# Patient Record
Sex: MALE | Race: BLACK OR AFRICAN AMERICAN | Employment: OTHER | URBAN - NONMETROPOLITAN AREA
[De-identification: names, ages, dates, MRNs, and addresses within clinical notes are randomized per-mention and may not be internally consistent; named-entity substitution may affect disease eponyms.]

---

## 2021-03-23 ENCOUNTER — HOSPITAL ENCOUNTER (OUTPATIENT)
Dept: LAB | Age: 71
Discharge: HOME | End: 2021-03-23

## 2021-03-23 PROCEDURE — 99001 SPECIMEN HANDLING PT-LAB: CPT

## 2021-04-04 ENCOUNTER — HOSPITAL ENCOUNTER (INPATIENT)
Age: 71
LOS: 39 days | Discharge: NURSING, REHAB OR LONG-TERM CARE | DRG: 004 | End: 2021-05-13
Attending: INTERNAL MEDICINE | Admitting: INTERNAL MEDICINE
Payer: MEDICARE

## 2021-04-04 ENCOUNTER — ANESTHESIA (OUTPATIENT)
Dept: SURGERY | Age: 71
DRG: 004 | End: 2021-04-04
Payer: MEDICARE

## 2021-04-04 ENCOUNTER — ANESTHESIA (OUTPATIENT)
Dept: ICU | Age: 71
DRG: 004 | End: 2021-04-04
Payer: MEDICARE

## 2021-04-04 ENCOUNTER — ANESTHESIA EVENT (OUTPATIENT)
Dept: ICU | Age: 71
DRG: 004 | End: 2021-04-04
Payer: MEDICARE

## 2021-04-04 ENCOUNTER — APPOINTMENT (OUTPATIENT)
Dept: GENERAL RADIOLOGY | Age: 71
End: 2021-04-04
Attending: FAMILY MEDICINE
Payer: MEDICARE

## 2021-04-04 ENCOUNTER — APPOINTMENT (OUTPATIENT)
Dept: GENERAL RADIOLOGY | Age: 71
DRG: 004 | End: 2021-04-04
Attending: PHYSICIAN ASSISTANT
Payer: MEDICARE

## 2021-04-04 ENCOUNTER — ANESTHESIA EVENT (OUTPATIENT)
Dept: SURGERY | Age: 71
DRG: 004 | End: 2021-04-04
Payer: MEDICARE

## 2021-04-04 ENCOUNTER — HOSPITAL ENCOUNTER (EMERGENCY)
Age: 71
Discharge: OTHER INSTITUTION | End: 2021-04-04
Attending: FAMILY MEDICINE
Payer: MEDICARE

## 2021-04-04 VITALS
HEIGHT: 72 IN | OXYGEN SATURATION: 100 % | SYSTOLIC BLOOD PRESSURE: 126 MMHG | WEIGHT: 280 LBS | HEART RATE: 78 BPM | DIASTOLIC BLOOD PRESSURE: 84 MMHG | RESPIRATION RATE: 16 BRPM | TEMPERATURE: 98.2 F | BODY MASS INDEX: 37.93 KG/M2

## 2021-04-04 DIAGNOSIS — J93.11 PRIMARY SPONTANEOUS PNEUMOTHORAX: ICD-10-CM

## 2021-04-04 DIAGNOSIS — J96.00 ACUTE RESPIRATORY FAILURE, UNSPECIFIED WHETHER WITH HYPOXIA OR HYPERCAPNIA (HCC): ICD-10-CM

## 2021-04-04 DIAGNOSIS — I46.9 CARDIAC ARREST (HCC): ICD-10-CM

## 2021-04-04 DIAGNOSIS — I26.94 MULTIPLE SUBSEGMENTAL PULMONARY EMBOLI WITHOUT ACUTE COR PULMONALE (HCC): ICD-10-CM

## 2021-04-04 DIAGNOSIS — T46.4X5A ANGIOEDEMA DUE TO ANGIOTENSIN CONVERTING ENZYME INHIBITOR (ACE-I): ICD-10-CM

## 2021-04-04 DIAGNOSIS — T78.3XXA ANGIOEDEMA DUE TO ANGIOTENSIN CONVERTING ENZYME INHIBITOR (ACE-I): ICD-10-CM

## 2021-04-04 DIAGNOSIS — J98.8 ACUTE AIRWAY OBSTRUCTION: ICD-10-CM

## 2021-04-04 DIAGNOSIS — E11.319 CONTROLLED TYPE 2 DIABETES MELLITUS WITH RETINOPATHY, MACULAR EDEMA PRESENCE UNSPECIFIED, UNSPECIFIED LATERALITY, UNSPECIFIED RETINOPATHY SEVERITY, UNSPECIFIED WHETHER LONG TERM INSULIN USE (HCC): ICD-10-CM

## 2021-04-04 DIAGNOSIS — E11.42 DIABETIC POLYNEUROPATHY ASSOCIATED WITH TYPE 2 DIABETES MELLITUS (HCC): ICD-10-CM

## 2021-04-04 DIAGNOSIS — Z51.5 ENCOUNTER FOR PALLIATIVE CARE: ICD-10-CM

## 2021-04-04 DIAGNOSIS — E11.40 CONTROLLED TYPE 2 DIABETES MELLITUS WITH DIABETIC NEUROPATHY, WITHOUT LONG-TERM CURRENT USE OF INSULIN (HCC): ICD-10-CM

## 2021-04-04 DIAGNOSIS — J96.01 ACUTE RESPIRATORY FAILURE WITH HYPOXIA (HCC): ICD-10-CM

## 2021-04-04 DIAGNOSIS — Z93.0 TRACHEOSTOMY IN PLACE (HCC): ICD-10-CM

## 2021-04-04 DIAGNOSIS — Z71.89 GOALS OF CARE, COUNSELING/DISCUSSION: ICD-10-CM

## 2021-04-04 DIAGNOSIS — I82.409 DEEP VEIN THROMBOSIS (DVT) OF LOWER EXTREMITY, UNSPECIFIED CHRONICITY, UNSPECIFIED LATERALITY, UNSPECIFIED VEIN (HCC): ICD-10-CM

## 2021-04-04 DIAGNOSIS — I26.09 ACUTE PULMONARY EMBOLISM WITH ACUTE COR PULMONALE, UNSPECIFIED PULMONARY EMBOLISM TYPE (HCC): ICD-10-CM

## 2021-04-04 DIAGNOSIS — T78.3XXA ANGIOEDEMA, INITIAL ENCOUNTER: Primary | ICD-10-CM

## 2021-04-04 DIAGNOSIS — R91.8 PULMONARY INFILTRATES: ICD-10-CM

## 2021-04-04 DIAGNOSIS — E66.9 OBESITY (BMI 30-39.9): ICD-10-CM

## 2021-04-04 DIAGNOSIS — N17.9 AKI (ACUTE KIDNEY INJURY) (HCC): ICD-10-CM

## 2021-04-04 LAB
ALBUMIN SERPL-MCNC: 3.4 G/DL (ref 3.4–5)
ALBUMIN/GLOB SERPL: 0.7 {RATIO} (ref 0.8–1.7)
ALP SERPL-CCNC: 130 U/L (ref 45–117)
ALT SERPL-CCNC: 141 U/L (ref 16–61)
ANION GAP SERPL CALC-SCNC: 11 MMOL/L
ANION GAP SERPL CALC-SCNC: 7 MMOL/L (ref 3–18)
ARTERIAL PATENCY WRIST A: ABNORMAL
ARTERIAL PATENCY WRIST A: ABNORMAL
ARTERIAL PATENCY WRIST A: YES
ARTERIAL PATENCY WRIST A: YES
AST SERPL-CCNC: 160 U/L (ref 10–38)
B PERT DNA SPEC QL NAA+PROBE: NOT DETECTED
BASE DEFICIT BLD-SCNC: 7 MMOL/L
BASE DEFICIT BLDA-SCNC: 4.8 MMOL/L (ref 0–2.5)
BASE DEFICIT BLDA-SCNC: 4.9 MMOL/L (ref 0–2.5)
BASE EXCESS BLD CALC-SCNC: 1 MMOL/L
BASOPHILS # BLD: 0.1 K/UL (ref 0–0.1)
BASOPHILS NFR BLD: 1 % (ref 0–2)
BDY SITE: ABNORMAL
BILIRUB SERPL-MCNC: 0.5 MG/DL (ref 0.2–1)
BODY TEMPERATURE: 36.9
BORDETELLA PARAPERTUSSIS PCR, BORPAR: NOT DETECTED
BUN SERPL-MCNC: 24 MG/DL (ref 7–18)
BUN SERPL-MCNC: 27 MG/DL (ref 9–21)
BUN/CREAT SERPL: 13 (ref 12–20)
BUN/CREAT SERPL: 14
C PNEUM DNA SPEC QL NAA+PROBE: NOT DETECTED
CA-I BLD-MCNC: 8.4 MG/DL (ref 8.5–10.5)
CA-I SERPL-SCNC: 1.12 MMOL/L (ref 1.12–1.32)
CALCIUM SERPL-MCNC: 8 MG/DL (ref 8.5–10.1)
CHLORIDE SERPL-SCNC: 103 MMOL/L (ref 94–111)
CHLORIDE SERPL-SCNC: 108 MMOL/L (ref 100–111)
CO2 SERPL-SCNC: 22 MMOL/L (ref 21–33)
CO2 SERPL-SCNC: 26 MMOL/L (ref 21–32)
COHGB MFR BLD: 1.6 % (ref 0–3)
COHGB MFR BLD: 3.1 % (ref 0–3)
COVID-19 RAPID TEST, COVR: NOT DETECTED
CREAT SERPL-MCNC: 1.8 MG/DL (ref 0.6–1.3)
CREAT SERPL-MCNC: 2 MG/DL (ref 0.8–1.5)
CRP SERPL-MCNC: 0.8 MG/DL (ref 0–0.3)
EOSINOPHIL # BLD: 0.3 K/UL (ref 0–0.4)
EOSINOPHIL NFR BLD: 3 % (ref 0–5)
EPAP/CPAP/PEEP, PAPEEP: 5
EPAP/CPAP/PEEP, PAPEEP: 5
ERYTHROCYTE [DISTWIDTH] IN BLOOD BY AUTOMATED COUNT: 15.9 % (ref 11.6–14.5)
EST. AVERAGE GLUCOSE BLD GHB EST-MCNC: 143 MG/DL
FIO2 ON VENT: 100 %
FIO2 ON VENT: 100 %
FLUAV H1 2009 PAND RNA SPEC QL NAA+PROBE: NOT DETECTED
FLUAV H1 RNA SPEC QL NAA+PROBE: NOT DETECTED
FLUAV H3 RNA SPEC QL NAA+PROBE: NOT DETECTED
FLUAV SUBTYP SPEC NAA+PROBE: NOT DETECTED
FLUBV RNA SPEC QL NAA+PROBE: NOT DETECTED
GAS FLOW.O2 O2 DELIVERY SYS: ABNORMAL L/MIN
GAS FLOW.O2 O2 DELIVERY SYS: ABNORMAL L/MIN
GAS FLOW.O2 SETTING OXYMISER: 10 L/MIN
GAS FLOW.O2 SETTING OXYMISER: 15 L/M
GAS FLOW.O2 SETTING OXYMISER: 15 L/MIN
GAS FLOW.O2 SETTING OXYMISER: 20 BPM
GLOBULIN SER CALC-MCNC: 4.8 G/DL (ref 2–4)
GLUCOSE SERPL-MCNC: 102 MG/DL (ref 74–99)
GLUCOSE SERPL-MCNC: 144 MG/DL (ref 70–110)
HADV DNA SPEC QL NAA+PROBE: NOT DETECTED
HBA1C MFR BLD: 6.6 % (ref 4.2–5.6)
HCO3 BLD-SCNC: 19 MMOL/L (ref 22–26)
HCO3 BLD-SCNC: 28.3 MMOL/L (ref 22–26)
HCO3 BLDA-SCNC: 22 MMOL/L (ref 23–27)
HCO3 BLDA-SCNC: 25 MMOL/L (ref 23–27)
HCOV 229E RNA SPEC QL NAA+PROBE: NOT DETECTED
HCOV HKU1 RNA SPEC QL NAA+PROBE: NOT DETECTED
HCOV NL63 RNA SPEC QL NAA+PROBE: NOT DETECTED
HCOV OC43 RNA SPEC QL NAA+PROBE: NOT DETECTED
HCT VFR BLD AUTO: 45.1 % (ref 36–48)
HGB BLD OXIMETRY-MCNC: 12.9 G/DL (ref 12–16)
HGB BLD OXIMETRY-MCNC: 14.5 G/DL (ref 12–16)
HGB BLD-MCNC: 13.8 G/DL (ref 13–16)
HMPV RNA SPEC QL NAA+PROBE: NOT DETECTED
HPIV1 RNA SPEC QL NAA+PROBE: NOT DETECTED
HPIV2 RNA SPEC QL NAA+PROBE: NOT DETECTED
HPIV3 RNA SPEC QL NAA+PROBE: NOT DETECTED
HPIV4 RNA SPEC QL NAA+PROBE: NOT DETECTED
IMM GRANULOCYTES # BLD AUTO: 0 K/UL
IMM GRANULOCYTES NFR BLD AUTO: 0 %
INR PPP: 1 (ref 0.8–1.2)
INSPIRATION.DURATION SETTING TIME VENT: 1 SEC
IPAP/PIP, IPAPIP: 30
IPAP/PIP, IPAPIP: 30
L PNEUMO AG UR QL IA: NEGATIVE
LYMPHOCYTES # BLD: 4 K/UL (ref 0.9–3.6)
LYMPHOCYTES NFR BLD: 43 % (ref 21–52)
M PNEUMO DNA SPEC QL NAA+PROBE: NOT DETECTED
MAGNESIUM SERPL-MCNC: 2.1 MG/DL (ref 1.6–2.6)
MCH RBC QN AUTO: 25.5 PG (ref 24–34)
MCHC RBC AUTO-ENTMCNC: 30.6 G/DL (ref 31–37)
MCV RBC AUTO: 83.2 FL (ref 74–97)
METHGB MFR BLD: 0.1 % (ref 0–3)
METHGB MFR BLD: 0.3 % (ref 0–3)
MONOCYTES # BLD: 0.9 K/UL (ref 0.05–1.2)
MONOCYTES NFR BLD: 10 % (ref 3–10)
NEUTS SEG # BLD: 4 K/UL (ref 1.8–8)
NEUTS SEG NFR BLD: 43 % (ref 40–73)
O2/TOTAL GAS SETTING VFR VENT: 100 %
O2/TOTAL GAS SETTING VFR VENT: 100 %
OXYHGB MFR BLD: 91.7 % (ref 90–100)
OXYHGB MFR BLD: 95.4 % (ref 90–100)
PCO2 BLD: 35.1 MMHG (ref 35–45)
PCO2 BLD: 64.9 MMHG (ref 35–45)
PCO2 BLDA: 49 MMHG (ref 35–45)
PCO2 BLDA: 71 MMHG (ref 35–45)
PEEP RESPIRATORY: 6 CMH2O
PH BLD: 7.25 [PH] (ref 7.35–7.45)
PH BLD: 7.34 [PH] (ref 7.35–7.45)
PH BLDA: 7.17 [PH] (ref 7.37–7.43)
PH BLDA: 7.28 [PH] (ref 7.37–7.43)
PHOSPHATE SERPL-MCNC: 3.7 MG/DL (ref 2.5–4.9)
PLATELET # BLD AUTO: 252 K/UL (ref 135–420)
PMV BLD AUTO: 11.7 FL (ref 9.2–11.8)
PO2 BLD: 101 MMHG (ref 80–100)
PO2 BLD: 94 MMHG (ref 80–100)
PO2 BLDA: 103 MMHG (ref 84–98)
PO2 BLDA: 88 MMHG (ref 84–98)
POTASSIUM SERPL-SCNC: 3.6 MMOL/L (ref 3.2–5.1)
POTASSIUM SERPL-SCNC: 4.6 MMOL/L (ref 3.5–5.5)
PROT SERPL-MCNC: 8.2 G/DL (ref 6.4–8.2)
PROTHROMBIN TIME: 12.7 SEC (ref 11.5–15.2)
RBC # BLD AUTO: 5.42 M/UL (ref 4.7–5.5)
RSV RNA SPEC QL NAA+PROBE: NOT DETECTED
RV+EV RNA SPEC QL NAA+PROBE: NOT DETECTED
S PNEUM AG UR QL: NEGATIVE
SAO2 % BLD: 95 % (ref 94–100)
SAO2 % BLD: 96 % (ref 92–97)
SAO2 % BLD: 97 % (ref 92–97)
SAO2 % BLD: 97 % (ref 94–100)
SAO2% DEVICE SAO2% SENSOR NAME: ABNORMAL
SAO2% DEVICE SAO2% SENSOR NAME: ABNORMAL
SARS-COV-2 PCR, COVPCR: NOT DETECTED
SERVICE CMNT-IMP: ABNORMAL
SERVICE CMNT-IMP: ABNORMAL
SODIUM SERPL-SCNC: 136 MMOL/L (ref 135–145)
SODIUM SERPL-SCNC: 141 MMOL/L (ref 136–145)
SPECIMEN SITE: ABNORMAL
SPECIMEN SITE: ABNORMAL
SPECIMEN SOURCE: NORMAL
SPECIMEN TYPE: ABNORMAL
SPECIMEN TYPE: ABNORMAL
TOTAL RATE, TRATE: 15
TOTAL RESP. RATE, ITRR: 15
TOTAL RESP. RATE, ITRR: 23
TROPONIN I SERPL-MCNC: 0.06 NG/ML (ref 0.02–0.05)
TSH SERPL DL<=0.05 MIU/L-ACNC: 1.35 UIU/ML (ref 0.36–3.74)
VENTILATION MODE VENT: ABNORMAL
VOLUME CONTROL PLUS IVLCP: YES
VT SETTING VENT: 550 ML
WBC # BLD AUTO: 9.1 K/UL (ref 4.6–13.2)

## 2021-04-04 PROCEDURE — 87635 SARS-COV-2 COVID-19 AMP PRB: CPT

## 2021-04-04 PROCEDURE — 74011000258 HC RX REV CODE- 258: Performed by: PHYSICIAN ASSISTANT

## 2021-04-04 PROCEDURE — 36600 WITHDRAWAL OF ARTERIAL BLOOD: CPT

## 2021-04-04 PROCEDURE — 65610000006 HC RM INTENSIVE CARE

## 2021-04-04 PROCEDURE — 85025 COMPLETE CBC W/AUTO DIFF WBC: CPT

## 2021-04-04 PROCEDURE — 80048 BASIC METABOLIC PNL TOTAL CA: CPT

## 2021-04-04 PROCEDURE — 96375 TX/PRO/DX INJ NEW DRUG ADDON: CPT

## 2021-04-04 PROCEDURE — 85652 RBC SED RATE AUTOMATED: CPT

## 2021-04-04 PROCEDURE — 77030002996 HC SUT SLK J&J -A: Performed by: OTOLARYNGOLOGY

## 2021-04-04 PROCEDURE — P9047 ALBUMIN (HUMAN), 25%, 50ML: HCPCS | Performed by: PHYSICIAN ASSISTANT

## 2021-04-04 PROCEDURE — 99292 CRITICAL CARE ADDL 30 MIN: CPT | Performed by: INTERNAL MEDICINE

## 2021-04-04 PROCEDURE — 86161 COMPLEMENT/FUNCTION ACTIVITY: CPT

## 2021-04-04 PROCEDURE — 83605 ASSAY OF LACTIC ACID: CPT

## 2021-04-04 PROCEDURE — 2709999900 HC NON-CHARGEABLE SUPPLY: Performed by: OTOLARYNGOLOGY

## 2021-04-04 PROCEDURE — 0BH17EZ INSERTION OF ENDOTRACHEAL AIRWAY INTO TRACHEA, VIA NATURAL OR ARTIFICIAL OPENING: ICD-10-PCS | Performed by: OTOLARYNGOLOGY

## 2021-04-04 PROCEDURE — 87449 NOS EACH ORGANISM AG IA: CPT

## 2021-04-04 PROCEDURE — 74011250636 HC RX REV CODE- 250/636: Performed by: FAMILY MEDICINE

## 2021-04-04 PROCEDURE — 82803 BLOOD GASES ANY COMBINATION: CPT

## 2021-04-04 PROCEDURE — 86140 C-REACTIVE PROTEIN: CPT

## 2021-04-04 PROCEDURE — 83735 ASSAY OF MAGNESIUM: CPT

## 2021-04-04 PROCEDURE — 99140 ANES COMP EMERGENCY COND: CPT | Performed by: NURSE ANESTHETIST, CERTIFIED REGISTERED

## 2021-04-04 PROCEDURE — 77030011267 HC ELECTRD BLD COVD -A: Performed by: OTOLARYNGOLOGY

## 2021-04-04 PROCEDURE — 31720 CLEARANCE OF AIRWAYS: CPT

## 2021-04-04 PROCEDURE — 96366 THER/PROPH/DIAG IV INF ADDON: CPT

## 2021-04-04 PROCEDURE — 77030040356 HC CORD BPLR FRCP COVD -A: Performed by: OTOLARYNGOLOGY

## 2021-04-04 PROCEDURE — 86160 COMPLEMENT ANTIGEN: CPT

## 2021-04-04 PROCEDURE — 92950 HEART/LUNG RESUSCITATION CPR: CPT

## 2021-04-04 PROCEDURE — 93005 ELECTROCARDIOGRAM TRACING: CPT

## 2021-04-04 PROCEDURE — 74011000250 HC RX REV CODE- 250: Performed by: OTOLARYNGOLOGY

## 2021-04-04 PROCEDURE — 5A1955Z RESPIRATORY VENTILATION, GREATER THAN 96 CONSECUTIVE HOURS: ICD-10-PCS | Performed by: OTOLARYNGOLOGY

## 2021-04-04 PROCEDURE — 00320 ANES ALL PX NECK NOS 1YR/>: CPT | Performed by: ANESTHESIOLOGY

## 2021-04-04 PROCEDURE — 99285 EMERGENCY DEPT VISIT HI MDM: CPT

## 2021-04-04 PROCEDURE — 76060000033 HC ANESTHESIA 1 TO 1.5 HR: Performed by: OTOLARYNGOLOGY

## 2021-04-04 PROCEDURE — 74011000258 HC RX REV CODE- 258: Performed by: FAMILY MEDICINE

## 2021-04-04 PROCEDURE — 74011250637 HC RX REV CODE- 250/637: Performed by: PHYSICIAN ASSISTANT

## 2021-04-04 PROCEDURE — 0202U NFCT DS 22 TRGT SARS-COV-2: CPT

## 2021-04-04 PROCEDURE — 74011000250 HC RX REV CODE- 250: Performed by: NURSE ANESTHETIST, CERTIFIED REGISTERED

## 2021-04-04 PROCEDURE — 82330 ASSAY OF CALCIUM: CPT

## 2021-04-04 PROCEDURE — 0B110F4 BYPASS TRACHEA TO CUTANEOUS WITH TRACHEOSTOMY DEVICE, OPEN APPROACH: ICD-10-PCS | Performed by: OTOLARYNGOLOGY

## 2021-04-04 PROCEDURE — 96365 THER/PROPH/DIAG IV INF INIT: CPT

## 2021-04-04 PROCEDURE — 74011250636 HC RX REV CODE- 250/636: Performed by: PHYSICIAN ASSISTANT

## 2021-04-04 PROCEDURE — 76010000149 HC OR TIME 1 TO 1.5 HR: Performed by: OTOLARYNGOLOGY

## 2021-04-04 PROCEDURE — 71045 X-RAY EXAM CHEST 1 VIEW: CPT

## 2021-04-04 PROCEDURE — 84145 PROCALCITONIN (PCT): CPT

## 2021-04-04 PROCEDURE — 85610 PROTHROMBIN TIME: CPT

## 2021-04-04 PROCEDURE — 75810000216 HC TRACHEOSTOMY

## 2021-04-04 PROCEDURE — 83520 IMMUNOASSAY QUANT NOS NONAB: CPT

## 2021-04-04 PROCEDURE — 80053 COMPREHEN METABOLIC PANEL: CPT

## 2021-04-04 PROCEDURE — 74011250636 HC RX REV CODE- 250/636: Performed by: NURSE ANESTHETIST, CERTIFIED REGISTERED

## 2021-04-04 PROCEDURE — 94002 VENT MGMT INPAT INIT DAY: CPT

## 2021-04-04 PROCEDURE — 75810000304 HC PLACE NEED INTRAOSSEOUS INFUS

## 2021-04-04 PROCEDURE — 00320 ANES ALL PX NECK NOS 1YR/>: CPT | Performed by: NURSE ANESTHETIST, CERTIFIED REGISTERED

## 2021-04-04 PROCEDURE — 96376 TX/PRO/DX INJ SAME DRUG ADON: CPT

## 2021-04-04 PROCEDURE — 99291 CRITICAL CARE FIRST HOUR: CPT | Performed by: INTERNAL MEDICINE

## 2021-04-04 PROCEDURE — 74011000250 HC RX REV CODE- 250: Performed by: PHYSICIAN ASSISTANT

## 2021-04-04 PROCEDURE — 36415 COLL VENOUS BLD VENIPUNCTURE: CPT

## 2021-04-04 PROCEDURE — 83036 HEMOGLOBIN GLYCOSYLATED A1C: CPT

## 2021-04-04 PROCEDURE — 74011000250 HC RX REV CODE- 250: Performed by: FAMILY MEDICINE

## 2021-04-04 PROCEDURE — 84100 ASSAY OF PHOSPHORUS: CPT

## 2021-04-04 PROCEDURE — 84484 ASSAY OF TROPONIN QUANT: CPT

## 2021-04-04 PROCEDURE — 0CJS8ZZ INSPECTION OF LARYNX, VIA NATURAL OR ARTIFICIAL OPENING ENDOSCOPIC: ICD-10-PCS | Performed by: OTOLARYNGOLOGY

## 2021-04-04 PROCEDURE — 77030031753 HC SHR ENDO COAG HARM J&J -E: Performed by: OTOLARYNGOLOGY

## 2021-04-04 PROCEDURE — 84443 ASSAY THYROID STIM HORMONE: CPT

## 2021-04-04 PROCEDURE — 99140 ANES COMP EMERGENCY COND: CPT | Performed by: ANESTHESIOLOGY

## 2021-04-04 RX ORDER — VECURONIUM BROMIDE FOR INJECTION 1 MG/ML
INJECTION, POWDER, LYOPHILIZED, FOR SOLUTION INTRAVENOUS AS NEEDED
Status: DISCONTINUED | OUTPATIENT
Start: 2021-04-04 | End: 2021-04-04 | Stop reason: HOSPADM

## 2021-04-04 RX ORDER — LIDOCAINE HCL/PF 100 MG/5ML
100 SYRINGE (ML) INTRAVENOUS ONCE
Status: COMPLETED | OUTPATIENT
Start: 2021-04-04 | End: 2021-04-04

## 2021-04-04 RX ORDER — PROPOFOL 10 MG/ML
0-50 INJECTION, EMULSION INTRAVENOUS
Status: COMPLETED | OUTPATIENT
Start: 2021-04-04 | End: 2021-04-04

## 2021-04-04 RX ORDER — ALBUMIN HUMAN 250 G/1000ML
25 SOLUTION INTRAVENOUS ONCE
Status: COMPLETED | OUTPATIENT
Start: 2021-04-04 | End: 2021-04-05

## 2021-04-04 RX ORDER — LIDOCAINE HYDROCHLORIDE 20 MG/ML
40 INJECTION, SOLUTION EPIDURAL; INFILTRATION; INTRACAUDAL; PERINEURAL ONCE
Status: DISCONTINUED | OUTPATIENT
Start: 2021-04-04 | End: 2021-04-04 | Stop reason: HOSPADM

## 2021-04-04 RX ORDER — PROPOFOL 10 MG/ML
5 VIAL (ML) INTRAVENOUS
Status: DISCONTINUED | OUTPATIENT
Start: 2021-04-04 | End: 2021-04-04 | Stop reason: HOSPADM

## 2021-04-04 RX ORDER — CIPROFLOXACIN HYDROCHLORIDE 3.5 MG/ML
2 SOLUTION/ DROPS TOPICAL
Status: DISPENSED | OUTPATIENT
Start: 2021-04-04 | End: 2021-04-06

## 2021-04-04 RX ORDER — DIPHENHYDRAMINE HYDROCHLORIDE 50 MG/ML
50 INJECTION, SOLUTION INTRAMUSCULAR; INTRAVENOUS EVERY 6 HOURS
Status: COMPLETED | OUTPATIENT
Start: 2021-04-04 | End: 2021-04-05

## 2021-04-04 RX ORDER — PROPOFOL 10 MG/ML
0-50 VIAL (ML) INTRAVENOUS
Status: DISCONTINUED | OUTPATIENT
Start: 2021-04-04 | End: 2021-04-06

## 2021-04-04 RX ORDER — NOREPINEPHRINE BITARTRATE 1 MG/ML
INJECTION, SOLUTION INTRAVENOUS
Status: DISCONTINUED
Start: 2021-04-04 | End: 2021-04-04 | Stop reason: HOSPADM

## 2021-04-04 RX ORDER — CHLORHEXIDINE GLUCONATE 1.2 MG/ML
10 RINSE ORAL EVERY 12 HOURS
Status: DISCONTINUED | OUTPATIENT
Start: 2021-04-04 | End: 2021-05-01

## 2021-04-04 RX ORDER — LORAZEPAM 2 MG/ML
2 INJECTION INTRAMUSCULAR
Status: COMPLETED | OUTPATIENT
Start: 2021-04-04 | End: 2021-04-04

## 2021-04-04 RX ORDER — SODIUM BICARBONATE 1 MEQ/ML
50 SYRINGE (ML) INTRAVENOUS
Status: COMPLETED | OUTPATIENT
Start: 2021-04-04 | End: 2021-04-04

## 2021-04-04 RX ORDER — LIDOCAINE HYDROCHLORIDE AND EPINEPHRINE 20; 5 MG/ML; UG/ML
INJECTION, SOLUTION EPIDURAL; INFILTRATION; INTRACAUDAL; PERINEURAL AS NEEDED
Status: DISCONTINUED | OUTPATIENT
Start: 2021-04-04 | End: 2021-04-19 | Stop reason: ALTCHOICE

## 2021-04-04 RX ORDER — LIDOCAINE HYDROCHLORIDE 10 MG/ML
10 INJECTION INFILTRATION; PERINEURAL ONCE
Status: DISCONTINUED | OUTPATIENT
Start: 2021-04-04 | End: 2021-04-04 | Stop reason: CLARIF

## 2021-04-04 RX ORDER — VASOPRESSIN 20 U/ML
INJECTION PARENTERAL AS NEEDED
Status: DISCONTINUED | OUTPATIENT
Start: 2021-04-04 | End: 2021-04-04 | Stop reason: HOSPADM

## 2021-04-04 RX ORDER — PHENYLEPHRINE HCL IN 0.9% NACL 1 MG/10 ML
SYRINGE (ML) INTRAVENOUS AS NEEDED
Status: DISCONTINUED | OUTPATIENT
Start: 2021-04-04 | End: 2021-04-04 | Stop reason: HOSPADM

## 2021-04-04 RX ORDER — SODIUM CHLORIDE 9 MG/ML
INJECTION, SOLUTION INTRAVENOUS
Status: DISCONTINUED
Start: 2021-04-04 | End: 2021-04-04 | Stop reason: HOSPADM

## 2021-04-04 RX ORDER — MAGNESIUM SULFATE 100 %
4 CRYSTALS MISCELLANEOUS AS NEEDED
Status: DISCONTINUED | OUTPATIENT
Start: 2021-04-04 | End: 2021-05-13 | Stop reason: HOSPADM

## 2021-04-04 RX ORDER — ROCURONIUM BROMIDE 10 MG/ML
0.6 INJECTION, SOLUTION INTRAVENOUS
Status: COMPLETED | OUTPATIENT
Start: 2021-04-04 | End: 2021-04-04

## 2021-04-04 RX ORDER — MIDAZOLAM IN 0.9 % SOD.CHLORID 1 MG/ML
0-10 PLASTIC BAG, INJECTION (ML) INTRAVENOUS
Status: DISCONTINUED | OUTPATIENT
Start: 2021-04-04 | End: 2021-04-05

## 2021-04-04 RX ORDER — DEXAMETHASONE SODIUM PHOSPHATE 4 MG/ML
INJECTION, SOLUTION INTRA-ARTICULAR; INTRALESIONAL; INTRAMUSCULAR; INTRAVENOUS; SOFT TISSUE AS NEEDED
Status: DISCONTINUED | OUTPATIENT
Start: 2021-04-04 | End: 2021-04-04 | Stop reason: HOSPADM

## 2021-04-04 RX ORDER — DEXAMETHASONE SODIUM PHOSPHATE 4 MG/ML
4 INJECTION, SOLUTION INTRA-ARTICULAR; INTRALESIONAL; INTRAMUSCULAR; INTRAVENOUS; SOFT TISSUE EVERY 6 HOURS
Status: COMPLETED | OUTPATIENT
Start: 2021-04-04 | End: 2021-04-06

## 2021-04-04 RX ORDER — DEXTROSE 50 % IN WATER (D50W) INTRAVENOUS SYRINGE
25-50 AS NEEDED
Status: DISCONTINUED | OUTPATIENT
Start: 2021-04-04 | End: 2021-05-13 | Stop reason: HOSPADM

## 2021-04-04 RX ORDER — FENTANYL CITRATE 50 UG/ML
100 INJECTION, SOLUTION INTRAMUSCULAR; INTRAVENOUS ONCE
Status: COMPLETED | OUTPATIENT
Start: 2021-04-04 | End: 2021-04-04

## 2021-04-04 RX ORDER — MIDAZOLAM HYDROCHLORIDE 5 MG/ML
5 INJECTION INTRAMUSCULAR; INTRAVENOUS ONCE
Status: COMPLETED | OUTPATIENT
Start: 2021-04-04 | End: 2021-04-04

## 2021-04-04 RX ORDER — LIDOCAINE HYDROCHLORIDE 10 MG/ML
10 INJECTION, SOLUTION EPIDURAL; INFILTRATION; INTRACAUDAL; PERINEURAL ONCE
Status: DISCONTINUED | OUTPATIENT
Start: 2021-04-04 | End: 2021-04-04 | Stop reason: CLARIF

## 2021-04-04 RX ORDER — MIDAZOLAM HYDROCHLORIDE 1 MG/ML
1-2 INJECTION, SOLUTION INTRAMUSCULAR; INTRAVENOUS
Status: DISCONTINUED | OUTPATIENT
Start: 2021-04-04 | End: 2021-04-25

## 2021-04-04 RX ORDER — IPRATROPIUM BROMIDE AND ALBUTEROL SULFATE 2.5; .5 MG/3ML; MG/3ML
3 SOLUTION RESPIRATORY (INHALATION)
Status: DISCONTINUED | OUTPATIENT
Start: 2021-04-04 | End: 2021-05-01

## 2021-04-04 RX ORDER — CIPROFLOXACIN HYDROCHLORIDE 3.5 MG/ML
2 SOLUTION/ DROPS TOPICAL EVERY 4 HOURS
Status: DISPENSED | OUTPATIENT
Start: 2021-04-06 | End: 2021-04-11

## 2021-04-04 RX ORDER — NOREPINEPHRINE BITARTRATE/D5W 8 MG/250ML
.5-5 PLASTIC BAG, INJECTION (ML) INTRAVENOUS
Status: DISCONTINUED | OUTPATIENT
Start: 2021-04-04 | End: 2021-04-05

## 2021-04-04 RX ORDER — ETOMIDATE 2 MG/ML
0.15 INJECTION INTRAVENOUS ONCE
Status: COMPLETED | OUTPATIENT
Start: 2021-04-04 | End: 2021-04-04

## 2021-04-04 RX ORDER — INSULIN LISPRO 100 [IU]/ML
INJECTION, SOLUTION INTRAVENOUS; SUBCUTANEOUS EVERY 6 HOURS
Status: DISCONTINUED | OUTPATIENT
Start: 2021-04-04 | End: 2021-05-13 | Stop reason: HOSPADM

## 2021-04-04 RX ORDER — FENTANYL CITRATE 50 UG/ML
50-100 INJECTION, SOLUTION INTRAMUSCULAR; INTRAVENOUS
Status: DISCONTINUED | OUTPATIENT
Start: 2021-04-04 | End: 2021-04-09

## 2021-04-04 RX ORDER — CARBOXYMETHYLCELLULOSE SODIUM 10 MG/ML
1 GEL OPHTHALMIC AS NEEDED
Status: DISCONTINUED | OUTPATIENT
Start: 2021-04-04 | End: 2021-05-13 | Stop reason: HOSPADM

## 2021-04-04 RX ADMIN — ETOMIDATE 19.06 MG: 2 INJECTION INTRAVENOUS at 09:45

## 2021-04-04 RX ADMIN — FAMOTIDINE 20 MG: 10 INJECTION INTRAVENOUS at 17:39

## 2021-04-04 RX ADMIN — SODIUM BICARBONATE 50 MEQ: 84 INJECTION, SOLUTION INTRAVENOUS at 12:04

## 2021-04-04 RX ADMIN — DEXAMETHASONE SODIUM PHOSPHATE 4 MG: 4 INJECTION, SOLUTION INTRAMUSCULAR; INTRAVENOUS at 17:39

## 2021-04-04 RX ADMIN — VANCOMYCIN HYDROCHLORIDE 2000 MG: 1 INJECTION, POWDER, LYOPHILIZED, FOR SOLUTION INTRAVENOUS at 11:33

## 2021-04-04 RX ADMIN — FENTANYL CITRATE 200 MCG/HR: 50 INJECTION, SOLUTION INTRAMUSCULAR; INTRAVENOUS at 22:15

## 2021-04-04 RX ADMIN — CARBOXYMETHYLCELLULOSE SODIUM 1 DROP: 10 GEL OPHTHALMIC at 18:48

## 2021-04-04 RX ADMIN — MIDAZOLAM 50 MG: 5 INJECTION INTRAMUSCULAR; INTRAVENOUS at 14:28

## 2021-04-04 RX ADMIN — DIPHENHYDRAMINE HYDROCHLORIDE 50 MG: 50 INJECTION, SOLUTION INTRAMUSCULAR; INTRAVENOUS at 17:39

## 2021-04-04 RX ADMIN — CIPROFLOXACIN 2 DROP: 3 SOLUTION OPHTHALMIC at 18:48

## 2021-04-04 RX ADMIN — SODIUM CHLORIDE 1000 ML: 9 INJECTION, SOLUTION INTRAVENOUS at 13:16

## 2021-04-04 RX ADMIN — LIDOCAINE HYDROCHLORIDE 20 MG: 20 INJECTION INTRAVENOUS at 14:44

## 2021-04-04 RX ADMIN — MIDAZOLAM 2 MG/HR: 5 INJECTION INTRAMUSCULAR; INTRAVENOUS at 22:06

## 2021-04-04 RX ADMIN — AMPICILLIN SODIUM AND SULBACTAM SODIUM 3 G: 2; 1 INJECTION, POWDER, FOR SOLUTION INTRAMUSCULAR; INTRAVENOUS at 12:08

## 2021-04-04 RX ADMIN — ALBUMIN (HUMAN) 25 G: 0.25 INJECTION, SOLUTION INTRAVENOUS at 21:31

## 2021-04-04 RX ADMIN — SODIUM CHLORIDE, SODIUM ACETATE ANHYDROUS, SODIUM GLUCONATE, POTASSIUM CHLORIDE, AND MAGNESIUM CHLORIDE 1000 ML: 526; 222; 502; 37; 30 INJECTION, SOLUTION INTRAVENOUS at 19:00

## 2021-04-04 RX ADMIN — PROPOFOL 40 MCG/KG/MIN: 10 INJECTION, EMULSION INTRAVENOUS at 17:35

## 2021-04-04 RX ADMIN — PROPOFOL 18 MCG/KG/MIN: 10 INJECTION, EMULSION INTRAVENOUS at 10:11

## 2021-04-04 RX ADMIN — PROPOFOL 22 MCG/KG/MIN: 10 INJECTION, EMULSION INTRAVENOUS at 12:55

## 2021-04-04 RX ADMIN — FENTANYL CITRATE 175 MCG/HR: 50 INJECTION, SOLUTION INTRAMUSCULAR; INTRAVENOUS at 17:36

## 2021-04-04 RX ADMIN — MIDAZOLAM 2 MG: 1 INJECTION INTRAMUSCULAR; INTRAVENOUS at 21:25

## 2021-04-04 RX ADMIN — ROCURONIUM BROMIDE 100 MG: 10 INJECTION INTRAVENOUS at 14:23

## 2021-04-04 RX ADMIN — LORAZEPAM 2 MG: 2 INJECTION INTRAMUSCULAR; INTRAVENOUS at 11:54

## 2021-04-04 RX ADMIN — ROCURONIUM BROMIDE 76.2 MG: 10 SOLUTION INTRAVENOUS at 09:46

## 2021-04-04 RX ADMIN — LORAZEPAM 2 MG: 2 INJECTION INTRAMUSCULAR; INTRAVENOUS at 12:17

## 2021-04-04 RX ADMIN — MIDAZOLAM 2 MG: 1 INJECTION INTRAMUSCULAR; INTRAVENOUS at 21:01

## 2021-04-04 RX ADMIN — SODIUM CHLORIDE, SODIUM ACETATE ANHYDROUS, SODIUM GLUCONATE, POTASSIUM CHLORIDE, AND MAGNESIUM CHLORIDE: 526; 222; 502; 37; 30 INJECTION, SOLUTION INTRAVENOUS at 17:39

## 2021-04-04 RX ADMIN — VASOPRESSIN 2 UNITS: 20 INJECTION INTRAVENOUS at 16:23

## 2021-04-04 RX ADMIN — VECURONIUM BROMIDE 10 MG: 1 INJECTION, POWDER, LYOPHILIZED, FOR SOLUTION INTRAVENOUS at 15:30

## 2021-04-04 RX ADMIN — PROPOFOL 18 MCG/KG/MIN: 10 INJECTION, EMULSION INTRAVENOUS at 13:24

## 2021-04-04 RX ADMIN — SODIUM CHLORIDE 1000 ML: 9 INJECTION, SOLUTION INTRAVENOUS at 12:59

## 2021-04-04 RX ADMIN — FENTANYL CITRATE 100 MCG: 50 INJECTION, SOLUTION INTRAMUSCULAR; INTRAVENOUS at 14:28

## 2021-04-04 RX ADMIN — PROPOFOL 20 MCG/KG/MIN: 10 INJECTION, EMULSION INTRAVENOUS at 12:58

## 2021-04-04 RX ADMIN — PIPERACILLIN SODIUM AND TAZOBACTAM SODIUM 4.5 G: 4; .5 INJECTION, POWDER, LYOPHILIZED, FOR SOLUTION INTRAVENOUS at 18:48

## 2021-04-04 RX ADMIN — DEXAMETHASONE SODIUM PHOSPHATE 10 MG: 4 INJECTION, SOLUTION INTRAMUSCULAR; INTRAVENOUS at 15:30

## 2021-04-04 RX ADMIN — Medication 100 MCG: at 16:22

## 2021-04-04 NOTE — H&P
[facility]  Pulmonary, Critical Care, and Sleep Medicine    Name: Mariela White MRN: [id number]   : 1950 Hospital: [facility]   Date: 2021        Critical Care: Initial Patient Consult    Admission Date:   2021  LOS: 0  MAR reviewed and pertinent medications noted or modified as needed    IMPRESSION:   · Angioedema- with airway and respiratory failure and collapse; thought due to ACE inhibiotor  · S/P Emergent Cricthyrotomy [facility]-ED 21- converted to 6.5 ETT intraoperative by anesthesia team 21  · S/P cardiac arrest @ [facility]iatric 21- brief thought due to hypoxia due to airway collapse  · Respiratory Failure: due to above; currently intubated and on vent for airway protection  · Pulmonary Infiltrates: suspect aspiration secretions and/or blood due to above- can't exclude other infectious process at this time. Rapid Covid Negative at Psychiatric  · JACQUELYN  · Left scleral erythema- unknown baseline; possible infection   · DM  · DM retinopathy per chart review  · Diabetic peripheral neuropathy  · Obesity: Body mass index is 37.96 kg/m².   ·       RECOMMENDATIONS:   NEURO:   Serial neuro evaluations   Sedation Holiday per protocol- On hold for next 24 hrs and reassess   RAAS: -3 to -4   Wetting tears and cipro eye drops     CARDIO:   MAP goal >64   Serial cardiac markers   Telemetry   EKG  PULM:   Maintain aspiration precautions: HOB >30 degrees   SpO2 goal >92%   Bronchial /oral hygiene   VAP bundle- Wean vent as clinical status allows   Decadron scheduled 4mg Q 6 x 8 doses and reassess   Benedryl Scheduled 50mg Q 6     GI/ NUTRITION:   OGT- will attempt to place if able due to face and neck swelling   Diet: NPO for now   SUP:Pepcid   Follow up with nutritional recommendations     RENAL/ METAB/ :   Monitor I&Os   Trend electrolytes- replaced as needed, K:4, Mg>2 PO4>2   Garcia: indwelling   Normosol at 100mls/hr    HEM/ONC   Trend Hb/HCT and PLTs, and indicis   DVT prophylaxis: SCD and    Blood Products: not indicated at this time  ID   Broad spectrum anticioitcs   BioFire   Pan culture to include urine antigents   Follow up on pending cultures   Cipro Eye drops   Streamline antibiotics per culture data and clinical course    ENDO   BGs Q 6,SSI   Check C1 inhibitor     MSK/ ORTHO   No active Issues     SKIN/ WOUNDS   Per protocol     OTHER/DISPO:    CODE STATUS: No Order- Full    Case reviewed and discussed with ENT staff, OR and ICU care team     Subjective/History: This patient has been seen and evaluated at the request of Dr. Brenda Guillen- ED from [facility] for Angioedema and airway compromise. Patient is a 79 y.o. male presented earlier this morning to [facility] ED in rapidly progressive respiratory distress due to angioedema and airway swelling presumed due to ACE inhibitor therapy. ED provider unable to intubate due to severity of airway swelling. Brief cardio-pulmonary collapse with brief loss of pulse. IO placed into right shoulder. Emergent cricothyrotomy 5.0 place. Good placement. ED team able to oxygenate and ventilate patient. Prior to transport ABG notable for Respiratory acidosis. Call back to -ED patient on vent and paralyzed with rate of 10. I requested that RR be increased to 16-18 bpm depending what patient could tolerate. Our ENT provider on call, Dr. Treva Archer was contacted. OR team on call came in. Patient flow via Nightingale/Life flight to our facility. The patient was taken from flight line to OR hold. On-call team rapidly assess patient. I evaluated the patient immediately upon arrival. Patient with Cric in place, bilateral breath  Sounds, SpO2 in the mid 90's. Swollen neck and face. ABG -POC with persistent but improved respiratory acidosis. ER team took patient emergently to the OR    Anesthesia able to place ETT 6.5 intraop. I was called by ENT provider in the OR. Case discussed.  Opted to keep patient intubated with 6.5 ETT. Will monitor for 48-72 hours and reassess patient. CXR notable for bilateral opacities. Suspect the later due to aspiration of blood and /or airway secretions. Patient was reexamined again at bedside upon arrival to ICU- Bed2    Patient paralyzed and sedated. 6.5 ETT in place. Bilateral breath sounds. SpO2 100%    CXR obtained in ICU- ETT slightly high- will try to advance 1-2 cm if able. Inpat Anti-Infectives (From admission, onward)     Start     Ordered Stop    04/06/21 1800  ciprofloxacin HCl (CILOXAN) 0.3 % ophthalmic solution 2 Drop  2 Drop,   Both Eyes,   EVERY 4 HOURS      04/04/21 1724 04/11/21 1759    04/04/21 1800  ciprofloxacin HCl (CILOXAN) 0.3 % ophthalmic solution 2 Drop  2 Drop,   Both Eyes,   EVERY 2 HOURS WHILE AWAKE      04/04/21 1724 04/06/21 1759    04/04/21 1800  piperacillin-tazobactam (ZOSYN) 4.5 g in 0.9% sodium chloride (MBP/ADV) 100 mL MBP  4.5 g,   IntraVENous,   EVERY 6 HOURS      04/04/21 1728 --    04/04/21 1756  VANCOMYCIN INFORMATION NOTE  Other,   RX DOSING/MONITORING      04/04/21 1757 --                The patient is critically ill and can not provide additional history due to Unconsciousness, Ventilated and Unable to speak. Chart and notes reviewed. Data reviewed. []I have reviewed the flowsheet and previous days notes. []The patient is unable to give any meaningful history or review of systems because the patient is:  [x]Intubated [x]Sedated   []Unresponsive      []The patient is critically ill on      [x]Mechanical ventilation []Pressors   []BiPAP []                       No past medical history on file. No past surgical history on file. Prior to Admission medications    Medication Sig Start Date End Date Taking? Authorizing Provider   LISINOPRIL, BULK, by Does Not Apply route.     Other, MD Cheryl     Current Facility-Administered Medications   Medication Dose Route Frequency    chlorhexidine (PERIDEX) 0.12 % mouthwash 10 mL 10 mL Oral Q12H    propofol (DIPRIVAN) 10 mg/mL infusion  0-50 mcg/kg/min IntraVENous TITRATE    fentaNYL (PF) 900 mcg/30 ml infusion soln  0-200 mcg/hr IntraVENous TITRATE    electrolyte-r (NORMOSOL R) infusion   IntraVENous CONTINUOUS    famotidine (PF) (PEPCID) 20 mg in 0.9% sodium chloride 10 mL injection  20 mg IntraVENous Q12H    dexamethasone (DECADRON) 4 mg/mL injection 4 mg  4 mg IntraVENous Q6H    diphenhydrAMINE (BENADRYL) injection 50 mg  50 mg IntraVENous Q6H    insulin lispro (HUMALOG) injection   SubCUTAneous Q6H    ciprofloxacin HCl (CILOXAN) 0.3 % ophthalmic solution 2 Drop  2 Drop Both Eyes Q2HWA    Followed by   Negrita Redmond ON 2021] ciprofloxacin HCl (CILOXAN) 0.3 % ophthalmic solution 2 Drop  2 Drop Both Eyes Q4H    piperacillin-tazobactam (ZOSYN) 4.5 g in 0.9% sodium chloride (MBP/ADV) 100 mL MBP  4.5 g IntraVENous Q6H    VANCOMYCIN INFORMATION NOTE   Other Rx Dosing/Monitoring    electrolyte-r (NORMOSOL R) bolus infusion 1,000 mL  1,000 mL IntraVENous ONCE    albumin human 25% (BUMINATE) solution 25 g  25 g IntraVENous ONCE    NOREPINephrine (LEVOPHED) 8 mg in 5% dextrose 250mL (32 mcg/mL) infusion  0.5-50 mcg/min IntraVENous TITRATE     No Known Allergies   Social History     Tobacco Use    Smoking status: Not on file   Substance Use Topics    Alcohol use: Not on file      No family history on file. Review of Systems:  Review of systems not obtained due to patient factors. Objective:   Vital Signs:    Visit Vitals  BP (!) 80/56   Pulse 70   Temp 97.5 °F (36.4 °C)   Resp 20   Ht 6' 0.01\" (1.829 m)   Wt 127 kg (279 lb 15.8 oz)   SpO2 94%   BMI 37.96 kg/m²       O2 Device: Ventilator       Temp (24hrs), Av.9 °F (36.6 °C), Min:97.5 °F (36.4 °C), Max:98.2 °F (36.8 °C)       Intake/Output:   Last shift:      No intake/output data recorded.   Last 3 shifts:  07 - 04/04 1900  In: 500 [I.V.:500]  Out: -     Intake/Output Summary (Last 24 hours) at 2021 1955  Last data filed at 4/4/2021 1659  Gross per 24 hour   Intake 500 ml   Output    Net 500 ml         Ventilator Settings:  Mode Rate Tidal Volume Pressure FiO2 PEEP   VC+   550 ml    100 % 6 cm H20(Per Spo2)     Peak airway pressure: 27 cm H2O    Minute ventilation: 11.5 l/min       ARDS network Guidelines:   Lung protective strategy and Plateau  Pressure goal < 30 cm H2O goals  Oxygenation Goals PaO2 55-80 mm Hg or SaO2 88-95%  PH goal 7.30-7.45    VAP bundle;  Reviewed. [place] tube to suction at 20-30 cm Hg. Maintain Annalisa tube with 5-10ml air every 4 hours  Routine oral care every 4 hours  Elevation of head > 45 degree  Daily sedation holiday and SBT evaluation starting at 6.00am.  Physical Exam:    General:  Intubated and sedated  , appears stated age.+ Obese body habitus   Head:  Normocephalic, without obvious abnormality, atraumatic. + facial edema   Eyes:  Conjunctivae/corneas clear. PERRL- left eye hyperremia, EOMs intact. Nose: Nares normal. Septum midline. Mucosa normal. No drainage or sinus tenderness. Throat: Lips, mucosa, and tongue swollen. Teeth and gums normal.   Neck: bandage and packing inplace where trach was palced, no carotid bruit and no JVD. - minimal crepitus at neck   Back:   Symmetric   Lungs:   Bilateral breath sounds, with bilateral rhonchi- no wheezing   Chest wall:  No tenderness or deformity.- No crepitus   Heart:  Regular rate and rhythm, S1, S2 normal, no murmur, click, rub or gallop. Abdomen:   Soft, Obes[place]on-tender. Bowel sounds normal. No masses,  No organomegaly. Extremities: Extremities normal, atraumatic, no cyanosis or edema. Pulses: 2+ and symmetric all extremities.    Skin: Skin color, texture, turgor normal. No rashes or lesions   Lymph nodes:      Cervical, supraclavicular nodes: unremarkable   Neurologic: Grossly nonfocal       Data:     Recent Labs     04/04/21  0940   WBC 9.1   HGB 13.8   HCT 45.1        Recent Labs     04/04/21  1711 04/04/21  0940    136 K 4.6 3.6    103   CO2 26 22   * 144*   BUN 24* 27*   CREA 1.80* 2.00*   CA 8.0* 8.4*   MG 2.1  --    PHOS 3.7  --    ALB 3.4  --    *  --    INR 1.0  --      No results found for: BNP, BNPP, BNPPPOC, XBNPT, BNPNT  Lab Results   Component Value Date/Time    INR 1.0 04/04/2021 05:11 PM    Prothrombin time 12.7 04/04/2021 05:11 PM       Recent Labs     04/04/21  1355 04/04/21  1140   PH 7.28* 7.17*   PCO2 49* 71*   PO2 103* 88   HCO3 22* 25   FIO2 100.0 100.0     Recent Labs     04/04/21  1540   FIO2I 100   HCO3I 28.3*   PCO2I 64.9*   PHI 7.25*   PO2I 101*       ENDO:  Lab Results   Component Value Date/Time    TSH 1.35 04/04/2021 05:11 PM       Lab Results   Component Value Date/Time    Glucose 102 (H) 04/04/2021 05:11 PM    Glucose 144 (H) 04/04/2021 09:40 AM         CARDIO:  Telemetry:normal sinus rhythm  EKG Results     Procedure 720 Value Units Date/Time    EKG, 12 LEAD, SUBSEQUENT [[id number]]     Order Status: Sent                MEDS: Please also see MAR  Current Facility-Administered Medications   Medication Dose Route Frequency    chlorhexidine (PERIDEX) 0.12 % mouthwash 10 mL  10 mL Oral Q12H    propofol (DIPRIVAN) 10 mg/mL infusion  0-50 mcg/kg/min IntraVENous TITRATE    fentaNYL (PF) 900 mcg/30 ml infusion soln  0-200 mcg/hr IntraVENous TITRATE    electrolyte-r (NORMOSOL R) infusion   IntraVENous CONTINUOUS    famotidine (PF) (PEPCID) 20 mg in 0.9% sodium chloride 10 mL injection  20 mg IntraVENous Q12H    dexamethasone (DECADRON) 4 mg/mL injection 4 mg  4 mg IntraVENous Q6H    diphenhydrAMINE (BENADRYL) injection 50 mg  50 mg IntraVENous Q6H    insulin lispro (HUMALOG) injection   SubCUTAneous Q6H    ciprofloxacin HCl (CILOXAN) 0.3 % ophthalmic solution 2 Drop  2 Drop Both Eyes Q2HWA    Followed by   Zarina Mealing ON 4/6/2021] ciprofloxacin HCl (CILOXAN) 0.3 % ophthalmic solution 2 Drop  2 Drop Both Eyes Q4H    piperacillin-tazobactam (ZOSYN) 4.5 g in 0.9% sodium chloride (MBP/ADV) 100 mL MBP  4.5 g IntraVENous Q6H    VANCOMYCIN INFORMATION NOTE   Other Rx Dosing/Monitoring    electrolyte-r (NORMOSOL R) bolus infusion 1,000 mL  1,000 mL IntraVENous ONCE    albumin human 25% (BUMINATE) solution 25 g  25 g IntraVENous ONCE    NOREPINephrine (LEVOPHED) 8 mg in 5% dextrose 250mL (32 mcg/mL) infusion  0.5-50 mcg/min IntraVENous TITRATE     MICRO:       Results     Procedure Component Value Units Date/Time    Anthony Diez, URINE [[id number]] Collected: 04/04/21 1801    Order Status: Completed Specimen: Urine, random Updated: 04/04/21 1944     Strep pneumo Ag, urine Negative       LEGIONELLA PNEUMOPHILA AG, URINE [[id number]] Collected: 04/04/21 1801    Order Status: Completed Specimen: Urine, random Updated: 04/04/21 1944     Legionella Ag, urine Negative       RESPIRATORY VIRUS PANEL W/COVID-19, PCR [[id number]] Collected: 04/04/21 1700    Order Status: Completed Specimen: NASOPHARYNGEAL SWAB Updated: 04/04/21 1804    CULTURE, MRSA [[id number]] Collected: 04/04/21 1700    Order Status: Completed Specimen: Nasal from Nares Updated: 04/04/21 1836    COVID-19 RAPID TEST [[id number]] Collected: 04/04/21 1112    Order Status: Completed Specimen: Nasopharyngeal Updated: 04/04/21 1151     Specimen source Nasopharyngeal        COVID-19 rapid test Not Detected        Comment:   Rapid NAAT:  The specimen is NEGATIVE for SARS-CoV-2, the novel coronavirus associated with COVID-19. Negative results should be treated as presumptive and, if inconsistent with clinical signs and symptoms or necessary for patient management, should be tested with an alternative molecular assay. Negative results do not preclude SARS-CoV-2 infection and should not be used as the sole basis for patient management decisions. This test has been authorized by the FDA under an Emergency Use   Authorization (EUA) for use by authorized laboratories.  Fact sheet for Healthcare Providers: [employer]nz Fact sheet for Patients: [employer]Update[employer]co[employer]nz   Methodology: Isothermal Nucleic Acid Amplification                   Imaging:  I have personally reviewed the patients radiographs and have reviewed the reports:    CXR Results  (Last 48 hours)               04/04/21 1034  XR CHEST PORT Final result    Impression:      Interstitial and alveolar process, with bilateral pulmonary consolidation. Appearance concerning for atypical infection. Narrative:  CLINICAL HISTORY:  Shortness of breath. Airway insertion. COMPARISONS:  None. TECHNIQUE:  single frontal view of the chest       ------------------------------------------       FINDINGS:       Lungs:  Patchy multifocal lung parenchymal consolidation bilaterally. Mild   associated increase in pulmonary interstitial markings. No evidence of pleural   fluid. Mediastinum: Mild to moderate cardiomegaly. Tracheostomy tube, appears in good   position. NG/OG tube passes beyond edge of film in expected location of stomach       Bones: No evidence of fracture or suspicious bone lesion.           ------------------------------------------               CT Results  (Last 48 hours)    None           Best practice :  Core measures:    Glycemic control  [facility]. Ventilated patients- aim to keep peak plateau pressure 07-78AM H2O. Sress ulcer prophylaxis  DVT prophylaxis               Critical Care Time:  The services I provided to this patient were to treat and/or prevent clinically significant deterioration that could result in the failure of one or more body systems and/or organ systems due to respiratory distress, hypoxia, cardiac dysrhythmia.      Services included the following:  -reviewing nursing notes and old charts  -vital sign assessments   -direct patient care  -medication orders and management  -interpreting and reviewing diagnostic studies/labs  -re-evaluations  -documentation time Aggregate critical care time was 90  minutes, which includes only time during which I was engaged in work directly related to the patient's care as described above. During this entire length of time I was immediately available to the patient. The reason for providing this level of medical care for this critically ill patient was due a critical illness that impaired one or more vital organ systems such that there was a high probability of imminent or life threatening deterioration in the patients condition. This care involved high complexity decision making to assess, manipulate, and support vital system functions, to treat this degreee vital organ system failure and to prevent further life threatening deterioration of the patients condition      Complex decision making was made in the evaluation and management plans during this consultation. More than 50% of time was spent in counseling and coordination of care including review of data and discussion with other team members.       Vj Koehler DO, [place] [id number] [facility]  Pulmonary, Critical Care, and Sleep Medicine

## 2021-04-04 NOTE — ED NOTES
Unable to obtain health history or medication list from grandson. Grandson questioned about allergies and states \"I don't think he any any. \"

## 2021-04-04 NOTE — ED PROVIDER NOTES
EMERGENCY DEPARTMENT HISTORY AND PHYSICAL EXAM      Date: 4/4/2021  Patient Name: Pal Posadas      History of Presenting Illness     Chief Complaint   Patient presents with    Respiratory Distress       History Provided By: Patient    HPI: Pal Posadas, 79 y.o. male with a past medical history significant hypertension presents to the ED with cc of trouble breathing. Patient stated he woke up in his tongue and lips were swollen. He stated he was having difficulty breathing because of his throat closing off. He is having lot of secretions and is spitting them up. He denies any chest pain or shortness of breath. He denies any fevers or chills. He is on lisinopril. He has had no previous episode like this. He is not allergic to anything that he knows of. There are no other complaints, changes, or physical findings at this time. PCP: None    Current Facility-Administered Medications   Medication Dose Route Frequency Provider Last Rate Last Admin    vancomycin (VANCOCIN) 2,000 mg in 0.9% sodium chloride 500 mL IVPB  2,000 mg IntraVENous Andrea Cabezas  mL/hr at 04/04/21 1133 2,000 mg at 04/04/21 1133    propofol (DIPRIVAN) 10 mg/mL infusion  5 mcg/kg/min IntraVENous TITRATE Michelle Chavez MD 15.2 mL/hr at 04/04/21 1258 20 mcg/kg/min at 04/04/21 1258     Current Outpatient Medications   Medication Sig Dispense Refill    LISINOPRIL, BULK, by Does Not Apply route. Past History     Past Medical History:  No past medical history on file. Past Surgical History:  No past surgical history on file. Family History:  No family history on file. Social History:  Social History     Tobacco Use    Smoking status: Not on file   Substance Use Topics    Alcohol use: Not on file    Drug use: Not on file       Allergies:  No Known Allergies      Review of Systems     Review of Systems   Constitutional: Negative for fatigue and fever.    HENT: Positive for drooling, facial swelling, trouble swallowing and voice change. Negative for rhinorrhea and sore throat. Respiratory: Positive for stridor. Negative for cough and shortness of breath. Cardiovascular: Negative for chest pain and palpitations. Gastrointestinal: Negative for abdominal pain, diarrhea, nausea and vomiting. Genitourinary: Negative for difficulty urinating and dysuria. Musculoskeletal: Positive for neck pain. Negative for arthralgias and myalgias. Skin: Negative for color change and rash. Neurological: Negative for light-headedness and headaches. Physical Exam     Physical Exam  Vitals signs and nursing note reviewed. Constitutional:       General: He is awake. He is in acute distress. Appearance: Normal appearance. He is well-developed. He is obese. He is not ill-appearing, toxic-appearing or diaphoretic. Interventions: Face mask in place. HENT:      Head: Normocephalic and atraumatic. Mouth/Throat:      Comments: Tongue and neck swollen. Obvious angioedema. Able to see the palate but not the UVULA. Lots of secretions. Eyes:      Conjunctiva/sclera: Conjunctivae normal.      Pupils: Pupils are equal, round, and reactive to light. Neck:      Musculoskeletal: Normal range of motion and neck supple. Cardiovascular:      Rate and Rhythm: Regular rhythm. Tachycardia present. Pulses: Normal pulses. Heart sounds: Normal heart sounds. Pulmonary:      Effort: Respiratory distress present. Breath sounds: Normal breath sounds. Stridor present. Abdominal:      General: Abdomen is flat. Palpations: Abdomen is soft. Tenderness: There is no abdominal tenderness. Musculoskeletal: Normal range of motion. Skin:     General: Skin is warm and dry. Capillary Refill: Capillary refill takes less than 2 seconds. Neurological:      General: No focal deficit present. Mental Status: He is alert and oriented to person, place, and time.       GCS: GCS eye subscore is 4. GCS verbal subscore is 5. GCS motor subscore is 6. Psychiatric:         Mood and Affect: Mood and affect normal.         Behavior: Behavior normal. Behavior is cooperative. Thought Content: Thought content normal.         Lab and Diagnostic Study Results     Labs -     Recent Results (from the past 12 hour(s))   CBC WITH AUTOMATED DIFF    Collection Time: 04/04/21  9:40 AM   Result Value Ref Range    WBC 9.1 4.6 - 13.2 K/uL    RBC 5.42 4.70 - 5.50 M/uL    HGB 13.8 13.0 - 16.0 g/dL    HCT 45.1 36.0 - 48.0 %    MCV 83.2 74.0 - 97.0 FL    MCH 25.5 24.0 - 34.0 PG    MCHC 30.6 (L) 31.0 - 37.0 g/dL    RDW 15.9 (H) 11.6 - 14.5 %    PLATELET 345 872 - 185 K/uL    MPV 11.7 9.2 - 11.8 FL    NEUTROPHILS 43 40 - 73 %    LYMPHOCYTES 43 21 - 52 %    MONOCYTES 10 3 - 10 %    EOSINOPHILS 3 0 - 5 %    BASOPHILS 1 0 - 2 %    IMMATURE GRANULOCYTES 0 %    ABS. NEUTROPHILS 4.0 1.8 - 8.0 K/UL    ABS. LYMPHOCYTES 4.0 (H) 0.9 - 3.6 K/UL    ABS. MONOCYTES 0.9 0.05 - 1.2 K/UL    ABS. EOSINOPHILS 0.3 0.0 - 0.4 K/UL    ABS. BASOPHILS 0.1 0.0 - 0.1 K/UL    ABS. IMM.  GRANS. 0.0 K/UL   METABOLIC PANEL, BASIC    Collection Time: 04/04/21  9:40 AM   Result Value Ref Range    Sodium 136 135 - 145 mmol/L    Potassium 3.6 3.2 - 5.1 mmol/L    Chloride 103 94 - 111 mmol/L    CO2 22 21 - 33 mmol/L    Anion gap 11 mmol/L    Glucose 144 (H) 70 - 110 mg/dL    BUN 27 (H) 9 - 21 mg/dL    Creatinine 2.00 (H) 0.8 - 1.50 mg/dL    BUN/Creatinine ratio 14      GFR est AA 40 ml/min/1.73m2    GFR est non-AA 33 ml/min/1.73m2    Calcium 8.4 (L) 8.5 - 10.5 mg/dL   COVID-19 RAPID TEST    Collection Time: 04/04/21 11:12 AM   Result Value Ref Range    Specimen source Nasopharyngeal      COVID-19 rapid test Not Detected Not Detected     BLOOD GAS, ARTERIAL    Collection Time: 04/04/21 11:40 AM   Result Value Ref Range    pH 7.17 (LL) 7.37 - 7.43      PCO2 71 (HH) 35.0 - 45.0 mmHg    PO2 88 84 - 98 mmHg    O2 SAT 95 94 - 100 %    BICARBONATE 25 23.0 - 27.0 mmol/L    BASE DEFICIT 4.9 (H) 0.0 - 2.5 mmol/L    O2 METHOD VENT      FIO2 100.0 %    MODE PCV      SET RATE 10      IPAP/PIP 30      EPAP/CPAP/PEEP 5      Sample source Arterial      SITE Right Brachial      CHRISTIANE'S TEST PASS      Carboxy-Hgb 3.1 (H) 0 - 3 %    Methemoglobin 0.1 0 - 3 %    tHb 14.5 12.0 - 16.0 g/dL    Oxyhemoglobin 91.7 90 - 100 %       Radiologic Studies -   [unfilled]  CT Results  (Last 48 hours)    None        CXR Results  (Last 48 hours)               04/04/21 1034  XR CHEST PORT Final result    Impression:      Interstitial and alveolar process, with bilateral pulmonary consolidation. Appearance concerning for atypical infection. Narrative:  CLINICAL HISTORY:  Shortness of breath. Airway insertion. COMPARISONS:  None. TECHNIQUE:  single frontal view of the chest       ------------------------------------------       FINDINGS:       Lungs:  Patchy multifocal lung parenchymal consolidation bilaterally. Mild   associated increase in pulmonary interstitial markings. No evidence of pleural   fluid. Mediastinum: Mild to moderate cardiomegaly. Tracheostomy tube, appears in good   position. NG/OG tube passes beyond edge of film in expected location of stomach       Bones: No evidence of fracture or suspicious bone lesion.           ------------------------------------------             Medical Decision Making and ED Course   - I am the first and primary provider for this patient AND AM THE PRIMARY PROVIDER OF RECORD. - I reviewed the vital signs, available nursing notes, past medical history, past surgical history, family history and social history. - Initial assessment performed. The patients presenting problems have been discussed, and the staff are in agreement with the care plan formulated and outlined with them. I have encouraged them to ask questions as they arise throughout their visit.     Vital Signs-Reviewed the patient's vital signs. Patient Vitals for the past 12 hrs:   Temp Pulse Resp BP SpO2   04/04/21 1301    (!) 80/52    04/04/21 1255  86 14 (!) 80/60 95 %   04/04/21 1240    98/60    04/04/21 1216  96 12 106/74 97 %   04/04/21 1130  (!) 102 10 136/86 94 %   04/04/21 1108  (!) 110 10  94 %   04/04/21 1103  (!) 109 12 (!) 155/97 94 %   04/04/21 1037  (!) 110 10 (!) 192/123 94 %   04/04/21 1014 98.2 °F (36.8 °C) (!) 118 20  94 %   04/04/21 0957  100 20 (!) 163/128 100 %   04/04/21 0940  98 18 (!) 172/163 93 %       EKG interpretation: (Preliminary): Performed at 1013, and read at 1020  Rhythm: sinus tachycardia; and regular . Rate (approx.): 114; Axis: left axis deviation; SC interval: normal; QRS interval: normal ; ST/T wave: normal; Other findings: PVCs. Records Reviewed: Nursing Notes    The patient presents with trouble breathing with a differential diagnosis of angioedema, airway obstruction. ED Course:              Provider Notes (Medical Decision Making):     MDM     1010 -the patient presented with acute respiratory distress due to compromised airway secondary to severe angioedema. Using etomidate and rocuronium for RSI intubation was attempted using the video assisted laryngoscopy. This was unsuccessful. During the attempts the patient bradycardia down and most likely lost a pulse. He was given chest compressions for less than 2 minutes and there was a return of spontaneous circulation. After regaining his pulse surgical airway was performed. This was successful. A postprocedure chest x-ray shows good placement of the tracheostomy tube. There was some bilateral infiltrates noted. This is most likely aspiration. He is going to be given some IV antibiotics. Currently his sats are 94-95%. 1130 -patient was reassessed. His vitals remain stable on the ventilator. His O2 sats are in the 94-95 range. Reevaluated his surgical airway site.   There is no crepitus noted there is no swelling around the site. 1303 -the patient is a 70-year-old male with a history of hypertension who takes an ACE inhibitor. Patient states that he woke up this morning with his tongue swelling. He came in because he felt like he was having trouble breathing due to throat swelling. On arrival patient was in extremis. He was able to say a few words but was having trouble swallowing his secretions. His tongue was definitely swollen with angioedema. There was a little bit of neck swelling as well in the submandibular regions. I advised the patient that we need to emergently intubate him and he consented. 2 attempts at video assisted laryngoscopy was unsuccessful due to extreme amount of edema and airway obstruction. The usual landmarks were not seen. During the attempts the patient's O2 sats dropped and the patient's pulse of was lost for less than 2 minutes. Chest compressions were done. We had some spontaneous return of circulation without epinephrine. An LMA was then placed in the patient's O2 sats came up to the 90s. I then proceeded to place a surgical airway. This was successful using a 5.0 tracheostomy tube through the cricothyroid cartilage. This is been secured. The tracheostomy has a cuff on it. The chest x-ray postprocedure shows good placement of the tracheostomy tube. Also still shows some fluffy infiltrates bilaterally. This most likely an aspiration. His rapid Covid test was negative. His white count was normal.  He has been given Unasyn and vancomycin. The intensivist Dr. Poonam Pierre at [facility] was consulted. She is the accepting physician. I also talked to Dr. Darius Truong the ENT there. The patient's blood gas was done. The pH was 7. 16. PCO2 was 70. PO2 was 88. The rate has been increased to 15. The sats have remained stable at 94-95%. 1356 -patient blood pressure has dropped to the 80s.   We have been weaning back his propofol dose as well as giving him IV fluid bolus. His blood pressure has not come above 90 for the past hour. The flight crew should be arriving anytime. I went ahead and put in an IO in his right humerus. We will start him on Levophed. 1423 -patient's blood pressure has improved with IV fluid bolus. The IO was in place. Of note the patient's pH has also improved. It is now up to 7.23. We did increase his rate from 10-15. His PCO2 is going down. He is stable for transfer for. Consultations:       CONSULT NOTE:   11:09 AM  I spoke with Dr. Forrest Forrest. She is an intensivist at [facility]   Specialty: Intensivist.  Discussed pt's hx, disposition, and available diagnostic and imaging results. Reviewed care plans. Consultant wants to consult with her ENT to make sure that he is available this weekend. She recommends that in the meantime we start Unasyn, vancomycin, and check a rapid Covid. She is willing to accept the patient if ENT is available. 1210 -   Consult Note    I spoke with Dr. Forrest Forrest. Speciality: Intensivist.  The patient history and physical discussed. All pertinent labs and imaging discussed. The consultant recommends transfer to [facility]. She will be the accepting physician. Chicho Tamez MD      [id number]  Consult Note    I spoke with Dr. Zunilda Sue. Speciality: ENT  The patient history and physical discussed. All pertinent labs and imaging discussed.   The consultant recommends transfer and that he is taking the patient straight to the OR on arrival.   Chicho Tamez MD        Procedures and Critical Care       Performed by: Chicho Tamez MD  PROCEDURES:  Intubation    Date/Time: 4/4/2021 9:50 AM  Performed by: Leroy Black MD  Authorized by: Leroy Black MD     Consent:     Consent obtained:  Verbal    Consent given by:  Patient    Risks discussed:  Aspiration and bleeding  Pre-procedure details:     Patient status:  Awake    Mallampati score:  III    Pretreatment meds: Etomidate. Paralytics:  Rocuronium  Procedure details:     Preoxygenation:  Bag valve mask    CPR in progress: no      Intubation method:  Oral    Oral intubation technique:  Video-assisted    Laryngoscope blade: Mac 3    Tube size (mm):  7.5    Tube type:  Cuffed    Number of attempts:  2    Ventilation between attempts: yes      Cricoid pressure: no    Comments:      2 attempts were made to intubate the patient and both were unsuccessful. There was too much edema and airway swelling. All the landmarks were obscured. Was not able to see the arytenoids cartilage. Was unable to see the cords. A bougie was attempted with one last time and this was unsuccessful as well. Because of unsuccessful attempt we placed an LMA to improve the saturations and move towards a surgical airway. IO Line Insertion    Date/Time: 4/4/2021 1:55 PM  Performed by: Jony Williamson MD  Authorized by: Jony Williamson MD     Consent:     Consent obtained:  Emergent situation  Pre-procedure details:     Site preparation:  Chlorhexidine  Procedure details:     Insertion site:  R proximal humerus    Insertion device:  15 gauge IO needle and drill device    Insertion: Needle was inserted through the bony cortex      Number of attempts:  1    Insertion confirmation:  Aspiration of blood/marrow, easy infusion of fluids and stability of the needle  Post-procedure details:     Secured with:  Protective shield    Patient tolerance of procedure: Tolerated well, no immediate complications     Surgical Airway    1005 -because of failure of the video assisted laryngoscopy surgical airway was done. Using a #10 blade a vertical incision was made over the trachea. Incision was made down to the level of the trachea. The cricoid cartilage was noted and a small incision using the blade was made. After puncturing the cricoid cartilage and getting airflow through that a  #5 tracheostomy tube placed in the airway.   This was secured in place.  Ventilations were given and the O2 sats remained stable. The skin incision was then closed using 4-0 Ethilon. 4 simple sutures were placed. A post procedure chest x-ray showed good placement of the tracheostomy tube. CRITICAL CARE NOTE :  10:47 AM  Amount of Critical Care Time: 45(minutes)    IMPENDING DETERIORATION -Airway and Respiratory  ASSOCIATED RISK FACTORS - Hypoxia, Dysrhythmia and CNS Decompensation  MANAGEMENT- Bedside Assessment and Surgical Airway. INTERPRETATION -  Xrays, Blood Gases, ECG, Blood Pressure and Labs. INTERVENTIONS - Surgical Airway. CASE REVIEW - Hospitalist/Intensivist and ENT. TREATMENT RESPONSE -Stable  PERFORMED BY - Self    NOTES   :  I have spent critical care time involved in lab review, consultations with specialist, family decision- making, bedside attention and documentation. This time excludes time spent in any separate billed procedures. During this entire length of time I was immediately available to the patient . Dalia Caraballo MD        Disposition     Disposition: Transfer. Diagnosis     Clinical Impression:   1. Angioedema, initial encounter    2. Acute airway obstruction        Attestations:    Dalia Caraballo MD    Please note that this dictation was completed with [employer], the computer voice recognition software. Quite often unanticipated grammatical, syntax, homophones, and other interpretive errors are inadvertently transcribed by the computer software. Please disregard these errors. Please excuse any errors that have escaped final proofreading. Thank you.

## 2021-04-04 NOTE — ED TRIAGE NOTES
Patient arrives via private car with family for c/o respiratory distress. Patient with tongue and throat swelling. States symptoms started this morning.

## 2021-04-04 NOTE — BRIEF OP NOTE
Brief Postoperative Note    Patient: Rey Raygoza  YOB: 1950  MRN: [id number]    Date of Procedure: 4/4/2021     Pre-Op Diagnosis: Upper airway obstruction     Post-Op Diagnosis: Same as preoperative diagnosis. Procedure(s):  Direct laryngoscopy and placement of endotracheal tube  Surgeon(s):  Benji Jenkins MD    Surgical Assistant: None    Anesthesia: General     Estimated Blood Loss (mL): Minimal    Complications: None    Specimens: * No specimens in log *     Implants: * No implants in log *    Drains: * No LDAs found *    Findings: Under direct visualization via laryngoscope, airway able to be noted however significant swelling still persisting on right side. With significant amount of difficulty endotracheal tube passed and cricothyroid trach tube removed. Good exchange of air noted. Excellent tidal volumes seen.   I discussed situation via phone conversation with patient's son and sister    Electronically Signed by Nidhi Weston MD on 4/4/2021 at 4:51 PM

## 2021-04-04 NOTE — PROGRESS NOTES
5.0 cuffed trach secured with twill ties. Bilateral breath sounds clear and equal. Cuff inflated. Vent initiated.

## 2021-04-04 NOTE — ROUTINE PROCESS
1910: Bedside and Verbal shift change report given to KUSHAL Ramirez (oncoming nurse) by RN Etha Leyden (offgoing nurse). Report included the following information SBAR, Intake/Output, Recent Results and Quality Measures.

## 2021-04-04 NOTE — ANESTHESIA PREPROCEDURE EVALUATION
Relevant Problems   No relevant active problems       Anesthetic History               Review of Systems / Medical History      Pulmonary                   Neuro/Psych              Cardiovascular    Hypertension                   GI/Hepatic/Renal                Endo/Other             Other Findings   Comments: Patient brought to OR from [facility] with a trach ,sedated and paralysed.   History and consent obtained from sister over telephone           Physical Exam    Airway          Tracheostomy present   Cardiovascular  Regular rate and rhythm,  S1 and S2 normal,  no murmur, click, rub, or gallop             Dental         Pulmonary  Breath sounds clear to auscultation               Abdominal  GI exam deferred       Other Findings            Anesthetic Plan    ASA: 4, emergent  Anesthesia type: general          Induction: Intravenous  Anesthetic plan and risks discussed with: Sibling

## 2021-04-04 NOTE — PROGRESS NOTES
At the bedside optimizing vent settings as he has a 5.0 trach. PIPs are over 45 in volume ventilation. Changed to Pressure ventilation, PIP of 30. Volumes are approximately 6 per Kg. Will continue to monitor and adjust as necessary.

## 2021-04-04 NOTE — ANESTHESIA POSTPROCEDURE EVALUATION
Procedure(s):  TRACHEOSTOMY. general    Anesthesia Post Evaluation      Multimodal analgesia: multimodal analgesia used between 6 hours prior to anesthesia start to PACU discharge  Patient location during evaluation: ICU  Patient participation: complete - patient participated  Level of consciousness: awake  Pain score: 0  Pain management: adequate  Airway patency: patent  Anesthetic complications: no  Cardiovascular status: stable  Respiratory status: acceptable and ventilator  Hydration status: acceptable  Post anesthesia nausea and vomiting:  controlled  Final Post Anesthesia Temperature Assessment:  Normothermia (36.0-37.5 degrees C)      INITIAL Post-op Vital signs:   Vitals Value Taken Time   /89 04/04/21 1658   Temp 36.8 °C (98.2 °F) 04/04/21 1658   Pulse 90 04/04/21 1700   Resp 19 04/04/21 1700   SpO2 96 % 04/04/21 1700   Vitals shown include unvalidated device data.

## 2021-04-04 NOTE — PROGRESS NOTES
Otolaryngology head neck surgery  Consult dictated  Impression upper airway obstruction secondary to angioedema    Plan to or for possible tracheotomy possible intubation  Unable to reach family hence we will proceed due to the emergent situation  I believe family has been informed by nursing care but I have not talked to them    Pari Light

## 2021-04-05 ENCOUNTER — APPOINTMENT (OUTPATIENT)
Dept: GENERAL RADIOLOGY | Age: 71
DRG: 004 | End: 2021-04-05
Attending: PHYSICIAN ASSISTANT
Payer: MEDICARE

## 2021-04-05 PROBLEM — I46.9 CARDIAC ARREST (HCC): Status: ACTIVE | Noted: 2021-04-04

## 2021-04-05 PROBLEM — R91.8 PULMONARY INFILTRATES: Status: ACTIVE | Noted: 2021-04-05

## 2021-04-05 PROBLEM — E11.49 CONTROLLED TYPE 2 DIABETES MELLITUS WITH NEUROLOGIC COMPLICATION, WITHOUT LONG-TERM CURRENT USE OF INSULIN (HCC): Status: ACTIVE | Noted: 2021-04-05

## 2021-04-05 PROBLEM — E11.319 DIABETIC RETINOPATHY ASSOCIATED WITH TYPE 2 DIABETES MELLITUS (HCC): Status: ACTIVE | Noted: 2021-04-05

## 2021-04-05 PROBLEM — E11.40 DIABETIC NEUROPATHY ASSOCIATED WITH TYPE 2 DIABETES MELLITUS (HCC): Status: ACTIVE | Noted: 2021-04-05

## 2021-04-05 PROBLEM — N17.9 AKI (ACUTE KIDNEY INJURY) (HCC): Status: ACTIVE | Noted: 2021-04-04

## 2021-04-05 PROBLEM — E66.9 OBESITY (BMI 30-39.9): Status: ACTIVE | Noted: 2021-04-05

## 2021-04-05 PROBLEM — J96.90 RESPIRATORY FAILURE (HCC): Status: ACTIVE | Noted: 2021-04-05

## 2021-04-05 LAB
ANION GAP SERPL CALC-SCNC: 10 MMOL/L (ref 3–18)
ARTERIAL PATENCY WRIST A: YES
ATRIAL RATE: 115 BPM
BASE DEFICIT BLD-SCNC: 5 MMOL/L
BASOPHILS # BLD: 0 K/UL (ref 0–0.1)
BASOPHILS # BLD: 0 K/UL (ref 0–0.1)
BASOPHILS NFR BLD: 0 % (ref 0–2)
BASOPHILS NFR BLD: 0 % (ref 0–2)
BDY SITE: ABNORMAL
BODY TEMPERATURE: 37.2
BUN SERPL-MCNC: 23 MG/DL (ref 7–18)
BUN/CREAT SERPL: 15 (ref 12–20)
CALCIUM SERPL-MCNC: 8.1 MG/DL (ref 8.5–10.1)
CALCULATED P AXIS, ECG09: -64 DEGREES
CALCULATED R AXIS, ECG10: -28 DEGREES
CALCULATED T AXIS, ECG11: 30 DEGREES
CHLORIDE SERPL-SCNC: 109 MMOL/L (ref 100–111)
CK MB CFR SERPL CALC: 1.6 % (ref 0–4)
CK MB CFR SERPL CALC: 1.7 % (ref 0–4)
CK MB CFR SERPL CALC: 1.7 % (ref 0–4)
CK MB SERPL-MCNC: 1.4 NG/ML (ref 5–25)
CK MB SERPL-MCNC: 1.5 NG/ML (ref 5–25)
CK MB SERPL-MCNC: 2.8 NG/ML (ref 5–25)
CK SERPL-CCNC: 164 U/L (ref 39–308)
CK SERPL-CCNC: 89 U/L (ref 39–308)
CK SERPL-CCNC: 90 U/L (ref 39–308)
CO2 SERPL-SCNC: 20 MMOL/L (ref 21–32)
CREAT SERPL-MCNC: 1.55 MG/DL (ref 0.6–1.3)
DIAGNOSIS, 93000: NORMAL
DIFFERENTIAL METHOD BLD: ABNORMAL
DIFFERENTIAL METHOD BLD: ABNORMAL
EOSINOPHIL # BLD: 0 K/UL (ref 0–0.4)
EOSINOPHIL # BLD: 0 K/UL (ref 0–0.4)
EOSINOPHIL NFR BLD: 0 % (ref 0–5)
EOSINOPHIL NFR BLD: 0 % (ref 0–5)
ERYTHROCYTE [DISTWIDTH] IN BLOOD BY AUTOMATED COUNT: 15.5 % (ref 11.6–14.5)
ERYTHROCYTE [DISTWIDTH] IN BLOOD BY AUTOMATED COUNT: 15.6 % (ref 11.6–14.5)
ERYTHROCYTE [SEDIMENTATION RATE] IN BLOOD: 1 MM/HR (ref 0–20)
GAS FLOW.O2 O2 DELIVERY SYS: ABNORMAL L/MIN
GAS FLOW.O2 SETTING OXYMISER: 20 BPM
GLUCOSE BLD STRIP.AUTO-MCNC: 115 MG/DL (ref 70–110)
GLUCOSE BLD STRIP.AUTO-MCNC: 127 MG/DL (ref 70–110)
GLUCOSE BLD STRIP.AUTO-MCNC: 130 MG/DL (ref 70–110)
GLUCOSE BLD STRIP.AUTO-MCNC: 132 MG/DL (ref 70–110)
GLUCOSE SERPL-MCNC: 93 MG/DL (ref 74–99)
HCO3 BLD-SCNC: 20 MMOL/L (ref 22–26)
HCT VFR BLD AUTO: 34.4 % (ref 36–48)
HCT VFR BLD AUTO: 37.9 % (ref 36–48)
HGB BLD-MCNC: 11 G/DL (ref 13–16)
HGB BLD-MCNC: 11.9 G/DL (ref 13–16)
INSPIRATION.DURATION SETTING TIME VENT: 1 SEC
LACTATE SERPL-SCNC: 1.4 MMOL/L (ref 0.4–2)
LACTATE SERPL-SCNC: 2.3 MMOL/L (ref 0.4–2)
LYMPHOCYTES # BLD: 0.6 K/UL (ref 0.9–3.6)
LYMPHOCYTES # BLD: 0.7 K/UL (ref 0.9–3.6)
LYMPHOCYTES NFR BLD: 7 % (ref 21–52)
LYMPHOCYTES NFR BLD: 9 % (ref 21–52)
MAGNESIUM SERPL-MCNC: 2 MG/DL (ref 1.6–2.6)
MCH RBC QN AUTO: 25.7 PG (ref 24–34)
MCH RBC QN AUTO: 26.6 PG (ref 24–34)
MCHC RBC AUTO-ENTMCNC: 31.4 G/DL (ref 31–37)
MCHC RBC AUTO-ENTMCNC: 32 G/DL (ref 31–37)
MCV RBC AUTO: 81.9 FL (ref 74–97)
MCV RBC AUTO: 83.1 FL (ref 74–97)
MONOCYTES # BLD: 0.2 K/UL (ref 0.05–1.2)
MONOCYTES # BLD: 0.2 K/UL (ref 0.05–1.2)
MONOCYTES NFR BLD: 2 % (ref 3–10)
MONOCYTES NFR BLD: 3 % (ref 3–10)
NEUTS SEG # BLD: 6.9 K/UL (ref 1.8–8)
NEUTS SEG # BLD: 7.1 K/UL (ref 1.8–8)
NEUTS SEG NFR BLD: 89 % (ref 40–73)
NEUTS SEG NFR BLD: 90 % (ref 40–73)
O2/TOTAL GAS SETTING VFR VENT: 0.7 %
P-R INTERVAL, ECG05: 176 MS
PCO2 BLD: 31.8 MMHG (ref 35–45)
PEEP RESPIRATORY: 6 CMH2O
PH BLD: 7.41 [PH] (ref 7.35–7.45)
PHOSPHATE SERPL-MCNC: 1.7 MG/DL (ref 2.5–4.9)
PHOSPHATE SERPL-MCNC: 4.1 MG/DL (ref 2.5–4.9)
PLATELET # BLD AUTO: 198 K/UL (ref 135–420)
PLATELET # BLD AUTO: 224 K/UL (ref 135–420)
PMV BLD AUTO: 11.1 FL (ref 9.2–11.8)
PMV BLD AUTO: 12 FL (ref 9.2–11.8)
PO2 BLD: 80 MMHG (ref 80–100)
POTASSIUM SERPL-SCNC: 4.1 MMOL/L (ref 3.5–5.5)
PROCALCITONIN SERPL-MCNC: 0.35 NG/ML
Q-T INTERVAL, ECG07: 325 MS
QRS DURATION, ECG06: 100 MS
QTC CALCULATION (BEZET), ECG08: 448 MS
RBC # BLD AUTO: 4.14 M/UL (ref 4.7–5.5)
RBC # BLD AUTO: 4.63 M/UL (ref 4.7–5.5)
SAO2 % BLD: 96 % (ref 92–97)
SERVICE CMNT-IMP: ABNORMAL
SODIUM SERPL-SCNC: 139 MMOL/L (ref 136–145)
SPECIMEN TYPE: ABNORMAL
TOTAL RESP. RATE, ITRR: 20
TROPONIN I SERPL-MCNC: 0.17 NG/ML (ref 0–0.04)
TROPONIN I SERPL-MCNC: 0.18 NG/ML (ref 0–0.04)
TROPONIN I SERPL-MCNC: 0.28 NG/ML (ref 0–0.04)
VANCOMYCIN SERPL-MCNC: 3.6 UG/ML (ref 5–40)
VENTILATION MODE VENT: ABNORMAL
VENTRICULAR RATE, ECG03: 114 BPM
VOLUME CONTROL PLUS IVLCP: YES
VT SETTING VENT: 550 ML
WBC # BLD AUTO: 7.7 K/UL (ref 4.6–13.2)
WBC # BLD AUTO: 8 K/UL (ref 4.6–13.2)

## 2021-04-05 PROCEDURE — 84100 ASSAY OF PHOSPHORUS: CPT

## 2021-04-05 PROCEDURE — 74011250636 HC RX REV CODE- 250/636: Performed by: PHYSICIAN ASSISTANT

## 2021-04-05 PROCEDURE — 77030008771 HC TU NG SALEM SUMP -A

## 2021-04-05 PROCEDURE — 83605 ASSAY OF LACTIC ACID: CPT

## 2021-04-05 PROCEDURE — 65610000006 HC RM INTENSIVE CARE

## 2021-04-05 PROCEDURE — 99291 CRITICAL CARE FIRST HOUR: CPT | Performed by: INTERNAL MEDICINE

## 2021-04-05 PROCEDURE — 80202 ASSAY OF VANCOMYCIN: CPT

## 2021-04-05 PROCEDURE — 36600 WITHDRAWAL OF ARTERIAL BLOOD: CPT

## 2021-04-05 PROCEDURE — 74011000250 HC RX REV CODE- 250: Performed by: PHYSICIAN ASSISTANT

## 2021-04-05 PROCEDURE — 74011000258 HC RX REV CODE- 258: Performed by: PHYSICIAN ASSISTANT

## 2021-04-05 PROCEDURE — 2709999900 HC NON-CHARGEABLE SUPPLY

## 2021-04-05 PROCEDURE — 80048 BASIC METABOLIC PNL TOTAL CA: CPT

## 2021-04-05 PROCEDURE — 77030018798 HC PMP KT ENTRL FED COVD -A

## 2021-04-05 PROCEDURE — 71045 X-RAY EXAM CHEST 1 VIEW: CPT

## 2021-04-05 PROCEDURE — 83735 ASSAY OF MAGNESIUM: CPT

## 2021-04-05 PROCEDURE — 82803 BLOOD GASES ANY COMBINATION: CPT

## 2021-04-05 PROCEDURE — 82962 GLUCOSE BLOOD TEST: CPT

## 2021-04-05 PROCEDURE — 85025 COMPLETE CBC W/AUTO DIFF WBC: CPT

## 2021-04-05 PROCEDURE — 82553 CREATINE MB FRACTION: CPT

## 2021-04-05 PROCEDURE — 36415 COLL VENOUS BLD VENIPUNCTURE: CPT

## 2021-04-05 PROCEDURE — 94003 VENT MGMT INPAT SUBQ DAY: CPT

## 2021-04-05 PROCEDURE — 74018 RADEX ABDOMEN 1 VIEW: CPT

## 2021-04-05 PROCEDURE — APPNB15 APP NON BILLABLE TIME 0-15 MINS: Performed by: PHYSICIAN ASSISTANT

## 2021-04-05 PROCEDURE — 74011250636 HC RX REV CODE- 250/636: Performed by: INTERNAL MEDICINE

## 2021-04-05 PROCEDURE — 74011000250 HC RX REV CODE- 250: Performed by: INTERNAL MEDICINE

## 2021-04-05 RX ORDER — VANCOMYCIN/0.9 % SOD CHLORIDE 1.5G/250ML
1500 PLASTIC BAG, INJECTION (ML) INTRAVENOUS EVERY 24 HOURS
Status: DISCONTINUED | OUTPATIENT
Start: 2021-04-05 | End: 2021-04-05

## 2021-04-05 RX ORDER — MIDAZOLAM IN 0.9 % SOD.CHLORID 1 MG/ML
0-10 PLASTIC BAG, INJECTION (ML) INTRAVENOUS
Status: DISCONTINUED | OUTPATIENT
Start: 2021-04-05 | End: 2021-04-07

## 2021-04-05 RX ORDER — VANCOMYCIN 1.75 GRAM/500 ML IN 0.9 % SODIUM CHLORIDE INTRAVENOUS
1750
Status: DISCONTINUED | OUTPATIENT
Start: 2021-04-05 | End: 2021-04-06

## 2021-04-05 RX ADMIN — CIPROFLOXACIN 2 DROP: 3 SOLUTION OPHTHALMIC at 06:02

## 2021-04-05 RX ADMIN — CIPROFLOXACIN 2 DROP: 3 SOLUTION OPHTHALMIC at 22:58

## 2021-04-05 RX ADMIN — CIPROFLOXACIN 2 DROP: 3 SOLUTION OPHTHALMIC at 17:00

## 2021-04-05 RX ADMIN — CHLORHEXIDINE GLUCONATE 0.12% ORAL RINSE 10 ML: 1.2 LIQUID ORAL at 08:30

## 2021-04-05 RX ADMIN — DEXAMETHASONE SODIUM PHOSPHATE 4 MG: 4 INJECTION, SOLUTION INTRAMUSCULAR; INTRAVENOUS at 23:51

## 2021-04-05 RX ADMIN — PIPERACILLIN SODIUM AND TAZOBACTAM SODIUM 4.5 G: 4; .5 INJECTION, POWDER, LYOPHILIZED, FOR SOLUTION INTRAVENOUS at 23:51

## 2021-04-05 RX ADMIN — FENTANYL CITRATE 200 MCG/HR: 50 INJECTION, SOLUTION INTRAMUSCULAR; INTRAVENOUS at 11:30

## 2021-04-05 RX ADMIN — CIPROFLOXACIN 2 DROP: 3 SOLUTION OPHTHALMIC at 08:30

## 2021-04-05 RX ADMIN — CIPROFLOXACIN 2 DROP: 3 SOLUTION OPHTHALMIC at 10:00

## 2021-04-05 RX ADMIN — SODIUM CHLORIDE, SODIUM ACETATE ANHYDROUS, SODIUM GLUCONATE, POTASSIUM CHLORIDE, AND MAGNESIUM CHLORIDE: 526; 222; 502; 37; 30 INJECTION, SOLUTION INTRAVENOUS at 08:30

## 2021-04-05 RX ADMIN — CHLORHEXIDINE GLUCONATE 0.12% ORAL RINSE 10 ML: 1.2 LIQUID ORAL at 20:32

## 2021-04-05 RX ADMIN — PIPERACILLIN SODIUM AND TAZOBACTAM SODIUM 4.5 G: 4; .5 INJECTION, POWDER, LYOPHILIZED, FOR SOLUTION INTRAVENOUS at 17:30

## 2021-04-05 RX ADMIN — DIPHENHYDRAMINE HYDROCHLORIDE 50 MG: 50 INJECTION, SOLUTION INTRAMUSCULAR; INTRAVENOUS at 05:51

## 2021-04-05 RX ADMIN — FENTANYL CITRATE 200 MCG/HR: 50 INJECTION, SOLUTION INTRAMUSCULAR; INTRAVENOUS at 02:42

## 2021-04-05 RX ADMIN — FENTANYL CITRATE 200 MCG/HR: 50 INJECTION, SOLUTION INTRAMUSCULAR; INTRAVENOUS at 06:48

## 2021-04-05 RX ADMIN — MIDAZOLAM 6 MG/HR: 5 INJECTION INTRAMUSCULAR; INTRAVENOUS at 06:54

## 2021-04-05 RX ADMIN — CIPROFLOXACIN 2 DROP: 3 SOLUTION OPHTHALMIC at 20:32

## 2021-04-05 RX ADMIN — FENTANYL CITRATE 200 MCG/HR: 50 INJECTION, SOLUTION INTRAMUSCULAR; INTRAVENOUS at 16:00

## 2021-04-05 RX ADMIN — VANCOMYCIN HYDROCHLORIDE 1750 MG: 10 INJECTION, POWDER, LYOPHILIZED, FOR SOLUTION INTRAVENOUS at 10:00

## 2021-04-05 RX ADMIN — FAMOTIDINE 20 MG: 10 INJECTION INTRAVENOUS at 20:33

## 2021-04-05 RX ADMIN — DEXAMETHASONE SODIUM PHOSPHATE 4 MG: 4 INJECTION, SOLUTION INTRAMUSCULAR; INTRAVENOUS at 05:51

## 2021-04-05 RX ADMIN — CIPROFLOXACIN 2 DROP: 3 SOLUTION OPHTHALMIC at 11:30

## 2021-04-05 RX ADMIN — DEXAMETHASONE SODIUM PHOSPHATE 4 MG: 4 INJECTION, SOLUTION INTRAMUSCULAR; INTRAVENOUS at 00:39

## 2021-04-05 RX ADMIN — DIPHENHYDRAMINE HYDROCHLORIDE 50 MG: 50 INJECTION, SOLUTION INTRAMUSCULAR; INTRAVENOUS at 00:39

## 2021-04-05 RX ADMIN — SODIUM PHOSPHATE, MONOBASIC, MONOHYDRATE: 276; 142 INJECTION, SOLUTION INTRAVENOUS at 05:43

## 2021-04-05 RX ADMIN — DEXAMETHASONE SODIUM PHOSPHATE 4 MG: 4 INJECTION, SOLUTION INTRAMUSCULAR; INTRAVENOUS at 11:30

## 2021-04-05 RX ADMIN — CIPROFLOXACIN 2 DROP: 3 SOLUTION OPHTHALMIC at 14:00

## 2021-04-05 RX ADMIN — FENTANYL CITRATE 200 MCG/HR: 50 INJECTION, SOLUTION INTRAMUSCULAR; INTRAVENOUS at 20:39

## 2021-04-05 RX ADMIN — PIPERACILLIN SODIUM AND TAZOBACTAM SODIUM 4.5 G: 4; .5 INJECTION, POWDER, LYOPHILIZED, FOR SOLUTION INTRAVENOUS at 00:39

## 2021-04-05 RX ADMIN — MIDAZOLAM 8 MG/HR: 5 INJECTION INTRAMUSCULAR; INTRAVENOUS at 10:00

## 2021-04-05 RX ADMIN — DIPHENHYDRAMINE HYDROCHLORIDE 50 MG: 50 INJECTION, SOLUTION INTRAMUSCULAR; INTRAVENOUS at 11:30

## 2021-04-05 RX ADMIN — CIPROFLOXACIN 2 DROP: 3 SOLUTION OPHTHALMIC at 16:00

## 2021-04-05 RX ADMIN — MIDAZOLAM 4 MG/HR: 5 INJECTION INTRAMUSCULAR; INTRAVENOUS at 00:10

## 2021-04-05 RX ADMIN — PIPERACILLIN SODIUM AND TAZOBACTAM SODIUM 4.5 G: 4; .5 INJECTION, POWDER, LYOPHILIZED, FOR SOLUTION INTRAVENOUS at 11:30

## 2021-04-05 RX ADMIN — PIPERACILLIN SODIUM AND TAZOBACTAM SODIUM 4.5 G: 4; .5 INJECTION, POWDER, LYOPHILIZED, FOR SOLUTION INTRAVENOUS at 05:51

## 2021-04-05 RX ADMIN — DEXAMETHASONE SODIUM PHOSPHATE 4 MG: 4 INJECTION, SOLUTION INTRAMUSCULAR; INTRAVENOUS at 17:00

## 2021-04-05 RX ADMIN — FAMOTIDINE 20 MG: 10 INJECTION INTRAVENOUS at 08:30

## 2021-04-05 NOTE — PROGRESS NOTES
[facility]  Pulmonary, Critical Care, and Sleep Medicine    Name: King June MRN: [id number]   : 1950 Hospital: [address] [place] [address]    Date: 2021        Critical Care: Daily Progress Note    Admission Date:   2021  LOS: 1  MAR reviewed and pertinent medications noted or modified as needed    IMPRESSION:   · Angioedema- with airway and respiratory failure and collapse; thought due to ACE inhibiotor  · S/P Emergent Cricthyrotomy [facility]-ED 21- converted to 6.5 ETT intraoperative by anesthesia team 21  · S/P cardiac arrest @ [facility] 21- brief thought due to hypoxia due to airway collapse  · Respiratory Failure: due to above; currently intubated and on vent for airway protection  · Pulmonary Infiltrates: suspect aspiration secretions and/or blood due to above- can't exclude other infectious process at this time. Rapid Covid Negative at [facility]  · JACQUELYN  · Left scleral erythema- unknown baseline; possible infection   · DM  · DM retinopathy per chart review  · Diabetic peripheral neuropathy  · COVID -19; Negative rapid and [employer]: 21  · Obesity: Body mass index is 39.05 kg/m².   ·       RECOMMENDATIONS:   NEURO:   Serial neuro evaluations   Sedation Holiday per protocol- On hold for next 24 hrs and reassess   RAAS: -3 to -4   Wetting tears and cipro eye drops     CARDIO:   MAP goal >64   Obtain Echo   Telemetry    PULM:   Maintain aspiration precautions: HOB >30 degrees   SpO2 goal >92%   Bronchial /oral hygiene   VAP bundle- Wean vent as clinical status allows   Decadron scheduled 4mg Q 6 x 8 doses and reassess   Benedryl Scheduled 50mg Q 6     GI/ NUTRITION:   OGT- will attempt to place if able due to face and neck swelling   Diet: NPO for now   SUP:Pepcid   Follow up with nutritional recommendations     RENAL/ METAB/ :   Monitor I&Os   Trend electrolytes- replaced as needed, K:4, Mg>2 PO4>2   Garcia: indwelling      HEM/ONC   Trend Hb/HCT and PLTs, and indicis   DVT prophylaxis: SCD and    Blood Products: not indicated at this time  ID   Broad spectrum anticioitcs   Follow up on pending cultures   Continue Cipro Eye drops   Streamline antibiotics per culture data and clinical course    ENDO   BGs Q 6,SSI   Check C1 inhibitor- Pending     MSK/ ORTHO   No active Issues     SKIN/ WOUNDS   Per protocol     OTHER/DISPO:    CODE STATUS: No Order- Full    Case reviewed and discussed with ENT staff, OR and ICU care team     Subjective/History: This patient has been seen and evaluated at the request of Dr. Brenda Guillen- ED from [facility] for Angioedema and airway compromise. Patient is a 79 y.o. male presented earlier this morning to [facility] ED in rapidly progressive respiratory distress due to angioedema and airway swelling presumed due to ACE inhibitor therapy. ED provider unable to intubate due to severity of airway swelling. Brief cardio-pulmonary collapse with brief loss of pulse. IO placed into right shoulder. Emergent cricothyrotomy 5.0 place. Good placement. ED team able to oxygenate and ventilate patient. Prior to transport ABG notable for Respiratory acidosis. Call back to -ED patient on vent and paralyzed with rate of 10. I requested that RR be increased to 16-18 bpm depending what patient could tolerate. Our ENT provider on call, Dr. Treva Archer was contacted. OR team on call came in. Patient flow via [facility] flight to our facility. The patient was taken from flight line to OR hold. On-call team rapidly assess patient. I evaluated the patient immediately upon arrival. Patient with Cric in place, bilateral breath  Sounds, SpO2 in the mid 90's. Swollen neck and face. ABG -POC with persistent but improved respiratory acidosis. ER team took patient emergently to the OR    Anesthesia able to place ETT 6.5 intraop. I was called by ENT provider in the OR. Case discussed.  Opted to keep patient intubated with 6.5 ETT. Will monitor for 48-72 hours and reassess patient. CXR notable for bilateral opacities. Suspect the later due to aspiration of blood and /or airway secretions. Patient was reexamined again at bedside upon arrival to ICU- Bed2    Patient paralyzed and sedated. 6.5 ETT in place. Bilateral breath sounds. SpO2 100%    CXR obtained in ICU- ETT slightly high- will try to advance 1-2 cm if able. 04/05/21  · Patient admitted to ICU post op yesterday  · No issues overnight  · ETT still high but only able to advance 2 cm due to length of tube  · Patient remains sedated as we await facial/neck swell to decrease and/or resolve  · No plan to extubate today  · Low phos and bicarb- addressing  · Tmax: 99.7    Inpat Anti-Infectives (From admission, onward)     Start     Ordered Stop    04/06/21 1800  ciprofloxacin HCl (CILOXAN) 0.3 % ophthalmic solution 2 Drop  2 Drop,   Both Eyes,   EVERY 4 HOURS      04/04/21 1724 04/11/21 1759    04/04/21 1800  ciprofloxacin HCl (CILOXAN) 0.3 % ophthalmic solution 2 Drop  2 Drop,   Both Eyes,   EVERY 2 HOURS WHILE AWAKE      04/04/21 1724 04/06/21 1759    04/04/21 1800  piperacillin-tazobactam (ZOSYN) 4.5 g in 0.9% sodium chloride (MBP/ADV) 100 mL MBP  4.5 g,   IntraVENous,   EVERY 6 HOURS      04/04/21 1728 --    04/04/21 1756  VANCOMYCIN INFORMATION NOTE  Other,   RX DOSING/MONITORING      04/04/21 1757 --                The patient is critically ill and can not provide additional history due to Unconsciousness, Ventilated and Unable to speak. Chart and notes reviewed. Data reviewed. []I have reviewed the flowsheet and previous days notes. []The patient is unable to give any meaningful history or review of systems because the patient is:  [x]Intubated [x]Sedated   []Unresponsive      []The patient is critically ill on      [x]Mechanical ventilation []Pressors   []BiPAP []                       No past medical history on file. No past surgical history on file.    Prior to Admission medications    Medication Sig Start Date End Date Taking? Authorizing Provider   LISINOPRIL, BULK, by Does Not Apply route. Other, MD Cheryl     Current Facility-Administered Medications   Medication Dose Route Frequency    midazolam in normal saline (VERSED) 1 mg/mL infusion  0-10 mg/hr IntraVENous TITRATE    vancomycin (VANCOCIN) 1750 mg in  ml infusion  1,750 mg IntraVENous L51O    multivit-folic acid-herbal 735 (WELLESSE PLUS) oral liquid 30 mL  30 mL Per NG tube DAILY    chlorhexidine (PERIDEX) 0.12 % mouthwash 10 mL  10 mL Oral Q12H    propofol (DIPRIVAN) 10 mg/mL infusion  0-50 mcg/kg/min IntraVENous TITRATE    fentaNYL (PF) 900 mcg/30 ml infusion soln  0-200 mcg/hr IntraVENous TITRATE    famotidine (PF) (PEPCID) 20 mg in 0.9% sodium chloride 10 mL injection  20 mg IntraVENous Q12H    dexamethasone (DECADRON) 4 mg/mL injection 4 mg  4 mg IntraVENous Q6H    insulin lispro (HUMALOG) injection   SubCUTAneous Q6H    ciprofloxacin HCl (CILOXAN) 0.3 % ophthalmic solution 2 Drop  2 Drop Both Eyes Q2HWA    Followed by   Rupali Garcia ON 2021] ciprofloxacin HCl (CILOXAN) 0.3 % ophthalmic solution 2 Drop  2 Drop Both Eyes Q4H    piperacillin-tazobactam (ZOSYN) 4.5 g in 0.9% sodium chloride (MBP/ADV) 100 mL MBP  4.5 g IntraVENous Q6H     No Known Allergies   Social History     Tobacco Use    Smoking status: Not on file   Substance Use Topics    Alcohol use: Not on file      No family history on file. Review of Systems:  Review of systems not obtained due to patient factors.     Objective:   Vital Signs:    Visit Vitals  /65   Pulse (!) 59   Temp 98.1 °F (36.7 °C)   Resp 20   Ht 5' 11\" (1.803 m)   Wt 127 kg (279 lb 15.8 oz)   SpO2 95%   BMI 39.05 kg/m²       O2 Device: Endotracheal tube, Ventilator       Temp (24hrs), Av.7 °F (37.1 °C), Min:97.5 °F (36.4 °C), Max:99.7 °F (37.6 °C)       Intake/Output:   Last shift:       07 -  1900  In: 726.1 [I.V.:726.1]  Out: 150 [Urine:150]  Last 3 shifts: 04/03 1901 - 04/05 0700  In: 500 [I.V.:500]  Out: 1105 [Urine:1105]    Intake/Output Summary (Last 24 hours) at 4/5/2021 1330  Last data filed at 4/5/2021 1100  Gross per 24 hour   Intake 1226.08 ml   Output 1255 ml   Net -28.92 ml         Ventilator Settings:  Mode Rate Tidal Volume Pressure FiO2 PEEP   Assist control   550 ml    60 % 6 cm H20     Peak airway pressure: 26 cm H2O    Minute ventilation: 11.4 l/min       ARDS network Guidelines:   Lung protective strategy and Plateau  Pressure goal < 30 cm H2O goals  Oxygenation Goals PaO2 55-80 mm Hg or SaO2 88-95%  PH goal 7.30-7.45    VAP bundle;  Reviewed. Annalisa tube to suction at 20-30 cm Hg. Maintain [place] tube with 5-10ml air every 4 hours  Routine oral care every 4 hours  Elevation of head > 45 degree  Daily sedation holiday and SBT evaluation starting at 6.00am.  Physical Exam:    General:  Intubated and sedated  , appears stated age.+ Obese body habitus   Head:  Normocephalic, without obvious abnormality, atraumatic. + facial edema   Eyes:  Conjunctivae/corneas clear. PERRL- left eye hyperremia, EOMs intact. Nose: Nares normal. Septum midline. Mucosa normal. No drainage or sinus tenderness. Throat: Lips, mucosa, and tongue swollen. Teeth and gums normal.   Neck: bandage and packing inplace where trach was palced, no carotid bruit and no JVD. Back:   Symmetric   Lungs:   Bilateral breath sounds, with bilateral rhonchi- no wheezing   Chest wall:  No tenderness or deformity.- No crepitus   Heart:  Regular rate and rhythm, S1, S2 normal, no murmur, click, rub or gallop. Abdomen:   Soft, Obese non-tender. Bowel sounds normal. No masses,  No organomegaly. Extremities: Extremities normal, atraumatic, no cyanosis or edema. Pulses: 2+ and symmetric all extremities.    Skin: Skin color, texture, turgor normal. No rashes or lesions   Lymph nodes:      Cervical, supraclavicular nodes: unremarkable   Neurologic: Grossly nonfocal       Data:     Recent Labs     04/05/21  0056 04/04/21  2321 04/04/21  0940   WBC 8.0 7.7 9.1   HGB 11.9* 11.0* 13.8   HCT 37.9 34.4* 45.1    198 252     Recent Labs     04/05/21  0056 04/04/21  1711 04/04/21  0940    141 136   K 4.1 4.6 3.6    108 103   CO2 20* 26 22   GLU 93 102* 144*   BUN 23* 24* 27*   CREA 1.55* 1.80* 2.00*   CA 8.1* 8.0* 8.4*   MG 2.0 2.1  --    PHOS 1.7* 3.7  --    ALB  --  3.4  --    ALT  --  141*  --    INR  --  1.0  --      No results found for: BNP, BNPP, BNPPPOC, XBNPT, BNPNT  Lab Results   Component Value Date/Time    INR 1.0 04/04/2021 05:11 PM    Prothrombin time 12.7 04/04/2021 05:11 PM       Recent Labs     04/04/21  1355 04/04/21  1140   PH 7.28* 7.17*   PCO2 49* 71*   PO2 103* 88   HCO3 22* 25   FIO2 100.0 100.0     Recent Labs     04/05/21  0354 04/04/21  1953 04/04/21  1540   FIO2I 0.70 100 100   HCO3I 20.0* 19.0* 28.3*   PCO2I 31.8* 35.1 64.9*   PHI 7.41 7.34* 7.25*   PO2I 80 94 101*       ENDO:  Lab Results   Component Value Date/Time    TSH 1.35 04/04/2021 05:11 PM       Lab Results   Component Value Date/Time    Glucose 93 04/05/2021 12:56 AM    Glucose 102 (H) 04/04/2021 05:11 PM    Glucose 144 (H) 04/04/2021 09:40 AM         CARDIO:  Telemetry:normal sinus rhythm   Lab Results   Component Value Date/Time     04/05/2021 06:15 AM    CK - MB 2.8 04/05/2021 06:15 AM    CK-MB Index 1.7 04/05/2021 06:15 AM    Troponin-I, Qt. 0.06 (H) 04/04/2021 01:40 PM    Troponin-I, QT 0.28 (H) 04/05/2021 06:15 AM       EKG Results     Procedure 720 Value Units Date/Time    EKG, 12 LEAD, SUBSEQUENT [[id number]]     Order Status: Canceled                MEDS: Please also see MAR  Current Facility-Administered Medications   Medication Dose Route Frequency    midazolam in normal saline (VERSED) 1 mg/mL infusion  0-10 mg/hr IntraVENous TITRATE    vancomycin (VANCOCIN) 1750 mg in  ml infusion  1,750 mg IntraVENous C61W    multivit-folic acid-herbal 381 (WELLESSE PLUS) oral liquid 30 mL  30 mL Per NG tube DAILY    chlorhexidine (PERIDEX) 0.12 % mouthwash 10 mL  10 mL Oral Q12H    propofol (DIPRIVAN) 10 mg/mL infusion  0-50 mcg/kg/min IntraVENous TITRATE    fentaNYL (PF) 900 mcg/30 ml infusion soln  0-200 mcg/hr IntraVENous TITRATE    famotidine (PF) (PEPCID) 20 mg in 0.9% sodium chloride 10 mL injection  20 mg IntraVENous Q12H    dexamethasone (DECADRON) 4 mg/mL injection 4 mg  4 mg IntraVENous Q6H    insulin lispro (HUMALOG) injection   SubCUTAneous Q6H    ciprofloxacin HCl (CILOXAN) 0.3 % ophthalmic solution 2 Drop  2 Drop Both Eyes Q2HWA    Followed by   Wendy Locus ON 4/6/2021] ciprofloxacin HCl (CILOXAN) 0.3 % ophthalmic solution 2 Drop  2 Drop Both Eyes Q4H    piperacillin-tazobactam (ZOSYN) 4.5 g in 0.9% sodium chloride (MBP/ADV) 100 mL MBP  4.5 g IntraVENous Q6H     MICRO:       Results     Procedure Component Value Units Date/Time    Preston Dontae, URINE [[id number]] Collected: 04/04/21 1801    Order Status: Completed Specimen: Urine, random Updated: 04/04/21 1944     Strep pneumo Ag, urine Negative       LEGIONELLA PNEUMOPHILA AG, URINE [[id number]] Collected: 04/04/21 1801    Order Status: Completed Specimen: Urine, random Updated: 04/04/21 1944     Legionella Ag, urine Negative       RESPIRATORY VIRUS PANEL W/COVID-19, PCR [[id number]] Collected: 04/04/21 1700    Order Status: Completed Specimen: Nasopharyngeal Updated: 04/04/21 2020     Adenovirus Not detected        Coronavirus 229E Not detected        Coronavirus HKU1 Not detected        Coronavirus CVNL63 Not detected        Coronavirus OC43 Not detected        Metapneumovirus Not detected        Rhinovirus and Enterovirus Not detected        Influenza A Not detected        Influenza A, subtype H1 Not detected        Influenza A, subtype H3 Not detected        INFLUENZA A H1N1 PCR Not detected        Influenza B Not detected        Parainfluenza 1 Not detected        Parainfluenza 2 Not detected Parainfluenza 3 Not detected        Parainfluenza virus 4 Not detected        RSV by PCR Not detected        B. parapertussis, PCR Not detected        Bordetella pertussis - PCR Not detected        Chlamydophila pneumoniae DNA, QL, PCR Not detected        Mycoplasma pneumoniae DNA, QL, PCR Not detected        SARS-CoV-2, PCR Not detected       CULTURE, MRSA [[id number]] Collected: 04/04/21 1700    Order Status: Completed Specimen: Nasal from Nares Updated: 04/05/21 0932    COVID-19 RAPID TEST [[id number]] Collected: 04/04/21 1112    Order Status: Completed Specimen: Nasopharyngeal Updated: 04/04/21 1151     Specimen source Nasopharyngeal        COVID-19 rapid test Not Detected        Comment:   Rapid NAAT:  The specimen is NEGATIVE for SARS-CoV-2, the novel coronavirus associated with COVID-19. Negative results should be treated as presumptive and, if inconsistent with clinical signs and symptoms or necessary for patient management, should be tested with an alternative molecular assay. Negative results do not preclude SARS-CoV-2 infection and should not be used as the sole basis for patient management decisions. This test has been authorized by the FDA under an Emergency Use   Authorization (EUA) for use by authorized laboratories. Fact sheet for Healthcare Providers: ConventionUpdate.co.nz Fact sheet for Patients: ConventionUpdate.co.nz   Methodology: Isothermal Nucleic Acid Amplification                   Imaging:  I have personally reviewed the patients radiographs and have reviewed the reports:    CXR Results  (Last 48 hours)               04/05/21 0604  XR CHEST PORT Final result    Impression:      Persistent bilateral airspace opacities. Narrative:  EXAM: XR CHEST PORT       INDICATION: Respiratory Failure; Check ETT Placement       COMPARISON: Recent prior       FINDINGS: A portable AP radiograph of the chest was obtained at 0 445 hours.  The   patient is on a cardiac monitor. Patchy bilateral airspace opacities without   gross interval change. Stable cardiac silhouette. .  No acute bone findings. Stable endotracheal tube. Esophageal not clearly delineated. Phyllis Fang 04/04/21 1846  XR CHEST PORT Final result    Impression:  1.  ET tube without evidence of complication. 2.  Bilateral airspace opacities with mild improved aeration of the right lung. Narrative:  EXAM: Chest X-Ray       INDICATION:  Check ETT placement. TECHNIQUE: AP view of the chest       COMPARISON: 4/4/2021 at 1029 hours       FINDINGS:    Tubes and Lines: Patient is intubated. ET tube is 7 cm above the gabriella. Pleura: No pneumothorax appreciated. No effusions appreciated. Lungs:  Left perihilar opacities are noted. There is mild improved expansion of   the right lung. Hazy opacities are noted in the right hemithorax. Cardiac/Mediastinum/Aorta: Cardiac silhouette is enlarged. Pulmonary Vascularity: The pulmonary vasculature is unremarkable. Chest Wall: No acute finding       Osseous Structures: Postoperative changes are noted in the left AC joint. Upper Abdomen: No acute findings appreciated. 04/04/21 1034  XR CHEST PORT Final result    Impression:      Interstitial and alveolar process, with bilateral pulmonary consolidation. Appearance concerning for atypical infection. Narrative:  CLINICAL HISTORY:  Shortness of breath. Airway insertion. COMPARISONS:  None. TECHNIQUE:  single frontal view of the chest       ------------------------------------------       FINDINGS:       Lungs:  Patchy multifocal lung parenchymal consolidation bilaterally. Mild   associated increase in pulmonary interstitial markings. No evidence of pleural   fluid. Mediastinum: Mild to moderate cardiomegaly. Tracheostomy tube, appears in good   position.  NG/OG tube passes beyond edge of film in expected location of stomach Bones: No evidence of fracture or suspicious bone lesion.           ------------------------------------------               CT Results  (Last 48 hours)    None           Best practice :  Core measures:    Glycemic control  [facility]. Ventilated patients- aim to keep peak plateau pressure 89-25XB H2O. Sress ulcer prophylaxis  DVT prophylaxis               Critical Care Time:  The services I provided to this patient were to treat and/or prevent clinically significant deterioration that could result in the failure of one or more body systems and/or organ systems due to respiratory distress, hypoxia, cardiac dysrhythmia. Services included the following:  -reviewing nursing notes and old charts  -vital sign assessments   -direct patient care  -medication orders and management  -interpreting and reviewing diagnostic studies/labs  -re-evaluations  -documentation time        Aggregate critical care time was 35 minutes, which includes only time during which I was engaged in work directly related to the patient's care as described above. During this entire length of time I was immediately available to the patient. The reason for providing this level of medical care for this critically ill patient was due a critical illness that impaired one or more vital organ systems such that there was a high probability of imminent or life threatening deterioration in the patients condition. This care involved high complexity decision making to assess, manipulate, and support vital system functions, to treat this degreee vital organ system failure and to prevent further life threatening deterioration of the patients condition      Complex decision making was made in the evaluation and management plans during this consultation. More than 50% of time was spent in counseling and coordination of care including review of data and discussion with other team members.       Achilles Forth, DO, [address] [facility]  Pulmonary, Critical Care, and Sleep Medicine

## 2021-04-05 NOTE — INTERDISCIPLINARY ROUNDS
[address] [place] [address] [facility]  Pulmonary, Critical Care, and Sleep Medicine  Interdisciplinary and Ventilator Weaning Rounds    Patient discussed in morning walking rounds and interdisciplinary rounds. ICU day: 2    Overnight events:    No acute over night events   Cr improving    Assessments and best practice:   Ventilator  o Ventilator day 2  o Vent settings: FiO2 of 70 and PEEP of 6  o VAP bundle, aim to keep peak plateau pressure 85-06TM H2O  o Weaning assessed and documented   - Patient does not meet criteria for SBT. - Patient is not on sedation holiday. - Plan not to wean. - Outcome: n/a  - Final plan: will remain on mechanical ventilatory support today   Sedation  o Propofol, Fentanyl  and Versed   Other pertinent drips  o normosol   Lines noted  o peripheral IVs   Critical labs assessed  o Yes   Antibiotics  o Zosyn and Vancomycin   Medications reviewed  o Yes   Pending imaging  o none   Pending send out labs  o Yes   Pending Procedures  o None   Glycemic control   Stress ulcer prophylaxis. o Pepcid   DVT prophylaxis. o SCDs   Need for Lines, cardona assessed.  o Yes   Restraint Reevaluation   o Yes  o I have reevaluated the patient one hour after initiation of intervention. The patient is comfortable, uninjured, but continues to pose an imminent risk of injury to self to themselves and/or serious disruption of medical treatment required to keep patient stable. The patient's current medical and behavioral conditions that warrant the use intervention include danger to self and Interference with medical treatment. Restraint or seclusion will be discontinued at the earliest possible time, regardless of the scheduled expiration of the order.  Based on my evaluation, restraints will be continued: YES 4/5/21   o Attending Overseeing restraints: Abiola Watson DO     Family contact/MPOA: Trista Metcalf, son & sisterStephen  Family updated: sisterSukh, updated this AM      Daily Plans:   Continue mechanical ventilatory suppor   Keep well sedated w/ RASS of -3 to -4    REGGIE time 15 minutes        Mari Ladd PA-C  04/05/21  Pulmonary, Critical Care Medicine  [facility]

## 2021-04-05 NOTE — ROUTINE PROCESS
Bedside and Verbal shift change report given to [facility] RN and Mariela Haro RN (oncoming nurse) by Kae Valle RN (offgoing nurse). Report included the following information SBAR, ED Summary, OR Summary, Procedure Summary, Intake/Output, MAR, Recent Results and Cardiac Rhythm NSR.     0010 Extended discussion with armida Baker regarding pt status. Son informed that pt is currently hemodynamically stable, has had an ETT placed, is currently restrained and sedated. Son informed that pt remains critically ill and is being closely observed by staff. Pt required increases in fentanyl dose overnight to maintain appropriate level of sedation. Versed drip started after pt required multiple boluses to maintain goal RASS. See MAR for dosing. Bedside and Verbal shift change report given to [facility] (oncoming nurse) by [facility] RN (offgoing nurse). Report included the following information SBAR, Intake/Output, MAR, Recent Results and Cardiac Rhythm 1st degree AVB.

## 2021-04-05 NOTE — PROGRESS NOTES
Otolaryngology head neck surgery    Patient remains on vent  Decision made to maintain patient on ventilator following swelling to resolve which is probably optimal in light of patient's exam yesterday   At the present time patient sedated  Chest x-ray reviewed which reveals some significant infiltrates however uncertain whether this may simply be some aspiration or infectious process    At the present time patient's angioedema should resolve with use of steroids and other medical treatment  Patient's ET tube is fairly deep since this is only a 6.5 tube  Neck wound should heal quite nicely  May change Xeroform gauze daily    Leticia Jalloh

## 2021-04-05 NOTE — OP NOTES
[address] [place] [address]   OPERATIVE REPORT    Name:  Franki Pena  MR#:   [id number]  :  1950  ACCOUNT #:  [de-identified]  DATE OF SERVICE:  2021    PREOPERATIVE DIAGNOSIS:  Upper airway obstruction secondary to angioedema. POSTOPERATIVE DIAGNOSIS:  Upper airway obstruction secondary to angioedema. PROCEDURES PERFORMED:  Direct laryngoscopy and removal of cricothyroidotomy tracheostomy tube. SURGEON:  Elsa Lake MD    ASSISTANT:  None. ANESTHESIA:  General endotracheal anesthesia. COMPLICATIONS:  None. SPECIMENS REMOVED:  None. IMPLANTS:  #6.5 ET tube placed at 26 cm at the lip line. ESTIMATED BLOOD LOSS:  0.    DRAINS:  None. OPERATIVE PROCEDURE:  The patient was brought to the operating room. The patient has a very complex history including allergy to lisinopril for which he has upper airway obstruction. The patient underwent cricothyroidotomy at [facility], was air lifted here for definitive airway placement. Preoperative eval revealed the patient with some swelling intraorally; however, this does not appear to be too severe. I discussed the situation with Anesthesia, we hope to place a orally placed endotracheal tube in with revision tracheotomy. The patient was brought to the operating room, placed supine on the operating room table. GlideScope was used. The patient's oral cavity, oropharynx, hypopharynx and larynx were all examined. The patient was noted to have some significant edema, more pronounced on the patient's right than left side; however, both vocal cords could be clearly visualized. Number of times, however, we made passing of the endotracheal tube by Anesthesia with some difficulty, this was finally obtained. Once this was noted to be in the proper position, the cuff was deflated from the cricothyroidotomy tube and removed as the cuff passed through.     Once the above was complete, Xeroform gauze was packed into the wound and placed around the neck to prevent any potential air leakage. The patient was subsequently taken from the operating room to the ICU in stable condition after correct needle and sponge counts were obtained.       Nitish Velasquez MD      [id number]  D:  04/04/2021 17:03  T:  04/04/2021 21:17  JOB #:  [id number]

## 2021-04-05 NOTE — PROGRESS NOTES
1930 Bedside and Verbal shift change report given to jarad rn (oncoming nurse) by Juliet Norton (offgoing nurse). Report included the following information SBAR, Intake/Output and Recent Results   2000 assessment completed. Supplies for dressing at bedside , for DR Shanique Young to do initial dressing, oral and cardona care completed. 2200 b.nancie arteaga in to assess patient, aware of uop, will continue to monitor. 0000 reassessment completed. Oral and cardona care completed. 0100 b.nancie arteaga in to assess patient and uop. 0140 b. Mervat Dickens pa notified of heart rate  48-52 range, decreased versed to  7mg/hr and asked lab to come collect am labs, temp 98.6 bp stable. 0400 reassessment completed. Oral and cardona care completed. 0730 Bedside and Verbal shift change report given to tyree english (oncoming nurse) by jarad rn (offgoing nurse). Report included the following information SBAR, Intake/Output and Recent Results side rails up, hob elevated 30 degrees. Karely Leal

## 2021-04-05 NOTE — PROGRESS NOTES
Late entry:  Patient received from [employer], transferred via flight from [facility] for emergent airway stabilization and possible trach.  Dr Thomas Klein and entire surgical team present, pt was brought directly into OR on transfer

## 2021-04-05 NOTE — CONSULTS
[address] [facility]    Name:  Karolina Gomez  MR#:   [id number]  :  1950  ACCOUNT #:  [de-identified]  DATE OF SERVICE:  2021    REASON FOR CONSULTATION:  Upper airway obstruction. HISTORY OF PRESENT ILLNESS:  The patient is a 72-year-old male who was air-lifted from [facility].  The patient has a past medical history significant for hypertension, presented to the emergency room in [facility] with complaints of trouble breathing, noted that his tongue and lips were swollen, was having difficulty breathing due to the throat closing up. The patient was unable to tolerate his secretions. He denied any chest pain, shortness of breath, fevers, or chills. The patient does have a history of use of lisinopril. Denies any previous history of this in the past.    The patient initially was attempted to be intubated at [facility]. This was unsuccessful due to the significant amount of swelling. A cricothyroidotomy was subsequently performed; however, the patient was then having difficulties with hypotension. I was consulted at around the time of the cricothyroidotomy asking for acceptance for transfer. Acceptance was noted. This was also discussed with the ICU staff as well. In any event, the patient was subsequently transferred to [facility] for possible tracheotomy and ICU placement. ALLERGIES:  INCLUDE LISINOPRIL. CURRENT MEDICATION USE:  Includes Unasyn, Decadron, Ativan, Diprivan, and vasopressin. PAST SURGICAL HISTORY:  Unable to be obtained. PAST MEDICAL HISTORY:  Also unable to be obtained. SOCIAL HISTORY:  Unable to be obtained. The patient is presently on a ventilator at this time. He has no family around. PHYSICAL EXAMINATION:  GENERAL:  Reveals a well-developed, well-nourished, large white male with a BMI of 38. HEENT:  Ear exam reveals some blood in the ears.   The oral cavity and oropharynx reveal orogastric tube in place.  The intranasal exam reveals no evidence of any abnormality. NECK:  Reveals a cricothyroidotomy with a #5 trach tube in this area. No evidence of any other significant findings are seen. CHEST:  Distant breath sounds. ABDOMEN:  Soft and nontender. EXTREMITIES:  No evidence of any edematous changes. I have been contacted at 11:00 a.m. in regards to transfer. I immediately proceeded to the hospital at [facility] in anticipation of a transfer. Unfortunately, I was on standby for approximately four hours waiting for the patient to be transferred. While the patient was at [facility], there was inability to obtain ground transport; hence, helicopter transfer was arranged; however, there was still need for ground transfer to and from [address] [place] [address] both at [facility] and [facility]. In any event, the patient arrived at approximately  and standby was for four hours. At this point, we will proceed with possible tracheotomy. I have discussed the situation at length with Anesthesia. We will at least initiate an attempt at orotracheal intubation to see if we may remove the cricothyroidotomy and placement of an oral-placed trach tube. If this was unable to be obtained, we will proceed with tracheotomy at this time. There were no family members around at this point to discuss the above. I will make arrangements for the above in an emergent setting.       Mari Henry MD      [id number]  D:  04/04/2021 16:59  T:  04/04/2021 22:12  JOB #:  [id number]

## 2021-04-05 NOTE — PROGRESS NOTES
Reason for Admission:  Angioedema due to angiotensin converting enzyme inhibitor (ACE-I) [T78. 3XXA, T46.4X5A]                 RUR Score:    17%           Plan for utilizing home health: To be determined                    Likelihood of Readmission:   LOW                         Transition of Care Plan:              Initial assessment completed with Jeri Weaver and [facility] via phone. Cognitive status of patient: unable to assess. Patient is intubated. Face sheet information confirmed:  yes. Jeri Weaver, son  ([phone]) and [facility], sister ([phone]) provided needed information. This patient lives in a single family home with son. Patient was able to navigate steps as needed. Prior to hospitalization, patient was considered to be independent with ADLs/IADLS : yes . Patient has a current ACP document on file: no      Healthcare Decision Maker:  no    Transportation needs to be determined upon discharge. The patient's sister reported no medical equipment available in the home. Patient is not currently active with home health. Patient has not stayed in a skilled nursing facility or rehab. This patient is on dialysis :no    Freedom of choice signed: no.     Currently, the discharge plan is Unable to determine at this time. CM will continue to assist with transitional needs. The patient's sister statedthat he can obtain his medications from the pharmacy, and take his medications as directed. Patient's [facility] [id number] [facility] and [facility] Management Interventions  PCP Verified by CM: Yes  Mode of Transport at Discharge: Self  Transition of Care Consult (CM Consult): Discharge Planning  Discharge Durable Medical Equipment: No  Physical Therapy Consult: No  Occupational Therapy Consult: No  Speech Therapy Consult: No  Current Support Network:  Other(Son lives with patient.)  Confirm Follow Up Transport: Self  Discharge Location  Discharge Placement: Unable to determine at this time      CRAIG GerardN, RN  Pager # [phone]  Care Manager

## 2021-04-05 NOTE — ROUTINE PROCESS
Bedside shift change report given to Di Tobin RN (oncoming nurse) by Che Rodriguez RN (offgoing nurse). Report included the following information SBAR, Kardex, Intake/Output, MAR and Recent Results.

## 2021-04-05 NOTE — PROGRESS NOTES
Comprehensive Nutrition Assessment    Type and Reason for Visit: Initial, NPO/clear liquid    Nutrition Recommendations/Plan:   - Initiate tube feeding of Vital Felipe Protein at 10 mL/hr and advance as tolerated by 10 mL q 8 hours to 30 mL/hr with prosource TID, daily multivitamin and 150 mL q 4 hour water flushes; increase to goal rate of 55 mL/hr once propofol is stopped (goal regimen to provide 1500 kcal, 161 gm protein, 1104 mL free water, 93% RDIs). - IVF per MD.       Nutrition Assessment:  Intubated with plan to place OGT, unable to place initially due to swelling. Malnutrition Assessment:  Malnutrition Status: At risk for malnutrition (specify)(inability to tolerate oral diet due to respiratory status)      Nutrition History and Allergies: Past medical history of hypertension and diabetes admitted with angioedema due to angiotensin converting enzyme inhibitor s/p cardiac arrest and intubated for airway protection s/p emergent cricothyrotomy converted to ETT intraoperative. NKFA. Estimated Daily Nutrient Needs:  Energy (kcal): 4345-7721; Weight Used for Energy Requirements: Current(127 kg)  Protein (g): 156-195; Weight Used for Protein Requirements: Ideal(78 kg x 2-2.5)  Fluid (ml/day): 4932-7727; Method Used for Fluid Requirements: 1 ml/kcal    Nutrition Related Findings:  Last BM 4/4. Medications: IVF (Normosol at 100 mL/hr)- plan to stop today, 15 mmol Na Phos, propofol at 40 mcg/kg/min      Wounds:  Surgical incision       Additional Caloric Sources: propofol at 40 mcg/kg/min (805 kcal per day)     Current Nutrition Therapies:  DIET NPO    Anthropometric Measures:  · Height:  5' 11\" (180.3 cm)  · Current Body Wt:  127 kg (279 lb 15.8 oz)   · Admission Body Wt:  279 lb 15.8 oz    · Usual Body Wt:  113.4 kg (250 lb)(3/22/21)     · Ideal Body Wt:  172 lbs:  162.8 %   · BMI Category:  Obese class 2 (BMI 35.0-39. 9)       Nutrition Diagnosis:   · Inadequate oral intake related to cognitive or neurological impairment, impaired respiratory function, swallowing difficulty as evidenced by NPO or clear liquid status due to medical condition, intubation      Nutrition Interventions:   Food and/or Nutrient Delivery: Continue NPO, Start tube feeding, IV fluid delivery, Vitamin supplement  Nutrition Education and Counseling: Education not indicated  Coordination of Nutrition Care: Continue to monitor while inpatient, Interdisciplinary rounds    Goals:  Nutritional needs will be met through adequate oral intake or nutrition support within the next 7 days       Nutrition Monitoring and Evaluation:   Behavioral-Environmental Outcomes: None identified  Food/Nutrient Intake Outcomes: Enteral nutrition intake/tolerance, Vitamin/mineral intake, IVF intake  Physical Signs/Symptoms Outcomes: Biochemical data, GI status, Fluid status or edema, Hemodynamic status, Nutrition focused physical findings    Discharge Planning:     Too soon to determine     Electronically signed by Reba Schilder, RD, [address]  on 4/5/2021 at 12:34 PM    Contact: [phone]

## 2021-04-05 NOTE — PROGRESS NOTES
attended the interdisciplinary rounds for Jose R Grace, who is a 79 y.o.,male. Patients Primary Language is: Giang Ghvenita. According to the patients EMR Taoism Affiliation is: No preference. The reason the Patient came to the hospital is:   Patient Active Problem List    Diagnosis Date Noted    Respiratory failure ([facility] [address] 04/05/2021    Angioedema due to angiotensin converting enzyme inhibitor (ACE-I) 04/04/2021      Plan:  Chaplains will continue to follow and will provide pastoral care on an as needed/requested basis.  recommends bedside caregivers page  on duty if patient shows signs of acute spiritual or emotional distress.     [address] [facility]  Board Certified [address] [place] [address]   [phone]

## 2021-04-06 ENCOUNTER — APPOINTMENT (OUTPATIENT)
Dept: GENERAL RADIOLOGY | Age: 71
DRG: 004 | End: 2021-04-06
Attending: INTERNAL MEDICINE
Payer: MEDICARE

## 2021-04-06 ENCOUNTER — APPOINTMENT (OUTPATIENT)
Dept: GENERAL RADIOLOGY | Age: 71
DRG: 004 | End: 2021-04-06
Attending: PHYSICIAN ASSISTANT
Payer: MEDICARE

## 2021-04-06 ENCOUNTER — APPOINTMENT (OUTPATIENT)
Dept: NON INVASIVE DIAGNOSTICS | Age: 71
DRG: 004 | End: 2021-04-06
Attending: INTERNAL MEDICINE
Payer: MEDICARE

## 2021-04-06 LAB
ANION GAP SERPL CALC-SCNC: 5 MMOL/L (ref 3–18)
ARTERIAL PATENCY WRIST A: ABNORMAL
ARTERIAL PATENCY WRIST A: YES
ATRIAL RATE: 54 BPM
BACTERIA SPEC CULT: NORMAL
BACTERIA SPEC CULT: NORMAL
BASE DEFICIT BLD-SCNC: 2 MMOL/L
BASE DEFICIT BLD-SCNC: 2 MMOL/L
BASOPHILS # BLD: 0 K/UL (ref 0–0.1)
BASOPHILS NFR BLD: 0 % (ref 0–2)
BDY SITE: ABNORMAL
BDY SITE: NORMAL
BNP SERPL-MCNC: 378 PG/ML (ref 0–900)
BODY TEMPERATURE: 36.9
BUN SERPL-MCNC: 37 MG/DL (ref 7–18)
BUN/CREAT SERPL: 19 (ref 12–20)
C4 SERPL-MCNC: 38 MG/DL (ref 12–38)
CA-I SERPL-SCNC: 1.15 MMOL/L (ref 1.12–1.32)
CALCIUM SERPL-MCNC: 7.5 MG/DL (ref 8.5–10.1)
CALCULATED P AXIS, ECG09: 44 DEGREES
CALCULATED R AXIS, ECG10: -20 DEGREES
CALCULATED T AXIS, ECG11: 130 DEGREES
CHLORIDE SERPL-SCNC: 112 MMOL/L (ref 100–111)
CO2 SERPL-SCNC: 25 MMOL/L (ref 21–32)
CREAT SERPL-MCNC: 1.99 MG/DL (ref 0.6–1.3)
DIAGNOSIS, 93000: NORMAL
DIFFERENTIAL METHOD BLD: ABNORMAL
ECHO AO ASC DIAM: 3.58 CM
ECHO AO ROOT DIAM: 3.91 CM
ECHO LA AREA 4C: 16.04 CM2
ECHO LA VOL 2C: 57.18 ML (ref 18–58)
ECHO LA VOL 4C: 27.98 ML (ref 18–58)
ECHO LA VOL BP: 45.95 ML (ref 18–58)
ECHO LA VOL/BSA BIPLANE: 19.52 ML/M2 (ref 16–28)
ECHO LA VOLUME INDEX A2C: 24.29 ML/M2 (ref 16–28)
ECHO LA VOLUME INDEX A4C: 11.89 ML/M2 (ref 16–28)
ECHO LV E' LATERAL VELOCITY: 3.76 CM/S
ECHO LV E' SEPTAL VELOCITY: 3.94 CM/S
ECHO LV INTERNAL DIMENSION DIASTOLIC: 5.05 CM (ref 4.2–5.9)
ECHO LV INTERNAL DIMENSION SYSTOLIC: 3.88 CM
ECHO LV IVSD: 1.04 CM (ref 0.6–1)
ECHO LV MASS 2D: 175.2 G (ref 88–224)
ECHO LV MASS INDEX 2D: 74.4 G/M2 (ref 49–115)
ECHO LV POSTERIOR WALL DIASTOLIC: 0.88 CM (ref 0.6–1)
ECHO LVOT CARDIAC OUTPUT: 3.56 LITER/MINUTE
ECHO LVOT DIAM: 2.52 CM
ECHO LVOT PEAK GRADIENT: 3.04 MMHG
ECHO LVOT PEAK VELOCITY: 87.16 CM/S
ECHO LVOT SV: 74.4 ML
ECHO LVOT VTI: 14.98 CM
ECHO MV A VELOCITY: 81 CM/S
ECHO MV E DECELERATION TIME (DT): 233.19 MS
ECHO MV E VELOCITY: 65.62 CM/S
ECHO MV E/A RATIO: 0.81
ECHO MV E/E' LATERAL: 17.45
ECHO MV E/E' RATIO (AVERAGED): 17.05
ECHO MV E/E' SEPTAL: 16.65
ECHO RV TAPSE: 2.06 CM (ref 1.5–2)
ECHO TV REGURGITANT MAX VELOCITY: 363.9 CM/S
ECHO TV REGURGITANT PEAK GRADIENT: 52.97 MMHG
EOSINOPHIL # BLD: 0 K/UL (ref 0–0.4)
EOSINOPHIL NFR BLD: 0 % (ref 0–5)
ERYTHROCYTE [DISTWIDTH] IN BLOOD BY AUTOMATED COUNT: 15.6 % (ref 11.6–14.5)
GAS FLOW.O2 O2 DELIVERY SYS: ABNORMAL L/MIN
GAS FLOW.O2 O2 DELIVERY SYS: NORMAL L/MIN
GAS FLOW.O2 SETTING OXYMISER: 16 BPM
GAS FLOW.O2 SETTING OXYMISER: 20 BPM
GLUCOSE BLD STRIP.AUTO-MCNC: 134 MG/DL (ref 70–110)
GLUCOSE BLD STRIP.AUTO-MCNC: 136 MG/DL (ref 70–110)
GLUCOSE BLD STRIP.AUTO-MCNC: 147 MG/DL (ref 70–110)
GLUCOSE BLD STRIP.AUTO-MCNC: 149 MG/DL (ref 70–110)
GLUCOSE SERPL-MCNC: 133 MG/DL (ref 74–99)
HCO3 BLD-SCNC: 22.8 MMOL/L (ref 22–26)
HCO3 BLD-SCNC: 23.7 MMOL/L (ref 22–26)
HCT VFR BLD AUTO: 33.6 % (ref 36–48)
HGB BLD-MCNC: 11 G/DL (ref 13–16)
INSPIRATION.DURATION SETTING TIME VENT: 1 SEC
INSPIRATION.DURATION SETTING TIME VENT: 1 SEC
LVOT MG: 1.17 MMHG
LYMPHOCYTES # BLD: 0.7 K/UL (ref 0.9–3.6)
LYMPHOCYTES NFR BLD: 8 % (ref 21–52)
MAGNESIUM SERPL-MCNC: 2 MG/DL (ref 1.6–2.6)
MCH RBC QN AUTO: 26.5 PG (ref 24–34)
MCHC RBC AUTO-ENTMCNC: 32.7 G/DL (ref 31–37)
MCV RBC AUTO: 81 FL (ref 74–97)
MONOCYTES # BLD: 0.5 K/UL (ref 0.05–1.2)
MONOCYTES NFR BLD: 6 % (ref 3–10)
NEUTS SEG # BLD: 7 K/UL (ref 1.8–8)
NEUTS SEG NFR BLD: 86 % (ref 40–73)
NITRIC:PPM ISTAT,INITR: 0 PPM
O2/TOTAL GAS SETTING VFR VENT: 0.55 %
O2/TOTAL GAS SETTING VFR VENT: 100 %
P-R INTERVAL, ECG05: 190 MS
PCO2 BLD: 35.4 MMHG (ref 35–45)
PCO2 BLD: 46.8 MMHG (ref 35–45)
PEEP RESPIRATORY: 10 CMH2O
PEEP RESPIRATORY: 6 CMH2O
PH BLD: 7.31 [PH] (ref 7.35–7.45)
PH BLD: 7.42 [PH] (ref 7.35–7.45)
PHOSPHATE SERPL-MCNC: 3.6 MG/DL (ref 2.5–4.9)
PIP ISTAT,IPIP: 30
PIP ISTAT,IPIP: 34
PLATELET # BLD AUTO: 166 K/UL (ref 135–420)
PMV BLD AUTO: 11.1 FL (ref 9.2–11.8)
PO2 BLD: 75 MMHG (ref 80–100)
PO2 BLD: 87 MMHG (ref 80–100)
POTASSIUM SERPL-SCNC: 4.2 MMOL/L (ref 3.5–5.5)
Q-T INTERVAL, ECG07: 588 MS
QRS DURATION, ECG06: 98 MS
QTC CALCULATION (BEZET), ECG08: 557 MS
RBC # BLD AUTO: 4.15 M/UL (ref 4.7–5.5)
SAO2 % BLD: 93 % (ref 92–97)
SAO2 % BLD: 97 % (ref 92–97)
SERVICE CMNT-IMP: ABNORMAL
SERVICE CMNT-IMP: NORMAL
SERVICE CMNT-IMP: NORMAL
SODIUM SERPL-SCNC: 142 MMOL/L (ref 136–145)
SPECIMEN TYPE: ABNORMAL
SPECIMEN TYPE: NORMAL
TOTAL RESP. RATE, ITRR: 17
TOTAL RESP. RATE, ITRR: 20
VENTILATION MODE VENT: ABNORMAL
VENTILATION MODE VENT: NORMAL
VENTRICULAR RATE, ECG03: 54 BPM
VOLUME CONTROL PLUS IVLCP: YES
VOLUME CONTROL PLUS IVLCP: YES
VT SETTING VENT: 561 ML
VT SETTING VENT: 570 ML
WBC # BLD AUTO: 8.1 K/UL (ref 4.6–13.2)

## 2021-04-06 PROCEDURE — 99292 CRITICAL CARE ADDL 30 MIN: CPT | Performed by: INTERNAL MEDICINE

## 2021-04-06 PROCEDURE — 2709999900 HC NON-CHARGEABLE SUPPLY

## 2021-04-06 PROCEDURE — C8929 TTE W OR WO FOL WCON,DOPPLER: HCPCS

## 2021-04-06 PROCEDURE — 36600 WITHDRAWAL OF ARTERIAL BLOOD: CPT

## 2021-04-06 PROCEDURE — 94640 AIRWAY INHALATION TREATMENT: CPT

## 2021-04-06 PROCEDURE — 84100 ASSAY OF PHOSPHORUS: CPT

## 2021-04-06 PROCEDURE — 85025 COMPLETE CBC W/AUTO DIFF WBC: CPT

## 2021-04-06 PROCEDURE — 93306 TTE W/DOPPLER COMPLETE: CPT | Performed by: INTERNAL MEDICINE

## 2021-04-06 PROCEDURE — 87205 SMEAR GRAM STAIN: CPT

## 2021-04-06 PROCEDURE — 74011000250 HC RX REV CODE- 250: Performed by: PHYSICIAN ASSISTANT

## 2021-04-06 PROCEDURE — 94003 VENT MGMT INPAT SUBQ DAY: CPT

## 2021-04-06 PROCEDURE — 99291 CRITICAL CARE FIRST HOUR: CPT | Performed by: INTERNAL MEDICINE

## 2021-04-06 PROCEDURE — 83735 ASSAY OF MAGNESIUM: CPT

## 2021-04-06 PROCEDURE — 74011000258 HC RX REV CODE- 258: Performed by: PHYSICIAN ASSISTANT

## 2021-04-06 PROCEDURE — 74011250636 HC RX REV CODE- 250/636: Performed by: PHYSICIAN ASSISTANT

## 2021-04-06 PROCEDURE — 94762 N-INVAS EAR/PLS OXIMTRY CONT: CPT

## 2021-04-06 PROCEDURE — 36415 COLL VENOUS BLD VENIPUNCTURE: CPT

## 2021-04-06 PROCEDURE — 71045 X-RAY EXAM CHEST 1 VIEW: CPT

## 2021-04-06 PROCEDURE — 74011250636 HC RX REV CODE- 250/636: Performed by: INTERNAL MEDICINE

## 2021-04-06 PROCEDURE — 65610000006 HC RM INTENSIVE CARE

## 2021-04-06 PROCEDURE — 82803 BLOOD GASES ANY COMBINATION: CPT

## 2021-04-06 PROCEDURE — 93005 ELECTROCARDIOGRAM TRACING: CPT

## 2021-04-06 PROCEDURE — 74011250636 HC RX REV CODE- 250/636: Performed by: STUDENT IN AN ORGANIZED HEALTH CARE EDUCATION/TRAINING PROGRAM

## 2021-04-06 PROCEDURE — 89220 SPUTUM SPECIMEN COLLECTION: CPT

## 2021-04-06 PROCEDURE — 83880 ASSAY OF NATRIURETIC PEPTIDE: CPT

## 2021-04-06 PROCEDURE — 74011000250 HC RX REV CODE- 250: Performed by: STUDENT IN AN ORGANIZED HEALTH CARE EDUCATION/TRAINING PROGRAM

## 2021-04-06 PROCEDURE — 80048 BASIC METABOLIC PNL TOTAL CA: CPT

## 2021-04-06 PROCEDURE — 82962 GLUCOSE BLOOD TEST: CPT

## 2021-04-06 PROCEDURE — 74011250637 HC RX REV CODE- 250/637: Performed by: INTERNAL MEDICINE

## 2021-04-06 PROCEDURE — 77030018798 HC PMP KT ENTRL FED COVD -A

## 2021-04-06 PROCEDURE — 82330 ASSAY OF CALCIUM: CPT

## 2021-04-06 RX ORDER — ARFORMOTEROL TARTRATE 15 UG/2ML
15 SOLUTION RESPIRATORY (INHALATION)
Status: DISCONTINUED | OUTPATIENT
Start: 2021-04-06 | End: 2021-05-13 | Stop reason: HOSPADM

## 2021-04-06 RX ORDER — FUROSEMIDE 10 MG/ML
80 INJECTION INTRAMUSCULAR; INTRAVENOUS 2 TIMES DAILY
Status: DISCONTINUED | OUTPATIENT
Start: 2021-04-06 | End: 2021-04-06

## 2021-04-06 RX ORDER — ALBUTEROL SULFATE 0.83 MG/ML
2.5 SOLUTION RESPIRATORY (INHALATION)
Status: DISCONTINUED | OUTPATIENT
Start: 2021-04-06 | End: 2021-04-15

## 2021-04-06 RX ORDER — DEXAMETHASONE SODIUM PHOSPHATE 4 MG/ML
4 INJECTION, SOLUTION INTRA-ARTICULAR; INTRALESIONAL; INTRAMUSCULAR; INTRAVENOUS; SOFT TISSUE EVERY 6 HOURS
Status: COMPLETED | OUTPATIENT
Start: 2021-04-06 | End: 2021-04-07

## 2021-04-06 RX ORDER — FUROSEMIDE 10 MG/ML
40 INJECTION INTRAMUSCULAR; INTRAVENOUS 2 TIMES DAILY
Status: DISCONTINUED | OUTPATIENT
Start: 2021-04-06 | End: 2021-04-07

## 2021-04-06 RX ADMIN — CHLORHEXIDINE GLUCONATE 0.12% ORAL RINSE 10 ML: 1.2 LIQUID ORAL at 09:30

## 2021-04-06 RX ADMIN — CHLORHEXIDINE GLUCONATE 0.12% ORAL RINSE 10 ML: 1.2 LIQUID ORAL at 20:38

## 2021-04-06 RX ADMIN — FAMOTIDINE 20 MG: 10 INJECTION INTRAVENOUS at 09:30

## 2021-04-06 RX ADMIN — FENTANYL CITRATE 175 MCG/HR: 50 INJECTION, SOLUTION INTRAMUSCULAR; INTRAVENOUS at 11:15

## 2021-04-06 RX ADMIN — CIPROFLOXACIN 2 DROP: 3 SOLUTION OPHTHALMIC at 14:00

## 2021-04-06 RX ADMIN — PERFLUTREN 2 ML: 6.52 INJECTION, SUSPENSION INTRAVENOUS at 10:32

## 2021-04-06 RX ADMIN — ALBUTEROL SULFATE 2.5 MG: 2.5 SOLUTION RESPIRATORY (INHALATION) at 19:42

## 2021-04-06 RX ADMIN — ARFORMOTEROL TARTRATE 15 MCG: 15 SOLUTION RESPIRATORY (INHALATION) at 19:42

## 2021-04-06 RX ADMIN — Medication 30 ML: at 09:30

## 2021-04-06 RX ADMIN — FUROSEMIDE 40 MG: 10 INJECTION, SOLUTION INTRAMUSCULAR; INTRAVENOUS at 18:15

## 2021-04-06 RX ADMIN — PIPERACILLIN SODIUM AND TAZOBACTAM SODIUM 4.5 G: 4; .5 INJECTION, POWDER, LYOPHILIZED, FOR SOLUTION INTRAVENOUS at 05:30

## 2021-04-06 RX ADMIN — VANCOMYCIN HYDROCHLORIDE 1750 MG: 10 INJECTION, POWDER, LYOPHILIZED, FOR SOLUTION INTRAVENOUS at 04:00

## 2021-04-06 RX ADMIN — CIPROFLOXACIN 2 DROP: 3 SOLUTION OPHTHALMIC at 05:29

## 2021-04-06 RX ADMIN — CIPROFLOXACIN 2 DROP: 3 SOLUTION OPHTHALMIC at 11:30

## 2021-04-06 RX ADMIN — MIDAZOLAM 8 MG/HR: 5 INJECTION INTRAMUSCULAR; INTRAVENOUS at 00:05

## 2021-04-06 RX ADMIN — PIPERACILLIN SODIUM AND TAZOBACTAM SODIUM 4.5 G: 4; .5 INJECTION, POWDER, LYOPHILIZED, FOR SOLUTION INTRAVENOUS at 18:00

## 2021-04-06 RX ADMIN — CIPROFLOXACIN 2 DROP: 3 SOLUTION OPHTHALMIC at 17:55

## 2021-04-06 RX ADMIN — DEXAMETHASONE SODIUM PHOSPHATE 4 MG: 4 INJECTION, SOLUTION INTRAMUSCULAR; INTRAVENOUS at 11:30

## 2021-04-06 RX ADMIN — FENTANYL CITRATE 175 MCG/HR: 50 INJECTION, SOLUTION INTRAMUSCULAR; INTRAVENOUS at 16:10

## 2021-04-06 RX ADMIN — MIDAZOLAM 6 MG/HR: 5 INJECTION INTRAMUSCULAR; INTRAVENOUS at 16:15

## 2021-04-06 RX ADMIN — PIPERACILLIN SODIUM AND TAZOBACTAM SODIUM 4.5 G: 4; .5 INJECTION, POWDER, LYOPHILIZED, FOR SOLUTION INTRAVENOUS at 11:30

## 2021-04-06 RX ADMIN — CIPROFLOXACIN 2 DROP: 3 SOLUTION OPHTHALMIC at 10:30

## 2021-04-06 RX ADMIN — FENTANYL CITRATE 175 MCG/HR: 50 INJECTION, SOLUTION INTRAMUSCULAR; INTRAVENOUS at 06:07

## 2021-04-06 RX ADMIN — FENTANYL CITRATE 175 MCG/HR: 50 INJECTION, SOLUTION INTRAMUSCULAR; INTRAVENOUS at 21:09

## 2021-04-06 RX ADMIN — DEXAMETHASONE SODIUM PHOSPHATE 4 MG: 4 INJECTION, SOLUTION INTRAMUSCULAR; INTRAVENOUS at 18:00

## 2021-04-06 RX ADMIN — FENTANYL CITRATE 200 MCG/HR: 50 INJECTION, SOLUTION INTRAMUSCULAR; INTRAVENOUS at 01:02

## 2021-04-06 RX ADMIN — CIPROFLOXACIN 2 DROP: 3 SOLUTION OPHTHALMIC at 16:00

## 2021-04-06 RX ADMIN — DORNASE ALFA 2.5 MG: 1 SOLUTION RESPIRATORY (INHALATION) at 20:10

## 2021-04-06 RX ADMIN — FAMOTIDINE 20 MG: 10 INJECTION INTRAVENOUS at 20:37

## 2021-04-06 RX ADMIN — SODIUM CHLORIDE, SODIUM LACTATE, POTASSIUM CHLORIDE, AND CALCIUM CHLORIDE 1000 ML: 600; 310; 30; 20 INJECTION, SOLUTION INTRAVENOUS at 01:13

## 2021-04-06 RX ADMIN — CIPROFLOXACIN 2 DROP: 3 SOLUTION OPHTHALMIC at 22:00

## 2021-04-06 RX ADMIN — CIPROFLOXACIN 2 DROP: 3 SOLUTION OPHTHALMIC at 09:00

## 2021-04-06 RX ADMIN — DEXAMETHASONE SODIUM PHOSPHATE 4 MG: 4 INJECTION, SOLUTION INTRAMUSCULAR; INTRAVENOUS at 06:07

## 2021-04-06 RX ADMIN — MIDAZOLAM 2 MG: 1 INJECTION INTRAMUSCULAR; INTRAVENOUS at 17:05

## 2021-04-06 NOTE — PROGRESS NOTES
Problem: Ventilator Management  Goal: *Adequate oxygenation and ventilation  Outcome: Progressing Towards Goal  Goal: *Patient maintains clear airway/free of aspiration  Outcome: Progressing Towards Goal  Goal: *Absence of infection signs and symptoms  Outcome: Progressing Towards Goal  Goal: *Normal spontaneous ventilation  Outcome: Not Progressing Towards Goal     Problem: Patient Education: Go to Patient Education Activity  Goal: Patient/Family Education  Outcome: Progressing Towards Goal     Problem: Diabetes Self-Management  Goal: *Using medications safely  Description: State effect of diabetes medications on diabetes; name diabetes medication taking, action and side effects. Outcome: Progressing Towards Goal  Goal: *Monitoring blood glucose, interpreting and using results  Description: Identify recommended blood glucose targets  and personal targets. Outcome: Progressing Towards Goal     Problem: Patient Education: Go to Patient Education Activity  Goal: Patient/Family Education  Outcome: Not Progressing Towards Goal     Problem: Non-Violent Restraints  Goal: Removal from restraints as soon as assessed to be safe  Outcome: Progressing Towards Goal  Goal: No harm/injury to patient while restraints in use  Outcome: Progressing Towards Goal  Goal: Patient's dignity will be maintained  Outcome: Progressing Towards Goal  Goal: Patient Interventions  Outcome: Progressing Towards Goal     Problem: Falls - Risk of  Goal: *Absence of Falls  Description: Document [facility] and appropriate interventions in the flowsheet.   Outcome: Progressing Towards Goal  Note: Fall Risk Interventions:       Mentation Interventions: Adequate sleep, hydration, pain control, Bed/chair exit alarm, Door open when patient unattended, Evaluate medications/consider consulting pharmacy, Update white board, Toileting rounds, Room close to nurse's station, Reorient patient, More frequent rounding    Medication Interventions: Bed/chair exit alarm, Evaluate medications/consider consulting pharmacy, Patient to call before getting OOB, Teach patient to arise slowly    Elimination Interventions: Bed/chair exit alarm, Call light in reach, Patient to call for help with toileting needs, Stay With Me (per policy), Toileting schedule/hourly rounds, Urinal in reach              Problem: Patient Education: Go to Patient Education Activity  Goal: Patient/Family Education  Outcome: Not Progressing Towards Goal     Problem: Pressure Injury - Risk of  Goal: *Prevention of pressure injury  Description: Document Lang Scale and appropriate interventions in the flowsheet. Outcome: Progressing Towards Goal  Note: Pressure Injury Interventions:  Sensory Interventions: Assess changes in LOC, Assess need for specialty bed, Turn and reposition approx. every two hours (pillows and wedges if needed), Pressure redistribution bed/mattress (bed type), Monitor skin under medical devices, Minimize linen layers, Keep linens dry and wrinkle-free, Float heels    Moisture Interventions: Absorbent underpads, Apply protective barrier, creams and emollients, Assess need for specialty bed, Check for incontinence Q2 hours and as needed, Internal/External urinary devices, Minimize layers    Activity Interventions: Pressure redistribution bed/mattress(bed type), Assess need for specialty bed    Mobility Interventions: Assess need for specialty bed, Float heels, HOB 30 degrees or less, Pressure redistribution bed/mattress (bed type), Turn and reposition approx.  every two hours(pillow and wedges)    Nutrition Interventions: Document food/fluid/supplement intake    Friction and Shear Interventions: HOB 30 degrees or less, Foam dressings/transparent film/skin sealants, Minimize layers, Transferring/repositioning devices                Problem: Patient Education: Go to Patient Education Activity  Goal: Patient/Family Education  Outcome: Not Progressing Towards Goal

## 2021-04-06 NOTE — ROUTINE PROCESS
1915 Bedside and Verbal shift change report given to jarad english (oncoming nurse) by Alex Najera (offgoing nurse). Report included the following information SBAR, Kardex and Recent Results   1930 paged DR Stacy Chandler verified to change cric dressing to pack and/or cover with xeroform dressing and guaze. 2000 assessment completed. Oral and cardona care completed. .   0000 reassessment completed. Oral and cardona care completed. 0020 meng arteaga in assessing patient , aware intake and output, and current vent setting.  0400 reassessment completed. Oral and cardona care completed. 0730   Bedside and Verbal shift change report given to david rn (oncoming nurse) by Maureen Cervantes RN (offgoing nurse). Report included the following information SBAR, Kardex, MAR and Recent Results. SITUATION:    Code Status: Full Code   Reason for Admission: Angioedema due to angiotensin converting enzyme inhibitor (ACE-I) [T78. [facility] day: 3   Problem List:       Hospital Problems  Date Reviewed: 4/5/2021          Codes Class Noted POA    Respiratory failure ([facility] ICD-10-CM: J96.90  ICD-9-CM: 518.81  4/5/2021 Unknown        Obesity (BMI 30-39. 9) ICD-10-CM: E66.9  ICD-9-CM: 278.00  4/5/2021 Unknown        Diabetic neuropathy associated with type 2 diabetes mellitus ([facility] ICD-10-CM: E11.40  ICD-9-CM: 250.60, 357.2  4/5/2021 Unknown        Diabetic retinopathy associated with type 2 diabetes mellitus ([facility] ICD-10-CM: E11.319  ICD-9-CM: 250.50, 362.01  4/5/2021 Unknown        Pulmonary infiltrates ICD-10-CM: R91.8  ICD-9-CM: 793.19  4/5/2021 Unknown        Controlled type 2 diabetes mellitus with neurologic complication, without long-term current use of insulin ([facility] ICD-10-CM: E11.49  ICD-9-CM: 250.60  4/5/2021 Unknown        Angioedema due to angiotensin converting enzyme inhibitor (ACE-I) ICD-10-CM: T78. Qiana [place], [id number]  ICD-9-CM: 995.1, E942.6  4/4/2021 Unknown        JACQUELYN (acute kidney injury) ([facility] ICD-10-CM: N17.9  ICD-9-CM: 584.9  4/4/2021 Unknown        Cardiac arrest [facility]) ICD-10-CM: I46.9  ICD-9-CM: 427.5  4/4/2021 Unknown              BACKGROUND:    Past Medical History:   Past Medical History:   Diagnosis Date    DDD (degenerative disc disease), lumbar     Diabetes ([facility]     Diabetic neuropathy ([facility]     Diabetic retinopathy ([facility]     DM2 (diabetes mellitus, type 2) ([facility]     HLD (hyperlipidemia)     HTN (hypertension)     Obesity (BMI 30-39. 9)          Patient taking anticoagulants na    ASSESSMENT:    Changes in Assessment Throughout Shift: low uop,received lasix.  Patient has Central Line: no Reasons if yes: na   Patient has Garcia Cath: yes Reasons if yes: strict I/o      Last Vitals:     Vitals:    04/07/21 0557 04/07/21 0600 04/07/21 0700 04/07/21 0800   BP: 118/72 119/69 116/65    Pulse: (!) 56 (!) 57 (!) 54    Resp:  16 16    Temp:    98.5 °F (36.9 °C)   SpO2:  95% 94%    Weight:       Height:            IV and DRAINS (will only show if present)   Airway - Endotracheal Tube 04/04/21 Oral-Site Assessment: Clean, dry, & intact  Peripheral IV 04/04/21 Left Antecubital-Site Assessment: Clean, dry, & intact  Peripheral IV 04/04/21 Anterior;Right Wrist-Site Assessment: Clean, dry, & intact  Peripheral IV 04/04/21 Left;Posterior Hand-Site Assessment: Clean, dry, & intact  Airway - Continuous Aspiration of Subglottic Secretions (GENESIS) Tube 04/04/21 Oral-Site Assessment: Clean, dry, & intact  Orogastric Tube 04/05/21-Site Assessment: Clean, dry, & intact     WOUND (if present)   Wound Type:  surgicial incision   Dressing present Dressing Present : Yes, Intact, not due to be changed   Wound Concerns/Notes:  Packed with xeroform x 2 with tail left out and one folded over  And telfa not stick dressing with paper tape.      PAIN    Pain Assessment    Pain Intensity 1: 0 (04/07/21 0400)              Patient Stated Pain Goal: Unable to verbalize/indicate pain  o Interventions for Pain: fentanyl  o Intervention effective: yes  o Time of last intervention: continous  o Reassessment Completed: yes      Last 3 Weights:  Last 3 Recorded Weights in this Encounter    04/04/21 1726 04/05/21 2042 04/06/21 0948   Weight: 127 kg (279 lb 15.8 oz) 114.3 kg (251 lb 15.8 oz) 117.5 kg (259 lb)     Weight change: 3.182 kg (7 lb 0.2 oz)     INTAKE/OUPUT    Current Shift: No intake/output data recorded. Last three shifts: 04/05 1901 - 04/07 0700  In: 4570.7 [I.V.:3110.7]  Out: 1786 [Urine:1586]     LAB RESULTS     Recent Labs     04/07/21 0413 04/06/21 0220 04/05/21  0056   WBC 10.0 8.1 8.0   HGB 12.2* 11.0* 11.9*   HCT 40.4 33.6* 37.9    166 224        Recent Labs     04/07/21 0413 04/06/21 0220 04/05/21  0056 04/04/21  1711    142 139 141   K 4.5 4.2 4.1 4.6   * 133* 93 102*   BUN 54* 37* 23* 24*   CREA 2.74* 1.99* 1.55* 1.80*   CA 8.1* 7.5* 8.1* 8.0*   MG 2.5 2.0 2.0 2.1   INR  --   --   --  1.0       RECOMMENDATIONS AND DISCHARGE PLANNING     1. Pending tests/procedures/ Plan of Care or Other Needs: na    2. Discharge plan for patient and Needs/Barriers: vent    3. Estimated Discharge Date: tbdPosted on Whiteboard in Patients Room: yes     4. The patient's care plan was reviewed with the oncoming nurse. \"HEALS\" SAFETY CHECK      Fall Risk    Total Score: 2    Safety Measures: Safety Measures: Bed/Chair alarm on, Bed/Chair-Wheels locked, Bed in low position, Call light within reach, Emergency bedside equipment, Fall prevention (comment), Gripper socks, Restraints    A safety check occurred in the patient's room between off going nurse and oncoming nurse listed above.     The safety check included the below items  Area Items   H  High Alert Medications - Verify all high alert medication ips (heparin, PCA, etc.)   E  Equipment - Suction is set up for ALL patients (with dalton)  - Red plugs utilized for all equipment (IV pumps, etc.)  - WOWs wiped down at end of shift.  - Room stocked with oxygen, suction, and other unit-specific supplies   A  Alarms - Bed alarm is set for fall risk patients  - Ensure chair alarm is in place and activated if patient is up in a chair   L  Lines - Check IV for any infiltration  - Garcia bag is empty if patient has a Garcia   - Tubing and IV bags are labeled   S  Safety   - Room is clean, patient is clean, and equipment is clean. - Hallways are clear from equipment besides carts. - Fall bracelet on for fall risk patients  - Ensure room is clear and free of clutter  - Suction is set up for ALL patients (with dalton)  - Hallways are clear from equipment besides carts.    - Isolation precautions followed, supplies available outside room, sign posted     Fina Sorto RN

## 2021-04-06 NOTE — PROGRESS NOTES
Problem: Ventilator Management  Goal: *Adequate oxygenation and ventilation  Outcome: Progressing Towards Goal  Goal: *Patient maintains clear airway/free of aspiration  Outcome: Progressing Towards Goal  Goal: *Absence of infection signs and symptoms  Outcome: Progressing Towards Goal  Goal: *Normal spontaneous ventilation  Outcome: Not Progressing Towards Goal     Problem: Patient Education: Go to Patient Education Activity  Goal: Patient/Family Education  Outcome: Not Progressing Towards Goal     Problem: Diabetes Self-Management  Goal: *Using medications safely  Description: State effect of diabetes medications on diabetes; name diabetes medication taking, action and side effects. Outcome: Progressing Towards Goal  Goal: *Monitoring blood glucose, interpreting and using results  Description: Identify recommended blood glucose targets  and personal targets. Outcome: Progressing Towards Goal  Goal: *Prevention, detection and treatment of chronic complications  Description: Define the natural course of diabetes and describe the relationship of blood glucose levels to long term complications of diabetes. Outcome: Progressing Towards Goal     Problem: Diabetes Self-Management  Goal: *Using medications safely  Description: State effect of diabetes medications on diabetes; name diabetes medication taking, action and side effects. Outcome: Progressing Towards Goal  Goal: *Monitoring blood glucose, interpreting and using results  Description: Identify recommended blood glucose targets  and personal targets. Outcome: Progressing Towards Goal  Goal: *Prevention, detection and treatment of chronic complications  Description: Define the natural course of diabetes and describe the relationship of blood glucose levels to long term complications of diabetes.   Outcome: Progressing Towards Goal     Problem: Patient Education: Go to Patient Education Activity  Goal: Patient/Family Education  Outcome: Not Progressing Towards Goal     Problem: Non-Violent Restraints  Goal: Removal from restraints as soon as assessed to be safe  Outcome: Progressing Towards Goal  Goal: No harm/injury to patient while restraints in use  Outcome: Progressing Towards Goal  Goal: Patient's dignity will be maintained  Outcome: Progressing Towards Goal  Goal: Patient Interventions  Outcome: Progressing Towards Goal     Problem: Falls - Risk of  Goal: *Absence of Falls  Description: Document Carmen Hua Fall Risk and appropriate interventions in the flowsheet. Outcome: Progressing Towards Goal  Note: Fall Risk Interventions:       Mentation Interventions: Adequate sleep, hydration, pain control, Bed/chair exit alarm, Door open when patient unattended, Evaluate medications/consider consulting pharmacy, Update white board, Toileting rounds, Room close to nurse's station, Reorient patient, More frequent rounding    Medication Interventions: Bed/chair exit alarm, Evaluate medications/consider consulting pharmacy, Patient to call before getting OOB, Teach patient to arise slowly    Elimination Interventions: Bed/chair exit alarm, Call light in reach, Patient to call for help with toileting needs, Stay With Me (per policy), Toileting schedule/hourly rounds, Urinal in reach              Problem: Pressure Injury - Risk of  Goal: *Prevention of pressure injury  Description: Document Lang Scale and appropriate interventions in the flowsheet. Outcome: Progressing Towards Goal  Note: Pressure Injury Interventions:  Sensory Interventions: Assess changes in LOC, Assess need for specialty bed, Turn and reposition approx.  every two hours (pillows and wedges if needed), Pressure redistribution bed/mattress (bed type), Monitor skin under medical devices, Minimize linen layers, Keep linens dry and wrinkle-free, Float heels    Moisture Interventions: Absorbent underpads, Apply protective barrier, creams and emollients, Assess need for specialty bed, Check for incontinence Q2 hours and as needed, Internal/External urinary devices, Minimize layers    Activity Interventions: Pressure redistribution bed/mattress(bed type), Assess need for specialty bed    Mobility Interventions: Assess need for specialty bed, Float heels, HOB 30 degrees or less, Pressure redistribution bed/mattress (bed type), Turn and reposition approx.  every two hours(pillow and wedges)    Nutrition Interventions: Document food/fluid/supplement intake    Friction and Shear Interventions: HOB 30 degrees or less, Foam dressings/transparent film/skin sealants, Minimize layers, Transferring/repositioning devices

## 2021-04-06 NOTE — ROUTINE PROCESS
Bedside shift change report given to Jojo Stewart RN (oncoming nurse) by Saluo Carrillo RN (offgoing nurse). Report included the following information SBAR, Kardex, Intake/Output, MAR and Recent Results.

## 2021-04-06 NOTE — PROGRESS NOTES
Cuff leak test performed- ETT cuff was deflated complely, no audible leak, no loss of measured exhaled volumes on vent. Cuff re inflated to minimal leak. Will monitor.

## 2021-04-06 NOTE — INTERDISCIPLINARY ROUNDS
[facility]  Pulmonary, Critical Care, and Sleep Medicine  Interdisciplinary and Ventilator Weaning Rounds    Patient discussed in morning walking rounds and interdisciplinary rounds. ICU day: 3    Overnight events:    No acute over night events   Cr improving    Assessments and best practice:   Ventilator  o Ventilator day 3  o Vent settings: FiO2 of 55 and PEEP of 6  o VAP bundle, aim to keep peak plateau pressure 80-42BP H2O  o Weaning assessed and documented   - Patient does not meet criteria for SBT. - Patient is not on sedation holiday. - Plan not to wean today. No audible cuff leak. - Outcome: n/a  - Final plan: will remain on mechanical ventilatory support today   Sedation  o Propofol, Fentanyl  and Versed   Other pertinent drips  o none   Lines noted  o peripheral IVs   Critical labs assessed  o Yes   Antibiotics  o Zosyn and Vancomycin   Medications reviewed  o Yes   Pending imaging  o none   Pending send out labs  o Yes   Pending Procedures  o None   Glycemic control   Stress ulcer prophylaxis. o Pepcid   DVT prophylaxis. o SCDs   Need for Lines, cardona assessed.  o Yes   Restraint Reevaluation   o Yes  o I have reevaluated the patient one hour after initiation of intervention. The patient is comfortable, uninjured, but continues to pose an imminent risk of injury to self to themselves and/or serious disruption of medical treatment required to keep patient stable. The patient's current medical and behavioral conditions that warrant the use intervention include danger to self and Interference with medical treatment. Restraint or seclusion will be discontinued at the earliest possible time, regardless of the scheduled expiration of the order.  Based on my evaluation, restraints will be continued: YES 4/6/21   o Attending Overseeing restraints: Cody Gant DO     Family contact/MPOA: Mary cummins  Family updated: Crystal, vale night by Nir Mayers PA-C      Daily Plans:   Continue mechanical ventilatory support   Continue decadron    REGGIE time 15 minutes        Leann Burks PA-C  04/06/21  Pulmonary, Critical Care Medicine  [facility]

## 2021-04-06 NOTE — PROGRESS NOTES
[facility]    Patient with respiratory decompensation this afternoon. Called urgently to the bedside    · RT at bedside bagging patient with SpO2 in the mid to high 80% range. · RT took patient off vent due to patient not getting Vts and patient with desats  · Difficult to bag patient  · Difficult to pass leal - several attempts tried  · Saline flushed placed in ETT  · Was able to suction mucus plug with difficulty  · Patient awake and breathing against vent  · Persistent swelling of neck  · Extra sedation given  · Distant bilateral breath sounds with auscultation  · No crepitus with palpation  ·  balloon soft mls put into balloon but patient did not have cuff leak earlier today- concern for possible rupture of  balloon and/or possible dislodgement of ETT  · STAT CXR called  · Anesthesia called to bedside due to patient's airway history  · Patient with 6.5 ETT. - ICU team not able to pass scope with our current equipment to assess tube  · Anesthesia able to get their cable for glide scope to pass glide scope bronch  · ETT noted to be about 6 cm above gabriella- gabriella patent  · CXR also with ETT 6.9 cm above gabriella  · Unable to advance ETT any further as ETT has been advanced as far as possible- short length as this is a 6.5  · Improved Vts , Needed recruitment maneuver to improved SpO2  · BPs marginal at this time due to sedation bolus. · FiO2 increased to 100%  · PEEP up to 10 will try to drop later  · Start Pulmozyme- avoid 3% nacl - want to avoid patient coughing so as not to cough tube out  · Keep chin down and keep patient sedated. · Echo today with borderline RV function  · Will try to diurese patient  · Will order doppler US of lower extremities  · Doubt PE but would not empirically anticoagulant due to recent neck procedure  · At this time I suspect, respiratory decline a combination of patient being awake and breathing against vent, along with mucus plug and some atelectasis.   Plateau pressure in the low 20's  · Monitor respiratory status closely    Merry Black DO, FCCP    [facility]  Pulmonary, Critical Care, and Sleep Medicine    Complex decision making was made in the evaluation and management plans during this consultation. More than 50% of time was spent in counseling and coordination of care including review of data and discussion with other team members.       Critical  care, chart review and time spent coordinating care: 60 min

## 2021-04-06 NOTE — FAMILY MEETING
PCCM Family Update:    Spoke with patient's sister, Beni Singleton, to provide an update on the patient's clinical condition, treatment plan, and prognosis. All questions answered to the best of my ability. Confirmed pt is a FULL CODE.     Rachna Guzman PA-C   04/05/21  Pulmonary, Critical Care Medicine  [facility]

## 2021-04-06 NOTE — PROGRESS NOTES
[employer] [facility]  Pulmonary, Critical Care, and Sleep Medicine    Name: Arnie Lepe MRN: [id number]   : 1950 Hospital: [facility]   Date: 2021        Critical Care: Daily Progress Note    Admission Date:   2021  LOS: 2  MAR reviewed and pertinent medications noted or modified as needed    IMPRESSION:   · Angioedema- with airway and respiratory failure and collapse; thought due to ACE inhibiotor  · S/P Emergent Cricthyrotomy [facility]-ED 21- converted to 6.5 ETT intraoperative by anesthesia team 21  · S/P cardiac arrest @ [facility] 21- brief thought due to hypoxia due to airway collapse  · Respiratory Failure: due to above; currently intubated and on vent for airway protection  · Pulmonary Infiltrates: suspect aspiration secretions and/or blood due to above- can't exclude other infectious process at this time. Rapid Covid Negative at [facility]  · Bradycardia  · JACQUELYN  · Left scleral erythema- unknown baseline; possible infection   · DM  · DM retinopathy per chart review  · Diabetic peripheral neuropathy  · COVID -19; Negative rapid and [employer]fire: 21  · Obesity: Body mass index is 36.12 kg/m².   ·       RECOMMENDATIONS:   NEURO:   Serial neuro evaluations   Sedation Holiday per protocol- On hold for next 24 hrs and reassess   RAAS: -3 to -4   Wetting tears and cipro eye drops     CARDIO:   MAP goal >64   Follow Up echo- completed earlier today   Telemetry    PULM:   Maintain aspiration precautions: HOB >30 degrees   SpO2 goal >92%   Bronchial /oral hygiene   VAP bundle- Wean vent as clinical status allows   Decadron scheduled 4mg Q 6    Benedryl Scheduled 50mg Q 6     GI/ NUTRITION:   OGT    Diet: TF   SUP:Pepcid   Follow up with nutritional recommendations     RENAL/ METAB/ :   Monitor I&Os   Trend electrolytes- replaced as needed, K:4, Mg>2 PO4>2   Garcia: indwelling      HEM/ONC   Trend Hb/HCT and PLTs, and indicis   DVT prophylaxis: SCDs- start SQ heparin 5K TID later tonight   Blood Products: not indicated at this time  ID   Antibioitcs: Stop Vanco; MRSA swab negative, Continue Zosyn   Follow up on pending cultures   Continue Cipro Eye drops   Streamline antibiotics per culture data and clinical course    ENDO   BGs Q 6,SSI   Check C1 inhibitor- Pending     MSK/ ORTHO   No active Issues     SKIN/ WOUNDS   Per protocol   Neck- daily Xeroform dressing changes per ENT     OTHER/DISPO:    CODE STATUS: Full Code- Full    Case reviewed and discussed with ENT staff, OR and ICU care team     Subjective/History: This patient has been seen and evaluated at the request of Dr. Victorino Osborn- ED from [facility] for Angioedema and airway compromise. Patient is a 79 y.o. male presented earlier this morning to [facility] ED in rapidly progressive respiratory distress due to angioedema and airway swelling presumed due to ACE inhibitor therapy. ED provider unable to intubate due to severity of airway swelling. Brief cardio-pulmonary collapse with brief loss of pulse. IO placed into right shoulder. Emergent cricothyrotomy 5.0 place. Good placement. ED team able to oxygenate and ventilate patient. Prior to transport ABG notable for Respiratory acidosis. Call back to -ED patient on vent and paralyzed with rate of 10. I requested that RR be increased to 16-18 bpm depending what patient could tolerate. Our ENT provider on call, Dr. Ana Dent was contacted. OR team on call came in. Patient flow via Nightingale/Life flight to our facility. The patient was taken from flight line to OR hold. On-call team rapidly assess patient. I evaluated the patient immediately upon arrival. Patient with Cric in place, bilateral breath  Sounds, SpO2 in the mid 90's. Swollen neck and face. ABG -POC with persistent but improved respiratory acidosis. ER team took patient emergently to the OR    Anesthesia able to place ETT 6.5 intraop.  I was called by ENT provider in the OR. Case discussed. Opted to keep patient intubated with 6.5 ETT. Will monitor for 48-72 hours and reassess patient. CXR notable for bilateral opacities. Suspect the later due to aspiration of blood and /or airway secretions. Patient was reexamined again at bedside upon arrival to ICU- Bed2    Patient paralyzed and sedated. 6.5 ETT in place. Bilateral breath sounds. SpO2 100%    CXR obtained in ICU- ETT slightly high- will try to advance 1-2 cm if able. 04/06/21  · Patient with some mild bradycardia and PVCs  · Remains hemodynamically stable  · ETT still high but only able to advance 2 cm due to length of tube (6.5)  · Discussed case this morning with ENT staff  · Patient remains sedated as we await facial/neck swell to decrease and/or resolve. Patient may also have some coarse facial and neck features at baseline. · No cuff leak- with dropping cuff, no change in VTs and no audible leak  · No plan to extubate today- extending steroids  · Worsening infiltrate on the right- aspiration/ pneumonitis from initial insult- FiO2 at 50%    Inpat Anti-Infectives (From admission, onward)     Start     Ordered Stop    04/06/21 1800  ciprofloxacin HCl (CILOXAN) 0.3 % ophthalmic solution 2 Drop  2 Drop,   Both Eyes,   EVERY 4 HOURS      04/04/21 1724 04/11/21 1759    04/05/21 0900  vancomycin (VANCOCIN) 1750 mg in  ml infusion  1,750 mg,   IntraVENous,   EVERY 18 HOURS      04/05/21 0751 --    04/04/21 1800  ciprofloxacin HCl (CILOXAN) 0.3 % ophthalmic solution 2 Drop  2 Drop,   Both Eyes,   EVERY 2 HOURS WHILE AWAKE      04/04/21 1724 04/06/21 1759    04/04/21 1800  piperacillin-tazobactam (ZOSYN) 4.5 g in 0.9% sodium chloride (MBP/ADV) 100 mL MBP  4.5 g,   IntraVENous,   EVERY 6 HOURS      04/04/21 1728 --                The patient is critically ill and can not provide additional history due to Unconsciousness, Ventilated and Unable to speak. Chart and notes reviewed. Data reviewed.    []I have reviewed the flowsheet and previous days notes. []The patient is unable to give any meaningful history or review of systems because the patient is:  [x]Intubated [x]Sedated   []Unresponsive      []The patient is critically ill on      [x]Mechanical ventilation []Pressors   []BiPAP []                       Past Medical History:   Diagnosis Date    DDD (degenerative disc disease), lumbar     Diabetes ([address]     Diabetic neuropathy [address] [address]     Diabetic retinopathy ([address]     DM2 (diabetes mellitus, type 2) ([address]     HLD (hyperlipidemia)     HTN (hypertension)     Obesity (BMI 30-39. 9)       History reviewed. No pertinent surgical history. Prior to Admission medications    Medication Sig Start Date End Date Taking? Authorizing Provider   LISINOPRIL, BULK, by Does Not Apply route.     Other, MD Cheryl     Current Facility-Administered Medications   Medication Dose Route Frequency    [START ON 4/7/2021] Vancomycin trough 4/7 @1400  1 Each Other ONCE    midazolam in normal saline (VERSED) 1 mg/mL infusion  0-10 mg/hr IntraVENous TITRATE    vancomycin (VANCOCIN) 1750 mg in  ml infusion  1,750 mg IntraVENous F64P    multivit-folic acid-herbal 849 (WELLESSE PLUS) oral liquid 30 mL  30 mL Per NG tube DAILY    chlorhexidine (PERIDEX) 0.12 % mouthwash 10 mL  10 mL Oral Q12H    fentaNYL (PF) 900 mcg/30 ml infusion soln  0-200 mcg/hr IntraVENous TITRATE    famotidine (PF) (PEPCID) 20 mg in 0.9% sodium chloride 10 mL injection  20 mg IntraVENous Q12H    insulin lispro (HUMALOG) injection   SubCUTAneous Q6H    ciprofloxacin HCl (CILOXAN) 0.3 % ophthalmic solution 2 Drop  2 Drop Both Eyes Q2HWA    Followed by   [facility] ciprofloxacin HCl (CILOXAN) 0.3 % ophthalmic solution 2 Drop  2 Drop Both Eyes Q4H    piperacillin-tazobactam (ZOSYN) 4.5 g in 0.9% sodium chloride (MBP/ADV) 100 mL MBP  4.5 g IntraVENous Q6H     Allergies   Allergen Reactions    Lisinopril Angioedema      Social History     Tobacco Use    Smoking status: Current Every Day Smoker     Packs/day: 0.50   Substance Use Topics    Alcohol use: Not on file      History reviewed. No pertinent family history. Review of Systems:  Review of systems not obtained due to patient factors. Objective:   Vital Signs:    Visit Vitals  /68   Pulse (!) 49   Temp 98.1 °F (36.7 °C)   Resp 20   Ht 5' 11\" (1.803 m)   Wt 117.5 kg (259 lb)   SpO2 94%   BMI 36.12 kg/m²       O2 Device: Endotracheal tube, Ventilator       Temp (24hrs), Av.4 °F (36.9 °C), Min:97.2 °F (36.2 °C), Max:98.8 °F (37.1 °C)       Intake/Output:   Last shift:       07 - 1900  In: 387.2 [I.V.:47.2]  Out: 200 [Urine:200]  Last 3 shifts:  190 -  0700  In: 4440 [I.V.:3355]  Out: 1665 [Urine:1665]    Intake/Output Summary (Last 24 hours) at 2021 1332  Last data filed at 2021 1100  Gross per 24 hour   Intake 3896.69 ml   Output 610 ml   Net 3286.69 ml         Ventilator Settings:  Mode Rate Tidal Volume Pressure FiO2 PEEP   Assist control, VC+   550 ml    50 % 6 cm H20     Peak airway pressure: 28 cm H2O    Minute ventilation: 8.4 l/min       ARDS network Guidelines:   Lung protective strategy and Plateau  Pressure goal < 30 cm H2O goals  Oxygenation Goals PaO2 55-80 mm Hg or SaO2 88-95%  PH goal 7.30-7.45    VAP bundle;  Reviewed. Laurel tube to suction at 20-30 cm Hg. Maintain Laurel tube with 5-10ml air every 4 hours  Routine oral care every 4 hours  Elevation of head > 45 degree  Daily sedation holiday and SBT evaluation starting at 6.00am.  Physical Exam:    General:  Intubated and sedated  , appears stated age.+ Obese body habitus   Head:  Normocephalic, without obvious abnormality, atraumatic. + facial edema   Eyes:  Conjunctivae/corneas clear. PERRL- left eye hyperremia, EOMs intact. Nose: Nares normal. Septum midline. Mucosa normal. No drainage or sinus tenderness. Throat: Lips, mucosa, and tongue swollen.  Teeth and gums normal.   Neck: bandage and packing inplace where trach was palced, no carotid bruit and no JVD. Back:   Symmetric   Lungs:   Bilateral breath sounds, with bilateral rhonchi- no wheezing   Chest wall:  No tenderness or deformity.- No crepitus   Heart:  Regular rate and rhythm, S1, S2 normal, no murmur, click, rub or gallop. Abdomen:   Soft, Obese non-tender. Bowel sounds normal. No masses,  No organomegaly. Extremities: Extremities normal, atraumatic, no cyanosis or edema. Pulses: 2+ and symmetric all extremities.    Skin: Skin color, texture, turgor normal. No rashes or lesions   Lymph nodes:      Cervical, supraclavicular nodes: unremarkable   Neurologic: Grossly nonfocal       Data:     Recent Labs     04/06/21 0220 04/05/21 0056 04/04/21  2321   WBC 8.1 8.0 7.7   HGB 11.0* 11.9* 11.0*   HCT 33.6* 37.9 34.4*    224 198     Recent Labs     04/06/21 0220 04/05/21 2011 04/05/21 0056 04/04/21  1711     --  139 141   K 4.2  --  4.1 4.6   *  --  109 108   CO2 25  --  20* 26   *  --  93 102*   BUN 37*  --  23* 24*   CREA 1.99*  --  1.55* 1.80*   CA 7.5*  --  8.1* 8.0*   MG 2.0  --  2.0 2.1   PHOS 3.6 4.1 1.7* 3.7   ALB  --   --   --  3.4   ALT  --   --   --  141*   INR  --   --   --  1.0     No results found for: BNP, BNPP, BNPPPOC, XBNPT, BNPNT  Lab Results   Component Value Date/Time    INR 1.0 04/04/2021 05:11 PM    Prothrombin time 12.7 04/04/2021 05:11 PM       Recent Labs     04/04/21  1355 04/04/21  1140   PH 7.28* 7.17*   PCO2 49* 71*   PO2 103* 88   HCO3 22* 25   FIO2 100.0 100.0     Recent Labs     04/06/21  0353 04/05/21  0354 04/04/21  1953   FIO2I 0.55 0.70 100   HCO3I 22.8 20.0* 19.0*   PCO2I 35.4 31.8* 35.1   PHI 7.42 7.41 7.34*   PO2I 87 80 94       ENDO:  Lab Results   Component Value Date/Time    TSH 1.35 04/04/2021 05:11 PM       Lab Results   Component Value Date/Time    Glucose 133 (H) 04/06/2021 02:20 AM    Glucose 93 04/05/2021 12:56 AM    Glucose 102 (H) 04/04/2021 05:11 PM    Glucose 144 (H) 04/04/2021 09:40 AM         CARDIO:  Telemetry:normal sinus rhythm / sinus bradycardia    Lab Results   Component Value Date/Time    CK 90 04/05/2021 08:11 PM    CK 89 04/05/2021 08:11 PM    CK - MB 1.5 04/05/2021 08:11 PM    CK - MB 1.4 04/05/2021 08:11 PM    CK-MB Index 1.7 04/05/2021 08:11 PM    CK-MB Index 1.6 04/05/2021 08:11 PM    Troponin-I, Qt. 0.06 (H) 04/04/2021 01:40 PM    Troponin-I, QT 0.18 (H) 04/05/2021 08:11 PM    Troponin-I, QT 0.17 (H) 04/05/2021 08:11 PM       EKG Results     Procedure 720 Value Units Date/Time    EKG, 12 LEAD, SUBSEQUENT [[id number]]     Order Status: Sent     EKG, 12 LEAD, SUBSEQUENT [[id number]]     Order Status: Canceled                MEDS: Please also see MAR  Current Facility-Administered Medications   Medication Dose Route Frequency    [START ON 4/7/2021] Vancomycin trough 4/7 @1400  1 Each Other ONCE    midazolam in normal saline (VERSED) 1 mg/mL infusion  0-10 mg/hr IntraVENous TITRATE    vancomycin (VANCOCIN) 1750 mg in  ml infusion  1,750 mg IntraVENous X41C    multivit-folic acid-herbal 853 (WELLESSE PLUS) oral liquid 30 mL  30 mL Per NG tube DAILY    chlorhexidine (PERIDEX) 0.12 % mouthwash 10 mL  10 mL Oral Q12H    fentaNYL (PF) 900 mcg/30 ml infusion soln  0-200 mcg/hr IntraVENous TITRATE    famotidine (PF) (PEPCID) 20 mg in 0.9% sodium chloride 10 mL injection  20 mg IntraVENous Q12H    insulin lispro (HUMALOG) injection   SubCUTAneous Q6H    ciprofloxacin HCl (CILOXAN) 0.3 % ophthalmic solution 2 Drop  2 Drop Both Eyes Q2HWA    Followed by    ciprofloxacin HCl (CILOXAN) 0.3 % ophthalmic solution 2 Drop  2 Drop Both Eyes Q4H    piperacillin-tazobactam (ZOSYN) 4.5 g in 0.9% sodium chloride (MBP/ADV) 100 mL MBP  4.5 g IntraVENous Q6H     MICRO:       Results     Procedure Component Value Units Date/Time    Cintia Needles, URINE [[id number]] Collected: 04/04/21 1801    Order Status: Completed Specimen: Urine, random Updated: 04/04/21 1944     Strep pneumo Ag, urine Negative       LEGIONELLA PNEUMOPHILA AG, URINE [[id number]] Collected: 04/04/21 1801    Order Status: Completed Specimen: Urine, random Updated: 04/04/21 1944     Legionella Ag, urine Negative       RESPIRATORY VIRUS PANEL W/COVID-19, PCR [[id number]] Collected: 04/04/21 1700    Order Status: Completed Specimen: Nasopharyngeal Updated: 04/04/21 2020     Adenovirus Not detected        Coronavirus 229E Not detected        Coronavirus HKU1 Not detected        Coronavirus CVNL63 Not detected        Coronavirus OC43 Not detected        Metapneumovirus Not detected        Rhinovirus and Enterovirus Not detected        Influenza A Not detected        Influenza A, subtype H1 Not detected        Influenza A, subtype H3 Not detected        INFLUENZA A H1N1 PCR Not detected        Influenza B Not detected        Parainfluenza 1 Not detected        Parainfluenza 2 Not detected        Parainfluenza 3 Not detected        Parainfluenza virus 4 Not detected        RSV by PCR Not detected        B. parapertussis, PCR Not detected        Bordetella pertussis - PCR Not detected        Chlamydophila pneumoniae DNA, QL, PCR Not detected        Mycoplasma pneumoniae DNA, QL, PCR Not detected        SARS-CoV-2, PCR Not detected       CULTURE, MRSA [[id number]] Collected: 04/04/21 1700    Order Status: Completed Specimen: Nasal from Nares Updated: 04/06/21 0838     Special Requests: NO SPECIAL REQUESTS        Culture result: MRSA NOT PRESENT               Screening of patient nares for MRSA is for surveillance purposes and, if positive, to facilitate isolation considerations in high risk settings. It is not intended for automatic decolonization interventions per se as regimens are not sufficiently effective to warrant routine use.           COVID-19 RAPID TEST [[id number]] Collected: 04/04/21 1112    Order Status: Completed Specimen: Nasopharyngeal Updated: 04/04/21 1151     Specimen source Nasopharyngeal        COVID-19 rapid test Not Detected        Comment:   Rapid NAAT:  The specimen is NEGATIVE for SARS-CoV-2, the novel coronavirus associated with COVID-19. Negative results should be treated as presumptive and, if inconsistent with clinical signs and symptoms or necessary for patient management, should be tested with an alternative molecular assay. Negative results do not preclude SARS-CoV-2 infection and should not be used as the sole basis for patient management decisions. This test has been authorized by the FDA under an Emergency Use   Authorization (EUA) for use by authorized laboratories. Fact sheet for Healthcare Providers: [employer] Fact sheet for Patients: [employer]   Methodology: Isothermal Nucleic Acid Amplification                   Imaging:  I have personally reviewed the patients radiographs and have reviewed the reports:    CXR Results  (Last 48 hours)               04/06/21 0547  XR CHEST PORT Final result    Impression:      Persistent bilateral airspace opacities with slight improvement in aeration left   lung and slight worsening in the right lung base. Probable increase in right   pleural opacity. Interval enteric tube placement. Please see report for further details. Narrative:  EXAM: XR CHEST PORT       INDICATION: Respiratory Failure; Check ETT Placement       COMPARISON: Recent prior       FINDINGS: A portable AP radiograph of the chest was obtained at 0 445 hours. The   patient is on a cardiac monitor. Persistent bilateral airspace opacities. Slight   evolution in distribution since prior exam. Slight improvement in aeration left   lung. Slight increase in right basilar parenchymal opacity and right basilar   pleural opacity. . Stable enlarged cardiac silhouette. .  No acute bone findings. Stable endotracheal tube. Interval placement of enteric tube with tip below the   diaphragm and visualized field.  Metallic wire overlying the distal clavicle on   the left. Chronic widening of the left AC joint. [facility] 04/05/21 0604  XR CHEST PORT Final result    Impression:      Persistent bilateral airspace opacities. Narrative:  EXAM: XR CHEST PORT       INDICATION: Respiratory Failure; Check ETT Placement       COMPARISON: Recent prior       FINDINGS: A portable AP radiograph of the chest was obtained at 0 445 hours. The   patient is on a cardiac monitor. Patchy bilateral airspace opacities without   gross interval change. Stable cardiac silhouette. .  No acute bone findings. Stable endotracheal tube. Esophageal not clearly delineated. [facility] 04/04/21 1846  XR CHEST PORT Final result    Impression:  1.  ET tube without evidence of complication. 2.  Bilateral airspace opacities with mild improved aeration of the right lung. Narrative:  EXAM: Chest X-Ray       INDICATION:  Check ETT placement. TECHNIQUE: AP view of the chest       COMPARISON: 4/4/2021 at 1029 hours       FINDINGS:    Tubes and Lines: Patient is intubated. ET tube is 7 cm above the gabriella. Pleura: No pneumothorax appreciated. No effusions appreciated. Lungs:  Left perihilar opacities are noted. There is mild improved expansion of   the right lung. Hazy opacities are noted in the right hemithorax. Cardiac/Mediastinum/Aorta: Cardiac silhouette is enlarged. Pulmonary Vascularity: The pulmonary vasculature is unremarkable. Chest Wall: No acute finding       Osseous Structures: Postoperative changes are noted in the left AC joint. Upper Abdomen: No acute findings appreciated. CT Results  (Last 48 hours)    None           Best practice :  Core measures:    Glycemic control  [facility]. Ventilated patients- aim to keep peak plateau pressure 42-74GW H2O.   Sress ulcer prophylaxis  DVT prophylaxis               Critical Care Time:  The services I provided to this patient were to treat and/or prevent clinically significant deterioration that could result in the failure of one or more body systems and/or organ systems due to respiratory distress, hypoxia, cardiac dysrhythmia. Services included the following:  -reviewing nursing notes and old charts  -vital sign assessments   -direct patient care  -medication orders and management  -interpreting and reviewing diagnostic studies/labs  -re-evaluations  -documentation time        Aggregate critical care time was 35 minutes, which includes only time during which I was engaged in work directly related to the patient's care as described above. During this entire length of time I was immediately available to the patient. The reason for providing this level of medical care for this critically ill patient was due a critical illness that impaired one or more vital organ systems such that there was a high probability of imminent or life threatening deterioration in the patients condition. This care involved high complexity decision making to assess, manipulate, and support vital system functions, to treat this degreee vital organ system failure and to prevent further life threatening deterioration of the patients condition      Complex decision making was made in the evaluation and management plans during this consultation. More than 50% of time was spent in counseling and coordination of care including review of data and discussion with other team members.       Maribell Casiano DO, [facility]    [facility]  Pulmonary, Critical Care, and Sleep Medicine

## 2021-04-06 NOTE — PROGRESS NOTES
Patient received resting comfortably on the ventilator with 6. 5ETT patent and secure at 28cm at the lips. Audible leak heard. Cuff pressure checked and required increase to [id number] to seal leak. Patient suctioned for a moderate amount of thick white secretions via ETT, small amount of thick, blood tinged secretions via GENESIS tube. Alarms are set and functioning. Emergency equipment is available at the bedside. 04/06/21 1929   Patient Observations   Pulse (Heart Rate) 60   Resp Rate 16   O2 Sat (%) 93 %   Airway - Continuous Aspiration of Subglottic Secretions (GENESIS) Tube 04/04/21 Oral   Placement Date/Time: 04/04/21 1539   Number of Attempts: 2  Inserted By: Gerson Shaver  Location: Oral  Placement Verified: Auscultation;BBS;EtCO2;Fiberoptic  Airway Types: Endotracheal, cuffed  Airway Tube Size: 6.5 mm   Insertion Depth (cm) 28 cm   Line Christian Lips   Side Secured Right   Cuff Pressure 40 cmH20  (patient has cuff leak below cuff pressure of [id number])   Site Assessment Clean, dry, & intact   Suction on Yes   Amt Secretions Aspirated (mL) 3 mL   Respiratory   Respiratory (WDL) X   Patient on Vent Yes - If patient is on vent, add Doc Flowsheet Ventilator ().    Respiratory Pattern Regular   Breath Sounds Bilateral Coarse   Cough Cough with suction;Productive   Airway Clearance   Suction ET Tube   Suction Device Inline suction catheter   Sputum Method Obtained Endotracheal   Sputum Amount Moderate   Sputum Color/Odor White   Sputum Consistency Thick   Ventilator Initiate/Discontinue   Bio[employer] ID # [id number]   Vent Settings   FIO2 (%) 100 %   SpO2/FIO2 Ratio 93   CMV Rate Set 16   Back-Up Rate 16   Vt Set (ml) 570 ml   PEEP/VENT (cm H2O) 10 cm H20   Insp Time (sec) 1 sec   Insp Rise Time % 50 %   Flow Trigger 3   Ventilator Measurements   Resp Rate Observed 16   Vt Exhaled (Machine Breath) (ml) 629 ml   Ve Observed (l/min) 10.7 l/min   PIP Observed (cm H2O) 30 cm H2O   Plateau Pressure (cm H2O) 25 cm H2O   MAP (cm H2O) 17   I:E Ratio Actual 1:2.8   Static Compliance (ml/cm H20) 40 ml/cm H20   Safety & Alarms   Circuit Temperature 98.2 °F (36.8 °C)   Backup Mode Checked/Apnea Yes   Pressure Max 50 cm H2O   Pressure Min 16 cm H2O   Ve Min 3   Ve Max 20   Vt Min 300 ml   Vt Max 1000 ml   RR Max 40   Ambu Bag Yes   Ambu Mask Yes   Age Specific Ventilator Associated Pneumonia Bundle   Patient Age Group Adult   Adult Ventilator Associated Pneumonia Bundle   Elevation of Head to 30-45 Degrees (Unless Contraindicated) Yes   Vent Method/Mode   Ventilation Method Conventional   Ventilator Mode Assist control;VC+   Pulmonary Toilet   Pulmonary Toilet H. O.B elevated;Suction

## 2021-04-07 ENCOUNTER — APPOINTMENT (OUTPATIENT)
Dept: GENERAL RADIOLOGY | Age: 71
DRG: 004 | End: 2021-04-07
Attending: INTERNAL MEDICINE
Payer: MEDICARE

## 2021-04-07 ENCOUNTER — APPOINTMENT (OUTPATIENT)
Dept: VASCULAR SURGERY | Age: 71
DRG: 004 | End: 2021-04-07
Attending: INTERNAL MEDICINE
Payer: MEDICARE

## 2021-04-07 ENCOUNTER — ANESTHESIA EVENT (OUTPATIENT)
Dept: ICU | Age: 71
DRG: 004 | End: 2021-04-07
Payer: MEDICARE

## 2021-04-07 ENCOUNTER — ANESTHESIA (OUTPATIENT)
Dept: ICU | Age: 71
DRG: 004 | End: 2021-04-07
Payer: MEDICARE

## 2021-04-07 LAB
ANION GAP SERPL CALC-SCNC: 11 MMOL/L (ref 3–18)
ANION GAP SERPL CALC-SCNC: 7 MMOL/L (ref 3–18)
ARTERIAL PATENCY WRIST A: ABNORMAL
ARTERIAL PATENCY WRIST A: YES
BASE DEFICIT BLD-SCNC: 2 MMOL/L
BASE DEFICIT BLD-SCNC: 2 MMOL/L
BASOPHILS # BLD: 0 K/UL (ref 0–0.1)
BASOPHILS NFR BLD: 0 % (ref 0–2)
BDY SITE: ABNORMAL
BDY SITE: ABNORMAL
BODY TEMPERATURE: 36.8
BUN SERPL-MCNC: 54 MG/DL (ref 7–18)
BUN SERPL-MCNC: 64 MG/DL (ref 7–18)
BUN/CREAT SERPL: 20 (ref 12–20)
BUN/CREAT SERPL: 22 (ref 12–20)
C1INH FUNCTIONAL/C1INH TOTAL MFR SERPL: 84 %MEAN NORMAL
CALCIUM SERPL-MCNC: 8 MG/DL (ref 8.5–10.1)
CALCIUM SERPL-MCNC: 8.1 MG/DL (ref 8.5–10.1)
CHLORIDE SERPL-SCNC: 112 MMOL/L (ref 100–111)
CHLORIDE SERPL-SCNC: 112 MMOL/L (ref 100–111)
CO2 SERPL-SCNC: 21 MMOL/L (ref 21–32)
CO2 SERPL-SCNC: 25 MMOL/L (ref 21–32)
CREAT SERPL-MCNC: 2.74 MG/DL (ref 0.6–1.3)
CREAT SERPL-MCNC: 2.94 MG/DL (ref 0.6–1.3)
DIFFERENTIAL METHOD BLD: ABNORMAL
EOSINOPHIL # BLD: 0 K/UL (ref 0–0.4)
EOSINOPHIL NFR BLD: 0 % (ref 0–5)
ERYTHROCYTE [DISTWIDTH] IN BLOOD BY AUTOMATED COUNT: 15.9 % (ref 11.6–14.5)
GAS FLOW.O2 O2 DELIVERY SYS: ABNORMAL L/MIN
GAS FLOW.O2 O2 DELIVERY SYS: ABNORMAL L/MIN
GAS FLOW.O2 SETTING OXYMISER: 16 BPM
GAS FLOW.O2 SETTING OXYMISER: 16 BPM
GLUCOSE BLD STRIP.AUTO-MCNC: 131 MG/DL (ref 70–110)
GLUCOSE BLD STRIP.AUTO-MCNC: 142 MG/DL (ref 70–110)
GLUCOSE BLD STRIP.AUTO-MCNC: 144 MG/DL (ref 70–110)
GLUCOSE BLD STRIP.AUTO-MCNC: 154 MG/DL (ref 70–110)
GLUCOSE SERPL-MCNC: 117 MG/DL (ref 74–99)
GLUCOSE SERPL-MCNC: 147 MG/DL (ref 74–99)
HCO3 BLD-SCNC: 23.6 MMOL/L (ref 22–26)
HCO3 BLD-SCNC: 24.6 MMOL/L (ref 22–26)
HCT VFR BLD AUTO: 40.4 % (ref 36–48)
HGB BLD-MCNC: 12.2 G/DL (ref 13–16)
INSPIRATION.DURATION SETTING TIME VENT: 1 SEC
INSPIRATION.DURATION SETTING TIME VENT: 1 SEC
LYMPHOCYTES # BLD: 0.9 K/UL (ref 0.9–3.6)
LYMPHOCYTES NFR BLD: 9 % (ref 21–52)
MAGNESIUM SERPL-MCNC: 2.5 MG/DL (ref 1.6–2.6)
MAGNESIUM SERPL-MCNC: 2.9 MG/DL (ref 1.6–2.6)
MCH RBC QN AUTO: 25.7 PG (ref 24–34)
MCHC RBC AUTO-ENTMCNC: 30.2 G/DL (ref 31–37)
MCV RBC AUTO: 85.1 FL (ref 74–97)
MONOCYTES # BLD: 0.5 K/UL (ref 0.05–1.2)
MONOCYTES NFR BLD: 5 % (ref 3–10)
NEUTS SEG # BLD: 8.6 K/UL (ref 1.8–8)
NEUTS SEG NFR BLD: 86 % (ref 40–73)
O2/TOTAL GAS SETTING VFR VENT: 1 %
O2/TOTAL GAS SETTING VFR VENT: 90 %
PCO2 BLD: 44.2 MMHG (ref 35–45)
PCO2 BLD: 50 MMHG (ref 35–45)
PEEP RESPIRATORY: 10 CMH2O
PEEP RESPIRATORY: 10 CMH2O
PH BLD: 7.3 [PH] (ref 7.35–7.45)
PH BLD: 7.34 [PH] (ref 7.35–7.45)
PHOSPHATE SERPL-MCNC: 6.2 MG/DL (ref 2.5–4.9)
PIP ISTAT,IPIP: 30
PIP ISTAT,IPIP: 30
PLATELET # BLD AUTO: 156 K/UL (ref 135–420)
PMV BLD AUTO: 11.7 FL (ref 9.2–11.8)
PO2 BLD: 94 MMHG (ref 80–100)
PO2 BLD: 98 MMHG (ref 80–100)
POTASSIUM SERPL-SCNC: 4.5 MMOL/L (ref 3.5–5.5)
POTASSIUM SERPL-SCNC: 4.6 MMOL/L (ref 3.5–5.5)
PROCALCITONIN SERPL-MCNC: 0.28 NG/ML
RBC # BLD AUTO: 4.75 M/UL (ref 4.35–5.65)
SAO2 % BLD: 96 % (ref 92–97)
SAO2 % BLD: 97 % (ref 92–97)
SERVICE CMNT-IMP: ABNORMAL
SERVICE CMNT-IMP: ABNORMAL
SODIUM SERPL-SCNC: 144 MMOL/L (ref 136–145)
SODIUM SERPL-SCNC: 144 MMOL/L (ref 136–145)
SPECIMEN TYPE: ABNORMAL
SPECIMEN TYPE: ABNORMAL
TOTAL RESP. RATE, ITRR: 16
TOTAL RESP. RATE, ITRR: 16
TRYPTASE SERPL-MCNC: 9.1 UG/L (ref 2.2–13.2)
VENTILATION MODE VENT: ABNORMAL
VENTILATION MODE VENT: ABNORMAL
VOLUME CONTROL PLUS IVLCP: YES
VOLUME CONTROL PLUS IVLCP: YES
VT SETTING VENT: 570 ML
VT SETTING VENT: 570 ML
WBC # BLD AUTO: 10 K/UL (ref 4.6–13.2)

## 2021-04-07 PROCEDURE — 74011250636 HC RX REV CODE- 250/636: Performed by: INTERNAL MEDICINE

## 2021-04-07 PROCEDURE — 74011250636 HC RX REV CODE- 250/636: Performed by: NURSE PRACTITIONER

## 2021-04-07 PROCEDURE — 2709999900 HC NON-CHARGEABLE SUPPLY

## 2021-04-07 PROCEDURE — 36415 COLL VENOUS BLD VENIPUNCTURE: CPT

## 2021-04-07 PROCEDURE — 71045 X-RAY EXAM CHEST 1 VIEW: CPT

## 2021-04-07 PROCEDURE — 74011000258 HC RX REV CODE- 258: Performed by: PHYSICIAN ASSISTANT

## 2021-04-07 PROCEDURE — 82962 GLUCOSE BLOOD TEST: CPT

## 2021-04-07 PROCEDURE — 74011000250 HC RX REV CODE- 250: Performed by: NURSE ANESTHETIST, CERTIFIED REGISTERED

## 2021-04-07 PROCEDURE — 94762 N-INVAS EAR/PLS OXIMTRY CONT: CPT

## 2021-04-07 PROCEDURE — 74011000250 HC RX REV CODE- 250: Performed by: PHYSICIAN ASSISTANT

## 2021-04-07 PROCEDURE — 85025 COMPLETE CBC W/AUTO DIFF WBC: CPT

## 2021-04-07 PROCEDURE — 80048 BASIC METABOLIC PNL TOTAL CA: CPT

## 2021-04-07 PROCEDURE — 94640 AIRWAY INHALATION TREATMENT: CPT

## 2021-04-07 PROCEDURE — 65610000006 HC RM INTENSIVE CARE

## 2021-04-07 PROCEDURE — 74011250636 HC RX REV CODE- 250/636: Performed by: NURSE ANESTHETIST, CERTIFIED REGISTERED

## 2021-04-07 PROCEDURE — 99291 CRITICAL CARE FIRST HOUR: CPT | Performed by: INTERNAL MEDICINE

## 2021-04-07 PROCEDURE — APPNB15 APP NON BILLABLE TIME 0-15 MINS: Performed by: NURSE PRACTITIONER

## 2021-04-07 PROCEDURE — 85730 THROMBOPLASTIN TIME PARTIAL: CPT

## 2021-04-07 PROCEDURE — 36600 WITHDRAWAL OF ARTERIAL BLOOD: CPT

## 2021-04-07 PROCEDURE — 83735 ASSAY OF MAGNESIUM: CPT

## 2021-04-07 PROCEDURE — 93970 EXTREMITY STUDY: CPT

## 2021-04-07 PROCEDURE — 74011250636 HC RX REV CODE- 250/636: Performed by: PHYSICIAN ASSISTANT

## 2021-04-07 PROCEDURE — 84145 PROCALCITONIN (PCT): CPT

## 2021-04-07 PROCEDURE — 74011000250 HC RX REV CODE- 250: Performed by: STUDENT IN AN ORGANIZED HEALTH CARE EDUCATION/TRAINING PROGRAM

## 2021-04-07 PROCEDURE — 74011000258 HC RX REV CODE- 258: Performed by: NURSE PRACTITIONER

## 2021-04-07 PROCEDURE — 87205 SMEAR GRAM STAIN: CPT

## 2021-04-07 PROCEDURE — 74011250637 HC RX REV CODE- 250/637: Performed by: INTERNAL MEDICINE

## 2021-04-07 PROCEDURE — 94003 VENT MGMT INPAT SUBQ DAY: CPT

## 2021-04-07 PROCEDURE — 82803 BLOOD GASES ANY COMBINATION: CPT

## 2021-04-07 PROCEDURE — 74011636637 HC RX REV CODE- 636/637: Performed by: PHYSICIAN ASSISTANT

## 2021-04-07 PROCEDURE — 74011000258 HC RX REV CODE- 258: Performed by: INTERNAL MEDICINE

## 2021-04-07 PROCEDURE — 84100 ASSAY OF PHOSPHORUS: CPT

## 2021-04-07 RX ORDER — ROCURONIUM BROMIDE 10 MG/ML
INJECTION, SOLUTION INTRAVENOUS AS NEEDED
Status: DISCONTINUED | OUTPATIENT
Start: 2021-04-07 | End: 2021-04-07 | Stop reason: HOSPADM

## 2021-04-07 RX ORDER — FUROSEMIDE 10 MG/ML
40 INJECTION INTRAMUSCULAR; INTRAVENOUS ONCE
Status: COMPLETED | OUTPATIENT
Start: 2021-04-07 | End: 2021-04-07

## 2021-04-07 RX ORDER — PROPOFOL 10 MG/ML
INJECTION, EMULSION INTRAVENOUS AS NEEDED
Status: DISCONTINUED | OUTPATIENT
Start: 2021-04-07 | End: 2021-04-07 | Stop reason: HOSPADM

## 2021-04-07 RX ORDER — MIDAZOLAM IN 0.9 % SOD.CHLORID 1 MG/ML
0-10 PLASTIC BAG, INJECTION (ML) INTRAVENOUS
Status: DISCONTINUED | OUTPATIENT
Start: 2021-04-07 | End: 2021-04-10

## 2021-04-07 RX ORDER — HEPARIN SODIUM 5000 [USP'U]/ML
5000 INJECTION, SOLUTION INTRAVENOUS; SUBCUTANEOUS EVERY 8 HOURS
Status: DISCONTINUED | OUTPATIENT
Start: 2021-04-07 | End: 2021-04-07 | Stop reason: SDUPTHER

## 2021-04-07 RX ORDER — FUROSEMIDE 10 MG/ML
40 INJECTION INTRAMUSCULAR; INTRAVENOUS 2 TIMES DAILY
Status: DISCONTINUED | OUTPATIENT
Start: 2021-04-07 | End: 2021-04-07

## 2021-04-07 RX ORDER — HEPARIN SODIUM 10000 [USP'U]/100ML
12-25 INJECTION, SOLUTION INTRAVENOUS
Status: DISCONTINUED | OUTPATIENT
Start: 2021-04-07 | End: 2021-04-27

## 2021-04-07 RX ADMIN — INSULIN LISPRO 2 UNITS: 100 INJECTION, SOLUTION INTRAVENOUS; SUBCUTANEOUS at 23:43

## 2021-04-07 RX ADMIN — ARFORMOTEROL TARTRATE 15 MCG: 15 SOLUTION RESPIRATORY (INHALATION) at 19:45

## 2021-04-07 RX ADMIN — CIPROFLOXACIN 2 DROP: 3 SOLUTION OPHTHALMIC at 02:00

## 2021-04-07 RX ADMIN — CIPROFLOXACIN 2 DROP: 3 SOLUTION OPHTHALMIC at 17:51

## 2021-04-07 RX ADMIN — Medication 30 ML: at 12:38

## 2021-04-07 RX ADMIN — DEXAMETHASONE SODIUM PHOSPHATE 4 MG: 4 INJECTION, SOLUTION INTRAMUSCULAR; INTRAVENOUS at 12:36

## 2021-04-07 RX ADMIN — DEXAMETHASONE SODIUM PHOSPHATE 4 MG: 4 INJECTION, SOLUTION INTRAMUSCULAR; INTRAVENOUS at 00:09

## 2021-04-07 RX ADMIN — CIPROFLOXACIN 2 DROP: 3 SOLUTION OPHTHALMIC at 14:30

## 2021-04-07 RX ADMIN — FENTANYL CITRATE 175 MCG/HR: 50 INJECTION, SOLUTION INTRAMUSCULAR; INTRAVENOUS at 02:21

## 2021-04-07 RX ADMIN — CHLORHEXIDINE GLUCONATE 0.12% ORAL RINSE 10 ML: 1.2 LIQUID ORAL at 08:43

## 2021-04-07 RX ADMIN — DORNASE ALFA 2.5 MG: 1 SOLUTION RESPIRATORY (INHALATION) at 19:45

## 2021-04-07 RX ADMIN — CIPROFLOXACIN 2 DROP: 3 SOLUTION OPHTHALMIC at 10:00

## 2021-04-07 RX ADMIN — PIPERACILLIN SODIUM AND TAZOBACTAM SODIUM 4.5 G: 4; .5 INJECTION, POWDER, LYOPHILIZED, FOR SOLUTION INTRAVENOUS at 05:50

## 2021-04-07 RX ADMIN — ARFORMOTEROL TARTRATE 15 MCG: 15 SOLUTION RESPIRATORY (INHALATION) at 07:49

## 2021-04-07 RX ADMIN — PIPERACILLIN AND TAZOBACTAM 2.25 G: 2; .25 INJECTION, POWDER, LYOPHILIZED, FOR SOLUTION INTRAVENOUS at 12:36

## 2021-04-07 RX ADMIN — CIPROFLOXACIN 2 DROP: 3 SOLUTION OPHTHALMIC at 21:30

## 2021-04-07 RX ADMIN — CHLORHEXIDINE GLUCONATE 0.12% ORAL RINSE 10 ML: 1.2 LIQUID ORAL at 20:51

## 2021-04-07 RX ADMIN — PROPOFOL 100 MG: 10 INJECTION, EMULSION INTRAVENOUS at 09:41

## 2021-04-07 RX ADMIN — ALBUTEROL SULFATE 2.5 MG: 2.5 SOLUTION RESPIRATORY (INHALATION) at 19:45

## 2021-04-07 RX ADMIN — PIPERACILLIN SODIUM AND TAZOBACTAM SODIUM 4.5 G: 4; .5 INJECTION, POWDER, LYOPHILIZED, FOR SOLUTION INTRAVENOUS at 00:09

## 2021-04-07 RX ADMIN — Medication 175 MCG/HR: at 12:34

## 2021-04-07 RX ADMIN — ROCURONIUM BROMIDE 50 MG: 50 INJECTION INTRAVENOUS at 09:41

## 2021-04-07 RX ADMIN — Medication 175 MCG/HR: at 17:48

## 2021-04-07 RX ADMIN — PIPERACILLIN AND TAZOBACTAM 2.25 G: 2; .25 INJECTION, POWDER, LYOPHILIZED, FOR SOLUTION INTRAVENOUS at 17:50

## 2021-04-07 RX ADMIN — MIDAZOLAM 8 MG/HR: 5 INJECTION INTRAMUSCULAR; INTRAVENOUS at 04:14

## 2021-04-07 RX ADMIN — Medication 175 MCG/HR: at 23:08

## 2021-04-07 RX ADMIN — FAMOTIDINE 20 MG: 10 INJECTION INTRAVENOUS at 20:52

## 2021-04-07 RX ADMIN — FAMOTIDINE 20 MG: 10 INJECTION INTRAVENOUS at 08:43

## 2021-04-07 RX ADMIN — MIDAZOLAM HYDROCHLORIDE 5 MG/HR: 5 INJECTION, SOLUTION INTRAMUSCULAR; INTRAVENOUS at 17:48

## 2021-04-07 RX ADMIN — ALBUTEROL SULFATE 2.5 MG: 2.5 SOLUTION RESPIRATORY (INHALATION) at 07:49

## 2021-04-07 RX ADMIN — ALBUTEROL SULFATE 2.5 MG: 2.5 SOLUTION RESPIRATORY (INHALATION) at 14:34

## 2021-04-07 RX ADMIN — FUROSEMIDE 40 MG: 10 INJECTION, SOLUTION INTRAMUSCULAR; INTRAVENOUS at 05:57

## 2021-04-07 RX ADMIN — PIPERACILLIN AND TAZOBACTAM 2.25 G: 2; .25 INJECTION, POWDER, LYOPHILIZED, FOR SOLUTION INTRAVENOUS at 23:44

## 2021-04-07 RX ADMIN — CIPROFLOXACIN 2 DROP: 3 SOLUTION OPHTHALMIC at 05:51

## 2021-04-07 RX ADMIN — FUROSEMIDE 40 MG: 10 INJECTION, SOLUTION INTRAMUSCULAR; INTRAVENOUS at 21:27

## 2021-04-07 RX ADMIN — FENTANYL CITRATE 175 MCG/HR: 50 INJECTION, SOLUTION INTRAMUSCULAR; INTRAVENOUS at 07:30

## 2021-04-07 RX ADMIN — FENTANYL CITRATE 100 MCG: 50 INJECTION, SOLUTION INTRAMUSCULAR; INTRAVENOUS at 09:17

## 2021-04-07 RX ADMIN — ALBUTEROL SULFATE 2.5 MG: 2.5 SOLUTION RESPIRATORY (INHALATION) at 03:19

## 2021-04-07 RX ADMIN — HEPARIN SODIUM 5000 UNITS: 5000 INJECTION INTRAVENOUS; SUBCUTANEOUS at 14:30

## 2021-04-07 RX ADMIN — DEXAMETHASONE SODIUM PHOSPHATE 4 MG: 4 INJECTION, SOLUTION INTRAMUSCULAR; INTRAVENOUS at 05:50

## 2021-04-07 NOTE — INTERDISCIPLINARY ROUNDS
[facility]  Pulmonary, Critical Care, and Sleep Medicine  Interdisciplinary and Ventilator Weaning Rounds    Patient discussed in morning walking rounds and interdisciplinary rounds. ICU day: 4    Overnight events:   · No acute events overnight   Assessments and best practice:   Ventilator  o Ventilator day 4  o Vent settings: FiO2 of 100% and PEEP 12   o VAP bundle, aim to keep peak plateau pressure 96-55OS H2O  o Weaning assessed and documented   - Patient does not meet criteria for SBT. - Patient is not on sedation holiday. - No plan to wean today. ETT switched from 6.5 TO 8 successfully.   - Outcome: successful exchange of ETT   - Final plan: will remain on mechanical ventilatory support today   Sedation  Fentanyl  and Versed   Other pertinent drips  o none   Lines noted  o peripheral IVs   Critical labs assessed  o Yes   Antibiotics  · Zosyn    Medications reviewed  o Yes   Pending imaging  o none   Pending send out labs  o Yes   Pending Procedures  o None   Glycemic control   Stress ulcer prophylaxis. o Pepcid   DVT prophylaxis. o SCDs/ heparin    Need for Lines, cardona assessed.  o Yes   Restraint Reevaluation   o Yes  o I have reevaluated the patient one hour after initiation of intervention. The patient is comfortable, uninjured, but continues to pose an imminent risk of injury to self to themselves and/or serious disruption of medical treatment required to keep patient stable. The patient's current medical and behavioral conditions that warrant the use intervention include danger to self and Interference with medical treatment. Restraint or seclusion will be discontinued at the earliest possible time, regardless of the scheduled expiration of the order.  Based on my evaluation, restraints will be continued: YES 4/7/21   o Attending Overseeing restraints: Talia Morrow DO     Family contact/MPOA: sisterCassy        Daily Plans:   Continue mechanical ventilatory support   Continue decadron   ETT exchanged from 6.5 to 8.  Repeat chest x-ray with good positioning    Change RASS from -3 to -4 to 0 to -1   Obtain sputum cx   D/C diuresis    Add heparin subq for DVT prophylaxis     REGGIE time 15 minutes        Diana Power NP  04/07/21  Pulmonary, Critical Care Medicine  [facility]

## 2021-04-07 NOTE — PROGRESS NOTES
Discharge planning    Reviewed chart. Patient remains intubated. CM will continue to monitor and assist with transitional needs.      MARGUERITE Day, RN  Pager # [phone]  Care Manager

## 2021-04-07 NOTE — PROGRESS NOTES
Nutrition Note    Patient tolerating tube feeding of Vital High Protein at 20 mL/hr then held and OGT clamped at 17:00 yesterday (4/6) then put to low intermittent suction at 18:00 due to instability, respiratory decompensation s/p bronch yesterday and ETT exchanged to size 8 by anesthesia this morning and plan to resume tube feeds today per MD. Last BM 4/4. Previous nutrition goals have not been met, plan to continue current goal.     Recommendations/Plan   - Resume tube feeding of Vital High Protein at 20 mL/hr and advance as tolerated by 10 mL q 8 hours to goal rate of 55 mL/hr with prosource TID, daily multivitamin and 150 mL q 4 hour water flushes (goal regimen to provide 1500 kcal, 161 gm protein, 1104 mL free water, 93% RDIs).     Electronically signed by Francie Ojeda RD, [address]  on 4/7/2021 at 12:20 PM    Contact: [phone]

## 2021-04-07 NOTE — ANESTHESIA PROCEDURE NOTES
Emergent Intubation  Performed by: Reginaldo Nielson CRNA  Authorized by: Reginaldo Nielson CRNA     Emergent Intubation:   Location:  ICU  Date/Time:  4/7/2021 9:45 AM  Spontaneous Ventilation: present    Level of Consciousness: sedated    Preoxygenated: No      Airway Documentation:   Airway:  ETT - Cuffed  Technique:  Intubating stylet  Advanced Technique:  Glide scope  Insertion Site:  Oral  Blade Type:  Chuckie  Blade Size:  4  ETT size (mm):  8.0  ETT Line Christian:  Lips  ETT Insertion depth (cm):  25  Placement verified by: auscultation, EtCO2, BBS and fiber optic    Attempts:  1  Difficult airway: No

## 2021-04-07 NOTE — PROGRESS NOTES
[facility]  Pulmonary, Critical Care, and Sleep Medicine    Name: Leesa Blake MRN: [id number]   : 1950 Hospital: [address] [place] [address]    Date: 2021        Critical Care: Daily Progress Note    Admission Date:   2021  LOS: 3  MAR reviewed and pertinent medications noted or modified as needed    IMPRESSION:   Angioedema- with airway and respiratory failure and collapse; thought due to ACE inhibitor  S/P Emergent Cricthyrotomy [facility]-ED 21- converted to 6.5 ETT intraoperative by anesthesia team 21  S/P cardiac arrest @ [facility] 21- brief thought due to hypoxia due to airway collapse  Respiratory Failure: due to above; currently intubated and on vent for airway protection  Pulmonary Infiltrates: suspect aspiration secretions and/or blood due to above- can't exclude other infectious process at this time. Rapid Covid Negative at [facility]  Bradycardia  JACQUELYN  Left scleral erythema- unknown baseline; possible infection   DM  DM retinopathy per chart review  Diabetic peripheral neuropathy  COVID -19; Negative rapid and [employer]: 21  Obesity: Body mass index is 36.12 kg/m². RECOMMENDATIONS:   NEURO:  Serial neuro evaluations  Sedation Holiday per protocol- On hold for next 24 hrs and reassess  RAAS: -3 to -4  Wetting tears and cipro eye drops    CARDIO:  MAP goal >64  Follow Up echo- completed earlier today  Telemetry    PULM:  Maintain aspiration precautions: HOB >30 degrees  SpO2 goal >92%  Bronchial /oral hygiene  VAP bundle- Wean vent as clinical status allows  Decadron scheduled 4mg Q 6   Benedryl Scheduled 50mg Q 6    GI/ NUTRITION:  OGT   Diet: TF  SUP:Pepcid  Follow up with nutritional recommendations    RENAL/ METAB/ :   Monitor I&Os  Trend electrolytes- replaced as needed, K:4, Mg>2 PO4>2  Garcia: indwelling      HEM/ONC  Trend Hb/HCT and PLTs, and indicis  DVT prophylaxis: SCDs- start SQ heparin 5K TID later tonight  Blood Products: not indicated at this time  ID  Antibioitcs: Stop Vanco; MRSA swab negative, Continue Zosyn  Follow up on pending cultures  Continue Cipro Eye drops  Streamline antibiotics per culture data and clinical course    ENDO  BGs Q 6,SSI  Check C1 inhibitor- Pending    MSK/ ORTHO  No active Issues    SKIN/ WOUNDS  Per protocol  Neck- daily Xeroform dressing changes per ENT    OTHER/DISPO:    CODE STATUS: Full Code- Full    Case reviewed and discussed with ENT staff, OR and ICU care team     Subjective/History: This patient has been seen and evaluated at the request of Dr. Carly Zurita- ED from [facility] for Angioedema and airway compromise. Patient is a 79 y.o. male presented earlier this morning to [facility] ED in rapidly progressive respiratory distress due to angioedema and airway swelling presumed due to ACE inhibitor therapy. ED provider unable to intubate due to severity of airway swelling. Brief cardio-pulmonary collapse with brief loss of pulse. IO placed into right shoulder. Emergent cricothyrotomy 5.0 place. Good placement. ED team able to oxygenate and ventilate patient. Prior to transport ABG notable for Respiratory acidosis. Call back to -ED patient on vent and paralyzed with rate of 10. I requested that RR be increased to 16-18 bpm depending what patient could tolerate. Our ENT provider on call, Dr. Karis Darnell was contacted. OR team on call came in. Patient flow via Nightingale/Life flight to our facility. The patient was taken from flight line to OR hold. On-call team rapidly assess patient. I evaluated the patient immediately upon arrival. Patient with Cric in place, bilateral breath  Sounds, SpO2 in the mid 90's. Swollen neck and face. ABG -POC with persistent but improved respiratory acidosis. ER team took patient emergently to the OR    Anesthesia able to place ETT 6.5 intraop. I was called by ENT provider in the OR. Case discussed. Opted to keep patient intubated with 6.5 ETT.  Will monitor for 48-72 hours and reassess patient. CXR notable for bilateral opacities. Suspect the later due to aspiration of blood and /or airway secretions. Patient was reexamined again at bedside upon arrival to ICU- Bed2    Patient paralyzed and sedated. 6.5 ETT in place. Bilateral breath sounds. SpO2 100%    CXR obtained in ICU- ETT slightly high- will try to advance 1-2 cm if able. 04/07/21  Yesterday PM, patient had abrupt desat to 80s, difficult to bag, requiring high PEEP & FiO2. Had no cuff leak, bronch with anesthesia at bedside showed tip of tube ~6 cm above gabriella; however, airway patent. CXR shows infiltrate RLL could represent aspiration, infiltrate, pneumonitis, or atelectasis/mucous plug. FiO2 requirement decreased overnight. Case discussed with ENT, but not at hospital today  Tube exchanged with anesthesia this AM went well. 8.0 ETT advanced to 28 cm at lip after initial CXR. Sedated with 175 Fentanyl and 8 Versed. New RASS goal of 0 to -1 with more secure airway. Leave Pulmozyme on MAR and aggressive pulmonary toilette. Send sputum Cx  Has ahd some mild bradycardia and PVCs; however, HD stable. Restart tube feeds.  Continue 150 mL free water q4h  Leave abx the same  Start sq heparin and obtain BLE dopplers   Hold diuresis today in setting of worsening kidney function    Inpat Anti-Infectives (From admission, onward)     Start     Ordered Stop    04/06/21 1800  ciprofloxacin HCl (CILOXAN) 0.3 % ophthalmic solution 2 Drop  2 Drop,   Both Eyes,   EVERY 4 HOURS      04/04/21 1724 04/11/21 1759    04/05/21 0900  vancomycin (VANCOCIN) 1750 mg in  ml infusion  1,750 mg,   IntraVENous,   EVERY 18 HOURS      04/05/21 0751 --    04/04/21 1800  ciprofloxacin HCl (CILOXAN) 0.3 % ophthalmic solution 2 Drop  2 Drop,   Both Eyes,   EVERY 2 HOURS WHILE AWAKE      04/04/21 1724 04/06/21 1759    04/04/21 1800  piperacillin-tazobactam (ZOSYN) 4.5 g in 0.9% sodium chloride (MBP/ADV) 100 mL MBP  4.5 g,   IntraVENous,   EVERY 6 HOURS      04/04/21 1728 --                The patient is critically ill and can not provide additional history due to Unconsciousness, Ventilated and Unable to speak. Chart and notes reviewed. Data reviewed. []I have reviewed the flowsheet and previous days notes. []The patient is unable to give any meaningful history or review of systems because the patient is:  [x]Intubated [x]Sedated   []Unresponsive      []The patient is critically ill on      [x]Mechanical ventilation []Pressors   []BiPAP []                       Past Medical History:   Diagnosis Date    DDD (degenerative disc disease), lumbar     Diabetes [facility]     Diabetic neuropathy [facility]     Diabetic retinopathy ([facility]     DM2 (diabetes mellitus, type 2) ([facility]     HLD (hyperlipidemia)     HTN (hypertension)     Obesity (BMI 30-39. 9)       History reviewed. No pertinent surgical history. Prior to Admission medications    Medication Sig Start Date End Date Taking? Authorizing Provider   LISINOPRIL, BULK, by Does Not Apply route.     Other, MD Cheryl     Current Facility-Administered Medications   Medication Dose Route Frequency    furosemide (LASIX) injection 40 mg  40 mg IntraVENous BID    piperacillin-tazobactam (ZOSYN) 2.25 g in 0.9% sodium chloride (MBP/ADV) 50 mL MBP  2.25 g IntraVENous Q6H    dexamethasone (DECADRON) 4 mg/mL injection 4 mg  4 mg IntraVENous Q6H    dornase alpha (PULMOZYME)2.5mg/2.5ml nebulizer solution  2.5 mg Nebulization BID RT    albuterol (PROVENTIL VENTOLIN) nebulizer solution 2.5 mg  2.5 mg Nebulization Q6H RT    arformoteroL (BROVANA) neb solution 15 mcg  15 mcg Nebulization BID RT    midazolam in normal saline (VERSED) 1 mg/mL infusion  0-10 mg/hr IntraVENous TITRATE    multivit-folic acid-herbal 526 (WELLESSE PLUS) oral liquid 30 mL  30 mL Per NG tube DAILY    chlorhexidine (PERIDEX) 0.12 % mouthwash 10 mL  10 mL Oral Q12H    fentaNYL (PF) 900 mcg/30 ml infusion soln  0-200 mcg/hr IntraVENous TITRATE  famotidine (PF) (PEPCID) 20 mg in 0.9% sodium chloride 10 mL injection  20 mg IntraVENous Q12H    insulin lispro (HUMALOG) injection   SubCUTAneous Q6H    ciprofloxacin HCl (CILOXAN) 0.3 % ophthalmic solution 2 Drop  2 Drop Both Eyes Q4H     Allergies   Allergen Reactions    Lisinopril Angioedema      Social History     Tobacco Use    Smoking status: Current Every Day Smoker     Packs/day: 0.50   Substance Use Topics    Alcohol use: Not on file      History reviewed. No pertinent family history. Review of Systems:  Review of systems not obtained due to patient factors. Objective:   Vital Signs:    Visit Vitals  /65   Pulse (!) 54   Temp 98.8 °F (37.1 °C)   Resp 16   Ht 5' 11\" (1.803 m)   Wt 117.5 kg (259 lb)   SpO2 94%   BMI 36.12 kg/m²       O2 Device: Endotracheal tube, Ventilator       Temp (24hrs), Av.2 °F (36.8 °C), Min:97.7 °F (36.5 °C), Max:98.8 °F (37.1 °C)       Intake/Output:   Last shift:      No intake/output data recorded. Last 3 shifts:  1901 -  0700  In: 4570.7 [I.V.:3110.7]  Out: 1786 [Urine:1586]    Intake/Output Summary (Last 24 hours) at 2021 0759  Last data filed at 2021 0700  Gross per 24 hour   Intake 1499.4 ml   Output 1401 ml   Net 98.4 ml         Ventilator Settings:  Mode Rate Tidal Volume Pressure FiO2 PEEP   Assist control, VC+   570 ml    100 % 10 cm H20     Peak airway pressure: 30 cm H2O    Minute ventilation: 9.55 l/min       ARDS network Guidelines:   Lung protective strategy and Plateau  Pressure goal < 30 cm H2O goals  Oxygenation Goals PaO2 55-80 mm Hg or SaO2 88-95%  PH goal 7.30-7.45    VAP bundle;  Reviewed. Annalisa tube to suction at 20-30 cm Hg.   Maintain [place] tube with 5-10ml air every 4 hours  Routine oral care every 4 hours  Elevation of head > 45 degree  Daily sedation holiday and SBT evaluation starting at 6.00am.  Physical Exam:    General:  Intubated and sedated  , appears stated age.+ Obese body habitus   Head: Normocephalic, without obvious abnormality, atraumatic. + facial edema   Eyes:  Conjunctivae/corneas clear. PERRL- left eye hyperremia, EOMs intact. Nose: Nares normal. Septum midline. Mucosa normal. No drainage or sinus tenderness. Throat: Lips, mucosa, and tongue swollen. Teeth and gums normal.   Neck: bandage and packing inplace where trach was palced, no carotid bruit and no JVD. Back:   Symmetric   Lungs:   Bilateral breath sounds, with bilateral rhonchi- no wheezing   Chest wall:  No tenderness or deformity.- No crepitus   Heart:  Regular rate and rhythm, S1, S2 normal, no murmur, click, rub or gallop. Abdomen:   Soft, Obese non-tender. Bowel sounds normal. No masses,  No organomegaly. Extremities: Extremities normal, atraumatic, no cyanosis or edema. Pulses: 2+ and symmetric all extremities.    Skin: Skin color, texture, turgor normal. No rashes or lesions   Lymph nodes:      Cervical, supraclavicular nodes: unremarkable   Neurologic: Grossly nonfocal       Data:     Recent Labs     04/07/21 0413 04/06/21 0220 04/05/21 0056   WBC 10.0 8.1 8.0   HGB 12.2* 11.0* 11.9*   HCT 40.4 33.6* 37.9    166 224     Recent Labs     04/07/21  0413 04/06/21 0220 04/05/21 2011 04/05/21  0056 04/04/21  1711    142  --  139 141   K 4.5 4.2  --  4.1 4.6   * 112*  --  109 108   CO2 21 25  --  20* 26   * 133*  --  93 102*   BUN 54* 37*  --  23* 24*   CREA 2.74* 1.99*  --  1.55* 1.80*   CA 8.1* 7.5*  --  8.1* 8.0*   MG 2.5 2.0  --  2.0 2.1   PHOS 6.2* 3.6 4.1 1.7* 3.7   ALB  --   --   --   --  3.4   ALT  --   --   --   --  141*   INR  --   --   --   --  1.0     Lab Results   Component Value Date/Time    NT pro- 04/06/2021 12:02 PM     Lab Results   Component Value Date/Time    INR 1.0 04/04/2021 05:11 PM    Prothrombin time 12.7 04/04/2021 05:11 PM       Recent Labs     04/04/21  1355 04/04/21  1140   PH 7.28* 7.17*   PCO2 49* 71*   PO2 103* 88   HCO3 22* 25   FIO2 100.0 100.0 Recent Labs     04/07/21  0313 04/06/21  1826 04/06/21  0353   FIO2I 1.0 100 0.55   HCO3I 23.6 23.7 22.8   PCO2I 44.2 46.8* 35.4   PHI 7.34* 7.31* 7.42   PO2I 98 75* 87       ENDO:  Lab Results   Component Value Date/Time    TSH 1.35 04/04/2021 05:11 PM       Lab Results   Component Value Date/Time    Glucose 117 (H) 04/07/2021 04:13 AM    Glucose 133 (H) 04/06/2021 02:20 AM    Glucose 93 04/05/2021 12:56 AM    Glucose 102 (H) 04/04/2021 05:11 PM    Glucose 144 (H) 04/04/2021 09:40 AM         CARDIO:  Telemetry:normal sinus rhythm / sinus bradycardia    Lab Results   Component Value Date/Time    CK 90 04/05/2021 08:11 PM    CK 89 04/05/2021 08:11 PM    CK - MB 1.5 04/05/2021 08:11 PM    CK - MB 1.4 04/05/2021 08:11 PM    CK-MB Index 1.7 04/05/2021 08:11 PM    CK-MB Index 1.6 04/05/2021 08:11 PM    Troponin-I, Qt. 0.06 (H) 04/04/2021 01:40 PM    Troponin-I, QT 0.18 (H) 04/05/2021 08:11 PM    Troponin-I, QT 0.17 (H) 04/05/2021 08:11 PM       EKG Results     Procedure 720 Value Units Date/Time    EKG, 12 LEAD, SUBSEQUENT [[id number]] Collected: 04/06/21 1153    Order Status: Completed Updated: 04/06/21 1536     Ventricular Rate 54 BPM      Atrial Rate 54 BPM      P-R Interval 190 ms      QRS Duration 98 ms      Q-T Interval 588 ms      QTC Calculation (Bezet) 557 ms      Calculated P Axis 44 degrees      Calculated R Axis -20 degrees      Calculated T Axis 130 degrees      Diagnosis --     Sinus bradycardia  Anteroseptal infarct , age undetermined  T wave abnormality, consider lateral ischemia  Prolonged QT  Abnormal ECG  No previous ECGs available  Confirmed by Rolo Wong ([id number]) on 4/6/2021 3:36:40 PM      EKG, 12 LEAD, SUBSEQUENT [[id number]]     Order Status: Canceled                MEDS: Please also see MAR  Current Facility-Administered Medications   Medication Dose Route Frequency    furosemide (LASIX) injection 40 mg  40 mg IntraVENous BID    piperacillin-tazobactam (ZOSYN) 2.25 g in 0.9% sodium chloride (MBP/ADV) 50 mL MBP  2.25 g IntraVENous Q6H    dexamethasone (DECADRON) 4 mg/mL injection 4 mg  4 mg IntraVENous Q6H    dornase alpha (PULMOZYME)2.5mg/2.5ml nebulizer solution  2.5 mg Nebulization BID RT    albuterol (PROVENTIL VENTOLIN) nebulizer solution 2.5 mg  2.5 mg Nebulization Q6H RT    arformoteroL (BROVANA) neb solution 15 mcg  15 mcg Nebulization BID RT    midazolam in normal saline (VERSED) 1 mg/mL infusion  0-10 mg/hr IntraVENous TITRATE    multivit-folic acid-herbal 233 (WELLESSE PLUS) oral liquid 30 mL  30 mL Per NG tube DAILY    chlorhexidine (PERIDEX) 0.12 % mouthwash 10 mL  10 mL Oral Q12H    fentaNYL (PF) 900 mcg/30 ml infusion soln  0-200 mcg/hr IntraVENous TITRATE    famotidine (PF) (PEPCID) 20 mg in 0.9% sodium chloride 10 mL injection  20 mg IntraVENous Q12H    insulin lispro (HUMALOG) injection   SubCUTAneous Q6H    ciprofloxacin HCl (CILOXAN) 0.3 % ophthalmic solution 2 Drop  2 Drop Both Eyes Q4H     MICRO:       Results     Procedure Component Value Units Date/Time    CULTURE, RESPIRATORY/SPUTUM/BRONCH Augustus Ni [[id number]] Collected: 04/06/21 1715    Order Status: Completed Specimen: Sputum,ET Suction Updated: 04/07/21 0023     Special Requests: NO SPECIAL REQUESTS        GRAM STAIN OCCASIONAL WBCS SEEN         NO ORGANISMS SEEN        Culture result: PENDING    Dorsey Homans, URINE [[id number]] Collected: 04/04/21 1801    Order Status: Completed Specimen: Urine, random Updated: 04/04/21 1944     Strep pneumo Ag, urine Negative       LEGIONELLA PNEUMOPHILA AG, URINE [[id number]] Collected: 04/04/21 1801    Order Status: Completed Specimen: Urine, random Updated: 04/04/21 1944     Legionella Ag, urine Negative       RESPIRATORY VIRUS PANEL W/COVID-19, PCR [[id number]] Collected: 04/04/21 1700    Order Status: Completed Specimen: Nasopharyngeal Updated: 04/04/21 2020     Adenovirus Not detected        Coronavirus 229E Not detected        Coronavirus HKU1 Not detected Coronavirus CVNL63 Not detected        Coronavirus OC43 Not detected        Metapneumovirus Not detected        Rhinovirus and Enterovirus Not detected        Influenza A Not detected        Influenza A, subtype H1 Not detected        Influenza A, subtype H3 Not detected        INFLUENZA A H1N1 PCR Not detected        Influenza B Not detected        Parainfluenza 1 Not detected        Parainfluenza 2 Not detected        Parainfluenza 3 Not detected        Parainfluenza virus 4 Not detected        RSV by PCR Not detected        B. parapertussis, PCR Not detected        Bordetella pertussis - PCR Not detected        Chlamydophila pneumoniae DNA, QL, PCR Not detected        Mycoplasma pneumoniae DNA, QL, PCR Not detected        SARS-CoV-2, PCR Not detected       CULTURE, MRSA [[id number]] Collected: 04/04/21 1700    Order Status: Completed Specimen: Nasal from Nares Updated: 04/06/21 0838     Special Requests: NO SPECIAL REQUESTS        Culture result: MRSA NOT PRESENT               Screening of patient nares for MRSA is for surveillance purposes and, if positive, to facilitate isolation considerations in high risk settings. It is not intended for automatic decolonization interventions per se as regimens are not sufficiently effective to warrant routine use. COVID-19 RAPID TEST [[id number]] Collected: 04/04/21 1112    Order Status: Completed Specimen: Nasopharyngeal Updated: 04/04/21 1151     Specimen source Nasopharyngeal        COVID-19 rapid test Not Detected        Comment:   Rapid NAAT:  The specimen is NEGATIVE for SARS-CoV-2, the novel coronavirus associated with COVID-19. Negative results should be treated as presumptive and, if inconsistent with clinical signs and symptoms or necessary for patient management, should be tested with an alternative molecular assay. Negative results do not preclude SARS-CoV-2 infection and should not be used as the sole basis for patient management decisions.    This test has been authorized by the FDA under an Emergency Use   Authorization (EUA) for use by authorized laboratories. Fact sheet for Healthcare Providers: ConventionUpdate.co.nz Fact sheet for Patients: ConventionUpdate.co.nz   Methodology: Isothermal Nucleic Acid Amplification                   Imaging:  I have personally reviewed the patients radiographs and have reviewed the reports:    CXR Results  (Last 48 hours)               04/06/21 0547  XR CHEST PORT Final result    Impression:      Persistent bilateral airspace opacities with slight improvement in aeration left   lung and slight worsening in the right lung base. Probable increase in right   pleural opacity. Interval enteric tube placement. Please see report for further details. Narrative:  EXAM: XR CHEST PORT       INDICATION: Respiratory Failure; Check ETT Placement       COMPARISON: Recent prior       FINDINGS: A portable AP radiograph of the chest was obtained at 0 445 hours. The   patient is on a cardiac monitor. Persistent bilateral airspace opacities. Slight   evolution in distribution since prior exam. Slight improvement in aeration left   lung. Slight increase in right basilar parenchymal opacity and right basilar   pleural opacity. . Stable enlarged cardiac silhouette. .  No acute bone findings. Stable endotracheal tube. Interval placement of enteric tube with tip below the   diaphragm and visualized field. Metallic wire overlying the distal clavicle on   the left. Chronic widening of the left AC joint. .                    CT Results  (Last 48 hours)    None           Best practice :  Core measures:    Glycemic control  [facility]. Ventilated patients- aim to keep peak plateau pressure 41-01YC H2O.   Sress ulcer prophylaxis  DVT prophylaxis               Critical Care Time:  The services I provided to this patient were to treat and/or prevent clinically significant deterioration that could result in the failure of one or more body systems and/or organ systems due to respiratory distress, hypoxia, cardiac dysrhythmia. Services included the following:  -reviewing nursing notes and old charts  -vital sign assessments   -direct patient care  -medication orders and management  -interpreting and reviewing diagnostic studies/labs  -re-evaluations  -documentation time        Aggregate critical care time was 35 minutes, which includes only time during which I was engaged in work directly related to the patient's care as described above. During this entire length of time I was immediately available to the patient. The reason for providing this level of medical care for this critically ill patient was due a critical illness that impaired one or more vital organ systems such that there was a high probability of imminent or life threatening deterioration in the patients condition. This care involved high complexity decision making to assess, manipulate, and support vital system functions, to treat this degreee vital organ system failure and to prevent further life threatening deterioration of the patients condition      Complex decision making was made in the evaluation and management plans during this consultation. More than 50% of time was spent in counseling and coordination of care including review of data and discussion with other team members. Kaity Merino MD PGY-1  [facility] Emergency Medicine    [facility]  Pulmonary, Critical Care, and Sleep Medicine        [facility] Staff Note     I have independently evaluated the patient at the bedside and I am the primary provider of record. I agree with the above evaluation, assessment and recommendations along with the following comments and observations. I have made editions to above evaluation which was performed under my direction. Please also review my orders.      Chart and labs reviewed    Patient remains intubated and sedated. + cuff leak earlier this morning. Patient with increased Vent requirements, mucus plugging yesterday and small 6.5 ETT high in trachea despite maximal advancement. RLL atelectasis. Case discussed this morning with Anesthesia and ENT staff. Anesthesia able to perform tube exchange at the bedside. Plan to continue steroids today. Restarting tube feeds. Work on Pulmonary toilet. Send sputum cultures. Doppler studies of lower extremities. Complex decision making was made in the evaluation and management plans during this consultation. More than 50% of time was spent in counseling and coordination of care including review of data and discussion with other team members. Further recommendations and therapies pending clinical course.     Critical Care and time spent coordinating care: physical exam, chart review, documentation, discussion with care team,  minus procedure time: 55 min    Becka Rey DO, [facility]  Pulmonary, Sleep and Critical Care Medicine  1:36 PM

## 2021-04-08 ENCOUNTER — APPOINTMENT (OUTPATIENT)
Dept: GENERAL RADIOLOGY | Age: 71
DRG: 004 | End: 2021-04-08
Attending: PHYSICIAN ASSISTANT
Payer: MEDICARE

## 2021-04-08 LAB
ANION GAP SERPL CALC-SCNC: 8 MMOL/L (ref 3–18)
APTT PPP: 31.1 SEC (ref 23–36.4)
APTT PPP: 69.2 SEC (ref 23–36.4)
APTT PPP: 95.7 SEC (ref 23–36.4)
ARTERIAL PATENCY WRIST A: YES
BACTERIA SPEC CULT: NORMAL
BASE EXCESS BLD CALC-SCNC: 2 MMOL/L
BASOPHILS # BLD: 0 K/UL (ref 0–0.1)
BASOPHILS # BLD: 0 K/UL (ref 0–0.1)
BASOPHILS NFR BLD: 0 % (ref 0–2)
BASOPHILS NFR BLD: 0 % (ref 0–2)
BDY SITE: ABNORMAL
BODY TEMPERATURE: 37.2
BUN SERPL-MCNC: 64 MG/DL (ref 7–18)
BUN/CREAT SERPL: 24 (ref 12–20)
CALCIUM SERPL-MCNC: 7.9 MG/DL (ref 8.5–10.1)
CHLORIDE SERPL-SCNC: 112 MMOL/L (ref 100–111)
CO2 SERPL-SCNC: 26 MMOL/L (ref 21–32)
CREAT SERPL-MCNC: 2.71 MG/DL (ref 0.6–1.3)
DIFFERENTIAL METHOD BLD: ABNORMAL
DIFFERENTIAL METHOD BLD: ABNORMAL
EOSINOPHIL # BLD: 0 K/UL (ref 0–0.4)
EOSINOPHIL # BLD: 0 K/UL (ref 0–0.4)
EOSINOPHIL NFR BLD: 0 % (ref 0–5)
EOSINOPHIL NFR BLD: 0 % (ref 0–5)
ERYTHROCYTE [DISTWIDTH] IN BLOOD BY AUTOMATED COUNT: 15.9 % (ref 11.6–14.5)
ERYTHROCYTE [DISTWIDTH] IN BLOOD BY AUTOMATED COUNT: 15.9 % (ref 11.6–14.5)
GAS FLOW.O2 O2 DELIVERY SYS: ABNORMAL L/MIN
GAS FLOW.O2 SETTING OXYMISER: 16 BPM
GLUCOSE BLD STRIP.AUTO-MCNC: 135 MG/DL (ref 70–110)
GLUCOSE BLD STRIP.AUTO-MCNC: 149 MG/DL (ref 70–110)
GLUCOSE BLD STRIP.AUTO-MCNC: 149 MG/DL (ref 70–110)
GLUCOSE BLD STRIP.AUTO-MCNC: 151 MG/DL (ref 70–110)
GLUCOSE SERPL-MCNC: 131 MG/DL (ref 74–99)
GRAM STN SPEC: NORMAL
GRAM STN SPEC: NORMAL
HCO3 BLD-SCNC: 27.7 MMOL/L (ref 22–26)
HCT VFR BLD AUTO: 38.1 % (ref 36–48)
HCT VFR BLD AUTO: 38.6 % (ref 36–48)
HGB BLD-MCNC: 11.4 G/DL (ref 13–16)
HGB BLD-MCNC: 11.9 G/DL (ref 13–16)
INSPIRATION.DURATION SETTING TIME VENT: 1 SEC
LYMPHOCYTES # BLD: 0.9 K/UL (ref 0.9–3.6)
LYMPHOCYTES # BLD: 1.7 K/UL (ref 0.9–3.6)
LYMPHOCYTES NFR BLD: 14 % (ref 21–52)
LYMPHOCYTES NFR BLD: 8 % (ref 21–52)
MAGNESIUM SERPL-MCNC: 3 MG/DL (ref 1.6–2.6)
MCH RBC QN AUTO: 25.9 PG (ref 24–34)
MCH RBC QN AUTO: 26 PG (ref 24–34)
MCHC RBC AUTO-ENTMCNC: 29.9 G/DL (ref 31–37)
MCHC RBC AUTO-ENTMCNC: 30.8 G/DL (ref 31–37)
MCV RBC AUTO: 84.3 FL (ref 74–97)
MCV RBC AUTO: 86.6 FL (ref 74–97)
MONOCYTES # BLD: 0.9 K/UL (ref 0.05–1.2)
MONOCYTES # BLD: 1 K/UL (ref 0.05–1.2)
MONOCYTES NFR BLD: 8 % (ref 3–10)
MONOCYTES NFR BLD: 9 % (ref 3–10)
NEUTS SEG # BLD: 8.9 K/UL (ref 1.8–8)
NEUTS SEG # BLD: 9.4 K/UL (ref 1.8–8)
NEUTS SEG NFR BLD: 76 % (ref 40–73)
NEUTS SEG NFR BLD: 84 % (ref 40–73)
O2/TOTAL GAS SETTING VFR VENT: 80 %
PCO2 BLD: 50.3 MMHG (ref 35–45)
PEEP RESPIRATORY: 10 CMH2O
PH BLD: 7.35 [PH] (ref 7.35–7.45)
PHOSPHATE SERPL-MCNC: 5.8 MG/DL (ref 2.5–4.9)
PIP ISTAT,IPIP: 27
PLATELET # BLD AUTO: 144 K/UL (ref 135–420)
PLATELET # BLD AUTO: 151 K/UL (ref 135–420)
PMV BLD AUTO: 11.5 FL (ref 9.2–11.8)
PMV BLD AUTO: 11.6 FL (ref 9.2–11.8)
PO2 BLD: 82 MMHG (ref 80–100)
POTASSIUM SERPL-SCNC: 3.8 MMOL/L (ref 3.5–5.5)
RBC # BLD AUTO: 4.4 M/UL (ref 4.35–5.65)
RBC # BLD AUTO: 4.58 M/UL (ref 4.35–5.65)
SAO2 % BLD: 95 % (ref 92–97)
SERVICE CMNT-IMP: ABNORMAL
SERVICE CMNT-IMP: NORMAL
SODIUM SERPL-SCNC: 146 MMOL/L (ref 136–145)
SPECIMEN TYPE: ABNORMAL
TOTAL RESP. RATE, ITRR: 17
VENTILATION MODE VENT: ABNORMAL
VOLUME CONTROL PLUS IVLCP: YES
VT SETTING VENT: 570 ML
WBC # BLD AUTO: 11.3 K/UL (ref 4.6–13.2)
WBC # BLD AUTO: 11.7 K/UL (ref 4.6–13.2)

## 2021-04-08 PROCEDURE — 74011000250 HC RX REV CODE- 250: Performed by: PHYSICIAN ASSISTANT

## 2021-04-08 PROCEDURE — 36600 WITHDRAWAL OF ARTERIAL BLOOD: CPT

## 2021-04-08 PROCEDURE — 85730 THROMBOPLASTIN TIME PARTIAL: CPT

## 2021-04-08 PROCEDURE — 94640 AIRWAY INHALATION TREATMENT: CPT

## 2021-04-08 PROCEDURE — 94003 VENT MGMT INPAT SUBQ DAY: CPT

## 2021-04-08 PROCEDURE — 94667 MNPJ CHEST WALL 1ST: CPT

## 2021-04-08 PROCEDURE — 99291 CRITICAL CARE FIRST HOUR: CPT | Performed by: INTERNAL MEDICINE

## 2021-04-08 PROCEDURE — 77030018798 HC PMP KT ENTRL FED COVD -A

## 2021-04-08 PROCEDURE — 65610000006 HC RM INTENSIVE CARE

## 2021-04-08 PROCEDURE — 74011250636 HC RX REV CODE- 250/636: Performed by: INTERNAL MEDICINE

## 2021-04-08 PROCEDURE — 74011250636 HC RX REV CODE- 250/636: Performed by: PHYSICIAN ASSISTANT

## 2021-04-08 PROCEDURE — 94762 N-INVAS EAR/PLS OXIMTRY CONT: CPT

## 2021-04-08 PROCEDURE — APPNB15 APP NON BILLABLE TIME 0-15 MINS: Performed by: NURSE PRACTITIONER

## 2021-04-08 PROCEDURE — 83735 ASSAY OF MAGNESIUM: CPT

## 2021-04-08 PROCEDURE — 85025 COMPLETE CBC W/AUTO DIFF WBC: CPT

## 2021-04-08 PROCEDURE — 74011000250 HC RX REV CODE- 250: Performed by: STUDENT IN AN ORGANIZED HEALTH CARE EDUCATION/TRAINING PROGRAM

## 2021-04-08 PROCEDURE — 71045 X-RAY EXAM CHEST 1 VIEW: CPT

## 2021-04-08 PROCEDURE — 84100 ASSAY OF PHOSPHORUS: CPT

## 2021-04-08 PROCEDURE — 74011636637 HC RX REV CODE- 636/637: Performed by: PHYSICIAN ASSISTANT

## 2021-04-08 PROCEDURE — 80048 BASIC METABOLIC PNL TOTAL CA: CPT

## 2021-04-08 PROCEDURE — 82803 BLOOD GASES ANY COMBINATION: CPT

## 2021-04-08 PROCEDURE — 74011250636 HC RX REV CODE- 250/636: Performed by: NURSE PRACTITIONER

## 2021-04-08 PROCEDURE — 74011000258 HC RX REV CODE- 258: Performed by: NURSE PRACTITIONER

## 2021-04-08 PROCEDURE — 36415 COLL VENOUS BLD VENIPUNCTURE: CPT

## 2021-04-08 PROCEDURE — 74011250637 HC RX REV CODE- 250/637: Performed by: INTERNAL MEDICINE

## 2021-04-08 PROCEDURE — 74011000258 HC RX REV CODE- 258: Performed by: INTERNAL MEDICINE

## 2021-04-08 PROCEDURE — 2709999900 HC NON-CHARGEABLE SUPPLY

## 2021-04-08 PROCEDURE — 82962 GLUCOSE BLOOD TEST: CPT

## 2021-04-08 RX ADMIN — Medication 175 MCG/HR: at 04:17

## 2021-04-08 RX ADMIN — ALBUTEROL SULFATE 2.5 MG: 2.5 SOLUTION RESPIRATORY (INHALATION) at 07:30

## 2021-04-08 RX ADMIN — ALBUTEROL SULFATE 2.5 MG: 2.5 SOLUTION RESPIRATORY (INHALATION) at 19:28

## 2021-04-08 RX ADMIN — ALBUTEROL SULFATE 2.5 MG: 2.5 SOLUTION RESPIRATORY (INHALATION) at 02:08

## 2021-04-08 RX ADMIN — CIPROFLOXACIN 2 DROP: 3 SOLUTION OPHTHALMIC at 06:49

## 2021-04-08 RX ADMIN — CIPROFLOXACIN 2 DROP: 3 SOLUTION OPHTHALMIC at 01:48

## 2021-04-08 RX ADMIN — CHLORHEXIDINE GLUCONATE 0.12% ORAL RINSE 10 ML: 1.2 LIQUID ORAL at 20:06

## 2021-04-08 RX ADMIN — PIPERACILLIN AND TAZOBACTAM 2.25 G: 2; .25 INJECTION, POWDER, LYOPHILIZED, FOR SOLUTION INTRAVENOUS at 06:50

## 2021-04-08 RX ADMIN — CIPROFLOXACIN 2 DROP: 3 SOLUTION OPHTHALMIC at 23:03

## 2021-04-08 RX ADMIN — HEPARIN SODIUM 12 UNITS/KG/HR: 10000 INJECTION, SOLUTION INTRAVENOUS at 00:12

## 2021-04-08 RX ADMIN — CIPROFLOXACIN 2 DROP: 3 SOLUTION OPHTHALMIC at 10:42

## 2021-04-08 RX ADMIN — DORNASE ALFA 2.5 MG: 1 SOLUTION RESPIRATORY (INHALATION) at 19:28

## 2021-04-08 RX ADMIN — CHLORHEXIDINE GLUCONATE 0.12% ORAL RINSE 10 ML: 1.2 LIQUID ORAL at 10:40

## 2021-04-08 RX ADMIN — CARBOXYMETHYLCELLULOSE SODIUM 1 DROP: 10 GEL OPHTHALMIC at 23:03

## 2021-04-08 RX ADMIN — ARFORMOTEROL TARTRATE 15 MCG: 15 SOLUTION RESPIRATORY (INHALATION) at 19:28

## 2021-04-08 RX ADMIN — Medication 175 MCG/HR: at 09:35

## 2021-04-08 RX ADMIN — MIDAZOLAM 2 MG: 1 INJECTION INTRAMUSCULAR; INTRAVENOUS at 04:33

## 2021-04-08 RX ADMIN — DORNASE ALFA 2.5 MG: 1 SOLUTION RESPIRATORY (INHALATION) at 07:30

## 2021-04-08 RX ADMIN — FAMOTIDINE 20 MG: 10 INJECTION INTRAVENOUS at 10:40

## 2021-04-08 RX ADMIN — Medication 30 ML: at 10:06

## 2021-04-08 RX ADMIN — Medication 175 MCG/HR: at 14:49

## 2021-04-08 RX ADMIN — CIPROFLOXACIN 2 DROP: 3 SOLUTION OPHTHALMIC at 13:57

## 2021-04-08 RX ADMIN — INSULIN LISPRO 2 UNITS: 100 INJECTION, SOLUTION INTRAVENOUS; SUBCUTANEOUS at 18:05

## 2021-04-08 RX ADMIN — Medication 175 MCG/HR: at 20:00

## 2021-04-08 RX ADMIN — ALBUTEROL SULFATE 2.5 MG: 2.5 SOLUTION RESPIRATORY (INHALATION) at 14:53

## 2021-04-08 RX ADMIN — ARFORMOTEROL TARTRATE 15 MCG: 15 SOLUTION RESPIRATORY (INHALATION) at 07:30

## 2021-04-08 NOTE — PROGRESS NOTES
Patients sister updated about patient care this afternoon 1415. Had no additional questions after phone encounter. Patients son contacted as well and obtained phone consent for bronchoscopy, aware of risk vs benefits and alternative options for patient.      Signed By: Dione Arroyo MD     April 8, 2021      ICU Staff  Above reviewed and noted    Casper Miller DO, [facility]  Pulmonary, Critical Care, and Sleep Medicine

## 2021-04-08 NOTE — PROGRESS NOTES
Problem: Ventilator Management  Goal: *Adequate oxygenation and ventilation  Outcome: Progressing Towards Goal  Goal: *Patient maintains clear airway/free of aspiration  Outcome: Progressing Towards Goal  Goal: *Absence of infection signs and symptoms  Outcome: Progressing Towards Goal     Problem: Non-Violent Restraints  Goal: Removal from restraints as soon as assessed to be safe  Outcome: Progressing Towards Goal  Goal: No harm/injury to patient while restraints in use  Outcome: Progressing Towards Goal  Goal: Patient's dignity will be maintained  Outcome: Progressing Towards Goal  Goal: Patient Interventions  Outcome: Progressing Towards Goal

## 2021-04-08 NOTE — PROGRESS NOTES
Patient remains intubated fio2 down to 60% PEEP still at 10, started on medi-neb, thick secretions and copious amount noted when sxned, RASS 0 to -1, intermittently following simple commands with delayed response , sedated with versed/ fentanyl, on heparin drip AMI/ACS. No vasopressor, no central lines, discontinued indwelling cardona, placed condom cath will monitor for retention, Tolerating tube feeding now at goal increased water flushes to 150 mlQ4. Bedside and Verbal shift change report given to nurse Jennifer Vegas RN (oncoming nurse) by Juan Monterroso RN   (offgoing nurse). Report included the following information SBAR, Kardex, OR Summary, Procedure Summary, Intake/Output, MAR and Recent Results.

## 2021-04-08 NOTE — PROGRESS NOTES
Assumed care of patient after receiving bedside and verbal shift change report from Carla Garcia RN.

## 2021-04-08 NOTE — PROGRESS NOTES
[facility]  Pulmonary, Critical Care, and Sleep Medicine    Name: Ariadna Glass MRN: [id number]   : 1950 Hospital: [address] [place] [address]   Date: 2021        Critical Care: Daily Progress Note    Admission Date:   2021  LOS: 4  MAR reviewed and pertinent medications noted or modified as needed    IMPRESSION:   · Angioedema- with airway and respiratory failure and collapse; thought due to ACE inhibitor  · S/P Emergent Cricthyrotomy [facility]-ED 21- converted to 6.5 ETT intraoperative by anesthesia team 21  · S/P cardiac arrest @ [facility] 21- brief thought due to hypoxia due to airway collapse  · Respiratory Failure: due to above; currently intubated and on vent for airway protection  · Pulmonary Infiltrates: suspect aspiration secretions and/or blood due to above- can't exclude other infectious process at this time. Rapid Covid Negative at [facility]  · Bradycardia  · DVT: Popliteal- Left; acute non-occlusive  · JACQUELYN  · Left scleral erythema- unknown baseline; possible infection   · DM  · DM retinopathy per chart review  · Diabetic peripheral neuropathy  · COVID -19; Negative rapid and [employer]: 21    · Obesity: Body mass index is 34.87 kg/m².   ·       RECOMMENDATIONS:   NEURO:   Serial neuro evaluations   Sedation Holiday per protocol   RAAS: 0 to -1   Wetting tears and cipro eye drops     CARDIO:   MAP goal >64   Follow Up echo- completed earlier today   Telemetry    PULM:   Maintain aspiration precautions: HOB >30 degrees   SpO2 goal >92%   Bronchial /oral hygiene   VAP bundle- Wean vent as clinical status allows   SBT as clinical status allows   Complete course of decadron   Benedryl Scheduled 50mg Q 6   Consider possible Bronch but for now focus on bronchial hygeien     GI/ NUTRITION:   OGT    Diet: TF with Free water@ 100 mls Q 4   SUP:Pepcid   Follow up with nutritional recommendations     RENAL/ METAB/ :   Monitor I&Os   Trend electrolytes- replaced as needed, K:4, Mg>2 PO4>2   Garcia: indwelling      HEM/ONC   Trend Hb/HCT and PLTs, and indicis   DVT prophylaxis: SCDs- Continue heparin drip with ACS protocol   Blood Products: not indicated at this time  ID   Antibioitcs: Stop Vanco; MRSA swab negative, Continue Zosyn   Follow up on pending cultures   Continue Cipro Eye drops   Streamline antibiotics per culture data and clinical course    ENDO   BGs Q 6,SSI   Check C1 inhibitor- Pending     MSK/ ORTHO   No active Issues     SKIN/ WOUNDS   Per protocol   Neck- daily Xeroform dressing changes per ENT     OTHER/DISPO:    CODE STATUS: Full Code- Full    Case reviewed and discussed with ENT staff, OR and ICU care team     Subjective/History: This patient has been seen and evaluated at the request of Dr. Micha Garcia- ED from [facility] for Angioedema and airway compromise. Patient is a 79 y.o. male presented earlier this morning to [facility] ED in rapidly progressive respiratory distress due to angioedema and airway swelling presumed due to ACE inhibitor therapy. ED provider unable to intubate due to severity of airway swelling. Brief cardio-pulmonary collapse with brief loss of pulse. IO placed into right shoulder. Emergent cricothyrotomy 5.0 place. Good placement. ED team able to oxygenate and ventilate patient. Prior to transport ABG notable for Respiratory acidosis. Call back to -ED patient on vent and paralyzed with rate of 10. I requested that RR be increased to 16-18 bpm depending what patient could tolerate. Our ENT provider on call, Dr. Thomas Klein was contacted. OR team on call came in. Patient flow via Nightingale/Life flight to our facility. The patient was taken from flight line to OR hold. On-call team rapidly assess patient. I evaluated the patient immediately upon arrival. Patient with Cric in place, bilateral breath  Sounds, SpO2 in the mid 90's. Swollen neck and face.   ABG -POC with persistent but improved respiratory acidosis. ER team took patient emergently to the OR    Anesthesia able to place ETT 6.5 intraop. I was called by ENT provider in the OR. Case discussed. Opted to keep patient intubated with 6.5 ETT. Will monitor for 48-72 hours and reassess patient. CXR notable for bilateral opacities. Suspect the later due to aspiration of blood and /or airway secretions. Patient was reexamined again at bedside upon arrival to ICU- Bed2    Patient paralyzed and sedated. 6.5 ETT in place. Bilateral breath sounds. SpO2 100%    CXR obtained in ICU- ETT slightly high- will try to advance 1-2 cm if able. 04/08/21  · Patient did well overnight  · ETT changed to 8.0 over boogie 4/7/21  · Still report of suctioning thick secretions  · FiO2 down to 60% PEEP still at 10- appropriate recruitment on pressure -volume loops- suspect some component of aspiration pneumonitis  · Start on heparin drip yesterday for Left popliteal DVT- monitor crico- site , minimal oozing  · Plan to D/C indwelling cardona for condom cath  · No infection isolated thus far    Inpat Anti-Infectives (From admission, onward)     Start     Ordered Stop    04/06/21 1800  ciprofloxacin HCl (CILOXAN) 0.3 % ophthalmic solution 2 Drop  2 Drop,   Both Eyes,   EVERY 4 HOURS      04/04/21 1724 04/11/21 1759    04/05/21 0900  vancomycin (VANCOCIN) 1750 mg in  ml infusion  1,750 mg,   IntraVENous,   EVERY 18 HOURS      04/05/21 0751 --    04/04/21 1800  ciprofloxacin HCl (CILOXAN) 0.3 % ophthalmic solution 2 Drop  2 Drop,   Both Eyes,   EVERY 2 HOURS WHILE AWAKE      04/04/21 1724 04/06/21 1759    04/04/21 1800  piperacillin-tazobactam (ZOSYN) 4.5 g in 0.9% sodium chloride (MBP/ADV) 100 mL MBP  4.5 g,   IntraVENous,   EVERY 6 HOURS      04/04/21 1728 --                The patient is critically ill and can not provide additional history due to Unconsciousness, Ventilated and Unable to speak. Chart and notes reviewed. Data reviewed.    []I have reviewed the flowsheet and previous days notes. []The patient is unable to give any meaningful history or review of systems because the patient is:  [x]Intubated [x]Sedated   []Unresponsive      []The patient is critically ill on      [x]Mechanical ventilation []Pressors   []BiPAP []                       Past Medical History:   Diagnosis Date    DDD (degenerative disc disease), lumbar     Diabetes ([address]     [facility] neuropathy [facility] [address]     Diabetic retinopathy [facility] [address]     DM2 (diabetes mellitus, type 2) ([facility] [address]     HLD (hyperlipidemia)     HTN (hypertension)     Obesity (BMI 30-39. 9)       History reviewed. No pertinent surgical history. Prior to Admission medications    Medication Sig Start Date End Date Taking? Authorizing Provider   LISINOPRIL, BULK, by Does Not Apply route.     Other, MD Cheryl     Current Facility-Administered Medications   Medication Dose Route Frequency    [START ON 4/9/2021] famotidine (PF) (PEPCID) 20 mg in 0.9% sodium chloride 10 mL injection  20 mg IntraVENous DAILY    fentaNYL (PF) 900 mcg/30 ml infusion soln  0-200 mcg/hr IntraVENous TITRATE    midazolam in normal saline (VERSED) 1 mg/mL infusion  0-10 mg/hr IntraVENous TITRATE    heparin 25,000 units in D5W 250 ml infusion  12-25 Units/kg/hr IntraVENous TITRATE    dornase alpha (PULMOZYME)2.5mg/2.5ml nebulizer solution  2.5 mg Nebulization BID RT    albuterol (PROVENTIL VENTOLIN) nebulizer solution 2.5 mg  2.5 mg Nebulization Q6H RT    arformoteroL (BROVANA) neb solution 15 mcg  15 mcg Nebulization BID RT    multivit-folic acid-herbal 553 (WELLESSE PLUS) oral liquid 30 mL  30 mL Per NG tube DAILY    chlorhexidine (PERIDEX) 0.12 % mouthwash 10 mL  10 mL Oral Q12H    insulin lispro (HUMALOG) injection   SubCUTAneous Q6H    ciprofloxacin HCl (CILOXAN) 0.3 % ophthalmic solution 2 Drop  2 Drop Both Eyes Q4H     Allergies   Allergen Reactions    Lisinopril Angioedema      Social History     Tobacco Use    Smoking status: Current Every Day Smoker     Packs/day: 0.50   Substance Use Topics    Alcohol use: Not on file      History reviewed. No pertinent family history. Review of Systems:  Review of systems not obtained due to patient factors. Objective:   Vital Signs:    Visit Vitals  BP (!) 145/75   Pulse (!) 102   Temp 98 °F (36.7 °C)   Resp 21   Ht 5' 11\" (1.803 m)   Wt 113.4 kg (250 lb)   SpO2 97%   BMI 34.87 kg/m²       O2 Device: Ventilator, Endotracheal tube       Temp (24hrs), Av.4 °F (36.9 °C), Min:98 °F (36.7 °C), Max:98.9 °F (37.2 °C)       Intake/Output:   Last shift:       07 - 1900  In: 269   Out: -   Last 3 shifts: 1901 -  0700  In: 2435.2 [I.V.:1125.2]  Out: 9250 [Urine:3501]    Intake/Output Summary (Last 24 hours) at 2021 1603  Last data filed at 2021 1400  Gross per 24 hour   Intake 1935.68 ml   Output 2075 ml   Net -139.32 ml         Ventilator Settings:  Mode Rate Tidal Volume Pressure FiO2 PEEP   Assist control, VC+   570 ml    60 % 10 cm H20     Peak airway pressure: 25 cm H2O    Minute ventilation: 11.3 l/min       ARDS network Guidelines:   Lung protective strategy and Plateau  Pressure goal < 30 cm H2O goals  Oxygenation Goals PaO2 55-80 mm Hg or SaO2 88-95%  PH goal 7.30-7.45    VAP bundle;  Reviewed. Atkinson tube to suction at 20-30 cm Hg. Maintain Atkinson tube with 5-10ml air every 4 hours  Routine oral care every 4 hours  Elevation of head > 45 degree  Daily sedation holiday and SBT evaluation starting at 6.00am.  Physical Exam:    General:  Intubated and sedated  , appears stated age.+ Obese body habitus   Head:  Normocephalic, without obvious abnormality, atraumatic. + facial edema   Eyes:  Conjunctivae/corneas clear. PERRL- left eye hyperremia- improved, EOMs intact. Nose: Nares normal. Septum midline. Mucosa normal. No drainage or sinus tenderness.    Throat: Lips, mucosa, and tongue swollen. + ETT in place   Neck: bandage and packing inplace where cric was palced (minimal venous oozing at site), no carotid bruit and no JVD. Back:   Symmetric   Lungs:   Bilateral breath sounds, with bilateral rhonchi- no wheezing   Chest wall:  No tenderness or deformity.- No crepitus   Heart:  Regular rate and rhythm, S1, S2 normal, no murmur, click, rub or gallop. Abdomen:   Soft, Obese non-tender. Bowel sounds normal. No masses,  No organomegaly. Extremities: Extremities normal, atraumatic, no cyanosis or edema. Pulses: 2+ and symmetric all extremities. Skin: Skin color, texture, turgor normal. No rashes or lesions   Lymph nodes:      Cervical, supraclavicular nodes: unremarkable   Neurologic: Grossly nonfocal       Data:     Recent Labs     04/08/21  0610 04/07/21  2248 04/07/21  0413   WBC 11.7 11.3 10.0   HGB 11.4* 11.9* 12.2*   HCT 38.1 38.6 40.4    151 156     Recent Labs     04/08/21  0610 04/07/21  1752 04/07/21  0413 04/06/21  0220   * 144 144 142   K 3.8 4.6 4.5 4.2   * 112* 112* 112*   CO2 26 25 21 25   * 147* 117* 133*   BUN 64* 64* 54* 37*   CREA 2.71* 2.94* 2.74* 1.99*   CA 7.9* 8.0* 8.1* 7.5*   MG 3.0* 2.9* 2.5 2.0   PHOS 5.8*  --  6.2* 3.6     Lab Results   Component Value Date/Time    NT pro- 04/06/2021 12:02 PM     Lab Results   Component Value Date/Time    INR 1.0 04/04/2021 05:11 PM    Prothrombin time 12.7 04/04/2021 05:11 PM       No results for input(s): PH, PCO2, PO2, HCO3, FIO2 in the last 72 hours.   Recent Labs     04/08/21  0308 04/07/21  1943 04/07/21  0313   FIO2I 80 90 1.0   HCO3I 27.7* 24.6 23.6   PCO2I 50.3* 50.0* 44.2   PHI 7.35 7.30* 7.34*   PO2I 82 94 98       ENDO:  Lab Results   Component Value Date/Time    TSH 1.35 04/04/2021 05:11 PM       Lab Results   Component Value Date/Time    Glucose 131 (H) 04/08/2021 06:10 AM    Glucose 147 (H) 04/07/2021 05:52 PM    Glucose 117 (H) 04/07/2021 04:13 AM    Glucose 133 (H) 04/06/2021 02:20 AM    Glucose 93 04/05/2021 12:56 AM         CARDIO:  Telemetry:normal sinus rhythm / sinus bradycardia    Lab Results   Component Value Date/Time    CK 90 04/05/2021 08:11 PM    CK 89 04/05/2021 08:11 PM    CK - MB 1.5 04/05/2021 08:11 PM    CK - MB 1.4 04/05/2021 08:11 PM    CK-MB Index 1.7 04/05/2021 08:11 PM    CK-MB Index 1.6 04/05/2021 08:11 PM    Troponin-I, Qt. 0.06 (H) 04/04/2021 01:40 PM    Troponin-I, QT 0.18 (H) 04/05/2021 08:11 PM    Troponin-I, QT 0.17 (H) 04/05/2021 08:11 PM       EKG Results     Procedure 720 Value Units Date/Time    EKG, 12 LEAD, SUBSEQUENT [[id number]] Collected: 04/06/21 1153    Order Status: Completed Updated: 04/06/21 1536     Ventricular Rate 54 BPM      Atrial Rate 54 BPM      P-R Interval 190 ms      QRS Duration 98 ms      Q-T Interval 588 ms      QTC Calculation (Bezet) 557 ms      Calculated P Axis 44 degrees      Calculated R Axis -20 degrees      Calculated T Axis 130 degrees      Diagnosis --     Sinus bradycardia  Anteroseptal infarct , age undetermined  T wave abnormality, consider lateral ischemia  Prolonged QT  Abnormal ECG  No previous ECGs available  Confirmed by Veto Salazar [id number]) on 4/6/2021 3:36:40 PM      EKG, 12 LEAD, SUBSEQUENT [[id number]]     Order Status: Canceled                MEDS: Please also see MAR  Current Facility-Administered Medications   Medication Dose Route Frequency    [START ON 4/9/2021] famotidine (PF) (PEPCID) 20 mg in 0.9% sodium chloride 10 mL injection  20 mg IntraVENous DAILY    fentaNYL (PF) 900 mcg/30 ml infusion soln  0-200 mcg/hr IntraVENous TITRATE    midazolam in normal saline (VERSED) 1 mg/mL infusion  0-10 mg/hr IntraVENous TITRATE    heparin 25,000 units in D5W 250 ml infusion  12-25 Units/kg/hr IntraVENous TITRATE    dornase alpha (PULMOZYME)2.5mg/2.5ml nebulizer solution  2.5 mg Nebulization BID RT    albuterol (PROVENTIL VENTOLIN) nebulizer solution 2.5 mg  2.5 mg Nebulization Q6H RT    arformoteroL (BROVANA) neb solution 15 mcg  15 mcg Nebulization BID RT    multivit-folic acid-herbal 275 (WELLESSE PLUS) oral liquid 30 mL  30 mL Per NG tube DAILY    chlorhexidine (PERIDEX) 0.12 % mouthwash 10 mL  10 mL Oral Q12H    insulin lispro (HUMALOG) injection   SubCUTAneous Q6H    ciprofloxacin HCl (CILOXAN) 0.3 % ophthalmic solution 2 Drop  2 Drop Both Eyes Q4H     MICRO:       Results     Procedure Component Value Units Date/Time    CULTURE, RESPIRATORY/SPUTUM/BRONCH Magalene Asim [[id number]] Collected: 04/07/21 1300    Order Status: Completed Specimen: Sputum,ET Suction Updated: 04/08/21 1334     Special Requests: NO SPECIAL REQUESTS        GRAM STAIN OCCASIONAL WBCS SEEN               OCCASIONAL EPITHELIAL CELLS SEEN            NO ORGANISMS SEEN        Culture result:       NO NORMAL RESPIRATORY ANNALEE ISOLATED SO FAR          CULTURE, RESPIRATORY/SPUTUM/BRONCH Lucinda Dailey [[id number]] Collected: 04/06/21 1715    Order Status: Completed Specimen: Sputum,ET Suction Updated: 04/08/21 1212     Special Requests: NO SPECIAL REQUESTS        GRAM STAIN OCCASIONAL WBCS SEEN         NO ORGANISMS SEEN        Culture result:       NO NORMAL RESPIRATORY ANNALEE ISOLATED          STREP Todd Cabralgaby, URINE [[id number]] Collected: 04/04/21 1801    Order Status: Completed Specimen: Urine, random Updated: 04/04/21 1944     Strep pneumo Ag, urine Negative       LEGIONELLA PNEUMOPHILA AG, URINE [[id number]] Collected: 04/04/21 1801    Order Status: Completed Specimen: Urine, random Updated: 04/04/21 1944     Legionella Ag, urine Negative       RESPIRATORY VIRUS PANEL W/COVID-19, PCR [[id number]] Collected: 04/04/21 1700    Order Status: Completed Specimen: Nasopharyngeal Updated: 04/04/21 2020     Adenovirus Not detected        Coronavirus 229E Not detected        Coronavirus HKU1 Not detected        Coronavirus CVNL63 Not detected        Coronavirus OC43 Not detected        Metapneumovirus Not detected        Rhinovirus and Enterovirus Not detected        Influenza A Not detected        Influenza A, subtype H1 Not detected        Influenza A, subtype H3 Not detected        INFLUENZA A H1N1 PCR Not detected        Influenza B Not detected        Parainfluenza 1 Not detected        Parainfluenza 2 Not detected        Parainfluenza 3 Not detected        Parainfluenza virus 4 Not detected        RSV by PCR Not detected        B. parapertussis, PCR Not detected        Bordetella pertussis - PCR Not detected        Chlamydophila pneumoniae DNA, QL, PCR Not detected        Mycoplasma pneumoniae DNA, QL, PCR Not detected        SARS-CoV-2, PCR Not detected       CULTURE, MRSA [[id number]] Collected: 04/04/21 1700    Order Status: Completed Specimen: Nasal from Nares Updated: 04/06/21 0838     Special Requests: NO SPECIAL REQUESTS        Culture result: MRSA NOT PRESENT               Screening of patient nares for MRSA is for surveillance purposes and, if positive, to facilitate isolation considerations in high risk settings. It is not intended for automatic decolonization interventions per se as regimens are not sufficiently effective to warrant routine use. COVID-19 RAPID TEST [[id number]] Collected: 04/04/21 1112    Order Status: Completed Specimen: Nasopharyngeal Updated: 04/04/21 1151     Specimen source Nasopharyngeal        COVID-19 rapid test Not Detected        Comment:   Rapid NAAT:  The specimen is NEGATIVE for SARS-CoV-2, the novel coronavirus associated with COVID-19. Negative results should be treated as presumptive and, if inconsistent with clinical signs and symptoms or necessary for patient management, should be tested with an alternative molecular assay. Negative results do not preclude SARS-CoV-2 infection and should not be used as the sole basis for patient management decisions. This test has been authorized by the FDA under an Emergency Use   Authorization (EUA) for use by authorized laboratories.  Fact sheet for Healthcare Providers: ConventionUpdate.co.nz Fact sheet for Patients: [facility]co[facility]   Methodology: Isothermal Nucleic Acid Amplification                   Imaging:  I have personally reviewed the patients radiographs and have reviewed the reports:    CXR Results  (Last 48 hours)               04/08/21 0548  XR CHEST PORT Final result    Impression:      No gross interval change. Narrative:  EXAM: XR CHEST PORT       INDICATION: Daily CXR while intubated. COMPARISON: Recent prior       FINDINGS: A portable AP radiograph of the chest was obtained at 0 518 hours. The   patient is on a cardiac monitor. Patchy bibasilar airspace opacities persist   without interval change. Stable mildly prominent cardiac silhouette. .  No acute   bone findings. Grossly stable enteric tube and endotracheal tube. Ari Evansperry 04/07/21 1043  XR CHEST PORT Final result    Impression:  As described. Narrative:  Portable CXR:       HISTORY: ET tube exchange. Comparison 4/6/2021       ET tube tip 6.6 cm above the gabriella. NG tube extends into the stomach. Mild   cardiomegaly. No significant vascular congestion. Atelectasis in the lower   chest, grossly stable. Right pleural effusion, stable. 04/07/21 0840  XR CHEST PORT Final result    Impression:  As described       Narrative:  Portable CXR:       Comparison 4/6/2021       HISTORY: Intubation       ET tube tip 6.5 cm the gabriella. NG tube extends into the stomach. Mild   cardiomegaly. Slightly less severe basilar infiltrate/atelectasis. Small   bilateral pleural effusion. No vascular congestion. 04/06/21 1732  XR CHEST PORT Final result    Impression:  As described       Narrative:  Portable CXR:       HISTORY: ET tube placement. Comparison 4/6/2021. ET tube tip is about 6 cm above the gabriella. NG tube extends below the field of   view. Mild cardiomegaly, stable. No significant vascular congestion. Increasing   infiltrate/atelectasis in the right lung base with right pleural effusion. Left   lung base is not completely included. CT Results  (Last 48 hours)    None           Best practice :  Core measures:    Glycemic control  [facility]. Ventilated patients- aim to keep peak plateau pressure 18-84UW H2O. Sress ulcer prophylaxis  DVT prophylaxis               Critical Care Time:  The services I provided to this patient were to treat and/or prevent clinically significant deterioration that could result in the failure of one or more body systems and/or organ systems due to respiratory distress, hypoxia, cardiac dysrhythmia. Services included the following:  -reviewing nursing notes and old charts  -vital sign assessments   -direct patient care  -medication orders and management  -interpreting and reviewing diagnostic studies/labs  -re-evaluations  -documentation time        Aggregate critical care time was 35 minutes, which includes only time during which I was engaged in work directly related to the patient's care as described above. During this entire length of time I was immediately available to the patient. The reason for providing this level of medical care for this critically ill patient was due a critical illness that impaired one or more vital organ systems such that there was a high probability of imminent or life threatening deterioration in the patients condition. This care involved high complexity decision making to assess, manipulate, and support vital system functions, to treat this degreee vital organ system failure and to prevent further life threatening deterioration of the patients condition      Complex decision making was made in the evaluation and management plans during this consultation. More than 50% of time was spent in counseling and coordination of care including review of data and discussion with other team members.       Jorge Doan DO, [place] [id number] [facility]  Pulmonary, Critical Care, and Sleep Medicine

## 2021-04-08 NOTE — PROGRESS NOTES
Nutrition Assessment     Type and Reason for Visit: Reassess, Positive nutrition screen, NPO/clear liquid    Nutrition Recommendations/Plan:   - Continue to advance tube feeding of Vital High Protein as tolerated by 10 mL q 8 hours to goal rate of 55 mL/hr with prosource TID, daily multivitamin and 150 mL q 4 hour water flushes. Nutrition Assessment:  Tolerating tube feeding and advanced to 40 mL/hr, propofol off and hypernatremia noted. Malnutrition Assessment:  Malnutrition Status: At risk for malnutrition (specify)(inability to tolerate oral diet due to respiratory status)     Estimated Daily Nutrient Needs:  Energy (kcal):  5426-3855  Protein (g):  156-195       Fluid (ml/day):  5142-9918    Nutrition Related Findings:  Last BM 4/4      Current Nutrition Therapies:  DIET NUTRITIONAL SUPPLEMENTS Breakfast, Lunch, Dinner; Prosource  DIET TUBE FEEDING Please increase to goal rate of 55 mL/hr once propofol is stopped.      Current Tube Feeding Regimen: Vital High Protein at 40 mL/hr, prosouce TID  Current Tube Feeding Regimen Provides: 1140 kcal, 129 gm protein, 803 mL free water, 68% RDIs  Goal Tube Feeding Regimen Provides: 1500 kcal, 161 gm protein, 1104 mL free water, 93% RDIs  Current Water Flush Order: 150 mL q 4 hours (900 mL per day)     Anthropometric Measures:  · Height:  5' 11\" (180.3 cm)  · Current Body Wt:  113.4 kg (250 lb)  · BMI: 34.9    Nutrition Diagnosis:   · Inadequate oral intake related to cognitive or neurological impairment, impaired respiratory function, swallowing difficulty as evidenced by NPO or clear liquid status due to medical condition, intubation      Nutrition Intervention:  Food and/or Nutrient Delivery: Continue NPO, Vitamin supplement, Continue tube feeding  Nutrition Education and Counseling: Education not indicated  Coordination of Nutrition Care: Continue to monitor while inpatient    Goals:  Nutritional needs will be met through adequate oral intake or nutrition support within the next 7 days       Nutrition Monitoring and Evaluation:   Behavioral-Environmental Outcomes: None identified  Food/Nutrient Intake Outcomes: Enteral nutrition intake/tolerance, Vitamin/mineral intake  Physical Signs/Symptoms Outcomes: Biochemical data, Constipation, GI status, Fluid status or edema, Hemodynamic status, Nutrition focused physical findings    Discharge Planning:     Too soon to determine     Electronically signed by Bird Cole RD, [address]  on 4/8/2021 at 2:31 PM    Contact Number: [phone]

## 2021-04-08 NOTE — PROGRESS NOTES
Results for Abdi Escalante (MRN [id number]) as of 4/7/2021 22:10   Ref. Range 4/7/2021 19:43   pH (POC) Latest Ref Range: 7.35 - 7.45   7.30 (L)   pCO2 (POC) Latest Ref Range: 35.0 - 45.0 MMHG 50.0 (H)   pO2 (POC) Latest Ref Range: 80 - 100 MMHG 94   HCO3 (POC) Latest Ref Range: 22 - 26 MMOL/L 24.6   sO2 (POC) Latest Ref Range: 92 - 97 % 96   Base deficit (POC) Latest Units: mmol/L 2   FIO2 (POC) Latest Units: % 90   Patient temp. Latest Units:   36.8   Specimen type (POC) Latest Units:   ARTERIAL   Set Rate Latest Units: bpm 16   Site Latest Units:   LEFT RADIAL   Device: Latest Units:   VENT   Total resp.  rate Latest Units:   16   Mode Latest Units:   ASSIST CONTROL   Tidal volume Latest Units: ml 570   Volume control plus Latest Units:   YES   PIP (POC) Latest Units:   30   PEEP/CPAP (POC) Latest Units: cmH2O 10   Allens test (POC) Latest Units:   YES   Inspiratory Time Latest Units: sec 1

## 2021-04-08 NOTE — PROGRESS NOTES
2100H> Relayed Chem results to Aziza, [address], to go ahead and administer Furosemide IV as previously ordered. 0000H> Heparin drip started without loading bolus per PA and to go ahead and may administer boluses per protocol.

## 2021-04-08 NOTE — PROGRESS NOTES
Check completed and tolerated well. Patient found on these settings with these results. No resp distress noted. 04/07/21 1945   Patient Observations   Pulse (Heart Rate) (!) 58   Resp Rate 17   O2 Sat (%) 94 %   Airway - Continuous Aspiration of Subglottic Secretions (GENESIS) Tube 04/07/21 Oral   Placement Date/Time: 04/07/21 1030   Number of Attempts: 1  Inserted By: Juanis Valle CRNA  Present on Admission/Arrival: No  Location: Oral  Placement Verified: Auscultation;BBS;Chest x-ray;EtCO2;Placement not verified (comment)  Airway Types: Endotr. .. Insertion Depth (cm) 28 cm   Line Christian Lips   Side Secured Device; Centered  (pt biting)   Suction on Yes   Amt Secretions Aspirated (mL)   (GENESIS cleared)   Respiratory   Patient on Vent Yes - If patient is on vent, add Doc Flowsheet Ventilator ().    Respiratory Pattern Regular   Chest/Tracheal Assessment Chest expansion, symmetrical   Airway Clearance   Suction ET Tube   Suction Device Inline suction catheter   Sputum Method Obtained Endotracheal   Sputum Amount Small   Sputum Consistency Thick   Vent Settings   FIO2 (%) 90 %   SpO2/FIO2 Ratio 104.44   CMV Rate Set 16   Back-Up Rate 16   Vt Set (ml) 570 ml   PEEP/VENT (cm H2O) 10 cm H20   Insp Time (sec) 1 sec   Insp Rise Time % 50 %   Flow Trigger 3   Ventilator Measurements   Resp Rate Observed 17   Vt Exhaled (Machine Breath) (ml) 630 ml   Ve Observed (l/min) 8 l/min   PIP Observed (cm H2O) 15 cm H2O   MAP (cm H2O) 8.5   I:E Ratio Actual 1:2.2   Safety & Alarms   Circuit Temperature 98.6 °F (37 °C)   Backup Mode Checked/Apnea Yes   Pressure Max 50 cm H2O   Ve Min 3   Ve Max 20   Vt Min 300 ml   Vt Max 1000 ml   RR Max 40   Ambu Bag Yes   Ambu Mask Yes   Age Specific Ventilator Associated Pneumonia Bundle   Patient Age Group Adult   Vent Method/Mode   Ventilation Method Conventional   Ventilator Mode Assist control;VC+   Procedures   $$ Subsequent Procedure Aerosol   Delivery Source In-line nebulizer Pulmonary Toilet   Pulmonary Toilet H. O.B elevated;Suction

## 2021-04-08 NOTE — PROGRESS NOTES
Check completed and tolerated well. No resp distress noted. 04/07/21 2328   Patient Observations   Pulse (Heart Rate) 68   Resp Rate 16   O2 Sat (%) 94 %   Airway - Continuous Aspiration of Subglottic Secretions (GENESIS) Tube 04/07/21 Oral   Placement Date/Time: 04/07/21 1030   Number of Attempts: 1  Inserted By: James Glass CRNA  Present on Admission/Arrival: No  Location: Oral  Placement Verified: Auscultation;BBS;Chest x-ray;EtCO2;Placement not verified (comment)  Airway Types: Endotr. .. Insertion Depth (cm) 28 cm   Line Christian Lips   Side Secured Device; Centered   Respiratory   Patient on Vent Yes - If patient is on vent, add Doc Flowsheet Ventilator (). Chest/Tracheal Assessment Chest expansion, symmetrical   Vent Settings   FIO2 (%) 80 %   SpO2/FIO2 Ratio 117.5   CMV Rate Set 16   Back-Up Rate 16   Vt Set (ml) 50 ml   PEEP/VENT (cm H2O) 10 cm H20   Insp Time (sec) 1 sec   Insp Rise Time % 50 %   Flow Trigger 3   Ventilator Measurements   Resp Rate Observed 16   Vt Exhaled (Machine Breath) (ml) 688 ml   Ve Observed (l/min) 10.7 l/min   PIP Observed (cm H2O) 29 cm H2O   MAP (cm H2O) 16   I:E Ratio Actual 1:2.7   Safety & Alarms   Circuit Temperature 98.6 °F (37 °C)   Backup Mode Checked/Apnea Yes   Pressure Max 50 cm H2O   Ve Min 3   Ve Max 20   Vt Min 300 ml   Vt Max 1000 ml   RR Max 40   Ambu Bag Yes   Ambu Mask Yes   Age Specific Ventilator Associated Pneumonia Bundle   Patient Age Group Adult   Vent Method/Mode   Ventilation Method Conventional   Ventilator Mode Assist control;VC+   Pulmonary Toilet   Pulmonary Toilet H. O.B elevated;Suction

## 2021-04-08 NOTE — PROGRESS NOTES
attended the interdisciplinary rounds for Angelica Harris, who is a 79 y.o.,male. Patients Primary Language is: [place]. According to the patients EMR Jehovah's witness Affiliation is: No preference. The reason the Patient came to the hospital is:   Patient Active Problem List    Diagnosis Date Noted    Respiratory failure ([facility] 04/05/2021    Obesity (BMI 30-39.9) 04/05/2021    Diabetic neuropathy associated with type 2 diabetes mellitus ([facility] 04/05/2021    Diabetic retinopathy associated with type 2 diabetes mellitus ([facility] 04/05/2021    Pulmonary infiltrates 04/05/2021    Controlled type 2 diabetes mellitus with neurologic complication, without long-term current use of insulin ([facility] 04/05/2021    Angioedema due to angiotensin converting enzyme inhibitor (ACE-I) 04/04/2021    JACQUELYN (acute kidney injury) ([facility] 04/04/2021    Cardiac arrest ([facility] 04/04/2021      Plan:  Chaplains will continue to follow and will provide pastoral care on an as needed/requested basis.  recommends bedside caregivers page  on duty if patient shows signs of acute spiritual or emotional distress.     [address] [facility]  Board Certified [address] [place] [address]   [phone]

## 2021-04-08 NOTE — PROGRESS NOTES
[facility] Update    · Patient with ETT tube exchange earlier today- now with ETT 8.0  · Some intermittent thick secretions suctioned from airway  · Patient with worsening hypoxia- remains on PEEP 10 and FiO2 90 % SpO2 95-95  · No significant response to diuresis earlier today  · Worsening JACQUELYN today  · Unclear if patient may have suffered ATN from report of brief cardiac arrest episode at [facility]  · Patient more than likely aspirated secretions and blood during that event  · Doppler US with acute non-occlusive popliteal DVT. Emergency cric was performed more than 72 hours ago. Less likely popliteal DVT contributing to hypoxia but unable to say that patient is truly asymptomatic at this time  · For now will start ACS heparin drip protocol without a bolus and monitor response. If unable to tolerate heparin drip and further clot extension noted then may require IVC filter  · Continuing with full supportive care  · Order ABG        CXR Results  (Last 48 hours)               04/07/21 1043  XR CHEST PORT Final result    Impression:  As described. Narrative:  Portable CXR:       HISTORY: ET tube exchange. Comparison 4/6/2021       ET tube tip 6.6 cm above the gabriella. NG tube extends into the stomach. Mild   cardiomegaly. No significant vascular congestion. Atelectasis in the lower   chest, grossly stable. Right pleural effusion, stable. 04/07/21 0840  XR CHEST PORT Final result    Impression:  As described       Narrative:  Portable CXR:       Comparison 4/6/2021       HISTORY: Intubation       ET tube tip 6.5 cm the gabriella. NG tube extends into the stomach. Mild   cardiomegaly. Slightly less severe basilar infiltrate/atelectasis. Small   bilateral pleural effusion. No vascular congestion. 04/06/21 1732  XR CHEST PORT Final result    Impression:  As described       Narrative:  Portable CXR:       HISTORY: ET tube placement. Comparison 4/6/2021.        ET tube tip is about 6 cm above the gabriella. NG tube extends below the field of   view. Mild cardiomegaly, stable. No significant vascular congestion. Increasing   infiltrate/atelectasis in the right lung base with right pleural effusion. Left   lung base is not completely included. 04/06/21 0547  XR CHEST PORT Final result    Impression:      Persistent bilateral airspace opacities with slight improvement in aeration left   lung and slight worsening in the right lung base. Probable increase in right   pleural opacity. Interval enteric tube placement. Please see report for further details. Narrative:  EXAM: XR CHEST PORT       INDICATION: Respiratory Failure; Check ETT Placement       COMPARISON: Recent prior       FINDINGS: A portable AP radiograph of the chest was obtained at 0 445 hours. The   patient is on a cardiac monitor. Persistent bilateral airspace opacities. Slight   evolution in distribution since prior exam. Slight improvement in aeration left   lung. Slight increase in right basilar parenchymal opacity and right basilar   pleural opacity. . Stable enlarged cardiac silhouette. .  No acute bone findings. Stable endotracheal tube. Interval placement of enteric tube with tip below the   diaphragm and visualized field. Metallic wire overlying the distal clavicle on   the left. Chronic widening of the left AC joint. Jhony Barrera, DO, FCCP    [facility]  Pulmonary, Critical Care, and Sleep Medicine

## 2021-04-08 NOTE — PROGRESS NOTES
1930 received pt on vent support AC/VC+/16/570/60%/+10,eyes closed head evelated resp neb given inline via aerogen,pt not tolerating [place] Neb at this time.

## 2021-04-08 NOTE — PROGRESS NOTES
Pt found on 60% fio2 tolerating well. Spo2 95%.        04/08/21 1144   Vent Settings   FIO2 (%) 60 %   SpO2/FIO2 Ratio 160   CMV Rate Set 16   Back-Up Rate 16   Vt Set (ml) 570 ml   PEEP/VENT (cm H2O) 10 cm H20   Insp Time (sec) 1 sec   Insp Rise Time % 50 %   Flow Trigger 3

## 2021-04-08 NOTE — PROGRESS NOTES
Bedside and Verbal shift change report given to Eli Guerra RN and Roseann Mcginnis RN (oncoming nurses) by Linh Weaver RN (offgoing nurse). Report included the following information SBAR, Kardex, Intake/Output, MAR, Recent Results and Cardiac Rhythm SB on telemetry.

## 2021-04-08 NOTE — INTERDISCIPLINARY ROUNDS
[facility]  Pulmonary, Critical Care, and Sleep Medicine  Interdisciplinary and Ventilator Weaning Rounds    Patient discussed in morning walking rounds and interdisciplinary rounds. ICU day: 5    Overnight events:   · Lasix 40 mg IV x1 overnight   · Started on Heparin gtt overnight     Assessments and best practice:   Ventilator  o Ventilator day 5  o Vent settings: FiO2 of 80 and PEEP 10  o VAP bundle, aim to keep peak plateau pressure 53-29WC H2O  o Weaning assessed and documented   - Patient does not meet criteria for SBT. FiO2 and PEEP too high   - Patient is not on sedation holiday. - No plan to wean today. - Outcome: cont full ventilator support   - Final plan: will remain on mechanical ventilatory support today   Sedation  Fentanyl  and Versed   Other pertinent drips  o Heparin gtt    Lines noted  o peripheral IVs   Critical labs assessed  o Yes   Antibiotics   None    Medications reviewed  o Yes   Pending imaging  o none   Pending send out labs  o Yes   Pending Procedures  o Possible bronch    Glycemic control   SSI    Stress ulcer prophylaxis. o Pepcid   DVT prophylaxis. o SCDs/ heparin gtt    Need for Lines, cardona assessed.  o Yes   Restraint Reevaluation   o Yes  o I have reevaluated the patient one hour after initiation of intervention. The patient is comfortable, uninjured, but continues to pose an imminent risk of injury to self to themselves and/or serious disruption of medical treatment required to keep patient stable. The patient's current medical and behavioral conditions that warrant the use intervention include danger to self and Interference with medical treatment. Restraint or seclusion will be discontinued at the earliest possible time, regardless of the scheduled expiration of the order.  Based on my evaluation, restraints will be continued: YES 4/8/21   o Attending Overseeing restraints: Cody Gant DO     Family contact/MPOA: sister, Castrotyler Echeverria  Family update: Will be updated by provider today         Daily Plans:   Continue mechanical ventilatory support   Continue decadron   Possible bronch today    Add Metaneb.  Cont Pulmozyme    D/c Abx    D/c Garcia and place condom cath     REGGIE time 15 minutes        Maia Fair NP  04/08/21  Pulmonary, Critical Care Medicine  [facility]

## 2021-04-08 NOTE — PROGRESS NOTES
Problem: Ventilator Management  Goal: *Adequate oxygenation and ventilation  Outcome: Not Progressing Towards Goal  Goal: *Patient maintains clear airway/free of aspiration  Outcome: Not Progressing Towards Goal  Goal: *Absence of infection signs and symptoms  Outcome: Not Progressing Towards Goal  Goal: *Normal spontaneous ventilation  Outcome: Not Progressing Towards Goal     Problem: Patient Education: Go to Patient Education Activity  Goal: Patient/Family Education  Outcome: Not Progressing Towards Goal     Problem: Diabetes Self-Management  Goal: *Disease process and treatment process  Description: Define diabetes and identify own type of diabetes; list 3 options for treating diabetes. Outcome: Not Progressing Towards Goal  Goal: *Incorporating nutritional management into lifestyle  Description: Describe effect of type, amount and timing of food on blood glucose; list 3 methods for planning meals. Outcome: Not Progressing Towards Goal  Goal: *Incorporating physical activity into lifestyle  Description: State effect of exercise on blood glucose levels. Outcome: Not Progressing Towards Goal  Goal: *Developing strategies to promote health/change behavior  Description: Define the ABC's of diabetes; identify appropriate screenings, schedule and personal plan for screenings. Outcome: Not Progressing Towards Goal  Goal: *Using medications safely  Description: State effect of diabetes medications on diabetes; name diabetes medication taking, action and side effects. Outcome: Not Progressing Towards Goal  Goal: *Monitoring blood glucose, interpreting and using results  Description: Identify recommended blood glucose targets  and personal targets. Outcome: Not Progressing Towards Goal  Goal: *Prevention, detection, treatment of acute complications  Description: List symptoms of hyper- and hypoglycemia; describe how to treat low blood sugar and actions for lowering  high blood glucose level.   Outcome: Not Progressing Towards Goal  Goal: *Prevention, detection and treatment of chronic complications  Description: Define the natural course of diabetes and describe the relationship of blood glucose levels to long term complications of diabetes. Outcome: Not Progressing Towards Goal  Goal: *Developing strategies to address psychosocial issues  Description: Describe feelings about living with diabetes; identify support needed and support network  Outcome: Not Progressing Towards Goal  Goal: *Sick day guidelines  Outcome: Not Progressing Towards Goal     Problem: Patient Education: Go to Patient Education Activity  Goal: Patient/Family Education  Outcome: Not Progressing Towards Goal     Problem: Non-Violent Restraints  Goal: Removal from restraints as soon as assessed to be safe  Outcome: Not Progressing Towards Goal  Goal: No harm/injury to patient while restraints in use  Outcome: Not Progressing Towards Goal  Goal: Patient's dignity will be maintained  Outcome: Not Progressing Towards Goal  Goal: Patient Interventions  Outcome: Not Progressing Towards Goal     Problem: Falls - Risk of  Goal: *Absence of Falls  Description: Document Pedro Bradshaw Fall Risk and appropriate interventions in the flowsheet.   Outcome: Not Progressing Towards Goal  Note: Fall Risk Interventions:       Mentation Interventions: Door open when patient unattended, Evaluate medications/consider consulting pharmacy, Increase mobility, Reorient patient, More frequent rounding, Room close to nurse's station, Update white board    Medication Interventions: Bed/chair exit alarm, Evaluate medications/consider consulting pharmacy    Elimination Interventions: Bed/chair exit alarm, Toileting schedule/hourly rounds              Problem: Patient Education: Go to Patient Education Activity  Goal: Patient/Family Education  Outcome: Not Progressing Towards Goal     Problem: Pressure Injury - Risk of  Goal: *Prevention of pressure injury  Description: Document Lang Scale and appropriate interventions in the flowsheet. Outcome: Not Progressing Towards Goal  Note: Pressure Injury Interventions:  Sensory Interventions: Assess changes in LOC, Assess need for specialty bed, Avoid rigorous massage over bony prominences, Float heels, Keep linens dry and wrinkle-free, Maintain/enhance activity level, Turn and reposition approx. every two hours (pillows and wedges if needed), Pressure redistribution bed/mattress (bed type), Monitor skin under medical devices, Minimize linen layers    Moisture Interventions: Absorbent underpads, Internal/External urinary devices, Check for incontinence Q2 hours and as needed    Activity Interventions: Pressure redistribution bed/mattress(bed type)    Mobility Interventions: Float heels, HOB 30 degrees or less, Pressure redistribution bed/mattress (bed type), Turn and reposition approx. every two hours(pillow and wedges), Suspension boots    Nutrition Interventions: Document food/fluid/supplement intake    Friction and Shear Interventions: Feet elevated on foot rest, HOB 30 degrees or less, Foam dressings/transparent film/skin sealants, Minimize layers, Lift sheet                Problem: Patient Education: Go to Patient Education Activity  Goal: Patient/Family Education  Outcome: Not Progressing Towards Goal     Problem: Nutrition Deficit  Goal: *Optimize nutritional status  Outcome: Not Progressing Towards Goal     Problem: Non-Violent Restraints  Goal: Removal from restraints as soon as assessed to be safe  Outcome: Not Progressing Towards Goal     Problem: Falls - Risk of  Goal: *Absence of Falls  Description: Document Pina Prashant Fall Risk and appropriate interventions in the flowsheet.   Outcome: Not Progressing Towards Goal  Note: Fall Risk Interventions:       Mentation Interventions: Door open when patient unattended, Evaluate medications/consider consulting pharmacy, Increase mobility, Reorient patient, More frequent rounding, Room close to nurse's station, Update white board    Medication Interventions: Bed/chair exit alarm, Evaluate medications/consider consulting pharmacy    Elimination Interventions: Bed/chair exit alarm, Toileting schedule/hourly rounds              Problem: Pressure Injury - Risk of  Goal: *Prevention of pressure injury  Description: Document Lang Scale and appropriate interventions in the flowsheet. Outcome: Not Progressing Towards Goal  Note: Pressure Injury Interventions:  Sensory Interventions: Assess changes in LOC, Assess need for specialty bed, Avoid rigorous massage over bony prominences, Float heels, Keep linens dry and wrinkle-free, Maintain/enhance activity level, Turn and reposition approx. every two hours (pillows and wedges if needed), Pressure redistribution bed/mattress (bed type), Monitor skin under medical devices, Minimize linen layers    Moisture Interventions: Absorbent underpads, Internal/External urinary devices, Check for incontinence Q2 hours and as needed    Activity Interventions: Pressure redistribution bed/mattress(bed type)    Mobility Interventions: Float heels, HOB 30 degrees or less, Pressure redistribution bed/mattress (bed type), Turn and reposition approx.  every two hours(pillow and wedges), Suspension boots    Nutrition Interventions: Document food/fluid/supplement intake    Friction and Shear Interventions: Feet elevated on foot rest, HOB 30 degrees or less, Foam dressings/transparent film/skin sealants, Minimize layers, Lift sheet                Problem: Patient Education: Go to Patient Education Activity  Goal: Patient/Family Education  Outcome: Not Progressing Towards Goal     Problem: Nutrition Deficit  Goal: *Optimize nutritional status  Outcome: Not Progressing Towards Goal

## 2021-04-09 ENCOUNTER — APPOINTMENT (OUTPATIENT)
Dept: GENERAL RADIOLOGY | Age: 71
DRG: 004 | End: 2021-04-09
Attending: PHYSICIAN ASSISTANT
Payer: MEDICARE

## 2021-04-09 LAB
ANION GAP SERPL CALC-SCNC: 4 MMOL/L (ref 3–18)
APTT PPP: 105.1 SEC (ref 23–36.4)
APTT PPP: 39.4 SEC (ref 23–36.4)
ARTERIAL PATENCY WRIST A: ABNORMAL
BACTERIA SPEC CULT: NORMAL
BASE EXCESS BLD CALC-SCNC: 9 MMOL/L
BASOPHILS # BLD: 0 K/UL (ref 0–0.1)
BASOPHILS NFR BLD: 0 % (ref 0–2)
BDY SITE: ABNORMAL
BNP SERPL-MCNC: 963 PG/ML (ref 0–900)
BODY TEMPERATURE: 100.4
BUN SERPL-MCNC: 46 MG/DL (ref 7–18)
BUN/CREAT SERPL: 29 (ref 12–20)
CALCIUM SERPL-MCNC: 8 MG/DL (ref 8.5–10.1)
CHLORIDE SERPL-SCNC: 116 MMOL/L (ref 100–111)
CO2 SERPL-SCNC: 28 MMOL/L (ref 21–32)
CREAT SERPL-MCNC: 1.57 MG/DL (ref 0.6–1.3)
DIFFERENTIAL METHOD BLD: ABNORMAL
EOSINOPHIL # BLD: 0 K/UL (ref 0–0.4)
EOSINOPHIL NFR BLD: 0 % (ref 0–5)
ERYTHROCYTE [DISTWIDTH] IN BLOOD BY AUTOMATED COUNT: 15.9 % (ref 11.6–14.5)
GAS FLOW.O2 O2 DELIVERY SYS: ABNORMAL L/MIN
GAS FLOW.O2 SETTING OXYMISER: 16 BPM
GLUCOSE BLD STRIP.AUTO-MCNC: 149 MG/DL (ref 70–110)
GLUCOSE BLD STRIP.AUTO-MCNC: 170 MG/DL (ref 70–110)
GLUCOSE SERPL-MCNC: 133 MG/DL (ref 74–99)
GRAM STN SPEC: NORMAL
HCO3 BLD-SCNC: 33.3 MMOL/L (ref 22–26)
HCT VFR BLD AUTO: 37.6 % (ref 36–48)
HGB BLD-MCNC: 11.2 G/DL (ref 13–16)
INSPIRATION.DURATION SETTING TIME VENT: 1 SEC
LYMPHOCYTES # BLD: 1.1 K/UL (ref 0.9–3.6)
LYMPHOCYTES NFR BLD: 9 % (ref 21–52)
MAGNESIUM SERPL-MCNC: 3 MG/DL (ref 1.6–2.6)
MCH RBC QN AUTO: 25.3 PG (ref 24–34)
MCHC RBC AUTO-ENTMCNC: 29.8 G/DL (ref 31–37)
MCV RBC AUTO: 84.9 FL (ref 74–97)
MONOCYTES # BLD: 1.1 K/UL (ref 0.05–1.2)
MONOCYTES NFR BLD: 9 % (ref 3–10)
NEUTS SEG # BLD: 9.6 K/UL (ref 1.8–8)
NEUTS SEG NFR BLD: 81 % (ref 40–73)
O2/TOTAL GAS SETTING VFR VENT: 60 %
PCO2 BLD: 52.3 MMHG (ref 35–45)
PEEP RESPIRATORY: 10 CMH2O
PH BLD: 7.42 [PH] (ref 7.35–7.45)
PHOSPHATE SERPL-MCNC: 2.2 MG/DL (ref 2.5–4.9)
PIP ISTAT,IPIP: 30
PLATELET # BLD AUTO: 143 K/UL (ref 135–420)
PMV BLD AUTO: 12.4 FL (ref 9.2–11.8)
PO2 BLD: 83 MMHG (ref 80–100)
POTASSIUM SERPL-SCNC: 4.3 MMOL/L (ref 3.5–5.5)
RBC # BLD AUTO: 4.43 M/UL (ref 4.35–5.65)
SAO2 % BLD: 95 % (ref 92–97)
SERVICE CMNT-IMP: ABNORMAL
SERVICE CMNT-IMP: NORMAL
SODIUM SERPL-SCNC: 148 MMOL/L (ref 136–145)
SPECIMEN TYPE: ABNORMAL
TOTAL RESP. RATE, ITRR: 17
VENTILATION MODE VENT: ABNORMAL
VOLUME CONTROL PLUS IVLCP: YES
VT SETTING VENT: 570 ML
WBC # BLD AUTO: 11.9 K/UL (ref 4.6–13.2)

## 2021-04-09 PROCEDURE — 94003 VENT MGMT INPAT SUBQ DAY: CPT

## 2021-04-09 PROCEDURE — 74011250636 HC RX REV CODE- 250/636: Performed by: PHYSICIAN ASSISTANT

## 2021-04-09 PROCEDURE — 74011000250 HC RX REV CODE- 250: Performed by: PHYSICIAN ASSISTANT

## 2021-04-09 PROCEDURE — 84100 ASSAY OF PHOSPHORUS: CPT

## 2021-04-09 PROCEDURE — 77030018798 HC PMP KT ENTRL FED COVD -A

## 2021-04-09 PROCEDURE — 71045 X-RAY EXAM CHEST 1 VIEW: CPT

## 2021-04-09 PROCEDURE — 83735 ASSAY OF MAGNESIUM: CPT

## 2021-04-09 PROCEDURE — 74011250637 HC RX REV CODE- 250/637: Performed by: NURSE PRACTITIONER

## 2021-04-09 PROCEDURE — 74011000250 HC RX REV CODE- 250: Performed by: INTERNAL MEDICINE

## 2021-04-09 PROCEDURE — 36415 COLL VENOUS BLD VENIPUNCTURE: CPT

## 2021-04-09 PROCEDURE — 87086 URINE CULTURE/COLONY COUNT: CPT

## 2021-04-09 PROCEDURE — 83880 ASSAY OF NATRIURETIC PEPTIDE: CPT

## 2021-04-09 PROCEDURE — 74011250636 HC RX REV CODE- 250/636: Performed by: NURSE PRACTITIONER

## 2021-04-09 PROCEDURE — 80048 BASIC METABOLIC PNL TOTAL CA: CPT

## 2021-04-09 PROCEDURE — 74011000258 HC RX REV CODE- 258: Performed by: NURSE PRACTITIONER

## 2021-04-09 PROCEDURE — 36600 WITHDRAWAL OF ARTERIAL BLOOD: CPT

## 2021-04-09 PROCEDURE — 74011250637 HC RX REV CODE- 250/637: Performed by: INTERNAL MEDICINE

## 2021-04-09 PROCEDURE — 74011250636 HC RX REV CODE- 250/636: Performed by: REGISTERED NURSE

## 2021-04-09 PROCEDURE — 87070 CULTURE OTHR SPECIMN AEROBIC: CPT

## 2021-04-09 PROCEDURE — 87077 CULTURE AEROBIC IDENTIFY: CPT

## 2021-04-09 PROCEDURE — 74011000250 HC RX REV CODE- 250: Performed by: STUDENT IN AN ORGANIZED HEALTH CARE EDUCATION/TRAINING PROGRAM

## 2021-04-09 PROCEDURE — 2709999900 HC NON-CHARGEABLE SUPPLY

## 2021-04-09 PROCEDURE — 82803 BLOOD GASES ANY COMBINATION: CPT

## 2021-04-09 PROCEDURE — 87040 BLOOD CULTURE FOR BACTERIA: CPT

## 2021-04-09 PROCEDURE — 65610000006 HC RM INTENSIVE CARE

## 2021-04-09 PROCEDURE — 94640 AIRWAY INHALATION TREATMENT: CPT

## 2021-04-09 PROCEDURE — 74011636637 HC RX REV CODE- 636/637: Performed by: PHYSICIAN ASSISTANT

## 2021-04-09 PROCEDURE — APPNB15 APP NON BILLABLE TIME 0-15 MINS: Performed by: NURSE PRACTITIONER

## 2021-04-09 PROCEDURE — 74011000250 HC RX REV CODE- 250: Performed by: REGISTERED NURSE

## 2021-04-09 PROCEDURE — 94669 MECHANICAL CHEST WALL OSCILL: CPT

## 2021-04-09 PROCEDURE — 99291 CRITICAL CARE FIRST HOUR: CPT | Performed by: INTERNAL MEDICINE

## 2021-04-09 PROCEDURE — 85025 COMPLETE CBC W/AUTO DIFF WBC: CPT

## 2021-04-09 PROCEDURE — 74011250636 HC RX REV CODE- 250/636: Performed by: INTERNAL MEDICINE

## 2021-04-09 PROCEDURE — 82962 GLUCOSE BLOOD TEST: CPT

## 2021-04-09 PROCEDURE — 87186 SC STD MICRODIL/AGAR DIL: CPT

## 2021-04-09 PROCEDURE — 85730 THROMBOPLASTIN TIME PARTIAL: CPT

## 2021-04-09 RX ORDER — FENTANYL CITRATE 50 UG/ML
100 INJECTION, SOLUTION INTRAMUSCULAR; INTRAVENOUS
Status: DISCONTINUED | OUTPATIENT
Start: 2021-04-09 | End: 2021-04-19

## 2021-04-09 RX ORDER — ACETYLCYSTEINE 100 MG/ML
4 SOLUTION ORAL; RESPIRATORY (INHALATION)
Status: DISCONTINUED | OUTPATIENT
Start: 2021-04-09 | End: 2021-04-15

## 2021-04-09 RX ORDER — FUROSEMIDE 10 MG/ML
40 INJECTION INTRAMUSCULAR; INTRAVENOUS ONCE
Status: COMPLETED | OUTPATIENT
Start: 2021-04-09 | End: 2021-04-09

## 2021-04-09 RX ORDER — OXYMETAZOLINE HCL 0.05 %
2 SPRAY, NON-AEROSOL (ML) NASAL
Status: DISCONTINUED | OUTPATIENT
Start: 2021-04-09 | End: 2021-05-13 | Stop reason: HOSPADM

## 2021-04-09 RX ADMIN — FENTANYL CITRATE 100 MCG: 50 INJECTION, SOLUTION INTRAMUSCULAR; INTRAVENOUS at 08:41

## 2021-04-09 RX ADMIN — Medication 175 MCG/HR: at 06:07

## 2021-04-09 RX ADMIN — ALBUTEROL SULFATE 2.5 MG: 2.5 SOLUTION RESPIRATORY (INHALATION) at 19:28

## 2021-04-09 RX ADMIN — MIDAZOLAM 2 MG: 1 INJECTION INTRAMUSCULAR; INTRAVENOUS at 02:02

## 2021-04-09 RX ADMIN — ACETYLCYSTEINE 400 MG: 100 SOLUTION ORAL; RESPIRATORY (INHALATION) at 15:00

## 2021-04-09 RX ADMIN — ALBUTEROL SULFATE 2.5 MG: 2.5 SOLUTION RESPIRATORY (INHALATION) at 11:49

## 2021-04-09 RX ADMIN — Medication 175 MCG/HR: at 01:21

## 2021-04-09 RX ADMIN — ACETAMINOPHEN ORAL SOLUTION 650 MG: 650 SOLUTION ORAL at 13:25

## 2021-04-09 RX ADMIN — CIPROFLOXACIN 2 DROP: 3 SOLUTION OPHTHALMIC at 17:49

## 2021-04-09 RX ADMIN — DORNASE ALFA 2.5 MG: 1 SOLUTION RESPIRATORY (INHALATION) at 11:49

## 2021-04-09 RX ADMIN — Medication 30 ML: at 10:09

## 2021-04-09 RX ADMIN — Medication 175 MCG/HR: at 16:06

## 2021-04-09 RX ADMIN — ARFORMOTEROL TARTRATE 15 MCG: 15 SOLUTION RESPIRATORY (INHALATION) at 19:28

## 2021-04-09 RX ADMIN — MIDAZOLAM HYDROCHLORIDE 5 MG/HR: 5 INJECTION, SOLUTION INTRAMUSCULAR; INTRAVENOUS at 18:00

## 2021-04-09 RX ADMIN — CIPROFLOXACIN 2 DROP: 3 SOLUTION OPHTHALMIC at 10:17

## 2021-04-09 RX ADMIN — CARBOXYMETHYLCELLULOSE SODIUM 1 DROP: 10 GEL OPHTHALMIC at 07:16

## 2021-04-09 RX ADMIN — DORNASE ALFA 2.5 MG: 1 SOLUTION RESPIRATORY (INHALATION) at 19:29

## 2021-04-09 RX ADMIN — Medication 175 MCG/HR: at 10:58

## 2021-04-09 RX ADMIN — FAMOTIDINE 20 MG: 10 INJECTION INTRAVENOUS at 10:09

## 2021-04-09 RX ADMIN — CIPROFLOXACIN 2 DROP: 3 SOLUTION OPHTHALMIC at 13:25

## 2021-04-09 RX ADMIN — CHLORHEXIDINE GLUCONATE 0.12% ORAL RINSE 10 ML: 1.2 LIQUID ORAL at 20:57

## 2021-04-09 RX ADMIN — CHLORHEXIDINE GLUCONATE 0.12% ORAL RINSE 10 ML: 1.2 LIQUID ORAL at 10:09

## 2021-04-09 RX ADMIN — ARFORMOTEROL TARTRATE 15 MCG: 15 SOLUTION RESPIRATORY (INHALATION) at 07:27

## 2021-04-09 RX ADMIN — MIDAZOLAM HYDROCHLORIDE 5 MG/HR: 5 INJECTION, SOLUTION INTRAMUSCULAR; INTRAVENOUS at 02:05

## 2021-04-09 RX ADMIN — FUROSEMIDE 40 MG: 10 INJECTION, SOLUTION INTRAMUSCULAR; INTRAVENOUS at 10:09

## 2021-04-09 RX ADMIN — CIPROFLOXACIN 2 DROP: 3 SOLUTION OPHTHALMIC at 22:00

## 2021-04-09 RX ADMIN — Medication 175 MCG/HR: at 22:30

## 2021-04-09 RX ADMIN — INSULIN LISPRO 2 UNITS: 100 INJECTION, SOLUTION INTRAVENOUS; SUBCUTANEOUS at 17:46

## 2021-04-09 RX ADMIN — CIPROFLOXACIN 2 DROP: 3 SOLUTION OPHTHALMIC at 02:08

## 2021-04-09 RX ADMIN — FENTANYL CITRATE 100 MCG: 50 INJECTION, SOLUTION INTRAMUSCULAR; INTRAVENOUS at 20:57

## 2021-04-09 RX ADMIN — ALBUTEROL SULFATE 2.5 MG: 2.5 SOLUTION RESPIRATORY (INHALATION) at 07:27

## 2021-04-09 RX ADMIN — ALBUTEROL SULFATE 2.5 MG: 2.5 SOLUTION RESPIRATORY (INHALATION) at 15:00

## 2021-04-09 RX ADMIN — FENTANYL CITRATE 100 MCG: 50 INJECTION, SOLUTION INTRAMUSCULAR; INTRAVENOUS at 11:33

## 2021-04-09 RX ADMIN — CIPROFLOXACIN 2 DROP: 3 SOLUTION OPHTHALMIC at 07:16

## 2021-04-09 NOTE — INTERDISCIPLINARY ROUNDS
[facility]  Pulmonary, Critical Care, and Sleep Medicine  Interdisciplinary and Ventilator Weaning Rounds    Patient discussed in morning walking rounds and interdisciplinary rounds. 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 Heparin stopped for a short period of time and then restarted. Assessments and best practice:   Ventilator  o Ventilator day 6   o Vent settings: FiO2 of 100 and PEEP 12. Increased 2/2 hypoxia and 2/2 resp distress during bleeding from crico site   o VAP bundle, aim to keep peak plateau pressure 51-85IE H2O  o Weaning assessed and documented   - Patient does not meet criteria for SBT. FiO2 and PEEP too high   - Patient is not on sedation holiday. - No plan to wean today. - Outcome: cont full ventilator support   - Final plan: will remain on mechanical ventilatory support today   Sedation  Fentanyl  and Versed   Other pertinent drips  o None    Lines noted  o peripheral IVs   Critical labs assessed  o Yes   Antibiotics   None    Medications reviewed  o Yes   Pending imaging  o none   Pending send out labs  o Yes   Pending Procedures  o Possible bronch    Glycemic control   SSI    Stress ulcer prophylaxis. o Pepcid   DVT prophylaxis. o SCDs/    Need for Lines, cardona assessed.  o Yes   Restraint Reevaluation   o Yes  o I have reevaluated the patient one hour after initiation of intervention. The patient is comfortable, uninjured, but continues to pose an imminent risk of injury to self to themselves and/or serious disruption of medical treatment required to keep patient stable. The patient's current medical and behavioral conditions that warrant the use intervention include danger to self and Interference with medical treatment. Restraint or seclusion will be discontinued at the earliest possible time, regardless of the scheduled expiration of the order. Based on my evaluation, restraints will be continued: YES 4/9/21   o Attending Overseeing restraints: Nanci Ramirez DO     Family contact/MPOA: sister, Suad Childs  Family update: Will be updated by provider today         Daily Plans:   Continue mechanical ventilatory support   Afrin ordered. Will soak quick clot in Afrin and place to crico site    Continue decadron   Possible bronch today    Cont Metaneb. Cont Pulmozyme. Add Mucomyst     Diurese with lasix 40 mg IV x1   Increased water flushes to 200 nl Q4h   F/u Doppler studies later this week.  Re: popliteal clot for resolution       REGGIE time 15 minutes        Robin Mac NP  04/09/21  Pulmonary, Critical Care Medicine  [facility]

## 2021-04-09 NOTE — PROGRESS NOTES
Otolaryngology head neck surgery   Patient seen and examined   Anterior neck wound examined   Xeroform gauze remove from the cricothyroidotomy with some blood noted   Occlusive dressing placed.   See no evidence of any air release   At this point would maintain occlusive neck dressing and avoid use of Xeroform  The cricothyroidotomy site should heal quite nicely  Hopefully patient may be weaned from ventilator soon  We will follow  Nicolasa New

## 2021-04-09 NOTE — PROGRESS NOTES
Discharge planning    Reviewed chart. Patient remains intubated. CM will continue to monitor and assist with transitional needs.     MARGUERITE Almendarez, RN  Pager # [phone]  Care Manager

## 2021-04-09 NOTE — PROGRESS NOTES
[facility]  Pulmonary, Critical Care, and Sleep Medicine    Name: Betzy Hicks MRN: [id number]   : 1950 Hospital: [address] [place] [address] Dr   Date: 2021        Critical Care: Daily Progress Note    Admission Date:   2021  LOS: 5  MAR reviewed and pertinent medications noted or modified as needed    IMPRESSION:   · Angioedema- with airway and respiratory failure and collapse; thought due to ACE inhibitor  · S/P Emergent Cricthyrotomy [facility]-ED 21- converted to 6.5 ETT intraoperative by anesthesia team 21, packing removed evening 21-ENT  · S/P cardiac arrest @ [facility] 21- brief thought due to hypoxia due to airway collapse  · Respiratory Failure: Acute due to above; currently intubated and on vent for airway protection  · Pulmonary Infiltrates: suspect aspiration secretions and/or blood due to above- can't exclude other infectious process at this time. Rapid Covid Negative at [facility]  · Bradycardia  · DVT: Popliteal- Left; acute non-occlusive Heparin ACS protocol started - stopped  due to bleeding from Cric site  · JACQUELYN  · Left scleral erythema- unknown baseline; possible infection   · DM  · DM retinopathy per chart review  · Diabetic peripheral neuropathy  · COVID -19; Negative rapid and Biofire: 21    · Obesity: Body mass index is 36.04 kg/m².   ·       RECOMMENDATIONS:   NEURO:   Serial neuro evaluations   Sedation Holiday per protocol   RAAS: 0 to -1   Wetting tears and cipro eye drops- stop date for cipro on chart     CARDIO:   MAP goal >64- No IV pressor requirement   Follow Up echo- completed earlier today   Telemetry    PULM:   Maintain aspiration precautions: HOB >30 degrees   SpO2 goal >92%   Bronchial /oral hygiene   VAP bundle- Wean vent as clinical status allows   SBT as clinical status allows   Complete course of decadron   Benedryl Scheduled 50mg Q 6   Pulmozyme and Mucomist BID with bronchodilator   Metaneb   Consider possible Bronch but for now focus on bronchial hygeien     GI/ NUTRITION:   OGT    Diet: TF with Free water@ 100 mls Q 4--> 200 Q4   SUP:Pepcid   Follow up with nutritional recommendations     RENAL/ METAB/ :   Monitor I&Os   Trend electrolytes- replaced as needed, K:4, Mg>2 PO4>2   Garcia: Continue      HEM/ONC   Trend Hb/HCT and PLTs, and indicis   DVT prophylaxis: SCDs   Hold heparin drip due to bleeding from Cric site   Re-image poplyteal DVT 72 hours and/or pending clinical course- may need IVC Filter if worsens   Blood Products: not indicated at this time  ID   Antibioitcs: Cipro eye drop, Zosyn: 4/4- 4/8/21 Vanco: 4/4-6, MRSA swab negative   Follow up on pending cultures    ENDO   BGs Q 6,SSI   Check C1 inhibitor- Pending     MSK/ ORTHO   No active Issues     SKIN/ WOUNDS   Per protocol   Neck- daily Xeroform dressing changes per ENT, Quick clot wit Afrin if needed     OTHER/DISPO:    CODE STATUS: Full Code- Full    Case reviewed and discussed with  ICU care team     Subjective/History: This patient has been seen and evaluated at the request of Dr. Farooq Mendoza- ED from [facility] for Angioedema and airway compromise. Patient is a 79 y.o. male presented earlier this morning to [facility] ED in rapidly progressive respiratory distress due to angioedema and airway swelling presumed due to ACE inhibitor therapy. ED provider unable to intubate due to severity of airway swelling. Brief cardio-pulmonary collapse with brief loss of pulse. IO placed into right shoulder. Emergent cricothyrotomy 5.0 place. Good placement. ED team able to oxygenate and ventilate patient. Prior to transport ABG notable for Respiratory acidosis. Call back to -ED patient on vent and paralyzed with rate of 10. I requested that RR be increased to 16-18 bpm depending what patient could tolerate. Our ENT provider on call, Dr. Nicol Dinh was contacted. OR team on call came in.  Patient flow via Nightingale/Life flight to our facility. The patient was taken from flight line to OR hold. On-call team rapidly assess patient. I evaluated the patient immediately upon arrival. Patient with Cric in place, bilateral breath  Sounds, SpO2 in the mid 90's. Swollen neck and face. ABG -POC with persistent but improved respiratory acidosis. ER team took patient emergently to the OR    Anesthesia able to place ETT 6.5 intraop. I was called by ENT provider in the OR. Case discussed. Opted to keep patient intubated with 6.5 ETT. Will monitor for 48-72 hours and reassess patient. CXR notable for bilateral opacities. Suspect the later due to aspiration of blood and /or airway secretions. Patient was reexamined again at bedside upon arrival to ICU- Bed2    Patient paralyzed and sedated. 6.5 ETT in place. Bilateral breath sounds. SpO2 100%    CXR obtained in ICU- ETT slightly high- will try to advance 1-2 cm if able. 04/09/21  ·    · ETT changed to 8.0 over boogie 4/7/21 by Anesthesia   · ENT removed packing from Cric site overnight  · Some bleeding from Cric site overnight/ early this morning- Heparin drip stopped- ENT aware- quick-clot soaked in afrin to site if needed  · Increased vent requirements since ETT change outPEEP still at 11- appropriate recruitment on pressure -volume loops- suspect some component of aspiration pneumonitis/ ARDS   · Pulmonary Hygiene Increased  · We are also still mobilizing thick secretions which require lavage- holding on bronch at this time.    · No infection isolated thus far  · CXR: Increased atelectasis, fluid RLL- increase airspace disease        Inpat Anti-Infectives (From admission, onward)     Start     Ordered Stop    04/06/21 1800  ciprofloxacin HCl (CILOXAN) 0.3 % ophthalmic solution 2 Drop  2 Drop,   Both Eyes,   EVERY 4 HOURS      04/04/21 1724 04/11/21 1759    04/04/21 1800  ciprofloxacin HCl (CILOXAN) 0.3 % ophthalmic solution 2 Drop  2 Drop,   Both Eyes,   EVERY 2 HOURS WHILE AWAKE      04/04/21 1724 04/06/21 1759                The patient is critically ill and can not provide additional history due to Unconsciousness, Ventilated and Unable to speak. Chart and notes reviewed. Data reviewed. []I have reviewed the flowsheet and previous days notes. []The patient is unable to give any meaningful history or review of systems because the patient is:  [x]Intubated [x]Sedated   []Unresponsive      []The patient is critically ill on      [x]Mechanical ventilation []Pressors   []BiPAP []                       Past Medical History:   Diagnosis Date    DDD (degenerative disc disease), lumbar     Diabetes ([address]     Diabetic neuropathy [facility] [facility]     Diabetic retinopathy ([facility] [address] [facility]     DM2 (diabetes mellitus, type 2) ([facility] [address]     HLD (hyperlipidemia)     HTN (hypertension)     Obesity (BMI 30-39. 9)       History reviewed. No pertinent surgical history. Prior to Admission medications    Medication Sig Start Date End Date Taking? Authorizing Provider   LISINOPRIL, BULK, by Does Not Apply route.     Other, MD Cheryl     Current Facility-Administered Medications   Medication Dose Route Frequency    acetylcysteine (MUCOMYST) 100 mg/mL (10 %) nebulizer solution 400 mg  4 mL Nebulization BID RT    famotidine (PF) (PEPCID) 20 mg in 0.9% sodium chloride 10 mL injection  20 mg IntraVENous DAILY    fentaNYL (PF) 900 mcg/30 ml infusion soln  0-200 mcg/hr IntraVENous TITRATE    midazolam in normal saline (VERSED) 1 mg/mL infusion  0-10 mg/hr IntraVENous TITRATE    [Held by provider] heparin 25,000 units in D5W 250 ml infusion  12-25 Units/kg/hr IntraVENous TITRATE    dornase alpha (PULMOZYME)2.5mg/2.5ml nebulizer solution  2.5 mg Nebulization BID RT    albuterol (PROVENTIL VENTOLIN) nebulizer solution 2.5 mg  2.5 mg Nebulization Q6H RT    arformoteroL (BROVANA) neb solution 15 mcg  15 mcg Nebulization BID RT    multivit-folic acid-herbal 548 (WELLESSE PLUS) oral liquid 30 mL  30 mL Per NG tube DAILY    chlorhexidine (PERIDEX) 0.12 % mouthwash 10 mL  10 mL Oral Q12H    insulin lispro (HUMALOG) injection   SubCUTAneous Q6H    ciprofloxacin HCl (CILOXAN) 0.3 % ophthalmic solution 2 Drop  2 Drop Both Eyes Q4H     Allergies   Allergen Reactions    Lisinopril Angioedema      Social History     Tobacco Use    Smoking status: Current Every Day Smoker     Packs/day: 0.50   Substance Use Topics    Alcohol use: Not on file      History reviewed. No pertinent family history. Review of Systems:  Review of systems not obtained due to patient factors. Objective:   Vital Signs:    Visit Vitals  /71   Pulse (!) 114   Temp (!) 101.5 °F (38.6 °C)   Resp 19   Ht 5' 11\" (1.803 m)   Wt 117.2 kg (258 lb 6.1 oz)   SpO2 92%   BMI 36.04 kg/m²       O2 Device: Endotracheal tube, Ventilator       Temp (24hrs), Av.5 °F (38.1 °C), Min:99.4 °F (37.4 °C), Max:101.5 °F (38.6 °C)       Intake/Output:   Last shift:       07 -  1900  In: 189.2 [I.V.:189.2]  Out: 1325 [Urine:1325]  Last 3 shifts:  190 -  0700  In: 3364.7 [I.V.:744.7]  Out: 7315 [Urine:2975]    Intake/Output Summary (Last 24 hours) at 2021 1327  Last data filed at 2021 0728  Gross per 24 hour   Intake 1808.45 ml   Output 2475 ml   Net -666.55 ml         Ventilator Settings:  Mode Rate Tidal Volume Pressure FiO2 PEEP   Assist control, VC+   620 ml    100 % 11 cm H20     Peak airway pressure: 22 cm H2O    Minute ventilation: 11.2 l/min       ARDS network Guidelines:   Lung protective strategy and Plateau  Pressure goal < 30 cm H2O goals  Oxygenation Goals PaO2 55-80 mm Hg or SaO2 88-95%  PH goal 7.30-7.45    VAP bundle;  Reviewed. Annalisa tube to suction at 20-30 cm Hg.   Maintain Annalisa tube with 5-10ml air every 4 hours  Routine oral care every 4 hours  Elevation of head > 45 degree  Daily sedation holiday and SBT evaluation starting at 6.00am.  Physical Exam:    General:  Intubated and sedated  , appears stated age.+ Obese body habitus   Head:  Normocephalic, without obvious abnormality, atraumatic. + facial edema   Eyes:  Conjunctivae/corneas clear. PERRL- left eye hyperremia- improved, EOMs intact. Nose: Nares normal. Septum midline. Mucosa normal. No drainage or sinus tenderness. Throat: Lips, mucosa, and tongue swollen. + ETT in place   Neck: bandage and packing inplace where cric was palced (minimal venous oozing at site), no carotid bruit and no JVD. Back:   Symmetric   Lungs:   Bilateral breath sounds, with bilateral rhonchi- no wheezing   Chest wall:  No tenderness or deformity.- No crepitus   Heart:  Regular rate and rhythm, S1, S2 normal, no murmur, click, rub or gallop. Abdomen:   Soft, Obese non-tender. Bowel sounds normal. No masses,  No organomegaly. Extremities: Extremities normal, atraumatic, no cyanosis or edema. Pulses: 2+ and symmetric all extremities. Skin: Skin color, texture, turgor normal. No rashes or lesions   Lymph nodes:      Cervical, supraclavicular nodes: unremarkable   Neurologic: Grossly nonfocal       Data:     Recent Labs     04/09/21  0443 04/08/21  0610 04/07/21  2248   WBC 11.9 11.7 11.3   HGB 11.2* 11.4* 11.9*   HCT 37.6 38.1 38.6    144 151     Recent Labs     04/09/21  0443 04/08/21  0610 04/07/21  1752 04/07/21  0413   * 146* 144 144   K 4.3 3.8 4.6 4.5   * 112* 112* 112*   CO2 28 26 25 21   * 131* 147* 117*   BUN 46* 64* 64* 54*   CREA 1.57* 2.71* 2.94* 2.74*   CA 8.0* 7.9* 8.0* 8.1*   MG 3.0* 3.0* 2.9* 2.5   PHOS 2.2* 5.8*  --  6.2*     Lab Results   Component Value Date/Time    NT pro- (H) 04/09/2021 09:38 AM    NT pro- 04/06/2021 12:02 PM     Lab Results   Component Value Date/Time    INR 1.0 04/04/2021 05:11 PM    Prothrombin time 12.7 04/04/2021 05:11 PM       No results for input(s): PH, PCO2, PO2, HCO3, FIO2 in the last 72 hours.   Recent Labs     04/09/21  0430 04/08/21  0308 04/07/21  1943   FIO2I 60 80 90   HCO3I 33.3* 27.7* 24.6   PCO2I 52.3* 50.3* 50.0*   PHI 7.42 7.35 7.30*   PO2I 83 82 94       ENDO:  Lab Results   Component Value Date/Time    TSH 1.35 04/04/2021 05:11 PM       Lab Results   Component Value Date/Time    Glucose 133 (H) 04/09/2021 04:43 AM    Glucose 131 (H) 04/08/2021 06:10 AM    Glucose 147 (H) 04/07/2021 05:52 PM    Glucose 117 (H) 04/07/2021 04:13 AM    Glucose 133 (H) 04/06/2021 02:20 AM         CARDIO:  Telemetry:normal sinus rhythm / sinus bradycardia    Lab Results   Component Value Date/Time    CK 90 04/05/2021 08:11 PM    CK 89 04/05/2021 08:11 PM    CK - MB 1.5 04/05/2021 08:11 PM    CK - MB 1.4 04/05/2021 08:11 PM    CK-MB Index 1.7 04/05/2021 08:11 PM    CK-MB Index 1.6 04/05/2021 08:11 PM    Troponin-I, Qt. 0.06 (H) 04/04/2021 01:40 PM    Troponin-I, QT 0.18 (H) 04/05/2021 08:11 PM    Troponin-I, QT 0.17 (H) 04/05/2021 08:11 PM       EKG Results     Procedure 720 Value Units Date/Time    EKG, 12 LEAD, SUBSEQUENT [[id number]] Collected: 04/06/21 1153    Order Status: Completed Updated: 04/06/21 1536     Ventricular Rate 54 BPM      Atrial Rate 54 BPM      P-R Interval 190 ms      QRS Duration 98 ms      Q-T Interval 588 ms      QTC Calculation (Bezet) 557 ms      Calculated P Axis 44 degrees      Calculated R Axis -20 degrees      Calculated T Axis 130 degrees      Diagnosis --     Sinus bradycardia  Anteroseptal infarct , age undetermined  T wave abnormality, consider lateral ischemia  Prolonged QT  Abnormal ECG  No previous ECGs available  Confirmed by Enrique Chiang ([id number]) on 4/6/2021 3:36:40 PM      EKG, 12 LEAD, SUBSEQUENT [[id number]]     Order Status: Canceled                MEDS: Please also see MAR  Current Facility-Administered Medications   Medication Dose Route Frequency    acetylcysteine (MUCOMYST) 100 mg/mL (10 %) nebulizer solution 400 mg  4 mL Nebulization BID RT    famotidine (PF) (PEPCID) 20 mg in 0.9% sodium chloride 10 mL injection  20 mg IntraVENous DAILY    fentaNYL (PF) 900 mcg/30 ml infusion soln  0-200 mcg/hr IntraVENous TITRATE    midazolam in normal saline (VERSED) 1 mg/mL infusion  0-10 mg/hr IntraVENous TITRATE    [Held by provider] heparin 25,000 units in D5W 250 ml infusion  12-25 Units/kg/hr IntraVENous TITRATE    dornase alpha (PULMOZYME)2.5mg/2.5ml nebulizer solution  2.5 mg Nebulization BID RT    albuterol (PROVENTIL VENTOLIN) nebulizer solution 2.5 mg  2.5 mg Nebulization Q6H RT    arformoteroL (BROVANA) neb solution 15 mcg  15 mcg Nebulization BID RT    multivit-folic acid-herbal 681 (WELLESSE PLUS) oral liquid 30 mL  30 mL Per NG tube DAILY    chlorhexidine (PERIDEX) 0.12 % mouthwash 10 mL  10 mL Oral Q12H    insulin lispro (HUMALOG) injection   SubCUTAneous Q6H    ciprofloxacin HCl (CILOXAN) 0.3 % ophthalmic solution 2 Drop  2 Drop Both Eyes Q4H     MICRO:       Results     Procedure Component Value Units Date/Time    CULTURE, BLOOD [[id number]]     Order Status: Sent Specimen: Blood     CULTURE, BLOOD [[id number]]     Order Status: Sent Specimen: Blood     CULTURE, URINE [[id number]]     Order Status: Sent Specimen: Urine from Clean catch     CULTURE, RESPIRATORY/SPUTUM/BRONCH Nate Necessary STAIN [[id number]]     Order Status: Sent Specimen: Sputum,ET Suction     CULTURE, RESPIRATORY/SPUTUM/BRONCH Nate Necessary STAIN [[id number]] Collected: 04/07/21 1300    Order Status: Completed Specimen: Sputum,ET Suction Updated: 04/09/21 1224     Special Requests: NO SPECIAL REQUESTS        GRAM STAIN OCCASIONAL WBCS SEEN               OCCASIONAL EPITHELIAL CELLS SEEN            NO ORGANISMS SEEN        Culture result:       RARE NORMAL RESPIRATORY ANNALEE          CULTURE, RESPIRATORY/SPUTUM/BRONCH Nate Necessary STAIN [[id number]] Collected: 04/06/21 1715    Order Status: Completed Specimen: Sputum,ET Suction Updated: 04/08/21 1212     Special Requests: NO SPECIAL REQUESTS        GRAM STAIN OCCASIONAL WBCS SEEN         NO ORGANISMS SEEN        Culture result:       NO NORMAL RESPIRATORY ANNALEE ISOLATED Cintia Needles, URINE [[id number]] Collected: 04/04/21 1801    Order Status: Completed Specimen: Urine, random Updated: 04/04/21 1944     Strep pneumo Ag, urine Negative       LEGIONELLA PNEUMOPHILA AG, URINE [[id number]] Collected: 04/04/21 1801    Order Status: Completed Specimen: Urine, random Updated: 04/04/21 1944     Legionella Ag, urine Negative       RESPIRATORY VIRUS PANEL W/COVID-19, PCR [[id number]] Collected: 04/04/21 1700    Order Status: Completed Specimen: Nasopharyngeal Updated: 04/04/21 2020     Adenovirus Not detected        Coronavirus 229E Not detected        Coronavirus HKU1 Not detected        Coronavirus CVNL63 Not detected        Coronavirus OC43 Not detected        Metapneumovirus Not detected        Rhinovirus and Enterovirus Not detected        Influenza A Not detected        Influenza A, subtype H1 Not detected        Influenza A, subtype H3 Not detected        INFLUENZA A H1N1 PCR Not detected        Influenza B Not detected        Parainfluenza 1 Not detected        Parainfluenza 2 Not detected        Parainfluenza 3 Not detected        Parainfluenza virus 4 Not detected        RSV by PCR Not detected        B. parapertussis, PCR Not detected        Bordetella pertussis - PCR Not detected        Chlamydophila pneumoniae DNA, QL, PCR Not detected        Mycoplasma pneumoniae DNA, QL, PCR Not detected        SARS-CoV-2, PCR Not detected       CULTURE, MRSA [[id number]] Collected: 04/04/21 1700    Order Status: Completed Specimen: Nasal from Nares Updated: 04/06/21 0838     Special Requests: NO SPECIAL REQUESTS        Culture result: MRSA NOT PRESENT               Screening of patient nares for MRSA is for surveillance purposes and, if positive, to facilitate isolation considerations in high risk settings. It is not intended for automatic decolonization interventions per se as regimens are not sufficiently effective to warrant routine use.           COVID-19 RAPID TEST [[id number]] Collected: 04/04/21 1112    Order Status: Completed Specimen: Nasopharyngeal Updated: 04/04/21 1151     Specimen source Nasopharyngeal        COVID-19 rapid test Not Detected        Comment:   Rapid NAAT:  The specimen is NEGATIVE for SARS-CoV-2, the novel coronavirus associated with COVID-19. Negative results should be treated as presumptive and, if inconsistent with clinical signs and symptoms or necessary for patient management, should be tested with an alternative molecular assay. Negative results do not preclude SARS-CoV-2 infection and should not be used as the sole basis for patient management decisions. This test has been authorized by the FDA under an Emergency Use   Authorization (EUA) for use by authorized laboratories. Fact sheet for Healthcare Providers: [employer] Fact sheet for Patients: [employer]   Methodology: Isothermal Nucleic Acid Amplification                   Imaging:  I have personally reviewed the patients radiographs and have reviewed the reports:    CXR Results  (Last 48 hours)               04/09/21 0545  XR CHEST PORT Final result    Impression:      Persistent bilateral airspace opacities perhaps slightly increased since prior. Question developing small bilateral pleural opacities. Narrative:  EXAM: XR CHEST PORT       INDICATION: Daily CXR while intubated. COMPARISON: Recent prior       FINDINGS: A portable AP radiograph of the chest was obtained at 0 445 hours. The   patient is on a cardiac monitor. Persistent bilateral airspace opacities. Developing small pleural opacities. . Stable cardiac silhouette. .  No acute bone   findings. Stable appearance to the left clavicle. Stable endotracheal tube. Stable enteric tube given limitations and differences in technique. [facility] 05[facility]  XR CHEST PORT Final result    Impression:      No gross interval change.        Narrative:  EXAM: XR CHEST PORT INDICATION: Daily CXR while intubated. COMPARISON: Recent prior       FINDINGS: A portable AP radiograph of the chest was obtained at 0 518 hours. The   patient is on a cardiac monitor. Patchy bibasilar airspace opacities persist   without interval change. Stable mildly prominent cardiac silhouette. .  No acute   bone findings. Grossly stable enteric tube and endotracheal tube. .                    CT Results  (Last 48 hours)    None           Best practice :  Core measures:    Glycemic control  [facility]. Ventilated patients- aim to keep peak plateau pressure 27-40BD H2O. Sress ulcer prophylaxis  DVT prophylaxis               Critical Care Time:  The services I provided to this patient were to treat and/or prevent clinically significant deterioration that could result in the failure of one or more body systems and/or organ systems due to respiratory distress, hypoxia, cardiac dysrhythmia. Services included the following:  -reviewing nursing notes and old charts  -vital sign assessments   -direct patient care  -medication orders and management  -interpreting and reviewing diagnostic studies/labs  -re-evaluations  -documentation time        Aggregate critical care time was 45 minutes, which includes only time during which I was engaged in work directly related to the patient's care as described above. During this entire length of time I was immediately available to the patient. The reason for providing this level of medical care for this critically ill patient was due a critical illness that impaired one or more vital organ systems such that there was a high probability of imminent or life threatening deterioration in the patients condition.  This care involved high complexity decision making to assess, manipulate, and support vital system functions, to treat this degreee vital organ system failure and to prevent further life threatening deterioration of the patients condition      Complex decision making was made in the evaluation and management plans during this consultation. More than 50% of time was spent in counseling and coordination of care including review of data and discussion with other team members.       Job Reyna DO, [facility]    [address] [facility]  Pulmonary, Critical Care, and Sleep Medicine

## 2021-04-09 NOTE — PROGRESS NOTES
attended the interdisciplinary rounds for Pramod Gonsalez, who is a 79 y.o.,male. Patients Primary Language is: [place]. According to the patients EMR Caodaism Affiliation is: No preference. The reason the Patient came to the hospital is:   Patient Active Problem List    Diagnosis Date Noted    Respiratory failure ([facility] [address] 04/05/2021    Obesity (BMI 30-39.9) 04/05/2021    Diabetic neuropathy associated with type 2 diabetes mellitus ([facility] [address] 04/05/2021    Diabetic retinopathy associated with type 2 diabetes mellitus ([facility] [address] 04/05/2021    Pulmonary infiltrates 04/05/2021    Controlled type 2 diabetes mellitus with neurologic complication, without long-term current use of insulin ([facility] [address] 04/05/2021    Angioedema due to angiotensin converting enzyme inhibitor (ACE-I) 04/04/2021    JACQUELYN (acute kidney injury) ([facility] [address] 04/04/2021    Cardiac arrest ([facility] [address] 04/04/2021      Plan:  Chaplains will continue to follow and will provide pastoral care on an as needed/requested basis.  recommends bedside caregivers page  on duty if patient shows signs of acute spiritual or emotional distress.     [address]  Board Certified [facility]   [phone]

## 2021-04-09 NOTE — ROUTINE PROCESS
1900 Bedside and Verbal shift change report given to Jesus Fletcher RN (oncoming nurse) by José Kimble RN (offgoing nurse). Report included the following information SBAR, Kardex, ED Summary and Intake/Output. 0150: Pt coughing, air leak noticed from crichothyroidotomy site with saturated dressing. Pressure applied, PA at bedside. 0700: Bedside and Verbal shift change report given to Sivan RN (oncoming nurse) by Jesus Fletcher RN (offgoing nurse). Report included the following information SBAR, Kardex and ED Summary.

## 2021-04-09 NOTE — PROGRESS NOTES
Nutrition Note    TF advanced to goal of 55 mL/hr last night and tolerating with hypernatremia, off propofol. Na up to 148 today from 146 yesterday. BM 4/4 & receiving liquid MVI. Pt meeting nutritional goals. Nutrition Recommendations/Plan:   - Continue tube feeding of Vital High Protein at goal rate of 55 mL/hr with prosource TID, daily multivitamin and increase water flushes to 200 mL q 4 hours. (Goal regimen provides: 1500 kcal, 161 gm protein, 1104 mL free water, 93% RDIs).     Electronically signed by Karina Duncan RD on 4/9/2021 at 10:42 AM    Contact: [phone]

## 2021-04-09 NOTE — PROGRESS NOTES
Interim note:    Patients sister contacted the unit, provider updated patients sister about patients medical status. Identified some bleeding noted from removal of packing from cric site which is being treated and monitored. Concern for reimaging dvt and overall respiratory status with increased vent requirements. Had no other additional questions at this time.      [id number]    Signed By: Hossein Barraza MD     April 9, 2021      ICU Staff  Above reviewed and noted    Becka Rey DO, [facility]  Pulmonary, Critical Care, and Sleep Medicine

## 2021-04-09 NOTE — PROGRESS NOTES
Adjusted vent based on pt WOB and Flow- volume and Pressure - volume loops. Increased vT to 620, Ti to 1.2 seconds. Pt RR now 18 (down from low 20's) with constant vE.  Pt no longer diaphoretic, PIP 23, pPLAT 21, CSTAT 44

## 2021-04-10 ENCOUNTER — APPOINTMENT (OUTPATIENT)
Dept: GENERAL RADIOLOGY | Age: 71
DRG: 004 | End: 2021-04-10
Attending: PHYSICIAN ASSISTANT
Payer: MEDICARE

## 2021-04-10 PROBLEM — I82.409 DVT (DEEP VENOUS THROMBOSIS) (HCC): Status: ACTIVE | Noted: 2021-04-10

## 2021-04-10 LAB
ANION GAP SERPL CALC-SCNC: 5 MMOL/L (ref 3–18)
ARTERIAL PATENCY WRIST A: ABNORMAL
BACTERIA SPEC CULT: NORMAL
BASE EXCESS BLD CALC-SCNC: 4 MMOL/L
BASOPHILS # BLD: 0 K/UL (ref 0–0.1)
BASOPHILS NFR BLD: 0 % (ref 0–2)
BDY SITE: ABNORMAL
BUN SERPL-MCNC: 55 MG/DL (ref 7–18)
BUN/CREAT SERPL: 32 (ref 12–20)
CALCIUM SERPL-MCNC: 8.4 MG/DL (ref 8.5–10.1)
CHLORIDE SERPL-SCNC: 117 MMOL/L (ref 100–111)
CO2 SERPL-SCNC: 28 MMOL/L (ref 21–32)
CREAT SERPL-MCNC: 1.7 MG/DL (ref 0.6–1.3)
DIFFERENTIAL METHOD BLD: ABNORMAL
EOSINOPHIL # BLD: 0 K/UL (ref 0–0.4)
EOSINOPHIL NFR BLD: 0 % (ref 0–5)
ERYTHROCYTE [DISTWIDTH] IN BLOOD BY AUTOMATED COUNT: 16 % (ref 11.6–14.5)
GAS FLOW.O2 O2 DELIVERY SYS: ABNORMAL L/MIN
GAS FLOW.O2 SETTING OXYMISER: 16 BPM
GLUCOSE BLD STRIP.AUTO-MCNC: 148 MG/DL (ref 70–110)
GLUCOSE BLD STRIP.AUTO-MCNC: 169 MG/DL (ref 70–110)
GLUCOSE BLD STRIP.AUTO-MCNC: 175 MG/DL (ref 70–110)
GLUCOSE BLD STRIP.AUTO-MCNC: 177 MG/DL (ref 70–110)
GLUCOSE SERPL-MCNC: 149 MG/DL (ref 74–99)
HCO3 BLD-SCNC: 29.2 MMOL/L (ref 22–26)
HCT VFR BLD AUTO: 36.7 % (ref 36–48)
HGB BLD-MCNC: 11.1 G/DL (ref 13–16)
LYMPHOCYTES # BLD: 1.2 K/UL (ref 0.9–3.6)
LYMPHOCYTES NFR BLD: 7 % (ref 21–52)
MAGNESIUM SERPL-MCNC: 3.4 MG/DL (ref 1.6–2.6)
MCH RBC QN AUTO: 25.7 PG (ref 24–34)
MCHC RBC AUTO-ENTMCNC: 30.2 G/DL (ref 31–37)
MCV RBC AUTO: 85 FL (ref 74–97)
MONOCYTES # BLD: 1.3 K/UL (ref 0.05–1.2)
MONOCYTES NFR BLD: 7 % (ref 3–10)
NEUTS SEG # BLD: 14.4 K/UL (ref 1.8–8)
NEUTS SEG NFR BLD: 85 % (ref 40–73)
O2/TOTAL GAS SETTING VFR VENT: 85 %
PCO2 BLD: 46.4 MMHG (ref 35–45)
PEEP RESPIRATORY: 11 CMH2O
PH BLD: 7.41 [PH] (ref 7.35–7.45)
PHOSPHATE SERPL-MCNC: 2.3 MG/DL (ref 2.5–4.9)
PLATELET # BLD AUTO: 118 K/UL (ref 135–420)
PMV BLD AUTO: 11.7 FL (ref 9.2–11.8)
PO2 BLD: 90 MMHG (ref 80–100)
POTASSIUM SERPL-SCNC: 4.2 MMOL/L (ref 3.5–5.5)
RBC # BLD AUTO: 4.32 M/UL (ref 4.35–5.65)
SAO2 % BLD: 97 % (ref 92–97)
SERVICE CMNT-IMP: ABNORMAL
SERVICE CMNT-IMP: NORMAL
SODIUM SERPL-SCNC: 150 MMOL/L (ref 136–145)
SPECIMEN TYPE: ABNORMAL
TOTAL RESP. RATE, ITRR: 16
VENTILATION MODE VENT: ABNORMAL
VOLUME CONTROL PLUS IVLCP: YES
VT SETTING VENT: 620 ML
WBC # BLD AUTO: 16.9 K/UL (ref 4.6–13.2)

## 2021-04-10 PROCEDURE — 74011250636 HC RX REV CODE- 250/636: Performed by: PHYSICIAN ASSISTANT

## 2021-04-10 PROCEDURE — 36600 WITHDRAWAL OF ARTERIAL BLOOD: CPT

## 2021-04-10 PROCEDURE — 74011000250 HC RX REV CODE- 250: Performed by: PHYSICIAN ASSISTANT

## 2021-04-10 PROCEDURE — 94003 VENT MGMT INPAT SUBQ DAY: CPT

## 2021-04-10 PROCEDURE — 2709999900 HC NON-CHARGEABLE SUPPLY

## 2021-04-10 PROCEDURE — 80048 BASIC METABOLIC PNL TOTAL CA: CPT

## 2021-04-10 PROCEDURE — 74011000250 HC RX REV CODE- 250: Performed by: STUDENT IN AN ORGANIZED HEALTH CARE EDUCATION/TRAINING PROGRAM

## 2021-04-10 PROCEDURE — 74011000258 HC RX REV CODE- 258: Performed by: NURSE PRACTITIONER

## 2021-04-10 PROCEDURE — 71045 X-RAY EXAM CHEST 1 VIEW: CPT

## 2021-04-10 PROCEDURE — 74011250637 HC RX REV CODE- 250/637: Performed by: INTERNAL MEDICINE

## 2021-04-10 PROCEDURE — 87077 CULTURE AEROBIC IDENTIFY: CPT

## 2021-04-10 PROCEDURE — 94762 N-INVAS EAR/PLS OXIMTRY CONT: CPT

## 2021-04-10 PROCEDURE — 83735 ASSAY OF MAGNESIUM: CPT

## 2021-04-10 PROCEDURE — APPNB15 APP NON BILLABLE TIME 0-15 MINS: Performed by: PHYSICIAN ASSISTANT

## 2021-04-10 PROCEDURE — 74011636637 HC RX REV CODE- 636/637: Performed by: PHYSICIAN ASSISTANT

## 2021-04-10 PROCEDURE — 0B958ZZ DRAINAGE OF RIGHT MIDDLE LOBE BRONCHUS, VIA NATURAL OR ARTIFICIAL OPENING ENDOSCOPIC: ICD-10-PCS | Performed by: INTERNAL MEDICINE

## 2021-04-10 PROCEDURE — 85025 COMPLETE CBC W/AUTO DIFF WBC: CPT

## 2021-04-10 PROCEDURE — 99291 CRITICAL CARE FIRST HOUR: CPT | Performed by: INTERNAL MEDICINE

## 2021-04-10 PROCEDURE — 84100 ASSAY OF PHOSPHORUS: CPT

## 2021-04-10 PROCEDURE — 82962 GLUCOSE BLOOD TEST: CPT

## 2021-04-10 PROCEDURE — 87205 SMEAR GRAM STAIN: CPT

## 2021-04-10 PROCEDURE — 74011250636 HC RX REV CODE- 250/636: Performed by: INTERNAL MEDICINE

## 2021-04-10 PROCEDURE — 76040000007: Performed by: INTERNAL MEDICINE

## 2021-04-10 PROCEDURE — 87186 SC STD MICRODIL/AGAR DIL: CPT

## 2021-04-10 PROCEDURE — 0B9B8ZZ DRAINAGE OF LEFT LOWER LOBE BRONCHUS, VIA NATURAL OR ARTIFICIAL OPENING ENDOSCOPIC: ICD-10-PCS | Performed by: INTERNAL MEDICINE

## 2021-04-10 PROCEDURE — 31624 DX BRONCHOSCOPE/LAVAGE: CPT | Performed by: INTERNAL MEDICINE

## 2021-04-10 PROCEDURE — 82803 BLOOD GASES ANY COMBINATION: CPT

## 2021-04-10 PROCEDURE — 74011000250 HC RX REV CODE- 250: Performed by: INTERNAL MEDICINE

## 2021-04-10 PROCEDURE — 74011250636 HC RX REV CODE- 250/636: Performed by: NURSE PRACTITIONER

## 2021-04-10 PROCEDURE — 36415 COLL VENOUS BLD VENIPUNCTURE: CPT

## 2021-04-10 PROCEDURE — 94640 AIRWAY INHALATION TREATMENT: CPT

## 2021-04-10 PROCEDURE — 74011000250 HC RX REV CODE- 250: Performed by: REGISTERED NURSE

## 2021-04-10 PROCEDURE — 65610000006 HC RM INTENSIVE CARE

## 2021-04-10 RX ORDER — MIDAZOLAM IN 0.9 % SOD.CHLORID 1 MG/ML
0-10 PLASTIC BAG, INJECTION (ML) INTRAVENOUS
Status: DISCONTINUED | OUTPATIENT
Start: 2021-04-10 | End: 2021-04-16

## 2021-04-10 RX ADMIN — DORNASE ALFA 2.5 MG: 1 SOLUTION RESPIRATORY (INHALATION) at 19:43

## 2021-04-10 RX ADMIN — CIPROFLOXACIN 2 DROP: 3 SOLUTION OPHTHALMIC at 13:36

## 2021-04-10 RX ADMIN — FAMOTIDINE 20 MG: 10 INJECTION INTRAVENOUS at 09:52

## 2021-04-10 RX ADMIN — Medication 175 MCG/HR: at 22:01

## 2021-04-10 RX ADMIN — Medication 175 MCG/HR: at 11:30

## 2021-04-10 RX ADMIN — FENTANYL CITRATE 100 MCG: 50 INJECTION, SOLUTION INTRAMUSCULAR; INTRAVENOUS at 11:31

## 2021-04-10 RX ADMIN — ALBUTEROL SULFATE 2.5 MG: 2.5 SOLUTION RESPIRATORY (INHALATION) at 01:32

## 2021-04-10 RX ADMIN — CIPROFLOXACIN 2 DROP: 3 SOLUTION OPHTHALMIC at 22:00

## 2021-04-10 RX ADMIN — INSULIN LISPRO 2 UNITS: 100 INJECTION, SOLUTION INTRAVENOUS; SUBCUTANEOUS at 23:36

## 2021-04-10 RX ADMIN — CIPROFLOXACIN 2 DROP: 3 SOLUTION OPHTHALMIC at 05:27

## 2021-04-10 RX ADMIN — ALBUTEROL SULFATE 2.5 MG: 2.5 SOLUTION RESPIRATORY (INHALATION) at 16:09

## 2021-04-10 RX ADMIN — HEPARIN SODIUM 12 UNITS/KG/HR: 10000 INJECTION, SOLUTION INTRAVENOUS at 18:00

## 2021-04-10 RX ADMIN — Medication 175 MCG/HR: at 02:45

## 2021-04-10 RX ADMIN — ARFORMOTEROL TARTRATE 15 MCG: 15 SOLUTION RESPIRATORY (INHALATION) at 08:25

## 2021-04-10 RX ADMIN — Medication 175 MCG/HR: at 06:00

## 2021-04-10 RX ADMIN — CIPROFLOXACIN 2 DROP: 3 SOLUTION OPHTHALMIC at 18:17

## 2021-04-10 RX ADMIN — CIPROFLOXACIN 2 DROP: 3 SOLUTION OPHTHALMIC at 09:52

## 2021-04-10 RX ADMIN — CIPROFLOXACIN 2 DROP: 3 SOLUTION OPHTHALMIC at 02:00

## 2021-04-10 RX ADMIN — ACETYLCYSTEINE 400 MG: 100 SOLUTION ORAL; RESPIRATORY (INHALATION) at 01:32

## 2021-04-10 RX ADMIN — INSULIN LISPRO 2 UNITS: 100 INJECTION, SOLUTION INTRAVENOUS; SUBCUTANEOUS at 12:30

## 2021-04-10 RX ADMIN — ALBUTEROL SULFATE 2.5 MG: 2.5 SOLUTION RESPIRATORY (INHALATION) at 08:25

## 2021-04-10 RX ADMIN — MIDAZOLAM 2 MG: 1 INJECTION INTRAMUSCULAR; INTRAVENOUS at 11:34

## 2021-04-10 RX ADMIN — CHLORHEXIDINE GLUCONATE 0.12% ORAL RINSE 10 ML: 1.2 LIQUID ORAL at 09:52

## 2021-04-10 RX ADMIN — MIDAZOLAM HYDROCHLORIDE 5 MG/HR: 5 INJECTION, SOLUTION INTRAMUSCULAR; INTRAVENOUS at 10:11

## 2021-04-10 RX ADMIN — CHLORHEXIDINE GLUCONATE 0.12% ORAL RINSE 10 ML: 1.2 LIQUID ORAL at 20:55

## 2021-04-10 RX ADMIN — FENTANYL CITRATE 100 MCG: 50 INJECTION, SOLUTION INTRAMUSCULAR; INTRAVENOUS at 02:37

## 2021-04-10 RX ADMIN — Medication 175 MCG/HR: at 16:50

## 2021-04-10 RX ADMIN — ARFORMOTEROL TARTRATE 15 MCG: 15 SOLUTION RESPIRATORY (INHALATION) at 19:43

## 2021-04-10 RX ADMIN — Medication 30 ML: at 09:52

## 2021-04-10 RX ADMIN — ALBUTEROL SULFATE 2.5 MG: 2.5 SOLUTION RESPIRATORY (INHALATION) at 19:43

## 2021-04-10 RX ADMIN — DORNASE ALFA 2.5 MG: 1 SOLUTION RESPIRATORY (INHALATION) at 16:09

## 2021-04-10 RX ADMIN — FENTANYL CITRATE 100 MCG: 50 INJECTION, SOLUTION INTRAMUSCULAR; INTRAVENOUS at 06:27

## 2021-04-10 RX ADMIN — ACETYLCYSTEINE 400 MG: 100 SOLUTION ORAL; RESPIRATORY (INHALATION) at 16:13

## 2021-04-10 RX ADMIN — INSULIN LISPRO 2 UNITS: 100 INJECTION, SOLUTION INTRAVENOUS; SUBCUTANEOUS at 18:13

## 2021-04-10 NOTE — PROGRESS NOTES
Dr. Alejandro Brittonots aware of temperature 100.8 F. No new interventions at this time. Continue to monitor.

## 2021-04-10 NOTE — ROUTINE PROCESS
TRANSFER - IN REPORT:    Verbal report received from primary nurse on Pramod Gonsalez  being received from ICU for change in patient condition(bedside bronch needed.)      Report consisted of patients Situation, Background, Assessment and   Recommendations(SBAR). Information from the following report(s) SBAR was reviewed with the receiving nurse. Opportunity for questions and clarification was provided. Assessment completed upon patients arrival to unit and care assumed.

## 2021-04-10 NOTE — PROGRESS NOTES
Assumed care of patient after receiving bedside and verbal shift change report from Patience Cristobal RN.

## 2021-04-10 NOTE — PROCEDURES
[facility]  Pulmonary, Critical Care, and Sleep Medicine    Name: Mariela White MRN: [id number]   : 1950 Hospital: [address] [place] [address]   Date: 4/10/2021        Bronchoscopy Report    Procedure: Diagnostic bronchoscopy. Indication: Atelectasis and refractory hypoxemia    Consent/Treatment: Informed consent was obtained from the  family after risks, benefits and alternatives were explained. Timeout verified the correct patient and correct procedure. Anesthesia:   Patient on ventilator and receiving  Versed gtt, Versed and Fentanyl bolus    Procedure Details:   -- The bronchoscope was introduced through an endotracheal tube. -- The vocal cords were not visualized  -- The trachea and gabriella were  inspected and were found to be normal except for moderate suction trauma. -- The right-sided endobronchial anatomy was completely inspected. Mucosa was edematous with moderate suction trauma. RML medial segment with mucus plug easily cleared with saline lavage  -- The left-sided endobronchial anatomy was inspected.  LLL with small mucus plug easily cleared with suctioning    Specimens:   Bronchial washings were sent for  microbiology    Rapid On-Site Evaluation: NA    Complications: none    Estimated Blood Loss: Minimal    Servando Moreno MD  April 10, 2021  12:12 PM

## 2021-04-10 NOTE — PROGRESS NOTES
Bedside and Verbal shift change report given to Diane Saenz RN (oncoming nurse) by Joesph Ambrose RN (offgoing nurse). Report included the following information SBAR, Kardex, Intake/Output, MAR, Recent Results and Cardiac Rhythm is SR on telemetry.

## 2021-04-10 NOTE — INTERDISCIPLINARY ROUNDS
[facility]  Pulmonary, Critical Care, and Sleep Medicine  Interdisciplinary and Ventilator Weaning Rounds    Patient discussed in morning walking rounds and interdisciplinary rounds. ICU day: 7    Overnight events:   · No acute events overnight     Assessments and best practice:   Ventilator  o Ventilator day 7  o Vent settings: FiO2 of 85 and PEEP 11  o VAP bundle, aim to keep peak plateau pressure [id number] H2O  o Weaning assessed and documented   - Patient does not meet criteria for SBT. FiO2 and PEEP too high   - Patient is not on sedation holiday. - No plan to wean today. - Outcome: cont full ventilator support   - Final plan: will remain on mechanical ventilatory support today   Sedation  Fentanyl  and Versed   Other pertinent drips  o None    Lines noted  o peripheral IVs   Critical labs assessed  o Yes   Antibiotics   None    Medications reviewed  o Yes   Pending imaging  o none   Pending send out labs  o no   Pending Procedures  o no   Glycemic control   SSI    Stress ulcer prophylaxis. o Pepcid   DVT prophylaxis. o SCDs/    Need for Lines, cardona assessed.  o Yes   Restraint Reevaluation   o Yes  o I have reevaluated the patient one hour after initiation of intervention. The patient is comfortable, uninjured, but continues to pose an imminent risk of injury to self to themselves and/or serious disruption of medical treatment required to keep patient stable. The patient's current medical and behavioral conditions that warrant the use intervention include danger to self and Interference with medical treatment. Restraint or seclusion will be discontinued at the earliest possible time, regardless of the scheduled expiration of the order. Based on my evaluation, restraints will be continued: YES 4/10/21   o Attending Overseeing restraints:Dr. Dixon Hensley     Family contact/MPOA: sister, Tiffany Dickerson  Family update:  Will be updated by bedside RN        Daily Plans:   Bronch today     Karie Long NP  04/10/21  Pulmonary, Critical Care Medicine  [facility]

## 2021-04-10 NOTE — PROGRESS NOTES
[facility]  Pulmonary, Critical Care, and Sleep Medicine    Name: Betzy Hicks MRN: [id number]   : 1950 Hospital: [address] [place] [address]    Date: 4/10/2021        Critical Care: Daily Progress Note    Admission Date:   2021  LOS: 6  MAR reviewed and pertinent medications noted or modified as needed    IMPRESSION:   · Angioedema- with airway and respiratory failure and collapse; thought due to ACE inhibitor  · S/P Emergent Cricthyrotomy [facility]-ED 21- converted to 6.5 ETT intraoperative by anesthesia team 21, 8.0 ETT by anesthesia 21, packing removed evening 21-ENT  · S/P cardiac arrest @ [facility] 21- brief thought due to hypoxia due to airway collapse  · Respiratory Failure: Acute due to above; currently intubated and on vent for airway protection  · Pulmonary Infiltrates: suspect aspiration secretions and/or blood due to above- can't exclude other infectious process at this time. Rapid Covid Negative at [facility]  · Bradycardia  · DVT: Popliteal- Left; acute non-occlusive Heparin ACS protocol started - stopped  due to bleeding from Cric site. Will consider resuming after bronchoscopy  · JACQUELYN  · Left scleral erythema- unknown baseline; possible infection   · DM  · DM retinopathy per chart review  · Diabetic peripheral neuropathy  · COVID -19; Negative rapid and Biofire: 21    · Obesity: Body mass index is 36.04 kg/m².   ·       RECOMMENDATIONS:   NEURO:   Serial neuro evaluations   Sedation Holiday per protocol   RAAS: 0 to -1   Wetting tears and cipro eye drops- stop date for cipro on chart     CARDIO:   MAP goal >64- No IV pressor requirement   Echo on 21    Telemetry    PULM:   Maintain aspiration precautions: HOB >30 degrees   SpO2 goal >92%   Bronchial /oral hygiene   VAP bundle- Wean vent as clinical status allows   SBT as clinical status allows   Complete course of decadron   Benedryl Scheduled 50mg Q 6   Pulmozyme and Mucomist BID with bronchodilator   Metaneb   Bronchoscopy today     GI/ NUTRITION:   OGT    Diet: TF with Free water@ 100 mls Q 4--> 200 Q4   SUP:Pepcid   Follow up with nutritional recommendations     RENAL/ METAB/ :   Monitor I&Os   Trend electrolytes- replaced as needed, K:4, Mg>2 PO4>2   Garcia: Continue    HEM/ONC   Trend Hb/HCT and PLTs, and indicis   DVT prophylaxis: SCDs   Hold heparin drip due to bleeding from Cric site. Will consider resuming after bronchoscopy   Re-image poplyteal DVT 72 hours and/or pending clinical course- may need IVC Filter if worsens   Blood Products: not indicated at this time    ID   Antibioitcs: Cipro eye drop, Zosyn: 4/4- 4/8/21 Vanco: 4/4-6, MRSA swab negative   Follow up on pending cultures    ENDO   BGs Q 6,SSI   Check C1 inhibitor normal level (4/7/21)     MSK/ ORTHO   No active Issues     SKIN/ WOUNDS   Per protocol   Neck- daily dressing changes per ENT, Quick clot wit Afrin if needed, d/c xeroform     OTHER/DISPO:    CODE STATUS: Full Code- Full    Case reviewed and discussed with  ICU care team     Subjective/History: This patient has been seen and evaluated at the request of Dr. Ileana Quezada- ED from [facility] for Angioedema and airway compromise. Patient is a 79 y.o. male presented earlier this morning to [facility] ED in rapidly progressive respiratory distress due to angioedema and airway swelling presumed due to ACE inhibitor therapy. ED provider unable to intubate due to severity of airway swelling. Brief cardio-pulmonary collapse with brief loss of pulse. IO placed into right shoulder. Emergent cricothyrotomy 5.0 place. Good placement. ED team able to oxygenate and ventilate patient. Prior to transport ABG notable for Respiratory acidosis. Call back to -ED patient on vent and paralyzed with rate of 10. I requested that RR be increased to 16-18 bpm depending what patient could tolerate.       Our ENT provider on call, Dr. Alex Sparks was contacted. OR team on call came in. Patient flow via Nightingale/Life flight to our facility. The patient was taken from flight line to OR hold. On-call team rapidly assess patient. I evaluated the patient immediately upon arrival. Patient with Cric in place, bilateral breath  Sounds, SpO2 in the mid 90's. Swollen neck and face. ABG -POC with persistent but improved respiratory acidosis. ER team took patient emergently to the OR    Anesthesia able to place ETT 6.5 intraop. I was called by ENT provider in the OR. Case discussed. Opted to keep patient intubated with 6.5 ETT. Will monitor for 48-72 hours and reassess patient. CXR notable for bilateral opacities. Suspect the later due to aspiration of blood and /or airway secretions. Patient was reexamined again at bedside upon arrival to ICU- Bed2    Patient paralyzed and sedated. 6.5 ETT in place. Bilateral breath sounds. SpO2 100%    CXR obtained in ICU- ETT slightly high- will try to advance 1-2 cm if able. 04/10/21  ·   · ETT changed to 8.0 over boogie 4/7/21 by Anesthesia   · ENT removed packing from Cric site overnight  · Some bleeding from Cric site 4/8 and 4/9. ENT aware- quick-clot soaked in afrin to site if needed  · Heparin drip stopped, will consider restarting after bronchoscopy  · Increased vent requirements since ETT change out PEEP still at 11- appropriate recruitment on pressure -volume loops- suspect some component of aspiration pneumonitis/ ARDS   · No infection isolated thus far  · CXR: Increased atelectasis, fluid RLL, as well as opacities. Could represent atelectasis/infiltrate. Increased WBC today and fevers. Will bronch today to evaluate for mucous plugging. Consent in chart.          Inpat Anti-Infectives (From admission, onward)     Start     Ordered Stop    04/06/21 1800  ciprofloxacin HCl (CILOXAN) 0.3 % ophthalmic solution 2 Drop  2 Drop,   Both Eyes,   EVERY 4 HOURS      04/04/21 1724 04/11/21 1759    04/04/21 1800  ciprofloxacin HCl (CILOXAN) 0.3 % ophthalmic solution 2 Drop  2 Drop,   Both Eyes,   EVERY 2 HOURS WHILE AWAKE      04/04/21 1724 04/06/21 1759                The patient is critically ill and can not provide additional history due to Unconsciousness, Ventilated and Unable to speak. Chart and notes reviewed. Data reviewed. []I have reviewed the flowsheet and previous days notes. []The patient is unable to give any meaningful history or review of systems because the patient is:  [x]Intubated [x]Sedated   []Unresponsive      []The patient is critically ill on      [x]Mechanical ventilation []Pressors   []BiPAP []                       Past Medical History:   Diagnosis Date    DDD (degenerative disc disease), lumbar     Diabetes ([facility]     Diabetic neuropathy ([facility]     Diabetic retinopathy ([facility]     DM2 (diabetes mellitus, type 2) ([facility]     HLD (hyperlipidemia)     HTN (hypertension)     Obesity (BMI 30-39. 9)       History reviewed. No pertinent surgical history. Prior to Admission medications    Medication Sig Start Date End Date Taking? Authorizing Provider   LISINOPRIL, BULK, by Does Not Apply route.     Other, MD Cheryl     Current Facility-Administered Medications   Medication Dose Route Frequency    acetylcysteine (MUCOMYST) 100 mg/mL (10 %) nebulizer solution 400 mg  4 mL Nebulization BID RT    famotidine (PF) (PEPCID) 20 mg in 0.9% sodium chloride 10 mL injection  20 mg IntraVENous DAILY    fentaNYL (PF) 900 mcg/30 ml infusion soln  0-200 mcg/hr IntraVENous TITRATE    midazolam in normal saline (VERSED) 1 mg/mL infusion  0-10 mg/hr IntraVENous TITRATE    [Held by provider] heparin 25,000 units in D5W 250 ml infusion  12-25 Units/kg/hr IntraVENous TITRATE    dornase alpha (PULMOZYME)2.5mg/2.5ml nebulizer solution  2.5 mg Nebulization BID RT    albuterol (PROVENTIL VENTOLIN) nebulizer solution 2.5 mg  2.5 mg Nebulization Q6H RT    arformoteroL (BROVANA) neb solution 15 mcg  15 mcg Nebulization BID RT  multivit-folic acid-herbal 487 (WELLESSE PLUS) oral liquid 30 mL  30 mL Per NG tube DAILY    chlorhexidine (PERIDEX) 0.12 % mouthwash 10 mL  10 mL Oral Q12H    insulin lispro (HUMALOG) injection   SubCUTAneous Q6H    ciprofloxacin HCl (CILOXAN) 0.3 % ophthalmic solution 2 Drop  2 Drop Both Eyes Q4H     Allergies   Allergen Reactions    Lisinopril Angioedema      Social History     Tobacco Use    Smoking status: Current Every Day Smoker     Packs/day: 0.50   Substance Use Topics    Alcohol use: Not on file      History reviewed. No pertinent family history. Review of Systems:  Review of systems not obtained due to patient factors. Objective:   Vital Signs:    Visit Vitals  /65   Pulse 88   Temp 99.1 °F (37.3 °C)   Resp 16   Ht 5' 11\" (1.803 m)   Wt 117.2 kg (258 lb 6.1 oz)   SpO2 95%   BMI 36.04 kg/m²       O2 Device: Endotracheal tube, Ventilator       Temp (24hrs), Av.5 °F (37.5 °C), Min:98.1 °F (36.7 °C), Max:101.5 °F (38.6 °C)       Intake/Output:   Last shift:      No intake/output data recorded. Last 3 shifts:  1901 - 04/10 0700  In: 3634.3 [I.V.:439.3]  Out: 3475 [Urine:3475]    Intake/Output Summary (Last 24 hours) at 4/10/2021 0807  Last data filed at 4/10/2021 0659  Gross per 24 hour   Intake 2228.8 ml   Output 2150 ml   Net 78.8 ml         Ventilator Settings:  Mode Rate Tidal Volume Pressure FiO2 PEEP   Assist control, VC+   620 ml    85 % 11 cm H20     Peak airway pressure: 27 cm H2O    Minute ventilation: 10.7 l/min       ARDS network Guidelines:   Lung protective strategy and Plateau  Pressure goal < 30 cm H2O goals  Oxygenation Goals PaO2 55-80 mm Hg or SaO2 88-95%  PH goal 7.30-7.45    VAP bundle;  Reviewed. Cowlitz tube to suction at 20-30 cm Hg.   Maintain Cowlitz tube with 5-10ml air every 4 hours  Routine oral care every 4 hours  Elevation of head > 45 degree  Daily sedation holiday and SBT evaluation starting at 6.00am.  Physical Exam:    General:  Intubated and sedated  , appears stated age.+ Obese body habitus   Head:  Normocephalic, without obvious abnormality, atraumatic. + facial edema   Eyes:  Conjunctivae/corneas clear. PERRL. Nose: Nares normal. Septum midline. Mucosa normal. No drainage or sinus tenderness. Throat: Lips, mucosa, and tongue swollen. + ETT in place   Neck: bandage and packing inplace where cric was palced (minimal venous oozing at site), no carotid bruit and no JVD. Back:   Symmetric   Lungs:   Bilateral breath sounds, with bilateral rhonchi- no wheezing   Chest wall:  No tenderness or deformity.- No crepitus   Heart:  Regular rate and rhythm, S1, S2 normal, no murmur, click, rub or gallop. Abdomen:   Soft, Obese, non-tender. Bowel sounds normal. No masses,  No organomegaly. Extremities: Extremities normal, atraumatic, no cyanosis or edema. Pulses: 2+ and symmetric all extremities. Skin: Skin color, texture, turgor normal. No rashes or lesions   Lymph nodes:      Cervical, supraclavicular nodes: unremarkable   Neurologic: Grossly nonfocal       Data:     Recent Labs     04/10/21  0400 04/09/21  0443 04/08/21  0610   WBC 16.9* 11.9 11.7   HGB 11.1* 11.2* 11.4*   HCT 36.7 37.6 38.1   * 143 144     Recent Labs     04/10/21  0400 04/09/21  0443 04/08/21  0610   * 148* 146*   K 4.2 4.3 3.8   * 116* 112*   CO2 28 28 26   * 133* 131*   BUN 55* 46* 64*   CREA 1.70* 1.57* 2.71*   CA 8.4* 8.0* 7.9*   MG 3.4* 3.0* 3.0*   PHOS 2.3* 2.2* 5.8*     Lab Results   Component Value Date/Time    NT pro- (H) 04/09/2021 09:38 AM    NT pro- 04/06/2021 12:02 PM     Lab Results   Component Value Date/Time    INR 1.0 04/04/2021 05:11 PM    Prothrombin time 12.7 04/04/2021 05:11 PM       No results for input(s): PH, PCO2, PO2, HCO3, FIO2 in the last 72 hours.   Recent Labs     04/10/21  0328 04/09/21  0430 04/08/21  0308   FIO2I 85 60 80   HCO3I 29.2* 33.3* 27.7*   PCO2I 46.4* 52.3* 50.3*   PHI 7.41 7.42 7.35   PO2I 90 83 82       ENDO:  Lab Results   Component Value Date/Time    TSH 1.35 04/04/2021 05:11 PM       Lab Results   Component Value Date/Time    Glucose 149 (H) 04/10/2021 04:00 AM    Glucose 133 (H) 04/09/2021 04:43 AM    Glucose 131 (H) 04/08/2021 06:10 AM    Glucose 147 (H) 04/07/2021 05:52 PM    Glucose 117 (H) 04/07/2021 04:13 AM         CARDIO:  Telemetry:normal sinus rhythm / sinus bradycardia    Lab Results   Component Value Date/Time    CK 90 04/05/2021 08:11 PM    CK 89 04/05/2021 08:11 PM    CK - MB 1.5 04/05/2021 08:11 PM    CK - MB 1.4 04/05/2021 08:11 PM    CK-MB Index 1.7 04/05/2021 08:11 PM    CK-MB Index 1.6 04/05/2021 08:11 PM    Troponin-I, Qt. 0.06 (H) 04/04/2021 01:40 PM    Troponin-I, QT 0.18 (H) 04/05/2021 08:11 PM    Troponin-I, QT 0.17 (H) 04/05/2021 08:11 PM       EKG Results     Procedure 720 Value Units Date/Time    EKG, 12 LEAD, SUBSEQUENT [[id number]] Collected: 04/06/21 1153    Order Status: Completed Updated: 04/06/21 1536     Ventricular Rate 54 BPM      Atrial Rate 54 BPM      P-R Interval 190 ms      QRS Duration 98 ms      Q-T Interval 588 ms      QTC Calculation (Bezet) 557 ms      Calculated P Axis 44 degrees      Calculated R Axis -20 degrees      Calculated T Axis 130 degrees      Diagnosis --     Sinus bradycardia  Anteroseptal infarct , age undetermined  T wave abnormality, consider lateral ischemia  Prolonged QT  Abnormal ECG  No previous ECGs available  Confirmed by Ann Marie Potter [id number]) on 4/6/2021 3:36:40 PM      EKG, 12 LEAD, SUBSEQUENT [[id number]]     Order Status: Canceled                MEDS: Please also see MAR  Current Facility-Administered Medications   Medication Dose Route Frequency    acetylcysteine (MUCOMYST) 100 mg/mL (10 %) nebulizer solution 400 mg  4 mL Nebulization BID RT    famotidine (PF) (PEPCID) 20 mg in 0.9% sodium chloride 10 mL injection  20 mg IntraVENous DAILY    fentaNYL (PF) 900 mcg/30 ml infusion soln  0-200 mcg/hr IntraVENous TITRATE    midazolam in normal saline (VERSED) 1 mg/mL infusion  0-10 mg/hr IntraVENous TITRATE    [Held by provider] heparin 25,000 units in D5W 250 ml infusion  12-25 Units/kg/hr IntraVENous TITRATE    dornase alpha (PULMOZYME)2.5mg/2.5ml nebulizer solution  2.5 mg Nebulization BID RT    albuterol (PROVENTIL VENTOLIN) nebulizer solution 2.5 mg  2.5 mg Nebulization Q6H RT    arformoteroL (BROVANA) neb solution 15 mcg  15 mcg Nebulization BID RT    multivit-folic acid-herbal 973 (WELLESSE PLUS) oral liquid 30 mL  30 mL Per NG tube DAILY    chlorhexidine (PERIDEX) 0.12 % mouthwash 10 mL  10 mL Oral Q12H    insulin lispro (HUMALOG) injection   SubCUTAneous Q6H    ciprofloxacin HCl (CILOXAN) 0.3 % ophthalmic solution 2 Drop  2 Drop Both Eyes Q4H     MICRO:       Results     Procedure Component Value Units Date/Time    CULTURE, BLOOD [[id number]] Collected: 04/09/21 1449    Order Status: Completed Specimen: Blood Updated: 04/10/21 0702     Special Requests: NO SPECIAL REQUESTS        Culture result: NO GROWTH AFTER 14 HOURS       CULTURE, BLOOD [[id number]] Collected: 04/09/21 1449    Order Status: Completed Specimen: Blood Updated: 04/10/21 0702     Special Requests: NO SPECIAL REQUESTS        Culture result: NO GROWTH AFTER 14 HOURS       CULTURE, URINE [[id number]] Collected: 04/09/21 1350    Order Status: Completed Specimen: Urine from Clean catch Updated: 04/09/21 2335    CULTURE, RESPIRATORY/SPUTUM/BRONCH Renford Dusky STAIN [[id number]] Collected: 04/09/21 1350    Order Status: Completed Specimen: Sputum,ET Suction Updated: 04/09/21 2343     Special Requests: NO SPECIAL REQUESTS        GRAM STAIN RARE WBCS SEEN               RARE EPITHELIAL CELLS SEEN            NO ORGANISMS SEEN        Culture result: PENDING    CULTURE, RESPIRATORY/SPUTUM/BRONCH Lucinda Dailey [[id number]] Collected: 04/07/21 1300    Order Status: Completed Specimen: Sputum,ET Suction Updated: 04/09/21 1224     Special Requests: NO SPECIAL REQUESTS        GRAM STAIN OCCASIONAL WBCS SEEN               OCCASIONAL EPITHELIAL CELLS SEEN            NO ORGANISMS SEEN        Culture result:       RARE NORMAL RESPIRATORY ANNALEE          CULTURE, RESPIRATORY/SPUTUM/BRONCH Jeralene Rad [[id number]] Collected: 04/06/21 1715    Order Status: Completed Specimen: Sputum,ET Suction Updated: 04/08/21 1212     Special Requests: NO SPECIAL REQUESTS        GRAM STAIN OCCASIONAL WBCS SEEN         NO ORGANISMS SEEN        Culture result:       NO NORMAL RESPIRATORY ANNALEE ISOLATED          STREP Fredderick Canavan, URINE [[id number]] Collected: 04/04/21 1801    Order Status: Completed Specimen: Urine, random Updated: 04/04/21 1944     Strep pneumo Ag, urine Negative       LEGIONELLA PNEUMOPHILA AG, URINE [[id number]] Collected: 04/04/21 1801    Order Status: Completed Specimen: Urine, random Updated: 04/04/21 1944     Legionella Ag, urine Negative       RESPIRATORY VIRUS PANEL W/COVID-19, PCR [[id number]] Collected: 04/04/21 1700    Order Status: Completed Specimen: Nasopharyngeal Updated: 04/04/21 2020     Adenovirus Not detected        Coronavirus 229E Not detected        Coronavirus HKU1 Not detected        Coronavirus CVNL63 Not detected        Coronavirus OC43 Not detected        Metapneumovirus Not detected        Rhinovirus and Enterovirus Not detected        Influenza A Not detected        Influenza A, subtype H1 Not detected        Influenza A, subtype H3 Not detected        INFLUENZA A H1N1 PCR Not detected        Influenza B Not detected        Parainfluenza 1 Not detected        Parainfluenza 2 Not detected        Parainfluenza 3 Not detected        Parainfluenza virus 4 Not detected        RSV by PCR Not detected        B. parapertussis, PCR Not detected        Bordetella pertussis - PCR Not detected        Chlamydophila pneumoniae DNA, QL, PCR Not detected        Mycoplasma pneumoniae DNA, QL, PCR Not detected        SARS-CoV-2, PCR Not detected       CULTURE, MRSA [[id number]] Collected: 04/04/21 1700    Order Status: Completed Specimen: Nasal from Nares Updated: 04/06/21 0838     Special Requests: NO SPECIAL REQUESTS        Culture result: MRSA NOT PRESENT               Screening of patient nares for MRSA is for surveillance purposes and, if positive, to facilitate isolation considerations in high risk settings. It is not intended for automatic decolonization interventions per se as regimens are not sufficiently effective to warrant routine use. COVID-19 RAPID TEST [[id number]] Collected: 04/04/21 1112    Order Status: Completed Specimen: Nasopharyngeal Updated: 04/04/21 1151     Specimen source Nasopharyngeal        COVID-19 rapid test Not Detected        Comment:   Rapid NAAT:  The specimen is NEGATIVE for SARS-CoV-2, the novel coronavirus associated with COVID-19. Negative results should be treated as presumptive and, if inconsistent with clinical signs and symptoms or necessary for patient management, should be tested with an alternative molecular assay. Negative results do not preclude SARS-CoV-2 infection and should not be used as the sole basis for patient management decisions. This test has been authorized by the FDA under an Emergency Use   Authorization (EUA) for use by authorized laboratories. Fact sheet for Healthcare Providers: ConventionUpdate.co.nz Fact sheet for Patients: ConventionUpdate.co.nz   Methodology: Isothermal Nucleic Acid Amplification                   Imaging:  I have personally reviewed the patients radiographs and have reviewed the reports:    CXR Results  (Last 48 hours)               04/09/21 0545  XR CHEST PORT Final result    Impression:      Persistent bilateral airspace opacities perhaps slightly increased since prior. Question developing small bilateral pleural opacities. Narrative:  EXAM: XR CHEST PORT       INDICATION: Daily CXR while intubated.        COMPARISON: Recent prior       FINDINGS: A portable AP radiograph of the chest was obtained at 0 445 hours. The   patient is on a cardiac monitor. Persistent bilateral airspace opacities. Developing small pleural opacities. . Stable cardiac silhouette. .  No acute bone   findings. Stable appearance to the left clavicle. Stable endotracheal tube. Stable enteric tube given limitations and differences in technique. .                    CT Results  (Last 48 hours)    None           Best practice :  Core measures:    Glycemic control  [facility]. Ventilated patients- aim to keep peak plateau pressure 12-21MO H2O. Sress ulcer prophylaxis  DVT prophylaxis               Critical Care Time:  The services I provided to this patient were to treat and/or prevent clinically significant deterioration that could result in the failure of one or more body systems and/or organ systems due to respiratory distress, hypoxia, cardiac dysrhythmia. Services included the following:  -reviewing nursing notes and old charts  -vital sign assessments   -direct patient care  -medication orders and management  -interpreting and reviewing diagnostic studies/labs  -re-evaluations  -documentation time        Aggregate critical care time was 35 minutes, which includes only time during which I was engaged in work directly related to the patient's care as described above. During this entire length of time I was immediately available to the patient. The reason for providing this level of medical care for this critically ill patient was due a critical illness that impaired one or more vital organ systems such that there was a high probability of imminent or life threatening deterioration in the patients condition.  This care involved high complexity decision making to assess, manipulate, and support vital system functions, to treat this degreee vital organ system failure and to prevent further life threatening deterioration of the patients condition      Complex decision making was made in the evaluation and management plans during this consultation. More than 50% of time was spent in counseling and coordination of care including review of data and discussion with other team members.       Wilmer Cisneros MD PGY-1  [facility] Emergency Medicine    [facility]  Pulmonary, Critical Care, and Sleep Medicine

## 2021-04-10 NOTE — PROGRESS NOTES
Bedside and Verbal shift change report given to Stiven Cuellar RN (oncoming nurse) by Marco Antonio Langley RN (offgoing nurse). Report included the following information SBAR, Kardex, MAR and Recent Results. SITUATION:    Code Status: Full Code   Reason for Admission: Angioedema due to angiotensin converting enzyme inhibitor (ACE-I) [T78. [facility] day: 6   Problem List:       Hospital Problems  Date Reviewed: 4/5/2021          Codes Class Noted POA    Respiratory failure ([facility].) ICD-10-CM: J96.90  ICD-9-CM: 518.81  4/5/2021 Unknown        Obesity (BMI 30-39. 9) ICD-10-CM: E66.9  ICD-9-CM: 278.00  4/5/2021 Unknown        Diabetic neuropathy associated with type 2 diabetes mellitus ([facility] ICD-10-CM: E11.40  ICD-9-CM: 250.60, 357.2  4/5/2021 Unknown        Diabetic retinopathy associated with type 2 diabetes mellitus ([facility] ICD-10-CM: E11.319  ICD-9-CM: 250.50, 362.01  4/5/2021 Unknown        Pulmonary infiltrates ICD-10-CM: R91.8  ICD-9-CM: 793.19  4/5/2021 Unknown        Controlled type 2 diabetes mellitus with neurologic complication, without long-term current use of insulin ([facility] ICD-10-CM: E11.49  ICD-9-CM: 250.60  4/5/2021 Unknown        Angioedema due to angiotensin converting enzyme inhibitor (ACE-I) ICD-10-CM: T78. [place] , [id number]  ICD-9-CM: 995.1, E942.6  4/4/2021 Unknown        JACQUELYN (acute kidney injury) ([facility] ICD-10-CM: N17.9  ICD-9-CM: 584.9  4/4/2021 Unknown        Cardiac arrest [facility]) ICD-10-CM: I46.9  ICD-9-CM: 427.5  4/4/2021 Unknown              BACKGROUND:    Past Medical History:   Past Medical History:   Diagnosis Date    DDD (degenerative disc disease), lumbar     Diabetes ([facility]     Diabetic neuropathy ([facility]     Diabetic retinopathy ([facility]     DM2 (diabetes mellitus, type 2) ([facility]     HLD (hyperlipidemia)     HTN (hypertension)     Obesity (BMI 30-39. 9)          Patient taking anticoagulants: Yes - Heparin drip restarted per order    ASSESSMENT:    Changes in Assessment Throughout Shift: Diagnostic bronchoscopy completed at bedside without complication. Heparin drip resumed per order.  Patient has Central Line: No     Patient has Garcia Cath: No      Last Vitals:     Vitals:    04/10/21 1607 04/10/21 1700 04/10/21 1800 04/10/21 1900   BP:  114/70 116/66 124/68   Pulse: 85 96 93 96   Resp: 15 16 16 16   Temp:       SpO2: 97% 96% 96% 96%   Weight:       Height:            IV and DRAINS (will only show if present)   [REMOVED] Airway - Endotracheal Tube 04/04/21 Oral-Site Assessment: Clean, dry, & intact  Peripheral IV 04/04/21 Left Antecubital-Site Assessment: Clean, dry, & intact  Peripheral IV 04/04/21 Anterior;Right Wrist-Site Assessment: Clean, dry, & intact  Peripheral IV 04/04/21 Left;Posterior Hand-Site Assessment: Clean, dry, & intact  [REMOVED] Airway - Continuous Aspiration of Subglottic Secretions (GENESIS) Tube 04/04/21 Oral-Site Assessment: Clean, dry, & intact  Orogastric Tube 04/05/21-Site Assessment: Clean, dry, & intact  Airway - Continuous Aspiration of Subglottic Secretions (GENESIS) Tube 04/07/21 Oral-Site Assessment: Clean, dry, & intact     WOUND (if present)   Wound Type: Cric dressing   Dressing present Dressing Present : Yes, Intact, not due to be changed   Wound Concerns/Notes:  Monitor for bleeding     PAIN    Pain Assessment    Pain Intensity 1: 0 (04/10/21 1118)              Patient Stated Pain Goal: Unable to verbalize/indicate pain  o Interventions for Pain:  Continuous Fentanyl drip  o Intervention effective: yes  o Time of last intervention: Continuous Fentanyl drip  o Reassessment Completed: yes     Last 3 Weights:  Last 3 Recorded Weights in this Encounter    04/06/21 0948 04/08/21 0400 04/09/21 0000   Weight: 117.5 kg (259 lb) 113.4 kg (250 lb) 117.2 kg (258 lb 6.1 oz)     Weight change:      INTAKE/OUPUT    Current Shift: No intake/output data recorded.     Last three shifts: 04/09 0701 - 04/10 1900  In: 4323.1 [I.V.:593.1]  Out: 5753 [Urine:3225]     LAB RESULTS     Recent Labs     04/10/21  0400 04/09/21  0443 04/08/21  0610   WBC 16.9* 11.9 11.7   HGB 11.1* 11.2* 11.4*   HCT 36.7 37.6 38.1   * 143 144        Recent Labs     04/10/21  0400 04/09/21  0443 04/08/21  0610   * 148* 146*   K 4.2 4.3 3.8   * 133* 131*   BUN 55* 46* 64*   CREA 1.70* 1.57* 2.71*   CA 8.4* 8.0* 7.9*   MG 3.4* 3.0* 3.0*       RECOMMENDATIONS AND DISCHARGE PLANNING     1. Pending tests/procedures/ Plan of Care or Other Needs: Monitor for bleeding from cric site; Wean sedation and ventilator settings as tolerated    2. Discharge plan for patient and Needs/Barriers: TBD    3. Estimated Discharge Date: TBD; Posted on Whiteboard in Patients Room: yes      4. The patient's care plan was reviewed with the oncoming nurse. \"HEALS\" SAFETY CHECK      Fall Risk    Total Score: 3    Safety Measures: Safety Measures: Bed/Chair alarm on, Bed/Chair-Wheels locked, Bed in low position, Call light within reach, Emergency bedside equipment, Fall prevention (comment), Side rails X 3    A safety check occurred in the patient's room between off going nurse and oncoming nurse listed above. The safety check included the below items  Area Items   H  High Alert Medications - Verify all high alert medication drips (heparin, PCA, etc.)   E  Equipment - Suction is set up for ALL patients (with yanker)  - Red plugs utilized for all equipment (IV pumps, etc.)  - WOWs wiped down at end of shift.  - Room stocked with oxygen, suction, and other unit-specific supplies   A  Alarms - Bed alarm is set for fall risk patients  - Ensure chair alarm is in place and activated if patient is up in a chair   L  Lines - Check IV for any infiltration  - Garcia bag is empty if patient has a Garcia   - Tubing and IV bags are labeled   S  Safety   - Room is clean, patient is clean, and equipment is clean. - Hallways are clear from equipment besides carts.    - Fall bracelet on for fall risk patients  - Ensure room is clear and free of clutter  - Suction is set up for ALL patients (with dalton)  - Hallways are clear from equipment besides carts.    - Isolation precautions followed, supplies available outside room, sign posted     Sivan Murguia RN

## 2021-04-10 NOTE — PROGRESS NOTES
Bedside and Verbal shift change report given to Linh Weaver RN (oncoming nurse) by Delores Terrell RN (offgoing nurse). Report included the following information SBAR, Kardex, Intake/Output, MAR, Recent Results and Cardiac Rhythm is SR on telemetry.

## 2021-04-11 ENCOUNTER — APPOINTMENT (OUTPATIENT)
Dept: GENERAL RADIOLOGY | Age: 71
DRG: 004 | End: 2021-04-11
Attending: PHYSICIAN ASSISTANT
Payer: MEDICARE

## 2021-04-11 LAB
ANION GAP SERPL CALC-SCNC: 3 MMOL/L (ref 3–18)
APTT PPP: 94.5 SEC (ref 23–36.4)
ARTERIAL PATENCY WRIST A: ABNORMAL
B PERT DNA SPEC QL NAA+PROBE: NOT DETECTED
BACTERIA SPEC CULT: ABNORMAL
BACTERIA SPEC CULT: ABNORMAL
BASE EXCESS BLD CALC-SCNC: 5 MMOL/L
BASOPHILS # BLD: 0 K/UL (ref 0–0.1)
BASOPHILS NFR BLD: 0 % (ref 0–2)
BDY SITE: ABNORMAL
BORDETELLA PARAPERTUSSIS PCR, BORPAR: NOT DETECTED
BUN SERPL-MCNC: 65 MG/DL (ref 7–18)
BUN/CREAT SERPL: 35 (ref 12–20)
C PNEUM DNA SPEC QL NAA+PROBE: NOT DETECTED
CALCIUM SERPL-MCNC: 8.5 MG/DL (ref 8.5–10.1)
CHLORIDE SERPL-SCNC: 116 MMOL/L (ref 100–111)
CO2 SERPL-SCNC: 32 MMOL/L (ref 21–32)
CREAT SERPL-MCNC: 1.85 MG/DL (ref 0.6–1.3)
DIFFERENTIAL METHOD BLD: ABNORMAL
EOSINOPHIL # BLD: 0.1 K/UL (ref 0–0.4)
EOSINOPHIL NFR BLD: 1 % (ref 0–5)
ERYTHROCYTE [DISTWIDTH] IN BLOOD BY AUTOMATED COUNT: 16.2 % (ref 11.6–14.5)
FLUAV SUBTYP SPEC NAA+PROBE: NOT DETECTED
FLUBV RNA SPEC QL NAA+PROBE: NOT DETECTED
GAS FLOW.O2 O2 DELIVERY SYS: ABNORMAL L/MIN
GAS FLOW.O2 SETTING OXYMISER: 16 BPM
GLUCOSE BLD STRIP.AUTO-MCNC: 153 MG/DL (ref 70–110)
GLUCOSE BLD STRIP.AUTO-MCNC: 164 MG/DL (ref 70–110)
GLUCOSE BLD STRIP.AUTO-MCNC: 183 MG/DL (ref 70–110)
GLUCOSE BLD STRIP.AUTO-MCNC: 185 MG/DL (ref 70–110)
GLUCOSE SERPL-MCNC: 152 MG/DL (ref 74–99)
GRAM STN SPEC: ABNORMAL
HADV DNA SPEC QL NAA+PROBE: NOT DETECTED
HCO3 BLD-SCNC: 29 MMOL/L (ref 22–26)
HCOV 229E RNA SPEC QL NAA+PROBE: NOT DETECTED
HCOV HKU1 RNA SPEC QL NAA+PROBE: NOT DETECTED
HCOV NL63 RNA SPEC QL NAA+PROBE: NOT DETECTED
HCOV OC43 RNA SPEC QL NAA+PROBE: NOT DETECTED
HCT VFR BLD AUTO: 35.6 % (ref 36–48)
HGB BLD-MCNC: 10.9 G/DL (ref 13–16)
HMPV RNA SPEC QL NAA+PROBE: NOT DETECTED
HPIV1 RNA SPEC QL NAA+PROBE: NOT DETECTED
HPIV2 RNA SPEC QL NAA+PROBE: NOT DETECTED
HPIV3 RNA SPEC QL NAA+PROBE: NOT DETECTED
HPIV4 RNA SPEC QL NAA+PROBE: NOT DETECTED
LYMPHOCYTES # BLD: 1.5 K/UL (ref 0.9–3.6)
LYMPHOCYTES NFR BLD: 9 % (ref 21–52)
M PNEUMO DNA SPEC QL NAA+PROBE: NOT DETECTED
MAGNESIUM SERPL-MCNC: 3.3 MG/DL (ref 1.6–2.6)
MCH RBC QN AUTO: 26.3 PG (ref 24–34)
MCHC RBC AUTO-ENTMCNC: 30.6 G/DL (ref 31–37)
MCV RBC AUTO: 85.8 FL (ref 74–97)
MONOCYTES # BLD: 1.3 K/UL (ref 0.05–1.2)
MONOCYTES NFR BLD: 8 % (ref 3–10)
NEUTS SEG # BLD: 14.2 K/UL (ref 1.8–8)
NEUTS SEG NFR BLD: 82 % (ref 40–73)
O2/TOTAL GAS SETTING VFR VENT: 80 %
PCO2 BLD: 39.8 MMHG (ref 35–45)
PEEP RESPIRATORY: 11 CMH2O
PH BLD: 7.47 [PH] (ref 7.35–7.45)
PHOSPHATE SERPL-MCNC: 2.7 MG/DL (ref 2.5–4.9)
PLATELET # BLD AUTO: 127 K/UL (ref 135–420)
PMV BLD AUTO: 12.4 FL (ref 9.2–11.8)
PO2 BLD: 61 MMHG (ref 80–100)
POTASSIUM SERPL-SCNC: 4.3 MMOL/L (ref 3.5–5.5)
PROCALCITONIN SERPL-MCNC: 4.26 NG/ML
RBC # BLD AUTO: 4.15 M/UL (ref 4.35–5.65)
RSV RNA SPEC QL NAA+PROBE: NOT DETECTED
RV+EV RNA SPEC QL NAA+PROBE: NOT DETECTED
SAO2 % BLD: 92 % (ref 92–97)
SARS-COV-2 PCR, COVPCR: NOT DETECTED
SERVICE CMNT-IMP: ABNORMAL
SERVICE CMNT-IMP: ABNORMAL
SODIUM SERPL-SCNC: 151 MMOL/L (ref 136–145)
SPECIMEN TYPE: ABNORMAL
TOTAL RESP. RATE, ITRR: 19
VENTILATION MODE VENT: ABNORMAL
VOLUME CONTROL PLUS IVLCP: YES
VT SETTING VENT: 620 ML
WBC # BLD AUTO: 17.3 K/UL (ref 4.6–13.2)

## 2021-04-11 PROCEDURE — 94640 AIRWAY INHALATION TREATMENT: CPT

## 2021-04-11 PROCEDURE — 36415 COLL VENOUS BLD VENIPUNCTURE: CPT

## 2021-04-11 PROCEDURE — 2709999900 HC NON-CHARGEABLE SUPPLY

## 2021-04-11 PROCEDURE — 71045 X-RAY EXAM CHEST 1 VIEW: CPT

## 2021-04-11 PROCEDURE — 94762 N-INVAS EAR/PLS OXIMTRY CONT: CPT

## 2021-04-11 PROCEDURE — 82962 GLUCOSE BLOOD TEST: CPT

## 2021-04-11 PROCEDURE — 82803 BLOOD GASES ANY COMBINATION: CPT

## 2021-04-11 PROCEDURE — 74011000250 HC RX REV CODE- 250: Performed by: PHYSICIAN ASSISTANT

## 2021-04-11 PROCEDURE — 84145 PROCALCITONIN (PCT): CPT

## 2021-04-11 PROCEDURE — 74011000250 HC RX REV CODE- 250: Performed by: INTERNAL MEDICINE

## 2021-04-11 PROCEDURE — 84100 ASSAY OF PHOSPHORUS: CPT

## 2021-04-11 PROCEDURE — 74011250636 HC RX REV CODE- 250/636: Performed by: PHYSICIAN ASSISTANT

## 2021-04-11 PROCEDURE — 85730 THROMBOPLASTIN TIME PARTIAL: CPT

## 2021-04-11 PROCEDURE — 94003 VENT MGMT INPAT SUBQ DAY: CPT

## 2021-04-11 PROCEDURE — 74011250636 HC RX REV CODE- 250/636: Performed by: INTERNAL MEDICINE

## 2021-04-11 PROCEDURE — 77030018798 HC PMP KT ENTRL FED COVD -A

## 2021-04-11 PROCEDURE — 83735 ASSAY OF MAGNESIUM: CPT

## 2021-04-11 PROCEDURE — 74011000258 HC RX REV CODE- 258: Performed by: PHYSICIAN ASSISTANT

## 2021-04-11 PROCEDURE — 36600 WITHDRAWAL OF ARTERIAL BLOOD: CPT

## 2021-04-11 PROCEDURE — 74011000250 HC RX REV CODE- 250: Performed by: REGISTERED NURSE

## 2021-04-11 PROCEDURE — 74011250637 HC RX REV CODE- 250/637: Performed by: PHYSICIAN ASSISTANT

## 2021-04-11 PROCEDURE — 80048 BASIC METABOLIC PNL TOTAL CA: CPT

## 2021-04-11 PROCEDURE — 77030040392 HC DRSG OPTIFOAM MDII -A

## 2021-04-11 PROCEDURE — 94668 MNPJ CHEST WALL SBSQ: CPT

## 2021-04-11 PROCEDURE — 65610000006 HC RM INTENSIVE CARE

## 2021-04-11 PROCEDURE — 74011636637 HC RX REV CODE- 636/637: Performed by: PHYSICIAN ASSISTANT

## 2021-04-11 PROCEDURE — 0202U NFCT DS 22 TRGT SARS-COV-2: CPT

## 2021-04-11 PROCEDURE — 74011250637 HC RX REV CODE- 250/637: Performed by: INTERNAL MEDICINE

## 2021-04-11 PROCEDURE — 85025 COMPLETE CBC W/AUTO DIFF WBC: CPT

## 2021-04-11 PROCEDURE — 74011000250 HC RX REV CODE- 250: Performed by: STUDENT IN AN ORGANIZED HEALTH CARE EDUCATION/TRAINING PROGRAM

## 2021-04-11 RX ADMIN — ACETYLCYSTEINE 400 MG: 100 SOLUTION ORAL; RESPIRATORY (INHALATION) at 01:58

## 2021-04-11 RX ADMIN — INSULIN LISPRO 2 UNITS: 100 INJECTION, SOLUTION INTRAVENOUS; SUBCUTANEOUS at 23:33

## 2021-04-11 RX ADMIN — INSULIN LISPRO 2 UNITS: 100 INJECTION, SOLUTION INTRAVENOUS; SUBCUTANEOUS at 06:18

## 2021-04-11 RX ADMIN — CHLORHEXIDINE GLUCONATE 0.12% ORAL RINSE 10 ML: 1.2 LIQUID ORAL at 20:48

## 2021-04-11 RX ADMIN — ACETAMINOPHEN ORAL SOLUTION 500 MG: 650 SOLUTION ORAL at 06:39

## 2021-04-11 RX ADMIN — CIPROFLOXACIN 2 DROP: 3 SOLUTION OPHTHALMIC at 02:18

## 2021-04-11 RX ADMIN — ACETAMINOPHEN ORAL SOLUTION 500 MG: 650 SOLUTION ORAL at 17:03

## 2021-04-11 RX ADMIN — DORNASE ALFA 2.5 MG: 1 SOLUTION RESPIRATORY (INHALATION) at 09:57

## 2021-04-11 RX ADMIN — CIPROFLOXACIN 2 DROP: 3 SOLUTION OPHTHALMIC at 06:18

## 2021-04-11 RX ADMIN — FENTANYL CITRATE 175 MCG/HR: 50 INJECTION, SOLUTION INTRAMUSCULAR; INTRAVENOUS at 03:14

## 2021-04-11 RX ADMIN — PIPERACILLIN SODIUM AND TAZOBACTAM SODIUM 3.38 G: 3; .375 INJECTION, POWDER, LYOPHILIZED, FOR SOLUTION INTRAVENOUS at 06:42

## 2021-04-11 RX ADMIN — DORNASE ALFA 2.5 MG: 1 SOLUTION RESPIRATORY (INHALATION) at 23:43

## 2021-04-11 RX ADMIN — CIPROFLOXACIN 2 DROP: 3 SOLUTION OPHTHALMIC at 09:05

## 2021-04-11 RX ADMIN — ALBUTEROL SULFATE 2.5 MG: 2.5 SOLUTION RESPIRATORY (INHALATION) at 13:46

## 2021-04-11 RX ADMIN — PIPERACILLIN SODIUM AND TAZOBACTAM SODIUM 3.38 G: 3; .375 INJECTION, POWDER, LYOPHILIZED, FOR SOLUTION INTRAVENOUS at 12:30

## 2021-04-11 RX ADMIN — ARFORMOTEROL TARTRATE 15 MCG: 15 SOLUTION RESPIRATORY (INHALATION) at 09:56

## 2021-04-11 RX ADMIN — HEPARIN SODIUM 12 UNITS/KG/HR: 10000 INJECTION, SOLUTION INTRAVENOUS at 12:39

## 2021-04-11 RX ADMIN — FENTANYL CITRATE 175 MCG/HR: 0.05 INJECTION, SOLUTION INTRAMUSCULAR; INTRAVENOUS at 16:50

## 2021-04-11 RX ADMIN — FAMOTIDINE 20 MG: 10 INJECTION INTRAVENOUS at 08:54

## 2021-04-11 RX ADMIN — PIPERACILLIN SODIUM AND TAZOBACTAM SODIUM 3.38 G: 3; .375 INJECTION, POWDER, LYOPHILIZED, FOR SOLUTION INTRAVENOUS at 17:49

## 2021-04-11 RX ADMIN — Medication 30 ML: at 08:54

## 2021-04-11 RX ADMIN — CHLORHEXIDINE GLUCONATE 0.12% ORAL RINSE 10 ML: 1.2 LIQUID ORAL at 08:54

## 2021-04-11 RX ADMIN — ALBUTEROL SULFATE 2.5 MG: 2.5 SOLUTION RESPIRATORY (INHALATION) at 20:20

## 2021-04-11 RX ADMIN — FENTANYL CITRATE 175 MCG/HR: 0.05 INJECTION, SOLUTION INTRAMUSCULAR; INTRAVENOUS at 08:55

## 2021-04-11 RX ADMIN — ALBUTEROL SULFATE 2.5 MG: 2.5 SOLUTION RESPIRATORY (INHALATION) at 01:58

## 2021-04-11 RX ADMIN — ACETYLCYSTEINE 400 MG: 100 SOLUTION ORAL; RESPIRATORY (INHALATION) at 13:46

## 2021-04-11 RX ADMIN — MIDAZOLAM 5 MG/HR: 5 INJECTION INTRAMUSCULAR; INTRAVENOUS at 03:12

## 2021-04-11 RX ADMIN — INSULIN LISPRO 2 UNITS: 100 INJECTION, SOLUTION INTRAVENOUS; SUBCUTANEOUS at 17:49

## 2021-04-11 RX ADMIN — INSULIN LISPRO 2 UNITS: 100 INJECTION, SOLUTION INTRAVENOUS; SUBCUTANEOUS at 12:30

## 2021-04-11 RX ADMIN — ARFORMOTEROL TARTRATE 15 MCG: 15 SOLUTION RESPIRATORY (INHALATION) at 20:20

## 2021-04-11 RX ADMIN — CIPROFLOXACIN 2 DROP: 3 SOLUTION OPHTHALMIC at 14:54

## 2021-04-11 RX ADMIN — ALBUTEROL SULFATE 2.5 MG: 2.5 SOLUTION RESPIRATORY (INHALATION) at 09:56

## 2021-04-11 NOTE — PROGRESS NOTES
Bedside and Verbal shift change report given to Jovanni Dong RN (oncoming nurse) by Benji Chan RN (offgoing nurse). Report included the following information SBAR, Kardex, MAR and Recent Results. SITUATION:    Code Status: Full Code   Reason for Admission: Angioedema due to angiotensin converting enzyme inhibitor (ACE-I) [T78. Alexandra Barrera    [facility] day: 7   Problem List:       Hospital Problems  Date Reviewed: 4/5/2021          Codes Class Noted POA    DVT (deep venous thrombosis) ([facility].) ICD-10-CM: I82.409  ICD-9-CM: 453.40  4/10/2021 Unknown        Respiratory failure ([facility] ICD-10-CM: J96.90  ICD-9-CM: 518.81  4/5/2021 Unknown        Obesity (BMI 30-39. 9) ICD-10-CM: E66.9  ICD-9-CM: 278.00  4/5/2021 Unknown        Diabetic neuropathy associated with type 2 diabetes mellitus ([facility] ICD-10-CM: E11.40  ICD-9-CM: 250.60, 357.2  4/5/2021 Unknown        Diabetic retinopathy associated with type 2 diabetes mellitus ([facility] ICD-10-CM: E11.319  ICD-9-CM: 250.50, 362.01  4/5/2021 Unknown        Pulmonary infiltrates ICD-10-CM: R91.8  ICD-9-CM: 793.19  4/5/2021 Unknown        Controlled type 2 diabetes mellitus with neurologic complication, without long-term current use of insulin ([facility] ICD-10-CM: E11.49  ICD-9-CM: 250.60  4/5/2021 Unknown        Angioedema due to angiotensin converting enzyme inhibitor (ACE-I) ICD-10-CM: T78. King Esquivel, [id number]  ICD-9-CM: 995.1, E942.6  4/4/2021 Unknown        JACQUELYN (acute kidney injury) ([facility] ICD-10-CM: N17.9  ICD-9-CM: 584.9  4/4/2021 Unknown        Cardiac arrest [facility]) ICD-10-CM: I46.9  ICD-9-CM: 427.5  4/4/2021 Unknown              BACKGROUND:    Past Medical History:   Past Medical History:   Diagnosis Date    DDD (degenerative disc disease), lumbar     Diabetes ([facility]     Diabetic neuropathy ([facility]     Diabetic retinopathy ([facility]     DM2 (diabetes mellitus, type 2) ([facility]     HLD (hyperlipidemia)     HTN (hypertension)     Obesity (BMI 30-39. 9)          Patient taking anticoagulants: Yes     ASSESSMENT:    Changes in Assessment Throughout Shift: FIO2 decreased to 70%. Free water flush increased to 300 ml q 4 hr for ongoing hypernatremia. Sister (Castro) and son Ever Jody) visited at bedside today around 1:00 pm. Updated regarding patient's status by RN - opportunity for questions provided and answered to the best of my ability.      Patient has Central Line: No     Patient has Garcia Cath: No     Last Vitals:     Vitals:    04/11/21 1600 04/11/21 1700 04/11/21 1800 04/11/21 1900   BP: 109/63 113/66 106/60 108/61   Pulse: 93 95 90 87   Resp: 17 16 17 17   Temp: (!) 101.5 °F (38.6 °C)      SpO2: 97% 96% 96% 97%   Weight:       Height:            IV and DRAINS (will only show if present)   [REMOVED] Airway - Endotracheal Tube 04/04/21 Oral-Site Assessment: Clean, dry, & intact  Peripheral IV 04/04/21 Left Antecubital-Site Assessment: Clean, dry, & intact  Peripheral IV 04/04/21 Anterior;Right Wrist-Site Assessment: Clean, dry, & intact  Peripheral IV 04/04/21 Left;Posterior Hand-Site Assessment: Clean, dry, & intact  [REMOVED] Airway - Continuous Aspiration of Subglottic Secretions (GENESIS) Tube 04/04/21 Oral-Site Assessment: Clean, dry, & intact  Orogastric Tube 04/05/21-Site Assessment: Clean, dry, & intact  Airway - Continuous Aspiration of Subglottic Secretions (GENESIS) Tube 04/07/21 Oral-Site Assessment: Clean, dry, & intact     WOUND (if present)   Wound Type:  none   Dressing present Dressing Present : Yes, Intact, not due to be changed   Wound Concerns/Notes:  none     PAIN    Pain Assessment    Pain Intensity 1: 0 (04/11/21 0400)              Patient Stated Pain Goal: Unable to verbalize/indicate pain  o Interventions for Pain: Continuous Fentanyl drip  o Intervention effective: yes  o Time of last intervention: Continuous Fentanyl drip   o Reassessment Completed: yes      Last 3 Weights:  Last 3 Recorded Weights in this Encounter    04/08/21 0400 04/09/21 0000 04/10/21 2057   Weight: 113.4 kg (250 lb) 117.2 kg (258 lb 6.1 oz) 114.5 kg (252 lb 6.8 oz)     Weight change:      INTAKE/OUPUT    Current Shift: No intake/output data recorded. Last three shifts: 04/10 0701 - 04/11 1900  In: 5239.8 [I.V.:1013.8]  Out: 2960 [Urine:2960]     LAB RESULTS     Recent Labs     04/11/21  0020 04/10/21  0400 04/09/21  0443   WBC 17.3* 16.9* 11.9   HGB 10.9* 11.1* 11.2*   HCT 35.6* 36.7 37.6   * 118* 143        Recent Labs     04/11/21  0020 04/10/21  0400 04/09/21  0443   * 150* 148*   K 4.3 4.2 4.3   * 149* 133*   BUN 65* 55* 46*   CREA 1.85* 1.70* 1.57*   CA 8.5 8.4* 8.0*   MG 3.3* 3.4* 3.0*       RECOMMENDATIONS AND DISCHARGE PLANNING     1. Pending tests/procedures/ Plan of Care or Other Needs: Continue to monitor for bleeding from cric site. Wean sedation and ventilator settings as tolerated. 2. Discharge plan for patient and Needs/Barriers: TBD    3. Estimated Discharge Date: TBD; Posted on Whiteboard in Patients Room: yes      4. The patient's care plan was reviewed with the oncoming nurse. \"HEALS\" SAFETY CHECK      Fall Risk    Total Score: 3    Safety Measures: Safety Measures: Bed/Chair alarm on, Bed/Chair-Wheels locked, Bed in low position, Call light within reach, Emergency bedside equipment, Fall prevention (comment), Side rails X 3    A safety check occurred in the patient's room between off going nurse and oncoming nurse listed above.     The safety check included the below items  Area Items   H  High Alert Medications - Verify all high alert medication drips (heparin, PCA, etc.)   E  Equipment - Suction is set up for ALL patients (with yanker)  - Red plugs utilized for all equipment (IV pumps, etc.)  - WOWs wiped down at end of shift.  - Room stocked with oxygen, suction, and other unit-specific supplies   A  Alarms - Bed alarm is set for fall risk patients  - Ensure chair alarm is in place and activated if patient is up in a chair   L  Lines - Check IV for any infiltration  - Garcia bag is empty if patient has a Garcia   - Tubing and IV bags are labeled   S  Safety   - Room is clean, patient is clean, and equipment is clean. - Hallways are clear from equipment besides carts. - Fall bracelet on for fall risk patients  - Ensure room is clear and free of clutter  - Suction is set up for ALL patients (with yanker)  - Hallways are clear from equipment besides carts.    - Isolation precautions followed, supplies available outside room, sign posted     Siavn George RN

## 2021-04-11 NOTE — ROUTINE PROCESS
1930 Bedside and Verbal shift change report given to jarad rn (oncoming nurse) by david rn (offgoing nurse). Report included the following information SBAR, Kardex, Intake/Output and Recent [id number] assessment completed. Oral and external cath care provided. .  0000 reassessment completed. Oral and cardona care completed. [id number] resp rate increasing and increasing work of breathing, tirated versed, fentanyl, resp kiarra rt called to assist with oxygen titration. rosanna arteaga at bedside. Diprivan added. 0400 reassessment completed. Oral and cardona care completed. 0730 Bedside and Verbal shift change report given to sam rn (oncoming nurse) by jarad rn (offgoing nurse). Report included the following information SBAR, Kardex, Intake/Output and Recent Results side rails up, hob elevated 30 degrees,. Windy Clarence

## 2021-04-11 NOTE — PROGRESS NOTES
[email or web address]  Pulmonary, Critical Care, and Sleep Medicine    Name: Bryan Ball MRN: [id number]   : 1950 Hospital: [address] [place] [address]    Date: 2021        Critical Care: Daily Progress Note    Admission Date:   2021  LOS: 7  MAR reviewed and pertinent medications noted or modified as needed    IMPRESSION:   · Angioedema- with airway and respiratory failure and collapse; thought due to ACE inhibitor  · S/P Emergent Cricthyrotomy [facility]-ED 21- converted to 6.5 ETT intraoperative by anesthesia team 21, 8.0 ETT by anesthesia 21, packing removed evening 21-ENT  · S/P cardiac arrest @ [facility] 21- brief thought due to hypoxia due to airway collapse  · Respiratory Failure: Acute due to above; currently intubated and on vent for airway protection  · Pulmonary Infiltrates: suspect aspiration secretions and/or blood due to above- can't exclude other infectious process at this time. Rapid Covid Negative at [facility]  · Bradycardia  · DVT: Popliteal- Left; acute non-occlusive Heparin ACS protocol started - stopped  due to bleeding from Cric site. Will consider resuming after bronchoscopy  · JACQUELYN  · Left scleral erythema- unknown baseline; possible infection   · DM  · DM retinopathy per chart review  · Diabetic peripheral neuropathy  · COVID -19; Negative rapid and [employer]fire: 21    · Obesity: Body mass index is 35.21 kg/m².   ·       RECOMMENDATIONS:   NEURO:   Serial neuro evaluations   Sedation Holiday per protocol   RAAS: 0 to -1   Wetting tears and cipro eye drops- stop date for cipro on chart     CARDIO:   MAP goal >64- No IV pressor requirement   Echo on 21    Telemetry    PULM:   Maintain aspiration precautions: HOB >30 degrees   SpO2 goal >92%   Bronchial /oral hygiene   VAP bundle- Wean vent as clinical status allows   SBT as clinical status allows   Complete course of decadron   Benadryl Scheduled 50mg Q 6   Pulmozyme and Mucomist BID with bronchodilator   Metaneb   Bronchoscopy today     GI/ NUTRITION:   OGT    Diet: TF with Free water@ 300 Q4   SUP:Pepcid   Follow up with nutritional recommendations     RENAL/ METAB/ :   Monitor I&Os   Trend electrolytes- replaced as needed, K:4, Mg>2 PO4>2   Garcia: Continue    HEM/ONC   Trend Hb/HCT and PLTs, and indicis   DVT prophylaxis: SCDs   Hold heparin drip due to bleeding from Cric site. Will consider resuming after bronchoscopy   Re-image poplyteal DVT 72 hours and/or pending clinical course- may need IVC Filter if worsens   Blood Products: not indicated at this time    ID   Antibioitcs: Cipro eye drop, Zosyn: 4/4- 4/8/21 Vanco: 4/4-6, MRSA swab negative   Follow up on pending cultures   Repeat Covid pending   Restart Zosyn   Tylenol for fevers    ENDO   BGs Q 6,SSI   C1 inhibitor normal level (4/7/21)     MSK/ ORTHO   No active Issues     SKIN/ WOUNDS   Per protocol   Neck- daily dressing changes per ENT, Quick clot wit Afrin if needed, d/c xeroform     OTHER/DISPO:    CODE STATUS: Full Code- Full    Case reviewed and discussed with  ICU care team     Subjective/History: This patient has been seen and evaluated at the request of Dr. Maryana Erwin- ED from [facility] for Angioedema and airway compromise. Patient is a 79 y.o. male presented earlier this morning to [facility] ED in rapidly progressive respiratory distress due to angioedema and airway swelling presumed due to ACE inhibitor therapy. ED provider unable to intubate due to severity of airway swelling. Brief cardio-pulmonary collapse with brief loss of pulse. IO placed into right shoulder. Emergent cricothyrotomy 5.0 place. Good placement. ED team able to oxygenate and ventilate patient. Prior to transport ABG notable for Respiratory acidosis. Call back to -ED patient on vent and paralyzed with rate of 10. I requested that RR be increased to 16-18 bpm depending what patient could tolerate.       Our ENT provider on call, Dr. Dana Brar was contacted. OR team on call came in. Patient flow via Nightingale/Life flight to our facility. The patient was taken from flight line to OR hold. On-call team rapidly assess patient. I evaluated the patient immediately upon arrival. Patient with Cric in place, bilateral breath  Sounds, SpO2 in the mid 90's. Swollen neck and face. ABG -POC with persistent but improved respiratory acidosis. ER team took patient emergently to the OR    Anesthesia able to place ETT 6.5 intraop. I was called by ENT provider in the OR. Case discussed. Opted to keep patient intubated with 6.5 ETT. Will monitor for 48-72 hours and reassess patient. CXR notable for bilateral opacities. Suspect the later due to aspiration of blood and /or airway secretions. Patient was reexamined again at bedside upon arrival to ICU- Bed2    Patient paralyzed and sedated. 6.5 ETT in place. Bilateral breath sounds. SpO2 100%    CXR obtained in ICU- ETT slightly high- will try to advance 1-2 cm if able. 04/11/21    · No new bleeding over cric site - continue heparin  · Increased vent requirements since ETT change out PEEP still at 11- appropriate recruitment on pressure -volume loops- suspect some component of aspiration pneumonitis/ ARDS   · No infection isolated thus far, continue to follow cultures and BAL  · CXR: Worsening RLL opacity. Blunting of costophrenic angle b/l. Likely effusions, could represent atelectasis/infiltrate    · Increased WBC today and fevers. Zosyn restarted and Covid test sent (pending).    · Na 151, free water increased to 300 q4h        Inpat Anti-Infectives (From admission, onward)     Start     Ordered Stop    04/06/21 1800  ciprofloxacin HCl (CILOXAN) 0.3 % ophthalmic solution 2 Drop  2 Drop,   Both Eyes,   EVERY 4 HOURS      04/04/21 1724 04/11/21 1759    04/04/21 1800  ciprofloxacin HCl (CILOXAN) 0.3 % ophthalmic solution 2 Drop  2 Drop,   Both Eyes,   EVERY 2 HOURS WHILE AWAKE 04/04/21 1724 04/06/21 1759                The patient is critically ill and can not provide additional history due to Unconsciousness, Ventilated and Unable to speak. Chart and notes reviewed. Data reviewed. []I have reviewed the flowsheet and previous days notes. []The patient is unable to give any meaningful history or review of systems because the patient is:  [x]Intubated [x]Sedated   []Unresponsive      []The patient is critically ill on      [x]Mechanical ventilation []Pressors   []BiPAP []                       Past Medical History:   Diagnosis Date    DDD (degenerative disc disease), lumbar     Diabetes ([facility]     Diabetic neuropathy ([facility]     Diabetic retinopathy ([facility]     DM2 (diabetes mellitus, type 2) ([facility]     HLD (hyperlipidemia)     HTN (hypertension)     Obesity (BMI 30-39. 9)       History reviewed. No pertinent surgical history. Prior to Admission medications    Medication Sig Start Date End Date Taking? Authorizing Provider   LISINOPRIL, BULK, by Does Not Apply route.     Other, MD Cheryl     Current Facility-Administered Medications   Medication Dose Route Frequency    fentaNYL (PF) 1,500 mcg/30 mL (50 mcg/mL) infusion  0-200 mcg/hr IntraVENous TITRATE    piperacillin-tazobactam (ZOSYN) 3.375 g in dextrose 5% 100 mL  3.375 g IntraVENous Q6H    midazolam in normal saline (VERSED) 1 mg/mL infusion  0-10 mg/hr IntraVENous TITRATE    acetylcysteine (MUCOMYST) 100 mg/mL (10 %) nebulizer solution 400 mg  4 mL Nebulization BID RT    famotidine (PF) (PEPCID) 20 mg in 0.9% sodium chloride 10 mL injection  20 mg IntraVENous DAILY    heparin 25,000 units in D5W 250 ml infusion  12-25 Units/kg/hr IntraVENous TITRATE    dornase alpha (PULMOZYME)2.5mg/2.5ml nebulizer solution  2.5 mg Nebulization BID RT    albuterol (PROVENTIL VENTOLIN) nebulizer solution 2.5 mg  2.5 mg Nebulization Q6H RT    arformoteroL (BROVANA) neb solution 15 mcg  15 mcg Nebulization BID RT    multivit-folic acid-herbal 275 (WELLESSE PLUS) oral liquid 30 mL  30 mL Per NG tube DAILY    chlorhexidine (PERIDEX) 0.12 % mouthwash 10 mL  10 mL Oral Q12H    insulin lispro (HUMALOG) injection   SubCUTAneous Q6H    ciprofloxacin HCl (CILOXAN) 0.3 % ophthalmic solution 2 Drop  2 Drop Both Eyes Q4H     Allergies   Allergen Reactions    Lisinopril Angioedema      Social History     Tobacco Use    Smoking status: Current Every Day Smoker     Packs/day: 0.50   Substance Use Topics    Alcohol use: Not on file      History reviewed. No pertinent family history. Review of Systems:  Review of systems not obtained due to patient factors. Objective:   Vital Signs:    Visit Vitals  /62   Pulse 84   Temp 98.9 °F (37.2 °C)   Resp 16   Ht 5' 11\" (1.803 m)   Wt 114.5 kg (252 lb 6.8 oz)   SpO2 94%   BMI 35.21 kg/m²       O2 Device: Endotracheal tube, Ventilator       Temp (24hrs), Av.6 °F (38.1 °C), Min:98.9 °F (37.2 °C), Max:102.8 °F (39.3 °C)       Intake/Output:   Last shift:       07 - 1900  In: 146 [I.V.:100]  Out: -   Last 3 shifts: 1901 - 700  In: 4270.3 [I.V.:590.3]  Out: 3125 [Urine:3125]    Intake/Output Summary (Last 24 hours) at 2021 0900  Last data filed at 2021 0703  Gross per 24 hour   Intake 2895.05 ml   Output 2325 ml   Net 570.05 ml         Ventilator Settings:  Mode Rate Tidal Volume Pressure FiO2 PEEP   Assist control, VC+   620 ml    80 % 11 cm H20     Peak airway pressure: 31 cm H2O    Minute ventilation: 12 l/min       ARDS network Guidelines:   Lung protective strategy and Plateau  Pressure goal < 30 cm H2O goals  Oxygenation Goals PaO2 55-80 mm Hg or SaO2 88-95%  PH goal 7.30-7.45    VAP bundle;  Reviewed. Maxwell tube to suction at 20-30 cm Hg.   Maintain Maxwell tube with 5-10ml air every 4 hours  Routine oral care every 4 hours  Elevation of head > 45 degree  Daily sedation holiday and SBT evaluation starting at 6.00am.  Physical Exam:    General:  Intubated and sedated  , appears stated age.+ Obese body habitus   Head:  Normocephalic, without obvious abnormality, atraumatic. + facial edema   Eyes:  Conjunctivae/corneas clear. PERRL. Nose: Nares normal. Septum midline. Mucosa normal. No drainage or sinus tenderness. Throat: Lips, mucosa, and tongue swollen. + ETT in place   Neck: bandage and packing inplace where cric was palced (minimal venous oozing at site), no carotid bruit and no JVD. Back:   Symmetric   Lungs:   Bilateral breath sounds, with bilateral rhonchi- no wheezing   Chest wall:  No tenderness or deformity.- No crepitus   Heart:  Regular rate and rhythm, S1, S2 normal, no murmur, click, rub or gallop. Abdomen:   Soft, Obese, non-tender. Bowel sounds normal. No masses,  No organomegaly. Extremities: Extremities normal, atraumatic, no cyanosis or edema. Pulses: 2+ and symmetric all extremities. Skin: Skin color, texture, turgor normal. No rashes or lesions   Lymph nodes:      Cervical, supraclavicular nodes: unremarkable   Neurologic: Grossly nonfocal       Data:     Recent Labs     04/11/21  0020 04/10/21  0400 04/09/21  0443   WBC 17.3* 16.9* 11.9   HGB 10.9* 11.1* 11.2*   HCT 35.6* 36.7 37.6   * 118* 143     Recent Labs     04/11/21  0020 04/10/21  0400 04/09/21  0443   * 150* 148*   K 4.3 4.2 4.3   * 117* 116*   CO2 32 28 28   * 149* 133*   BUN 65* 55* 46*   CREA 1.85* 1.70* 1.57*   CA 8.5 8.4* 8.0*   MG 3.3* 3.4* 3.0*   PHOS 2.7 2.3* 2.2*     Lab Results   Component Value Date/Time    NT pro- (H) 04/09/2021 09:38 AM    NT pro- 04/06/2021 12:02 PM     Lab Results   Component Value Date/Time    INR 1.0 04/04/2021 05:11 PM    Prothrombin time 12.7 04/04/2021 05:11 PM       No results for input(s): PH, PCO2, PO2, HCO3, FIO2 in the last 72 hours.   Recent Labs     04/11/21  0319 04/10/21  0328 04/09/21  0430   FIO2I 80 85 60   HCO3I 29.0* 29.2* 33.3*   PCO2I 39.8 46.4* 52.3*   PHI 7.47* 7.41 7.42   PO2I 61* 90 83       ENDO:  Lab Results   Component Value Date/Time    TSH 1.35 04/04/2021 05:11 PM       Lab Results   Component Value Date/Time    Glucose 152 (H) 04/11/2021 12:20 AM    Glucose 149 (H) 04/10/2021 04:00 AM    Glucose 133 (H) 04/09/2021 04:43 AM    Glucose 131 (H) 04/08/2021 06:10 AM    Glucose 147 (H) 04/07/2021 05:52 PM         CARDIO:  Telemetry:normal sinus rhythm / sinus bradycardia    Lab Results   Component Value Date/Time    CK 90 04/05/2021 08:11 PM    CK 89 04/05/2021 08:11 PM    CK - MB 1.5 04/05/2021 08:11 PM    CK - MB 1.4 04/05/2021 08:11 PM    CK-MB Index 1.7 04/05/2021 08:11 PM    CK-MB Index 1.6 04/05/2021 08:11 PM    Troponin-I, Qt. 0.06 (H) 04/04/2021 01:40 PM    Troponin-I, QT 0.18 (H) 04/05/2021 08:11 PM    Troponin-I, QT 0.17 (H) 04/05/2021 08:11 PM       EKG Results     Procedure 720 Value Units Date/Time    EKG, 12 LEAD, SUBSEQUENT [[id number]] Collected: 04/06/21 1153    Order Status: Completed Updated: 04/06/21 1536     Ventricular Rate 54 BPM      Atrial Rate 54 BPM      P-R Interval 190 ms      QRS Duration 98 ms      Q-T Interval 588 ms      QTC Calculation (Bezet) 557 ms      Calculated P Axis 44 degrees      Calculated R Axis -20 degrees      Calculated T Axis 130 degrees      Diagnosis --     Sinus bradycardia  Anteroseptal infarct , age undetermined  T wave abnormality, consider lateral ischemia  Prolonged QT  Abnormal ECG  No previous ECGs available  Confirmed by Isabella Marcus [id number]) on 4/6/2021 3:36:40 PM      EKG, 12 LEAD, SUBSEQUENT [[id number]]     Order Status: Canceled                MEDS: Please also see MAR  Current Facility-Administered Medications   Medication Dose Route Frequency    fentaNYL (PF) 1,500 mcg/30 mL (50 mcg/mL) infusion  0-200 mcg/hr IntraVENous TITRATE    piperacillin-tazobactam (ZOSYN) 3.375 g in dextrose 5% 100 mL  3.375 g IntraVENous Q6H    midazolam in normal saline (VERSED) 1 mg/mL infusion  0-10 mg/hr IntraVENous TITRATE    acetylcysteine (MUCOMYST) 100 mg/mL (10 %) nebulizer solution 400 mg  4 mL Nebulization BID RT    famotidine (PF) (PEPCID) 20 mg in 0.9% sodium chloride 10 mL injection  20 mg IntraVENous DAILY    heparin 25,000 units in D5W 250 ml infusion  12-25 Units/kg/hr IntraVENous TITRATE    dornase alpha (PULMOZYME)2.5mg/2.5ml nebulizer solution  2.5 mg Nebulization BID RT    albuterol (PROVENTIL VENTOLIN) nebulizer solution 2.5 mg  2.5 mg Nebulization Q6H RT    arformoteroL (BROVANA) neb solution 15 mcg  15 mcg Nebulization BID RT    multivit-folic acid-herbal 724 (WELLESSE PLUS) oral liquid 30 mL  30 mL Per NG tube DAILY    chlorhexidine (PERIDEX) 0.12 % mouthwash 10 mL  10 mL Oral Q12H    insulin lispro (HUMALOG) injection   SubCUTAneous Q6H    ciprofloxacin HCl (CILOXAN) 0.3 % ophthalmic solution 2 Drop  2 Drop Both Eyes Q4H     MICRO:       Results     Procedure Component Value Units Date/Time    RESPIRATORY VIRUS PANEL W/COVID-19, PCR [[id number]] Collected: 04/11/21 0805    Order Status: Completed Specimen: NASOPHARYNGEAL SWAB Updated: 04/11/21 0814    CULTURE, RESPIRATORY/SPUTUM/BRONCH [employer] STAIN [[id number]] Collected: 04/10/21 1145    Order Status: Completed Specimen: Sputum from Bronch lavage right middle lobe Updated: 04/10/21 2249     Special Requests: NO SPECIAL REQUESTS        GRAM STAIN FEW WBCS SEEN         NO EPITHELIAL CELLS SEEN         NO ORGANISMS SEEN        Culture result: PENDING    CULTURE, BLOOD [[id number]] Collected: 04/09/21 1449    Order Status: Completed Specimen: Blood Updated: 04/11/21 0651     Special Requests: NO SPECIAL REQUESTS        Culture result: NO GROWTH 2 DAYS       CULTURE, BLOOD [[id number]] Collected: 04/09/21 1449    Order Status: Completed Specimen: Blood Updated: 04/11/21 0651     Special Requests: NO SPECIAL REQUESTS        Culture result: NO GROWTH 2 DAYS       CULTURE, URINE [[id number]] Collected: 04/09/21 1350    Order Status: Completed Specimen: Urine from Clean catch Updated: 04/10/21 1830     Special Requests: NO SPECIAL REQUESTS        Culture result: No growth (<1,000 CFU/ML)       CULTURE, RESPIRATORY/SPUTUM/BRONCH Orp[place] Sam STAIN [[id number]]  (Abnormal) Collected: 04/09/21 1350    Order Status: Completed Specimen: Sputum,ET Suction Updated: 04/10/21 1247     Special Requests: NO SPECIAL REQUESTS        GRAM STAIN RARE WBCS SEEN               RARE EPITHELIAL CELLS SEEN            NO ORGANISMS SEEN        Culture result: LIGHT GRAM NEGATIVE RODS               NO NORMAL RESPIRATORY ANNALEE ISOLATED          CULTURE, RESPIRATORY/SPUTUM/BRONCH W Pérez Bonner [[id number]] Collected: 04/07/21 1300    Order Status: Completed Specimen: Sputum,ET Suction Updated: 04/09/21 1224     Special Requests: NO SPECIAL REQUESTS        GRAM STAIN OCCASIONAL WBCS SEEN               OCCASIONAL EPITHELIAL CELLS SEEN            NO ORGANISMS SEEN        Culture result:       RARE NORMAL RESPIRATORY ANNALEE          CULTURE, RESPIRATORY/SPUTUM/BRONCH Darnell Arenas [[id number]] Collected: 04/06/21 1715    Order Status: Completed Specimen: Sputum,ET Suction Updated: 04/08/21 1212     Special Requests: NO SPECIAL REQUESTS        GRAM STAIN OCCASIONAL WBCS SEEN         NO ORGANISMS SEEN        Culture result:       NO NORMAL RESPIRATORY ANNALEE ISOLATED          STREP Radha Redo, URINE [[id number]] Collected: 04/04/21 1801    Order Status: Completed Specimen: Urine, random Updated: 04/04/21 1944     Strep pneumo Ag, urine Negative       LEGIONELLA PNEUMOPHILA AG, URINE [[id number]] Collected: 04/04/21 1801    Order Status: Completed Specimen: Urine, random Updated: 04/04/21 1944     Legionella Ag, urine Negative       RESPIRATORY VIRUS PANEL W/COVID-19, PCR [[id number]] Collected: 04/04/21 1700    Order Status: Completed Specimen: Nasopharyngeal Updated: 04/04/21 2020     Adenovirus Not detected        Coronavirus 229E Not detected        Coronavirus HKU1 Not detected        Coronavirus CVNL63 Not detected        Coronavirus OC43 Not detected        Metapneumovirus Not detected        Rhinovirus and Enterovirus Not detected        Influenza A Not detected        Influenza A, subtype H1 Not detected        Influenza A, subtype H3 Not detected        INFLUENZA A H1N1 PCR Not detected        Influenza B Not detected        Parainfluenza 1 Not detected        Parainfluenza 2 Not detected        Parainfluenza 3 Not detected        Parainfluenza virus 4 Not detected        RSV by PCR Not detected        B. parapertussis, PCR Not detected        Bordetella pertussis - PCR Not detected        Chlamydophila pneumoniae DNA, QL, PCR Not detected        Mycoplasma pneumoniae DNA, QL, PCR Not detected        SARS-CoV-2, PCR Not detected       CULTURE, MRSA [[id number]] Collected: 04/04/21 1700    Order Status: Completed Specimen: Nasal from Nares Updated: 04/06/21 0838     Special Requests: NO SPECIAL REQUESTS        Culture result: MRSA NOT PRESENT               Screening of patient nares for MRSA is for surveillance purposes and, if positive, to facilitate isolation considerations in high risk settings. It is not intended for automatic decolonization interventions per se as regimens are not sufficiently effective to warrant routine use. COVID-19 RAPID TEST [[id number]] Collected: 04/04/21 1112    Order Status: Completed Specimen: Nasopharyngeal Updated: 04/04/21 1151     Specimen source Nasopharyngeal        COVID-19 rapid test Not Detected        Comment:   Rapid NAAT:  The specimen is NEGATIVE for SARS-CoV-2, the novel coronavirus associated with COVID-19. Negative results should be treated as presumptive and, if inconsistent with clinical signs and symptoms or necessary for patient management, should be tested with an alternative molecular assay. Negative results do not preclude SARS-CoV-2 infection and should not be used as the sole basis for patient management decisions.    This test has been authorized by the FDA under an Emergency Use Authorization (EUA) for use by authorized laboratories. Fact sheet for Healthcare Providers: ConventionUpdate.co.nz Fact sheet for Patients: ConventionUpdate.co.nz   Methodology: Isothermal Nucleic Acid Amplification                   Imaging:  I have personally reviewed the patients radiographs and have reviewed the reports:    CXR Results  (Last 48 hours)               04/11/21 0133  XR CHEST PORT Final result    Impression:      Support lines and tubes as above without pneumothorax. Cardiomediastinal silhouette enlargement with similar central venous congestion. Small bilateral pleural effusions and likely atelectasis. Narrative:  PORTABLE CHEST RADIOGRAPH        CPT CODE: 16823        INDICATION: Intubated. COMPARISON: 4/10/2021. FINDINGS:       Frontal view of the chest obtained at 0102 hours. Tip of ET tube is 4 cm   proximal to the gabriella. NG tube courses below the diaphragm. Lungs are   hypoinflated. Cardiomediastinal silhouette is similarly enlarged given   differences in patient positioning and inflation. Similar central venous   congestion. Small bilateral pleural effusions. Likely mild atelectasis at the   lung bases. No pneumothorax. 04/10/21 0222  XR CHEST PORT Final result    Impression:      No significant interval change. Narrative:  PORTABLE CHEST RADIOGRAPH        CPT CODE: 51521        INDICATION: Intubated. COMPARISON: 4/9/2021. FINDINGS:       Frontal view of the chest obtained at 0205 hours. Similar support lines and   tubes. Patient is rotated. Cardiomediastinal silhouette is unchanged. There is   similar retrocardiac consolidation with possible bilateral pleural effusions and   lung base consolidations. No upper lobe opacity or pneumothorax. CT Results  (Last 48 hours)    None           Best practice :  Core measures:    Glycemic control  [facility].  Ventilated patients- aim to keep peak plateau pressure 17-79VC H2O. Sress ulcer prophylaxis  DVT prophylaxis               Critical Care Time:  The services I provided to this patient were to treat and/or prevent clinically significant deterioration that could result in the failure of one or more body systems and/or organ systems due to respiratory distress, hypoxia, cardiac dysrhythmia. Services included the following:  -reviewing nursing notes and old charts  -vital sign assessments   -direct patient care  -medication orders and management  -interpreting and reviewing diagnostic studies/labs  -re-evaluations  -documentation time        Aggregate critical care time was 35 minutes, which includes only time during which I was engaged in work directly related to the patient's care as described above. During this entire length of time I was immediately available to the patient. The reason for providing this level of medical care for this critically ill patient was due a critical illness that impaired one or more vital organ systems such that there was a high probability of imminent or life threatening deterioration in the patients condition. This care involved high complexity decision making to assess, manipulate, and support vital system functions, to treat this degreee vital organ system failure and to prevent further life threatening deterioration of the patients condition      Complex decision making was made in the evaluation and management plans during this consultation. More than 50% of time was spent in counseling and coordination of care including review of data and discussion with other team members.       Wilmer Cisneros MD PGY-1  [facility] Emergency Medicine    [facility]  Pulmonary, Critical Care, and Sleep Medicine

## 2021-04-11 NOTE — PROGRESS NOTES
Assumed care of patient after receiving bedside and verbal shift change report from Kevin Matute RN.

## 2021-04-11 NOTE — INTERDISCIPLINARY ROUNDS
[facility]  Pulmonary, Critical Care, and Sleep Medicine  Interdisciplinary and Ventilator Weaning Rounds    Patient discussed in morning walking rounds and interdisciplinary rounds. ICU day: 8    Overnight events:   · No acute events overnight     Assessments and best practice:   Ventilator  o Ventilator day 8  o Vent settings: FiO2 of 88 and PEEP 11  o VAP bundle, aim to keep peak plateau pressure 74-99BS H2O  o Weaning assessed and documented   - Patient does not meet criteria for SBT. FiO2 and PEEP too high   - Patient is not on sedation holiday. - No plan to wean today. - Outcome: cont full ventilator support   - Final plan: will remain on mechanical ventilatory support today   Sedation  Fentanyl  and Versed   Other pertinent drips  o None    Lines noted  o peripheral IVs   Critical labs assessed  o Yes   Antibiotics   Zosyn    Medications reviewed  o Yes   Pending imaging  o none   Pending send out labs  o no   Pending Procedures  o no   Glycemic control   SSI    Stress ulcer prophylaxis. o Pepcid   DVT prophylaxis. o SCDs/On heparin gtt    Need for Lines, cardona assessed.  o Yes   Restraint Reevaluation   o Yes  o I have reevaluated the patient one hour after initiation of intervention. The patient is comfortable, uninjured, but continues to pose an imminent risk of injury to self to themselves and/or serious disruption of medical treatment required to keep patient stable. The patient's current medical and behavioral conditions that warrant the use intervention include danger to self and Interference with medical treatment. Restraint or seclusion will be discontinued at the earliest possible time, regardless of the scheduled expiration of the order.  Based on my evaluation, restraints will be continued: YES 4/11/21   o Attending Overseeing restraints:Dr. Paulina Morse     Family contact/MPOA: sister, Jacky Swain  Family update: updated by nightshift APC        Daily Plans:   Add abx    Shanon Simmons NP  04/11/21  Pulmonary, Critical Care Medicine  [facility]

## 2021-04-11 NOTE — ROUTINE PROCESS
1940 Bedside and Verbal shift change report given to [address] [place] [address] (oncoming nurse) by Yi Hutchins RN (offgoing nurse). Report included the following information SBAR, Kardex, Intake/Output and Recent Results   2000 ASSESSMENT completed. Oral and external cath care provided. 2115 SARAH ARTEAGA NOTIFIED OF FREQUENT PVC'S and will  collect lytes at midnight with ptt. .   2300 a.jacky arteaga notified patient firing sepsis screen , pan cultures  4/9/2021, no new orders will , continue to monitor. 0000 reassessment completed. Oral and external cath care provided. 0222 increased water flushed to 300 cc/ every 4 hours. 0340 A. Nicolas ARTEAGA aware am gases with oxygen sat 92% , pulse ox matched gas. SARAH ARTEAGA states okay to leave on current setting, per [facility] RT, no new orders received. 0400 reassessment completed. Oral and external cath care completed. 5667 sarah arteaga notified of temp 102.8 axillary , no tylenol ordered an last cultured 4/9/21.   0730  Bedside and Verbal shift change report given to [address] [place], [place] [address]d (oncoming nurse) by Arianna Olivarez RN (offgoing nurse). Report included the following information SBAR, Kardex, MAR and Recent Results. SITUATION:    Code Status: Full Code   Reason for Admission: Angioedema due to angiotensin converting enzyme inhibitor (ACE-I) [T78. [facility] day: 7   Problem List:       Hospital Problems  Date Reviewed: 4/5/2021          Codes Class Noted POA    DVT (deep venous thrombosis) ([facility] ICD-10-CM: I82.409  ICD-9-CM: 453.40  4/10/2021 Unknown        Respiratory failure ([facility] ICD-10-CM: J96.90  ICD-9-CM: 518.81  4/5/2021 Unknown        Obesity (BMI 30-39. 9) ICD-10-CM: E66.9  ICD-9-CM: 278.00  4/5/2021 Unknown        Diabetic neuropathy associated with type 2 diabetes mellitus ([facility] ICD-10-CM: E11.40  ICD-9-CM: 250.60, 357.2  4/5/2021 Unknown        Diabetic retinopathy associated with type 2 diabetes mellitus [facility] ICD-10-CM: E11.319  ICD-9-CM: 250.50, 362.01 4/5/2021 Unknown        Pulmonary infiltrates ICD-10-CM: R91.8  ICD-9-CM: 793.19  4/5/2021 Unknown        Controlled type 2 diabetes mellitus with neurologic complication, without long-term current use of insulin [facility] ICD-10-CM: E11.49  ICD-9-CM: 250.60  4/5/2021 Unknown        Angioedema due to angiotensin converting enzyme inhibitor (ACE-I) ICD-10-CM: T78. Chance Baez, [id number]  ICD-9-CM: 995.1, E942.6  4/4/2021 Unknown        JACQUELYN (acute kidney injury) ([facility] ICD-10-CM: N17.9  ICD-9-CM: 584.9  4/4/2021 Unknown        Cardiac arrest [facility]) ICD-10-CM: I46.9  ICD-9-CM: 427.5  4/4/2021 Unknown              BACKGROUND:    Past Medical History:   Past Medical History:   Diagnosis Date    DDD (degenerative disc disease), lumbar     Diabetes [facility]     Diabetic neuropathy ([facility]     Diabetic retinopathy ([facility]     DM2 (diabetes mellitus, type 2) ([facility]     HLD (hyperlipidemia)     HTN (hypertension)     Obesity (BMI 30-39. 9)          Patient taking anticoagulants on heparin drip    ASSESSMENT:    Changes in Assessment Throughout Shift: SLIGHT BLOOD NOTED ON SPONGE WITH 0600 ORAL CARE. Temp 102.8, tylenol given . Zosyn started.      Patient has Central Line: no Reasons if yes: n/a   Patient has Garcia Cath: no Reasons if yes: na      Last Vitals:     Vitals:    04/11/21 0500 04/11/21 0600 04/11/21 0700 04/11/21 0703   BP: 121/68 119/66 128/75    Pulse: 98 (!) 102 96 95   Resp: 17 16 17 18   Temp:       SpO2: 94% 93% 95% 94%   Weight:       Height:            IV and DRAINS (will only show if present)   [REMOVED] Airway - Endotracheal Tube 04/04/21 Oral-Site Assessment: Clean, dry, & intact  Peripheral IV 04/04/21 Left Antecubital-Site Assessment: Clean, dry, & intact  Peripheral IV 04/04/21 Anterior;Right Wrist-Site Assessment: Clean, dry, & intact  Peripheral IV 04/04/21 Left;Posterior Hand-Site Assessment: Clean, dry, & intact  [REMOVED] Airway - Continuous Aspiration of Subglottic Secretions (GENESIS) Tube 04/04/21 Oral-Site Assessment: Clean, dry, & intact  Orogastric Tube 04/05/21-Site Assessment: Clean, dry, & intact  Airway - Continuous Aspiration of Subglottic Secretions (GENESIS) Tube 04/07/21 Oral-Site Assessment: Clean, dry, & intact     WOUND (if present)   Wound Type:  na   Dressing present Dressing Present : Yes, Intact, not due to be changed preventative sacral mepilex. Wound Concerns/Notes:  none     PAIN    Pain Assessment    Pain Intensity 1: 0 (04/11/21 0400)              Patient Stated Pain Goal: Unable to verbalize/indicate pain  o Interventions for Pain:  na  o Intervention effective: na  o Time of last intervention: na   o Reassessment Completed: yes     Last 3 Weights:  Last 3 Recorded Weights in this Encounter    04/08/21 0400 04/09/21 0000 04/10/21 2057   Weight: 113.4 kg (250 lb) 117.2 kg (258 lb 6.1 oz) 114.5 kg (252 lb 6.8 oz)     Weight change:      INTAKE/OUPUT    Current Shift: 04/11 0701 - 04/11 1900  In: 146 [I.V.:100]  Out: -     Last three shifts: 04/09 1901 - 04/11 0700  In: 4270.3 [I.V.:590.3]  Out: 3125 [Urine:3125]     LAB RESULTS     Recent Labs     04/11/21  0020 04/10/21  0400 04/09/21  0443   WBC 17.3* 16.9* 11.9   HGB 10.9* 11.1* 11.2*   HCT 35.6* 36.7 37.6   * 118* 143        Recent Labs     04/11/21  0020 04/10/21  0400 04/09/21  0443   * 150* 148*   K 4.3 4.2 4.3   * 149* 133*   BUN 65* 55* 46*   CREA 1.85* 1.70* 1.57*   CA 8.5 8.4* 8.0*   MG 3.3* 3.4* 3.0*       RECOMMENDATIONS AND DISCHARGE PLANNING     1. Pending tests/procedures/ Plan of Care or Other Needs: ptt  For heparin drip. 2. Discharge plan for patient and Needs/Barriers: vent. hypoxia    3. Estimated Discharge Date: tbd Posted on Whiteboard in Patients Room: tbd    4. The patient's care plan was reviewed with the oncoming nurse.        \"HEALS\" SAFETY CHECK      Fall Risk    Total Score: 3    Safety Measures: Safety Measures: Bed/Chair alarm on, Bed/Chair-Wheels locked, Bed in low position, Call light within reach, Emergency bedside equipment, Fall prevention (comment), Gripper socks, Side rails X 3    A safety check occurred in the patient's room between off going nurse and oncoming nurse listed above. The safety check included the below items  Area Items   H  High Alert Medications - Verify all high alert medication drips (heparin, PCA, etc.)   E  Equipment - Suction is set up for ALL patients (with dalton)  - Red plugs utilized for all equipment (IV pumps, etc.)  - WOWs wiped down at end of shift.  - Room stocked with oxygen, suction, and other unit-specific supplies   A  Alarms - Bed alarm is set for fall risk patients  - Ensure chair alarm is in place and activated if patient is up in a chair   L  Lines - Check IV for any infiltration  - Garcia bag is empty if patient has a Garcia   - Tubing and IV bags are labeled   S  Safety   - Room is clean, patient is clean, and equipment is clean. - Hallways are clear from equipment besides carts. - Fall bracelet on for fall risk patients  - Ensure room is clear and free of clutter  - Suction is set up for ALL patients (with dalton)  - Hallways are clear from equipment besides carts.    - Isolation precautions followed, supplies available outside room, sign posted     Chirag Lujan RN

## 2021-04-11 NOTE — PROGRESS NOTES
[facility]  Pulmonary, Critical Care, and Sleep Medicine  Subsequent Encounter Note    Updated patient's sister on patient's critical condition via phone call. Explained oxygen requirements, concern for PE, fevers. Answered all questions.        REGGIE time 10 minutes    Kaleb Lewis PA-C  04/10/21  Pulmonary, Critical Care Medicine  [facility]

## 2021-04-11 NOTE — PROGRESS NOTES
Problem: Ventilator Management  Goal: *Adequate oxygenation and ventilation  Outcome: Progressing Towards Goal  Goal: *Patient maintains clear airway/free of aspiration  Outcome: Progressing Towards Goal  Goal: *Absence of infection signs and symptoms  Outcome: Progressing Towards Goal  Goal: *Normal spontaneous ventilation  Outcome: Not Progressing Towards Goal     Problem: Patient Education: Go to Patient Education Activity  Goal: Patient/Family Education  Outcome: Not Progressing Towards Goal     Problem: Diabetes Self-Management  Goal: *Using medications safely  Description: State effect of diabetes medications on diabetes; name diabetes medication taking, action and side effects. Outcome: Progressing Towards Goal  Goal: *Monitoring blood glucose, interpreting and using results  Description: Identify recommended blood glucose targets  and personal targets. Outcome: Progressing Towards Goal     Problem: Patient Education: Go to Patient Education Activity  Goal: Patient/Family Education  Outcome: Not Progressing Towards Goal     Problem: Non-Violent Restraints  Goal: Removal from restraints as soon as assessed to be safe  Outcome: Progressing Towards Goal  Goal: No harm/injury to patient while restraints in use  Outcome: Progressing Towards Goal  Goal: Patient's dignity will be maintained  Outcome: Progressing Towards Goal  Goal: Patient Interventions  Outcome: Progressing Towards Goal     Problem: Non-Violent Restraints  Goal: Removal from restraints as soon as assessed to be safe  Outcome: Progressing Towards Goal  Goal: No harm/injury to patient while restraints in use  Outcome: Progressing Towards Goal  Goal: Patient's dignity will be maintained  Outcome: Progressing Towards Goal  Goal: Patient Interventions  Outcome: Progressing Towards Goal     Problem: Falls - Risk of  Goal: *Absence of Falls  Description: Document [facility] Fall Risk and appropriate interventions in the flowsheet.   Outcome: Progressing Towards Goal  Note: Fall Risk Interventions:       Mentation Interventions: Adequate sleep, hydration, pain control, Bed/chair exit alarm, Door open when patient unattended, Evaluate medications/consider consulting pharmacy, Update white board, Toileting rounds, Room close to nurse's station, Reorient patient, More frequent rounding    Medication Interventions: Bed/chair exit alarm, Evaluate medications/consider consulting pharmacy    Elimination Interventions: Bed/chair exit alarm, Call light in reach, Patient to call for help with toileting needs, Toileting schedule/hourly rounds, Toilet paper/wipes in reach, Stay With Me (per policy)              Problem: Patient Education: Go to Patient Education Activity  Goal: Patient/Family Education  Outcome: Not Progressing Towards Goal     Problem: Pressure Injury - Risk of  Goal: *Prevention of pressure injury  Description: Document Lang Scale and appropriate interventions in the flowsheet. Outcome: Progressing Towards Goal  Note: Pressure Injury Interventions:  Sensory Interventions: Assess changes in LOC, Assess need for specialty bed, Avoid rigorous massage over bony prominences, Check visual cues for pain, Float heels, Keep linens dry and wrinkle-free, Minimize linen layers, Monitor skin under medical devices, Pressure redistribution bed/mattress (bed type), Turn and reposition approx. every two hours (pillows and wedges if needed)    Moisture Interventions: Absorbent underpads, Apply protective barrier, creams and emollients, Assess need for specialty bed, Check for incontinence Q2 hours and as needed, Internal/External urinary devices, Minimize layers, Moisture barrier    Activity Interventions: Assess need for specialty bed, Pressure redistribution bed/mattress(bed type)    Mobility Interventions: Assess need for specialty bed, Float heels, HOB 30 degrees or less, Pressure redistribution bed/mattress (bed type), Turn and reposition approx.  every two hours(pillow and wedges)    Nutrition Interventions: Document food/fluid/supplement intake    Friction and Shear Interventions: Apply protective barrier, creams and emollients, Foam dressings/transparent film/skin sealants, HOB 30 degrees or less, Lift team/patient mobility team, Minimize layers, Transferring/repositioning devices                Problem: Patient Education: Go to Patient Education Activity  Goal: Patient/Family Education  Outcome: Not Progressing Towards Goal     Problem: Injury - Risk of, Adverse Drug Event  Goal: *Absence of adverse drug events  Outcome: Progressing Towards Goal  Goal: *Absence of medication errors  Outcome: Progressing Towards Goal  Goal: *Knowledge of prescribed medications  Outcome: Not Progressing Towards Goal     Problem: Patient Education: Go to Patient Education Activity  Goal: Patient/Family Education  Outcome: Not Progressing Towards Goal     Problem: Pain  Goal: *Control of Pain  Outcome: Progressing Towards Goal     Problem: Delirium Treatment  Goal: *Absence of falls  Outcome: Progressing Towards Goal  Goal: Interventions  Outcome: Progressing Towards Goal

## 2021-04-11 NOTE — PROGRESS NOTES
Problem: Ventilator Management  Goal: *Adequate oxygenation and ventilation  Outcome: Progressing Towards Goal     Problem: Non-Violent Restraints  Goal: No harm/injury to patient while restraints in use  Outcome: Progressing Towards Goal  Goal: Patient's dignity will be maintained  Outcome: Progressing Towards Goal     Problem: Nutrition Deficit  Goal: *Optimize nutritional status  Outcome: Progressing Towards Goal     Problem: Injury - Risk of, Adverse Drug Event  Goal: *Absence of adverse drug events  Outcome: Progressing Towards Goal  Goal: *Absence of medication errors  Outcome: Progressing Towards Goal

## 2021-04-11 NOTE — PROGRESS NOTES
Otolaryngology head neck surgery  Patient seen and examined  Patient remains on vent  Vent settings remain high  Anterior neck wound slowly healing from his cricothyroidotomy  Heparin has been restarted  Patient with a blood clot in the cricothyroidotomy wound however this not dislodged    Dressing changed  Impression  1.   Angioedema with emergent cricothyroidotomy subsequently removed once endotracheal tube placed  Patient stable with his endotracheal tube at this setting  I suspect his angioedema is completely resolved however now patient with significant pulmonologic issues necessitating ventilatory support  Patient now on vent day 7  Hopefully  will have some hopes of extubation soon, if not may need to undergo tracheotomy    Emigdio Sweet

## 2021-04-11 NOTE — PROGRESS NOTES
Received patient on current vent settings, vitals stable. Scheduled HHN txs given, MetaNeb given. No resp distress noted. Will continue to monitor. 04/11/21 0958   Patient Observations   Pulse (Heart Rate) 82   Resp Rate 19   O2 Sat (%) 93 %   ETCO2 (mmHg) 33 mmHg   Airway - Continuous Aspiration of Subglottic Secretions (GENESIS) Tube 04/07/21 Oral   Placement Date/Time: 04/07/21 1030   Number of Attempts: 1  Inserted By: Mireille Jaramillo CRNA  Present on Admission/Arrival: No  Location: Oral  Placement Verified: Auscultation;BBS;Chest x-ray;EtCO2;Placement not verified (comment)  Airway Types: Endotr. .. Insertion Depth (cm) 28 cm   Line Christian Lips   Side Secured Centered   Cuff Pressure   (MLT)   Site Assessment Clean, dry, & intact   Suction on Yes   Amt Secretions Aspirated (mL) 0 mL   Respiratory   Respiratory (WDL) X   Patient on Vent Yes - If patient is on vent, add Doc Flowsheet Ventilator ().    Respiratory Pattern Regular   Chest/Tracheal Assessment Chest expansion, symmetrical   Breath Sounds Bilateral Coarse   Cough Other(comment)  (no cough with suction)   Airway Clearance   Suction ET Tube   Suction Device Inline suction catheter   Sputum Method Obtained Endotracheal   Sputum Amount Small   Sputum Color/Odor Tan;Pink tinged   Sputum Consistency Thick   Ventilator Initiate/Discontinue   Bio-Med ID # [id number]   Vent Settings   FIO2 (%) 80 %   SpO2/FIO2 Ratio 116.25   CMV Rate Set 16   Back-Up Rate 16   Vt Set (ml) 620 ml   PEEP/VENT (cm H2O) 11 cm H20   Insp Time (sec) 1.2 sec   Insp Rise Time % 50 %   Flow Trigger 3.0   Ventilator Measurements   Resp Rate Observed 19   Vt Exhaled (Machine Breath) (ml) 652 ml   Ve Observed (l/min) 11.2 l/min   PIP Observed (cm H2O) 29 cm H2O   Plateau Pressure (cm H2O) 28 cm H2O   MAP (cm H2O) 17   I:E Ratio Actual 1:1.8   Auto PEEP Observed (cm H2O) 0 cm H2O   Static Compliance (ml/cm H20) 40 ml/cm H20   Safety & Alarms   Circuit Temperature 98.6 °F (37 °C)   Backup Mode Checked/Apnea Yes   Pressure Max 40 cm H2O   Pressure Min 17 cm H2O   Ve Min 2   Ve Max 20   Vt Min 200 ml   Vt Max 1000 ml   RR Max 40   Ambu Bag Yes   Ambu Mask Yes   Weaning Parameters   Spontaneous Breathing Trial Complete No (Comments)  (PEEP 11, FIO2 80)   Age Specific Ventilator Associated Pneumonia Bundle   Patient Age Group Adult   Adult Ventilator Associated Pneumonia Bundle   Elevation of Head to 30-45 Degrees (Unless Contraindicated) Yes   Vent Method/Mode   Ventilation Method Conventional   Ventilator Mode Assist control;VC+   Procedures   $$ Subsequent Procedure Aerosol   Delivery Source In-line nebulizer   Pulmonary Toilet   Pulmonary Toilet H. O.B elevated;Suction

## 2021-04-11 NOTE — PROGRESS NOTES
Problem: Ventilator Management  Goal: *Adequate oxygenation and ventilation  Outcome: Progressing Towards Goal  Goal: *Patient maintains clear airway/free of aspiration  Outcome: Progressing Towards Goal  Goal: *Absence of infection signs and symptoms  Outcome: Progressing Towards Goal  Goal: *Normal spontaneous ventilation  Outcome: Progressing Towards Goal   VENTILATOR Care plan    Problem: Ventilator Management  Goal: *Adequate oxygenation/ ventilation/ and extubation      Patient:        Shira Sepulveda     79 y.o.   male     4/11/2021  5:41 PM  Patient Active Problem List   Diagnosis Code    Angioedema due to angiotensin converting enzyme inhibitor (ACE-I) T78. 3XXA, [id number]    Respiratory failure ([facility] J96.90    [facility]acute [facility]) [facility] N17.9    Cardiac arrest ([facility] I46.9    Obesity (BMI 30-39. 9) E66.9    Diabetic neuropathy associated with type 2 diabetes mellitus ([facility] E11.40    Diabetic retinopathy associated with type 2 diabetes mellitus ([facility] E11.319    Pulmonary infiltrates R91.8    Controlled type 2 diabetes mellitus with neurologic complication, without long-term current use of insulin ([facility]) E11.49    DVT (deep venous thrombosis) ([facility]) I82.409       Angioedema due to angiotensin converting enzyme inhibitor (ACE-I) [T78. 3XXA, T46.4X5A]    Reason patient intubated: Angioedema, s/p cardiac arrest, resp failure    Ventilator day: 8    Ventilator settings: ACVC+ 16/620/+11/0.70    ETT Size/Placement: 8.0mm ETT @ 28cm at the lips       ABG:  Date:4/11/2021  Lab Results   Component Value Date/Time    PHI 7.47 (H) 04/11/2021 03:19 AM    PCO2I 39.8 04/11/2021 03:19 AM    PO2I 61 (L) 04/11/2021 03:19 AM    HCO3I 29.0 (H) 04/11/2021 03:19 AM    FIO2I 80 04/11/2021 03:19 AM       Chest X-ray:  Date:4/11/2021  Results from Hospital Encounter encounter on 04/04/21   XR CHEST PORT    Narrative PORTABLE CHEST RADIOGRAPH     CPT CODE: 30078     INDICATION: Intubated.     COMPARISON: 4/10/2021. FINDINGS:    Frontal view of the chest obtained at 0102 hours. Tip of ET tube is 4 cm  proximal to the gabriella. NG tube courses below the diaphragm. Lungs are  hypoinflated. Cardiomediastinal silhouette is similarly enlarged given  differences in patient positioning and inflation. Similar central venous  congestion. Small bilateral pleural effusions. Likely mild atelectasis at the  lung bases. No pneumothorax. Impression Support lines and tubes as above without pneumothorax. Cardiomediastinal silhouette enlargement with similar central venous congestion. Small bilateral pleural effusions and likely atelectasis.        Lab Test:  Date:4/11/2021  WBC:   Lab Results   Component Value Date/Time    WBC 17.3 (H) 04/11/2021 12:20 AM   HGB:   Lab Results   Component Value Date/Time    HGB 10.9 (L) 04/11/2021 12:20 AM    PLTS:   Lab Results   Component Value Date/Time    PLATELET 493 (L) 87/00/9520 12:20 AM       SaO2%/flow: @LASTSAO2(1)@    Vital Signs:   Patient Vitals for the past 8 hrs:   Temp Pulse Resp BP SpO2   04/11/21 1700 -- 95 16 113/66 96 %   04/11/21 1600 (!) 101.5 °F (38.6 °C) 93 17 109/63 97 %   04/11/21 1500 -- 92 17 (!) 112/59 96 %   04/11/21 1400 -- 96 23 (!) 141/86 96 %   04/11/21 1355 -- 91 19 -- 97 %   04/11/21 1346 -- 92 20 -- 97 %   04/11/21 1300 -- 97 24 (!) 163/71 95 %   04/11/21 1200 99.3 °F (37.4 °C) 91 19 124/69 92 %   04/11/21 1100 -- 90 19 134/71 93 %   04/11/21 1000 -- 84 20 113/71 92 %   04/11/21 0958 -- 82 19 -- 93 %       Wean Screen Pass (Yes or No): No    Wean Screen Reason for Failure: PEEP 11, FiO2 0.70, sedated    Duration of Weaning Trial: N/A    Additional Comments:      PLAN OF CARE: Vent support, MetaNeb BID, Albuterol Q6, Mucomyst BID, Pulmozyme BID, Brovana BID     GOAL: Oxygenation, ventilation, maintain airway, mobilize secretions    OUTCOME: Patient remains intubated, on mechanical ventilation

## 2021-04-12 ENCOUNTER — APPOINTMENT (OUTPATIENT)
Dept: GENERAL RADIOLOGY | Age: 71
DRG: 004 | End: 2021-04-12
Attending: PHYSICIAN ASSISTANT
Payer: MEDICARE

## 2021-04-12 ENCOUNTER — APPOINTMENT (OUTPATIENT)
Dept: GENERAL RADIOLOGY | Age: 71
DRG: 004 | End: 2021-04-12
Attending: STUDENT IN AN ORGANIZED HEALTH CARE EDUCATION/TRAINING PROGRAM
Payer: MEDICARE

## 2021-04-12 LAB
ANION GAP SERPL CALC-SCNC: 4 MMOL/L (ref 3–18)
APTT PPP: 112 SEC (ref 23–36.4)
APTT PPP: 46.2 SEC (ref 23–36.4)
ARTERIAL PATENCY WRIST A: ABNORMAL
BASE EXCESS BLD CALC-SCNC: 2 MMOL/L
BASOPHILS # BLD: 0 K/UL (ref 0–0.1)
BASOPHILS NFR BLD: 0 % (ref 0–2)
BDY SITE: ABNORMAL
BUN SERPL-MCNC: 72 MG/DL (ref 7–18)
BUN/CREAT SERPL: 33 (ref 12–20)
CALCIUM SERPL-MCNC: 8.2 MG/DL (ref 8.5–10.1)
CHLORIDE SERPL-SCNC: 113 MMOL/L (ref 100–111)
CO2 SERPL-SCNC: 29 MMOL/L (ref 21–32)
CREAT SERPL-MCNC: 2.21 MG/DL (ref 0.6–1.3)
DIFFERENTIAL METHOD BLD: ABNORMAL
EOSINOPHIL # BLD: 0.3 K/UL (ref 0–0.4)
EOSINOPHIL NFR BLD: 3 % (ref 0–5)
ERYTHROCYTE [DISTWIDTH] IN BLOOD BY AUTOMATED COUNT: 16.1 % (ref 11.6–14.5)
GAS FLOW.O2 O2 DELIVERY SYS: ABNORMAL L/MIN
GAS FLOW.O2 SETTING OXYMISER: 16 BPM
GLUCOSE BLD STRIP.AUTO-MCNC: 136 MG/DL (ref 70–110)
GLUCOSE BLD STRIP.AUTO-MCNC: 152 MG/DL (ref 70–110)
GLUCOSE BLD STRIP.AUTO-MCNC: 177 MG/DL (ref 70–110)
GLUCOSE SERPL-MCNC: 165 MG/DL (ref 74–99)
HCO3 BLD-SCNC: 26.1 MMOL/L (ref 22–26)
HCT VFR BLD AUTO: 32.1 % (ref 36–48)
HGB BLD-MCNC: 10 G/DL (ref 13–16)
INSPIRATION.DURATION SETTING TIME VENT: 1.1 SEC
LYMPHOCYTES # BLD: 0.9 K/UL (ref 0.9–3.6)
LYMPHOCYTES NFR BLD: 8 % (ref 21–52)
MAGNESIUM SERPL-MCNC: 3 MG/DL (ref 1.6–2.6)
MCH RBC QN AUTO: 26.4 PG (ref 24–34)
MCHC RBC AUTO-ENTMCNC: 31.2 G/DL (ref 31–37)
MCV RBC AUTO: 84.7 FL (ref 74–97)
MONOCYTES # BLD: 1 K/UL (ref 0.05–1.2)
MONOCYTES NFR BLD: 9 % (ref 3–10)
NEUTS SEG # BLD: 9 K/UL (ref 1.8–8)
NEUTS SEG NFR BLD: 79 % (ref 40–73)
O2/TOTAL GAS SETTING VFR VENT: 100 %
PCO2 BLD: 36.7 MMHG (ref 35–45)
PEEP RESPIRATORY: 11 CMH2O
PH BLD: 7.46 [PH] (ref 7.35–7.45)
PHOSPHATE SERPL-MCNC: 3.1 MG/DL (ref 2.5–4.9)
PLATELET # BLD AUTO: 147 K/UL (ref 135–420)
PMV BLD AUTO: 12.8 FL (ref 9.2–11.8)
PO2 BLD: 71 MMHG (ref 80–100)
POTASSIUM SERPL-SCNC: 4.4 MMOL/L (ref 3.5–5.5)
RBC # BLD AUTO: 3.79 M/UL (ref 4.35–5.65)
SAO2 % BLD: 95 % (ref 92–97)
SERVICE CMNT-IMP: ABNORMAL
SODIUM SERPL-SCNC: 146 MMOL/L (ref 136–145)
SPECIMEN TYPE: ABNORMAL
TOTAL RESP. RATE, ITRR: 20
VENTILATION MODE VENT: ABNORMAL
VT SETTING VENT: 550 ML
WBC # BLD AUTO: 11.3 K/UL (ref 4.6–13.2)

## 2021-04-12 PROCEDURE — 74011000250 HC RX REV CODE- 250: Performed by: PHYSICIAN ASSISTANT

## 2021-04-12 PROCEDURE — 80048 BASIC METABOLIC PNL TOTAL CA: CPT

## 2021-04-12 PROCEDURE — 94003 VENT MGMT INPAT SUBQ DAY: CPT

## 2021-04-12 PROCEDURE — 74011250636 HC RX REV CODE- 250/636: Performed by: NURSE PRACTITIONER

## 2021-04-12 PROCEDURE — 74011000250 HC RX REV CODE- 250: Performed by: INTERNAL MEDICINE

## 2021-04-12 PROCEDURE — 94668 MNPJ CHEST WALL SBSQ: CPT

## 2021-04-12 PROCEDURE — 85730 THROMBOPLASTIN TIME PARTIAL: CPT

## 2021-04-12 PROCEDURE — 74011250636 HC RX REV CODE- 250/636: Performed by: PHYSICIAN ASSISTANT

## 2021-04-12 PROCEDURE — 74011000250 HC RX REV CODE- 250: Performed by: STUDENT IN AN ORGANIZED HEALTH CARE EDUCATION/TRAINING PROGRAM

## 2021-04-12 PROCEDURE — 77030012390 HC DRN CHST BTL GTNG -B

## 2021-04-12 PROCEDURE — 99291 CRITICAL CARE FIRST HOUR: CPT | Performed by: INTERNAL MEDICINE

## 2021-04-12 PROCEDURE — 74011636637 HC RX REV CODE- 636/637: Performed by: PHYSICIAN ASSISTANT

## 2021-04-12 PROCEDURE — 74011000258 HC RX REV CODE- 258: Performed by: PHYSICIAN ASSISTANT

## 2021-04-12 PROCEDURE — APPNB15 APP NON BILLABLE TIME 0-15 MINS: Performed by: REGISTERED NURSE

## 2021-04-12 PROCEDURE — 74011000250 HC RX REV CODE- 250: Performed by: REGISTERED NURSE

## 2021-04-12 PROCEDURE — 36415 COLL VENOUS BLD VENIPUNCTURE: CPT

## 2021-04-12 PROCEDURE — 85610 PROTHROMBIN TIME: CPT

## 2021-04-12 PROCEDURE — 82962 GLUCOSE BLOOD TEST: CPT

## 2021-04-12 PROCEDURE — 0BJQ3ZZ INSPECTION OF PLEURA, PERCUTANEOUS APPROACH: ICD-10-PCS | Performed by: STUDENT IN AN ORGANIZED HEALTH CARE EDUCATION/TRAINING PROGRAM

## 2021-04-12 PROCEDURE — 2709999900 HC NON-CHARGEABLE SUPPLY

## 2021-04-12 PROCEDURE — 82803 BLOOD GASES ANY COMBINATION: CPT

## 2021-04-12 PROCEDURE — 65610000006 HC RM INTENSIVE CARE

## 2021-04-12 PROCEDURE — 74011000250 HC RX REV CODE- 250

## 2021-04-12 PROCEDURE — 94762 N-INVAS EAR/PLS OXIMTRY CONT: CPT

## 2021-04-12 PROCEDURE — 32555 ASPIRATE PLEURA W/ IMAGING: CPT | Performed by: INTERNAL MEDICINE

## 2021-04-12 PROCEDURE — 74011250637 HC RX REV CODE- 250/637: Performed by: PHYSICIAN ASSISTANT

## 2021-04-12 PROCEDURE — 84100 ASSAY OF PHOSPHORUS: CPT

## 2021-04-12 PROCEDURE — 85025 COMPLETE CBC W/AUTO DIFF WBC: CPT

## 2021-04-12 PROCEDURE — 94640 AIRWAY INHALATION TREATMENT: CPT

## 2021-04-12 PROCEDURE — 36600 WITHDRAWAL OF ARTERIAL BLOOD: CPT

## 2021-04-12 PROCEDURE — 83735 ASSAY OF MAGNESIUM: CPT

## 2021-04-12 PROCEDURE — 74011250636 HC RX REV CODE- 250/636: Performed by: INTERNAL MEDICINE

## 2021-04-12 PROCEDURE — 71045 X-RAY EXAM CHEST 1 VIEW: CPT

## 2021-04-12 PROCEDURE — 94669 MECHANICAL CHEST WALL OSCILL: CPT

## 2021-04-12 PROCEDURE — 51798 US URINE CAPACITY MEASURE: CPT

## 2021-04-12 PROCEDURE — 74011250637 HC RX REV CODE- 250/637: Performed by: INTERNAL MEDICINE

## 2021-04-12 RX ORDER — LIDOCAINE HYDROCHLORIDE 10 MG/ML
INJECTION, SOLUTION EPIDURAL; INFILTRATION; INTRACAUDAL; PERINEURAL
Status: COMPLETED
Start: 2021-04-12 | End: 2021-04-12

## 2021-04-12 RX ORDER — LIDOCAINE HYDROCHLORIDE 10 MG/ML
20 INJECTION, SOLUTION EPIDURAL; INFILTRATION; INTRACAUDAL; PERINEURAL ONCE
Status: COMPLETED | OUTPATIENT
Start: 2021-04-12 | End: 2021-04-12

## 2021-04-12 RX ORDER — PROPOFOL 10 MG/ML
0-50 VIAL (ML) INTRAVENOUS
Status: DISCONTINUED | OUTPATIENT
Start: 2021-04-12 | End: 2021-04-13

## 2021-04-12 RX ORDER — PROPOFOL 10 MG/ML
INJECTION, EMULSION INTRAVENOUS
Status: DISPENSED
Start: 2021-04-12 | End: 2021-04-12

## 2021-04-12 RX ADMIN — FENTANYL CITRATE 100 MCG: 50 INJECTION, SOLUTION INTRAMUSCULAR; INTRAVENOUS at 00:45

## 2021-04-12 RX ADMIN — LIDOCAINE HYDROCHLORIDE 20 ML: 10 INJECTION, SOLUTION EPIDURAL; INFILTRATION; INTRACAUDAL; PERINEURAL at 13:30

## 2021-04-12 RX ADMIN — ALBUTEROL SULFATE 2.5 MG: 2.5 SOLUTION RESPIRATORY (INHALATION) at 14:23

## 2021-04-12 RX ADMIN — FENTANYL CITRATE 175 MCG/HR: 0.05 INJECTION, SOLUTION INTRAMUSCULAR; INTRAVENOUS at 01:15

## 2021-04-12 RX ADMIN — CHLORHEXIDINE GLUCONATE 0.12% ORAL RINSE 10 ML: 1.2 LIQUID ORAL at 08:13

## 2021-04-12 RX ADMIN — PROPOFOL 5 MCG/KG/MIN: 10 INJECTION, EMULSION INTRAVENOUS at 08:43

## 2021-04-12 RX ADMIN — FENTANYL CITRATE 200 MCG/HR: 0.05 INJECTION, SOLUTION INTRAMUSCULAR; INTRAVENOUS at 16:32

## 2021-04-12 RX ADMIN — DORNASE ALFA 2.5 MG: 1 SOLUTION RESPIRATORY (INHALATION) at 07:49

## 2021-04-12 RX ADMIN — PIPERACILLIN SODIUM AND TAZOBACTAM SODIUM 3.38 G: 3; .375 INJECTION, POWDER, LYOPHILIZED, FOR SOLUTION INTRAVENOUS at 05:47

## 2021-04-12 RX ADMIN — ACETYLCYSTEINE 400 MG: 100 SOLUTION ORAL; RESPIRATORY (INHALATION) at 14:23

## 2021-04-12 RX ADMIN — PIPERACILLIN SODIUM AND TAZOBACTAM SODIUM 3.38 G: 3; .375 INJECTION, POWDER, LYOPHILIZED, FOR SOLUTION INTRAVENOUS at 17:48

## 2021-04-12 RX ADMIN — ARFORMOTEROL TARTRATE 15 MCG: 15 SOLUTION RESPIRATORY (INHALATION) at 20:16

## 2021-04-12 RX ADMIN — CHLORHEXIDINE GLUCONATE 0.12% ORAL RINSE 10 ML: 1.2 LIQUID ORAL at 21:52

## 2021-04-12 RX ADMIN — ALBUTEROL SULFATE 2.5 MG: 2.5 SOLUTION RESPIRATORY (INHALATION) at 07:48

## 2021-04-12 RX ADMIN — ARFORMOTEROL TARTRATE 15 MCG: 15 SOLUTION RESPIRATORY (INHALATION) at 07:48

## 2021-04-12 RX ADMIN — MIDAZOLAM 10 MG/HR: 5 INJECTION INTRAMUSCULAR; INTRAVENOUS at 18:05

## 2021-04-12 RX ADMIN — FENTANYL CITRATE 200 MCG/HR: 0.05 INJECTION, SOLUTION INTRAMUSCULAR; INTRAVENOUS at 08:06

## 2021-04-12 RX ADMIN — ALBUTEROL SULFATE 2.5 MG: 2.5 SOLUTION RESPIRATORY (INHALATION) at 20:16

## 2021-04-12 RX ADMIN — PROPOFOL 25 MCG/KG/MIN: 10 INJECTION, EMULSION INTRAVENOUS at 03:20

## 2021-04-12 RX ADMIN — ACETAMINOPHEN ORAL SOLUTION 500 MG: 650 SOLUTION ORAL at 18:21

## 2021-04-12 RX ADMIN — INSULIN LISPRO 2 UNITS: 100 INJECTION, SOLUTION INTRAVENOUS; SUBCUTANEOUS at 12:00

## 2021-04-12 RX ADMIN — ALBUTEROL SULFATE 2.5 MG: 2.5 SOLUTION RESPIRATORY (INHALATION) at 02:00

## 2021-04-12 RX ADMIN — INSULIN LISPRO 2 UNITS: 100 INJECTION, SOLUTION INTRAVENOUS; SUBCUTANEOUS at 06:55

## 2021-04-12 RX ADMIN — ACETYLCYSTEINE 400 MG: 100 SOLUTION ORAL; RESPIRATORY (INHALATION) at 02:00

## 2021-04-12 RX ADMIN — MIDAZOLAM 10 MG/HR: 5 INJECTION INTRAMUSCULAR; INTRAVENOUS at 08:26

## 2021-04-12 RX ADMIN — FAMOTIDINE 20 MG: 10 INJECTION INTRAVENOUS at 08:13

## 2021-04-12 RX ADMIN — PIPERACILLIN SODIUM AND TAZOBACTAM SODIUM 3.38 G: 3; .375 INJECTION, POWDER, LYOPHILIZED, FOR SOLUTION INTRAVENOUS at 01:26

## 2021-04-12 RX ADMIN — PIPERACILLIN SODIUM AND TAZOBACTAM SODIUM 3.38 G: 3; .375 INJECTION, POWDER, LYOPHILIZED, FOR SOLUTION INTRAVENOUS at 12:14

## 2021-04-12 RX ADMIN — ACETAMINOPHEN ORAL SOLUTION 500 MG: 650 SOLUTION ORAL at 08:13

## 2021-04-12 RX ADMIN — DORNASE ALFA 2.5 MG: 1 SOLUTION RESPIRATORY (INHALATION) at 23:03

## 2021-04-12 RX ADMIN — FENTANYL CITRATE 175 MCG/HR: 0.05 INJECTION, SOLUTION INTRAMUSCULAR; INTRAVENOUS at 23:01

## 2021-04-12 RX ADMIN — MIDAZOLAM 5 MG/HR: 5 INJECTION INTRAMUSCULAR; INTRAVENOUS at 01:22

## 2021-04-12 RX ADMIN — Medication 30 ML: at 08:13

## 2021-04-12 NOTE — PROCEDURES
[facility]    Ultrasound Note    Indications: Increased ventilatory requirements, concern for pneumothorax    Procedure: Ultrasound of bilateral lungs with linear probe. Findings: Poor lung slide on left with lung point anterior left lung, just medial to nipple. Right side had good lung slide. Likely anterior pneumothorax on the left. Dr. Pierce Mendieta supervised and was present and immediately available for the entirety of the procedure.     [facility]

## 2021-04-12 NOTE — PROCEDURES
[facility]  Chest Tube Insertion / Tube Thoracostomy Procedure Note done with Ultrasound guidance    Indications: Left Pneumothorax     Inserted By: Emmy Parikh MD/[facility]  Assistant: Shannan Bose MD/[facility]  Anesthesia: Local with Lidocaine 1% about 20 cc  Procedure: Chest tube insertion/Tube thoracostomy seldinger technique   Kit: Thal-Quick chest tube kit - 18 Fr    Risks, benefits, alternatives explained and consent obtained from patient. Patient positioned in sitting position slightly leaning forward. Full sterile barrier precautions used (cap, mask sterile gown, sterile gloves, large sterile sheet, hand hygiene, 2% chlorhexidine for cutaneous antisepsis, sterile probe cover). The LEFT 4th rib along with mid axillary linel was evaluated with the Ultrasound and site marked. The chest was cleaned with chloroprep times 3. The skin and subcutaneous tissues of the chest was infiltrated with Lidocaine, rib felt and given more Lidocaine, the marched over the rib and more Lidocaine given. The pleural space was entered with the needle. There was no air return in the needle despite 2 attempts. At this point procedure was aborted. The patient tolerated the procedure well with no immediate complications. CXR pending post procedure    Estimated Blood Loss: Minimal   Specimens: None  Complications: None - no bleeding or hematoma. Vital signs stable through out procedure. Emmy Parikh MD  [facility] Pulmonary/Critical Care Fellow      I was present for and supervised the entire procedure.   See fellow note for details    Shannan Bose MD/MPH     Pulmonary, [address] [place] [address] [facility]

## 2021-04-12 NOTE — PROGRESS NOTES
Discharge planning    Reviewed chart. Patient remains intubated. CM will continue to monitor and assist with transitional needs.      CRAIG ConwayN, RN  Pager # [phone]  Care Manager

## 2021-04-12 NOTE — PROGRESS NOTES
[facility]  Pulmonary, Critical Care, and Sleep Medicine    Name: Jovon Fisher MRN: [id number]   : 1950 Hospital: [address] [place] [address] Dr   Date: 2021        Critical Care: Daily Progress Note    Admission Date:   2021  LOS: 8  MAR reviewed and pertinent medications noted or modified as needed    IMPRESSION:   · Angioedema- with airway and respiratory failure and collapse; thought due to ACE inhibitor  · S/P Emergent Cricthyrotomy [facility]-ED 21- converted to 6.5 ETT intraoperative by anesthesia team 21, 8.0 ETT by anesthesia 21, packing removed evening 21-ENT  · S/P cardiac arrest @ [facility] 21- brief thought due to hypoxia due to airway collapse  · Respiratory Failure: Acute due to above; currently intubated and on vent for airway protection  · Pulmonary Infiltrates: suspect aspiration secretions and/or blood due to above- can't exclude other infectious process at this time. Rapid Covid Negative at [facility]  · Bradycardia  · DVT: Popliteal- Left; acute non-occlusive Heparin ACS protocol started - stopped  due to bleeding from Cric site. Will consider resuming after bronchoscopy  · JACQUELYN  · Left scleral erythema- unknown baseline; possible infection   · DM  · DM retinopathy per chart review  · Diabetic peripheral neuropathy  · COVID -19; Negative rapid and [employer]: 21  · Left Pneumothorax    · Obesity: Body mass index is 35.67 kg/m².   ·       RECOMMENDATIONS:   NEURO:   Serial neuro evaluations   Sedation Holiday per protocol   RAAS: 0 to -1   Wetting tears and cipro eye drops- stop date for cipro on chart     CARDIO:   MAP goal >64- No IV pressor requirement   Echo on 21    Telemetry    PULM:   Maintain aspiration precautions: HOB >30 degrees   SpO2 goal >92%   Bronchial /oral hygiene   VAP bundle- Wean vent as clinical status allows   SBT as clinical status allows   Complete course of decadron   Benadryl Scheduled 50mg Q 6   Pulmozyme and Mucomist BID with bronchodilator   Metaneb   Bronchoscopy today     GI/ NUTRITION:   OGT    Diet: TF with Free water @ 300 mL Q4   SUP:Pepcid   Follow up with nutritional recommendations     RENAL/ METAB/ :   Monitor I&Os   Trend electrolytes- replaced as needed, K:4, Mg>2 PO4>2   Garcia: Continue    HEM/ONC   Trend Hb/HCT and PLTs, and indicis   DVT prophylaxis: SCDs   Hold heparin drip due to bleeding from Cric site. Will consider resuming after bronchoscopy   Re-image poplyteal DVT 72 hours and/or pending clinical course- may need IVC Filter if worsens   Blood Products: not indicated at this time    ID   Antibioitcs: Cipro eye drop, Zosyn: 4/4- 4/8/21 Vanco: 4/4-6, MRSA swab negative   Follow up on pending cultures   Repeat Covid pending   Restart Zosyn   Tylenol for fevers    ENDO   BGs Q 6,SSI   C1 inhibitor normal level (4/7/21)     MSK/ ORTHO   No active Issues     SKIN/ WOUNDS   Per protocol   Neck- daily dressing changes per ENT, Quick clot wit Afrin if needed, d/c xeroform     OTHER/DISPO:    CODE STATUS: Full Code- Full    Case reviewed and discussed with  ICU care team     Subjective/History: This patient has been seen and evaluated at the request of Dr. Ileana Quezada- ED from [facility] for Angioedema and airway compromise. Patient is a 79 y.o. male presented earlier this morning to [facility] ED in rapidly progressive respiratory distress due to angioedema and airway swelling presumed due to ACE inhibitor therapy. ED provider unable to intubate due to severity of airway swelling. Brief cardio-pulmonary collapse with brief loss of pulse. IO placed into right shoulder. Emergent cricothyrotomy 5.0 place. Good placement. ED team able to oxygenate and ventilate patient. Prior to transport ABG notable for Respiratory acidosis. Call back to -ED patient on vent and paralyzed with rate of 10.   I requested that RR be increased to 16-18 bpm depending what patient could tolerate. Our ENT provider on call, Dr. Lorena Stewartly was contacted. OR team on call came in. Patient flow via Nightingale/Life flight to our facility. The patient was taken from flight line to OR hold. On-call team rapidly assess patient. I evaluated the patient immediately upon arrival. Patient with Cric in place, bilateral breath  Sounds, SpO2 in the mid 90's. Swollen neck and face. ABG -POC with persistent but improved respiratory acidosis. ER team took patient emergently to the OR    Anesthesia able to place ETT 6.5 intraop. I was called by ENT provider in the OR. Case discussed. Opted to keep patient intubated with 6.5 ETT. Will monitor for 48-72 hours and reassess patient. CXR notable for bilateral opacities. Suspect the later due to aspiration of blood and /or airway secretions. Patient was reexamined again at bedside upon arrival to ICU- Bed2    Patient paralyzed and sedated. 6.5 ETT in place. Bilateral breath sounds. SpO2 100%    CXR obtained in ICU- ETT slightly high- will try to advance 1-2 cm if able. 04/12/21      Overnight Events/Plan:  · No new bleeding over cric site - continue heparin  · Episode of tachypnea to respiratory rate of high 40s and hypoxia  · Led to increase in vent settings ---> PEEP at 11. Will trial down as tolerated  · Left CXR with likely deep sulcus sign of left. POCUS w/ anterior lung point and poor slide. Likely left pneumothorax which would explain his acute increase ventilatory need. · 7.46/36.7/71/26.1  · Also shows worsening RLL opacity. Blunting of costophrenic angle b/l. Likely effusions, could represent atelectasis/infiltrate    · No infection isolated thus far, continue to follow cultures and BAL  · Increased WBC today and fevers. Zosyn restarted and Covid test sent (pending).    · Na 146, free water at 300 q4h        Inpat Anti-Infectives (From admission, onward)     Start     Ordered Stop    04/06/21 1800  ciprofloxacin HCl (CILOXAN) 0.3 % ophthalmic solution 2 Drop  2 Drop,   Both Eyes,   EVERY 4 HOURS      04/04/21 1724 04/11/21 1759 04/04/21 1800  ciprofloxacin HCl (CILOXAN) 0.3 % ophthalmic solution 2 Drop  2 Drop,   Both Eyes,   EVERY 2 HOURS WHILE AWAKE      04/04/21 1724 04/06/21 1759                The patient is critically ill and can not provide additional history due to Unconsciousness, Ventilated and Unable to speak. Chart and notes reviewed. Data reviewed. []I have reviewed the flowsheet and previous days notes. []The patient is unable to give any meaningful history or review of systems because the patient is:  [x]Intubated [x]Sedated   []Unresponsive      []The patient is critically ill on      [x]Mechanical ventilation []Pressors   []BiPAP []                       Past Medical History:   Diagnosis Date    DDD (degenerative disc disease), lumbar     Diabetes ([address]     Diabetic neuropathy [facility]     Diabetic retinopathy [facility]     DM2 (diabetes mellitus, type 2) ([facility]     HLD (hyperlipidemia)     HTN (hypertension)     Obesity (BMI 30-39. 9)       History reviewed. No pertinent surgical history. Prior to Admission medications    Medication Sig Start Date End Date Taking? Authorizing Provider   LISINOPRIL, BULK, by Does Not Apply route.     Other, MD Cheryl     Current Facility-Administered Medications   Medication Dose Route Frequency    propofoL (DIPRIVAN) 10 mg/mL injection        propofol (DIPRIVAN) 10 mg/mL infusion  0-50 mcg/kg/min IntraVENous TITRATE    fentaNYL (PF) 1,500 mcg/30 mL (50 mcg/mL) infusion  0-200 mcg/hr IntraVENous TITRATE    piperacillin-tazobactam (ZOSYN) 3.375 g in dextrose 5% 100 mL  3.375 g IntraVENous Q6H    midazolam in normal saline (VERSED) 1 mg/mL infusion  0-10 mg/hr IntraVENous TITRATE    acetylcysteine (MUCOMYST) 100 mg/mL (10 %) nebulizer solution 400 mg  4 mL Nebulization BID RT    famotidine (PF) (PEPCID) 20 mg in 0.9% sodium chloride 10 mL injection  20 mg IntraVENous DAILY    heparin 25,000 units in D5W 250 ml infusion  12-25 Units/kg/hr IntraVENous TITRATE    dornase alpha (PULMOZYME)2.5mg/2.5ml nebulizer solution  2.5 mg Nebulization BID RT    albuterol (PROVENTIL VENTOLIN) nebulizer solution 2.5 mg  2.5 mg Nebulization Q6H RT    arformoteroL (BROVANA) neb solution 15 mcg  15 mcg Nebulization BID RT    multivit-folic acid-herbal 644 (WELLESSE PLUS) oral liquid 30 mL  30 mL Per NG tube DAILY    chlorhexidine (PERIDEX) 0.12 % mouthwash 10 mL  10 mL Oral Q12H    insulin lispro (HUMALOG) injection   SubCUTAneous Q6H     Allergies   Allergen Reactions    Lisinopril Angioedema      Social History     Tobacco Use    Smoking status: Current Every Day Smoker     Packs/day: 0.50   Substance Use Topics    Alcohol use: Not on file      History reviewed. No pertinent family history. Review of Systems:  Review of systems not obtained due to patient factors. Objective:   Vital Signs:    Visit Vitals  /64   Pulse 98   Temp 100.1 °F (37.8 °C)   Resp 22   Ht 5' 11\" (1.803 m)   Wt 116 kg (255 lb 11.7 oz)   SpO2 94%   BMI 35.67 kg/m²       O2 Device: Ventilator, Endotracheal tube       Temp (24hrs), Av.8 °F (37.7 °C), Min:98.9 °F (37.2 °C), Max:101.5 °F (38.6 °C)       Intake/Output:   Last shift:      No intake/output data recorded. Last 3 shifts: 04/10 1901 -  0700  In: 4368.8 [I.V.:937.8]  Out: 2695 [Urine:2695]    Intake/Output Summary (Last 24 hours) at 2021 0745  Last data filed at 2021 0553  Gross per 24 hour   Intake 2545.81 ml   Output 1445 ml   Net 1100.81 ml         Ventilator Settings:  Mode Rate Tidal Volume Pressure FiO2 PEEP   Assist control, VC+   550 ml    100 % 11 cm H20     Peak airway pressure: 27 cm H2O    Minute ventilation: 12.8 l/min       ARDS network Guidelines:   Lung protective strategy and Plateau  Pressure goal < 30 cm H2O goals  Oxygenation Goals PaO2 55-80 mm Hg or SaO2 88-95%  PH goal 7.30-7.45    VAP bundle;  Reviewed.   [place] tube to suction at 20-30 cm Hg. Maintain [place] tube with 5-10ml air every 4 hours  Routine oral care every 4 hours  Elevation of head > 45 degree  Daily sedation holiday and SBT evaluation starting at 6.00am.  Physical Exam:    General:  Intubated and sedated  , appears stated age.+ Obese body habitus   Head:  Normocephalic, without obvious abnormality, atraumatic. + facial edema   Eyes:  Conjunctivae/corneas clear. PERRL. Nose: Nares normal. Septum midline. Mucosa normal. No drainage or sinus tenderness. Throat: Lips, mucosa, and tongue swollen. + ETT in place   Neck: bandage and packing inplace where cric was palced (minimal venous oozing at site), no carotid bruit and no JVD. Back:   Symmetric   Lungs:   Bilateral breath sounds, with bilateral rhonchi- no wheezing   Chest wall:  No tenderness or deformity.- No crepitus   Heart:  Regular rate and rhythm, S1, S2 normal, no murmur, click, rub or gallop. Abdomen:   Soft, Obese, non-tender. Bowel sounds normal. No masses,  No organomegaly. Extremities: Extremities normal, atraumatic, no cyanosis or edema. Pulses: 2+ and symmetric all extremities.    Skin: Skin color, texture, turgor normal. No rashes or lesions   Lymph nodes:      Cervical, supraclavicular nodes: unremarkable   Neurologic: Grossly nonfocal       Data:     Recent Labs     04/12/21  0352 04/11/21  0020 04/10/21  0400   WBC 11.3 17.3* 16.9*   HGB 10.0* 10.9* 11.1*   HCT 32.1* 35.6* 36.7    127* 118*     Recent Labs     04/12/21  0352 04/11/21  0020 04/10/21  0400   * 151* 150*   K 4.4 4.3 4.2   * 116* 117*   CO2 29 32 28   * 152* 149*   BUN 72* 65* 55*   CREA 2.21* 1.85* 1.70*   CA 8.2* 8.5 8.4*   MG 3.0* 3.3* 3.4*   PHOS 3.1 2.7 2.3*     Lab Results   Component Value Date/Time    NT pro- (H) 04/09/2021 09:38 AM    NT pro- 04/06/2021 12:02 PM     Lab Results   Component Value Date/Time    INR 1.0 04/04/2021 05:11 PM    Prothrombin time 12.7 04/04/2021 05:11 PM       No results for input(s): PH, PCO2, PO2, HCO3, FIO2 in the last 72 hours.   Recent Labs     04/12/21  0431 04/11/21  0319 04/10/21  0328   FIO2I 100 80 85   HCO3I 26.1* 29.0* 29.2*   PCO2I 36.7 39.8 46.4*   PHI 7.46* 7.47* 7.41   PO2I 71* 61* 90       ENDO:  Lab Results   Component Value Date/Time    TSH 1.35 04/04/2021 05:11 PM       Lab Results   Component Value Date/Time    Glucose 165 (H) 04/12/2021 03:52 AM    Glucose 152 (H) 04/11/2021 12:20 AM    Glucose 149 (H) 04/10/2021 04:00 AM    Glucose 133 (H) 04/09/2021 04:43 AM    Glucose 131 (H) 04/08/2021 06:10 AM         CARDIO:  Telemetry:normal sinus rhythm / sinus bradycardia    Lab Results   Component Value Date/Time    CK 90 04/05/2021 08:11 PM    CK 89 04/05/2021 08:11 PM    CK - MB 1.5 04/05/2021 08:11 PM    CK - MB 1.4 04/05/2021 08:11 PM    CK-MB Index 1.7 04/05/2021 08:11 PM    CK-MB Index 1.6 04/05/2021 08:11 PM    Troponin-I, Qt. 0.06 (H) 04/04/2021 01:40 PM    Troponin-I, QT 0.18 (H) 04/05/2021 08:11 PM    Troponin-I, QT 0.17 (H) 04/05/2021 08:11 PM       EKG Results     Procedure 720 Value Units Date/Time    EKG, 12 LEAD, SUBSEQUENT [[id number]] Collected: 04/06/21 1153    Order Status: Completed Updated: 04/06/21 1536     Ventricular Rate 54 BPM      Atrial Rate 54 BPM      P-R Interval 190 ms      QRS Duration 98 ms      Q-T Interval 588 ms      QTC Calculation (Bezet) 557 ms      Calculated P Axis 44 degrees      Calculated R Axis -20 degrees      Calculated T Axis 130 degrees      Diagnosis --     Sinus bradycardia  Anteroseptal infarct , age undetermined  T wave abnormality, consider lateral ischemia  Prolonged QT  Abnormal ECG  No previous ECGs available  Confirmed by Star Eddy ([id number]) on 4/6/2021 3:36:40 PM      EKG, 12 LEAD, SUBSEQUENT [[id number]]     Order Status: Canceled                MEDS: Please also see MAR  Current Facility-Administered Medications   Medication Dose Route Frequency    propofoL (DIPRIVAN) 10 mg/mL injection  propofol (DIPRIVAN) 10 mg/mL infusion  0-50 mcg/kg/min IntraVENous TITRATE    fentaNYL (PF) 1,500 mcg/30 mL (50 mcg/mL) infusion  0-200 mcg/hr IntraVENous TITRATE    piperacillin-tazobactam (ZOSYN) 3.375 g in dextrose 5% 100 mL  3.375 g IntraVENous Q6H    midazolam in normal saline (VERSED) 1 mg/mL infusion  0-10 mg/hr IntraVENous TITRATE    acetylcysteine (MUCOMYST) 100 mg/mL (10 %) nebulizer solution 400 mg  4 mL Nebulization BID RT    famotidine (PF) (PEPCID) 20 mg in 0.9% sodium chloride 10 mL injection  20 mg IntraVENous DAILY    heparin 25,000 units in D5W 250 ml infusion  12-25 Units/kg/hr IntraVENous TITRATE    dornase alpha (PULMOZYME)2.5mg/2.5ml nebulizer solution  2.5 mg Nebulization BID RT    albuterol (PROVENTIL VENTOLIN) nebulizer solution 2.5 mg  2.5 mg Nebulization Q6H RT    arformoteroL (BROVANA) neb solution 15 mcg  15 mcg Nebulization BID RT    multivit-folic acid-herbal 451 (WELLESSE PLUS) oral liquid 30 mL  30 mL Per NG tube DAILY    chlorhexidine (PERIDEX) 0.12 % mouthwash 10 mL  10 mL Oral Q12H    insulin lispro (HUMALOG) injection   SubCUTAneous Q6H     MICRO:       Results     Procedure Component Value Units Date/Time    RESPIRATORY VIRUS PANEL W/COVID-19, PCR [[id number]] Collected: 04/11/21 0805    Order Status: Completed Specimen: Nasopharyngeal Updated: 04/11/21 0947     Adenovirus Not detected        Coronavirus 229E Not detected        Coronavirus HKU1 Not detected        Coronavirus CVNL63 Not detected        Coronavirus OC43 Not detected        Metapneumovirus Not detected        Rhinovirus and Enterovirus Not detected        Influenza A Not detected        Influenza B Not detected        Parainfluenza 1 Not detected        Parainfluenza 2 Not detected        Parainfluenza 3 Not detected        Parainfluenza virus 4 Not detected        RSV by PCR Not detected        B. parapertussis, PCR Not detected        Bordetella pertussis - PCR Not detected        Chlamydophila pneumoniae DNA, QL, PCR Not detected        Mycoplasma pneumoniae DNA, QL, PCR Not detected        SARS-CoV-2, PCR Not detected       CULTURE, RESPIRATORY/SPUTUM/BRONCH Talisha Arm STAIN [[id number]]  (Abnormal) Collected: 04/10/21 1145    Order Status: Completed Specimen: Sputum from Bronch lavage right middle lobe Updated: 04/11/21 1258     Special Requests: NO SPECIAL REQUESTS        GRAM STAIN FEW WBCS SEEN         NO EPITHELIAL CELLS SEEN         NO ORGANISMS SEEN        Culture result: LIGHT GRAM NEGATIVE RODS               NO NORMAL RESPIRATORY ANNALEE ISOLATED , SO FAR          CULTURE, BLOOD [[id number]] Collected: 04/09/21 1449    Order Status: Completed Specimen: Blood Updated: 04/12/21 0701     Special Requests: NO SPECIAL REQUESTS        Culture result: NO GROWTH 3 DAYS       CULTURE, BLOOD [[id number]] Collected: 04/09/21 1449    Order Status: Completed Specimen: Blood Updated: 04/12/21 0701     Special Requests: NO SPECIAL REQUESTS        Culture result: NO GROWTH 3 DAYS       CULTURE, URINE [[id number]] Collected: 04/09/21 1350    Order Status: Completed Specimen: Urine from Clean catch Updated: 04/10/21 1830     Special Requests: NO SPECIAL REQUESTS        Culture result: No growth (<1,000 CFU/ML)       CULTURE, RESPIRATORY/SPUTUM/BRONCH W GRAM STAIN [[id number]]  (Abnormal)  (Susceptibility) Collected: 04/09/21 1350    Order Status: Completed Specimen: Sputum,ET Suction Updated: 04/11/21 1057     Special Requests: NO SPECIAL REQUESTS        GRAM STAIN RARE WBCS SEEN               RARE EPITHELIAL CELLS SEEN            NO ORGANISMS SEEN        Culture result:       LIGHT ENTEROBACTER AEROGENES                  NO NORMAL RESPIRATORY ANNALEE ISOLATED          Susceptibility      Enterobacter aerogenes     JOANNE     Amikacin ($) Susceptible     Cefazolin ($) Resistant     Cefepime ($$) Susceptible     Cefoxitin Resistant     Ceftazidime ($) Susceptible     Ceftriaxone ($) Susceptible     Ciprofloxacin ($) Susceptible Gentamicin ($) Susceptible     Levofloxacin ($) Susceptible     Meropenem ($$) Susceptible     Piperacillin/Tazobac ($) Susceptible     Tobramycin ($) Susceptible     Trimeth/Sulfa Susceptible                    CULTURE, RESPIRATORY/SPUTUM/BRONCH Sarah Quant STAIN [[id number]] Collected: 04/07/21 1300    Order Status: Completed Specimen: Sputum,ET Suction Updated: 04/09/21 1224     Special Requests: NO SPECIAL REQUESTS        GRAM STAIN OCCASIONAL WBCS SEEN               OCCASIONAL EPITHELIAL CELLS SEEN            NO ORGANISMS SEEN        Culture result:       RARE NORMAL RESPIRATORY ANNALEE          CULTURE, RESPIRATORY/SPUTUM/BRONCH Onnolan Weber [[id number]] Collected: 04/06/21 1715    Order Status: Completed Specimen: Sputum,ET Suction Updated: 04/08/21 1212     Special Requests: NO SPECIAL REQUESTS        GRAM STAIN OCCASIONAL WBCS SEEN         NO ORGANISMS SEEN        Culture result:       NO NORMAL RESPIRATORY ANNALEE ISOLATED          STREP PNEUMO AG, URINE [[id number]] Collected: 04/04/21 1801    Order Status: Completed Specimen: Urine, random Updated: 04/04/21 1944     Strep pneumo Ag, urine Negative       LEGIONELLA PNEUMOPHILA AG, URINE [[id number]] Collected: 04/04/21 1801    Order Status: Completed Specimen: Urine, random Updated: 04/04/21 1944     Legionella Ag, urine Negative       RESPIRATORY VIRUS PANEL W/COVID-19, PCR [[id number]] Collected: 04/04/21 1700    Order Status: Completed Specimen: Nasopharyngeal Updated: 04/04/21 2020     Adenovirus Not detected        Coronavirus 229E Not detected        Coronavirus HKU1 Not detected        Coronavirus CVNL63 Not detected        Coronavirus OC43 Not detected        Metapneumovirus Not detected        Rhinovirus and Enterovirus Not detected        Influenza A Not detected        Influenza A, subtype H1 Not detected        Influenza A, subtype H3 Not detected        INFLUENZA A H1N1 PCR Not detected        Influenza B Not detected        Parainfluenza 1 Not detected        Parainfluenza 2 Not detected        Parainfluenza 3 Not detected        Parainfluenza virus 4 Not detected        RSV by PCR Not detected        B. parapertussis, PCR Not detected        Bordetella pertussis - PCR Not detected        Chlamydophila pneumoniae DNA, QL, PCR Not detected        Mycoplasma pneumoniae DNA, QL, PCR Not detected        SARS-CoV-2, PCR Not detected       CULTURE, MRSA [[id number]] Collected: 04/04/21 1700    Order Status: Completed Specimen: Nasal from Nares Updated: 04/06/21 0838     Special Requests: NO SPECIAL REQUESTS        Culture result: MRSA NOT PRESENT               Screening of patient nares for MRSA is for surveillance purposes and, if positive, to facilitate isolation considerations in high risk settings. It is not intended for automatic decolonization interventions per se as regimens are not sufficiently effective to warrant routine use. COVID-19 RAPID TEST [[id number]] Collected: 04/04/21 1112    Order Status: Completed Specimen: Nasopharyngeal Updated: 04/04/21 1151     Specimen source Nasopharyngeal        COVID-19 rapid test Not Detected        Comment:   Rapid NAAT:  The specimen is NEGATIVE for SARS-CoV-2, the novel coronavirus associated with COVID-19. Negative results should be treated as presumptive and, if inconsistent with clinical signs and symptoms or necessary for patient management, should be tested with an alternative molecular assay. Negative results do not preclude SARS-CoV-2 infection and should not be used as the sole basis for patient management decisions. This test has been authorized by the FDA under an Emergency Use   Authorization (EUA) for use by authorized laboratories.  Fact sheet for Healthcare Providers: ConventionUpdate.co.nz Fact sheet for Patients: ConventionUpdate.co.nz   Methodology: Isothermal Nucleic Acid Amplification                   Imaging:  I have personally reviewed the patients radiographs and have reviewed the reports:    CXR Results  (Last 48 hours)               04/11/21 0133  XR CHEST PORT Final result    Impression:      Support lines and tubes as above without pneumothorax. Cardiomediastinal silhouette enlargement with similar central venous congestion. Small bilateral pleural effusions and likely atelectasis. Narrative:  PORTABLE CHEST RADIOGRAPH        CPT CODE: 86073        INDICATION: Intubated. COMPARISON: 4/10/2021. FINDINGS:       Frontal view of the chest obtained at 0102 hours. Tip of ET tube is 4 cm   proximal to the gabriella. NG tube courses below the diaphragm. Lungs are   hypoinflated. Cardiomediastinal silhouette is similarly enlarged given   differences in patient positioning and inflation. Similar central venous   congestion. Small bilateral pleural effusions. Likely mild atelectasis at the   lung bases. No pneumothorax. CT Results  (Last 48 hours)    None           Best practice :  Core measures:    Glycemic control  [place]. Ventilated patients- aim to keep peak plateau pressure 43-93YQ H2O. Sress ulcer prophylaxis  DVT prophylaxis               Critical Care Time:  The services I provided to this patient were to treat and/or prevent clinically significant deterioration that could result in the failure of one or more body systems and/or organ systems due to respiratory distress, hypoxia, cardiac dysrhythmia. Services included the following:  -reviewing nursing notes and old charts  -vital sign assessments   -direct patient care  -medication orders and management  -interpreting and reviewing diagnostic studies/labs  -re-evaluations  -documentation time        Aggregate critical care time was 35 minutes, which includes only time during which I was engaged in work directly related to the patient's care as described above. During this entire length of time I was immediately available to the patient.  The reason for providing this level of medical care for this critically ill patient was due a critical illness that impaired one or more vital organ systems such that there was a high probability of imminent or life threatening deterioration in the patients condition. This care involved high complexity decision making to assess, manipulate, and support vital system functions, to treat this degreee vital organ system failure and to prevent further life threatening deterioration of the patients condition      Complex decision making was made in the evaluation and management plans during this consultation. More than 50% of time was spent in counseling and coordination of care including review of data and discussion with other team members.       Seng Rodriguez MD PGY-1  [facility] Emergency Medicine    [facility]  Pulmonary, Critical Care, and Sleep Medicine

## 2021-04-12 NOTE — INTERDISCIPLINARY ROUNDS
[facility]  Pulmonary, Critical Care, and Sleep Medicine  Interdisciplinary and Ventilator Weaning Rounds    Patient discussed in morning walking rounds and interdisciplinary rounds. ICU day: 9    Overnight events:   · No acute events overnight   · Increase in sedation requirements  · Increased O2 requirement    Assessments and best practice:   Ventilator  o Ventilator day 9  o Vent settings: FiO2 of 100 and PEEP 11  o VAP bundle, aim to keep peak plateau pressure 44-50QD H2O  o Weaning assessed and documented   - Patient does not meet criteria for SBT. FiO2 and PEEP too high   - Patient is not on sedation holiday. - No plan to wean today. - Outcome: cont full ventilator support   - Final plan: will remain on mechanical ventilatory support today   Sedation  Fentanyl, Versed, Propofol   Other pertinent drips  o Heparin    Lines noted  o peripheral IVs   Critical labs assessed  o Yes   Antibiotics   Zosyn    Medications reviewed  o Yes   Pending imaging  o none   Pending send out labs  o no   Pending Procedures  o no   Glycemic control   SSI    Stress ulcer prophylaxis. o Pepcid   DVT prophylaxis. o SCDs/On heparin gtt    Need for Lines, cardona assessed.  o Yes   Restraint Reevaluation   o Yes  o I have reevaluated the patient one hour after initiation of intervention. The patient is comfortable, uninjured, but continues to pose an imminent risk of injury to self to themselves and/or serious disruption of medical treatment required to keep patient stable. The patient's current medical and behavioral conditions that warrant the use intervention include danger to self and Interference with medical treatment. Restraint or seclusion will be discontinued at the earliest possible time, regardless of the scheduled expiration of the order.  Based on my evaluation, restraints will be continued: YES 4/12/21   o Attending Overseeing restraints:Dr. Saundra Bardales     Family contact/MPOA: sister, Tiffany Dickerson  Family update: updated by bedside nurse        Daily Plans:   Chest tube for left lung Pneumothorax   Wean FIO2 as tolerated    David Lopez, RUDDY  04/12/21  Pulmonary, [address] [place] [address],[address] [facility]

## 2021-04-12 NOTE — PROGRESS NOTES
Was called to room by RN for desaturation . Pt was breathing see saw manner 40 -47 times a minute. HR was 115-125. Peak pressure was increasing. Worked with RN and MAHAD Meraz to get him stable again. We increased sedation and I tried various vent settings that didn't have desired effect. I had to temporarily place him back on 100% O2 from 60 . I'm trying to not suction too much because of edema mentioned in notes and I don't want to de recruit him but he has a moderate amount of thick bloody sx with some plugs. Will continue to monitor.

## 2021-04-12 NOTE — PROGRESS NOTES
Decreased FiO2 to 65%       04/11/21 2010   Patient Observations   Pulse (Heart Rate) 89   Resp Rate 17   O2 Sat (%) 96 %   ETCO2 (mmHg) 38 mmHg   Airway - Continuous Aspiration of Subglottic Secretions (GENESIS) Tube 04/07/21 Oral   Placement Date/Time: 04/07/21 1030   Number of Attempts: 1  Inserted By: Lali Rey CRNA  Present on Admission/Arrival: No  Location: Oral  Placement Verified: Auscultation;BBS;Chest x-ray;EtCO2;Placement not verified (comment)  Airway Types: Endotr. .. Insertion Depth (cm) 27 cm   Line Christian Lips  (inside)   Side Secured Left   Cuff Pressure 28 cmH20   Site Assessment Clean, dry, & intact   Suction on Yes   Amt Secretions Aspirated (mL) 0 mL   Respiratory   Respiratory Pattern Regular   Chest/Tracheal Assessment Chest expansion, symmetrical   Breath Sounds Bilateral Clear   Airway Clearance   Suction ET Tube   Suction Device Inline suction catheter   Sputum Method Obtained Endotracheal   Sputum Amount Small   Sputum Color/Odor Pink tinged; White   Sputum Consistency Thick   Ventilator Initiate/Discontinue   Bio-Med ID # [id number]   Vent Settings   FIO2 (%) 70 %   SpO2/FIO2 Ratio 137.14   CMV Rate Set 16   Back-Up Rate 16   Vt Set (ml) 620 ml   PEEP/VENT (cm H2O) 11 cm H20   Insp Time (sec) 1.2 sec   Insp Rise Time % 50 %   Flow Trigger 3   Ventilator Measurements   Resp Rate Observed 17   Vt Spont (ml) 615 ml   Ve Observed (l/min) 9.79 l/min   PIP Observed (cm H2O) 26 cm H2O   Plateau Pressure (cm H2O) 24 cm H2O   MAP (cm H2O) 16   I:E Ratio Actual 1:2.1   Static Compliance (ml/cm H20) 43 ml/cm H20   Safety & Alarms   Circuit Temperature 98.1 °F (36.7 °C)   Backup Mode Checked/Apnea Yes   Pressure Max 40 cm H2O   Pressure Min 17 cm H2O   Ve Min 2   Ve Max 20   Vt Min 200 ml   Vt Max 1000 ml   RR Max 40   Ambu Bag Yes   Ambu Mask Yes   ETCO2/TCM Min 20 mmHg   ETCO2/TCM Max 50 mmHg   Weaning Parameters   Spontaneous Breathing Trial Complete No (Comments)  (high peep)   Age Specific Ventilator Associated Pneumonia Bundle   Patient Age Group Adult   Adult Ventilator Associated Pneumonia Bundle   Elevation of Head to 30-45 Degrees (Unless Contraindicated) Yes   Assessment of Readiness to Extubate Yes   Oxygen Therapy   Skin Assessment Clean, dry, & intact   Vent Method/Mode   Ventilation Method Conventional   Pulmonary Toilet   Pulmonary Toilet Chest  PT;H. O.B elevated;Suction

## 2021-04-12 NOTE — PROGRESS NOTES
04/12/21 0415   Patient Observations   Pulse (Heart Rate) 98   Resp Rate 22   O2 Sat (%) 94 %   ETCO2 (mmHg) 34 mmHg   Airway - Continuous Aspiration of Subglottic Secretions (GENESIS) Tube 04/07/21 Oral   Placement Date/Time: 04/07/21 1030   Number of Attempts: 1  Inserted By: Ty Barboza CRNA  Present on Admission/Arrival: No  Location: Oral  Placement Verified: Auscultation;BBS;Chest x-ray;EtCO2;Placement not verified (comment)  Airway Types: Endotr. ..    Insertion Depth (cm) 27 cm   Line Christian Lips   Side Secured Left   Cuff Pressure 26 cmH20   Site Assessment Clean, dry, & intact   Suction on Yes   Amt Secretions Aspirated (mL) 0 mL   Respiratory   Respiratory Pattern Regular   Chest/Tracheal Assessment Chest expansion, symmetrical   Breath Sounds Bilateral Clear   Vent Settings   FIO2 (%) 100 %   SpO2/FIO2 Ratio 94   CMV Rate Set 16   Back-Up Rate 16   Vt Set (ml) 550 ml   PEEP/VENT (cm H2O) 11 cm H20   Insp Time (sec) 1.1 sec   Insp Rise Time % 40 %   Flow Trigger 2   Ventilator Measurements   Resp Rate Observed 22   Vt Spont (ml) 577 ml   Ve Observed (l/min) 12.8 l/min   PIP Observed (cm H2O) 27 cm H2O   Plateau Pressure (cm H2O) 24 cm H2O   MAP (cm H2O) 17   I:E Ratio Actual 1:1.4   Static Compliance (ml/cm H20) 39 ml/cm H20   Safety & Alarms   Circuit Temperature 98.6 °F (37 °C)   Backup Mode Checked/Apnea Yes   Pressure Max 45 cm H2O   Pressure Min 17 cm H2O   Ve Min 2   Ve Max 20   Vt Min 200 ml   Vt Max 1000 ml   RR Min 16   RR Max 40   Ambu Bag Yes   Ambu Mask Yes   ETCO2/TCM Min 20 mmHg   ETCO2/TCM Max 50 mmHg   Weaning Parameters   Spontaneous Breathing Trial Complete No (Comments)   Age Specific Ventilator Associated Pneumonia Bundle   Patient Age Group Adult   Adult Ventilator Associated Pneumonia Bundle   Elevation of Head to 30-45 Degrees (Unless Contraindicated) Yes   Vent Method/Mode   Ventilation Method Conventional   Ventilator Mode Assist control;VC+

## 2021-04-12 NOTE — PROGRESS NOTES
VENTILATOR Care plan    Problem: Ventilator Management  Goal: *Adequate oxygenation/ ventilation/ and extubation      Patient:        Ariadna Glass     79 y.o.   male     4/12/2021  8:19 AM  Patient Active Problem List   Diagnosis Code    Angioedema due to angiotensin converting enzyme inhibitor (ACE-I) T78. 3XXA, [id number]    [facility] J96.90    JACQUELYN (acute kidney injury) ([facility] N17.9    Cardiac arrest ([facility]) I46.9    Obesity (BMI 30-39. 9) E66.9    Diabetic neuropathy associated with type 2 diabetes mellitus ([facility] E11.40    Diabetic retinopathy associated with type 2 diabetes mellitus ([facility] E11.319    Pulmonary infiltrates R91.8    Controlled type 2 diabetes mellitus with neurologic complication, without long-term current use of insulin ([facility]) E11.49    DVT (deep venous thrombosis) ([facility]) I82.409       Angioedema due to angiotensin converting enzyme inhibitor (ACE-I) [T78. 3XXA, T46.4X5A]    Reason patient intubated: Airway Protection ( Angioedema )    Ventilator day: 9    Ventilator settings: Not received as ordered   Question Answer Comment   Mode: ASSIST CONTROL    PEEP 11.0 CM/H20    Rate: 16    Tidal Volume 620    FIO2 OTHER titrate for SpO2 >94%     VT  Now 550     ETT Size/Placement: #8 on 4/12 /21 Second intubation following difficult intubation on admission        ABG:  Date:4/12/2021  Lab Results   Component Value Date/Time    PHI 7.46 (H) 04/12/2021 04:31 AM    PCO2I 36.7 04/12/2021 04:31 AM    PO2I 71 (L) 04/12/2021 04:31 AM    HCO3I 26.1 (H) 04/12/2021 04:31 AM    FIO2I 100 04/12/2021 04:31 AM       Chest X-ray:  Date:4/12/2021  Results from Hospital Encounter encounter on 04/04/21   XR CHEST PORT    Narrative PORTABLE CHEST RADIOGRAPH     CPT CODE: 28905     INDICATION: Intubated. COMPARISON: 4/10/2021. FINDINGS:    Frontal view of the chest obtained at 0102 hours. Tip of ET tube is 4 cm  proximal to the gabriella. NG tube courses below the diaphragm.  Lungs are  hypoinflated. Cardiomediastinal silhouette is similarly enlarged given  differences in patient positioning and inflation. Similar central venous  congestion. Small bilateral pleural effusions. Likely mild atelectasis at the  lung bases. No pneumothorax. Impression Support lines and tubes as above without pneumothorax. Cardiomediastinal silhouette enlargement with similar central venous congestion. Small bilateral pleural effusions and likely atelectasis. Lab Test:  Date:4/12/2021  WBC:   Lab Results   Component Value Date/Time    WBC 11.3 04/12/2021 03:52 AM   HGB:   Lab Results   Component Value Date/Time    HGB 10.0 (L) 04/12/2021 03:52 AM    PLTS:   Lab Results   Component Value Date/Time    PLATELET 202 97/58/3752 03:52 AM       SaO2%/flow: @LASTSAO2(1)@    Vital Signs:     Patient Vitals for the past 8 hrs:   Temp Pulse Resp BP SpO2   04/12/21 0800 (!) 103 °F (39.4 °C)       04/12/21 0749     96 %   04/12/21 0415  98 22  94 %   04/12/21 0307 100.1 °F (37.8 °C)       04/12/21 0300  (!) 114 24 108/64 93 %   04/12/21 0245  (!) 114 29  92 %   04/12/21 0230  (!) 114 (!) 43  91 %   04/12/21 0215  (!) 114 30  (!) 87 %   04/12/21 0206     91 %   04/12/21 0200  (!) 104 30 (!) 158/71 (!) 89 %   04/12/21 0145  (!) 108 24  92 %   04/12/21 0115  98 21  90 %   04/12/21 0100  96 19 123/67 (!) 89 %       Wean Screen Pass (Yes or No): Wean Screen Reason for Failure:  Duration of Weaning Trial:  Additional Comments:        PLAN OF CARE: Wean FIO2 / Maintain sedation        GOAL: Safe supportive Invasive ventialtion      OUTCOME:    0635 Reducing peep slowly to 8 / if tolerated drop FIO2 .  Per ICU rounding / Chest Tube pending

## 2021-04-12 NOTE — PROGRESS NOTES
Problem: Ventilator Management  Goal: *Adequate oxygenation and ventilation  Outcome: Progressing Towards Goal     Problem: Patient Education: Go to Patient Education Activity  Goal: Patient/Family Education  Outcome: Not Progressing Towards Goal     Problem: Diabetes Self-Management  Goal: *Disease process and treatment process  Description: Define diabetes and identify own type of diabetes; list 3 options for treating diabetes. Outcome: Progressing Towards Goal  Goal: *Using medications safely  Description: State effect of diabetes medications on diabetes; name diabetes medication taking, action and side effects. Outcome: Progressing Towards Goal  Goal: *Prevention, detection, treatment of acute complications  Description: List symptoms of hyper- and hypoglycemia; describe how to treat low blood sugar and actions for lowering  high blood glucose level. Outcome: Progressing Towards Goal     Problem: Patient Education: Go to Patient Education Activity  Goal: Patient/Family Education  Outcome: Not Progressing Towards Goal     Problem: Non-Violent Restraints  Goal: Removal from restraints as soon as assessed to be safe  Outcome: Progressing Towards Goal  Goal: No harm/injury to patient while restraints in use  Outcome: Progressing Towards Goal  Goal: Patient's dignity will be maintained  Outcome: Progressing Towards Goal  Goal: Patient Interventions  Outcome: Progressing Towards Goal     Problem: Non-Violent Restraints  Goal: Removal from restraints as soon as assessed to be safe  Outcome: Progressing Towards Goal  Goal: No harm/injury to patient while restraints in use  Outcome: Progressing Towards Goal  Goal: Patient's dignity will be maintained  Outcome: Progressing Towards Goal  Goal: Patient Interventions  Outcome: Progressing Towards Goal     Problem: Falls - Risk of  Goal: *Absence of Falls  Description: Document Blayne Larimore Fall Risk and appropriate interventions in the flowsheet.   Outcome: Progressing Towards Goal  Note: Fall Risk Interventions:       Mentation Interventions: Adequate sleep, hydration, pain control, Bed/chair exit alarm, Door open when patient unattended, Evaluate medications/consider consulting pharmacy, Update white board, Toileting rounds, Room close to nurse's station, Reorient patient, More frequent rounding    Medication Interventions: Bed/chair exit alarm, Evaluate medications/consider consulting pharmacy, Patient to call before getting OOB, Teach patient to arise slowly    Elimination Interventions: Bed/chair exit alarm, Call light in reach, Stay With Me (per policy), Toilet paper/wipes in reach, Toileting schedule/hourly rounds              Problem: Patient Education: Go to Patient Education Activity  Goal: Patient/Family Education  Outcome: Not Progressing Towards Goal     Problem: Pressure Injury - Risk of  Goal: *Prevention of pressure injury  Description: Document Lang Scale and appropriate interventions in the flowsheet. Outcome: Progressing Towards Goal  Note: Pressure Injury Interventions:  Sensory Interventions: Assess changes in LOC, Assess need for specialty bed, Check visual cues for pain, Float heels, Keep linens dry and wrinkle-free, Minimize linen layers, Monitor skin under medical devices, Sit a 90-degree angle/use footstool if needed, Pressure redistribution bed/mattress (bed type), Turn and reposition approx. every two hours (pillows and wedges if needed)    Moisture Interventions: Absorbent underpads, Apply protective barrier, creams and emollients, Assess need for specialty bed, Check for incontinence Q2 hours and as needed, Internal/External urinary devices, Minimize layers, Moisture barrier    Activity Interventions: Assess need for specialty bed, Pressure redistribution bed/mattress(bed type)    Mobility Interventions: Assess need for specialty bed, Float heels, HOB 30 degrees or less, Pressure redistribution bed/mattress (bed type), Turn and reposition approx.  every two hours(pillow and wedges)    Nutrition Interventions: Discuss nutritional consult with provider, Document food/fluid/supplement intake    Friction and Shear Interventions: Apply protective barrier, creams and emollients, Feet elevated on foot rest, Foam dressings/transparent film/skin sealants, HOB 30 degrees or less, Minimize layers, Transferring/repositioning devices                Problem: Patient Education: Go to Patient Education Activity  Goal: Patient/Family Education  Outcome: Not Progressing Towards Goal     Problem: Injury - Risk of, Adverse Drug Event  Goal: *Absence of adverse drug events  Outcome: Progressing Towards Goal  Goal: *Absence of medication errors  Outcome: Progressing Towards Goal     Problem: Injury - Risk of, Adverse Drug Event  Goal: *Absence of adverse drug events  Outcome: Progressing Towards Goal  Goal: *Absence of medication errors  Outcome: Progressing Towards Goal     Problem: Patient Education: Go to Patient Education Activity  Goal: Patient/Family Education  Outcome: Not Progressing Towards Goal

## 2021-04-13 ENCOUNTER — APPOINTMENT (OUTPATIENT)
Dept: GENERAL RADIOLOGY | Age: 71
DRG: 004 | End: 2021-04-13
Attending: PHYSICIAN ASSISTANT
Payer: MEDICARE

## 2021-04-13 ENCOUNTER — APPOINTMENT (OUTPATIENT)
Dept: CT IMAGING | Age: 71
DRG: 004 | End: 2021-04-13
Attending: STUDENT IN AN ORGANIZED HEALTH CARE EDUCATION/TRAINING PROGRAM
Payer: MEDICARE

## 2021-04-13 LAB
ALBUMIN SERPL-MCNC: 1.6 G/DL (ref 3.4–5)
ALBUMIN SERPL-MCNC: 1.6 G/DL (ref 3.4–5)
ALBUMIN/GLOB SERPL: 0.3 {RATIO} (ref 0.8–1.7)
ALP SERPL-CCNC: 69 U/L (ref 45–117)
ALT SERPL-CCNC: 172 U/L (ref 16–61)
ANION GAP SERPL CALC-SCNC: 3 MMOL/L (ref 3–18)
ANION GAP SERPL CALC-SCNC: 5 MMOL/L (ref 3–18)
APPEARANCE UR: CLEAR
APTT PPP: 42.4 SEC (ref 23–36.4)
ARTERIAL PATENCY WRIST A: ABNORMAL
ARTERIAL PATENCY WRIST A: ABNORMAL
ARTERIAL PATENCY WRIST A: YES
AST SERPL-CCNC: 157 U/L (ref 10–38)
BACTERIA SPEC CULT: ABNORMAL
BACTERIA SPEC CULT: ABNORMAL
BACTERIA URNS QL MICRO: NEGATIVE /HPF
BASE EXCESS BLD CALC-SCNC: 3 MMOL/L
BASE EXCESS BLD CALC-SCNC: 3 MMOL/L
BASE EXCESS BLD CALC-SCNC: 4 MMOL/L
BASOPHILS # BLD: 0 K/UL (ref 0–0.1)
BASOPHILS # BLD: 0 K/UL (ref 0–0.1)
BASOPHILS NFR BLD: 0 % (ref 0–2)
BASOPHILS NFR BLD: 0 % (ref 0–2)
BDY SITE: ABNORMAL
BILIRUB SERPL-MCNC: 0.5 MG/DL (ref 0.2–1)
BILIRUB UR QL: NEGATIVE
BNP SERPL-MCNC: 923 PG/ML (ref 0–900)
BODY TEMPERATURE: 100.4
BODY TEMPERATURE: 37.8
BUN SERPL-MCNC: 86 MG/DL (ref 7–18)
BUN SERPL-MCNC: 87 MG/DL (ref 7–18)
BUN/CREAT SERPL: 35 (ref 12–20)
BUN/CREAT SERPL: 36 (ref 12–20)
CALCIUM SERPL-MCNC: 8 MG/DL (ref 8.5–10.1)
CALCIUM SERPL-MCNC: 8.2 MG/DL (ref 8.5–10.1)
CHLORIDE SERPL-SCNC: 110 MMOL/L (ref 100–111)
CHLORIDE SERPL-SCNC: 111 MMOL/L (ref 100–111)
CO2 SERPL-SCNC: 28 MMOL/L (ref 21–32)
CO2 SERPL-SCNC: 29 MMOL/L (ref 21–32)
COLOR UR: YELLOW
CREAT SERPL-MCNC: 2.41 MG/DL (ref 0.6–1.3)
CREAT SERPL-MCNC: 2.44 MG/DL (ref 0.6–1.3)
CREAT UR-MCNC: 27 MG/DL (ref 30–125)
DIFFERENTIAL METHOD BLD: ABNORMAL
DIFFERENTIAL METHOD BLD: ABNORMAL
EOSINOPHIL # BLD: 0.2 K/UL (ref 0–0.4)
EOSINOPHIL # BLD: 0.2 K/UL (ref 0–0.4)
EOSINOPHIL #/AREA URNS HPF: NORMAL /[HPF]
EOSINOPHIL NFR BLD: 1 % (ref 0–5)
EOSINOPHIL NFR BLD: 1 % (ref 0–5)
EPITH CASTS URNS QL MICRO: NEGATIVE /LPF (ref 0–5)
ERYTHROCYTE [DISTWIDTH] IN BLOOD BY AUTOMATED COUNT: 15.9 % (ref 11.6–14.5)
ERYTHROCYTE [DISTWIDTH] IN BLOOD BY AUTOMATED COUNT: 16.1 % (ref 11.6–14.5)
GAS FLOW.O2 O2 DELIVERY SYS: ABNORMAL L/MIN
GAS FLOW.O2 SETTING OXYMISER: 18 BPM
GLOBULIN SER CALC-MCNC: 5.3 G/DL (ref 2–4)
GLUCOSE BLD STRIP.AUTO-MCNC: 154 MG/DL (ref 70–110)
GLUCOSE BLD STRIP.AUTO-MCNC: 154 MG/DL (ref 70–110)
GLUCOSE BLD STRIP.AUTO-MCNC: 156 MG/DL (ref 70–110)
GLUCOSE BLD STRIP.AUTO-MCNC: 157 MG/DL (ref 70–110)
GLUCOSE BLD STRIP.AUTO-MCNC: 163 MG/DL (ref 70–110)
GLUCOSE SERPL-MCNC: 152 MG/DL (ref 74–99)
GLUCOSE SERPL-MCNC: 156 MG/DL (ref 74–99)
GLUCOSE UR STRIP.AUTO-MCNC: NEGATIVE MG/DL
GRAM STN SPEC: ABNORMAL
HCO3 BLD-SCNC: 26.6 MMOL/L (ref 22–26)
HCO3 BLD-SCNC: 26.7 MMOL/L (ref 22–26)
HCO3 BLD-SCNC: 28.4 MMOL/L (ref 22–26)
HCT VFR BLD AUTO: 30.7 % (ref 36–48)
HCT VFR BLD AUTO: 34 % (ref 36–48)
HGB BLD-MCNC: 9.5 G/DL (ref 13–16)
HGB BLD-MCNC: 9.9 G/DL (ref 13–16)
HGB UR QL STRIP: NEGATIVE
INR PPP: 1.4 (ref 0.8–1.2)
INSPIRATION.DURATION SETTING TIME VENT: 1.1 SEC
KETONES UR QL STRIP.AUTO: NEGATIVE MG/DL
LEUKOCYTE ESTERASE UR QL STRIP.AUTO: NEGATIVE
LYMPHOCYTES # BLD: 1.3 K/UL (ref 0.9–3.6)
LYMPHOCYTES # BLD: 1.3 K/UL (ref 0.9–3.6)
LYMPHOCYTES NFR BLD: 10 % (ref 21–52)
LYMPHOCYTES NFR BLD: 9 % (ref 21–52)
MAGNESIUM SERPL-MCNC: 3.4 MG/DL (ref 1.6–2.6)
MCH RBC QN AUTO: 25.3 PG (ref 24–34)
MCH RBC QN AUTO: 26 PG (ref 24–34)
MCHC RBC AUTO-ENTMCNC: 29.1 G/DL (ref 31–37)
MCHC RBC AUTO-ENTMCNC: 30.9 G/DL (ref 31–37)
MCV RBC AUTO: 83.9 FL (ref 74–97)
MCV RBC AUTO: 87 FL (ref 74–97)
MONOCYTES # BLD: 1.4 K/UL (ref 0.05–1.2)
MONOCYTES # BLD: 1.5 K/UL (ref 0.05–1.2)
MONOCYTES NFR BLD: 10 % (ref 3–10)
MONOCYTES NFR BLD: 10 % (ref 3–10)
NEUTS SEG # BLD: 10.7 K/UL (ref 1.8–8)
NEUTS SEG # BLD: 12 K/UL (ref 1.8–8)
NEUTS SEG NFR BLD: 78 % (ref 40–73)
NEUTS SEG NFR BLD: 80 % (ref 40–73)
NITRITE UR QL STRIP.AUTO: NEGATIVE
O2/TOTAL GAS SETTING VFR VENT: 0.85 %
O2/TOTAL GAS SETTING VFR VENT: 100 %
O2/TOTAL GAS SETTING VFR VENT: 100 %
PCO2 BLD: 38.6 MMHG (ref 35–45)
PCO2 BLD: 40 MMHG (ref 35–45)
PCO2 BLD: 42.4 MMHG (ref 35–45)
PEEP RESPIRATORY: 8 CMH2O
PH BLD: 7.43 [PH] (ref 7.35–7.45)
PH BLD: 7.44 [PH] (ref 7.35–7.45)
PH BLD: 7.45 [PH] (ref 7.35–7.45)
PH UR STRIP: 5 [PH] (ref 5–8)
PHOSPHATE SERPL-MCNC: 3.7 MG/DL (ref 2.5–4.9)
PHOSPHATE SERPL-MCNC: 4.2 MG/DL (ref 2.5–4.9)
PIP ISTAT,IPIP: 25
PLATELET # BLD AUTO: 133 K/UL (ref 135–420)
PLATELET # BLD AUTO: 144 K/UL (ref 135–420)
PMV BLD AUTO: 13.1 FL (ref 9.2–11.8)
PMV BLD AUTO: 13.2 FL (ref 9.2–11.8)
PO2 BLD: 55 MMHG (ref 80–100)
PO2 BLD: 76 MMHG (ref 80–100)
PO2 BLD: 89 MMHG (ref 80–100)
POTASSIUM SERPL-SCNC: 4.3 MMOL/L (ref 3.5–5.5)
POTASSIUM SERPL-SCNC: 4.6 MMOL/L (ref 3.5–5.5)
PROT SERPL-MCNC: 6.9 G/DL (ref 6.4–8.2)
PROT UR STRIP-MCNC: NEGATIVE MG/DL
PROTHROMBIN TIME: 17.2 SEC (ref 11.5–15.2)
RBC # BLD AUTO: 3.66 M/UL (ref 4.35–5.65)
RBC # BLD AUTO: 3.91 M/UL (ref 4.35–5.65)
RBC #/AREA URNS HPF: NEGATIVE /HPF (ref 0–5)
SAO2 % BLD: 89 % (ref 92–97)
SAO2 % BLD: 95 % (ref 92–97)
SAO2 % BLD: 97 % (ref 92–97)
SERVICE CMNT-IMP: ABNORMAL
SODIUM SERPL-SCNC: 142 MMOL/L (ref 136–145)
SODIUM SERPL-SCNC: 144 MMOL/L (ref 136–145)
SODIUM UR-SCNC: 76 MMOL/L (ref 20–110)
SP GR UR REFRACTOMETRY: 1.01 (ref 1–1.03)
SPECIMEN TYPE: ABNORMAL
TOTAL RESP. RATE, ITRR: 23
TOTAL RESP. RATE, ITRR: 25
UROBILINOGEN UR QL STRIP.AUTO: 0.2 EU/DL (ref 0.2–1)
VENTILATION MODE VENT: ABNORMAL
VOLUME CONTROL PLUS IVLCP: YES
VOLUME CONTROL PLUS IVLCP: YES
VT SETTING VENT: 550 ML
WBC # BLD AUTO: 13.7 K/UL (ref 4.6–13.2)
WBC # BLD AUTO: 15 K/UL (ref 4.6–13.2)
WBC URNS QL MICRO: NEGATIVE /HPF (ref 0–4)

## 2021-04-13 PROCEDURE — 71275 CT ANGIOGRAPHY CHEST: CPT

## 2021-04-13 PROCEDURE — 74011000250 HC RX REV CODE- 250: Performed by: REGISTERED NURSE

## 2021-04-13 PROCEDURE — 74011000250 HC RX REV CODE- 250: Performed by: INTERNAL MEDICINE

## 2021-04-13 PROCEDURE — 94003 VENT MGMT INPAT SUBQ DAY: CPT

## 2021-04-13 PROCEDURE — 74011250636 HC RX REV CODE- 250/636: Performed by: NURSE PRACTITIONER

## 2021-04-13 PROCEDURE — 94762 N-INVAS EAR/PLS OXIMTRY CONT: CPT

## 2021-04-13 PROCEDURE — 74011000250 HC RX REV CODE- 250: Performed by: STUDENT IN AN ORGANIZED HEALTH CARE EDUCATION/TRAINING PROGRAM

## 2021-04-13 PROCEDURE — 83735 ASSAY OF MAGNESIUM: CPT

## 2021-04-13 PROCEDURE — 74011636637 HC RX REV CODE- 636/637: Performed by: PHYSICIAN ASSISTANT

## 2021-04-13 PROCEDURE — 99291 CRITICAL CARE FIRST HOUR: CPT | Performed by: INTERNAL MEDICINE

## 2021-04-13 PROCEDURE — 36600 WITHDRAWAL OF ARTERIAL BLOOD: CPT

## 2021-04-13 PROCEDURE — 80053 COMPREHEN METABOLIC PANEL: CPT

## 2021-04-13 PROCEDURE — 74011000258 HC RX REV CODE- 258: Performed by: PHYSICIAN ASSISTANT

## 2021-04-13 PROCEDURE — 82803 BLOOD GASES ANY COMBINATION: CPT

## 2021-04-13 PROCEDURE — 77030018798 HC PMP KT ENTRL FED COVD -A

## 2021-04-13 PROCEDURE — 74011250636 HC RX REV CODE- 250/636: Performed by: REGISTERED NURSE

## 2021-04-13 PROCEDURE — 71045 X-RAY EXAM CHEST 1 VIEW: CPT

## 2021-04-13 PROCEDURE — 74011000250 HC RX REV CODE- 250: Performed by: PHYSICIAN ASSISTANT

## 2021-04-13 PROCEDURE — 84300 ASSAY OF URINE SODIUM: CPT

## 2021-04-13 PROCEDURE — 84100 ASSAY OF PHOSPHORUS: CPT

## 2021-04-13 PROCEDURE — 74011250637 HC RX REV CODE- 250/637: Performed by: INTERNAL MEDICINE

## 2021-04-13 PROCEDURE — 74011250636 HC RX REV CODE- 250/636: Performed by: PHYSICIAN ASSISTANT

## 2021-04-13 PROCEDURE — 74011250636 HC RX REV CODE- 250/636: Performed by: INTERNAL MEDICINE

## 2021-04-13 PROCEDURE — 74011000636 HC RX REV CODE- 636: Performed by: INTERNAL MEDICINE

## 2021-04-13 PROCEDURE — 94640 AIRWAY INHALATION TREATMENT: CPT

## 2021-04-13 PROCEDURE — 83880 ASSAY OF NATRIURETIC PEPTIDE: CPT

## 2021-04-13 PROCEDURE — APPNB15 APP NON BILLABLE TIME 0-15 MINS: Performed by: REGISTERED NURSE

## 2021-04-13 PROCEDURE — 65610000006 HC RM INTENSIVE CARE

## 2021-04-13 PROCEDURE — 2709999900 HC NON-CHARGEABLE SUPPLY

## 2021-04-13 PROCEDURE — 81001 URINALYSIS AUTO W/SCOPE: CPT

## 2021-04-13 PROCEDURE — 74011250637 HC RX REV CODE- 250/637: Performed by: PHYSICIAN ASSISTANT

## 2021-04-13 PROCEDURE — 80069 RENAL FUNCTION PANEL: CPT

## 2021-04-13 PROCEDURE — 82962 GLUCOSE BLOOD TEST: CPT

## 2021-04-13 PROCEDURE — 74011000258 HC RX REV CODE- 258: Performed by: INTERNAL MEDICINE

## 2021-04-13 PROCEDURE — 82570 ASSAY OF URINE CREATININE: CPT

## 2021-04-13 PROCEDURE — 87205 SMEAR GRAM STAIN: CPT

## 2021-04-13 RX ORDER — FUROSEMIDE 10 MG/ML
40 INJECTION INTRAMUSCULAR; INTRAVENOUS ONCE
Status: DISCONTINUED | OUTPATIENT
Start: 2021-04-13 | End: 2021-04-13

## 2021-04-13 RX ORDER — BUMETANIDE 0.25 MG/ML
2 INJECTION INTRAMUSCULAR; INTRAVENOUS ONCE
Status: COMPLETED | OUTPATIENT
Start: 2021-04-13 | End: 2021-04-13

## 2021-04-13 RX ADMIN — MIDAZOLAM 10 MG/HR: 5 INJECTION INTRAMUSCULAR; INTRAVENOUS at 15:46

## 2021-04-13 RX ADMIN — FENTANYL CITRATE 200 MCG/HR: 0.05 INJECTION, SOLUTION INTRAMUSCULAR; INTRAVENOUS at 06:25

## 2021-04-13 RX ADMIN — FENTANYL CITRATE 100 MCG: 50 INJECTION, SOLUTION INTRAMUSCULAR; INTRAVENOUS at 00:45

## 2021-04-13 RX ADMIN — DORNASE ALFA 2.5 MG: 1 SOLUTION RESPIRATORY (INHALATION) at 07:22

## 2021-04-13 RX ADMIN — INSULIN LISPRO 2 UNITS: 100 INJECTION, SOLUTION INTRAVENOUS; SUBCUTANEOUS at 01:10

## 2021-04-13 RX ADMIN — IOPAMIDOL 72 ML: 755 INJECTION, SOLUTION INTRAVENOUS at 17:56

## 2021-04-13 RX ADMIN — PIPERACILLIN SODIUM AND TAZOBACTAM SODIUM 3.38 G: 3; .375 INJECTION, POWDER, LYOPHILIZED, FOR SOLUTION INTRAVENOUS at 06:35

## 2021-04-13 RX ADMIN — ACETAMINOPHEN ORAL SOLUTION 500 MG: 650 SOLUTION ORAL at 14:12

## 2021-04-13 RX ADMIN — INSULIN LISPRO 2 UNITS: 100 INJECTION, SOLUTION INTRAVENOUS; SUBCUTANEOUS at 06:35

## 2021-04-13 RX ADMIN — FAMOTIDINE 20 MG: 10 INJECTION INTRAVENOUS at 09:30

## 2021-04-13 RX ADMIN — ACETAMINOPHEN ORAL SOLUTION 500 MG: 650 SOLUTION ORAL at 06:34

## 2021-04-13 RX ADMIN — PIPERACILLIN SODIUM AND TAZOBACTAM SODIUM 3.38 G: 3; .375 INJECTION, POWDER, LYOPHILIZED, FOR SOLUTION INTRAVENOUS at 01:15

## 2021-04-13 RX ADMIN — Medication 30 ML: at 09:30

## 2021-04-13 RX ADMIN — ALBUTEROL SULFATE 2.5 MG: 2.5 SOLUTION RESPIRATORY (INHALATION) at 13:45

## 2021-04-13 RX ADMIN — INSULIN LISPRO 2 UNITS: 100 INJECTION, SOLUTION INTRAVENOUS; SUBCUTANEOUS at 23:47

## 2021-04-13 RX ADMIN — MIDAZOLAM 10 MG/HR: 5 INJECTION INTRAMUSCULAR; INTRAVENOUS at 06:33

## 2021-04-13 RX ADMIN — PIPERACILLIN SODIUM AND TAZOBACTAM SODIUM 3.38 G: 3; .375 INJECTION, POWDER, LYOPHILIZED, FOR SOLUTION INTRAVENOUS at 12:12

## 2021-04-13 RX ADMIN — ARFORMOTEROL TARTRATE 15 MCG: 15 SOLUTION RESPIRATORY (INHALATION) at 19:31

## 2021-04-13 RX ADMIN — PIPERACILLIN SODIUM AND TAZOBACTAM SODIUM 3.38 G: 3; .375 INJECTION, POWDER, LYOPHILIZED, FOR SOLUTION INTRAVENOUS at 18:30

## 2021-04-13 RX ADMIN — ACETYLCYSTEINE 400 MG: 100 SOLUTION ORAL; RESPIRATORY (INHALATION) at 13:45

## 2021-04-13 RX ADMIN — PIPERACILLIN SODIUM AND TAZOBACTAM SODIUM 3.38 G: 3; .375 INJECTION, POWDER, LYOPHILIZED, FOR SOLUTION INTRAVENOUS at 23:50

## 2021-04-13 RX ADMIN — FENTANYL CITRATE 200 MCG/HR: 0.05 INJECTION, SOLUTION INTRAMUSCULAR; INTRAVENOUS at 21:28

## 2021-04-13 RX ADMIN — ALBUTEROL SULFATE 2.5 MG: 2.5 SOLUTION RESPIRATORY (INHALATION) at 07:22

## 2021-04-13 RX ADMIN — CHLORHEXIDINE GLUCONATE 0.12% ORAL RINSE 10 ML: 1.2 LIQUID ORAL at 09:30

## 2021-04-13 RX ADMIN — EPOPROSTENOL 50 NG/KG/MIN: 1.5 INJECTION, POWDER, LYOPHILIZED, FOR SOLUTION INTRAVENOUS at 20:05

## 2021-04-13 RX ADMIN — BUMETANIDE 2 MG: 0.25 INJECTION INTRAMUSCULAR; INTRAVENOUS at 11:01

## 2021-04-13 RX ADMIN — EPOPROSTENOL 50 NG/KG/MIN: 1.5 INJECTION, POWDER, LYOPHILIZED, FOR SOLUTION INTRAVENOUS at 13:59

## 2021-04-13 RX ADMIN — ACETAMINOPHEN ORAL SOLUTION 500 MG: 650 SOLUTION ORAL at 01:19

## 2021-04-13 RX ADMIN — FENTANYL CITRATE 200 MCG/HR: 0.05 INJECTION, SOLUTION INTRAMUSCULAR; INTRAVENOUS at 13:56

## 2021-04-13 RX ADMIN — INSULIN LISPRO 2 UNITS: 100 INJECTION, SOLUTION INTRAVENOUS; SUBCUTANEOUS at 17:38

## 2021-04-13 RX ADMIN — CHLORHEXIDINE GLUCONATE 0.12% ORAL RINSE 10 ML: 1.2 LIQUID ORAL at 21:32

## 2021-04-13 RX ADMIN — DORNASE ALFA 2.5 MG: 1 SOLUTION RESPIRATORY (INHALATION) at 19:31

## 2021-04-13 RX ADMIN — MIDAZOLAM 2 MG: 1 INJECTION INTRAMUSCULAR; INTRAVENOUS at 01:11

## 2021-04-13 RX ADMIN — ALBUTEROL SULFATE 2.5 MG: 2.5 SOLUTION RESPIRATORY (INHALATION) at 19:30

## 2021-04-13 RX ADMIN — INSULIN LISPRO 2 UNITS: 100 INJECTION, SOLUTION INTRAVENOUS; SUBCUTANEOUS at 12:17

## 2021-04-13 RX ADMIN — CHLOROTHIAZIDE SODIUM 500 MG: 500 INJECTION, POWDER, LYOPHILIZED, FOR SOLUTION INTRAVENOUS at 18:41

## 2021-04-13 RX ADMIN — ARFORMOTEROL TARTRATE 15 MCG: 15 SOLUTION RESPIRATORY (INHALATION) at 07:23

## 2021-04-13 NOTE — PROGRESS NOTES
Reviewed patients labs this afternoon. Creatinine as well as BUN about the same as this morning. Considering that patient is severely hypoxic and need for decision for anticoagulation for possible PE Surely outweighs the risk of contrast Associated nephropathy in the setting. Discussed with Dr Eli Tomas and pccm team.     Suzan Velasquez well with bumex. Agree with adding diuril. Ordering urine studies. Ordering renal panel every 12 hours. Discussed with patients nurse as well as the PCCM team.    Maggie Stout MD, MOLINA.

## 2021-04-13 NOTE — PROGRESS NOTES
04/13/21 1359   Patient Observations   Pulse (Heart Rate) 90   Resp Rate 25   O2 Sat (%) 92 %   ETCO2 (mmHg) 30 mmHg     Veletri Started at [facility]. Will continue to monitor patient closely. 1548 Veletri VTBI 35.9 mL. SpO2 94-95%.

## 2021-04-13 NOTE — PROGRESS NOTES
Per ABG decreased to 90%          Results for David Fournier (MRN [id number]) as of 4/13/2021 05:25   Ref. Range 4/13/2021 03:59   pH (POC) Latest Ref Range: 7.35 - 7.45   7.43   pCO2 (POC) Latest Ref Range: 35.0 - 45.0 MMHG 40.0   pO2 (POC) Latest Ref Range: 80 - 100 MMHG 89   HCO3 (POC) Latest Ref Range: 22 - 26 MMOL/L 26.6 (H)   sO2 (POC) Latest Ref Range: 92 - 97 % 97   Base excess (POC) Latest Units: mmol/L 3   FIO2 (POC) Latest Units: % 100   Patient temp.  Latest Units:   37.8   Specimen type (POC) Latest Units:   ARTERIAL   Set Rate Latest Units: bpm 18   Site Latest Units:   RIGHT BRACHIAL   Device: Latest Units:   VENT   Mode Latest Units:   ASSIST CONTROL   Tidal volume Latest Units: ml 550   PEEP/CPAP (POC) Latest Units: cmH2O 8   Allens test (POC) Latest Units:   N/A

## 2021-04-13 NOTE — PROGRESS NOTES
Problem: Ventilator Management  Goal: *Adequate oxygenation and ventilation  Outcome: Progressing Towards Goal     Problem: Nutrition Deficit  Goal: *Optimize nutritional status  Outcome: Progressing Towards Goal     Problem: Injury - Risk of, Adverse Drug Event  Goal: *Absence of adverse drug events  Outcome: Progressing Towards Goal  Goal: *Absence of medication errors  Outcome: Progressing Towards Goal

## 2021-04-13 NOTE — PROGRESS NOTES
[facility]  Pulmonary, Critical Care, and Sleep Medicine    Name: Rey Raygoza MRN: [id number]   : 1950 Hospital: [address] [place] [address] Dr   Date: 2021        Critical Care: Daily Progress Note    Admission Date:   2021  LOS: 9  MAR reviewed and pertinent medications noted or modified as needed    IMPRESSION:   · Angioedema- with airway and respiratory failure and collapse; thought due to ACE inhibitor  · S/P Emergent Cricthyrotomy [facility]-ED 21- converted to 6.5 ETT intraoperative by anesthesia team 21, 8.0 ETT by anesthesia 21, packing removed evening 21-ENT  · S/P cardiac arrest @ [facility] 21- brief thought due to hypoxia due to airway collapse  · Respiratory Failure: Acute due to above; currently intubated and on vent for airway protection  · Pulmonary Infiltrates: suspect aspiration secretions and/or blood due to above- can't exclude other infectious process at this time. Rapid Covid Negative at [facility]  · Bradycardia  · DVT: Popliteal- Left; acute non-occlusive Heparin ACS protocol started - stopped  due to bleeding from Cric site. Heparin on/off due to bleeding episodes  · JACQUELYN  · Left scleral erythema- unknown baseline; possible infection   · DM  · DM retinopathy per chart review  · Diabetic peripheral neuropathy  · COVID -19; Negative rapid and Biofire: 21  · Left Pneumothorax    · Obesity: Body mass index is 35.64 kg/m².   ·       RECOMMENDATIONS:   NEURO:   Serial neuro evaluations   Sedation Holiday per protocol   RAAS: 0 to -1   Wetting tears and cipro eye drops- stop date for cipro on chart     CARDIO:   MAP goal >64- No IV pressor requirement   Echo on 21    Telemetry    PULM:   Maintain aspiration precautions: HOB >30 degrees   SpO2 goal >92%   Bronchial /oral hygiene   VAP bundle- Wean vent as clinical status allows   SBT as clinical status allows   Complete course of decadron   Pulmozyme and Mucomist BID with bronchodilator   Metaneb    GI/ NUTRITION:   OGT    Diet: TF with Free water @ 300 mL Q4   SUP:Pepcid   Follow up with nutritional recommendations     RENAL/ METAB/ :   Monitor I&Os   Trend electrolytes - replaced as needed, K:4, Mg>2 PO4>2   Garcia: Continue    HEM/ONC   Trend Hb/HCT and PLTs, and indicis   DVT prophylaxis: SCDs. Heparin on/off due to bleeding episodes   Re-image poplyteal DVT 72 hours and/or pending clinical course- may need IVC Filter if worsens   Blood Products: not indicated at this time    ID   Antibioitcs: Cipro eye drop, Zosyn: 4/4- 4/8/21 Vanco: 4/4-6, MRSA swab negative   Zosyn restarted on 4/11   Follow up on pending cultures   Repeat Covid negative (4/11)   Tylenol for fevers    ENDO   BGs Q 6,SSI   C1 inhibitor normal level (4/7/21)     MSK/ ORTHO   No active Issues     SKIN/ WOUNDS   Per protocol   Neck- daily dressing changes per ENT, Quick clot wit Afrin if needed, d/c xeroform     OTHER/DISPO:    CODE STATUS: Full Code- Full    Case reviewed and discussed with  ICU care team     Subjective/History: This patient has been seen and evaluated at the request of Dr. Asha Cornell- ED from [facility] for Angioedema and airway compromise. Patient is a 79 y.o. male presented earlier this morning to [facility] ED in rapidly progressive respiratory distress due to angioedema and airway swelling presumed due to ACE inhibitor therapy. ED provider unable to intubate due to severity of airway swelling. Brief cardio-pulmonary collapse with brief loss of pulse. IO placed into right shoulder. Emergent cricothyrotomy 5.0 place. Good placement. ED team able to oxygenate and ventilate patient. Prior to transport ABG notable for Respiratory acidosis. Call back to -ED patient on vent and paralyzed with rate of 10. I requested that RR be increased to 16-18 bpm depending what patient could tolerate. Our ENT provider on call, Dr. Rissa Stevenson was contacted.  OR team on call came in. Patient flow via Nightingale/Life flight to our facility. The patient was taken from flight line to OR hold. On-call team rapidly assess patient. I evaluated the patient immediately upon arrival. Patient with Cric in place, bilateral breath  Sounds, SpO2 in the mid 90's. Swollen neck and face. ABG -POC with persistent but improved respiratory acidosis. ER team took patient emergently to the OR    Anesthesia able to place ETT 6.5 intraop. I was called by ENT provider in the OR. Case discussed. Opted to keep patient intubated with 6.5 ETT. Will monitor for 48-72 hours and reassess patient. CXR notable for bilateral opacities. Suspect the later due to aspiration of blood and /or airway secretions. Patient was reexamined again at bedside upon arrival to ICU- Bed2    Patient paralyzed and sedated. 6.5 ETT in place. Bilateral breath sounds. SpO2 100%    CXR obtained in ICU- ETT slightly high- will try to advance 1-2 cm if able. 04/13/21      Overnight Events/Plan:  · No new bleeding over cric site  · Episode of bright red blood per ETT. Hypoxic with event requiring increased sedation. Heparin held. · H/H stable  · Aborted chest tube yesterday, q12h CXR temporarily  · Wean vent as tolerated, PEEP 8, FiO2 90%  · 7.43/40/89/26.6  · Also shows worsening RLL opacity. Blunting of costophrenic angle b/l. Likely effusions, could represent atelectasis/infiltrate    · Concern for PE with worsening respiratory status, pulmonary hypertension. · Needs CTA Chest to evaluate for PE, but will need IV contrast, which may worsen kidney function. · Consult Nephrology for recommendations.   · Bumex once and epoprostenol (veletri) today  · No infection isolated thus far, continue to follow cultures and BAL  · Na 144, free water at 300 q4h  · Family discussion today        Inpat Anti-Infectives (From admission, onward)     Start     Ordered Stop    04/06/21 1800  ciprofloxacin HCl (CILOXAN) 0.3 % ophthalmic solution 2 Drop  2 Drop,   Both Eyes,   EVERY 4 HOURS      04/04/21 1724 04/11/21 1759 04/04/21 1800  ciprofloxacin HCl (CILOXAN) 0.3 % ophthalmic solution 2 Drop  2 Drop,   Both Eyes,   EVERY 2 HOURS WHILE AWAKE      04/04/21 1724 04/06/21 1759                The patient is critically ill and can not provide additional history due to Unconsciousness, Ventilated and Unable to speak. Chart and notes reviewed. Data reviewed. []I have reviewed the flowsheet and previous days notes. []The patient is unable to give any meaningful history or review of systems because the patient is:  [x]Intubated [x]Sedated   []Unresponsive      []The patient is critically ill on      [x]Mechanical ventilation []Pressors   []BiPAP []                       Past Medical History:   Diagnosis Date    DDD (degenerative disc disease), lumbar     Diabetes ([address]     Di[facility] neuropathy [facility]     Diabetic retinopathy [facility]     DM2 (diabetes mellitus, type 2) ([facility]     HLD (hyperlipidemia)     HTN (hypertension)     Obesity (BMI 30-39. 9)       History reviewed. No pertinent surgical history. Prior to Admission medications    Medication Sig Start Date End Date Taking? Authorizing Provider   LISINOPRIL, BULK, by Does Not Apply route.     Other, MD Cheryl     Current Facility-Administered Medications   Medication Dose Route Frequency    propofol (DIPRIVAN) 10 mg/mL infusion  0-50 mcg/kg/min IntraVENous TITRATE    fentaNYL (PF) 1,500 mcg/30 mL (50 mcg/mL) infusion  0-200 mcg/hr IntraVENous TITRATE    piperacillin-tazobactam (ZOSYN) 3.375 g in dextrose 5% 100 mL  3.375 g IntraVENous Q6H    midazolam in normal saline (VERSED) 1 mg/mL infusion  0-10 mg/hr IntraVENous TITRATE    acetylcysteine (MUCOMYST) 100 mg/mL (10 %) nebulizer solution 400 mg  4 mL Nebulization BID RT    famotidine (PF) (PEPCID) 20 mg in 0.9% sodium chloride 10 mL injection  20 mg IntraVENous DAILY    heparin 25,000 units in D5W 250 ml infusion  12-25 Units/kg/hr IntraVENous TITRATE    dornase alpha (PULMOZYME)2.5mg/2.5ml nebulizer solution  2.5 mg Nebulization BID RT    albuterol (PROVENTIL VENTOLIN) nebulizer solution 2.5 mg  2.5 mg Nebulization Q6H RT    arformoteroL (BROVANA) neb solution 15 mcg  15 mcg Nebulization BID RT    multivit-folic acid-herbal 727 (WELLESSE PLUS) oral liquid 30 mL  30 mL Per NG tube DAILY    chlorhexidine (PERIDEX) 0.12 % mouthwash 10 mL  10 mL Oral Q12H    insulin lispro (HUMALOG) injection   SubCUTAneous Q6H     Allergies   Allergen Reactions    Lisinopril Angioedema      Social History     Tobacco Use    Smoking status: Current Every Day Smoker     Packs/day: 0.50   Substance Use Topics    Alcohol use: Not on file      History reviewed. No pertinent family history. Review of Systems:  Review of systems not obtained due to patient factors. Objective:   Vital Signs:    Visit Vitals  /60   Pulse 84   Temp 99.7 °F (37.6 °C)   Resp 22   Ht 5' 11\" (1.803 m)   Wt 115.9 kg (255 lb 8.2 oz)   SpO2 93%   BMI 35.64 kg/m²       O2 Device: Ventilator       Temp (24hrs), Av.5 °F (38.1 °C), Min:96.4 °F (35.8 °C), Max:102.2 °F (39 °C)       Intake/Output:   Last shift:      No intake/output data recorded. Last 3 shifts:  1901 -  0700  In: 4780.9 [I.V.:1303.9]  Out: 2535 [Urine:2535]    Intake/Output Summary (Last 24 hours) at 2021 0819  Last data filed at 2021 0700  Gross per 24 hour   Intake 2277.58 ml   Output 1725 ml   Net 552.58 ml         Ventilator Settings:  Mode Rate Tidal Volume Pressure FiO2 PEEP   Assist control, VC+   550 ml    90 % 8 cm H20     Peak airway pressure: 23 cm H2O    Minute ventilation: 13.3 l/min       ARDS network Guidelines:   Lung protective strategy and Plateau  Pressure goal < 30 cm H2O goals  Oxygenation Goals PaO2 55-80 mm Hg or SaO2 88-95%  PH goal 7.30-7.45    VAP bundle;  Reviewed. Annalisa tube to suction at 20-30 cm Hg.   Maintain [place] tube with 5-10ml air every 4 hours  Routine oral care every 4 hours  Elevation of head > 45 degree  Daily sedation holiday and SBT evaluation starting at 6.00am.  Physical Exam:    General:   Intubated and sedated, appears stated age.+ Obese body habitus   Head:  Normocephalic, without obvious abnormality, atraumatic. + facial edema   Eyes:  Conjunctivae/corneas clear. PERRL. Nose: Nares normal. Septum midline. Mucosa normal. No drainage or sinus tenderness. Throat: Lips, mucosa, and tongue swollen. + ETT in place   Neck: bandage and packing inplace where cric was palced (minimal venous oozing at site), no carotid bruit and no JVD. Back:   Symmetric   Lungs:   Bilateral breath sounds, with bilateral rhonchi- no wheezing   Chest wall:  No tenderness or deformity.- No crepitus   Heart:  Regular rate and rhythm, S1, S2 normal, no murmur, click, rub or gallop. Abdomen:   Soft, Obese, non-tender. Bowel sounds normal. No masses,  No organomegaly. Extremities: Extremities normal, atraumatic, no cyanosis or edema. Pulses: 2+ and symmetric all extremities.    Skin: Skin color, texture, turgor normal. No rashes or lesions   Lymph nodes:      Cervical, supraclavicular nodes: unremarkable   Neurologic: Grossly nonfocal       Data:     Recent Labs     04/12/21  2354 04/12/21  0352 04/11/21  0020   WBC 13.7* 11.3 17.3*   HGB 9.9* 10.0* 10.9*   HCT 34.0* 32.1* 35.6*    147 127*     Recent Labs     04/12/21  2354 04/12/21  0352 04/11/21  0020   NA  --  146* 151*   K  --  4.4 4.3   CL  --  113* 116*   CO2  --  29 32   GLU  --  165* 152*   BUN  --  72* 65*   CREA  --  2.21* 1.85*   CA  --  8.2* 8.5   MG  --  3.0* 3.3*   PHOS  --  3.1 2.7   INR 1.4*  --   --      Lab Results   Component Value Date/Time    NT pro- (H) 04/09/2021 09:38 AM    NT pro- 04/06/2021 12:02 PM     Lab Results   Component Value Date/Time    INR 1.4 (H) 04/12/2021 11:54 PM    INR 1.0 04/04/2021 05:11 PM    Prothrombin time 17.2 (H) 04/12/2021 11:54 PM    Prothrombin time 12.7 04/04/2021 05:11 PM       No results for input(s): PH, PCO2, PO2, HCO3, FIO2 in the last 72 hours.   Recent Labs     04/13/21  0359 04/12/21  0431 04/11/21  0319   FIO2I 100 100 80   HCO3I 26.6* 26.1* 29.0*   PCO2I 40.0 36.7 39.8   PHI 7.43 7.46* 7.47*   PO2I 89 71* 61*       ENDO:  Lab Results   Component Value Date/Time    TSH 1.35 04/04/2021 05:11 PM       Lab Results   Component Value Date/Time    Glucose 165 (H) 04/12/2021 03:52 AM    Glucose 152 (H) 04/11/2021 12:20 AM    Glucose 149 (H) 04/10/2021 04:00 AM    Glucose 133 (H) 04/09/2021 04:43 AM    Glucose 131 (H) 04/08/2021 06:10 AM         CARDIO:  Telemetry:normal sinus rhythm / sinus bradycardia    Lab Results   Component Value Date/Time    CK 90 04/05/2021 08:11 PM    CK 89 04/05/2021 08:11 PM    CK - MB 1.5 04/05/2021 08:11 PM    CK - MB 1.4 04/05/2021 08:11 PM    CK-MB Index 1.7 04/05/2021 08:11 PM    CK-MB Index 1.6 04/05/2021 08:11 PM    Troponin-I, Qt. 0.06 (H) 04/04/2021 01:40 PM    Troponin-I, QT 0.18 (H) 04/05/2021 08:11 PM    Troponin-I, QT 0.17 (H) 04/05/2021 08:11 PM       EKG Results     Procedure 720 Value Units Date/Time    EKG, 12 LEAD, SUBSEQUENT [[id number]] Collected: 04/06/21 1153    Order Status: Completed Updated: 04/06/21 1536     Ventricular Rate 54 BPM      Atrial Rate 54 BPM      P-R Interval 190 ms      QRS Duration 98 ms      Q-T Interval 588 ms      QTC Calculation (Bezet) 557 ms      Calculated P Axis 44 degrees      Calculated R Axis -20 degrees      Calculated T Axis 130 degrees      Diagnosis --     Sinus bradycardia  Anteroseptal infarct , age undetermined  T wave abnormality, consider lateral ischemia  Prolonged QT  Abnormal ECG  No previous ECGs available  Confirmed by Alfredo Vega ([id number]) on 4/6/2021 3:36:40 PM      EKG, 12 LEAD, SUBSEQUENT [[id number]]     Order Status: Canceled                MEDS: Please also see MAR  Current Facility-Administered Medications   Medication Dose Route Frequency    propofol (DIPRIVAN) 10 mg/mL infusion  0-50 mcg/kg/min IntraVENous TITRATE    fentaNYL (PF) 1,500 mcg/30 mL (50 mcg/mL) infusion  0-200 mcg/hr IntraVENous TITRATE    piperacillin-tazobactam (ZOSYN) 3.375 g in dextrose 5% 100 mL  3.375 g IntraVENous Q6H    midazolam in normal saline (VERSED) 1 mg/mL infusion  0-10 mg/hr IntraVENous TITRATE    acetylcysteine (MUCOMYST) 100 mg/mL (10 %) nebulizer solution 400 mg  4 mL Nebulization BID RT    famotidine (PF) (PEPCID) 20 mg in 0.9% sodium chloride 10 mL injection  20 mg IntraVENous DAILY    heparin 25,000 units in D5W 250 ml infusion  12-25 Units/kg/hr IntraVENous TITRATE    dornase alpha (PULMOZYME)2.5mg/2.5ml nebulizer solution  2.5 mg Nebulization BID RT    albuterol (PROVENTIL VENTOLIN) nebulizer solution 2.5 mg  2.5 mg Nebulization Q6H RT    arformoteroL (BROVANA) neb solution 15 mcg  15 mcg Nebulization BID RT    multivit-folic acid-herbal 390 (WELLESSE PLUS) oral liquid 30 mL  30 mL Per NG tube DAILY    chlorhexidine (PERIDEX) 0.12 % mouthwash 10 mL  10 mL Oral Q12H    insulin lispro (HUMALOG) injection   SubCUTAneous Q6H     MICRO:       Results     Procedure Component Value Units Date/Time    RESPIRATORY VIRUS PANEL W/COVID-19, PCR [[id number]] Collected: 04/11/21 0805    Order Status: Completed Specimen: Nasopharyngeal Updated: 04/11/21 0947     Adenovirus Not detected        Coronavirus 229E Not detected        Coronavirus HKU1 Not detected        Coronavirus CVNL63 Not detected        Coronavirus OC43 Not detected        Metapneumovirus Not detected        Rhinovirus and Enterovirus Not detected        Influenza A Not detected        Influenza B Not detected        Parainfluenza 1 Not detected        Parainfluenza 2 Not detected        Parainfluenza 3 Not detected        Parainfluenza virus 4 Not detected        RSV by PCR Not detected        B. parapertussis, PCR Not detected        Bordetella pertussis - PCR Not detected        Chlamydophila pneumoniae DNA, QL, PCR Not detected        Mycoplasma pneumoniae DNA, QL, PCR Not detected        SARS-CoV-2, PCR Not detected       CULTURE, RESPIRATORY/SPUTUM/BRONCH Leverne Sayres STAIN [[id number]]  (Abnormal) Collected: 04/10/21 1145    Order Status: Completed Specimen: Sputum from Bronch lavage right middle lobe Updated: 04/11/21 1258     Special Requests: NO SPECIAL REQUESTS        GRAM STAIN FEW WBCS SEEN         NO EPITHELIAL CELLS SEEN         NO ORGANISMS SEEN        Culture result: LIGHT GRAM NEGATIVE RODS               NO NORMAL RESPIRATORY ANNALEE ISOLATED , SO FAR          CULTURE, BLOOD [[id number]] Collected: 04/09/21 1449    Order Status: Completed Specimen: Blood Updated: 04/13/21 0746     Special Requests: NO SPECIAL REQUESTS        Culture result: NO GROWTH 4 DAYS       CULTURE, BLOOD [[id number]] Collected: 04/09/21 1449    Order Status: Completed Specimen: Blood Updated: 04/13/21 0746     Special Requests: NO SPECIAL REQUESTS        Culture result: NO GROWTH 4 DAYS       CULTURE, URINE [[id number]] Collected: 04/09/21 1350    Order Status: Completed Specimen: Urine from Clean catch Updated: 04/10/21 1830     Special Requests: NO SPECIAL REQUESTS        Culture result: No growth (<1,000 CFU/ML)       CULTURE, RESPIRATORY/SPUTUM/BRONCH W GRAM STAIN [[id number]]  (Abnormal)  (Susceptibility) Collected: 04/09/21 1350    Order Status: Completed Specimen: Sputum,ET Suction Updated: 04/11/21 1057     Special Requests: NO SPECIAL REQUESTS        GRAM STAIN RARE WBCS SEEN               RARE EPITHELIAL CELLS SEEN            NO ORGANISMS SEEN        Culture result:       LIGHT ENTEROBACTER AEROGENES                  NO NORMAL RESPIRATORY ANNALEE ISOLATED          Susceptibility      Enterobacter aerogenes     JOANNE     Amikacin ($) Susceptible     Cefazolin ($) Resistant     Cefepime ($$) Susceptible     Cefoxitin Resistant     Ceftazidime ($) Susceptible     Ceftriaxone ($) Susceptible     Ciprofloxacin ($) Susceptible Gentamicin ($) Susceptible     Levofloxacin ($) Susceptible     Meropenem ($$) Susceptible     Piperacillin/Tazobac ($) Susceptible     Tobramycin ($) Susceptible     Trimeth/Sulfa Susceptible                    CULTURE, RESPIRATORY/SPUTUM/BRONCH Lakesha Catrina STAIN [[id number]] Collected: 04/07/21 1300    Order Status: Completed Specimen: Sputum,ET Suction Updated: 04/09/21 1224     Special Requests: NO SPECIAL REQUESTS        GRAM STAIN OCCASIONAL WBCS SEEN               OCCASIONAL EPITHELIAL CELLS SEEN            NO ORGANISMS SEEN        Culture result:       RARE NORMAL RESPIRATORY ANNALEE          CULTURE, RESPIRATORY/SPUTUM/BRONCH [place] [[id number]] Collected: 04/06/21 1715    Order Status: Completed Specimen: Sputum,ET Suction Updated: 04/08/21 1212     Special Requests: NO SPECIAL REQUESTS        GRAM STAIN OCCASIONAL WBCS SEEN         NO ORGANISMS SEEN        Culture result:       NO NORMAL RESPIRATORY ANNALEE ISOLATED          STREP PNEUMO AG, URINE [[id number]] Collected: 04/04/21 1801    Order Status: Completed Specimen: Urine, random Updated: 04/04/21 1944     Strep pneumo Ag, urine Negative       LEGIONELLA PNEUMOPHILA AG, URINE [[id number]] Collected: 04/04/21 1801    Order Status: Completed Specimen: Urine, random Updated: 04/04/21 1944     Legionella Ag, urine Negative       RESPIRATORY VIRUS PANEL W/COVID-19, PCR [[id number]] Collected: 04/04/21 1700    Order Status: Completed Specimen: Nasopharyngeal Updated: 04/04/21 2020     Adenovirus Not detected        Coronavirus 229E Not detected        Coronavirus HKU1 Not detected        Coronavirus CVNL63 Not detected        Coronavirus OC43 Not detected        Metapneumovirus Not detected        Rhinovirus and Enterovirus Not detected        Influenza A Not detected        Influenza A, subtype H1 Not detected        Influenza A, subtype H3 Not detected        INFLUENZA A H1N1 PCR Not detected        Influenza B Not detected        Parainfluenza 1 Not detected Parainfluenza 2 Not detected        Parainfluenza 3 Not detected        Parainfluenza virus 4 Not detected        RSV by PCR Not detected        B. parapertussis, PCR Not detected        Bordetella pertussis - PCR Not detected        Chlamydophila pneumoniae DNA, QL, PCR Not detected        Mycoplasma pneumoniae DNA, QL, PCR Not detected        SARS-CoV-2, PCR Not detected       CULTURE, MRSA [[id number]] Collected: 04/04/21 1700    Order Status: Completed Specimen: Nasal from Nares Updated: 04/06/21 0838     Special Requests: NO SPECIAL REQUESTS        Culture result: MRSA NOT PRESENT               Screening of patient nares for MRSA is for surveillance purposes and, if positive, to facilitate isolation considerations in high risk settings. It is not intended for automatic decolonization interventions per se as regimens are not sufficiently effective to warrant routine use. COVID-19 RAPID TEST [[id number]] Collected: 04/04/21 1112    Order Status: Completed Specimen: Nasopharyngeal Updated: 04/04/21 1151     Specimen source Nasopharyngeal        COVID-19 rapid test Not Detected        Comment:   Rapid NAAT:  The specimen is NEGATIVE for SARS-CoV-2, the novel coronavirus associated with COVID-19. Negative results should be treated as presumptive and, if inconsistent with clinical signs and symptoms or necessary for patient management, should be tested with an alternative molecular assay. Negative results do not preclude SARS-CoV-2 infection and should not be used as the sole basis for patient management decisions. This test has been authorized by the FDA under an Emergency Use   Authorization (EUA) for use by authorized laboratories.  Fact sheet for Healthcare Providers: ConventionUpdate.co.nz Fact sheet for Patients: ConventionUpdate.co.nz   Methodology: Isothermal Nucleic Acid Amplification                   Imaging:  I have personally reviewed the patients radiographs and have reviewed the reports:    CXR Results  (Last 48 hours)               04/12/21 1544  XR CHEST PORT Final result    Impression:  No significant change from prior study. No definite pneumothorax. Narrative:  ONE VIEW CHEST RADIOGRAPH        INDICATION: Eval for pneumothorax. Concern for pneumothorax on ultrasound. Increased oxygen requirements. COMPARISON: Chest radiograph earlier today. TECHNIQUE: AP view of the chest       FINDINGS:       LINES/TUBES: Enteric tube tip is in the stomach. Endotracheal tube tip is above   the gabriella. MEDIASTINUM: Unchanged. LUNGS: No pneumothorax. No significant change in small bilateral pleural   effusions with overlying consolidation since recent prior study. BONES/OTHER: No acute osseous abnormality. 04/12/21 0519  XR CHEST PORT Final result    Impression:  No significant change from recent prior study. Persistent small bilateral   pleural effusions with overlying consolidation, likely atelectasis and/or   infiltrate. Narrative:  ONE VIEW CHEST RADIOGRAPH        INDICATION: Daily CXR while intubated. COMPARISON: Chest radiograph 4/11/2021       TECHNIQUE: AP view of the chest       FINDINGS:       LINES/TUBES: Endotracheal tube tip is approximately 6.5 cm above the gabriella. Enteric tube tip is not included on the image, but is below the diaphragm. MEDIASTINUM: Unchanged mild cardiomegaly, but this is likely accentuated by low   lung volumes and AP technique. LUNGS: No pneumothorax. Small bilateral pleural effusions with overlying   consolidation. Appearance is not significantly changed from recent prior. BONES/OTHER: No acute osseous abnormality. CT Results  (Last 48 hours)    None           Best practice :  Core measures:    Glycemic control  [facility]. Ventilated patients- aim to keep peak plateau pressure 70-71ME H2O.   Sress ulcer prophylaxis  DVT prophylaxis               Critical Care Time:  The services I provided to this patient were to treat and/or prevent clinically significant deterioration that could result in the failure of one or more body systems and/or organ systems due to respiratory distress, hypoxia, cardiac dysrhythmia. Services included the following:  -reviewing nursing notes and old charts  -vital sign assessments   -direct patient care  -medication orders and management  -interpreting and reviewing diagnostic studies/labs  -re-evaluations  -documentation time        Aggregate critical care time was 45 minutes, which includes only time during which I was engaged in work directly related to the patient's care as described above. During this entire length of time I was immediately available to the patient. The reason for providing this level of medical care for this critically ill patient was due a critical illness that impaired one or more vital organ systems such that there was a high probability of imminent or life threatening deterioration in the patients condition. This care involved high complexity decision making to assess, manipulate, and support vital system functions, to treat this degreee vital organ system failure and to prevent further life threatening deterioration of the patients condition      Complex decision making was made in the evaluation and management plans during this consultation. More than 50% of time was spent in counseling and coordination of care including review of data and discussion with other team members.       Rose Camacho MD PGY-1  [facility] Emergency Medicine    [facility]  Pulmonary, Critical Care, and Sleep Medicine

## 2021-04-13 NOTE — INTERDISCIPLINARY ROUNDS
[facility]  Pulmonary, Critical Care, and Sleep Medicine  Interdisciplinary and Ventilator Weaning Rounds    Patient discussed in morning walking rounds and interdisciplinary rounds. ICU day: 9    Overnight events:   · Increase in sedation requirements  · Increased O2 requirement  · Bloody secretion with clots in the ETT  · Holding Heparin ggt  · Chest tube insertion not successful    Assessments and best practice:   Ventilator  o Ventilator day 9  o Vent settings: FiO2 of 90 and PEEP 8  o VAP bundle, aim to keep peak plateau pressure 90-50TL H2O  o Weaning assessed and documented   - Patient does not meet criteria for SBT. FiO2 and PEEP too high   - Patient is not on sedation holiday. - No plan to wean today. - Outcome: cont full ventilator support   - Final plan: will remain on mechanical ventilatory support today   Sedation  Fentanyl, Versed, Propofol   Other pertinent drips  o none    Lines noted  o peripheral IVs   Critical labs assessed  o Yes   Antibiotics   Zosyn    Medications reviewed  o Yes   Pending imaging  o none   Pending send out labs  o no   Pending Procedures  o no   Glycemic control   SSI    Stress ulcer prophylaxis. o Pepcid   DVT prophylaxis. o SCDs   Need for Lines, cardona assessed.  o Yes   Restraint Reevaluation   o Yes  o I have reevaluated the patient one hour after initiation of intervention. The patient is comfortable, uninjured, but continues to pose an imminent risk of injury to self to themselves and/or serious disruption of medical treatment required to keep patient stable. The patient's current medical and behavioral conditions that warrant the use intervention include danger to self and Interference with medical treatment. Restraint or seclusion will be discontinued at the earliest possible time, regardless of the scheduled expiration of the order.  Based on my evaluation, restraints will be continued: YES 4/13/21   o Attending Overseeing restraints:Dr. Mickey Mckeon     Family contact/MPOA: sister, Tiffany Carey  Family update: updated by bedside nurse        Daily Plans:  Kevin Search today to assess for [facility]   [facility]   Wean FIO2 as tolerated   Nephro consult   Chest CT to R/O PE pending nephro recommendation    Sahra Dillon NP  04/13/21  Pulmonary, Critical Care Medicine  [facility]

## 2021-04-13 NOTE — PROGRESS NOTES
Dr. Hannah Pedro notified of renal function panel results from 1550 today. BUN & Creatinine: 2.41 & 36, down slightly from 2.44 & 35 at 1120 today. Aware of 750 ml urine output from Bumex 2 mg IV administered at 1101 today. Aware of NT pro-BNP: 923 today. Per Dr. Hannah Pedro, okay to proceed with obtaining CTA chest with & without contrast this evening.

## 2021-04-13 NOTE — PROGRESS NOTES
Pt has started the rapid breathing and coughing again tonight. He is coughing up thick, blood red sx and blood clots. We called MD to bedside, orders given. He expels clots and presents with increased RR, HR, and peak pressures. [facility] RN helped me get right lung down and I had to make some adjustments because of desaturation.       04/12/21 2103   Patient Observations   Pulse (Heart Rate) 87   Resp Rate 23   O2 Sat (%) 91 %   ETCO2 (mmHg) 29 mmHg   Vent Settings   FIO2 (%) 100 %   SpO2/FIO2 Ratio 91   CMV Rate Set 18   Back-Up Rate 18   PEEP/VENT (cm H2O) 8 cm H20   Ventilator Measurements   Resp Rate Observed 23

## 2021-04-13 NOTE — CONSULTS
Consult Note    Consult requested by: Antonio Tovar PA-C    ADMIT DATE: 4/4/2021    CONSULT DATE: April 13, 2021           Admission diagnosis: Respi failure  Reason for Nephrology Consultation: JACQUELYN      Assessment and plan    #1 acute kidney injury, etiology appears to be secondary to Ischaemic ATN d/t hypoperfusion v/s cardiorenal syndrome in  Setting of diastolic CHF, pulmonary hypertension versus acute interstitial nephritis due to Zosyn. creatinine trending up   But diuresing well with diuretics  #2 acute respiratory failure secondary to rapidly progressive angioedema s/p emergent cricothyrotomy. Presently intubated on the vent,  severely hypoxic out of proportion to x-ray findings, therefore PE has been considered , Rx for presumed PE  #3 pulmonary infiltrates/Pulm edema   #4 left-sided pneumothorax, appears resolved  #5 diabetes mellitus with complications  #6 popliteal DVT left    Plan:    1) ok to diurese gently , prefer thiazide diuretics in combination of loop as patient hypernatremia   2) strict I/o  3) follow renal parameters , electrolytes q12hrs   4) ok to proceed with CT PE protocol if creatinine trend stabilizes, consider patient has high probability of   But not being treated d/t bleeding , may need to proceed with CT PE protocol anyway as this may be helpful  Regarding critical decision making. 5) urine studies ordered    Please call with questions,    Etta Ge MD [facility]  Cell [phone]  Pager: [phone]    HPI:     Patient is a 19-year-old male [facility]ioedema and respiratory failure. Patient developed rapidly progressive respiratory distress thought secondary to angioedema, presumed to be secondary to ACE inhibitor therapy. Patient was intubated due to cardiopulmonary collapse with brief loss of pulse. Patient underwent emergent cricothyrotomy, ET tube was placed and patient was subsequently ventilated.   Patient was noted to have progressive respiratory failure, bronchoscopy showed mucous plugging, also is has a popliteal DVT and with pulmonary hypertension has been treated for presumed PE with anticoagulation. Due to patient's volume status and effusions patient has been diuresed with loop diuretics, creatinine has trended up to 2 range. Baseline creatinine unclear. Nephrology consulted for JACQUELYN       Past Medical History:   Diagnosis Date    DDD (degenerative disc disease), lumbar     Diabetes [facility]     [facility]     Di[facility] retinopathy [facility]     DM2 (diabetes mellitus, type 2) ([facility]     HLD (hyperlipidemia)     HTN (hypertension)     Obesity (BMI 30-39. 9)       History reviewed. No pertinent surgical history.     Social History     Socioeconomic History    Marital status:      Spouse name: Not on file    Number of children: Not on file    Years of education: Not on file    Highest education level: Not on file   Occupational History    Not on file   Social Needs    Financial resource strain: Not on file    Food insecurity     Worry: Not on file     Inability: Not on file    Transportation needs     Medical: Not on file     Non-medical: Not on file   Tobacco Use    Smoking status: Current Every Day Smoker     Packs/day: 0.50   Substance and Sexual Activity    Alcohol use: Not on file    Drug use: Not on file    Sexual activity: Not on file   Lifestyle    Physical activity     Days per week: Not on file     Minutes per session: Not on file    Stress: Not on file   Relationships    Social connections     Talks on phone: Not on file     Gets together: Not on file     Attends Hindu service: Not on file     Active member of club or organization: Not on file     Attends meetings of clubs or organizations: Not on file     Relationship status: Not on file    Intimate partner violence     Fear of current or ex partner: Not on file     Emotionally abused: Not on file     Physically abused: Not on file     Forced sexual activity: Not on file   Other Topics Concern     Service Not Asked    Blood Transfusions Not Asked    Caffeine Concern Not Asked    Occupational Exposure Not Asked    Hobby Hazards Not Asked    Sleep Concern Not Asked    Stress Concern Not Asked    Weight Concern Not Asked    Special Diet Not Asked    Back Care Not Asked    Exercise Not Asked    Bike Helmet Not Asked   [address] Not Asked    Self-Exams Not Asked   Social History Narrative    Not on file       History reviewed. No pertinent family history. Allergies   Allergen Reactions    Lisinopril Angioedema        Home Medications:     Medications Prior to Admission   Medication Sig    LISINOPRIL, BULK, by Does Not Apply route.        Current Inpatient Medications:     Current Facility-Administered Medications   Medication Dose Route Frequency    epoprostenol (VELETRI) 30 mcg/mL in 0.9% sodium chloride 50 mL inhalation solution  50 ng/kg/min (Order-Specific) Inhalation CONTINUOUS    propofol (DIPRIVAN) 10 mg/mL infusion  0-50 mcg/kg/min IntraVENous TITRATE    fentaNYL (PF) 1,500 mcg/30 mL (50 mcg/mL) infusion  0-200 mcg/hr IntraVENous TITRATE    acetaminophen (TYLENOL) solution 500 mg  500 mg Oral Q4H PRN    piperacillin-tazobactam (ZOSYN) 3.375 g in dextrose 5% 100 mL  3.375 g IntraVENous Q6H    midazolam in normal saline (VERSED) 1 mg/mL infusion  0-10 mg/hr IntraVENous TITRATE    fentaNYL citrate (PF) injection 100 mcg  100 mcg IntraVENous Q30MIN PRN    oxymetazoline (AFRIN) 0.05 % nasal spray 2 Spray  2 Spray Other BID PRN    acetylcysteine (MUCOMYST) 100 mg/mL (10 %) nebulizer solution 400 mg  4 mL Nebulization BID RT    famotidine (PF) (PEPCID) 20 mg in 0.9% sodium chloride 10 mL injection  20 mg IntraVENous DAILY    ELECTROLYTE REPLACEMENT PROTOCOL - Potassium Renal Dosing  1 Each Other PRN    [Held by provider] heparin 25,000 units in D5W 250 ml infusion  12-25 Units/kg/hr IntraVENous TITRATE  dornase alpha (PULMOZYME)2.5mg/2.5ml nebulizer solution  2.5 mg Nebulization BID RT    albuterol (PROVENTIL VENTOLIN) nebulizer solution 2.5 mg  2.5 mg Nebulization Q6H RT    arformoteroL (BROVANA) neb solution 15 mcg  15 mcg Nebulization BID RT    ELECTROLYTE REPLACEMENT PROTOCOL - Phosphorus  Standard Dosing  1 Each Other PRN    multivit-folic acid-herbal 813 (WELLESSE PLUS) oral liquid 30 mL  30 mL Per NG tube DAILY    chlorhexidine (PERIDEX) 0.12 % mouthwash 10 mL  10 mL Oral Q12H    midazolam (VERSED) injection 1-2 mg  1-2 mg IntraVENous Q10MIN PRN    albuterol-ipratropium (DUO-NEB) 2.5 MG-0.5 MG/3 ML  3 mL Nebulization Q4H PRN    glucose chewable tablet 16 g  4 Tab Oral PRN    glucagon (GLUCAGEN) injection 1 mg  1 mg IntraMUSCular PRN    dextrose (D50W) injection syrg 12.5-25 g  25-50 mL IntraVENous PRN    thrombin (bovine) (THROMBIN-JMI) 5,000 unit topical solution    PRN    lidocaine-EPINEPHrine (PF) (XYLOCAINE) 2 %-1:200,000 injection    PRN    insulin lispro (HUMALOG) injection   SubCUTAneous Q6H    carboxymethylcellulose sodium (REFRESH LIQUIGEL) 1 % ophthalmic solution 1 Drop  1 Drop Both Eyes PRN       Review of Systems:   Unable to obtain as intubated on vent      Physical Assessment:     Vitals:    04/13/21 0800 04/13/21 0900 04/13/21 1000 04/13/21 1100   BP: 102/62 (!) 96/57 (!) 115/56 (!) 121/55   Pulse: 85 80 79 77   Resp: 22 21 21 21   Temp: 99.5 °F (37.5 °C) 99 °F (37.2 °C) 98.8 °F (37.1 °C) 98.8 °F (37.1 °C)   SpO2: 95% 95% 96% 95%   Weight:       Height:         Last 3 Recorded Weights in this Encounter    04/10/21 2057 04/12/21 0553 04/13/21 0500   Weight: 114.5 kg (252 lb 6.8 oz) 116 kg (255 lb 11.7 oz) 115.9 kg (255 lb 8.2 oz)     Admission weight: Weight: 127 kg (279 lb 15.8 oz) (04/04/21 1726)      Intake/Output Summary (Last 24 hours) at 4/13/2021 1126  Last data filed at 4/13/2021 0900  Gross per 24 hour   Intake 2687.58 ml   Output 2375 ml   Net 312.58 ml Intubated/ on vent   HEENT: mmm  Lungs: good air entry, coarse BS +  Cardiovascular system: S1, S2, regular rate and rhythm. Abdomen: soft, non tender, non distended. Positive bowel sounds. Extremities: no clubbing, cyanosis or edema. Integumentary: skin is grossly intact. Data Review:    Labs: Results:       Chemistry Recent Labs     04/12/21  0352 04/11/21  0020   * 152*   * 151*   K 4.4 4.3   * 116*   CO2 29 32   BUN 72* 65*   CREA 2.21* 1.85*   CA 8.2* 8.5   AGAP 4 3   BUCR 33* 35*   PHOS 3.1 2.7         CBC w/Diff Recent Labs     04/12/21  2354 04/12/21  0352 04/11/21  0020   WBC 13.7* 11.3 17.3*   RBC 3.91* 3.79* 4.15*   HGB 9.9* 10.0* 10.9*   HCT 34.0* 32.1* 35.6*    147 127*   GRANS 78* 79* 82*   LYMPH 10* 8* 9*   EOS 1 3 1         Iron/Ferritin No results for input(s): IRON in the last 72 hours. No lab exists for component: TIBCCALC   PTH/VIT D No results for input(s): PTH in the last 72 hours.     No lab exists for component: VITD           Jacqui Young MD  4/13/2021  11:26 AM      April 13, 2021

## 2021-04-13 NOTE — PROGRESS NOTES
VENTILATOR CARE PLAN    Problem: Ventilator Management  Goal: *Adequate oxygenation/ ventilation/ and extubation      Patient:        Leesa Blake     79 y.o.   male     4/13/2021  5:27 AM  Patient Active Problem List   Diagnosis Code    Angioedema due to angiotensin converting enzyme inhibitor (ACE-I) T78. 3XXA, [id number]    [facility] J96.90    JACQUELYN (acute kidney injury) ([facility] N17.9    Cardiac arrest ([facility] I46.9    Obesity (BMI 30-39. 9) E66.9    Diabetic neuropathy associated with type 2 diabetes mellitus ([facility] E11.40    Diabetic retinopathy associated with type 2 diabetes mellitus ([facility] E11.319    Pulmonary infiltrates R91.8    Controlled type 2 diabetes mellitus with neurologic complication, without long-term current use of insulin ([facility]) E11.49    DVT (deep venous thrombosis) ([facility]) I82.409       Angioedema due to angiotensin converting enzyme inhibitor (ACE-I) [T78. 3XXA, T46.4X5A]    Reason patient intubated: Air way protection and respiratory distress    Ventilator day: 5    Ventilator settings: ac/vc+ rate 18, , +8, and 90%    ETT Size/Placement: 8mm and 27 @ the lip   Wean Screen Pass ( No):  Wean Screen Reason for Failure:sedation  Duration of Weaning Trial:  Additional Comments:        PLAN OF CARE: Wean fiO2 and SBTs       GOAL:To be removed from ventilator and back to baseline      OUTCOME: TBD    ABG:  Date:4/13/2021  Lab Results   Component Value Date/Time    PHI 7.43 04/13/2021 03:59 AM    PCO2I 40.0 04/13/2021 03:59 AM    PO2I 89 04/13/2021 03:59 AM    HCO3I 26.6 (H) 04/13/2021 03:59 AM    FIO2I 100 04/13/2021 03:59 AM       Chest X-ray:  Date:4/13/2021  Results from Hospital Encounter encounter on 04/04/21   XR CHEST PORT    Narrative ONE VIEW CHEST RADIOGRAPH     INDICATION: Eval for pneumothorax. Concern for pneumothorax on ultrasound. Increased oxygen requirements. COMPARISON: Chest radiograph earlier today.     TECHNIQUE: AP view of the chest    FINDINGS: LINES/TUBES: Enteric tube tip is in the stomach. Endotracheal tube tip is above  the gabriella. MEDIASTINUM: Unchanged. LUNGS: No pneumothorax. No significant change in small bilateral pleural  effusions with overlying consolidation since recent prior study. BONES/OTHER: No acute osseous abnormality. Impression No significant change from prior study. No definite pneumothorax.        Lab Test:  Date:4/13/2021  WBC:   Lab Results   Component Value Date/Time    WBC 13.7 (H) 04/12/2021 11:54 PM   HGB:   Lab Results   Component Value Date/Time    HGB 9.9 (L) 04/12/2021 11:54 PM    PLTS:   Lab Results   Component Value Date/Time    PLATELET 345 16/08/8008 11:54 PM         Vital Signs:     Patient Vitals for the past 8 hrs:   Temp Pulse Resp BP SpO2   04/13/21 0430 100.2 °F (37.9 °C) 82 23 (!) 87/54 95 %   04/13/21 0400 100 °F (37.8 °C) 86 20 (!) 99/55 98 %   04/13/21 0343  87 20  98 %   04/13/21 0330 100.2 °F (37.9 °C) 88 20 (!) 99/58 98 %   04/13/21 0300 (!) 100.6 °F (38.1 °C) 95 23 (!) 111/57 98 %   04/13/21 0230 (!) 100.8 °F (38.2 °C) 89 27 117/68 94 %   04/13/21 0200 (!) 101.7 °F (38.7 °C) 98 25 (!) 95/49 92 %   04/13/21 0130 (!) 102.2 °F (39 °C) (!) 113 29 (!) 160/71 90 %   04/13/21 0100 (!) 101.8 °F (38.8 °C) (!) 112 (!) 31 (!) 160/97 91 %   04/13/21 0030    (!) 173/78    04/13/21 0000 (!) 101.1 °F (38.4 °C) (!) 102 (!) 33 (!) 153/75 (!) 85 %   04/12/21 2330 (!) 100.9 °F (38.3 °C) 100 (!) 32 (!) 157/72 91 %   04/12/21 2304  91 27  94 %   04/12/21 2300 100.2 °F (37.9 °C) 91 24 132/69 97 %   04/12/21 2230 98.2 °F (36.8 °C) 91 28 138/69 (!) 89 %   04/12/21 2200 99.7 °F (37.6 °C) 86 20 109/62 (!) 88 %

## 2021-04-13 NOTE — PROGRESS NOTES
Bedside and Verbal shift change report given to Carola Hernandez RN (oncoming nurse) by Yenny Rico RN (offgoing nurse). Report included the following information SBAR, Kardex, Intake/Output, MAR, Recent Results and Cardiac Rhythm is SR on telemetry.

## 2021-04-13 NOTE — PROGRESS NOTES
[facility] Update:    I had a discussion with the patient's sister, Ellen Augustine, as well as his son Libertad Calle with two other family members on the phone. We discussed the patient's clinical status concerning his angioedema requiring emergent cricothyroidotomy, transition to an ETT, as well as his time on the ventilator. We discussed that he may have a primary pulmonary issue that is leading to his elevated requirements on the ventilator with both high PEEP and FiO2. We discussed that our leading diagnosis at this time is a pulmonary embolism in the setting of his DVT, but we will need a CTA with intravenous contrast to evaluate further and that requires consultation with Nephrology due to his poor renal function. They reported that they understood and agreed with this plan. Additionally, we discussed further goals of care and the what the patient's desires would be if his clinical condition were to improve, or were to worsen. The family agreed that the patient would want cardiopulmonary resuscitation including chest compression, defibrillation, and intubation in the event of arrest. However, they indicated that he would not, \"want to be kept alive on machines\" if his condition only improved slightly, necessitating tracheostomy or placement of a feeding tube. Sivan FATIMA was present for the entire conversation.       Roseann Davis MD PGY-1   [facility] Emergency Medicine      April 13, 2021, 1:15 PM   Pulmonary, Critical Care Medicine  [facility]

## 2021-04-13 NOTE — ROUTINE PROCESS
1930 Bedside and Verbal shift change report given to Keith Mejia (oncoming nurse) by Arnie Hernandez (offgoing nurse). Report included the following information SBAR, Kardex and Recent Results dual drip verification completed. Hob elevated 30 degrees. Side rails up . 2000 assessment completed. Oral external cath care provided. .   1378 dropped oxygen sat 84 % peak pressure 24. And tidal volume 589 fio2 increased to 100% SIERRA TOBIN IN ROOM. Katiuska TOBIN aware patient trigger sepsis screening and results of cts has discussed with DR YO Alanis. Will continue to monitor patient. 2249 Janessa TOBIN aware oxygen sat 88% on 100% fio2. Will continue to monitor patient, no new orders. 2335 gradually increased to 91 -92 %. 0000 reassessment completed. Oral and external cath care completed. 0127 tube feeds stopped . 0130 suctioned for moderate amount bloody secretions, oxygen saturation increased. 0135 ogt connected to low intermittent suction. 0235 lab and nurse unable to collect blood cultures. 0357 12 run v tach  0400 reassessment completed. Oral and external cath care provided. 5502 12 lead ekg completed. Given to LUZ TOBIN to assess and evaluate. 8999 order for cardiac enzymes lab called. 0730 Bedside and Verbal shift change report given to Sivan RN (oncoming nurse) by Joseph Huggins RN (offgoing nurse). Report included the following information SBAR, Recent Results and Cardiac Rhythm nsr with pvc's and pac's. . Side rails up, hob elevated 30 degrees. Dual drip verification completed.

## 2021-04-13 NOTE — PROGRESS NOTES
Problem: Non-Violent Restraints  Goal: Removal from restraints as soon as assessed to be safe  Outcome: Progressing Towards Goal     Problem: Injury - Risk of, Adverse Drug Event  Goal: *Absence of adverse drug events  Outcome: Progressing Towards Goal  Goal: *Knowledge of prescribed medications  Outcome: Progressing Towards Goal     Problem: Patient Education: Go to Patient Education Activity  Goal: Patient/Family Education  Outcome: Progressing Towards Goal

## 2021-04-14 ENCOUNTER — HOSPITAL ENCOUNTER (INPATIENT)
Dept: CT IMAGING | Age: 71
Discharge: HOME | DRG: 004 | End: 2021-04-14
Attending: REGISTERED NURSE
Payer: MEDICARE

## 2021-04-14 ENCOUNTER — APPOINTMENT (OUTPATIENT)
Dept: CT IMAGING | Age: 71
DRG: 004 | End: 2021-04-14
Attending: REGISTERED NURSE
Payer: MEDICARE

## 2021-04-14 ENCOUNTER — APPOINTMENT (OUTPATIENT)
Dept: GENERAL RADIOLOGY | Age: 71
DRG: 004 | End: 2021-04-14
Attending: PHYSICIAN ASSISTANT
Payer: MEDICARE

## 2021-04-14 LAB
ALBUMIN SERPL-MCNC: 1.6 G/DL (ref 3.4–5)
ALBUMIN SERPL-MCNC: 1.6 G/DL (ref 3.4–5)
ANION GAP SERPL CALC-SCNC: 10 MMOL/L (ref 3–18)
ANION GAP SERPL CALC-SCNC: 10 MMOL/L (ref 3–18)
APPEARANCE UR: CLEAR
APTT PPP: 44.9 SEC (ref 23–36.4)
APTT PPP: 62.2 SEC (ref 23–36.4)
ARTERIAL PATENCY WRIST A: ABNORMAL
BASE EXCESS BLD CALC-SCNC: 3 MMOL/L
BASOPHILS # BLD: 0 K/UL (ref 0–0.1)
BASOPHILS NFR BLD: 0 % (ref 0–2)
BDY SITE: ABNORMAL
BILIRUB UR QL: NEGATIVE
BNP SERPL-MCNC: 1121 PG/ML (ref 0–900)
BUN SERPL-MCNC: 100 MG/DL (ref 7–18)
BUN SERPL-MCNC: 94 MG/DL (ref 7–18)
BUN/CREAT SERPL: 34 (ref 12–20)
BUN/CREAT SERPL: 36 (ref 12–20)
CALCIUM SERPL-MCNC: 7.5 MG/DL (ref 8.5–10.1)
CALCIUM SERPL-MCNC: 7.7 MG/DL (ref 8.5–10.1)
CHLORIDE SERPL-SCNC: 107 MMOL/L (ref 100–111)
CHLORIDE SERPL-SCNC: 108 MMOL/L (ref 100–111)
CK MB CFR SERPL CALC: ABNORMAL % (ref 0–4)
CK MB SERPL-MCNC: <1 NG/ML (ref 5–25)
CK SERPL-CCNC: 270 U/L (ref 39–308)
CO2 SERPL-SCNC: 27 MMOL/L (ref 21–32)
CO2 SERPL-SCNC: 28 MMOL/L (ref 21–32)
COLOR UR: YELLOW
CREAT SERPL-MCNC: 2.62 MG/DL (ref 0.6–1.3)
CREAT SERPL-MCNC: 2.94 MG/DL (ref 0.6–1.3)
DIFFERENTIAL METHOD BLD: ABNORMAL
EOSINOPHIL # BLD: 0.3 K/UL (ref 0–0.4)
EOSINOPHIL NFR BLD: 2 % (ref 0–5)
ERYTHROCYTE [DISTWIDTH] IN BLOOD BY AUTOMATED COUNT: 15.5 % (ref 11.6–14.5)
GAS FLOW.O2 O2 DELIVERY SYS: ABNORMAL L/MIN
GAS FLOW.O2 SETTING OXYMISER: 18 BPM
GLUCOSE BLD STRIP.AUTO-MCNC: 134 MG/DL (ref 70–110)
GLUCOSE BLD STRIP.AUTO-MCNC: 142 MG/DL (ref 70–110)
GLUCOSE BLD STRIP.AUTO-MCNC: 149 MG/DL (ref 70–110)
GLUCOSE SERPL-MCNC: 107 MG/DL (ref 74–99)
GLUCOSE SERPL-MCNC: 146 MG/DL (ref 74–99)
GLUCOSE UR STRIP.AUTO-MCNC: NEGATIVE MG/DL
HAV IGM SER QL: NEGATIVE
HBV CORE IGM SER QL: NEGATIVE
HBV SURFACE AG SER QL: <0.1 INDEX
HBV SURFACE AG SER QL: NEGATIVE
HCO3 BLD-SCNC: 26.7 MMOL/L (ref 22–26)
HCT VFR BLD AUTO: 29.6 % (ref 36–48)
HCV AB SER IA-ACNC: 0.06 INDEX
HCV AB SERPL QL IA: NEGATIVE
HCV COMMENT,HCGAC: NORMAL
HGB BLD-MCNC: 9.3 G/DL (ref 13–16)
HGB UR QL STRIP: NEGATIVE
HIV 1+2 AB+HIV1 P24 AG SERPL QL IA: NONREACTIVE
HIV12 RESULT COMMENT, HHIVC: NORMAL
KETONES UR QL STRIP.AUTO: NEGATIVE MG/DL
LEUKOCYTE ESTERASE UR QL STRIP.AUTO: NEGATIVE
LYMPHOCYTES # BLD: 1.6 K/UL (ref 0.9–3.6)
LYMPHOCYTES NFR BLD: 10 % (ref 21–52)
MAGNESIUM SERPL-MCNC: 3.3 MG/DL (ref 1.6–2.6)
MCH RBC QN AUTO: 25.8 PG (ref 24–34)
MCHC RBC AUTO-ENTMCNC: 31.4 G/DL (ref 31–37)
MCV RBC AUTO: 82 FL (ref 74–97)
MONOCYTES # BLD: 1 K/UL (ref 0.05–1.2)
MONOCYTES NFR BLD: 7 % (ref 3–10)
NEUTS SEG # BLD: 12.9 K/UL (ref 1.8–8)
NEUTS SEG NFR BLD: 81 % (ref 40–73)
NITRITE UR QL STRIP.AUTO: NEGATIVE
O2/TOTAL GAS SETTING VFR VENT: 100 %
PCO2 BLD: 38 MMHG (ref 35–45)
PEEP RESPIRATORY: 10 CMH2O
PH BLD: 7.45 [PH] (ref 7.35–7.45)
PH UR STRIP: 5 [PH] (ref 5–8)
PHOSPHATE SERPL-MCNC: 4.2 MG/DL (ref 2.5–4.9)
PHOSPHATE SERPL-MCNC: 5.4 MG/DL (ref 2.5–4.9)
PLATELET # BLD AUTO: 188 K/UL (ref 135–420)
PMV BLD AUTO: 12.7 FL (ref 9.2–11.8)
PO2 BLD: 67 MMHG (ref 80–100)
POTASSIUM SERPL-SCNC: 4.7 MMOL/L (ref 3.5–5.5)
POTASSIUM SERPL-SCNC: 4.7 MMOL/L (ref 3.5–5.5)
PROT UR STRIP-MCNC: NEGATIVE MG/DL
RBC # BLD AUTO: 3.61 M/UL (ref 4.35–5.65)
SAO2 % BLD: 94 % (ref 92–97)
SERVICE CMNT-IMP: ABNORMAL
SODIUM SERPL-SCNC: 144 MMOL/L (ref 136–145)
SODIUM SERPL-SCNC: 146 MMOL/L (ref 136–145)
SP GR UR REFRACTOMETRY: 1.01 (ref 1–1.03)
SP1: NORMAL
SP2: NORMAL
SP3: NORMAL
SPECIMEN TYPE: ABNORMAL
TOTAL RESP. RATE, ITRR: 27
TROPONIN I SERPL-MCNC: 0.05 NG/ML (ref 0–0.04)
UROBILINOGEN UR QL STRIP.AUTO: 0.2 EU/DL (ref 0.2–1)
VENTILATION MODE VENT: ABNORMAL
VOLUME CONTROL PLUS IVLCP: YES
VT SETTING VENT: 550 ML
WBC # BLD AUTO: 15.9 K/UL (ref 4.6–13.2)

## 2021-04-14 PROCEDURE — 74011000250 HC RX REV CODE- 250: Performed by: INTERNAL MEDICINE

## 2021-04-14 PROCEDURE — 82803 BLOOD GASES ANY COMBINATION: CPT

## 2021-04-14 PROCEDURE — 82962 GLUCOSE BLOOD TEST: CPT

## 2021-04-14 PROCEDURE — 74011250636 HC RX REV CODE- 250/636: Performed by: NURSE PRACTITIONER

## 2021-04-14 PROCEDURE — 80074 ACUTE HEPATITIS PANEL: CPT

## 2021-04-14 PROCEDURE — 74011250636 HC RX REV CODE- 250/636: Performed by: PHYSICIAN ASSISTANT

## 2021-04-14 PROCEDURE — 36415 COLL VENOUS BLD VENIPUNCTURE: CPT

## 2021-04-14 PROCEDURE — 77030005513 HC CATH URETH FOL11 MDII -B

## 2021-04-14 PROCEDURE — 74018 RADEX ABDOMEN 1 VIEW: CPT

## 2021-04-14 PROCEDURE — 74011000250 HC RX REV CODE- 250: Performed by: REGISTERED NURSE

## 2021-04-14 PROCEDURE — 74011250637 HC RX REV CODE- 250/637: Performed by: INTERNAL MEDICINE

## 2021-04-14 PROCEDURE — 81003 URINALYSIS AUTO W/O SCOPE: CPT

## 2021-04-14 PROCEDURE — 65610000006 HC RM INTENSIVE CARE

## 2021-04-14 PROCEDURE — 80069 RENAL FUNCTION PANEL: CPT

## 2021-04-14 PROCEDURE — 74011250636 HC RX REV CODE- 250/636: Performed by: INTERNAL MEDICINE

## 2021-04-14 PROCEDURE — 83880 ASSAY OF NATRIURETIC PEPTIDE: CPT

## 2021-04-14 PROCEDURE — 74011000258 HC RX REV CODE- 258: Performed by: INTERNAL MEDICINE

## 2021-04-14 PROCEDURE — 74011000250 HC RX REV CODE- 250: Performed by: STUDENT IN AN ORGANIZED HEALTH CARE EDUCATION/TRAINING PROGRAM

## 2021-04-14 PROCEDURE — 74011000250 HC RX REV CODE- 250: Performed by: PHYSICIAN ASSISTANT

## 2021-04-14 PROCEDURE — 85025 COMPLETE CBC W/AUTO DIFF WBC: CPT

## 2021-04-14 PROCEDURE — 36600 WITHDRAWAL OF ARTERIAL BLOOD: CPT

## 2021-04-14 PROCEDURE — 99291 CRITICAL CARE FIRST HOUR: CPT | Performed by: INTERNAL MEDICINE

## 2021-04-14 PROCEDURE — 2709999900 HC NON-CHARGEABLE SUPPLY

## 2021-04-14 PROCEDURE — 93005 ELECTROCARDIOGRAM TRACING: CPT

## 2021-04-14 PROCEDURE — 74011250637 HC RX REV CODE- 250/637: Performed by: PHYSICIAN ASSISTANT

## 2021-04-14 PROCEDURE — 83735 ASSAY OF MAGNESIUM: CPT

## 2021-04-14 PROCEDURE — 74011000258 HC RX REV CODE- 258: Performed by: PHYSICIAN ASSISTANT

## 2021-04-14 PROCEDURE — 74011000250 HC RX REV CODE- 250

## 2021-04-14 PROCEDURE — 74011250636 HC RX REV CODE- 250/636: Performed by: REGISTERED NURSE

## 2021-04-14 PROCEDURE — 74011250636 HC RX REV CODE- 250/636

## 2021-04-14 PROCEDURE — 85730 THROMBOPLASTIN TIME PARTIAL: CPT

## 2021-04-14 PROCEDURE — 87389 HIV-1 AG W/HIV-1&-2 AB AG IA: CPT

## 2021-04-14 PROCEDURE — 82553 CREATINE MB FRACTION: CPT

## 2021-04-14 PROCEDURE — 94003 VENT MGMT INPAT SUBQ DAY: CPT

## 2021-04-14 PROCEDURE — APPNB15 APP NON BILLABLE TIME 0-15 MINS: Performed by: REGISTERED NURSE

## 2021-04-14 PROCEDURE — 74011000636 HC RX REV CODE- 636: Performed by: INTERNAL MEDICINE

## 2021-04-14 PROCEDURE — 94640 AIRWAY INHALATION TREATMENT: CPT

## 2021-04-14 PROCEDURE — 87040 BLOOD CULTURE FOR BACTERIA: CPT

## 2021-04-14 PROCEDURE — 71045 X-RAY EXAM CHEST 1 VIEW: CPT

## 2021-04-14 RX ORDER — METOLAZONE 5 MG/1
5 TABLET ORAL 2 TIMES DAILY
Status: COMPLETED | OUTPATIENT
Start: 2021-04-14 | End: 2021-04-14

## 2021-04-14 RX ORDER — DOBUTAMINE HYDROCHLORIDE 200 MG/100ML
2.5 INJECTION INTRAVENOUS
Status: DISCONTINUED | OUTPATIENT
Start: 2021-04-14 | End: 2021-04-15

## 2021-04-14 RX ORDER — METOLAZONE 5 MG/1
5 TABLET ORAL 2 TIMES DAILY
Status: DISCONTINUED | OUTPATIENT
Start: 2021-04-14 | End: 2021-04-14 | Stop reason: SDUPTHER

## 2021-04-14 RX ORDER — BUMETANIDE 0.25 MG/ML
1 INJECTION INTRAMUSCULAR; INTRAVENOUS ONCE
Status: COMPLETED | OUTPATIENT
Start: 2021-04-14 | End: 2021-04-14

## 2021-04-14 RX ADMIN — FENTANYL CITRATE 200 MCG/HR: 0.05 INJECTION, SOLUTION INTRAMUSCULAR; INTRAVENOUS at 18:52

## 2021-04-14 RX ADMIN — PIPERACILLIN SODIUM AND TAZOBACTAM SODIUM 3.38 G: 3; .375 INJECTION, POWDER, LYOPHILIZED, FOR SOLUTION INTRAVENOUS at 17:53

## 2021-04-14 RX ADMIN — BUMETANIDE 1 MG: 0.25 INJECTION INTRAMUSCULAR; INTRAVENOUS at 11:37

## 2021-04-14 RX ADMIN — DOBUTAMINE HYDROCHLORIDE 5 MCG/KG/MIN: 200 INJECTION INTRAVENOUS at 16:56

## 2021-04-14 RX ADMIN — HEPARIN SODIUM 12 UNITS/KG/HR: 10000 INJECTION, SOLUTION INTRAVENOUS at 11:16

## 2021-04-14 RX ADMIN — DIATRIZOATE MEGLUMINE AND DIATRIZOATE SODIUM 30 ML: 600; 100 SOLUTION ORAL; RECTAL at 18:53

## 2021-04-14 RX ADMIN — FENTANYL CITRATE 100 MCG: 50 INJECTION, SOLUTION INTRAMUSCULAR; INTRAVENOUS at 20:34

## 2021-04-14 RX ADMIN — DORNASE ALFA 2.5 MG: 1 SOLUTION RESPIRATORY (INHALATION) at 19:32

## 2021-04-14 RX ADMIN — CARBOXYMETHYLCELLULOSE SODIUM 1 DROP: 10 GEL OPHTHALMIC at 06:21

## 2021-04-14 RX ADMIN — MIDAZOLAM 2 MG: 1 INJECTION INTRAMUSCULAR; INTRAVENOUS at 01:33

## 2021-04-14 RX ADMIN — BUMETANIDE 0.5 MG/HR: 0.25 INJECTION INTRAMUSCULAR; INTRAVENOUS at 11:38

## 2021-04-14 RX ADMIN — EPOPROSTENOL 50 NG/KG/MIN: 1.5 INJECTION, POWDER, LYOPHILIZED, FOR SOLUTION INTRAVENOUS at 12:32

## 2021-04-14 RX ADMIN — METOLAZONE 5 MG: 5 TABLET ORAL at 11:07

## 2021-04-14 RX ADMIN — PIPERACILLIN SODIUM AND TAZOBACTAM SODIUM 3.38 G: 3; .375 INJECTION, POWDER, LYOPHILIZED, FOR SOLUTION INTRAVENOUS at 11:23

## 2021-04-14 RX ADMIN — EPOPROSTENOL 50 NG/KG/MIN: 1.5 INJECTION, POWDER, LYOPHILIZED, FOR SOLUTION INTRAVENOUS at 01:31

## 2021-04-14 RX ADMIN — ALBUTEROL SULFATE 2.5 MG: 2.5 SOLUTION RESPIRATORY (INHALATION) at 01:25

## 2021-04-14 RX ADMIN — ACETYLCYSTEINE 400 MG: 100 SOLUTION ORAL; RESPIRATORY (INHALATION) at 14:19

## 2021-04-14 RX ADMIN — ACETYLCYSTEINE 400 MG: 100 SOLUTION ORAL; RESPIRATORY (INHALATION) at 01:25

## 2021-04-14 RX ADMIN — METOLAZONE 5 MG: 5 TABLET ORAL at 17:00

## 2021-04-14 RX ADMIN — ALBUTEROL SULFATE 2.5 MG: 2.5 SOLUTION RESPIRATORY (INHALATION) at 14:18

## 2021-04-14 RX ADMIN — DOBUTAMINE HYDROCHLORIDE 5 MCG/KG/MIN: 200 INJECTION INTRAVENOUS at 01:34

## 2021-04-14 RX ADMIN — CHLORHEXIDINE GLUCONATE 0.12% ORAL RINSE 10 ML: 1.2 LIQUID ORAL at 09:04

## 2021-04-14 RX ADMIN — MIDAZOLAM 10 MG/HR: 5 INJECTION INTRAMUSCULAR; INTRAVENOUS at 11:46

## 2021-04-14 RX ADMIN — PIPERACILLIN SODIUM AND TAZOBACTAM SODIUM 3.38 G: 3; .375 INJECTION, POWDER, LYOPHILIZED, FOR SOLUTION INTRAVENOUS at 06:20

## 2021-04-14 RX ADMIN — FENTANYL CITRATE 200 MCG/HR: 0.05 INJECTION, SOLUTION INTRAMUSCULAR; INTRAVENOUS at 04:43

## 2021-04-14 RX ADMIN — MIDAZOLAM 10 MG/HR: 5 INJECTION INTRAMUSCULAR; INTRAVENOUS at 01:11

## 2021-04-14 RX ADMIN — ALBUTEROL SULFATE 2.5 MG: 2.5 SOLUTION RESPIRATORY (INHALATION) at 09:06

## 2021-04-14 RX ADMIN — ACETAMINOPHEN ORAL SOLUTION 500 MG: 650 SOLUTION ORAL at 01:11

## 2021-04-14 RX ADMIN — EPOPROSTENOL 50 NG/KG/MIN: 1.5 INJECTION, POWDER, LYOPHILIZED, FOR SOLUTION INTRAVENOUS at 06:37

## 2021-04-14 RX ADMIN — CHLORHEXIDINE GLUCONATE 0.12% ORAL RINSE 10 ML: 1.2 LIQUID ORAL at 21:44

## 2021-04-14 RX ADMIN — HEPARIN SODIUM 16.1 UNITS/HR: 10000 INJECTION, SOLUTION INTRAVENOUS at 19:47

## 2021-04-14 RX ADMIN — ALBUTEROL SULFATE 2.5 MG: 2.5 SOLUTION RESPIRATORY (INHALATION) at 19:32

## 2021-04-14 RX ADMIN — PIPERACILLIN SODIUM AND TAZOBACTAM SODIUM 3.38 G: 3; .375 INJECTION, POWDER, LYOPHILIZED, FOR SOLUTION INTRAVENOUS at 23:38

## 2021-04-14 RX ADMIN — MIDAZOLAM 2 MG: 1 INJECTION INTRAMUSCULAR; INTRAVENOUS at 04:24

## 2021-04-14 RX ADMIN — FENTANYL CITRATE 200 MCG/HR: 0.05 INJECTION, SOLUTION INTRAMUSCULAR; INTRAVENOUS at 11:40

## 2021-04-14 RX ADMIN — DORNASE ALFA 2.5 MG: 1 SOLUTION RESPIRATORY (INHALATION) at 09:06

## 2021-04-14 RX ADMIN — ARFORMOTEROL TARTRATE 15 MCG: 15 SOLUTION RESPIRATORY (INHALATION) at 09:06

## 2021-04-14 RX ADMIN — FAMOTIDINE 20 MG: 10 INJECTION INTRAVENOUS at 09:04

## 2021-04-14 RX ADMIN — ACETAMINOPHEN ORAL SOLUTION 500 MG: 650 SOLUTION ORAL at 16:52

## 2021-04-14 RX ADMIN — MIDAZOLAM 10 MG/HR: 5 INJECTION INTRAMUSCULAR; INTRAVENOUS at 22:27

## 2021-04-14 RX ADMIN — FENTANYL CITRATE 100 MCG: 50 INJECTION, SOLUTION INTRAMUSCULAR; INTRAVENOUS at 04:26

## 2021-04-14 RX ADMIN — EPOPROSTENOL 50 NG/KG/MIN: 1.5 INJECTION, POWDER, LYOPHILIZED, FOR SOLUTION INTRAVENOUS at 17:53

## 2021-04-14 RX ADMIN — ARFORMOTEROL TARTRATE 15 MCG: 15 SOLUTION RESPIRATORY (INHALATION) at 19:32

## 2021-04-14 RX ADMIN — EPOPROSTENOL 50 NG/KG/MIN: 1.5 INJECTION, POWDER, LYOPHILIZED, FOR SOLUTION INTRAVENOUS at 22:45

## 2021-04-14 NOTE — PROGRESS NOTES
Gastrographin odered: please do not have patient drink until 6pm tonight 04/14.  Pt had contrast yyesterday and must wait until after 6pm to receive any more contrast

## 2021-04-14 NOTE — ROUTINE PROCESS
1930 Bedside and Verbal shift change report given to Adriano (oncoming nurse) by Romulo Henry RN (offgoing nurse). Report included the following information SBAR, Kardex and Recent Results DUAL DRIP VERIFICATION COMPLETED. .   2000 assessment completed. 2200 sarah arteaga notified of current blood pressure, will continue to monitor. 0000 reassessment completed oral and cardona care completed. 0041 dropped oxygen saturation. Bolus with versed and fentanyl in addition to drips. lavaged with normal saline,  Suctioned for small mucous plug x 2 whitish. rewp therapy jean rt at bedside with leodan arteaga.  0400 reassessment completed. Oral and cardona care completed. 0730 Bedside and Verbal shift change report given to chantel english (oncoming nurse) by jarad english (offgoing nurse). Report included the following information SBAR, Kardex and Recent Results side rails up, hob elevated 30 degrees. Dual drip verification completed. Brianna Pérez

## 2021-04-14 NOTE — PROGRESS NOTES
Discharge planning    Reviewed chart. Patient remains intubated. CM will continue to monitor and assist with transitional needs.        MARGUERITE Thapa, RN  Pager # [phone]  Care Manager

## 2021-04-14 NOTE — PROGRESS NOTES
[facility]  Pulmonary, Critical Care, and Sleep Medicine    Name: Arnie Lepe MRN: [id number]   : 1950 Hospital: [address] [place] [address] Dr   Date: 2021        Critical Care: Daily Progress Note    Admission Date:   2021  LOS: 10  MAR reviewed and pertinent medications noted or modified as needed    IMPRESSION:   · Angioedema- with airway and respiratory failure and collapse; thought due to ACE inhibitor  · S/P Emergent Cricthyrotomy [facility]-ED 21- converted to 6.5 ETT intraoperative by anesthesia team 21, 8.0 ETT by anesthesia 21, packing removed evening 21-ENT  · S/P cardiac arrest @ [facility] 21- brief thought due to hypoxia due to airway collapse  · Respiratory Failure: Acute due to above; currently intubated and on vent for airway protection  · Pulmonary Infiltrates: suspect aspiration secretions and/or blood due to above- can't exclude other infectious process at this time. Rapid Covid Negative at [facility]  · Bradycardia  · DVT: Popliteal- Left; acute non-occlusive Heparin ACS protocol started - stopped  due to bleeding from Cric site. Heparin on/off due to bleeding episodes  · Pulmonary Embolism -   · JACQUELYN  · Left scleral erythema- unknown baseline; possible infection   · DM  · DM retinopathy per chart review  · Diabetic peripheral neuropathy  · COVID -19; Negative rapid and Biofire: 21  · Left Pneumothorax - resolved    · Obesity: Body mass index is 35.64 kg/m².   ·       RECOMMENDATIONS:   NEURO:   Serial neuro evaluations   Sedation Holiday per protocol   RAAS: 0 to -1   Wetting tears and cipro eye drops- stop date for cipro on chart     CARDIO:   MAP goal >64- No IV pressor requirement   Echo on 21    Telemetry    PULM:   Maintain aspiration precautions: HOB >30 degrees   SpO2 goal >92%   Bronchial /oral hygiene   VAP bundle- Wean vent as clinical status allows   SBT as clinical status allows   Complete course of decadron   Pulmozyme and Mucomist BID with bronchodilator   Metaneb    GI/ NUTRITION:   OGT    Diet: TF with Free water @ 300 mL Q4   SUP:Pepcid   Follow up with nutritional recommendations     RENAL/ METAB/ :   Monitor I&Os   Trend electrolytes - replaced as needed, K:4, Mg>2 PO4>2   Garcia: Continue    HEM/ONC   Trend Hb/HCT and PLTs, and indicis   DVT prophylaxis: SCDs. Heparin on/off due to bleeding episodes   Re-image poplyteal DVT 72 hours and/or pending clinical course- may need IVC Filter if worsens   Blood Products: not indicated at this time    ID   Antibioitcs: Cipro eye drop, Zosyn: 4/4- 4/8/21 Vanco: 4/4-6, MRSA swab negative   Zosyn restarted on 4/11   Follow up on pending cultures   Repeat Covid negative (4/11)   Tylenol for fevers    ENDO   BGs Q 6,SSI   C1 inhibitor normal level (4/7/21)     MSK/ ORTHO   No active Issues     SKIN/ WOUNDS   Per protocol   Neck- daily dressing changes per ENT, Quick clot wit Afrin if needed, d/c xeroform     OTHER/DISPO:    CODE STATUS: Full Code- Full    Case reviewed and discussed with  ICU care team     Subjective/History: This patient has been seen and evaluated at the request of Dr. Victorino Osborn- ED from [facility] for Angioedema and airway compromise. Patient is a 79 y.o. male presented earlier this morning to [facility] ED in rapidly progressive respiratory distress due to angioedema and airway swelling presumed due to ACE inhibitor therapy. ED provider unable to intubate due to severity of airway swelling. Brief cardio-pulmonary collapse with brief loss of pulse. IO placed into right shoulder. Emergent cricothyrotomy 5.0 place. Good placement. ED team able to oxygenate and ventilate patient. Prior to transport ABG notable for Respiratory acidosis. Call back to -ED patient on vent and paralyzed with rate of 10. I requested that RR be increased to 16-18 bpm depending what patient could tolerate.       Our ENT provider on call, Dr. Sarah Hunter was contacted. OR team on call came in. Patient flow via Nightingale/Life flight to our facility. The patient was taken from flight line to OR hold. On-call team rapidly assess patient. I evaluated the patient immediately upon arrival. Patient with Cric in place, bilateral breath  Sounds, SpO2 in the mid 90's. Swollen neck and face. ABG -POC with persistent but improved respiratory acidosis. ER team took patient emergently to the OR    Anesthesia able to place ETT 6.5 intraop. I was called by ENT provider in the OR. Case discussed. Opted to keep patient intubated with 6.5 ETT. Will monitor for 48-72 hours and reassess patient. CXR notable for bilateral opacities. Suspect the later due to aspiration of blood and /or airway secretions. Patient was reexamined again at bedside upon arrival to ICU- Bed2    Patient paralyzed and sedated. 6.5 ETT in place. Bilateral breath sounds. SpO2 100%    CXR obtained in ICU- ETT slightly high- will try to advance 1-2 cm if able. 04/14/21      Overnight Events/Plan:    · CTA yesterday revealed moderate PE in LLL and small segmental PE in RUL, as well as multifocal consolidations suggestive of pneumonia vs edema vs atelectasis  · As bleeding has improved, will resume heparin. · Unable to evaluate right heart strain appropriately, but presumed from previously known high pulmonary artery pressures and extrav on CT. Dobutamine started. · H/H stable  · Increased FiO2 100% and PEEP 10.  · Short run of Vtach overnight   · [place] to have a distended abdomen overnight, tube feeds stopped and NG placed to suction  · 7.45/38/67/26.7  · Consult Nephrology for recommendations, bumex drip and metolazone 5 BID  · Epoprostenol (veletri) continues.  Ok to stop for CT.  · CT abdomen pelvis with oral contrast to evaluate for obstruction  · No infection isolated thus far, continue to follow cultures and BAL  · Stop Zosyn on Saturday, f/u cx  · Na 146, free water at 300 q4h  · Re-engage family for further medical history        Inpat Anti-Infectives (From admission, onward)     Start     Ordered Stop    04/06/21 1800  ciprofloxacin HCl (CILOXAN) 0.3 % ophthalmic solution 2 Drop  2 Drop,   Both Eyes,   EVERY 4 HOURS      04/04/21 1724 04/11/21 1759    04/04/21 1800  ciprofloxacin HCl (CILOXAN) 0.3 % ophthalmic solution 2 Drop  2 Drop,   Both Eyes,   EVERY 2 HOURS WHILE AWAKE      04/04/21 1724 04/06/21 1759                The patient is critically ill and can not provide additional history due to Unconsciousness, Ventilated and Unable to speak. Chart and notes reviewed. Data reviewed. []I have reviewed the flowsheet and previous days notes. []The patient is unable to give any meaningful history or review of systems because the patient is:  [x]Intubated [x]Sedated   []Unresponsive      []The patient is critically ill on      [x]Mechanical ventilation []Pressors   []BiPAP []                       Past Medical History:   Diagnosis Date    DDD (degenerative disc disease), lumbar     Diabetes ([address]     Diabetic neuropathy ([address]     Diabetic retinopathy ([address]     DM2 (diabetes mellitus, type 2) ([address]     HLD (hyperlipidemia)     HTN (hypertension)     Obesity (BMI 30-39. 9)       History reviewed. No pertinent surgical history. Prior to Admission medications    Medication Sig Start Date End Date Taking? Authorizing Provider   LISINOPRIL, BULK, by Does Not Apply route.     Other, MD Cheryl     Current Facility-Administered Medications   Medication Dose Route Frequency    DOBUTamine (DOBUTREX) 500 mg/250 mL (2,000 mcg/mL) infusion  5 mcg/kg/min IntraVENous TITRATE    bumetanide (BUMEX) 12.5 mg in 0.9% sodium chloride 100 mL infusion  0.5 mg/hr IntraVENous CONTINUOUS    metOLazone (ZAROXOLYN) tablet 5 mg  5 mg Oral BID    epoprostenol (VELETRI) 30 mcg/mL in 0.9% sodium chloride 50 mL inhalation solution  50 ng/kg/min (Order-Specific) Inhalation CONTINUOUS    fentaNYL (PF) 1,500 mcg/30 mL (50 mcg/mL) infusion  0-200 mcg/hr IntraVENous TITRATE    piperacillin-tazobactam (ZOSYN) 3.375 g in dextrose 5% 100 mL  3.375 g IntraVENous Q6H    midazolam in normal saline (VERSED) 1 mg/mL infusion  0-10 mg/hr IntraVENous TITRATE    acetylcysteine (MUCOMYST) 100 mg/mL (10 %) nebulizer solution 400 mg  4 mL Nebulization BID RT    famotidine (PF) (PEPCID) 20 mg in 0.9% sodium chloride 10 mL injection  20 mg IntraVENous DAILY    heparin 25,000 units in D5W 250 ml infusion  12-25 Units/kg/hr IntraVENous TITRATE    dornase alpha (PULMOZYME)2.5mg/2.5ml nebulizer solution  2.5 mg Nebulization BID RT    albuterol (PROVENTIL VENTOLIN) nebulizer solution 2.5 mg  2.5 mg Nebulization Q6H RT    arformoteroL (BROVANA) neb solution 15 mcg  15 mcg Nebulization BID RT    multivit-folic acid-herbal 755 (WELLESSE PLUS) oral liquid 30 mL  30 mL Per NG tube DAILY    chlorhexidine (PERIDEX) 0.12 % mouthwash 10 mL  10 mL Oral Q12H    insulin lispro (HUMALOG) injection   SubCUTAneous Q6H     Allergies   Allergen Reactions    Lisinopril Angioedema      Social History     Tobacco Use    Smoking status: Current Every Day Smoker     Packs/day: 0.50   Substance Use Topics    Alcohol use: Not on file      History reviewed. No pertinent family history. Review of Systems:  Review of systems not obtained due to patient factors.     Objective:   Vital Signs:    Visit Vitals  /83   Pulse 98   Temp 100 °F (37.8 °C)   Resp 20   Ht 5' 11\" (1.803 m)   Wt 115.9 kg (255 lb 8.2 oz)   SpO2 99%   BMI 35.64 kg/m²       O2 Device: Ventilator, Endotracheal tube       Temp (24hrs), Av.4 °F (38 °C), Min:97.2 °F (36.2 °C), Max:102 °F (38.9 °C)       Intake/Output:   Last shift:       0701 -  1900  In: 258.5 [I.V.:258.5]  Out: 550 [Urine:550]  Last 3 shifts: 1901 -  0700  In: 5092.5 [I.V.:1364.5]  Out: 4868 [Urine:9665]    Intake/Output Summary (Last 24 hours) at 2021 1539  Last data filed at 4/14/2021 1358  Gross per 24 hour   Intake 2606.14 ml   Output 2525 ml   Net 81.14 ml         Ventilator Settings:  Mode Rate Tidal Volume Pressure FiO2 PEEP   Assist control, VC+   550 ml    80 %(titrated down from 85) 10 cm H20     Peak airway pressure: 26 cm H2O    Minute ventilation: 12.6 l/min       ARDS network Guidelines:   Lung protective strategy and Plateau  Pressure goal < 30 cm H2O goals  Oxygenation Goals PaO2 55-80 mm Hg or SaO2 88-95%  PH goal 7.30-7.45    VAP bundle;  Reviewed. [place] tube to suction at 20-30 cm Hg. Maintain [place] tube with 5-10ml air every 4 hours  Routine oral care every 4 hours  Elevation of head > 45 degree  Daily sedation holiday and SBT evaluation starting at 6.00am.  Physical Exam:    General:   Intubated and sedated, appears stated age.+ Obese body habitus   Head:  Normocephalic, without obvious abnormality, atraumatic. + facial edema   Eyes:  Conjunctivae/corneas clear. PERRL. Nose: Nares normal. Septum midline. Mucosa normal. No drainage or sinus tenderness. Throat: Lips, mucosa, and tongue swollen. + ETT in place   Neck: bandage and packing inplace where cric was palced (minimal venous oozing at site), no carotid bruit and no JVD. Back:   Symmetric   Lungs:   Bilateral breath sounds, with bilateral rhonchi- no wheezing   Chest wall:  No tenderness or deformity.- No crepitus   Heart:  Regular rate and rhythm, S1, S2 normal, no murmur, click, rub or gallop. Abdomen:   Soft, Obese, non-tender. Bowel sounds normal. No masses,  No organomegaly. Extremities: Extremities normal, atraumatic, no cyanosis or edema. Pulses: 2+ and symmetric all extremities.    Skin: Skin color, texture, turgor normal. No rashes or lesions   Lymph nodes:      Cervical, supraclavicular nodes: unremarkable   Neurologic: Grossly nonfocal       Data:     Recent Labs     04/14/21  1003 04/13/21  1120 04/12/21  2354   WBC 15.9* 15.0* 13.7*   HGB 9.3* 9.5* 9.9*   HCT 29.6* 30.7* 34.0*    133* 144     Recent Labs     04/14/21  0327 04/13/21  1550 04/13/21  1120 04/12/21  2354 04/12/21  0352   * 142 144  --  146*   K 4.7 4.3 4.6  --  4.4    110 111  --  113*   CO2 28 29 28  --  29   * 156* 152*  --  165*   BUN 94* 87* 86*  --  72*   CREA 2.62* 2.41* 2.44*  --  2.21*   CA 7.5* 8.0* 8.2*  --  8.2*   MG 3.3*  --  3.4*  --  3.0*   PHOS 4.2 3.7 4.2  --  3.1   ALB 1.6* 1.6* 1.6*  --   --    ALT  --   --  172*  --   --    INR  --   --   --  1.4*  --      Lab Results   Component Value Date/Time    NT pro-BNP 1,121 (H) 04/14/2021 05:02 AM    NT pro- (H) 04/13/2021 11:20 AM    NT pro- (H) 04/09/2021 09:38 AM    NT pro- 04/06/2021 12:02 PM     Lab Results   Component Value Date/Time    INR 1.4 (H) 04/12/2021 11:54 PM    INR 1.0 04/04/2021 05:11 PM    Prothrombin time 17.2 (H) 04/12/2021 11:54 PM    Prothrombin time 12.7 04/04/2021 05:11 PM       No results for input(s): PH, PCO2, PO2, HCO3, FIO2 in the last 72 hours.   Recent Labs     04/14/21  0349 04/13/21  2222 04/13/21  1614   FIO2I 100 100 0.85   HCO3I 26.7* 26.7* 28.4*   PCO2I 38.0 38.6 42.4   PHI 7.45 7.45 7.44   PO2I 67* 55* 76*       ENDO:  Lab Results   Component Value Date/Time    TSH 1.35 04/04/2021 05:11 PM       Lab Results   Component Value Date/Time    Glucose 107 (H) 04/14/2021 03:27 AM    Glucose 156 (H) 04/13/2021 03:50 PM    Glucose 152 (H) 04/13/2021 11:20 AM    Glucose 165 (H) 04/12/2021 03:52 AM    Glucose 152 (H) 04/11/2021 12:20 AM         CARDIO:  Telemetry:normal sinus rhythm / sinus bradycardia    Lab Results   Component Value Date/Time     04/14/2021 03:27 AM    CK - MB <1.0 04/14/2021 03:27 AM    CK-MB Index  04/14/2021 03:27 AM     CALCULATION NOT PERFORMED WHEN RESULT IS BELOW LINEAR LIMIT    Troponin-I, Qt. 0.06 (H) 04/04/2021 01:40 PM    Troponin-I, QT 0.05 (H) 04/14/2021 03:27 AM       EKG Results     Procedure 720 Value Units Date/Time    EKG, 12 LEAD, INITIAL [[id number]] Collected: 04/14/21 0427    Order Status: Completed Updated: 04/14/21 0803     Ventricular Rate 106 BPM      Atrial Rate 106 BPM      P-R Interval 148 ms      QRS Duration 84 ms      Q-T Interval 328 ms      QTC Calculation (Bezet) 435 ms      Calculated P Axis 45 degrees      Calculated R Axis -19 degrees      Calculated T Axis 65 degrees      Diagnosis --     Sinus tachycardia  Septal infarct (cited on or before 06-APR-2021)  Abnormal ECG  When compared with ECG of 06-APR-2021 11:53,  Vent.  rate has increased BY  52 BPM  Serial changes of Septal infarct present      EKG, 12 LEAD, SUBSEQUENT [[id number]] Collected: 04/06/21 1153    Order Status: Completed Updated: 04/06/21 1536     Ventricular Rate 54 BPM      Atrial Rate 54 BPM      P-R Interval 190 ms      QRS Duration 98 ms      Q-T Interval 588 ms      QTC Calculation (Bezet) 557 ms      Calculated P Axis 44 degrees      Calculated R Axis -20 degrees      Calculated T Axis 130 degrees      Diagnosis --     Sinus bradycardia  Anteroseptal infarct , age undetermined  T wave abnormality, consider lateral ischemia  Prolonged QT  Abnormal ECG  No previous ECGs available  Confirmed by Alphonso Jacob ([id number]) on 4/6/2021 3:36:40 PM      EKG, 12 LEAD, SUBSEQUENT [[id number]]     Order Status: Canceled                MEDS: Please also see MAR  Current Facility-Administered Medications   Medication Dose Route Frequency    DOBUTamine (DOBUTREX) 500 mg/250 mL (2,000 mcg/mL) infusion  5 mcg/kg/min IntraVENous TITRATE    bumetanide (BUMEX) 12.5 mg in 0.9% sodium chloride 100 mL infusion  0.5 mg/hr IntraVENous CONTINUOUS    metOLazone (ZAROXOLYN) tablet 5 mg  5 mg Oral BID    epoprostenol (VELETRI) 30 mcg/mL in 0.9% sodium chloride 50 mL inhalation solution  50 ng/kg/min (Order-Specific) Inhalation CONTINUOUS    fentaNYL (PF) 1,500 mcg/30 mL (50 mcg/mL) infusion  0-200 mcg/hr IntraVENous TITRATE    piperacillin-tazobactam (ZOSYN) 3.375 g in dextrose 5% 100 mL  3.375 g IntraVENous Q6H  midazolam in normal saline (VERSED) 1 mg/mL infusion  0-10 mg/hr IntraVENous TITRATE    acetylcysteine (MUCOMYST) 100 mg/mL (10 %) nebulizer solution 400 mg  4 mL Nebulization BID RT    famotidine (PF) (PEPCID) 20 mg in 0.9% sodium chloride 10 mL injection  20 mg IntraVENous DAILY    heparin 25,000 units in D5W 250 ml infusion  12-25 Units/kg/hr IntraVENous TITRATE    dornase alpha (PULMOZYME)2.5mg/2.5ml nebulizer solution  2.5 mg Nebulization BID RT    albuterol (PROVENTIL VENTOLIN) nebulizer solution 2.5 mg  2.5 mg Nebulization Q6H RT    arformoteroL (BROVANA) neb solution 15 mcg  15 mcg Nebulization BID RT    multivit-folic acid-herbal 088 (WELLESSE PLUS) oral liquid 30 mL  30 mL Per NG tube DAILY    chlorhexidine (PERIDEX) 0.12 % mouthwash 10 mL  10 mL Oral Q12H    insulin lispro (HUMALOG) injection   SubCUTAneous Q6H     MICRO:       Results     Procedure Component Value Units Date/Time    CULTURE, BLOOD [[id number]] Collected: 04/14/21 0502    Order Status: Completed Specimen: Blood Updated: 04/14/21 0515    CULTURE, BLOOD [[id number]] Collected: 04/14/21 0502    Order Status: Completed Specimen: Blood Updated: 04/14/21 0720    RESPIRATORY VIRUS PANEL W/COVID-19, PCR [[id number]] Collected: 04/11/21 0805    Order Status: Completed Specimen: Nasopharyngeal Updated: 04/11/21 0947     Adenovirus Not detected        Coronavirus 229E Not detected        Coronavirus HKU1 Not detected        Coronavirus CVNL63 Not detected        Coronavirus OC43 Not detected        Metapneumovirus Not detected        Rhinovirus and Enterovirus Not detected        Influenza A Not detected        Influenza B Not detected        Parainfluenza 1 Not detected        Parainfluenza 2 Not detected        Parainfluenza 3 Not detected        Parainfluenza virus 4 Not detected        RSV by PCR Not detected        B. parapertussis, PCR Not detected        Bordetella pertussis - PCR Not detected        Chlamydophila pneumoniae DNA, QL, PCR Not detected        Mycoplasma pneumoniae DNA, QL, PCR Not detected        SARS-CoV-2, PCR Not detected       CULTURE, RESPIRATORY/SPUTUM/BRONCH Anastasiia Fish STAIN [[id number]]  (Abnormal)  (Susceptibility) Collected: 04/10/21 1145    Order Status: Completed Specimen: Sputum from Bronch lavage right middle lobe Updated: 04/13/21 1012     Special Requests: NO SPECIAL REQUESTS        GRAM STAIN FEW WBCS SEEN         NO EPITHELIAL CELLS SEEN         NO ORGANISMS SEEN        Culture result:       LIGHT ENTEROBACTER AEROGENES                  NO NORMAL RESPIRATORY ANNALEE ISOLATED          Susceptibility      Enterobacter aerogenes     JOANNE     Amikacin ($) Susceptible     Cefazolin ($) Resistant     Cefepime ($$) Susceptible     Cefoxitin Resistant     Ceftazidime ($) Susceptible     Ceftriaxone ($) Susceptible     Ciprofloxacin ($) Susceptible     Gentamicin ($) Susceptible     Levofloxacin ($) Susceptible     Meropenem ($$) Susceptible     Piperacillin/Tazobac ($) Susceptible     Tobramycin ($) Susceptible     Trimeth/Sulfa Susceptible                    CULTURE, BLOOD [[id number]] Collected: 04/09/21 1449    Order Status: Completed Specimen: Blood Updated: 04/14/21 0726     Special Requests: NO SPECIAL REQUESTS        Culture result: NO GROWTH 5 DAYS       CULTURE, BLOOD [[id number]] Collected: 04/09/21 1449    Order Status: Completed Specimen: Blood Updated: 04/14/21 0726     Special Requests: NO SPECIAL REQUESTS        Culture result: NO GROWTH 5 DAYS       CULTURE, URINE [[id number]] Collected: 04/09/21 1350    Order Status: Completed Specimen: Urine from Clean catch Updated: 04/10/21 1830     Special Requests: NO SPECIAL REQUESTS        Culture result: No growth (<1,000 CFU/ML)       CULTURE, RESPIRATORY/SPUTUM/BRONCH W GRAM STAIN [[id number]]  (Abnormal)  (Susceptibility) Collected: 04/09/21 1350    Order Status: Completed Specimen: Sputum,ET Suction Updated: 04/11/21 1057     Special Requests: NO SPECIAL REQUESTS GRAM STAIN RARE WBCS SEEN               RARE EPITHELIAL CELLS SEEN            NO ORGANISMS SEEN        Culture result:       LIGHT ENTEROBACTER AEROGENES                  NO NORMAL RESPIRATORY ANNALEE ISOLATED          Susceptibility      Enterobacter aerogenes     JOANNE     Amikacin ($) Susceptible     Cefazolin ($) Resistant     Cefepime ($$) Susceptible     Cefoxitin Resistant     Ceftazidime ($) Susceptible     Ceftriaxone ($) Susceptible     Ciprofloxacin ($) Susceptible     Gentamicin ($) Susceptible     Levofloxacin ($) Susceptible     Meropenem ($$) Susceptible     Piperacillin/Tazobac ($) Susceptible     Tobramycin ($) Susceptible     Trimeth/Sulfa Susceptible                    CULTURE, RESPIRATORY/SPUTUM/BRONCH [employer] STAIN [[id number]] Collected: 04/07/21 1300    Order Status: Completed Specimen: Sputum,ET Suction Updated: 04/09/21 1224     Special Requests: NO SPECIAL REQUESTS        GRAM STAIN OCCASIONAL WBCS SEEN               OCCASIONAL EPITHELIAL CELLS SEEN            NO ORGANISMS SEEN        Culture result:       RARE NORMAL RESPIRATORY ANNALEE          CULTURE, RESPIRATORY/SPUTUM/BRONCH [facility] [[id number]] Collected: 04/06/21 1715    Order Status: Completed Specimen: Sputum,ET Suction Updated: 04/08/21 1212     Special Requests: NO SPECIAL REQUESTS        GRAM STAIN OCCASIONAL WBCS SEEN         NO ORGANISMS SEEN        Culture result:       NO NORMAL RESPIRATORY ANNALEE ISOLATED          STREP [place], URINE [[id number]] Collected: 04/04/21 1801    Order Status: Completed Specimen: Urine, random Updated: 04/04/21 1944     Strep pneumo Ag, urine Negative       LEGIONELLA PNEUMOPHILA AG, URINE [[id number]] Collected: 04/04/21 1801    Order Status: Completed Specimen: Urine, random Updated: 04/04/21 1944     Legionella Ag, urine Negative       RESPIRATORY VIRUS PANEL W/COVID-19, PCR [[id number]] Collected: 04/04/21 1700    Order Status: Completed Specimen: Nasopharyngeal Updated: 04/04/21 2020 Adenovirus Not detected        Coronavirus 229E Not detected        Coronavirus HKU1 Not detected        Coronavirus CVNL63 Not detected        Coronavirus OC43 Not detected        Metapneumovirus Not detected        Rhinovirus and Enterovirus Not detected        Influenza A Not detected        Influenza A, subtype H1 Not detected        Influenza A, subtype H3 Not detected        INFLUENZA A H1N1 PCR Not detected        Influenza B Not detected        Parainfluenza 1 Not detected        Parainfluenza 2 Not detected        Parainfluenza 3 Not detected        Parainfluenza virus 4 Not detected        RSV by PCR Not detected        B. parapertussis, PCR Not detected        Bordetella pertussis - PCR Not detected        Chlamydophila pneumoniae DNA, QL, PCR Not detected        Mycoplasma pneumoniae DNA, QL, PCR Not detected        SARS-CoV-2, PCR Not detected       CULTURE, MRSA [[id number]] Collected: 04/04/21 1700    Order Status: Completed Specimen: Nasal from Nares Updated: 04/06/21 0838     Special Requests: NO SPECIAL REQUESTS        Culture result: MRSA NOT PRESENT               Screening of patient nares for MRSA is for surveillance purposes and, if positive, to facilitate isolation considerations in high risk settings. It is not intended for automatic decolonization interventions per se as regimens are not sufficiently effective to warrant routine use. COVID-19 RAPID TEST [[id number]] Collected: 04/04/21 1112    Order Status: Completed Specimen: Nasopharyngeal Updated: 04/04/21 1151     Specimen source Nasopharyngeal        COVID-19 rapid test Not Detected        Comment:   Rapid NAAT:  The specimen is NEGATIVE for SARS-CoV-2, the novel coronavirus associated with COVID-19. Negative results should be treated as presumptive and, if inconsistent with clinical signs and symptoms or necessary for patient management, should be tested with an alternative molecular assay.  Negative results do not preclude SARS-CoV-2 infection and should not be used as the sole basis for patient management decisions. This test has been authorized by the FDA under an Emergency Use   Authorization (EUA) for use by authorized laboratories. Fact sheet for Healthcare Providers: ConventionUpdate.co.nz Fact sheet for Patients: ConventionUpdate.co.nz   Methodology: Isothermal Nucleic Acid Amplification                   Imaging:  I have personally reviewed the patients radiographs and have reviewed the reports:    CXR Results  (Last 48 hours)               04/14/21 0023  XR CHEST PORT Final result    Impression:      1. Stable supporting lines. 2. Moderate airspace disease seems to be slightly improved. Thank you for your referral.       Narrative:  Chest AP single view       HISTORY: Postintubation follow-up        COMPARISON: Yesterday. FINDINGS:    LINES/TUBES/DEVICES: Multiple supporting lines appear stable including ET tube,   NG tube and pH probe in esophagus. No pneumothorax. LUNGS: Patchy opacities in bilateral mid and lower lungs are again identified   with probably slight improvement. There is possible layering pleural fluid at   bilateral lung bases. MEDIASTINUM: The heart is partially obscured by pulmonary opacities but grossly   stable. BONES/SOFT TISSUES: No acute findings. 04/13/21 0552  XR CHEST PORT Final result    Impression:  1. Interval increased bilateral pulmonary opacities which may reflect pulmonary   edema or multifocal pneumonia. Recommend continued imaging follow-up. 2.  Questionable layering small right pleural effusion. 3.  Central pulmonary interstitial edema/infiltrates. Narrative:  EXAM: XR CHEST PORT       INDICATION: 79 years Male. Daily CXR while intubated. .   ADDITIONAL HISTORY: None.        TECHNIQUE: Frontal view of the chest.       COMPARISON: 4/12/2021 at 1528 hours       FINDINGS:       Endotracheal tube with tip 6.8 cm proximal to gabriella. Temperature probe with tip   at the proximal thoracic esophagus. There is an enteric tube extending inferior   to the field-of-view, beyond the GE junction. Multilevel lines overlie the   chest.       Hypoinflated lungs. Underpenetration of the lung bases. Heart borders are obscured. The cardiac silhouette is at least mildly enlarged,   similar to prior. Increased fullness of central vascular interstitial markings. Interval increased hazy bilateral pulmonary opacities. Blunting of the right   costophrenic sulcus. The left costophrenic sulcus appears sharp. No definite   pneumothorax. Osseous structures are unchanged in appearance. 04/12/21 1544  XR CHEST PORT Final result    Impression:  No significant change from prior study. No definite pneumothorax. Narrative:  ONE VIEW CHEST RADIOGRAPH        INDICATION: Eval for pneumothorax. Concern for pneumothorax on ultrasound. Increased oxygen requirements. COMPARISON: Chest radiograph earlier today. TECHNIQUE: AP view of the chest       FINDINGS:       LINES/TUBES: Enteric tube tip is in the stomach. Endotracheal tube tip is above   the gabriella. MEDIASTINUM: Unchanged. LUNGS: No pneumothorax. No significant change in small bilateral pleural   effusions with overlying consolidation since recent prior study. BONES/OTHER: No acute osseous abnormality. CT Results  (Last 48 hours)               04/13/21 1757  CTA CHEST W OR W WO CONT Final result    Impression:      1. Moderate burden of acute pulmonary embolism in the left lower lobe. Additional small acute segmental pulmonary embolism in the right upper lobe. 2. There is dense multifocal consolidations bilaterally, as well as in the   dependent lower lobes, favoring a combination of multifocal pneumonia, edema,   and dependent atelectasis.    -There is suggestion of focal hypoattenuation within the consolidation in the   left lower lobe, which may be infectious in etiology versus sequela of pulmonary   infarct given #1.       3. Mildly prominent mediastinal and bilateral hilar nodes, likely   benign/reactive. 4. Mild cardiomegaly. Reflux of trace contrast into the IVC, which can be seen   in the setting of right heart dysfunction. 5. Trace bilateral pleural effusions. 6. Endotracheal and enteric tubes noted in appropriate position. Discussed findings #1-2 with Shantanu Fernandez PA-C on 4/13/2021 at 7:19 PM.       Narrative:  CTA CHEST PULMONARY ANGIOGRAM       HISTORY/INDICATION:  Shortness of breath, clinical concern for pulmonary   embolism, history of cardiac arrest 4/4/2021       COMPARISON:  No prior CTA chest. Reference chest radiograph 4/13/2021. TECHNIQUE:  Helical multidetector array volumetric acquisition of the chest is   performed following intravenous contrast administration of 72cc Isovue-370, per   pulmonary angiogram protocol. These images are reviewed and 2.5 mm and 5 mm   images along with coronal and sagittal 3D reconstruction maximum intensity   projection (MIP) images. Dose reduction techniques:  Automated exposure control,   mAs and/or kVp reductions based on patient size, and iterative reconstruction. CTA SPECIFIC FINDINGS:   Pulmonary arteries: Central contrast filling defect in the left lower lobe,   involving lobar, segmental, and segmental branches. Small contrast filling   defect in a segmental branch in the right upper lobe. Aorta: No aneurysm. CHEST FINDINGS:    Thyroid:  No focal lesion. Mediastinum: Heart is mildly enlarged. Mild burden of coronary artery   atherosclerotic calcifications. No pericardial effusion. Reflux of trace   contrast into the IVC. No right ventricular dilatation or bowing of the   intraventricular septum. Pleural space: Trace bilateral pleural effusions. No pneumothorax. Lungs:  There is dense multifocal consolidations bilaterally and in the dependent   lower lobes. There is suggestion of focal hypoattenuation within the   consolidation in the left lower lobe. Endotracheal tube in appropriate position,   terminating 3.7 cm above the gabriella on the  view. Included Abdomen: Visualized solid organs of the upper abdomen are normal.   Enteric tube tip terminates within the body of the stomach. Skeleton: No suspicious lytic or blastic lesions. No fractures. Soft tissues: Normal.       Lymph Nodes: Increased number of mildly prominent mediastinal and bilateral   hilar nodes, including a 1.0 x 1.8 cm AP window node and a 1.1 x 2.1 cm right   paratracheal node. Best practice :  Core measures:    Glycemic control  [facility]. Ventilated patients- aim to keep peak plateau pressure 27-85NL H2O. Sress ulcer prophylaxis  DVT prophylaxis               Critical Care Time:  The services I provided to this patient were to treat and/or prevent clinically significant deterioration that could result in the failure of one or more body systems and/or organ systems due to respiratory distress, hypoxia, cardiac dysrhythmia. Services included the following:  -reviewing nursing notes and old charts  -vital sign assessments   -direct patient care  -medication orders and management  -interpreting and reviewing diagnostic studies/labs  -re-evaluations  -documentation time        Aggregate critical care time was 45 minutes, which includes only time during which I was engaged in work directly related to the patient's care as described above. During this entire length of time I was immediately available to the patient. The reason for providing this level of medical care for this critically ill patient was due a critical illness that impaired one or more vital organ systems such that there was a high probability of imminent or life threatening deterioration in the patients condition.  This care involved high complexity decision making to assess, manipulate, and support vital system functions, to treat this degreee vital organ system failure and to prevent further life threatening deterioration of the patients condition      Complex decision making was made in the evaluation and management plans during this consultation. More than 50% of time was spent in counseling and coordination of care including review of data and discussion with other team members.       Janna Frye MD PGY-1  [facility] Emergency Medicine    [facility]  Pulmonary, Critical Care, and Sleep Medicine

## 2021-04-14 NOTE — PROGRESS NOTES
attended the interdisciplinary rounds for Kimberlee Lora, who is a 79 y.o.,male. Patients Primary Language is: [place]. According to the patients EMR Scientology Affiliation is: No preference. The reason the Patient came to the hospital is:   Patient Active Problem List    Diagnosis Date Noted    [facility] thrombosis) [facility] 04/10/2021    Respiratory failure ([facility] 04/05/2021    Obesity (BMI 30-39.9) 04/05/2021    Diabetic neuropathy associated with type 2 diabetes mellitus ([facility] 04/05/2021    Diabetic retinopathy associated with type 2 diabetes mellitus ([facility] 04/05/2021    Pulmonary infiltrates 04/05/2021    Controlled type 2 diabetes mellitus with neurologic complication, without long-term current use of insulin ([facility] 04/05/2021    Angioedema due to angiotensin converting enzyme inhibitor (ACE-I) 04/04/2021    JACQUELYN (acute kidney injury) ([facility] 04/04/2021    Cardiac arrest ([facility] 04/04/2021      Plan:  Chaplains will continue to follow and will provide pastoral care on an as needed/requested basis.  recommends bedside caregivers page  on duty if patient shows signs of acute spiritual or emotional distress.     [address]  Board Certified [address] [place] [address]   [phone]

## 2021-04-14 NOTE — PROGRESS NOTES
Bedside and Verbal shift change report given to Polo Palma RN (oncoming nurse) by Maik Starr RN (offgoing nurse). Report included the following information SBAR, Kardex, MAR and Recent Results. SITUATION:    Code Status: Full Code   Reason for Admission: Angioedema due to angiotensin converting enzyme inhibitor (ACE-I) [T78. [facility] day: 5   Problem List:       Hospital Problems  Date Reviewed: 4/5/2021          Codes Class Noted POA    DVT (deep venous thrombosis) ([facility] ICD-10-CM: I82.409  ICD-9-CM: 453.40  4/10/2021 Unknown        Respiratory failure ([facility] ICD-10-CM: J96.90  ICD-9-CM: 518.81  4/5/2021 Unknown        Obesity (BMI 30-39. 9) ICD-10-CM: E66.9  ICD-9-CM: 278.00  4/5/2021 Unknown        Diabetic neuropathy associated with type 2 diabetes mellitus ([facility] ICD-10-CM: E11.40  ICD-9-CM: 250.60, 357.2  4/5/2021 Unknown        Diabetic retinopathy associated with type 2 diabetes mellitus ([facility] ICD-10-CM: E11.319  ICD-9-CM: 250.50, 362.01  4/5/2021 Unknown        Pulmonary infiltrates ICD-10-CM: R91.8  ICD-9-CM: 793.19  4/5/2021 Unknown        Controlled type 2 diabetes mellitus with neurologic complication, without long-term current use of insulin ([facility] ICD-10-CM: E11.49  ICD-9-CM: 250.60  4/5/2021 Unknown        Angioedema due to angiotensin converting enzyme inhibitor (ACE-I) ICD-10-CM: T78. Teryl Able, [id number]  ICD-9-CM: 995.1, E942.6  4/4/2021 Unknown        JACQUELYN (acute kidney injury) ([facility] ICD-10-CM: N17.9  ICD-9-CM: 584.9  4/4/2021 Unknown        Cardiac arrest [facility]) ICD-10-CM: I46.9  ICD-9-CM: 427.5  4/4/2021 Unknown              BACKGROUND:    Past Medical History:   Past Medical History:   Diagnosis Date    DDD (degenerative disc disease), lumbar     Diabetes ([facility]     Diabetic neuropathy ([facility]     Diabetic retinopathy ([facility]     DM2 (diabetes mellitus, type 2) ([facility]     HLD (hyperlipidemia)     HTN (hypertension)     Obesity (BMI 30-39. 9)          Patient taking anticoagulants: No     ASSESSMENT:    Changes in Assessment Throughout Shift: FIO2 decreased from 90% to 85%. Nephrology consulted. Diuresed with Bumex 2 mg IV today. Total urine output on day shift: 2100 ml. CTA chest with & without contrast completed per order. Sister and son in to visit at bedside today and updated by Dr. Yael Spann.      Patient has Central Line: No     Patient has Garcia Cath: No      Last Vitals:     Vitals:    04/13/21 1740 04/13/21 1806 04/13/21 1900 04/13/21 1932   BP:  (!) 102/50 (!) 96/49    Pulse: 87 85 81 77   Resp: 19 23 22 19   Temp:   99.3 °F (37.4 °C)    SpO2: 97% 95% 96% 96%   Weight:       Height:            IV and DRAINS (will only show if present)   [REMOVED] Airway - Endotracheal Tube 04/04/21 Oral-Site Assessment: Clean, dry, & intact  [REMOVED] Peripheral IV 04/12/21 Left Antecubital-Site Assessment: Clean, dry, & intact  Peripheral IV 04/13/21 Right;Upper Forearm-Site Assessment: Clean, dry, & intact  [REMOVED] Peripheral IV 04/04/21 Left Antecubital-Site Assessment: Clean, dry, & intact  Peripheral IV 04/04/21 Anterior;Right Wrist-Site Assessment: Clean, dry, & intact  Peripheral IV 04/04/21 Left;Posterior Hand-Site Assessment: Clean, dry, & intact  [REMOVED] Airway - Continuous Aspiration of Subglottic Secretions (GENESIS) Tube 04/04/21 Oral-Site Assessment: Clean, dry, & intact  Orogastric Tube 04/05/21-Site Assessment: Clean, dry, & intact  Airway - Continuous Aspiration of Subglottic Secretions (GENESIS) Tube 04/07/21 Oral-Site Assessment: Clean, dry, & intact     WOUND (if present)   Wound Type: Anterior neck cric incision   Dressing present Dressing Present : Yes, Intact, not due to be changed   Wound Concerns/Notes:  none     PAIN    Pain Assessment    Pain Intensity 1: 0 (04/13/21 0430)              Patient Stated Pain Goal: Unable to verbalize/indicate pain  o Interventions for Pain:  Continuous Fentanyl drip  o Intervention effective: yes  o Time of last intervention: Continuous Fentanyl drip   o Reassessment Completed: yes      Last 3 Weights:  Last 3 Recorded Weights in this Encounter    04/10/21 2057 04/12/21 0553 04/13/21 0500   Weight: 114.5 kg (252 lb 6.8 oz) 116 kg (255 lb 11.7 oz) 115.9 kg (255 lb 8.2 oz)     Weight change: -0.1 kg (-3.5 oz)     INTAKE/OUPUT    Current Shift: No intake/output data recorded. Last three shifts: 04/12 0701 - 04/13 1900  In: 4665.6 [I.V.:1133.6]  Out: 4150 [Urine:4150]     LAB RESULTS     Recent Labs     04/13/21  1120 04/12/21 2354 04/12/21  0352   WBC 15.0* 13.7* 11.3   HGB 9.5* 9.9* 10.0*   HCT 30.7* 34.0* 32.1*   * 144 147        Recent Labs     04/13/21  1550 04/13/21  1120 04/12/21  2354 04/12/21  0352 04/11/21  0020    144  --  146* 151*   K 4.3 4.6  --  4.4 4.3   * 152*  --  165* 152*   BUN 87* 86*  --  72* 65*   CREA 2.41* 2.44*  --  2.21* 1.85*   CA 8.0* 8.2*  --  8.2* 8.5   MG  --  3.4*  --  3.0* 3.3*   INR  --   --  1.4*  --   --        RECOMMENDATIONS AND DISCHARGE PLANNING     1. Pending tests/procedures/ Plan of Care or Other Needs: Wean sedation and ventilator settings as tolerated    2. Discharge plan for patient and Needs/Barriers: TBD    3. Estimated Discharge Date: TBD; Posted on Whiteboard in Patients Room: yes      4. The patient's care plan was reviewed with the oncoming nurse. \"HEALS\" SAFETY CHECK      Fall Risk    Total Score: 3    Safety Measures: Safety Measures: Bed/Chair alarm on, Bed/Chair-Wheels locked, Bed in low position, Call light within reach, Emergency bedside equipment, Fall prevention (comment), Nurse at bedside, Side rails X 3    A safety check occurred in the patient's room between off going nurse and oncoming nurse listed above.     The safety check included the below items  Area Items   H  High Alert Medications - Verify all high alert medication drips (heparin, PCA, etc.)   E  Equipment - Suction is set up for ALL patients (with yanker)  - Red plugs utilized for all equipment (IV pumps, etc.)  - WOWs wiped down at end of shift.  - Room stocked with oxygen, suction, and other unit-specific supplies   A  Alarms - Bed alarm is set for fall risk patients  - Ensure chair alarm is in place and activated if patient is up in a chair   L  Lines - Check IV for any infiltration  - Garcia bag is empty if patient has a Garcia   - Tubing and IV bags are labeled   S  Safety   - Room is clean, patient is clean, and equipment is clean. - Hallways are clear from equipment besides carts. - Fall bracelet on for fall risk patients  - Ensure room is clear and free of clutter  - Suction is set up for ALL patients (with yanker)  - Hallways are clear from equipment besides carts.    - Isolation precautions followed, supplies available outside room, sign posted     Sivan Mora RN

## 2021-04-14 NOTE — INTERDISCIPLINARY ROUNDS
[facility]  Pulmonary, Critical Care, and Sleep Medicine  Interdisciplinary and Ventilator Weaning Rounds    Patient discussed in morning walking rounds and interdisciplinary rounds. ICU day: 11    Overnight events:   · Remains on FIO2 100%  · CTA positive for PE  · Started on Dobutamine ggt  · Started on Velitri    Assessments and best practice:   Ventilator  o Ventilator day 11  o Vent settings: FiO2 of 100 and PEEP 10  o VAP bundle, aim to keep peak plateau pressure 51-15KS H2O  o Weaning assessed and documented   - Patient does not meet criteria for SBT. FiO2 and PEEP too high   - Patient is not on sedation holiday. - No plan to wean today. - Outcome: cont full ventilator support   - Final plan: will remain on mechanical ventilatory support today   Sedation  Fentanyl, Versed   Other pertinent drips  o Bumex, Dobutamine, Heparin, Velitri    Lines noted  o PIVs   Critical labs assessed  o Yes   Antibiotics   Zosyn    Medications reviewed  o Yes   Pending imaging  o CT scan abdomen to evaluate for toxic casper colon   Pending send out labs  o no   Pending Procedures  o no   Glycemic control   SSI    Stress ulcer prophylaxis. o Pepcid   DVT prophylaxis. o Heparin ggt   Need for Lines, cardona assessed.  o Yes   Restraint Reevaluation   o Yes  o I have reevaluated the patient one hour after initiation of intervention. The patient is comfortable, uninjured, but continues to pose an imminent risk of injury to self to themselves and/or serious disruption of medical treatment required to keep patient stable. The patient's current medical and behavioral conditions that warrant the use intervention include danger to self and Interference with medical treatment. Restraint or seclusion will be discontinued at the earliest possible time, regardless of the scheduled expiration of the order.  Based on my evaluation, restraints will be continued: YES 4/14/21   o Attending Overseeing restraints:Dr. Mati East     Family contact/MPOA: sister, Nikhil Hurst  Family update: updated by bedside nurse        Daily Plans:   Bumex ggt with goal of negative 1 liter   Re-start Heparin ggt no bolus   HIV, Hepatitis panel   Recruitment maneuver with vent      Wyatt Cuellar NP  04/14/21  Pulmonary, [address] [facility],[address] [place] [address] Pulmonary Specialists

## 2021-04-14 NOTE — PROGRESS NOTES
Problem: Ventilator Management  Goal: *Adequate oxygenation and ventilation  Outcome: Progressing Towards Goal  Goal: *Patient maintains clear airway/free of aspiration  Outcome: Progressing Towards Goal  Goal: *Absence of infection signs and symptoms  Outcome: Progressing Towards Goal  Goal: *Normal spontaneous ventilation  Outcome: Progressing Towards Goal   VENTILATOR Care plan    Problem: Ventilator Management  Goal: *Adequate oxygenation/ ventilation/ and extubation      Patient:        King June     79 y.o.   male     4/14/2021  1:32 PM  Patient Active Problem List   Diagnosis Code    Angioedema due to angiotensin converting enzyme inhibitor (ACE-I) T78. 3XXA, [id number]    Respiratory failure ([facility] J96.90    JACQUELYN (acute kidney injury) ([facility] N17.9    Cardiac arrest ([facility] I46.9    Obesity (BMI 30-39. 9) E66.9    Diabetic neuropathy associated with type 2 diabetes mellitus ([facility] E11.40    Diabetic retinopathy associated with type 2 diabetes mellitus ([facility] E11.319    Pulmonary infiltrates R91.8    Controlled type 2 diabetes mellitus with neurologic complication, without long-term current use of insulin ([facility]) E11.49    DVT (deep venous thrombosis) ([facility]) I82.409       Angioedema due to angiotensin converting enzyme inhibitor (ACE-I) [T78. 3XXA, T46.4X5A]    Reason patient intubated: Respiratory failure    Ventilator day: 11    Ventilator settings: ACVC+ 18/550/+10/0.80    ETT Size/Placement: 8.0mm ETT @ 28cm at the lips       ABG:  Date:4/14/2021  Lab Results   Component Value Date/Time    PHI 7.45 04/14/2021 03:49 AM    PCO2I 38.0 04/14/2021 03:49 AM    PO2I 67 (L) 04/14/2021 03:49 AM    HCO3I 26.7 (H) 04/14/2021 03:49 AM    FIO2I 100 04/14/2021 03:49 AM       Chest X-ray:  Date:4/14/2021  Results from Hospital Encounter encounter on 04/04/21   XR ABD (KUB)    Narrative Abdomen AP single view    HISTORY: Abdominal distention with decreased bowel sounds.     COMPARISON: 4/5/21    FINDINGS: The NG tube appears unchanged, tip in the proximal stomach. There is  gas filled mildly distended proximal colon from cecum to the distal transverse  colon, new since the prior study. Decompressed splenic flexure. Poorly distended  bladder containing hypodensity, of likely from recent contrast procedure. Impression Gas-filled mildly dilated right-sided colon up to distal transverse colon with  decompressed splenic flexure. Early colonic obstruction is concerned. NG tube is in place.     Thank you for your referral.        Lab Test:  Date:4/14/2021  WBC:   Lab Results   Component Value Date/Time    WBC 15.9 (H) 04/14/2021 10:03 AM   HGB:   Lab Results   Component Value Date/Time    HGB 9.3 (L) 04/14/2021 10:03 AM    PLTS:   Lab Results   Component Value Date/Time    PLATELET 006 74/52/9112 10:03 AM       SaO2%/flow: @SDHEEHK1(4)@    Vital Signs:   Patient Vitals for the past 8 hrs:   Temp Pulse Resp BP SpO2   04/14/21 1200 99.9 °F (37.7 °C) 90 21 (!) 107/58 100 %   04/14/21 1130 99.9 °F (37.7 °C) 90 21 (!) 97/53 100 %   04/14/21 1126  92 18  100 %   04/14/21 1119  92 23  98 %   04/14/21 1100 99.9 °F (37.7 °C) 98 21 (!) 123/48 97 %   04/14/21 1000 99.9 °F (37.7 °C) 100 22 (!) 111/45 96 %   04/14/21 0906  92 24  94 %   04/14/21 0900 100 °F (37.8 °C) 94 22 134/60 95 %   04/14/21 0800 100.4 °F (38 °C) 92 22 (!) 130/47 97 %   04/14/21 0731  98  121/61    04/14/21 0700 (!) 100.8 °F (38.2 °C) 100 24 109/60 97 %   04/14/21 0630 (!) 101.1 °F (38.4 °C) 100 25 (!) 104/51 97 %   04/14/21 0600 (!) 101.1 °F (38.4 °C) 100 24 (!) 122/47 96 %       Wean Screen Pass (Yes or No): No    Wean Screen Reason for Failure: PEEP 10, FIO2 0.80, patient sedated    Duration of Weaning Trial: N/A    Additional Comments: PEEP recruitment done, titrated FiO2 down from 0.90 to 0.75, SpO2 97-99%, will wean O2, as tolerated      PLAN OF CARE:Vent support, Albuterol Q6, Brovana BID, Pulmozyme BID, Mucomyst BID, Veletri 8mL/hr     GOAL: Oxygenation, ventilation, maintain airway    OUTCOME: Patient remains intubated on mechanical ventilation

## 2021-04-14 NOTE — PROGRESS NOTES
[facility]  Pulmonary, Critical Care, and Sleep Medicine  Subsequent Encounter Note    CTA noted below:    CT Results  (Last 48 hours)               04/13/21 1757  CTA CHEST W OR W WO CONT Final result    Impression:      1. Moderate burden of acute pulmonary embolism in the left lower lobe. Additional small acute segmental pulmonary embolism in the right upper lobe. 2. There is dense multifocal consolidations bilaterally, as well as in the   dependent lower lobes, favoring a combination of multifocal pneumonia, edema,   and dependent atelectasis. -There is suggestion of focal hypoattenuation within the consolidation in the   left lower lobe, which may be infectious in etiology versus sequela of pulmonary   infarct given #1.       3. Mildly prominent mediastinal and bilateral hilar nodes, likely   benign/reactive. 4. Mild cardiomegaly. Reflux of trace contrast into the IVC, which can be seen   in the setting of right heart dysfunction. 5. Trace bilateral pleural effusions. 6. Endotracheal and enteric tubes noted in appropriate position. Discussed findings #1-2 with Kwasi Rahman PA-C on 4/13/2021 at 7:19 PM.       Narrative:  CTA CHEST PULMONARY ANGIOGRAM       HISTORY/INDICATION:  Shortness of breath, clinical concern for pulmonary   embolism, history of cardiac arrest 4/4/2021       COMPARISON:  No prior CTA chest. Reference chest radiograph 4/13/2021. TECHNIQUE:  Helical multidetector array volumetric acquisition of the chest is   performed following intravenous contrast administration of 72cc Isovue-370, per   pulmonary angiogram protocol. These images are reviewed and 2.5 mm and 5 mm   images along with coronal and sagittal 3D reconstruction maximum intensity   projection (MIP) images. Dose reduction techniques:  Automated exposure control,   mAs and/or kVp reductions based on patient size, and iterative reconstruction.        CTA SPECIFIC FINDINGS:   Pulmonary arteries: Central contrast filling defect in the left lower lobe,   involving lobar, segmental, and segmental branches. Small contrast filling   defect in a segmental branch in the right upper lobe. Aorta: No aneurysm. CHEST FINDINGS:    Thyroid:  No focal lesion. Mediastinum: Heart is mildly enlarged. Mild burden of coronary artery   atherosclerotic calcifications. No pericardial effusion. Reflux of trace   contrast into the IVC. No right ventricular dilatation or bowing of the   intraventricular septum. Pleural space: Trace bilateral pleural effusions. No pneumothorax. Lungs: There is dense multifocal consolidations bilaterally and in the dependent   lower lobes. There is suggestion of focal hypoattenuation within the   consolidation in the left lower lobe. Endotracheal tube in appropriate position,   terminating 3.7 cm above the gabriella on the  view. Included Abdomen: Visualized solid organs of the upper abdomen are normal.   Enteric tube tip terminates within the body of the stomach. Skeleton: No suspicious lytic or blastic lesions. No fractures. Soft tissues: Normal.       Lymph Nodes: Increased number of mildly prominent mediastinal and bilateral   hilar nodes, including a 1.0 x 1.8 cm AP window node and a 1.1 x 2.1 cm right   paratracheal node. Discussed with Fabio Green MD regarding issues with hemoptysis night prior. None noted today. Will cont' to monitor tonight and reconvene in the am regarding anticoagulation as risk of worsening hypoxia and acute decompensation with hemoptysis are high.            Trinidad King PA-C  04/13/21  Pulmonary, Critical Care Medicine  [facility]

## 2021-04-14 NOTE — PROGRESS NOTES
Otolaryngology head neck surgery    Patient seen and examined  Neck wound examined  Still with some significant defect from the cricothyroidotomy  Some slight oozing noted however patient on anticoagulants  Patient remains on vent  Patient now 10 days on vent  Hopefully patient may have some significant improvement from pulmonal logic issue however may need formal tracheotomy at some point if no improvement seen  Might be somewhat difficult especially in light of patient's wound from cricothyroidotomy    I will follow   Saturnino Malone

## 2021-04-14 NOTE — PROGRESS NOTES
Nutrition Assessment     Type and Reason for Visit: Reassess, Positive nutrition screen, NPO/clear liquid    Nutrition Recommendations/Plan:   - Monitor ability to tolerate enteral feeding versus need for parenteral nutrition support. Nutrition Assessment:  TF at goal then held, OGT clamped overnight (01:30 on 4/14) and placed to low intermittent suction with 300 mL output due to nursing observation of increased abdominal distension and KUB done this morning showing increased colonic distension up to 8 cm with concern for early colonic obstruction with plan for abdominal CT with oral contrast today. Malnutrition Assessment:  Malnutrition Status: At risk for malnutrition (specify)(inability to tolerate oral diet due to respiratory status)     Estimated Daily Nutrient Needs:  Energy (kcal):  3364-0936  Protein (g):  156-195       Fluid (ml/day):  5617-0694    Nutrition Related Findings:  Worsening renal function and started on bumex drip, diuresis per nephrology with hypernatremia (146) and elevated pro-BNP (1121) today; recent blood glucose 107-163 mg/dL on corrective coverage SSI. Large stool 4/11 and soft, formed stool on the day of admission (4/4).  UOP 3250 mL      Current Nutrition Therapies:  DIET NUTRITIONAL SUPPLEMENTS Breakfast, Lunch, Dinner; Prosource  DIET TUBE FEEDING     Current Tube Feeding Regimen: Vital High Protein at 55 mL/hr, prosource TID via OGT (held overnight, since 01:30 on 4/14)  Current Tube Feeding Regimen Provides: not applicable, feedings held  Goal Tube Feeding Regimen Provides: 1500 kcal, 161 gm protein, 1104 mL free water, 93% RDIs    Current Water Flush Order: 300 mL q 4 hours (1800 mL per day)    Anthropometric Measures:  · Height:  5' 11\" (180.3 cm)  · Current Body Wt:  115.9 kg (255 lb 8.2 oz)  · BMI: 35.7    Nutrition Diagnosis:   · Inadequate oral intake related to cognitive or neurological impairment, impaired respiratory function, swallowing difficulty as evidenced by NPO or clear liquid status due to medical condition, intubation    · Inadequate protein-energy intake related to altered GI function, altered GI structure as evidenced by NPO or clear liquid status due to medical condition, GI abnormality, constipation, intubation, nutrition support-enteral nutrition      Nutrition Intervention:  Food and/or Nutrient Delivery: Continue NPO  Nutrition Education and Counseling: Education not indicated  Coordination of Nutrition Care: Continue to monitor while inpatient, Interdisciplinary rounds    Goals:  Nutritional needs will be met through adequate oral intake or nutrition support within the next 7 days       Nutrition Monitoring and Evaluation:   Behavioral-Environmental Outcomes: None identified  Food/Nutrient Intake Outcomes: Enteral nutrition intake/tolerance, Vitamin/mineral intake  Physical Signs/Symptoms Outcomes: Biochemical data, Constipation, GI status, Fluid status or edema, Nutrition focused physical findings    Discharge Planning:     Too soon to determine     Electronically signed by Gonzalo Toure RD, [address]  on 4/14/2021 at 1:15 PM    Contact Number: [phone]

## 2021-04-14 NOTE — MED STUDENT NOTES
[facility]. Pulmonary, Critical Care, and Sleep Medicine    Name: Bryan Ball MRN: [id number]   : 1950 Hospital: [address] [place] [address] Dr   Date: 2021  Admission Date: 2021     Chart and notes reviewed. Data reviewed. I have evaluated all findings. [x]I have reviewed the flowsheet and previous days notes. []The patient is unable to give any meaningful history or review of systems because the patient is:  [x]Intubated []Sedated   []Unresponsive      []The patient is critically ill on      [x]Mechanical ventilation []Pressors   []BiPAP []         Interval HPI:   78 yo M h/o HTN tx w/ lisinopril presenting to [facility] ED  for rapidly progressing respiratory distress due to severe angioedema. During intubation attempts pt had brief cardiac arrest w/ pulse loss and severe airway swelling leading to emergent cricothyrotomy. Pt stabilized before [facility] transport to [facility]. Once at [facility] pt taken emergently to OR  where cric was converted to ETT 6.5. CXR notable correct tube placement and  B/L opacities aspiration of blood vs airway secretions. DVT in left popliteal artery found on duplex. Patient continues to have trouble with o2 requirements despite adjustment of FiO2 and PEEP, administration of inhaled epoprostenol indicating a need to further evaluate primary pulmonary issue. US performed finding poor lung slide on left indicating a high suspicion for left pneumothorax leading to needle decompression with no air return. Pt continues to have trouble oxygenating.       Subjective 21  Hospital Day: 10  Vent Day: 10  Overnight events:   · No new bleeding over cric site  · No episode of blood per ETT  Heparin continues to be held due to past bleeding event   · H/H stable  · CTA found moderate PE in left lower lobe left lower lobe and small PE in right upper lobe    · worsening hypoxia overnight, dobutamine added   Mentation/Activity: sedated and paralyzed  Respiratory/ Secretions: decreased saturation remaining in mid-80s   Hemodynamics: stable aside form hypoxia  Urine output, bowel:   Diet: tube feeding held due to continue hypoxia and abdominal distention   Need for procedures: none              ROS:Review of systems not obtained due to patient factors. Events and notes from last 24 hours reviewed. Care plan discussed on multidisciplinary rounds. Patient Active Problem List   Diagnosis Code    Angioedema due to angiotensin converting enzyme inhibitor (ACE-I) T78. 3XXA, [id number]    Respiratory failure ([facility] J96.90    [facility]) [facility] N17.9    Cardiac arrest ([facility]) I46.9    Obesity (BMI 30-39. 9) E66.9    Diabetic neuropathy associated with type 2 diabetes mellitus ([facility] E11.40    Diabetic retinopathy associated with type 2 diabetes mellitus ([facility] E11.319    Pulmonary infiltrates R91.8    Controlled type 2 diabetes mellitus with neurologic complication, without long-term current use of insulin ([facility]) E11.49    DVT (deep venous thrombosis) ([facility]) I82.409       Vital Signs:  Visit Vitals  BP (!) 96/52   Pulse 78   Temp 99.5 °F (37.5 °C)   Resp 21   Ht 5' 11\" (1.803 m)   Wt 115.9 kg (255 lb 8.2 oz)   SpO2 94%   BMI 35.64 kg/m²       O2 Device: Endotracheal tube, Ventilator       Temp (24hrs), Av.1 °F (37.8 °C), Min:96.4 °F (35.8 °C), Max:102.2 °F (39 °C)       Intake/Output:   Last shift:      1901 -  0700  In: 494.1 [I.V.:84.1]  Out: 55 [Urine:55]  Last 3 shifts:  0701 -  1900  In: 4665.6 [I.V.:1133.6]  Out: 4150 [Urine:4150]    Intake/Output Summary (Last 24 hours) at 2021  Last data filed at 2021 2139  Gross per 24 hour   Intake 3601.45 ml   Output 3580 ml   Net 21.45 ml        Ventilator Settings:  Ventilator Mode: Assist control, VC+  Respiratory Rate  Back-Up Rate: 18  Insp Time (sec): 1.1 sec  I:E Ratio: 1:2  Ventilator Volumes  Vt Set (ml): 550 ml  Vt Exhaled (Machine Breath) (ml): 553 ml  Vt Spont (ml): 547 ml  Ve Observed (l/min): 13.2 l/min  Ventilator Pressures  PC Set: 550  PIP Observed (cm H2O): 23 cm H2O  Plateau Pressure (cm H2O): 22 cm H2O  MAP (cm H2O): 14  PEEP/VENT (cm H2O): 8 cm H20  Auto PEEP Observed (cm H2O): 0 cm H2O    Current Facility-Administered Medications   Medication Dose Route Frequency    epoprostenol (VELETRI) 30 mcg/mL in 0.9% sodium chloride 50 mL inhalation solution  50 ng/kg/min (Order-Specific) Inhalation CONTINUOUS    fentaNYL (PF) 1,500 mcg/30 mL (50 mcg/mL) infusion  0-200 mcg/hr IntraVENous TITRATE    piperacillin-tazobactam (ZOSYN) 3.375 g in dextrose 5% 100 mL  3.375 g IntraVENous Q6H    midazolam in normal saline (VERSED) 1 mg/mL infusion  0-10 mg/hr IntraVENous TITRATE    acetylcysteine (MUCOMYST) 100 mg/mL (10 %) nebulizer solution 400 mg  4 mL Nebulization BID RT    famotidine (PF) (PEPCID) 20 mg in 0.9% sodium chloride 10 mL injection  20 mg IntraVENous DAILY    [Held by provider] heparin 25,000 units in D5W 250 ml infusion  12-25 Units/kg/hr IntraVENous TITRATE    dornase alpha (PULMOZYME)2.5mg/2.5ml nebulizer solution  2.5 mg Nebulization BID RT    albuterol (PROVENTIL VENTOLIN) nebulizer solution 2.5 mg  2.5 mg Nebulization Q6H RT    arformoteroL (BROVANA) neb solution 15 mcg  15 mcg Nebulization BID RT    multivit-folic acid-herbal 968 (WELLESSE PLUS) oral liquid 30 mL  30 mL Per NG tube DAILY    chlorhexidine (PERIDEX) 0.12 % mouthwash 10 mL  10 mL Oral Q12H    insulin lispro (HUMALOG) injection   SubCUTAneous Q6H         Telemetry: []Sinus []A-flutter []Paced    []A-fib [x]Multiple PVCs                  Physical Exam:      General: Intubated/sedated;    HEENT:  Anicteric sclerae; pink palpebral conjunctivae; mucosa moist; C/D/I dressing over cric site   Resp:  Symmetrical chest expansion, no accessory muscle use; good airway entry;  Rales noted and air leak audible w/ inflated cuff and decent title volume on vent  CV:  S1, S2 present; regular rate and rhythm  GI:  Abdomen soft, non-tender;distention present; hypoactive bowel sounds  Extremities:  +2 pulses on all extremities; trace edema in UE; no restraints; dusky fingertips   Skin:  Warm; no rashes/ lesions noted, normal turgor/cap refill   Neurologic:  Sedated   Devices:  ETT tube placed,OGT, cardona       DATA:  MAR reviewed and pertinent medications noted or modified as needed    Labs:  Recent Labs     04/13/21  1120 04/12/21 2354 04/12/21  0352   WBC 15.0* 13.7* 11.3   HGB 9.5* 9.9* 10.0*   HCT 30.7* 34.0* 32.1*   * 144 147     Recent Labs     04/13/21  1550 04/13/21  1120 04/12/21 2354 04/12/21  0352 04/11/21  0020    144  --  146* 151*   K 4.3 4.6  --  4.4 4.3    111  --  113* 116*   CO2 29 28  --  29 32   * 152*  --  165* 152*   BUN 87* 86*  --  72* 65*   CREA 2.41* 2.44*  --  2.21* 1.85*   CA 8.0* 8.2*  --  8.2* 8.5   MG  --  3.4*  --  3.0* 3.3*   PHOS 3.7 4.2  --  3.1 2.7   ALB 1.6* 1.6*  --   --   --    ALT  --  172*  --   --   --    INR  --   --  1.4*  --   --      No results for input(s): PH, PCO2, PO2, HCO3, FIO2 in the last 72 hours. Recent Labs     04/13/21  1614 04/13/21  0359 04/12/21  0431   FIO2I 0.85 100 100   HCO3I 28.4* 26.6* 26.1*   PCO2I 42.4 40.0 36.7   PHI 7.44 7.43 7.46*   PO2I 76* 89 71*       Imaging:  [x]   I have personally reviewed the patients radiographs and reports  XR Results (most recent):  CXR Results  (Last 48 hours)               04/13/21 0581  XR CHEST PORT Final result    Impression:  1. Interval increased bilateral pulmonary opacities which may reflect pulmonary   edema or multifocal pneumonia. Recommend continued imaging follow-up. 2.  Questionable layering small right pleural effusion. 3.  Central pulmonary interstitial edema/infiltrates. Narrative:  EXAM: XR CHEST PORT       INDICATION: 79 years Male. Daily CXR while intubated. .   ADDITIONAL HISTORY: None.        TECHNIQUE: Frontal view of the chest. COMPARISON: 4/12/2021 at 1528 hours       FINDINGS:       Endotracheal tube with tip 6.8 cm proximal to gabriella. Temperature probe with tip   at the proximal thoracic esophagus. There is an enteric tube extending inferior   to the field-of-view, beyond the GE junction. Multilevel lines overlie the   chest.       Hypoinflated lungs. Underpenetration of the lung bases. Heart borders are obscured. The cardiac silhouette is at least mildly enlarged,   similar to prior. Increased fullness of central vascular interstitial markings. Interval increased hazy bilateral pulmonary opacities. Blunting of the right   costophrenic sulcus. The left costophrenic sulcus appears sharp. No definite   pneumothorax. Osseous structures are unchanged in appearance. 04/12/21 1544  XR CHEST PORT Final result    Impression:  No significant change from prior study. No definite pneumothorax. Narrative:  ONE VIEW CHEST RADIOGRAPH        INDICATION: Eval for pneumothorax. Concern for pneumothorax on ultrasound. Increased oxygen requirements. COMPARISON: Chest radiograph earlier today. TECHNIQUE: AP view of the chest       FINDINGS:       LINES/TUBES: Enteric tube tip is in the stomach. Endotracheal tube tip is above   the gabriella. MEDIASTINUM: Unchanged. LUNGS: No pneumothorax. No significant change in small bilateral pleural   effusions with overlying consolidation since recent prior study. BONES/OTHER: No acute osseous abnormality. 04/12/21 0519  XR CHEST PORT Final result    Impression:  No significant change from recent prior study. Persistent small bilateral   pleural effusions with overlying consolidation, likely atelectasis and/or   infiltrate. Narrative:  ONE VIEW CHEST RADIOGRAPH        INDICATION: Daily CXR while intubated.        COMPARISON: Chest radiograph 4/11/2021       TECHNIQUE: AP view of the chest       FINDINGS:       LINES/TUBES: Endotracheal tube tip is approximately 6.5 cm above the gabriella. Enteric tube tip is not included on the image, but is below the diaphragm. MEDIASTINUM: Unchanged mild cardiomegaly, but this is likely accentuated by low   lung volumes and AP technique. LUNGS: No pneumothorax. Small bilateral pleural effusions with overlying   consolidation. Appearance is not significantly changed from recent prior. BONES/OTHER: No acute osseous abnormality. CT Results (most recent):  CT Results  (Last 48 hours)               04/13/21 1757  CTA CHEST W OR W WO CONT Final result    Impression:      1. Moderate burden of acute pulmonary embolism in the left lower lobe. Additional small acute segmental pulmonary embolism in the right upper lobe. 2. There is dense multifocal consolidations bilaterally, as well as in the   dependent lower lobes, favoring a combination of multifocal pneumonia, edema,   and dependent atelectasis. -There is suggestion of focal hypoattenuation within the consolidation in the   left lower lobe, which may be infectious in etiology versus sequela of pulmonary   infarct given #1.       3. Mildly prominent mediastinal and bilateral hilar nodes, likely   benign/reactive. 4. Mild cardiomegaly. Reflux of trace contrast into the IVC, which can be seen   in the setting of right heart dysfunction. 5. Trace bilateral pleural effusions. 6. Endotracheal and enteric tubes noted in appropriate position. Discussed findings #1-2 with Laisha Kwon PA-C on 4/13/2021 at 7:19 PM.       Narrative:  CTA CHEST PULMONARY ANGIOGRAM       HISTORY/INDICATION:  Shortness of breath, clinical concern for pulmonary   embolism, history of cardiac arrest 4/4/2021       COMPARISON:  No prior CTA chest. Reference chest radiograph 4/13/2021.        TECHNIQUE:  Helical multidetector array volumetric acquisition of the chest is   performed following intravenous contrast administration of 72cc Isovue-370, per   pulmonary angiogram protocol. These images are reviewed and 2.5 mm and 5 mm   images along with coronal and sagittal 3D reconstruction maximum intensity   projection (MIP) images. Dose reduction techniques:  Automated exposure control,   mAs and/or kVp reductions based on patient size, and iterative reconstruction. CTA SPECIFIC FINDINGS:   Pulmonary arteries: Central contrast filling defect in the left lower lobe,   involving lobar, segmental, and segmental branches. Small contrast filling   defect in a segmental branch in the right upper lobe. Aorta: No aneurysm. CHEST FINDINGS:    Thyroid:  No focal lesion. Mediastinum: Heart is mildly enlarged. Mild burden of coronary artery   atherosclerotic calcifications. No pericardial effusion. Reflux of trace   contrast into the IVC. No right ventricular dilatation or bowing of the   intraventricular septum. Pleural space: Trace bilateral pleural effusions. No pneumothorax. Lungs: There is dense multifocal consolidations bilaterally and in the dependent   lower lobes. There is suggestion of focal hypoattenuation within the   consolidation in the left lower lobe. Endotracheal tube in appropriate position,   terminating 3.7 cm above the gabriella on the  view. Included Abdomen: Visualized solid organs of the upper abdomen are normal.   Enteric tube tip terminates within the body of the stomach. Skeleton: No suspicious lytic or blastic lesions. No fractures. Soft tissues: Normal.       Lymph Nodes: Increased number of mildly prominent mediastinal and bilateral   hilar nodes, including a 1.0 x 1.8 cm AP window node and a 1.1 x 2.1 cm right   paratracheal node.                    IMPRESSION:   · Angioedema s/p emergent cric 4/4 converted to ETT secondary to ACEi  · Pulmonary embolism  · Respiratory failure w/ hypoxia  · DVT- left popliteal non-occlusive   · JACQUELYN  · Cardiac arrest · Aspiration pneumonia    · Acute diastolic heart failure most recent echo w/ grade II diastolic dysfunction and moderate pulmonary HTN   · Sepsis indicated by fever, leukocytosis, and elevated pro-sharonda   · Abdominal distention  · Hemoptysis        Patient Active Problem List   Diagnosis Code    Angioedema due to angiotensin converting enzyme inhibitor (ACE-I) T78. 3XXA, [id number]    [facility] J96.90    JACQUELYN (acute kidney injury) ([facility] N17.9    Cardiac arrest ([facility]) I46.9    Obesity (BMI 30-39. 9) E66.9    Diabetic neuropathy associated with type 2 diabetes mellitus ([facility] E11.40    Diabetic retinopathy associated with type 2 diabetes mellitus ([facility] E11.319    Pulmonary infiltrates R91.8    Controlled type 2 diabetes mellitus with neurologic complication, without long-term current use of insulin ([facility]) E11.49    DVT (deep venous thrombosis) ([facility]) I82.409        RECOMMENDATIONS:   Neuro:Titrate sedation for RASS goal -2 to -3  Continuous Midazolam and fentanyl for sedation   Pulm: Aspiration precautions, HOB>30'. VAP Bundle; not a candidate for daily SBT due to oxygen requirements  Continued scheduled nebulizer: acetylcysteine, albuterol, arformoterol, dornase alpha  Inhaled epoprostenol   CVS:Monitor HD, MAP goal >65. Continuous dobutamine, intermittent diuresis   GI: Diet held tube feeds, SUP   Renal: Trend Cr, UOP. Garcia in place; nephrology is following   Hem/Onc: Continue to hold anticoagulation and continue to monitor for repeat bleeding Trend H/H, monitor for s/o active bleeding. Daily CBC. I/D:Trend WBCs and temperature curve. BC negative ; repeat due to fever; respiratory secretions grew enterobacter    Zosyn   Metabolic: Daily BMP, mag, phos. Trend lytes and replace per protocol. Endocrine:Q6 glucoses. SSI. Avoid hypoglycemia. Musc/Skin: wound care for cric site   Full Code  Discussed in interdisciplinary rounds     Best practice :    Glycemic control  IHI ICU bundles:    Radha Bundle Followed and Vent Bundle Followed, Vent Day 10    [facility] Vent patients- VAP bundle, aim to keep peak plateau pressure 81-95PV H2O  Sress ulcer prophylaxis. DVT prophylaxis. Need for Lines, cardona assessed. Palliative care evaluation. Restraints need. High complexity decision making was performed during this consultation and evaluation. [x]       Pt is at high risk for further organ failure and dysfunction.        Ruby BOSE  04/13/21  Pulmonary, Critical Care Medicine  [facility]

## 2021-04-14 NOTE — PROGRESS NOTES
04/14/21 0906   Patient Observations   Pulse (Heart Rate) 92   Resp Rate 24   O2 Sat (%) 94 %   ETCO2 (mmHg) 30 mmHg   Airway - Continuous Aspiration of Subglottic Secretions (GENESIS) Tube 04/07/21 Oral   Placement Date/Time: 04/07/21 1030   Number of Attempts: 1  Inserted By: Tommy North CRNA  Present on Admission/Arrival: No  Location: Oral  Placement Verified: Auscultation;BBS;Chest x-ray;EtCO2;Placement not verified (comment)  Airway Types: Endotr. .. Insertion Depth (cm) 28 cm   Line Christian Lips   Side Secured Left   Cuff Pressure 30 cmH20   Site Assessment Clean, dry, & intact   Suction on Yes   Amt Secretions Aspirated (mL) 0 mL   Respiratory   Respiratory (WDL) X   Patient on Vent Yes - If patient is on vent, add Doc Flowsheet Ventilator (). Respiratory Pattern Regular   Chest/Tracheal Assessment Chest expansion, symmetrical   Breath Sounds Bilateral Coarse;Diminished   Cough Cough with suction;Weak   Airway Clearance   Suction ET Tube   Suction Device Inline suction catheter   Sputum Method Obtained Endotracheal   Sputum Amount Small   Sputum Color/Odor Clear; White   Sputum Consistency Thin   Ventilator Initiate/Discontinue   Bio-Med ID # [id number]   Vent Settings   FIO2 (%) 90 %   SpO2/FIO2 Ratio 104.44   CMV Rate Set 18   Back-Up Rate 18   Vt Set (ml) 550 ml   PEEP/VENT (cm H2O) 10 cm H20   Insp Time (sec) 1.1 sec   Insp Rise Time % 35 %   Flow Trigger 2.0   Ventilator Measurements   Resp Rate Observed 24   Vt Exhaled (Machine Breath) (ml) 619 ml   Ve Observed (l/min) 11.5 l/min   PIP Observed (cm H2O) 27 cm H2O   Plateau Pressure (cm H2O) 24 cm H2O   MAP (cm H2O) 16   I:E Ratio Actual 1:1.4   Auto PEEP Observed (cm H2O)   (unable to obtain)   Static Compliance (ml/cm H20) 29 ml/cm H20   Safety & Alarms   Circuit Temperature 99 °F (37.2 °C)   Backup Mode Checked/Apnea Yes   Pressure Max 45 cm H2O   Pressure Min 16 cm H2O   Ve Min 2   Ve Max 20   Vt Min 200 ml   Vt Max 1000 ml   RR Max 40   Ambu Bag Yes Ambu Mask Yes   ABCDEF Bundle   SBT Safety Screen Passed Yes   SBT Screen Reason for Failure FiO2 > 50%;PEEP > 7.5  (sedated)   Weaning Parameters   Spontaneous Breathing Trial Complete No (Comments)   Age Specific Ventilator Associated Pneumonia Bundle   Patient Age Group Adult   Adult Ventilator Associated Pneumonia Bundle   Elevation of Head to 30-45 Degrees (Unless Contraindicated) Yes   Vent Method/Mode   Ventilation Method Conventional   Ventilator Mode Assist control;VC+   Procedures   $$ Subsequent Procedure Aerosol   Delivery Source In-line nebulizer   Pulmonary Toilet   Pulmonary Toilet H. O.B elevated;Suction    Received patient on current vent settings. Vitals stable. Will continue to monitor, and wean O2, as tolerated.

## 2021-04-14 NOTE — PROGRESS NOTES
Problem: Ventilator Management  Goal: *Adequate oxygenation and ventilation  Outcome: Progressing Towards Goal  Goal: *Patient maintains clear airway/free of aspiration  Outcome: Progressing Towards Goal  Goal: *Absence of infection signs and symptoms  Outcome: Progressing Towards Goal     Problem: Patient Education: Go to Patient Education Activity  Goal: Patient/Family Education  Outcome: Not Progressing Towards Goal     Problem: Diabetes Self-Management  Goal: *Disease process and treatment process  Description: Define diabetes and identify own type of diabetes; list 3 options for treating diabetes. Outcome: Progressing Towards Goal  Goal: *Using medications safely  Description: State effect of diabetes medications on diabetes; name diabetes medication taking, action and side effects. Outcome: Progressing Towards Goal  Goal: *Monitoring blood glucose, interpreting and using results  Description: Identify recommended blood glucose targets  and personal targets. Outcome: Progressing Towards Goal  Goal: *Prevention, detection, treatment of acute complications  Description: List symptoms of hyper- and hypoglycemia; describe how to treat low blood sugar and actions for lowering  high blood glucose level. Outcome: Progressing Towards Goal     Problem: Patient Education: Go to Patient Education Activity  Goal: Patient/Family Education  Outcome: Not Progressing Towards Goal     Problem: Falls - Risk of  Goal: *Absence of Falls  Description: Document Sarahi Santillan Fall Risk and appropriate interventions in the flowsheet.   Outcome: Progressing Towards Goal  Note: Fall Risk Interventions:       Mentation Interventions: Adequate sleep, hydration, pain control, Bed/chair exit alarm, Door open when patient unattended, Evaluate medications/consider consulting pharmacy, More frequent rounding, Reorient patient, Room close to nurse's station, Toileting rounds, Update white board    Medication Interventions: Bed/chair exit alarm, Evaluate medications/consider consulting pharmacy    Elimination Interventions: Bed/chair exit alarm, Call light in reach, Patient to call for help with toileting needs, Toileting schedule/hourly rounds              Problem: Patient Education: Go to Patient Education Activity  Goal: Patient/Family Education  Outcome: Not Progressing Towards Goal     Problem: Pressure Injury - Risk of  Goal: *Prevention of pressure injury  Description: Document Lang Scale and appropriate interventions in the flowsheet. Outcome: Progressing Towards Goal  Note: Pressure Injury Interventions:  Sensory Interventions: Assess changes in LOC, Assess need for specialty bed, Avoid rigorous massage over bony prominences, Check visual cues for pain, Float heels, Keep linens dry and wrinkle-free, Maintain/enhance activity level, Minimize linen layers, Monitor skin under medical devices, Pad between skin to skin, Pressure redistribution bed/mattress (bed type), Suspension boots, Turn and reposition approx. every two hours (pillows and wedges if needed)    Moisture Interventions: Absorbent underpads, Apply protective barrier, creams and emollients, Assess need for specialty bed, Check for incontinence Q2 hours and as needed, Internal/External urinary devices, Maintain skin hydration (lotion/cream), Minimize layers, Moisture barrier    Activity Interventions: Assess need for specialty bed, Pressure redistribution bed/mattress(bed type)    Mobility Interventions: Assess need for specialty bed, Float heels, HOB 30 degrees or less, Pressure redistribution bed/mattress (bed type), Suspension boots, Turn and reposition approx.  every two hours(pillow and wedges)    Nutrition Interventions: Document food/fluid/supplement intake, Discuss nutritional consult with provider    Friction and Shear Interventions: Apply protective barrier, creams and emollients, Feet elevated on foot rest, Foam dressings/transparent film/skin sealants, HOB 30 degrees or less, Lift sheet, Lift team/patient mobility team, Minimize layers, Transferring/repositioning devices                Problem: Patient Education: Go to Patient Education Activity  Goal: Patient/Family Education  Outcome: Not Progressing Towards Goal     Problem: Injury - Risk of, Adverse Drug Event  Goal: *Absence of adverse drug events  Outcome: Progressing Towards Goal  Goal: *Absence of medication errors  Outcome: Progressing Towards Goal  Goal: *Knowledge of prescribed medications  Outcome: Progressing Towards Goal     Problem: Patient Education: Go to Patient Education Activity  Goal: Patient/Family Education  Outcome: Not Progressing Towards Goal     Problem: Patient Education: Go to Patient Education Activity  Goal: Patient/Family Education  Outcome: Not Progressing Towards Goal     Problem: Delirium Treatment  Goal: *Level of consciousness restored to baseline  Outcome: Not Progressing Towards Goal  Goal: Interventions  Outcome: Progressing Towards Goal     Problem: Patient Education: Go to Patient Education Activity  Goal: Patient/Family Education  Outcome: Not Progressing Towards Goal

## 2021-04-14 NOTE — PROGRESS NOTES
In Patient Progress note      Admit Date: 4/4/2021    Impression:     #1 Acute kidney injury, etiology appears to be secondary to Ischaemic ATN d/t hypoperfusion v/s cardiorenal syndrome in  setting of CHF/V failure in setting of PE  #2 acute respiratory failure secondary to rapidly progressive angioedema s/p emergent cricothyrotomy. Presently intubated on the vent,  severely hypoxic out of proportion to x-ray findings, therefore PE has been considered , Rx for presumed PE  #3 pulmonary infiltrates/Pulm edema   #4 left-sided pneumothorax, appears resolved  #5 diabetes mellitus with complications  #6 popliteal DVT left     Plan:     1) Bumex gtt and metolazone 5 BID   2) strict I/o  3) follow renal parameters , electrolytes q12hrs   4) keep MAP > 65  5) dobutamine per pccm team      Please call with questions,     Sherly Portillo MD [facility]  Cell [phone]  Pager: [phone]  Subjective:     - No acute over night events. - respiratory - stable  - hemodynamics - stable, no pressrs  - UOP-good  - Nutrition -ok    Objective:     Visit Vitals  BP (!) 107/58   Pulse 90   Temp 99.9 °F (37.7 °C)   Resp 21   Ht 5' 11\" (1.803 m)   Wt 115.9 kg (255 lb 8.2 oz)   SpO2 100%   BMI 35.64 kg/m²         Intake/Output Summary (Last 24 hours) at 4/14/2021 1229  Last data filed at 4/14/2021 3337  Gross per 24 hour   Intake 2554.67 ml   Output 2425 ml   Net 129.67 ml       Physical Exam:        Intubated/ on vent   HEENT: mmm  Lungs: good air entry, coarse BS +  Cardiovascular system: S1, S2, regular rate and rhythm. Abdomen: soft, non tender, non distended. Positive bowel sounds. Extremities: no clubbing, cyanosis or edema. Integumentary: skin is grossly intact.        Data Review:    Recent Labs     04/14/21  1003   WBC 15.9*   RBC 3.61*   HCT 29.6*   MCV 82.0   MCH 25.8   MCHC 31.4   RDW 15.5*     Recent Labs     04/14/21  0327 04/13/21  1550 04/13/21  1120 04/12/21  0352   BUN 94* 87* 86* 72*   CREA 2.62* 2.41* 2.44* 2.21* CA 7.5* 8.0* 8.2* 8.2*   ALB 1.6* 1.6* 1.6*  --    K 4.7 4.3 4.6 4.4   * 142 144 146*    110 111 113*   CO2 28 29 28 29   PHOS 4.2 3.7 4.2 3.1   * 156* 152* 165*       Jacqui Young MD

## 2021-04-15 ENCOUNTER — APPOINTMENT (OUTPATIENT)
Dept: GENERAL RADIOLOGY | Age: 71
DRG: 004 | End: 2021-04-15
Attending: PHYSICIAN ASSISTANT
Payer: MEDICARE

## 2021-04-15 LAB
ALBUMIN SERPL-MCNC: 1.5 G/DL (ref 3.4–5)
ALBUMIN/GLOB SERPL: 0.3 {RATIO} (ref 0.8–1.7)
ALP SERPL-CCNC: 86 U/L (ref 45–117)
ALT SERPL-CCNC: 186 U/L (ref 16–61)
ANION GAP SERPL CALC-SCNC: 10 MMOL/L (ref 3–18)
ANION GAP SERPL CALC-SCNC: 10 MMOL/L (ref 3–18)
APTT PPP: 54.3 SEC (ref 23–36.4)
APTT PPP: 65.7 SEC (ref 23–36.4)
APTT PPP: 69.6 SEC (ref 23–36.4)
ARTERIAL PATENCY WRIST A: ABNORMAL
AST SERPL-CCNC: 150 U/L (ref 10–38)
ATRIAL RATE: 106 BPM
BACTERIA SPEC CULT: NORMAL
BACTERIA SPEC CULT: NORMAL
BASE EXCESS BLD CALC-SCNC: 7 MMOL/L
BASOPHILS # BLD: 0.1 K/UL (ref 0–0.1)
BASOPHILS NFR BLD: 0 % (ref 0–2)
BDY SITE: ABNORMAL
BILIRUB DIRECT SERPL-MCNC: 0.3 MG/DL (ref 0–0.2)
BILIRUB SERPL-MCNC: 0.5 MG/DL (ref 0.2–1)
BUN SERPL-MCNC: 110 MG/DL (ref 7–18)
BUN SERPL-MCNC: 99 MG/DL (ref 7–18)
BUN/CREAT SERPL: 31 (ref 12–20)
BUN/CREAT SERPL: 31 (ref 12–20)
CALCIUM SERPL-MCNC: 7.6 MG/DL (ref 8.5–10.1)
CALCIUM SERPL-MCNC: 8.2 MG/DL (ref 8.5–10.1)
CALCULATED P AXIS, ECG09: 45 DEGREES
CALCULATED R AXIS, ECG10: -19 DEGREES
CALCULATED T AXIS, ECG11: 65 DEGREES
CHLORIDE SERPL-SCNC: 102 MMOL/L (ref 100–111)
CHLORIDE SERPL-SCNC: 103 MMOL/L (ref 100–111)
CO2 SERPL-SCNC: 29 MMOL/L (ref 21–32)
CO2 SERPL-SCNC: 29 MMOL/L (ref 21–32)
CREAT SERPL-MCNC: 3.15 MG/DL (ref 0.6–1.3)
CREAT SERPL-MCNC: 3.6 MG/DL (ref 0.6–1.3)
DIAGNOSIS, 93000: NORMAL
DIFFERENTIAL METHOD BLD: ABNORMAL
EOSINOPHIL # BLD: 0.3 K/UL (ref 0–0.4)
EOSINOPHIL NFR BLD: 1 % (ref 0–5)
ERYTHROCYTE [DISTWIDTH] IN BLOOD BY AUTOMATED COUNT: 15.7 % (ref 11.6–14.5)
GAS FLOW.O2 O2 DELIVERY SYS: ABNORMAL L/MIN
GAS FLOW.O2 SETTING OXYMISER: 18 BPM
GLOBULIN SER CALC-MCNC: 4.7 G/DL (ref 2–4)
GLUCOSE BLD STRIP.AUTO-MCNC: 134 MG/DL (ref 70–110)
GLUCOSE BLD STRIP.AUTO-MCNC: 138 MG/DL (ref 70–110)
GLUCOSE BLD STRIP.AUTO-MCNC: 142 MG/DL (ref 70–110)
GLUCOSE BLD STRIP.AUTO-MCNC: 148 MG/DL (ref 70–110)
GLUCOSE SERPL-MCNC: 135 MG/DL (ref 74–99)
GLUCOSE SERPL-MCNC: 151 MG/DL (ref 74–99)
HCO3 BLD-SCNC: 29.8 MMOL/L (ref 22–26)
HCT VFR BLD AUTO: 32.3 % (ref 36–48)
HGB BLD-MCNC: 10 G/DL (ref 13–16)
LACTATE SERPL-SCNC: 1.7 MMOL/L (ref 0.4–2)
LACTATE SERPL-SCNC: 2.2 MMOL/L (ref 0.4–2)
LYMPHOCYTES # BLD: 1.3 K/UL (ref 0.9–3.6)
LYMPHOCYTES NFR BLD: 8 % (ref 21–52)
MAGNESIUM SERPL-MCNC: 3.4 MG/DL (ref 1.6–2.6)
MCH RBC QN AUTO: 25.6 PG (ref 24–34)
MCHC RBC AUTO-ENTMCNC: 31 G/DL (ref 31–37)
MCV RBC AUTO: 82.8 FL (ref 74–97)
MONOCYTES # BLD: 1 K/UL (ref 0.05–1.2)
MONOCYTES NFR BLD: 6 % (ref 3–10)
NEUTS SEG # BLD: 14.5 K/UL (ref 1.8–8)
NEUTS SEG NFR BLD: 84 % (ref 40–73)
O2/TOTAL GAS SETTING VFR VENT: 100 %
P-R INTERVAL, ECG05: 148 MS
PCO2 BLD: 37 MMHG (ref 35–45)
PEEP RESPIRATORY: 10 CMH2O
PH BLD: 7.51 [PH] (ref 7.35–7.45)
PHOSPHATE SERPL-MCNC: 6 MG/DL (ref 2.5–4.9)
PHOSPHATE SERPL-MCNC: 7 MG/DL (ref 2.5–4.9)
PLATELET # BLD AUTO: 243 K/UL (ref 135–420)
PMV BLD AUTO: 12.7 FL (ref 9.2–11.8)
PO2 BLD: 62 MMHG (ref 80–100)
POTASSIUM SERPL-SCNC: 4.4 MMOL/L (ref 3.5–5.5)
POTASSIUM SERPL-SCNC: 4.4 MMOL/L (ref 3.5–5.5)
PROT SERPL-MCNC: 6.2 G/DL (ref 6.4–8.2)
Q-T INTERVAL, ECG07: 328 MS
QRS DURATION, ECG06: 84 MS
QTC CALCULATION (BEZET), ECG08: 435 MS
RBC # BLD AUTO: 3.9 M/UL (ref 4.35–5.65)
SAO2 % BLD: 93 % (ref 92–97)
SERVICE CMNT-IMP: ABNORMAL
SERVICE CMNT-IMP: NORMAL
SERVICE CMNT-IMP: NORMAL
SODIUM SERPL-SCNC: 141 MMOL/L (ref 136–145)
SODIUM SERPL-SCNC: 142 MMOL/L (ref 136–145)
SPECIMEN TYPE: ABNORMAL
TOTAL RESP. RATE, ITRR: 23
VENTILATION MODE VENT: ABNORMAL
VENTRICULAR RATE, ECG03: 106 BPM
VOLUME CONTROL PLUS IVLCP: YES
VT SETTING VENT: 550 ML
WBC # BLD AUTO: 17.3 K/UL (ref 4.6–13.2)

## 2021-04-15 PROCEDURE — 99291 CRITICAL CARE FIRST HOUR: CPT | Performed by: INTERNAL MEDICINE

## 2021-04-15 PROCEDURE — 36600 WITHDRAWAL OF ARTERIAL BLOOD: CPT

## 2021-04-15 PROCEDURE — 94003 VENT MGMT INPAT SUBQ DAY: CPT

## 2021-04-15 PROCEDURE — 85730 THROMBOPLASTIN TIME PARTIAL: CPT

## 2021-04-15 PROCEDURE — 83605 ASSAY OF LACTIC ACID: CPT

## 2021-04-15 PROCEDURE — 74011000250 HC RX REV CODE- 250: Performed by: INTERNAL MEDICINE

## 2021-04-15 PROCEDURE — 74011000250 HC RX REV CODE- 250: Performed by: NURSE PRACTITIONER

## 2021-04-15 PROCEDURE — 94762 N-INVAS EAR/PLS OXIMTRY CONT: CPT

## 2021-04-15 PROCEDURE — 74011000250 HC RX REV CODE- 250: Performed by: PHYSICIAN ASSISTANT

## 2021-04-15 PROCEDURE — 74011250636 HC RX REV CODE- 250/636

## 2021-04-15 PROCEDURE — 76450000000

## 2021-04-15 PROCEDURE — 80076 HEPATIC FUNCTION PANEL: CPT

## 2021-04-15 PROCEDURE — 85025 COMPLETE CBC W/AUTO DIFF WBC: CPT

## 2021-04-15 PROCEDURE — 71045 X-RAY EXAM CHEST 1 VIEW: CPT

## 2021-04-15 PROCEDURE — 80069 RENAL FUNCTION PANEL: CPT

## 2021-04-15 PROCEDURE — APPNB15 APP NON BILLABLE TIME 0-15 MINS: Performed by: REGISTERED NURSE

## 2021-04-15 PROCEDURE — 74011000258 HC RX REV CODE- 258: Performed by: INTERNAL MEDICINE

## 2021-04-15 PROCEDURE — 65610000006 HC RM INTENSIVE CARE

## 2021-04-15 PROCEDURE — 74011250636 HC RX REV CODE- 250/636: Performed by: INTERNAL MEDICINE

## 2021-04-15 PROCEDURE — 74018 RADEX ABDOMEN 1 VIEW: CPT

## 2021-04-15 PROCEDURE — 74011000250 HC RX REV CODE- 250: Performed by: REGISTERED NURSE

## 2021-04-15 PROCEDURE — 74011250636 HC RX REV CODE- 250/636: Performed by: NURSE PRACTITIONER

## 2021-04-15 PROCEDURE — 74011000250 HC RX REV CODE- 250: Performed by: STUDENT IN AN ORGANIZED HEALTH CARE EDUCATION/TRAINING PROGRAM

## 2021-04-15 PROCEDURE — 82803 BLOOD GASES ANY COMBINATION: CPT

## 2021-04-15 PROCEDURE — 94640 AIRWAY INHALATION TREATMENT: CPT

## 2021-04-15 PROCEDURE — 2709999900 HC NON-CHARGEABLE SUPPLY

## 2021-04-15 PROCEDURE — 77010033678 HC OXYGEN DAILY

## 2021-04-15 PROCEDURE — 74011250636 HC RX REV CODE- 250/636: Performed by: PHYSICIAN ASSISTANT

## 2021-04-15 PROCEDURE — 94644 CONT INHLJ TX 1ST HOUR: CPT

## 2021-04-15 PROCEDURE — 74011250636 HC RX REV CODE- 250/636: Performed by: REGISTERED NURSE

## 2021-04-15 PROCEDURE — 82962 GLUCOSE BLOOD TEST: CPT

## 2021-04-15 PROCEDURE — 74011000258 HC RX REV CODE- 258: Performed by: PHYSICIAN ASSISTANT

## 2021-04-15 PROCEDURE — 36415 COLL VENOUS BLD VENIPUNCTURE: CPT

## 2021-04-15 PROCEDURE — 94645 CONT INHLJ TX EACH ADDL HOUR: CPT

## 2021-04-15 PROCEDURE — 74011250637 HC RX REV CODE- 250/637: Performed by: INTERNAL MEDICINE

## 2021-04-15 PROCEDURE — 83735 ASSAY OF MAGNESIUM: CPT

## 2021-04-15 RX ORDER — ACETAZOLAMIDE 250 MG/1
250 TABLET ORAL 2 TIMES DAILY
Status: DISCONTINUED | OUTPATIENT
Start: 2021-04-15 | End: 2021-04-15

## 2021-04-15 RX ORDER — HEPARIN SODIUM 1000 [USP'U]/ML
3000 INJECTION, SOLUTION INTRAVENOUS; SUBCUTANEOUS ONCE
Status: COMPLETED | OUTPATIENT
Start: 2021-04-15 | End: 2021-04-15

## 2021-04-15 RX ORDER — HEPARIN SODIUM 1000 [USP'U]/ML
INJECTION, SOLUTION INTRAVENOUS; SUBCUTANEOUS
Status: COMPLETED
Start: 2021-04-15 | End: 2021-04-15

## 2021-04-15 RX ORDER — ALBUTEROL SULFATE 0.83 MG/ML
2.5 SOLUTION RESPIRATORY (INHALATION)
Status: DISCONTINUED | OUTPATIENT
Start: 2021-04-15 | End: 2021-04-21

## 2021-04-15 RX ADMIN — FENTANYL CITRATE 100 MCG: 50 INJECTION, SOLUTION INTRAMUSCULAR; INTRAVENOUS at 00:41

## 2021-04-15 RX ADMIN — ARFORMOTEROL TARTRATE 15 MCG: 15 SOLUTION RESPIRATORY (INHALATION) at 19:52

## 2021-04-15 RX ADMIN — FAMOTIDINE 20 MG: 10 INJECTION INTRAVENOUS at 08:20

## 2021-04-15 RX ADMIN — BUMETANIDE 0.5 MG/HR: 0.25 INJECTION INTRAMUSCULAR; INTRAVENOUS at 08:20

## 2021-04-15 RX ADMIN — ALBUTEROL SULFATE 2.5 MG: 2.5 SOLUTION RESPIRATORY (INHALATION) at 16:19

## 2021-04-15 RX ADMIN — FENTANYL CITRATE 200 MCG/HR: 0.05 INJECTION, SOLUTION INTRAMUSCULAR; INTRAVENOUS at 16:59

## 2021-04-15 RX ADMIN — ARFORMOTEROL TARTRATE 15 MCG: 15 SOLUTION RESPIRATORY (INHALATION) at 07:44

## 2021-04-15 RX ADMIN — DOBUTAMINE HYDROCHLORIDE 5 MCG/KG/MIN: 200 INJECTION INTRAVENOUS at 08:20

## 2021-04-15 RX ADMIN — HEPARIN SODIUM 17.1 UNITS/HR: 10000 INJECTION, SOLUTION INTRAVENOUS at 03:41

## 2021-04-15 RX ADMIN — ACETYLCYSTEINE 400 MG: 100 SOLUTION ORAL; RESPIRATORY (INHALATION) at 01:03

## 2021-04-15 RX ADMIN — EPOPROSTENOL 50 NG/KG/MIN: 1.5 INJECTION, POWDER, LYOPHILIZED, FOR SOLUTION INTRAVENOUS at 13:51

## 2021-04-15 RX ADMIN — EPOPROSTENOL 50 NG/KG/MIN: 1.5 INJECTION, POWDER, LYOPHILIZED, FOR SOLUTION INTRAVENOUS at 08:52

## 2021-04-15 RX ADMIN — ACETAZOLAMIDE SODIUM 250 MG: 500 INJECTION, POWDER, LYOPHILIZED, FOR SOLUTION INTRAVENOUS at 19:10

## 2021-04-15 RX ADMIN — ACETAZOLAMIDE SODIUM 250 MG: 500 INJECTION, POWDER, LYOPHILIZED, FOR SOLUTION INTRAVENOUS at 14:23

## 2021-04-15 RX ADMIN — FENTANYL CITRATE 200 MCG/HR: 0.05 INJECTION, SOLUTION INTRAMUSCULAR; INTRAVENOUS at 01:57

## 2021-04-15 RX ADMIN — FENTANYL CITRATE 200 MCG/HR: 0.05 INJECTION, SOLUTION INTRAMUSCULAR; INTRAVENOUS at 09:30

## 2021-04-15 RX ADMIN — HEPARIN SODIUM 3000 UNITS: 1000 INJECTION, SOLUTION INTRAVENOUS; SUBCUTANEOUS at 22:19

## 2021-04-15 RX ADMIN — DORNASE ALFA 2.5 MG: 1 SOLUTION RESPIRATORY (INHALATION) at 07:44

## 2021-04-15 RX ADMIN — PIPERACILLIN SODIUM AND TAZOBACTAM SODIUM 3.38 G: 3; .375 INJECTION, POWDER, LYOPHILIZED, FOR SOLUTION INTRAVENOUS at 14:23

## 2021-04-15 RX ADMIN — ALBUTEROL SULFATE 2.5 MG: 2.5 SOLUTION RESPIRATORY (INHALATION) at 07:44

## 2021-04-15 RX ADMIN — EPOPROSTENOL 50 NG/KG/MIN: 1.5 INJECTION, POWDER, LYOPHILIZED, FOR SOLUTION INTRAVENOUS at 03:42

## 2021-04-15 RX ADMIN — PIPERACILLIN AND TAZOBACTAM 2.25 G: 2; .25 INJECTION, POWDER, LYOPHILIZED, FOR SOLUTION INTRAVENOUS at 23:59

## 2021-04-15 RX ADMIN — PIPERACILLIN SODIUM AND TAZOBACTAM SODIUM 3.38 G: 3; .375 INJECTION, POWDER, LYOPHILIZED, FOR SOLUTION INTRAVENOUS at 19:10

## 2021-04-15 RX ADMIN — EPOPROSTENOL 50 NG/KG/MIN: 1.5 INJECTION, POWDER, LYOPHILIZED, FOR SOLUTION INTRAVENOUS at 18:37

## 2021-04-15 RX ADMIN — HEPARIN SODIUM 1810 UNITS/HR: 10000 INJECTION, SOLUTION INTRAVENOUS at 19:52

## 2021-04-15 RX ADMIN — MIDAZOLAM 8 MG/HR: 5 INJECTION INTRAMUSCULAR; INTRAVENOUS at 11:02

## 2021-04-15 RX ADMIN — PIPERACILLIN SODIUM AND TAZOBACTAM SODIUM 3.38 G: 3; .375 INJECTION, POWDER, LYOPHILIZED, FOR SOLUTION INTRAVENOUS at 05:32

## 2021-04-15 RX ADMIN — HEPARIN SODIUM 3000 UNITS: 1000 INJECTION INTRAVENOUS; SUBCUTANEOUS at 22:19

## 2021-04-15 RX ADMIN — CHLORHEXIDINE GLUCONATE 0.12% ORAL RINSE 10 ML: 1.2 LIQUID ORAL at 08:21

## 2021-04-15 RX ADMIN — MIDAZOLAM 2 MG: 1 INJECTION INTRAMUSCULAR; INTRAVENOUS at 00:36

## 2021-04-15 RX ADMIN — CHLORHEXIDINE GLUCONATE 0.12% ORAL RINSE 10 ML: 1.2 LIQUID ORAL at 20:53

## 2021-04-15 RX ADMIN — ALBUTEROL SULFATE 2.5 MG: 2.5 SOLUTION RESPIRATORY (INHALATION) at 01:03

## 2021-04-15 NOTE — PROGRESS NOTES
Received patient on current vent settings. Will continue to monitor. 04/15/21 0745   Patient Observations   Pulse (Heart Rate) 92   Resp Rate 23   O2 Sat (%) 96 %   Airway - Continuous Aspiration of Subglottic Secretions (GENESIS) Tube 04/07/21 Oral   Placement Date/Time: 04/07/21 1030   Number of Attempts: 1  Inserted By: Darius Hidalgo CRNA  Present on Admission/Arrival: No  Location: Oral  Placement Verified: Auscultation;BBS;Chest x-ray;EtCO2;Placement not verified (comment)  Airway Types: Endotr. .. Insertion Depth (cm) 28 cm   Line Christian Lips   Side Secured Centered   Cuff Pressure   (MLT)   Site Assessment Clean, dry, & intact   Suction on Yes   Amt Secretions Aspirated (mL) 0 mL   Respiratory   Respiratory (WDL) X   Patient on Vent Yes - If patient is on vent, add Doc Flowsheet Ventilator ().    Respiratory Pattern Regular   Chest/Tracheal Assessment Chest expansion, symmetrical   Breath Sounds Bilateral Coarse;Diminished   Cough Other(comment)  (no cough or gag)   Airway Clearance   Suction ET Tube;Oral   Suction Device Inline suction catheter;Heather   Sputum Method Obtained Endotracheal   Sputum Amount Scant   Ventilator Initiate/Discontinue   Bio-Med ID # [id number]   Vent Settings   FIO2 (%) 100 %   SpO2/FIO2 Ratio 96   CMV Rate Set 18   Back-Up Rate 18   PEEP/VENT (cm H2O) 10 cm H20   Insp Time (sec) 1.1 sec   Insp Rise Time % 35 %   Flow Trigger 2.0   Ventilator Measurements   Resp Rate Observed 23   Vt Exhaled (Machine Breath) (ml) 573 ml   Ve Observed (l/min) 10.5 l/min   PIP Observed (cm H2O) 26 cm H2O   Plateau Pressure (cm H2O) 22 cm H2O   MAP (cm H2O) 16   I:E Ratio Actual 1:1.7   Auto PEEP Observed (cm H2O)   (unable to obtain)   Static Compliance (ml/cm H20) 33 ml/cm H20   Safety & Alarms   Circuit Temperature 98.4 °F (36.9 °C)   Backup Mode Checked/Apnea Yes   Pressure Max 45 cm H2O   Pressure Min 16 cm H2O   Ve Min 2   Ve Max 20   Vt Min 200 ml   Vt Max 1000 ml   RR Max 40   Ambu Bag Yes   Ambu Mask Yes   ABCDEF Bundle   SBT Safety Screen Passed No   SBT Screen Reason for Failure FiO2 > 50%;PEEP > 7.5  (sedated)   Age Specific Ventilator Associated Pneumonia Bundle   Patient Age Group Adult   Adult Ventilator Associated Pneumonia Bundle   Elevation of Head to 30-45 Degrees (Unless Contraindicated) Yes   Vent Method/Mode   Ventilation Method Conventional   Ventilator Mode Assist control;VC+   Procedures   $$ Subsequent Procedure Aerosol   Delivery Source In-line nebulizer   Pulmonary Toilet   Pulmonary Toilet H. O.B elevated;Suction

## 2021-04-15 NOTE — INTERDISCIPLINARY ROUNDS
[facility]  Pulmonary, Critical Care, and Sleep Medicine  Interdisciplinary and Ventilator Weaning Rounds    Patient discussed in morning walking rounds and interdisciplinary rounds. ICU day: 12    Overnight events:   · Remains on FIO2 100%/ PEEP of 10  · Episode of desaturation during transport to CT which was aborted    Assessments and best practice:   Ventilator  o Ventilator day 12  o Vent settings: FiO2 of 100 and PEEP 10  o VAP bundle, aim to keep peak plateau pressure 07-11DE H2O  o Weaning assessed and documented   - Patient does not meet criteria for SBT. FiO2 and PEEP too high   - Patient is not on sedation holiday. - No plan to wean today. - Outcome: cont full ventilator support   - Final plan: will remain on mechanical ventilatory support today   Sedation  Fentanyl, Versed   Other pertinent drips  o Bumex, Dobutamine, Heparin, Velitri    Lines noted  o PIVs   Critical labs assessed  o Yes   Antibiotics   Zosyn    Medications reviewed  o Yes   Pending imaging  o CT scan abdomen to evaluate for toxic casper colon   Pending send out labs  o no   Pending Procedures  o no   Glycemic control   SSI    Stress ulcer prophylaxis. o Pepcid   DVT prophylaxis. o Heparin ggt   Need for Lines, cardona assessed.  o Yes   Restraint Reevaluation   o Yes  o I have reevaluated the patient one hour after initiation of intervention. The patient is comfortable, uninjured, but continues to pose an imminent risk of injury to self to themselves and/or serious disruption of medical treatment required to keep patient stable. The patient's current medical and behavioral conditions that warrant the use intervention include danger to self and Interference with medical treatment. Restraint or seclusion will be discontinued at the earliest possible time, regardless of the scheduled expiration of the order.  Based on my evaluation, restraints will be continued: YES 4/15/21   o Attending Overseeing restraints:Dr. Alejandro Norman     Family contact/MPOA: sister, Quan Scooby  Family update: updated by bedside nurse        Daily Plans:   Bumex ggt with goal of negative 1 liter   CT-scan abdomen R?O toxic megacolon   Recruitment maneuver with vent   Lighten sedation today, titrate down Versed      Anthony Diop NP  04/15/21  Pulmonary, [address] [facility],[address] [facility]

## 2021-04-15 NOTE — PROGRESS NOTES
Interim Note:    Talked with patients sister, Cinthya Morales on the phone regarding patients status. Noted to family that patient has had continued difficulty with respirations while lying flat. We are unable to obtain imaging of his abdomen for concern of obstruction. Identified to family concerns with progressive medical conditions and difficulties with future interventions given current anticoagulation for blood clots and difficulties with ventilation. Noted risks with continued ventilation and progression of medical course. Patients sister noted that family would be present tomorrow in the hospital. Provider will try and update son today, per patients sister he is at work right now.      Signed By: Yuni Joy MD     April 15, 2021

## 2021-04-15 NOTE — PROGRESS NOTES
Problem: Ventilator Management  Goal: *Adequate oxygenation and ventilation  Outcome: Progressing Towards Goal  Goal: *Patient maintains clear airway/free of aspiration  Outcome: Progressing Towards Goal  Goal: *Absence of infection signs and symptoms  Outcome: Progressing Towards Goal  Goal: *Normal spontaneous ventilation  Outcome: Not Progressing Towards Goal     Problem: Patient Education: Go to Patient Education Activity  Goal: Patient/Family Education  Outcome: Not Progressing Towards Goal     Problem: Diabetes Self-Management  Goal: *Using medications safely  Description: State effect of diabetes medications on diabetes; name diabetes medication taking, action and side effects. Outcome: Progressing Towards Goal  Goal: *Monitoring blood glucose, interpreting and using results  Description: Identify recommended blood glucose targets  and personal targets. Outcome: Progressing Towards Goal     Problem: Diabetes Self-Management  Goal: *Using medications safely  Description: State effect of diabetes medications on diabetes; name diabetes medication taking, action and side effects. Outcome: Progressing Towards Goal  Goal: *Monitoring blood glucose, interpreting and using results  Description: Identify recommended blood glucose targets  and personal targets. Outcome: Progressing Towards Goal     Problem: Falls - Risk of  Goal: *Absence of Falls  Description: Document Devere Rowe Fall Risk and appropriate interventions in the flowsheet.   Outcome: Progressing Towards Goal  Note: Fall Risk Interventions:       Mentation Interventions: Adequate sleep, hydration, pain control, Bed/chair exit alarm, Door open when patient unattended, Update white board, Toileting rounds, Reorient patient, More frequent rounding, Room close to nurse's station    Medication Interventions: Bed/chair exit alarm, Evaluate medications/consider consulting pharmacy, Patient to call before getting OOB, Teach patient to arise slowly    Elimination Interventions: Bed/chair exit alarm, Toileting schedule/hourly rounds, Toilet paper/wipes in reach, Stay With Me (per policy), Patient to call for help with toileting needs              Problem: Patient Education: Go to Patient Education Activity  Goal: Patient/Family Education  Outcome: Progressing Towards Goal     Problem: Pressure Injury - Risk of  Goal: *Prevention of pressure injury  Description: Document Lang Scale and appropriate interventions in the flowsheet. Outcome: Progressing Towards Goal  Note: Pressure Injury Interventions:  Sensory Interventions: Assess changes in LOC, Assess need for specialty bed, Turn and reposition approx. every two hours (pillows and wedges if needed), Pressure redistribution bed/mattress (bed type), Monitor skin under medical devices, Minimize linen layers, Keep linens dry and wrinkle-free, Float heels, Check visual cues for pain    Moisture Interventions: Absorbent underpads, Apply protective barrier, creams and emollients, Assess need for specialty bed, Check for incontinence Q2 hours and as needed, Internal/External fecal devices, Minimize layers    Activity Interventions: Assess need for specialty bed, Pressure redistribution bed/mattress(bed type)    Mobility Interventions: HOB 30 degrees or less, Pressure redistribution bed/mattress (bed type), Assess need for specialty bed, Float heels, Turn and reposition approx.  every two hours(pillow and wedges)    Nutrition Interventions: Discuss nutritional consult with provider    Friction and Shear Interventions: Apply protective barrier, creams and emollients, Feet elevated on foot rest, Foam dressings/transparent film/skin sealants, HOB 30 degrees or less, Transferring/repositioning devices, Minimize layers                Problem: Injury - Risk of, Adverse Drug Event  Goal: *Absence of adverse drug events  Outcome: Progressing Towards Goal  Goal: *Absence of medication errors  Outcome: Progressing Towards Goal

## 2021-04-15 NOTE — PROGRESS NOTES
Assumed care of patient after receiving bedside and verbal shift change report from Dipika Marin RN.

## 2021-04-15 NOTE — ROUTINE PROCESS
Respiratory therapist noted that ETT is not holding air in ballon and wheezing heard to upper airway. On vent TV are not reached. 89- 200 are being shown. Dr Claudene Cluster and ICU team made aware. Plan to switch out ETT. 1615 after MD examine. Assessed that cricoid site was leaking and with pressure over site volumes begin to increase. New dressing applied as a pressure dressing with Vaseline gauze, folded 4x4, meplex and silk tape. Neck prop on rolled wash cloth to elevate neck off chest. No ETT switch is needed for now. Will continue to observe.

## 2021-04-15 NOTE — PROGRESS NOTES
Attempted to take patient to [facility], but desaturtated into low 80s before getting on elevator. Brought back to room and placed back on vent.

## 2021-04-15 NOTE — PROGRESS NOTES
In Patient Progress note      Admit Date: 4/4/2021    Impression:     #1 Acute kidney injury, etiology appears to be secondary to Ischaemic ATN d/t hypoperfusion v/s cardiorenal syndrome in  setting of CHF/V failure in setting of PE  #2 acute respiratory failure secondary to rapidly progressive angioedema s/p emergent cricothyrotomy. Presently intubated on the vent,  severely hypoxic out of proportion to x-ray findings, therefore PE has been considered , Rx for presumed PE  #3 pulmonary infiltrates/Pulm edema   #4 left-sided pneumothorax, appears resolved  #5 diabetes mellitus with complications  #6 popliteal DVT left  #7      Plan:     1) Bumex gtt 0.25 mg/hrs , Diamox 250 MG bid for alkalosis  Goal to keep slight negative balance   2) strict I/o  3) follow renal parameters , electrolytes q12hrs   4) keep MAP > 65  5) dobutamine per pccm team      Please call with questions,     Annette Barnett MD FASN  Cell [phone]  Pager: [phone]  Subjective:     - No acute over night events. - respiratory - stable  - hemodynamics - stable, no pressrs  - UOP-good  - Nutrition -ok    Objective:     Visit Vitals  /65   Pulse 94   Temp 99.9 °F (37.7 °C)   Resp (!) 31   Ht 5' 11\" (1.803 m)   Wt 115.9 kg (255 lb 8.2 oz)   SpO2 91%   BMI 35.64 kg/m²         Intake/Output Summary (Last 24 hours) at 4/15/2021 1143  Last data filed at 4/15/2021 1110  Gross per 24 hour   Intake 2273.87 ml   Output 4650 ml   Net -2376.13 ml       Physical Exam:        Intubated/ on vent   HEENT: mmm  Lungs: good air entry, coarse BS +  Cardiovascular system: S1, S2, regular rate and rhythm. Abdomen: soft, non tender, non distended. Positive bowel sounds. Extremities: no clubbing, cyanosis or edema. Integumentary: skin is grossly intact.        Data Review:    Recent Labs     04/15/21  0057   WBC 17.3*   RBC 3.90*   HCT 32.3*   MCV 82.8   MCH 25.6   MCHC 31.0   RDW 15.7*     Recent Labs     04/15/21  0940 04/15/21  0057 04/14/21  1710 04/14/21  0327 04/13/21  1550 04/13/21  1120   BUN  --  99* 100* 94* 87* 86*   CREA  --  3.15* 2.94* 2.62* 2.41* 2.44*   CA  --  8.2* 7.7* 7.5* 8.0* 8.2*   ALB 1.5* 1.5* 1.6* 1.6* 1.6* 1.6*   K  --  4.4 4.7 4.7 4.3 4.6   NA  --  142 144 146* 142 144   CL  --  103 107 108 110 111   CO2  --  29 27 28 29 28   PHOS  --  6.0* 5.4* 4.2 3.7 4.2   GLU  --  135* 146* 107* 156* 152*       Edd Gomez MD

## 2021-04-15 NOTE — PROGRESS NOTES
Patient Having sounds around trach. He is getting volus and the cuff has air in it. I spoke to Dr. Diez Sportsman aboput it to make him aware.

## 2021-04-15 NOTE — PROGRESS NOTES
[facility]  Pulmonary, Critical Care, and Sleep Medicine  Subsequent Encounter Note    Patient seen at bedside and and evaluated for hypoxia during transport. Patient unable to tolerate laying flat, worsening audible air leak. Desaturations into the 70's/80's persistently. Tachycardic. Not safe for transport. Patient placed back on vent support requiring several minutes of 100% fio2 for recovery. OGT back to LIS.  CT tech reporting if patient is able to attempt CT tomorrow then he may not require further contrast.     Leandra Vance PA-C  04/14/21  Pulmonary, [address] [place] [address],[address] [facility]       \

## 2021-04-15 NOTE — ROUTINE PROCESS
Bedside and Verbal shift change report given to Alexa Acosta RN (oncoming nurse) by Princess Vance RN (offgoing nurse). Report included the following information SBAR, Intake/Output, Recent Results, Cardiac Rhythm SR and Quality Measures.

## 2021-04-15 NOTE — PROGRESS NOTES
[facility]. Pulmonary, Critical Care, and Sleep Medicine    Name: Ariadna Glass MRN: [id number]   : 1950 Hospital: [facility]   Date: 4/15/2021  Admission Date: 2021     Chart and notes reviewed. Data reviewed. I have evaluated all findings. [x]I have reviewed the flowsheet and previous days notes. [x]The patient is unable to give any meaningful history or review of systems because the patient is:  [x]Intubated [x]Sedated   []Unresponsive      [x]The patient is critically ill on      [x]Mechanical ventilation [x]Pressors   []BiPAP []         Interval HPI:  80 yo M h/o HTN tx w/ lisinopril presenting to [facility] ED  for rapidly progressing respiratory distress due to severe angioedema. During intubation attempts pt had brief cardiac arrest w/ pulse loss and severe airway swelling leading to emergent cricothyrotomy. Pt stabilized before [facility] transport to [place] [facility] [place] pt taken emergently to [address] was converted to ETT. Patient continues to have trouble with o2 requirements disproportionate to CXR findings and despite adjustment of FiO2 and PEEP. CTA revelaed B/L pulmonary emboli which is likely causing hypoxemia and RH strain. Administration of inhaled epoprostenol on  is showing improvement in V/Q  FiO2 75%/ PEEP 10. Pt is on dobutamine and being diuresed w/ bumex and metolazone. Subjective 04/15/21  Hospital Day: 11  Vent Day: 11  Overnight events:patient unable to tolerate transport to ct due to episodes of desaturations into 70/80s when lying flat,  Mentation/Activity: sedated  Respiratory/ Secretions:audible leak near cric site, continued desaturations   Hemodynamics: on dobutamine  Urine output, bowel: see below  Diet:npo, tube feedings held  Need for procedures:None    4/15/21  -continue to wean settings as tolerated, Goal is to try and obtain CT imaging of abdomen today.    -Zosyn to be discontinued on Saturday, f/u cx  -Diamox 250 bid for resp alkalosis  -albuterol changed to q8h  -titrating versed as tolerated, need to assess neuro status with wean  -DC mucomyst and pulmozyme,  -Titrating diamox drip,titrating bumex drip. Goal is to continue additional at least 1-2 L output in 24 hours. ROS:Review of systems not obtained due to patient factors. Events and notes from last 24 hours reviewed. Care plan discussed on multidisciplinary rounds. Patient Active Problem List   Diagnosis Code    Angioedema due to angiotensin converting enzyme inhibitor (ACE-I) T78. 3XXA, [id number]    Respiratory failure ([facility] J96.90    JACQUELYN (acute kidney injury) ([facility] [facility] N17.9    Cardiac arrest ([facility]) I46.9    Obesity (BMI 30-39. 9) E66.9    Diabetic neuropathy associated with type 2 diabetes mellitus ([facility] [address] [facility] E11.40    Diabetic retinopathy associated with type 2 diabetes mellitus ([facility] E11.319    Pulmonary infiltrates R91.8    Controlled type 2 diabetes mellitus with neurologic complication, without long-term current use of insulin ([facility]) E11.49    DVT (deep venous thrombosis) ([facility]) I82.409       Vital Signs:  Visit Vitals  BP 96/62   Pulse 95   Temp 100 °F (37.8 °C)   Resp 23   Ht 5' 11\" (1.803 m)   Wt 115.9 kg (255 lb 8.2 oz)   SpO2 95%   BMI 35.64 kg/m²       O2 Device: Endotracheal tube, Ventilator       Temp (24hrs), Av.7 °F (37.6 °C), Min:96.4 °F (35.8 °C), Max:100.9 °F (38.3 °C)       Intake/Output:   Last shift:      No intake/output data recorded.   Last 3 shifts:  1901 - 04/15 0700  In: 3249.1 [I.V.:1356.1]  Out: 5725 [Urine:5325]    Intake/Output Summary (Last 24 hours) at 4/15/2021 0747  Last data filed at 4/15/2021 0651  Gross per 24 hour   Intake 1577.41 ml   Output 4275 ml   Net -2697.59 ml        Ventilator Settings:  Ventilator Mode: Assist control, VC+  Respiratory Rate  Back-Up Rate: 18  Insp Time (sec): 1.1 sec  I:E Ratio: 1:2.3  Ventilator Volumes  Vt Set (ml): 500 ml(changed after ABG results)  Vt Exhaled (Machine Breath) (ml): 547 ml  Vt Spont (ml): 522 ml  Ve Observed (l/min): 12.6 l/min  Ventilator Pressures  PC Set: 550  PIP Observed (cm H2O): 26 cm H2O  Plateau Pressure (cm H2O): 19 cm H2O  MAP (cm H2O): 17  PEEP/VENT (cm H2O): 10 cm H20  Auto PEEP Observed (cm H2O): 0.6 cm H2O    Current Facility-Administered Medications   Medication Dose Route Frequency    DOBUTamine (DOBUTREX) 500 mg/250 mL (2,000 mcg/mL) infusion  5 mcg/kg/min IntraVENous TITRATE    bumetanide (BUMEX) 12.5 mg in 0.9% sodium chloride 100 mL infusion  0.5 mg/hr IntraVENous CONTINUOUS    epoprostenol (VELETRI) 30 mcg/mL in 0.9% sodium chloride 50 mL inhalation solution  50 ng/kg/min (Order-Specific) Inhalation CONTINUOUS    fentaNYL (PF) 1,500 mcg/30 mL (50 mcg/mL) infusion  0-200 mcg/hr IntraVENous TITRATE    piperacillin-tazobactam (ZOSYN) 3.375 g in dextrose 5% 100 mL  3.375 g IntraVENous Q6H    midazolam in normal saline (VERSED) 1 mg/mL infusion  0-10 mg/hr IntraVENous TITRATE    acetylcysteine (MUCOMYST) 100 mg/mL (10 %) nebulizer solution 400 mg  4 mL Nebulization BID RT    famotidine (PF) (PEPCID) 20 mg in 0.9% sodium chloride 10 mL injection  20 mg IntraVENous DAILY    heparin 25,000 units in D5W 250 ml infusion  12-25 Units/kg/hr IntraVENous TITRATE    dornase alpha (PULMOZYME)2.5mg/2.5ml nebulizer solution  2.5 mg Nebulization BID RT    albuterol (PROVENTIL VENTOLIN) nebulizer solution 2.5 mg  2.5 mg Nebulization Q6H RT    arformoteroL (BROVANA) neb solution 15 mcg  15 mcg Nebulization BID RT    multivit-folic acid-herbal 295 (WELLESSE PLUS) oral liquid 30 mL  30 mL Per NG tube DAILY    chlorhexidine (PERIDEX) 0.12 % mouthwash 10 mL  10 mL Oral Q12H    insulin lispro (HUMALOG) injection   SubCUTAneous Q6H         Telemetry: [x]Sinus []A-flutter []Paced    []A-fib [x]Multiple PVCs                  Physical Exam:       General: Intubated/sedated; obese   HEENT:  Left scleral erythema; mucosa moist  Resp:  Symmetrical chest expansion, no accessory muscle use; audible air leak good airway entry; no rales/ wheezing/ rhonchi noted  CV:  S1, S2 present; regular rate and rhythm   GI:  Abdomen soft, obese, non-tender; hypoactive bowel sounds w/ distention   Extremities:  +2 pulses on all extremities; mild edema noted on hands   Skin:  Warm; no rashes/ lesions noted, normal turgor/cap refill   Neurologic:  Non-focal; sedated   Devices:  OGT, ETT, cardona     DATA:  MAR reviewed and pertinent medications noted or modified as needed    Labs:  Recent Labs     04/15/21  0057 04/14/21  1003 04/13/21  1120   WBC 17.3* 15.9* 15.0*   HGB 10.0* 9.3* 9.5*   HCT 32.3* 29.6* 30.7*    188 133*     Recent Labs     04/15/21  0057 04/14/21  1716 04/14/21  0327 04/13/21  1120 04/13/21  1120 04/12/21  2354    144 146*   < > 144  --    K 4.4 4.7 4.7   < > 4.6  --     107 108   < > 111  --    CO2 29 27 28   < > 28  --    * 146* 107*   < > 152*  --    BUN 99* 100* 94*   < > 86*  --    CREA 3.15* 2.94* 2.62*   < > 2.44*  --    CA 8.2* 7.7* 7.5*   < > 8.2*  --    MG 3.4*  --  3.3*  --  3.4*  --    PHOS 6.0* 5.4* 4.2   < > 4.2  --    ALB 1.5* 1.6* 1.6*   < > 1.6*  --    ALT  --   --   --   --  172*  --    INR  --   --   --   --   --  1.4*    < > = values in this interval not displayed. No results for input(s): PH, PCO2, PO2, HCO3, FIO2 in the last 72 hours. Recent Labs     04/15/21  0316 04/14/21  0349 04/13/21  2222   FIO2I 100 100 100   HCO3I 29.8* 26.7* 26.7*   PCO2I 37.0 38.0 38.6   PHI 7.51* 7.45 7.45   PO2I 62* 67* 55*       Imaging:  [x]   I have personally reviewed the patients radiographs and reports  XR Results (most recent):  CXR Results  (Last 48 hours)               04/14/21 0023  XR CHEST PORT Final result    Impression:      1. Stable supporting lines. 2. Moderate airspace disease seems to be slightly improved.        Thank you for your referral.       Narrative:  Chest AP single view HISTORY: Postintubation follow-up        COMPARISON: Yesterday. FINDINGS:    LINES/TUBES/DEVICES: Multiple supporting lines appear stable including ET tube,   NG tube and pH probe in esophagus. No pneumothorax. LUNGS: Patchy opacities in bilateral mid and lower lungs are again identified   with probably slight improvement. There is possible layering pleural fluid at   bilateral lung bases. MEDIASTINUM: The heart is partially obscured by pulmonary opacities but grossly   stable. BONES/SOFT TISSUES: No acute findings. CT Results (most recent):  CT Results  (Last 48 hours)               04/13/21 5827  CTA CHEST W OR W WO CONT Final result    Impression:      1. Moderate burden of acute pulmonary embolism in the left lower lobe. Additional small acute segmental pulmonary embolism in the right upper lobe. 2. There is dense multifocal consolidations bilaterally, as well as in the   dependent lower lobes, favoring a combination of multifocal pneumonia, edema,   and dependent atelectasis. -There is suggestion of focal hypoattenuation within the consolidation in the   left lower lobe, which may be infectious in etiology versus sequela of pulmonary   infarct given #1.       3. Mildly prominent mediastinal and bilateral hilar nodes, likely   benign/reactive. 4. Mild cardiomegaly. Reflux of trace contrast into the IVC, which can be seen   in the setting of right heart dysfunction. 5. Trace bilateral pleural effusions. 6. Endotracheal and enteric tubes noted in appropriate position. Discussed findings #1-2 with Prashant Galeas PA-C on 4/13/2021 at 7:19 PM.       Narrative:  CTA CHEST PULMONARY ANGIOGRAM       HISTORY/INDICATION:  Shortness of breath, clinical concern for pulmonary   embolism, history of cardiac arrest 4/4/2021       COMPARISON:  No prior CTA chest. Reference chest radiograph 4/13/2021.        TECHNIQUE:  Helical multidetector array volumetric acquisition of the chest is   performed following intravenous contrast administration of 72cc Isovue-370, per   pulmonary angiogram protocol. These images are reviewed and 2.5 mm and 5 mm   images along with coronal and sagittal 3D reconstruction maximum intensity   projection (MIP) images. Dose reduction techniques:  Automated exposure control,   mAs and/or kVp reductions based on patient size, and iterative reconstruction. CTA SPECIFIC FINDINGS:   Pulmonary arteries: Central contrast filling defect in the left lower lobe,   involving lobar, segmental, and segmental branches. Small contrast filling   defect in a segmental branch in the right upper lobe. Aorta: No aneurysm. CHEST FINDINGS:    Thyroid:  No focal lesion. Mediastinum: Heart is mildly enlarged. Mild burden of coronary artery   atherosclerotic calcifications. No pericardial effusion. Reflux of trace   contrast into the IVC. No right ventricular dilatation or bowing of the   intraventricular septum. Pleural space: Trace bilateral pleural effusions. No pneumothorax. Lungs: There is dense multifocal consolidations bilaterally and in the dependent   lower lobes. There is suggestion of focal hypoattenuation within the   consolidation in the left lower lobe. Endotracheal tube in appropriate position,   terminating 3.7 cm above the gabriella on the  view. Included Abdomen: Visualized solid organs of the upper abdomen are normal.   Enteric tube tip terminates within the body of the stomach. Skeleton: No suspicious lytic or blastic lesions. No fractures. Soft tissues: Normal.       Lymph Nodes: Increased number of mildly prominent mediastinal and bilateral   hilar nodes, including a 1.0 x 1.8 cm AP window node and a 1.1 x 2.1 cm right   paratracheal node.                      IMPRESSION:   · Angioedema- with airway and respiratory failure and collapse; thought due to ACE inhibitor  · S/P Emergent Cricthyrotomy [facility]-ED 4/4/21- converted to 6.5 ETT intraoperative by anesthesia team 4/4/21, 8.0 ETT by anesthesia 4/7/21, packing removed evening 4/8/21-ENT  · S/P cardiac arrest @ [facility] 4/4/21- brief thought due to hypoxia due to airway collapse  · Respiratory Failure: Acute due to above; currently intubated and on vent for airway protection  · Pulmonary Infiltrates: suspect aspiration secretions and/or blood due to above- can't exclude other infectious process at this time. Rapid Covid Negative at [facility]  · Bradycardia  · DVT: Popliteal- Left; acute non-occlusive Heparin ACS protocol started 4/8- stopped 4/9 due to bleeding from Cric site. Heparin on/off due to bleeding episodes  · Pulmonary Embolism -   · JACQUELYN  · Left scleral erythema- unknown baseline; possible infection   · DM  · DM retinopathy per chart review  · Diabetic peripheral neuropathy  · COVID -19; Negative rapid and [employer]: 4/4/21  · Left Pneumothorax - resolved     · Obesity: Body mass index is 35.64 kg/m². Patient Active Problem List   Diagnosis Code    Angioedema due to angiotensin converting enzyme inhibitor (ACE-I) T78. 3XXA, [id number]    Respiratory failure ([facility] J96.90    JACQUELYN (acute kidney injury) ([facility] N17.9    Cardiac arrest ([facility]) I46.9    Obesity (BMI 30-39. 9) E66.9    Diabetic neuropathy associated with type 2 diabetes mellitus ([facility] E11.40    Diabetic retinopathy associated with type 2 diabetes mellitus ([facility] E11.319    Pulmonary infiltrates R91.8    Controlled type 2 diabetes mellitus with neurologic complication, without long-term current use of insulin ([facility]) E11.49    DVT (deep venous thrombosis) ([facility]) I82.409        RECOMMENDATIONS:   NEURO:  · Serial neuro evaluations  · Sedation Holiday per protocol  · RAAS: 0 to -1  · Wetting tears and cipro eye drops- stop date for cipro on chart  ·    CARDIO:  · MAP goal >64- No IV pressor requirement  · Echo on 4/6/21   · Telemetry     PULM:  · Maintain aspiration precautions: HOB >30 degrees  · SpO2 goal >92%  · Bronchial /oral hygiene  · VAP bundle- Wean vent as clinical status allows  · SBT as clinical status allows  · Complete course of decadron  · Pulmozyme and Mucomist DC  · Metaneb     GI/ NUTRITION:  · OGT   · Diet: TF with Free water @ 300 mL Q4  · SUP:Pepcid  · Follow up with nutritional recommendations  ·    RENAL/ METAB/ :  · Monitor I&Os  · Trend electrolytes - replaced as needed, K:4, Mg>2 PO4>2  · Cardona: Continue     HEM/ONC  · Trend Hb/HCT and PLTs, and indicis  · DVT prophylaxis: SCDs. Heparin on/off due to bleeding episodes  · Re-image poplyteal DVT 72 hours and/or pending clinical course- may need IVC Filter if worsens  · Blood Products: not indicated at this time     ID  · Antibioitcs: Cipro eye drop, Zosyn: 4/4- 4/8/21 Vanco: 4/4-6, MRSA swab negative  · Zosyn restarted on 4/11  · Follow up on pending cultures  · Repeat Covid negative (4/11)  · Tylenol for fevers     ENDO  · BGs Q 6,SSI  · C1 inhibitor normal level (4/7/21)  ·    MSK/ ORTHO  · No active Issues  ·    SKIN/ WOUNDS  · Per protocol  · Neck- daily dressing changes per ENT, Quick clot wit Afrin if needed, d/c xeroform  ·    OTHER/DISPO:     CODE STATUS: Full Code- Full     Case reviewed and discussed with  ICU care team     Best practice :    Glycemic control  Mech Vent patients- VAP bundle, aim to keep peak plateau pressure 74-59BD H2O  Sress ulcer prophylaxis. DVT prophylaxis. Need for Lines, cardona assessed. High complexity decision making was performed during this consultation and evaluation. [x]       Pt is at high risk for further organ failure and dysfunction. Critical care time spent:    minutes with patient exclusive of procedures.     Sauld Lamb MD, PGY-1  04/15/21  Pulmonary, Critical Care Medicine  [address] [place] [address] [facility]

## 2021-04-15 NOTE — MED STUDENT NOTES
[facility] [place] [facility]. Pulmonary, Critical Care, and Sleep Medicine    Name: Jaswinder Block MRN: [id number]   : 1950 Hospital: [facility]   Date: 2021  Admission Date: 2021     Chart and notes reviewed. Data reviewed. I have evaluated all findings. [x]I have reviewed the flowsheet and previous days notes. [x]The patient is unable to give any meaningful history or review of systems because the patient is:  [x]Intubated [x]Sedated   []Unresponsive      [x]The patient is critically ill on      [x]Mechanical ventilation [x]Pressors   []BiPAP []         Interval HPI:   80 yo M h/o HTN tx w/ lisinopril presenting to [facility] ED  for rapidly progressing respiratory distress due to severe angioedema. During intubation attempts pt had brief cardiac arrest w/ pulse loss and severe airway swelling leading to emergent cricothyrotomy. Pt stabilized before [facility] transport to [facility]. Once at [facility] pt taken emergently to OR  where cric was converted to ETT. Patient continues to have trouble with o2 requirements disproportionate to CXR findings and despite adjustment of FiO2 and PEEP. CTA revelaed B/L pulmonary emboli which is likely causing hypoxemia and RH strain. Administration of inhaled epoprostenol on  is showing improvement in V/Q  FiO2 75%/ PEEP 10. Pt is on dobutamine and being diuresed w/ bumex and metolazone.         Subjective 21  Hospital Day: 11  Vent Day:11  Overnight events: abdominal distention associated w/ no bowel signs; KUB displayed colonic distention ileus vs obstruction; CT ordered however pt unable to tolerate transport due to desaturation into 70s/80s when lying flat; continued desaturations throughout the night   Mentation/Activity: sedated  Respiratory/ Secretions: audible leak near cric site, continued desaturations   Hemodynamics: episodes of hypotension but are improving   Urine output, bowel: monitored through cardona Diet: NPO; tube feedings held                 ROS:Review of systems not obtained due to patient factors. Events and notes from last 24 hours reviewed. Care plan discussed on multidisciplinary rounds. Patient Active Problem List   Diagnosis Code    Angioedema due to angiotensin converting enzyme inhibitor (ACE-I) T78. 3XXA, [id number]    Respiratory failure ([facility] J96.90    JACQUELYN (acute kidney injury) ([facility] N17.9    Cardiac arrest ([facility]) I46.9    Obesity (BMI 30-39. 9) E66.9    Diabetic neuropathy associated with type 2 diabetes mellitus ([facility] E11.40    Diabetic retinopathy associated with type 2 diabetes mellitus ([facility] E11.319    Pulmonary infiltrates R91.8    Controlled type 2 diabetes mellitus with neurologic complication, without long-term current use of insulin ([facility]) E11.49    DVT (deep venous thrombosis) ([facility]) I82.409       Vital Signs:  Visit Vitals  BP (!) 96/46   Pulse 88   Temp 98.2 °F (36.8 °C)   Resp 21   Ht 5' 11\" (1.803 m)   Wt 115.9 kg (255 lb 8.2 oz)   SpO2 93%   BMI 35.64 kg/m²       O2 Device: Endotracheal tube, Ventilator       Temp (24hrs), Av.4 °F (38 °C), Min:96.4 °F (35.8 °C), Max:102 °F (38.9 °C)       Intake/Output:   Last shift:      1901 - 04/15 0700  In: 20   Out: 350 [Urine:300]  Last 3 shifts:  07 -  1900  In: 5157.1 [I.V.:1724.1]  Out: 3330 [Urine:5025]    Intake/Output Summary (Last 24 hours) at 2021 2231  Last data filed at 2021 2151  Gross per 24 hour   Intake 2677.95 ml   Output 3305 ml   Net -627.05 ml        Ventilator Settings:  Ventilator Mode: Assist control, VC+  Respiratory Rate  Back-Up Rate: 18  Insp Time (sec): 1.1 sec  I:E Ratio: 1:2  Ventilator Volumes  Vt Set (ml): 550 ml  Vt Exhaled (Machine Breath) (ml): 551 ml  Vt Spont (ml): 494 ml  Ve Observed (l/min): 9.24 l/min  Ventilator Pressures  PC Set: 550  PIP Observed (cm H2O): 27 cm H2O  Plateau Pressure (cm H2O): 19 cm H2O  MAP (cm H2O): 16  PEEP/VENT (cm H2O): 10 cm H20  Auto PEEP Observed (cm H2O): 0.6 cm H2O    Current Facility-Administered Medications   Medication Dose Route Frequency    DOBUTamine (DOBUTREX) 500 mg/250 mL (2,000 mcg/mL) infusion  5 mcg/kg/min IntraVENous TITRATE    bumetanide (BUMEX) 12.5 mg in 0.9% sodium chloride 100 mL infusion  0.5 mg/hr IntraVENous CONTINUOUS    epoprostenol (VELETRI) 30 mcg/mL in 0.9% sodium chloride 50 mL inhalation solution  50 ng/kg/min (Order-Specific) Inhalation CONTINUOUS    fentaNYL (PF) 1,500 mcg/30 mL (50 mcg/mL) infusion  0-200 mcg/hr IntraVENous TITRATE    piperacillin-tazobactam (ZOSYN) 3.375 g in dextrose 5% 100 mL  3.375 g IntraVENous Q6H    midazolam in normal saline (VERSED) 1 mg/mL infusion  0-10 mg/hr IntraVENous TITRATE    acetylcysteine (MUCOMYST) 100 mg/mL (10 %) nebulizer solution 400 mg  4 mL Nebulization BID RT    famotidine (PF) (PEPCID) 20 mg in 0.9% sodium chloride 10 mL injection  20 mg IntraVENous DAILY    heparin 25,000 units in D5W 250 ml infusion  12-25 Units/kg/hr IntraVENous TITRATE    dornase alpha (PULMOZYME)2.5mg/2.5ml nebulizer solution  2.5 mg Nebulization BID RT    albuterol (PROVENTIL VENTOLIN) nebulizer solution 2.5 mg  2.5 mg Nebulization Q6H RT    arformoteroL (BROVANA) neb solution 15 mcg  15 mcg Nebulization BID RT    multivit-folic acid-herbal 645 (WELLESSE PLUS) oral liquid 30 mL  30 mL Per NG tube DAILY    chlorhexidine (PERIDEX) 0.12 % mouthwash 10 mL  10 mL Oral Q12H    insulin lispro (HUMALOG) injection   SubCUTAneous Q6H         Telemetry: [x]Sinus []A-flutter []Paced    []A-fib [x]Multiple PVCs                  Physical Exam:      General: Intubated/sedated; diaphoretic   HEENT:  Left scleral erythema; mucosa moist  Resp:  Symmetrical chest expansion, no accessory muscle use; audible air leak good airway entry; no rales/ wheezing/ rhonchi noted  CV:  S1, S2 present; regular rate and rhythm   GI:  Abdomen soft, non-tender; hypoactive bowel sounds w/ distention   Extremities: +2 pulses on all extremities; mild edema noted on hands   Skin:  Warm; no rashes/ lesions noted, normal turgor/cap refill   Neurologic:  Non-focal; sedated   Devices:  OGT, ETT, cardona       DATA:  MAR reviewed and pertinent medications noted or modified as needed    Labs:  Recent Labs     04/14/21  1003 04/13/21  1120 04/12/21  2354   WBC 15.9* 15.0* 13.7*   HGB 9.3* 9.5* 9.9*   HCT 29.6* 30.7* 34.0*    133* 144     Recent Labs     04/14/21  1716 04/14/21  0327 04/13/21  1550 04/13/21  1120 04/12/21  2354 04/12/21  2354 04/12/21  0352    146* 142 144  --   --  146*   K 4.7 4.7 4.3 4.6  --   --  4.4    108 110 111  --   --  113*   CO2 27 28 29 28  --   --  29   * 107* 156* 152*  --   --  165*   * 94* 87* 86*  --   --  72*   CREA 2.94* 2.62* 2.41* 2.44*  --   --  2.21*   CA 7.7* 7.5* 8.0* 8.2*  --   --  8.2*   MG  --  3.3*  --  3.4*  --   --  3.0*   PHOS 5.4* 4.2 3.7 4.2  --   --  3.1   ALB 1.6* 1.6* 1.6* 1.6*   < >  --   --    ALT  --   --   --  172*  --   --   --    INR  --   --   --   --   --  1.4*  --     < > = values in this interval not displayed. No results for input(s): PH, PCO2, PO2, HCO3, FIO2 in the last 72 hours. Recent Labs     04/14/21  0349 04/13/21  2222 04/13/21  1614   FIO2I 100 100 0.85   HCO3I 26.7* 26.7* 28.4*   PCO2I 38.0 38.6 42.4   PHI 7.45 7.45 7.44   PO2I 67* 55* 76*       Imaging:  [x]   I have personally reviewed the patients radiographs and reports  XR Results (most recent):  CXR Results  (Last 48 hours)               04/14/21 0023  XR CHEST PORT Final result    Impression:      1. Stable supporting lines. 2. Moderate airspace disease seems to be slightly improved. Thank you for your referral.       Narrative:  Chest AP single view       HISTORY: Postintubation follow-up        COMPARISON: Yesterday. FINDINGS:    LINES/TUBES/DEVICES: Multiple supporting lines appear stable including ET tube,   NG tube and pH probe in esophagus.  No pneumothorax. LUNGS: Patchy opacities in bilateral mid and lower lungs are again identified   with probably slight improvement. There is possible layering pleural fluid at   bilateral lung bases. MEDIASTINUM: The heart is partially obscured by pulmonary opacities but grossly   stable. BONES/SOFT TISSUES: No acute findings. 04/13/21 0552  XR CHEST PORT Final result    Impression:  1. Interval increased bilateral pulmonary opacities which may reflect pulmonary   edema or multifocal pneumonia. Recommend continued imaging follow-up. 2.  Questionable layering small right pleural effusion. 3.  Central pulmonary interstitial edema/infiltrates. Narrative:  EXAM: XR CHEST PORT       INDICATION: 79 years Male. Daily CXR while intubated. .   ADDITIONAL HISTORY: None. TECHNIQUE: Frontal view of the chest.       COMPARISON: 4/12/2021 at 1528 hours       FINDINGS:       Endotracheal tube with tip 6.8 cm proximal to gabriella. Temperature probe with tip   at the proximal thoracic esophagus. There is an enteric tube extending inferior   to the field-of-view, beyond the GE junction. Multilevel lines overlie the   chest.       Hypoinflated lungs. Underpenetration of the lung bases. Heart borders are obscured. The cardiac silhouette is at least mildly enlarged,   similar to prior. Increased fullness of central vascular interstitial markings. Interval increased hazy bilateral pulmonary opacities. Blunting of the right   costophrenic sulcus. The left costophrenic sulcus appears sharp. No definite   pneumothorax. Osseous structures are unchanged in appearance. CT Results (most recent):  CT Results  (Last 48 hours)               04/13/21 8067  CTA CHEST W OR W WO CONT Final result    Impression:      1. Moderate burden of acute pulmonary embolism in the left lower lobe. Additional small acute segmental pulmonary embolism in the right upper lobe.        2. There is dense multifocal consolidations bilaterally, as well as in the   dependent lower lobes, favoring a combination of multifocal pneumonia, edema,   and dependent atelectasis. -There is suggestion of focal hypoattenuation within the consolidation in the   left lower lobe, which may be infectious in etiology versus sequela of pulmonary   infarct given #1.       3. Mildly prominent mediastinal and bilateral hilar nodes, likely   benign/reactive. 4. Mild cardiomegaly. Reflux of trace contrast into the IVC, which can be seen   in the setting of right heart dysfunction. 5. Trace bilateral pleural effusions. 6. Endotracheal and enteric tubes noted in appropriate position. Discussed findings #1-2 with Netta Sy PA-C on 4/13/2021 at 7:19 PM.       Narrative:  CTA CHEST PULMONARY ANGIOGRAM       HISTORY/INDICATION:  Shortness of breath, clinical concern for pulmonary   embolism, history of cardiac arrest 4/4/2021       COMPARISON:  No prior CTA chest. Reference chest radiograph 4/13/2021. TECHNIQUE:  Helical multidetector array volumetric acquisition of the chest is   performed following intravenous contrast administration of 72cc Isovue-370, per   pulmonary angiogram protocol. These images are reviewed and 2.5 mm and 5 mm   images along with coronal and sagittal 3D reconstruction maximum intensity   projection (MIP) images. Dose reduction techniques:  Automated exposure control,   mAs and/or kVp reductions based on patient size, and iterative reconstruction. CTA SPECIFIC FINDINGS:   Pulmonary arteries: Central contrast filling defect in the left lower lobe,   involving lobar, segmental, and segmental branches. Small contrast filling   defect in a segmental branch in the right upper lobe. Aorta: No aneurysm. CHEST FINDINGS:    Thyroid:  No focal lesion. Mediastinum: Heart is mildly enlarged. Mild burden of coronary artery   atherosclerotic calcifications.  No pericardial effusion. Reflux of trace   contrast into the IVC. No right ventricular dilatation or bowing of the   intraventricular septum. Pleural space: Trace bilateral pleural effusions. No pneumothorax. Lungs: There is dense multifocal consolidations bilaterally and in the dependent   lower lobes. There is suggestion of focal hypoattenuation within the   consolidation in the left lower lobe. Endotracheal tube in appropriate position,   terminating 3.7 cm above the gabriella on the  view. Included Abdomen: Visualized solid organs of the upper abdomen are normal.   Enteric tube tip terminates within the body of the stomach. Skeleton: No suspicious lytic or blastic lesions. No fractures. Soft tissues: Normal.       Lymph Nodes: Increased number of mildly prominent mediastinal and bilateral   hilar nodes, including a 1.0 x 1.8 cm AP window node and a 1.1 x 2.1 cm right   paratracheal node. IMPRESSION:   · S/p emergent cric converted to ETT 4/4 due to severe angioedema secondary to ACEi  · Acute respiratory failure w/ hypoxia require mechanical ventilation   · Pulmonary embolism B/L   · JACQUELYN  · S/p Cardiac arrest 4/4  · Aspiration pneumonia  · Acute diastolic heart failure most recent echo w/ grad II diastolic dysfunction and moderate pulmonary HTN  · DVT left popliteal nonocclusive   · Sepsis   · Hemoptysis   · Abdominal distention w/ early colon obstruction      Patient Active Problem List   Diagnosis Code    Angioedema due to angiotensin converting enzyme inhibitor (ACE-I) T78. 3XXA, [id number]    Respiratory failure ([address] J96.90    JACQUELYN (acute kidney injury) ([address] N17.9    Cardiac arrest (HCC) I46.9    Obesity (BMI 30-39. 9) E66.9    Diabetic neuropathy associated with type 2 diabetes mellitus ([address] E11.40    Diabetic retinopathy associated with type 2 diabetes mellitus ([address] E11.319    Pulmonary infiltrates R91.8    Controlled type 2 diabetes mellitus with neurologic complication, without long-term current use of insulin (HCC) E11.49    DVT (deep venous thrombosis) ([facility] I82.409        RECOMMENDATIONS:   Neuro:Titrate sedation for RASS goal -3 to -2, daily sedation holiday. Pulm: Aspiration precautions, HOB>30'. VAP Bundle-ween vent as clinical status allows, SpO2 goal >92%, complete course of decadron; Pulmozyme and mucomist BID w/ broncodilator; Metaneb  CVS:Monitor HD, MAP goal >65, no pressor support  GI: NPO. Diet: tube feedings w/ free water @ 300 mL Q4; SUP: pepcid  Renal: Trend Cr, UOP. Cardona continue  Hem/Onc: Trend H/H, monitor for s/o active bleeding. Daily CBC; DVT prophylaxis: SCDs, heparin on/off due to bleeding episodes restarted on 4/14 due to PE  I/D: Abx: cipro eye drops, Zosyn 4/4-4/8/21 Vanco 4/4-4/6/2021, Zosyn resarted on 4/11; tylenol for fevers; blood culture for persistent fever: pending   Metabolic: Daily BMP, mag, phos. Trend lytes and replace per protocol. Endocrine:Q6 glucoses. SSI-lisinopril. Avoid hypoglycemia. Musc/Skin: no active issues  Full Code  Discussed in interdisciplinary rounds     Best practice :    Glycemic control  I ICU bundles: Cardona Bundle Followed and Vent Bundle Followed, Vent Day 11    [facility] Vent patients- VAP bundle, aim to keep peak plateau pressure 77-65FN H2O  Sress ulcer prophylaxis. DVT prophylaxis. Need for Lines, cardona assessed. Palliative care evaluation. High complexity decision making was performed during this consultation and evaluation. [x]       Pt is at high risk for further organ failure and dysfunction.      Sharlotte Klinefelter, PA-S  04/14/21  Pulmonary, Critical Care Medicine  [facility]

## 2021-04-15 NOTE — PROGRESS NOTES
Bedside and Verbal shift change report given to Arthur Pendleton RN (oncoming nurse) by Linh Weaver RN (offgoing nurse). Report included the following information SBAR, Kardex, Intake/Output, MAR, Recent Results and Cardiac Rhythm is SR on telemetry. Shift Summary:   Bumex drip started and Heparin drip resumed today per orders. Garcia catheter inserted per Dr. Satya Fall order. Total urine output on day shift: 1775 ml. Oral contrast (Gastrografin) administered at [address] for pending CT of abdomen and pelvis with oral contrast to rule out megacolon.

## 2021-04-15 NOTE — PROGRESS NOTES
Problem: Ventilator Management  Goal: *Adequate oxygenation and ventilation  Outcome: Progressing Towards Goal  Goal: *Patient maintains clear airway/free of aspiration  Outcome: Progressing Towards Goal  Goal: *Absence of infection signs and symptoms  Outcome: Progressing Towards Goal   VENTILATOR Care plan    Problem: Ventilator Management  Goal: *Adequate oxygenation/ ventilation/ and extubation      Patient:        Jaswinder Block     79 y.o.   male     4/15/2021  5:21 PM  Patient Active Problem List   Diagnosis Code    Angioedema due to angiotensin converting enzyme inhibitor (ACE-I) T78. 3XXA, [id number]    Respiratory failure ([facility] J96.90    JACQUELYN (acute kidney injury) ([facility] N17.9    Cardiac arrest ([facility] I46.9    Obesity (BMI 30-39. 9) E66.9    Diabetic neuropathy associated with type 2 diabetes mellitus ([facility] E11.40    Diabetic retinopathy associated with type 2 diabetes mellitus ([facility] E11.319    Pulmonary infiltrates R91.8    Controlled type 2 diabetes mellitus with neurologic complication, without long-term current use of insulin ([facility]) E11.49    DVT (deep venous thrombosis) ([facility]) I82.409       Angioedema due to angiotensin converting enzyme inhibitor (ACE-I) [T78. 3XXA, T46.4X5A]    Reason patient intubated: Respiratory failure     Ventilator day: 12     Ventilator settings: ACVC+ 18/500/+14/0.90     ETT Size/Placement: 8.0mm ETT @ 28cm at the lips     ABG:  Date:4/15/2021  Lab Results   Component Value Date/Time    PHI 7.51 (H) 04/15/2021 03:16 AM    PCO2I 37.0 04/15/2021 03:16 AM    PO2I 62 (L) 04/15/2021 03:16 AM    HCO3I 29.8 (H) 04/15/2021 03:16 AM    FIO2I 100 04/15/2021 03:16 AM       Chest X-ray:  Date:4/15/2021  Results from Hospital Encounter encounter on 04/04/21   XR ABD PORT  1 V    Narrative EXAM: XR ABD PORT  1 V    INDICATION: eval obstruction. COMPARISON: Abdominal radiograph 4/14/2021    TECHNIQUE: One view of the abdomen was obtained.      FINDINGS:  NG tube is in the stomach. Hyperdense material in the ascending colon, possibly oral contrast. The  ascending and transverse colon are dilated. Transverse colon measures up to 9.2  cm, similar to prior. Smaller amount of gas is seen in the descending and  sigmoid colon. No significant small bowel dilatation. No acute osseous abnormality. Impression Similar gaseous distention of the ascending and transverse colon. The more  distal colon is relatively decompressed, but does contain a small amount of air.                  Lab Test:  Date:4/15/2021  WBC:   Lab Results   Component Value Date/Time    WBC 17.3 (H) 04/15/2021 12:57 AM   HGB:   Lab Results   Component Value Date/Time    HGB 10.0 (L) 04/15/2021 12:57 AM    PLTS:   Lab Results   Component Value Date/Time    PLATELET 051 05/13/3789 12:57 AM       SaO2%/flow: @WVTQQYN9(5)@    Vital Signs:   Patient Vitals for the past 8 hrs:   Temp Pulse Resp BP SpO2   04/15/21 1619 -- 90 24 -- 96 %   04/15/21 1600 100 °F (37.8 °C) 89 25 (!) 104/53 97 %   04/15/21 1500 99.9 °F (37.7 °C) 90 23 (!) 101/55 96 %   04/15/21 1400 99.9 °F (37.7 °C) 93 30 122/68 (!) 87 %   04/15/21 1300 99.9 °F (37.7 °C) 90 22 (!) 107/57 93 %   04/15/21 1200 99.7 °F (37.6 °C) 87 25 (!) 104/52 96 %   04/15/21 1108 -- 94 -- 128/65 --   04/15/21 1100 99.1 °F (37.3 °C) 89 (!) 32 128/65 90 %   04/15/21 1058 -- 94 (!) 31 -- 91 %   04/15/21 1000 99.3 °F (37.4 °C) 91 25 97/64 98 %     Wean Screen Pass (Yes or No): No     Wean Screen Reason for Failure: PEEP 14, FIO2 0.90, patient sedated     Duration of Weaning Trial: N/A     Additional Comments: Leak coming from emergency cric site      PLAN OF CARE:Vent support, Albuterol Q6, Brovana BID, Veletri 8mL/hr     GOAL: Oxygenation, ventilation, maintain airway     OUTCOME: Patient remains intubated on mechanical ventilation

## 2021-04-15 NOTE — PROGRESS NOTES
Peep increased to 12 from 10, FiO2 titrated down from 1.0 to 0.90. HR 95, SpO2 96, RR 23, /59. Will continue to monitor.

## 2021-04-15 NOTE — ROUTINE PROCESS
Trial with patient lying flat. After 1 minute. Patient respirations increased to 50 BPM with abdominal breathing. Sat decreased to 88-86%. Vent alarming. Head then raised back to 45  Degrees. Patient settled down with respirations back to 20-24 BPM. o2 sats at 90%. Dr Jenise Zuleta made aware.

## 2021-04-16 ENCOUNTER — APPOINTMENT (OUTPATIENT)
Dept: GENERAL RADIOLOGY | Age: 71
DRG: 004 | End: 2021-04-16
Attending: PHYSICIAN ASSISTANT
Payer: MEDICARE

## 2021-04-16 LAB
ALBUMIN SERPL-MCNC: 1.5 G/DL (ref 3.4–5)
ALBUMIN SERPL-MCNC: 1.8 G/DL (ref 3.4–5)
ANION GAP SERPL CALC-SCNC: 12 MMOL/L (ref 3–18)
ANION GAP SERPL CALC-SCNC: 9 MMOL/L (ref 3–18)
APTT PPP: 110.9 SEC (ref 23–36.4)
APTT PPP: 77.5 SEC (ref 23–36.4)
ARTERIAL PATENCY WRIST A: ABNORMAL
ARTERIAL PATENCY WRIST A: ABNORMAL
BASE EXCESS BLD CALC-SCNC: 7 MMOL/L
BASE EXCESS BLD CALC-SCNC: 7 MMOL/L
BASOPHILS # BLD: 0 K/UL (ref 0–0.1)
BASOPHILS NFR BLD: 0 % (ref 0–2)
BDY SITE: ABNORMAL
BDY SITE: ABNORMAL
BODY TEMPERATURE: 37.7
BUN SERPL-MCNC: 124 MG/DL (ref 7–18)
BUN SERPL-MCNC: 127 MG/DL (ref 7–18)
BUN/CREAT SERPL: 32 (ref 12–20)
BUN/CREAT SERPL: 32 (ref 12–20)
CALCIUM SERPL-MCNC: 7.6 MG/DL (ref 8.5–10.1)
CALCIUM SERPL-MCNC: 8.3 MG/DL (ref 8.5–10.1)
CHLORIDE SERPL-SCNC: 100 MMOL/L (ref 100–111)
CHLORIDE SERPL-SCNC: 100 MMOL/L (ref 100–111)
CO2 SERPL-SCNC: 29 MMOL/L (ref 21–32)
CO2 SERPL-SCNC: 31 MMOL/L (ref 21–32)
CREAT SERPL-MCNC: 3.91 MG/DL (ref 0.6–1.3)
CREAT SERPL-MCNC: 3.94 MG/DL (ref 0.6–1.3)
DIFFERENTIAL METHOD BLD: ABNORMAL
EOSINOPHIL # BLD: 0.3 K/UL (ref 0–0.4)
EOSINOPHIL NFR BLD: 2 % (ref 0–5)
ERYTHROCYTE [DISTWIDTH] IN BLOOD BY AUTOMATED COUNT: 15.5 % (ref 11.6–14.5)
GAS FLOW.O2 O2 DELIVERY SYS: ABNORMAL L/MIN
GAS FLOW.O2 O2 DELIVERY SYS: ABNORMAL L/MIN
GAS FLOW.O2 SETTING OXYMISER: 18 BPM
GAS FLOW.O2 SETTING OXYMISER: 18 BPM
GLUCOSE BLD STRIP.AUTO-MCNC: 135 MG/DL (ref 70–110)
GLUCOSE BLD STRIP.AUTO-MCNC: 136 MG/DL (ref 70–110)
GLUCOSE SERPL-MCNC: 119 MG/DL (ref 74–99)
GLUCOSE SERPL-MCNC: 124 MG/DL (ref 74–99)
HCO3 BLD-SCNC: 30.9 MMOL/L (ref 22–26)
HCO3 BLD-SCNC: 31.7 MMOL/L (ref 22–26)
HCT VFR BLD AUTO: 29.1 % (ref 36–48)
HGB BLD-MCNC: 9.2 G/DL (ref 13–16)
INSPIRATION.DURATION SETTING TIME VENT: 1.1 SEC
LYMPHOCYTES # BLD: 1.5 K/UL (ref 0.9–3.6)
LYMPHOCYTES NFR BLD: 9 % (ref 21–52)
MAGNESIUM SERPL-MCNC: 3.9 MG/DL (ref 1.6–2.6)
MCH RBC QN AUTO: 25.8 PG (ref 24–34)
MCHC RBC AUTO-ENTMCNC: 31.6 G/DL (ref 31–37)
MCV RBC AUTO: 81.7 FL (ref 74–97)
MONOCYTES # BLD: 0.8 K/UL (ref 0.05–1.2)
MONOCYTES NFR BLD: 5 % (ref 3–10)
NEUTS SEG # BLD: 13.6 K/UL (ref 1.8–8)
NEUTS SEG NFR BLD: 84 % (ref 40–73)
O2/TOTAL GAS SETTING VFR VENT: 85 %
O2/TOTAL GAS SETTING VFR VENT: 85 %
PCO2 BLD: 45.5 MMHG (ref 35–45)
PCO2 BLD: 52.8 MMHG (ref 35–45)
PEEP RESPIRATORY: 10 CMH2O
PEEP RESPIRATORY: 10 CMH2O
PH BLD: 7.39 [PH] (ref 7.35–7.45)
PH BLD: 7.44 [PH] (ref 7.35–7.45)
PHOSPHATE SERPL-MCNC: 8 MG/DL (ref 2.5–4.9)
PHOSPHATE SERPL-MCNC: 8.5 MG/DL (ref 2.5–4.9)
PLATELET # BLD AUTO: 327 K/UL (ref 135–420)
PLATELET COMMENTS,PCOM: ABNORMAL
PMV BLD AUTO: 12.7 FL (ref 9.2–11.8)
PO2 BLD: 46 MMHG (ref 80–100)
PO2 BLD: 65 MMHG (ref 80–100)
POTASSIUM SERPL-SCNC: 3.9 MMOL/L (ref 3.5–5.5)
POTASSIUM SERPL-SCNC: 4.2 MMOL/L (ref 3.5–5.5)
RBC # BLD AUTO: 3.56 M/UL (ref 4.35–5.65)
RBC MORPH BLD: ABNORMAL
SAO2 % BLD: 78 % (ref 92–97)
SAO2 % BLD: 93 % (ref 92–97)
SERVICE CMNT-IMP: ABNORMAL
SERVICE CMNT-IMP: ABNORMAL
SODIUM SERPL-SCNC: 140 MMOL/L (ref 136–145)
SODIUM SERPL-SCNC: 141 MMOL/L (ref 136–145)
SPECIMEN TYPE: ABNORMAL
SPECIMEN TYPE: ABNORMAL
TOTAL RESP. RATE, ITRR: 22
VENTILATION MODE VENT: ABNORMAL
VENTILATION MODE VENT: ABNORMAL
VT SETTING VENT: 500 ML
VT SETTING VENT: 500 ML
WBC # BLD AUTO: 16.2 K/UL (ref 4.6–13.2)

## 2021-04-16 PROCEDURE — 94640 AIRWAY INHALATION TREATMENT: CPT

## 2021-04-16 PROCEDURE — 74011000250 HC RX REV CODE- 250: Performed by: PHYSICIAN ASSISTANT

## 2021-04-16 PROCEDURE — 80069 RENAL FUNCTION PANEL: CPT

## 2021-04-16 PROCEDURE — 94668 MNPJ CHEST WALL SBSQ: CPT

## 2021-04-16 PROCEDURE — 36600 WITHDRAWAL OF ARTERIAL BLOOD: CPT

## 2021-04-16 PROCEDURE — 36415 COLL VENOUS BLD VENIPUNCTURE: CPT

## 2021-04-16 PROCEDURE — 82962 GLUCOSE BLOOD TEST: CPT

## 2021-04-16 PROCEDURE — APPNB15 APP NON BILLABLE TIME 0-15 MINS: Performed by: NURSE PRACTITIONER

## 2021-04-16 PROCEDURE — 74011250636 HC RX REV CODE- 250/636: Performed by: INTERNAL MEDICINE

## 2021-04-16 PROCEDURE — 71045 X-RAY EXAM CHEST 1 VIEW: CPT

## 2021-04-16 PROCEDURE — 65610000006 HC RM INTENSIVE CARE

## 2021-04-16 PROCEDURE — 74011000258 HC RX REV CODE- 258: Performed by: INTERNAL MEDICINE

## 2021-04-16 PROCEDURE — 74011250636 HC RX REV CODE- 250/636: Performed by: PHYSICIAN ASSISTANT

## 2021-04-16 PROCEDURE — 74011000250 HC RX REV CODE- 250: Performed by: STUDENT IN AN ORGANIZED HEALTH CARE EDUCATION/TRAINING PROGRAM

## 2021-04-16 PROCEDURE — 94645 CONT INHLJ TX EACH ADDL HOUR: CPT

## 2021-04-16 PROCEDURE — 99291 CRITICAL CARE FIRST HOUR: CPT | Performed by: INTERNAL MEDICINE

## 2021-04-16 PROCEDURE — 87186 SC STD MICRODIL/AGAR DIL: CPT

## 2021-04-16 PROCEDURE — 82803 BLOOD GASES ANY COMBINATION: CPT

## 2021-04-16 PROCEDURE — 85730 THROMBOPLASTIN TIME PARTIAL: CPT

## 2021-04-16 PROCEDURE — 87205 SMEAR GRAM STAIN: CPT

## 2021-04-16 PROCEDURE — 94003 VENT MGMT INPAT SUBQ DAY: CPT

## 2021-04-16 PROCEDURE — 74011000258 HC RX REV CODE- 258: Performed by: PHYSICIAN ASSISTANT

## 2021-04-16 PROCEDURE — 85025 COMPLETE CBC W/AUTO DIFF WBC: CPT

## 2021-04-16 PROCEDURE — 2709999900 HC NON-CHARGEABLE SUPPLY

## 2021-04-16 PROCEDURE — 94762 N-INVAS EAR/PLS OXIMTRY CONT: CPT

## 2021-04-16 PROCEDURE — 83735 ASSAY OF MAGNESIUM: CPT

## 2021-04-16 PROCEDURE — 87077 CULTURE AEROBIC IDENTIFY: CPT

## 2021-04-16 PROCEDURE — P9047 ALBUMIN (HUMAN), 25%, 50ML: HCPCS | Performed by: INTERNAL MEDICINE

## 2021-04-16 PROCEDURE — 99221 1ST HOSP IP/OBS SF/LOW 40: CPT | Performed by: NURSE PRACTITIONER

## 2021-04-16 PROCEDURE — 94669 MECHANICAL CHEST WALL OSCILL: CPT

## 2021-04-16 PROCEDURE — 74011000250 HC RX REV CODE- 250: Performed by: INTERNAL MEDICINE

## 2021-04-16 RX ORDER — ALBUMIN HUMAN 250 G/1000ML
25 SOLUTION INTRAVENOUS 3 TIMES DAILY
Status: COMPLETED | OUTPATIENT
Start: 2021-04-16 | End: 2021-04-16

## 2021-04-16 RX ADMIN — MIDAZOLAM 4 MG/HR: 5 INJECTION INTRAMUSCULAR; INTRAVENOUS at 00:11

## 2021-04-16 RX ADMIN — CHLORHEXIDINE GLUCONATE 0.12% ORAL RINSE 10 ML: 1.2 LIQUID ORAL at 22:38

## 2021-04-16 RX ADMIN — CHLORHEXIDINE GLUCONATE 0.12% ORAL RINSE 10 ML: 1.2 LIQUID ORAL at 08:37

## 2021-04-16 RX ADMIN — PIPERACILLIN AND TAZOBACTAM 2.25 G: 2; .25 INJECTION, POWDER, LYOPHILIZED, FOR SOLUTION INTRAVENOUS at 05:49

## 2021-04-16 RX ADMIN — EPOPROSTENOL 50 NG/KG/MIN: 1.5 INJECTION, POWDER, LYOPHILIZED, FOR SOLUTION INTRAVENOUS at 05:15

## 2021-04-16 RX ADMIN — EPOPROSTENOL 50 NG/KG/MIN: 1.5 INJECTION, POWDER, LYOPHILIZED, FOR SOLUTION INTRAVENOUS at 00:29

## 2021-04-16 RX ADMIN — ALBUMIN (HUMAN) 25 G: 0.25 INJECTION, SOLUTION INTRAVENOUS at 22:36

## 2021-04-16 RX ADMIN — PIPERACILLIN AND TAZOBACTAM 2.25 G: 2; .25 INJECTION, POWDER, LYOPHILIZED, FOR SOLUTION INTRAVENOUS at 18:21

## 2021-04-16 RX ADMIN — ALBUMIN (HUMAN) 25 G: 0.25 INJECTION, SOLUTION INTRAVENOUS at 16:09

## 2021-04-16 RX ADMIN — ALBUTEROL SULFATE 2.5 MG: 2.5 SOLUTION RESPIRATORY (INHALATION) at 07:53

## 2021-04-16 RX ADMIN — FENTANYL CITRATE 200 MCG/HR: 0.05 INJECTION, SOLUTION INTRAMUSCULAR; INTRAVENOUS at 14:52

## 2021-04-16 RX ADMIN — FENTANYL CITRATE 200 MCG/HR: 0.05 INJECTION, SOLUTION INTRAMUSCULAR; INTRAVENOUS at 00:33

## 2021-04-16 RX ADMIN — PIPERACILLIN AND TAZOBACTAM 2.25 G: 2; .25 INJECTION, POWDER, LYOPHILIZED, FOR SOLUTION INTRAVENOUS at 12:27

## 2021-04-16 RX ADMIN — FENTANYL CITRATE 200 MCG/HR: 0.05 INJECTION, SOLUTION INTRAMUSCULAR; INTRAVENOUS at 06:28

## 2021-04-16 RX ADMIN — ALBUTEROL SULFATE 2.5 MG: 2.5 SOLUTION RESPIRATORY (INHALATION) at 00:29

## 2021-04-16 RX ADMIN — FAMOTIDINE 20 MG: 10 INJECTION INTRAVENOUS at 08:37

## 2021-04-16 RX ADMIN — HEPARIN SODIUM 2010 UNITS/HR: 10000 INJECTION, SOLUTION INTRAVENOUS at 12:28

## 2021-04-16 RX ADMIN — ALBUTEROL SULFATE 2.5 MG: 2.5 SOLUTION RESPIRATORY (INHALATION) at 16:19

## 2021-04-16 RX ADMIN — FENTANYL CITRATE 200 MCG/HR: 0.05 INJECTION, SOLUTION INTRAMUSCULAR; INTRAVENOUS at 22:21

## 2021-04-16 RX ADMIN — ARFORMOTEROL TARTRATE 15 MCG: 15 SOLUTION RESPIRATORY (INHALATION) at 07:53

## 2021-04-16 RX ADMIN — ALBUMIN (HUMAN) 25 G: 0.25 INJECTION, SOLUTION INTRAVENOUS at 09:45

## 2021-04-16 RX ADMIN — ARFORMOTEROL TARTRATE 15 MCG: 15 SOLUTION RESPIRATORY (INHALATION) at 19:19

## 2021-04-16 RX ADMIN — MIDAZOLAM 2 MG: 1 INJECTION INTRAMUSCULAR; INTRAVENOUS at 05:59

## 2021-04-16 NOTE — PROGRESS NOTES
PCC Update: The patient's son, Elayne Alvarenga, and his sister, Mick Escalera, called the ICU requesting an update. I discussed with them that the patient remains critically ill and reiterated that he has a moderate burden of pulmonary emboli in his lungs that is also causing damage to his heart. He remains on heparin to degrade the clots. We also discussed that due to this he remains on high ventilatory support with both high FiO2 and high PEEP. We also suspect a bowel obstruction and are working on trials of laying flat to see if he can tolerate CT again. I informed him that his prognosis is guarded at this time and they should continue to have family discussions and also prepare themselves if he were to worsen, or not recover at all. They report understanding. Castro reported that there are other family members that have questions and wanted to speak with us. She requested a family meeting early next week and to have them phone in. I have reached out to Palliative Care who will help set up a family meeting for early next week.     Jose Jimenes MD PGY-1   [facility] Emergency Medicine      April 16, 2021, 1:30 PM   Pulmonary, Critical Care Medicine  [facility]

## 2021-04-16 NOTE — CONSULTS
[facility]: [phone])  [facility]: [address] [place] [address] Way: [phone]    Patient Name: Louis Hamm  YOB: 1950    Date of Consult : 4/16/21  Reason for Consult: establish goals of care  Requesting Provider: Reza Byrd MD  Primary Care Physician: DUSTIN Hassan      SUMMARY:   Louis Hamm is a 79 y.o. male with a past history of HTN, DM2, Diabetic neuropathy, Obesity , who was admitted on 4/4/2021 from home with a diagnosis of acute respiratory failure and angioedema. Current medical issues leading to Palliative Medicine involvement include: establish goals of care    CHIEF COMPLAINT: intubated and mechanically ventilated     HPI/SUBJECTIVE:    Pt is a 79Year old AAM that presented to the ED with angioedema due to ACE inhibitor. Pt had emergent acute respiratory failure and collapse and was intubated and placed on a ventilator for airway protection. Found later to have a large pulmonary emboli in his lungs that also caused damage to his heart. He cardiac arrested on 4/4/21. He is currently on Day 13 of the ventilator     The patient is:   [] Verbal and participatory  [x] Non-participatory due to: Intubated and sedated     GOALS OF CARE:  Patient/Health Care Proxy Stated Goals: Prolong life      TREATMENT PREFERENCES:   Code Status: Full Code         PALLIATIVE DIAGNOSES:   1. Encounter for Palliative Medicine  2. Goals of care/ACP  3. Acute respiratory failure  4. PE       PLAN:   1. Encounter for palliative Medicine  Pt with a long term life limiting chronic and acute disease process that warrants discussions about her goals of care. 2.  Goals of care  This NP in to see patient at the bedside. He is not able to interact due to intubation and sedation.  Have reached out to his family and spoken to his son Becca Kaur and sister Tiffany Dickerson to set up an appointment for Monday morning 4/19 at 10am will appreciate assistance from the ICU team.   At this time patient remains a FULL CODE with FULL INTERVENTIONS  3. Acute respiratory failure  Patient supported on ventilator and intubated now for 13 days. Fi02 is 85% with PEEP 12 Prognosis remains guarded due to high ventilator settings. Plan is to wean support as tolerated but not yet stable and does not meet criteria for SBT. 4.   Pulmonary Embolism  Per the CT scan Moderate burden of acute pulmonary embolism in the left lower lobe with additional small acute segmental pulmonary embolism in the right upper lobe. Moderate burden in the lungs likely causing damage to the heart. On Heparin to degrade the clots. 5.   Initial consult note routed to primary continuity provider  6. Communicated plan of care with: Palliative IDT      Advance Care Planning:  [] The [facility] Interdisciplinary Team has updated the ACP Navigator with [address] and Patient Capacity    Primary Decision [facility] ([address]): Gianni Aviles Citizen is DEV[facility]     Medical Interventions: Full interventions   Other Instructions:         As far as possible, the palliative care team has discussed with patient / health care proxy about goals of care / treatment preferences for patient.          HISTORY:     History obtained from: Chart review   Active Problems:    Angioedema due to angiotensin converting enzyme inhibitor (ACE-I) (4/4/2021)      Respiratory failure ([address] (4/5/2021)      JACQUELYN (acute kidney injury) ([address] (4/4/2021)      Cardiac arrest ([address] (4/4/2021)      Obesity (BMI 30-39.9) (4/5/2021)      Diabetic neuropathy associated with type 2 diabetes mellitus ([address] (4/5/2021)      Diabetic retinopathy associated with type 2 diabetes mellitus ([address] (4/5/2021)      Pulmonary infiltrates (4/5/2021)      Controlled type 2 diabetes mellitus with neurologic complication, without long-term current use of insulin ([address] (4/5/2021)      DVT (deep venous thrombosis) ([facility] (4/10/2021)      Past Medical History:   Diagnosis Date    DDD (degenerative disc disease), lumbar     Diabetes ([facility]     Diabetic neuropathy ([place] [address]     Diabetic retinopathy ([place] [address]     DM2 (diabetes mellitus, type 2) ([place] [address]     HLD (hyperlipidemia)     HTN (hypertension)     Obesity (BMI 30-39. 9)       History reviewed. No pertinent surgical history. History reviewed. No pertinent family history. History reviewed, no pertinent family history.   Social History     Tobacco Use    Smoking status: Current Every Day Smoker     Packs/day: 0.50   Substance Use Topics    Alcohol use: Not on file     Allergies   Allergen Reactions    Lisinopril Angioedema      Current Facility-Administered Medications   Medication Dose Route Frequency    albumin human 25% (BUMINATE) solution 25 g  25 g IntraVENous TID    albuterol (PROVENTIL VENTOLIN) nebulizer solution 2.5 mg  2.5 mg Nebulization Q8H RT    piperacillin-tazobactam (ZOSYN) 2.25 g in 0.9% sodium chloride (MBP/ADV) 50 mL MBP  2.25 g IntraVENous Q6H    [Held by provider] bumetanide (BUMEX) 12.5 mg in 0.9% sodium chloride 100 mL infusion  0.25 mg/hr IntraVENous CONTINUOUS    fentaNYL (PF) 1,500 mcg/30 mL (50 mcg/mL) infusion  0-200 mcg/hr IntraVENous TITRATE    acetaminophen (TYLENOL) solution 500 mg  500 mg Oral Q4H PRN    fentaNYL citrate (PF) injection 100 mcg  100 mcg IntraVENous Q30MIN PRN    oxymetazoline (AFRIN) 0.05 % nasal spray 2 Spray  2 Spray Other BID PRN    famotidine (PF) (PEPCID) 20 mg in 0.9% sodium chloride 10 mL injection  20 mg IntraVENous DAILY    ELECTROLYTE REPLACEMENT PROTOCOL - Potassium Renal Dosing  1 Each Other PRN    heparin 25,000 units in D5W 250 ml infusion  12-25 Units/kg/hr IntraVENous TITRATE    arformoteroL (BROVANA) neb solution 15 mcg  15 mcg Nebulization BID RT    ELECTROLYTE REPLACEMENT PROTOCOL - Phosphorus  Standard Dosing  1 Each Other PRN    multivit-folic acid-herbal 581 (WELLESSE PLUS) oral liquid 30 mL 30 mL Per NG tube DAILY    chlorhexidine (PERIDEX) 0.12 % mouthwash 10 mL  10 mL Oral Q12H    midazolam (VERSED) injection 1-2 mg  1-2 mg IntraVENous Q10MIN PRN    albuterol-ipratropium (DUO-NEB) 2.5 MG-0.5 MG/3 ML  3 mL Nebulization Q4H PRN    glucose chewable tablet 16 g  4 Tab Oral PRN    glucagon (GLUCAGEN) injection 1 mg  1 mg IntraMUSCular PRN    dextrose (D50W) injection syrg 12.5-25 g  25-50 mL IntraVENous PRN    thrombin (bovine) (THROMBIN-JMI) 5,000 unit topical solution    PRN    lidocaine-EPINEPHrine (PF) (XYLOCAINE) 2 %-1:200,000 injection    PRN    insulin lispro (HUMALOG) injection   SubCUTAneous Q6H    carboxymethylcellulose sodium (REFRESH LIQUIGEL) 1 % ophthalmic solution 1 Drop  1 Drop Both Eyes PRN          Clinical Pain Assessment (nonverbal scale for nonverbal patients): Activity (Movement): Lying quietly, normal position    Duration: for how long has pt been experiencing pain (e.g., 2 days, 1 month, years)  Frequency: how often pain is an issue (e.g., several times per day, once every few days, constant)     FUNCTIONAL ASSESSMENT:     Palliative Performance Scale (PPS):  PPS: 10    ECOG  ECOG Status : Completely disabled     PSYCHOSOCIAL/SPIRITUAL SCREENING:      Any spiritual / Rastafari concerns:  [] Yes /  [x] No    Caregiver Burnout:  [] Yes /  [] No /  [x] No Caregiver Present      Anticipatory grief assessment:   [x] Normal  / [] Maladaptive        REVIEW OF SYSTEMS:     Systems: constitutional, ears/nose/mouth/throat, respiratory, gastrointestinal, genitourinary, musculoskeletal, integumentary, neurologic, psychiatric, endocrine. Positive findings noted below. Modified ESAS Completed by: provider                       Dyspnea: 5           Stool Occurrence(s): 0   Positive and pertinent negative findings in ROS are noted above in HPI. The following systems were [x] reviewed / [] unable to be reviewed as noted in HPI  Other findings are noted below.      PHYSICAL EXAM: Constitutional: sedated   Eyes: closed   ENMT: no nasal discharge, moist mucous membranes  Cardiovascular: regular rhythm, distal pulses intact  Respiratory: supported on a ventilator and intubated   Musculoskeletal: no deformity, no tenderness to palpation  Skin: warm, dry  Neurologic: not following commands, or moving all extremities    Other: Wt Readings from Last 3 Encounters:   04/13/21 115.9 kg (255 lb 8.2 oz)   04/04/21 127 kg (280 lb)     Blood pressure 110/62, pulse 85, temperature (!) 100.8 °F (38.2 °C), resp. rate 20, height 5' 11\" (1.803 m), weight 115.9 kg (255 lb 8.2 oz), SpO2 96 %. Pain:  Pain Scale 1: Adult Nonverbal Pain Scale  Pain Intensity 1: 0                      LAB AND IMAGING FINDINGS:     Lab Results   Component Value Date/Time    WBC 16.2 (H) 04/16/2021 04:50 AM    HGB 9.2 (L) 04/16/2021 04:50 AM    PLATELET 886 18/41/4773 04:50 AM     Lab Results   Component Value Date/Time    Sodium 141 04/16/2021 04:50 AM    Potassium 4.2 04/16/2021 04:50 AM    Chloride 100 04/16/2021 04:50 AM    CO2 29 04/16/2021 04:50 AM     (H) 04/16/2021 04:50 AM    Creatinine 3.91 (H) 04/16/2021 04:50 AM    Calcium 7.6 (L) 04/16/2021 04:50 AM    Magnesium 3.9 (H) 04/16/2021 04:50 AM    Phosphorus 8.5 (H) 04/16/2021 04:50 AM      Lab Results   Component Value Date/Time    Alk.  phosphatase 86 04/15/2021 09:40 AM    Protein, total 6.2 (L) 04/15/2021 09:40 AM    Albumin 1.5 (L) 04/16/2021 04:50 AM    Globulin 4.7 (H) 04/15/2021 09:40 AM     Lab Results   Component Value Date/Time    INR 1.4 (H) 04/12/2021 11:54 PM    Prothrombin time 17.2 (H) 04/12/2021 11:54 PM    aPTT 77.5 (H) 04/16/2021 10:04 AM      No results found for: IRON, FE, TIBC, IBCT, PSAT, FERR   Lab Results   Component Value Date/Time    pH 7.28 (L) 04/04/2021 01:55 PM    PCO2 49 (H) 04/04/2021 01:55 PM    PO2 103 (H) 04/04/2021 01:55 PM     No components found for: [place]   Lab Results   Component Value Date/Time     04/14/2021 03:27 AM CK - MB <1.0 04/14/2021 03:27 AM              Total time: 30 minutes   Counseling / coordination time, spent as noted above:   > 50% counseling / coordination:  Time spent in direct consultation with the patient, medical team, and family     Prolonged service was provided for  []30 min   []75 min in face to face time in the presence of the patient, spent as noted above.   Time Start:   Time End:

## 2021-04-16 NOTE — PROGRESS NOTES
[address] [facility] [phone]  [facility] [phone]      Family Meeting: This writer phoned patient's son Vanda Magana) and patient's sister Baldomero Calle) to arrange a family meeting for Monday. Son and sister both agree that 10:00AM, Monday April 19th, will be a good time for them to come in for the meeting. Meeting is set. The Palliative Care team and the medical team will meet with patient's family, at that time. At this time, patient remains a FULL CODE WITH FULL INTERVENTIONS. Recommendations: The Palliative Care team will continue to offer support to patient and his family; while he remains hospitalized. Grace Costa., MSW  Palliative Care   CG#[phone]

## 2021-04-16 NOTE — ROUTINE PROCESS
Bedside and Verbal shift change report given to Kenan He RN (oncoming nurse) by Javy Garduno RN (offgoing nurse). Report included the following information SBAR, Intake/Output, MAR, Recent Results, Cardiac Rhythm SR and Quality Measures.

## 2021-04-16 NOTE — PROGRESS NOTES
In Patient Progress note      Admit Date: 4/4/2021    Impression:     #1 Acute kidney injury, etiology appears to be secondary to Ischaemic ATN d/t hypoperfusion v/s cardiorenal syndrome in  setting of CHF/V failure in setting of PE  #2 acute respiratory failure secondary to rapidly progressive angioedema s/p emergent cricothyrotomy. Presently intubated on the vent,  severely hypoxic out of proportion to x-ray findings, therefore PE has been considered , Rx for presumed PE  #3 pulmonary infiltrates/Pulm edema   #4 left-sided pneumothorax, appears resolved  #5 diabetes mellitus with complications  #6 popliteal DVT left  #7 possible colonic obst , awaiting CT abd      Plan:     1) holding Bumex , add albumin and allow volume status to  equilibrate and renal functions stabilize   2) strict I/o  3) follow renal parameters , electrolytes q12hrs   4) keep MAP > 65  5) noted dobutamine d/jennifer     Please call with questions,     Angela Pelletier MD FASN  Cell [phone]  Pager: [phone]  Subjective:     - No acute over night events. - respiratory - stable  - hemodynamics - stable, no pressrs  - UOP-good  - Nutrition -ok    Objective:     Visit Vitals  /60   Pulse 89   Temp 100.4 °F (38 °C)   Resp 22   Ht 5' 11\" (1.803 m)   Wt 115.9 kg (255 lb 8.2 oz)   SpO2 93%   BMI 35.64 kg/m²         Intake/Output Summary (Last 24 hours) at 4/16/2021 1153  Last data filed at 4/16/2021 1058  Gross per 24 hour   Intake 1352.55 ml   Output 3900 ml   Net -2547.45 ml       Physical Exam:        Intubated/ on vent   HEENT: mmm  Lungs: good air entry, coarse BS +  Cardiovascular system: S1, S2, regular rate and rhythm. Abdomen: soft, non tender, non distended. Positive bowel sounds. Extremities: no clubbing, cyanosis or edema. Integumentary: skin is grossly intact.        Data Review:    Recent Labs     04/16/21  0450   WBC 16.2*   RBC 3.56*   HCT 29.1*   MCV 81.7   MCH 25.8   MCHC 31.6   RDW 15.5*     Recent Labs 04/16/21  0450 04/15/21  1327 04/15/21  0940 04/15/21  0057 04/14/21  1716 04/14/21  0327 04/13/21  1550   * 110*  --  99* 100* 94* 87*   CREA 3.91* 3.60*  --  3.15* 2.94* 2.62* 2.41*   CA 7.6* 7.6*  --  8.2* 7.7* 7.5* 8.0*   ALB 1.5* 1.5* 1.5* 1.5* 1.6* 1.6* 1.6*   K 4.2 4.4  --  4.4 4.7 4.7 4.3    141  --  142 144 146* 142    102  --  103 107 108 110   CO2 29 29  --  29 27 28 29   PHOS 8.5* 7.0*  --  6.0* 5.4* 4.2 3.7   * 151*  --  135* 146* 107* 156*       Jung Obregon MD

## 2021-04-16 NOTE — PROGRESS NOTES
1930: Assumed care of patient after report with Roseann Mcginnis RN on orientation. Drips verified. Orders reviewed. 0730: Bedside and Verbal shift change report given to Shabnam Baez RN (oncoming nurse) by Deep Ramirez RN (offgoing nurse). Report included the following information SBAR, Kardex, MAR and Recent Results. SITUATION:    Code Status: Full Code   Reason for Admission: Angioedema due to angiotensin converting enzyme inhibitor (ACE-I) [T78. [facility] day: 15   Problem List:       Hospital Problems  Date Reviewed: 4/5/2021          Codes Class Noted POA    DVT (deep venous thrombosis) ([facility] ICD-10-CM: I82.409  ICD-9-CM: 453.40  4/10/2021 Unknown        Respiratory failure ([facility] ICD-10-CM: J96.90  ICD-9-CM: 518.81  4/5/2021 Unknown        Obesity (BMI 30-39. 9) ICD-10-CM: E66.9  ICD-9-CM: 278.00  4/5/2021 Unknown        Diabetic neuropathy associated with type 2 diabetes mellitus ([facility] ICD-10-CM: E11.40  ICD-9-CM: 250.60, 357.2  4/5/2021 Unknown        Diabetic retinopathy associated with type 2 diabetes mellitus ([facility] ICD-10-CM: E11.319  ICD-9-CM: 250.50, 362.01  4/5/2021 Unknown        Pulmonary infiltrates ICD-10-CM: R91.8  ICD-9-CM: 793.19  4/5/2021 Unknown        Controlled type 2 diabetes mellitus with neurologic complication, without long-term current use of insulin ([facility] ICD-10-CM: E11.49  ICD-9-CM: 250.60  4/5/2021 Unknown        Angioedema due to angiotensin converting enzyme inhibitor (ACE-I) ICD-10-CM: T78. Kamranzina Deleon, [id number]  ICD-9-CM: 995.1, E942.6  4/4/2021 Unknown        JACQUELYN (acute kidney injury) ([facility] ICD-10-CM: N17.9  ICD-9-CM: 584.9  4/4/2021 Unknown        Cardiac arrest [facility]) ICD-10-CM: I46.9  ICD-9-CM: 427.5  4/4/2021 Unknown              BACKGROUND:    Past Medical History:   Past Medical History:   Diagnosis Date    DDD (degenerative disc disease), lumbar     Diabetes ([facility]     Diabetic neuropathy ([facility]     Diabetic retinopathy ([facility]     DM2 (diabetes mellitus, type 2) [facility]     HLD (hyperlipidemia)     HTN (hypertension)     Obesity (BMI 30-39. 9)          Patient taking anticoagulants yes     ASSESSMENT:    Changes in Assessment Throughout Shift: Desaturates easily with stimulation with respirations in 30's. Able to wean versed down to 2mg/hr. Still with air leak via neck incision site but able to hold tidal volumes on ventilator.       Patient has Central Line: no Reasons if yes: n/a   Patient has Garcia Cath: yes Reasons if yes: I&O      Last Vitals:     Vitals:    04/16/21 0457 04/16/21 0500 04/16/21 0600 04/16/21 0700   BP:  123/80 (!) 105/52 (!) 106/58   Pulse: 86 88 83 83   Resp: 23 (!) 34 21 21   Temp:  99.7 °F (37.6 °C) 99.9 °F (37.7 °C) 99.7 °F (37.6 °C)   SpO2: 96% 91% 94% 91%   Weight:       Height:            IV and DRAINS (will only show if present)   [REMOVED] Airway - Endotracheal Tube 04/04/21 Oral-Site Assessment: Clean, dry, & intact  [REMOVED] Peripheral IV 04/12/21 Left Antecubital-Site Assessment: Clean, dry, & intact  Peripheral IV 04/13/21 Right;Upper Forearm-Site Assessment: Clean, dry, & intact  [REMOVED] Peripheral IV 04/04/21 Left Antecubital-Site Assessment: Clean, dry, & intact  Peripheral IV 04/04/21 Anterior;Right Wrist-Site Assessment: Clean, dry, & intact  Peripheral IV 04/04/21 Left;Posterior Hand-Site Assessment: Clean, dry, & intact  [REMOVED] Airway - Continuous Aspiration of Subglottic Secretions (GENESIS) Tube 04/04/21 Oral-Site Assessment: Clean, dry, & intact  Orogastric Tube 04/05/21-Site Assessment: Clean, dry, & intact  Airway - Continuous Aspiration of Subglottic Secretions (GENESIS) Tube 04/07/21 Oral-Site Assessment: Clean, dry, & intact     WOUND (if present)   Wound Type:  none   Dressing present Dressing Present : Intact, not due to be changed   Wound Concerns/Notes:  none     PAIN    Pain Assessment    Pain Intensity 1: 0 (04/16/21 0400)              Patient Stated Pain Goal: Unable to verbalize/indicate pain  o Interventions for Pain:  none  o Intervention effective: n/a  o Time of last intervention: n/a   o Reassessment Completed: n/a      Last 3 Weights:  Last 3 Recorded Weights in this Encounter    04/10/21 2057 04/12/21 0553 04/13/21 0500   Weight: 114.5 kg (252 lb 6.8 oz) 116 kg (255 lb 11.7 oz) 115.9 kg (255 lb 8.2 oz)     Weight change:      INTAKE/OUPUT    Current Shift: No intake/output data recorded. Last three shifts: 04/14 1901 - 04/16 0700  In: 2198.8 [I.V.:2068.8]  Out: 5925 [Urine:5825]     LAB RESULTS     Recent Labs     04/16/21  0450 04/15/21  0057 04/14/21  1003   WBC 16.2* 17.3* 15.9*   HGB 9.2* 10.0* 9.3*   HCT 29.1* 32.3* 29.6*    243 188        Recent Labs     04/16/21  0450 04/15/21  1327 04/15/21  0057 04/14/21  0327 04/14/21  0327    141 142   < > 146*   K 4.2 4.4 4.4   < > 4.7   * 151* 135*   < > 107*   * 110* 99*   < > 94*   CREA 3.91* 3.60* 3.15*   < > 2.62*   CA 7.6* 7.6* 8.2*   < > 7.5*   MG 3.9*  --  3.4*  --  3.3*    < > = values in this interval not displayed. RECOMMENDATIONS AND DISCHARGE PLANNING     1. Pending tests/procedures/ Plan of Care or Other Needs: Wean sedation as tolerated. 2. Discharge plan for patient and Needs/Barriers: none identified at this time    3. Estimated Discharge Date: unknown. Posted on Whiteboard in Patients Room: not at this time      4. The patient's care plan was reviewed with the oncoming nurse. \"HEALS\" SAFETY CHECK      Fall Risk    Total Score: 2    Safety Measures: Safety Measures: Side rails X 3, Bed/Chair alarm on, Bed in low position    A safety check occurred in the patient's room between off going nurse and oncoming nurse listed above.     The safety check included the below items  Area Items   H  High Alert Medications - Verify all high alert medication drips (heparin, PCA, etc.)   E  Equipment - Suction is set up for ALL patients (with yanker)  - Red plugs utilized for all equipment (IV pumps, etc.)  - WOWs wiped down at end of shift.  - Room stocked with oxygen, suction, and other unit-specific supplies   A  Alarms - Bed alarm is set for fall risk patients  - Ensure chair alarm is in place and activated if patient is up in a chair   L  Lines - Check IV for any infiltration  - Garcia bag is empty if patient has a Garcia   - Tubing and IV bags are labeled   S  Safety   - Room is clean, patient is clean, and equipment is clean. - Hallways are clear from equipment besides carts. - Fall bracelet on for fall risk patients  - Ensure room is clear and free of clutter  - Suction is set up for ALL patients (with yanker)  - Hallways are clear from equipment besides carts.    - Isolation precautions followed, supplies available outside room, sign posted     Deep Ramirez RN

## 2021-04-16 NOTE — PROGRESS NOTES
Discharge planning    Reviewed chart. Patient remains intubated and in soft wrist restraints. CM will continue to monitor and assist with transitional needs.     MARGUERITE Ruffin, RN  Pager # [phone]  Care Manager

## 2021-04-16 NOTE — PROGRESS NOTES
Nutrition Note    Patient receiving tube feeding at goal then held due to increased abdominal distension at 01:30 on 4/14 and OGT placed to low intermittent suction with 300 mL output and KUB showing increased colonic distension up to 8 cm with concern for early colonic obstruction with plan for abdominal CT once stable and respiratory status improves/able to tolerate lying flat. Bumex drip held today and albumin ordered with good urine output and plan to hold off on initiating parenteral nutrition support (day three of holding enteral feeds today) and family discussion on goals of care planned. Recommendations/Plan   - Monitor ability to tolerate enteral feeding versus need for parenteral nutrition support.      Electronically signed by Alexander Toussaint RD, [address]  on 4/16/2021 at 2:06 PM    Contact: [phone]

## 2021-04-16 NOTE — PROGRESS NOTES
Problem: Ventilator Management  Goal: *Adequate oxygenation and ventilation  Outcome: Not Progressing Towards Goal  Goal: *Patient maintains clear airway/free of aspiration  Outcome: Not Progressing Towards Goal  Goal: *Absence of infection signs and symptoms  Outcome: Not Progressing Towards Goal  Goal: *Normal spontaneous ventilation  Outcome: Not Progressing Towards Goal     Problem: Patient Education: Go to Patient Education Activity  Goal: Patient/Family Education  Outcome: Not Progressing Towards Goal     Problem: Diabetes Self-Management  Goal: *Disease process and treatment process  Description: Define diabetes and identify own type of diabetes; list 3 options for treating diabetes. Outcome: Not Progressing Towards Goal  Goal: *Incorporating nutritional management into lifestyle  Description: Describe effect of type, amount and timing of food on blood glucose; list 3 methods for planning meals. Outcome: Not Progressing Towards Goal  Goal: *Incorporating physical activity into lifestyle  Description: State effect of exercise on blood glucose levels. Outcome: Not Progressing Towards Goal  Goal: *Developing strategies to promote health/change behavior  Description: Define the ABC's of diabetes; identify appropriate screenings, schedule and personal plan for screenings. Outcome: Not Progressing Towards Goal  Goal: *Using medications safely  Description: State effect of diabetes medications on diabetes; name diabetes medication taking, action and side effects. Outcome: Not Progressing Towards Goal  Goal: *Monitoring blood glucose, interpreting and using results  Description: Identify recommended blood glucose targets  and personal targets. Outcome: Not Progressing Towards Goal  Goal: *Prevention, detection, treatment of acute complications  Description: List symptoms of hyper- and hypoglycemia; describe how to treat low blood sugar and actions for lowering  high blood glucose level.   Outcome: Not Progressing Towards Goal  Goal: *Prevention, detection and treatment of chronic complications  Description: Define the natural course of diabetes and describe the relationship of blood glucose levels to long term complications of diabetes. Outcome: Not Progressing Towards Goal  Goal: *Developing strategies to address psychosocial issues  Description: Describe feelings about living with diabetes; identify support needed and support network  Outcome: Not Progressing Towards Goal  Goal: *Sick day guidelines  Outcome: Not Progressing Towards Goal     Problem: Patient Education: Go to Patient Education Activity  Goal: Patient/Family Education  Outcome: Not Progressing Towards Goal     Problem: Falls - Risk of  Goal: *Absence of Falls  Description: Document [place] Gum Fall Risk and appropriate interventions in the flowsheet. Outcome: Not Progressing Towards Goal  Note: Fall Risk Interventions:       Mentation Interventions: More frequent rounding, Evaluate medications/consider consulting pharmacy, Bed/chair exit alarm, Update white board    Medication Interventions: Evaluate medications/consider consulting pharmacy, Bed/chair exit alarm    Elimination Interventions: Bed/chair exit alarm              Problem: Patient Education: Go to Patient Education Activity  Goal: Patient/Family Education  Outcome: Not Progressing Towards Goal     Problem: Pressure Injury - Risk of  Goal: *Prevention of pressure injury  Description: Document Lang Scale and appropriate interventions in the flowsheet. Outcome: Not Progressing Towards Goal  Note: Pressure Injury Interventions:  Sensory Interventions: Assess changes in LOC, Avoid rigorous massage over bony prominences, Float heels, Keep linens dry and wrinkle-free, Minimize linen layers, Monitor skin under medical devices, Pressure redistribution bed/mattress (bed type), Turn and reposition approx.  every two hours (pillows and wedges if needed), Use 30-degree side-lying position    Moisture Interventions: Absorbent underpads, Apply protective barrier, creams and emollients, Internal/External urinary devices, Minimize layers    Activity Interventions: Pressure redistribution bed/mattress(bed type)    Mobility Interventions: HOB 30 degrees or less, Assess need for specialty bed, Suspension boots, Pressure redistribution bed/mattress (bed type)    Nutrition Interventions: Document food/fluid/supplement intake    Friction and Shear Interventions: Feet elevated on foot rest, Foam dressings/transparent film/skin sealants, HOB 30 degrees or less, Minimize layers, Transfer aides:transfer board/George lift/ceiling lift                Problem: Patient Education: Go to Patient Education Activity  Goal: Patient/Family Education  Outcome: Not Progressing Towards Goal     Problem: Nutrition Deficit  Goal: *Optimize nutritional status  Outcome: Not Progressing Towards Goal     Problem: Injury - Risk of, Adverse Drug Event  Goal: *Absence of adverse drug events  Outcome: Not Progressing Towards Goal  Goal: *Absence of medication errors  Outcome: Not Progressing Towards Goal  Goal: *Knowledge of prescribed medications  Outcome: Not Progressing Towards Goal     Problem: Patient Education: Go to Patient Education Activity  Goal: Patient/Family Education  Outcome: Not Progressing Towards Goal     Problem: Pain  Goal: *Control of Pain  Outcome: Not Progressing Towards Goal  Goal: *PALLIATIVE CARE:  Alleviation of Pain  Outcome: Not Progressing Towards Goal     Problem: Patient Education: Go to Patient Education Activity  Goal: Patient/Family Education  Outcome: Not Progressing Towards Goal     Problem: Delirium Treatment  Goal: *Level of consciousness restored to baseline  Outcome: Not Progressing Towards Goal  Goal: *Level of environmental perceptions restored to baseline  Outcome: Not Progressing Towards Goal  Goal: *Sensory perception restored to baseline  Outcome: Not Progressing Towards Goal  Goal: *Emotional stability restored to baseline  Outcome: Not Progressing Towards Goal  Goal: *Functional assessment restored to baseline  Outcome: Not Progressing Towards Goal  Goal: *Absence of falls  Outcome: Not Progressing Towards Goal  Goal: *Will remain free of delirium, CAM Score negative  Outcome: Not Progressing Towards Goal  Goal: *Cognitive status will be restored to baseline  Outcome: Not Progressing Towards Goal  Goal: Interventions  Outcome: Not Progressing Towards Goal     Problem: Patient Education: Go to Patient Education Activity  Goal: Patient/Family Education  Outcome: Not Progressing Towards Goal     Problem: Falls - Risk of  Goal: *Absence of Falls  Description: Document [place] Fall Risk and appropriate interventions in the flowsheet. Outcome: Not Progressing Towards Goal  Note: Fall Risk Interventions:       Mentation Interventions: More frequent rounding, Evaluate medications/consider consulting pharmacy, Bed/chair exit alarm, Update white board    Medication Interventions: Evaluate medications/consider consulting pharmacy, Bed/chair exit alarm    Elimination Interventions: Bed/chair exit alarm              Problem: Pressure Injury - Risk of  Goal: *Prevention of pressure injury  Description: Document Lang Scale and appropriate interventions in the flowsheet. Outcome: Not Progressing Towards Goal  Note: Pressure Injury Interventions:  Sensory Interventions: Assess changes in LOC, Avoid rigorous massage over bony prominences, Float heels, Keep linens dry and wrinkle-free, Minimize linen layers, Monitor skin under medical devices, Pressure redistribution bed/mattress (bed type), Turn and reposition approx.  every two hours (pillows and wedges if needed), Use 30-degree side-lying position    Moisture Interventions: Absorbent underpads, Apply protective barrier, creams and emollients, Internal/External urinary devices, Minimize layers    Activity Interventions: Pressure redistribution bed/mattress(bed type)    Mobility Interventions: HOB 30 degrees or less, Assess need for specialty bed, Suspension boots, Pressure redistribution bed/mattress (bed type)    Nutrition Interventions: Document food/fluid/supplement intake    Friction and Shear Interventions: Feet elevated on foot rest, Foam dressings/transparent film/skin sealants, HOB 30 degrees or less, Minimize layers, Transfer aides:transfer board/George lift/ceiling lift                Problem: Nutrition Deficit  Goal: *Optimize nutritional status  Outcome: Not Progressing Towards Goal     Problem: Injury - Risk of, Adverse Drug Event  Goal: *Absence of adverse drug events  Outcome: Not Progressing Towards Goal     Problem: Pain  Goal: *Control of Pain  Outcome: Not Progressing Towards Goal

## 2021-04-16 NOTE — PROGRESS NOTES
Attempted to check weight and placed patient flat on bed but kept coughing with increasing secretions and desaturation at 80s despite the prior PRN Versed IV given.

## 2021-04-16 NOTE — PROGRESS NOTES
Lung volumes greatly decreased from air escape through cricoid opening. No real cough or gag with sx at this time. Pt found on high peep, Is pneumo resolved?        04/15/21 1953   Patient Observations   Pulse (Heart Rate) 86   Resp Rate 23   O2 Sat (%) 98 %   Airway - Continuous Aspiration of Subglottic Secretions (GENESIS) Tube 04/07/21 Oral   Placement Date/Time: 04/07/21 1030   Number of Attempts: 1  Inserted By: Mireille Jaramillo CRNA  Present on Admission/Arrival: No  Location: Oral  Placement Verified: Auscultation;BBS;Chest x-ray;EtCO2;Placement not verified (comment)  Airway Types: Endotr. .. Insertion Depth (cm) 27 cm   Line Christian Lips   Side Secured Centered   Cuff Pressure   (30)   Site Assessment Clean, dry, & intact   Suction on Yes   Amt Secretions Aspirated (mL) 0 mL   Respiratory   Respiratory (WDL) X   Patient on Vent Yes - If patient is on vent, add Doc Flowsheet Ventilator (). Respiratory Pattern Regular   Chest/Tracheal Assessment Chest expansion, symmetrical   Cough   (no)   Airway Clearance   Suction ET Tube;Oral   Suction Device Heather; Inline suction catheter   Sputum Method Obtained Endotracheal   Sputum Amount Moderate   Sputum Color/Odor White   Sputum Consistency Thin   Ventilator Initiate/Discontinue   Bio-Med ID # [id number]   Vent Settings   FIO2 (%) 85 %   SpO2/FIO2 Ratio 115.29   CMV Rate Set 18   Back-Up Rate 18   Vt Set (ml) 500 ml   PEEP/VENT (cm H2O) 14 cm H20   Insp Time (sec) 1.1 sec   Insp Rise Time % 35 %   Flow Trigger 2   Ventilator Measurements   Resp Rate Observed 23   Vt Spont (ml) 276 ml   Ve Observed (l/min) 6.82 l/min   PIP Observed (cm H2O) 26 cm H2O   MAP (cm H2O) 19   I:E Ratio Actual 1:1.1   Safety & Alarms   Circuit Temperature 97.7 °F (36.5 °C)   Backup Mode Checked/Apnea Yes   Pressure Max 45 cm H2O   Pressure Min 20 cm H2O   Ve Min 2   Ve Max 20   Vt Min 200 ml   Vt Max 1000 ml   RR Max 40   Ambu Bag Yes   Ambu Mask Yes   ETCO2/TCM Min 20 mmHg   ETCO2/TCM Max 50 mmHg   Weaning Parameters   Spontaneous Breathing Trial Complete No (Comments)  (high peep)   Age Specific Ventilator Associated Pneumonia Bundle   Patient Age Group Adult   Adult Ventilator Associated Pneumonia Bundle   Elevation of Head to 30-45 Degrees (Unless Contraindicated) Yes   Assessment of Readiness to Extubate Yes   Vent Method/Mode   Ventilation Method Conventional   Ventilator Mode Assist control;VC+   Pulmonary Toilet   Pulmonary Toilet H. O.B elevated;Suction

## 2021-04-16 NOTE — PROGRESS NOTES
[address] [place] [address] Pulmonary Specialists. Pulmonary, Critical Care, and Sleep Medicine    Name: Rey Raygoza MRN: [id number]   : 1950 Hospital: [address] [place] [address] Dr   Date: 2021  Admission Date: 2021     Chart and notes reviewed. Data reviewed. I have evaluated all findings. [x]I have reviewed the flowsheet and previous days notes. [x]The patient is unable to give any meaningful history or review of systems because the patient is:  [x]Intubated [x]Sedated   []Unresponsive      [x]The patient is critically ill on      [x]Mechanical ventilation [x]Pressors   []BiPAP []         Interval HPI:  80 yo M h/o HTN tx w/ lisinopril presenting to [facility] ED  for rapidly progressing respiratory distress due to severe angioedema. During intubation attempts pt had brief cardiac arrest w/ pulse loss and severe airway swelling leading to emergent cricothyrotomy. Pt stabilized before [facility] transport to [place] at [facility] pt taken emergently to [address] was converted to ETT. Patient continues to have trouble with o2 requirements disproportionate to CXR findings and despite adjustment of FiO2 and PEEP. CTA revelaed B/L pulmonary emboli which is likely causing hypoxemia and RH strain. Administration of inhaled epoprostenol on  is showing improvement in V/Q  FiO2 75%/ PEEP 10. Pt is on dobutamine and being diuresed w/ bumex and metolazone. Subjective 21  Hospital Day: 12  Vent Day: 12      Overnight events: patient unable to tolerate transport to ct due to episodes of desaturations into 70/80s when lying flat  Mentation/Activity: sedated  Respiratory/ Secretions: leak improved, obtaining full volumes  Hemodynamics: dobutamine d/c  Urine output, bowel: see below  Diet:npo, tube feedings held  Need for procedures:None    4/15/21    -continue to wean settings as tolerated. Trial laying flat today to hopefully obtain CT a/p  - Leak from cric site?  ETT  Advanced to 29 cm, which improved leak. Now getting volumes.  -Zosyn to be discontinued on Saturday, f/u cx  - Nephro consulted and will change diuretics  - Palliative to work with family              ROS:Review of systems not obtained due to patient factors. Events and notes from last 24 hours reviewed. Care plan discussed on multidisciplinary rounds. Patient Active Problem List   Diagnosis Code    Angioedema due to angiotensin converting enzyme inhibitor (ACE-I) T78. 3XXA, [id number]    Respiratory failure ([facility] [address] J96.90    [facility]acute [facility]) [facility] N17.9    Cardiac arrest ([facility]) I46.9    Obesity (BMI 30-39. 9) E66.9    Diabetic neuropathy associated with type 2 diabetes mellitus ([facility] E11.40    Diabetic retinopathy associated with type 2 diabetes mellitus ([facility] E11.319    Pulmonary infiltrates R91.8    Controlled type 2 diabetes mellitus with neurologic complication, without long-term current use of insulin ([facility]) E11.49    DVT (deep venous thrombosis) ([facility]) I82.409       Vital Signs:  Visit Vitals  BP (!) 106/58   Pulse 89   Temp 99.7 °F (37.6 °C)   Resp 18   Ht 5' 11\" (1.803 m)   Wt 115.9 kg (255 lb 8.2 oz)   SpO2 96%   BMI 35.64 kg/m²       O2 Device: Ventilator, Endotracheal tube, Heated, Humidifier       Temp (24hrs), Av.9 °F (37.7 °C), Min:99.7 °F (37.6 °C), Max:100.2 °F (37.9 °C)       Intake/Output:   Last shift:      701 - 1900  In: 14.9 [I.V.:14.9]  Out: 1050 [Urine:1050]  Last 3 shifts: 1901 -  07  In: 2198.8 [I.V.:2068.8]  Out:  [Urine:5825]    Intake/Output Summary (Last 24 hours) at 2021 1106  Last data filed at 2021 1058  Gross per 24 hour   Intake 1195.27 ml   Output 4200 ml   Net -3004.73 ml        Ventilator Settings:  Ventilator Mode: Assist control, VC+  Respiratory Rate  Back-Up Rate: 18  Insp Time (sec): 1.1 sec  I:E Ratio: 1:2.3  Ventilator Volumes  Vt Set (ml): 500 ml  Vt Exhaled (Machine Breath) (ml): 532 ml  Vt Spont (ml): 514 ml  Ve Observed (l/min): 11.7 l/min  Ventilator Pressures  PC Set: 550  PIP Observed (cm H2O): 29 cm H2O  Plateau Pressure (cm H2O): 27 cm H2O  MAP (cm H2O): 19  PEEP/VENT (cm H2O): 12 cm H20(increased from 10)  Auto PEEP Observed (cm H2O): 0 cm H2O    Current Facility-Administered Medications   Medication Dose Route Frequency    albumin human 25% (BUMINATE) solution 25 g  25 g IntraVENous TID    albuterol (PROVENTIL VENTOLIN) nebulizer solution 2.5 mg  2.5 mg Nebulization Q8H RT    piperacillin-tazobactam (ZOSYN) 2.25 g in 0.9% sodium chloride (MBP/ADV) 50 mL MBP  2.25 g IntraVENous Q6H    [Held by provider] bumetanide (BUMEX) 12.5 mg in 0.9% sodium chloride 100 mL infusion  0.25 mg/hr IntraVENous CONTINUOUS    fentaNYL (PF) 1,500 mcg/30 mL (50 mcg/mL) infusion  0-200 mcg/hr IntraVENous TITRATE    famotidine (PF) (PEPCID) 20 mg in 0.9% sodium chloride 10 mL injection  20 mg IntraVENous DAILY    heparin 25,000 units in D5W 250 ml infusion  12-25 Units/kg/hr IntraVENous TITRATE    arformoteroL (BROVANA) neb solution 15 mcg  15 mcg Nebulization BID RT    multivit-folic acid-herbal 558 (WELLESSE PLUS) oral liquid 30 mL  30 mL Per NG tube DAILY    chlorhexidine (PERIDEX) 0.12 % mouthwash 10 mL  10 mL Oral Q12H    insulin lispro (HUMALOG) injection   SubCUTAneous Q6H         Telemetry: [x]Sinus []A-flutter []Paced    []A-fib [x]Multiple PVCs                  Physical Exam:       General: Intubated/sedated; obese   HEENT:  Left scleral erythema; mucosa moist  Resp:  Symmetrical chest expansion, no accessory muscle use; no rales/ wheezing/ rhonchi noted  CV:  S1, S2 present; regular rate and rhythm   GI:  Abdomen soft, obese, non-tender; hypoactive bowel sounds w/ distention   Extremities:  +2 pulses on all extremities; mild edema noted on hands   Skin:  Warm; no rashes/ lesions noted, normal turgor/cap refill   Neurologic:  Non-focal; sedated   Devices:  OGT, ETT, cardona     DATA:  MAR reviewed and pertinent medications noted or modified as needed    Labs:  Recent Labs     04/16/21  0450 04/15/21  0057 04/14/21  1003   WBC 16.2* 17.3* 15.9*   HGB 9.2* 10.0* 9.3*   HCT 29.1* 32.3* 29.6*    243 188     Recent Labs     04/16/21  0450 04/15/21  1327 04/15/21  0940 04/15/21  0057 04/14/21  0327 04/14/21  0327 04/13/21  1120 04/13/21  1120    141  --  142   < > 146*   < > 144   K 4.2 4.4  --  4.4   < > 4.7   < > 4.6    102  --  103   < > 108   < > 111   CO2 29 29  --  29   < > 28   < > 28   * 151*  --  135*   < > 107*   < > 152*   * 110*  --  99*   < > 94*   < > 86*   CREA 3.91* 3.60*  --  3.15*   < > 2.62*   < > 2.44*   CA 7.6* 7.6*  --  8.2*   < > 7.5*   < > 8.2*   MG 3.9*  --   --  3.4*  --  3.3*  --  3.4*   PHOS 8.5* 7.0*  --  6.0*   < > 4.2   < > 4.2   ALB 1.5* 1.5* 1.5* 1.5*   < > 1.6*   < > 1.6*   ALT  --   --  186*  --   --   --   --  172*    < > = values in this interval not displayed. No results for input(s): PH, PCO2, PO2, HCO3, FIO2 in the last 72 hours. Recent Labs     04/16/21  0557 04/16/21  0534 04/15/21  0316   FIO2I 85 85 100   HCO3I 30.9* 31.7* 29.8*   PCO2I 45.5* 52.8* 37.0   PHI 7.44 7.39 7.51*   PO2I 65* 46* 62*       Imaging:  [x]   I have personally reviewed the patients radiographs and reports  XR Results (most recent):  CXR Results  (Last 48 hours)               04/16/21 0524  XR CHEST PORT Final result    Impression:      Persistent bilateral predominantly mid to lower lung infiltrates       Narrative:  EXAM: PORTABLE CHEST 0447 hours       CLINICAL HISTORY/INDICATION: Daily CXR while intubated. Initial presentation of   severe angioedema with brief cardiac arrest necessitating emergent   cricothyroidotomy, converted to endotracheal tube, bilateral pulmonary emboli,   hypoxemia with right heart strain, acute kidney injury         COMPARISON: Chest x-ray April 15 through April 12, 2021.        TECHNIQUE: Single AP view       FINDINGS:        Endotracheal tube terminates 6.7 cm proximal to the gabriella. The esophagogastric   tube enters the stomach and extends beyond off the film. Multifocal bilateral predominantly mid to lower lung infiltrates redemonstrated. The heart is normal in size. Pulmonary vascularity is obscured. There is no   pneumothorax. Lysbeth Carrel 04/15/21 0547  XR CHEST PORT Final result    Impression:  No significant change from prior study. Right greater than left airspace disease   is not significantly changed. Narrative:  ONE VIEW CHEST RADIOGRAPH        INDICATION: Daily CXR while intubated. Respiratory distress due to angioedema       COMPARISON: Chest radiograph 4/14/2021. TECHNIQUE: AP view of the chest       FINDINGS:       LINES/TUBES: Endotracheal tube tip approximately 5.5 cm above the gabriella. Enteric tube tip is not included on the image, but is below the diaphragm. Esophageal temperature probe is in the midesophagus. MEDIASTINUM: Cardiac silhouette is within normal limits. LUNGS: Similar appearance of consolidation in the mid to right lower lobe. More   mild streaky consolidation left lower lobe. No new consolidation. No   pneumothorax. BONES/OTHER: No acute osseous abnormality. CT Results (most recent):  CT Results  (Last 48 hours)    None              IMPRESSION:   · Angioedema- with airway and respiratory failure and collapse; thought due to ACE inhibitor  · S/P Emergent Cricthyrotomy [facility] 4/4/21- converted to 6.5 ETT intraoperative by anesthesia team 4/4/21, 8.0 ETT by anesthesia 4/7/21, packing removed evening 4/8/21-ENT  · S/P cardiac arrest @ [facility] 4/4/21- brief thought due to hypoxia due to airway collapse  · Respiratory Failure: Acute due to above; currently intubated and on vent for airway protection  · Pulmonary Infiltrates: suspect aspiration secretions and/or blood due to above- can't exclude other infectious process at this time.  Rapid Covid Negative at [facility]  · Bradycardia  · DVT: Popliteal- Left; acute non-occlusive Heparin ACS protocol started 4/8- stopped 4/9 due to bleeding from Cric site. Heparin on/off due to bleeding episodes  · Pulmonary Embolism -   · JACQUELYN  · Left scleral erythema- unknown baseline; possible infection   · DM  · DM retinopathy per chart review  · Diabetic peripheral neuropathy  · COVID -19; Negative rapid and [employer]: 4/4/21  · Left Pneumothorax - resolved     · Obesity: Body mass index is 35.64 kg/m². Patient Active Problem List   Diagnosis Code    Angioedema due to angiotensin converting enzyme inhibitor (ACE-I) T78. 3XXA, [id number]    Respiratory failure ([facility] J96.90    [facility] N17.9    Cardiac arrest ([facility]) I46.9    Obesity (BMI 30-39. 9) E66.9    Diabetic neuropathy associated with type 2 diabetes mellitus ([facility] E11.40    Diabetic retinopathy associated with type 2 diabetes mellitus ([facility] E11.319    Pulmonary infiltrates R91.8    Controlled type 2 diabetes mellitus with neurologic complication, without long-term current use of insulin ([facility]) E11.49    DVT (deep venous thrombosis) ([facility]) I82.409        RECOMMENDATIONS:   NEURO:  · Serial neuro evaluations  · Sedation Holiday per protocol  · RAAS: 0 to -1  · Wetting tears and cipro eye drops- stop date for cipro on chart  ·    CARDIO:  · MAP goal >64- No IV pressor requirement  · Echo on 4/6/21   · Telemetry     PULM:  · Maintain aspiration precautions: HOB >30 degrees  · SpO2 goal >92%  · Bronchial /oral hygiene  · VAP bundle- Wean vent as clinical status allows  · SBT as clinical status allows  · Complete course of decadron  · Pulmozyme and Mucomist DC  · Metaneb     GI/ NUTRITION:  · OGT   · Diet: TF with Free water @ 300 mL Q4  · SUP:Pepcid  · Follow up with nutritional recommendations  ·    RENAL/ METAB/ :  · Monitor I&Os  · Trend electrolytes - replaced as needed, K:4, Mg>2 PO4>2  · Garcia: Continue     HEM/ONC  · Trend Hb/HCT and PLTs, and indicis  · DVT prophylaxis: SCDs. Heparin on/off due to bleeding episodes  · Re-image poplyteal DVT 72 hours and/or pending clinical course- may need IVC Filter if worsens  · Blood Products: not indicated at this time     ID  · Antibioitcs: Cipro eye drop, Zosyn: 4/4- 4/8/21 Vanco: 4/4-6, MRSA swab negative  · Zosyn restarted on 4/11  · Follow up on pending cultures  · Repeat Covid negative (4/11)  · Tylenol for fevers     ENDO  · BGs Q 6,SSI  · C1 inhibitor normal level (4/7/21)  ·    MSK/ ORTHO  · No active Issues  ·    SKIN/ WOUNDS  · Per protocol  · Neck- daily dressing changes per ENT, Quick clot wit Afrin if needed, d/c xeroform  ·    OTHER/DISPO:     CODE STATUS: Full Code- Full     Case reviewed and discussed with  ICU care team     Best practice :    Glycemic control  Mech Vent patients- VAP bundle, aim to keep peak plateau pressure 98-98RS H2O  Sress ulcer prophylaxis. DVT prophylaxis. Need for Lines, cardona assessed. High complexity decision making was performed during this consultation and evaluation. [x]       Pt is at high risk for further organ failure and dysfunction. Critical care time spent:    minutes with patient exclusive of procedures.     Nohemi José MD PGY-1  [facility] Emergency Medicine    04/16/21  Pulmonary, Critical Care Medicine  [facility]

## 2021-04-16 NOTE — INTERDISCIPLINARY ROUNDS
[employer] [facility]  Pulmonary, Critical Care, and Sleep Medicine  Interdisciplinary and Ventilator Weaning Rounds    Patient discussed in morning walking rounds and interdisciplinary rounds. ICU day: 13    Overnight events:   · No acute events overnight   Assessments and best practice:   Ventilator  o Ventilator day 13  o Vent settings: FiO2 of 85% and PEEP 12  o VAP bundle, aim to keep peak plateau pressure 53-83ME H2O  o Weaning assessed and documented   - Patient does not meet criteria for SBT. FiO2 and PEEP too high   - Patient is not on sedation holiday. - No plan to wean today. - Outcome: cont full ventilator support   - Final plan: will remain on mechanical ventilatory support today   Sedation  Fentanyl, Versed. Start Preceex    Other pertinent drips  o Heparin    Lines noted  o PIVs   Critical labs assessed  o Yes   Antibiotics   Zosyn    Medications reviewed  o Yes   Pending imaging  o CT scan abdomen to evaluate for toxic casper colon   Pending send out labs  o no   Pending Procedures  o no   Glycemic control   SSI    Stress ulcer prophylaxis. o Pepcid   DVT prophylaxis. o Heparin gtt    Need for Lines, cardona assessed.  o Yes   Restraint Reevaluation   o No. Patient does not require restraints     Family contact/MPOA: sister, Jas Borjas  Family update: to be updated by bedside nurse/palliative care today         Daily Plans:   Sputum cx sent.  No diuresis today. Hold Bumex and Diamox.  Start Albumin TID    D/C Flolan   D/c Versed gtt and wean Fentanyl gtt as tolerated   Start Metaneb Q4h    CT-scan abdomen R?O toxic megacolon        Diana Power NP  04/16/21  Pulmonary, Critical Care Medicine  [facility]

## 2021-04-17 ENCOUNTER — APPOINTMENT (OUTPATIENT)
Dept: GENERAL RADIOLOGY | Age: 71
DRG: 004 | End: 2021-04-17
Attending: PHYSICIAN ASSISTANT
Payer: MEDICARE

## 2021-04-17 LAB
ALBUMIN SERPL-MCNC: 2.2 G/DL (ref 3.4–5)
ALBUMIN SERPL-MCNC: 2.4 G/DL (ref 3.4–5)
ANION GAP SERPL CALC-SCNC: 10 MMOL/L (ref 3–18)
ANION GAP SERPL CALC-SCNC: 12 MMOL/L (ref 3–18)
APTT PPP: 119.8 SEC (ref 23–36.4)
APTT PPP: 62.5 SEC (ref 23–36.4)
APTT PPP: 68.3 SEC (ref 23–36.4)
ARTERIAL PATENCY WRIST A: YES
BASE EXCESS BLD CALC-SCNC: 8 MMOL/L
BASOPHILS # BLD: 0.3 K/UL (ref 0–0.1)
BASOPHILS NFR BLD: 2 % (ref 0–2)
BDY SITE: ABNORMAL
BODY TEMPERATURE: 38.1
BUN SERPL-MCNC: 132 MG/DL (ref 7–18)
BUN SERPL-MCNC: 139 MG/DL (ref 7–18)
BUN/CREAT SERPL: 32 (ref 12–20)
BUN/CREAT SERPL: 37 (ref 12–20)
CA-I SERPL-SCNC: 0.97 MMOL/L (ref 1.12–1.32)
CALCIUM SERPL-MCNC: 7.6 MG/DL (ref 8.5–10.1)
CALCIUM SERPL-MCNC: 7.9 MG/DL (ref 8.5–10.1)
CHLORIDE SERPL-SCNC: 102 MMOL/L (ref 100–111)
CHLORIDE SERPL-SCNC: 103 MMOL/L (ref 100–111)
CO2 SERPL-SCNC: 29 MMOL/L (ref 21–32)
CO2 SERPL-SCNC: 31 MMOL/L (ref 21–32)
CREAT SERPL-MCNC: 3.74 MG/DL (ref 0.6–1.3)
CREAT SERPL-MCNC: 4.1 MG/DL (ref 0.6–1.3)
DIFFERENTIAL METHOD BLD: ABNORMAL
EOSINOPHIL # BLD: 0.2 K/UL (ref 0–0.4)
EOSINOPHIL NFR BLD: 1 % (ref 0–5)
ERYTHROCYTE [DISTWIDTH] IN BLOOD BY AUTOMATED COUNT: 15.6 % (ref 11.6–14.5)
GAS FLOW.O2 O2 DELIVERY SYS: ABNORMAL L/MIN
GAS FLOW.O2 SETTING OXYMISER: 18 BPM
GLUCOSE BLD STRIP.AUTO-MCNC: 132 MG/DL (ref 70–110)
GLUCOSE BLD STRIP.AUTO-MCNC: 139 MG/DL (ref 70–110)
GLUCOSE BLD STRIP.AUTO-MCNC: 141 MG/DL (ref 70–110)
GLUCOSE BLD STRIP.AUTO-MCNC: 152 MG/DL (ref 70–110)
GLUCOSE BLD STRIP.AUTO-MCNC: 159 MG/DL (ref 70–110)
GLUCOSE SERPL-MCNC: 123 MG/DL (ref 74–99)
GLUCOSE SERPL-MCNC: 125 MG/DL (ref 74–99)
HCO3 BLD-SCNC: 31.5 MMOL/L (ref 22–26)
HCT VFR BLD AUTO: 28.1 % (ref 36–48)
HGB BLD-MCNC: 8.7 G/DL (ref 13–16)
INSPIRATION.DURATION SETTING TIME VENT: 1.1 SEC
LYMPHOCYTES # BLD: 1.5 K/UL (ref 0.9–3.6)
LYMPHOCYTES NFR BLD: 9 % (ref 21–52)
MAGNESIUM SERPL-MCNC: 4 MG/DL (ref 1.6–2.6)
MCH RBC QN AUTO: 25.6 PG (ref 24–34)
MCHC RBC AUTO-ENTMCNC: 31 G/DL (ref 31–37)
MCV RBC AUTO: 82.6 FL (ref 74–97)
MONOCYTES # BLD: 0 K/UL (ref 0.05–1.2)
MONOCYTES NFR BLD: 0 % (ref 3–10)
NEUTS SEG # BLD: 14.4 K/UL (ref 1.8–8)
NEUTS SEG NFR BLD: 87 % (ref 40–73)
O2/TOTAL GAS SETTING VFR VENT: 75 %
OTHER CELLS NFR BLD MANUAL: 1 %
PCO2 BLD: 43.8 MMHG (ref 35–45)
PEEP RESPIRATORY: 12 CMH2O
PH BLD: 7.47 [PH] (ref 7.35–7.45)
PHOSPHATE SERPL-MCNC: 7.9 MG/DL (ref 2.5–4.9)
PHOSPHATE SERPL-MCNC: 8.1 MG/DL (ref 2.5–4.9)
PLATELET # BLD AUTO: 380 K/UL (ref 135–420)
PLATELET COMMENTS,PCOM: ABNORMAL
PMV BLD AUTO: 12.4 FL (ref 9.2–11.8)
PO2 BLD: 71 MMHG (ref 80–100)
POTASSIUM SERPL-SCNC: 3.6 MMOL/L (ref 3.5–5.5)
POTASSIUM SERPL-SCNC: 3.8 MMOL/L (ref 3.5–5.5)
RBC # BLD AUTO: 3.4 M/UL (ref 4.35–5.65)
RBC MORPH BLD: ABNORMAL
RBC MORPH BLD: ABNORMAL
SAO2 % BLD: 94 % (ref 92–97)
SERVICE CMNT-IMP: ABNORMAL
SODIUM SERPL-SCNC: 143 MMOL/L (ref 136–145)
SODIUM SERPL-SCNC: 144 MMOL/L (ref 136–145)
SPECIMEN TYPE: ABNORMAL
TOTAL RESP. RATE, ITRR: 20
VENTILATION MODE VENT: ABNORMAL
VOLUME CONTROL PLUS IVLCP: YES
VT SETTING VENT: 500 ML
WBC # BLD AUTO: 16.5 K/UL (ref 4.6–13.2)

## 2021-04-17 PROCEDURE — 74011000258 HC RX REV CODE- 258: Performed by: INTERNAL MEDICINE

## 2021-04-17 PROCEDURE — 80069 RENAL FUNCTION PANEL: CPT

## 2021-04-17 PROCEDURE — 2709999900 HC NON-CHARGEABLE SUPPLY

## 2021-04-17 PROCEDURE — 74011000250 HC RX REV CODE- 250: Performed by: PHYSICIAN ASSISTANT

## 2021-04-17 PROCEDURE — 74011250636 HC RX REV CODE- 250/636: Performed by: INTERNAL MEDICINE

## 2021-04-17 PROCEDURE — 74011250636 HC RX REV CODE- 250/636: Performed by: NURSE PRACTITIONER

## 2021-04-17 PROCEDURE — P9047 ALBUMIN (HUMAN), 25%, 50ML: HCPCS | Performed by: INTERNAL MEDICINE

## 2021-04-17 PROCEDURE — 77030018798 HC PMP KT ENTRL FED COVD -A

## 2021-04-17 PROCEDURE — 85025 COMPLETE CBC W/AUTO DIFF WBC: CPT

## 2021-04-17 PROCEDURE — 74011636637 HC RX REV CODE- 636/637: Performed by: PHYSICIAN ASSISTANT

## 2021-04-17 PROCEDURE — 74011250636 HC RX REV CODE- 250/636: Performed by: PHYSICIAN ASSISTANT

## 2021-04-17 PROCEDURE — 36415 COLL VENOUS BLD VENIPUNCTURE: CPT

## 2021-04-17 PROCEDURE — 83735 ASSAY OF MAGNESIUM: CPT

## 2021-04-17 PROCEDURE — 82330 ASSAY OF CALCIUM: CPT

## 2021-04-17 PROCEDURE — APPNB30 APP NON BILLABLE TIME 0-30 MINS: Performed by: NURSE PRACTITIONER

## 2021-04-17 PROCEDURE — 94668 MNPJ CHEST WALL SBSQ: CPT

## 2021-04-17 PROCEDURE — 77030038269 HC DRN EXT URIN PURWCK BARD -A

## 2021-04-17 PROCEDURE — 71045 X-RAY EXAM CHEST 1 VIEW: CPT

## 2021-04-17 PROCEDURE — 85730 THROMBOPLASTIN TIME PARTIAL: CPT

## 2021-04-17 PROCEDURE — 94762 N-INVAS EAR/PLS OXIMTRY CONT: CPT

## 2021-04-17 PROCEDURE — 74011250637 HC RX REV CODE- 250/637: Performed by: PHYSICIAN ASSISTANT

## 2021-04-17 PROCEDURE — 74011250637 HC RX REV CODE- 250/637: Performed by: INTERNAL MEDICINE

## 2021-04-17 PROCEDURE — 94003 VENT MGMT INPAT SUBQ DAY: CPT

## 2021-04-17 PROCEDURE — 82803 BLOOD GASES ANY COMBINATION: CPT

## 2021-04-17 PROCEDURE — 74011000258 HC RX REV CODE- 258: Performed by: NURSE PRACTITIONER

## 2021-04-17 PROCEDURE — 74011250636 HC RX REV CODE- 250/636: Performed by: STUDENT IN AN ORGANIZED HEALTH CARE EDUCATION/TRAINING PROGRAM

## 2021-04-17 PROCEDURE — 36600 WITHDRAWAL OF ARTERIAL BLOOD: CPT

## 2021-04-17 PROCEDURE — 82962 GLUCOSE BLOOD TEST: CPT

## 2021-04-17 PROCEDURE — 65610000006 HC RM INTENSIVE CARE

## 2021-04-17 PROCEDURE — 94640 AIRWAY INHALATION TREATMENT: CPT

## 2021-04-17 PROCEDURE — 94669 MECHANICAL CHEST WALL OSCILL: CPT

## 2021-04-17 PROCEDURE — 74011000250 HC RX REV CODE- 250: Performed by: STUDENT IN AN ORGANIZED HEALTH CARE EDUCATION/TRAINING PROGRAM

## 2021-04-17 RX ORDER — ALBUMIN HUMAN 250 G/1000ML
25 SOLUTION INTRAVENOUS 3 TIMES DAILY
Status: COMPLETED | OUTPATIENT
Start: 2021-04-17 | End: 2021-04-17

## 2021-04-17 RX ORDER — HEPARIN SODIUM 1000 [USP'U]/ML
3000 INJECTION, SOLUTION INTRAVENOUS; SUBCUTANEOUS ONCE
Status: COMPLETED | OUTPATIENT
Start: 2021-04-17 | End: 2021-04-17

## 2021-04-17 RX ORDER — MIDAZOLAM IN 0.9 % SOD.CHLORID 1 MG/ML
0-10 PLASTIC BAG, INJECTION (ML) INTRAVENOUS
Status: DISCONTINUED | OUTPATIENT
Start: 2021-04-17 | End: 2021-04-19

## 2021-04-17 RX ADMIN — FENTANYL CITRATE 200 MCG/HR: 0.05 INJECTION, SOLUTION INTRAMUSCULAR; INTRAVENOUS at 06:09

## 2021-04-17 RX ADMIN — PIPERACILLIN AND TAZOBACTAM 2.25 G: 2; .25 INJECTION, POWDER, LYOPHILIZED, FOR SOLUTION INTRAVENOUS at 17:22

## 2021-04-17 RX ADMIN — ALBUMIN (HUMAN) 25 G: 0.25 INJECTION, SOLUTION INTRAVENOUS at 21:42

## 2021-04-17 RX ADMIN — HEPARIN SODIUM 3000 UNITS: 1000 INJECTION INTRAVENOUS; SUBCUTANEOUS at 08:46

## 2021-04-17 RX ADMIN — ALBUTEROL SULFATE 2.5 MG: 2.5 SOLUTION RESPIRATORY (INHALATION) at 16:12

## 2021-04-17 RX ADMIN — CHLORHEXIDINE GLUCONATE 0.12% ORAL RINSE 10 ML: 1.2 LIQUID ORAL at 08:56

## 2021-04-17 RX ADMIN — HEPARIN SODIUM 2010 UNITS/HR: 10000 INJECTION, SOLUTION INTRAVENOUS at 01:49

## 2021-04-17 RX ADMIN — MIDAZOLAM 2 MG/HR: 5 INJECTION INTRAMUSCULAR; INTRAVENOUS at 12:10

## 2021-04-17 RX ADMIN — ARFORMOTEROL TARTRATE 15 MCG: 15 SOLUTION RESPIRATORY (INHALATION) at 21:56

## 2021-04-17 RX ADMIN — ALBUTEROL SULFATE 2.5 MG: 2.5 SOLUTION RESPIRATORY (INHALATION) at 00:00

## 2021-04-17 RX ADMIN — FENTANYL CITRATE 150 MCG/HR: 0.05 INJECTION, SOLUTION INTRAMUSCULAR; INTRAVENOUS at 13:30

## 2021-04-17 RX ADMIN — ALBUMIN (HUMAN) 25 G: 0.25 INJECTION, SOLUTION INTRAVENOUS at 12:11

## 2021-04-17 RX ADMIN — ALBUMIN (HUMAN) 25 G: 0.25 INJECTION, SOLUTION INTRAVENOUS at 17:22

## 2021-04-17 RX ADMIN — INSULIN LISPRO 2 UNITS: 100 INJECTION, SOLUTION INTRAVENOUS; SUBCUTANEOUS at 06:15

## 2021-04-17 RX ADMIN — CHLORHEXIDINE GLUCONATE 0.12% ORAL RINSE 10 ML: 1.2 LIQUID ORAL at 21:41

## 2021-04-17 RX ADMIN — FENTANYL CITRATE 150 MCG/HR: 0.05 INJECTION, SOLUTION INTRAMUSCULAR; INTRAVENOUS at 23:22

## 2021-04-17 RX ADMIN — PIPERACILLIN AND TAZOBACTAM 2.25 G: 2; .25 INJECTION, POWDER, LYOPHILIZED, FOR SOLUTION INTRAVENOUS at 06:10

## 2021-04-17 RX ADMIN — HEPARIN SODIUM 2110 UNITS/HR: 10000 INJECTION, SOLUTION INTRAVENOUS at 13:25

## 2021-04-17 RX ADMIN — ACETAMINOPHEN ORAL SOLUTION 500 MG: 650 SOLUTION ORAL at 00:27

## 2021-04-17 RX ADMIN — Medication 30 ML: at 08:55

## 2021-04-17 RX ADMIN — ALBUTEROL SULFATE 2.5 MG: 2.5 SOLUTION RESPIRATORY (INHALATION) at 07:57

## 2021-04-17 RX ADMIN — PIPERACILLIN AND TAZOBACTAM 2.25 G: 2; .25 INJECTION, POWDER, LYOPHILIZED, FOR SOLUTION INTRAVENOUS at 00:00

## 2021-04-17 RX ADMIN — FAMOTIDINE 20 MG: 10 INJECTION INTRAVENOUS at 08:55

## 2021-04-17 RX ADMIN — PIPERACILLIN AND TAZOBACTAM 2.25 G: 2; .25 INJECTION, POWDER, LYOPHILIZED, FOR SOLUTION INTRAVENOUS at 12:20

## 2021-04-17 RX ADMIN — ARFORMOTEROL TARTRATE 15 MCG: 15 SOLUTION RESPIRATORY (INHALATION) at 07:57

## 2021-04-17 NOTE — PROGRESS NOTES
In Patient Progress note      Admit Date: 4/4/2021    Impression:     #1 Acute kidney injury, etiology appears to be secondary to Ischaemic ATN d/t hypoperfusion v/s cardiorenal syndrome in  setting of CHF/V failure in setting of PE  #2 acute respiratory failure secondary to rapidly progressive angioedema s/p emergent cricothyrotomy. Presently intubated on the vent,  severely hypoxic out of proportion to x-ray findings, therefore PE has been considered , Rx for presumed PE  #3 pulmonary infiltrates/Pulm edema   #4 left-sided pneumothorax, appears resolved  #5 diabetes mellitus with complications  #6 popliteal DVT left  #7 possible colonic obst , awaiting CT abd      Plan:     1) allowing pat to auto diurese ,  add albumin  To allow volume status to  equilibrate and renal functions stabilize   2) strict I/o  3) follow renal parameters , electrolytes q12hrs   4) keep MAP > 65     Please call with questions,     Jevon Diaz MD FASN  Cell [phone]  Pager: [phone]  Subjective:     - No acute over night events. - respiratory - stable  - hemodynamics - stable, no pressrs  - UOP-good  - Nutrition -ok    Objective:     Visit Vitals  BP (!) 149/71 (BP 1 Location: Right upper arm)   Pulse 96   Temp (!) 100.6 °F (38.1 °C) Comment: Simultaneous filing. User may not have seen previous data. Resp 30   Ht 5' 11\" (1.803 m)   Wt 115.9 kg (255 lb 8.2 oz)   SpO2 97%   BMI 35.64 kg/m²         Intake/Output Summary (Last 24 hours) at 4/17/2021 1045  Last data filed at 4/16/2021 2000  Gross per 24 hour   Intake 49.25 ml   Output 2400 ml   Net -2350.75 ml       Physical Exam:        Intubated/ on vent   HEENT: mmm  Lungs: good air entry, coarse BS +  Cardiovascular system: S1, S2, regular rate and rhythm. Abdomen: soft, non tender, non distended. Positive bowel sounds. Extremities: no clubbing, cyanosis or edema. Integumentary: skin is grossly intact.        Data Review:    Recent Labs     04/17/21  0400   WBC 16.5* RBC 3.40*   HCT 28.1*   MCV 82.6   MCH 25.6   MCHC 31.0   RDW 15.6*     Recent Labs     04/17/21  0400 04/16/21  1607 04/16/21  0450 04/15/21  1327 04/15/21  0940 04/15/21  0057 04/14/21  1716   * 127* 124* 110*  --  99* 100*   CREA 4.10* 3.94* 3.91* 3.60*  --  3.15* 2.94*   CA 7.6* 8.3* 7.6* 7.6*  --  8.2* 7.7*   ALB 2.2* 1.8* 1.5* 1.5* 1.5* 1.5* 1.6*   K 3.8 3.9 4.2 4.4  --  4.4 4.7    140 141 141  --  142 144    100 100 102  --  103 107   CO2 29 31 29 29  --  29 27   PHOS 7.9* 8.0* 8.5* 7.0*  --  6.0* 5.4*   * 124* 119* 151*  --  135* 146*       Nazario Thompson MD

## 2021-04-17 NOTE — PROGRESS NOTES
[facility]  Pulmonary, Critical Care, and Sleep Medicine    Name: Rey Raygoza MRN: [id number]   : 1950 Hospital: [address] [place] [address] Dr   Date: 2021        IMPRESSION:   · Angioedema- with airway and respiratory failure and collapse; thought due to ACE inhibitor  · S/P Emergent Cricthyrotomy [facility]-ED 21- converted to 6.5 ETT intraoperative by anesthesia team 21, 8.0 ETT by anesthesia 21, packing removed evening 21-ENT  · S/P cardiac arrest @ [facility] 21- brief thought due to hypoxia due to airway collapse  · Respiratory Failure: Acute due to above; currently intubated and on vent for airway protection  · Pulmonary Infiltrates: suspect aspiration secretions and/or blood due to above- can't exclude other infectious process at this time. Rapid Covid Negative at [facility]  · Bradycardia  · DVT: Popliteal- Left; acute non-occlusive Heparin ACS protocol started - stopped  due to bleeding from Cric site. Heparin on/off due to bleeding episodes  · Pulmonary Embolism - 21 - l;eft lower and right upper lobes  · JACQUELYN  · Left scleral erythema- unknown baseline; possible infection   · DM  · DM retinopathy per chart review  · Diabetic peripheral neuropathy  · COVID -19; Negative rapid and [employer]: 21  · Left Pneumothorax - resolved     · Obesity: Body mass index is 35.64 kg/m².            Patient Active Problem List   Diagnosis Code    Angioedema due to angiotensin converting enzyme inhibitor (ACE-I) T78. 3XXA, [id number]    Respiratory failure ([address] J96.90    JACQUELYN (acute kidney injury) ([address] N17.9    Cardiac arrest (HCC) I46.9    Obesity (BMI 30-39. 9) E66.9    Diabetic neuropathy associated with type 2 diabetes mellitus ([address] E11.40    Diabetic retinopathy associated with type 2 diabetes mellitus ([address] E11.319    Pulmonary infiltrates R91.8    Controlled type 2 diabetes mellitus with neurologic complication, without long-term current use of insulin ([address] E11.49    DVT (deep venous thrombosis) ([facility]) I82.409    Encounter for palliative care Z51.5    Goals of care, counseling/discussion Z71.89    Multiple subsegmental pulmonary emboli without acute cor pulmonale ([facility]) I26.94      PLAN:   RECOMMENDATIONS:   NEURO:  · Serial neuro evaluations  · Daily sedation holiday  · Wetting tears and cipro eye drops- stop date for cipro on chart    CARDIO:  · MAP goal >64- No IV pressor requirement  · Echo on 4/6/21 with EF of 55%, dilated RV and moderate pulmonary hypertension ( PASP 61 mmHg  PULM:  · Maintain aspiration precautions: HOB >30 degrees  · SpO2 goal >92%  · Bronchial /oral hygiene   · VAP bundle- Wean vent as clinical status allows  · SBT as clinical status allows  · Completed course of decadron  · Pulmozyme and Mucomist DC  · Metaneb  · Daily CXR and ABG while intubated     GI/ NUTRITION:  · OGT   · Diet: TF held due to possible obstruction, CT pending  · SUP:Pepcid  · Follow up with nutritional recommendations  RENAL/ METAB/ :  · Monitor I&Os  · Trend electrolytes - replaced as needed, K:4, Mg>2 PO4>2  · Garcia: Continue     HEM/ONC  · Trend Hb/HCT and PLTs, and indicis  · DVT prophylaxis: SCDs, heparin gtt  · Re-image poplyteal DVT 72 hours and/or pending clinical course- may need IVC Filter if worsens  · Blood Products: not indicated at this time     ID  · Antibioitcs: Cipro eye drop, Zosyn: 4/4- 4/8/21 Vanco: 4/4-6, MRSA swab negative  · Zosyn restarted on 4/11, WBCs elevated to 16.5  · Follow up on pending cultures  · Repeat Covid negative (4/11)  · Tylenol for fevers     ENDO  · BGs Q 6,SSI, TSH normal  · C1 inhibitor normal level (4/7/21)    MSK/ ORTHO  · No active Issues    SKIN/ WOUNDS  · Per protocol  · Neck- daily dressing changes per ENT, Quick clot wit Afrin if needed, d/c xeroform         CODE STATUS: Full Code- Full     Discussed with attending physician         Best practice :     Glycemic control  Mech Vent patients- VAP bundle, aim to keep peak plateau pressure 49-89TB H2O  Sress ulcer prophylaxis. DVT prophylaxis. Need for Lines, cardona assessed.        Subjective/Interval History:      Interval HPI:  78 yo M h/o HTN tx w/ lisinopril presenting to [facility] ED  for rapidly progressing respiratory distress due to severe angioedema. During intubation attempts pt had brief cardiac arrest w/ pulse loss and severe airway swelling leading to emergent cricothyrotomy. Pt stabilized before [employer] transport to [place] at [facility] pt taken emergently to [address] was converted to ETT. Patient continues to have trouble with o2 requirements disproportionate to CXR findings and despite adjustment of FiO2 and PEEP. CTA revelaed B/L pulmonary emboli which is likely causing hypoxemia and RH strain. Administration of inhaled epoprostenol on  is showing improvement in V/Q  FiO2 75%/ PEEP 10. Pt is on dobutamine and being diuresed w/ bumex and metolazone.        Subjective 21  Hospital Day: 13  Vent Day: 13     Overnight events:  Febrile with Tmax 102  Mentation/Activity: sedated  Respiratory/ Secretions: leak improved, obtaining full volumes  Hemodynamics: stable off pressors and dobutamine  Urine output, bowel: see below  Diet:npo, tube feedings held due to possible obstruction, CT pending when patient is stable to go  Need for procedures:None       ROS:A comprehensive review of systems was negative except for that written in the HPI. Objective:   Vital Signs:    Visit Vitals  BP (!) 164/71   Pulse 97   Temp (!) 100.8 °F (38.2 °C)   Resp 26   Ht 5' 11\" (1.803 m)   Wt 115.9 kg (255 lb 8.2 oz)   SpO2 97%   BMI 35.64 kg/m²       O2 Device: Ventilator, Endotracheal tube, Heated, Humidifier       Temp (24hrs), Av.3 °F (38.5 °C), Min:99.9 °F (37.7 °C), Max:102.2 °F (39 °C)       Intake/Output:   Last shift:      No intake/output data recorded.   Last 3 shifts: 04/15 1901 -  0700  In: 824 [I.V.:734]  Out: 4150 [Urine:4150]    Intake/Output Summary (Last 24 hours) at 4/17/2021 0827  Last data filed at 4/16/2021 1821  Gross per 24 hour   Intake 204.53 ml   Output 1800 ml   Net -1595.47 ml      Physical Exam:                  General: Intubated/sedated; obese   HEENT:  Left scleral erythema; mucosa moist  Resp:  Symmetrical chest expansion, no accessory muscle use; no rales/ wheezing/ rhonchi noted  CV:  S1, S2 present; regular rate and rhythm   GI:  Abdomen soft, obese, non-tender; hypoactive bowel sounds w/ distention   Extremities:  +2 pulses on all extremities; mild edema noted on hands   Skin:  Warm; no rashes/ lesions noted, normal turgor/cap refill   Neurologic:  Non-focal; sedated   Devices:  OGT, ETT, cardona         DATA:  Labs:  Recent Labs     04/17/21  0400 04/16/21  0450 04/15/21  0057   WBC 16.5* 16.2* 17.3*   HGB 8.7* 9.2* 10.0*   HCT 28.1* 29.1* 32.3*    327 243     Recent Labs     04/17/21  0400 04/16/21  1607 04/16/21  0450 04/15/21  0940 04/15/21  0940 04/15/21  0057    140 141   < >  --  142   K 3.8 3.9 4.2   < >  --  4.4    100 100   < >  --  103   CO2 29 31 29   < >  --  29   * 124* 119*   < >  --  135*   * 127* 124*   < >  --  99*   CREA 4.10* 3.94* 3.91*   < >  --  3.15*   CA 7.6* 8.3* 7.6*   < >  --  8.2*   MG 4.0*  --  3.9*  --   --  3.4*   PHOS 7.9* 8.0* 8.5*   < >  --  6.0*   ALB 2.2* 1.8* 1.5*   < > 1.5* 1.5*   ALT  --   --   --   --  186*  --     < > = values in this interval not displayed. No results for input(s): PH, PCO2, PO2, HCO3, FIO2 in the last 72 hours. PFT:                                                     Echo:  04/04/21   ECHO ADULT COMPLETE 04/06/2021 4/6/2021    Narrative · Contrast used: DEFINITY. · Image quality for this study was technically difficult. · LV: Calculated LVEF is 55%. Visually measured ejection fraction. Normal   cavity size, wall thickness and systolic function (ejection fraction   normal).  Wall motion: normal. Moderate (grade 2) left ventricular   diastolic dysfunction. · AO: Mild aortic root and ascending aorta dilatation. Ascending aorta   diameter = 3.6 cm. Aortic root measures 3.9 cm  · RA: Dilated right atrium. · RV: Dilated right ventricle. Borderline low systolic function. · TV: Mild tricuspid valve regurgitation is present. · IVC: Mechanically ventilated; cannot use inferior caval vein diameter to   estimate central venous pressure. · PA: Moderate pulmonary hypertension. Pulmonary arterial systolic   pressure is 61 mmHg. Signed by: Faye Barton MD         Imaging:  [x]I have personally reviewed the patients radiographs  []Radiographs reviewed with radiologist   [x]No change from prior, tubes and lines in adequate position  []Improved   []Worsening      Tamara Tucker NP - C  04/17/2021  Pulmonary, Critical Care Medicine  [facility]

## 2021-04-17 NOTE — PROGRESS NOTES
Pt resting in bed, VSS-has low grade temp, will continue to monitor. Has gag/cough reflex with suctioning. Has whitish tan secretions, moderate amount mostly orally. 2300  Rec'd tylenol via OGT for 101 temp    0200  Temp to low 100.0 range. Has copius amount of secretions thick yellow/white. Strong gag and cough reflex. 0600  Continues to desat  With minimal movement/suctioning. Average sat is 92-93 which drops to 88 range with slight movent.

## 2021-04-17 NOTE — PROGRESS NOTES
Otolaryngology head neck surgery  Patient seen and examined  Cricothyroidotomy wound appears to be healing  At this point I really see no evidence of any air leakage from the cricothyroidotomy site  There have been reports that this is been noted but at this time I do not see that this is the case    Patient with significant respiratory insufficiency necessitating high FiO2 and PEEP  We will continue to follow     Victorino Dial

## 2021-04-17 NOTE — PROGRESS NOTES
Called to bedside due to O2 desaturation. Found pt with SpO2 74% with good pleath. Verified position of ETT at 29 cm at upper lip secured at center of pt mouth. Added 1 cc air to cuff, and increased FiO2 to 100% and PEEP to 14. Suctioned ETT with 5 cc lavage, copious brown- tan mucous plugs suctioned. SpO2 now 93%. Will monitor.

## 2021-04-18 ENCOUNTER — APPOINTMENT (OUTPATIENT)
Dept: GENERAL RADIOLOGY | Age: 71
DRG: 004 | End: 2021-04-18
Attending: NURSE PRACTITIONER
Payer: MEDICARE

## 2021-04-18 ENCOUNTER — APPOINTMENT (OUTPATIENT)
Dept: GENERAL RADIOLOGY | Age: 71
DRG: 004 | End: 2021-04-18
Attending: PHYSICIAN ASSISTANT
Payer: MEDICARE

## 2021-04-18 LAB
ALBUMIN SERPL-MCNC: 2.8 G/DL (ref 3.4–5)
ANION GAP SERPL CALC-SCNC: 11 MMOL/L (ref 3–18)
APTT PPP: 81.6 SEC (ref 23–36.4)
ARTERIAL PATENCY WRIST A: YES
BASE EXCESS BLD CALC-SCNC: 8 MMOL/L
BASOPHILS # BLD: 0 K/UL (ref 0–0.1)
BASOPHILS NFR BLD: 0 % (ref 0–2)
BDY SITE: ABNORMAL
BODY TEMPERATURE: 37.8
BUN SERPL-MCNC: 134 MG/DL (ref 7–18)
BUN/CREAT SERPL: 38 (ref 12–20)
CALCIUM SERPL-MCNC: 8.6 MG/DL (ref 8.5–10.1)
CHLORIDE SERPL-SCNC: 103 MMOL/L (ref 100–111)
CO2 SERPL-SCNC: 30 MMOL/L (ref 21–32)
CREAT SERPL-MCNC: 3.57 MG/DL (ref 0.6–1.3)
DIFFERENTIAL METHOD BLD: ABNORMAL
EOSINOPHIL # BLD: 0 K/UL (ref 0–0.4)
EOSINOPHIL NFR BLD: 0 % (ref 0–5)
ERYTHROCYTE [DISTWIDTH] IN BLOOD BY AUTOMATED COUNT: 15.7 % (ref 11.6–14.5)
GAS FLOW.O2 O2 DELIVERY SYS: ABNORMAL L/MIN
GAS FLOW.O2 SETTING OXYMISER: 18 BPM
GLUCOSE BLD STRIP.AUTO-MCNC: 132 MG/DL (ref 70–110)
GLUCOSE BLD STRIP.AUTO-MCNC: 146 MG/DL (ref 70–110)
GLUCOSE BLD STRIP.AUTO-MCNC: 153 MG/DL (ref 70–110)
GLUCOSE SERPL-MCNC: 127 MG/DL (ref 74–99)
HCO3 BLD-SCNC: 31.9 MMOL/L (ref 22–26)
HCT VFR BLD AUTO: 28.3 % (ref 36–48)
HGB BLD-MCNC: 9 G/DL (ref 13–16)
INSPIRATION.DURATION SETTING TIME VENT: 1.2 SEC
LYMPHOCYTES # BLD: 1.3 K/UL (ref 0.9–3.6)
LYMPHOCYTES NFR BLD: 7 % (ref 21–52)
MAGNESIUM SERPL-MCNC: 4.4 MG/DL (ref 1.6–2.6)
MCH RBC QN AUTO: 26.2 PG (ref 24–34)
MCHC RBC AUTO-ENTMCNC: 31.8 G/DL (ref 31–37)
MCV RBC AUTO: 82.5 FL (ref 74–97)
MONOCYTES # BLD: 0.4 K/UL (ref 0.05–1.2)
MONOCYTES NFR BLD: 2 % (ref 3–10)
NEUTS SEG # BLD: 16.8 K/UL (ref 1.8–8)
NEUTS SEG NFR BLD: 91 % (ref 40–73)
O2/TOTAL GAS SETTING VFR VENT: 0.9 %
PCO2 BLD: 50 MMHG (ref 35–45)
PEEP RESPIRATORY: 14 CMH2O
PH BLD: 7.42 [PH] (ref 7.35–7.45)
PHOSPHATE SERPL-MCNC: 7.7 MG/DL (ref 2.5–4.9)
PLATELET # BLD AUTO: 427 K/UL (ref 135–420)
PLATELET COMMENTS,PCOM: ABNORMAL
PMV BLD AUTO: 11.9 FL (ref 9.2–11.8)
PO2 BLD: 97 MMHG (ref 80–100)
POTASSIUM SERPL-SCNC: 3.6 MMOL/L (ref 3.5–5.5)
RBC # BLD AUTO: 3.43 M/UL (ref 4.35–5.65)
RBC MORPH BLD: ABNORMAL
RBC MORPH BLD: ABNORMAL
SAO2 % BLD: 97 % (ref 92–97)
SERVICE CMNT-IMP: ABNORMAL
SODIUM SERPL-SCNC: 144 MMOL/L (ref 136–145)
SPECIMEN TYPE: ABNORMAL
TOTAL RESP. RATE, ITRR: 24
VENTILATION MODE VENT: ABNORMAL
VOLUME CONTROL PLUS IVLCP: YES
VT SETTING VENT: 500 ML
WBC # BLD AUTO: 18.5 K/UL (ref 4.6–13.2)

## 2021-04-18 PROCEDURE — 85730 THROMBOPLASTIN TIME PARTIAL: CPT

## 2021-04-18 PROCEDURE — 94640 AIRWAY INHALATION TREATMENT: CPT

## 2021-04-18 PROCEDURE — 2709999900 HC NON-CHARGEABLE SUPPLY

## 2021-04-18 PROCEDURE — 74011000250 HC RX REV CODE- 250: Performed by: PHYSICIAN ASSISTANT

## 2021-04-18 PROCEDURE — 65610000006 HC RM INTENSIVE CARE

## 2021-04-18 PROCEDURE — 74011250636 HC RX REV CODE- 250/636: Performed by: NURSE PRACTITIONER

## 2021-04-18 PROCEDURE — 94668 MNPJ CHEST WALL SBSQ: CPT

## 2021-04-18 PROCEDURE — APPNB30 APP NON BILLABLE TIME 0-30 MINS: Performed by: NURSE PRACTITIONER

## 2021-04-18 PROCEDURE — 74011000258 HC RX REV CODE- 258: Performed by: PHYSICIAN ASSISTANT

## 2021-04-18 PROCEDURE — 94762 N-INVAS EAR/PLS OXIMTRY CONT: CPT

## 2021-04-18 PROCEDURE — 74011000250 HC RX REV CODE- 250: Performed by: STUDENT IN AN ORGANIZED HEALTH CARE EDUCATION/TRAINING PROGRAM

## 2021-04-18 PROCEDURE — 85025 COMPLETE CBC W/AUTO DIFF WBC: CPT

## 2021-04-18 PROCEDURE — 82803 BLOOD GASES ANY COMBINATION: CPT

## 2021-04-18 PROCEDURE — 82962 GLUCOSE BLOOD TEST: CPT

## 2021-04-18 PROCEDURE — P9047 ALBUMIN (HUMAN), 25%, 50ML: HCPCS | Performed by: NURSE PRACTITIONER

## 2021-04-18 PROCEDURE — 94003 VENT MGMT INPAT SUBQ DAY: CPT

## 2021-04-18 PROCEDURE — 74018 RADEX ABDOMEN 1 VIEW: CPT

## 2021-04-18 PROCEDURE — 74011250636 HC RX REV CODE- 250/636: Performed by: STUDENT IN AN ORGANIZED HEALTH CARE EDUCATION/TRAINING PROGRAM

## 2021-04-18 PROCEDURE — 74011000258 HC RX REV CODE- 258: Performed by: NURSE PRACTITIONER

## 2021-04-18 PROCEDURE — 74011250636 HC RX REV CODE- 250/636: Performed by: INTERNAL MEDICINE

## 2021-04-18 PROCEDURE — 71045 X-RAY EXAM CHEST 1 VIEW: CPT

## 2021-04-18 PROCEDURE — 74011250636 HC RX REV CODE- 250/636: Performed by: PHYSICIAN ASSISTANT

## 2021-04-18 PROCEDURE — 77030040392 HC DRSG OPTIFOAM MDII -A

## 2021-04-18 PROCEDURE — 80069 RENAL FUNCTION PANEL: CPT

## 2021-04-18 PROCEDURE — 83735 ASSAY OF MAGNESIUM: CPT

## 2021-04-18 PROCEDURE — 36600 WITHDRAWAL OF ARTERIAL BLOOD: CPT

## 2021-04-18 PROCEDURE — 94669 MECHANICAL CHEST WALL OSCILL: CPT

## 2021-04-18 PROCEDURE — 74011250637 HC RX REV CODE- 250/637: Performed by: INTERNAL MEDICINE

## 2021-04-18 RX ORDER — POTASSIUM CHLORIDE 7.45 MG/ML
10 INJECTION INTRAVENOUS
Status: COMPLETED | OUTPATIENT
Start: 2021-04-18 | End: 2021-04-18

## 2021-04-18 RX ORDER — ALBUMIN HUMAN 250 G/1000ML
12.5 SOLUTION INTRAVENOUS ONCE
Status: COMPLETED | OUTPATIENT
Start: 2021-04-18 | End: 2021-04-18

## 2021-04-18 RX ORDER — SODIUM,POTASSIUM PHOSPHATES 280-250MG
2 POWDER IN PACKET (EA) ORAL ONCE
Status: DISCONTINUED | OUTPATIENT
Start: 2021-04-18 | End: 2021-04-18

## 2021-04-18 RX ADMIN — POTASSIUM CHLORIDE 10 MEQ: 7.46 INJECTION, SOLUTION INTRAVENOUS at 05:30

## 2021-04-18 RX ADMIN — FAMOTIDINE 20 MG: 10 INJECTION INTRAVENOUS at 09:07

## 2021-04-18 RX ADMIN — ALBUTEROL SULFATE 2.5 MG: 2.5 SOLUTION RESPIRATORY (INHALATION) at 17:26

## 2021-04-18 RX ADMIN — POTASSIUM CHLORIDE 10 MEQ: 7.46 INJECTION, SOLUTION INTRAVENOUS at 07:00

## 2021-04-18 RX ADMIN — FENTANYL CITRATE 150 MCG/HR: 0.05 INJECTION, SOLUTION INTRAMUSCULAR; INTRAVENOUS at 09:04

## 2021-04-18 RX ADMIN — PIPERACILLIN AND TAZOBACTAM 2.25 G: 2; .25 INJECTION, POWDER, LYOPHILIZED, FOR SOLUTION INTRAVENOUS at 20:40

## 2021-04-18 RX ADMIN — ALBUMIN (HUMAN) 12.5 G: 0.25 INJECTION, SOLUTION INTRAVENOUS at 11:26

## 2021-04-18 RX ADMIN — Medication 30 ML: at 09:07

## 2021-04-18 RX ADMIN — ARFORMOTEROL TARTRATE 15 MCG: 15 SOLUTION RESPIRATORY (INHALATION) at 09:00

## 2021-04-18 RX ADMIN — FENTANYL CITRATE 100 MCG/HR: 0.05 INJECTION, SOLUTION INTRAMUSCULAR; INTRAVENOUS at 12:18

## 2021-04-18 RX ADMIN — HEPARIN SODIUM 2210 UNITS/HR: 10000 INJECTION, SOLUTION INTRAVENOUS at 01:01

## 2021-04-18 RX ADMIN — ALBUTEROL SULFATE 2.5 MG: 2.5 SOLUTION RESPIRATORY (INHALATION) at 23:51

## 2021-04-18 RX ADMIN — FENTANYL CITRATE 100 MCG/HR: 0.05 INJECTION, SOLUTION INTRAMUSCULAR; INTRAVENOUS at 20:14

## 2021-04-18 RX ADMIN — ALBUTEROL SULFATE 2.5 MG: 2.5 SOLUTION RESPIRATORY (INHALATION) at 09:00

## 2021-04-18 RX ADMIN — PIPERACILLIN AND TAZOBACTAM 2.25 G: 2; .25 INJECTION, POWDER, LYOPHILIZED, FOR SOLUTION INTRAVENOUS at 16:35

## 2021-04-18 RX ADMIN — MIDAZOLAM 4 MG/HR: 5 INJECTION INTRAMUSCULAR; INTRAVENOUS at 12:25

## 2021-04-18 RX ADMIN — CHLORHEXIDINE GLUCONATE 0.12% ORAL RINSE 10 ML: 1.2 LIQUID ORAL at 20:40

## 2021-04-18 RX ADMIN — ALBUTEROL SULFATE 2.5 MG: 2.5 SOLUTION RESPIRATORY (INHALATION) at 00:43

## 2021-04-18 RX ADMIN — HEPARIN SODIUM 2210 UNITS/HR: 10000 INJECTION, SOLUTION INTRAVENOUS at 12:24

## 2021-04-18 RX ADMIN — PIPERACILLIN AND TAZOBACTAM 2.25 G: 2; .25 INJECTION, POWDER, LYOPHILIZED, FOR SOLUTION INTRAVENOUS at 09:08

## 2021-04-18 RX ADMIN — CHLORHEXIDINE GLUCONATE 0.12% ORAL RINSE 10 ML: 1.2 LIQUID ORAL at 09:07

## 2021-04-18 RX ADMIN — ARFORMOTEROL TARTRATE 15 MCG: 15 SOLUTION RESPIRATORY (INHALATION) at 19:37

## 2021-04-18 NOTE — PROGRESS NOTES
In Patient Progress note      Admit Date: 4/4/2021    Impression:     #1 Acute kidney injury, etiology appears to be secondary to Ischaemic ATN d/t hypoperfusion v/s cardiorenal syndrome in  setting of CHF/V failure in setting of PE  #2 acute respiratory failure secondary to rapidly progressive angioedema s/p emergent cricothyrotomy. Presently intubated on the vent,  severely hypoxic out of proportion to x-ray findings, therefore PE has been considered , Rx for presumed PE  #3 pulmonary infiltrates/Pulm edema   #4 left-sided pneumothorax, appears resolved  #5 diabetes mellitus with complications  #6 popliteal DVT left  #7 possible colonic obst , awaiting CT abd      Plan:     1) allowing pat to auto diurese ,continue albumin allow volume status to  equilibrate and renal functions stabilize , no diuretics  2) strict I/o  3) follow renal parameters , electrolytes q12hrs , replace electrolytes  4) keep MAP > 65       Please call with questions,     Purvis Lefort, MD FASN  Cell [phone]  Pager: [phone]  Subjective:     - No acute over night events. - respiratory - stable  - hemodynamics - stable, no pressrs  - UOP-good  - Nutrition -ok    Objective:     Visit Vitals  BP (!) 101/57   Pulse 70 Comment: Simultaneous filing. User may not have seen previous data. Temp 99.9 °F (37.7 °C)   Resp 18 Comment: Simultaneous filing. User may not have seen previous data. Ht 5' 11\" (1.803 m)   Wt 106.2 kg (234 lb 2.1 oz)   SpO2 99% Comment: Simultaneous filing. User may not have seen previous data. BMI 32.65 kg/m²         Intake/Output Summary (Last 24 hours) at 4/18/2021 1120  Last data filed at 4/18/2021 0700  Gross per 24 hour   Intake 818.2 ml   Output 2725 ml   Net -1906.8 ml       Physical Exam:        Intubated/ on vent   HEENT: mmm  Lungs: good air entry, coarse BS +  Cardiovascular system: S1, S2, regular rate and rhythm. Abdomen: soft, non tender, non distended. Positive bowel sounds.    Extremities: no clubbing, cyanosis or edema. Integumentary: skin is grossly intact.        Data Review:    Recent Labs     04/18/21 0315   WBC 18.5*   RBC 3.43*   HCT 28.3*   MCV 82.5   MCH 26.2   MCHC 31.8   RDW 15.7*     Recent Labs     04/18/21  0315 04/17/21  1555 04/17/21  0400 04/16/21  1607 04/16/21  0450 04/15/21  1327   * 139* 132* 127* 124* 110*   CREA 3.57* 3.74* 4.10* 3.94* 3.91* 3.60*   CA 8.6 7.9* 7.6* 8.3* 7.6* 7.6*   ALB 2.8* 2.4* 2.2* 1.8* 1.5* 1.5*   K 3.6 3.6 3.8 3.9 4.2 4.4    144 143 140 141 141    103 102 100 100 102   CO2 30 31 29 31 29 29   PHOS 7.7* 8.1* 7.9* 8.0* 8.5* 7.0*   * 125* 123* 124* 119* 151*       Bertram Mary MD

## 2021-04-18 NOTE — PROGRESS NOTES
[facility]  Pulmonary, Critical Care, and Sleep Medicine    Name: Christel Harden MRN: [id number]   : 1950 Hospital: [address] [place] [address] Dr   Date: 2021        IMPRESSION:   · Angioedema- with airway and respiratory failure and collapse; thought due to ACE inhibitor  · S/P Emergent Cricthyrotomy [facility]-ED 21- converted to 6.5 ETT intraoperative by anesthesia team 21, 8.0 ETT by anesthesia 21, packing removed evening 21-ENT  · S/P cardiac arrest @ [facility] 21- brief thought due to hypoxia due to airway collapse  · Respiratory Failure: Acute due to above; currently intubated and on vent for airway protection  · Pulmonary Infiltrates: suspect aspiration secretions and/or blood due to above- can't exclude other infectious process at this time. Rapid Covid Negative at [facility]  · Bradycardia - improved  · DVT: Popliteal- Left; acute non-occlusive thrombus. Heparin ACS protocol started - stopped  due to bleeding from Cric site. Heparin currently back on   · Pulmonary Embolism - 21 - l;eft lower and right upper lobes  · JACQUELYN - , Creatinine 3.57  · Left scleral erythema- unknown baseline; possible infection   · DM  · DM retinopathy per chart review  · Diabetic peripheral neuropathy  · Left Pneumothorax - resolved     · Obesity: Body mass index is 35.64 kg/m².            Patient Active Problem List   Diagnosis Code    Angioedema due to angiotensin converting enzyme inhibitor (ACE-I) T78. 3XXA, [id number]    Respiratory failure ([address] J96.90    JACQUELYN (acute kidney injury) ([address] N17.9    Cardiac arrest (HCC) I46.9    Obesity (BMI 30-39. 9) E66.9    Diabetic neuropathy associated with type 2 diabetes mellitus ([address] E11.40    Diabetic retinopathy associated with type 2 diabetes mellitus ([facility] E11.319    Pulmonary infiltrates R91.8    Controlled type 2 diabetes mellitus with neurologic complication, without long-term current use of insulin (HCC) E11.49    DVT (deep venous thrombosis) ([facility] I82.409    Encounter for palliative care Z51.5    Goals of care, counseling/discussion Z71.89    Multiple subsegmental pulmonary emboli without acute cor pulmonale (HCC) I26.94      PLAN:   NEURO:  · Serial neuro evaluations  · Daily sedation holiday  · Wetting tears and cipro eye drops- stop date for cipro on chart     CARDIO:  · MAP goal >64- No IV pressor requirement  · Echo on 4/6/21 with EF of 55%, dilated RV and moderate pulmonary hypertension ( PASP 61 mmHg  PULM:  · Maintain aspiration precautions: HOB >30 degrees  · SpO2 goal >92%  · Bronchial /oral hygiene   · VAP bundle- Wean vent as clinical status allows  · SBT as clinical status allows  · Completed course of decadron  · Pulmozyme and Mucomist DC  · Metaneb  · Daily CXR and ABG while intubated     GI/ NUTRITION:  · OGT   · Diet: TF held due to possible obstruction, CT pending  · SUP:Pepcid  · Follow up with nutritional recommendations  RENAL/ METAB/ :  · Monitor I&Os  · Trend electrolytes - replaced as needed  · Garcia: Continue     HEM/ONC  · Trend Hb/HCT and PLTs  · DVT prophylaxis: SCDs, heparin gtt  · Re-image poplyteal DVT 72 hours and/or pending clinical course- may need IVC Filter if worsens  · Blood Products: not indicated at this time     ID  · Antibioitcs: Cipro eye drop, Zosyn: 4/4- 4/8/21 Vanco: 4/4-6, MRSA swab negative  · Zosyn restarted on 4/11, WBCs elevated to 16.5  · Follow up on pending cultures  · Repeat Covid negative (4/11)  · Tylenol for fevers     ENDO  · BGs Q 6,SSI, TSH normal  · C1 inhibitor normal level (4/7/21)     MSK/ ORTHO  · No active Issues     SKIN/ WOUNDS  · Per protocol  · Neck- daily dressing changes per ENT, Quick clot wit Afrin if needed, d/c xeroform           CODE STATUS: Full Code- Full     Discussed with attending physician      Best practice :     Glycemic control  Mech Vent patients- VAP bundle, aim to keep peak plateau pressure 77-91XV H2O  Sress ulcer prophylaxis.   DVT prophylaxis. Need for Lines, cardona assessed.     Subjective/Interval History:     Interval HPI:  78 yo M h/o HTN tx w/ lisinopril presenting to [facility] ED  for rapidly progressing respiratory distress due to severe angioedema. During intubation attempts pt had brief cardiac arrest w/ pulse loss and severe airway swelling leading to emergent cricothyrotomy. Pt stabilized before [employer] transport to [place] at [facility] pt taken emergently to [address] was converted to ETT. Patient continues to have trouble with o2 requirements disproportionate to CXR findings and despite adjustment of FiO2 and PEEP. CTA revelaed B/L pulmonary emboli which is likely causing hypoxemia and RH strain. Administration of inhaled epoprostenol on  is showing improvement in V/Q  FiO2 75%/ PEEP 10. Pt is on dobutamine and being diuresed w/ bumex and metolazone.   COVID - 19 negative.     Subjective 21  Hospital Day: 14  Vent Day: 14     Overnight events:  no new events overnight  Mentation/Activity: sedated with fentanyl   Respiratory/ Secretions:  obtaining full volumes  Hemodynamics: stable off pressors and dobutamine  Urine output, bowel: adequate  Diet:npo, tube feedings held due to possible obstruction, CT pending when patient is stable to go  Need for procedures:None       ROS:Review of systems not obtained due to patient factors. Objective:   Vital Signs:    Visit Vitals  /65   Pulse 69   Temp 100 °F (37.8 °C)   Resp 18   Ht 5' 11\" (1.803 m)   Wt 106.2 kg (234 lb 2.1 oz)   SpO2 99%   BMI 32.65 kg/m²       O2 Device: Ventilator, Endotracheal tube, Heated, Humidifier       Temp (24hrs), Av.9 °F (37.7 °C), Min:99.3 °F (37.4 °C), Max:100.8 °F (38.2 °C)       Intake/Output:   Last shift:      No intake/output data recorded.   Last 3 shifts:  1901 -  0700  In: 1368.5 [I.V.:1368.5]  Out: 0164 [Urine:3575]    Intake/Output Summary (Last 24 hours) at 2021 0912  Last data filed at 4/18/2021 0700  Gross per 24 hour   Intake 818.2 ml   Output 2725 ml   Net -1906.8 ml         Physical Exam:                  General: Intubated/sedated; obese   HEENT:  Left scleral erythema; mucosa moist  Resp:  Symmetrical chest expansion, no accessory muscle use; no rales/ wheezing/ rhonchi noted  CV:  S1, S2 present; regular rate and rhythm   GI:  Abdomen soft, obese, non-tender; hypoactive bowel sounds w/ distention   Extremities:  +2 pulses on all extremities; mild edema noted on hands   Skin:  Warm; no rashes/ lesions noted, normal turgor/cap refill   Neurologic:  Non-focal; sedated   Devices:  OGT, ETT, cardona              DATA:  Labs:  Recent Labs     04/18/21 0315 04/17/21  0400 04/16/21  0450   WBC 18.5* 16.5* 16.2*   HGB 9.0* 8.7* 9.2*   HCT 28.3* 28.1* 29.1*   * 380 327     Recent Labs     04/18/21 0315 04/17/21  1555 04/17/21  0400 04/16/21  0450 04/16/21  0450 04/15/21  0940 04/15/21  0940    144 143   < > 141   < >  --    K 3.6 3.6 3.8   < > 4.2   < >  --     103 102   < > 100   < >  --    CO2 30 31 29   < > 29   < >  --    * 125* 123*   < > 119*   < >  --    * 139* 132*   < > 124*   < >  --    CREA 3.57* 3.74* 4.10*   < > 3.91*   < >  --    CA 8.6 7.9* 7.6*   < > 7.6*   < >  --    MG 4.4*  --  4.0*  --  3.9*  --   --    PHOS 7.7* 8.1* 7.9*   < > 8.5*   < >  --    ALB 2.8* 2.4* 2.2*   < > 1.5*   < > 1.5*   ALT  --   --   --   --   --   --  186*    < > = values in this interval not displayed. No results for input(s): PH, PCO2, PO2, HCO3, FIO2 in the last 72 hours. PFT:                                                     Echo:  04/04/21   ECHO ADULT COMPLETE 04/06/2021 4/6/2021    Narrative · Contrast used: DEFINITY. · Image quality for this study was technically difficult. · LV: Calculated LVEF is 55%. Visually measured ejection fraction. Normal   cavity size, wall thickness and systolic function (ejection fraction   normal).  Wall motion: normal. Moderate (grade 2) left ventricular   diastolic dysfunction. · AO: Mild aortic root and ascending aorta dilatation. Ascending aorta   diameter = 3.6 cm. Aortic root measures 3.9 cm  · RA: Dilated right atrium. · RV: Dilated right ventricle. Borderline low systolic function. · TV: Mild tricuspid valve regurgitation is present. · IVC: Mechanically ventilated; cannot use inferior caval vein diameter to   estimate central venous pressure. · PA: Moderate pulmonary hypertension. Pulmonary arterial systolic   pressure is 61 mmHg. Signed by: Caro Ward MD         Imaging:  [x]I have personally reviewed the patients radiographs  []Radiographs reviewed with radiologist   [x]No change from prior, tubes and lines in adequate positio  []Improved   []Worsening    Tamara Gutierrez NP - AMY  04/18/2021  Pulmonary, Critical Care Medicine  [facility]

## 2021-04-18 NOTE — PROGRESS NOTES
2000  Rec'd pt resting in bed with fentanyl/versed sedation /appears comfortable fio2 remains 90%. Eyes remain closed to pain, has + cough / gag reflex. 1930 ptt obtained and sent to lab.    [id number]   Continue to require frequent suctiioning, has large amount of secretions whitish tan color. sats remain 98 % range when at rest.      0200  Pt setting off vent frequently, requiring more suctioning/resp care. Increased versed from 2- 4 mg continuous gtt. -will continue to monitor. 0500 Pt more relaxed. VSS, tolerating vent. Placed #20 jelco to right hand and will start K potassium replacements per order x 2.

## 2021-04-19 ENCOUNTER — APPOINTMENT (OUTPATIENT)
Dept: GENERAL RADIOLOGY | Age: 71
DRG: 004 | End: 2021-04-19
Attending: PHYSICIAN ASSISTANT
Payer: MEDICARE

## 2021-04-19 LAB
ALBUMIN SERPL-MCNC: 2.4 G/DL (ref 3.4–5)
ALBUMIN SERPL-MCNC: 2.5 G/DL (ref 3.4–5)
ALBUMIN SERPL-MCNC: 2.5 G/DL (ref 3.4–5)
ANION GAP SERPL CALC-SCNC: 6 MMOL/L (ref 3–18)
ANION GAP SERPL CALC-SCNC: 8 MMOL/L (ref 3–18)
ANION GAP SERPL CALC-SCNC: 9 MMOL/L (ref 3–18)
APTT PPP: 55.7 SEC (ref 23–36.4)
APTT PPP: 82.7 SEC (ref 23–36.4)
APTT PPP: >180 SEC (ref 23–36.4)
ARTERIAL PATENCY WRIST A: YES
BACTERIA SPEC CULT: ABNORMAL
BACTERIA SPEC CULT: ABNORMAL
BASE EXCESS BLD CALC-SCNC: 7 MMOL/L
BASOPHILS # BLD: 0.1 K/UL (ref 0–0.1)
BASOPHILS NFR BLD: 1 % (ref 0–2)
BDY SITE: ABNORMAL
BODY TEMPERATURE: 37.4
BUN SERPL-MCNC: 126 MG/DL (ref 7–18)
BUN SERPL-MCNC: 129 MG/DL (ref 7–18)
BUN SERPL-MCNC: 129 MG/DL (ref 7–18)
BUN/CREAT SERPL: 43 (ref 12–20)
BUN/CREAT SERPL: 47 (ref 12–20)
BUN/CREAT SERPL: 47 (ref 12–20)
CALCIUM SERPL-MCNC: 8.2 MG/DL (ref 8.5–10.1)
CALCIUM SERPL-MCNC: 8.3 MG/DL (ref 8.5–10.1)
CALCIUM SERPL-MCNC: 8.6 MG/DL (ref 8.5–10.1)
CHLORIDE SERPL-SCNC: 104 MMOL/L (ref 100–111)
CHLORIDE SERPL-SCNC: 107 MMOL/L (ref 100–111)
CHLORIDE SERPL-SCNC: 110 MMOL/L (ref 100–111)
CO2 SERPL-SCNC: 30 MMOL/L (ref 21–32)
CO2 SERPL-SCNC: 31 MMOL/L (ref 21–32)
CO2 SERPL-SCNC: 33 MMOL/L (ref 21–32)
CREAT SERPL-MCNC: 2.72 MG/DL (ref 0.6–1.3)
CREAT SERPL-MCNC: 2.77 MG/DL (ref 0.6–1.3)
CREAT SERPL-MCNC: 2.94 MG/DL (ref 0.6–1.3)
DIFFERENTIAL METHOD BLD: ABNORMAL
EOSINOPHIL # BLD: 0.9 K/UL (ref 0–0.4)
EOSINOPHIL NFR BLD: 6 % (ref 0–5)
ERYTHROCYTE [DISTWIDTH] IN BLOOD BY AUTOMATED COUNT: 15.7 % (ref 11.6–14.5)
GAS FLOW.O2 O2 DELIVERY SYS: ABNORMAL L/MIN
GAS FLOW.O2 SETTING OXYMISER: 18 BPM
GLUCOSE BLD STRIP.AUTO-MCNC: 115 MG/DL (ref 70–110)
GLUCOSE BLD STRIP.AUTO-MCNC: 117 MG/DL (ref 70–110)
GLUCOSE BLD STRIP.AUTO-MCNC: 123 MG/DL (ref 70–110)
GLUCOSE BLD STRIP.AUTO-MCNC: 142 MG/DL (ref 70–110)
GLUCOSE SERPL-MCNC: 133 MG/DL (ref 74–99)
GLUCOSE SERPL-MCNC: 147 MG/DL (ref 74–99)
GLUCOSE SERPL-MCNC: 175 MG/DL (ref 74–99)
GRAM STN SPEC: ABNORMAL
HCO3 BLD-SCNC: 31.4 MMOL/L (ref 22–26)
HCT VFR BLD AUTO: 29.2 % (ref 36–48)
HGB BLD-MCNC: 8.7 G/DL (ref 13–16)
INSPIRATION.DURATION SETTING TIME VENT: 1.2 SEC
LYMPHOCYTES # BLD: 0.7 K/UL (ref 0.9–3.6)
LYMPHOCYTES NFR BLD: 5 % (ref 21–52)
MAGNESIUM SERPL-MCNC: 4.6 MG/DL (ref 1.6–2.6)
MCH RBC QN AUTO: 25.4 PG (ref 24–34)
MCHC RBC AUTO-ENTMCNC: 29.8 G/DL (ref 31–37)
MCV RBC AUTO: 85.1 FL (ref 74–97)
MONOCYTES # BLD: 0.6 K/UL (ref 0.05–1.2)
MONOCYTES NFR BLD: 4 % (ref 3–10)
NEUTS SEG # BLD: 12.2 K/UL (ref 1.8–8)
NEUTS SEG NFR BLD: 84 % (ref 40–73)
NITRIC:PPM ISTAT,INITR: 0 PPM
O2/TOTAL GAS SETTING VFR VENT: 0.9 %
PCO2 BLD: 46.3 MMHG (ref 35–45)
PEEP RESPIRATORY: 13 CMH2O
PH BLD: 7.44 [PH] (ref 7.35–7.45)
PHOSPHATE SERPL-MCNC: 5.4 MG/DL (ref 2.5–4.9)
PHOSPHATE SERPL-MCNC: 5.4 MG/DL (ref 2.5–4.9)
PHOSPHATE SERPL-MCNC: 6.1 MG/DL (ref 2.5–4.9)
PIP ISTAT,IPIP: 26
PLATELET # BLD AUTO: 523 K/UL (ref 135–420)
PLATELET COMMENTS,PCOM: ABNORMAL
PMV BLD AUTO: 11.6 FL (ref 9.2–11.8)
PO2 BLD: 91 MMHG (ref 80–100)
POTASSIUM SERPL-SCNC: 3.4 MMOL/L (ref 3.5–5.5)
POTASSIUM SERPL-SCNC: 3.8 MMOL/L (ref 3.5–5.5)
POTASSIUM SERPL-SCNC: 4 MMOL/L (ref 3.5–5.5)
RBC # BLD AUTO: 3.43 M/UL (ref 4.35–5.65)
RBC MORPH BLD: ABNORMAL
SAO2 % BLD: 97 % (ref 92–97)
SERVICE CMNT-IMP: ABNORMAL
SERVICE CMNT-IMP: ABNORMAL
SODIUM SERPL-SCNC: 144 MMOL/L (ref 136–145)
SODIUM SERPL-SCNC: 146 MMOL/L (ref 136–145)
SODIUM SERPL-SCNC: 148 MMOL/L (ref 136–145)
SPECIMEN TYPE: ABNORMAL
TOTAL RESP. RATE, ITRR: 19
VENTILATION MODE VENT: ABNORMAL
VOLUME CONTROL PLUS IVLCP: YES
VT SETTING VENT: 500 ML
WBC # BLD AUTO: 14.5 K/UL (ref 4.6–13.2)

## 2021-04-19 PROCEDURE — 99233 SBSQ HOSP IP/OBS HIGH 50: CPT | Performed by: NURSE PRACTITIONER

## 2021-04-19 PROCEDURE — 74011250637 HC RX REV CODE- 250/637: Performed by: PHYSICIAN ASSISTANT

## 2021-04-19 PROCEDURE — 74011250636 HC RX REV CODE- 250/636: Performed by: PHYSICIAN ASSISTANT

## 2021-04-19 PROCEDURE — 74011000250 HC RX REV CODE- 250: Performed by: PHYSICIAN ASSISTANT

## 2021-04-19 PROCEDURE — 94003 VENT MGMT INPAT SUBQ DAY: CPT

## 2021-04-19 PROCEDURE — 85025 COMPLETE CBC W/AUTO DIFF WBC: CPT

## 2021-04-19 PROCEDURE — 85730 THROMBOPLASTIN TIME PARTIAL: CPT

## 2021-04-19 PROCEDURE — 99291 CRITICAL CARE FIRST HOUR: CPT | Performed by: NURSE PRACTITIONER

## 2021-04-19 PROCEDURE — 94669 MECHANICAL CHEST WALL OSCILL: CPT

## 2021-04-19 PROCEDURE — 74011000250 HC RX REV CODE- 250: Performed by: INTERNAL MEDICINE

## 2021-04-19 PROCEDURE — 71045 X-RAY EXAM CHEST 1 VIEW: CPT

## 2021-04-19 PROCEDURE — 74011000250 HC RX REV CODE- 250: Performed by: STUDENT IN AN ORGANIZED HEALTH CARE EDUCATION/TRAINING PROGRAM

## 2021-04-19 PROCEDURE — 36600 WITHDRAWAL OF ARTERIAL BLOOD: CPT

## 2021-04-19 PROCEDURE — 2709999900 HC NON-CHARGEABLE SUPPLY

## 2021-04-19 PROCEDURE — 82962 GLUCOSE BLOOD TEST: CPT

## 2021-04-19 PROCEDURE — 83735 ASSAY OF MAGNESIUM: CPT

## 2021-04-19 PROCEDURE — 36415 COLL VENOUS BLD VENIPUNCTURE: CPT

## 2021-04-19 PROCEDURE — 94640 AIRWAY INHALATION TREATMENT: CPT

## 2021-04-19 PROCEDURE — 80069 RENAL FUNCTION PANEL: CPT

## 2021-04-19 PROCEDURE — 74011250636 HC RX REV CODE- 250/636: Performed by: STUDENT IN AN ORGANIZED HEALTH CARE EDUCATION/TRAINING PROGRAM

## 2021-04-19 PROCEDURE — 74011250636 HC RX REV CODE- 250/636: Performed by: INTERNAL MEDICINE

## 2021-04-19 PROCEDURE — 74011000258 HC RX REV CODE- 258: Performed by: PHYSICIAN ASSISTANT

## 2021-04-19 PROCEDURE — 74011250636 HC RX REV CODE- 250/636: Performed by: NURSE PRACTITIONER

## 2021-04-19 PROCEDURE — 74011250637 HC RX REV CODE- 250/637: Performed by: INTERNAL MEDICINE

## 2021-04-19 PROCEDURE — 82803 BLOOD GASES ANY COMBINATION: CPT

## 2021-04-19 PROCEDURE — 65610000006 HC RM INTENSIVE CARE

## 2021-04-19 PROCEDURE — 99356 PR PROLONGED SVC I/P OR OBS SETTING 1ST HOUR: CPT | Performed by: NURSE PRACTITIONER

## 2021-04-19 PROCEDURE — 74011000258 HC RX REV CODE- 258: Performed by: NURSE PRACTITIONER

## 2021-04-19 PROCEDURE — 94668 MNPJ CHEST WALL SBSQ: CPT

## 2021-04-19 RX ORDER — POTASSIUM CHLORIDE 7.45 MG/ML
10 INJECTION INTRAVENOUS
Status: COMPLETED | OUTPATIENT
Start: 2021-04-19 | End: 2021-04-19

## 2021-04-19 RX ORDER — PROPOFOL 10 MG/ML
0-50 VIAL (ML) INTRAVENOUS
Status: DISCONTINUED | OUTPATIENT
Start: 2021-04-19 | End: 2021-04-19

## 2021-04-19 RX ORDER — POTASSIUM CHLORIDE 7.45 MG/ML
10 INJECTION INTRAVENOUS ONCE
Status: COMPLETED | OUTPATIENT
Start: 2021-04-19 | End: 2021-04-19

## 2021-04-19 RX ORDER — PROPOFOL 10 MG/ML
0-50 VIAL (ML) INTRAVENOUS
Status: DISCONTINUED | OUTPATIENT
Start: 2021-04-19 | End: 2021-04-23

## 2021-04-19 RX ORDER — MIDAZOLAM IN 0.9 % SOD.CHLORID 1 MG/ML
0-10 PLASTIC BAG, INJECTION (ML) INTRAVENOUS
Status: DISCONTINUED | OUTPATIENT
Start: 2021-04-19 | End: 2021-04-23

## 2021-04-19 RX ORDER — HEPARIN SODIUM 1000 [USP'U]/ML
INJECTION, SOLUTION INTRAVENOUS; SUBCUTANEOUS
Status: DISPENSED
Start: 2021-04-19 | End: 2021-04-20

## 2021-04-19 RX ORDER — HEPARIN SODIUM 1000 [USP'U]/ML
3000 INJECTION, SOLUTION INTRAVENOUS; SUBCUTANEOUS ONCE
Status: COMPLETED | OUTPATIENT
Start: 2021-04-19 | End: 2021-04-19

## 2021-04-19 RX ADMIN — MEROPENEM 500 MG: 500 INJECTION INTRAVENOUS at 21:10

## 2021-04-19 RX ADMIN — CHLORHEXIDINE GLUCONATE 0.12% ORAL RINSE 10 ML: 1.2 LIQUID ORAL at 09:16

## 2021-04-19 RX ADMIN — ALBUTEROL SULFATE 2.5 MG: 2.5 SOLUTION RESPIRATORY (INHALATION) at 07:57

## 2021-04-19 RX ADMIN — PIPERACILLIN AND TAZOBACTAM 2.25 G: 2; .25 INJECTION, POWDER, LYOPHILIZED, FOR SOLUTION INTRAVENOUS at 09:16

## 2021-04-19 RX ADMIN — FAMOTIDINE 20 MG: 10 INJECTION INTRAVENOUS at 09:17

## 2021-04-19 RX ADMIN — FENTANYL CITRATE 100 MCG/HR: 0.05 INJECTION, SOLUTION INTRAMUSCULAR; INTRAVENOUS at 11:08

## 2021-04-19 RX ADMIN — ARFORMOTEROL TARTRATE 15 MCG: 15 SOLUTION RESPIRATORY (INHALATION) at 07:57

## 2021-04-19 RX ADMIN — PROPOFOL 10 MCG/KG/MIN: 10 INJECTION, EMULSION INTRAVENOUS at 12:42

## 2021-04-19 RX ADMIN — ARFORMOTEROL TARTRATE 15 MCG: 15 SOLUTION RESPIRATORY (INHALATION) at 20:18

## 2021-04-19 RX ADMIN — POTASSIUM CHLORIDE 10 MEQ: 7.46 INJECTION, SOLUTION INTRAVENOUS at 04:27

## 2021-04-19 RX ADMIN — FENTANYL CITRATE 25 MCG/HR: 50 INJECTION, SOLUTION INTRAMUSCULAR; INTRAVENOUS at 19:32

## 2021-04-19 RX ADMIN — Medication 30 ML: at 09:16

## 2021-04-19 RX ADMIN — MEROPENEM 500 MG: 500 INJECTION INTRAVENOUS at 14:49

## 2021-04-19 RX ADMIN — POTASSIUM CHLORIDE 10 MEQ: 7.46 INJECTION, SOLUTION INTRAVENOUS at 09:30

## 2021-04-19 RX ADMIN — MIDAZOLAM 4 MG/HR: 5 INJECTION INTRAMUSCULAR; INTRAVENOUS at 15:25

## 2021-04-19 RX ADMIN — HEPARIN SODIUM 1810 UNITS/HR: 10000 INJECTION, SOLUTION INTRAVENOUS at 18:12

## 2021-04-19 RX ADMIN — ALBUTEROL SULFATE 2.5 MG: 2.5 SOLUTION RESPIRATORY (INHALATION) at 15:49

## 2021-04-19 RX ADMIN — PIPERACILLIN AND TAZOBACTAM 2.25 G: 2; .25 INJECTION, POWDER, LYOPHILIZED, FOR SOLUTION INTRAVENOUS at 02:05

## 2021-04-19 RX ADMIN — CHLORHEXIDINE GLUCONATE 0.12% ORAL RINSE 10 ML: 1.2 LIQUID ORAL at 21:10

## 2021-04-19 RX ADMIN — HEPARIN SODIUM 3000 UNITS: 1000 INJECTION, SOLUTION INTRAVENOUS; SUBCUTANEOUS at 16:03

## 2021-04-19 RX ADMIN — HEPARIN SODIUM 2210 UNITS/HR: 10000 INJECTION, SOLUTION INTRAVENOUS at 00:33

## 2021-04-19 RX ADMIN — POTASSIUM CHLORIDE 10 MEQ: 7.46 INJECTION, SOLUTION INTRAVENOUS at 07:46

## 2021-04-19 RX ADMIN — POTASSIUM BICARBONATE 10 MEQ: 391 TABLET, EFFERVESCENT ORAL at 23:48

## 2021-04-19 NOTE — PROGRESS NOTES
Palliative Medicine    Palliative Medicine team Alcides Martini NP and Juan M Wade RN met at the pt's bedside. Pt remains intubated and sedated. Pt's sister and son Amelia Jhaveri were present for a family meeting. Also joining by phone were the pt's \"adopted parents\". Brief medical update was provided. Explained that at this time we are unable to perform SBT's or consider the possibility of a trach for the pt as his PEEP settings are too high. Also discussed that this next week will provide a lot of answers as to what direction and options we will have as far as continuing aggressive medical management vs transitioning to comfort measures. Also discussed benefits and burdens of CPR in someone who has suffered a previous cardiac arrest and is debilitated in ICU. Provided our medical recommendation to not subject the pt to CPR if he suffers another cardiac arrest.  Family would like to have a meeting prior to making this decision. Pt has three adult children and a multitude of extended family members. While pt's son and sister were tearful they were appreciative of the information provided. The pt's sister did state, \"I refuse to accept this. God has this and He can turn things around. \"  Pt's sister and son did leave the ICU after the meeting and were both very tearful. For now the pt remains FULL code with FULL aggressive medical management. Potential dispo plan is unknown. Thank you for the Palliative Medicine consult and allowing us to participate in the care of Mr. Harvinder Aguirre. Will continue to monitor and provide support.     Juan M Wade RN, BSN  Palliative Medicine Inpatient RN  [facility]  Palliative COPE Line: [phone])

## 2021-04-19 NOTE — ROUTINE PROCESS
1940 Bedside and Verbal shift change report given to Adriano (oncoming nurse) by Saulo Levine RN (offgoing nurse). Report included the following information SBAR, Kardex and Intake/Output DUAL DRIP verification completed. 2000  Assessment completed. Oral and cardona care completed. 2200 will continue to monitor patient. 0000 Reassessment completed. Oral and cardona care completed. 0400 reassessment completed. Oral and cardona care provided. 0430 adela arteaga notified potassium 3.4 and nurse ordered 30 meq kcl to be replaced per protocol ( 10 meq in 100 cc ns x 3), accepting. 0730 Bedside and Verbal shift change report given to [facility] (oncoming nurse) by Pratik Escudero (offgoing nurse). Report included the following information SBAR, Kardex and Intake/Output side rails up x 4, rotating bed. Hob elevated 30 degrees. Dual drip verification completed. Tommie Angry

## 2021-04-19 NOTE — ROUTINE PROCESS
Bedside and Verbal shift change report given to ANUM Montalvo (oncoming nurse) by Ana M Denson RN (offgoing nurse). Report included the following information SBAR, Intake/Output, MAR, Recent Results, Med Rec Status and Cardiac Rhythm NSR.

## 2021-04-19 NOTE — PROGRESS NOTES
[facility]: [phone]  [facility]: [address] [place] [address] Way: [phone]    Patient Name: Durwood Kehr  YOB: 1950    Date of Progress note : 4/19/21  Reason for Consult: establish goals of care  Requesting Provider: Nanci Martinez MD  Primary Care Physician: DUSTIN Dodson      SUMMARY:   Durwood Kehr is a 79 y.o. male with a past history of HTN, DM2, Diabetic neuropathy, Obesity , who was admitted on 4/4/2021 from home with a diagnosis of acute respiratory failure and angioedema. Current medical issues leading to Palliative Medicine involvement include: establish goals of care    CHIEF COMPLAINT: intubated and mechanically ventilated     HPI/SUBJECTIVE:    Pt is a 79Year old AAM that presented to the ED with angioedema due to ACE inhibitor. Pt had emergent acute respiratory failure and collapse and was intubated and placed on a ventilator for airway protection. Found later to have a large pulmonary emboli in his lungs that also caused damage to his heart. He cardiac arrested on 4/4/21. He is currently on Day 13 of the ventilator     4/19/21:  Day 15 intubation and mechanical ventilation with PEEP today at 13 with 85% Fi02. Continues on DVT prophylaxis     The patient is:   [] Verbal and participatory  [x] Non-participatory due to: Intubated and sedated     GOALS OF CARE:  Patient/Health Care Proxy Stated Goals: Prolong life      TREATMENT PREFERENCES:   Code Status: Full Code         PALLIATIVE DIAGNOSES:   1. Encounter for Palliative Medicine  2. Goals of care/ACP  3. Acute respiratory failure  4. PE       PLAN:   4/19/21:  Family meeting with Palliative team including this NP and Edward Alves RN. Present were patients sister Markell Marroquin and patient's youngest son Carmen Rao. On the phone were the patient's step mother and father.   Provided a brief medical update and a picture of the options for the patient with burdens and benefits of those options. Discussed inability to trach patient at this time due to the high ventilator PEEP pressure. Informed the family that keeping the patient intubated long term is not an option and he is not at this time a candidate for a trach. Spoke with Dr Daksha Winkler who wants to see how patient progresses over the next few days to make final decisions on goals of care. I have recommended to the family that he be made a DNR with No CPR if he arrests again. Provided much support and active listening. They are going to call a family meeting to discuss the options and await the outcome of this weeks treatments. At this time patient remains a FULL CODE with FULL AGGRESSIVE MEASURES   1. Encounter for palliative Medicine  Pt with a long term life limiting chronic and acute disease process that warrants discussions about her goals of care. 2.  Goals of care  This NP in to see patient at the bedside. He is not able to interact due to intubation and sedation. Have reached out to his family and spoken to his son Becca Kaur and sister Tiffany Dickerson to set up an appointment for Monday morning 4/19 at 10am will appreciate assistance from the ICU team.   At this time patient remains a FULL CODE with FULL INTERVENTIONS  3. Acute respiratory failure  Patient supported on ventilator and intubated now for 13 days. Fi02 is 85% with PEEP 12 Prognosis remains guarded due to high ventilator settings. Plan is to wean support as tolerated but not yet stable and does not meet criteria for SBT. 4.   Pulmonary Embolism  Per the CT scan Moderate burden of acute pulmonary embolism in the left lower lobe with additional small acute segmental pulmonary embolism in the right upper lobe. Moderate burden in the lungs likely causing damage to the heart. On Heparin to degrade the clots. 5.   Initial consult note routed to primary continuity provider  6.    Communicated plan of care with: Palliative IDT      Advance Care Planning:  [] The [facility] Interdisciplinary Team has updated the ACP Navigator with [address] and Patient Capacity    Primary Decision [facility] ([address]): Gianni Polanco is DEVEN [facility]     Medical Interventions: Full interventions   Other Instructions:         As far as possible, the palliative care team has discussed with patient / health care proxy about goals of care / treatment preferences for patient. HISTORY:     History obtained from: Chart review   Active Problems:    Angioedema due to angiotensin converting enzyme inhibitor (ACE-I) (4/4/2021)      Respiratory failure ([address] (4/5/2021)      JACQUELYN (acute kidney injury) ([address] (4/4/2021)      Cardiac arrest ([address] (4/4/2021)      Obesity (BMI 30-39.9) (4/5/2021)      Diabetic neuropathy associated with type 2 diabetes mellitus ([address] (4/5/2021)      Diabetic retinopathy associated with type 2 diabetes mellitus ([address] (4/5/2021)      Pulmonary infiltrates (4/5/2021)      Controlled type 2 diabetes mellitus with neurologic complication, without long-term current use of insulin ([address] (4/5/2021)      DVT (deep venous thrombosis) ([address] (4/10/2021)      Encounter for palliative care ()      Goals of care, counseling/discussion ()      Multiple subsegmental pulmonary emboli without acute cor pulmonale (HCC) ()      Past Medical History:   Diagnosis Date    DDD (degenerative disc disease), lumbar     Diabetes ([address]     Diabetic neuropathy ([address]     Diabetic retinopathy ([address]     DM2 (diabetes mellitus, type 2) ([address]     HLD (hyperlipidemia)     HTN (hypertension)     Obesity (BMI 30-39. 9)       History reviewed. No pertinent surgical history. History reviewed. No pertinent family history. History reviewed, no pertinent family history.   Social History     Tobacco Use    Smoking status: Current Every Day Smoker     Packs/day: 0.50   Substance Use Topics    Alcohol use: Not on file     Allergies Allergen Reactions    Lisinopril Angioedema      Current Facility-Administered Medications   Medication Dose Route Frequency    propofol (DIPRIVAN) 10 mg/mL infusion  0-50 mcg/kg/min IntraVENous TITRATE    midazolam in normal saline (VERSED) 1 mg/mL infusion  0-10 mg/hr IntraVENous TITRATE    fentaNYL (PF) 1,500 mcg/30 mL (50 mcg/mL) infusion  0-200 mcg/hr IntraVENous TITRATE    meropenem (MERREM) 500 mg in sterile water (preservative free) 10 mL IV syringe  500 mg IntraVENous Q8H    albuterol (PROVENTIL VENTOLIN) nebulizer solution 2.5 mg  2.5 mg Nebulization Q8H RT    acetaminophen (TYLENOL) solution 500 mg  500 mg Oral Q4H PRN    oxymetazoline (AFRIN) 0.05 % nasal spray 2 Spray  2 Spray Other BID PRN    famotidine (PF) (PEPCID) 20 mg in 0.9% sodium chloride 10 mL injection  20 mg IntraVENous DAILY    ELECTROLYTE REPLACEMENT PROTOCOL - Potassium Renal Dosing  1 Each Other PRN    heparin 25,000 units in D5W 250 ml infusion  12-25 Units/kg/hr IntraVENous TITRATE    arformoteroL (BROVANA) neb solution 15 mcg  15 mcg Nebulization BID RT    multivit-folic acid-herbal 183 (WELLESSE PLUS) oral liquid 30 mL  30 mL Per NG tube DAILY    chlorhexidine (PERIDEX) 0.12 % mouthwash 10 mL  10 mL Oral Q12H    midazolam (VERSED) injection 1-2 mg  1-2 mg IntraVENous Q10MIN PRN    albuterol-ipratropium (DUO-NEB) 2.5 MG-0.5 MG/3 ML  3 mL Nebulization Q4H PRN    glucose chewable tablet 16 g  4 Tab Oral PRN    glucagon (GLUCAGEN) injection 1 mg  1 mg IntraMUSCular PRN    dextrose (D50W) injection syrg 12.5-25 g  25-50 mL IntraVENous PRN    insulin lispro (HUMALOG) injection   SubCUTAneous Q6H    carboxymethylcellulose sodium (REFRESH LIQUIGEL) 1 % ophthalmic solution 1 Drop  1 Drop Both Eyes PRN          Clinical Pain Assessment (nonverbal scale for nonverbal patients):        Activity (Movement): Lying quietly, normal position    Duration: for how long has pt been experiencing pain (e.g., 2 days, 1 month, years)  Frequency: how often pain is an issue (e.g., several times per day, once every few days, constant)     FUNCTIONAL ASSESSMENT:     Palliative Performance Scale (PPS):  PPS: 10    ECOG  ECOG Status : Completely disabled     PSYCHOSOCIAL/SPIRITUAL SCREENING:      Any spiritual / [employer] concerns:  [] Yes /  [x] No    Caregiver Burnout:  [] Yes /  [] No /  [x] No Caregiver Present      Anticipatory grief assessment:   [x] Normal  / [] Maladaptive        REVIEW OF SYSTEMS:     Systems: constitutional, ears/nose/mouth/throat, respiratory, gastrointestinal, genitourinary, musculoskeletal, integumentary, neurologic, psychiatric, endocrine. Positive findings noted below. Modified ESAS Completed by: provider                       Dyspnea: 5           Stool Occurrence(s): 0   Positive and pertinent negative findings in ROS are noted above in HPI. The following systems were [x] reviewed / [] unable to be reviewed as noted in HPI  Other findings are noted below. PHYSICAL EXAM:     Constitutional: sedated   Eyes: closed   ENMT: no nasal discharge, moist mucous membranes  Cardiovascular: regular rhythm, distal pulses intact  Respiratory: supported on a ventilator and intubated   Musculoskeletal: no deformity, no tenderness to palpation  Skin: warm, dry  Neurologic: not following commands, or moving all extremities    Other: Wt Readings from Last 3 Encounters:   04/19/21 105.9 kg (233 lb 7.5 oz)   04/04/21 127 kg (280 lb)     Blood pressure 116/67, pulse 79, temperature 98.8 °F (37.1 °C), resp. rate 18, height 5' 11\" (1.803 m), weight 105.9 kg (233 lb 7.5 oz), SpO2 96 %.   Pain:  Pain Scale 1: Adult Nonverbal Pain Scale  Pain Intensity 1: 0                      LAB AND IMAGING FINDINGS:     Lab Results   Component Value Date/Time    WBC 14.5 (H) 04/19/2021 03:11 AM    HGB 8.7 (L) 04/19/2021 03:11 AM    PLATELET 681 (H) 88/93/3511 03:11 AM     Lab Results   Component Value Date/Time    Sodium 144 04/19/2021 03:11 AM Potassium 3.4 (L) 04/19/2021 03:11 AM    Chloride 104 04/19/2021 03:11 AM    CO2 31 04/19/2021 03:11 AM     (H) 04/19/2021 03:11 AM    Creatinine 2.94 (H) 04/19/2021 03:11 AM    Calcium 8.3 (L) 04/19/2021 03:11 AM    Magnesium 4.6 (H) 04/19/2021 03:11 AM    Phosphorus 6.1 (H) 04/19/2021 03:11 AM      Lab Results   Component Value Date/Time    Alk. phosphatase 86 04/15/2021 09:40 AM    Protein, total 6.2 (L) 04/15/2021 09:40 AM    Albumin 2.4 (L) 04/19/2021 03:11 AM    Globulin 4.7 (H) 04/15/2021 09:40 AM     Lab Results   Component Value Date/Time    INR 1.4 (H) 04/12/2021 11:54 PM    Prothrombin time 17.2 (H) 04/12/2021 11:54 PM    aPTT 55.7 (H) 04/19/2021 11:55 AM      No results found for: IRON, FE, TIBC, IBCT, PSAT, FERR   Lab Results   Component Value Date/Time    pH 7.28 (L) 04/04/2021 01:55 PM    PCO2 49 (H) 04/04/2021 01:55 PM    PO2 103 (H) 04/04/2021 01:55 PM     No components found for: [place]   Lab Results   Component Value Date/Time     04/14/2021 03:27 AM    CK - MB <1.0 04/14/2021 03:27 AM              Total time: 35 minutes + 30 minutes   Counseling / coordination time, spent as noted above:   > 50% counseling / coordination:  Time spent in direct consultation with the patient, medical team, and family     Prolonged service was provided for  [x]30 min   []75 min in face to face time in the presence of the patient, spent as noted above.   Time Start: 10:00  Time End: 11:40

## 2021-04-19 NOTE — PROGRESS NOTES
In Patient Progress note      Admit Date: 4/4/2021    Impression:     #1 Acute kidney injury, etiology appears to be secondary to Ischaemic ATN d/t hypoperfusion v/s cardiorenal syndrome in  setting of CHF/V failure in setting of PE  #2 acute respiratory failure secondary to rapidly progressive angioedema s/p emergent cricothyrotomy. Presently intubated on the vent,  severely hypoxic out of proportion to x-ray findings, therefore PE has been considered , Rx for presumed PE  #3 pulmonary infiltrates/Pulm edema   #4 left-sided pneumothorax, appears resolved  #5 diabetes mellitus with complications  #6 popliteal DVT left  #7 possible colonic obst , awaiting CT abd      Plan:     1) allowing pat to auto diurese ,continue albumin 25 gm q6hrs X 4 doses    allow volume status to equilibrate and renal functions stabilize , no diuretics  2) strict I/o  3) follow renal parameters , electrolytes q12hrs , replace electrolytes  4) keep MAP > 65       Please call with questions,     Tono Reagan MD FASN  Cell [phone]  Pager: [phone]  Subjective:     - No acute over night events. - respiratory - stable  - hemodynamics - stable, no pressrs  - UOP-good  - Nutrition -ok    Objective:     Visit Vitals  /67   Pulse 69   Temp 98.8 °F (37.1 °C)   Resp 18   Ht 5' 11\" (1.803 m)   Wt 105.9 kg (233 lb 7.5 oz)   SpO2 94%   BMI 32.56 kg/m²         Intake/Output Summary (Last 24 hours) at 4/19/2021 1600  Last data filed at 4/19/2021 1500  Gross per 24 hour   Intake 758.66 ml   Output 2855 ml   Net -2096.34 ml       Physical Exam:        Intubated/ on vent   HEENT: mmm  Lungs: good air entry, coarse BS +  Cardiovascular system: S1, S2, regular rate and rhythm. Abdomen: soft, non tender, non distended. Positive bowel sounds. Extremities: no clubbing, cyanosis or edema. Integumentary: skin is grossly intact.        Data Review:    Recent Labs     04/19/21  0311   WBC 14.5*   RBC 3.43*   HCT 29.2*   MCV 85.1   MCH 25.4 MCHC 29.8*   RDW 15.7*     Recent Labs     04/19/21  0311 04/18/21  0315 04/17/21  1555 04/17/21  0400 04/16/21  1607   * 134* 139* 132* 127*   CREA 2.94* 3.57* 3.74* 4.10* 3.94*   CA 8.3* 8.6 7.9* 7.6* 8.3*   ALB 2.4* 2.8* 2.4* 2.2* 1.8*   K 3.4* 3.6 3.6 3.8 3.9    144 144 143 140    103 103 102 100   CO2 31 30 31 29 31   PHOS 6.1* 7.7* 8.1* 7.9* 8.0*   * 127* 125* 123* 124*       Margot Hubbard MD

## 2021-04-19 NOTE — PROGRESS NOTES
Nutrition Note    Patient receiving tube feeding at goal then held due to increased abdominal distension at 01:30 on 4/14 and OGT placed to low intermittent suction and KUB showing increased colonic distension up to 8 cm with concern for early colonic obstruction with plan for abdominal CT once stable and respiratory status improves/able to tolerate lying flat. Last BM on 4/11 and 275 mL output from OGT in the past 24 hours. Plan to resume trickle feeds today and change sedation. Recommendations/Plan   - Start trickle tube feeding of Vital High Protein at 10 mL/hr with 50 mL q 4 hour water flushes; monitor tolerance and ability to advance to goal rate of 55 mL/hr and add prosource TID (goal regimen to provide 1500 kcal, 161 gm protein, 1104 mL free water, 93% RDIs).      Electronically signed by Kathy Fisher RD, [address]  on 4/19/2021     Contact: [phone]

## 2021-04-19 NOTE — PROGRESS NOTES
[facility]  Pulmonary, Critical Care, and Sleep Medicine    Name: Tung Onofre MRN: [id number]   : 1950 Hospital: [address] [place] [address] Dr   Date: 2021        IMPRESSION:   · Angioedema- with airway and respiratory failure and collapse; thought due to ACE inhibitor  · S/P Emergent Cricthyrotomy [facility]-ED 21- converted to 6.5 ETT intraoperative by anesthesia team 21, 8.0 ETT by anesthesia 21, packing removed evening 21-ENT  · S/P cardiac arrest @ [facility] 21- brief, likely due to  hypoxia and  airway collapse  · Respiratory Failure: Acute due to above; currently intubated and on vent for airway protection  · Pulmonary Infiltrates: suspect aspiration secretions and/or blood due to above- can't exclude other infectious process at this time. Rapid Covid Negative at [facility]  · Bradycardia - improved  · DVT: Popliteal- Left; acute non-occlusive thrombus. Heparin ACS protocol started - stopped  due to bleeding from Cric site. Bleeding controlled -  heparin currently back on   · Pulmonary Embolism - 21 - left lower and right upper lobes  · JACQUELYN - , Creatinine 3.57  · Hypokalemia  · Left scleral erythema- unknown baseline; possible infection   · DM  · DM retinopathy per chart review  · Diabetic peripheral neuropathy  · Left Pneumothorax - resolved     · Obesity: Body mass index is 35.64 kg/m². Patient Active Problem List   Diagnosis Code    Angioedema due to angiotensin converting enzyme inhibitor (ACE-I) T78. 3XXA, [id number]    Respiratory failure ([address] J96.90    JACQUELYN (acute kidney injury) ([address] N17.9    Cardiac arrest (HCC) I46.9    Obesity (BMI 30-39. 9) E66.9    Diabetic neuropathy associated with type 2 diabetes mellitus ([address] E11.40    Diabetic retinopathy associated with type 2 diabetes mellitus ([address] E11.319    Pulmonary infiltrates R91.8    Controlled type 2 diabetes mellitus with neurologic complication, without long-term current use of insulin (HCC) E11.49    DVT (deep venous thrombosis) ([facility] I82.409    Encounter for palliative care Z51.5    Goals of care, counseling/discussion Z71.89    Multiple subsegmental pulmonary emboli without acute cor pulmonale (HCC) I26.94      PLAN:   NEURO:  · Serial neuro evaluations  · Daily sedation holiday  · Wetting tears and cipro eye drops- stop date for cipro on chart     CARDIO:  · MAP goal >64- No IV pressor requirement  · Echo on 4/6/21 with EF of 55%, dilated RV and moderate pulmonary hypertension ( PASP 61 mmHg)  PULM:  · Maintain aspiration precautions: HOB >30 degrees  · SpO2 goal >92%  · Bronchial /oral hygiene   · VAP bundle- Wean vent as clinical status allows  · SBT as clinical status allows  · Completed course of decadron  · Pulmozyme and Mucomist DC  · Metaneb  · Daily CXR and ABG while intubated     GI/ NUTRITION:  · OGT   · Diet: start trickle feeds and advance as tolerated  · SUP:Pepcid  · Follow up with nutritional recommendations  RENAL/ METAB/ :  · Monitor I&Os  · Trend electrolytes - replaced as needed  · Garcia: Continue     HEM/ONC  · Trend Hb/HCT and PLTs  · DVT prophylaxis: SCDs, heparin gtt  · Re-image poplyteal DVT 72 hours and/or pending clinical course- may need IVC Filter if worsens  · Blood Products: not indicated at this time     ID  · Antibioitcs: Cipro eye drop, Zosyn 4/4 - 4/8  Vanco: 4/4-6, MRSA swab negative.    · Zosyn restarted on 4/11, WBCs elevated to 16.5  · Follow up on pending cultures  · Repeat Covid negative (4/11)  · Tylenol for fevers     ENDO  · BGs Q 6,SSI, TSH normal  · C1 inhibitor normal level (4/7/21)     MSK/ ORTHO  · No active Issues     SKIN/ WOUNDS  · Per protocol  · Neck- daily dressing changes per ENT, Quick clot wit Afrin if needed, d/c xeroform           CODE STATUS: Full Code- Full     Discussed in interdisciplinary rounds   Best practice :     Glycemic control  Mech Vent patients- VAP bundle, aim to keep peak plateau pressure 37-66ME H2O  Sress ulcer prophylaxis. DVT prophylaxis. Need for Lines, cardona assessed  Subjective/Interval History:     Interval HPI:  78 yo M h/o HTN tx w/ lisinopril presenting to [facility] ED  for rapidly progressing respiratory distress due to severe angioedema. During intubation attempts pt had brief cardiac arrest w/ pulse loss and severe airway swelling leading to emergent cricothyrotomy. Pt stabilized before [facility] transport to [place] pt taken emergently to [address] was converted to ETT. Patient continues to have trouble with o2 requirements disproportionate to CXR findings and despite adjustment of FiO2 and PEEP. CTA revelaed B/L pulmonary emboli which is likely causing hypoxemia and RH strain. Administration of inhaled epoprostenol on  is showing improvement in V/Q  FiO2 75%/ PEEP 10. Pt is on dobutamine and being diuresed w/ bumex and metolazone.   COVID - 19 negative.     Subjective 21  Hospital Day: 15  Vent Day: 15     Overnight events:  no new events overnight  Mentation/Activity: sedated with fentanyl, versed and propofol  Respiratory/ Secretions:  obtaining full volumes. High vent requirements:  FIO2 90% and 13 PEEP  Hemodynamics: stable off pressors and dobutamine  Urine output, bowel: adequate  Diet:re-starting trickle feeds and advance as tolerated  Need for procedures:None  Palliative meeting with family today to discuss goals of care       ROS:Review of systems not obtained due to patient factors. Objective:   Vital Signs:    Visit Vitals  /65   Pulse 81   Temp 99 °F (37.2 °C)   Resp 19   Ht 5' 11\" (1.803 m)   Wt 105.9 kg (233 lb 7.5 oz)   SpO2 98%   BMI 32.56 kg/m²       O2 Device: Endotracheal tube, Ventilator       Temp (24hrs), Av.4 °F (37.4 °C), Min:98.6 °F (37 °C), Max:100 °F (37.8 °C)       Intake/Output:   Last shift:      No intake/output data recorded.   Last 3 shifts:  1901 -  0700  In: 1486.7 [I.V.:1456.7]  Out: 8897 [Urine:3580]    Intake/Output Summary (Last 24 hours) at 4/19/2021 0805  Last data filed at 4/19/2021 0700  Gross per 24 hour   Intake 1027.76 ml   Output 2855 ml   Net -1827.24 ml        Physical Exam:                  General: Intubated/sedated; obese   HEENT:  Left scleral erythema; mucosa moist  Resp:  Symmetrical chest expansion, no accessory muscle use; no rales/ wheezing/ rhonchi noted  CV:  S1, S2 present; regular rate and rhythm   GI:  Abdomen soft, obese, non-tender; hypoactive bowel sounds w/ distention   Extremities:  +2 pulses on all extremities; mild edema noted on hands   Skin:  Warm; no rashes/ lesions noted, normal turgor/cap refill   Neurologic:  Non-focal; sedated   Devices:  OGT, ETT, cardona    DATA:  Labs:  Recent Labs     04/19/21  0311 04/18/21  0315 04/17/21  0400   WBC 14.5* 18.5* 16.5*   HGB 8.7* 9.0* 8.7*   HCT 29.2* 28.3* 28.1*   * 427* 380     Recent Labs     04/19/21  0311 04/18/21  0315 04/17/21  1555 04/17/21  0400    144 144 143   K 3.4* 3.6 3.6 3.8    103 103 102   CO2 31 30 31 29   * 127* 125* 123*   * 134* 139* 132*   CREA 2.94* 3.57* 3.74* 4.10*   CA 8.3* 8.6 7.9* 7.6*   MG 4.6* 4.4*  --  4.0*   PHOS 6.1* 7.7* 8.1* 7.9*   ALB 2.4* 2.8* 2.4* 2.2*     No results for input(s): PH, PCO2, PO2, HCO3, FIO2 in the last 72 hours. PFT:                                                     Echo:   04/04/21   ECHO ADULT COMPLETE 04/06/2021 4/6/2021    Narrative · Contrast used: DEFINITY. · Image quality for this study was technically difficult. · LV: Calculated LVEF is 55%. Visually measured ejection fraction. Normal   cavity size, wall thickness and systolic function (ejection fraction   normal). Wall motion: normal. Moderate (grade 2) left ventricular   diastolic dysfunction. · AO: Mild aortic root and ascending aorta dilatation. Ascending aorta   diameter = 3.6 cm. Aortic root measures 3.9 cm  · RA: Dilated right atrium. · RV: Dilated right ventricle. Borderline low systolic function. · TV: Mild tricuspid valve regurgitation is present. · IVC: Mechanically ventilated; cannot use inferior caval vein diameter to   estimate central venous pressure. · PA: Moderate pulmonary hypertension. Pulmonary arterial systolic   pressure is 61 mmHg. Signed by: Remi Brown MD         Imaging:  [x]I have personally reviewed the patients radiographs  []Radiographs reviewed with radiologist   [x]No change from prior, tubes and lines in adequate position  []Improved   []Worsening      Total of  35 min critical care time spent at bedside during the course of care providing evaluation,management and care decisions and ordering appropriate treatment related to critical care problems exclusive of procedures      Tamara Huntley NP - C  04/19/2021  Pulmonary, Critical Care Medicine  [facility]

## 2021-04-19 NOTE — PROGRESS NOTES
attended the interdisciplinary rounds for Alfie Medina, who is a 79 y.o.,male. Patients Primary Language is: [place]. According to the patients EMR Druze Affiliation is: No preference. The reason the Patient came to the hospital is:   Patient Active Problem List    Diagnosis Date Noted    Encounter for palliative care     Goals of care, counseling/discussion     Multiple subsegmental pulmonary emboli without acute cor pulmonale ([facility]     DVT (deep venous thrombosis) ([facility] 04/10/2021    Respiratory failure ([address] 04/05/2021    Obesity (BMI 30-39.9) 04/05/2021    Diabetic neuropathy associated with type 2 diabetes mellitus ([facility] 04/05/2021    Diabetic retinopathy associated with type 2 diabetes mellitus ([facility] 04/05/2021    Pulmonary infiltrates 04/05/2021    Controlled type 2 diabetes mellitus with neurologic complication, without long-term current use of insulin ([facility] 04/05/2021    Angioedema due to angiotensin converting enzyme inhibitor (ACE-I) 04/04/2021    JACQUELYN (acute kidney injury) ([facility] 04/04/2021    Cardiac arrest ([facility] 04/04/2021        Plan:  Chaplains will continue to follow and will provide pastoral care on an as needed/requested basis.  recommends bedside caregivers page  on duty if patient shows signs of acute spiritual or emotional distress.     [address] [facility]  Board Certified [address] [facility]   [phone]

## 2021-04-19 NOTE — PROGRESS NOTES
Problem: Ventilator Management  Goal: *Adequate oxygenation and ventilation  Outcome: Progressing Towards Goal  Goal: *Patient maintains clear airway/free of aspiration  Outcome: Progressing Towards Goal  Goal: *Absence of infection signs and symptoms  Outcome: Progressing Towards Goal  Goal: *Normal spontaneous ventilation  Outcome: Not Progressing Towards Goal     Problem: Patient Education: Go to Patient Education Activity  Goal: Patient/Family Education  Outcome: Progressing Towards Goal     Problem: Diabetes Self-Management  Goal: *Disease process and treatment process  Description: Define diabetes and identify own type of diabetes; list 3 options for treating diabetes. Outcome: Progressing Towards Goal  Goal: *Using medications safely  Description: State effect of diabetes medications on diabetes; name diabetes medication taking, action and side effects. Outcome: Progressing Towards Goal  Goal: *Monitoring blood glucose, interpreting and using results  Description: Identify recommended blood glucose targets  and personal targets. Outcome: Not Progressing Towards Goal     Problem: Patient Education: Go to Patient Education Activity  Goal: Patient/Family Education  Outcome: Progressing Towards Goal     Problem: Falls - Risk of  Goal: *Absence of Falls  Description: Document [facility] Fall Risk and appropriate interventions in the flowsheet.   Outcome: Progressing Towards Goal  Note: Fall Risk Interventions:       Mentation Interventions: Adequate sleep, hydration, pain control, Bed/chair exit alarm, Door open when patient unattended, Evaluate medications/consider consulting pharmacy, More frequent rounding, Room close to nurse's station, Toileting rounds, Update white board    Medication Interventions: Bed/chair exit alarm, Evaluate medications/consider consulting pharmacy, Patient to call before getting OOB, Teach patient to arise slowly    Elimination Interventions: Bed/chair exit alarm, Call light in reach, Stay With Me (per policy), Toileting schedule/hourly rounds              Problem: Patient Education: Go to Patient Education Activity  Goal: Patient/Family Education  Outcome: Progressing Towards Goal     Problem: Pressure Injury - Risk of  Goal: *Prevention of pressure injury  Description: Document Lang Scale and appropriate interventions in the flowsheet. Outcome: Progressing Towards Goal  Note: Pressure Injury Interventions:  Sensory Interventions: Assess changes in LOC, Assess need for specialty bed, Avoid rigorous massage over bony prominences, Check visual cues for pain, Float heels, Keep linens dry and wrinkle-free, Minimize linen layers, Turn and reposition approx. every two hours (pillows and wedges if needed), Pressure redistribution bed/mattress (bed type), Monitor skin under medical devices, Pad between skin to skin    Moisture Interventions: Internal/External urinary devices, Minimize layers, Maintain skin hydration (lotion/cream), Apply protective barrier, creams and emollients, Absorbent underpads, Check for incontinence Q2 hours and as needed    Activity Interventions: Assess need for specialty bed, Pressure redistribution bed/mattress(bed type)    Mobility Interventions: Float heels, HOB 30 degrees or less, Assess need for specialty bed, Suspension boots, Turn and reposition approx.  every two hours(pillow and wedges)    Nutrition Interventions: Discuss nutritional consult with provider    Friction and Shear Interventions: Apply protective barrier, creams and emollients, Foam dressings/transparent film/skin sealants, HOB 30 degrees or less, Minimize layers, Transferring/repositioning devices                Problem: Patient Education: Go to Patient Education Activity  Goal: Patient/Family Education  Outcome: Progressing Towards Goal     Problem: Injury - Risk of, Adverse Drug Event  Goal: *Absence of adverse drug events  Outcome: Progressing Towards Goal  Goal: *Absence of medication errors  Outcome: Progressing Towards Goal  Goal: *Knowledge of prescribed medications  Outcome: Not Progressing Towards Goal     Problem: Patient Education: Go to Patient Education Activity  Goal: Patient/Family Education  Outcome: Not Progressing Towards Goal     Problem: Pain  Goal: *Control of Pain  Outcome: Progressing Towards Goal     Problem: Patient Education: Go to Patient Education Activity  Goal: Patient/Family Education  Outcome: Progressing Towards Goal   Sister beatrice gilmore calls and we teach sister about vent, labs andreview how patient is doing and encouragte roderickins. Will continue to educate sister and review care with patient in hope he understands.

## 2021-04-20 ENCOUNTER — APPOINTMENT (OUTPATIENT)
Dept: GENERAL RADIOLOGY | Age: 71
DRG: 004 | End: 2021-04-20
Attending: PHYSICIAN ASSISTANT
Payer: MEDICARE

## 2021-04-20 LAB
ALBUMIN SERPL-MCNC: 2.3 G/DL (ref 3.4–5)
ALBUMIN SERPL-MCNC: 2.3 G/DL (ref 3.4–5)
ANION GAP SERPL CALC-SCNC: 5 MMOL/L (ref 3–18)
ANION GAP SERPL CALC-SCNC: 6 MMOL/L (ref 3–18)
APTT PPP: 165.3 SEC (ref 23–36.4)
APTT PPP: 46.2 SEC (ref 23–36.4)
APTT PPP: 55.2 SEC (ref 23–36.4)
ARTERIAL PATENCY WRIST A: YES
BACTERIA SPEC CULT: NORMAL
BACTERIA SPEC CULT: NORMAL
BASE EXCESS BLD CALC-SCNC: 8 MMOL/L
BASOPHILS # BLD: 0 K/UL (ref 0–0.1)
BASOPHILS NFR BLD: 0 % (ref 0–2)
BDY SITE: ABNORMAL
BODY TEMPERATURE: 37.8
BUN SERPL-MCNC: 129 MG/DL (ref 7–18)
BUN SERPL-MCNC: 131 MG/DL (ref 7–18)
BUN/CREAT SERPL: 47 (ref 12–20)
BUN/CREAT SERPL: 51 (ref 12–20)
CALCIUM SERPL-MCNC: 8.5 MG/DL (ref 8.5–10.1)
CALCIUM SERPL-MCNC: 8.9 MG/DL (ref 8.5–10.1)
CHLORIDE SERPL-SCNC: 107 MMOL/L (ref 100–111)
CHLORIDE SERPL-SCNC: 112 MMOL/L (ref 100–111)
CO2 SERPL-SCNC: 30 MMOL/L (ref 21–32)
CO2 SERPL-SCNC: 33 MMOL/L (ref 21–32)
CREAT SERPL-MCNC: 2.59 MG/DL (ref 0.6–1.3)
CREAT SERPL-MCNC: 2.74 MG/DL (ref 0.6–1.3)
DIFFERENTIAL METHOD BLD: ABNORMAL
EOSINOPHIL # BLD: 0.6 K/UL (ref 0–0.4)
EOSINOPHIL NFR BLD: 5 % (ref 0–5)
ERYTHROCYTE [DISTWIDTH] IN BLOOD BY AUTOMATED COUNT: 15.4 % (ref 11.6–14.5)
GAS FLOW.O2 O2 DELIVERY SYS: ABNORMAL L/MIN
GAS FLOW.O2 SETTING OXYMISER: 18 BPM
GLUCOSE BLD STRIP.AUTO-MCNC: 130 MG/DL (ref 70–110)
GLUCOSE BLD STRIP.AUTO-MCNC: 142 MG/DL (ref 70–110)
GLUCOSE BLD STRIP.AUTO-MCNC: 150 MG/DL (ref 70–110)
GLUCOSE BLD STRIP.AUTO-MCNC: 160 MG/DL (ref 70–110)
GLUCOSE SERPL-MCNC: 134 MG/DL (ref 74–99)
GLUCOSE SERPL-MCNC: 164 MG/DL (ref 74–99)
HCO3 BLD-SCNC: 31.7 MMOL/L (ref 22–26)
HCT VFR BLD AUTO: 30.5 % (ref 36–48)
HGB BLD-MCNC: 9.3 G/DL (ref 13–16)
INSPIRATION.DURATION SETTING TIME VENT: 1.2 SEC
LYMPHOCYTES # BLD: 0.9 K/UL (ref 0.9–3.6)
LYMPHOCYTES NFR BLD: 7 % (ref 21–52)
MAGNESIUM SERPL-MCNC: 4.6 MG/DL (ref 1.6–2.6)
MCH RBC QN AUTO: 25.9 PG (ref 24–34)
MCHC RBC AUTO-ENTMCNC: 30.5 G/DL (ref 31–37)
MCV RBC AUTO: 85 FL (ref 74–97)
MONOCYTES # BLD: 0.9 K/UL (ref 0.05–1.2)
MONOCYTES NFR BLD: 7 % (ref 3–10)
NEUTS SEG # BLD: 10.4 K/UL (ref 1.8–8)
NEUTS SEG NFR BLD: 81 % (ref 40–73)
NITRIC:PPM ISTAT,INITR: 0 PPM
O2/TOTAL GAS SETTING VFR VENT: 0.7 %
PCO2 BLD: 42.7 MMHG (ref 35–45)
PEEP RESPIRATORY: 13 CMH2O
PH BLD: 7.48 [PH] (ref 7.35–7.45)
PHOSPHATE SERPL-MCNC: 4.5 MG/DL (ref 2.5–4.9)
PHOSPHATE SERPL-MCNC: 5.3 MG/DL (ref 2.5–4.9)
PIP ISTAT,IPIP: 24
PLATELET # BLD AUTO: 560 K/UL (ref 135–420)
PLATELET COMMENTS,PCOM: ABNORMAL
PMV BLD AUTO: 11.3 FL (ref 9.2–11.8)
PO2 BLD: 206 MMHG (ref 80–100)
POTASSIUM SERPL-SCNC: 3.6 MMOL/L (ref 3.5–5.5)
POTASSIUM SERPL-SCNC: 3.7 MMOL/L (ref 3.5–5.5)
RBC # BLD AUTO: 3.59 M/UL (ref 4.35–5.65)
RBC MORPH BLD: ABNORMAL
SAO2 % BLD: 100 % (ref 92–97)
SERVICE CMNT-IMP: ABNORMAL
SERVICE CMNT-IMP: NORMAL
SERVICE CMNT-IMP: NORMAL
SODIUM SERPL-SCNC: 145 MMOL/L (ref 136–145)
SODIUM SERPL-SCNC: 148 MMOL/L (ref 136–145)
SPECIMEN TYPE: ABNORMAL
TOTAL RESP. RATE, ITRR: 18
VENTILATION MODE VENT: ABNORMAL
VOLUME CONTROL PLUS IVLCP: YES
VT SETTING VENT: 500 ML
WBC # BLD AUTO: 12.8 K/UL (ref 4.6–13.2)

## 2021-04-20 PROCEDURE — 74011000250 HC RX REV CODE- 250: Performed by: INTERNAL MEDICINE

## 2021-04-20 PROCEDURE — 82962 GLUCOSE BLOOD TEST: CPT

## 2021-04-20 PROCEDURE — 74011250637 HC RX REV CODE- 250/637: Performed by: PHYSICIAN ASSISTANT

## 2021-04-20 PROCEDURE — 74011000258 HC RX REV CODE- 258: Performed by: NURSE PRACTITIONER

## 2021-04-20 PROCEDURE — 94669 MECHANICAL CHEST WALL OSCILL: CPT

## 2021-04-20 PROCEDURE — 74011000250 HC RX REV CODE- 250: Performed by: PHYSICIAN ASSISTANT

## 2021-04-20 PROCEDURE — 36415 COLL VENOUS BLD VENIPUNCTURE: CPT

## 2021-04-20 PROCEDURE — 94003 VENT MGMT INPAT SUBQ DAY: CPT

## 2021-04-20 PROCEDURE — 85025 COMPLETE CBC W/AUTO DIFF WBC: CPT

## 2021-04-20 PROCEDURE — 85730 THROMBOPLASTIN TIME PARTIAL: CPT

## 2021-04-20 PROCEDURE — 80069 RENAL FUNCTION PANEL: CPT

## 2021-04-20 PROCEDURE — 83735 ASSAY OF MAGNESIUM: CPT

## 2021-04-20 PROCEDURE — 74011250636 HC RX REV CODE- 250/636: Performed by: NURSE PRACTITIONER

## 2021-04-20 PROCEDURE — 99291 CRITICAL CARE FIRST HOUR: CPT | Performed by: PHYSICIAN ASSISTANT

## 2021-04-20 PROCEDURE — 2709999900 HC NON-CHARGEABLE SUPPLY

## 2021-04-20 PROCEDURE — 82803 BLOOD GASES ANY COMBINATION: CPT

## 2021-04-20 PROCEDURE — 74011250636 HC RX REV CODE- 250/636: Performed by: PHYSICIAN ASSISTANT

## 2021-04-20 PROCEDURE — 65610000006 HC RM INTENSIVE CARE

## 2021-04-20 PROCEDURE — 94640 AIRWAY INHALATION TREATMENT: CPT

## 2021-04-20 PROCEDURE — 77030018798 HC PMP KT ENTRL FED COVD -A

## 2021-04-20 PROCEDURE — 74011250636 HC RX REV CODE- 250/636: Performed by: INTERNAL MEDICINE

## 2021-04-20 PROCEDURE — 71045 X-RAY EXAM CHEST 1 VIEW: CPT

## 2021-04-20 PROCEDURE — 74011250637 HC RX REV CODE- 250/637: Performed by: INTERNAL MEDICINE

## 2021-04-20 PROCEDURE — 74011000250 HC RX REV CODE- 250: Performed by: STUDENT IN AN ORGANIZED HEALTH CARE EDUCATION/TRAINING PROGRAM

## 2021-04-20 PROCEDURE — 36600 WITHDRAWAL OF ARTERIAL BLOOD: CPT

## 2021-04-20 RX ORDER — HEPARIN SODIUM 1000 [USP'U]/ML
3000 INJECTION, SOLUTION INTRAVENOUS; SUBCUTANEOUS ONCE
Status: COMPLETED | OUTPATIENT
Start: 2021-04-20 | End: 2021-04-20

## 2021-04-20 RX ORDER — POTASSIUM CHLORIDE 7.45 MG/ML
10 INJECTION INTRAVENOUS
Status: COMPLETED | OUTPATIENT
Start: 2021-04-20 | End: 2021-04-20

## 2021-04-20 RX ORDER — POTASSIUM CHLORIDE 7.45 MG/ML
10 INJECTION INTRAVENOUS ONCE
Status: COMPLETED | OUTPATIENT
Start: 2021-04-20 | End: 2021-04-20

## 2021-04-20 RX ADMIN — HEPARIN SODIUM 3000 UNITS: 1000 INJECTION INTRAVENOUS; SUBCUTANEOUS at 18:33

## 2021-04-20 RX ADMIN — ACETAMINOPHEN ORAL SOLUTION 500 MG: 650 SOLUTION ORAL at 22:27

## 2021-04-20 RX ADMIN — Medication 30 ML: at 09:01

## 2021-04-20 RX ADMIN — MEROPENEM 500 MG: 500 INJECTION INTRAVENOUS at 05:20

## 2021-04-20 RX ADMIN — FAMOTIDINE 20 MG: 10 INJECTION INTRAVENOUS at 09:02

## 2021-04-20 RX ADMIN — POTASSIUM CHLORIDE 10 MEQ: 7.46 INJECTION, SOLUTION INTRAVENOUS at 05:43

## 2021-04-20 RX ADMIN — ARFORMOTEROL TARTRATE 15 MCG: 15 SOLUTION RESPIRATORY (INHALATION) at 19:13

## 2021-04-20 RX ADMIN — CHLORHEXIDINE GLUCONATE 0.12% ORAL RINSE 10 ML: 1.2 LIQUID ORAL at 20:30

## 2021-04-20 RX ADMIN — ALBUTEROL SULFATE 2.5 MG: 2.5 SOLUTION RESPIRATORY (INHALATION) at 15:54

## 2021-04-20 RX ADMIN — POTASSIUM CHLORIDE 10 MEQ: 7.46 INJECTION, SOLUTION INTRAVENOUS at 20:30

## 2021-04-20 RX ADMIN — MEROPENEM 500 MG: 500 INJECTION INTRAVENOUS at 21:51

## 2021-04-20 RX ADMIN — POTASSIUM CHLORIDE 10 MEQ: 7.46 INJECTION, SOLUTION INTRAVENOUS at 07:01

## 2021-04-20 RX ADMIN — HEPARIN SODIUM 3000 UNITS: 1000 INJECTION INTRAVENOUS; SUBCUTANEOUS at 10:28

## 2021-04-20 RX ADMIN — MEROPENEM 500 MG: 500 INJECTION INTRAVENOUS at 13:51

## 2021-04-20 RX ADMIN — MIDAZOLAM 5 MG/HR: 5 INJECTION INTRAMUSCULAR; INTRAVENOUS at 10:28

## 2021-04-20 RX ADMIN — ARFORMOTEROL TARTRATE 15 MCG: 15 SOLUTION RESPIRATORY (INHALATION) at 07:50

## 2021-04-20 RX ADMIN — HEPARIN SODIUM 1710 UNITS/HR: 10000 INJECTION, SOLUTION INTRAVENOUS at 10:29

## 2021-04-20 RX ADMIN — HEPARIN SODIUM 1910 UNITS/HR: 10000 INJECTION, SOLUTION INTRAVENOUS at 23:39

## 2021-04-20 RX ADMIN — PROPOFOL 10 MCG/KG/MIN: 10 INJECTION, EMULSION INTRAVENOUS at 13:48

## 2021-04-20 RX ADMIN — ALBUTEROL SULFATE 2.5 MG: 2.5 SOLUTION RESPIRATORY (INHALATION) at 00:27

## 2021-04-20 RX ADMIN — PROPOFOL 10 MCG/KG/MIN: 10 INJECTION, EMULSION INTRAVENOUS at 00:40

## 2021-04-20 RX ADMIN — ALBUTEROL SULFATE 2.5 MG: 2.5 SOLUTION RESPIRATORY (INHALATION) at 07:50

## 2021-04-20 RX ADMIN — CHLORHEXIDINE GLUCONATE 0.12% ORAL RINSE 10 ML: 1.2 LIQUID ORAL at 09:02

## 2021-04-20 NOTE — ROUTINE PROCESS
1930 Bedside and Verbal shift change report given to jarad rn (oncoming nurse) by mamadou rn (offgoing nurse). Report included the following information SBAR, Kardex and Intake/Output dual drip verification completed. 2000 assessment completed. Oral and cardona care completed. [address] TOBIN notified temp 100.6 esophogeal temp, and tylenol to be given, with patient triggering sepsis screening, no new orders received, will continue to monitor patient. 0000 reassessment completed. Oral and cardona care completed. 0400 reassessment completed. Oral and cardona care completed. 0730 Bedside and Verbal shift change report given to mamadou ch rn (oncoming nurse) by ajrad rn(offgoing nurse). Report included the following information SBAR, Kardex and Intake/Output side rail up, hob elevated 30 degrees. Anisa Mckeon

## 2021-04-20 NOTE — PROGRESS NOTES
Reviewed chart. Patient remains intubated. Palliative care is following. CM will continue to monitor and assist as needed.     MARGUERITE Martinez, RN  Pager # [phone]  Care Manager

## 2021-04-20 NOTE — PROGRESS NOTES
1930: Assumed care of patient after report with Ely Acosta RN on orientation. Drips verified. Orders reviewed. 2100: Called lab and spoke with phlebotomist - she remembers drawing a PTT and RFP on patient at 1747 - PTT result present but no chemistry - lab will try to find sample and if cannot locate, they will draw at 2200 with PTT.    2245: Spoke with lab and they were able to find 1747 chemistry sample and are running that sample along with new 2200 drawn sample - results pending for both. 0040: Fentanyl drip off per Alessandra Cuevas PA. Per Alessandra Cuevas, do not titrate sedation any further tonight - leave diprivan at 10 mcg and versed at 5mg.     0500: Complete bath done and patient able to lie flat in bed and turn for cleaning without desaturating.    0600: No sedation holiday per ICU team due to high FIO2 and PEEP requirements. 0730: Bedside and Verbal shift change report given to Noam Partida RN (oncoming nurse) by KUSHAL Ruth RN (offgoing nurse). Report included the following information SBAR, Kardex, MAR and Recent Results. SITUATION:    Code Status: Full Code   Reason for Admission: Angioedema due to angiotensin converting enzyme inhibitor (ACE-I) [T78. [facility] day: 12   Problem List:       Hospital Problems  Date Reviewed: 4/5/2021          Codes Class Noted POA    Encounter for palliative care ICD-10-CM: Z51.5  ICD-9-CM: V66.7  Unknown Unknown        Goals of care, counseling/discussion ICD-10-CM: Z71.89  ICD-9-CM: V65.49  Unknown Unknown        Multiple subsegmental pulmonary emboli without acute cor pulmonale ([facility] ICD-10-CM: I26.94  ICD-9-CM: 415.19  Unknown Unknown        DVT (deep venous thrombosis) ([facility] ICD-10-CM: I82.409  ICD-9-CM: 453.40  4/10/2021 Unknown        Respiratory failure ([facility] ICD-10-CM: J96.90  ICD-9-CM: 518.81  4/5/2021 Unknown        Obesity (BMI 30-39. 9) ICD-10-CM: E66.9  ICD-9-CM: 278.00  4/5/2021 Unknown        Diabetic neuropathy associated with type 2 diabetes mellitus ([facility] ICD-10-CM: E11.40  ICD-9-CM: 250.60, 357.2  4/5/2021 Unknown        Diabetic retinopathy associated with type 2 diabetes mellitus ([facility] ICD-10-CM: E11.319  ICD-9-CM: 250.50, 362.01  4/5/2021 Unknown        Pulmonary infiltrates ICD-10-CM: R91.8  ICD-9-CM: 793.19  4/5/2021 Unknown        Controlled type 2 diabetes mellitus with neurologic complication, without long-term current use of insulin ([facility] ICD-10-CM: E11.49  ICD-9-CM: 250.60  4/5/2021 Unknown        Angioedema due to angiotensin converting enzyme inhibitor (ACE-I) ICD-10-CM: T78. Vidal Talavera, [id number]  ICD-9-CM: 995.1, E942.6  4/4/2021 Unknown        JACQUELYN (acute kidney injury) ([facility] ICD-10-CM: N17.9  ICD-9-CM: 584.9  4/4/2021 Unknown        Cardiac arrest [facility]) ICD-10-CM: I46.9  ICD-9-CM: 427.5  4/4/2021 Unknown              BACKGROUND:    Past Medical History:   Past Medical History:   Diagnosis Date    DDD (degenerative disc disease), lumbar     Diabetes ([facility]     Diabetic neuropathy ([facility]     Diabetic retinopathy ([facility]     DM2 (diabetes mellitus, type 2) ([facility]     HLD (hyperlipidemia)     HTN (hypertension)     Obesity (BMI 30-39. 9)          Patient taking anticoagulants yes     ASSESSMENT:    Changes in Assessment Throughout Shift: Potassium replaced overnight. Fentanyl drip weaned off. Patient able to tolerate laying flat for bath and turning without desaturation noted.       Patient has Central Line: no Reasons if yes: n/a   Patient has Garcia Cath: yes Reasons if yes: I&O      Last Vitals:     Vitals:    04/20/21 0339 04/20/21 0400 04/20/21 0500 04/20/21 0600   BP:  106/66 118/66 133/62   Pulse: 79 78 79 82   Resp: 19 18 20 20   Temp:  100 °F (37.8 °C) 100.2 °F (37.9 °C) 99.3 °F (37.4 °C)   SpO2: 98% 98%  100%   Weight:   99.5 kg (219 lb 5.7 oz)    Height:            IV and DRAINS (will only show if present)   [REMOVED] Airway - Endotracheal Tube 04/04/21 Oral-Site Assessment: Clean, dry, & intact  [REMOVED] Peripheral IV 04/12/21 Left Antecubital-Site Assessment: Clean, dry, & intact  Peripheral IV 04/13/21 Right;Upper Forearm-Site Assessment: Clean, dry, & intact  Peripheral IV 04/18/21 Posterior;Right Hand-Site Assessment: Clean, dry, & intact  Peripheral IV 04/16/21 Anterior;Distal;Left Forearm-Site Assessment: Clean, dry, & intact  [REMOVED] Peripheral IV 04/04/21 Left Antecubital-Site Assessment: Clean, dry, & intact  [REMOVED] Peripheral IV 04/04/21 Anterior;Right Wrist-Site Assessment: Clean, dry, & intact  [REMOVED] Peripheral IV 04/04/21 Left;Posterior Hand-Site Assessment: Clean, dry, & intact  [REMOVED] Airway - Continuous Aspiration of Subglottic Secretions (GENESIS) Tube 04/04/21 Oral-Site Assessment: Clean, dry, & intact  Orogastric Tube 04/05/21-Site Assessment: Clean, dry, & intact  Airway - Continuous Aspiration of Subglottic Secretions (GENESIS) Tube 04/07/21 Oral-Site Assessment: Clean, dry, & intact     WOUND (if present)   Wound Type:  Neck incision   Dressing present Dressing Present : Intact, not due to be changed   Wound Concerns/Notes:  none     PAIN    Pain Assessment    Pain Intensity 1: 0 (04/20/21 0400)              Patient Stated Pain Goal: Unable to verbalize/indicate pain  o Interventions for Pain:  none  o Intervention effective: n/a  o Time of last intervention: n/a   o Reassessment Completed: n/a      Last 3 Weights:  Last 3 Recorded Weights in this Encounter    04/17/21 2000 04/19/21 0400 04/20/21 0500   Weight: 106.2 kg (234 lb 2.1 oz) 105.9 kg (233 lb 7.5 oz) 99.5 kg (219 lb 5.7 oz)     Weight change: -6.4 kg (-14 lb 1.8 oz)     INTAKE/OUPUT    Current Shift: 04/20 0701 - 04/20 1900  In: 100 [I.V.:100]  Out: -     Last three shifts: 04/18 1901 - 04/20 0700  In: 1907.8 [I.V.:1417.8]  Out: 3160 [Urine:2885]     LAB RESULTS     Recent Labs     04/20/21  0407 04/19/21  0311 04/18/21 0315   WBC 12.8 14.5* 18.5*   HGB 9.3* 8.7* 9.0*   HCT 30.5* 29.2* 28.3*   * 523* 427*        Recent Labs     04/20/21  0407 04/19/21  2200 04/19/21  1747 04/19/21  0311 04/18/21  0315    146* 148* 144 144   K 3.6 3.8 4.0 3.4* 3.6   * 147* 133* 175* 127*   * 129* 129* 126* 134*   CREA 2.74* 2.77* 2.72* 2.94* 3.57*   CA 8.5 8.6 8.2* 8.3* 8.6   MG 4.6*  --   --  4.6* 4.4*       RECOMMENDATIONS AND DISCHARGE PLANNING     1. Pending tests/procedures/ Plan of Care or Other Needs: Weaning FIO2 and PEEP as tolerated per RT. Wean sedation as tolerated per MD.     2. Discharge plan for patient and Needs/Barriers: not identified at this time    3. Estimated Discharge Date: unknown. Posted on Whiteboard in [address] [place] [address] Room: yes      4. The patient's care plan was reviewed with the oncoming nurse. \"HEALS\" SAFETY CHECK      Fall Risk    Total Score: 2    Safety Measures: Safety Measures: Bed/Chair alarm on, Bed/Chair-Wheels locked, Bed in low position, Call light within reach, Side rails X 3    A safety check occurred in the patient's room between off going nurse and oncoming nurse listed above. The safety check included the below items  Area Items   H  High Alert Medications - Verify all high alert medication drips (heparin, PCA, etc.)   E  Equipment - Suction is set up for ALL patients (with dalton)  - Red plugs utilized for all equipment (IV pumps, etc.)  - WOWs wiped down at end of shift.  - Room stocked with oxygen, suction, and other unit-specific supplies   A  Alarms - Bed alarm is set for fall risk patients  - Ensure chair alarm is in place and activated if patient is up in a chair   L  Lines - Check IV for any infiltration  - Garcia bag is empty if patient has a Garcia   - Tubing and IV bags are labeled   S  Safety   - Room is clean, patient is clean, and equipment is clean. - Hallways are clear from equipment besides carts.    - Fall bracelet on for fall risk patients  - Ensure room is clear and free of clutter  - Suction is set up for ALL patients (with dalton)  - Hallways are clear from equipment besides carts.    - Isolation precautions followed, supplies available outside room, sign posted     Wilda Page RN

## 2021-04-20 NOTE — PROGRESS NOTES
attended the interdisciplinary rounds for Stacey Matute, who is a 79 y.o.,male. Patients Primary Language is: [place]. According to the patients EMR Episcopal Affiliation is: No preference. The reason the Patient came to the hospital is:   Patient Active Problem List    Diagnosis Date Noted    Encounter for palliative care     Goals of care, counseling/discussion     Multiple subsegmental pulmonary emboli without acute cor pulmonale ([facility]     DVT (deep venous thrombosis) ([facility] 04/10/2021    Respiratory failure ([facility] 04/05/2021    Obesity (BMI 30-39.9) 04/05/2021    Diabetic neuropathy associated with type 2 diabetes mellitus ([facility] 04/05/2021    Diabetic retinopathy associated with type 2 diabetes mellitus ([facility] 04/05/2021    Pulmonary infiltrates 04/05/2021    Controlled type 2 diabetes mellitus with neurologic complication, without long-term current use of insulin ([facility] 04/05/2021    Angioedema due to angiotensin converting enzyme inhibitor (ACE-I) 04/04/2021    JACQUELYN (acute kidney injury) ([facility] 04/04/2021    Cardiac arrest ([facility] 04/04/2021      Plan:  Chaplains will continue to follow and will provide pastoral care on an as needed/requested basis.  recommends bedside caregivers page  on duty if patient shows signs of acute spiritual or emotional distress.     [address] [facility]  Board Certified [facility]   [phone]

## 2021-04-20 NOTE — PROGRESS NOTES
Problem: Ventilator Management  Goal: *Adequate oxygenation and ventilation  Outcome: Not Progressing Towards Goal  Goal: *Patient maintains clear airway/free of aspiration  Outcome: Not Progressing Towards Goal  Goal: *Absence of infection signs and symptoms  Outcome: Not Progressing Towards Goal  Goal: *Normal spontaneous ventilation  Outcome: Not Progressing Towards Goal     Problem: Patient Education: Go to Patient Education Activity  Goal: Patient/Family Education  Outcome: Not Progressing Towards Goal     Problem: Diabetes Self-Management  Goal: *Disease process and treatment process  Description: Define diabetes and identify own type of diabetes; list 3 options for treating diabetes. Outcome: Not Progressing Towards Goal  Goal: *Incorporating nutritional management into lifestyle  Description: Describe effect of type, amount and timing of food on blood glucose; list 3 methods for planning meals. Outcome: Not Progressing Towards Goal  Goal: *Incorporating physical activity into lifestyle  Description: State effect of exercise on blood glucose levels. Outcome: Not Progressing Towards Goal  Goal: *Developing strategies to promote health/change behavior  Description: Define the ABC's of diabetes; identify appropriate screenings, schedule and personal plan for screenings. Outcome: Not Progressing Towards Goal  Goal: *Using medications safely  Description: State effect of diabetes medications on diabetes; name diabetes medication taking, action and side effects. Outcome: Not Progressing Towards Goal  Goal: *Monitoring blood glucose, interpreting and using results  Description: Identify recommended blood glucose targets  and personal targets. Outcome: Not Progressing Towards Goal  Goal: *Prevention, detection, treatment of acute complications  Description: List symptoms of hyper- and hypoglycemia; describe how to treat low blood sugar and actions for lowering  high blood glucose level.   Outcome: Not Progressing Towards Goal  Goal: *Prevention, detection and treatment of chronic complications  Description: Define the natural course of diabetes and describe the relationship of blood glucose levels to long term complications of diabetes. Outcome: Not Progressing Towards Goal  Goal: *Developing strategies to address psychosocial issues  Description: Describe feelings about living with diabetes; identify support needed and support network  Outcome: Not Progressing Towards Goal  Goal: *Sick day guidelines  Outcome: Not Progressing Towards Goal     Problem: Patient Education: Go to Patient Education Activity  Goal: Patient/Family Education  Outcome: Not Progressing Towards Goal     Problem: Falls - Risk of  Goal: *Absence of Falls  Description: Document Azeem Hernandez Fall Risk and appropriate interventions in the flowsheet. Outcome: Not Progressing Towards Goal  Note: Fall Risk Interventions:       Mentation Interventions: Adequate sleep, hydration, pain control, Bed/chair exit alarm, Door open when patient unattended, Evaluate medications/consider consulting pharmacy, Reorient patient    Medication Interventions: Evaluate medications/consider consulting pharmacy, Assess postural VS orthostatic hypotension, Bed/chair exit alarm    Elimination Interventions: Bed/chair exit alarm              Problem: Patient Education: Go to Patient Education Activity  Goal: Patient/Family Education  Outcome: Not Progressing Towards Goal     Problem: Pressure Injury - Risk of  Goal: *Prevention of pressure injury  Description: Document Lang Scale and appropriate interventions in the flowsheet.   Outcome: Not Progressing Towards Goal  Note: Pressure Injury Interventions:  Sensory Interventions: Assess changes in LOC, Assess need for specialty bed, Avoid rigorous massage over bony prominences, Chair cushion, Check visual cues for pain, Float heels, Keep linens dry and wrinkle-free, Maintain/enhance activity level, Monitor skin under medical devices, Minimize linen layers, Pressure redistribution bed/mattress (bed type), Turn and reposition approx. every two hours (pillows and wedges if needed)    Moisture Interventions: Absorbent underpads, Apply protective barrier, creams and emollients, Assess need for specialty bed, Check for incontinence Q2 hours and as needed, Internal/External urinary devices, Limit adult briefs, Minimize layers    Activity Interventions: Assess need for specialty bed, Pressure redistribution bed/mattress(bed type)    Mobility Interventions: Assess need for specialty bed, Turn and reposition approx.  every two hours(pillow and wedges), Float heels, Suspension boots, Pressure redistribution bed/mattress (bed type)    Nutrition Interventions: Document food/fluid/supplement intake    Friction and Shear Interventions: Apply protective barrier, creams and emollients, Feet elevated on foot rest, Foam dressings/transparent film/skin sealants, Lift sheet, Transfer aides:transfer board/George lift/ceiling lift                Problem: Patient Education: Go to Patient Education Activity  Goal: Patient/Family Education  Outcome: Not Progressing Towards Goal     Problem: Nutrition Deficit  Goal: *Optimize nutritional status  Outcome: Not Progressing Towards Goal     Problem: Injury - Risk of, Adverse Drug Event  Goal: *Absence of adverse drug events  Outcome: Not Progressing Towards Goal  Goal: *Absence of medication errors  Outcome: Not Progressing Towards Goal  Goal: *Knowledge of prescribed medications  Outcome: Not Progressing Towards Goal     Problem: Patient Education: Go to Patient Education Activity  Goal: Patient/Family Education  Outcome: Not Progressing Towards Goal     Problem: Pain  Goal: *Control of Pain  Outcome: Not Progressing Towards Goal  Goal: *PALLIATIVE CARE:  Alleviation of Pain  Outcome: Not Progressing Towards Goal     Problem: Patient Education: Go to Patient Education Activity  Goal: Patient/Family Education  Outcome: Not Progressing Towards Goal     Problem: Delirium Treatment  Goal: *Level of consciousness restored to baseline  Outcome: Not Progressing Towards Goal  Goal: *Level of environmental perceptions restored to baseline  Outcome: Not Progressing Towards Goal  Goal: *Sensory perception restored to baseline  Outcome: Not Progressing Towards Goal  Goal: *Emotional stability restored to baseline  Outcome: Not Progressing Towards Goal  Goal: *Functional assessment restored to baseline  Outcome: Not Progressing Towards Goal  Goal: *Absence of falls  Outcome: Not Progressing Towards Goal  Goal: *Will remain free of delirium, CAM Score negative  Outcome: Not Progressing Towards Goal  Goal: *Cognitive status will be restored to baseline  Outcome: Not Progressing Towards Goal  Goal: Interventions  Outcome: Not Progressing Towards Goal     Problem: Patient Education: Go to Patient Education Activity  Goal: Patient/Family Education  Outcome: Not Progressing Towards Goal     Problem: Falls - Risk of  Goal: *Absence of Falls  Description: Document Nohemi Zhao Fall Risk and appropriate interventions in the flowsheet.   Outcome: Not Progressing Towards Goal  Note: Fall Risk Interventions:       Mentation Interventions: Adequate sleep, hydration, pain control, Bed/chair exit alarm, Door open when patient unattended, Evaluate medications/consider consulting pharmacy, Reorient patient    Medication Interventions: Evaluate medications/consider consulting pharmacy, Assess postural VS orthostatic hypotension, Bed/chair exit alarm    Elimination Interventions: Bed/chair exit alarm              Problem: Nutrition Deficit  Goal: *Optimize nutritional status  Outcome: Not Progressing Towards Goal     Problem: Pain  Goal: *Control of Pain  Outcome: Not Progressing Towards Goal

## 2021-04-20 NOTE — PROGRESS NOTES
Nutrition Note    Patient tolerating trickle feeds, resumed yesterday with BM this morning; sedated on propofol at 10 mcg/kg/min (169 kcal per day). Recommendations/Plan   - Advance tube feeding of Vital High Protein as tolerated by 10 mL q 8 hours to goal rate of 55 mL/hr with prosource TID and 50 mL q 4 hour water flushes (goal regimen to provide 1500 kcal, 161 gm protein, 1104 mL free water, 93% RDIs).      Electronically signed by Brain Nance RD, [address]  on 4/20/2021     Contact: [phone]

## 2021-04-20 NOTE — PROGRESS NOTES
[facility]. Pulmonary, Critical Care, and Sleep Medicine    Name: Madonna White MRN: [id number]   : 1950 Hospital: [address] [place] [address] Dr   Date: 2021  Admission Date: 2021     Chart and notes reviewed. Data reviewed. I have evaluated all findings. [x]I have reviewed the flowsheet and previous days notes. [x]The patient is unable to give any meaningful history or review of systems because the patient is:  [x]Intubated [x]Sedated   []Unresponsive      [x]The patient is critically ill on      [x]Mechanical ventilation []Pressors   []BiPAP []         Interval HPI:71 yo M h/o HTN tx w/ lisinopril presenting to [facility] ED  for rapidly progressing respiratory distress due to severe angioedema. During intubation attempts pt had brief cardiac arrest w/ pulse loss and severe airway swelling leading to emergent cricothyrotomy. Pt stabilized before [facility] transport to [place] at [facility] pt taken emergently to [address] was converted to ETT. Patient continues to have trouble with o2 requirements disproportionate to CXR findings and despite adjustment of FiO2 and PEEP. CTA revelaed B/L pulmonary emboli which is likely causing hypoxemia and RH strain. Administration of inhaled epoprostenol on  is showing improvement in V/Q  FiO2 75%/ PEEP 10. Pt is on dobutamine and being diuresed w/ bumex and metolazone.   COVID - 19 negative. Subjective 21  Hospital Day: 16  Vent Day:16  Overnight events: No significant events noted overnight  Mentation/Activity: intubated/sedated on propofol and versed  Respiratory/ Secretions: stable  Hemodynamics: H/H stable  Urine output, bowel: adequate  Diet: None    -Plan to wean vent settings today. ROS:Review of systems not obtained due to patient factors. Events and notes from last 24 hours reviewed. Care plan discussed on multidisciplinary rounds.   Patient Active Problem List   Diagnosis Code    Angioedema due to angiotensin converting enzyme inhibitor (ACE-I) T78. 3XXA, [id number]    Respiratory failure ([facility] J96.90    JACQUELYN (acute kidney injury) ([facility] N17.9    Cardiac arrest ([facility]) I46.9    Obesity (BMI 30-39. 9) E66.9    Diabetic neuropathy associated with type 2 diabetes mellitus ([facility] E11.40    Diabetic retinopathy associated with type 2 diabetes mellitus ([facility] E11.319    Pulmonary infiltrates R91.8    Controlled type 2 diabetes mellitus with neurologic complication, without long-term current use of insulin ([facility]) E11.49    DVT (deep venous thrombosis) ([facility] I82.409    Encounter for palliative care Z51.5    Goals of care, counseling/discussion Z71.89    Multiple subsegmental pulmonary emboli without acute cor pulmonale ([facility]) I26.94       Vital Signs:  Visit Vitals  /66   Pulse 82   Temp 99.5 °F (37.5 °C)   Resp 20   Ht 5' 11\" (1.803 m)   Wt 99.5 kg (219 lb 5.7 oz)   SpO2 100%   BMI 30.59 kg/m²       O2 Device: Ventilator, Endotracheal tube       Temp (24hrs), Av.3 °F (37.4 °C), Min:98.6 °F (37 °C), Max:100.2 °F (37.9 °C)       Intake/Output:   Last shift:       07 - 1900  In: 100 [I.V.:100]  Out: -   Last 3 shifts: 1901 -  0700  In: 1907.8 [I.V.:1417.8]  Out: 3160 [Urine:2885]    Intake/Output Summary (Last 24 hours) at 2021 1112  Last data filed at 2021 0701  Gross per 24 hour   Intake 1343.2 ml   Output 1855 ml   Net -511.8 ml        Ventilator Settings:  Ventilator Mode: Assist control  Respiratory Rate  Back-Up Rate: 18  Insp Time (sec): 1 sec  I:E Ratio: 1;1  Ventilator Volumes  Vt Set (ml): 500 ml  Vt Exhaled (Machine Breath) (ml): 501 ml  Vt Spont (ml): 514 ml  Ve Observed (l/min): 11 l/min  Ventilator Pressures  PC Set: 550  PIP Observed (cm H2O): 28 cm H2O  Plateau Pressure (cm H2O): 24 cm H2O  MAP (cm H2O): 19  PEEP/VENT (cm H2O): 13 cm H20  Auto PEEP Observed (cm H2O): 0 cm H2O    Current Facility-Administered Medications   Medication Dose Route Frequency    propofol (DIPRIVAN) 10 mg/mL infusion  0-50 mcg/kg/min IntraVENous TITRATE    midazolam in normal saline (VERSED) 1 mg/mL infusion  0-10 mg/hr IntraVENous TITRATE    fentaNYL (PF) 1,500 mcg/30 mL (50 mcg/mL) infusion  0-200 mcg/hr IntraVENous TITRATE    meropenem (MERREM) 500 mg in sterile water (preservative free) 10 mL IV syringe  500 mg IntraVENous Q8H    albuterol (PROVENTIL VENTOLIN) nebulizer solution 2.5 mg  2.5 mg Nebulization Q8H RT    famotidine (PF) (PEPCID) 20 mg in 0.9% sodium chloride 10 mL injection  20 mg IntraVENous DAILY    heparin 25,000 units in D5W 250 ml infusion  12-25 Units/kg/hr IntraVENous TITRATE    arformoteroL (BROVANA) neb solution 15 mcg  15 mcg Nebulization BID RT    multivit-folic acid-herbal 451 (WELLESSE PLUS) oral liquid 30 mL  30 mL Per NG tube DAILY    chlorhexidine (PERIDEX) 0.12 % mouthwash 10 mL  10 mL Oral Q12H    insulin lispro (HUMALOG) injection   SubCUTAneous Q6H         Telemetry: [x]Sinus []A-flutter []Paced    []A-fib []Multiple PVCs                  Physical Exam:      General: Intubated/sedated; HEENT:  Anicteric sclerae; pink palpebral conjunctivae; mucosa moist  Resp:  Symmetrical chest expansion, no accessory muscle use; good airway entry; no rales/ wheezing/ rhonchi noted  CV:  S1, S2 present; regular rate and rhythm  GI:  Obese.  Abdomen soft, non-tender; (+) active bowel sounds  Extremities:  +2 pulses on all extremities; no edema/ cyanosis/ clubbing noted + restraints   Skin:  Warm; no rashes/ lesions noted, normal turgor/cap refill   Neurologic:  Non-focal  Devices:  OGT, ETT, Garcia      DATA:  MAR reviewed and pertinent medications noted or modified as needed    Labs:  Recent Labs     04/20/21  0407 04/19/21  0311 04/18/21  0315   WBC 12.8 14.5* 18.5*   HGB 9.3* 8.7* 9.0*   HCT 30.5* 29.2* 28.3*   * 523* 427*     Recent Labs     04/20/21  0407 04/19/21  2200 04/19/21  1747 04/19/21  0311 04/18/21  0315    146* 148* 144 144   K 3.6 3.8 4.0 3.4* 3.6    107 110 104 103   CO2 33* 33* 30 31 30   * 147* 133* 175* 127*   * 129* 129* 126* 134*   CREA 2.74* 2.77* 2.72* 2.94* 3.57*   CA 8.5 8.6 8.2* 8.3* 8.6   MG 4.6*  --   --  4.6* 4.4*   PHOS 5.3* 5.4* 5.4* 6.1* 7.7*   ALB 2.3* 2.5* 2.5* 2.4* 2.8*     No results for input(s): PH, PCO2, PO2, HCO3, FIO2 in the last 72 hours. Recent Labs     04/20/21  0350 04/19/21  0325 04/18/21  0407   FIO2I 0.70 0.90 0.90   HCO3I 31.7* 31.4* 31.9*   PCO2I 42.7 46.3* 50.0*   PHI 7.48* 7.44 7.42   PO2I 206* 91 97       Imaging:  [x]   I have personally reviewed the patients radiographs and reports  XR Results (most recent):  Results from Hospital Encounter encounter on 04/04/21   XR ABD (KUB)    Narrative Examination: Supine abdomen, one view     History: Bowel obstruction    Comparison: 3 days prior    Findings: Persistent mildly dilated transverse colon without significant  distention of additional bowel. Oral contrast is seen in the rectum. No definite  obstruction. Airspace disease in the right upper lobe. NG tube is in place. Impression 1. Persistent or minimally improved gaseous distention of the transverse colon  without clear obstruction. Mild colonic ileus is possible. CT Results (most recent):  Results from Hospital Encounter encounter on 04/04/21   CTA CHEST W OR W WO CONT    Narrative CTA CHEST PULMONARY ANGIOGRAM    HISTORY/INDICATION:  Shortness of breath, clinical concern for pulmonary  embolism, history of cardiac arrest 4/4/2021    COMPARISON:  No prior CTA chest. Reference chest radiograph 4/13/2021. TECHNIQUE:  Helical multidetector array volumetric acquisition of the chest is  performed following intravenous contrast administration of 72cc Isovue-370, per  pulmonary angiogram protocol. These images are reviewed and 2.5 mm and 5 mm  images along with coronal and sagittal 3D reconstruction maximum intensity  projection (MIP) images. Dose reduction techniques:  Automated exposure control,  mAs and/or kVp reductions based on patient size, and iterative reconstruction. CTA SPECIFIC FINDINGS:  Pulmonary arteries: Central contrast filling defect in the left lower lobe,  involving lobar, segmental, and segmental branches. Small contrast filling  defect in a segmental branch in the right upper lobe. Aorta: No aneurysm. CHEST FINDINGS:   Thyroid:  No focal lesion. Mediastinum: Heart is mildly enlarged. Mild burden of coronary artery  atherosclerotic calcifications. No pericardial effusion. Reflux of trace  contrast into the IVC. No right ventricular dilatation or bowing of the  intraventricular septum. Pleural space: Trace bilateral pleural effusions. No pneumothorax. Lungs: There is dense multifocal consolidations bilaterally and in the dependent  lower lobes. There is suggestion of focal hypoattenuation within the  consolidation in the left lower lobe. Endotracheal tube in appropriate position,  terminating 3.7 cm above the gabriella on the  view. Included Abdomen: Visualized solid organs of the upper abdomen are normal.  Enteric tube tip terminates within the body of the stomach. Skeleton: No suspicious lytic or blastic lesions. No fractures. Soft tissues: Normal.    Lymph Nodes: Increased number of mildly prominent mediastinal and bilateral  hilar nodes, including a 1.0 x 1.8 cm AP window node and a 1.1 x 2.1 cm right  paratracheal node. Impression 1. Moderate burden of acute pulmonary embolism in the left lower lobe. Additional small acute segmental pulmonary embolism in the right upper lobe. 2. There is dense multifocal consolidations bilaterally, as well as in the  dependent lower lobes, favoring a combination of multifocal pneumonia, edema,  and dependent atelectasis.   -There is suggestion of focal hypoattenuation within the consolidation in the  left lower lobe, which may be infectious in etiology versus sequela of pulmonary  infarct given #1.    3. Mildly prominent mediastinal and bilateral hilar nodes, likely  benign/reactive. 4. Mild cardiomegaly. Reflux of trace contrast into the IVC, which can be seen  in the setting of right heart dysfunction. 5. Trace bilateral pleural effusions. 6. Endotracheal and enteric tubes noted in appropriate position. Discussed findings #1-2 with Kwasi Rahman PA-C on 4/13/2021 at 7:19 PM.         IMPRESSION:   · Angioedema- with airway and respiratory failure and collapse; thought due to ACE inhibitor  · S/P Emergent Cricthyrotomy [facility]-ED 4/4/21- converted to 6.5 ETT intraoperative by anesthesia team 4/4/21, 8.0 ETT by anesthesia 4/7/21, packing removed evening 4/8/21-ENT  · S/P cardiac arrest @ [facility] 4/4/21- brief, likely due to  hypoxia and  airway collapse  · Respiratory Failure: Acute due to above; currently intubated and on vent for airway protection  · Pulmonary Infiltrates: suspect aspiration secretions and/or blood due to above- can't exclude other infectious process at this time. Rapid Covid Negative at [facility]  · Bradycardia - improved  · DVT: Popliteal- Left; acute non-occlusive thrombus.  Heparin ACS protocol started 4/8- stopped 4/9 due to bleeding from Cric site. Bleeding controlled -  heparin currently back on   · Pulmonary Embolism - 4/13/21 - left lower and right upper lobes  · JACQUELYN - , Creatinine 3.57  · Hypokalemia  · Left scleral erythema- unknown baseline; possible infection   · DM  · DM retinopathy per chart review  · Diabetic peripheral neuropathy  · Left Pneumothorax - resolved     · Obesity: Body mass index is 35.64 kg/m². Patient Active Problem List   Diagnosis Code    Angioedema due to angiotensin converting enzyme inhibitor (ACE-I) T78. 3XXA, [id number]    Respiratory failure ([facility] J96.90    JACQUELYN (acute kidney injury) ([facility] N17.9    Cardiac arrest ([facility]) I46.9    Obesity (BMI 30-39. 9) E66.9    Diabetic neuropathy associated with type 2 diabetes mellitus ([facility]) E11.40    Diabetic retinopathy associated with type 2 diabetes mellitus ([facility] E11.319    Pulmonary infiltrates R91.8    Controlled type 2 diabetes mellitus with neurologic complication, without long-term current use of insulin ([facility]) E11.49    DVT (deep venous thrombosis) ([facility]) I82.409    Encounter for palliative care Z51.5    Goals of care, counseling/discussion Z71.89    Multiple subsegmental pulmonary emboli without acute cor pulmonale ([facility]) I26.94        RECOMMENDATIONS:   NEURO:  · Serial neuro evaluations  · Daily sedation holiday  · Wetting tears and cipro eye drops- stop date for cipro on chart     CARDIO:  · MAP goal >64- No IV pressor requirement  · Echo on 4/6/21 with EF of 55%, dilated RV and moderate pulmonary hypertension (PASP 61 mmHg)  PULM:  · Maintain aspiration precautions: HOB >30 degrees  · SpO2 goal >92%  · Bronchial /oral hygiene   · VAP bundle- Wean vent as clinical status allows  · SBT as clinical status allows  · Completed course of decadron  · Pulmozyme and Mucomist DC  · Metaneb  · Daily CXR and ABG while intubated     GI/ NUTRITION:  · OGT   · Diet: start trickle feeds and advance as tolerated  · SUP:Pepcid  · Follow up with nutritional recommendations  RENAL/ METAB/ :  · Monitor I&Os  · Trend electrolytes - replaced as needed  · Garcia: Continue     HEM/ONC  · Trend Hb/HCT and PLTs  · DVT prophylaxis: SCDs, heparin gtt  · Re-image poplyteal DVT 72 hours and/or pending clinical course- may need IVC Filter if worsens  · Blood Products: not indicated at this time     ID  · Antibioitcs: Cipro eye drop, Zosyn 4/4 - 4/8  Vanco: 4/4-6, MRSA swab negative.    · Zosyn restarted on 4/11, WBCs elevated to 16.5  · Follow up on pending cultures  · Repeat Covid negative (4/11)  · Tylenol for fevers     ENDO  · BGs Q 6,SSI, TSH normal  · C1 inhibitor normal level (4/7/21)     MSK/ ORTHO  · No active Issues     SKIN/ WOUNDS  · Per protocol  · Neck- daily dressing changes per ENT, Quick clot wit Afrin if needed, d/c xeroform           CODE STATUS: Full Code- Full     Discussed in interdisciplinary rounds     Best practice :    Glycemic control  IHI ICU bundles:   Central Line Bundle Followed , Cardona Bundle Followed and Vent Bundle Followed, Vent Day 16    [facility] Vent patients- VAP bundle, aim to keep peak plateau pressure 11-75HP H2O  Sress ulcer prophylaxis. DVT prophylaxis. Need for Lines, cardona assessed. Restraints need. Palliative care evaluation. High complexity decision making was performed during this consultation and evaluation. [x]       Pt is at high risk for further organ failure and dysfunction. Critical care time spent:  35  minutes with patient exclusive of procedures.     Viridiana Arceo PA-C  04/20/21  Pulmonary, Critical Care Medicine  [facility]

## 2021-04-20 NOTE — PROGRESS NOTES
Problem: Ventilator Management  Goal: *Adequate oxygenation and ventilation  Outcome: Progressing Towards Goal  Goal: *Patient maintains clear airway/free of aspiration  Outcome: Progressing Towards Goal  Goal: *Absence of infection signs and symptoms  Outcome: Progressing Towards Goal  Goal: *Normal spontaneous ventilation  Outcome: Progressing Towards Goal     Problem: Falls - Risk of  Goal: *Absence of Falls  Description: Document Alexander Fall Risk and appropriate interventions in the flowsheet. Outcome: Progressing Towards Goal  Note: Fall Risk Interventions:       Mentation Interventions: Adequate sleep, hydration, pain control, Bed/chair exit alarm, Door open when patient unattended, Evaluate medications/consider consulting pharmacy, Reorient patient    Medication Interventions: Evaluate medications/consider consulting pharmacy, Assess postural VS orthostatic hypotension, Bed/chair exit alarm    Elimination Interventions: Bed/chair exit alarm              Problem: Pressure Injury - Risk of  Goal: *Prevention of pressure injury  Description: Document Lang Scale and appropriate interventions in the flowsheet. Outcome: Progressing Towards Goal  Note: Pressure Injury Interventions:  Sensory Interventions: Assess changes in LOC, Assess need for specialty bed, Avoid rigorous massage over bony prominences, Chair cushion, Check visual cues for pain, Float heels, Keep linens dry and wrinkle-free, Maintain/enhance activity level, Monitor skin under medical devices, Minimize linen layers, Pressure redistribution bed/mattress (bed type), Turn and reposition approx.  every two hours (pillows and wedges if needed)    Moisture Interventions: Absorbent underpads, Apply protective barrier, creams and emollients, Assess need for specialty bed, Check for incontinence Q2 hours and as needed, Internal/External urinary devices, Limit adult briefs, Minimize layers    Activity Interventions: Assess need for specialty bed, Pressure redistribution bed/mattress(bed type)    Mobility Interventions: Assess need for specialty bed, Turn and reposition approx.  every two hours(pillow and wedges), Float heels, Suspension boots, Pressure redistribution bed/mattress (bed type)    Nutrition Interventions: Document food/fluid/supplement intake    Friction and Shear Interventions: Apply protective barrier, creams and emollients, Feet elevated on foot rest, Foam dressings/transparent film/skin sealants, Lift sheet, Transfer aides:transfer board/George lift/ceiling lift

## 2021-04-20 NOTE — PROGRESS NOTES
In Patient Progress note      Admit Date: 4/4/2021    Impression:     #1 Acute kidney injury, etiology appears to be secondary to Ischaemic ATN d/t hypoperfusion v/s cardiorenal syndrome in  setting of CHF/V failure in setting of PE  #2 acute respiratory failure secondary to rapidly progressive angioedema s/p emergent cricothyrotomy. Presently intubated on the vent,  severely hypoxic out of proportion to x-ray findings, therefore PE has been considered , Rx for presumed PE  #3 pulmonary infiltrates/Pulm edema   #4 left-sided pneumothorax, appears resolved  #5 diabetes mellitus with complications  #6 popliteal DVT left  #7 possible colonic obst , awaiting CT abd      Plan:     1) balance intake/output and try to keep slight negative balance . Good urine output so no need for diuretics, renal parameters improving slowly  2) strict I/o  3) follow renal parameters , electrolytes q12hrs , replace electrolytes  4) keep MAP > 65    Please call with questions,     MD MOLINA Bennett  Cell [phone]  Pager: [phone]  Subjective:     - No acute over night events. - respiratory - stable  - hemodynamics - stable, no pressrs  - UOP-good  - Nutrition -ok    Objective:     Visit Vitals  BP (!) 126/56   Pulse 73   Temp 100 °F (37.8 °C)   Resp 18   Ht 5' 11\" (1.803 m)   Wt 99.5 kg (219 lb 5.7 oz)   SpO2 98%   BMI 30.59 kg/m²         Intake/Output Summary (Last 24 hours) at 4/20/2021 1730  Last data filed at 4/20/2021 1100  Gross per 24 hour   Intake 1203.44 ml   Output 1855 ml   Net -651.56 ml       Physical Exam:        Intubated/ on vent   HEENT: mmm  Lungs: good air entry, coarse BS +  Cardiovascular system: S1, S2, regular rate and rhythm. Abdomen: soft, non tender, non distended. Positive bowel sounds. Extremities: no clubbing, cyanosis or edema. Integumentary: skin is grossly intact.        Data Review:    Recent Labs     04/20/21  0407   WBC 12.8   RBC 3.59*   HCT 30.5*   MCV 85.0   MCH 25.9   MCHC 30.5* RDW 15.4*     Recent Labs     04/20/21  0407 04/19/21 2200 04/19/21  1747 04/19/21 0311 04/18/21  0315   * 129* 129* 126* 134*   CREA 2.74* 2.77* 2.72* 2.94* 3.57*   CA 8.5 8.6 8.2* 8.3* 8.6   ALB 2.3* 2.5* 2.5* 2.4* 2.8*   K 3.6 3.8 4.0 3.4* 3.6    146* 148* 144 144    107 110 104 103   CO2 33* 33* 30 31 30   PHOS 5.3* 5.4* 5.4* 6.1* 7.7*   * 147* 133* 175* 127*       Bertram Mary MD

## 2021-04-21 ENCOUNTER — APPOINTMENT (OUTPATIENT)
Dept: GENERAL RADIOLOGY | Age: 71
DRG: 004 | End: 2021-04-21
Attending: PHYSICIAN ASSISTANT
Payer: MEDICARE

## 2021-04-21 LAB
ALBUMIN SERPL-MCNC: 2.3 G/DL (ref 3.4–5)
ALBUMIN SERPL-MCNC: 2.3 G/DL (ref 3.4–5)
ANION GAP SERPL CALC-SCNC: 6 MMOL/L (ref 3–18)
ANION GAP SERPL CALC-SCNC: 9 MMOL/L (ref 3–18)
APTT PPP: 63.1 SEC (ref 23–36.4)
APTT PPP: 84.3 SEC (ref 23–36.4)
ARTERIAL PATENCY WRIST A: YES
BASE EXCESS BLD CALC-SCNC: 6 MMOL/L
BASOPHILS # BLD: 0.1 K/UL (ref 0–0.1)
BASOPHILS NFR BLD: 0 % (ref 0–2)
BDY SITE: ABNORMAL
BODY TEMPERATURE: 37.1
BUN SERPL-MCNC: 111 MG/DL (ref 7–18)
BUN SERPL-MCNC: 131 MG/DL (ref 7–18)
BUN/CREAT SERPL: 49 (ref 12–20)
BUN/CREAT SERPL: 56 (ref 12–20)
CALCIUM SERPL-MCNC: 7.9 MG/DL (ref 8.5–10.1)
CALCIUM SERPL-MCNC: 8.8 MG/DL (ref 8.5–10.1)
CHLORIDE SERPL-SCNC: 112 MMOL/L (ref 100–111)
CHLORIDE SERPL-SCNC: 113 MMOL/L (ref 100–111)
CO2 SERPL-SCNC: 28 MMOL/L (ref 21–32)
CO2 SERPL-SCNC: 29 MMOL/L (ref 21–32)
CREAT SERPL-MCNC: 1.98 MG/DL (ref 0.6–1.3)
CREAT SERPL-MCNC: 2.7 MG/DL (ref 0.6–1.3)
DIFFERENTIAL METHOD BLD: ABNORMAL
EOSINOPHIL # BLD: 0.4 K/UL (ref 0–0.4)
EOSINOPHIL NFR BLD: 3 % (ref 0–5)
ERYTHROCYTE [DISTWIDTH] IN BLOOD BY AUTOMATED COUNT: 15.7 % (ref 11.6–14.5)
GAS FLOW.O2 O2 DELIVERY SYS: ABNORMAL L/MIN
GAS FLOW.O2 SETTING OXYMISER: 18 BPM
GLUCOSE BLD STRIP.AUTO-MCNC: 134 MG/DL (ref 70–110)
GLUCOSE BLD STRIP.AUTO-MCNC: 140 MG/DL (ref 70–110)
GLUCOSE BLD STRIP.AUTO-MCNC: 146 MG/DL (ref 70–110)
GLUCOSE BLD STRIP.AUTO-MCNC: 158 MG/DL (ref 70–110)
GLUCOSE SERPL-MCNC: 134 MG/DL (ref 74–99)
GLUCOSE SERPL-MCNC: 169 MG/DL (ref 74–99)
HCO3 BLD-SCNC: 30 MMOL/L (ref 22–26)
HCT VFR BLD AUTO: 28.8 % (ref 36–48)
HGB BLD-MCNC: 8.8 G/DL (ref 13–16)
INSPIRATION.DURATION SETTING TIME VENT: 1.2 SEC
LYMPHOCYTES # BLD: 1.9 K/UL (ref 0.9–3.6)
LYMPHOCYTES NFR BLD: 15 % (ref 21–52)
MAGNESIUM SERPL-MCNC: 4.5 MG/DL (ref 1.6–2.6)
MCH RBC QN AUTO: 25.7 PG (ref 24–34)
MCHC RBC AUTO-ENTMCNC: 30.6 G/DL (ref 31–37)
MCV RBC AUTO: 84 FL (ref 74–97)
MONOCYTES # BLD: 0.5 K/UL (ref 0.05–1.2)
MONOCYTES NFR BLD: 4 % (ref 3–10)
NEUTS SEG # BLD: 9.8 K/UL (ref 1.8–8)
NEUTS SEG NFR BLD: 77 % (ref 40–73)
O2/TOTAL GAS SETTING VFR VENT: 50 %
PCO2 BLD: 42.5 MMHG (ref 35–45)
PEEP RESPIRATORY: 8 CMH2O
PH BLD: 7.46 [PH] (ref 7.35–7.45)
PHOSPHATE SERPL-MCNC: 4 MG/DL (ref 2.5–4.9)
PHOSPHATE SERPL-MCNC: 4.5 MG/DL (ref 2.5–4.9)
PIP ISTAT,IPIP: 22
PLATELET # BLD AUTO: 638 K/UL (ref 135–420)
PMV BLD AUTO: 11.2 FL (ref 9.2–11.8)
PO2 BLD: 83 MMHG (ref 80–100)
POTASSIUM SERPL-SCNC: 3.9 MMOL/L (ref 3.5–5.5)
POTASSIUM SERPL-SCNC: 4.1 MMOL/L (ref 3.5–5.5)
RBC # BLD AUTO: 3.43 M/UL (ref 4.35–5.65)
SAO2 % BLD: 97 % (ref 92–97)
SERVICE CMNT-IMP: ABNORMAL
SODIUM SERPL-SCNC: 147 MMOL/L (ref 136–145)
SODIUM SERPL-SCNC: 150 MMOL/L (ref 136–145)
SPECIMEN TYPE: ABNORMAL
TOTAL RESP. RATE, ITRR: 21
VENTILATION MODE VENT: ABNORMAL
VOLUME CONTROL PLUS IVLCP: YES
VT SETTING VENT: 500 ML
WBC # BLD AUTO: 12.9 K/UL (ref 4.6–13.2)

## 2021-04-21 PROCEDURE — 74011250636 HC RX REV CODE- 250/636: Performed by: INTERNAL MEDICINE

## 2021-04-21 PROCEDURE — 36415 COLL VENOUS BLD VENIPUNCTURE: CPT

## 2021-04-21 PROCEDURE — 74011250636 HC RX REV CODE- 250/636: Performed by: PHYSICIAN ASSISTANT

## 2021-04-21 PROCEDURE — 2709999900 HC NON-CHARGEABLE SUPPLY

## 2021-04-21 PROCEDURE — 82962 GLUCOSE BLOOD TEST: CPT

## 2021-04-21 PROCEDURE — 74011000250 HC RX REV CODE- 250: Performed by: PHYSICIAN ASSISTANT

## 2021-04-21 PROCEDURE — 94640 AIRWAY INHALATION TREATMENT: CPT

## 2021-04-21 PROCEDURE — 85025 COMPLETE CBC W/AUTO DIFF WBC: CPT

## 2021-04-21 PROCEDURE — 80069 RENAL FUNCTION PANEL: CPT

## 2021-04-21 PROCEDURE — 94668 MNPJ CHEST WALL SBSQ: CPT

## 2021-04-21 PROCEDURE — 85730 THROMBOPLASTIN TIME PARTIAL: CPT

## 2021-04-21 PROCEDURE — 74011250636 HC RX REV CODE- 250/636: Performed by: NURSE PRACTITIONER

## 2021-04-21 PROCEDURE — 99291 CRITICAL CARE FIRST HOUR: CPT | Performed by: PHYSICIAN ASSISTANT

## 2021-04-21 PROCEDURE — 74011250637 HC RX REV CODE- 250/637: Performed by: INTERNAL MEDICINE

## 2021-04-21 PROCEDURE — 82803 BLOOD GASES ANY COMBINATION: CPT

## 2021-04-21 PROCEDURE — 71045 X-RAY EXAM CHEST 1 VIEW: CPT

## 2021-04-21 PROCEDURE — 83735 ASSAY OF MAGNESIUM: CPT

## 2021-04-21 PROCEDURE — 65610000006 HC RM INTENSIVE CARE

## 2021-04-21 PROCEDURE — 94762 N-INVAS EAR/PLS OXIMTRY CONT: CPT

## 2021-04-21 PROCEDURE — 74011000250 HC RX REV CODE- 250: Performed by: INTERNAL MEDICINE

## 2021-04-21 PROCEDURE — 74011000258 HC RX REV CODE- 258: Performed by: NURSE PRACTITIONER

## 2021-04-21 PROCEDURE — 36600 WITHDRAWAL OF ARTERIAL BLOOD: CPT

## 2021-04-21 PROCEDURE — 74011636637 HC RX REV CODE- 636/637: Performed by: PHYSICIAN ASSISTANT

## 2021-04-21 PROCEDURE — 94003 VENT MGMT INPAT SUBQ DAY: CPT

## 2021-04-21 RX ORDER — HEPARIN SODIUM 1000 [USP'U]/ML
3000 INJECTION, SOLUTION INTRAVENOUS; SUBCUTANEOUS ONCE
Status: COMPLETED | OUTPATIENT
Start: 2021-04-21 | End: 2021-04-21

## 2021-04-21 RX ORDER — DEXTROSE MONOHYDRATE 50 MG/ML
50 INJECTION, SOLUTION INTRAVENOUS CONTINUOUS
Status: DISCONTINUED | OUTPATIENT
Start: 2021-04-21 | End: 2021-04-24

## 2021-04-21 RX ORDER — ALBUTEROL SULFATE 0.83 MG/ML
2.5 SOLUTION RESPIRATORY (INHALATION)
Status: DISCONTINUED | OUTPATIENT
Start: 2021-04-21 | End: 2021-04-27

## 2021-04-21 RX ADMIN — DEXTROSE MONOHYDRATE 75 ML/HR: 5 INJECTION, SOLUTION INTRAVENOUS at 10:24

## 2021-04-21 RX ADMIN — ARFORMOTEROL TARTRATE 15 MCG: 15 SOLUTION RESPIRATORY (INHALATION) at 20:07

## 2021-04-21 RX ADMIN — ALBUTEROL SULFATE 2.5 MG: 2.5 SOLUTION RESPIRATORY (INHALATION) at 08:13

## 2021-04-21 RX ADMIN — CHLORHEXIDINE GLUCONATE 0.12% ORAL RINSE 10 ML: 1.2 LIQUID ORAL at 09:02

## 2021-04-21 RX ADMIN — FAMOTIDINE 20 MG: 10 INJECTION INTRAVENOUS at 09:02

## 2021-04-21 RX ADMIN — CHLORHEXIDINE GLUCONATE 0.12% ORAL RINSE 10 ML: 1.2 LIQUID ORAL at 21:04

## 2021-04-21 RX ADMIN — PROPOFOL 10 MCG/KG/MIN: 10 INJECTION, EMULSION INTRAVENOUS at 04:14

## 2021-04-21 RX ADMIN — MIDAZOLAM 5 MG/HR: 5 INJECTION INTRAMUSCULAR; INTRAVENOUS at 04:15

## 2021-04-21 RX ADMIN — ARFORMOTEROL TARTRATE 15 MCG: 15 SOLUTION RESPIRATORY (INHALATION) at 08:13

## 2021-04-21 RX ADMIN — HEPARIN SODIUM 2110 UNITS/HR: 10000 INJECTION, SOLUTION INTRAVENOUS at 13:06

## 2021-04-21 RX ADMIN — HEPARIN SODIUM 3000 UNITS: 1000 INJECTION, SOLUTION INTRAVENOUS; SUBCUTANEOUS at 01:30

## 2021-04-21 RX ADMIN — DEXTROSE MONOHYDRATE 75 ML/HR: 5 INJECTION, SOLUTION INTRAVENOUS at 23:52

## 2021-04-21 RX ADMIN — MEROPENEM 500 MG: 500 INJECTION INTRAVENOUS at 21:11

## 2021-04-21 RX ADMIN — MEROPENEM 500 MG: 500 INJECTION INTRAVENOUS at 13:06

## 2021-04-21 RX ADMIN — MEROPENEM 500 MG: 500 INJECTION INTRAVENOUS at 07:17

## 2021-04-21 RX ADMIN — INSULIN LISPRO 2 UNITS: 100 INJECTION, SOLUTION INTRAVENOUS; SUBCUTANEOUS at 00:20

## 2021-04-21 RX ADMIN — ALBUTEROL SULFATE 2.5 MG: 2.5 SOLUTION RESPIRATORY (INHALATION) at 20:07

## 2021-04-21 RX ADMIN — Medication 30 ML: at 09:02

## 2021-04-21 RX ADMIN — INSULIN LISPRO 2 UNITS: 100 INJECTION, SOLUTION INTRAVENOUS; SUBCUTANEOUS at 23:45

## 2021-04-21 RX ADMIN — ALBUTEROL SULFATE 2.5 MG: 2.5 SOLUTION RESPIRATORY (INHALATION) at 13:06

## 2021-04-21 NOTE — PROGRESS NOTES
[employer] [facility]. Pulmonary, Critical Care, and Sleep Medicine    Name: Pal Posadas MRN: [id number]   : 1950 Hospital: [facility]   Date: 2021  Admission Date: 2021     Chart and notes reviewed. Data reviewed. I have evaluated all findings. [x]I have reviewed the flowsheet and previous days notes. [x]The patient is unable to give any meaningful history or review of systems because the patient is:  [x]Intubated []Sedated   []Unresponsive      []The patient is critically ill on      []Mechanical ventilation []Pressors   []BiPAP []         Interval HPI:69 yo M h/o HTN tx w/ lisinopril presenting to [facility] ED  for rapidly progressing respiratory distress due to severe angioedema. During intubation attempts pt had brief cardiac arrest w/ pulse loss and severe airway swelling leading to emergent cricothyrotomy. Pt stabilized before [facility] transport to [place] [facility] pt taken emergently to [address] was converted to ETT. Patient continues to have trouble with o2 requirements disproportionate to CXR findings and despite adjustment of FiO2 and PEEP. CTA revelaed B/L pulmonary emboli which is likely causing hypoxemia and RH strain. Administration of inhaled epoprostenol on  is showing improvement in V/Q  FiO2 75%/ PEEP 10. Pt is on dobutamine and being diuresed w/ bumex and metolazone.   COVID - 19 negative. Subjective 21  Hospital Day: 17  Vent Day:17  Overnight events: No significant events noted overnight  Mentation/Activity: intubated/sedated on propofol and versed  Respiratory/ Secretions: stable  Hemodynamics: H/H stable  Urine output, bowel: adequate  Diet: None     -Plan to wean sedation today.  -Start D5W @75cc/hr  -50 q1h water flushes   - PEEP 7, FiO2 45% today              ROS:Review of systems not obtained due to patient factors. Events and notes from last 24 hours reviewed.  Care plan discussed on multidisciplinary rounds. Patient Active Problem List   Diagnosis Code    Angioedema due to angiotensin converting enzyme inhibitor (ACE-I) T78. 3XXA, [id number]    Res[facility] failure ([facility] J96.90    JACQUELYN (acute kidney injury) ([facility] N17.9    Cardiac arrest (HCC) I46.9    Obesity (BMI 30-39. 9) E66.9    Diabetic neuropathy associated with type 2 diabetes mellitus ([facility] E11.40    Diabetic retinopathy associated with type 2 diabetes mellitus ([facility] E11.319    Pulmonary infiltrates R91.8    Controlled type 2 diabetes mellitus with neurologic complication, without long-term current use of insulin (HCC) E11.49    DVT (deep venous thrombosis) ([facility] I82.409    Encounter for palliative care Z51.5    Goals of care, counseling/discussion Z71.89    Multiple subsegmental pulmonary emboli without acute cor pulmonale (HCC) I26.94       Vital Signs:  Visit Vitals  BP 93/60   Pulse 74   Temp 99.3 °F (37.4 °C)   Resp 19   Ht 5' 11\" (1.803 m)   Wt 101.2 kg (223 lb 1.7 oz)   SpO2 93%   BMI 31.12 kg/m²       O2 Device: Ventilator       Temp (24hrs), Av.6 °F (37.6 °C), Min:98.6 °F (37 °C), Max:100.8 °F (38.2 °C)       Intake/Output:   Last shift:       0701 -  1900  In: 250   Out: -   Last 3 shifts: 1901 -  0700  In: 2402.7 [I.V.:1254.7]  Out: 2925 [Urine:2925]    Intake/Output Summary (Last 24 hours) at 2021 1113  Last data filed at 2021 1000  Gross per 24 hour   Intake 1550.96 ml   Output 1895 ml   Net -344.04 ml        Ventilator Settings:  Ventilator Mode: Assist control, VC+  Respiratory Rate  Back-Up Rate: 18  Insp Time (sec): 1.2 sec  I:E Ratio: 1;1  Ventilator Volumes  Vt Set (ml): 500 ml  Vt Exhaled (Machine Breath) (ml): (P) 504 ml  Vt Spont (ml): 514 ml  Ve Observed (l/min): (P) 12.6 l/min  Ventilator Pressures  PC Set: 550  PIP Observed (cm H2O): (P) 20 cm H2O  Plateau Pressure (cm H2O): (P) 21 cm H2O  MAP (cm H2O): (P) 13  PEEP/VENT (cm H2O): 7 cm H20  Auto PEEP Observed (cm H2O): 0 cm H2O    Current Facility-Administered Medications   Medication Dose Route Frequency    albuterol (PROVENTIL VENTOLIN) nebulizer solution 2.5 mg  2.5 mg Nebulization TID RT    dextrose 5% infusion  75 mL/hr IntraVENous CONTINUOUS    propofol (DIPRIVAN) 10 mg/mL infusion  0-50 mcg/kg/min IntraVENous TITRATE    midazolam in normal saline (VERSED) 1 mg/mL infusion  0-10 mg/hr IntraVENous TITRATE    fentaNYL (PF) 1,500 mcg/30 mL (50 mcg/mL) infusion  0-200 mcg/hr IntraVENous TITRATE    meropenem (MERREM) 500 mg in sterile water (preservative free) 10 mL IV syringe  500 mg IntraVENous Q8H    famotidine (PF) (PEPCID) 20 mg in 0.9% sodium chloride 10 mL injection  20 mg IntraVENous DAILY    heparin 25,000 units in D5W 250 ml infusion  12-25 Units/kg/hr IntraVENous TITRATE    arformoteroL (BROVANA) neb solution 15 mcg  15 mcg Nebulization BID RT    multivit-folic acid-herbal 113 (WELLESSE PLUS) oral liquid 30 mL  30 mL Per NG tube DAILY    chlorhexidine (PERIDEX) 0.12 % mouthwash 10 mL  10 mL Oral Q12H    insulin lispro (HUMALOG) injection   SubCUTAneous Q6H         Telemetry: [x]Sinus []A-flutter []Paced    []A-fib []Multiple PVCs                  Physical Exam:      General: Intubated/sedated; HEENT:  Anicteric sclerae; pink palpebral conjunctivae; mucosa moist  Resp:  Symmetrical chest expansion, no accessory muscle use; good airway entry; no rales/ wheezing/ rhonchi noted  CV:  S1, S2 present; regular rate and rhythm  GI:  Obese.  Abdomen soft, non-tender; (+) active bowel sounds  Extremities:  +2 pulses on all extremities; no edema/ cyanosis/ clubbing noted + restraints   Skin:  Warm; no rashes/ lesions noted, normal turgor/cap refill   Neurologic:  Non-focal  Devices:  OGT, ETT, Garcia      DATA:  MAR reviewed and pertinent medications noted or modified as needed    Labs:  Recent Labs     04/21/21  0002 04/20/21  0407 04/19/21  0311   WBC 12.9 12.8 14.5*   HGB 8.8* 9.3* 8.7*   HCT 28.8* 30.5* 29.2* * 560* 523*     Recent Labs     04/21/21  0002 04/20/21  1702 04/20/21  0407 04/19/21  0311 04/19/21  0311   * 148* 145   < > 144   K 3.9 3.7 3.6   < > 3.4*   * 112* 107   < > 104   CO2 29 30 33*   < > 31   * 134* 164*   < > 175*   * 131* 129*   < > 126*   CREA 2.70* 2.59* 2.74*   < > 2.94*   CA 7.9* 8.9 8.5   < > 8.3*   MG 4.5*  --  4.6*  --  4.6*   PHOS 4.5 4.5 5.3*   < > 6.1*   ALB 2.3* 2.3* 2.3*   < > 2.4*    < > = values in this interval not displayed. No results for input(s): PH, PCO2, PO2, HCO3, FIO2 in the last 72 hours. Recent Labs     04/21/21  0414 04/20/21  0350 04/19/21  0325   FIO2I 50 0.70 0.90   HCO3I 30.0* 31.7* 31.4*   PCO2I 42.5 42.7 46.3*   PHI 7.46* 7.48* 7.44   PO2I 83 206* 91       Imaging:  [x]   I have personally reviewed the patients radiographs and reports  XR Results (most recent):  Results from Hospital Encounter encounter on 04/04/21   XR ABD (KUB)    Narrative Examination: Supine abdomen, one view     History: Bowel obstruction    Comparison: 3 days prior    Findings: Persistent mildly dilated transverse colon without significant  distention of additional bowel. Oral contrast is seen in the rectum. No definite  obstruction. Airspace disease in the right upper lobe. NG tube is in place. Impression 1. Persistent or minimally improved gaseous distention of the transverse colon  without clear obstruction. Mild colonic ileus is possible. CT Results (most recent):  Results from Hospital Encounter encounter on 04/04/21   CTA CHEST W OR W WO CONT    Narrative CTA CHEST PULMONARY ANGIOGRAM    HISTORY/INDICATION:  Shortness of breath, clinical concern for pulmonary  embolism, history of cardiac arrest 4/4/2021    COMPARISON:  No prior CTA chest. Reference chest radiograph 4/13/2021.     TECHNIQUE:  Helical multidetector array volumetric acquisition of the chest is  performed following intravenous contrast administration of 72cc Isovue-370, per  pulmonary angiogram protocol. These images are reviewed and 2.5 mm and 5 mm  images along with coronal and sagittal 3D reconstruction maximum intensity  projection (MIP) images. Dose reduction techniques:  Automated exposure control,  mAs and/or kVp reductions based on patient size, and iterative reconstruction. CTA SPECIFIC FINDINGS:  Pulmonary arteries: Central contrast filling defect in the left lower lobe,  involving lobar, segmental, and segmental branches. Small contrast filling  defect in a segmental branch in the right upper lobe. Aorta: No aneurysm. CHEST FINDINGS:   Thyroid:  No focal lesion. Mediastinum: Heart is mildly enlarged. Mild burden of coronary artery  atherosclerotic calcifications. No pericardial effusion. Reflux of trace  contrast into the IVC. No right ventricular dilatation or bowing of the  intraventricular septum. Pleural space: Trace bilateral pleural effusions. No pneumothorax. Lungs: There is dense multifocal consolidations bilaterally and in the dependent  lower lobes. There is suggestion of focal hypoattenuation within the  consolidation in the left lower lobe. Endotracheal tube in appropriate position,  terminating 3.7 cm above the gabriella on the  view. Included Abdomen: Visualized solid organs of the upper abdomen are normal.  Enteric tube tip terminates within the body of the stomach. Skeleton: No suspicious lytic or blastic lesions. No fractures. Soft tissues: Normal.    Lymph Nodes: Increased number of mildly prominent mediastinal and bilateral  hilar nodes, including a 1.0 x 1.8 cm AP window node and a 1.1 x 2.1 cm right  paratracheal node. Impression 1. Moderate burden of acute pulmonary embolism in the left lower lobe. Additional small acute segmental pulmonary embolism in the right upper lobe.     2. There is dense multifocal consolidations bilaterally, as well as in the  dependent lower lobes, favoring a combination of multifocal pneumonia, edema,  and dependent atelectasis. -There is suggestion of focal hypoattenuation within the consolidation in the  left lower lobe, which may be infectious in etiology versus sequela of pulmonary  infarct given #1.    3. Mildly prominent mediastinal and bilateral hilar nodes, likely  benign/reactive. 4. Mild cardiomegaly. Reflux of trace contrast into the IVC, which can be seen  in the setting of right heart dysfunction. 5. Trace bilateral pleural effusions. 6. Endotracheal and enteric tubes noted in appropriate position. Discussed findings #1-2 with Rachael King PA-C on 4/13/2021 at 7:19 PM.         IMPRESSION:   · Angioedema- with airway and respiratory failure and collapse; thought due to ACE inhibitor  · S/P Emergent Cricthyrotomy [facility] 4/4/21- converted to 6.5 ETT intraoperative by anesthesia team 4/4/21, 8.0 ETT by anesthesia 4/7/21, packing removed evening 4/8/21-ENT  · S/P cardiac arrest @ [facility] 4/4/21- brief, likely due to  hypoxia and  airway collapse  · Respiratory Failure: Acute due to above; currently intubated and on vent for airway protection  · Pulmonary Infiltrates: suspect aspiration secretions and/or blood due to above- can't exclude other infectious process at this time. Rapid Covid Negative at [facility]  · Bradycardia - improved  · DVT: Popliteal- Left; acute non-occlusive thrombus.  Heparin ACS protocol started 4/8- stopped 4/9 due to bleeding from Cric site.  Bleeding controlled -  heparin currently back on   · Pulmonary Embolism - 4/13/21 - left lower and right upper lobes  · JACQUELYN - , Creatinine 3.57  · Hypokalemia  · Left scleral erythema- unknown baseline; possible infection   · DM  · DM retinopathy per chart review  · Diabetic peripheral neuropathy  · Left Pneumothorax - resolved     · Obesity: Body mass index is 35.64 kg/m². Patient Active Problem List   Diagnosis Code    Angioedema due to angiotensin converting enzyme inhibitor (ACE-I) T78. 3XXA, [id number]  Respiratory failure ([facility]) J96.90    JACQUELYN (acute kidney injury) ([facility] N17.9    Cardiac arrest ([facility]) I46.9    Obesity (BMI 30-39. 9) E66.9    Diabetic neuropathy associated with type 2 diabetes mellitus [facility] E11.40    Diabetic retinopathy associated with type 2 diabetes mellitus ([facility] E11.319    Pulmonary infiltrates R91.8    Controlled type 2 diabetes mellitus with neurologic complication, without long-term current use of insulin ([facility]) E11.49    DVT (deep venous thrombosis) ([facility]) I82.409    Encounter for palliative care Z51.5    Goals of care, counseling/discussion Z71.89    Multiple subsegmental pulmonary emboli without acute cor pulmonale ([facility]) I26.94        RECOMMENDATIONS:   NEURO:  · Serial neuro evaluations  · Daily sedation holiday  · Wetting tears and cipro eye drops- stop date for cipro on chart     CARDIO:  · MAP goal >64- No IV pressor requirement  · Echo on 4/6/21 with EF of 55%, dilated RV and moderate pulmonary hypertension (PASP 61 mmHg)  PULM:  · Maintain aspiration precautions: HOB >30 degrees  · SpO2 goal >92%  · Bronchial /oral hygiene   · VAP bundle- Wean vent as clinical status allows  · SBT as clinical status allows  · Completed course of decadron  · Pulmozyme and Mucomist DC  · Metaneb  · Daily CXR and ABG while intubated     GI/ NUTRITION:  · OGT   · Diet: start trickle feeds and advance as tolerated  · SUP:Pepcid  · Follow up with nutritional recommendations  RENAL/ METAB/ :  · Monitor I&Os  · Trend electrolytes - replaced as needed  · Garcia: Continue     HEM/ONC  · Trend Hb/HCT and PLTs  · DVT prophylaxis: SCDs, heparin gtt     ID  · Antibioitcs: Cipro eye drop, Zosyn 4/4 - 4/8  Vanco: 4/4-6, MRSA swab negative.   · Zosyn restarted on 4/11, WBCs elevated to 16.5  · Follow up on pending cultures  · Repeat Covid negative (4/11)  · Tylenol for fevers     ENDO  · BGs Q 6,SSI, TSH normal  · C1 inhibitor normal level (4/7/21)     MSK/ ORTHO  · No active Issues     SKIN/ WOUNDS  · Per protocol  · Neck- daily dressing changes per ENT, Quick clot with Afrin if needed, d/c xeroform           CODE STATUS: Full Code- Full     Discussed in interdisciplinary rounds     Best practice :    Glycemic control  IHI ICU bundles: Cardona Bundle Followed and Vent Bundle Followed, Vent Day 17    [facility] Vent patients- VAP bundle, aim to keep peak plateau pressure 81-65WH H2O  Sress ulcer prophylaxis. DVT prophylaxis. Need for Lines, cardona assessed. Restraints need. Palliative care evaluation. High complexity decision making was performed during this consultation and evaluation. [x]       Pt is at high risk for further organ failure and dysfunction. Critical care time spent:  35  minutes with patient exclusive of procedures.     Shala Contreras PA-C  04/21/21  Pulmonary, Critical Care Medicine  [facility]

## 2021-04-21 NOTE — PROGRESS NOTES
attended the interdisciplinary rounds for Juarez Alejandra, who is a 79 y.o.,male. Patients Primary Language is: [place]. According to the patients EMR [employer] Affiliation is: No preference. The reason the Patient came to the hospital is:   Patient Active Problem List    Diagnosis Date Noted    Encounter for palliative care     Goals of care, counseling/discussion     Multiple subsegmental pulmonary emboli without acute cor pulmonale ([facility]     DVT (deep venous thrombosis) ([facility] 04/10/2021    Respiratory failure ([facility] 04/05/2021    Obesity (BMI 30-39.9) 04/05/2021    Diabetic neuropathy associated with type 2 diabetes mellitus ([facility] 04/05/2021    Diabetic retinopathy associated with type 2 diabetes mellitus ([facility] 04/05/2021    Pulmonary infiltrates 04/05/2021    Controlled type 2 diabetes mellitus with neurologic complication, without long-term current use of insulin ([facility] 04/05/2021    Angioedema due to angiotensin converting enzyme inhibitor (ACE-I) 04/04/2021    JACQUELYN (acute kidney injury) ([facility] 04/04/2021    Cardiac arrest ([facility] 04/04/2021      Plan:  Chaplains will continue to follow and will provide pastoral care on an as needed/requested basis.  recommends bedside caregivers page  on duty if patient shows signs of acute spiritual or emotional distress.     [address] [facility]  Board Certified [address] [facility]   [phone]

## 2021-04-21 NOTE — PROGRESS NOTES
In Patient Progress note      Admit Date: 4/4/2021    Impression:     #1 Acute kidney injury, etiology appears to be secondary to Ischaemic ATN d/t hypoperfusion v/s cardiorenal syndrome in  setting of CHF/V failure in setting of PE  #2 acute respiratory failure secondary to rapidly progressive angioedema s/p emergent cricothyrotomy. Presently intubated on the vent,  severely hypoxic out of proportion to x-ray findings, therefore PE has been considered , Rx for presumed PE  #3 pulmonary infiltrates/Pulm edema   #4 left-sided pneumothorax, appears resolved  #5 diabetes mellitus with complications  #6 popliteal DVT left  #7 possible colonic obst , awaiting CT abd   #8 hypernatremia      Plan:     1) add D5W@ 75 cc/hrs , increase free water with TF  to 50 cc/hrs   2) strict I/o  3) follow renal parameters , electrolytes q12hrs , replace electrolytes  4) keep MAP > 65    Please call with questions,     Etta Ge MD FASN  Cell [phone]  Pager: [phone]  Subjective:     - No acute over night events. - respiratory - stable  - hemodynamics - stable, no pressrs  - UOP-good  - Nutrition -ok    Objective:     Visit Vitals  BP (!) 139/57 (BP 1 Location: Right upper arm, BP Patient Position: At rest)   Pulse 86   Temp 99.1 °F (37.3 °C)   Resp 16   Ht 5' 11\" (1.803 m)   Wt 101.2 kg (223 lb 1.7 oz)   SpO2 92%   BMI 31.12 kg/m²         Intake/Output Summary (Last 24 hours) at 4/21/2021 1606  Last data filed at 4/21/2021 1200  Gross per 24 hour   Intake 1795.13 ml   Output 1895 ml   Net -99.87 ml       Physical Exam:        Intubated/ on vent   HEENT: mmm  Lungs: good air entry, coarse BS +  Cardiovascular system: S1, S2, regular rate and rhythm. Abdomen: soft, non tender, non distended. Positive bowel sounds. Extremities: no clubbing, cyanosis or edema. Integumentary: skin is grossly intact.        Data Review:    Recent Labs     04/21/21  0002   WBC 12.9   RBC 3.43*   HCT 28.8*   MCV 84.0   MCH 25.7   MCHC 30.6*   RDW 15.7*     Recent Labs     04/21/21  0002 04/20/21  1702 04/20/21  0407 04/19/21  2200 04/19/21  1747 04/19/21  0311   * 131* 129* 129* 129* 126*   CREA 2.70* 2.59* 2.74* 2.77* 2.72* 2.94*   CA 7.9* 8.9 8.5 8.6 8.2* 8.3*   ALB 2.3* 2.3* 2.3* 2.5* 2.5* 2.4*   K 3.9 3.7 3.6 3.8 4.0 3.4*   * 148* 145 146* 148* 144   * 112* 107 107 110 104   CO2 29 30 33* 33* 30 31   PHOS 4.5 4.5 5.3* 5.4* 5.4* 6.1*   * 134* 164* 147* 133* 175*       Mat Velasquez MD

## 2021-04-21 NOTE — PROGRESS NOTES
Problem: Ventilator Management  Goal: *Adequate oxygenation and ventilation  Outcome: Progressing Towards Goal  Goal: *Patient maintains clear airway/free of aspiration  Outcome: Not Progressing Towards Goal  Goal: *Absence of infection signs and symptoms  Outcome: Progressing Towards Goal  Goal: *Normal spontaneous ventilation  Outcome: Not Progressing Towards Goal     Problem: Patient Education: Go to Patient Education Activity  Goal: Patient/Family Education  Outcome: Not Progressing Towards Goal     Problem: Diabetes Self-Management  Goal: *Incorporating physical activity into lifestyle  Description: State effect of exercise on blood glucose levels. Outcome: Progressing Towards Goal  Goal: *Using medications safely  Description: State effect of diabetes medications on diabetes; name diabetes medication taking, action and side effects. Outcome: Progressing Towards Goal  Goal: *Monitoring blood glucose, interpreting and using results  Description: Identify recommended blood glucose targets  and personal targets. Outcome: Progressing Towards Goal     Problem: Falls - Risk of  Goal: *Absence of Falls  Description: Document Schuyler Modi Fall Risk and appropriate interventions in the flowsheet. Outcome: Progressing Towards Goal  Note: Fall Risk Interventions:       Mentation Interventions: Adequate sleep, hydration, pain control, Bed/chair exit alarm, Door open when patient unattended, Evaluate medications/consider consulting pharmacy, Reorient patient    Medication Interventions: Evaluate medications/consider consulting pharmacy, Assess postural VS orthostatic hypotension, Bed/chair exit alarm    Elimination Interventions: Bed/chair exit alarm              Problem: Pressure Injury - Risk of  Goal: *Prevention of pressure injury  Description: Document Lang Scale and appropriate interventions in the flowsheet.   Outcome: Progressing Towards Goal  Note: Pressure Injury Interventions:  Sensory Interventions: Assess changes in LOC, Assess need for specialty bed, Avoid rigorous massage over bony prominences, Chair cushion, Check visual cues for pain, Float heels, Keep linens dry and wrinkle-free, Maintain/enhance activity level, Monitor skin under medical devices, Minimize linen layers, Pressure redistribution bed/mattress (bed type), Turn and reposition approx. every two hours (pillows and wedges if needed)    Moisture Interventions: Absorbent underpads, Apply protective barrier, creams and emollients, Assess need for specialty bed, Check for incontinence Q2 hours and as needed, Internal/External urinary devices, Limit adult briefs, Minimize layers    Activity Interventions: Assess need for specialty bed, Pressure redistribution bed/mattress(bed type)    Mobility Interventions: Assess need for specialty bed, Turn and reposition approx.  every two hours(pillow and wedges), Float heels, Suspension boots, Pressure redistribution bed/mattress (bed type)    Nutrition Interventions: Document food/fluid/supplement intake    Friction and Shear Interventions: Apply protective barrier, creams and emollients, Feet elevated on foot rest, Foam dressings/transparent film/skin sealants, Lift sheet, Transfer aides:transfer board/George lift/ceiling lift                Problem: Patient Education: Go to Patient Education Activity  Goal: Patient/Family Education  Outcome: Not Progressing Towards Goal     Problem: Injury - Risk of, Adverse Drug Event  Goal: *Absence of adverse drug events  Outcome: Progressing Towards Goal  Goal: *Absence of medication errors  Outcome: Progressing Towards Goal  Goal: *Knowledge of prescribed medications  Outcome: Not Progressing Towards Goal     Problem: Patient Education: Go to Patient Education Activity  Goal: Patient/Family Education  Outcome: Not Progressing Towards Goal     Problem: Pain  Goal: *Control of Pain  Outcome: Progressing Towards Goal     Problem: Patient Education: Go to Patient Education Activity  Goal: Patient/Family Education  Outcome: Not Progressing Towards Goal     Problem: Delirium Treatment  Goal: *Absence of falls  Outcome: Progressing Towards Goal

## 2021-04-22 ENCOUNTER — APPOINTMENT (OUTPATIENT)
Dept: GENERAL RADIOLOGY | Age: 71
DRG: 004 | End: 2021-04-22
Attending: PHYSICIAN ASSISTANT
Payer: MEDICARE

## 2021-04-22 LAB
ALBUMIN SERPL-MCNC: 2.2 G/DL (ref 3.4–5)
ALBUMIN SERPL-MCNC: 2.4 G/DL (ref 3.4–5)
ANION GAP SERPL CALC-SCNC: 7 MMOL/L (ref 3–18)
ANION GAP SERPL CALC-SCNC: 7 MMOL/L (ref 3–18)
APTT PPP: 51.2 SEC (ref 23–36.4)
APTT PPP: >180 SEC (ref 23–36.4)
ARTERIAL PATENCY WRIST A: ABNORMAL
BASE EXCESS BLD CALC-SCNC: 4 MMOL/L
BASOPHILS # BLD: 0.1 K/UL (ref 0–0.1)
BASOPHILS NFR BLD: 0 % (ref 0–2)
BDY SITE: ABNORMAL
BUN SERPL-MCNC: 86 MG/DL (ref 7–18)
BUN SERPL-MCNC: 92 MG/DL (ref 7–18)
BUN/CREAT SERPL: 55 (ref 12–20)
BUN/CREAT SERPL: 57 (ref 12–20)
CALCIUM SERPL-MCNC: 8.9 MG/DL (ref 8.5–10.1)
CALCIUM SERPL-MCNC: 9.1 MG/DL (ref 8.5–10.1)
CHLORIDE SERPL-SCNC: 107 MMOL/L (ref 100–111)
CHLORIDE SERPL-SCNC: 108 MMOL/L (ref 100–111)
CO2 SERPL-SCNC: 27 MMOL/L (ref 21–32)
CO2 SERPL-SCNC: 28 MMOL/L (ref 21–32)
CREAT SERPL-MCNC: 1.5 MG/DL (ref 0.6–1.3)
CREAT SERPL-MCNC: 1.67 MG/DL (ref 0.6–1.3)
DIFFERENTIAL METHOD BLD: ABNORMAL
EOSINOPHIL # BLD: 0.4 K/UL (ref 0–0.4)
EOSINOPHIL NFR BLD: 3 % (ref 0–5)
ERYTHROCYTE [DISTWIDTH] IN BLOOD BY AUTOMATED COUNT: 15.7 % (ref 11.6–14.5)
GAS FLOW.O2 O2 DELIVERY SYS: ABNORMAL L/MIN
GAS FLOW.O2 SETTING OXYMISER: 18 BPM
GLUCOSE BLD STRIP.AUTO-MCNC: 141 MG/DL (ref 70–110)
GLUCOSE BLD STRIP.AUTO-MCNC: 145 MG/DL (ref 70–110)
GLUCOSE BLD STRIP.AUTO-MCNC: 155 MG/DL (ref 70–110)
GLUCOSE BLD STRIP.AUTO-MCNC: 155 MG/DL (ref 70–110)
GLUCOSE SERPL-MCNC: 140 MG/DL (ref 74–99)
GLUCOSE SERPL-MCNC: 168 MG/DL (ref 74–99)
HCO3 BLD-SCNC: 27.5 MMOL/L (ref 22–26)
HCT VFR BLD AUTO: 35.8 % (ref 36–48)
HGB BLD-MCNC: 10.7 G/DL (ref 13–16)
LYMPHOCYTES # BLD: 2 K/UL (ref 0.9–3.6)
LYMPHOCYTES NFR BLD: 15 % (ref 21–52)
MAGNESIUM SERPL-MCNC: 3.8 MG/DL (ref 1.6–2.6)
MCH RBC QN AUTO: 25.2 PG (ref 24–34)
MCHC RBC AUTO-ENTMCNC: 29.9 G/DL (ref 31–37)
MCV RBC AUTO: 84.2 FL (ref 74–97)
MONOCYTES # BLD: 0.6 K/UL (ref 0.05–1.2)
MONOCYTES NFR BLD: 5 % (ref 3–10)
NEUTS SEG # BLD: 10.6 K/UL (ref 1.8–8)
NEUTS SEG NFR BLD: 76 % (ref 40–73)
O2/TOTAL GAS SETTING VFR VENT: 45 %
PCO2 BLD: 35.8 MMHG (ref 35–45)
PEEP RESPIRATORY: 7 CMH2O
PH BLD: 7.49 [PH] (ref 7.35–7.45)
PHOSPHATE SERPL-MCNC: 3.5 MG/DL (ref 2.5–4.9)
PHOSPHATE SERPL-MCNC: 3.8 MG/DL (ref 2.5–4.9)
PLATELET # BLD AUTO: 699 K/UL (ref 135–420)
PMV BLD AUTO: 11.2 FL (ref 9.2–11.8)
PO2 BLD: 85 MMHG (ref 80–100)
POTASSIUM SERPL-SCNC: 3.8 MMOL/L (ref 3.5–5.5)
POTASSIUM SERPL-SCNC: 3.9 MMOL/L (ref 3.5–5.5)
RBC # BLD AUTO: 4.25 M/UL (ref 4.35–5.65)
SAO2 % BLD: 97 % (ref 92–97)
SERVICE CMNT-IMP: ABNORMAL
SODIUM SERPL-SCNC: 141 MMOL/L (ref 136–145)
SODIUM SERPL-SCNC: 143 MMOL/L (ref 136–145)
SPECIMEN TYPE: ABNORMAL
VENTILATION MODE VENT: ABNORMAL
VT SETTING VENT: 500 ML
WBC # BLD AUTO: 13.9 K/UL (ref 4.6–13.2)

## 2021-04-22 PROCEDURE — 74011000250 HC RX REV CODE- 250: Performed by: PHYSICIAN ASSISTANT

## 2021-04-22 PROCEDURE — 74011250636 HC RX REV CODE- 250/636: Performed by: INTERNAL MEDICINE

## 2021-04-22 PROCEDURE — 85730 THROMBOPLASTIN TIME PARTIAL: CPT

## 2021-04-22 PROCEDURE — 71045 X-RAY EXAM CHEST 1 VIEW: CPT

## 2021-04-22 PROCEDURE — 77030018798 HC PMP KT ENTRL FED COVD -A

## 2021-04-22 PROCEDURE — 74011250636 HC RX REV CODE- 250/636: Performed by: PHYSICIAN ASSISTANT

## 2021-04-22 PROCEDURE — 74011000250 HC RX REV CODE- 250: Performed by: INTERNAL MEDICINE

## 2021-04-22 PROCEDURE — 82962 GLUCOSE BLOOD TEST: CPT

## 2021-04-22 PROCEDURE — 2709999900 HC NON-CHARGEABLE SUPPLY

## 2021-04-22 PROCEDURE — 99291 CRITICAL CARE FIRST HOUR: CPT | Performed by: NURSE PRACTITIONER

## 2021-04-22 PROCEDURE — 65610000006 HC RM INTENSIVE CARE

## 2021-04-22 PROCEDURE — 80069 RENAL FUNCTION PANEL: CPT

## 2021-04-22 PROCEDURE — 36600 WITHDRAWAL OF ARTERIAL BLOOD: CPT

## 2021-04-22 PROCEDURE — 94668 MNPJ CHEST WALL SBSQ: CPT

## 2021-04-22 PROCEDURE — 74011636637 HC RX REV CODE- 636/637: Performed by: PHYSICIAN ASSISTANT

## 2021-04-22 PROCEDURE — 94640 AIRWAY INHALATION TREATMENT: CPT

## 2021-04-22 PROCEDURE — 74011250637 HC RX REV CODE- 250/637: Performed by: INTERNAL MEDICINE

## 2021-04-22 PROCEDURE — 36415 COLL VENOUS BLD VENIPUNCTURE: CPT

## 2021-04-22 PROCEDURE — 94003 VENT MGMT INPAT SUBQ DAY: CPT

## 2021-04-22 PROCEDURE — 94762 N-INVAS EAR/PLS OXIMTRY CONT: CPT

## 2021-04-22 PROCEDURE — 82803 BLOOD GASES ANY COMBINATION: CPT

## 2021-04-22 PROCEDURE — 83735 ASSAY OF MAGNESIUM: CPT

## 2021-04-22 PROCEDURE — 85025 COMPLETE CBC W/AUTO DIFF WBC: CPT

## 2021-04-22 PROCEDURE — 77030041174 HC STOOL COL SYS DIGNSHLD BARD -C

## 2021-04-22 RX ORDER — POTASSIUM CHLORIDE 7.45 MG/ML
INJECTION INTRAVENOUS
Status: DISPENSED
Start: 2021-04-22 | End: 2021-04-22

## 2021-04-22 RX ORDER — POTASSIUM CHLORIDE 7.45 MG/ML
10 INJECTION INTRAVENOUS ONCE
Status: COMPLETED | OUTPATIENT
Start: 2021-04-22 | End: 2021-04-22

## 2021-04-22 RX ORDER — HEPARIN SODIUM 1000 [USP'U]/ML
3000 INJECTION, SOLUTION INTRAVENOUS; SUBCUTANEOUS ONCE
Status: COMPLETED | OUTPATIENT
Start: 2021-04-22 | End: 2021-04-22

## 2021-04-22 RX ADMIN — MEROPENEM 500 MG: 500 INJECTION INTRAVENOUS at 06:18

## 2021-04-22 RX ADMIN — CHLORHEXIDINE GLUCONATE 0.12% ORAL RINSE 10 ML: 1.2 LIQUID ORAL at 21:30

## 2021-04-22 RX ADMIN — FAMOTIDINE 20 MG: 10 INJECTION INTRAVENOUS at 09:00

## 2021-04-22 RX ADMIN — Medication 30 ML: at 09:00

## 2021-04-22 RX ADMIN — DEXTROSE MONOHYDRATE 50 ML/HR: 5 INJECTION, SOLUTION INTRAVENOUS at 18:23

## 2021-04-22 RX ADMIN — ALBUTEROL SULFATE 2.5 MG: 2.5 SOLUTION RESPIRATORY (INHALATION) at 07:58

## 2021-04-22 RX ADMIN — POTASSIUM CHLORIDE 10 MEQ: 7.46 INJECTION, SOLUTION INTRAVENOUS at 06:18

## 2021-04-22 RX ADMIN — ARFORMOTEROL TARTRATE 15 MCG: 15 SOLUTION RESPIRATORY (INHALATION) at 07:58

## 2021-04-22 RX ADMIN — ALBUTEROL SULFATE 2.5 MG: 2.5 SOLUTION RESPIRATORY (INHALATION) at 19:21

## 2021-04-22 RX ADMIN — HEPARIN SODIUM 1710 UNITS/HR: 10000 INJECTION, SOLUTION INTRAVENOUS at 23:51

## 2021-04-22 RX ADMIN — HEPARIN SODIUM 3000 UNITS: 1000 INJECTION INTRAVENOUS; SUBCUTANEOUS at 17:00

## 2021-04-22 RX ADMIN — HEPARIN SODIUM 2110 UNITS/HR: 10000 INJECTION, SOLUTION INTRAVENOUS at 04:49

## 2021-04-22 RX ADMIN — MEROPENEM 500 MG: 500 INJECTION INTRAVENOUS at 21:30

## 2021-04-22 RX ADMIN — ALBUTEROL SULFATE 2.5 MG: 2.5 SOLUTION RESPIRATORY (INHALATION) at 13:44

## 2021-04-22 RX ADMIN — MEROPENEM 500 MG: 500 INJECTION INTRAVENOUS at 14:00

## 2021-04-22 RX ADMIN — INSULIN LISPRO 2 UNITS: 100 INJECTION, SOLUTION INTRAVENOUS; SUBCUTANEOUS at 13:00

## 2021-04-22 RX ADMIN — ARFORMOTEROL TARTRATE 15 MCG: 15 SOLUTION RESPIRATORY (INHALATION) at 19:21

## 2021-04-22 RX ADMIN — CHLORHEXIDINE GLUCONATE 0.12% ORAL RINSE 10 ML: 1.2 LIQUID ORAL at 09:00

## 2021-04-22 NOTE — PROGRESS NOTES
ABG good. Pt is getting wound on upper lip andhis lip and tongue is swollen. I informed RN. She asked if moving tube which I do every vent check. 04/22/21 0353   Patient Observations   Pulse (Heart Rate) 83   Resp Rate 26   O2 Sat (%) 96 %   Airway - Continuous Aspiration of Subglottic Secretions (GENESIS) Tube 04/07/21 Oral   Placement Date/Time: 04/07/21 1030   Number of Attempts: 1  Inserted By: Gabriel Clement CRNA  Present on Admission/Arrival: No  Location: Oral  Placement Verified: Auscultation;BBS;Chest x-ray;EtCO2;Placement not verified (comment)  Airway Types: Endotr. ..    Insertion Depth (cm) 28 cm   Line Christian Lips   Side Secured Left;Device   Cuff Pressure   (mlt)   Site Assessment Bleeding;Swelling  (cut on lip)   Respiratory   Respiratory Pattern Regular   Chest/Tracheal Assessment Chest expansion, symmetrical   Breath Sounds Bilateral Clear   Airway Clearance   Suction ET Tube;Oral   Suction Device Inline suction catheter;Yankauer   Sputum Method Obtained Endotracheal   Sputum Amount Small   Sputum Color/Odor Clear   Sputum Consistency Thin   Vent Settings   FIO2 (%) 45 %   SpO2/FIO2 Ratio 213.33   CMV Rate Set 18   Back-Up Rate 18   Vt Set (ml) 500 ml   PEEP/VENT (cm H2O) 7 cm H20   Insp Time (sec) 1.2 sec   Insp Rise Time % 40 %   Flow Trigger 2   Ventilator Measurements   Resp Rate Observed 26   Vt Spont (ml) 535 ml   Ve Observed (l/min) 12.9 l/min   PIP Observed (cm H2O) 21 cm H2O   MAP (cm H2O) 14   I:E Ratio Actual 1:1   Safety & Alarms   Circuit Temperature 98.6 °F (37 °C)   Backup Mode Checked/Apnea Yes   Pressure Max 40 cm H2O   Pressure Min 13 cm H2O   Ve Min 2   Ve Max 20   Vt Min 200 ml   Vt Max 1000 ml   RR Max 40   Ambu Bag Yes   Ambu Mask Yes   ETCO2/TCM Min 20 mmHg   ETCO2/TCM Max 50 mmHg   Weaning Parameters   Spontaneous Breathing Trial Complete No (Comments)   Age Specific Ventilator Associated Pneumonia Bundle   Patient Age Group Adult   Adult Ventilator Associated Pneumonia Bundle Elevation of Head to 30-45 Degrees (Unless Contraindicated) Yes   Assessment of Readiness to Extubate Yes   Vent Method/Mode   Ventilation Method Conventional   Ventilator Mode Assist control;VC+   Pulmonary Toilet   Pulmonary Toilet Cough and deep breath;H. O.B elevated;Suction

## 2021-04-22 NOTE — PROGRESS NOTES
[facility]  Pulmonary, Critical Care, and Sleep Medicine    Name: Pramod Gonsalez MRN: [id number]   : 1950 Hospital: [address] [place] [address] Dr   Date: 2021        IMPRESSION:   · Angioedema- with airway and respiratory failure and collapse; thought due to ACE inhibitor  · S/P Emergent Cricthyrotomy [facility]-ED 21- converted to 6.5 ETT intraoperative by anesthesia team 21, 8.0 ETT by anesthesia 21, packing removed evening 21-ENT  · S/P cardiac arrest @ [facility] 21- brief, likely due to  hypoxia and  airway collapse  · Respiratory Failure: Acute due to above; currently intubated and on vent for airway protection  · Pulmonary Infiltrates: suspect aspiration secretions and/or blood due to above- can't exclude other infectious process at this time. Rapid Covid Negative at [facility]  · Bradycardia - improved  · DVT: Popliteal- Left; acute non-occlusive thrombus.  Heparin ACS protocol started - stopped  due to bleeding from Cric site.  Bleeding controlled -  heparin currently back on   · Pulmonary Embolism - 21 - left lower and right upper lobes  · JACQUELYN - , Creatinine 3.57  · Hypokalemia - resolved  · Left scleral erythema- unknown baseline; possible infection   · DM  · DM retinopathy per chart review  · Diabetic peripheral neuropathy  · Left Pneumothorax - resolved     · Obesity: Body mass index is 35.64 kg/m².            Patient Active Problem List   Diagnosis Code    Angioedema due to angiotensin converting enzyme inhibitor (ACE-I) T78. 3XXA, [id number]    Respiratory failure ([address] J96.90    JACQUELYN (acute kidney injury) ([address] N17.9    Cardiac arrest (HCC) I46.9    Obesity (BMI 30-39. 9) E66.9    Diabetic neuropathy associated with type 2 diabetes mellitus ([address] E11.40    Diabetic retinopathy associated with type 2 diabetes mellitus ([address] E11.319    Pulmonary infiltrates R91.8    Controlled type 2 diabetes mellitus with neurologic complication, without long-term current use of insulin (HCC) E11.49    DVT (deep venous thrombosis) ([facility] I82.409    Encounter for palliative care Z51.5    Goals of care, counseling/discussion Z71.89    Multiple subsegmental pulmonary emboli without acute cor pulmonale (HCC) I26.94      PLAN:   NEURO:  · Serial neuro evaluations  · Daily sedation holiday  · Wetting tears and cipro eye drops- stop date for cipro on chart     CARDIO:  · MAP goal >64- No IV pressor requirement  · Echo on 4/6/21 with EF of 55%, dilated RV and moderate pulmonary hypertension (PASP 61 mmHg)  PULM:  · Maintain aspiration precautions: HOB >30 degrees  · SpO2 goal >92%  · Bronchial /oral hygiene   · VAP bundle- Wean vent as clinical status allows  · SBT as clinical status allows  · Completed course of decadron  · Continue brovana and albuterol nebs  · Daily CXR and ABG while intubated     GI/ NUTRITION:  · OGT   · Diet: continue TF  · SUP:Pepcid  · Follow up with nutritional recommendations  RENAL/ METAB/ :  · Monitor I&Os  · Trend electrolytes - replace as needed  · Garcia: Continue, monitor I/Os     HEM/ONC  · Trend Hb/HCT and PLTs  · DVT prophylaxis: SCDs, heparin gtt     ID  · Antibioitcs: Cipro eye drop, Zosyn 4/4 - 4/8  Vanco: 4/4-6, MRSA swab negative.   · Zosyn restarted on 4/11, WBCs elevated to 16.5  · Follow up on pending cultures  · Repeat Covid negative (4/11)  · Tylenol for fevers     ENDO  · BGs Q 6,SSI, TSH normal  · C1 inhibitor normal level (4/7/21)     MSK/ ORTHO  · No active Issues     SKIN/ WOUNDS  · Per protocol  · Neck- daily dressing changes per ENT, Quick clot with Afrin if needed, d/c xeroform           CODE STATUS: Full Code- Full     Discussed in interdisciplinary rounds   Best practice :     Glycemic control  IHI ICU bundles: Garcia Bundle Followed and Vent Bundle Followed, Vent Day 17    [facility] Vent patients- VAP bundle, aim to keep peak plateau pressure 51-12XG H2O  Sress ulcer prophylaxis. DVT prophylaxis.   Need for Lines, cardona assessed. Restraints need. Palliative care evaluation.        Subjective/Interval History:        Interval HPI:71 yo M h/o HTN tx w/ lisinopril presenting to [facility] ED  for rapidly progressing respiratory distress due to severe angioedema. During intubation attempts pt had brief cardiac arrest w/ pulse loss and severe airway swelling leading to emergent cricothyrotomy. Pt stabilized before [facility] transport to [place] pt taken emergently to [address] was converted to ETT. Patient continues to have trouble with o2 requirements disproportionate to CXR findings and despite adjustment of FiO2 and PEEP. CTA revelaed B/L pulmonary emboli which is likely causing hypoxemia and RH strain. Administration of inhaled epoprostenol on  is showing improvement in V/Q  FiO2 75%/ PEEP 10. Pt is on dobutamine and being diuresed w/ bumex and metolazone.   COVID - 19 negative.     Subjective 21  Hospital Day: 18  Vent Day:18  Overnight events: No significant events noted overnight  Mentation/Activity: intubated/sedated on propofol and versed. Opening eyes this am, no command following  Respiratory/ Secretions: stable  Hemodynamics: H/H stable  Urine output, bowel: adequate  Diet: None     -Continue sedation wean  -continue  D5W @50cc/hr  -50 q1h water flushes   - PEEP 7, FiO2 45% today    ROS:Review of systems not obtained due to patient factors.     Objective:   Vital Signs:    Visit Vitals  /69   Pulse 86   Temp 98.8 °F (37.1 °C)   Resp 24   Ht 5' 11\" (1.803 m)   Wt 98.3 kg (216 lb 11.4 oz)   SpO2 97%   BMI 30.23 kg/m²       O2 Device: Ventilator       Temp (24hrs), Av.5 °F (36.9 °C), Min:96.6 °F (35.9 °C), Max:100.2 °F (37.9 °C)       Intake/Output:   Last shift:       07 - 1900  In: 940 [I.V.:375]  Out: 0   Last 3 shifts:  190 -  0700  In: 5271.2 [I.V.:2773.2]  Out: 6084 [Urine:5400]    Intake/Output Summary (Last 24 hours) at 2021 [facility] filed at 4/22/2021 1200  Gross per 24 hour   Intake 4270.84 ml   Output 4355 ml   Net -84.16 ml      Physical Exam:                 General: Intubated/sedated; HEENT:  Anicteric sclerae; pink palpebral conjunctivae; mucosa moist  Resp:  Symmetrical chest expansion, no accessory muscle use; good airway entry; no rales/ wheezing/ rhonchi noted  CV:  S1, S2 present; regular rate and rhythm  GI:  Obese. Abdomen soft, non-tender; (+) active bowel sounds  Extremities:  +2 pulses on all extremities; no edema/ cyanosis/ clubbing noted + restraints   Skin:  Warm; no rashes/ lesions noted, normal turgor/cap refill   Neurologic:  Non-focal  Devices:  OGT, ETT, Garcia         DATA:  Labs:  Recent Labs     04/22/21  0428 04/21/21  0002 04/20/21  0407   WBC 13.9* 12.9 12.8   HGB 10.7* 8.8* 9.3*   HCT 35.8* 28.8* 30.5*   * 638* 560*     Recent Labs     04/22/21  0428 04/21/21  1604 04/21/21  0002 04/20/21  0407 04/20/21  0407    147* 150*   < > 145   K 3.8 4.1 3.9   < > 3.6    113* 112*   < > 107   CO2 28 28 29   < > 33*   * 169* 134*   < > 164*   BUN 92* 111* 131*   < > 129*   CREA 1.67* 1.98* 2.70*   < > 2.74*   CA 9.1 8.8 7.9*   < > 8.5   MG 3.8*  --  4.5*  --  4.6*   PHOS 3.8 4.0 4.5   < > 5.3*   ALB 2.4* 2.3* 2.3*   < > 2.3*    < > = values in this interval not displayed. No results for input(s): PH, PCO2, PO2, HCO3, FIO2 in the last 72 hours. PFT:                                                     Echo:  04/04/21   ECHO ADULT COMPLETE 04/06/2021 4/6/2021    Narrative · Contrast used: DEFINITY. · Image quality for this study was technically difficult. · LV: Calculated LVEF is 55%. Visually measured ejection fraction. Normal   cavity size, wall thickness and systolic function (ejection fraction   normal). Wall motion: normal. Moderate (grade 2) left ventricular   diastolic dysfunction. · AO: Mild aortic root and ascending aorta dilatation.  Ascending aorta   diameter = 3.6 cm. Aortic root measures 3.9 cm  · RA: Dilated right atrium. · RV: Dilated right ventricle. Borderline low systolic function. · TV: Mild tricuspid valve regurgitation is present. · IVC: Mechanically ventilated; cannot use inferior caval vein diameter to   estimate central venous pressure. · PA: Moderate pulmonary hypertension. Pulmonary arterial systolic   pressure is 61 mmHg. Signed by: Angela Laguna MD         Imaging:  [x]I have personally reviewed the patients radiographs  []Radiographs reviewed with radiologist   [x]No change from prior, tubes and lines in adequate position  []Improved   []Worsening    Total of  35 min critical care time spent at bedside during the course of care providing evaluation,management and care decisions and ordering appropriate treatment related to critical care problems exclusive of procedures    Tamara Hernandez, NP - C  04/22/2021  Pulmonary, Critical Care Medicine  [facility]

## 2021-04-22 NOTE — ADT AUTH CERT NOTES
Respiratory Failure GRG - Care Day 16 (4/19/2021) by Manohar Wong RN 
 
  
Review Status Review Entered Completed 4/20/2021 10:36  
  
Criteria Review Care Day: 16 Care Date: 4/19/2021 Level of Care: ICU Guideline Day 3 Level Of Care ( ) * Activity level acceptable ( ) Floor to discharge 4/20/2021 10:36:59 EDT by Koffi Santillan   
  ICU Clinical Status   
(X) * Ventilation status appropriate 4/20/2021 10:36:59 EDT by Shelby Hensley remains on vent   
( ) * Airway status acceptable   
(X) * Respiratory status acceptable ( ) * Stable chest findings ( ) * Neurologic status acceptable   
(X) * Temperature status acceptable ( ) * No infection, or status acceptable ( ) * General Discharge Criteria met Interventions ( ) * No chest tube, or status acceptable ( ) * Intake acceptable ( ) * No inpatient interventions needed * Milestone Additional Notes 4/19  Subjective 04/19/21 Hospital Day: 15 Vent Day: 15  
   
Overnight events:  no new events overnight Mentation/Activity: sedated with fentanyl, versed and propofol Respiratory/ Secretions:  obtaining full volumes.  High vent requirements:  FIO2 90% and 13 PEEP Hemodynamics: stable off pressors and dobutamine Urine output, bowel: adequate Diet:re-starting trickle feeds and advance as tolerated Need for procedures:None Palliative meeting with family today to discuss goals of care  
 IMPRESSION:  
· Angioedema- with airway and respiratory failure and collapse; thought due to ACE inhibitor · [facility]- converted to 6.5 ETT intraoperative by anesthesia team 4/4/21, 8.0 ETT by anesthesia 4/7/21, packing removed evening 4/8/21-ENT · S/P cardiac arrest @ [facility] 4/4/21- brief, likely due to [place] collapse · Respiratory Failure: Acute due to above; currently intubated and on vent for airway protection · Pulmonary Infiltrates: suspect aspiration secretions and/or blood due to above- can't exclude other infectious process at this time. Rapid Covid Negative at [facility] · Bradycardia - improved · DVT: Popliteal- Left; acute non-occlusive thrombus.  Heparin ACS protocol started 4/8- stopped 4/9 due to bleeding from Cric site.  Bleeding controlled -  heparin currently back on   
· Pulmonary Embolism - 4/13/21 - left lower and right upper lobes · JACQUELYN - , Family meeting with Palliative team   Present were patients sister Eliel Kulkarni and patient's youngest son Wing Savage the phone were the patient's step mother and father. Daina Francisco a brief medical update and a picture of the options for the patient with burdens and benefits of those options.  Discussed inability to trach patient at this time due to the high ventilator PEEP pressure. Informed the family that keeping the patient intubated long term is not an option and he is not at this time a candidate for a trach. . They are going to call a family meeting to discuss the options and await the outcome of this weeks treatments. At this time patient remains a FULL CODE with FULL AGGRESSIVE MEASURES 1.   Encounter for palliative Medicine Pt with a long term life limiting chronic and acute disease process that warrants discussions about her goals of care.  
  
Respiratory Failure GRG - Care Day 15 (4/18/2021) by Lyn Minaya RN 
 
  
Review Status Review Entered Completed 4/19/2021 14:30  
  
Criteria Review Care Day: 15 Care Date: 4/18/2021 Level of Care: ICU Guideline Day 3 Level Of Care ( ) * Activity level acceptable   
(X) Floor to discharge 4/19/2021 14:30:51 EDT by Kenia Watson   
  ICU Clinical Status   
(X) * Ventilation status appropriate ( ) * Airway status acceptable   
(X) * Respiratory status acceptable 4/19/2021 14:30:51 EDT by Pepe Pham ventilator, endotracheal tube, heated humidifier ( ) * Stable chest findings ( ) * Neurologic status acceptable ( ) * Temperature status acceptable 4/19/2021 14:30:51 EDT by Fely Yates   
  t 100. ( ) * No infection, or status acceptable ( ) * General Discharge Criteria met Interventions ( ) * No chest tube, or status acceptable ( ) * Intake acceptable ( ) * No inpatient interventions needed 4/19/2021 14:30:50 EDT by Fely Yates Subject: Additional Clinical Information Subjective 04/18/21 Hospital Day: 14 Vent Day: 14   
    
Overnight events:  no new events overnight Mentation/Activity: sedated with fentanyl Respiratory/ Secretions:  obtaining full volumes Hemodynamics: stable off pressors and dobutamine Urine output, bowel: adequate Diet:npo, tube feedings held due to possible obstruction, CT pending when patient is stable to go Need for procedures:None   
    
  
* Milestone Additional Notes 4/18   100. 106/65 69 18 99% O2 Device: Ventilator, Endotracheal tube, Heated, Humidifier General: Intubated/sedated; obese  HEENT:  Left scleral erythema; mucosa moist  
Resp:  Symmetrical chest expansion, no accessory muscle use; no rales/ wheezing/ rhonchi noted CV:  S1, S2 present; regular rate and rhythm   
GI:  Abdomen soft, obese, non-tender; hypoactive bowel sounds w/ distention   
Extremities:  +2 pulses on all extremities; mild edema noted on hands   
Skin:  Warm; no rashes/ lesions noted, normal turgor/cap refill Neurologic:  Non-focal; sedated   
Devices:  OGT, ETT, cardona   
   
PLAN:  
NEURO:  
· Serial neuro evaluations · Daily sedation holiday · Wetting tears and cipro eye drops- stop date for cipro on chart  
   
CARDIO:  
· MAP goal >64- No IV pressor requirement · Echo on 4/6/21 with EF of 55%, dilated RV and moderate pulmonary hypertension ( PASP 61 mmHg PULM:  
· Maintain aspiration precautions: HOB >30 degrees · SpO2 goal >92% · Bronchial /oral hygiene · VAP bundle- Wean vent as clinical status allows · SBT as clinical status allows · Completed course of decadron · Pulmozyme and Mucomist DC · Viola Trent · Daily CXR and ABG while intubated  
   
GI/ NUTRITION:  
· OGT · Diet: TF held due to possible obstruction, CT pending · SUP:Pepcid · Follow up with nutritional recommendations RENAL/ METAB/ :  
· Monitor I&Os · Trend electrolytes - replaced as needed · Garcia: Continue  
   
HEM/ONC · Trend Hb/HCT and PLTs · DVT prophylaxis: SCDs, heparin gtt · Re-image poplyteal DVT 72 hours and/or pending clinical course- may need IVC Filter if worsens ID · Antibioitcs: Cipro eye drop, Zosyn: 4/4- 4/8/21 Vanco: 4/4-6, MRSA swab negative · Zosyn restarted on 4/11, WBCs elevated to 16.5 · Follow up on pending cultures · Repeat Covid negative (4/11) · Tylenol for fevers  
   
ENDO  
· BGs Q 6,SSI, TSH normal  
· C1 inhibitor normal level (4/7/21)  
   
MSK/ ORTHO · No active Issues  
 SKIN/ WOUNDS  
· Per protocol

## 2021-04-22 NOTE — PROGRESS NOTES
Problem: Ventilator Management  Goal: *Adequate oxygenation and ventilation  4/22/2021 1156 by Keila Krishnan  Outcome: Progressing Towards Goal  4/22/2021 1156 by Keila Krishnan  Outcome: Not Progressing Towards Goal  Goal: *Patient maintains clear airway/free of aspiration  4/22/2021 1156 by Keila Krishnan  Outcome: Progressing Towards Goal  4/22/2021 1156 by Keila Krishnan  Outcome: Not Progressing Towards Goal  Goal: *Absence of infection signs and symptoms  4/22/2021 1156 by Keila Krishnan  Outcome: Progressing Towards Goal  4/22/2021 1156 by Keila Krishnan  Outcome: Not Progressing Towards Goal  Goal: *Normal spontaneous ventilation  4/22/2021 1156 by Keila Krishnan  Outcome: Progressing Towards Goal  4/22/2021 1156 by Keila Krishnan  Outcome: Not Progressing Towards Goal     Problem: Patient Education: Go to Patient Education Activity  Goal: Patient/Family Education  4/22/2021 1156 by Keila Krishnan  Outcome: Progressing Towards Goal  4/22/2021 1156 by Keila Krishnan  Outcome: Not Progressing Towards Goal

## 2021-04-22 NOTE — PROGRESS NOTES
Nutrition Assessment     Type and Reason for Visit: Reassess, Positive nutrition screen, NPO/clear liquid    Nutrition Recommendations/Plan:   - Continue tube feeding of Vital High Protein at goal rate of 55 mL/hr with prosource TID and 50 mL q 1 hour water flushes.  - IVF per MD.     Nutrition Assessment:  Tolerating feeds at goal. Hypernatremia resolved. Malnutrition Assessment:  Malnutrition Status:  At risk for malnutrition (specify)(inability to tolerate oral diet due to respiratory status)     Estimated Daily Nutrient Needs:  Energy (kcal):  2241-5629  Protein (g):  156-195       Fluid (ml/day):  0795-0587    Nutrition Related Findings:  BM 4/22      Current Nutrition Therapies:  DIET NUTRITIONAL SUPPLEMENTS Breakfast, Lunch, Dinner; Prosource  DIET NPO  DIET TUBE FEEDING     Current Tube Feeding Regimen: Vital High Protein at 55 mL/hr, prosource TID  Current/Goal Tube Feeding Regimen Provides: 1500 kcal, 161 gm protein, 1104 mL free water, 93% RDIs  Current Water Flush Order: 50 mL q 1 hours (1200 mL per day)    Anthropometric Measures:  · Height:  5' 11\" (180.3 cm)  · Current Body Wt:  98.3 kg (216 lb 11.4 oz)  · BMI: 30.2    Nutrition Diagnosis:   · Inadequate oral intake related to cognitive or neurological impairment, impaired respiratory function, swallowing difficulty as evidenced by NPO or clear liquid status due to medical condition, intubation    · Inadequate protein-energy intake related to altered GI function, altered GI structure as evidenced by NPO or clear liquid status due to medical condition, GI abnormality, constipation, intubation, nutrition support-enteral nutrition      Nutrition Intervention:  Food and/or Nutrient Delivery: Continue NPO, Continue tube feeding, IV fluid delivery  Nutrition Education and Counseling: Education not indicated  Coordination of Nutrition Care: Continue to monitor while inpatient, Interdisciplinary rounds    Goals:  Nutritional needs will be met through adequate oral intake or nutrition support within the next 7 days       Nutrition Monitoring and Evaluation:   Behavioral-Environmental Outcomes: None identified  Food/Nutrient Intake Outcomes: Enteral nutrition intake/tolerance  Physical Signs/Symptoms Outcomes: Biochemical data, GI status, Fluid status or edema, Nutrition focused physical findings    Discharge Planning:     Too soon to determine     Electronically signed by Zina Ramírez RD, [address]  on 4/22/2021 at 9:58 AM    Contact Number: [phone]

## 2021-04-22 NOTE — ROUTINE PROCESS
Bedside and Verbal shift change report given to Lissa Miller (oncoming nurse) by Kala Huggins   (offgoing nurse). Report included the following information SBAR, Kardex and Recent Results.

## 2021-04-22 NOTE — PROGRESS NOTES
Problem: Pressure Injury - Risk of  Goal: *Prevention of pressure injury  Description: Document Lang Scale and appropriate interventions in the flowsheet. Outcome: Progressing Towards Goal  Note: Pressure Injury Interventions:  Sensory Interventions: Assess changes in LOC, Assess need for specialty bed, Avoid rigorous massage over bony prominences, Check visual cues for pain, Float heels, Keep linens dry and wrinkle-free, Maintain/enhance activity level, Minimize linen layers, Monitor skin under medical devices, Pressure redistribution bed/mattress (bed type), Pad between skin to skin, Suspension boots, Turn and reposition approx. every two hours (pillows and wedges if needed)    Moisture Interventions: Absorbent underpads, Apply protective barrier, creams and emollients, Assess need for specialty bed, Check for incontinence Q2 hours and as needed, Internal/External urinary devices, Limit adult briefs, Maintain skin hydration (lotion/cream), Minimize layers    Activity Interventions: Assess need for specialty bed, Pressure redistribution bed/mattress(bed type)    Mobility Interventions: Assess need for specialty bed, Float heels, Pressure redistribution bed/mattress (bed type), Suspension boots, Turn and reposition approx.  every two hours(pillow and wedges)    Nutrition Interventions: Document food/fluid/supplement intake    Friction and Shear Interventions: Apply protective barrier, creams and emollients, Feet elevated on foot rest, Foam dressings/transparent film/skin sealants, Lift sheet, Minimize layers, Transfer aides:transfer board/George lift/ceiling lift, Transferring/repositioning devices                Problem: Ventilator Management  Goal: *Adequate oxygenation and ventilation  Outcome: Not Progressing Towards Goal  Goal: *Patient maintains clear airway/free of aspiration  Outcome: Not Progressing Towards Goal  Goal: *Absence of infection signs and symptoms  Outcome: Not Progressing Towards Goal  Goal: *Normal spontaneous ventilation  Outcome: Not Progressing Towards Goal     Problem: Patient Education: Go to Patient Education Activity  Goal: Patient/Family Education  Outcome: Not Progressing Towards Goal     Problem: Diabetes Self-Management  Goal: *Disease process and treatment process  Description: Define diabetes and identify own type of diabetes; list 3 options for treating diabetes. Outcome: Not Progressing Towards Goal  Goal: *Incorporating nutritional management into lifestyle  Description: Describe effect of type, amount and timing of food on blood glucose; list 3 methods for planning meals. Outcome: Not Progressing Towards Goal  Goal: *Incorporating physical activity into lifestyle  Description: State effect of exercise on blood glucose levels. Outcome: Not Progressing Towards Goal  Goal: *Developing strategies to promote health/change behavior  Description: Define the ABC's of diabetes; identify appropriate screenings, schedule and personal plan for screenings. Outcome: Not Progressing Towards Goal  Goal: *Using medications safely  Description: State effect of diabetes medications on diabetes; name diabetes medication taking, action and side effects. Outcome: Not Progressing Towards Goal  Goal: *Monitoring blood glucose, interpreting and using results  Description: Identify recommended blood glucose targets  and personal targets. Outcome: Not Progressing Towards Goal  Goal: *Prevention, detection, treatment of acute complications  Description: List symptoms of hyper- and hypoglycemia; describe how to treat low blood sugar and actions for lowering  high blood glucose level. Outcome: Not Progressing Towards Goal  Goal: *Prevention, detection and treatment of chronic complications  Description: Define the natural course of diabetes and describe the relationship of blood glucose levels to long term complications of diabetes.   Outcome: Not Progressing Towards Goal  Goal: *Developing strategies to address psychosocial issues  Description: Describe feelings about living with diabetes; identify support needed and support network  Outcome: Not Progressing Towards Goal  Goal: *Sick day guidelines  Outcome: Not Progressing Towards Goal     Problem: Patient Education: Go to Patient Education Activity  Goal: Patient/Family Education  Outcome: Not Progressing Towards Goal     Problem: Falls - Risk of  Goal: *Absence of Falls  Description: Document Eben Malone Fall Risk and appropriate interventions in the flowsheet.   Outcome: Not Progressing Towards Goal  Note: Fall Risk Interventions:       Mentation Interventions: Adequate sleep, hydration, pain control, Bed/chair exit alarm, Door open when patient unattended, Evaluate medications/consider consulting pharmacy, Reorient patient, Update white board, Room close to nurse's station    Medication Interventions: Assess postural VS orthostatic hypotension, Bed/chair exit alarm, Evaluate medications/consider consulting pharmacy    Elimination Interventions: Bed/chair exit alarm, Toileting schedule/hourly rounds              Problem: Patient Education: Go to Patient Education Activity  Goal: Patient/Family Education  Outcome: Not Progressing Towards Goal     Problem: Patient Education: Go to Patient Education Activity  Goal: Patient/Family Education  Outcome: Not Progressing Towards Goal     Problem: Nutrition Deficit  Goal: *Optimize nutritional status  Outcome: Not Progressing Towards Goal     Problem: Injury - Risk of, Adverse Drug Event  Goal: *Absence of adverse drug events  Outcome: Not Progressing Towards Goal  Goal: *Absence of medication errors  Outcome: Not Progressing Towards Goal  Goal: *Knowledge of prescribed medications  Outcome: Not Progressing Towards Goal     Problem: Patient Education: Go to Patient Education Activity  Goal: Patient/Family Education  Outcome: Not Progressing Towards Goal     Problem: Pain  Goal: *Control of Pain  Outcome: Not Progressing Towards Goal  Goal: *PALLIATIVE CARE:  Alleviation of Pain  Outcome: Not Progressing Towards Goal     Problem: Patient Education: Go to Patient Education Activity  Goal: Patient/Family Education  Outcome: Not Progressing Towards Goal     Problem: Delirium Treatment  Goal: *Level of consciousness restored to baseline  Outcome: Not Progressing Towards Goal  Goal: *Level of environmental perceptions restored to baseline  Outcome: Not Progressing Towards Goal  Goal: *Sensory perception restored to baseline  Outcome: Not Progressing Towards Goal  Goal: *Emotional stability restored to baseline  Outcome: Not Progressing Towards Goal  Goal: *Functional assessment restored to baseline  Outcome: Not Progressing Towards Goal  Goal: *Absence of falls  Outcome: Not Progressing Towards Goal  Goal: *Will remain free of delirium, CAM Score negative  Outcome: Not Progressing Towards Goal  Goal: *Cognitive status will be restored to baseline  Outcome: Not Progressing Towards Goal  Goal: Interventions  Outcome: Not Progressing Towards Goal     Problem: Patient Education: Go to Patient Education Activity  Goal: Patient/Family Education  Outcome: Not Progressing Towards Goal     Problem: Diabetes Self-Management  Goal: *Disease process and treatment process  Description: Define diabetes and identify own type of diabetes; list 3 options for treating diabetes. Outcome: Not Progressing Towards Goal     Problem: Falls - Risk of  Goal: *Absence of Falls  Description: Document Lilton Broad Fall Risk and appropriate interventions in the flowsheet.   Outcome: Not Progressing Towards Goal  Note: Fall Risk Interventions:       Mentation Interventions: Adequate sleep, hydration, pain control, Bed/chair exit alarm, Door open when patient unattended, Evaluate medications/consider consulting pharmacy, Reorient patient, Update white board, Room close to nurse's station    Medication Interventions: Assess postural VS orthostatic hypotension, Bed/chair exit alarm, Evaluate medications/consider consulting pharmacy    Elimination Interventions: Bed/chair exit alarm, Toileting schedule/hourly rounds              Problem: Nutrition Deficit  Goal: *Optimize nutritional status  Outcome: Not Progressing Towards Goal     Problem: Injury - Risk of, Adverse Drug Event  Goal: *Absence of adverse drug events  Outcome: Not Progressing Towards Goal     Problem: Pain  Goal: *Control of Pain  Outcome: Not Progressing Towards Goal

## 2021-04-22 NOTE — PROGRESS NOTES
1930: Assumed care of patient after report with Mg Griffin RN on orientation. Drips verified. Orders reviewed. 7076: Bedside and Verbal shift change report given to Karen Wynn RN (oncoming nurse) by Denis Segovia RN (offgoing nurse). Report included the following information SBAR, Kardex, MAR and Recent Results. SITUATION:    Code Status: Full Code   Reason for Admission: Angioedema due to angiotensin converting enzyme inhibitor (ACE-I) [T78. [facility] day: 25   Problem List:       Hospital Problems  Date Reviewed: 4/5/2021          Codes Class Noted POA    Encounter for palliative care ICD-10-CM: Z51.5  ICD-9-CM: V66.7  Unknown Unknown        Goals of care, counseling/discussion ICD-10-CM: Z71.89  ICD-9-CM: V65.49  Unknown Unknown        Multiple subsegmental pulmonary emboli without acute cor pulmonale ([facility] ICD-10-CM: I26.94  ICD-9-CM: 415.19  Unknown Unknown        DVT (deep venous thrombosis) ([facility] ICD-10-CM: I82.409  ICD-9-CM: 453.40  4/10/2021 Unknown        Respiratory failure ([facility] ICD-10-CM: J96.90  ICD-9-CM: 518.81  4/5/2021 Unknown        Obesity (BMI 30-39. 9) ICD-10-CM: E66.9  ICD-9-CM: 278.00  4/5/2021 Unknown        Diabetic neuropathy associated with type 2 diabetes mellitus ([facility] ICD-10-CM: E11.40  ICD-9-CM: 250.60, 357.2  4/5/2021 Unknown        Diabetic retinopathy associated with type 2 diabetes mellitus ([facility] ICD-10-CM: E11.319  ICD-9-CM: 250.50, 362.01  4/5/2021 Unknown        Pulmonary infiltrates ICD-10-CM: R91.8  ICD-9-CM: 793.19  4/5/2021 Unknown        Controlled type 2 diabetes mellitus with neurologic complication, without long-term current use of insulin ([facility] ICD-10-CM: E11.49  ICD-9-CM: 250.60  4/5/2021 Unknown        Angioedema due to angiotensin converting enzyme inhibitor (ACE-I) ICD-10-CM: T78. Teryl Able, [id number]  ICD-9-CM: 995.1, E942.6  4/4/2021 Unknown        JACQUELYN (acute kidney injury) ([facility] ICD-10-CM: N17.9  ICD-9-CM: 584.9  4/4/2021 Unknown Cardiac arrest [facility]) ICD-10-CM: I46.9  ICD-9-CM: 427.5  4/4/2021 Unknown              BACKGROUND:    Past Medical History:   Past Medical History:   Diagnosis Date    DDD (degenerative disc disease), lumbar     Diabetes ([facility]     Diabetic neuropathy ([facility]     Diabetic retinopathy ([facility]     DM2 (diabetes mellitus, type 2) ([facility]     HLD (hyperlipidemia)     HTN (hypertension)     Obesity (BMI 30-39. 9)          Patient taking anticoagulants yes     ASSESSMENT:    Changes in Assessment Throughout Shift: none     Patient has Central Line: no Reasons if yes: n/a   Patient has Garcia Cath: yes Reasons if yes: I&O     Last Vitals:     Vitals:    04/22/21 0400 04/22/21 0500 04/22/21 0600 04/22/21 0700   BP: (!) 147/80 128/79 115/71 138/78   Pulse: 82 85 78 87   Resp: 25 23 20 24   Temp: 98.2 °F (36.8 °C) 98.6 °F (37 °C) 99 °F (37.2 °C) 99.3 °F (37.4 °C)   SpO2: 97% 96% 96% 97%   Weight:       Height:            IV and DRAINS (will only show if present)   [REMOVED] Airway - Endotracheal Tube 04/04/21 Oral-Site Assessment: Clean, dry, & intact  [REMOVED] Peripheral IV 04/12/21 Left Antecubital-Site Assessment: Clean, dry, & intact  Peripheral IV 04/13/21 Right;Upper Forearm-Site Assessment: Clean, dry, & intact  Peripheral IV 04/18/21 Posterior;Right Hand-Site Assessment: Clean, dry, & intact  Peripheral IV 04/16/21 Left Hand-Site Assessment: Clean, dry, & intact  [REMOVED] Peripheral IV 04/04/21 Left Antecubital-Site Assessment: Clean, dry, & intact  [REMOVED] Peripheral IV 04/04/21 Anterior;Right Wrist-Site Assessment: Clean, dry, & intact  [REMOVED] Peripheral IV 04/04/21 Left;Posterior Hand-Site Assessment: Clean, dry, & intact  [REMOVED] Airway - Continuous Aspiration of Subglottic Secretions (GENESIS) Tube 04/04/21 Oral-Site Assessment: Clean, dry, & intact  Orogastric Tube 04/05/21-Site Assessment: Clean, dry, & intact  Airway - Continuous Aspiration of Subglottic Secretions (GENESIS) Tube 04/07/21 Oral-Site Assessment: Bleeding, Swelling, Pink, Ecchymotic (bruised)     WOUND (if present)   Wound Type:  Scattered abrasions bilat posterior shoulders with mepilex on, popped blister right posterior thigh with mepilex, stage 1 right ear lobe with dressing, incision neck with xeroform overlay, 4X4 and taped. Dressing present Dressing Present : Yes   Wound Concerns/Notes:  none     PAIN    Pain Assessment    Pain Intensity 1: 0 (04/22/21 0400)              Patient Stated Pain Goal: Unable to verbalize/indicate pain  o Interventions for Pain:  none  o Intervention effective: n/a  o Time of last intervention: n/a   o Reassessment Completed: n/a      Last 3 Weights:  Last 3 Recorded Weights in this Encounter    04/21/21 0200 04/21/21 0231 04/22/21 0200   Weight: 101.2 kg (223 lb 1.7 oz) 101.2 kg (223 lb 1.7 oz) 98.3 kg (216 lb 11.4 oz)     Weight change: -2.9 kg (-6 lb 6.3 oz)     INTAKE/OUPUT    Current Shift: No intake/output data recorded. Last three shifts: 04/20 1901 - 04/22 0700  In: 5311.2 [I.V.:2773.2]  Out: 5400 [Urine:5400]     LAB RESULTS     Recent Labs     04/22/21  0428 04/21/21  0002 04/20/21  0407   WBC 13.9* 12.9 12.8   HGB 10.7* 8.8* 9.3*   HCT 35.8* 28.8* 30.5*   * 638* 560*        Recent Labs     04/22/21  0428 04/21/21  1604 04/21/21  0002 04/20/21  0407 04/20/21  0407    147* 150*   < > 145   K 3.8 4.1 3.9   < > 3.6   * 169* 134*   < > 164*   BUN 92* 111* 131*   < > 129*   CREA 1.67* 1.98* 2.70*   < > 2.74*   CA 9.1 8.8 7.9*   < > 8.5   MG 3.8*  --  4.5*  --  4.6*    < > = values in this interval not displayed. RECOMMENDATIONS AND DISCHARGE PLANNING     1. Pending tests/procedures/ Plan of Care or Other Needs: Continue to wean oxygen and PEEP per RT     2. Discharge plan for patient and Needs/Barriers: not identified at this time    3. Estimated Discharge Date: unknown. Posted on Whiteboard in [address] [place] [address] Room: yes      4.  The patient's care plan was reviewed with the oncoming nurse. \"HEALS\" SAFETY CHECK      Fall Risk    Total Score: 2    Safety Measures: Safety Measures: Bed/Chair alarm on, Bed in low position, Call light within reach, Fall prevention (comment), Side rails X 3    A safety check occurred in the patient's room between off going nurse and oncoming nurse listed above. The safety check included the below items  Area Items   H  High Alert Medications - Verify all high alert medication drips (heparin, PCA, etc.)   E  Equipment - Suction is set up for ALL patients (with dalton)  - Red plugs utilized for all equipment (IV pumps, etc.)  - WOWs wiped down at end of shift.  - Room stocked with oxygen, suction, and other unit-specific supplies   A  Alarms - Bed alarm is set for fall risk patients  - Ensure chair alarm is in place and activated if patient is up in a chair   L  Lines - Check IV for any infiltration  - Garcia bag is empty if patient has a Garcia   - Tubing and IV bags are labeled   S  Safety   - Room is clean, patient is clean, and equipment is clean. - Hallways are clear from equipment besides carts. - Fall bracelet on for fall risk patients  - Ensure room is clear and free of clutter  - Suction is set up for ALL patients (with dalton)  - Hallways are clear from equipment besides carts.    - Isolation precautions followed, supplies available outside room, sign posted     Erwin Leger RN

## 2021-04-22 NOTE — PROGRESS NOTES
In Patient Progress note      Admit Date: 4/4/2021    Impression:     #1 Acute kidney injury, etiology appears to be secondary to Ischaemic ATN d/t hypoperfusion v/s cardiorenal syndrome in  setting of CHF/V failure in setting of PE  #2 acute respiratory failure secondary to rapidly progressive angioedema s/p emergent cricothyrotomy. Presently intubated   on the vent,  severely hypoxic out of proportion to x-ray findings, therefore PE has been considered , Rx for presumed PE  #3 pulmonary infiltrates/Pulm edema   #4 left-sided pneumothorax, appears resolved  #5 diabetes mellitus with complications  #6 popliteal DVT left  #7 possible colonic obst , awaiting CT abd   #8 hypernatremia      Plan:     1) decrease  D5W@ 50 cc/hrs , free water with TF  to 50 cc/hrs   2) strict I/o  3) follow renal parameters , electrolytes q12hrs , replace electrolytes  4) keep MAP > 65    Please call with questions,     Angela Pelletier MD FASN  Cell [phone]  Pager: [phone]  Subjective:     - No acute over night events. - respiratory - stable  - hemodynamics - stable, no pressrs  - UOP-good  - Nutrition -ok    Objective:     Visit Vitals  /71   Pulse 87   Temp 100.2 °F (37.9 °C)   Resp 24   Ht 5' 11\" (1.803 m)   Wt 98.3 kg (216 lb 11.4 oz)   SpO2 97%   BMI 30.23 kg/m²         Intake/Output Summary (Last 24 hours) at 4/22/2021 1159  Last data filed at 4/22/2021 0800  Gross per 24 hour   Intake 4121.87 ml   Output 4355 ml   Net -233.13 ml       Physical Exam:        Intubated/ on vent   HEENT: mmm  Lungs: good air entry, coarse BS +  Cardiovascular system: S1, S2, regular rate and rhythm. Abdomen: soft, non tender, non distended. Positive bowel sounds. Extremities: no clubbing, cyanosis or edema. Integumentary: skin is grossly intact.        Data Review:    Recent Labs     04/22/21  0428   WBC 13.9*   RBC 4.25*   HCT 35.8*   MCV 84.2   MCH 25.2   MCHC 29.9*   RDW 15.7*     Recent Labs     04/22/21  0428 04/21/21  1604 04/21/21  0002 04/20/21  1702 04/20/21  0407 04/19/21  2200 04/19/21  1747   BUN 92* 111* 131* 131* 129* 129* 129*   CREA 1.67* 1.98* 2.70* 2.59* 2.74* 2.77* 2.72*   CA 9.1 8.8 7.9* 8.9 8.5 8.6 8.2*   ALB 2.4* 2.3* 2.3* 2.3* 2.3* 2.5* 2.5*   K 3.8 4.1 3.9 3.7 3.6 3.8 4.0    147* 150* 148* 145 146* 148*    113* 112* 112* 107 107 110   CO2 28 28 29 30 33* 33* 30   PHOS 3.8 4.0 4.5 4.5 5.3* 5.4* 5.4*   * 169* 134* 134* 164* 147* 133*       Eddie Rasheed MD

## 2021-04-23 ENCOUNTER — APPOINTMENT (OUTPATIENT)
Dept: GENERAL RADIOLOGY | Age: 71
DRG: 004 | End: 2021-04-23
Attending: PHYSICIAN ASSISTANT
Payer: MEDICARE

## 2021-04-23 LAB
ALBUMIN SERPL-MCNC: 2.2 G/DL (ref 3.4–5)
ALBUMIN SERPL-MCNC: 2.3 G/DL (ref 3.4–5)
ANION GAP SERPL CALC-SCNC: 4 MMOL/L (ref 3–18)
ANION GAP SERPL CALC-SCNC: 7 MMOL/L (ref 3–18)
APTT PPP: 102.2 SEC (ref 23–36.4)
APTT PPP: 63.5 SEC (ref 23–36.4)
ARTERIAL PATENCY WRIST A: YES
BASE EXCESS BLD CALC-SCNC: 3 MMOL/L
BASOPHILS # BLD: 0.1 K/UL (ref 0–0.1)
BASOPHILS NFR BLD: 0 % (ref 0–2)
BDY SITE: ABNORMAL
BODY TEMPERATURE: 98.6
BUN SERPL-MCNC: 74 MG/DL (ref 7–18)
BUN SERPL-MCNC: 85 MG/DL (ref 7–18)
BUN/CREAT SERPL: 55 (ref 12–20)
BUN/CREAT SERPL: 59 (ref 12–20)
CALCIUM SERPL-MCNC: 8.4 MG/DL (ref 8.5–10.1)
CALCIUM SERPL-MCNC: 8.9 MG/DL (ref 8.5–10.1)
CHLORIDE SERPL-SCNC: 106 MMOL/L (ref 100–111)
CHLORIDE SERPL-SCNC: 106 MMOL/L (ref 100–111)
CO2 SERPL-SCNC: 26 MMOL/L (ref 21–32)
CO2 SERPL-SCNC: 27 MMOL/L (ref 21–32)
CREAT SERPL-MCNC: 1.34 MG/DL (ref 0.6–1.3)
CREAT SERPL-MCNC: 1.44 MG/DL (ref 0.6–1.3)
DIFFERENTIAL METHOD BLD: ABNORMAL
EOSINOPHIL # BLD: 0.3 K/UL (ref 0–0.4)
EOSINOPHIL NFR BLD: 2 % (ref 0–5)
ERYTHROCYTE [DISTWIDTH] IN BLOOD BY AUTOMATED COUNT: 15.5 % (ref 11.6–14.5)
GAS FLOW.O2 O2 DELIVERY SYS: ABNORMAL L/MIN
GAS FLOW.O2 SETTING OXYMISER: 16 BPM
GLUCOSE BLD STRIP.AUTO-MCNC: 127 MG/DL (ref 70–110)
GLUCOSE BLD STRIP.AUTO-MCNC: 136 MG/DL (ref 70–110)
GLUCOSE BLD STRIP.AUTO-MCNC: 141 MG/DL (ref 70–110)
GLUCOSE BLD STRIP.AUTO-MCNC: 142 MG/DL (ref 70–110)
GLUCOSE SERPL-MCNC: 130 MG/DL (ref 74–99)
GLUCOSE SERPL-MCNC: 147 MG/DL (ref 74–99)
HCO3 BLD-SCNC: 26.5 MMOL/L (ref 22–26)
HCT VFR BLD AUTO: 30.8 % (ref 36–48)
HGB BLD-MCNC: 9.5 G/DL (ref 13–16)
INSPIRATION.DURATION SETTING TIME VENT: 1.2 SEC
LYMPHOCYTES # BLD: 2.2 K/UL (ref 0.9–3.6)
LYMPHOCYTES NFR BLD: 15 % (ref 21–52)
MAGNESIUM SERPL-MCNC: 3.1 MG/DL (ref 1.6–2.6)
MCH RBC QN AUTO: 25.5 PG (ref 24–34)
MCHC RBC AUTO-ENTMCNC: 30.8 G/DL (ref 31–37)
MCV RBC AUTO: 82.8 FL (ref 74–97)
MONOCYTES # BLD: 0.7 K/UL (ref 0.05–1.2)
MONOCYTES NFR BLD: 5 % (ref 3–10)
NEUTS SEG # BLD: 11.3 K/UL (ref 1.8–8)
NEUTS SEG NFR BLD: 77 % (ref 40–73)
O2/TOTAL GAS SETTING VFR VENT: 40 %
PCO2 BLD: 33.4 MMHG (ref 35–45)
PEEP RESPIRATORY: 7 CMH2O
PH BLD: 7.51 [PH] (ref 7.35–7.45)
PHOSPHATE SERPL-MCNC: 3.5 MG/DL (ref 2.5–4.9)
PHOSPHATE SERPL-MCNC: 3.6 MG/DL (ref 2.5–4.9)
PLATELET # BLD AUTO: 713 K/UL (ref 135–420)
PMV BLD AUTO: 11.3 FL (ref 9.2–11.8)
PO2 BLD: 149 MMHG (ref 80–100)
POTASSIUM SERPL-SCNC: 4.1 MMOL/L (ref 3.5–5.5)
POTASSIUM SERPL-SCNC: 4.3 MMOL/L (ref 3.5–5.5)
RBC # BLD AUTO: 3.72 M/UL (ref 4.35–5.65)
SAO2 % BLD: 100 % (ref 92–97)
SERVICE CMNT-IMP: ABNORMAL
SODIUM SERPL-SCNC: 137 MMOL/L (ref 136–145)
SODIUM SERPL-SCNC: 139 MMOL/L (ref 136–145)
SPECIMEN TYPE: ABNORMAL
TOTAL RESP. RATE, ITRR: 25
VENTILATION MODE VENT: ABNORMAL
VOLUME CONTROL PLUS IVLCP: YES
VT SETTING VENT: 500 ML
WBC # BLD AUTO: 14.8 K/UL (ref 4.6–13.2)

## 2021-04-23 PROCEDURE — 74011000250 HC RX REV CODE- 250: Performed by: INTERNAL MEDICINE

## 2021-04-23 PROCEDURE — 94668 MNPJ CHEST WALL SBSQ: CPT

## 2021-04-23 PROCEDURE — 74011250636 HC RX REV CODE- 250/636: Performed by: PHYSICIAN ASSISTANT

## 2021-04-23 PROCEDURE — 74011250636 HC RX REV CODE- 250/636: Performed by: INTERNAL MEDICINE

## 2021-04-23 PROCEDURE — 85025 COMPLETE CBC W/AUTO DIFF WBC: CPT

## 2021-04-23 PROCEDURE — 71045 X-RAY EXAM CHEST 1 VIEW: CPT

## 2021-04-23 PROCEDURE — 94003 VENT MGMT INPAT SUBQ DAY: CPT

## 2021-04-23 PROCEDURE — 85730 THROMBOPLASTIN TIME PARTIAL: CPT

## 2021-04-23 PROCEDURE — 74011250637 HC RX REV CODE- 250/637: Performed by: INTERNAL MEDICINE

## 2021-04-23 PROCEDURE — 82803 BLOOD GASES ANY COMBINATION: CPT

## 2021-04-23 PROCEDURE — 74011636637 HC RX REV CODE- 636/637: Performed by: PHYSICIAN ASSISTANT

## 2021-04-23 PROCEDURE — 83735 ASSAY OF MAGNESIUM: CPT

## 2021-04-23 PROCEDURE — 74011000250 HC RX REV CODE- 250: Performed by: PHYSICIAN ASSISTANT

## 2021-04-23 PROCEDURE — 77030018798 HC PMP KT ENTRL FED COVD -A

## 2021-04-23 PROCEDURE — 36415 COLL VENOUS BLD VENIPUNCTURE: CPT

## 2021-04-23 PROCEDURE — 82962 GLUCOSE BLOOD TEST: CPT

## 2021-04-23 PROCEDURE — 65610000006 HC RM INTENSIVE CARE

## 2021-04-23 PROCEDURE — 74011250636 HC RX REV CODE- 250/636

## 2021-04-23 PROCEDURE — 99291 CRITICAL CARE FIRST HOUR: CPT | Performed by: NURSE PRACTITIONER

## 2021-04-23 PROCEDURE — 80069 RENAL FUNCTION PANEL: CPT

## 2021-04-23 PROCEDURE — 94640 AIRWAY INHALATION TREATMENT: CPT

## 2021-04-23 PROCEDURE — 94762 N-INVAS EAR/PLS OXIMTRY CONT: CPT

## 2021-04-23 PROCEDURE — 2709999900 HC NON-CHARGEABLE SUPPLY

## 2021-04-23 PROCEDURE — 36600 WITHDRAWAL OF ARTERIAL BLOOD: CPT

## 2021-04-23 PROCEDURE — 94669 MECHANICAL CHEST WALL OSCILL: CPT

## 2021-04-23 RX ORDER — WATER FOR INJECTION,STERILE
VIAL (ML) INJECTION
Status: DISCONTINUED
Start: 2021-04-23 | End: 2021-04-24 | Stop reason: WASHOUT

## 2021-04-23 RX ORDER — HEPARIN SODIUM 1000 [USP'U]/ML
INJECTION, SOLUTION INTRAVENOUS; SUBCUTANEOUS
Status: COMPLETED
Start: 2021-04-23 | End: 2021-04-23

## 2021-04-23 RX ORDER — HEPARIN SODIUM 1000 [USP'U]/ML
3000 INJECTION, SOLUTION INTRAVENOUS; SUBCUTANEOUS
Status: COMPLETED | OUTPATIENT
Start: 2021-04-23 | End: 2021-04-23

## 2021-04-23 RX ADMIN — DEXTROSE MONOHYDRATE 50 ML/HR: 5 INJECTION, SOLUTION INTRAVENOUS at 14:24

## 2021-04-23 RX ADMIN — CHLORHEXIDINE GLUCONATE 0.12% ORAL RINSE 10 ML: 1.2 LIQUID ORAL at 09:00

## 2021-04-23 RX ADMIN — HEPARIN SODIUM 3000 UNITS: 1000 INJECTION INTRAVENOUS; SUBCUTANEOUS at 05:44

## 2021-04-23 RX ADMIN — WATER 500 MG: 1 INJECTION INTRAMUSCULAR; INTRAVENOUS; SUBCUTANEOUS at 18:41

## 2021-04-23 RX ADMIN — ARFORMOTEROL TARTRATE 15 MCG: 15 SOLUTION RESPIRATORY (INHALATION) at 08:03

## 2021-04-23 RX ADMIN — WATER 500 MG: 1 INJECTION INTRAMUSCULAR; INTRAVENOUS; SUBCUTANEOUS at 23:18

## 2021-04-23 RX ADMIN — CHLORHEXIDINE GLUCONATE 0.12% ORAL RINSE 10 ML: 1.2 LIQUID ORAL at 22:39

## 2021-04-23 RX ADMIN — ALBUTEROL SULFATE 2.5 MG: 2.5 SOLUTION RESPIRATORY (INHALATION) at 08:03

## 2021-04-23 RX ADMIN — MEROPENEM 500 MG: 500 INJECTION INTRAVENOUS at 05:44

## 2021-04-23 RX ADMIN — ALBUTEROL SULFATE 2.5 MG: 2.5 SOLUTION RESPIRATORY (INHALATION) at 14:53

## 2021-04-23 RX ADMIN — ARFORMOTEROL TARTRATE 15 MCG: 15 SOLUTION RESPIRATORY (INHALATION) at 19:25

## 2021-04-23 RX ADMIN — ALBUTEROL SULFATE 2.5 MG: 2.5 SOLUTION RESPIRATORY (INHALATION) at 19:25

## 2021-04-23 RX ADMIN — Medication 30 ML: at 09:00

## 2021-04-23 RX ADMIN — INSULIN LISPRO 2 UNITS: 100 INJECTION, SOLUTION INTRAVENOUS; SUBCUTANEOUS at 00:00

## 2021-04-23 RX ADMIN — HEPARIN SODIUM 1910 UNITS/HR: 10000 INJECTION, SOLUTION INTRAVENOUS at 14:17

## 2021-04-23 RX ADMIN — FAMOTIDINE 20 MG: 10 INJECTION INTRAVENOUS at 09:00

## 2021-04-23 RX ADMIN — HEPARIN SODIUM 3000 UNITS: 1000 INJECTION, SOLUTION INTRAVENOUS; SUBCUTANEOUS at 05:44

## 2021-04-23 RX ADMIN — WATER 500 MG: 1 INJECTION INTRAMUSCULAR; INTRAVENOUS; SUBCUTANEOUS at 12:00

## 2021-04-23 NOTE — ROUTINE PROCESS
Bedside and Verbal shift change report given to [address] [place] [address] (oncoming nurse) by Andrew Botello RN (offgoing nurse). Report included the following information SBAR, Kardex, MAR and Recent Results. SITUATION:    Code Status: Full Code   Reason for Admission: Angioedema due to angiotensin converting enzyme inhibitor (ACE-I) [T78. Tanner Sadler    [facility] day: 23   Problem List:       Hospital Problems  Date Reviewed: 4/5/2021          Codes Class Noted POA    Encounter for palliative care ICD-10-CM: Z51.5  ICD-9-CM: V66.7  Unknown Unknown        Goals of care, counseling/discussion ICD-10-CM: Z71.89  ICD-9-CM: V65.49  Unknown Unknown        Multiple subsegmental pulmonary emboli without acute cor pulmonale ([facility] ICD-10-CM: I26.94  ICD-9-CM: 415.19  Unknown Unknown        DVT (deep venous thrombosis) ([facility] ICD-10-CM: I82.409  ICD-9-CM: 453.40  4/10/2021 Unknown        Respiratory failure ([facility] ICD-10-CM: J96.90  ICD-9-CM: 518.81  4/5/2021 Unknown        Obesity (BMI 30-39. 9) ICD-10-CM: E66.9  ICD-9-CM: 278.00  4/5/2021 Unknown        Diabetic neuropathy associated with type 2 diabetes mellitus ([facility] ICD-10-CM: E11.40  ICD-9-CM: 250.60, 357.2  4/5/2021 Unknown        Diabetic retinopathy associated with type 2 diabetes mellitus ([facility] ICD-10-CM: E11.319  ICD-9-CM: 250.50, 362.01  4/5/2021 Unknown        Pulmonary infiltrates ICD-10-CM: R91.8  ICD-9-CM: 793.19  4/5/2021 Unknown        Controlled type 2 diabetes mellitus with neurologic complication, without long-term current use of insulin ([facility] ICD-10-CM: E11.49  ICD-9-CM: 250.60  4/5/2021 Unknown        Angioedema due to angiotensin converting enzyme inhibitor (ACE-I) ICD-10-CM: T78. Shani Avelar [id number]  ICD-9-CM: 995.1, E942.6  4/4/2021 Unknown        JACQUELYN (acute kidney injury) ([facility] ICD-10-CM: N17.9  ICD-9-CM: 584.9  4/4/2021 Unknown        Cardiac arrest [facility]) ICD-10-CM: I46.9  ICD-9-CM: 427.5  4/4/2021 Unknown              BACKGROUND:    Past Medical History: Past Medical History:   Diagnosis Date    DDD (degenerative disc disease), lumbar     Diabetes ([facility]     Diabetic neuropathy ([facility]     Diabetic retinopathy ([facility]     DM2 (diabetes mellitus, type 2) ([facility]     HLD (hyperlipidemia)     HTN (hypertension)     Obesity (BMI 30-39. 9)          Patient taking anticoagulants yes     ASSESSMENT:    Changes in Assessment Throughout Shift: Patient more responsive, intubated, mechanically ventilated.       Patient has Central Line: no Reasons if yes: no   Patient has Garcia Cath: no Reasons if yes: condom catheter      Last Vitals:     Vitals:    04/23/21 0400 04/23/21 0500 04/23/21 0600 04/23/21 0700   BP: (!) 140/88 (!) 140/88 (!) 141/73 121/80   Pulse: 91 89 87 87   Resp: 23 22 19 18   Temp: 99.5 °F (37.5 °C) 99.1 °F (37.3 °C) 97.9 °F (36.6 °C) 98.1 °F (36.7 °C)   SpO2: 92% 100% 100% 97%   Weight: 101.7 kg (224 lb 3.3 oz)      Height:            IV and DRAINS (will only show if present)   [REMOVED] Airway - Endotracheal Tube 04/04/21 Oral-Site Assessment: Clean, dry, & intact  [REMOVED] Peripheral IV 04/12/21 Left Antecubital-Site Assessment: Clean, dry, & intact  Peripheral IV 04/13/21 Right;Upper Forearm-Site Assessment: Clean, dry, & intact  Peripheral IV 04/18/21 Posterior;Right Hand-Site Assessment: Clean, dry, & intact  Peripheral IV 04/16/21 Left Hand-Site Assessment: Clean, dry, & intact  [REMOVED] Peripheral IV 04/04/21 Left Antecubital-Site Assessment: Clean, dry, & intact  [REMOVED] Peripheral IV 04/04/21 Anterior;Right Wrist-Site Assessment: Clean, dry, & intact  [REMOVED] Peripheral IV 04/04/21 Left;Posterior Hand-Site Assessment: Clean, dry, & intact  [REMOVED] Airway - Continuous Aspiration of Subglottic Secretions (GENESIS) Tube 04/04/21 Oral-Site Assessment: Clean, dry, & intact  Orogastric Tube 04/05/21-Site Assessment: Clean, dry, & intact  Airway - Continuous Aspiration of Subglottic Secretions (GENESIS) Tube 04/07/21 Oral-Site Assessment: Clean, dry, & intact     WOUND (if present)   Wound Type:  none   Dressing present Dressing Present : No   Wound Concerns/Notes:  none     PAIN    Pain Assessment    Pain Intensity 1: 0 (04/23/21 0400)              Patient Stated Pain Goal: Unable to verbalize/indicate pain  o Interventions for Pain:  none  o Intervention effective: yes  o Time of last intervention: 0400   o Reassessment Completed: yes      Last 3 Weights:  Last 3 Recorded Weights in this Encounter    04/21/21 0231 04/22/21 0200 04/23/21 0400   Weight: 101.2 kg (223 lb 1.7 oz) 98.3 kg (216 lb 11.4 oz) 101.7 kg (224 lb 3.3 oz)     Weight change: 3.4 kg (7 lb 7.9 oz)     INTAKE/OUPUT    Current Shift: No intake/output data recorded. Last three shifts: 04/21 1901 - 04/23 0700  In: 6631.4 [I.V.:3076.4]  Out: 2756 [Urine:3405; Drains:50]     LAB RESULTS     Recent Labs     04/23/21 0228 04/22/21  0428 04/21/21  0002   WBC 14.8* 13.9* 12.9   HGB 9.5* 10.7* 8.8*   HCT 30.8* 35.8* 28.8*   * 699* 638*        Recent Labs     04/23/21 0228 04/22/21  1704 04/22/21  0428 04/21/21  0002 04/21/21  0002    141 143   < > 150*   K 4.3 3.9 3.8   < > 3.9   * 140* 168*   < > 134*   BUN 85* 86* 92*   < > 131*   CREA 1.44* 1.50* 1.67*   < > 2.70*   CA 8.4* 8.9 9.1   < > 7.9*   MG 3.1*  --  3.8*  --  4.5*    < > = values in this interval not displayed. RECOMMENDATIONS AND DISCHARGE PLANNING     1. Pending tests/procedures/ Plan of Care or Other Needs: daily labs     2. Discharge plan for patient and Needs/Barriers: pending    3. Estimated Discharge Date: 05/04/21 Posted on Whiteboard in [facility] Room: yes      4. The patient's care plan was reviewed with the oncoming nurse.        \"HEALS\" SAFETY CHECK      Fall Risk    Total Score: 2    Safety Measures: Safety Measures: Bed/Chair alarm on, Bed/Chair-Wheels locked, Bed in low position, Call light within reach, Drug code sheet, Emergency bedside equipment, Extra trach at bedside, Sitter at bedside    A safety check occurred in the patient's room between off going nurse and oncoming nurse listed above. The safety check included the below items  Area Items   H  High Alert Medications - Verify all high alert medication drips (heparin, PCA, etc.)   E  Equipment - Suction is set up for ALL patients (with dalton)  - Red plugs utilized for all equipment (IV pumps, etc.)  - WOWs wiped down at end of shift.  - Room stocked with oxygen, suction, and other unit-specific supplies   A  Alarms - Bed alarm is set for fall risk patients  - Ensure chair alarm is in place and activated if patient is up in a chair   L  Lines - Check IV for any infiltration  - Garcia bag is empty if patient has a Garcia   - Tubing and IV bags are labeled   S  Safety   - Room is clean, patient is clean, and equipment is clean. - Hallways are clear from equipment besides carts. - Fall bracelet on for fall risk patients  - Ensure room is clear and free of clutter  - Suction is set up for ALL patients (with dalton)  - Hallways are clear from equipment besides carts.    - Isolation precautions followed, supplies available outside room, sign posted     Dayna Gordillo RN

## 2021-04-23 NOTE — PROGRESS NOTES
Problem: Ventilator Management  Goal: *Adequate oxygenation and ventilation  Outcome: Progressing Towards Goal  Goal: *Patient maintains clear airway/free of aspiration  Outcome: Progressing Towards Goal  Goal: *Absence of infection signs and symptoms  Outcome: Progressing Towards Goal  Goal: *Normal spontaneous ventilation  Outcome: Progressing Towards Goal     Problem: Patient Education: Go to Patient Education Activity  Goal: Patient/Family Education  Outcome: Progressing Towards Goal     Problem: Diabetes Self-Management  Goal: *Disease process and treatment process  Description: Define diabetes and identify own type of diabetes; list 3 options for treating diabetes. Outcome: Progressing Towards Goal  Goal: *Incorporating nutritional management into lifestyle  Description: Describe effect of type, amount and timing of food on blood glucose; list 3 methods for planning meals. Outcome: Progressing Towards Goal  Goal: *Incorporating physical activity into lifestyle  Description: State effect of exercise on blood glucose levels. Outcome: Progressing Towards Goal  Goal: *Developing strategies to promote health/change behavior  Description: Define the ABC's of diabetes; identify appropriate screenings, schedule and personal plan for screenings. Outcome: Progressing Towards Goal  Goal: *Using medications safely  Description: State effect of diabetes medications on diabetes; name diabetes medication taking, action and side effects. Outcome: Progressing Towards Goal  Goal: *Monitoring blood glucose, interpreting and using results  Description: Identify recommended blood glucose targets  and personal targets. Outcome: Progressing Towards Goal  Goal: *Prevention, detection, treatment of acute complications  Description: List symptoms of hyper- and hypoglycemia; describe how to treat low blood sugar and actions for lowering  high blood glucose level.   Outcome: Progressing Towards Goal  Goal: *Prevention, detection and treatment of chronic complications  Description: Define the natural course of diabetes and describe the relationship of blood glucose levels to long term complications of diabetes.   Outcome: Progressing Towards Goal  Goal: *Developing strategies to address psychosocial issues  Description: Describe feelings about living with diabetes; identify support needed and support network  Outcome: Progressing Towards Goal  Goal: *Sick day guidelines  Outcome: Progressing Towards Goal

## 2021-04-23 NOTE — PROGRESS NOTES
attended the interdisciplinary rounds for Felipe Gray, who is a 79 y.o.,male. Patients Primary Language is: [place]. According to the patients EMR Zoroastrian Affiliation is: No preference. The reason the Patient came to the hospital is:   Patient Active Problem List    Diagnosis Date Noted    Encounter for palliative care     Goals of care, counseling/discussion     Multiple subsegmental pulmonary emboli without acute cor pulmonale ([facility]     DVT (deep venous thrombosis) ([facility] 04/10/2021    Respiratory failure ([address] 04/05/2021    Obesity (BMI 30-39.9) 04/05/2021    Diabetic neuropathy associated with type 2 diabetes mellitus ([facility] 04/05/2021    Diabetic retinopathy associated with type 2 diabetes mellitus ([facility] 04/05/2021    Pulmonary infiltrates 04/05/2021    Controlled type 2 diabetes mellitus with neurologic complication, without long-term current use of insulin ([facility] 04/05/2021    Angioedema due to angiotensin converting enzyme inhibitor (ACE-I) 04/04/2021    JACQUELYN (acute kidney injury) ([facility] 04/04/2021    Cardiac arrest ([facility] 04/04/2021      Plan:  Chaplains will continue to follow and will provide pastoral care on an as needed/requested basis.  recommends bedside caregivers page  on duty if patient shows signs of acute spiritual or emotional distress.     [address] [facility]  Board Certified [facility]   [phone]

## 2021-04-23 NOTE — PROGRESS NOTES
SBT initiated, PS 7, PEEP +5, FiO2 30%. Pt awake and tracks but not following commands. 04/23/21 0810   Weaning Parameters   Spontaneous Breathing Trial Complete Yes   Resp Rate Observed 22   Ve 13      RSBI 42     Tolerating. Will monitor.

## 2021-04-23 NOTE — PROGRESS NOTES
[facility]  Pulmonary, Critical Care, and Sleep Medicine    Name: Tung Onofre MRN: [id number]   : 1950 Hospital: [facility]   Date: 2021        IMPRESSION:   · Angioedema- with airway and respiratory failure and collapse; thought due to ACE inhibitor  · S/P Emergent Cricthyrotomy [facility]-ED 21- converted to 6.5 ETT intraoperative by anesthesia team 21, 8.0 ETT by anesthesia 21, packing removed evening 21-ENT  · S/P cardiac arrest @ [facility] 21- brief, likely due to  hypoxia and  airway collapse  · Respiratory Failure: Acute due to above; currently intubated and on vent for airway protection  · Pulmonary Infiltrates: suspect aspiration secretions and/or blood due to above- can't exclude other infectious process at this time. Rapid Covid Negative at [facility]  · Bradycardia - improved  · DVT: Popliteal- Left; acute non-occlusive thrombus.  Heparin ACS protocol started - stopped  due to bleeding from Cric site.  Bleeding controlled -  heparin currently back on   · Pulmonary Embolism - 21 - left lower and right upper lobes  · JACQUELYN - , Creatinine 3.57  · Thrombocytosis - Plt 713  · Hypokalemia - resolved  · Left scleral erythema- unknown baseline; possible infection   · DM  · DM retinopathy per chart review  · Diabetic peripheral neuropathy  · Left Pneumothorax - resolved  · Obesity: Body mass index is 35.64 kg/m². Patient Active Problem List   Diagnosis Code    Angioedema due to angiotensin converting enzyme inhibitor (ACE-I) T78. 3XXA, [id number]    Respiratory failure ([address] J96.90    JACQUELYN (acute kidney injury) ([address] N17.9    Cardiac arrest (HCC) I46.9    Obesity (BMI 30-39. 9) E66.9    Diabetic neuropathy associated with type 2 diabetes mellitus ([address] E11.40    Diabetic retinopathy associated with type 2 diabetes mellitus ([address] E11.319    Pulmonary infiltrates R91.8    Controlled type 2 diabetes mellitus with neurologic complication, without long-term current use of insulin ([facility]) E11.49    DVT (deep venous thrombosis) ([facility] I82.409    Encounter for palliative care Z51.5    Goals of care, counseling/discussion Z71.89    Multiple subsegmental pulmonary emboli without acute cor pulmonale ([facility]) I26.94      PLAN:   NEURO:  · Serial neuro evaluations  · Daily sedation holiday   · Wetting tears and cipro eye drops- stop date for cipro on chart     CARDIO:  · MAP goal >64- No IV pressor requirement  · Echo on 4/6/21 with EF of 55%, dilated RV and moderate pulmonary hypertension (PASP 61 mmHg)  PULM:  · Maintain aspiration precautions: HOB >30 degrees  · SpO2 goal >92%  · Bronchial /oral hygiene   · VAP bundle- Wean vent as clinical status allows  · SBT as clinical status allows  · Completed course of decadron  · Continue brovana and albuterol nebs  · Daily CXR and ABG while intubated     GI/ NUTRITION:  · OGT   · Diet: continue TF  · SUP:Pepcid  · Follow up with nutritional recommendations  RENAL/ METAB/ :  · Monitor I&Os  · Trend electrolytes - replace as needed  · Garcia: Continue, monitor I/Os     HEM/ONC  · Trend Hb/HCT and PLTs  · DVT prophylaxis: SCDs, heparin gtt     ID  · Antibioitcs: Cipro eye drop, Zosyn 4/4 - 4/8  Vanco: 4/4-6, MRSA swab negative.   · Zosyn restarted on 4/11, WBCs elevated to 16.5  · Follow up on pending cultures  · Repeat Covid negative (4/11)  · Tylenol for fevers     ENDO  · BGs Q 6,SSI, TSH normal  · C1 inhibitor normal level (4/7/21)     MSK/ ORTHO  · No active Issues     SKIN/ WOUNDS  · Per protocol  · Neck- daily dressing changes per ENT, Quick clot with Afrin if needed, d/c xeroform           CODE STATUS: Full Code- Full     Discussed in interdisciplinary rounds   Best practice :     Glycemic control  IHI ICU bundles:              Garcia Bundle Followed and Vent Bundle Followed, Vent Day 17    [facility] Vent patients- VAP bundle, aim to keep peak plateau pressure 65-58ZB H2O  Sress ulcer prophylaxis.   DVT prophylaxis. Need for Lines, cardona assessed. Restraints need. Palliative care evaluation. Subjective/Interval History:        Interval HPI:69 yo M h/o HTN tx w/ lisinopril presenting to [facility] ED  for rapidly progressing respiratory distress due to severe angioedema. During intubation attempts pt had brief cardiac arrest w/ pulse loss and severe airway swelling leading to emergent cricothyrotomy. Pt stabilized before [facility] transport to [place] at [facility] pt taken emergently to [address] was converted to ETT. Patient continues to have trouble with o2 requirements disproportionate to CXR findings and despite adjustment of FiO2 and PEEP. CTA revelaed B/L pulmonary emboli which is likely causing hypoxemia and RH strain. Administration of inhaled epoprostenol on  is showing improvement in V/Q  FiO2 75%/ PEEP 10. Pt is on dobutamine and being diuresed w/ bumex and metolazone.   COVID - 19 negative.     Subjective 21  Hospital Day: 19  Vent Day:19  Overnight events: No significant events noted overnight  Mentation/Activity: intubated, sedation held - responsive to verbal simuli   Respiratory/ Secretions: stable. CXR with improvement in aeration  Hemodynamics: H/H stable  Urine output, bowel: adequate  Diet: None     -Continue sedation wean and SBT  -continue  D5W @50cc/hr  -50 q1h water flushes     ROS:Review of systems not obtained due to patient factors. Objective:   Vital Signs:    Visit Vitals  /80   Pulse 88   Temp 98.1 °F (36.7 °C)   Resp 25   Ht 5' 11\" (1.803 m)   Wt 101.7 kg (224 lb 3.3 oz)   SpO2 96%   BMI 31.27 kg/m²       O2 Device: Ventilator, Endotracheal tube, Heated, Humidifier       Temp (24hrs), Av.7 °F (37.1 °C), Min:97.7 °F (36.5 °C), Max:100.2 °F (37.9 °C)       Intake/Output:   Last shift:      No intake/output data recorded.   Last 3 shifts:  1901 -  0700  In: 6631.4 [I.V.:3076.4]  Out: 9193 [Urine:3405; Drains:50]    Intake/Output Summary (Last 24 hours) at 4/23/2021 0843  Last data filed at 4/23/2021 0700  Gross per 24 hour   Intake 3972.5 ml   Output 1300 ml   Net 2672.5 ml      Physical Exam:                 General: Intubated/sedated; in NAD  HEENT:  Anicteric sclerae; pink palpebral conjunctivae; mucosa moist  Resp:  Symmetrical chest expansion, no accessory muscle use; good airway entry; no rales/ wheezing/ rhonchi noted  CV:  S1, S2 present; regular rate and rhythm  GI:  Obese. Abdomen soft, non-tender; (+) active bowel sounds  Extremities:  +2 pulses on all extremities; no edema/ cyanosis/ clubbing noted + restraints   Skin:  Warm; no rashes/ lesions noted, normal turgor/cap refill   Neurologic:  Non-focal  Devices:  OGT, ETT, Garcia        DATA:  Labs:  Recent Labs     04/23/21 0228 04/22/21  0428 04/21/21  0002   WBC 14.8* 13.9* 12.9   HGB 9.5* 10.7* 8.8*   HCT 30.8* 35.8* 28.8*   * 699* 638*     Recent Labs     04/23/21 0228 04/22/21  1704 04/22/21  0428 04/21/21  0002 04/21/21  0002    141 143   < > 150*   K 4.3 3.9 3.8   < > 3.9    107 108   < > 112*   CO2 26 27 28   < > 29   * 140* 168*   < > 134*   BUN 85* 86* 92*   < > 131*   CREA 1.44* 1.50* 1.67*   < > 2.70*   CA 8.4* 8.9 9.1   < > 7.9*   MG 3.1*  --  3.8*  --  4.5*   PHOS 3.6 3.5 3.8   < > 4.5   ALB 2.3* 2.2* 2.4*   < > 2.3*    < > = values in this interval not displayed. No results for input(s): PH, PCO2, PO2, HCO3, FIO2 in the last 72 hours. PFT:                                                     Echo:  04/04/21   ECHO ADULT COMPLETE 04/06/2021 4/6/2021    Narrative · Contrast used: DEFINITY. · Image quality for this study was technically difficult. · LV: Calculated LVEF is 55%. Visually measured ejection fraction. Normal   cavity size, wall thickness and systolic function (ejection fraction   normal). Wall motion: normal. Moderate (grade 2) left ventricular   diastolic dysfunction.   · AO: Mild aortic root and ascending aorta dilatation. Ascending aorta   diameter = 3.6 cm. Aortic root measures 3.9 cm  · RA: Dilated right atrium. · RV: Dilated right ventricle. Borderline low systolic function. · TV: Mild tricuspid valve regurgitation is present. · IVC: Mechanically ventilated; cannot use inferior caval vein diameter to   estimate central venous pressure. · PA: Moderate pulmonary hypertension. Pulmonary arterial systolic   pressure is 61 mmHg. Signed by: Darlyn Roca MD       Imaging:  [x]I have personally reviewed the patients radiographs  []Radiographs reviewed with radiologist   [x]No change from prior, tubes and lines in adequate position  []Improved   []Worsening    Total of  35 min critical care time spent at bedside during the course of care providing evaluation,management and care decisions and ordering appropriate treatment related to critical care problems exclusive of procedures    Tamara Cabello NP - C  04/23/21  Pulmonary, Critical Care Medicine  [facility]

## 2021-04-23 NOTE — PROGRESS NOTES
Reviewed chart. Patient remains intubated. Palliative care is following. CM will continue to monitor and assist as needed.     MARGUERITE Moore, RN  Pager # [phone]  Care Manager

## 2021-04-23 NOTE — PROGRESS NOTES
In Patient Progress note      Admit Date: 4/4/2021    Impression:     #1 Acute kidney injury,( baseline creat unknown) etiology appears to be secondary to Ischaemic ATN d/t hypoperfusion v/s cardiorenal syndrome in  setting of CHF/V failure in setting of PE  #2 acute respiratory failure secondary to rapidly progressive angioedema s/p emergent cricothyrotomy. Presently intubated   on the vent,  severely hypoxic out of proportion to x-ray findings, therefore PE has been considered , Rx for presumed PE  #3 pulmonary infiltrates/Pulm edema   #4 left-sided pneumothorax, appears resolved  #5 diabetes mellitus with complications  #6 popliteal DVT left  #7 possible colonic obst , awaiting CT abd   #8 hypernatremia      Plan:     1) ok to d/c IVF , continue  free water with TF  to 50 cc/hrs   2) strict I/o  3) follow renal parameters , electrolytes q12hrs , replace electrolytes  4) keep MAP > 65    Please call with questions,     Marcial Cadena MD FASN  Cell [phone]  Pager: [phone]  Subjective:     - No acute over night events. - respiratory - stable  - hemodynamics - stable, no pressrs  - UOP-good  - Nutrition -ok    Objective:     Visit Vitals  /70   Pulse 91   Temp 99.7 °F (37.6 °C)   Resp 26   Ht 5' 11\" (1.803 m)   Wt 101.7 kg (224 lb 3.3 oz)   SpO2 93%   BMI 31.27 kg/m²         Intake/Output Summary (Last 24 hours) at 4/23/2021 1150  Last data filed at 4/23/2021 0700  Gross per 24 hour   Intake 3612.2 ml   Output 1300 ml   Net 2312.2 ml       Physical Exam:        Intubated/ on vent   HEENT: mmm  Lungs: good air entry, coarse BS +  Cardiovascular system: S1, S2, regular rate and rhythm. Abdomen: soft, non tender, non distended. Positive bowel sounds. Extremities: no clubbing, cyanosis or edema. Integumentary: skin is grossly intact.        Data Review:    Recent Labs     04/23/21  0228   WBC 14.8*   RBC 3.72*   HCT 30.8*   MCV 82.8   MCH 25.5   MCHC 30.8*   RDW 15.5*     Recent Labs 04/23/21  0228 04/22/21  1704 04/22/21  0428 04/21/21  1604 04/21/21  0002 04/20/21  1702   BUN 85* 86* 92* 111* 131* 131*   CREA 1.44* 1.50* 1.67* 1.98* 2.70* 2.59*   CA 8.4* 8.9 9.1 8.8 7.9* 8.9   ALB 2.3* 2.2* 2.4* 2.3* 2.3* 2.3*   K 4.3 3.9 3.8 4.1 3.9 3.7    141 143 147* 150* 148*    107 108 113* 112* 112*   CO2 26 27 28 28 29 30   PHOS 3.6 3.5 3.8 4.0 4.5 4.5   * 140* 168* 169* 134* 134*       Reina Montero MD

## 2021-04-23 NOTE — PROGRESS NOTES
Returned call June Acevedo, care coordinator with [facility], and left a message.      CRAIG ConwayN, RN  Pager # [phone]  Care Manager

## 2021-04-24 ENCOUNTER — APPOINTMENT (OUTPATIENT)
Dept: GENERAL RADIOLOGY | Age: 71
DRG: 004 | End: 2021-04-24
Attending: PHYSICIAN ASSISTANT
Payer: MEDICARE

## 2021-04-24 LAB
ALBUMIN SERPL-MCNC: 2.2 G/DL (ref 3.4–5)
ALBUMIN SERPL-MCNC: 2.3 G/DL (ref 3.4–5)
ANION GAP SERPL CALC-SCNC: 6 MMOL/L (ref 3–18)
ANION GAP SERPL CALC-SCNC: 9 MMOL/L (ref 3–18)
APTT PPP: 105.5 SEC (ref 23–36.4)
APTT PPP: 122.4 SEC (ref 23–36.4)
APTT PPP: 123.7 SEC (ref 23–36.4)
ARTERIAL PATENCY WRIST A: YES
BASE EXCESS BLD CALC-SCNC: 0 MMOL/L
BASOPHILS # BLD: 0.1 K/UL (ref 0–0.1)
BASOPHILS NFR BLD: 0 % (ref 0–2)
BDY SITE: ABNORMAL
BODY TEMPERATURE: 36.2
BUN SERPL-MCNC: 70 MG/DL (ref 7–18)
BUN SERPL-MCNC: 71 MG/DL (ref 7–18)
BUN/CREAT SERPL: 55 (ref 12–20)
BUN/CREAT SERPL: 56 (ref 12–20)
CALCIUM SERPL-MCNC: 8.8 MG/DL (ref 8.5–10.1)
CALCIUM SERPL-MCNC: 8.9 MG/DL (ref 8.5–10.1)
CHLORIDE SERPL-SCNC: 104 MMOL/L (ref 100–111)
CHLORIDE SERPL-SCNC: 104 MMOL/L (ref 100–111)
CO2 SERPL-SCNC: 23 MMOL/L (ref 21–32)
CO2 SERPL-SCNC: 26 MMOL/L (ref 21–32)
CREAT SERPL-MCNC: 1.25 MG/DL (ref 0.6–1.3)
CREAT SERPL-MCNC: 1.29 MG/DL (ref 0.6–1.3)
DIFFERENTIAL METHOD BLD: ABNORMAL
EOSINOPHIL # BLD: 0.3 K/UL (ref 0–0.4)
EOSINOPHIL NFR BLD: 2 % (ref 0–5)
ERYTHROCYTE [DISTWIDTH] IN BLOOD BY AUTOMATED COUNT: 15.7 % (ref 11.6–14.5)
GAS FLOW.O2 O2 DELIVERY SYS: ABNORMAL L/MIN
GAS FLOW.O2 SETTING OXYMISER: 8 BPM
GLUCOSE BLD STRIP.AUTO-MCNC: 116 MG/DL (ref 70–110)
GLUCOSE BLD STRIP.AUTO-MCNC: 131 MG/DL (ref 70–110)
GLUCOSE BLD STRIP.AUTO-MCNC: 133 MG/DL (ref 70–110)
GLUCOSE SERPL-MCNC: 118 MG/DL (ref 74–99)
GLUCOSE SERPL-MCNC: 134 MG/DL (ref 74–99)
HCO3 BLD-SCNC: 24.1 MMOL/L (ref 22–26)
HCT VFR BLD AUTO: 32 % (ref 36–48)
HGB BLD-MCNC: 9.7 G/DL (ref 13–16)
LYMPHOCYTES # BLD: 2.4 K/UL (ref 0.9–3.6)
LYMPHOCYTES NFR BLD: 16 % (ref 21–52)
MAGNESIUM SERPL-MCNC: 2.7 MG/DL (ref 1.6–2.6)
MCH RBC QN AUTO: 25.4 PG (ref 24–34)
MCHC RBC AUTO-ENTMCNC: 30.3 G/DL (ref 31–37)
MCV RBC AUTO: 83.8 FL (ref 74–97)
MONOCYTES # BLD: 0.8 K/UL (ref 0.05–1.2)
MONOCYTES NFR BLD: 5 % (ref 3–10)
NEUTS SEG # BLD: 11.7 K/UL (ref 1.8–8)
NEUTS SEG NFR BLD: 76 % (ref 40–73)
O2/TOTAL GAS SETTING VFR VENT: 30 %
PCO2 BLD: 32.1 MMHG (ref 35–45)
PEEP RESPIRATORY: 5 CMH2O
PH BLD: 7.48 [PH] (ref 7.35–7.45)
PHOSPHATE SERPL-MCNC: 3.5 MG/DL (ref 2.5–4.9)
PHOSPHATE SERPL-MCNC: 3.6 MG/DL (ref 2.5–4.9)
PLATELET # BLD AUTO: 653 K/UL (ref 135–420)
PMV BLD AUTO: 11.5 FL (ref 9.2–11.8)
PO2 BLD: 82 MMHG (ref 80–100)
POTASSIUM SERPL-SCNC: 4 MMOL/L (ref 3.5–5.5)
POTASSIUM SERPL-SCNC: 4.6 MMOL/L (ref 3.5–5.5)
PRESSURE SUPPORT SETTING VENT: 7 CMH2O
RBC # BLD AUTO: 3.82 M/UL (ref 4.35–5.65)
SAO2 % BLD: 97 % (ref 92–97)
SERVICE CMNT-IMP: ABNORMAL
SODIUM SERPL-SCNC: 136 MMOL/L (ref 136–145)
SODIUM SERPL-SCNC: 136 MMOL/L (ref 136–145)
SPECIMEN TYPE: ABNORMAL
TOTAL RESP. RATE, ITRR: 22
VENTILATION MODE VENT: ABNORMAL
VOLUME CONTROL IVLC: YES
VT SETTING VENT: 500 ML
WBC # BLD AUTO: 15.4 K/UL (ref 4.6–13.2)

## 2021-04-24 PROCEDURE — 36415 COLL VENOUS BLD VENIPUNCTURE: CPT

## 2021-04-24 PROCEDURE — 83735 ASSAY OF MAGNESIUM: CPT

## 2021-04-24 PROCEDURE — 74011250636 HC RX REV CODE- 250/636: Performed by: INTERNAL MEDICINE

## 2021-04-24 PROCEDURE — 74011250637 HC RX REV CODE- 250/637: Performed by: INTERNAL MEDICINE

## 2021-04-24 PROCEDURE — 65610000006 HC RM INTENSIVE CARE

## 2021-04-24 PROCEDURE — 74011250636 HC RX REV CODE- 250/636: Performed by: PHYSICIAN ASSISTANT

## 2021-04-24 PROCEDURE — 74011000250 HC RX REV CODE- 250: Performed by: INTERNAL MEDICINE

## 2021-04-24 PROCEDURE — 82803 BLOOD GASES ANY COMBINATION: CPT

## 2021-04-24 PROCEDURE — 2709999900 HC NON-CHARGEABLE SUPPLY

## 2021-04-24 PROCEDURE — 77030018798 HC PMP KT ENTRL FED COVD -A

## 2021-04-24 PROCEDURE — 94003 VENT MGMT INPAT SUBQ DAY: CPT

## 2021-04-24 PROCEDURE — 82962 GLUCOSE BLOOD TEST: CPT

## 2021-04-24 PROCEDURE — 94640 AIRWAY INHALATION TREATMENT: CPT

## 2021-04-24 PROCEDURE — 85730 THROMBOPLASTIN TIME PARTIAL: CPT

## 2021-04-24 PROCEDURE — 36600 WITHDRAWAL OF ARTERIAL BLOOD: CPT

## 2021-04-24 PROCEDURE — 94668 MNPJ CHEST WALL SBSQ: CPT

## 2021-04-24 PROCEDURE — 71045 X-RAY EXAM CHEST 1 VIEW: CPT

## 2021-04-24 PROCEDURE — 80069 RENAL FUNCTION PANEL: CPT

## 2021-04-24 PROCEDURE — 85025 COMPLETE CBC W/AUTO DIFF WBC: CPT

## 2021-04-24 PROCEDURE — 74011000250 HC RX REV CODE- 250: Performed by: PHYSICIAN ASSISTANT

## 2021-04-24 PROCEDURE — 94669 MECHANICAL CHEST WALL OSCILL: CPT

## 2021-04-24 PROCEDURE — 94762 N-INVAS EAR/PLS OXIMTRY CONT: CPT

## 2021-04-24 PROCEDURE — 99291 CRITICAL CARE FIRST HOUR: CPT | Performed by: NURSE PRACTITIONER

## 2021-04-24 RX ADMIN — DEXTROSE MONOHYDRATE 50 ML/HR: 5 INJECTION, SOLUTION INTRAVENOUS at 09:09

## 2021-04-24 RX ADMIN — HEPARIN SODIUM 1910 UNITS/HR: 10000 INJECTION, SOLUTION INTRAVENOUS at 04:59

## 2021-04-24 RX ADMIN — WATER 500 MG: 1 INJECTION INTRAMUSCULAR; INTRAVENOUS; SUBCUTANEOUS at 17:07

## 2021-04-24 RX ADMIN — HEPARIN SODIUM 1710 UNITS/HR: 10000 INJECTION, SOLUTION INTRAVENOUS at 19:27

## 2021-04-24 RX ADMIN — ARFORMOTEROL TARTRATE 15 MCG: 15 SOLUTION RESPIRATORY (INHALATION) at 09:03

## 2021-04-24 RX ADMIN — ALBUTEROL SULFATE 2.5 MG: 2.5 SOLUTION RESPIRATORY (INHALATION) at 15:10

## 2021-04-24 RX ADMIN — WATER 500 MG: 1 INJECTION INTRAMUSCULAR; INTRAVENOUS; SUBCUTANEOUS at 05:21

## 2021-04-24 RX ADMIN — CHLORHEXIDINE GLUCONATE 0.12% ORAL RINSE 10 ML: 1.2 LIQUID ORAL at 21:38

## 2021-04-24 RX ADMIN — Medication 30 ML: at 08:17

## 2021-04-24 RX ADMIN — CHLORHEXIDINE GLUCONATE 0.12% ORAL RINSE 10 ML: 1.2 LIQUID ORAL at 08:18

## 2021-04-24 RX ADMIN — WATER 500 MG: 1 INJECTION INTRAMUSCULAR; INTRAVENOUS; SUBCUTANEOUS at 11:50

## 2021-04-24 RX ADMIN — FAMOTIDINE 20 MG: 10 INJECTION INTRAVENOUS at 21:38

## 2021-04-24 RX ADMIN — FAMOTIDINE 20 MG: 10 INJECTION INTRAVENOUS at 08:17

## 2021-04-24 RX ADMIN — ARFORMOTEROL TARTRATE 15 MCG: 15 SOLUTION RESPIRATORY (INHALATION) at 19:23

## 2021-04-24 RX ADMIN — ALBUTEROL SULFATE 2.5 MG: 2.5 SOLUTION RESPIRATORY (INHALATION) at 19:23

## 2021-04-24 RX ADMIN — ALBUTEROL SULFATE 2.5 MG: 2.5 SOLUTION RESPIRATORY (INHALATION) at 09:03

## 2021-04-24 NOTE — PROGRESS NOTES
Problem: Non-Violent Restraints  Goal: Removal from restraints as soon as assessed to be safe  Outcome: Progressing Towards Goal  Goal: No harm/injury to patient while restraints in use  Outcome: Progressing Towards Goal  Goal: Patient's dignity will be maintained  Outcome: Progressing Towards Goal  Goal: Patient Interventions  Outcome: Progressing Towards Goal

## 2021-04-24 NOTE — PROGRESS NOTES
Otolaryngology head neck surgery  Patient seen and examined  Anterior neck wound almost completely healed  I discussed situation with Dr. Daksha Winkler  Patient needs to undergo tracheotomy due to 3-week timeframe of intubation  We will discuss with family via phone conversation and obtain consent  We will need to be off anticoagulation prior to surgery in a day or 2 thereafter to prevent any significant bleeding  Patient's neck is significantly inflamed from his previous procedure once may have additional factor for postoperative bleeding  We will attempt to make arrangements for tracheotomy early next week if family agreeable    Jamie Silva

## 2021-04-24 NOTE — PROGRESS NOTES
Vent support     Airway remains high concern . Patient seems to ventilate well on reduced settings and SBT assessments . Plan : Rest patient with more supportive vent settings . Assess patient tomorrow for improved ability to tolerate extubation . All discussed with Dr Berhane Trivedi / trach possible for safe transition from vent . Possible trach early next week       1605 Adjustment to rest  Patient     - SIMV to 12 /500 / PS 8 / FIO2 35% / peep 5      - Airway concerns persist with extubation . Plan possible trach to wean needed .  Dr Berhane Trivedi discussed with family

## 2021-04-24 NOTE — ROUTINE PROCESS
Bedside and Verbal shift change report given to Anita Palomo (oncoming nurse) by Monica Burrell   (offgoing nurse). Report included the following information SBAR, Kardex, MAR and Recent Results.

## 2021-04-24 NOTE — ROUTINE PROCESS
Bedside and Verbal shift change report given to Iggy Ribera RN (oncoming nurse) by Shanae Schneider RN (offgoing nurse). Report included the following information SBAR, Kardex, MAR, Recent Results and Cardiac Rhythm . Luz Hull

## 2021-04-24 NOTE — ROUTINE PROCESS
Bedside and Verbal shift change report given to Padmini (oncoming nurse) by Giovani Pyle (offgoing nurse). Report included the following information SBAR, Kardex and MAR.

## 2021-04-24 NOTE — PROGRESS NOTES
VENTILATOR Care plan    Problem: Ventilator Management  Goal: *Adequate oxygenation/ ventilation/ and extubation      Patient:        Beatrice Kwok     79 y.o.   male     4/24/2021  9:26 AM  Patient Active Problem List   Diagnosis Code    Angioedema due to angiotensin converting enzyme inhibitor (ACE-I) T78. 3XXA, [id number]    Respiratory failure ([facility] J96.90    JACQUELYN (acute kidney injury) ([facility] N17.9    Cardiac arrest ([facility]) I46.9    Obesity (BMI 30-39. 9) E66.9    Diabetic neuropathy associated with type 2 diabetes mellitus ([facility] E11.40    Diabetic retinopathy associated with type 2 diabetes mellitus ([facility] E11.319    Pulmonary infiltrates R91.8    Controlled type 2 diabetes mellitus with neurologic complication, without long-term current use of insulin ([facility]) E11.49    DVT (deep venous thrombosis) ([facility]) I82.409    Encounter for palliative care Z51.5    Goals of care, counseling/discussion Z71.89    Multiple subsegmental pulmonary emboli without acute cor pulmonale ([facility]) I26.94       Angioedema due to angiotensin converting enzyme inhibitor (ACE-I) [T78. 3XXA, T46.4X5A]    Reason patient intubated:  Airway protection / Angioedema     Ventilator day: 15     Ventilator settings: New settings per discussion with Dr Tenisha Cherry     ETT Size/Placement: 8 / 4/7/2021       ABG:  Date:4/24/2021  Lab Results   Component Value Date/Time    PHI 7.48 (H) 04/24/2021 02:57 AM    PCO2I 32.1 (L) 04/24/2021 02:57 AM    PO2I 82 04/24/2021 02:57 AM    HCO3I 24.1 04/24/2021 02:57 AM    FIO2I 30 04/24/2021 02:57 AM       Chest X-ray:  Date:4/24/2021  Results from Hospital Encounter encounter on 04/04/21   XR CHEST PORT    Narrative EXAM: XR CHEST PORT    INDICATION: Daily CXR while intubated. COMPARISON: Recent prior    FINDINGS: A portable AP radiograph of the chest was obtained at 0 1:15 hours. The patient is on a cardiac monitor. Patchy bilateral airspace opacities  persist. Markedly improved aeration since previous exam.. Stable cardiac  silhouette. .  No acute bone findings. Stable enteric tube, temperature probe and  grossly stable enteric tube given limitations. .       Impression Improving aeration. Continued follow-up recommended. Lab Test:  Date:4/24/2021  WBC:   Lab Results   Component Value Date/Time    WBC 15.4 (H) 04/24/2021 02:30 AM   HGB:   Lab Results   Component Value Date/Time    HGB 9.7 (L) 04/24/2021 02:30 AM    PLTS:   Lab Results   Component Value Date/Time    PLATELET 456 (H) 35/46/5129 02:30 AM       SaO2%/flow: @MTUDSYD9(0)@    Vital Signs:     Patient Vitals for the past 8 hrs:   Temp Pulse Resp BP SpO2   04/24/21 0904     99 %   04/24/21 0700 99.1 °F (37.3 °C) 89 28 119/66 99 %   04/24/21 0600 97.3 °F (36.3 °C) 88 29 (!) 140/80 98 %   04/24/21 0500 97 °F (36.1 °C) 88 25 133/76 98 %   04/24/21 0400 96.8 °F (36 °C) 97 (!) 31 (!) 153/86 98 %   04/24/21 0319  84 26  99 %   04/24/21 0300 97.2 °F (36.2 °C) 80 25 118/67 98 %   04/24/21 0200 97.5 °F (36.4 °C) 90 25 (!) 143/78 98 %       Wean Screen Pass (Yes or No): Wean Screen Reason for Failure:  Duration of Weaning Trial:  Additional Comments:        PLAN OF CARE: Continue airway assessment and clearing . Airway protection post extubation remains a concern . Patient tolerate of SBT protocols but plan to rest patient WOB today      GOAL: Patient reduced WOB with assessment for extubation .       OUTCOME:

## 2021-04-24 NOTE — PROGRESS NOTES
RENAL PROGRESS NOTE        Cristiano Lynda Posadas         Assessment/Plan:     ·  JACQUELYN (ischemic atn in a setting of acute pe/resp failure). Resolving, good spontaneous diuresis. · Hypernatremia, corrected. D/c D5W. · Acute pe/le le dvt. On heparin drip. · Angioedema, s/p emergent cricthyrotomy/resp failure. · Bl pneumonia, on abx. · Resp failure. · Will f/u on Monday, please call if any questions. Subjective:  Patient complaints off: Intubated, opens eyes to voice. Tolerated tf.          Patient Active Problem List   Diagnosis Code    Angioedema due to angiotensin converting enzyme inhibitor (ACE-I) T78. 3XXA, [id number]    Respiratory failure ([facility] J96.90    JACQUELYN (acute kidney injury) ([facility] N17.9    Cardiac arrest ([facility]) I46.9    Obesity (BMI 30-39. 9) E66.9    Diabetic neuropathy associated with type 2 diabetes mellitus ([facility]) E11.40    Diabetic retinopathy associated with type 2 diabetes mellitus ([facility] E11.319    Pulmonary infiltrates R91.8    Controlled type 2 diabetes mellitus with neurologic complication, without long-term current use of insulin ([facility]) E11.49    DVT (deep venous thrombosis) ([facility]) I82.409    Encounter for palliative care Z51.5    Goals of care, counseling/discussion Z71.89    Multiple subsegmental pulmonary emboli without acute cor pulmonale ([facility]) I26.94       Current Facility-Administered Medications   Medication Dose Route Frequency Provider Last Rate Last Admin    meropenem (MERREM) 500 mg in sterile water (preservative free) 10 mL IV syringe  500 mg IntraVENous Q6H Luiz Membreno MD   500 mg at 04/24/21 0521    albuterol (PROVENTIL VENTOLIN) nebulizer solution 2.5 mg  2.5 mg Nebulization TID RT Rosey ARAUJO MD   2.5 mg at 04/24/21 1883    dextrose 5% infusion  50 mL/hr IntraVENous CONTINUOUS Blaine Watt MD 50 mL/hr at 04/24/21 1505 50 mL/hr at 04/24/21 2044    acetaminophen (TYLENOL) solution 500 mg  500 mg Oral Q4H PRN Alane Bamberger, PA-C   500 mg at 04/20/21 2227    oxymetazoline (AFRIN) 0.05 % nasal spray 2 Spray  2 Spray Other BID PRN Capilitan, Fredna Angle, NP        famotidine (PF) (PEPCID) 20 mg in 0.9% sodium chloride 10 mL injection  20 mg IntraVENous DAILY Darcie Stack, DO   20 mg at 04/24/21 9975    ELECTROLYTE REPLACEMENT PROTOCOL - Potassium Renal Dosing  1 Each Other PRN Darcie Stack, DO        heparin 25,000 units in D5W 250 ml infusion  12-25 Units/kg/hr IntraVENous TITRATE Marifer ARAUJO PA-C 18.1 mL/hr at 04/24/21 0509 1,810 Units/hr at 04/24/21 0509    arformoteroL (BROVANA) neb solution 15 mcg  15 mcg Nebulization BID RT Ronit Velasco PA-C   15 mcg at 04/24/21 7410    multivit-folic acid-herbal 904 (WELLESSE PLUS) oral liquid 30 mL  30 mL Per NG tube DAILY Migel CORONA, DO   30 mL at 04/24/21 0817    chlorhexidine (PERIDEX) 0.12 % mouthwash 10 mL  10 mL Oral Q12H [place] Clarity, PA-C   10 mL at 04/24/21 0818    midazolam (VERSED) injection 1-2 mg  1-2 mg IntraVENous Q10MIN PRN [place] Clarity, PA-C   2 mg at 04/16/21 0559    albuterol-ipratropium (DUO-NEB) 2.5 MG-0.5 MG/3 ML  3 mL Nebulization Q4H PRN [place] Clarity, PA-C        glucose chewable tablet 16 g  4 Tab Oral PRN [place] Clarity, PA-C        glucagon (GLUCAGEN) injection 1 mg  1 mg IntraMUSCular PRN [place] Clarity, HORACIO        dextrose (D50W) injection syrg 12.5-25 g  25-50 mL IntraVENous PRN [place] Clarity, PA-C        insulin lispro (HUMALOG) injection   SubCUTAneous Q6H [place] ClarityHORACIO   Stopped at 04/23/21 0535    carboxymethylcellulose sodium (REFRESH LIQUIGEL) 1 % ophthalmic solution 1 Drop  1 Drop Both Eyes PRN Lamine Amaro, PA-C   1 Drop at 04/14/21 0621       Objective  Vitals:    04/24/21 0900 04/24/21 0904 04/24/21 0933 04/24/21 1000   BP: 120/82   128/65   Pulse: 87  90 90   Resp: 24  29 26   Temp: 99.3 °F (37.4 °C)   98.6 °F (37 °C)   SpO2: 99% 99% 99% 100%   Weight:       Height:             Intake/Output Summary (Last 24 hours) at 4/24/2021 1150  Last data filed at 4/24/2021 0700  Gross per 24 hour   Intake 3707.46 ml   Output 2950 ml   Net 757.46 ml           Admission weight: Weight: 127 kg (279 lb 15.8 oz) (04/04/21 1726)  Last Weight Metrics:  Weight Loss Metrics 4/23/2021 4/4/2021 4/4/2021 4/4/2021   Today's Wt 224 lb 3.3 oz - 280 lb -   BMI - 31.27 kg/m2 - 37.97 kg/m2             Physical Assessment:     General: intubated, opens eyes to voice. Et tube in place. Neck: No jvd. LUNGS: diminished air entry at the bases, no crackles. CVS EXM: S1, S2  RRR. Abdomen: soft, non tender. Lower Extremities:  1+ edema. Lab    CBC w/Diff Recent Labs     04/24/21  0230 04/23/21  0228 04/22/21  0428   WBC 15.4* 14.8* 13.9*   RBC 3.82* 3.72* 4.25*   HGB 9.7* 9.5* 10.7*   HCT 32.0* 30.8* 35.8*   * 713* 699*   GRANS 76* 77* 76*   LYMPH 16* 15* 15*   EOS 2 2 3        Chemistry Recent Labs     04/24/21  0230 04/23/21  1531 04/23/21  0228   * 147* 130*    137 139   K 4.6 4.1 4.3    106 106   CO2 23 27 26   BUN 71* 74* 85*   CREA 1.29 1.34* 1.44*   CA 8.9 8.9 8.4*   AGAP 9 4 7   BUCR 55* 55* 59*   ALB 2.3* 2.2* 2.3*   PHOS 3.5 3.5 3.6         No results found for: IRON, FE, TIBC, IBCT, PSAT, FERR   Lab Results   Component Value Date/Time    Calcium 8.9 04/24/2021 02:30 AM    Phosphorus 3.5 04/24/2021 02:30 AM        Henok Flores M.D.   [facility]  Phone

## 2021-04-25 ENCOUNTER — ANESTHESIA EVENT (OUTPATIENT)
Dept: SURGERY | Age: 71
DRG: 004 | End: 2021-04-25
Payer: MEDICARE

## 2021-04-25 ENCOUNTER — APPOINTMENT (OUTPATIENT)
Dept: GENERAL RADIOLOGY | Age: 71
DRG: 004 | End: 2021-04-25
Attending: PHYSICIAN ASSISTANT
Payer: MEDICARE

## 2021-04-25 LAB
ALBUMIN SERPL-MCNC: 2.2 G/DL (ref 3.4–5)
ANION GAP SERPL CALC-SCNC: 7 MMOL/L (ref 3–18)
APTT PPP: 108 SEC (ref 23–36.4)
ARTERIAL PATENCY WRIST A: ABNORMAL
BASE EXCESS BLD CALC-SCNC: 2 MMOL/L
BASOPHILS # BLD: 0 K/UL (ref 0–0.1)
BASOPHILS NFR BLD: 0 % (ref 0–2)
BDY SITE: ABNORMAL
BODY TEMPERATURE: 98.6
BUN SERPL-MCNC: 64 MG/DL (ref 7–18)
BUN/CREAT SERPL: 54 (ref 12–20)
CALCIUM SERPL-MCNC: 9 MG/DL (ref 8.5–10.1)
CHLORIDE SERPL-SCNC: 104 MMOL/L (ref 100–111)
CO2 SERPL-SCNC: 25 MMOL/L (ref 21–32)
COVID-19 RAPID TEST, COVR: NOT DETECTED
CREAT SERPL-MCNC: 1.19 MG/DL (ref 0.6–1.3)
DIFFERENTIAL METHOD BLD: ABNORMAL
EOSINOPHIL # BLD: 0.4 K/UL (ref 0–0.4)
EOSINOPHIL NFR BLD: 3 % (ref 0–5)
ERYTHROCYTE [DISTWIDTH] IN BLOOD BY AUTOMATED COUNT: 15.8 % (ref 11.6–14.5)
GAS FLOW.O2 O2 DELIVERY SYS: ABNORMAL L/MIN
GAS FLOW.O2 SETTING OXYMISER: 12 BPM
GLUCOSE BLD STRIP.AUTO-MCNC: 118 MG/DL (ref 70–110)
GLUCOSE BLD STRIP.AUTO-MCNC: 124 MG/DL (ref 70–110)
GLUCOSE BLD STRIP.AUTO-MCNC: 124 MG/DL (ref 70–110)
GLUCOSE BLD STRIP.AUTO-MCNC: 125 MG/DL (ref 70–110)
GLUCOSE BLD STRIP.AUTO-MCNC: 140 MG/DL (ref 70–110)
GLUCOSE SERPL-MCNC: 118 MG/DL (ref 74–99)
HCO3 BLD-SCNC: 26.1 MMOL/L (ref 22–26)
HCT VFR BLD AUTO: 31.7 % (ref 36–48)
HGB BLD-MCNC: 9.8 G/DL (ref 13–16)
INSPIRATION.DURATION SETTING TIME VENT: 1.1 SEC
LYMPHOCYTES # BLD: 2.3 K/UL (ref 0.9–3.6)
LYMPHOCYTES NFR BLD: 17 % (ref 21–52)
MAGNESIUM SERPL-MCNC: 2.3 MG/DL (ref 1.6–2.6)
MCH RBC QN AUTO: 25.7 PG (ref 24–34)
MCHC RBC AUTO-ENTMCNC: 30.9 G/DL (ref 31–37)
MCV RBC AUTO: 83 FL (ref 74–97)
MONOCYTES # BLD: 0.7 K/UL (ref 0.05–1.2)
MONOCYTES NFR BLD: 6 % (ref 3–10)
NEUTS SEG # BLD: 9.5 K/UL (ref 1.8–8)
NEUTS SEG NFR BLD: 73 % (ref 40–73)
O2/TOTAL GAS SETTING VFR VENT: 35 %
PCO2 BLD: 38.8 MMHG (ref 35–45)
PEEP RESPIRATORY: 5 CMH2O
PH BLD: 7.44 [PH] (ref 7.35–7.45)
PHOSPHATE SERPL-MCNC: 3.1 MG/DL (ref 2.5–4.9)
PLATELET # BLD AUTO: 645 K/UL (ref 135–420)
PMV BLD AUTO: 11.3 FL (ref 9.2–11.8)
PO2 BLD: 105 MMHG (ref 80–100)
POTASSIUM SERPL-SCNC: 4.2 MMOL/L (ref 3.5–5.5)
PRESSURE SUPPORT SETTING VENT: 8 CMH2O
RBC # BLD AUTO: 3.82 M/UL (ref 4.35–5.65)
SAO2 % BLD: 98 % (ref 92–97)
SARS-COV-2, COV2: NORMAL
SERVICE CMNT-IMP: ABNORMAL
SODIUM SERPL-SCNC: 136 MMOL/L (ref 136–145)
SOURCE, COVRS: NORMAL
SPECIMEN TYPE: ABNORMAL
TOTAL RESP. RATE, ITRR: 21
VENTILATION MODE VENT: ABNORMAL
VOLUME CONTROL PLUS IVLCP: YES
VT SETTING VENT: 500 ML
WBC # BLD AUTO: 13.1 K/UL (ref 4.6–13.2)

## 2021-04-25 PROCEDURE — 85025 COMPLETE CBC W/AUTO DIFF WBC: CPT

## 2021-04-25 PROCEDURE — 74011250636 HC RX REV CODE- 250/636: Performed by: INTERNAL MEDICINE

## 2021-04-25 PROCEDURE — 99291 CRITICAL CARE FIRST HOUR: CPT | Performed by: NURSE PRACTITIONER

## 2021-04-25 PROCEDURE — 80069 RENAL FUNCTION PANEL: CPT

## 2021-04-25 PROCEDURE — 36415 COLL VENOUS BLD VENIPUNCTURE: CPT

## 2021-04-25 PROCEDURE — 82803 BLOOD GASES ANY COMBINATION: CPT

## 2021-04-25 PROCEDURE — 71045 X-RAY EXAM CHEST 1 VIEW: CPT

## 2021-04-25 PROCEDURE — 74011000250 HC RX REV CODE- 250: Performed by: PHYSICIAN ASSISTANT

## 2021-04-25 PROCEDURE — 85730 THROMBOPLASTIN TIME PARTIAL: CPT

## 2021-04-25 PROCEDURE — 82962 GLUCOSE BLOOD TEST: CPT

## 2021-04-25 PROCEDURE — 94003 VENT MGMT INPAT SUBQ DAY: CPT

## 2021-04-25 PROCEDURE — 2709999900 HC NON-CHARGEABLE SUPPLY

## 2021-04-25 PROCEDURE — 83735 ASSAY OF MAGNESIUM: CPT

## 2021-04-25 PROCEDURE — 74011000250 HC RX REV CODE- 250: Performed by: INTERNAL MEDICINE

## 2021-04-25 PROCEDURE — 74011250636 HC RX REV CODE- 250/636: Performed by: PHYSICIAN ASSISTANT

## 2021-04-25 PROCEDURE — 94762 N-INVAS EAR/PLS OXIMTRY CONT: CPT

## 2021-04-25 PROCEDURE — 74011250637 HC RX REV CODE- 250/637: Performed by: INTERNAL MEDICINE

## 2021-04-25 PROCEDURE — 65610000006 HC RM INTENSIVE CARE

## 2021-04-25 PROCEDURE — 94640 AIRWAY INHALATION TREATMENT: CPT

## 2021-04-25 PROCEDURE — 77030041175 HC BAG DIGNSHLD BARD -A

## 2021-04-25 PROCEDURE — 87635 SARS-COV-2 COVID-19 AMP PRB: CPT

## 2021-04-25 PROCEDURE — 94669 MECHANICAL CHEST WALL OSCILL: CPT

## 2021-04-25 RX ORDER — SODIUM CHLORIDE 0.9 % (FLUSH) 0.9 %
5-40 SYRINGE (ML) INJECTION EVERY 8 HOURS
Status: CANCELLED | OUTPATIENT
Start: 2021-04-25

## 2021-04-25 RX ORDER — SODIUM CHLORIDE 0.9 % (FLUSH) 0.9 %
5-40 SYRINGE (ML) INJECTION AS NEEDED
Status: CANCELLED | OUTPATIENT
Start: 2021-04-25

## 2021-04-25 RX ORDER — MIDAZOLAM HYDROCHLORIDE 1 MG/ML
1-2 INJECTION, SOLUTION INTRAMUSCULAR; INTRAVENOUS
Status: DISCONTINUED | OUTPATIENT
Start: 2021-04-25 | End: 2021-04-26

## 2021-04-25 RX ORDER — SODIUM CHLORIDE, SODIUM LACTATE, POTASSIUM CHLORIDE, CALCIUM CHLORIDE 600; 310; 30; 20 MG/100ML; MG/100ML; MG/100ML; MG/100ML
75 INJECTION, SOLUTION INTRAVENOUS CONTINUOUS
Status: CANCELLED | OUTPATIENT
Start: 2021-04-25 | End: 2021-04-26

## 2021-04-25 RX ORDER — INSULIN LISPRO 100 [IU]/ML
INJECTION, SOLUTION INTRAVENOUS; SUBCUTANEOUS ONCE
Status: CANCELLED | OUTPATIENT
Start: 2021-04-25 | End: 2021-04-25

## 2021-04-25 RX ADMIN — ARFORMOTEROL TARTRATE 15 MCG: 15 SOLUTION RESPIRATORY (INHALATION) at 19:40

## 2021-04-25 RX ADMIN — MIDAZOLAM 2 MG: 1 INJECTION INTRAMUSCULAR; INTRAVENOUS at 14:11

## 2021-04-25 RX ADMIN — ALBUTEROL SULFATE 2.5 MG: 2.5 SOLUTION RESPIRATORY (INHALATION) at 19:40

## 2021-04-25 RX ADMIN — MIDAZOLAM 2 MG: 1 INJECTION INTRAMUSCULAR; INTRAVENOUS at 18:16

## 2021-04-25 RX ADMIN — CHLORHEXIDINE GLUCONATE 0.12% ORAL RINSE 10 ML: 1.2 LIQUID ORAL at 20:53

## 2021-04-25 RX ADMIN — ALBUTEROL SULFATE 2.5 MG: 2.5 SOLUTION RESPIRATORY (INHALATION) at 08:35

## 2021-04-25 RX ADMIN — MIDAZOLAM 2 MG: 1 INJECTION INTRAMUSCULAR; INTRAVENOUS at 15:02

## 2021-04-25 RX ADMIN — CHLORHEXIDINE GLUCONATE 0.12% ORAL RINSE 10 ML: 1.2 LIQUID ORAL at 08:04

## 2021-04-25 RX ADMIN — MIDAZOLAM 2 MG: 1 INJECTION INTRAMUSCULAR; INTRAVENOUS at 17:07

## 2021-04-25 RX ADMIN — MIDAZOLAM 2 MG: 1 INJECTION INTRAMUSCULAR; INTRAVENOUS at 19:43

## 2021-04-25 RX ADMIN — Medication 30 ML: at 08:04

## 2021-04-25 RX ADMIN — WATER 500 MG: 1 INJECTION INTRAMUSCULAR; INTRAVENOUS; SUBCUTANEOUS at 00:15

## 2021-04-25 RX ADMIN — HEPARIN SODIUM 1710 UNITS/HR: 10000 INJECTION, SOLUTION INTRAVENOUS at 10:14

## 2021-04-25 RX ADMIN — ARFORMOTEROL TARTRATE 15 MCG: 15 SOLUTION RESPIRATORY (INHALATION) at 08:35

## 2021-04-25 RX ADMIN — WATER 500 MG: 1 INJECTION INTRAMUSCULAR; INTRAVENOUS; SUBCUTANEOUS at 17:07

## 2021-04-25 RX ADMIN — WATER 500 MG: 1 INJECTION INTRAMUSCULAR; INTRAVENOUS; SUBCUTANEOUS at 06:29

## 2021-04-25 RX ADMIN — WATER 500 MG: 1 INJECTION INTRAMUSCULAR; INTRAVENOUS; SUBCUTANEOUS at 11:55

## 2021-04-25 RX ADMIN — ALBUTEROL SULFATE 2.5 MG: 2.5 SOLUTION RESPIRATORY (INHALATION) at 16:38

## 2021-04-25 RX ADMIN — FAMOTIDINE 20 MG: 10 INJECTION INTRAVENOUS at 20:52

## 2021-04-25 RX ADMIN — FAMOTIDINE 20 MG: 10 INJECTION INTRAVENOUS at 08:05

## 2021-04-25 NOTE — PROGRESS NOTES
[facility]  Pulmonary, Critical Care, and Sleep Medicine    Name: Ariadna Glass MRN: [id number]   : 1950 Hospital: [facility]   Date: 2021        IMPRESSION:   · Angioedema- with airway and respiratory failure and collapse; thought due to ACE inhibitor  · S/P Emergent Cricthyrotomy [facility]-ED 21- converted to 6.5 ETT intraoperative by anesthesia team 21, 8.0 ETT by anesthesia 21, packing removed evening 21-ENT  · S/P cardiac arrest @ [facility] 21- brief, likely due to  hypoxia and  airway collapse  · Respiratory Failure: Acute due to above; currently intubated and on vent for airway protection. Will likely need trach   · Pulmonary Infiltrates: suspect aspiration secretions and/or blood due to above- can't exclude other infectious process at this time. Rapid Covid Negative at [facility]  · Bradycardia - improved  · DVT: Popliteal- Left; acute non-occlusive thrombus.  Heparin ACS protocol started - stopped  due to bleeding from Cric site.  Bleeding controlled -  heparin resumed    · Pulmonary Embolism - 21 - left lower and right upper lobes  · JACQUELYN- improving   · Thrombocytosis   · Hypokalemia - resolved  · Left scleral erythema- unknown baseline; possible infection   · DM  · DM retinopathy per chart review  · Diabetic peripheral neuropathy  · Left Pneumothorax - resolved  · Obesity: Body mass index is 35.64 kg/m². Patient Active Problem List   Diagnosis Code    Angioedema due to angiotensin converting enzyme inhibitor (ACE-I) T78. 3XXA, [id number]    Respiratory failure ([facility] J96.90    JACQUELYN (acute kidney injury) ([facility] N17.9    Cardiac arrest (H[facility]) I46.9    Obesity (BMI 30-39. 9) E66.9    Diabetic neuropathy associated with type 2 diabetes mellitus ([address] E11.40    Diabetic retinopathy associated with type 2 diabetes mellitus ([facility] E11.319    Pulmonary infiltrates R91.8    Controlled type 2 diabetes mellitus with neurologic complication, without long-term current use of insulin ([facility]) E11.49    DVT (deep venous thrombosis) ([facility] I82.409    Encounter for palliative care Z51.5    Goals of care, counseling/discussion Z71.89    Multiple subsegmental pulmonary emboli without acute cor pulmonale (HCC) I26.94      PLAN:   NEURO:  · Serial neuro evaluations  · Daily sedation holiday   · Wetting tears and cipro eye drops- stop date for cipro on chart     CARDIO:  · MAP goal >64- No IV pressor requirement  · Echo on 4/6/21 with EF of 55%, dilated RV and moderate pulmonary hypertension (PASP 61 mmHg)  PULM:  · Maintain aspiration precautions: HOB >30 degrees  · SpO2 goal >92%  · Bronchial /oral hygiene   · VAP bundle- Wean vent as clinical status allows  · SBT as clinical status allows  · Completed course of decadron  · Continue brovana and albuterol nebs  · Daily CXR and ABG while intubated  · Trach scheduled for next week      GI/ NUTRITION:  · OGT   · Diet: continue TF  · SUP:Pepcid  · Follow up with nutritional recommendations  RENAL/ METAB/ :  · Monitor I&Os  · Trend electrolytes - replace as needed  · Condom cath: Continue, monitor I/Os     HEM/ONC  · Trend Hb/HCT and PLTs  · DVT prophylaxis: SCDs, heparin gtt     ID  · Antibioitcs: Cipro eye drop, Zosyn 4/4 - 4/8  Vanco: 4/4-6, MRSA swab negative.   · Zosyn restarted on 4/11-4/19, Belvia New started 4/19- current-->will cont for total of 7 days   · Follow up on pending cultures  · Repeat Covid negative (4/11)  · Tylenol for fevers     ENDO  · BGs Q 6,SSI, TSH normal  · C1 inhibitor normal level (4/7/21)     MSK/ ORTHO  · No active Issues     SKIN/ WOUNDS  · Per protocol  · Neck- daily dressing changes per ENT, Quick clot with Afrin if needed, d/c xeroform           CODE STATUS: Full Code- Full     Discussed in interdisciplinary rounds   Best practice :     Glycemic control  IHI ICU bundles:              Garcia Bundle Followed and Vent Bundle Followed, Vent Day 21    [facility] Vent patients- VAP bundle, aim to keep peak plateau pressure 47-22BX H2O  Sress ulcer prophylaxis. DVT prophylaxis. Need for Lines, cardona assessed. Restraints need. Palliative care evaluation. Subjective/Interval History:        Interval HPI:71 yo M h/o HTN tx w/ lisinopril presenting to [facility] ED 4/4 for rapidly progressing respiratory distress due to severe angioedema. During intubation attempts pt had brief cardiac arrest w/ pulse loss and severe airway swelling leading to emergent cricothyrotomy. Pt stabilized before [facility] transport to [place] [facility] pt taken emergently to [address] was converted to ETT. Patient continued to have trouble with o2 requirements disproportionate to CXR findings and despite adjustment of FiO2 and PEEP, now improved. S/p administration of inhaled epoprostenol. CTA revelaed B/L pulmonary emboli which is likely the cause of his hypoxemia and RH strain. Continuing heparin gtt, which had already been initiated for left popliteal non-occlusive thrombus. Pt is s/p dobutamine and  diureses w/ bumex and metolazone.  COVID - 19 negative. Now with minimal vent support, however, mentation precludes extubation at this time.      Subjective 04/25/21  Hospital Day: 21  Vent Day: 21  Overnight events: No significant events noted overnight  Mentation/Activity: intubated, sedation held - responsive to verbal stimuli. Mentation improving. Opens eyes to name and moves all extremities spontaneously. Respiratory/ Secretions: stable. CXR with improvement in aeration  Hemodynamics: H/H stable  Urine output, bowel: adequate  Diet: TF      -Continue sedation wean and SBT  -50 q1h water flushes     ROS:Review of systems not obtained due to patient factors.     Objective:   Vital Signs:    Visit Vitals  BP (!) 116/55   Pulse 81   Temp 98.2 °F (36.8 °C)   Resp 25   Ht 5' 11\" (1.803 m)   Wt 101.7 kg (224 lb 3.3 oz)   SpO2 99%   BMI 31.27 kg/m²       O2 Device: Ventilator, Endotracheal tube       Temp (24hrs), Av.1 °F (36.7 °C), Min:96.8 °F (36 °C), Max:99.5 °F (37.5 °C)       Intake/Output:   Last shift:      1901 -  0700  In: 1130.6 [I.V.:100.6]  Out: 300 [Urine:300]  Last 3 shifts:  07 - 1900  In: 5911.5 [I.V.:2131.5]  Out: 5583 [Urine:3650]    Intake/Output Summary (Last 24 hours) at 2021 0511  Last data filed at 2021 0200  Gross per 24 hour   Intake 3309.21 ml   Output 1900 ml   Net 1409.21 ml      Physical Exam:                 General: Intubated/sedated; in NAD  HEENT:  Anicteric sclerae; pink palpebral conjunctivae; mucosa moist  Resp:  Symmetrical chest expansion, no accessory muscle use; good airway entry; no rales/ wheezing/ diminished b/l, + rhonchi noted  CV:  S1, S2 present; regular rate and rhythm  GI:  Obese. Abdomen soft, non-tender; (+) active bowel sounds  Extremities:  +2 pulses on all extremities; no edema/ cyanosis/ clubbing noted;  no restraints   Skin:  Warm; no rashes/ lesions noted, normal turgor/cap refill   Neurologic:  opens eyes, responds to noxious and verbal stimuli, +facial grimacing, no command following,  moves ext x4 spontaneously   Devices:  OGT, ETT, condom cath, FMS         DATA:  Labs:  Recent Labs     21  0250 21  0230 21   WBC 13.1 15.4* 14.8*   HGB 9.8* 9.7* 9.5*   HCT 31.7* 32.0* 30.8*   * 653* 713*     Recent Labs     21  0250 21  1718 21  0230 218 21    136 136   < > 139   K 4.2 4.0 4.6   < > 4.3    104 104   < > 106   CO2 25 26 23   < > 26   * 134* 118*   < > 130*   BUN 64* 70* 71*   < > 85*   CREA 1.19 1.25 1.29   < > 1.44*   CA 9.0 8.8 8.9   < > 8.4*   MG 2.3  --  2.7*  --  3.1*   PHOS 3.1 3.6 3.5   < > 3.6   ALB 2.2* 2.2* 2.3*   < > 2.3*    < > = values in this interval not displayed. No results for input(s): PH, PCO2, PO2, HCO3, FIO2 in the last 72 hours.     PFT:                                                     Echo:  21 ECHO ADULT COMPLETE 04/06/2021 4/6/2021    Narrative · Contrast used: DEFINITY. · Image quality for this study was technically difficult. · LV: Calculated LVEF is 55%. Visually measured ejection fraction. Normal   cavity size, wall thickness and systolic function (ejection fraction   normal). Wall motion: normal. Moderate (grade 2) left ventricular   diastolic dysfunction. · AO: Mild aortic root and ascending aorta dilatation. Ascending aorta   diameter = 3.6 cm. Aortic root measures 3.9 cm  · RA: Dilated right atrium. · RV: Dilated right ventricle. Borderline low systolic function. · TV: Mild tricuspid valve regurgitation is present. · IVC: Mechanically ventilated; cannot use inferior caval vein diameter to   estimate central venous pressure. · PA: Moderate pulmonary hypertension. Pulmonary arterial systolic   pressure is 61 mmHg. Signed by: Enriqueta Blackburn MD     CXR 4/24/21: Read Pending     Imaging:  [x]I have personally reviewed the patients radiographs  []Radiographs reviewed with radiologist   [x]No change from prior, tubes and lines in adequate position  []Improved   []Worsening    Total of  35 min critical care time spent at bedside during the course of care providing evaluation,management and care decisions and ordering appropriate treatment related to critical care problems exclusive of procedures    Humble Garibay AGACNP-BC  04/25/21  Pulmonary, Critical [facility]     Pulmonary / Critical Care Physician:    Chart and note reviewed. Data reviewed. Seen on rounds earlier today. I have independently evaluated and examined the patient. I agree with the exam, assessment and plans outlined by RUDDY Smith. In brief, my findings, evaluation and recommendations are as stated below:    Pt appears to be waking up more, moving arms spontaneously but not following commands. Spoke to ENT, plan for trach tomorrow. Continue to hold sedation for now.  LANE Juan UOP, Cr and BUN improved. Hold heparin gtt at midnight. Complete 7 days of merrem. Rest of details and diagnostic/treatment plans per APC note.         Nicki Mcmillan MD  11:40 AM

## 2021-04-25 NOTE — PROGRESS NOTES
Bedside and Verbal shift change report given to [facility] (oncoming nurse) by Tyson Bonilla (offgoing nurse). Report included the following information SBAR, Kardex and MAR.

## 2021-04-25 NOTE — PROGRESS NOTES
SBT initiated, PS 7, PEEP +5, FiO2 30%. Pt awake, alert, follows some commands. 04/25/21 0640   Weaning Parameters   Spontaneous Breathing Trial Complete Yes   Resp Rate Observed 32   Ve 14.8      RSBI 55     Tachypneic but with acceptable RSBI. Will monitor.

## 2021-04-25 NOTE — PROGRESS NOTES
Vent check complete,ot sxn for moderate amount of thick yellow secretions,resp neb given via META NEB,Pt resting with eyes closed. 04/25/21 1944   Patient Observations   Pulse (Heart Rate) 82   Resp Rate 28   O2 Sat (%) 99 %   Airway - Continuous Aspiration of Subglottic Secretions (GENESIS) Tube 04/07/21 Oral   Placement Date/Time: 04/07/21 1030   Number of Attempts: 1  Inserted By: Mg Lundberg CRNA  Present on Admission/Arrival: No  Location: Oral  Placement Verified: Auscultation;BBS;Chest x-ray;EtCO2;Placement not verified (comment)  Airway Types: Endotr. .. Insertion Depth (cm) 27 cm   Line Christian Lips   Side Secured Device; Left   Cuff Pressure   (MLT)   Site Assessment Clean, dry, & intact   Suction on Yes   Respiratory   Respiratory (WDL) X   Patient on Vent Yes - If patient is on vent, add Doc Flowsheet Ventilator (). Respiratory Pattern Regular   Upper Airway Sounds Coarse   Chest/Tracheal Assessment Chest expansion, symmetrical   Breath Sounds Bilateral Coarse   Cough Productive;Cough with suction   Airway Clearance   Suction ET Tube   Suction Device Heather; Inline suction catheter   Sputum Method Obtained Endotracheal   Sputum Amount Moderate   Sputum Color/Odor Tan   Sputum Consistency Thick   Ventilator Initiate/Discontinue   Bio-Med ID # [id number]   Vent Settings   FIO2 (%) 30 %   SpO2/FIO2 Ratio 330   CMV Rate Set 12   Back-Up Rate 12   Vt Set (ml) 500 ml   PEEP/VENT (cm H2O) 5 cm H20   Insp Time (sec) 1 sec   Insp Rise Time % 60 %   Flow Trigger 3   Ventilator Measurements   Resp Rate Observed 28   Vt Exhaled (Machine Breath) (ml) 513 ml   Ve Observed (l/min) 8.1 l/min   PIP Observed (cm H2O) 16 cm H2O   Plateau Pressure (cm H2O) 16 cm H2O   MAP (cm H2O) 9.3   I:E Ratio Actual 1:1.7   Static Compliance (ml/cm H20) 48 ml/cm H20   Safety & Alarms   Circuit Temperature 99.1 °F (37.3 °C)   Backup Mode Checked/Apnea Yes   Pressure Max 40 cm H2O   Pressure Min 11 cm H2O   Ve Min 2   Ve Max 20   Vt Min 200 ml   Vt Max 1000 ml   RR Max 40   Ambu Bag Yes   Ambu Mask Yes   Age Specific Ventilator Associated Pneumonia Bundle   Patient Age Group Adult    Ventilator Associated Pneumonia Bundle   Elevation of Head to 30-45 Degrees (Unless Contraindicated) Yes   Oxygen Therapy   Skin Assessment Clean, dry, & intact   Vent Method/Mode   Ventilation Method Conventional   Ventilator Mode Assist control;VC+   Procedures   $$ Subsequent Procedure Aerosol;IPV   Delivery Source In-line nebulizer; Other (comment)  (MEtaNeb)   Pulmonary Toilet   Pulmonary Toilet H. O.B elevated;Suction

## 2021-04-25 NOTE — PROGRESS NOTES
Pt is more awake today and moving right arm towards et tube, pt is high risk for airway problems if he self extubates  will apply restraint    1200 rapid covid test sent to the lab, pt is for the OR tomorrow   1400 very awake banging on the siderail, tolerated SBT well returned to previous settings, versed given for agitation

## 2021-04-25 NOTE — ROUTINE PROCESS
Bedside and Verbal shift change report given to Chris Hilton RN (oncoming nurse) by Shaan Street RN (offgoing nurse). Report included the following information SBAR, Kardex, Intake/Output, MAR, Recent Results, Med Rec Status and Cardiac Rhythm . Tommie Angry

## 2021-04-26 ENCOUNTER — APPOINTMENT (OUTPATIENT)
Dept: GENERAL RADIOLOGY | Age: 71
DRG: 004 | End: 2021-04-26
Attending: PHYSICIAN ASSISTANT
Payer: MEDICARE

## 2021-04-26 ENCOUNTER — ANESTHESIA (OUTPATIENT)
Dept: SURGERY | Age: 71
DRG: 004 | End: 2021-04-26
Payer: MEDICARE

## 2021-04-26 LAB
ALBUMIN SERPL-MCNC: 2.2 G/DL (ref 3.4–5)
ANION GAP SERPL CALC-SCNC: 5 MMOL/L (ref 3–18)
APTT PPP: 37.5 SEC (ref 23–36.4)
ARTERIAL PATENCY WRIST A: ABNORMAL
BASE EXCESS BLD CALC-SCNC: 1 MMOL/L
BASOPHILS # BLD: 0.1 K/UL (ref 0–0.1)
BASOPHILS NFR BLD: 1 % (ref 0–2)
BDY SITE: ABNORMAL
BODY TEMPERATURE: 98.5
BUN SERPL-MCNC: 57 MG/DL (ref 7–18)
BUN/CREAT SERPL: 49 (ref 12–20)
CALCIUM SERPL-MCNC: 8.7 MG/DL (ref 8.5–10.1)
CHLORIDE SERPL-SCNC: 103 MMOL/L (ref 100–111)
CO2 SERPL-SCNC: 27 MMOL/L (ref 21–32)
CREAT SERPL-MCNC: 1.17 MG/DL (ref 0.6–1.3)
DIFFERENTIAL METHOD BLD: ABNORMAL
EOSINOPHIL # BLD: 0.3 K/UL (ref 0–0.4)
EOSINOPHIL NFR BLD: 3 % (ref 0–5)
ERYTHROCYTE [DISTWIDTH] IN BLOOD BY AUTOMATED COUNT: 15.8 % (ref 11.6–14.5)
GAS FLOW.O2 O2 DELIVERY SYS: ABNORMAL L/MIN
GAS FLOW.O2 SETTING OXYMISER: 12 BPM
GLUCOSE BLD STRIP.AUTO-MCNC: 102 MG/DL (ref 70–110)
GLUCOSE BLD STRIP.AUTO-MCNC: 120 MG/DL (ref 70–110)
GLUCOSE SERPL-MCNC: 102 MG/DL (ref 74–99)
HCO3 BLD-SCNC: 24.1 MMOL/L (ref 22–26)
HCT VFR BLD AUTO: 34.2 % (ref 36–48)
HGB BLD-MCNC: 10.1 G/DL (ref 13–16)
INSPIRATION.DURATION SETTING TIME VENT: 1 SEC
LYMPHOCYTES # BLD: 1.9 K/UL (ref 0.9–3.6)
LYMPHOCYTES NFR BLD: 18 % (ref 21–52)
MAGNESIUM SERPL-MCNC: 2.3 MG/DL (ref 1.6–2.6)
MCH RBC QN AUTO: 24.9 PG (ref 24–34)
MCHC RBC AUTO-ENTMCNC: 29.5 G/DL (ref 31–37)
MCV RBC AUTO: 84.4 FL (ref 74–97)
MONOCYTES # BLD: 0.9 K/UL (ref 0.05–1.2)
MONOCYTES NFR BLD: 8 % (ref 3–10)
NEUTS SEG # BLD: 7.5 K/UL (ref 1.8–8)
NEUTS SEG NFR BLD: 69 % (ref 40–73)
O2/TOTAL GAS SETTING VFR VENT: 30 %
PCO2 BLD: 32.6 MMHG (ref 35–45)
PEEP RESPIRATORY: 5 CMH2O
PH BLD: 7.48 [PH] (ref 7.35–7.45)
PHOSPHATE SERPL-MCNC: 3 MG/DL (ref 2.5–4.9)
PIP ISTAT,IPIP: 18
PLATELET # BLD AUTO: 599 K/UL (ref 135–420)
PMV BLD AUTO: 11.4 FL (ref 9.2–11.8)
PO2 BLD: 84 MMHG (ref 80–100)
POTASSIUM SERPL-SCNC: 4.3 MMOL/L (ref 3.5–5.5)
RBC # BLD AUTO: 4.05 M/UL (ref 4.35–5.65)
SAO2 % BLD: 97 % (ref 92–97)
SERVICE CMNT-IMP: ABNORMAL
SODIUM SERPL-SCNC: 135 MMOL/L (ref 136–145)
SPECIMEN TYPE: ABNORMAL
TOTAL RESP. RATE, ITRR: 23
VENTILATION MODE VENT: ABNORMAL
VOLUME CONTROL PLUS IVLCP: YES
VT SETTING VENT: 500 ML
WBC # BLD AUTO: 10.8 K/UL (ref 4.6–13.2)

## 2021-04-26 PROCEDURE — 77030025757

## 2021-04-26 PROCEDURE — 77030008808 HC TU TRACH UNCUF SIMS -B: Performed by: OTOLARYNGOLOGY

## 2021-04-26 PROCEDURE — 77030011267 HC ELECTRD BLD COVD -A: Performed by: OTOLARYNGOLOGY

## 2021-04-26 PROCEDURE — 74011250636 HC RX REV CODE- 250/636: Performed by: INTERNAL MEDICINE

## 2021-04-26 PROCEDURE — 94003 VENT MGMT INPAT SUBQ DAY: CPT

## 2021-04-26 PROCEDURE — 65610000006 HC RM INTENSIVE CARE

## 2021-04-26 PROCEDURE — 74011000250 HC RX REV CODE- 250: Performed by: OTOLARYNGOLOGY

## 2021-04-26 PROCEDURE — APPNB15 APP NON BILLABLE TIME 0-15 MINS: Performed by: REGISTERED NURSE

## 2021-04-26 PROCEDURE — 74011250636 HC RX REV CODE- 250/636: Performed by: PHYSICIAN ASSISTANT

## 2021-04-26 PROCEDURE — 99231 SBSQ HOSP IP/OBS SF/LOW 25: CPT | Performed by: NURSE PRACTITIONER

## 2021-04-26 PROCEDURE — 82962 GLUCOSE BLOOD TEST: CPT

## 2021-04-26 PROCEDURE — 74011000250 HC RX REV CODE- 250: Performed by: PHYSICIAN ASSISTANT

## 2021-04-26 PROCEDURE — 77030002996 HC SUT SLK J&J -A: Performed by: OTOLARYNGOLOGY

## 2021-04-26 PROCEDURE — 94640 AIRWAY INHALATION TREATMENT: CPT

## 2021-04-26 PROCEDURE — 85025 COMPLETE CBC W/AUTO DIFF WBC: CPT

## 2021-04-26 PROCEDURE — 2709999900 HC NON-CHARGEABLE SUPPLY

## 2021-04-26 PROCEDURE — 2709999900 HC NON-CHARGEABLE SUPPLY: Performed by: OTOLARYNGOLOGY

## 2021-04-26 PROCEDURE — 00320 ANES ALL PX NECK NOS 1YR/>: CPT | Performed by: ANESTHESIOLOGY

## 2021-04-26 PROCEDURE — 94668 MNPJ CHEST WALL SBSQ: CPT

## 2021-04-26 PROCEDURE — 83735 ASSAY OF MAGNESIUM: CPT

## 2021-04-26 PROCEDURE — 36600 WITHDRAWAL OF ARTERIAL BLOOD: CPT

## 2021-04-26 PROCEDURE — 00320 ANES ALL PX NECK NOS 1YR/>: CPT | Performed by: NURSE ANESTHETIST, CERTIFIED REGISTERED

## 2021-04-26 PROCEDURE — 77030040356 HC CORD BPLR FRCP COVD -A: Performed by: OTOLARYNGOLOGY

## 2021-04-26 PROCEDURE — 77030031753 HC SHR ENDO COAG HARM J&J -E: Performed by: OTOLARYNGOLOGY

## 2021-04-26 PROCEDURE — 85730 THROMBOPLASTIN TIME PARTIAL: CPT

## 2021-04-26 PROCEDURE — 76010000138 HC OR TIME 0.5 TO 1 HR: Performed by: OTOLARYNGOLOGY

## 2021-04-26 PROCEDURE — 82803 BLOOD GASES ANY COMBINATION: CPT

## 2021-04-26 PROCEDURE — 71045 X-RAY EXAM CHEST 1 VIEW: CPT

## 2021-04-26 PROCEDURE — 74011000250 HC RX REV CODE- 250: Performed by: INTERNAL MEDICINE

## 2021-04-26 PROCEDURE — 36415 COLL VENOUS BLD VENIPUNCTURE: CPT

## 2021-04-26 PROCEDURE — 94762 N-INVAS EAR/PLS OXIMTRY CONT: CPT

## 2021-04-26 PROCEDURE — 80069 RENAL FUNCTION PANEL: CPT

## 2021-04-26 PROCEDURE — 76060000032 HC ANESTHESIA 0.5 TO 1 HR: Performed by: OTOLARYNGOLOGY

## 2021-04-26 PROCEDURE — 99291 CRITICAL CARE FIRST HOUR: CPT | Performed by: INTERNAL MEDICINE

## 2021-04-26 PROCEDURE — 74011000250 HC RX REV CODE- 250: Performed by: NURSE ANESTHETIST, CERTIFIED REGISTERED

## 2021-04-26 PROCEDURE — 74011250636 HC RX REV CODE- 250/636: Performed by: NURSE ANESTHETIST, CERTIFIED REGISTERED

## 2021-04-26 RX ORDER — ROCURONIUM BROMIDE 10 MG/ML
INJECTION, SOLUTION INTRAVENOUS AS NEEDED
Status: DISCONTINUED | OUTPATIENT
Start: 2021-04-26 | End: 2021-04-26 | Stop reason: HOSPADM

## 2021-04-26 RX ORDER — MIDAZOLAM HYDROCHLORIDE 1 MG/ML
2 INJECTION, SOLUTION INTRAMUSCULAR; INTRAVENOUS
Status: DISCONTINUED | OUTPATIENT
Start: 2021-04-26 | End: 2021-04-28

## 2021-04-26 RX ORDER — SODIUM CHLORIDE, SODIUM LACTATE, POTASSIUM CHLORIDE, CALCIUM CHLORIDE 600; 310; 30; 20 MG/100ML; MG/100ML; MG/100ML; MG/100ML
INJECTION, SOLUTION INTRAVENOUS
Status: DISCONTINUED | OUTPATIENT
Start: 2021-04-26 | End: 2021-04-26 | Stop reason: HOSPADM

## 2021-04-26 RX ADMIN — ALBUTEROL SULFATE 2.5 MG: 2.5 SOLUTION RESPIRATORY (INHALATION) at 14:28

## 2021-04-26 RX ADMIN — SODIUM CHLORIDE, SODIUM LACTATE, POTASSIUM CHLORIDE, AND CALCIUM CHLORIDE: 600; 310; 30; 20 INJECTION, SOLUTION INTRAVENOUS at 13:00

## 2021-04-26 RX ADMIN — FAMOTIDINE 20 MG: 10 INJECTION INTRAVENOUS at 21:00

## 2021-04-26 RX ADMIN — FAMOTIDINE 20 MG: 10 INJECTION INTRAVENOUS at 08:39

## 2021-04-26 RX ADMIN — WATER 500 MG: 1 INJECTION INTRAMUSCULAR; INTRAVENOUS; SUBCUTANEOUS at 00:48

## 2021-04-26 RX ADMIN — ROCURONIUM BROMIDE 50 MG: 50 INJECTION INTRAVENOUS at 13:10

## 2021-04-26 RX ADMIN — WATER 500 MG: 1 INJECTION INTRAMUSCULAR; INTRAVENOUS; SUBCUTANEOUS at 05:34

## 2021-04-26 RX ADMIN — MIDAZOLAM 2 MG: 1 INJECTION INTRAMUSCULAR; INTRAVENOUS at 12:55

## 2021-04-26 RX ADMIN — ALBUTEROL SULFATE 2.5 MG: 2.5 SOLUTION RESPIRATORY (INHALATION) at 19:28

## 2021-04-26 RX ADMIN — CHLORHEXIDINE GLUCONATE 0.12% ORAL RINSE 10 ML: 1.2 LIQUID ORAL at 08:39

## 2021-04-26 RX ADMIN — MIDAZOLAM 2 MG: 1 INJECTION INTRAMUSCULAR; INTRAVENOUS at 08:56

## 2021-04-26 RX ADMIN — ARFORMOTEROL TARTRATE 15 MCG: 15 SOLUTION RESPIRATORY (INHALATION) at 07:55

## 2021-04-26 RX ADMIN — CHLORHEXIDINE GLUCONATE 0.12% ORAL RINSE 10 ML: 1.2 LIQUID ORAL at 21:00

## 2021-04-26 RX ADMIN — ARFORMOTEROL TARTRATE 15 MCG: 15 SOLUTION RESPIRATORY (INHALATION) at 19:29

## 2021-04-26 RX ADMIN — ROCURONIUM BROMIDE 50 MG: 50 INJECTION INTRAVENOUS at 13:00

## 2021-04-26 RX ADMIN — ALBUTEROL SULFATE 2.5 MG: 2.5 SOLUTION RESPIRATORY (INHALATION) at 07:55

## 2021-04-26 NOTE — INTERDISCIPLINARY ROUNDS
[facility]  Pulmonary, Critical Care, and Sleep Medicine  Interdisciplinary and Ventilator Weaning Rounds    Patient discussed in morning walking rounds and interdisciplinary rounds. ICU day: 21    Overnight events:   · No acute events overnight   Assessments and best practice:   Ventilator  o Ventilator day 21  o Vent settings: FiO2 of 85% and PEEP 12  o VAP bundle, aim to keep peak plateau pressure 82-31LD H2O  o Weaning assessed and documented   - Patient does not meet criteria for SBT. Pt will be receiving tracheostomy today  - No sedation  - No plan to wean today. - Outcome: cont full ventilator support   - Final plan: will remain on mechanical ventilatory support today   Sedation   none   Other pertinent drips  o Holding Heparin ggt for procedure   Lines noted  o PIVs   Critical labs assessed  o Yes   Antibiotics   none   Medications reviewed  o Yes   Pending imaging  o none   Pending send out labs  o no   Pending Procedures  o tracheostomy   Glycemic control   SSI    Stress ulcer prophylaxis. o Pepcid   DVT prophylaxis.   o Heparin gtt  On hold, SCDs   Need for Lines, cardona assessed.  o Yes   Restraint Reevaluation   o Restraints on to prevent pulling lines    Family contact/MPOA: sister, Ellen Augustine  Family update: to be updated by bedside nurse/palliative care today         Daily Plans:   Tracheostomy today    Wean vent settings as tolerated   Reposition the OGT        Garry Farnsworth NP  04/26/21  Pulmonary, [address] [facility],[address] [facility]

## 2021-04-26 NOTE — PROGRESS NOTES
RENAL PROGRESS NOTE        Cristiano Nichols         Assessment/Plan:     1. JACQUELYN (ischemic atn in a setting of acute pe/resp failure). Resolving, good spontaneous diuresis. 2. Hypernatremia, corrected. D/c D5W.   3. Acute pe/le le dvt. On heparin drip. 4. Angioedema, s/p emergent cricthyrotomy/resp failure. 5. Bl pneumonia, on abx. 6. Resp failure. 7. Hyponatremia , decrease free water with TF    Please call with questions,    Inessa Pedraza MD FASN  Cell [phone]  Pager: [phone]                                                                                                                        Subjective: Intubated, opens eyes to voice. Tolerated tf.          Patient Active Problem List   Diagnosis Code    Angioedema due to angiotensin converting enzyme inhibitor (ACE-I) T78. 3XXA, [id number]    Respiratory failure ([facility] J96.90    JACQUELYN (acute kidney injury) ([facility] N17.9    Cardiac arrest (HCC) I46.9    Obesity (BMI 30-39. 9) E66.9    Diabetic neuropathy associated with type 2 diabetes mellitus ([facility] E11.40    Diabetic retinopathy associated with type 2 diabetes mellitus ([facility] E11.319    Pulmonary infiltrates R91.8    Controlled type 2 diabetes mellitus with neurologic complication, without long-term current use of insulin (HCC) E11.49    DVT (deep venous thrombosis) ([facility] I82.409    Encounter for palliative care Z51.5    Goals of care, counseling/discussion Z71.89    Multiple subsegmental pulmonary emboli without acute cor pulmonale (HCC) I26.94       Current Facility-Administered Medications   Medication Dose Route Frequency Provider Last Rate Last Admin    midazolam (VERSED) injection 2 mg  2 mg IntraVENous Q10MIN PRN EVELYNE'Loan Sharp PA-C   2 mg at 04/26/21 1255    famotidine (PF) (PEPCID) 20 mg in 0.9% sodium chloride 10 mL injection  20 mg IntraVENous Q12H Nahum ARAUJO MD   20 mg at 04/26/21 0839    albuterol (PROVENTIL VENTOLIN) nebulizer solution 2.5 mg  2.5 mg Nebulization TID RT Savannah Jarvis MD   2.5 mg at 04/26/21 1428    acetaminophen (TYLENOL) solution 500 mg  500 mg Oral Q4H PRN Loan Lopez PA-C   500 mg at 04/20/21 2227    oxymetazoline (AFRIN) 0.05 % nasal spray 2 Spray  2 Spray Other BID PRN CapiliSlime gregory NP        ELECTROLYTE REPLACEMENT PROTOCOL - Potassium Renal Dosing  1 Each Other PRN Ruperto Hoffmann Oats, DO        [Held by provider] heparin 25,000 units in D5W 250 ml infusion  12-25 Units/kg/hr IntraVENous TITRATE Haylee beba ARAUJO PA-C   Stopped at 04/25/21 1805    arformoteroL (BROVANA) neb solution 15 mcg  15 mcg Nebulization BID RT AnglinSarina HORACIO Núñez   15 mcg at 04/26/21 8777    multivit-folic acid-herbal 935 (WELLESSE PLUS) oral liquid 30 mL  30 mL Per NG tube DAILY Barb Cain DO   Stopped at 04/26/21 0900    chlorhexidine (PERIDEX) 0.12 % mouthwash 10 mL  10 mL Oral Q12H Yassine Dai PA-C   10 mL at 04/26/21 3554    albuterol-ipratropium (DUO-NEB) 2.5 MG-0.5 MG/3 ML  3 mL Nebulization Q4H PRN Yassine Dai PA-C        glucose chewable tablet 16 g  4 Tab Oral PRN Yassine Dai PA-C        glucagon (GLUCAGEN) injection 1 mg  1 mg IntraMUSCular PRN Yassine Dai PA-C        dextrose (D50W) injection syrg 12.5-25 g  25-50 mL IntraVENous PRN Yassine Dai PA-C        insulin lispro (HUMALOG) injection   SubCUTAneous Q6H Yassine Dai PA-C   Stopped at 04/23/21 0535    carboxymethylcellulose sodium (REFRESH LIQUIGEL) 1 % ophthalmic solution 1 Drop  1 Drop Both Eyes PRN Yassine Dai PA-C   1 Drop at 04/14/21 0621       Objective  Vitals:    04/26/21 1200 04/26/21 1408 04/26/21 1429 04/26/21 1535   BP:  (!) 187/95     Pulse:  (!) 102  100   Resp:  12  24   Temp: 98.7 °F (37.1 °C)      SpO2:  95% 93% 96%   Weight:       Height:             Intake/Output Summary (Last 24 hours) at 4/26/2021 1616  Last data filed at 4/26/2021 1415  Gross per 24 hour   Intake 986.63 ml   Output 1825 ml   Net -838.37 ml           Admission weight: Weight: 127 kg (279 lb 15.8 oz) (04/04/21 1726)  Last Weight Metrics:  Weight Loss Metrics 4/23/2021 4/4/2021 4/4/2021 4/4/2021   Today's Wt 224 lb 3.3 oz - 280 lb -   BMI - 31.27 kg/m2 - 37.97 kg/m2             Physical Assessment:     General: intubated, opens eyes to voice. Et tube in place. Neck: No jvd. LUNGS: diminished air entry at the bases, no crackles. CVS EXM: S1, S2  RRR. Abdomen: soft, non tender. Lower Extremities:  1+ edema.        Lab    CBC w/Diff Recent Labs     04/26/21  0320 04/25/21  0250 04/24/21  0230   WBC 10.8 13.1 15.4*   RBC 4.05* 3.82* 3.82*   HGB 10.1* 9.8* 9.7*   HCT 34.2* 31.7* 32.0*   * 645* 653*   GRANS 69 73 76*   LYMPH 18* 17* 16*   EOS 3 3 2        Chemistry Recent Labs     04/26/21  0320 04/25/21  0250 04/24/21  1718   * 118* 134*   * 136 136   K 4.3 4.2 4.0    104 104   CO2 27 25 26   BUN 57* 64* 70*   CREA 1.17 1.19 1.25   CA 8.7 9.0 8.8   AGAP 5 7 6   BUCR 49* 54* 56*   ALB 2.2* 2.2* 2.2*   PHOS 3.0 3.1 3.6         No results found for: IRON, FE, TIBC, IBCT, PSAT, FERR   Lab Results   Component Value Date/Time    Calcium 8.7 04/26/2021 03:20 AM    Phosphorus 3.0 04/26/2021 03:20 AM

## 2021-04-26 NOTE — PROGRESS NOTES
[facility]: [phone]  [facility]: [address] [place] [address] Way: [phone]    Patient Name: Abdelrahman Cabrera  YOB: 1950    Date of Progress note : 4/26/21  Reason for Consult: establish goals of care  Requesting Provider: Ministerio Carey MD  Primary Care Physician: DUSTIN Diaz      SUMMARY:   Abdelrahman Cabrera is a 79 y.o. male with a past history of HTN, DM2, Diabetic neuropathy, Obesity , who was admitted on 4/4/2021 from home with a diagnosis of acute respiratory failure and angioedema. Current medical issues leading to Palliative Medicine involvement include: establish goals of care    CHIEF COMPLAINT: intubated and mechanically ventilated     HPI/SUBJECTIVE:    Pt is a 79Year old AAM that presented to the ED with angioedema due to ACE inhibitor. Pt had emergent acute respiratory failure and collapse and was intubated and placed on a ventilator for airway protection. Found later to have a large pulmonary emboli in his lungs that also caused damage to his heart. He cardiac arrested on 4/4/21. He is currently on Day 13 of the ventilator     4/26/21:  Day 21 intubated and mechanically ventilated. PEEP down to 5 with Fi02 at 60%. Pt is alert and attempting communication. 4/19/21:  Day 15 intubation and mechanical ventilation with PEEP today at 13 with 85% Fi02. Continues on DVT prophylaxis     The patient is:   [] Verbal and participatory  [x] Non-participatory due to: Intubated and sedated     GOALS OF CARE:  Patient/Health Care Proxy Stated Goals: Prolong life      TREATMENT PREFERENCES:   Code Status: Full Code         PALLIATIVE DIAGNOSES:   1. Encounter for Palliative Medicine  2. Goals of care/ACP  3. Acute respiratory failure  4. PE       PLAN:   4/26/21:  Palliative continues to follow for support. Pt on 21 days intubated now. Improvements in his overall condition now.   Will await direction from the ICU attending on how to proceed with his family. Pt is alert and interactive with his PEEP down and Fi02 down. Did not pass his SBT yesterday, but now more alert. At this time patient remains a FULL CODE with FULL AGGRESSIVE measures. See below for prior discussions:    4/19/21:  Family meeting with Palliative team including this NP and Jarod Chino RN. Present were patients sister Cira Sandy and patient's youngest son Sarah Diallo. On the phone were the patient's step mother and father. Provided a brief medical update and a picture of the options for the patient with burdens and benefits of those options. Discussed inability to trach patient at this time due to the high ventilator PEEP pressure. Informed the family that keeping the patient intubated long term is not an option and he is not at this time a candidate for a trach. Spoke with Dr Yelitza Bautista who wants to see how patient progresses over the next few days to make final decisions on goals of care. I have recommended to the family that he be made a DNR with No CPR if he arrests again. Provided much support and active listening. They are going to call a family meeting to discuss the options and await the outcome of this weeks treatments. At this time patient remains a FULL CODE with FULL AGGRESSIVE MEASURES   1. Encounter for palliative Medicine  Pt with a long term life limiting chronic and acute disease process that warrants discussions about her goals of care. 2.  Goals of care  This NP in to see patient at the bedside. He is not able to interact due to intubation and sedation. Have reached out to his family and spoken to his son Sarah Diallo and sister Cira Sandy to set up an appointment for Monday morning 4/19 at 10am will appreciate assistance from the ICU team.   At this time patient remains a FULL CODE with FULL INTERVENTIONS  3.    Acute respiratory failure  Patient supported on ventilator and intubated now for 13 days. Fi02 is 85% with PEEP 12 Prognosis remains guarded due to high ventilator settings. Plan is to wean support as tolerated but not yet stable and does not meet criteria for SBT. 4.   Pulmonary Embolism  Per the CT scan Moderate burden of acute pulmonary embolism in the left lower lobe with additional small acute segmental pulmonary embolism in the right upper lobe. Moderate burden in the lungs likely causing damage to the heart. On Heparin to degrade the clots. 5.   Initial consult note routed to primary continuity provider  6. Communicated plan of care with: Palliative IDT      Advance Care Planning:  [] The [employer] Interdisciplinary Team has updated the ACP Navigator with [address] and Patient Capacity    Primary Decision [facility] ([address]): Gianni Soriano is [facility]     Medical Interventions: Full interventions   Other Instructions:         As far as possible, the palliative care team has discussed with patient / health care proxy about goals of care / treatment preferences for patient.          HISTORY:     History obtained from: Chart review   Active Problems:    Angioedema due to angiotensin converting enzyme inhibitor (ACE-I) (4/4/2021)      Respiratory failure ([address] (4/5/2021)      JACQUELYN (acute kidney injury) ([address] (4/4/2021)      Cardiac arrest ([address] (4/4/2021)      Obesity (BMI 30-39.9) (4/5/2021)      Diabetic neuropathy associated with type 2 diabetes mellitus ([address] (4/5/2021)      Diabetic retinopathy associated with type 2 diabetes mellitus ([address] (4/5/2021)      Pulmonary infiltrates (4/5/2021)      Controlled type 2 diabetes mellitus with neurologic complication, without long-term current use of insulin ([address] (4/5/2021)      DVT (deep venous thrombosis) ([address] (4/10/2021)      Encounter for palliative care ()      Goals of care, counseling/discussion ()      Multiple subsegmental pulmonary emboli without acute cor pulmonale (HCC) ()      Past Medical History:   Diagnosis Date    DDD (degenerative disc disease), lumbar     Diabetes [facility]     [facility]     Diabetic retinopathy ([facility]     DM2 (diabetes mellitus, type 2) ([facility]     HLD (hyperlipidemia)     HTN (hypertension)     Obesity (BMI 30-39. 9)       History reviewed. No pertinent surgical history. History reviewed. No pertinent family history. History reviewed, no pertinent family history.   Social History     Tobacco Use    Smoking status: Current Every Day Smoker     Packs/day: 0.50   Substance Use Topics    Alcohol use: Not on file     Allergies   Allergen Reactions    Lisinopril Angioedema      Current Facility-Administered Medications   Medication Dose Route Frequency    midazolam (VERSED) injection 2 mg  2 mg IntraVENous Q10MIN PRN    famotidine (PF) (PEPCID) 20 mg in 0.9% sodium chloride 10 mL injection  20 mg IntraVENous Q12H    albuterol (PROVENTIL VENTOLIN) nebulizer solution 2.5 mg  2.5 mg Nebulization TID RT    acetaminophen (TYLENOL) solution 500 mg  500 mg Oral Q4H PRN    oxymetazoline (AFRIN) 0.05 % nasal spray 2 Spray  2 Spray Other BID PRN    ELECTROLYTE REPLACEMENT PROTOCOL - Potassium Renal Dosing  1 Each Other PRN    [Held by provider] heparin 25,000 units in D5W 250 ml infusion  12-25 Units/kg/hr IntraVENous TITRATE    arformoteroL (BROVANA) neb solution 15 mcg  15 mcg Nebulization BID RT    multivit-folic acid-herbal 143 (WELLESSE PLUS) oral liquid 30 mL  30 mL Per NG tube DAILY    chlorhexidine (PERIDEX) 0.12 % mouthwash 10 mL  10 mL Oral Q12H    albuterol-ipratropium (DUO-NEB) 2.5 MG-0.5 MG/3 ML  3 mL Nebulization Q4H PRN    glucose chewable tablet 16 g  4 Tab Oral PRN    glucagon (GLUCAGEN) injection 1 mg  1 mg IntraMUSCular PRN    dextrose (D50W) injection syrg 12.5-25 g  25-50 mL IntraVENous PRN    insulin lispro (HUMALOG) injection   SubCUTAneous Q6H    carboxymethylcellulose sodium (REFRESH LIQUIGEL) 1 % ophthalmic solution 1 Drop  1 Drop Both Eyes PRN Clinical Pain Assessment (nonverbal scale for nonverbal patients): Clinical Pain Assessment  Severity: 0     Activity (Movement): Lying quietly, normal position    Duration: for how long has pt been experiencing pain (e.g., 2 days, 1 month, years)  Frequency: how often pain is an issue (e.g., several times per day, once every few days, constant)     FUNCTIONAL ASSESSMENT:     Palliative Performance Scale (PPS):  PPS: 20    ECOG  ECOG Status : Completely disabled     PSYCHOSOCIAL/SPIRITUAL SCREENING:      Any spiritual / Pentec[employer]alism concerns:  [] Yes /  [x] No    Caregiver Burnout:  [] Yes /  [] No /  [x] No Caregiver Present      Anticipatory grief assessment:   [x] Normal  / [] Maladaptive        REVIEW OF SYSTEMS:     Systems: constitutional, ears/nose/mouth/throat, respiratory, gastrointestinal, genitourinary, musculoskeletal, integumentary, neurologic, psychiatric, endocrine. Positive findings noted below. Modified ESAS Completed by: provider           Pain: 0   Anxiety: 4       Dyspnea: 5           Stool Occurrence(s): 0   Positive and pertinent negative findings in ROS are noted above in HPI. The following systems were [x] reviewed / [] unable to be reviewed as noted in HPI  Other findings are noted below. PHYSICAL EXAM:     Constitutional:  Alert and interactive   Eyes: open to stimulation   ENMT: no nasal discharge, moist mucous membranes  Cardiovascular: regular rhythm, distal pulses intact  Respiratory: supported on a ventilator and intubated   Musculoskeletal: no deformity, no tenderness to palpation  Skin: warm, dry  Neurologic: Is following commands and moving all extremities    Other: Wt Readings from Last 3 Encounters:   04/23/21 101.7 kg (224 lb 3.3 oz)   04/04/21 127 kg (280 lb)     Blood pressure 115/67, pulse 81, temperature 98.2 °F (36.8 °C), resp. rate 23, height 5' 11\" (1.803 m), weight 101.7 kg (224 lb 3.3 oz), SpO2 99 %.   Pain:  Pain Scale 1: Adult Nonverbal Pain Scale  Pain Intensity 1: 0                      LAB AND IMAGING FINDINGS:     Lab Results   Component Value Date/Time    WBC 10.8 04/26/2021 03:20 AM    HGB 10.1 (L) 04/26/2021 03:20 AM    PLATELET 320 (H) 47/89/4957 03:20 AM     Lab Results   Component Value Date/Time    Sodium 135 (L) 04/26/2021 03:20 AM    Potassium 4.3 04/26/2021 03:20 AM    Chloride 103 04/26/2021 03:20 AM    CO2 27 04/26/2021 03:20 AM    BUN 57 (H) 04/26/2021 03:20 AM    Creatinine 1.17 04/26/2021 03:20 AM    Calcium 8.7 04/26/2021 03:20 AM    Magnesium 2.3 04/26/2021 03:20 AM    Phosphorus 3.0 04/26/2021 03:20 AM      Lab Results   Component Value Date/Time    Alk. phosphatase 86 04/15/2021 09:40 AM    Protein, total 6.2 (L) 04/15/2021 09:40 AM    Albumin 2.2 (L) 04/26/2021 03:20 AM    Globulin 4.7 (H) 04/15/2021 09:40 AM     Lab Results   Component Value Date/Time    INR 1.4 (H) 04/12/2021 11:54 PM    Prothrombin time 17.2 (H) 04/12/2021 11:54 PM    aPTT 37.5 (H) 04/26/2021 03:20 AM      No results found for: IRON, FE, TIBC, IBCT, PSAT, FERR   Lab Results   Component Value Date/Time    pH 7.28 (L) 04/04/2021 01:55 PM    PCO2 49 (H) 04/04/2021 01:55 PM    PO2 103 (H) 04/04/2021 01:55 PM     No components found for: [place]   Lab Results   Component Value Date/Time     04/14/2021 03:27 AM    CK - MB <1.0 04/14/2021 03:27 AM              Total time: 15 minutes   Counseling / coordination time, spent as noted above:   > 50% counseling / coordination:  Time spent in direct consultation with the patient, medical team, and family     Prolonged service was provided for  [x]30 min   []75 min in face to face time in the presence of the patient, spent as noted above.   Time Start: 10:00  Time End: 11:40

## 2021-04-26 NOTE — PROGRESS NOTES
Post Trach    Vent adjustments needed to flow / time / volume to synchronize patient with vent . Too  / Ti .85  / flow 40% rise time / Patient much more comfortable     Patient observed on SBT and believe will wean well .  May need mucolytic vs brovana with albuterol for a few days ( 48 hours )

## 2021-04-26 NOTE — BRIEF OP NOTE
Brief Postoperative Note    Patient: Pramod Gonsalez  YOB: 1950  MRN: [id number]    Date of Procedure: 4/26/2021     Pre-Op Diagnosis: Ventilator Dependent    Post-Op Diagnosis: Same as preoperative diagnosis.       Procedure(s):  SKIN FLAP TRACHEOSTOMY    Surgeon(s):  Taz Sanchez MD    Surgical Assistant: None    Anesthesia: General     Estimated Blood Loss (mL): Minimal    Complications: None    Specimens: * No specimens in log *     Implants: * No implants in log *    Drains:   Orogastric Tube 04/05/21 (Active)   Site Assessment Clean, dry, & intact 04/26/21 0800   Securement Device Tape 04/26/21 0800   G Port Status Clamped 04/26/21 0800   External Insertion Christian (cms) 60 cms 04/26/21 0800   Action Taken Placement verified (comment) 04/26/21 0800   Drainage Description Tan 04/26/21 0800   Gastric Residual (mL) 0 ml 04/26/21 0800   Tube Feeding/Formula Options Vital High Protein 04/26/21 0800   Modular Nutrients Protein liquid 04/25/21 2000   Tube Feeding/Verify Rate (mL/hr) 55 04/26/21 0000   Water Flush Volume (mL) 50 mL 04/26/21 0000   Intake (ml) 0 ml 04/26/21 0800   Medication Volume 0 ml 04/26/21 0800   Drainage Chamber Level (ml) 0 ml 04/26/21 0800   Output (ml) 0 ml 04/26/21 0800       Fecal Management (Active)   Stool Consistency Semi-liquid 04/26/21 0800   Position of Indicator Line Visible 04/26/21 0800   Signal Indicator Bubble Appropriate 04/26/21 0800   Skin Assessment of the Anal Area Treatment with Zinc Oxide cream 04/26/21 0800   Tube Irrigated No 04/26/21 0800   Irrigation Volume (mL) 0 04/26/21 0800   Drainage Bag Level (mL) 0 04/26/21 0800   Output (ml) 0 ml 04/26/21 0800       Condom Catheter 04/22/21 (Active)   Indications for Use Accurate measurement of urinary output 04/26/21 0800   Status Draining;Patent 04/26/21 0800   Site Condition No abnormalities 04/26/21 0800   Drainage Tube Clipped to Bed Yes 04/26/21 0800   Catheter Secured to Thigh Yes 04/26/21 0800 Tamper Seal Intact No 04/26/21 0800   Bag Below Bladder/Not on Floor Yes 04/26/21 0800   Lack of Dependent Loop in Tubing Yes 04/26/21 0800   Drainage Bag Less Than Half Full Yes 04/26/21 0800   Sterile Solution Used for  Irrigation N/A 04/26/21 0800   Irrigation Volume Input (mL) 0 ml 04/26/21 0800   Urine Output (mL) 425 ml 04/26/21 0800   Removal Criteria No longer monitoring critically ill patient 04/26/21 0800       [REMOVED] Condom Catheter 04/08/21 (Removed)   Indications for Use Accurate measurement of urinary output 04/12/21 1200   Status Draining;Patent 04/12/21 1200   Site Condition No abnormalities 04/12/21 1200   Drainage Tube Clipped to Bed Yes 04/12/21 1200   Catheter Secured to Thigh Yes 04/12/21 1200   Tamper Seal Intact No 04/12/21 1200   Bag Below Bladder/Not on Floor Yes 04/12/21 1200   Lack of Dependent Loop in Tubing Yes 04/12/21 1200   Drainage Bag Less Than Half Full Yes 04/12/21 1200   Sterile Solution Used for  Irrigation N/A 04/12/21 1200   Irrigation Volume Input (mL) 0 ml 04/12/21 1200   Urine Output (mL) 500 ml 04/13/21 1200   Removal Criteria No longer monitoring critically ill patient 04/12/21 1200       [REMOVED] Condom Catheter 04/13/21 (Removed)   Indications for Use Accurate measurement of urinary output 04/14/21 1600   Status Patent;Draining 04/14/21 1600   Site Condition No abnormalities 04/14/21 1600   Drainage Tube Clipped to Bed Yes 04/14/21 1600   Catheter Secured to Thigh No 04/14/21 1600   Tamper Seal Intact No 04/14/21 1600   Bag Below Bladder/Not on Floor Yes 04/14/21 1600   Lack of Dependent Loop in Tubing Yes 04/14/21 1600   Drainage Bag Less Than Half Full Yes 04/14/21 1600   Sterile Solution Used for  Irrigation N/A 04/14/21 1600   Irrigation Volume Input (mL) 0 ml 04/13/21 2100   Urine Output (mL) 350 ml 04/14/21 1703   Removal Criteria No longer monitoring critically ill patient 04/14/21 0400       Findings: trach in trach ring #3    Electronically Signed by Brandi Finnegan MD on 4/26/2021 at 2:10 PM

## 2021-04-26 NOTE — PROGRESS NOTES
VENTILATOR Care plan    Problem: Ventilator Management  Goal: *Adequate oxygenation/ ventilation/ and extubation      Patient:        Arnie Lepe     79 y.o.   male     4/26/2021  1:11 PM  Patient Active Problem List   Diagnosis Code    Angioedema due to angiotensin converting enzyme inhibitor (ACE-I) T78. 3XXA, [id number]    Respiratory failure ([facility] J96.90    JACQUELYN (acute kidney injury) [facility] N17.9    Cardiac arrest ([facility]) I46.9    Obesity (BMI 30-39. 9) E66.9    Diabetic neuropathy associated with type 2 diabetes mellitus ([facility] E11.40    Diabetic retinopathy associated with type 2 diabetes mellitus ([facility] E11.319    Pulmonary infiltrates R91.8    Controlled type 2 diabetes mellitus with neurologic complication, without long-term current use of insulin ([facility]) E11.49    DVT (deep venous thrombosis) ([facility]) I82.409    Encounter for palliative care Z51.5    Goals of care, counseling/discussion Z71.89    Multiple subsegmental pulmonary emboli without acute cor pulmonale ([facility]) I26.94       Angioedema due to angiotensin converting enzyme inhibitor (ACE-I) [T78. 3XXA, T46.4X5A]    Reason patient intubated: Airway protection     Ventilator day: 22    Ventilator settings: Adjusted to orders     ETT Size/Placement: For trach placement today     ABG:  Date:4/26/2021  Lab Results   Component Value Date/Time    PHI 7.48 (H) 04/26/2021 04:13 AM    PCO2I 32.6 (L) 04/26/2021 04:13 AM    PO2I 84 04/26/2021 04:13 AM    HCO3I 24.1 04/26/2021 04:13 AM    FIO2I 30 04/26/2021 04:13 AM       Chest X-ray:  Date:4/26/2021  Results from Hospital Encounter encounter on 04/04/21   XR CHEST PORT    Narrative EXAM: XR CHEST PORT    INDICATION: 79 years Male. Daily CXR while intubated. .  ADDITIONAL HISTORY: None. TECHNIQUE: Frontal view of the chest.    COMPARISON: 4/25/2021 at 0155 hours    FINDINGS:    Endotracheal tube with tip 5.2 cm proximal to gabriella. Interval enteric tube  retraction.  The tip of the enteric tube now projects over the mid mediastinum. Multiple lines overlie the lower chest.    The cardiac silhouette is mildly enlarged, unchanged. Bilateral pulmonary patchy opacities most pronounced at the bilateral lung  bases. Slight interval worsening aeration of the right lung base. Aeration of  the left lung base is similar to prior. No definite evidence of pleural effusion or pneumothorax. No acute osseous abnormality appreciated. Impression 1. Interval enteric tube retraction with tip now projecting over the mid  mediastinum. Recommend repositioning and repeat imaging prior to use. 2.  Endotracheal tube in acceptable position. 3.  Bilateral airspace opacities with slight interval worsened aeration of the  right lung base. Lab Test:  Date:4/26/2021  WBC:   Lab Results   Component Value Date/Time    WBC 10.8 04/26/2021 03:20 AM   HGB:   Lab Results   Component Value Date/Time    HGB 10.1 (L) 04/26/2021 03:20 AM    PLTS:   Lab Results   Component Value Date/Time    PLATELET 914 (H) 54/05/1552 03:20 AM       SaO2%/flow: @FNUPIGL9(4)@    Vital Signs:     Patient Vitals for the past 8 hrs:   Temp Pulse Resp BP SpO2   04/26/21 1200 98.7 °F (37.1 °C)       04/26/21 1000  81 23 115/67 99 %   04/26/21 0900  86 13 112/61 100 %   04/26/21 0800 98.2 °F (36.8 °C) 71 20 112/67 98 %   04/26/21 0758  77 23  97 %   04/26/21 0756     97 %   04/26/21 0700  85 20 (!) 135/58 97 %   04/26/21 0600  83 18 117/69 98 %       Wean Screen Pass (Yes or No):   Wean Screen Reason for Failure:  Duration of Weaning Trial:  Additional Comments:        PLAN OF CARE: Jessica Blunt today / SBT in a.m / Wean      GOAL: Patient without need for Supportive ventilation / To Trach collar and [place] Value ASSP    OUTCOME: Back from trach and resting well

## 2021-04-26 NOTE — PROGRESS NOTES
[employer] [facility]  Pulmonary, Critical Care, and Sleep Medicine    Name: Alfie Medina MRN: [id number]   : 1950 Hospital: [address] [place] [address] Dr   Date: 2021        IMPRESSION:   Angioedema- with airway and respiratory failure and collapse; thought due to ACE inhibitor  S/P Emergent Cricthyrotomy [facility]-ED 21- converted to 6.5 ETT intraoperative by anesthesia team 21, 8.0 ETT by anesthesia 21, packing removed evening 21-ENT  S/P cardiac arrest @ [facility] 21- brief, likely due to hypoxia and airway collapse  Respiratory Failure: Acute due to above; currently intubated and on vent for airway protection. Will likely need trach   Pulmonary Infiltrates: suspect aspiration secretions and/or blood due to above- can't exclude other infectious process at this time. Rapid Covid Negative at [facility]  Bradycardia - improved  DVT: Popliteal- Left; acute non-occlusive thrombus.  Heparin ACS protocol started - stopped  due to bleeding from Cric site.  Bleeding controlled -  heparin resumed    Pulmonary Embolism - 21 - left lower and right upper lobes  JACQUELYN- improving   Thrombocytosis   Hypokalemia - resolved  Left scleral erythema- unknown baseline; possible infection   DM  DM retinopathy per chart review  Diabetic peripheral neuropathy  Left Pneumothorax - resolved  Obesity: Body mass index is 35.64 kg/m². Patient Active Problem List   Diagnosis Code    Angioedema due to angiotensin converting enzyme inhibitor (ACE-I) T78. 3XXA, [id number]    Respiratory failure ([facility] J96.90    JACQUELYN (acute kidney injury) ([address] N17.9    Cardiac arrest ([facility]) I46.9    Obesity (BMI 30-39. 9) E66.9    Diabetic neuropathy associated with type 2 diabetes mellitus ([address] E11.40    Diabetic retinopathy associated with type 2 diabetes mellitus ([address] E11.319    Pulmonary infiltrates R91.8    Controlled type 2 diabetes mellitus with neurologic complication, without long-term current use of insulin ([facility] E11.49    DVT (deep venous thrombosis) ([facility] I82.409    Encounter for palliative care Z51.5    Goals of care, counseling/discussion Z71.89    Multiple subsegmental pulmonary emboli without acute cor pulmonale ([facility]) I26.94      PLAN:   NEURO:  Serial neuro evaluations  Daily sedation holiday   Wetting tears and cipro eye drops- stop date for cipro on chart     CARDIO:  MAP goal >64- No IV pressor requirement  Echo on 4/6/21 with EF of 55%, dilated RV and moderate pulmonary hypertension (PASP 61 mmHg)  PULM:  Maintain aspiration precautions: HOB >30 degrees  SpO2 goal >92%  Bronchial /oral hygiene   VAP bundle- Wean vent as clinical status allows  SBT as clinical status allows  Completed course of decadron  Continue brovana and albuterol nebs  Daily CXR and ABG while intubated  Trach scheduled 4/26     GI/ NUTRITION:  OGT   Diet: continue TF  SUP:Pepcid  Follow up with nutritional recommendations    RENAL/ METAB/ : Monitor I&Os  Trend electrolytes - replace as needed  Condom cath: Continue, monitor I/Os     HEM/ONC  Trend Hb/HCT and PLTs  DVT prophylaxis: SCDs, heparin gtt     ID  Antibioitcs: Cipro eye drop, Zosyn 4/4 - 4/8  Vanco: 4/4-6, MRSA swab negative.   Zosyn restarted on 4/11-4/19, Melodye [place] started 4/19-25  Follow up on pending cultures  Repeat Covid negative (4/11)  Tylenol for fevers     ENDO  BGs Q 6,SSI, TSH normal  C1 inhibitor normal level (4/7/21)     MSK/ ORTHO  No active Issues     SKIN/ WOUNDS  Per protocol  Neck- daily dressing changes per ENT, Quick clot with Afrin if needed, d/c xeroform      CODE STATUS: Full Code- Full     Discussed in interdisciplinary rounds   Best practice :  Glycemic control  IHI ICU bundles:              Cardona Bundle Followed and Vent Bundle Followed, Vent Day 21    [facility] Vent patients- VAP bundle, aim to keep peak plateau pressure 51-25OC H2O  Sress ulcer prophylaxis. DVT prophylaxis. Need for Lines, cardona assessed. Restraints need.   Palliative care evaluation. Subjective/Interval History:        Interval HPI:71 yo M h/o HTN tx w/ lisinopril presenting to [facility] ED 4/4 for rapidly progressing respiratory distress due to severe angioedema. During intubation attempts pt had brief cardiac arrest w/ pulse loss and severe airway swelling leading to emergent cricothyrotomy. Pt stabilized before Nightingale transport to [place] at [facility] pt taken emergently to [address] was converted to ETT. Patient continued to have trouble with o2 requirements disproportionate to CXR findings and despite adjustment of FiO2 and PEEP, now improved. S/p administration of inhaled epoprostenol. CTA revelaed B/L pulmonary emboli which is likely the cause of his hypoxemia and RH strain. Continuing heparin gtt, which had already been initiated for left popliteal non-occlusive thrombus. Pt is s/p dobutamine and  diureses w/ bumex and metolazone.  COVID - 19 negative. Now with minimal vent support, however, mentation precludes extubation at this time.      Subjective 04/26/21  Hospital Day: 22  Vent Day: 22  Overnight events: No significant events noted overnight  Mentation/Activity: intubated, sedation held - responsive to verbal stimuli. Mentation improving. Opens eyes to name and moves all extremities spontaneously. Respiratory/ Secretions: stable. CXR with improvement in aeration  Hemodynamics: H/H stable  Urine output, bowel: adequate  Diet: TF      - Plan for trach around noon with ENT- heparin & tube feeds held  - Continue off sedation, PRNs available, and SBT as tolerated  - Renal function improvement  - OG malpositioned --> advance  - Consider pulling FMS after trach    ROS:Review of systems not obtained due to patient factors.     Objective:   Vital Signs:    Visit Vitals  /69   Pulse 83   Temp 98.6 °F (37 °C)   Resp 18   Ht 5' 11\" (1.803 m)   Wt 101.7 kg (224 lb 3.3 oz)   SpO2 97%   BMI 31.27 kg/m²       O2 Device: Endotracheal tube       Temp (24hrs), Av °F (36.7 °C), Min:97 °F (36.1 °C), Max:98.6 °F (37 °C)       Intake/Output:   Last shift:      No intake/output data recorded. Last 3 shifts: 1901 -  0700  In: 3259.9 [I.V.:499.9]  Out: 2800 [Urine:2500; Drains:300]    Intake/Output Summary (Last 24 hours) at 2021 0802  Last data filed at 2021 0600  Gross per 24 hour   Intake 1491.63 ml   Output 2200 ml   Net -708.37 ml      Physical Exam:                 General: Intubated/sedated; in NAD  HEENT:  Anicteric sclerae; pink palpebral conjunctivae; mucosa moist  Resp:  Symmetrical chest expansion, no accessory muscle use; good airway entry; no rales/ wheezing/ diminished b/l, + rhonchi noted  CV:  S1, S2 present; regular rate and rhythm  GI:  Obese. Abdomen soft, non-tender; (+) active bowel sounds  Extremities:  +2 pulses on all extremities; no edema/ cyanosis/ clubbing noted;  no restraints   Skin:  Warm; no rashes/ lesions noted, normal turgor/cap refill   Neurologic:  opens eyes, responds to noxious and verbal stimuli, +facial grimacing, moves ext x4 spontaneously, follows commands   Devices:  OGT, ETT, condom cath, FMS         DATA:  Labs:  Recent Labs     21  0320 21  0250 21  0230   WBC 10.8 13.1 15.4*   HGB 10.1* 9.8* 9.7*   HCT 34.2* 31.7* 32.0*   * 645* 653*     Recent Labs     21  0320 21  0250 21  1718 21  0230   * 136 136 136   K 4.3 4.2 4.0 4.6    104 104 104   CO2 27 25 26 23   * 118* 134* 118*   BUN 57* 64* 70* 71*   CREA 1.17 1.19 1.25 1.29   CA 8.7 9.0 8.8 8.9   MG 2.3 2.3  --  2.7*   PHOS 3.0 3.1 3.6 3.5   ALB 2.2* 2.2* 2.2* 2.3*     No results for input(s): PH, PCO2, PO2, HCO3, FIO2 in the last 72 hours.     Lab Results   Component Value Date/Time    Hemoglobin A1c 6.6 (H) 2021 05:11 PM     Lab Results   Component Value Date/Time    TSH 1.35 2021 05:11 PM     MICRO:  Results     Procedure Component Value Units Date/Time    COVID-19 RAPID TEST [[id number]] Collected: 04/25/21 1214    Order Status: Completed Specimen: Nasopharyngeal Updated: 04/25/21 1237     Specimen source Nasopharyngeal        COVID-19 rapid test Not detected        Comment: Rapid Abbott ID Now       Rapid NAAT:  The specimen is NEGATIVE for SARS-CoV-2, the novel coronavirus associated with COVID-19. Negative results should be treated as presumptive and, if inconsistent with clinical signs and symptoms or necessary for patient management, should be tested with an alternative molecular assay. Negative results do not preclude SARS-CoV-2 infection and should not be used as the sole basis for patient management decisions. This test has been authorized by the FDA under an Emergency Use Authorization (EUA) for use by authorized laboratories.    Fact sheet for Healthcare Providers: kstattoo.com  Fact sheet for Patients: kstattoo.com       Methodology: Isothermal Nucleic Acid Amplification         CULTURE, RESPIRATORY/SPUTUM/BRONCH Talisha Arm STAIN [[id number]]  (Abnormal)  (Susceptibility) Collected: 04/16/21 0750    Order Status: Completed Specimen: Sputum from Endotracheal aspirate Updated: 04/19/21 0907     Special Requests: NO SPECIAL REQUESTS        GRAM STAIN 1+ WBCS SEEN         NO EPITHELIAL CELLS SEEN         NO ORGANISMS SEEN        Culture result:       LIGHT ENTEROBACTER AEROGENES                  NO NORMAL RESPIRATORY ANNALEE ISOLATED          Susceptibility      Enterobacter aerogenes     JOANNE     Amikacin ($) Susceptible     Cefazolin ($) Resistant     Cefepime ($$) Susceptible     Cefoxitin Resistant     Ceftazidime ($) Resistant     Ceftriaxone ($) Intermediate     Ciprofloxacin ($) Susceptible     Gentamicin ($) Susceptible     Levofloxacin ($) Susceptible     Meropenem ($$) Susceptible     Piperacillin/Tazobac ($) Resistant     Tobramycin ($) Susceptible     Trimeth/Sulfa Susceptible                    CULTURE, BLOOD [[id number]] Collected: 04/14/21 0502    Order Status: Completed Specimen: Blood Updated: 04/20/21 1236     Special Requests: NO SPECIAL REQUESTS        Culture result: NO GROWTH 6 DAYS       CULTURE, BLOOD [[id number]] Collected: 04/14/21 0502    Order Status: Completed Specimen: Blood Updated: 04/20/21 1236     Special Requests: NO SPECIAL REQUESTS        Culture result: NO GROWTH 6 DAYS                                                             Echo  04/04/21   ECHO ADULT COMPLETE 04/06/2021 4/6/2021    Narrative · Contrast used: DEFINITY. · Image quality for this study was technically difficult. · LV: Calculated LVEF is 55%. Visually measured ejection fraction. Normal   cavity size, wall thickness and systolic function (ejection fraction   normal). Wall motion: normal. Moderate (grade 2) left ventricular   diastolic dysfunction. · AO: Mild aortic root and ascending aorta dilatation. Ascending aorta   diameter = 3.6 cm. Aortic root measures 3.9 cm  · RA: Dilated right atrium. · RV: Dilated right ventricle. Borderline low systolic function. · TV: Mild tricuspid valve regurgitation is present. · IVC: Mechanically ventilated; cannot use inferior caval vein diameter to   estimate central venous pressure. · PA: Moderate pulmonary hypertension. Pulmonary arterial systolic   pressure is 61 mmHg. Signed by: Valley Severs, MD        Imaging:  [x]I have personally reviewed the patients radiographs  []Radiographs reviewed with radiologist   []No change from prior, tubes and lines in adequate position.   []Improved   []Worsening    Total of 35 min critical care time spent at bedside during the course of care providing evaluation,management and care decisions and ordering appropriate treatment related to critical care problems exclusive of procedures    Natasha Christian MD PGY-1  [facility] Emergency Medicine      [address] [place] [address] [facility] Staff Note     I have independently evaluated the patient at the bedside and I am the primary provider of record. I agree with the above evaluation, assessment and recommendations along with the following comments and observations. I have made editions to above evaluation which was performed under my direction. Please also review my orders. Chart and labs reviewed    Patient remains now trached earlier this afternoon by ENT. Remains on ventilator. ENT note reviewed. Per ENT request will continue to hold heparin until tomorrow. Will reassess need for FMS today as well. Place NGT. Persistent thrombocytosis noted. Bun/creat improved today but still elevated    Prior to going to the OR, nursing had placed restraints due to patient pulling at lines- will reassess now that patient is post OP. Imperative that patient not pull at fresh trach or other lines. Complex decision making was made in the evaluation and management plans during this consultation. More than 50% of time was spent in counseling and coordination of care including review of data and discussion with other team members. Further recommendations and therapies pending clinical course.     Critical Care and time spent coordinating care: physical exam, chart review, documentation, discussion with care team,  minus procedure time: 35 min    Justen Otto DO, [facility]  Pulmonary, Sleep and Critical Care Medicine  3:48 PM

## 2021-04-26 NOTE — ANESTHESIA POSTPROCEDURE EVALUATION
Procedure(s):  TRACHEOSTOMY. general    Anesthesia Post Evaluation      Multimodal analgesia: multimodal analgesia used between 6 hours prior to anesthesia start to PACU discharge  Patient location during evaluation: ICU  Patient participation: complete - patient participated  Level of consciousness: awake and sleepy but conscious  Pain score: 0  Pain management: adequate  Airway patency: patent  Anesthetic complications: no  Cardiovascular status: stable  Respiratory status: acceptable  Hydration status: acceptable  Post anesthesia nausea and vomiting:  controlled  Final Post Anesthesia Temperature Assessment:  Normothermia (36.0-37.5 degrees C)      INITIAL Post-op Vital signs:   Vitals Value Taken Time   /73 04/26/21 1500   Temp     Pulse 101 04/26/21 1543   Resp 21 04/26/21 1543   SpO2 96 % 04/26/21 1543   Vitals shown include unvalidated device data.

## 2021-04-26 NOTE — PERIOP NOTES
TRANSFER - OUT REPORT:    Verbal report given to Cari(name) on Cristiano Flores  being transferred to icu(unit) for routine post - op       Report consisted of patients Situation, Background, Assessment and   Recommendations(SBAR). Information from the following report(s) SBAR was reviewed with the receiving nurse. Lines:   Peripheral IV 04/18/21 Posterior;Right Hand (Active)   Site Assessment Clean, dry, & intact 04/26/21 0800   Phlebitis Assessment 0 04/26/21 0800   Infiltration Assessment 0 04/26/21 0800   Dressing Status Clean, dry, & intact 04/26/21 0800   Dressing Type Transparent;Tape 04/26/21 0800   Hub Color/Line Status Pink;Patent; Flushed 04/26/21 0800   Action Taken Open ports on tubing capped 04/26/21 0800   Alcohol Cap Used Yes 04/26/21 0800       Peripheral IV 04/23/21 Left;Posterior Hand (Active)   Site Assessment Clean, dry, & intact 04/26/21 0800   Phlebitis Assessment 0 04/26/21 0800   Infiltration Assessment 0 04/26/21 0800   Dressing Status Clean, dry, & intact 04/26/21 0800   Dressing Type Transparent;Tape 04/26/21 0800   Hub Color/Line Status Blue;Patent; Flushed 04/26/21 0800   Action Taken Open ports on tubing capped 04/26/21 0800   Alcohol Cap Used Yes 04/26/21 0800        Opportunity for questions and clarification was provided.       Patient transported with:   Monitor  O2 @ 10 liters

## 2021-04-26 NOTE — PROGRESS NOTES
Postop check    Trach clean and dry  No evidence of any abnormality  We will try to hold with anticoagulation for approximately 24 hours post surgery--patient in a high risk for bleeding based on previously operated tissue as well as open wound for tracheotomy  We will follow    Jenny Thibodeaux

## 2021-04-26 NOTE — ANESTHESIA PREPROCEDURE EVALUATION
Relevant Problems   No relevant active problems       Anesthetic History   No history of anesthetic complications            Review of Systems / Medical History  Patient summary reviewed and pertinent labs reviewed    Pulmonary  Within defined limits                 Neuro/Psych   Within defined limits           Cardiovascular    Hypertension        Dysrhythmias       Exercise tolerance: >4 METS     GI/Hepatic/Renal  Within defined limits              Endo/Other    Diabetes: type 2    Morbid obesity and arthritis     Other Findings              Physical Exam    Airway  Mallampati: III  TM Distance: 4 - 6 cm  Neck ROM: normal range of motion   Mouth opening: Normal  Intubated   Cardiovascular  Regular rate and rhythm,  S1 and S2 normal,  no murmur, click, rub, or gallop  Rhythm: regular  Rate: normal         Dental    Dentition: Lower dentition intact and Upper dentition intact     Pulmonary  Breath sounds clear to auscultation               Abdominal  GI exam deferred       Other Findings            Anesthetic Plan    ASA: 4  Anesthesia type: general          Induction: Intravenous  Anesthetic plan and risks discussed with: Sibling      Consent from sister by phone

## 2021-04-26 NOTE — PROGRESS NOTES
Problem: Ventilator Management  Goal: *Adequate oxygenation and ventilation  Outcome: Progressing Towards Goal  Goal: *Patient maintains clear airway/free of aspiration  Outcome: Progressing Towards Goal  Goal: *Absence of infection signs and symptoms  Outcome: Progressing Towards Goal  Goal: *Normal spontaneous ventilation  Outcome: Progressing Towards Goal     Problem: Patient Education: Go to Patient Education Activity  Goal: Patient/Family Education  Outcome: Progressing Towards Goal     Problem: Diabetes Self-Management  Goal: *Disease process and treatment process  Description: Define diabetes and identify own type of diabetes; list 3 options for treating diabetes. Outcome: Progressing Towards Goal  Goal: *Incorporating nutritional management into lifestyle  Description: Describe effect of type, amount and timing of food on blood glucose; list 3 methods for planning meals. Outcome: Progressing Towards Goal  Goal: *Incorporating physical activity into lifestyle  Description: State effect of exercise on blood glucose levels. Outcome: Progressing Towards Goal  Goal: *Developing strategies to promote health/change behavior  Description: Define the ABC's of diabetes; identify appropriate screenings, schedule and personal plan for screenings. Outcome: Progressing Towards Goal  Goal: *Using medications safely  Description: State effect of diabetes medications on diabetes; name diabetes medication taking, action and side effects. Outcome: Progressing Towards Goal  Goal: *Monitoring blood glucose, interpreting and using results  Description: Identify recommended blood glucose targets  and personal targets. Outcome: Progressing Towards Goal  Goal: *Prevention, detection, treatment of acute complications  Description: List symptoms of hyper- and hypoglycemia; describe how to treat low blood sugar and actions for lowering  high blood glucose level.   Outcome: Progressing Towards Goal  Goal: *Prevention, detection and treatment of chronic complications  Description: Define the natural course of diabetes and describe the relationship of blood glucose levels to long term complications of diabetes. Outcome: Progressing Towards Goal  Goal: *Developing strategies to address psychosocial issues  Description: Describe feelings about living with diabetes; identify support needed and support network  Outcome: Progressing Towards Goal  Goal: *Sick day guidelines  Outcome: Progressing Towards Goal     Problem: Patient Education: Go to Patient Education Activity  Goal: Patient/Family Education  Outcome: Progressing Towards Goal     Problem: Non-Violent Restraints  Goal: Removal from restraints as soon as assessed to be safe  Outcome: Resolved/Met  Goal: No harm/injury to patient while restraints in use  Outcome: Resolved/Met  Goal: Patient's dignity will be maintained  Outcome: Resolved/Met  Goal: Patient Interventions  Outcome: Resolved/Met     Problem: Falls - Risk of  Goal: *Absence of Falls  Description: Document Christine Gold Fall Risk and appropriate interventions in the flowsheet. Outcome: Progressing Towards Goal  Note: Fall Risk Interventions:       Mentation Interventions: Adequate sleep, hydration, pain control, Bed/chair exit alarm, Door open when patient unattended, Evaluate medications/consider consulting pharmacy, Update white board, Toileting rounds, Increase mobility, More frequent rounding, Reorient patient, Room close to nurse's station    Medication Interventions: Bed/chair exit alarm, Evaluate medications/consider consulting pharmacy    Elimination Interventions: Toileting schedule/hourly rounds              Problem: Patient Education: Go to Patient Education Activity  Goal: Patient/Family Education  Outcome: Progressing Towards Goal     Problem: Pressure Injury - Risk of  Goal: *Prevention of pressure injury  Description: Document Lang Scale and appropriate interventions in the flowsheet.   Outcome: Progressing Towards Goal  Note: Pressure Injury Interventions:  Sensory Interventions: Assess changes in LOC, Avoid rigorous massage over bony prominences, Check visual cues for pain, Pressure redistribution bed/mattress (bed type), Pad between skin to skin, Monitor skin under medical devices, Minimize linen layers, Maintain/enhance activity level, Keep linens dry and wrinkle-free, Float heels, Use 30-degree side-lying position, Turn and reposition approx. every two hours (pillows and wedges if needed)    Moisture Interventions: Absorbent underpads, Moisture barrier, Minimize layers, Maintain skin hydration (lotion/cream), Limit adult briefs, Internal/External urinary devices, Check for incontinence Q2 hours and as needed, Apply protective barrier, creams and emollients    Activity Interventions: Pressure redistribution bed/mattress(bed type), Trapeze to reposition    Mobility Interventions: Turn and reposition approx.  every two hours(pillow and wedges), Trapeze to reposition, Pressure redistribution bed/mattress (bed type), HOB 30 degrees or less, Float heels    Nutrition Interventions: Document food/fluid/supplement intake    Friction and Shear Interventions: Apply protective barrier, creams and emollients, Feet elevated on foot rest, Foam dressings/transparent film/skin sealants, HOB 30 degrees or less, Lift sheet, Lift team/patient mobility team, Minimize layers, Transfer aides:transfer board/George lift/ceiling lift                Problem: Patient Education: Go to Patient Education Activity  Goal: Patient/Family Education  Outcome: Progressing Towards Goal     Problem: Nutrition Deficit  Goal: *Optimize nutritional status  Outcome: Progressing Towards Goal     Problem: Injury - Risk of, Adverse Drug Event  Goal: *Absence of adverse drug events  Outcome: Progressing Towards Goal  Goal: *Absence of medication errors  Outcome: Progressing Towards Goal  Goal: *Knowledge of prescribed medications  Outcome: Progressing Towards Goal Problem: Patient Education: Go to Patient Education Activity  Goal: Patient/Family Education  Outcome: Progressing Towards Goal     Problem: Pain  Goal: *Control of Pain  Outcome: Progressing Towards Goal  Goal: *PALLIATIVE CARE:  Alleviation of Pain  Outcome: Progressing Towards Goal     Problem: Patient Education: Go to Patient Education Activity  Goal: Patient/Family Education  Outcome: Progressing Towards Goal     Problem: Delirium Treatment  Goal: *Level of consciousness restored to baseline  Outcome: Progressing Towards Goal  Goal: *Level of environmental perceptions restored to baseline  Outcome: Progressing Towards Goal  Goal: *Sensory perception restored to baseline  Outcome: Progressing Towards Goal  Goal: *Emotional stability restored to baseline  Outcome: Progressing Towards Goal  Goal: *Functional assessment restored to baseline  Outcome: Progressing Towards Goal  Goal: *Absence of falls  Outcome: Progressing Towards Goal  Goal: *Will remain free of delirium, CAM Score negative  Outcome: Progressing Towards Goal  Goal: *Cognitive status will be restored to baseline  Outcome: Progressing Towards Goal  Goal: Interventions  Outcome: Progressing Towards Goal     Problem: Patient Education: Go to Patient Education Activity  Goal: Patient/Family Education  Outcome: Progressing Towards Goal     Problem: Risk for Spread of Infection  Goal: Prevent transmission of infectious organism to others  Description: Prevent the transmission of infectious organisms to other patients, staff members, and visitors.   Outcome: Progressing Towards Goal     Problem: Patient Education:  Go to Education Activity  Goal: Patient/Family Education  Outcome: Progressing Towards Goal

## 2021-04-26 NOTE — PROGRESS NOTES
Problem: Ventilator Management  Goal: *Adequate oxygenation and ventilation  Outcome: Progressing Towards Goal  Goal: *Patient maintains clear airway/free of aspiration  Outcome: Progressing Towards Goal  Goal: *Absence of infection signs and symptoms  Outcome: Progressing Towards Goal  Goal: *Normal spontaneous ventilation  Outcome: Progressing Towards Goal     Problem: Patient Education: Go to Patient Education Activity  Goal: Patient/Family Education  Outcome: Progressing Towards Goal     Problem: Diabetes Self-Management  Goal: *Disease process and treatment process  Description: Define diabetes and identify own type of diabetes; list 3 options for treating diabetes. Outcome: Progressing Towards Goal  Goal: *Incorporating nutritional management into lifestyle  Description: Describe effect of type, amount and timing of food on blood glucose; list 3 methods for planning meals. Outcome: Progressing Towards Goal  Goal: *Incorporating physical activity into lifestyle  Description: State effect of exercise on blood glucose levels. Outcome: Progressing Towards Goal  Goal: *Developing strategies to promote health/change behavior  Description: Define the ABC's of diabetes; identify appropriate screenings, schedule and personal plan for screenings. Outcome: Progressing Towards Goal  Goal: *Using medications safely  Description: State effect of diabetes medications on diabetes; name diabetes medication taking, action and side effects. Outcome: Progressing Towards Goal  Goal: *Monitoring blood glucose, interpreting and using results  Description: Identify recommended blood glucose targets  and personal targets. Outcome: Progressing Towards Goal  Goal: *Prevention, detection, treatment of acute complications  Description: List symptoms of hyper- and hypoglycemia; describe how to treat low blood sugar and actions for lowering  high blood glucose level.   Outcome: Progressing Towards Goal  Goal: *Prevention, detection and treatment of chronic complications  Description: Define the natural course of diabetes and describe the relationship of blood glucose levels to long term complications of diabetes. Outcome: Progressing Towards Goal  Goal: *Developing strategies to address psychosocial issues  Description: Describe feelings about living with diabetes; identify support needed and support network  Outcome: Progressing Towards Goal  Goal: *Sick day guidelines  Outcome: Progressing Towards Goal     Problem: Patient Education: Go to Patient Education Activity  Goal: Patient/Family Education  Outcome: Progressing Towards Goal     Problem: Falls - Risk of  Goal: *Absence of Falls  Description: Document Hazel De La Fuente Fall Risk and appropriate interventions in the flowsheet. Outcome: Progressing Towards Goal  Note: Fall Risk Interventions:       Mentation Interventions: Adequate sleep, hydration, pain control, Bed/chair exit alarm, Door open when patient unattended, Evaluate medications/consider consulting pharmacy, Update white board, Toileting rounds, Increase mobility, More frequent rounding, Reorient patient, Room close to nurse's station    Medication Interventions: Bed/chair exit alarm, Evaluate medications/consider consulting pharmacy    Elimination Interventions: Toileting schedule/hourly rounds              Problem: Patient Education: Go to Patient Education Activity  Goal: Patient/Family Education  Outcome: Progressing Towards Goal     Problem: Pressure Injury - Risk of  Goal: *Prevention of pressure injury  Description: Document Lang Scale and appropriate interventions in the flowsheet.   Outcome: Progressing Towards Goal  Note: Pressure Injury Interventions:  Sensory Interventions: Assess changes in LOC, Avoid rigorous massage over bony prominences, Check visual cues for pain, Pressure redistribution bed/mattress (bed type), Pad between skin to skin, Monitor skin under medical devices, Minimize linen layers, Maintain/enhance activity level, Keep linens dry and wrinkle-free, Float heels, Use 30-degree side-lying position, Turn and reposition approx. every two hours (pillows and wedges if needed)    Moisture Interventions: Absorbent underpads, Moisture barrier, Minimize layers, Maintain skin hydration (lotion/cream), Limit adult briefs, Internal/External urinary devices, Check for incontinence Q2 hours and as needed, Apply protective barrier, creams and emollients    Activity Interventions: Pressure redistribution bed/mattress(bed type), Trapeze to reposition    Mobility Interventions: Turn and reposition approx.  every two hours(pillow and wedges), Trapeze to reposition, Pressure redistribution bed/mattress (bed type), HOB 30 degrees or less, Float heels    Nutrition Interventions: Document food/fluid/supplement intake    Friction and Shear Interventions: Apply protective barrier, creams and emollients, Feet elevated on foot rest, Foam dressings/transparent film/skin sealants, HOB 30 degrees or less, Lift sheet, Lift team/patient mobility team, Minimize layers, Transfer aides:transfer board/George lift/ceiling lift                Problem: Patient Education: Go to Patient Education Activity  Goal: Patient/Family Education  Outcome: Progressing Towards Goal     Problem: Nutrition Deficit  Goal: *Optimize nutritional status  Outcome: Progressing Towards Goal     Problem: Injury - Risk of, Adverse Drug Event  Goal: *Absence of adverse drug events  Outcome: Progressing Towards Goal  Goal: *Absence of medication errors  Outcome: Progressing Towards Goal  Goal: *Knowledge of prescribed medications  Outcome: Progressing Towards Goal     Problem: Patient Education: Go to Patient Education Activity  Goal: Patient/Family Education  Outcome: Progressing Towards Goal     Problem: Pain  Goal: *Control of Pain  Outcome: Progressing Towards Goal  Goal: *PALLIATIVE CARE:  Alleviation of Pain  Outcome: Progressing Towards Goal     Problem: Patient Education: Go to Patient Education Activity  Goal: Patient/Family Education  Outcome: Progressing Towards Goal     Problem: Delirium Treatment  Goal: *Level of consciousness restored to baseline  Outcome: Progressing Towards Goal  Goal: *Level of environmental perceptions restored to baseline  Outcome: Progressing Towards Goal  Goal: *Sensory perception restored to baseline  Outcome: Progressing Towards Goal  Goal: *Emotional stability restored to baseline  Outcome: Progressing Towards Goal  Goal: *Functional assessment restored to baseline  Outcome: Progressing Towards Goal  Goal: *Absence of falls  Outcome: Progressing Towards Goal  Goal: *Will remain free of delirium, CAM Score negative  Outcome: Progressing Towards Goal  Goal: *Cognitive status will be restored to baseline  Outcome: Progressing Towards Goal  Goal: Interventions  Outcome: Progressing Towards Goal     Problem: Patient Education: Go to Patient Education Activity  Goal: Patient/Family Education  Outcome: Progressing Towards Goal     Problem: Non-Violent Restraints  Goal: Removal from restraints as soon as assessed to be safe  Outcome: Progressing Towards Goal  Goal: No harm/injury to patient while restraints in use  Outcome: Progressing Towards Goal  Goal: Patient's dignity will be maintained  Outcome: Progressing Towards Goal  Goal: Patient Interventions  Outcome: Progressing Towards Goal     Problem: Risk for Spread of Infection  Goal: Prevent transmission of infectious organism to others  Description: Prevent the transmission of infectious organisms to other patients, staff members, and visitors.   Outcome: Progressing Towards Goal     Problem: Patient Education:  Go to Education Activity  Goal: Patient/Family Education  Outcome: Progressing Towards Goal

## 2021-04-26 NOTE — PROGRESS NOTES
Discharge/Transition Planning    Case Management following and chart reviewed. Pt remains intubated. Will continue to follow and assist with transition planning when appropriate.  If gets trach , will likely bed LTAC      Brinda Hardy RN BSN  Outcomes Manager    Pager # [phone]

## 2021-04-27 ENCOUNTER — APPOINTMENT (OUTPATIENT)
Dept: GENERAL RADIOLOGY | Age: 71
DRG: 004 | End: 2021-04-27
Attending: INTERNAL MEDICINE
Payer: MEDICARE

## 2021-04-27 ENCOUNTER — APPOINTMENT (OUTPATIENT)
Dept: GENERAL RADIOLOGY | Age: 71
DRG: 004 | End: 2021-04-27
Attending: PHYSICIAN ASSISTANT
Payer: MEDICARE

## 2021-04-27 LAB
ALBUMIN SERPL-MCNC: 2.4 G/DL (ref 3.4–5)
ANION GAP SERPL CALC-SCNC: 7 MMOL/L (ref 3–18)
APTT PPP: 124.6 SEC (ref 23–36.4)
APTT PPP: 37.5 SEC (ref 23–36.4)
BASOPHILS # BLD: 0.1 K/UL (ref 0–0.1)
BASOPHILS NFR BLD: 1 % (ref 0–2)
BUN SERPL-MCNC: 56 MG/DL (ref 7–18)
BUN/CREAT SERPL: 42 (ref 12–20)
CALCIUM SERPL-MCNC: 9.3 MG/DL (ref 8.5–10.1)
CHLORIDE SERPL-SCNC: 106 MMOL/L (ref 100–111)
CO2 SERPL-SCNC: 24 MMOL/L (ref 21–32)
CREAT SERPL-MCNC: 1.34 MG/DL (ref 0.6–1.3)
DIFFERENTIAL METHOD BLD: ABNORMAL
EOSINOPHIL # BLD: 0.2 K/UL (ref 0–0.4)
EOSINOPHIL NFR BLD: 2 % (ref 0–5)
ERYTHROCYTE [DISTWIDTH] IN BLOOD BY AUTOMATED COUNT: 15.9 % (ref 11.6–14.5)
GLUCOSE BLD STRIP.AUTO-MCNC: 112 MG/DL (ref 70–110)
GLUCOSE BLD STRIP.AUTO-MCNC: 115 MG/DL (ref 70–110)
GLUCOSE BLD STRIP.AUTO-MCNC: 152 MG/DL (ref 70–110)
GLUCOSE SERPL-MCNC: 94 MG/DL (ref 74–99)
HCT VFR BLD AUTO: 36.2 % (ref 36–48)
HGB BLD-MCNC: 11 G/DL (ref 13–16)
LYMPHOCYTES # BLD: 1.4 K/UL (ref 0.9–3.6)
LYMPHOCYTES NFR BLD: 14 % (ref 21–52)
MAGNESIUM SERPL-MCNC: 2.3 MG/DL (ref 1.6–2.6)
MCH RBC QN AUTO: 25.9 PG (ref 24–34)
MCHC RBC AUTO-ENTMCNC: 30.4 G/DL (ref 31–37)
MCV RBC AUTO: 85.2 FL (ref 74–97)
MONOCYTES # BLD: 0.6 K/UL (ref 0.05–1.2)
MONOCYTES NFR BLD: 6 % (ref 3–10)
NEUTS SEG # BLD: 7.3 K/UL (ref 1.8–8)
NEUTS SEG NFR BLD: 76 % (ref 40–73)
PHOSPHATE SERPL-MCNC: 3.5 MG/DL (ref 2.5–4.9)
PLATELET # BLD AUTO: 571 K/UL (ref 135–420)
PMV BLD AUTO: 11.6 FL (ref 9.2–11.8)
POTASSIUM SERPL-SCNC: 4.7 MMOL/L (ref 3.5–5.5)
RBC # BLD AUTO: 4.25 M/UL (ref 4.35–5.65)
SODIUM SERPL-SCNC: 137 MMOL/L (ref 136–145)
WBC # BLD AUTO: 9.6 K/UL (ref 4.6–13.2)

## 2021-04-27 PROCEDURE — 85730 THROMBOPLASTIN TIME PARTIAL: CPT

## 2021-04-27 PROCEDURE — 94640 AIRWAY INHALATION TREATMENT: CPT

## 2021-04-27 PROCEDURE — 94668 MNPJ CHEST WALL SBSQ: CPT

## 2021-04-27 PROCEDURE — 74018 RADEX ABDOMEN 1 VIEW: CPT

## 2021-04-27 PROCEDURE — 94003 VENT MGMT INPAT SUBQ DAY: CPT

## 2021-04-27 PROCEDURE — 74011000250 HC RX REV CODE- 250: Performed by: INTERNAL MEDICINE

## 2021-04-27 PROCEDURE — 74011636637 HC RX REV CODE- 636/637: Performed by: PHYSICIAN ASSISTANT

## 2021-04-27 PROCEDURE — 74011000250 HC RX REV CODE- 250: Performed by: PHYSICIAN ASSISTANT

## 2021-04-27 PROCEDURE — 77010033678 HC OXYGEN DAILY

## 2021-04-27 PROCEDURE — 99291 CRITICAL CARE FIRST HOUR: CPT | Performed by: INTERNAL MEDICINE

## 2021-04-27 PROCEDURE — 71045 X-RAY EXAM CHEST 1 VIEW: CPT

## 2021-04-27 PROCEDURE — 74011250636 HC RX REV CODE- 250/636: Performed by: INTERNAL MEDICINE

## 2021-04-27 PROCEDURE — 65610000006 HC RM INTENSIVE CARE

## 2021-04-27 PROCEDURE — APPSS15 APP SPLIT SHARED TIME 0-15 MINUTES: Performed by: REGISTERED NURSE

## 2021-04-27 PROCEDURE — 94762 N-INVAS EAR/PLS OXIMTRY CONT: CPT

## 2021-04-27 PROCEDURE — 2709999900 HC NON-CHARGEABLE SUPPLY

## 2021-04-27 PROCEDURE — 80069 RENAL FUNCTION PANEL: CPT

## 2021-04-27 PROCEDURE — 83735 ASSAY OF MAGNESIUM: CPT

## 2021-04-27 PROCEDURE — 85025 COMPLETE CBC W/AUTO DIFF WBC: CPT

## 2021-04-27 PROCEDURE — 74011250636 HC RX REV CODE- 250/636: Performed by: PHYSICIAN ASSISTANT

## 2021-04-27 PROCEDURE — 36415 COLL VENOUS BLD VENIPUNCTURE: CPT

## 2021-04-27 RX ORDER — ATORVASTATIN CALCIUM 40 MG/1
80 TABLET, FILM COATED ORAL
Status: DISCONTINUED | OUTPATIENT
Start: 2021-04-27 | End: 2021-05-04

## 2021-04-27 RX ORDER — HEPARIN SODIUM 10000 [USP'U]/100ML
12-25 INJECTION, SOLUTION INTRAVENOUS
Status: DISCONTINUED | OUTPATIENT
Start: 2021-04-27 | End: 2021-04-30

## 2021-04-27 RX ADMIN — CHLORHEXIDINE GLUCONATE 0.12% ORAL RINSE 10 ML: 1.2 LIQUID ORAL at 08:00

## 2021-04-27 RX ADMIN — CHLORHEXIDINE GLUCONATE 0.12% ORAL RINSE 10 ML: 1.2 LIQUID ORAL at 20:20

## 2021-04-27 RX ADMIN — HEPARIN SODIUM 18 UNITS/KG/HR: 10000 INJECTION, SOLUTION INTRAVENOUS at 13:42

## 2021-04-27 RX ADMIN — ALBUTEROL SULFATE 2.5 MG: 2.5 SOLUTION RESPIRATORY (INHALATION) at 14:02

## 2021-04-27 RX ADMIN — ARFORMOTEROL TARTRATE 15 MCG: 15 SOLUTION RESPIRATORY (INHALATION) at 07:44

## 2021-04-27 RX ADMIN — MIDAZOLAM 2 MG: 1 INJECTION INTRAMUSCULAR; INTRAVENOUS at 20:20

## 2021-04-27 RX ADMIN — INSULIN LISPRO 2 UNITS: 100 INJECTION, SOLUTION INTRAVENOUS; SUBCUTANEOUS at 18:08

## 2021-04-27 RX ADMIN — ALBUTEROL SULFATE 2.5 MG: 2.5 SOLUTION RESPIRATORY (INHALATION) at 07:44

## 2021-04-27 RX ADMIN — FAMOTIDINE 20 MG: 10 INJECTION INTRAVENOUS at 08:05

## 2021-04-27 RX ADMIN — ARFORMOTEROL TARTRATE 15 MCG: 15 SOLUTION RESPIRATORY (INHALATION) at 20:06

## 2021-04-27 RX ADMIN — FAMOTIDINE 20 MG: 10 INJECTION INTRAVENOUS at 20:20

## 2021-04-27 NOTE — INTERDISCIPLINARY ROUNDS
[facility]  Pulmonary, Critical Care, and Sleep Medicine  Interdisciplinary and Ventilator Weaning Rounds    Patient discussed in morning walking rounds and interdisciplinary rounds. ICU day: 22    Overnight events:   · No acute events overnight   Assessments and best practice:   Ventilator  o Ventilator day 22, Trach (4/26)- #9 Portex  o Vent settings: FiO2 of 30/5  o VAP bundle, aim to keep peak plateau pressure 49-88KY H2O  o Weaning assessed and documented   - No sedation  - Tolerating SBT, PS:7   Sedation   none   Other pertinent drips  o Holding Heparin ggt for procedure   Lines noted  o PIVs   Critical labs assessed  o Yes   Antibiotics   none   Medications reviewed  o Yes   Pending imaging  o none   Pending send out labs  o no   Pending Procedures  o tracheostomy   Glycemic control   SSI    Stress ulcer prophylaxis. o Pepcid   DVT prophylaxis.   o Heparin gtt  On hold, SCDs   Need for Lines, cardona assessed.  o Yes   Restraint Reevaluation   o Restraints off    Family contact/MPOA: sister, Eliel Kulkarni  Family update: to be updated by bedside nurse/palliative care today         Daily Plans:   SBT, trach collar of pt can tolerated   Resumed statin   Restart tube feeds          Estee Vang NP  04/27/21  Pulmonary, [address] [facility],[address] [facility]

## 2021-04-27 NOTE — PROGRESS NOTES
Received patient on current vent settings. Vitals stable. Will continue to monitor. 04/27/21 0744   Patient Observations   Pulse (Heart Rate) 89   Resp Rate 22   O2 Sat (%) 100 %   ETCO2 (mmHg) 28 mmHg   Airway - Tracheostomy Tube 04/26/21 Portex   Placement Date/Time: 04/26/21 1343   Number of Attempts: 1  Inserted By: Dr Valdez Rascon  Present on Admission/Arrival: No  Placement Verified: EtCO2  Airway Types: Tracheostomy  Trach Tube Type: Portex  Cuff Type: Air  Airway Tube Size: 9 mm   Cuff Pressure 28 cmH20   Site Assessment Clean, dry, & intact   Trach Dressing Changed Yes   Trach Tie Changed No   Trach Cannula Changed Yes   Inner Cannula Cuffed, cuff inflated  (9 Portex)   Line [employer] of the lock   Side Secured Centered   Spare Trach at Bedside Yes   Spare Trach Tube One Size Smaller at Bedside Yes   Ambu Bag With Mask at Bedside Yes   Respiratory   Respiratory (WDL) X   Patient on Vent Yes - If patient is on vent, add Doc Flowsheet Ventilator ().    Respiratory Pattern Regular   Chest/Tracheal Assessment Chest expansion, symmetrical   Breath Sounds Bilateral Coarse   Cough Cough with suction;Productive   Airway Clearance   Suction Trach   Suction Device Inline suction catheter   Sputum Method Obtained Tracheal   Sputum Amount Moderate   Sputum Color/Odor Pink tinged   Sputum Consistency Thin   Ventilator Initiate/Discontinue   Bio-Med ID # [id number]   Vent Settings   FIO2 (%) 30 %   SpO2/FIO2 Ratio 333.33   CMV Rate Set 12   Back-Up Rate 12   Vt Set (ml) 450 ml   PEEP/VENT (cm H2O) 5 cm H20   Insp Time (sec) 0.85 sec   Insp Rise Time % 40 %   Flow Trigger 3.0   Ventilator Measurements   Resp Rate Observed 22   Vt Exhaled (Machine Breath) (ml) 510 ml   Ve Observed (l/min) 13.5 l/min   PIP Observed (cm H2O) 15 cm H2O   MAP (cm H2O) 9.5   I:E Ratio Actual 1:1.5   Safety & Alarms   Circuit Temperature 97.5 °F (36.4 °C)   Backup Mode Checked/Apnea Yes   Pressure Max 40 cm H2O   Pressure Min 11 cm H2O   Ve Min 2   Ve Max 20   Vt Min 200 ml   Vt Max 1000 ml   RR Max 40   Ambu Bag Yes   Ambu Mask Yes   ABCDEF Bundle   SBT Safety Screen Passed Yes   Age Specific Ventilator Associated Pneumonia Bundle   Patient Age Group Adult    Ventilator Associated Pneumonia Bundle   Elevation of Head to 30-45 Degrees (Unless Contraindicated) Yes   Vent Method/Mode   Ventilation Method Conventional   Ventilator Mode Assist control;VC+   Procedures   $$ Subsequent Procedure Aerosol   Delivery Source In-line nebulizer   Pulmonary Toilet   Pulmonary Toilet H. O.B elevated;Suction

## 2021-04-27 NOTE — PROGRESS NOTES
[facility]  Pulmonary, Critical Care, and Sleep Medicine    Name: King June MRN: [id number]   : 1950 Hospital: [facility]   Date: 2021        IMPRESSION:   Angioedema- with airway and respiratory failure and collapse; thought due to ACE inhibitor  S/P Emergent Cricthyrotomy [facility]-ED 21- converted to 6.5 ETT intraoperative by anesthesia team 21, 8.0 ETT by anesthesia 21, packing removed evening 21-ENT  S/P cardiac arrest @ [facility] 21- brief, likely due to hypoxia and airway collapse  Respiratory Failure: Acute due to above; remains on ventilator. SBTs. Tracheostomy - #9 Portex on  with Dr. Treva Archer- ENT  Pulmonary Infiltrates: suspect aspiration secretions and/or blood due to above- can't exclude other infectious process at this time. Rapid Covid Negative at [facility]  Bradycardia - improved  DVT: Popliteal- Left; acute non-occlusive thrombus.  Heparin ACS protocol started - stopped  due to bleeding from Cric site.  Bleeding controlled -  heparin resumed    Pulmonary Embolism - 21 - left lower and right upper lobes  JACQUELYN- improving   Thrombocytosis   Hypokalemia - resolved  Left scleral erythema- unknown baseline; possible infection   DM  DM retinopathy per chart review  Diabetic peripheral neuropathy  Left Pneumothorax - resolved  Obesity: Body mass index is 35.64 kg/m². Patient Active Problem List   Diagnosis Code    Angioedema due to angiotensin converting enzyme inhibitor (ACE-I) T78. 3XXA, [id number]    Respiratory failure ([address] J96.90    JACQUELYN (acute kidney injury) ([address] N17.9    Cardiac arrest (HCC) I46.9    Obesity (BMI 30-39. 9) E66.9    Diabetic neuropathy associated with type 2 diabetes mellitus ([address] E11.40    Diabetic retinopathy associated with type 2 diabetes mellitus ([address] E11.319    Pulmonary infiltrates R91.8    Controlled type 2 diabetes mellitus with neurologic complication, without long-term current use of insulin (HCC) E11.49    DVT (deep venous thrombosis) ([facility] I82.409    Encounter for palliative care Z51.5    Goals of care, counseling/discussion Z71.89    Multiple subsegmental pulmonary emboli without acute cor pulmonale (HCC) I26.94      PLAN:   NEURO:  Serial neuro evaluations  Off sedation - versed as needed       CARDIO:  MAP goal >64 - No IV pressor requirement  Echo on 4/6/21 with EF of 55%, dilated RV and moderate pulmonary hypertension (PASP 61 mmHg)      PULM:  Maintain aspiration precautions: HOB >30 degrees  SpO2 goal >92%  Bronchial /oral hygiene   Trach care per protocol  SBT/TC trials as clinical status allows  Completed course of decadron  Continue Brovana  Change albuterol to PRN  #9 Portex Trach on 4/26       GI/ NUTRITION:  NGT Continue and stop low-intermittent suction  Diet: Restart TF with 100 q6h free H2O  SUP:Pepcid 20 mg Q 12  Follow up with nutritional recommendations  Lipitor 80mg QHS  MV    RENAL/ METAB/ : Monitor I&Os  Trend electrolytes - replace as needed  Condom cath: Continue, monitor I/Os     HEM/ONC  Trend Hb/HCT and PLTs  DVT prophylaxis: SCDs, heparin gtt  Restart heparin gtt 4/27 now that 24 hours post-procedure.  Monitor for bleeding     ID  Antibioitcs: Cipro eye drop, Zosyn 4/4 - 4/8  Vanco: 4/4-6, MRSA swab negative.   Zosyn restarted on 4/11-4/19, Don Pelon started 4/19-25  Repeat Covid negative (4/11)  Monitor and trend WBC and temp curve     ENDO  BGs Q 6,SSI, TSH normal  C1 inhibitor normal level (4/7/21)     MSK/ ORTHO  No active Issues     SKIN/ WOUNDS  Per protocol  Neck- daily dressing changes per ENT, Quick clot with Afrin if needed, d/c xeroform  Trach sutured to skin      CODE STATUS: Full Code- Full     Discussed in interdisciplinary rounds   Best practice :  Glycemic control  IHI ICU bundles:              Garcia Bundle Followed and Vent Bundle Followed, Vent Day 21    [facility] Vent patients- VAP bundle, aim to keep peak plateau pressure 02-12NQ H2O  Sress ulcer prophylaxis. DVT prophylaxis. Need for Lines, cardona assessed. Restraints need. Palliative care evaluation. Subjective/Interval History:        Interval HPI:69 yo M h/o HTN tx w/ lisinopril presenting to [facility] ED 4/4 for rapidly progressing respiratory distress due to severe angioedema. During intubation attempts pt had brief cardiac arrest w/ pulse loss and severe airway swelling leading to emergent cricothyrotomy. Pt stabilized before Nightingale transport to [place] at [facility] pt taken emergently to [address] was converted to ETT. Patient continued to have trouble with o2 requirements disproportionate to CXR findings and despite adjustment of FiO2 and PEEP, now improved. S/p administration of inhaled epoprostenol. CTA revelaed B/L pulmonary emboli which is likely the cause of his hypoxemia and RH strain. Continuing heparin gtt, which had already been initiated for left popliteal non-occlusive thrombus. Pt is s/p dobutamine and  diureses w/ bumex and metolazone.  COVID - 19 negative. Now with minimal vent support, however, mentation precludes extubation at this time.      Subjective 04/27/21  Hospital Day: 23  Vent Day: 23  Overnight events: No significant events noted overnight. Irl Spindle yesterday. Mentation/Activity: Trach on vent. Responsive to verbal stimuli. Mentation improving. Opens eyes to name and intermittently moves all extremities spontaneously. Very tired. Respiratory/ Secretions: stable. CXR with improvement in aeration  Hemodynamics: H/H stable  Urine output, bowel: adequate  Diet: TF      - Trach yesterday.  Restart heparin now >24 hr since procedure  - SBT this AM. Will trial trach collar while seated  - Renal function continues to improve  - Consider pulling FMS  - Not to transfer off unit until trach tract has matured more  - Move out of negative pressure room when able  - Restart statin  - Resume antihypertensives when pressures higher       ROS:Review of systems not obtained due to patient factors. Objective:   Vital Signs:    Visit Vitals  /78   Pulse 98   Temp 98 °F (36.7 °C)   Resp 12   Ht 5' 11\" (1.803 m)   Wt 101.7 kg (224 lb 3.3 oz)   SpO2 96%   BMI 31.27 kg/m²       O2 Device: Tracheostomy, Ventilator       Temp (24hrs), Av.3 °F (36.8 °C), Min:98 °F (36.7 °C), Max:98.7 °F (37.1 °C)       Intake/Output:   Last shift:      No intake/output data recorded. Last 3 shifts: 1901 -  0700  In: 540 [I.V.:220]  Out: 1950 [Urine:1950]    Intake/Output Summary (Last 24 hours) at 2021 0839  Last data filed at 2021 0400  Gross per 24 hour   Intake 200 ml   Output 525 ml   Net -325 ml      Physical Exam:                 General: NAD. Trach in place. HEENT:  Anicteric sclerae; pink palpebral conjunctivae; mucosa moist  Resp:  Symmetrical chest expansion, no accessory muscle use; good airway entry; no rales/ wheezing/ diminished b/l, + rhonchi noted  CV:  S1, S2 present; regular rate and rhythm  GI:  Obese. Abdomen soft, non-tender; (+) active bowel sounds  Extremities:  +2 pulses on all extremities; no edema/ cyanosis/ clubbing noted;  no restraints   Skin:  Warm; no rashes/ lesions noted, normal turgor/cap refill   Neurologic:  opens eyes, responds to noxious and verbal stimuli, +facial grimacing, moves ext x4 spontaneously, follows commands. Very tired. Shimon Balls, condom cath, FMS         DATA:  Labs:  Recent Labs     21  0129 21  0320 21  0250   WBC 9.6 10.8 13.1   HGB 11.0* 10.1* 9.8*   HCT 36.2 34.2* 31.7*   * 599* 645*     Recent Labs     21  0129 21  0320 21  0250    135* 136   K 4.7 4.3 4.2    103 104   CO2 24 27 25   GLU 94 102* 118*   BUN 56* 57* 64*   CREA 1.34* 1.17 1.19   CA 9.3 8.7 9.0   MG 2.3 2.3 2.3   PHOS 3.5 3.0 3.1   ALB 2.4* 2.2* 2.2*     No results for input(s): PH, PCO2, PO2, HCO3, FIO2 in the last 72 hours.     Lab Results   Component Value Date/Time Hemoglobin A1c 6.6 (H) 04/04/2021 05:11 PM     Lab Results   Component Value Date/Time    TSH 1.35 04/04/2021 05:11 PM     MICRO:  Results     Procedure Component Value Units Date/Time    COVID-19 RAPID TEST [[id number]] Collected: 04/25/21 1214    Order Status: Completed Specimen: Nasopharyngeal Updated: 04/25/21 1237     Specimen source Nasopharyngeal        COVID-19 rapid test Not detected        Comment: Rapid Abbott ID Now       Rapid NAAT:  The specimen is NEGATIVE for SARS-CoV-2, the novel coronavirus associated with COVID-19. Negative results should be treated as presumptive and, if inconsistent with clinical signs and symptoms or necessary for patient management, should be tested with an alternative molecular assay. Negative results do not preclude SARS-CoV-2 infection and should not be used as the sole basis for patient management decisions. This test has been authorized by the FDA under an Emergency Use Authorization (EUA) for use by authorized laboratories.    Fact sheet for Healthcare Providers: kstattoo.com  Fact sheet for Patients: kstattoo.com       Methodology: Isothermal Nucleic Acid Amplification         CULTURE, RESPIRATORY/SPUTUM/BRONCH Sarah Quant STAIN [[id number]]  (Abnormal)  (Susceptibility) Collected: 04/16/21 0750    Order Status: Completed Specimen: Sputum from Endotracheal aspirate Updated: 04/19/21 0907     Special Requests: NO SPECIAL REQUESTS        GRAM STAIN 1+ WBCS SEEN         NO EPITHELIAL CELLS SEEN         NO ORGANISMS SEEN        Culture result:       LIGHT ENTEROBACTER AEROGENES                  NO NORMAL RESPIRATORY ANNALEE ISOLATED          Susceptibility      Enterobacter aerogenes     JOANNE     Amikacin ($) Susceptible     Cefazolin ($) Resistant     Cefepime ($$) Susceptible     Cefoxitin Resistant     Ceftazidime ($) Resistant     Ceftriaxone ($) Intermediate     Ciprofloxacin ($) Susceptible     Gentamicin ($) Susceptible     Levofloxacin ($) Susceptible     Meropenem ($$) Susceptible     Piperacillin/Tazobac ($) Resistant     Tobramycin ($) Susceptible     Trimeth/Sulfa Susceptible                    CULTURE, BLOOD [[id number]] Collected: 04/14/21 0502    Order Status: Completed Specimen: Blood Updated: 04/20/21 1236     Special Requests: NO SPECIAL REQUESTS        Culture result: NO GROWTH 6 DAYS       CULTURE, BLOOD [[id number]] Collected: 04/14/21 0502    Order Status: Completed Specimen: Blood Updated: 04/20/21 1236     Special Requests: NO SPECIAL REQUESTS        Culture result: NO GROWTH 6 DAYS                                                             Echo  04/04/21   ECHO ADULT COMPLETE 04/06/2021 4/6/2021    Narrative · Contrast used: DEFINITY. · Image quality for this study was technically difficult. · LV: Calculated LVEF is 55%. Visually measured ejection fraction. Normal   cavity size, wall thickness and systolic function (ejection fraction   normal). Wall motion: normal. Moderate (grade 2) left ventricular   diastolic dysfunction. · AO: Mild aortic root and ascending aorta dilatation. Ascending aorta   diameter = 3.6 cm. Aortic root measures 3.9 cm  · RA: Dilated right atrium. · RV: Dilated right ventricle. Borderline low systolic function. · TV: Mild tricuspid valve regurgitation is present. · IVC: Mechanically ventilated; cannot use inferior caval vein diameter to   estimate central venous pressure. · PA: Moderate pulmonary hypertension. Pulmonary arterial systolic   pressure is 61 mmHg. Signed by: Enrike Ordoñez MD        Imaging:  [x]I have personally reviewed the patients radiographs  CXR Results  (Last 48 hours)               04/26/21 0612  XR CHEST PORT Final result    Impression:  1. Interval enteric tube retraction with tip now projecting over the mid   mediastinum. Recommend repositioning and repeat imaging prior to use. 2.  Endotracheal tube in acceptable position.    3. Bilateral airspace opacities with slight interval worsened aeration of the   right lung base. Narrative:  EXAM: XR CHEST PORT       INDICATION: 79 years Male. Daily CXR while intubated. .   ADDITIONAL HISTORY: None. TECHNIQUE: Frontal view of the chest.       COMPARISON: 4/25/2021 at 0155 hours       FINDINGS:       Endotracheal tube with tip 5.2 cm proximal to gabriella. Interval enteric tube   retraction. The tip of the enteric tube now projects over the mid mediastinum. Multiple lines overlie the lower chest.       The cardiac silhouette is mildly enlarged, unchanged. Bilateral pulmonary patchy opacities most pronounced at the bilateral lung   bases. Slight interval worsening aeration of the right lung base. Aeration of   the left lung base is similar to prior. No definite evidence of pleural effusion or pneumothorax. No acute osseous abnormality appreciated. []Radiographs reviewed with radiologist   []No change from prior, tubes and lines in adequate position. []Improved   []Worsening    Total of 35 min critical care time spent at bedside during the course of care providing evaluation,management and care decisions and ordering appropriate treatment related to critical care problems exclusive of procedures    Candelaria Schaefer MD PGY-1  [facility] Emergency Medicine      [facility] Staff Note     I have independently evaluated the patient at the bedside and I am the primary provider of record. I agree with the above evaluation, assessment and recommendations along with the following comments and observations. I have made editions to above evaluation which was performed under my direction. Please also review my orders. Chart and labs reviewed    Patient did well overnight. S/P trach yesterday by ENT. Undergoing SBT with plan for TC today. 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  Will continue to work to wean from vent. Patient with need focused and aggressive PT/OT as clinical status allows. Monitor trach site closely for any signs of bleeding as we are restarting heparin today. Given patient's clinical course, I would like to see the patient is the ICU for several days to allow trach site to established a defined trach given prior emergent trach earlier this admission. Continuing with full supportive care measures. Complex decision making was made in the evaluation and management plans during this consultation. More than 50% of time was spent in counseling and coordination of care including review of data and discussion with other team members. Further recommendations and therapies pending clinical course.     Critical Care and time spent coordinating care: physical exam, chart review, documentation, discussion with care team,  minus procedure time: 35 min    Cate Mendez, DO, FCCP  Pulmonary, Sleep and Critical Care Medicine  2:39 PM

## 2021-04-27 NOTE — PROGRESS NOTES
Patient remains on 30% TC. Vitals stable, no resp distress or issues noted.      04/27/21 1540   Oxygen Therapy   O2 Sat (%) 96 %   Pulse via Oximetry 96 beats per minute   O2 Device Tracheal collar;Humidifier   O2 Flow Rate (L/min) 6 l/min   FIO2 (%) 30 %

## 2021-04-27 NOTE — PROGRESS NOTES
RENAL PROGRESS NOTE        Cristiano Flores         Assessment/Plan:     1. JACQUELYN (ischemic atn in a setting of acute pe/resp failure). Creat slightly higher         Monitor , appears to be hemodynamic , good urine output  2. Acute pe/le le dvt. On heparin drip. 3. Angioedema, s/p emergent cricthyrotomy/resp failure. 4. Bl pneumonia, on abx. 5. Resp failure. 6. Hyponatremia , resolved, 100cc q4hrs free water    Please call with questions,    Noam Jackson MD FASN  Cell [phone]  Pager: [phone]                                                                                                                        Subjective:  TF restarted  Following commands          Patient Active Problem List   Diagnosis Code    Angioedema due to angiotensin converting enzyme inhibitor (ACE-I) T78. 3XXA, [id number]    Respiratory failure ([facility] J96.90    JACQUELYN (acute kidney injury) ([facility] [facility] N17.9    Cardiac arrest (HCC) I46.9    Obesity (BMI 30-39. 9) E66.9    Diabetic neuropathy associated with type 2 diabetes mellitus ([facility] E11.40    Diabetic retinopathy associated with type 2 diabetes mellitus ([facility] E11.319    Pulmonary infiltrates R91.8    Controlled type 2 diabetes mellitus with neurologic complication, without long-term current use of insulin (HCC) E11.49    DVT (deep venous thrombosis) ([facility] I82.409    Encounter for palliative care Z51.5    Goals of care, counseling/discussion Z71.89    Multiple subsegmental pulmonary emboli without acute cor pulmonale (HCC) I26.94       Current Facility-Administered Medications   Medication Dose Route Frequency Provider Last Rate Last Admin    atorvastatin (LIPITOR) tablet 80 mg  80 mg Oral QHS Marco A Garcia NP        heparin 25,000 units in D5W 250 ml infusion  12-25 Units/kg/hr IntraVENous TITRATE EVELYNE'Loan Sharp PA-C 18.3 mL/hr at 04/27/21 1342 18 Units/kg/hr at 04/27/21 1342    midazolam (VERSED) injection 2 mg  2 mg IntraVENous Q10MIN PRN Kane Melgoza PA-C   2 mg at 04/26/21 1255    famotidine (PF) (PEPCID) 20 mg in 0.9% sodium chloride 10 mL injection  20 mg IntraVENous Q12H Gordon Castillo MD   20 mg at 04/27/21 0805    acetaminophen (TYLENOL) solution 500 mg  500 mg Oral Q4H PRN Loan Dunn PA-C   500 mg at 04/20/21 2227    oxymetazoline (AFRIN) 0.05 % nasal spray 2 Spray  2 Spray Other BID PRN Antonieta Garcia NP        ELECTROLYTE REPLACEMENT PROTOCOL - Potassium Renal Dosing  1 Each Other PRN Cody Gant DO        arformoteroL (BROVANA) neb solution 15 mcg  15 mcg Nebulization BID RT Nj Saldivar PA-C   15 mcg at 04/27/21 9028    multivit-folic acid-herbal 290 (WELLESSE PLUS) oral liquid 30 mL  30 mL Per NG tube DAILY Cody Gant DO   Stopped at 04/26/21 0900    chlorhexidine (PERIDEX) 0.12 % mouthwash 10 mL  10 mL Oral Q12H Elmer Ibarra PA-C   10 mL at 04/27/21 0800    albuterol-ipratropium (DUO-NEB) 2.5 MG-0.5 MG/3 ML  3 mL Nebulization Q4H PRN Elmer Ibarra PA-C        glucose chewable tablet 16 g  4 Tab Oral PRN Elmer Code, HORACIO        glucagon (GLUCAGEN) injection 1 mg  1 mg IntraMUSCular PRN Elmer Code, HORACIO        dextrose (D50W) injection syrg 12.5-25 g  25-50 mL IntraVENous PRN Elmer Ibarra, HORACIO        insulin lispro (HUMALOG) injection   SubCUTAneous Q6H Elmer CodeHORACIO   Stopped at 04/23/21 0535    carboxymethylcellulose sodium (REFRESH LIQUIGEL) 1 % ophthalmic solution 1 Drop  1 Drop Both Eyes PRN Elmer Ibarra PA-C   1 Drop at 04/14/21 0621       Objective  Vitals:    04/27/21 1404 04/27/21 1500 04/27/21 1540 04/27/21 1600   BP:  130/71  127/68   Pulse:  100  100   Resp:  17  27   Temp:    99.5 °F (37.5 °C)   SpO2: 96% 94% 96% 95%   Weight:       Height:             Intake/Output Summary (Last 24 hours) at 4/27/2021 1636  Last data filed at 4/27/2021 1600  Gross per 24 hour   Intake 542.09 ml   Output 665 ml   Net -122.91 ml           Admission weight: Weight: 127 kg (279 lb 15.8 oz) (04/04/21 1726)  Last Weight Metrics:  Weight Loss Metrics 4/23/2021 4/4/2021 4/4/2021 4/4/2021   Today's Wt 224 lb 3.3 oz - 280 lb -   BMI - 31.27 kg/m2 - 37.97 kg/m2             Physical Assessment:     General: intubated, opens eyes to voice. Et tube in place. Neck: No jvd. LUNGS: diminished air entry at the bases, no crackles. CVS EXM: S1, S2  RRR. Abdomen: soft, non tender. Lower Extremities:  1+ edema.      Lab    CBC w/Diff Recent Labs     04/27/21  0129 04/26/21  0320 04/25/21  0250   WBC 9.6 10.8 13.1   RBC 4.25* 4.05* 3.82*   HGB 11.0* 10.1* 9.8*   HCT 36.2 34.2* 31.7*   * 599* 645*   GRANS 76* 69 73   LYMPH 14* 18* 17*   EOS 2 3 3        Chemistry Recent Labs     04/27/21  0129 04/26/21  0320 04/25/21  0250   GLU 94 102* 118*    135* 136   K 4.7 4.3 4.2    103 104   CO2 24 27 25   BUN 56* 57* 64*   CREA 1.34* 1.17 1.19   CA 9.3 8.7 9.0   AGAP 7 5 7   BUCR 42* 49* 54*   ALB 2.4* 2.2* 2.2*   PHOS 3.5 3.0 3.1         No results found for: IRON, FE, TIBC, IBCT, PSAT, FERR   Lab Results   Component Value Date/Time    Calcium 9.3 04/27/2021 01:29 AM    Phosphorus 3.5 04/27/2021 01:29 AM

## 2021-04-27 NOTE — PROGRESS NOTES
Patient remains on trach collar. Vitals stable, and tolerating well. Will continue to monitor.      04/27/21 1404   Oxygen Therapy   O2 Sat (%) 96 %   Pulse via Oximetry 97 beats per minute   O2 Device Tracheal collar;Humidifier   O2 Flow Rate (L/min) 6 l/min   FIO2 (%) 30 %

## 2021-04-27 NOTE — PROGRESS NOTES
SBT started on PS 7, PEEP 5, FiO2 0.30. HR 90, SpO2 95. No resp distress or issues noted. Will continue to monitor.      04/27/21 0805   Weaning Parameters   Spontaneous Breathing Trial Complete No (Comments)  (Started)   Resp Rate Observed 30   Ve 12      RSBI 74

## 2021-04-27 NOTE — PROGRESS NOTES
[facility]: [phone]  [facility] within [facility]: [phone]    Goals of care defined. Palliative team will sign off. Please consult team as needed, if appropriate. Thank you for the Palliative Medicine consult and allowing us to participate in the care of Mr. Asaf López.          CODE STATUS  FULL CODE FULL INTERVENTIONS     Ceci EVERETT, RN, [facility]  Palliative Medicine Inpatient RN  [address]: [phone])

## 2021-04-27 NOTE — PROGRESS NOTES
Problem: Ventilator Management  Goal: *Adequate oxygenation and ventilation  Outcome: Progressing Towards Goal  Goal: *Patient maintains clear airway/free of aspiration  Outcome: Progressing Towards Goal  Goal: *Absence of infection signs and symptoms  Outcome: Progressing Towards Goal  Goal: *Normal spontaneous ventilation  Outcome: Progressing Towards Goal     Problem: Patient Education: Go to Patient Education Activity  Goal: Patient/Family Education  Outcome: Progressing Towards Goal     Problem: Diabetes Self-Management  Goal: *Disease process and treatment process  Description: Define diabetes and identify own type of diabetes; list 3 options for treating diabetes. Outcome: Progressing Towards Goal  Goal: *Using medications safely  Description: State effect of diabetes medications on diabetes; name diabetes medication taking, action and side effects. Outcome: Progressing Towards Goal  Goal: *Monitoring blood glucose, interpreting and using results  Description: Identify recommended blood glucose targets  and personal targets. Outcome: Progressing Towards Goal  Goal: *Prevention, detection, treatment of acute complications  Description: List symptoms of hyper- and hypoglycemia; describe how to treat low blood sugar and actions for lowering  high blood glucose level. Outcome: Progressing Towards Goal     Problem: Patient Education: Go to Patient Education Activity  Goal: Patient/Family Education  Outcome: Progressing Towards Goal     Problem: Falls - Risk of  Goal: *Absence of Falls  Description: Document Maskell Fall Risk and appropriate interventions in the flowsheet.   Outcome: Progressing Towards Goal  Note: Fall Risk Interventions:       Mentation Interventions: Adequate sleep, hydration, pain control, Bed/chair exit alarm, Door open when patient unattended, Evaluate medications/consider consulting pharmacy, Reorient patient, Update white board    Medication Interventions: Bed/chair exit alarm, Evaluate medications/consider consulting pharmacy    Elimination Interventions: Toileting schedule/hourly rounds, Bed/chair exit alarm              Problem: Pressure Injury - Risk of  Goal: *Prevention of pressure injury  Description: Document Lang Scale and appropriate interventions in the flowsheet. Outcome: Progressing Towards Goal  Note: Pressure Injury Interventions:  Sensory Interventions: Assess changes in LOC, Keep linens dry and wrinkle-free, Check visual cues for pain, Float heels, Minimize linen layers, Pressure redistribution bed/mattress (bed type), Turn and reposition approx. every two hours (pillows and wedges if needed)    Moisture Interventions: Apply protective barrier, creams and emollients, Check for incontinence Q2 hours and as needed, Internal/External urinary devices, Internal/External fecal devices, Maintain skin hydration (lotion/cream)    Activity Interventions: Pressure redistribution bed/mattress(bed type), PT/OT evaluation    Mobility Interventions: Pressure redistribution bed/mattress (bed type), PT/OT evaluation, Turn and reposition approx.  every two hours(pillow and wedges), Float heels    Nutrition Interventions: Document food/fluid/supplement intake    Friction and Shear Interventions: Apply protective barrier, creams and emollients, Feet elevated on foot rest, HOB 30 degrees or less                Problem: Patient Education: Go to Patient Education Activity  Goal: Patient/Family Education  Outcome: Progressing Towards Goal     Problem: Nutrition Deficit  Goal: *Optimize nutritional status  Outcome: Progressing Towards Goal     Problem: Injury - Risk of, Adverse Drug Event  Goal: *Absence of adverse drug events  Outcome: Progressing Towards Goal  Goal: *Absence of medication errors  Outcome: Progressing Towards Goal     Problem: Patient Education: Go to Patient Education Activity  Goal: Patient/Family Education  Outcome: Progressing Towards Goal     Problem: Pain  Goal: *Control of Pain  Outcome: Progressing Towards Goal  Goal: *PALLIATIVE CARE:  Alleviation of Pain  Outcome: Progressing Towards Goal     Problem: Delirium Treatment  Goal: *Level of consciousness restored to baseline  Outcome: Progressing Towards Goal  Goal: *Level of environmental perceptions restored to baseline  Outcome: Progressing Towards Goal  Goal: *Sensory perception restored to baseline  Outcome: Progressing Towards Goal  Goal: *Absence of falls  Outcome: Progressing Towards Goal  Goal: *Cognitive status will be restored to baseline  Outcome: Progressing Towards Goal     Problem: Patient Education: Go to Patient Education Activity  Goal: Patient/Family Education  Outcome: Progressing Towards Goal     Problem: Risk for Spread of Infection  Goal: Prevent transmission of infectious organism to others  Description: Prevent the transmission of infectious organisms to other patients, staff members, and visitors.   Outcome: Progressing Towards Goal     Problem: Patient Education:  Go to Education Activity  Goal: Patient/Family Education  Outcome: Progressing Towards Goal

## 2021-04-27 NOTE — PROGRESS NOTES
Discharge planning      Called and spoke with Tamara Polanco, son, concerning discharge planning. Explained LTACH vs SNF placement. All questioned answered and concerns addressed. Placed in [facility] and  link for LTACH placement. CM will continue to assist with transitional needs. 2:15 p.m. LTSS screening submitted for processing. Form ID # :[id number]. K. Severa Arrant, BSN, RN  Pager # [phone]  Care Manager

## 2021-04-27 NOTE — PROGRESS NOTES
Patient taken off vent SBT, and placed on 30% TC. Will continue to monitor.      04/27/21 1147   Oxygen Therapy   O2 Sat (%) 97 %   Pulse via Oximetry 97 beats per minute   O2 Device Tracheal collar;Humidifier   O2 Flow Rate (L/min) 6 l/min   FIO2 (%) 30 %

## 2021-04-27 NOTE — CONSULTS
Nutrition Assessment     Type and Reason for Visit: Reassess, Positive nutrition screen, Consult, NPO/clear liquid    Nutrition Recommendations/Plan:   - Change to tube feeding of Jevity 1.5 at goal rate of 60 mL/hr with 100 mL q 4 hour water flushes (goal regimen to provide 2160 kcal, 92 gm protein, 1094 mL free water, 100% RDIs). Nutrition Assessment:  Trach placed 4/26/21. NGT placed this morning and KUB confirmed placement Loose stool via FMS with plan to modify formula. Malnutrition Assessment:  Malnutrition Status:  At risk for malnutrition (specify)(inability to tolerate oral diet due to respiratory status)     Estimated Daily Nutrient Needs:  Energy (kcal):  1360-2502  Protein (g):         Fluid (ml/day):  2569-7999    Nutrition Related Findings:  Loose stool 4/26      Current Nutrition Therapies:  DIET NPO  DIET TUBE FEEDING    Anthropometric Measures:  · Height:  5' 11\" (180.3 cm)  · Current Body Wt:  101.7 kg (224 lb 3.3 oz)  · BMI: 31.3    Nutrition Diagnosis:   · Inadequate oral intake related to cognitive or neurological impairment, impaired respiratory function, swallowing difficulty as evidenced by NPO or clear liquid status due to medical condition, intubation      Nutrition Intervention:  Food and/or Nutrient Delivery: Continue NPO, Modify tube feeding  Nutrition Education and Counseling: Education not indicated  Coordination of Nutrition Care: Continue to monitor while inpatient, Interdisciplinary rounds    Goals:  Nutritional needs will be met through adequate oral intake or nutrition support within the next 7 days       Nutrition Monitoring and Evaluation:   Behavioral-Environmental Outcomes: None identified  Food/Nutrient Intake Outcomes: Enteral nutrition intake/tolerance  Physical Signs/Symptoms Outcomes: Biochemical data, GI status, Nutrition focused physical findings    Discharge Planning:    Enteral nutrition     Electronically signed by Tj Diamond RD on 4/27/2021 at 11:45 AM    Contact Number: [phone]

## 2021-04-27 NOTE — OP NOTES
[address] [place] [address]   OPERATIVE REPORT    Name:  Veronica Rose  MR#:   [id number]  :  1950  ACCOUNT #:  [de-identified]  DATE OF SERVICE:  2021    PREOPERATIVE DIAGNOSIS:  Ventilator dependency. POSTOPERATIVE DIAGNOSIS:  Ventilator dependency. PROCEDURE PERFORMED:  Skin flap tracheotomy. SURGEON:  Raoul Kawasaki, MD.    ASSISTANT:  None. ANESTHESIA:  General endotracheal.    COMPLICATIONS:  None. SPECIMENS REMOVED:  None. IMPLANTS:  A #9 Portex trach tube. ESTIMATED BLOOD LOSS:  Minimal.    DRAINS:  None. OPERATIVE FINDINGS:  1. The patient's previous cricothyroidotomy may have been lower into the tracheal ring #1 since it did appear that the cricothyroid was intact. 2.  Trach placed in tracheal ring number 3-4. No difficulties encountered. DESCRIPTION OF PROCEDURE:  The patient was brought to the operating room and placed supine on the operating room table. General endotracheal anesthesia was given per anesthesiology department via the previously placed endotracheal tube. No difficulties were encountered. At this point, 1% lidocaine with epinephrine was injected in the patient's anterior neck. Once this was complete and a sufficient amount of time was given for the vasoconstrictive effects of the epinephrine, an incision was made approximately one fingerbreadth above the sternal notch below the patient's previous cricothyroidotomy incision. The previous cricothyroidotomy incision was a vertical-type incision; hence, we elected to utilize a transverse incision just below the incision itself. The procedure itself was technically somewhat difficult due to some significant hematoma as well as significant inflammatory change along the subcutaneous tissue and the strap muscles. In any event, a lower skin flap was created utilizing the Bovie. The strap muscles were identified, opened in the midline, and taken down to the level of the thyroid isthmus.   There was still a piece of thyroid isthmus still present. This was ligated and  from the trachea. The patient's bleeding was controlled with bipolar cautery as well as Harmonic scalpel. The bipolar cautery was significantly utilized to prevent any potential bleeding. Once the pretracheal fascia was identified, this was cleared off the trachea, and in approximately tracheal ring number 3-4, an inferior trapdoor incision was made. This was sutured to the skin. The tracheal dilator was then used to dilate the tracheostomy hole. After this was complete, the endotracheal tube was withdrawn under direct visualization to a point just above the tracheotomy incision. At this point, a #9 Portex trach tube was then passed into the previously made tracheotomy. This was anchored in place. The patient's wound was packed with thrombin-soaked Surgicel around the trach site itself. This was done to prevent any potential bleeding postoperatively since the patient will be probably initiated on anticoagulation in the near future. After the above was complete, the trach was sutured to the skin. Straps were applied as well. The patient was subsequently taken from the operating room to the ICU in stable condition after correct needle and sponge counts were obtained.       Greer Eisenmenger, MD      [id number]  D:  04/26/2021 15:26  T:  04/27/2021 1:15  JOB #:  [id number]

## 2021-04-28 ENCOUNTER — APPOINTMENT (OUTPATIENT)
Dept: GENERAL RADIOLOGY | Age: 71
DRG: 004 | End: 2021-04-28
Attending: PHYSICIAN ASSISTANT
Payer: MEDICARE

## 2021-04-28 LAB
ALBUMIN SERPL-MCNC: 2.4 G/DL (ref 3.4–5)
ALBUMIN SERPL-MCNC: 2.4 G/DL (ref 3.4–5)
ALBUMIN/GLOB SERPL: 0.3 {RATIO} (ref 0.8–1.7)
ALP SERPL-CCNC: 101 U/L (ref 45–117)
ALT SERPL-CCNC: 79 U/L (ref 16–61)
ANION GAP SERPL CALC-SCNC: 6 MMOL/L (ref 3–18)
APTT PPP: 158.9 SEC (ref 23–36.4)
APTT PPP: 85.8 SEC (ref 23–36.4)
AST SERPL-CCNC: 40 U/L (ref 10–38)
BASOPHILS # BLD: 0.1 K/UL (ref 0–0.1)
BASOPHILS NFR BLD: 1 % (ref 0–2)
BILIRUB DIRECT SERPL-MCNC: 0.2 MG/DL (ref 0–0.2)
BILIRUB SERPL-MCNC: 0.4 MG/DL (ref 0.2–1)
BUN SERPL-MCNC: 53 MG/DL (ref 7–18)
BUN/CREAT SERPL: 38 (ref 12–20)
CALCIUM SERPL-MCNC: 9.4 MG/DL (ref 8.5–10.1)
CHLORIDE SERPL-SCNC: 107 MMOL/L (ref 100–111)
CO2 SERPL-SCNC: 24 MMOL/L (ref 21–32)
CREAT SERPL-MCNC: 1.39 MG/DL (ref 0.6–1.3)
DIFFERENTIAL METHOD BLD: ABNORMAL
EOSINOPHIL # BLD: 0.1 K/UL (ref 0–0.4)
EOSINOPHIL NFR BLD: 1 % (ref 0–5)
ERYTHROCYTE [DISTWIDTH] IN BLOOD BY AUTOMATED COUNT: 15.9 % (ref 11.6–14.5)
GLOBULIN SER CALC-MCNC: 7.2 G/DL (ref 2–4)
GLUCOSE BLD STRIP.AUTO-MCNC: 138 MG/DL (ref 70–110)
GLUCOSE BLD STRIP.AUTO-MCNC: 141 MG/DL (ref 70–110)
GLUCOSE BLD STRIP.AUTO-MCNC: 146 MG/DL (ref 70–110)
GLUCOSE BLD STRIP.AUTO-MCNC: 154 MG/DL (ref 70–110)
GLUCOSE SERPL-MCNC: 150 MG/DL (ref 74–99)
HCT VFR BLD AUTO: 32.5 % (ref 36–48)
HGB BLD-MCNC: 10.2 G/DL (ref 13–16)
LYMPHOCYTES # BLD: 2.1 K/UL (ref 0.9–3.6)
LYMPHOCYTES NFR BLD: 16 % (ref 21–52)
MAGNESIUM SERPL-MCNC: 2.1 MG/DL (ref 1.6–2.6)
MCH RBC QN AUTO: 26.3 PG (ref 24–34)
MCHC RBC AUTO-ENTMCNC: 31.4 G/DL (ref 31–37)
MCV RBC AUTO: 83.8 FL (ref 74–97)
MONOCYTES # BLD: 1 K/UL (ref 0.05–1.2)
MONOCYTES NFR BLD: 7 % (ref 3–10)
NEUTS SEG # BLD: 9.7 K/UL (ref 1.8–8)
NEUTS SEG NFR BLD: 74 % (ref 40–73)
PHOSPHATE SERPL-MCNC: 3 MG/DL (ref 2.5–4.9)
PLATELET # BLD AUTO: 500 K/UL (ref 135–420)
PMV BLD AUTO: 11.5 FL (ref 9.2–11.8)
POTASSIUM SERPL-SCNC: 4.6 MMOL/L (ref 3.5–5.5)
PROT SERPL-MCNC: 9.6 G/DL (ref 6.4–8.2)
RBC # BLD AUTO: 3.88 M/UL (ref 4.35–5.65)
SODIUM SERPL-SCNC: 137 MMOL/L (ref 136–145)
WBC # BLD AUTO: 13.1 K/UL (ref 4.6–13.2)

## 2021-04-28 PROCEDURE — 85730 THROMBOPLASTIN TIME PARTIAL: CPT

## 2021-04-28 PROCEDURE — 85025 COMPLETE CBC W/AUTO DIFF WBC: CPT

## 2021-04-28 PROCEDURE — 80076 HEPATIC FUNCTION PANEL: CPT

## 2021-04-28 PROCEDURE — 77010033678 HC OXYGEN DAILY

## 2021-04-28 PROCEDURE — 99291 CRITICAL CARE FIRST HOUR: CPT | Performed by: INTERNAL MEDICINE

## 2021-04-28 PROCEDURE — 36415 COLL VENOUS BLD VENIPUNCTURE: CPT

## 2021-04-28 PROCEDURE — 74011250637 HC RX REV CODE- 250/637: Performed by: INTERNAL MEDICINE

## 2021-04-28 PROCEDURE — 94762 N-INVAS EAR/PLS OXIMTRY CONT: CPT

## 2021-04-28 PROCEDURE — 94640 AIRWAY INHALATION TREATMENT: CPT

## 2021-04-28 PROCEDURE — 74011000250 HC RX REV CODE- 250: Performed by: PHYSICIAN ASSISTANT

## 2021-04-28 PROCEDURE — 74011250637 HC RX REV CODE- 250/637: Performed by: REGISTERED NURSE

## 2021-04-28 PROCEDURE — 82962 GLUCOSE BLOOD TEST: CPT

## 2021-04-28 PROCEDURE — 94003 VENT MGMT INPAT SUBQ DAY: CPT

## 2021-04-28 PROCEDURE — 65610000006 HC RM INTENSIVE CARE

## 2021-04-28 PROCEDURE — 94668 MNPJ CHEST WALL SBSQ: CPT

## 2021-04-28 PROCEDURE — 74011000250 HC RX REV CODE- 250: Performed by: INTERNAL MEDICINE

## 2021-04-28 PROCEDURE — 74011250636 HC RX REV CODE- 250/636: Performed by: INTERNAL MEDICINE

## 2021-04-28 PROCEDURE — 83735 ASSAY OF MAGNESIUM: CPT

## 2021-04-28 PROCEDURE — 74011636637 HC RX REV CODE- 636/637: Performed by: PHYSICIAN ASSISTANT

## 2021-04-28 PROCEDURE — 74011250636 HC RX REV CODE- 250/636: Performed by: PHYSICIAN ASSISTANT

## 2021-04-28 PROCEDURE — 97163 PT EVAL HIGH COMPLEX 45 MIN: CPT

## 2021-04-28 PROCEDURE — 80069 RENAL FUNCTION PANEL: CPT

## 2021-04-28 RX ORDER — OXYCODONE HCL 5 MG/5 ML
5 SOLUTION, ORAL ORAL
Status: DISCONTINUED | OUTPATIENT
Start: 2021-04-28 | End: 2021-05-04

## 2021-04-28 RX ADMIN — ARFORMOTEROL TARTRATE 15 MCG: 15 SOLUTION RESPIRATORY (INHALATION) at 19:33

## 2021-04-28 RX ADMIN — ATORVASTATIN CALCIUM 80 MG: 40 TABLET, FILM COATED ORAL at 21:07

## 2021-04-28 RX ADMIN — ATORVASTATIN CALCIUM 80 MG: 40 TABLET, FILM COATED ORAL at 00:38

## 2021-04-28 RX ADMIN — FAMOTIDINE 20 MG: 10 INJECTION INTRAVENOUS at 09:26

## 2021-04-28 RX ADMIN — INSULIN LISPRO 2 UNITS: 100 INJECTION, SOLUTION INTRAVENOUS; SUBCUTANEOUS at 06:00

## 2021-04-28 RX ADMIN — CHLORHEXIDINE GLUCONATE 0.12% ORAL RINSE 10 ML: 1.2 LIQUID ORAL at 21:07

## 2021-04-28 RX ADMIN — Medication 30 ML: at 09:25

## 2021-04-28 RX ADMIN — FAMOTIDINE 20 MG: 10 INJECTION INTRAVENOUS at 21:07

## 2021-04-28 RX ADMIN — IPRATROPIUM BROMIDE AND ALBUTEROL SULFATE 3 ML: .5; 3 SOLUTION RESPIRATORY (INHALATION) at 11:51

## 2021-04-28 RX ADMIN — IPRATROPIUM BROMIDE AND ALBUTEROL SULFATE 3 ML: .5; 3 SOLUTION RESPIRATORY (INHALATION) at 16:17

## 2021-04-28 RX ADMIN — CHLORHEXIDINE GLUCONATE 0.12% ORAL RINSE 10 ML: 1.2 LIQUID ORAL at 09:25

## 2021-04-28 RX ADMIN — INSULIN LISPRO 2 UNITS: 100 INJECTION, SOLUTION INTRAVENOUS; SUBCUTANEOUS at 17:59

## 2021-04-28 RX ADMIN — HEPARIN SODIUM 1730.6 UNITS/HR: 10000 INJECTION, SOLUTION INTRAVENOUS at 02:56

## 2021-04-28 RX ADMIN — ARFORMOTEROL TARTRATE 15 MCG: 15 SOLUTION RESPIRATORY (INHALATION) at 07:18

## 2021-04-28 NOTE — PROGRESS NOTES
[facility]  Pulmonary, Critical Care, and Sleep Medicine    Name: Juarez Alejandra MRN: [id number]   : 1950 Hospital: [address] [place] [address] Dr   Date: 2021        IMPRESSION:   Angioedema- with airway and respiratory failure and collapse; thought due to ACE inhibitor  S/P Emergent Cricthyrotomy [facility]-ED 21- converted to 6.5 ETT intraoperative by anesthesia team 21, 8.0 ETT by anesthesia 21, packing removed evening 21-ENT  S/P cardiac arrest @ [facility] 21- brief, likely due to hypoxia and airway collapse  Respiratory Failure: Acute due to above; remains on ventilator. SBTs. Tracheostomy - #9 Portex on  with Dr. Karis Darnell- ENT  Pulmonary Infiltrates: suspect aspiration secretions and/or blood due to above- can't exclude other infectious process at this time. Rapid Covid Negative at [facility]  Bradycardia - improved  DVT: Popliteal- Left; acute non-occlusive thrombus.  Heparin ACS protocol started - stopped  due to bleeding from Cric site.  Bleeding controlled -  heparin resumed    Pulmonary Embolism - 21 - left lower and right upper lobes  JACQUELYN- improving   Thrombocytosis   Hypokalemia - resolved  Left scleral erythema- unknown baseline; possible infection   DM  DM retinopathy per chart review  Diabetic peripheral neuropathy  Left Pneumothorax - resolved  Obesity: Body mass index is 35.64 kg/m². Patient Active Problem List   Diagnosis Code    Angioedema due to angiotensin converting enzyme inhibitor (ACE-I) T78. 3XXA, [id number]    Respiratory failure ([address] J96.90    JACQUELYN (acute kidney injury) ([address] N17.9    Cardiac arrest (HCC) I46.9    Obesity (BMI 30-39. 9) E66.9    Diabetic neuropathy associated with type 2 diabetes mellitus ([address] E11.40    Diabetic retinopathy associated with type 2 diabetes mellitus ([address] E11.319    Pulmonary infiltrates R91.8    Controlled type 2 diabetes mellitus with neurologic complication, without long-term current use of insulin (HCC) E11.49    DVT (deep venous thrombosis) ([facility] I82.409    Encounter for palliative care Z51.5    Goals of care, counseling/discussion Z71.89    Multiple subsegmental pulmonary emboli without acute cor pulmonale (HCC) I26.94      PLAN:   NEURO:  Serial neuro evaluations  Off sedation. Transition from PRN sedation to analgesics as needed.     CARDIO:  MAP goal >64 - No IV pressor requirement  Echo on 4/6/21 with EF of 55%, dilated RV and moderate pulmonary hypertension (PASP 61 mmHg)    PULM:  Maintain aspiration precautions: HOB >30 degrees  SpO2 goal >92%  Bronchial /oral hygiene   Trach care per protocol  SBT/TC trials as clinical status allows  Completed course of decadron  Continue Brovana  Change albuterol to PRN  #9 Portex Trach on 4/26       GI/ NUTRITION:  NGT Continue and stop low-intermittent suction  Diet: Restart TF with 100 q6h free H2O  SUP:Pepcid 20 mg Q 12  Follow up with nutritional recommendations  Lipitor 80mg QHS  MV    RENAL/ METAB/ : Monitor I&Os  Trend electrolytes - replace as needed  Condom cath: Continue, monitor I/Os     HEM/ONC  Trend Hb/HCT and PLTs  DVT prophylaxis: SCDs, heparin gtt  Restart heparin gtt 4/27 now that 24 hours post-procedure.  Monitor for bleeding     ID  Antibioitcs: Cipro eye drop, Zosyn 4/4 - 4/8  Vanco: 4/4-6, MRSA swab negative.   Zosyn restarted on 4/11-4/19, Suann Hemp started 4/19-25  Repeat Covid negative (4/11)  Monitor and trend WBC and temp curve     ENDO  BGs Q 6,SSI, TSH normal  C1 inhibitor normal level (4/7/21)     MSK/ ORTHO  No active Issues  Aggressive PT/OT for deconditioning     SKIN/ WOUNDS  Per protocol  Neck- daily dressing changes per ENT, Quick clot with Afrin if needed, d/c xeroform  Trach sutured to skin      CODE STATUS: Full Code- Full     Discussed in interdisciplinary rounds   Best practice :  Glycemic control  IHI ICU bundles:              Garcia Bundle Followed and Vent Bundle Followed, Vent Day 21    Mec Vent patients- VAP bundle, aim to keep peak plateau pressure 86-94ZW H2O  Sress ulcer prophylaxis. DVT prophylaxis. Need for Lines, cardona assessed. Restraints need. Palliative care evaluation. Subjective/Interval History:        Interval HPI:69 yo M h/o HTN tx w/ lisinopril presenting to [facility] ED 4/4 for rapidly progressing respiratory distress due to severe angioedema. During intubation attempts pt had brief cardiac arrest w/ pulse loss and severe airway swelling leading to emergent cricothyrotomy. Pt stabilized before Nightingale transport to [place] at [facility] pt taken emergently to [address] was converted to ETT. Patient continued to have trouble with o2 requirements disproportionate to CXR findings and despite adjustment of FiO2 and PEEP, now improved. S/p administration of inhaled epoprostenol. CTA revelaed B/L pulmonary emboli which is likely the cause of his hypoxemia and RH strain. Continuing heparin gtt, which had already been initiated for left popliteal non-occlusive thrombus. Pt is s/p dobutamine and  diureses w/ bumex and metolazone.  COVID - 19 negative. Now with minimal vent support, however, mentation precludes extubation at this time.      Subjective 04/28/21  Hospital Day: 24  Vent Day: 24  Overnight events: No significant events noted overnight. Mentation/Activity: Trach on TC. Responsive to verbal stimuli. Opens eyes to name and intermittently moves all extremities spontaneously. Very tired. Respiratory/ Secretions: stable. Hemodynamics: H/H stable  Urine output, bowel: adequate  Diet: TF     - No overnight events  - Passed SBT yesterday. Trach collar during the day and vent last night. - Tolerating trach collar against this morning. Trial PS tonight.  - Off versed. Oral pain meds  - Discuss PEG with family  - Trend LFTs  - Maintain ICU status while tracheostomy tract matures. No bleeding at site. - Aggressive PT/OT.     ROS:Review of systems not obtained due to patient factors. Objective:   Vital Signs:    Visit Vitals  /75   Pulse 94   Temp 98.4 °F (36.9 °C)   Resp 30   Ht 5' 11\" (1.803 m)   Wt 101.7 kg (224 lb 3.3 oz)   SpO2 98%   BMI 31.27 kg/m²       O2 Device: Tracheal collar   O2 Flow Rate (L/min): 8 l/min   Temp (24hrs), Av.9 °F (37.2 °C), Min:98.4 °F (36.9 °C), Max:99.5 °F (37.5 °C)       Intake/Output:   Last shift:      No intake/output data recorded. Last 3 shifts:  190 -  0700  In: 1853.4 [I.V.:273.4]  Out: 8051 [Urine:1240; Drains:50]    Intake/Output Summary (Last 24 hours) at 2021 0754  Last data filed at 2021 0500  Gross per 24 hour   Intake 1853.41 ml   Output 1290 ml   Net 563.41 ml      Physical Exam:                 General: NAD. Trach in place on TC. HEENT:  Anicteric sclerae; pink palpebral conjunctivae; mucosa moist  Resp:  Symmetrical chest expansion, no accessory muscle use; good airway entry; no rales/ wheezing/ diminished b/l, + rhonchi noted  CV:  S1, S2 present; regular rate and rhythm  GI:  Obese. Abdomen soft, non-tender; (+) active bowel sounds  Extremities:  +2 pulses on all extremities; no edema/ cyanosis/ clubbing noted;  no restraints   Skin:  Warm; no rashes/ lesions noted, normal turgor/cap refill   Neurologic:  opens eyes, responds to noxious and verbal stimuli, +facial grimacing, moves ext x4 spontaneously, follows commands. Very tired. Doni Foley, condom cath        DATA:  Labs:  Recent Labs     21  0430 21  01221  0320   WBC 13.1 9.6 10.8   HGB 10.2* 11.0* 10.1*   HCT 32.5* 36.2 34.2*   * 571* 599*     Recent Labs     21  0430 21  0129 21  0320    137 135*   K 4.6 4.7 4.3    106 103   CO2 24 24 27   * 94 102*   BUN 53* 56* 57*   CREA 1.39* 1.34* 1.17   CA 9.4 9.3 8.7   MG 2.1 2.3 2.3   PHOS 3.0 3.5 3.0   ALB 2.4* 2.4* 2.2*     No results for input(s): PH, PCO2, PO2, HCO3, FIO2 in the last 72 hours.     Lab Results   Component Value Date/Time    Hemoglobin A1c 6.6 (H) 04/04/2021 05:11 PM     Lab Results   Component Value Date/Time    TSH 1.35 04/04/2021 05:11 PM     MICRO:  Results     Procedure Component Value Units Date/Time    COVID-19 RAPID TEST [[id number]] Collected: 04/25/21 1214    Order Status: Completed Specimen: Nasopharyngeal Updated: 04/25/21 1237     Specimen source Nasopharyngeal        COVID-19 rapid test Not detected        Comment: Rapid Abbott ID Now       Rapid NAAT:  The specimen is NEGATIVE for SARS-CoV-2, the novel coronavirus associated with COVID-19. Negative results should be treated as presumptive and, if inconsistent with clinical signs and symptoms or necessary for patient management, should be tested with an alternative molecular assay. Negative results do not preclude SARS-CoV-2 infection and should not be used as the sole basis for patient management decisions. This test has been authorized by the FDA under an Emergency Use Authorization (EUA) for use by authorized laboratories.    Fact sheet for Healthcare Providers: ConventionUpdate.co.nz  Fact sheet for Patients: ConventionUpdate.co.nz       Methodology: Isothermal Nucleic Acid Amplification         CULTURE, RESPIRATORY/SPUTUM/BRONCH Carolyne Grain STAIN [[id number]]  (Abnormal)  (Susceptibility) Collected: 04/16/21 0750    Order Status: Completed Specimen: Sputum from Endotracheal aspirate Updated: 04/19/21 0907     Special Requests: NO SPECIAL REQUESTS        GRAM STAIN 1+ WBCS SEEN         NO EPITHELIAL CELLS SEEN         NO ORGANISMS SEEN        Culture result:       LIGHT ENTEROBACTER AEROGENES                  NO NORMAL RESPIRATORY ANNALEE ISOLATED          Susceptibility      Enterobacter aerogenes     JOANNE     Amikacin ($) Susceptible     Cefazolin ($) Resistant     Cefepime ($$) Susceptible     Cefoxitin Resistant     Ceftazidime ($) Resistant     Ceftriaxone ($) Intermediate     Ciprofloxacin ($) Susceptible Gentamicin ($) Susceptible     Levofloxacin ($) Susceptible     Meropenem ($$) Susceptible     Piperacillin/Tazobac ($) Resistant     Tobramycin ($) Susceptible     Trimeth/Sulfa Susceptible                                                                          Echo  04/04/21   ECHO ADULT COMPLETE 04/06/2021 4/6/2021    Narrative · Contrast used: DEFINITY. · Image quality for this study was technically difficult. · LV: Calculated LVEF is 55%. Visually measured ejection fraction. Normal   cavity size, wall thickness and systolic function (ejection fraction   normal). Wall motion: normal. Moderate (grade 2) left ventricular   diastolic dysfunction. · AO: Mild aortic root and ascending aorta dilatation. Ascending aorta   diameter = 3.6 cm. Aortic root measures 3.9 cm  · RA: Dilated right atrium. · RV: Dilated right ventricle. Borderline low systolic function. · TV: Mild tricuspid valve regurgitation is present. · IVC: Mechanically ventilated; cannot use inferior caval vein diameter to   estimate central venous pressure. · PA: Moderate pulmonary hypertension. Pulmonary arterial systolic   pressure is 61 mmHg. Signed by: Aline Sears MD        Imaging:  [x]I have personally reviewed the patients radiographs  CXR Results  (Last 48 hours)    None          []Radiographs reviewed with radiologist   [x]No change from prior, tubes and lines in adequate position. []Improved   []Worsening    Total of 35 min critical care time spent at bedside during the course of care providing evaluation,management and care decisions and ordering appropriate treatment related to critical care problems exclusive of procedures    Maira Munoz MD PGY-1  [facility] Emergency Medicine      [facility] Staff Note     I have independently evaluated the patient at the bedside and I am the primary provider of record.     I agree with the above evaluation, assessment and recommendations along with the following comments and observations. I have made editions to above evaluation which was performed under my direction. Please also review my orders. Chart and labs reviewed    Patient trached and currently on TC. Was on TC yesterday and rested overnight on vent. He appears weak. He will open eyes and trach. Good UOP. Bun/Creat slightly improved today. He favors turning his head to the right. PT/OT saw patient earlier today. WBC slightly increased today. Continuing with supportive care. Continue with ICU status due to fresh trach site and current intermittent vent requirement. Complex decision making was made in the evaluation and management plans during this consultation. More than 50% of time was spent in counseling and coordination of care including review of data and discussion with other team members. Further recommendations and therapies pending clinical course.     Critical Care and time spent coordinating care: physical exam, chart review, documentation, discussion with care team,  minus procedure time: 35 min    Hoang Sanchez DO, [facility]  Pulmonary, Sleep and Critical Care Medicine  1:33 PM

## 2021-04-28 NOTE — PROGRESS NOTES
Pt placed on trach collar at this time. Tolerating well. Will continue to monitor.         04/28/21 0736   Oxygen Therapy   O2 Sat (%) 98 %   Pulse via Oximetry 94 beats per minute   O2 Device Tracheal collar   O2 Flow Rate (L/min) 8 l/min   FIO2 (%) 30 %

## 2021-04-28 NOTE — INTERDISCIPLINARY ROUNDS
[facility]  Pulmonary, Critical Care, and Sleep Medicine  Interdisciplinary and Ventilator Weaning Rounds    Patient discussed in morning walking rounds and interdisciplinary rounds. ICU day: 23    Overnight events:   · No acute events overnight   Assessments and best practice:   Ventilator- On trach collar this morning   o Ventilator day 23, Trach (4/26)- #9 Portex  o Vent settings: FiO2 of 30/5  o VAP bundle, aim to keep peak plateau pressure 64-55XI H2O  o Weaning assessed and documented   - No sedation  - Tolerating SBT, PS:7   Sedation   none   Other pertinent drips  o Heparin gtt    Lines noted  o PIVs   Critical labs assessed  o Yes   Antibiotics   none   Medications reviewed  o Yes   Pending imaging  o none   Pending send out labs  o no   Pending Procedures  o none   Glycemic control   SSI    Stress ulcer prophylaxis. o Pepcid   DVT prophylaxis. o Heparin gtt  On hold, SCDs   Need for Lines, cardona assessed.  o Yes   Restraint Reevaluation   o Restraints off    Family contact/MPOA: sisterNikhil  Family update: will call sister today for medical  Update.          Daily Plans:   SBT, trach collar of pt can tolerated   Discuss PEG with family and consult IR for placement   Check LFTs          Lupe Mitchell PA-C  04/28/21  Pulmonary, Critical Care Medicine  [facility]

## 2021-04-28 NOTE — PROGRESS NOTES
RENAL PROGRESS NOTE        Cristiano Mcmillan         Assessment/Plan:     · JACQUELYN (ischemic atn in a setting of acute pe/resp failure). Scr is up slightly but overall jacquelyn is resolving, good uo. · Acute pe/le le dvt. On heparin drip. · Angioedema, s/p emergent cricthyrotomy/resp failure. · Bl pneumonia, finished abx. · Resp failure. Subjective:  Patient complaints off: Intubated, opens eyes to voice. Tolerated tf. Comfortable on a trach colour. Patient Active Problem List   Diagnosis Code    Angioedema due to angiotensin converting enzyme inhibitor (ACE-I) T78. 3XXA, [id number]    Respiratory failure ([facility] J96.90    JACQUELYN (acute kidney injury) ([facility] N17.9    Cardiac arrest ([facility]) I46.9    Obesity (BMI 30-39. 9) E66.9    Diabetic neuropathy associated with type 2 diabetes mellitus ([facility] E11.40    Diabetic retinopathy associated with type 2 diabetes mellitus ([facility] E11.319    Pulmonary infiltrates R91.8    Controlled type 2 diabetes mellitus with neurologic complication, without long-term current use of insulin ([facility]) E11.49    DVT (deep venous thrombosis) ([facility]) I82.409    Encounter for palliative care Z51.5    Goals of care, counseling/discussion Z71.89    Multiple subsegmental pulmonary emboli without acute cor pulmonale ([facility]) I26.94       Current Facility-Administered Medications   Medication Dose Route Frequency Provider Last Rate Last Admin    oxyCODONE (ROXICODONE) 5 mg/5 mL oral solution 5 mg  5 mg Oral Q4H PRN Loan Rashid PA-C        atorvastatin (LIPITOR) tablet 80 mg  80 mg Oral QHS Abhilash Garcia Monday, NP   80 mg at 04/28/21 0038    heparin 25,000 units in D5W 250 ml infusion  12-25 Units/kg/hr IntraVENous TITRATE Loan Sneed PA-C 14.3 mL/hr at 04/28/21 0830 1,431 Units/hr at 04/28/21 0830    famotidine (PF) (PEPCID) 20 mg in 0.9% sodium chloride 10 mL injection  20 mg IntraVENous Q12H Dillan Alexandra MD   20 mg at 04/28/21 7019    acetaminophen (TYLENOL) solution 500 mg  500 mg Oral Q4H PRN Maria Isabel SCHAEFER PA-C   500 mg at 04/20/21 2227    oxymetazoline (AFRIN) 0.05 % nasal spray 2 Spray  2 Spray Other BID PRN Jamey Garcia NP        ELECTROLYTE REPLACEMENT PROTOCOL - Potassium Renal Dosing  1 Each Other PRN Harrison Hough DO        arformoteroL (BROVANA) neb solution 15 mcg  15 mcg Nebulization BID RT Ton HORACIO Padilla   15 mcg at 04/28/21 8340    multivit-folic acid-herbal 569 (WELLESSE PLUS) oral liquid 30 mL  30 mL Per NG tube DAILY Joan CORONA DO   30 mL at 04/28/21 0925    chlorhexidine (PERIDEX) 0.12 % mouthwash 10 mL  10 mL Oral Q12H Robbie Molina PA-C   10 mL at 04/28/21 3225    albuterol-ipratropium (DUO-NEB) 2.5 MG-0.5 MG/3 ML  3 mL Nebulization Q4H PRN Robbie Molina PA-C   3 mL at 04/28/21 1151    glucose chewable tablet 16 g  4 Tab Oral PRN Robbie Molina PA-C        glucagon (GLUCAGEN) injection 1 mg  1 mg IntraMUSCular PRN Robbie Molina PA-C        dextrose (D50W) injection syrg 12.5-25 g  25-50 mL IntraVENous PRN Robbie Molina PA-C        insulin lispro (HUMALOG) injection   SubCUTAneous Q6H Robbie Molina PA-C   Stopped at 04/28/21 1200    carboxymethylcellulose sodium (REFRESH LIQUIGEL) 1 % ophthalmic solution 1 Drop  1 Drop Both Eyes PRN Robbie Molina PA-C   1 Drop at 04/14/21 0621       Objective  Vitals:    04/28/21 1152 04/28/21 1200 04/28/21 1300 04/28/21 1400   BP:  130/75 121/74 118/71   Pulse:  100 99 98   Resp:  27 28 (!) 31   Temp:  98.2 °F (36.8 °C)     SpO2: 97% 99% 97% 99%   Weight:       Height:             Intake/Output Summary (Last 24 hours) at 4/28/2021 1528  Last data filed at 4/28/2021 1200  Gross per 24 hour   Intake 2001.48 ml   Output 1075 ml   Net 926.48 ml           Admission weight: Weight: 127 kg (279 lb 15.8 oz) (04/04/21 1726)  Last Weight Metrics:  Weight Loss Metrics 4/23/2021 4/4/2021 4/4/2021 4/4/2021   Today's Wt 224 lb 3.3 oz - 280 lb -   BMI - 31.27 kg/m2 - 37.97 kg/m2             Physical Assessment:     General: intubated, opens eyes to voice. Neck: No jvd. Trach. LUNGS: diminished air entry at the bases, no crackles. CVS EXM: S1, S2  RRR. Abdomen: soft, non tender. Lower Extremities:  1+ edema. Lab    CBC w/Diff Recent Labs     04/28/21 0430 04/27/21  0129 04/26/21  0320   WBC 13.1 9.6 10.8   RBC 3.88* 4.25* 4.05*   HGB 10.2* 11.0* 10.1*   HCT 32.5* 36.2 34.2*   * 571* 599*   GRANS 74* 76* 69   LYMPH 16* 14* 18*   EOS 1 2 3        Chemistry Recent Labs     04/28/21 0430 04/27/21  0129 04/26/21  0320   * 94 102*    137 135*   K 4.6 4.7 4.3    106 103   CO2 24 24 27   BUN 53* 56* 57*   CREA 1.39* 1.34* 1.17   CA 9.4 9.3 8.7   AGAP 6 7 5   BUCR 38* 42* 49*     --   --    TP 9.6*  --   --    ALB 2.4*  2.4* 2.4* 2.2*   GLOB 7.2*  --   --    AGRAT 0.3*  --   --    PHOS 3.0 3.5 3.0         No results found for: IRON, FE, TIBC, IBCT, PSAT, FERR   Lab Results   Component Value Date/Time    Calcium 9.4 04/28/2021 04:30 AM    Phosphorus 3.0 04/28/2021 04:30 AM        Anais Parker M.D.   [facility]  Phone

## 2021-04-28 NOTE — PROGRESS NOTES
Problem: Mobility Impaired (Adult and Pediatric)  Goal: *Acute Goals and Plan of Care (Insert Text)  Description: Physical Therapy Goals  Initiated 4/28/2021 and to be accomplished within 7 day(s)  1. Patient will move from supine to sit and sit to supine , scoot up and down, and roll side to side in bed with maximal assistance. 2.  Patient will transfer from bed to chair and chair to bed with maximal assistance using the least restrictive device. 3.  Patient will perform sit to stand with maximal assistance. 4.  Patient will sit on EOB with maxA for >5 minutes in prep for OOB activities. PLOF: Pt unable to verbalize PLOF. Outcome: Progressing Towards Goal    PHYSICAL THERAPY EVALUATION    Patient: Beatrice Kwok (28 y.o. male)  Date: 4/28/2021  Primary Diagnosis: Angioedema due to angiotensin converting enzyme inhibitor (ACE-I) [T78. 3XXA, T46.4X5A]  Procedure(s) (LRB):  TRACHEOSTOMY (N/A) 2 Days Post-Op   Precautions:  (standard )    PLOF: see above     ASSESSMENT   Pt cleared to participate in PT session, pt received semi-reclined in bed and agreeable to therapy session, pt with trach on 8L O2 via trach collar and SPO2 in high 90s throughout session. Pt expressive throughout session but did not attempt to move mouth or speak. Based on the objective data described below, the patient presents with decreased endurance, decreased strength, decreased balance reactions, gait deviations, and decreased independence in functional mobility. Pt possibly hard of hearing, need to repeat commands and increase voice volume. Pt able to move UEs independently at elbow but did not squeeze hands to command. Pt able to move RLE digits to command but not LLE. Pt able to move BLEs in hip IR/ER. Limited LE passive range of motion due to facial grimace, unable to actively flex knees.  Completing L cervical rotation stretch, able to achieve ~10 degrees towards L from preferred position of R rotation to ~80 degrees, unable to achieve midline. Completing x3 stretches for 30 seconds each. Pt requiring totalA for scooting towards HOB. Pt this time pt would require totalA for all mobility. Pt positioned for comfort. Pt left with all needs met and call bell in reach. RN notified of position and participation. Pt will be placed on 3 day trial to assess progress towards goals and PLOF. Pt would benefit from 2 person assist at this time, would benefit from OT evaluation order. Patient will benefit from skilled intervention to address the above impairments. Patient's rehabilitation potential is considered to be Fair  Factors which may influence rehabilitation potential include:   []         None noted  []         Mental ability/status  [x]         Medical condition  []         Home/family situation and support systems  []         Safety awareness  []         Pain tolerance/management  []         Other:      PLAN :  Recommendations and Planned Interventions:   [x]           Bed Mobility Training             [x]    Neuromuscular Re-Education  [x]           Transfer Training                   []    Orthotic/Prosthetic Training  [x]           Gait Training                          []    Modalities  [x]           Therapeutic Exercises           []    Edema Management/Control  [x]           Therapeutic Activities            [x]    Family Training/Education  [x]           Patient Education  []           Other (comment):    Frequency/Duration: Patient will be followed by physical therapy 1-2 times per day/4-7 days per week to address goals.   Discharge Recommendations: LTACH  Further Equipment Recommendations for Discharge: TBD pending progress      SUBJECTIVE:   nonverbal    OBJECTIVE DATA SUMMARY:     Past Medical History:   Diagnosis Date    DDD (degenerative disc disease), lumbar     Diabetes ([address]     Diabetic neuropathy ([facility]     Diabetic retinopathy ([facility]     DM2 (diabetes mellitus, type 2) ([address]     HLD (hyperlipidemia)     HTN (hypertension)     Obesity (BMI 30-39. 9)    History reviewed. No pertinent surgical history. Barriers to Learning/Limitations: yes;  altered mental status (i.e.Sedation, Confusion)  Compensate with: Visual Cues, Verbal Cues, and Tactile Cues  Home Situation:  Home Situation  Home Environment: Other (comment)( [facility], sedated/intubated)  One/Two Story Residence: Other (Comment)( [facility], sedated/intubated)  Living Alone: No  Support Systems: Parent  Patient Expects to be Discharged to[de-identified] Unknown  Current DME Used/Available at Home: None( [facility], sedated/intubated)  Critical Behavior:  Neurologic State: Alert  Orientation Level: Unable to verbalize  Cognition: Follows commands  Safety/Judgement: Fall prevention  Psychosocial  Patient Behaviors: Calm    Strength:    Strength: Generally decreased, functional    Tone & Sensation:   Tone: Normal    Sensation: (unable to fully assess )    Range Of Motion:  AROM: Generally decreased, functional(limited due to decreased strength )    PROM: Generally decreased, functional(2/2 pain )    Posture:  Posture (WDL): (R cervical rotation )     Functional Mobility:  Bed Mobility:  Rolling: Total assistance    Scooting: Total assistance    Pain:  Did not rate, FLACC 0/10 before and after treatment, facial grimace with passive range through LEs       Activity Tolerance:   Poor activity tolerance, limited mobility     Please refer to the flowsheet for vital signs taken during this treatment. After treatment:   []         Patient left in no apparent distress sitting up in chair  [x]         Patient left in no apparent distress in bed  [x]         Call bell left within reach  [x]         Nursing notified  []         Caregiver present  []         Bed alarm activated  []         SCDs applied    COMMUNICATION/EDUCATION:   [x]         Role of Physical Therapy in the acute care setting. [x]         Fall prevention education was provided and the patient/caregiver indicated understanding.   [x] Patient/family have participated as able in goal setting and plan of care. [x]         Patient/family agree to work toward stated goals and plan of care. []         Patient understands intent and goals of therapy, but is neutral about his/her participation. []         Patient is unable to participate in goal setting/plan of care: ongoing with therapy staff.  []         Other:     Thank you for this referral.  Yamilex Dooley, PT   Time Calculation: 16 mins      Eval Complexity: History: HIGH Complexity :3+ comorbidities / personal factors will impact the outcome/ POC Exam:HIGH Complexity : 4+ Standardized tests and measures addressing body structure, function, activity limitation and / or participation in recreation  Presentation: HIGH Complexity : Unstable and unpredictable characteristics  Clinical Decision Making:High Complexity high  Overall Complexity:HIGH

## 2021-04-28 NOTE — PROGRESS NOTES
04/27/21 2006   Vent Method/Mode   Ventilation Method Conventional   Ventilator Mode Assist control     Vent circuit and leal changed

## 2021-04-28 NOTE — PROGRESS NOTES
attempted to complete a  follow up visit with and Spiritual assessment of patient in room 302 today but found the patient with a trache and awake but unable to communicate now. Prayer was offered at doorway for patient. . Chaplains will continue to follow and will provide pastoral care on an as needed/requested basis    Chaplain Ashwin Tovar   Board Certified [address] [place] [address]   [phone]

## 2021-04-29 ENCOUNTER — APPOINTMENT (OUTPATIENT)
Dept: GENERAL RADIOLOGY | Age: 71
DRG: 004 | End: 2021-04-29
Attending: PHYSICIAN ASSISTANT
Payer: MEDICARE

## 2021-04-29 ENCOUNTER — APPOINTMENT (OUTPATIENT)
Dept: GENERAL RADIOLOGY | Age: 71
DRG: 004 | End: 2021-04-29
Attending: NURSE PRACTITIONER
Payer: MEDICARE

## 2021-04-29 LAB
ALBUMIN SERPL-MCNC: 2.3 G/DL (ref 3.4–5)
ANION GAP SERPL CALC-SCNC: 2 MMOL/L (ref 3–18)
APTT PPP: 105.3 SEC (ref 23–36.4)
BASOPHILS # BLD: 0.1 K/UL (ref 0–0.1)
BASOPHILS NFR BLD: 0 % (ref 0–2)
BUN SERPL-MCNC: 50 MG/DL (ref 7–18)
BUN/CREAT SERPL: 41 (ref 12–20)
CALCIUM SERPL-MCNC: 9.1 MG/DL (ref 8.5–10.1)
CHLORIDE SERPL-SCNC: 107 MMOL/L (ref 100–111)
CO2 SERPL-SCNC: 29 MMOL/L (ref 21–32)
CREAT SERPL-MCNC: 1.23 MG/DL (ref 0.6–1.3)
DIFFERENTIAL METHOD BLD: ABNORMAL
EOSINOPHIL # BLD: 0.1 K/UL (ref 0–0.4)
EOSINOPHIL NFR BLD: 1 % (ref 0–5)
ERYTHROCYTE [DISTWIDTH] IN BLOOD BY AUTOMATED COUNT: 16 % (ref 11.6–14.5)
GLUCOSE BLD STRIP.AUTO-MCNC: 137 MG/DL (ref 70–110)
GLUCOSE BLD STRIP.AUTO-MCNC: 159 MG/DL (ref 70–110)
GLUCOSE BLD STRIP.AUTO-MCNC: 169 MG/DL (ref 70–110)
GLUCOSE SERPL-MCNC: 144 MG/DL (ref 74–99)
HCT VFR BLD AUTO: 34 % (ref 36–48)
HGB BLD-MCNC: 10.5 G/DL (ref 13–16)
INR PPP: 1.2 (ref 0.8–1.2)
LYMPHOCYTES # BLD: 2.1 K/UL (ref 0.9–3.6)
LYMPHOCYTES NFR BLD: 17 % (ref 21–52)
MAGNESIUM SERPL-MCNC: 2.4 MG/DL (ref 1.6–2.6)
MCH RBC QN AUTO: 25.6 PG (ref 24–34)
MCHC RBC AUTO-ENTMCNC: 30.9 G/DL (ref 31–37)
MCV RBC AUTO: 82.9 FL (ref 74–97)
MONOCYTES # BLD: 0.9 K/UL (ref 0.05–1.2)
MONOCYTES NFR BLD: 8 % (ref 3–10)
NEUTS SEG # BLD: 9.3 K/UL (ref 1.8–8)
NEUTS SEG NFR BLD: 74 % (ref 40–73)
PHOSPHATE SERPL-MCNC: 3.3 MG/DL (ref 2.5–4.9)
PLATELET # BLD AUTO: 436 K/UL (ref 135–420)
PMV BLD AUTO: 11.5 FL (ref 9.2–11.8)
POTASSIUM SERPL-SCNC: 5 MMOL/L (ref 3.5–5.5)
PROTHROMBIN TIME: 15.2 SEC (ref 11.5–15.2)
RBC # BLD AUTO: 4.1 M/UL (ref 4.35–5.65)
SODIUM SERPL-SCNC: 138 MMOL/L (ref 136–145)
WBC # BLD AUTO: 12.5 K/UL (ref 4.6–13.2)

## 2021-04-29 PROCEDURE — 36415 COLL VENOUS BLD VENIPUNCTURE: CPT

## 2021-04-29 PROCEDURE — 74011250637 HC RX REV CODE- 250/637: Performed by: NURSE PRACTITIONER

## 2021-04-29 PROCEDURE — 77030041174 HC STOOL COL SYS DIGNSHLD BARD -C

## 2021-04-29 PROCEDURE — 74011000250 HC RX REV CODE- 250: Performed by: PHYSICIAN ASSISTANT

## 2021-04-29 PROCEDURE — 83735 ASSAY OF MAGNESIUM: CPT

## 2021-04-29 PROCEDURE — APPNB45 APP NON BILLABLE 31-45 MINUTES: Performed by: NURSE PRACTITIONER

## 2021-04-29 PROCEDURE — 74011250637 HC RX REV CODE- 250/637: Performed by: INTERNAL MEDICINE

## 2021-04-29 PROCEDURE — 80069 RENAL FUNCTION PANEL: CPT

## 2021-04-29 PROCEDURE — 2709999900 HC NON-CHARGEABLE SUPPLY

## 2021-04-29 PROCEDURE — 74011250637 HC RX REV CODE- 250/637: Performed by: REGISTERED NURSE

## 2021-04-29 PROCEDURE — 94640 AIRWAY INHALATION TREATMENT: CPT

## 2021-04-29 PROCEDURE — 74011250636 HC RX REV CODE- 250/636: Performed by: INTERNAL MEDICINE

## 2021-04-29 PROCEDURE — 99291 CRITICAL CARE FIRST HOUR: CPT | Performed by: INTERNAL MEDICINE

## 2021-04-29 PROCEDURE — 77030018798 HC PMP KT ENTRL FED COVD -A

## 2021-04-29 PROCEDURE — 74011250637 HC RX REV CODE- 250/637: Performed by: PHYSICIAN ASSISTANT

## 2021-04-29 PROCEDURE — 94762 N-INVAS EAR/PLS OXIMTRY CONT: CPT

## 2021-04-29 PROCEDURE — 74011000250 HC RX REV CODE- 250: Performed by: INTERNAL MEDICINE

## 2021-04-29 PROCEDURE — 74018 RADEX ABDOMEN 1 VIEW: CPT

## 2021-04-29 PROCEDURE — 65610000006 HC RM INTENSIVE CARE

## 2021-04-29 PROCEDURE — 74011636637 HC RX REV CODE- 636/637: Performed by: PHYSICIAN ASSISTANT

## 2021-04-29 PROCEDURE — 85610 PROTHROMBIN TIME: CPT

## 2021-04-29 PROCEDURE — 82962 GLUCOSE BLOOD TEST: CPT

## 2021-04-29 PROCEDURE — 74011250636 HC RX REV CODE- 250/636: Performed by: PHYSICIAN ASSISTANT

## 2021-04-29 PROCEDURE — 85025 COMPLETE CBC W/AUTO DIFF WBC: CPT

## 2021-04-29 PROCEDURE — 85730 THROMBOPLASTIN TIME PARTIAL: CPT

## 2021-04-29 PROCEDURE — 94668 MNPJ CHEST WALL SBSQ: CPT

## 2021-04-29 RX ORDER — WARFARIN SODIUM 5 MG/1
5 TABLET ORAL ONCE
Status: COMPLETED | OUTPATIENT
Start: 2021-04-29 | End: 2021-04-29

## 2021-04-29 RX ORDER — POLYETHYLENE GLYCOL 3350 17 G/17G
17 POWDER, FOR SOLUTION ORAL DAILY
Status: DISCONTINUED | OUTPATIENT
Start: 2021-04-29 | End: 2021-05-04

## 2021-04-29 RX ORDER — SODIUM POLYSTYRENE SULFONATE 15 G/60ML
15 SUSPENSION ORAL; RECTAL
Status: COMPLETED | OUTPATIENT
Start: 2021-04-29 | End: 2021-04-29

## 2021-04-29 RX ADMIN — FAMOTIDINE 20 MG: 10 INJECTION INTRAVENOUS at 21:29

## 2021-04-29 RX ADMIN — SODIUM POLYSTYRENE SULFONATE 15 G: 15 SUSPENSION ORAL; RECTAL at 11:10

## 2021-04-29 RX ADMIN — ARFORMOTEROL TARTRATE 15 MCG: 15 SOLUTION RESPIRATORY (INHALATION) at 19:15

## 2021-04-29 RX ADMIN — WARFARIN SODIUM 5 MG: 5 TABLET ORAL at 21:28

## 2021-04-29 RX ADMIN — Medication 30 ML: at 08:52

## 2021-04-29 RX ADMIN — IPRATROPIUM BROMIDE AND ALBUTEROL SULFATE 3 ML: .5; 3 SOLUTION RESPIRATORY (INHALATION) at 15:27

## 2021-04-29 RX ADMIN — ACETAMINOPHEN ORAL SOLUTION 500 MG: 650 SOLUTION ORAL at 16:43

## 2021-04-29 RX ADMIN — CHLORHEXIDINE GLUCONATE 0.12% ORAL RINSE 10 ML: 1.2 LIQUID ORAL at 08:52

## 2021-04-29 RX ADMIN — CHLORHEXIDINE GLUCONATE 0.12% ORAL RINSE 10 ML: 1.2 LIQUID ORAL at 21:28

## 2021-04-29 RX ADMIN — ATORVASTATIN CALCIUM 80 MG: 40 TABLET, FILM COATED ORAL at 21:28

## 2021-04-29 RX ADMIN — POLYETHYLENE GLYCOL 3350 17 G: 17 POWDER, FOR SOLUTION ORAL at 12:23

## 2021-04-29 RX ADMIN — INSULIN LISPRO 2 UNITS: 100 INJECTION, SOLUTION INTRAVENOUS; SUBCUTANEOUS at 18:14

## 2021-04-29 RX ADMIN — IPRATROPIUM BROMIDE AND ALBUTEROL SULFATE 3 ML: .5; 3 SOLUTION RESPIRATORY (INHALATION) at 12:25

## 2021-04-29 RX ADMIN — HEPARIN SODIUM 1431 UNITS/HR: 10000 INJECTION, SOLUTION INTRAVENOUS at 16:51

## 2021-04-29 RX ADMIN — HEPARIN SODIUM 1431 UNITS/HR: 10000 INJECTION, SOLUTION INTRAVENOUS at 00:18

## 2021-04-29 RX ADMIN — ARFORMOTEROL TARTRATE 15 MCG: 15 SOLUTION RESPIRATORY (INHALATION) at 07:14

## 2021-04-29 RX ADMIN — INSULIN LISPRO 2 UNITS: 100 INJECTION, SOLUTION INTRAVENOUS; SUBCUTANEOUS at 05:31

## 2021-04-29 RX ADMIN — FAMOTIDINE 20 MG: 10 INJECTION INTRAVENOUS at 08:52

## 2021-04-29 NOTE — PROGRESS NOTES
RENAL PROGRESS NOTE        Cristiano Khanna         Assessment/Plan:     1. JACQUELYN (ischemic atn in a setting of acute pe/resp failure). Scr is up slightly but overall jacquelyn is resolving, good uo. 2. Acute pe/le le dvt. On heparin drip. 3. Angioedema, s/p emergent cricthyrotomy/resp failure. 4. Bl pneumonia, finished abx. 5. Resp failure. 6. High K , follow closely    Please call with questions    Jevon Diaz MD FASN  Cell [phone]  Pager: [phone]                                                                                                                              Subjective: Intubated/ awake         Patient Active Problem List   Diagnosis Code    Angioedema due to angiotensin converting enzyme inhibitor (ACE-I) T78. 3XXA, [id number]    Respiratory failure ([facility] J96.90    JACQUELYN (acute kidney injury) ([facility] N17.9    Cardiac arrest (HCC) I46.9    Obesity (BMI 30-39. 9) E66.9    Diabetic neuropathy associated with type 2 diabetes mellitus ([facility] E11.40    Diabetic retinopathy associated with type 2 diabetes mellitus ([facility] E11.319    Pulmonary infiltrates R91.8    Controlled type 2 diabetes mellitus with neurologic complication, without long-term current use of insulin (HCC) E11.49    DVT (deep venous thrombosis) ([facility] I82.409    Encounter for palliative care Z51.5    Goals of care, counseling/discussion Z71.89    Multiple subsegmental pulmonary emboli without acute cor pulmonale (HCC) I26.94       Current Facility-Administered Medications   Medication Dose Route Frequency Provider Last Rate Last Admin    polyethylene glycol (MIRALAX) packet 17 g  17 g Per NG tube DAILY Capilitan, Etheleen Ada, NP   17 g at 04/29/21 1223    WARFARIN INFORMATION NOTE (COUMADIN)   Other Q24H Sue Hoffmann,         oxyCODONE (ROXICODONE) 5 mg/5 mL oral solution 5 mg  5 mg Oral Q4H PRN EVELYNE'Loan Sharp, PAMengC        atorvastatin (LIPITOR) tablet 80 mg  80 mg Oral QHS Capilitan, Etheleen Ada, NP   80 mg at 04/28/21 2107    heparin 25,000 units in D5W 250 ml infusion  12-25 Units/kg/hr IntraVENous TITRATE Loan Farley Cera, PA-C 14.3 mL/hr at 04/29/21 0018 1,431 Units/hr at 04/29/21 0018    famotidine (PF) (PEPCID) 20 mg in 0.9% sodium chloride 10 mL injection  20 mg IntraVENous Q12H Donell Crawford MD   20 mg at 04/29/21 1502    acetaminophen (TYLENOL) solution 500 mg  500 mg Oral Q4H PRN Loan Farley Cera, PA-C   500 mg at 04/20/21 2227    oxymetazoline (AFRIN) 0.05 % nasal spray 2 Spray  2 Spray Other BID PRN Racquel Garcia NP        ELECTROLYTE REPLACEMENT PROTOCOL - Potassium Renal Dosing  1 Each Other PRN Gurinder Gallardo,         arformoteroL (BROVANA) neb solution 15 mcg  15 mcg Nebulization BID RT Rajeev Love PA-C   15 mcg at 04/29/21 9765    multivit-folic acid-herbal 234 (WELLESSE PLUS) oral liquid 30 mL  30 mL Per NG tube DAILY Kandi CORONA, DO   30 mL at 04/29/21 0852    chlorhexidine (PERIDEX) 0.12 % mouthwash 10 mL  10 mL Oral Q12H Isaura Grant PA-C   10 mL at 04/29/21 1202    albuterol-ipratropium (DUO-NEB) 2.5 MG-0.5 MG/3 ML  3 mL Nebulization Q4H PRN Isaura Grant PA-C   3 mL at 04/29/21 1527    glucose chewable tablet 16 g  4 Tab Oral PRN Isaura Grant PA-C        glucagon (GLUCAGEN) injection 1 mg  1 mg IntraMUSCular PRN Isaura Grant PA-C        dextrose (D50W) injection syrg 12.5-25 g  25-50 mL IntraVENous PRN Isaura Grant PA-C        insulin lispro (HUMALOG) injection   SubCUTAneous Q6H Isaura Grant PA-C   Stopped at 04/29/21 1112    carboxymethylcellulose sodium (REFRESH LIQUIGEL) 1 % ophthalmic solution 1 Drop  1 Drop Both Eyes PRN Isaura Grant PA-C   1 Drop at 04/14/21 0621       Objective  Vitals:    04/29/21 1500 04/29/21 1529 04/29/21 1530 04/29/21 1600   BP: 135/74   122/70   Pulse: (!) 110 (!) 107  (!) 107   Resp: (!) 32 25  (!) 34   Temp:       SpO2: 94% 94% 94% 96%   Weight:       Height:             Intake/Output Summary (Last 24 hours) at 4/29/2021 1620  Last data filed at 4/29/2021 1500  Gross per 24 hour   Intake 1928.9 ml   Output 1410 ml   Net 518.9 ml           Admission weight: Weight: 127 kg (279 lb 15.8 oz) (04/04/21 1726)  Last Weight Metrics:  Weight Loss Metrics 4/23/2021 4/4/2021 4/4/2021 4/4/2021   Today's Wt 224 lb 3.3 oz - 280 lb -   BMI - 31.27 kg/m2 - 37.97 kg/m2             Physical Assessment:     General: intubated, opens eyes to voice. Neck: No jvd. Trach. LUNGS: diminished air entry at the bases, no crackles. CVS EXM: S1, S2  RRR. Abdomen: soft, non tender. Lower Extremities:  1+ edema.        Lab    CBC w/Diff Recent Labs     04/29/21  0114 04/28/21  0430 04/27/21  0129   WBC 12.5 13.1 9.6   RBC 4.10* 3.88* 4.25*   HGB 10.5* 10.2* 11.0*   HCT 34.0* 32.5* 36.2   * 500* 571*   GRANS 74* 74* 76*   LYMPH 17* 16* 14*   EOS 1 1 2        Chemistry Recent Labs     04/29/21  0114 04/28/21  0430 04/27/21  0129   * 150* 94    137 137   K 5.0 4.6 4.7    107 106   CO2 29 24 24   BUN 50* 53* 56*   CREA 1.23 1.39* 1.34*   CA 9.1 9.4 9.3   AGAP 2* 6 7   BUCR 41* 38* 42*   AP  --  101  --    TP  --  9.6*  --    ALB 2.3* 2.4*  2.4* 2.4*   GLOB  --  7.2*  --    AGRAT  --  0.3*  --    PHOS 3.3 3.0 3.5         No results found for: IRON, FE, TIBC, IBCT, PSAT, FERR   Lab Results   Component Value Date/Time    Calcium 9.1 04/29/2021 01:14 AM    Phosphorus 3.3 04/29/2021 01:14 AM

## 2021-04-29 NOTE — DISCHARGE SUMMARY
Discharge Summary    Patient: Bernarda Hanks               Sex: male          DOA: 4/4/2021         YOB: 1950      Age:  79 y.o.        LOS:  LOS: 25 days                Admit Date: 4/4/2021    Discharge Date: 4/29/2021    Admission Diagnoses: Angioedema due to angiotensin converting enzyme inhibitor (ACE-I) [T78. 3XXA, T46.4X5A]    Discharge Diagnoses:  Tracheostomy, respiratory failure, DVT, thrombocytosis  Problem List as of 4/29/2021 Date Reviewed: 4/5/2021          Codes Class Noted - Resolved    Encounter for palliative care ICD-10-CM: Z51.5  ICD-9-CM: V66.7  Unknown - Present        Goals of care, counseling/discussion ICD-10-CM: Z71.89  ICD-9-CM: V65.49  Unknown - Present        Multiple subsegmental pulmonary emboli without acute cor pulmonale ([facility] ICD-10-CM: I26.94  ICD-9-CM: 415.19  Unknown - Present        DVT (deep venous thrombosis) ([facility] ICD-10-CM: I82.409  ICD-9-CM: 453.40  4/10/2021 - Present        Respiratory failure ([facility] ICD-10-CM: J96.90  ICD-9-CM: 518.81  4/5/2021 - Present        Obesity (BMI 30-39. 9) ICD-10-CM: E66.9  ICD-9-CM: 278.00  4/5/2021 - Present        Diabetic neuropathy associated with type 2 diabetes mellitus ([facility] ICD-10-CM: E11.40  ICD-9-CM: 250.60, 357.2  4/5/2021 - Present        Diabetic retinopathy associated with type 2 diabetes mellitus ([facility] ICD-10-CM: E11.319  ICD-9-CM: 250.50, 362.01  4/5/2021 - Present        Pulmonary infiltrates ICD-10-CM: R91.8  ICD-9-CM: 793.19  4/5/2021 - Present        Controlled type 2 diabetes mellitus with neurologic complication, without long-term current use of insulin ([facility] ICD-10-CM: E11.49  ICD-9-CM: 250.60  4/5/2021 - Present        Angioedema due to angiotensin converting enzyme inhibitor (ACE-I) ICD-10-CM: T78. Chary Bharath, [id number]  ICD-9-CM: 995.1, E942.6  4/4/2021 - Present        JACQUELYN (acute kidney injury) ([facility] ICD-10-CM: N17.9  ICD-9-CM: 584.9  4/4/2021 - Present        Cardiac arrest [facility]) ICD-10-CM: I46.9  ICD-9-CM: 427.5  4/4/2021 - Present              Discharge Condition: Stable    Hospital Course: 79year old male with PMH of HTN on ACE inhibitor, DM type 2, and cardiac arrest who presented  to [facility] ED on  4/4/21 for rapidly progressing respiratory distress due to severe angioedema. During intubation attempts patient had brief cardiac arrest with pulse loss and severe airway swelling leading to emergent cricothyrotomy. Pt stabilized before Nightingale transport to [place] at [facility] the patient was taken emergently to the [address] was converted to ETT. Patient continued to have trouble with oxygen requirements disproportionate to CXR findings and despite adjustment of FiO2 and PEEP, which has now improved. S/p administration of inhaled epoprostenol. CTA revelaed B/L pulmonary emboli which is likely the cause of his hypoxemia and RH strain. Patient started on  heparin gtt  for left popliteal non-occlusive thrombus. Pt is s/p course of antibiotics, dobutamine and  diureses w/ bumex and metolazone.  COVID - 19 negative. Tracheostomy placed on 4/26/21 by ENT and is now tolerating trach collar trials.        Consults: Infectious Disease, Nephrology, Pulmonary/Critical Care and palliative care, ENT    Significant Diagnostic Studies: labs, radiology    Discharge Medications:     Current Discharge Medication List      CONTINUE these medications which have NOT CHANGED    Details   LISINOPRIL, BULK, by Does Not Apply route.              Activity: Activity as tolerated    Diet: TF via NGT - Jevity 1.5 @ 60 ml/hr with free water flushes at 100 q 4 hours    Wound Care: None needed    Follow-up: ***

## 2021-04-29 NOTE — PROGRESS NOTES
[facility]  Pulmonary, Critical Care, and Sleep Medicine    Name: Ariadna Glass MRN: [id number]   : 1950 Hospital: [address] [place] [address] Dr   Date: 2021        IMPRESSION:   Angioedema- with airway and respiratory failure and collapse; thought due to ACE inhibitor  S/P Emergent Cricthyrotomy [facility]-ED 21- converted to 6.5 ETT intraoperative by anesthesia team 21, 8.0 ETT by anesthesia 21, packing removed evening 21-ENT  S/P cardiac arrest @ [facility] 21- brief, likely due to hypoxia and airway collapse  Respiratory Failure: Acute due to above; remains on ventilator. SBTs. Tracheostomy - #9 Portex on  with Dr. Trace Trejo- ENT  Pulmonary Infiltrates: suspect aspiration secretions and/or blood due to above- can't exclude other infectious process at this time. Rapid Covid Negative at [facility]  Bradycardia - improved  DVT: Popliteal- Left; acute non-occlusive thrombus.  Heparin ACS protocol started - stopped  due to bleeding from Cric site.  Bleeding controlled -  heparin resumed    Pulmonary Embolism - 21 - left lower and right upper lobes  JACQUELYN- improving   Thrombocytosis - improving  Hypokalemia - resolved  Left scleral erythema- unknown baseline; possible infection   DM  DM retinopathy per chart review  Diabetic peripheral neuropathy  Left Pneumothorax - resolved  Obesity: Body mass index is 35.64 kg/m². Patient Active Problem List   Diagnosis Code    Angioedema due to angiotensin converting enzyme inhibitor (ACE-I) T78. 3XXA, [id number]    Respiratory failure ([address] J96.90    JACQUELYN (acute kidney injury) ([address] N17.9    Cardiac arrest (HCC) I46.9    Obesity (BMI 30-39. 9) E66.9    Diabetic neuropathy associated with type 2 diabetes mellitus ([address] E11.40    Diabetic retinopathy associated with type 2 diabetes mellitus ([address] E11.319    Pulmonary infiltrates R91.8    Controlled type 2 diabetes mellitus with neurologic complication, without long-term current use of insulin (HCC) E11.49    DVT (deep venous thrombosis) ([facility] I82.409    Encounter for palliative care Z51.5    Goals of care, counseling/discussion Z71.89    Multiple subsegmental pulmonary emboli without acute cor pulmonale (HCC) I26.94      PLAN:   NEURO:  Serial neuro evaluations  Off sedation. Analgesics as needed.     CARDIO:  MAP goal >64 - No IV pressor requirement  Echo on 4/6/21 with EF of 55%, dilated RV and moderate pulmonary hypertension (PASP 61 mmHg)    PULM:  Maintain aspiration precautions: HOB >30 degrees  SpO2 goal >92%  Bronchial /oral hygiene   Trach care per protocol  SBT/TC trials as clinical status allows  Completed course of decadron  Continue Brovana  Change albuterol to PRN  #9 Portex Trach on 4/26       GI/ NUTRITION:  NGT Continue and stop low-intermittent suction  Diet: Tube feeds: Jevity 1.5, 60mls/hr, Free Water: 100mls Q 4  SUP:Pepcid 20 mg Q 12  Follow up with nutritional recommendations  Lipitor 80mg QHS  MV    RENAL/ METAB/ : Monitor I&Os  Trend electrolytes - replace as needed  Condom cath: Continue, monitor I/Os  K- excelate today     HEM/ONC  Trend Hb/HCT and PLTs  DVT prophylaxis: SCDs, heparin gttp  Restart heparin gtt 4/27    Start coumadin tonight: pharmacy to dose, stop heparin when theraputic     ID  Antibioitcs: Cipro eye drop, Zosyn 4/4 - 4/8  Vanco: 4/4-6, MRSA swab negative.   Zosyn restarted on 4/11-4/19, Paralee Labella started 4/19-25  Repeat Covid negative (4/11)  Monitor and trend WBC and temp curve     ENDO  BGs Q 6,SSI, TSH normal  C1 inhibitor normal level (4/7/21)     MSK/ ORTHO  No active Issues  Aggressive PT/OT for deconditioning     SKIN/ WOUNDS  Per protocol  Trach sutured to skin      CODE STATUS: Full Code- Full     Discussed in interdisciplinary rounds. Anticipate that [facility]. OK to send patient on PO coumadin and NGT. LTACH will perform PEG if patient not able to transition to PO with trach.     Best practice :  Glycemic control  IHI ICU bundles:              Cardona Bundle Followed and Vent Bundle Followed, Vent Day 21    [facility] Vent patients- VAP bundle, aim to keep peak plateau pressure 63-18SQ H2O  Sress ulcer prophylaxis. DVT prophylaxis. Need for Lines, cardona assessed. Restraints need. Palliative care evaluation. Subjective/Interval History:        Interval HPI:69 yo M h/o HTN tx w/ lisinopril presenting to [facility] ED 4/4 for rapidly progressing respiratory distress due to severe angioedema. During intubation attempts pt had brief cardiac arrest w/ pulse loss and severe airway swelling leading to emergent cricothyrotomy. Pt stabilized before Nightingale transport to [place] at [facility] pt taken emergently to [address] was converted to ETT. Patient continued to have trouble with o2 requirements disproportionate to CXR findings and despite adjustment of FiO2 and PEEP, now improved. S/p administration of inhaled epoprostenol. CTA revelaed B/L pulmonary emboli which is likely the cause of his hypoxemia and RH strain. Continuing heparin gtt, which had already been initiated for left popliteal non-occlusive thrombus. Pt is s/p dobutamine and  diureses w/ bumex and metolazone.  COVID - 19 negative.  Now with minimal vent support, however, mentation precludes extubation at this time.      Subjective 04/28/21  LOS: 25    No issues overnight  Remained on TC overnight  Afebrile  Remains on heparin drip  K+: 5 - will give dose of Kayexcelate  PLT count downtrending    Inpat Anti-Infectives (From admission, onward)     Start     Ordered Stop    04/06/21 1800  ciprofloxacin HCl (CILOXAN) 0.3 % ophthalmic solution 2 Drop  2 Drop,   Both Eyes,   EVERY 4 HOURS      04/04/21 1724 04/11/21 1759    04/04/21 1800  ciprofloxacin HCl (CILOXAN) 0.3 % ophthalmic solution 2 Drop  2 Drop,   Both Eyes,   EVERY 2 HOURS WHILE AWAKE      04/04/21 1724 04/06/21 1759                  ROS:Review of systems not obtained due to patient factors. Objective:   Vital Signs:    Visit Vitals  /70 (BP 1 Location: Right upper arm, BP Patient Position: At rest)   Pulse 97   Temp 99.9 °F (37.7 °C)   Resp 30   Ht 5' 11\" (1.803 m)   Wt 101.7 kg (224 lb 3.3 oz)   SpO2 97%   BMI 31.27 kg/m²       O2 Device: Tracheal collar, Humidifier   O2 Flow Rate (L/min): 8 l/min   Temp (24hrs), Av.5 °F (37.5 °C), Min:98.8 °F (37.1 °C), Max:100 °F (37.8 °C)       Intake/Output:   Last shift:       0701 -  1900  In: 711.5 [I.V.:71.5]  Out: 610 [Urine:610]  Last 3 shifts: 1901 -  0700  In: 2818.3 [I.V.:558.3]  Out: 2200 [Urine:2200]    Intake/Output Summary (Last 24 hours) at 2021 1517  Last data filed at 2021 1300  Gross per 24 hour   Intake 1923.2 ml   Output 1710 ml   Net 213.2 ml      Physical Exam:                 General: NAD. Trach in place on TC. Keeps head turned to the right  HEENT:  Anicteric sclerae; pink palpebral conjunctivae; mucosa moist  Resp:  Symmetrical chest expansion, no accessory muscle use; good airway entry; no rales/ wheezing/ diminished b/l, + rhonchi noted  CV:  S1, S2 present; regular rate and rhythm  GI:  Obese. Abdomen soft, non-tender; (+) active bowel sounds  Extremities:  +2 pulses on all extremities; no edema/ cyanosis/ clubbing noted;  no restraints   Skin:  Warm; no rashes/ lesions noted, normal turgor/cap refill   Neurologic:  opens eyes, responds to noxious and verbal stimuli, +facial grimacing, moves ext x4 spontaneously, follows commands. Very tired.     Alex Pod, condom cath        DATA:  Labs:  Recent Labs     21  0114 21  0430 21  0129   WBC 12.5 13.1 9.6   HGB 10.5* 10.2* 11.0*   HCT 34.0* 32.5* 36.2   * 500* 571*     Recent Labs     21  0114 21  0430 21  0129    137 137   K 5.0 4.6 4.7    107 106   CO2 29 24 24   * 150* 94   BUN 50* 53* 56*   CREA 1.23 1.39* 1.34*   CA 9.1 9.4 9.3   MG 2.4 2.1 2.3 PHOS 3.3 3.0 3.5   ALB 2.3* 2.4*  2.4* 2.4*   ALT  --  79*  --      No results for input(s): PH, PCO2, PO2, HCO3, FIO2 in the last 72 hours. Lab Results   Component Value Date/Time    Hemoglobin A1c 6.6 (H) 04/04/2021 05:11 PM     Lab Results   Component Value Date/Time    TSH 1.35 04/04/2021 05:11 PM     MICRO:  Results     Procedure Component Value Units Date/Time    COVID-19 RAPID TEST [[id number]] Collected: 04/25/21 1214    Order Status: Completed Specimen: Nasopharyngeal Updated: 04/25/21 1237     Specimen source Nasopharyngeal        COVID-19 rapid test Not detected        Comment: Rapid Abbott ID Now       Rapid NAAT:  The specimen is NEGATIVE for SARS-CoV-2, the novel coronavirus associated with COVID-19. Negative results should be treated as presumptive and, if inconsistent with clinical signs and symptoms or necessary for patient management, should be tested with an alternative molecular assay. Negative results do not preclude SARS-CoV-2 infection and should not be used as the sole basis for patient management decisions. This test has been authorized by the FDA under an Emergency Use Authorization (EUA) for use by authorized laboratories.    Fact sheet for Healthcare Providers: ConventionUpdate.co.nz  Fact sheet for Patients: ConventionUpdate.co.nz       Methodology: Isothermal Nucleic Acid Amplification         CULTURE, RESPIRATORY/SPUTUM/BRONCH Talisha Arm STAIN [[id number]]  (Abnormal)  (Susceptibility) Collected: 04/16/21 0750    Order Status: Completed Specimen: Sputum from Endotracheal aspirate Updated: 04/19/21 0907     Special Requests: NO SPECIAL REQUESTS        GRAM STAIN 1+ WBCS SEEN         NO EPITHELIAL CELLS SEEN         NO ORGANISMS SEEN        Culture result:       LIGHT ENTEROBACTER AEROGENES                  NO NORMAL RESPIRATORY ANNALEE ISOLATED          Susceptibility      Enterobacter aerogenes     JOANNE     Amikacin ($) Susceptible Cefazolin ($) Resistant     Cefepime ($$) Susceptible     Cefoxitin Resistant     Ceftazidime ($) Resistant     Ceftriaxone ($) Intermediate     Ciprofloxacin ($) Susceptible     Gentamicin ($) Susceptible     Levofloxacin ($) Susceptible     Meropenem ($$) Susceptible     Piperacillin/Tazobac ($) Resistant     Tobramycin ($) Susceptible     Trimeth/Sulfa Susceptible                                                                          Echo  04/04/21   ECHO ADULT COMPLETE 04/06/2021 4/6/2021    Narrative · Contrast used: DEFINITY. · Image quality for this study was technically difficult. · LV: Calculated LVEF is 55%. Visually measured ejection fraction. Normal   cavity size, wall thickness and systolic function (ejection fraction   normal). Wall motion: normal. Moderate (grade 2) left ventricular   diastolic dysfunction. · AO: Mild aortic root and ascending aorta dilatation. Ascending aorta   diameter = 3.6 cm. Aortic root measures 3.9 cm  · RA: Dilated right atrium. · RV: Dilated right ventricle. Borderline low systolic function. · TV: Mild tricuspid valve regurgitation is present. · IVC: Mechanically ventilated; cannot use inferior caval vein diameter to   estimate central venous pressure. · PA: Moderate pulmonary hypertension. Pulmonary arterial systolic   pressure is 61 mmHg. Signed by: Rolo Weaver MD        Imaging:  [x]I have personally reviewed the patients radiographs    CXR Results  (Last 48 hours)    None               CT Results (most recent):  Results from Hospital Encounter encounter on 04/04/21   CTA CHEST W OR W WO CONT    Narrative CTA CHEST PULMONARY ANGIOGRAM    HISTORY/INDICATION:  Shortness of breath, clinical concern for pulmonary  embolism, history of cardiac arrest 4/4/2021    COMPARISON:  No prior CTA chest. Reference chest radiograph 4/13/2021.     TECHNIQUE:  Helical multidetector array volumetric acquisition of the chest is  performed following intravenous contrast administration of 72cc Isovue-370, per  pulmonary angiogram protocol. These images are reviewed and 2.5 mm and 5 mm  images along with coronal and sagittal 3D reconstruction maximum intensity  projection (MIP) images. Dose reduction techniques:  Automated exposure control,  mAs and/or kVp reductions based on patient size, and iterative reconstruction. CTA SPECIFIC FINDINGS:  Pulmonary arteries: Central contrast filling defect in the left lower lobe,  involving lobar, segmental, and segmental branches. Small contrast filling  defect in a segmental branch in the right upper lobe. Aorta: No aneurysm. CHEST FINDINGS:   Thyroid:  No focal lesion. Mediastinum: Heart is mildly enlarged. Mild burden of coronary artery  atherosclerotic calcifications. No pericardial effusion. Reflux of trace  contrast into the IVC. No right ventricular dilatation or bowing of the  intraventricular septum. Pleural space: Trace bilateral pleural effusions. No pneumothorax. Lungs: There is dense multifocal consolidations bilaterally and in the dependent  lower lobes. There is suggestion of focal hypoattenuation within the  consolidation in the left lower lobe. Endotracheal tube in appropriate position,  terminating 3.7 cm above the gabriella on the  view. Included Abdomen: Visualized solid organs of the upper abdomen are normal.  Enteric tube tip terminates within the body of the stomach. Skeleton: No suspicious lytic or blastic lesions. No fractures. Soft tissues: Normal.    Lymph Nodes: Increased number of mildly prominent mediastinal and bilateral  hilar nodes, including a 1.0 x 1.8 cm AP window node and a 1.1 x 2.1 cm right  paratracheal node. Impression 1. Moderate burden of acute pulmonary embolism in the left lower lobe. Additional small acute segmental pulmonary embolism in the right upper lobe.     2. There is dense multifocal consolidations bilaterally, as well as in the  dependent lower lobes, favoring a combination of multifocal pneumonia, edema,  and dependent atelectasis. -There is suggestion of focal hypoattenuation within the consolidation in the  left lower lobe, which may be infectious in etiology versus sequela of pulmonary  infarct given #1.    3. Mildly prominent mediastinal and bilateral hilar nodes, likely  benign/reactive. 4. Mild cardiomegaly. Reflux of trace contrast into the IVC, which can be seen  in the setting of right heart dysfunction. 5. Trace bilateral pleural effusions. 6. Endotracheal and enteric tubes noted in appropriate position. Discussed findings #1-2 with Peng Calvo PA-C on 4/13/2021 at 7:19 PM.              Critical Care Time:  The services I provided to this patient were to treat and/or prevent clinically significant deterioration that could result in the failure of one or more body systems and/or organ systems due to respiratory distress, hypoxia, cardiac dysrhythmia. Services included the following:  -reviewing nursing notes and old charts  -vital sign assessments   -direct patient care  -medication orders and management  -interpreting and reviewing diagnostic studies/labs  -re-evaluations  -documentation time        Aggregate critical care time was 35  minutes, which includes only time during which I was engaged in work directly related to the patient's care as described above. During this entire length of time I was immediately available to the patient. The reason for providing this level of medical care for this critically ill patient was due a critical illness that impaired one or more vital organ systems such that there was a high probability of imminent or life threatening deterioration in the patients condition.  This care involved high complexity decision making to assess, manipulate, and support vital system functions, to treat this degreee vital organ system failure and to prevent further life threatening deterioration of the patients condition      Complex decision making was made in the evaluation and management plans during this consultation. More than 50% of time was spent in counseling and coordination of care including review of data and discussion with other team members.       Chaim Alamo DO, [facility]  Pulmonary, Critical Care, and Sleep Medicine

## 2021-04-29 NOTE — PROGRESS NOTES
Discharge planning    LTSS screening successfully processed. Faxed copy to [facility] at [phone]    CRAIG LuuN, RN  Pager # [phone]  Care Manager

## 2021-04-29 NOTE — PROGRESS NOTES
Problem: Patient Education: Go to Patient Education Activity  Goal: Patient/Family Education  Outcome: Progressing Towards Goal     Problem: Diabetes Self-Management  Goal: *Disease process and treatment process  Description: Define diabetes and identify own type of diabetes; list 3 options for treating diabetes. Outcome: Progressing Towards Goal  Goal: *Using medications safely  Description: State effect of diabetes medications on diabetes; name diabetes medication taking, action and side effects. Outcome: Progressing Towards Goal  Goal: *Monitoring blood glucose, interpreting and using results  Description: Identify recommended blood glucose targets  and personal targets. Outcome: Progressing Towards Goal  Goal: *Prevention, detection, treatment of acute complications  Description: List symptoms of hyper- and hypoglycemia; describe how to treat low blood sugar and actions for lowering  high blood glucose level. Outcome: Progressing Towards Goal     Problem: Patient Education: Go to Patient Education Activity  Goal: Patient/Family Education  Outcome: Progressing Towards Goal     Problem: Falls - Risk of  Goal: *Absence of Falls  Description: Document Vandana Wai Fall Risk and appropriate interventions in the flowsheet.   Outcome: Progressing Towards Goal  Note: Fall Risk Interventions:       Mentation Interventions: Adequate sleep, hydration, pain control, Bed/chair exit alarm, Door open when patient unattended, Evaluate medications/consider consulting pharmacy, More frequent rounding, Reorient patient, Update white board    Medication Interventions: Bed/chair exit alarm, Evaluate medications/consider consulting pharmacy    Elimination Interventions: Bed/chair exit alarm, Call light in reach, Patient to call for help with toileting needs, Toileting schedule/hourly rounds              Problem: Pressure Injury - Risk of  Goal: *Prevention of pressure injury  Description: Document Lang Scale and appropriate interventions in the flowsheet. Outcome: Progressing Towards Goal  Note: Pressure Injury Interventions:  Sensory Interventions: Assess need for specialty bed, Avoid rigorous massage over bony prominences, Check visual cues for pain, Discuss PT/OT consult with provider, Keep linens dry and wrinkle-free, Monitor skin under medical devices, Turn and reposition approx. every two hours (pillows and wedges if needed)    Moisture Interventions: Apply protective barrier, creams and emollients, Check for incontinence Q2 hours and as needed, Internal/External urinary devices, Maintain skin hydration (lotion/cream), Minimize layers, Offer toileting Q_hr    Activity Interventions: Pressure redistribution bed/mattress(bed type), PT/OT evaluation    Mobility Interventions: Pressure redistribution bed/mattress (bed type), HOB 30 degrees or less, Turn and reposition approx.  every two hours(pillow and wedges)    Nutrition Interventions: Document food/fluid/supplement intake    Friction and Shear Interventions: Apply protective barrier, creams and emollients, Feet elevated on foot rest, Foam dressings/transparent film/skin sealants, Minimize layers                Problem: Nutrition Deficit  Goal: *Optimize nutritional status  Outcome: Progressing Towards Goal     Problem: Injury - Risk of, Adverse Drug Event  Goal: *Absence of adverse drug events  Outcome: Progressing Towards Goal  Goal: *Absence of medication errors  Outcome: Progressing Towards Goal  Goal: *Knowledge of prescribed medications  Outcome: Progressing Towards Goal     Problem: Patient Education: Go to Patient Education Activity  Goal: Patient/Family Education  Outcome: Progressing Towards Goal     Problem: Pain  Goal: *Control of Pain  Outcome: Progressing Towards Goal  Goal: *PALLIATIVE CARE:  Alleviation of Pain  Outcome: Progressing Towards Goal     Problem: Patient Education: Go to Patient Education Activity  Goal: Patient/Family Education  Outcome: Progressing Towards Goal     Problem: Delirium Treatment  Goal: *Sensory perception restored to baseline  Outcome: Progressing Towards Goal  Goal: *Emotional stability restored to baseline  Outcome: Progressing Towards Goal  Goal: *Absence of falls  Outcome: Progressing Towards Goal     Problem: Risk for Spread of Infection  Goal: Prevent transmission of infectious organism to others  Description: Prevent the transmission of infectious organisms to other patients, staff members, and visitors.   Outcome: Progressing Towards Goal     Problem: Patient Education:  Go to Education Activity  Goal: Patient/Family Education  Outcome: Progressing Towards Goal

## 2021-04-29 NOTE — ROUTINE PROCESS
Bedside and Verbal shift change report given to Perez Parikh RN (oncoming nurse) by Dary Johnson RN (offgoing nurse). Report included the following information SBAR, Kardex, ED Summary, Procedure Summary, Intake/Output, MAR, Recent Results and Cardiac Rhythm NSR.

## 2021-04-29 NOTE — PROGRESS NOTES
Discharge planning    Reviewed chart. Patient remains on trach collar. Has NGT and no peg tube at this time. [facility], [facility],[address], and [facility] and rehab are accepting facilities. CM will continue to assist with transitional needs. 11:15 am Spoke with Erinn Shaikh, admission's coordinator with [facility], concerning accepting patient with NGT. Per Erinn Shaikh, the facility is able to place Peg tube as long as patient is not a high risk and need to be in an OR for peg tube placement. 11:25 am- Spoke ayesha Douglas NP concerning peg tube. Per NP, patient is not high risk for peg tube placement. 11:30 am Notified Caroline with [facility]. She will speak with family once I speak with them. 11:35 am. Spoke with Carole Baker, son and Shiv Norris, sister, via conference call concerning LTACH placement with [facility]. All questions answered and concerns addressed. Both verbalized understanding. They are in agreement with LTACH placement with [facility].      CRAIG BeckettN, RN  Pager # [phone]  Care Manager

## 2021-04-29 NOTE — INTERDISCIPLINARY ROUNDS
[facility]  Pulmonary, Critical Care, and Sleep Medicine  Interdisciplinary and Ventilator Weaning Rounds    Patient discussed in morning walking rounds and interdisciplinary rounds. ICU day: 24    Overnight events:    No acute events overnight   Patient responsive to verbal stimuli but not following commands    Assessments and best practice:   Ventilator  o Ventilator - on trach collar overnight  o Vent day 24, trach (4/26) - #9 portex  o VAP bundle, aim to keep peak plateau pressure 58-77QZ H2O  o Weaning assessed and documented   - Patient does meet criteria for SBT. - Patient is on sedation holiday. - Tolerating trach collar day and night.  - Final plan: Continue trach collar with vent support if needed   Sedation  o none   Other pertinent drips  o Heparin   Lines noted  o PIVs   Critical labs assessed  o Yes   Antibiotics  o none   Medications reviewed  o Yes   Pending imaging  o none   Pending send out labs  o No   Pending Procedures  o None   Glycemic control   Stress ulcer prophylaxis. o Pepcid   DVT prophylaxis.   o Heparin gtt   Need for Lines, cardona assessed.  o Yes      Family contact/MPOA: Castro Echeverria  Family updated:  will contact for medical update      Daily Plans:   Continue trach collar trials   Discuss PEG with family and consult IR for placement   LTAC placement pending    REGGIE time 45 minutes        Ilda Solorio NP  04/29/21  Pulmonary, [address] [facility]

## 2021-04-30 ENCOUNTER — APPOINTMENT (OUTPATIENT)
Dept: GENERAL RADIOLOGY | Age: 71
DRG: 004 | End: 2021-04-30
Attending: PHYSICIAN ASSISTANT
Payer: MEDICARE

## 2021-04-30 ENCOUNTER — APPOINTMENT (OUTPATIENT)
Dept: GENERAL RADIOLOGY | Age: 71
DRG: 004 | End: 2021-04-30
Attending: NURSE PRACTITIONER
Payer: MEDICARE

## 2021-04-30 LAB
ALBUMIN SERPL-MCNC: 2.3 G/DL (ref 3.4–5)
ANION GAP SERPL CALC-SCNC: 6 MMOL/L (ref 3–18)
APTT PPP: 103.7 SEC (ref 23–36.4)
BASOPHILS # BLD: 0.1 K/UL (ref 0–0.1)
BASOPHILS # BLD: 0.1 K/UL (ref 0–0.1)
BASOPHILS NFR BLD: 1 % (ref 0–2)
BASOPHILS NFR BLD: 1 % (ref 0–2)
BUN SERPL-MCNC: 50 MG/DL (ref 7–18)
BUN/CREAT SERPL: 37 (ref 12–20)
CALCIUM SERPL-MCNC: 9.3 MG/DL (ref 8.5–10.1)
CHLORIDE SERPL-SCNC: 106 MMOL/L (ref 100–111)
CO2 SERPL-SCNC: 27 MMOL/L (ref 21–32)
CREAT SERPL-MCNC: 1.35 MG/DL (ref 0.6–1.3)
DIFFERENTIAL METHOD BLD: ABNORMAL
DIFFERENTIAL METHOD BLD: ABNORMAL
EOSINOPHIL # BLD: 0.1 K/UL (ref 0–0.4)
EOSINOPHIL # BLD: 0.1 K/UL (ref 0–0.4)
EOSINOPHIL NFR BLD: 1 % (ref 0–5)
EOSINOPHIL NFR BLD: 1 % (ref 0–5)
ERYTHROCYTE [DISTWIDTH] IN BLOOD BY AUTOMATED COUNT: 15.9 % (ref 11.6–14.5)
ERYTHROCYTE [DISTWIDTH] IN BLOOD BY AUTOMATED COUNT: 15.9 % (ref 11.6–14.5)
GLUCOSE BLD STRIP.AUTO-MCNC: 126 MG/DL (ref 70–110)
GLUCOSE BLD STRIP.AUTO-MCNC: 153 MG/DL (ref 70–110)
GLUCOSE BLD STRIP.AUTO-MCNC: 154 MG/DL (ref 70–110)
GLUCOSE BLD STRIP.AUTO-MCNC: 163 MG/DL (ref 70–110)
GLUCOSE SERPL-MCNC: 134 MG/DL (ref 74–99)
HCT VFR BLD AUTO: 33.9 % (ref 36–48)
HCT VFR BLD AUTO: 37 % (ref 36–48)
HGB BLD-MCNC: 10.4 G/DL (ref 13–16)
HGB BLD-MCNC: 11.2 G/DL (ref 13–16)
INR PPP: 1.2 (ref 0.8–1.2)
INR PPP: 1.2 (ref 0.8–1.2)
LYMPHOCYTES # BLD: 2.1 K/UL (ref 0.9–3.6)
LYMPHOCYTES # BLD: 2.4 K/UL (ref 0.9–3.6)
LYMPHOCYTES NFR BLD: 17 % (ref 21–52)
LYMPHOCYTES NFR BLD: 18 % (ref 21–52)
MAGNESIUM SERPL-MCNC: 2.4 MG/DL (ref 1.6–2.6)
MCH RBC QN AUTO: 25.6 PG (ref 24–34)
MCH RBC QN AUTO: 25.7 PG (ref 24–34)
MCHC RBC AUTO-ENTMCNC: 30.3 G/DL (ref 31–37)
MCHC RBC AUTO-ENTMCNC: 30.7 G/DL (ref 31–37)
MCV RBC AUTO: 83.7 FL (ref 74–97)
MCV RBC AUTO: 84.5 FL (ref 74–97)
MONOCYTES # BLD: 0.8 K/UL (ref 0.05–1.2)
MONOCYTES # BLD: 1 K/UL (ref 0.05–1.2)
MONOCYTES NFR BLD: 6 % (ref 3–10)
MONOCYTES NFR BLD: 8 % (ref 3–10)
NEUTS SEG # BLD: 8.7 K/UL (ref 1.8–8)
NEUTS SEG # BLD: 9.8 K/UL (ref 1.8–8)
NEUTS SEG NFR BLD: 73 % (ref 40–73)
NEUTS SEG NFR BLD: 75 % (ref 40–73)
PHOSPHATE SERPL-MCNC: 3.8 MG/DL (ref 2.5–4.9)
PLATELET # BLD AUTO: 400 K/UL (ref 135–420)
PLATELET # BLD AUTO: 412 K/UL (ref 135–420)
PMV BLD AUTO: 11.7 FL (ref 9.2–11.8)
PMV BLD AUTO: 12.2 FL (ref 9.2–11.8)
POTASSIUM SERPL-SCNC: 4.5 MMOL/L (ref 3.5–5.5)
PROTHROMBIN TIME: 15.2 SEC (ref 11.5–15.2)
PROTHROMBIN TIME: 15.3 SEC (ref 11.5–15.2)
RBC # BLD AUTO: 4.05 M/UL (ref 4.35–5.65)
RBC # BLD AUTO: 4.38 M/UL (ref 4.35–5.65)
SODIUM SERPL-SCNC: 139 MMOL/L (ref 136–145)
WBC # BLD AUTO: 12 K/UL (ref 4.6–13.2)
WBC # BLD AUTO: 13.1 K/UL (ref 4.6–13.2)

## 2021-04-30 PROCEDURE — 74011000250 HC RX REV CODE- 250: Performed by: INTERNAL MEDICINE

## 2021-04-30 PROCEDURE — 2709999900 HC NON-CHARGEABLE SUPPLY

## 2021-04-30 PROCEDURE — 82962 GLUCOSE BLOOD TEST: CPT

## 2021-04-30 PROCEDURE — 85610 PROTHROMBIN TIME: CPT

## 2021-04-30 PROCEDURE — 94762 N-INVAS EAR/PLS OXIMTRY CONT: CPT

## 2021-04-30 PROCEDURE — 74018 RADEX ABDOMEN 1 VIEW: CPT

## 2021-04-30 PROCEDURE — 74011636637 HC RX REV CODE- 636/637: Performed by: PHYSICIAN ASSISTANT

## 2021-04-30 PROCEDURE — 74011250637 HC RX REV CODE- 250/637: Performed by: REGISTERED NURSE

## 2021-04-30 PROCEDURE — 74011000250 HC RX REV CODE- 250: Performed by: PHYSICIAN ASSISTANT

## 2021-04-30 PROCEDURE — 36415 COLL VENOUS BLD VENIPUNCTURE: CPT

## 2021-04-30 PROCEDURE — 87077 CULTURE AEROBIC IDENTIFY: CPT

## 2021-04-30 PROCEDURE — 74011250637 HC RX REV CODE- 250/637: Performed by: INTERNAL MEDICINE

## 2021-04-30 PROCEDURE — 85730 THROMBOPLASTIN TIME PARTIAL: CPT

## 2021-04-30 PROCEDURE — 77030018798 HC PMP KT ENTRL FED COVD -A

## 2021-04-30 PROCEDURE — 74011250636 HC RX REV CODE- 250/636: Performed by: PHYSICIAN ASSISTANT

## 2021-04-30 PROCEDURE — 77030025757

## 2021-04-30 PROCEDURE — 99291 CRITICAL CARE FIRST HOUR: CPT | Performed by: INTERNAL MEDICINE

## 2021-04-30 PROCEDURE — 77010033678 HC OXYGEN DAILY

## 2021-04-30 PROCEDURE — 65660000000 HC RM CCU STEPDOWN

## 2021-04-30 PROCEDURE — 74011250636 HC RX REV CODE- 250/636: Performed by: INTERNAL MEDICINE

## 2021-04-30 PROCEDURE — 94640 AIRWAY INHALATION TREATMENT: CPT

## 2021-04-30 PROCEDURE — 83735 ASSAY OF MAGNESIUM: CPT

## 2021-04-30 PROCEDURE — 85025 COMPLETE CBC W/AUTO DIFF WBC: CPT

## 2021-04-30 PROCEDURE — 97110 THERAPEUTIC EXERCISES: CPT

## 2021-04-30 PROCEDURE — 80069 RENAL FUNCTION PANEL: CPT

## 2021-04-30 PROCEDURE — 87205 SMEAR GRAM STAIN: CPT

## 2021-04-30 PROCEDURE — 74011250637 HC RX REV CODE- 250/637: Performed by: PHYSICIAN ASSISTANT

## 2021-04-30 PROCEDURE — 87186 SC STD MICRODIL/AGAR DIL: CPT

## 2021-04-30 PROCEDURE — 94668 MNPJ CHEST WALL SBSQ: CPT

## 2021-04-30 RX ORDER — WARFARIN 6 MG/1
6 TABLET ORAL ONCE
Status: COMPLETED | OUTPATIENT
Start: 2021-04-30 | End: 2021-04-30

## 2021-04-30 RX ORDER — HEPARIN SODIUM 10000 [USP'U]/100ML
9.8-25 INJECTION, SOLUTION INTRAVENOUS
Status: DISCONTINUED | OUTPATIENT
Start: 2021-04-30 | End: 2021-05-11

## 2021-04-30 RX ADMIN — INSULIN LISPRO 2 UNITS: 100 INJECTION, SOLUTION INTRAVENOUS; SUBCUTANEOUS at 06:46

## 2021-04-30 RX ADMIN — ATORVASTATIN CALCIUM 80 MG: 40 TABLET, FILM COATED ORAL at 22:18

## 2021-04-30 RX ADMIN — POLYETHYLENE GLYCOL 3350 17 G: 17 POWDER, FOR SOLUTION ORAL at 08:20

## 2021-04-30 RX ADMIN — INSULIN LISPRO 2 UNITS: 100 INJECTION, SOLUTION INTRAVENOUS; SUBCUTANEOUS at 00:31

## 2021-04-30 RX ADMIN — ARFORMOTEROL TARTRATE 15 MCG: 15 SOLUTION RESPIRATORY (INHALATION) at 07:36

## 2021-04-30 RX ADMIN — Medication 30 ML: at 08:24

## 2021-04-30 RX ADMIN — HEPARIN SODIUM 1431 UNITS/HR: 10000 INJECTION, SOLUTION INTRAVENOUS at 12:30

## 2021-04-30 RX ADMIN — FAMOTIDINE 20 MG: 10 INJECTION INTRAVENOUS at 08:24

## 2021-04-30 RX ADMIN — FAMOTIDINE 20 MG: 10 INJECTION INTRAVENOUS at 22:18

## 2021-04-30 RX ADMIN — WARFARIN SODIUM 6 MG: 6 TABLET ORAL at 17:05

## 2021-04-30 RX ADMIN — ARFORMOTEROL TARTRATE 15 MCG: 15 SOLUTION RESPIRATORY (INHALATION) at 20:03

## 2021-04-30 RX ADMIN — CHLORHEXIDINE GLUCONATE 0.12% ORAL RINSE 10 ML: 1.2 LIQUID ORAL at 22:19

## 2021-04-30 RX ADMIN — INSULIN LISPRO 2 UNITS: 100 INJECTION, SOLUTION INTRAVENOUS; SUBCUTANEOUS at 17:20

## 2021-04-30 RX ADMIN — OXYCODONE HYDROCHLORIDE 5 MG: 5 SOLUTION ORAL at 12:34

## 2021-04-30 RX ADMIN — CHLORHEXIDINE GLUCONATE 0.12% ORAL RINSE 10 ML: 1.2 LIQUID ORAL at 08:21

## 2021-04-30 NOTE — PROGRESS NOTES
Following peripherally   Electrolytes, acid base ok   Renal functions have improved and stable    Please call with questions    Nathalie Rodarte [facility]  Cell [phone]  Pager: [phone]

## 2021-04-30 NOTE — PROGRESS NOTES
Problem: Diabetes Self-Management  Goal: *Disease process and treatment process  Description: Define diabetes and identify own type of diabetes; list 3 options for treating diabetes. Outcome: Progressing Towards Goal  Goal: *Monitoring blood glucose, interpreting and using results  Description: Identify recommended blood glucose targets  and personal targets. Outcome: Progressing Towards Goal  Goal: *Prevention, detection, treatment of acute complications  Description: List symptoms of hyper- and hypoglycemia; describe how to treat low blood sugar and actions for lowering  high blood glucose level. Outcome: Progressing Towards Goal  Goal: *Prevention, detection and treatment of chronic complications  Description: Define the natural course of diabetes and describe the relationship of blood glucose levels to long term complications of diabetes. Outcome: Progressing Towards Goal     Problem: Patient Education: Go to Patient Education Activity  Goal: Patient/Family Education  Outcome: Progressing Towards Goal     Problem: Pressure Injury - Risk of  Goal: *Prevention of pressure injury  Description: Document Lang Scale and appropriate interventions in the flowsheet.   Outcome: Progressing Towards Goal  Note: Pressure Injury Interventions:  Sensory Interventions: Assess changes in LOC, Check visual cues for pain, Discuss PT/OT consult with provider, Minimize linen layers, Keep linens dry and wrinkle-free, Pressure redistribution bed/mattress (bed type)    Moisture Interventions: Absorbent underpads, Apply protective barrier, creams and emollients, Check for incontinence Q2 hours and as needed, Internal/External urinary devices, Internal/External fecal devices, Minimize layers    Activity Interventions: Pressure redistribution bed/mattress(bed type), PT/OT evaluation    Mobility Interventions: Pressure redistribution bed/mattress (bed type), PT/OT evaluation, Float heels    Nutrition Interventions: Document food/fluid/supplement intake    Friction and Shear Interventions: Foam dressings/transparent film/skin sealants, Apply protective barrier, creams and emollients, Minimize layers                Problem: Patient Education: Go to Patient Education Activity  Goal: Patient/Family Education  Outcome: Progressing Towards Goal     Problem: Injury - Risk of, Adverse Drug Event  Goal: *Absence of adverse drug events  Outcome: Progressing Towards Goal  Goal: *Absence of medication errors  Outcome: Progressing Towards Goal     Problem: Pain  Goal: *Control of Pain  Outcome: Progressing Towards Goal  Goal: *PALLIATIVE CARE:  Alleviation of Pain  Outcome: Progressing Towards Goal     Problem: Delirium Treatment  Goal: *Level of consciousness restored to baseline  Outcome: Progressing Towards Goal  Goal: *Level of environmental perceptions restored to baseline  Outcome: Progressing Towards Goal  Goal: *Absence of falls  Outcome: Progressing Towards Goal  Goal: *Will remain free of delirium, CAM Score negative  Outcome: Progressing Towards Goal     Problem: Patient Education: Go to Patient Education Activity  Goal: Patient/Family Education  Outcome: Progressing Towards Goal     Problem: Risk for Spread of Infection  Goal: Prevent transmission of infectious organism to others  Description: Prevent the transmission of infectious organisms to other patients, staff members, and visitors.   Outcome: Progressing Towards Goal     Problem: Patient Education:  Go to Education Activity  Goal: Patient/Family Education  Outcome: Progressing Towards Goal     Problem: Patient Education: Go to Patient Education Activity  Goal: Patient/Family Education  Outcome: Progressing Towards Goal

## 2021-04-30 NOTE — PROGRESS NOTES
Received patient on 35% TC @ 8 lpm. Back-up trachs and ambu bag/mask at bedside. Vent @ bedside on standby. Will continue to monitor.      04/30/21 0737   Oxygen Therapy   O2 Sat (%) 99 %   Pulse via Oximetry 94 beats per minute   O2 Device Humidifier;Tracheal collar   Skin Assessment Clean, dry, & intact   O2 Flow Rate (L/min) 8 l/min   FIO2 (%) 35 %

## 2021-04-30 NOTE — PROGRESS NOTES
Discharge planning    Patient remains on trach collar and has NGT tube. [facility] has accepted and started insurance authorization. Per Jitendra Rose, admissions coordinator with [facility], facility will accept patient with NGT and will insert PEG tube when needed. Facility will accept present negative COVID test results dated 4/25/2021. CM will continue to assist with transitional needs.      CRAIG BauerN, RN  Pager # [phone]  Care Manager

## 2021-04-30 NOTE — PROGRESS NOTES
Problem: Mobility Impaired (Adult and Pediatric)  Goal: *Acute Goals and Plan of Care (Insert Text)  Description: Physical Therapy Goals  Initiated 4/28/2021 and to be accomplished within 7 day(s)  1. Patient will move from supine to sit and sit to supine , scoot up and down, and roll side to side in bed with maximal assistance. 2.  Patient will transfer from bed to chair and chair to bed with maximal assistance using the least restrictive device. 3.  Patient will perform sit to stand with maximal assistance. 4.  Patient will sit on EOB with maxA for >5 minutes in prep for OOB activities. PLOF: Pt unable to verbalize PLOF. Outcome: Progressing Towards Goal   PHYSICAL THERAPY TREATMENT    Patient: Alvin Juárez (69 y.o. male)  Date: 4/30/2021  Diagnosis: Angioedema due to angiotensin converting enzyme inhibitor (ACE-I) [T78. 3XXA, T46.4X5A]   Procedure(s) (LRB):  TRACHEOSTOMY (N/A) 4 Days Post-Op  Precautions: Fall  ASSESSMENT:  Supine in bed with HOB elevated. Son at bedside. Supplemental O2 via trach. Eyes open entire session. Non verbal. Can shrug shoulders. Head turned to right end range rotation; unable to adjust to midline. Able to wiggle toes on right foot. Twitch contraction with flexion of bilateral knees. Unable to perform functional AROM BLE. PROM performed for BLE as noted below. Educated son on role of PT and importance of ROM exercises. Able to  with bilateral hands to command. AAROM for BUE; left weaker than right. Cues to encourage participation. Left in no acute distress. Deferred EOB trial d/t decreased active participation ROM and 2 person assist. Educated on need for RN assistance with mobility; verbalized understanding. Call bell in reach.      Progression toward goals:   []      Improving appropriately and progressing toward goals  [x]      Improving slowly and progressing toward goals  []      Not making progress toward goals and plan of care will be adjusted PLAN:  Patient continues to benefit from skilled intervention to address the above impairments. Continue treatment per established plan of care. Discharge Recommendations:  LTACH  Further Equipment Recommendations for Discharge:  hospital bed and mechanical lift     SUBJECTIVE:   Nonverbal    OBJECTIVE DATA SUMMARY:   Critical Behavior:  Neurologic State: Alert  Orientation Level: Unable to verbalize  Cognition: Follows commands     Psychosocial  Patient Behaviors: Cooperative    Therapeutic Exercises:   BUE AAROM: elbow flexion/extension, wrist pronation/supination, shoulder flexion, horizontal abduction x10  BLE PROM: hip/knee flexion, hip abduction, knee extension x10  BLE gastroc stretch 10\"x5    Pain:  Pain level pre-treatment: 0/10  Pain level post-treatment: 0/10     Activity Tolerance:   Fair    After treatment:   [] Patient left in no apparent distress sitting up in chair  [x] Patient left in no apparent distress in bed  [x] Call bell left within reach  [x] Nursing notified  [] Caregiver present  [] Bed alarm activated  [] SCDs applied      COMMUNICATION/EDUCATION:   [x]         Role of physical therapy in the acute care setting. [x]         Fall prevention education was provided and the patient/caregiver indicated understanding. [x]         Patient/family have participated as able in working toward goals and plan of care. [x]         Patient/family agree to work toward stated goals and plan of care. []         Patient understands intent and goals of therapy, but is neutral about his/her participation. []         Patient is unable to participate in stated goals/plan of care: ongoing with therapy staff.       Chuck De Guzman, PT   Time Calculation: 10 mins

## 2021-04-30 NOTE — PROGRESS NOTES
Pharmacist Progress note    Warfarin Day of Therapy # 2    Labs reviewed. Assessment performed for missed warfarin doses, new drug interactions, dietary intake or supplement changes, documentation of bleeding and other changes that may affect the INR. Plan:    Warfarin at 6 mg as a one-time dose (INR 1.2)    Pharmacy to follow daily and will provide subsequent warfarin dosing based on clinical status.     Thank you for the consult,  Jeff Silver  4/30/2021

## 2021-04-30 NOTE — PROGRESS NOTES
[facility]  Pulmonary, Critical Care, and Sleep Medicine    Name: Tung Onofre MRN: [id number]   : 1950 Hospital: [facility]   Date: 2021        IMPRESSION:   Angioedema- with airway and respiratory failure and collapse; thought due to ACE inhibitor  S/P Emergent Cricthyrotomy [facility]-ED 21- converted to 6.5 ETT intraoperative by anesthesia team 21, 8.0 ETT by anesthesia 21, packing removed evening 21-ENT  S/P cardiac arrest @ [facility] 21- brief, likely due to hypoxia and airway collapse  Respiratory Failure: Acute due to above; remains on ventilator. SBTs. Tracheostomy - #9 Portex on  with Dr. Prakash Bolanos- ENT  Pulmonary Infiltrates: suspect aspiration secretions and/or blood due to above- can't exclude other infectious process at this time. Rapid Covid Negative at [facility]  Bradycardia - improved  DVT: Popliteal- Left; acute non-occlusive thrombus.  Heparin ACS protocol started - stopped  due to bleeding from Cric site.  Bleeding controlled -  heparin resumed    Pulmonary Embolism - 21 - left lower and right upper lobes  JACQUELYN- improving   Thrombocytosis - improving  Hypokalemia - resolved  Left scleral erythema- unknown baseline; possible infection   DM  DM retinopathy per chart review  Diabetic peripheral neuropathy  Left Pneumothorax - resolved  Obesity: Body mass index is 35.64 kg/m². Patient Active Problem List   Diagnosis Code    Angioedema due to angiotensin converting enzyme inhibitor (ACE-I) T78. 3XXA, [id number]    Respiratory failure ([address] J96.90    JACQUELYN (acute kidney injury) ([address] N17.9    Cardiac arrest (HCC) I46.9    Obesity (BMI 30-39. 9) E66.9    Diabetic neuropathy associated with type 2 diabetes mellitus ([address] E11.40    Diabetic retinopathy associated with type 2 diabetes mellitus ([address] E11.319    Pulmonary infiltrates R91.8    Controlled type 2 diabetes mellitus with neurologic complication, without long-term current use of insulin (HCC) E11.49    DVT (deep venous thrombosis) ([facility] I82.409    Encounter for palliative care Z51.5    Goals of care, counseling/discussion Z71.89    Multiple subsegmental pulmonary emboli without acute cor pulmonale (HCC) I26.94      PLAN:   NEURO:  Serial neuro evaluations  Off sedation. Analgesics as needed.     CARDIO:  MAP goal >64 - No IV pressor requirement  Echo on 4/6/21 with EF of 55%, dilated RV and moderate pulmonary hypertension (PASP 61 mmHg)    PULM:  Maintain aspiration precautions: HOB >30 degrees  SpO2 goal >92%  Bronchial /oral hygiene   Trach care per protocol  SBT/TC trials as clinical status allows  Completed course of decadron  Continue Brovana  Albuterol PRN  #9 Portex Trach on 4/26       GI/ NUTRITION:  NGT Continue and stop low-intermittent suction  Diet: Tube feeds: Jevity 1.5, 60mls/hr, Free Water: 100mls Q 4  SUP:Pepcid 20 mg Q 12  Follow up with nutritional recommendations  Lipitor 80mg QHS  MV  KUB today to evaluate gas burden  Continue miralax. Consider simethicone    RENAL/ METAB/ : Monitor I&Os  Trend electrolytes - replace as needed  Condom cath: Continue, monitor I/Os  S/p Kayexalate 4/29     HEM/ONC  Trend Hb/HCT and PLTs  DVT prophylaxis: SCDs, heparin gttp  Restart heparin gtt 4/27    Day #2 coumadin: pharmacy to dose, stop heparin when theraputic     ID  Antibioitcs: Cipro eye drop, Zosyn 4/4 - 4/8  Vanco: 4/4-6, MRSA swab negative.   Zosyn restarted on 4/11-4/19, Stephan Severin started 4/19-25  Repeat Covid negative (4/11)  Monitor and trend WBC and temp curve  Send sputum culture 4/30     ENDO  BGs Q 6,SSI, TSH normal  C1 inhibitor normal level (4/7/21)     MSK/ ORTHO  No active issues  Aggressive PT/OT for deconditioning     SKIN/ WOUNDS  Per protocol  Trach sutured to skin      CODE STATUS: Full Code- Full     Discussed in interdisciplinary rounds. Anticipate that patient will transfer to [facility] in [place] Emergency [facility].  OK to send patient on PO coumadin and NGT.  LTACH will perform PEG if patient not able to transition to PO with trach. Best practice :  Glycemic control  IHI ICU bundles:              Cardona Bundle Followed and Vent Bundle Followed, Vent Day 21    [facility] Vent patients- VAP bundle, aim to keep peak plateau pressure [id number] H2O  Sress ulcer prophylaxis. DVT prophylaxis. Need for Lines, cardona assessed. Restraints need. Palliative care evaluation. Subjective/Interval History:        Interval HPI:69 yo M h/o HTN tx w/ lisinopril presenting to [facility] ED 4/4 for rapidly progressing respiratory distress due to severe angioedema. During intubation attempts pt had brief cardiac arrest w/ pulse loss and severe airway swelling leading to emergent cricothyrotomy. Pt stabilized before Nightingale transport to [place] [facility] [place] pt taken emergently to [address] was converted to ETT. Patient continued to have trouble with o2 requirements disproportionate to CXR findings and despite adjustment of FiO2 and PEEP, now improved. S/p administration of inhaled epoprostenol. CTA revelaed B/L pulmonary emboli which is likely the cause of his hypoxemia and RH strain. Continuing heparin gtt, which had already been initiated for left popliteal non-occlusive thrombus. Pt is s/p dobutamine and  diureses w/ bumex and metolazone.  COVID - 19 negative. Now with minimal vent support, however, mentation precludes extubation at this time.      Subjective 04/30/21  LOS: 26    No issues overnight  Still confused. Pulled NGT yesterday and replaced. Not pulling at trach. Redirectable. Tolerated TC for 48 hours with good saturations. No difficulty with positional changes. Continue. Fever yesterday, but no white count. Defervesced with Tylenol. Last sputum cx with scant enterobacter. Repeat sputum cx today. FMS s/p Kayexalate with large liquid stool. K improved today. Renal function stable with good UOP. Large amount of gas on KUB yesterday.  Repeat KUB this AM. Continue miralax. Consider simethicone. Transition from heparin to coumadin. Day #2. Aggressive PT/OT. Plan for [facility] and outpatient PEG. Possibly able to transfer to SDU        Inpat Anti-Infectives (From admission, onward)     Start     Ordered Stop    21 1800  ciprofloxacin HCl (CILOXAN) 0.3 % ophthalmic solution 2 Drop  2 Drop,   Both Eyes,   EVERY 4 HOURS      21 1724 21 17521 1800  ciprofloxacin HCl (CILOXAN) 0.3 % ophthalmic solution 2 Drop  2 Drop,   Both Eyes,   EVERY 2 HOURS WHILE AWAKE      21 1724 21 175              ROS:Review of systems not obtained due to patient factors. Objective:   Vital Signs:    Visit Vitals  /67   Pulse 91   Temp 98.7 °F (37.1 °C)   Resp 28   Ht 5' 11\" (1.803 m)   Wt 101.7 kg (224 lb 3.3 oz)   SpO2 99%   BMI 31.27 kg/m²       O2 Device: Humidifier, Tracheal collar   O2 Flow Rate (L/min): 8 l/min   Temp (24hrs), Av.4 °F (37.4 °C), Min:98.4 °F (36.9 °C), Max:101.5 °F (38.6 °C)       Intake/Output:   Last shift:      No intake/output data recorded. Last 3 shifts:  190 -  0700  In: 3514.3 [I.V.:504.3]  Out:  [Urine:]    Intake/Output Summary (Last 24 hours) at 2021 3277  Last data filed at 2021 0600  Gross per 24 hour   Intake 2324.6 ml   Output 1260 ml   Net 1064.6 ml      Physical Exam:                 General: NAD. Trach in place on TC.   Keeps head turned to the right  HEENT:  Anicteric sclerae; pink palpebral conjunctivae; mucosa moist  Resp:  Symmetrical chest expansion, no accessory muscle use; good airway entry; no rales/ wheezing/ diminished b/l  CV:  S1, S2 present; regular rate and rhythm  GI:  Obese. Abdomen soft, non-tender; (+) active bowel sounds  Extremities:  +2 pulses on all extremities; no edema/ cyanosis/ clubbing noted; no restraints   Skin:  Warm; no rashes/ lesions noted, normal turgor/cap refill   Neurologic:  opens eyes, responds to noxious and verbal stimuli, +facial grimacing, moves ext x4 spontaneously, follows commands. Attempting to mouth words. Lena Martinez, condom cath        DATA:  Labs:  Recent Labs     04/30/21  0408 04/29/21  0114 04/28/21  0430   WBC 12.0 12.5 13.1   HGB 10.4* 10.5* 10.2*   HCT 33.9* 34.0* 32.5*    436* 500*     Recent Labs     04/30/21  0408 04/29/21  1621 04/29/21  0114 04/28/21  0430     --  138 137   K 4.5  --  5.0 4.6     --  107 107   CO2 27  --  29 24   *  --  144* 150*   BUN 50*  --  50* 53*   CREA 1.35*  --  1.23 1.39*   CA 9.3  --  9.1 9.4   MG 2.4  --  2.4 2.1   PHOS 3.8  --  3.3 3.0   ALB 2.3*  --  2.3* 2.4*  2.4*   ALT  --   --   --  79*   INR 1.2 1.2  --   --      No results for input(s): PH, PCO2, PO2, HCO3, FIO2 in the last 72 hours. Lab Results   Component Value Date/Time    Hemoglobin A1c 6.6 (H) 04/04/2021 05:11 PM     Lab Results   Component Value Date/Time    TSH 1.35 04/04/2021 05:11 PM     MICRO:  Results     Procedure Component Value Units Date/Time    COVID-19 RAPID TEST [[id number]] Collected: 04/25/21 1214    Order Status: Completed Specimen: Nasopharyngeal Updated: 04/25/21 1237     Specimen source Nasopharyngeal        COVID-19 rapid test Not detected        Comment: Rapid Abbott ID Now       Rapid NAAT:  The specimen is NEGATIVE for SARS-CoV-2, the novel coronavirus associated with COVID-19. Negative results should be treated as presumptive and, if inconsistent with clinical signs and symptoms or necessary for patient management, should be tested with an alternative molecular assay. Negative results do not preclude SARS-CoV-2 infection and should not be used as the sole basis for patient management decisions. This test has been authorized by the FDA under an Emergency Use Authorization (EUA) for use by authorized laboratories.    Fact sheet for Healthcare Providers: ConventionUpdate.co.nz  Fact sheet for Patients: [facility].co.nz       Methodology: Isothermal Nucleic Acid Amplification                                                               Echo  04/04/21   ECHO ADULT COMPLETE 04/06/2021 4/6/2021    Narrative · Contrast used: DEFINITY. · Image quality for this study was technically difficult. · LV: Calculated LVEF is 55%. Visually measured ejection fraction. Normal   cavity size, wall thickness and systolic function (ejection fraction   normal). Wall motion: normal. Moderate (grade 2) left ventricular   diastolic dysfunction. · AO: Mild aortic root and ascending aorta dilatation. Ascending aorta   diameter = 3.6 cm. Aortic root measures 3.9 cm  · RA: Dilated right atrium. · RV: Dilated right ventricle. Borderline low systolic function. · TV: Mild tricuspid valve regurgitation is present. · IVC: Mechanically ventilated; cannot use inferior caval vein diameter to   estimate central venous pressure. · PA: Moderate pulmonary hypertension. Pulmonary arterial systolic   pressure is 61 mmHg. Signed by: Juan Judd MD        Imaging:  [x]I have personally reviewed the patients radiographs    CXR Results  (Last 48 hours)    None               CT Results (most recent):  Results from Hospital Encounter encounter on 04/04/21   CTA CHEST W OR W WO CONT    Narrative CTA CHEST PULMONARY ANGIOGRAM    HISTORY/INDICATION:  Shortness of breath, clinical concern for pulmonary  embolism, history of cardiac arrest 4/4/2021    COMPARISON:  No prior CTA chest. Reference chest radiograph 4/13/2021. TECHNIQUE:  Helical multidetector array volumetric acquisition of the chest is  performed following intravenous contrast administration of 72cc Isovue-370, per  pulmonary angiogram protocol. These images are reviewed and 2.5 mm and 5 mm  images along with coronal and sagittal 3D reconstruction maximum intensity  projection (MIP) images.  Dose reduction techniques:  Automated exposure control,  mAs and/or kVp reductions based on patient size, and iterative reconstruction. CTA SPECIFIC FINDINGS:  Pulmonary arteries: Central contrast filling defect in the left lower lobe,  involving lobar, segmental, and segmental branches. Small contrast filling  defect in a segmental branch in the right upper lobe. Aorta: No aneurysm. CHEST FINDINGS:   Thyroid:  No focal lesion. Mediastinum: Heart is mildly enlarged. Mild burden of coronary artery  atherosclerotic calcifications. No pericardial effusion. Reflux of trace  contrast into the IVC. No right ventricular dilatation or bowing of the  intraventricular septum. Pleural space: Trace bilateral pleural effusions. No pneumothorax. Lungs: There is dense multifocal consolidations bilaterally and in the dependent  lower lobes. There is suggestion of focal hypoattenuation within the  consolidation in the left lower lobe. Endotracheal tube in appropriate position,  terminating 3.7 cm above the gabriella on the  view. Included Abdomen: Visualized solid organs of the upper abdomen are normal.  Enteric tube tip terminates within the body of the stomach. Skeleton: No suspicious lytic or blastic lesions. No fractures. Soft tissues: Normal.    Lymph Nodes: Increased number of mildly prominent mediastinal and bilateral  hilar nodes, including a 1.0 x 1.8 cm AP window node and a 1.1 x 2.1 cm right  paratracheal node. Impression 1. Moderate burden of acute pulmonary embolism in the left lower lobe. Additional small acute segmental pulmonary embolism in the right upper lobe. 2. There is dense multifocal consolidations bilaterally, as well as in the  dependent lower lobes, favoring a combination of multifocal pneumonia, edema,  and dependent atelectasis. -There is suggestion of focal hypoattenuation within the consolidation in the  left lower lobe, which may be infectious in etiology versus sequela of pulmonary  infarct given #1.     3. Mildly prominent mediastinal and bilateral hilar nodes, likely  benign/reactive. 4. Mild cardiomegaly. Reflux of trace contrast into the IVC, which can be seen  in the setting of right heart dysfunction. 5. Trace bilateral pleural effusions. 6. Endotracheal and enteric tubes noted in appropriate position. Discussed findings #1-2 with Chichi Dumont PA-C on 4/13/2021 at 7:19 PM.       Maira Munoz MD PGY-1  [facility] Emergency Medicine      [facility] Staff Note     I have independently evaluated the patient at the bedside and I am the primary provider of record. I agree with the above evaluation, assessment and recommendations along with the following comments and observations. I have made editions to above evaluation which was performed under my direction. Please also review my orders. Chart and labs reviewed    Patient remained off vent again last night. Remains on trach collar. Started on coumadin overnight- bridging with heparin drip    Continuing with NGT for tube feeds. Current plan is to transfer to [facility] in [place] who will take patient with     NGT and trach. Temp curve improved. Patient remains weak and favors turning head to the right. Needs ongoing PT/OT    As patient has remained off vent for 2 nights now. No ongoing need for ICU care at this time. OK to transfer to SD status with pulmonary consult. Complex decision making was made in the evaluation and management plans during this consultation. More than 50% of time was spent in counseling and coordination of care including review of data and discussion with other team members. Further recommendations and therapies pending clinical course.     Critical Care and time spent coordinating care: physical exam, chart review, documentation, discussion with care team,  minus procedure time: 30 min    Gemini To DO, [facility]  Pulmonary, Sleep and Critical Care Medicine  4:02 PM

## 2021-04-30 NOTE — PROGRESS NOTES
O2 titrated down from 35% tC to 28% TC. Suctioning thick, bloody secretions from trach.      04/30/21 1202   Oxygen Therapy   O2 Sat (%) 99 %   Pulse via Oximetry 101 beats per minute   O2 Device Humidifier;Tracheal collar   O2 Flow Rate (L/min) 6 l/min  (titrated down from 8)   FIO2 (%) 28 %  (titrated down from 35)

## 2021-04-30 NOTE — ROUTINE PROCESS
0700: Assumed care of patient from [facility], [place].    0869: Patient coughing up bright red yolanda clots from tracheostomy. No bleeding noted around surgical site. Heparin gtt held and provider notified. 1900: Bedside and Verbal shift change report given to [facility], RN (oncoming nurse) by Todd King RN (offgoing nurse). Report included the following information SBAR, Kardex, Intake/Output, MAR, Recent Results and Cardiac Rhythm NSR.

## 2021-04-30 NOTE — PROGRESS NOTES
Hospitalist ICU Downgrade Note        Patient: Zoe Quick               Sex: male          DOA: 4/4/2021       YOB: 1950      Age:  79 y.o.        LOS:  LOS: 26 days             CHIEF COMPLAINT:  Ongoing respiratory failure with Pulmonary embolus. Initially with Cardiac arrest thought to be from hypoxia. Subjective:     Patient was initially seen at [facility] and had emergent Crychothyrotomy during Cardiac arrest.  Initially there was concern that he had Angioedema. He was stabilized and flown to [facility] for ongoing management. Mr Jorge Serrano has required prolonged support on the mechanical ventilator for ongoing respiratory failure. He has had Tracheostomy via ENT and he continues with Tracheostomy collar currently which he is tolerating well. Mr Jorge Serrano had cardiac arrest initially, but he has stable hemodynamics currently. His BP is appropriate and he is maintaining consistent sinus rhythm. Mr Jorge Serrano was treated for sepsis and has received a full course of antibiotics, he is off antibiotics currently. He has limited interaction, he does not have seizure activity. He has had sedation to maintain the ventilator which he no longer needs. We will have to follow his neurological status to assess his ongoing baseline. Mr Jorge Serrano has normal renal function currently. He has tracheostomy, but he currently does not have a PEG. He has ongoing NG tube and continues with NG tube feeding. The plan is to pursue PEG placement as an outpatient after he has transferred to Rehab type facility. Objective:      Visit Vitals  /75 (BP 1 Location: Right upper arm, BP Patient Position: At rest)   Pulse 99   Temp 98.9 °F (37.2 °C)   Resp 26   Ht 5' 11\" (1.803 m)   Wt 101.7 kg (224 lb 3.3 oz)   SpO2 100%   BMI 31.27 kg/m²       Physical Exam:  Gen:  Patient is in no distress.   Minimally interactive  HEENT and Neck:  PERRL, No cervical tenderness or masses  Lungs:  Clear bilaterally. No rales, no wheeze. Normal effort. Heart:  Regular Rate and Rhythm. No murmur or gallop. No JVD. No edema. Abdomen:  Soft, non tender, no masses, no Hepatosplenomegally  Extremities:  No digital clubbing, no cyanosis  Neuro:  Normal tone without seizure. Minimally interactive. Lab/Data Reviewed:  BMP:   Lab Results   Component Value Date/Time     04/30/2021 04:08 AM    K 4.5 04/30/2021 04:08 AM     04/30/2021 04:08 AM    CO2 27 04/30/2021 04:08 AM    AGAP 6 04/30/2021 04:08 AM     (H) 04/30/2021 04:08 AM    BUN 50 (H) 04/30/2021 04:08 AM    CREA 1.35 (H) 04/30/2021 04:08 AM    GFRAA >60 04/30/2021 04:08 AM    GFRNA 52 (L) 04/30/2021 04:08 AM     CBC:   Lab Results   Component Value Date/Time    WBC 12.0 04/30/2021 04:08 AM    HGB 10.4 (L) 04/30/2021 04:08 AM    HCT 33.9 (L) 04/30/2021 04:08 AM     04/30/2021 04:08 AM           Assessment/Plan     Principal Problem:    Respiratory failure ([address] (4/5/2021)    Active Problems:    Multiple subsegmental pulmonary emboli without acute cor pulmonale (HCC) ()      Cardiac arrest ([facility] (4/4/2021)      Diabetic neuropathy associated with type 2 diabetes mellitus ([facility] (4/5/2021)      Controlled type 2 diabetes mellitus with neurologic complication, without long-term current use of insulin ([address] (4/5/2021)      JACQUELYN (acute kidney injury) ([facility] [address] (4/4/2021)      DVT (deep venous thrombosis) ([facility] [address] (4/10/2021)      Obesity (BMI 30-39.9) (4/5/2021)      Angioedema due to angiotensin converting enzyme inhibitor (ACE-I) (4/4/2021)      Encounter for palliative care ()      Goals of care, counseling/discussion ()      Diabetic retinopathy associated with type 2 diabetes mellitus ([facility] [address] [facility] (4/5/2021)      Pulmonary infiltrates (4/5/2021)        Plan:  Monitor respiratory status with Trach collar.   Transitioning from heparin anticoagulation to Coumadin  Follow neuro status and mental status, currently he is minimally interactive. BP control, follow cardiac rhythm and hemodynamics  NG tube is the access we have currently for enteral feeding. Monitor BS   Follow renal function  Patient is transferring out of ICU, we will be working toward disposition.

## 2021-04-30 NOTE — PROGRESS NOTES
Problem: Patient Education: Go to Patient Education Activity  Goal: Patient/Family Education  Outcome: Progressing Towards Goal     Problem: Diabetes Self-Management  Goal: *Disease process and treatment process  Description: Define diabetes and identify own type of diabetes; list 3 options for treating diabetes. Outcome: Progressing Towards Goal  Goal: *Incorporating nutritional management into lifestyle  Description: Describe effect of type, amount and timing of food on blood glucose; list 3 methods for planning meals. Outcome: Progressing Towards Goal  Goal: *Incorporating physical activity into lifestyle  Description: State effect of exercise on blood glucose levels. Outcome: Progressing Towards Goal  Goal: *Developing strategies to promote health/change behavior  Description: Define the ABC's of diabetes; identify appropriate screenings, schedule and personal plan for screenings. Outcome: Progressing Towards Goal  Goal: *Using medications safely  Description: State effect of diabetes medications on diabetes; name diabetes medication taking, action and side effects. Outcome: Progressing Towards Goal  Goal: *Monitoring blood glucose, interpreting and using results  Description: Identify recommended blood glucose targets  and personal targets. Outcome: Progressing Towards Goal  Goal: *Prevention, detection, treatment of acute complications  Description: List symptoms of hyper- and hypoglycemia; describe how to treat low blood sugar and actions for lowering  high blood glucose level. Outcome: Progressing Towards Goal  Goal: *Prevention, detection and treatment of chronic complications  Description: Define the natural course of diabetes and describe the relationship of blood glucose levels to long term complications of diabetes.   Outcome: Progressing Towards Goal  Goal: *Developing strategies to address psychosocial issues  Description: Describe feelings about living with diabetes; identify support needed and support network  Outcome: Progressing Towards Goal  Goal: *Sick day guidelines  Outcome: Progressing Towards Goal     Problem: Patient Education: Go to Patient Education Activity  Goal: Patient/Family Education  Outcome: Progressing Towards Goal     Problem: Falls - Risk of  Goal: *Absence of Falls  Description: Document Alexander Fall Risk and appropriate interventions in the flowsheet. Outcome: Progressing Towards Goal  Note: Fall Risk Interventions:       Mentation Interventions: Evaluate medications/consider consulting pharmacy    Medication Interventions: Evaluate medications/consider consulting pharmacy    Elimination Interventions: Bed/chair exit alarm              Problem: Patient Education: Go to Patient Education Activity  Goal: Patient/Family Education  Outcome: Progressing Towards Goal     Problem: Pressure Injury - Risk of  Goal: *Prevention of pressure injury  Description: Document Lang Scale and appropriate interventions in the flowsheet.   Outcome: Progressing Towards Goal  Note: Pressure Injury Interventions:  Sensory Interventions: Assess changes in LOC    Moisture Interventions: Absorbent underpads, Apply protective barrier, creams and emollients    Activity Interventions: Pressure redistribution bed/mattress(bed type)    Mobility Interventions: Pressure redistribution bed/mattress (bed type)    Nutrition Interventions: Document food/fluid/supplement intake    Friction and Shear Interventions: Apply protective barrier, creams and emollients                Problem: Patient Education: Go to Patient Education Activity  Goal: Patient/Family Education  Outcome: Progressing Towards Goal     Problem: Nutrition Deficit  Goal: *Optimize nutritional status  Outcome: Progressing Towards Goal     Problem: Injury - Risk of, Adverse Drug Event  Goal: *Absence of adverse drug events  Outcome: Progressing Towards Goal  Goal: *Absence of medication errors  Outcome: Progressing Towards Goal  Goal: *Knowledge of prescribed medications  Outcome: Progressing Towards Goal     Problem: Patient Education: Go to Patient Education Activity  Goal: Patient/Family Education  Outcome: Progressing Towards Goal     Problem: Pain  Goal: *Control of Pain  Outcome: Progressing Towards Goal  Goal: *PALLIATIVE CARE:  Alleviation of Pain  Outcome: Progressing Towards Goal     Problem: Patient Education: Go to Patient Education Activity  Goal: Patient/Family Education  Outcome: Progressing Towards Goal     Problem: Delirium Treatment  Goal: *Level of consciousness restored to baseline  Outcome: Progressing Towards Goal  Goal: *Level of environmental perceptions restored to baseline  Outcome: Progressing Towards Goal  Goal: *Sensory perception restored to baseline  Outcome: Progressing Towards Goal  Goal: *Emotional stability restored to baseline  Outcome: Progressing Towards Goal  Goal: *Functional assessment restored to baseline  Outcome: Progressing Towards Goal  Goal: *Absence of falls  Outcome: Progressing Towards Goal  Goal: *Will remain free of delirium, CAM Score negative  Outcome: Progressing Towards Goal  Goal: *Cognitive status will be restored to baseline  Outcome: Progressing Towards Goal  Goal: Interventions  Outcome: Progressing Towards Goal     Problem: Patient Education: Go to Patient Education Activity  Goal: Patient/Family Education  Outcome: Progressing Towards Goal     Problem: Risk for Spread of Infection  Goal: Prevent transmission of infectious organism to others  Description: Prevent the transmission of infectious organisms to other patients, staff members, and visitors.   Outcome: Progressing Towards Goal     Problem: Patient Education:  Go to Education Activity  Goal: Patient/Family Education  Outcome: Progressing Towards Goal     Problem: Patient Education: Go to Patient Education Activity  Goal: Patient/Family Education  Outcome: Progressing Towards Goal

## 2021-04-30 NOTE — PROGRESS NOTES
2000 Assumed care of pt after bedside report with pt lying in bed with HOB elevated. Pt alert but confused. Pt pulled on NGT with lt hand. . NGT placed in lt nare and secures at 65 cm without difficulty. Placement verified and KUB ordered for same. Pt does not understand why he needs NGT. Monitor denotes NSR-ST. [place] x 4 but weaker in RUE nd LLE. Pt has trach in place and trach collar as ordered. Lungs coarse diminished in bases. Pt expectorate thick tannish secretions from trach when coughing. Abd obese, soft, intact. Pt has condom catheter and is draining yellow urine. 2030 Pt continues to try to pul out new NGT. Pt's arms covered with blankets and pt re[eatedly advised not tp remove NGT as needed for feeding pt and po meds. 2030 Gwendalyn Courser NP advised of pt trying to pull out NGT again. Will monitor pt closed=ly for now. No order given for restraints or sedation. 04/30/2021 0000 Accucheck result 163. Lispro insulin 2 iunits SQ given as per sliding scale coverage. 0200 Pt status unchanged. 0400 AM labs drawn and sent to lab.0600 Accucheck result 153. Lispro insulin 2 units SQ given s per sliding scale coverage.

## 2021-05-01 ENCOUNTER — APPOINTMENT (OUTPATIENT)
Dept: GENERAL RADIOLOGY | Age: 71
DRG: 004 | End: 2021-05-01
Attending: PHYSICIAN ASSISTANT
Payer: MEDICARE

## 2021-05-01 LAB
ALBUMIN SERPL-MCNC: 2.2 G/DL (ref 3.4–5)
ANION GAP SERPL CALC-SCNC: 4 MMOL/L (ref 3–18)
APTT PPP: 40.8 SEC (ref 23–36.4)
BASOPHILS # BLD: 0 K/UL (ref 0–0.1)
BASOPHILS NFR BLD: 0 % (ref 0–2)
BUN SERPL-MCNC: 47 MG/DL (ref 7–18)
BUN/CREAT SERPL: 37 (ref 12–20)
CALCIUM SERPL-MCNC: 8.8 MG/DL (ref 8.5–10.1)
CHLORIDE SERPL-SCNC: 106 MMOL/L (ref 100–111)
CO2 SERPL-SCNC: 28 MMOL/L (ref 21–32)
CREAT SERPL-MCNC: 1.28 MG/DL (ref 0.6–1.3)
DIFFERENTIAL METHOD BLD: ABNORMAL
EOSINOPHIL # BLD: 0.1 K/UL (ref 0–0.4)
EOSINOPHIL NFR BLD: 1 % (ref 0–5)
ERYTHROCYTE [DISTWIDTH] IN BLOOD BY AUTOMATED COUNT: 15.9 % (ref 11.6–14.5)
GLUCOSE BLD STRIP.AUTO-MCNC: 132 MG/DL (ref 70–110)
GLUCOSE BLD STRIP.AUTO-MCNC: 135 MG/DL (ref 70–110)
GLUCOSE BLD STRIP.AUTO-MCNC: 149 MG/DL (ref 70–110)
GLUCOSE BLD STRIP.AUTO-MCNC: 150 MG/DL (ref 70–110)
GLUCOSE SERPL-MCNC: 153 MG/DL (ref 74–99)
HCT VFR BLD AUTO: 33.1 % (ref 36–48)
HGB BLD-MCNC: 10 G/DL (ref 13–16)
INR PPP: 1.3 (ref 0.8–1.2)
LYMPHOCYTES # BLD: 1.8 K/UL (ref 0.9–3.6)
LYMPHOCYTES NFR BLD: 15 % (ref 21–52)
MAGNESIUM SERPL-MCNC: 2.2 MG/DL (ref 1.6–2.6)
MCH RBC QN AUTO: 25.3 PG (ref 24–34)
MCHC RBC AUTO-ENTMCNC: 30.2 G/DL (ref 31–37)
MCV RBC AUTO: 83.6 FL (ref 74–97)
MONOCYTES # BLD: 0.7 K/UL (ref 0.05–1.2)
MONOCYTES NFR BLD: 6 % (ref 3–10)
NEUTS SEG # BLD: 8.9 K/UL (ref 1.8–8)
NEUTS SEG NFR BLD: 77 % (ref 40–73)
PHOSPHATE SERPL-MCNC: 3.5 MG/DL (ref 2.5–4.9)
PLATELET # BLD AUTO: 387 K/UL (ref 135–420)
PMV BLD AUTO: 11.7 FL (ref 9.2–11.8)
POTASSIUM SERPL-SCNC: 4.6 MMOL/L (ref 3.5–5.5)
PROCALCITONIN SERPL-MCNC: 0.23 NG/ML
PROTHROMBIN TIME: 15.7 SEC (ref 11.5–15.2)
RBC # BLD AUTO: 3.96 M/UL (ref 4.35–5.65)
SODIUM SERPL-SCNC: 138 MMOL/L (ref 136–145)
T4 FREE SERPL-MCNC: 1.2 NG/DL (ref 0.7–1.5)
TSH SERPL DL<=0.05 MIU/L-ACNC: 2.62 UIU/ML (ref 0.36–3.74)
WBC # BLD AUTO: 11.7 K/UL (ref 4.6–13.2)

## 2021-05-01 PROCEDURE — 99221 1ST HOSP IP/OBS SF/LOW 40: CPT | Performed by: PSYCHIATRY & NEUROLOGY

## 2021-05-01 PROCEDURE — 99233 SBSQ HOSP IP/OBS HIGH 50: CPT | Performed by: INTERNAL MEDICINE

## 2021-05-01 PROCEDURE — 94640 AIRWAY INHALATION TREATMENT: CPT

## 2021-05-01 PROCEDURE — 77010033678 HC OXYGEN DAILY

## 2021-05-01 PROCEDURE — 74011250637 HC RX REV CODE- 250/637: Performed by: PHYSICIAN ASSISTANT

## 2021-05-01 PROCEDURE — 84439 ASSAY OF FREE THYROXINE: CPT

## 2021-05-01 PROCEDURE — 85025 COMPLETE CBC W/AUTO DIFF WBC: CPT

## 2021-05-01 PROCEDURE — 94669 MECHANICAL CHEST WALL OSCILL: CPT

## 2021-05-01 PROCEDURE — 82962 GLUCOSE BLOOD TEST: CPT

## 2021-05-01 PROCEDURE — 87077 CULTURE AEROBIC IDENTIFY: CPT

## 2021-05-01 PROCEDURE — 74011000250 HC RX REV CODE- 250: Performed by: INTERNAL MEDICINE

## 2021-05-01 PROCEDURE — 85610 PROTHROMBIN TIME: CPT

## 2021-05-01 PROCEDURE — 2709999900 HC NON-CHARGEABLE SUPPLY

## 2021-05-01 PROCEDURE — 84443 ASSAY THYROID STIM HORMONE: CPT

## 2021-05-01 PROCEDURE — 83735 ASSAY OF MAGNESIUM: CPT

## 2021-05-01 PROCEDURE — 80069 RENAL FUNCTION PANEL: CPT

## 2021-05-01 PROCEDURE — 94668 MNPJ CHEST WALL SBSQ: CPT

## 2021-05-01 PROCEDURE — 87186 SC STD MICRODIL/AGAR DIL: CPT

## 2021-05-01 PROCEDURE — 74011250636 HC RX REV CODE- 250/636: Performed by: INTERNAL MEDICINE

## 2021-05-01 PROCEDURE — 74011000250 HC RX REV CODE- 250: Performed by: PHYSICIAN ASSISTANT

## 2021-05-01 PROCEDURE — 94762 N-INVAS EAR/PLS OXIMTRY CONT: CPT

## 2021-05-01 PROCEDURE — 87205 SMEAR GRAM STAIN: CPT

## 2021-05-01 PROCEDURE — 84145 PROCALCITONIN (PCT): CPT

## 2021-05-01 PROCEDURE — 85730 THROMBOPLASTIN TIME PARTIAL: CPT

## 2021-05-01 PROCEDURE — 71045 X-RAY EXAM CHEST 1 VIEW: CPT

## 2021-05-01 PROCEDURE — 77030025757

## 2021-05-01 PROCEDURE — 74011250637 HC RX REV CODE- 250/637: Performed by: INTERNAL MEDICINE

## 2021-05-01 PROCEDURE — 36415 COLL VENOUS BLD VENIPUNCTURE: CPT

## 2021-05-01 PROCEDURE — 65660000000 HC RM CCU STEPDOWN

## 2021-05-01 RX ORDER — IPRATROPIUM BROMIDE AND ALBUTEROL SULFATE 2.5; .5 MG/3ML; MG/3ML
3 SOLUTION RESPIRATORY (INHALATION)
Status: DISCONTINUED | OUTPATIENT
Start: 2021-05-01 | End: 2021-05-07

## 2021-05-01 RX ORDER — LORAZEPAM 2 MG/ML
0.5 INJECTION INTRAMUSCULAR
Status: DISCONTINUED | OUTPATIENT
Start: 2021-05-01 | End: 2021-05-04

## 2021-05-01 RX ORDER — LORAZEPAM 2 MG/ML
0.5 INJECTION INTRAMUSCULAR
Status: DISCONTINUED | OUTPATIENT
Start: 2021-05-01 | End: 2021-05-01

## 2021-05-01 RX ADMIN — Medication 30 ML: at 08:34

## 2021-05-01 RX ADMIN — LORAZEPAM 0.5 MG: 2 INJECTION INTRAMUSCULAR; INTRAVENOUS at 04:05

## 2021-05-01 RX ADMIN — FAMOTIDINE 20 MG: 10 INJECTION INTRAVENOUS at 08:34

## 2021-05-01 RX ADMIN — IPRATROPIUM BROMIDE AND ALBUTEROL SULFATE 3 ML: .5; 3 SOLUTION RESPIRATORY (INHALATION) at 23:16

## 2021-05-01 RX ADMIN — OXYCODONE HYDROCHLORIDE 5 MG: 5 SOLUTION ORAL at 02:05

## 2021-05-01 RX ADMIN — IPRATROPIUM BROMIDE AND ALBUTEROL SULFATE 3 ML: .5; 3 SOLUTION RESPIRATORY (INHALATION) at 16:36

## 2021-05-01 RX ADMIN — LORAZEPAM 0.5 MG: 2 INJECTION INTRAMUSCULAR; INTRAVENOUS at 15:31

## 2021-05-01 RX ADMIN — IPRATROPIUM BROMIDE AND ALBUTEROL SULFATE 3 ML: .5; 3 SOLUTION RESPIRATORY (INHALATION) at 12:21

## 2021-05-01 RX ADMIN — IPRATROPIUM BROMIDE AND ALBUTEROL SULFATE 3 ML: .5; 3 SOLUTION RESPIRATORY (INHALATION) at 19:15

## 2021-05-01 RX ADMIN — ARFORMOTEROL TARTRATE 15 MCG: 15 SOLUTION RESPIRATORY (INHALATION) at 07:20

## 2021-05-01 RX ADMIN — ARFORMOTEROL TARTRATE 15 MCG: 15 SOLUTION RESPIRATORY (INHALATION) at 19:15

## 2021-05-01 NOTE — PROGRESS NOTES
INTERIM UPDATE - 0010 EST on 5/01/2021    Nursing Staff reports that Patient is having blood per his Tracheostomy from within the tube and not around it, but Nursing Staff reports that it is possible Patient grabbed at his Tracheostomy tube. The blood is reportedly bright red and was accompanied by a clot earlier today. Patient was on IV Heparin gtt and bridging to Warfarin. IV Heparin gtt and Warfarin are currently held. No desaturation, increase in FiO2 demand, or change in blood pressure. Plan:  Ordered CXR in AM to determine if Patient has had bloody aspiration.   CBC is already ordered for AM.

## 2021-05-01 NOTE — PROGRESS NOTES
Pharmacist consult: warfarin management    Indication: Venous Thrombosis  Goal INR: 2-3; currently subtherapeutic; ordered daily  Bright red blood and clots in trach, heparin held  Hold warfarin    Labs:  Coags:    Lab Results   Component Value Date/Time    APTT 40.8 (H) 05/01/2021 01:08 AM    PTP 15.7 (H) 05/01/2021 01:08 AM    INR 1.3 (H) 05/01/2021 01:08 AM     CBC:    Lab Results   Component Value Date/Time    HGB 10.0 (L) 05/01/2021 01:08 AM    HCT 33.1 (L) 05/01/2021 01:08 AM     05/01/2021 01:08 AM       CrCL: Estimated Creatinine Clearance: 65.2 mL/min (based on SCr of 1.28 mg/dL). Labs reviewed. Assessment performed for missed warfarin doses, new drug interactions, dietary intake or supplement changes, documentation of bleeding, and other changes that may affect the INR. Pharmacy to follow daily and will provide subsequent warfarin dosing based on clinical status.     Thank you for the consult,  Griselda Pleas, [facility]  5/1/2021

## 2021-05-01 NOTE — PROGRESS NOTES
2003- RT noted patient coughed out yolanda blood secretions. Suction, trach care and DIC changed. Pt remain on TC 28%Fio2. Informed Gib Leaven, NP regarding bloody secretions and metaneb on hold. Spare trachs and BVM available at the bedside. RT will continue to monitor respiratory status.

## 2021-05-01 NOTE — PROGRESS NOTES
0005- Pt continue to have small to moderate bloody  clot secretions coming from the trach. RN is aware and will contact hospitalist. No respiratory distress noted Spo2 98%, HR 99, RR 18. Emergent equipment remain at the bedside.

## 2021-05-01 NOTE — PROGRESS NOTES
Hospitalist Progress Note        Patient: Jaswinder Block               Sex: male          DOA: 4/4/2021       YOB: 1950      Age:  79 y.o.        LOS:  LOS: 27 days               Subjective:     Patient opens eyes on tactile command but also noticed that his right leg starts shaking. He has been tolerating NG tube feeding. Patient had some bleeding around tracheostomy site so heparin has been on hold. Patient tries to say something but cannot understand his speech. He has been on trach collar currently. HPI:  Patient was initially seen at [facility] and had emergent Crychothyrotomy during Cardiac arrest.  Initially there was concern that he had Angioedema. He was stabilized and flown to [facility] for ongoing management. Mr Bruce Hurley has required prolonged support on the mechanical ventilator for ongoing respiratory failure. He has had Tracheostomy via ENT and he continues with Tracheostomy collar currently which he is tolerating well. Mr Bruce Hurley had cardiac arrest initially, but he has stable hemodynamics currently. Mr Bruce Hurley was treated for sepsis and has received a full course of antibiotics, he is off antibiotics currently. He has limited interaction, he does not have seizure activity. He has had sedation to maintain the ventilator which he no longer needs. He has tracheostomy, but he currently does not have a PEG. He has ongoing NG tube and continues with NG tube feeding. Objective:      /77   Pulse 93   Temp 99.4 °F (37.4 °C)   Resp 29   Ht 5' 11\" (1.803 m)   Wt 101.7 kg (224 lb 3.3 oz)   SpO2 98%   BMI 31.27 kg/m²       Intake/Output Summary (Last 24 hours) at 5/1/2021 1153  Last data filed at 5/1/2021 1100  Gross per 24 hour   Intake 1783.43 ml   Output 425 ml   Net 1358.43 ml     Gen: Open eyes, cachectic, not in acute distress  HEENT : Atraumatic, NG tube in situ, no pallor. No obvious nasal discharge.   Neck: Tracheostomy is in situ with dressing  Lungs: Diminished breath sound bibasilar without wheezes  Heart:  Regular Rate and Rhythm. Abdomen:  Soft, nondistended, bowel sounds present  Extremities: Pedal edema present  Neuro: Opens eyes on tactile command, has involuntary moving the right side especially leg while awake. Minimally interactive. Skin: Dry and no obvious rashes      Assessment/Plan     ASSESSMENT:    1. Reportedly acute hypoxic respiratory failure due to angioedema s/p emergent cricothyrotomy followed by tracheostomy  2. Tracheostomy site bleeding, better currently  3. Post respiratory failure cardiac arrest  4. Chronic hypoxic respiratory failure status post trach and on trach collar  5. Bilateral pulmonary infiltrates  6. Oropharyngeal dysphagia status post NG tube  7. Left leg popliteal DVT and pulmonary embolism  8. Reportedly acute metabolic encephalopathy  9. 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 JACQUELYN, resolved  11. Left pneumothorax resolved  12. Diabetic neuropathy  13. Obesity    PLAN:    Continue Coumadin and hold heparin drip until tracheostomy bleeding resolves  If patient continues to trickle bleeding, we can ask vascular surgeon to evaluate this patient for IVC filter  GI has been consulted to evaluate this patient for PEG tube placement  Neurology has been consulted as patient having some right jerking movement and to decide whether he needs any imaging of the brain for encephalopathy evaluation  Continue trach collar and will add nebulizer  Continue to feeding through NG tube  Continue multivitamin  Patient is waiting to be transferred to [facility] unit. Discussed with patient sister over the phone and updated her about current treatment plan    Total time to take care of this patient was 35 minutes and more than 50% of time was spent counseling and coordinating care.      Disclaimer: Sections of this note are dictated using utilizing voice recognition software, which may have resulted in some phonetic based errors in grammar and contents. Even though attempts were made to correct all the mistakes, some may have been missed, and remained in the body of the document. If questions arise, please contact our department. Recent Results (from the past 24 hour(s))   GLUCOSE, POC    Collection Time: 04/30/21 12:23 PM   Result Value Ref Range    Glucose (POC) 126 (H) 70 - 110 mg/dL   CULTURE, RESPIRATORY/SPUTUM/BRONCH W GRAM STAIN    Collection Time: 04/30/21 12:45 PM    Specimen: Sputum,ET Suction   Result Value Ref Range    Special Requests: NO SPECIAL REQUESTS      GRAM STAIN 1+ WBCS SEEN      GRAM STAIN RARE EPITHELIAL CELLS SEEN      GRAM STAIN 1+ GRAM POSITIVE COCCI IN CHAINS      GRAM STAIN OCCASIONAL GRAM NEGATIVE COCCOBACILLI      Culture result: PENDING    GLUCOSE, POC    Collection Time: 04/30/21  5:12 PM   Result Value Ref Range    Glucose (POC) 154 (H) 70 - 110 mg/dL   PROTHROMBIN TIME + INR    Collection Time: 04/30/21  7:47 PM   Result Value Ref Range    Prothrombin time 15.2 11.5 - 15.2 sec    INR 1.2 0.8 - 1.2     CBC WITH AUTOMATED DIFF    Collection Time: 04/30/21  7:47 PM   Result Value Ref Range    WBC 13.1 4.6 - 13.2 K/uL    RBC 4.38 4.35 - 5.65 M/uL    HGB 11.2 (L) 13.0 - 16.0 g/dL    HCT 37.0 36.0 - 48.0 %    MCV 84.5 74.0 - 97.0 FL    MCH 25.6 24.0 - 34.0 PG    MCHC 30.3 (L) 31.0 - 37.0 g/dL    RDW 15.9 (H) 11.6 - 14.5 %    PLATELET 320 912 - 590 K/uL    MPV 12.2 (H) 9.2 - 11.8 FL    NEUTROPHILS 75 (H) 40 - 73 %    LYMPHOCYTES 18 (L) 21 - 52 %    MONOCYTES 6 3 - 10 %    EOSINOPHILS 1 0 - 5 %    BASOPHILS 1 0 - 2 %    ABS. NEUTROPHILS 9.8 (H) 1.8 - 8.0 K/UL    ABS. LYMPHOCYTES 2.4 0.9 - 3.6 K/UL    ABS. MONOCYTES 0.8 0.05 - 1.2 K/UL    ABS. EOSINOPHILS 0.1 0.0 - 0.4 K/UL    ABS.  BASOPHILS 0.1 0.0 - 0.1 K/UL    DF AUTOMATED     GLUCOSE, POC    Collection Time: 05/01/21 12:07 AM   Result Value Ref Range    Glucose (POC) 149 (H) 70 - 110 mg/dL   MAGNESIUM    Collection Time: 05/01/21  1:08 AM   Result Value Ref Range    Magnesium 2.2 1.6 - 2.6 mg/dL   CBC WITH AUTOMATED DIFF    Collection Time: 05/01/21  1:08 AM   Result Value Ref Range    WBC 11.7 4.6 - 13.2 K/uL    RBC 3.96 (L) 4.35 - 5.65 M/uL    HGB 10.0 (L) 13.0 - 16.0 g/dL    HCT 33.1 (L) 36.0 - 48.0 %    MCV 83.6 74.0 - 97.0 FL    MCH 25.3 24.0 - 34.0 PG    MCHC 30.2 (L) 31.0 - 37.0 g/dL    RDW 15.9 (H) 11.6 - 14.5 %    PLATELET 104 550 - 960 K/uL    MPV 11.7 9.2 - 11.8 FL    NEUTROPHILS 77 (H) 40 - 73 %    LYMPHOCYTES 15 (L) 21 - 52 %    MONOCYTES 6 3 - 10 %    EOSINOPHILS 1 0 - 5 %    BASOPHILS 0 0 - 2 %    ABS. NEUTROPHILS 8.9 (H) 1.8 - 8.0 K/UL    ABS. LYMPHOCYTES 1.8 0.9 - 3.6 K/UL    ABS. MONOCYTES 0.7 0.05 - 1.2 K/UL    ABS. EOSINOPHILS 0.1 0.0 - 0.4 K/UL    ABS.  BASOPHILS 0.0 0.0 - 0.1 K/UL    DF AUTOMATED     PTT    Collection Time: 05/01/21  1:08 AM   Result Value Ref Range    aPTT 40.8 (H) 23.0 - 36.4 SEC   RENAL FUNCTION PANEL    Collection Time: 05/01/21  1:08 AM   Result Value Ref Range    Sodium 138 136 - 145 mmol/L    Potassium 4.6 3.5 - 5.5 mmol/L    Chloride 106 100 - 111 mmol/L    CO2 28 21 - 32 mmol/L    Anion gap 4 3.0 - 18 mmol/L    Glucose 153 (H) 74 - 99 mg/dL    BUN 47 (H) 7.0 - 18 MG/DL    Creatinine 1.28 0.6 - 1.3 MG/DL    BUN/Creatinine ratio 37 (H) 12 - 20      GFR est AA >60 >60 ml/min/1.73m2    GFR est non-AA 56 (L) >60 ml/min/1.73m2    Calcium 8.8 8.5 - 10.1 MG/DL    Phosphorus 3.5 2.5 - 4.9 MG/DL    Albumin 2.2 (L) 3.4 - 5.0 g/dL   PROTHROMBIN TIME + INR    Collection Time: 05/01/21  1:08 AM   Result Value Ref Range    Prothrombin time 15.7 (H) 11.5 - 15.2 sec    INR 1.3 (H) 0.8 - 1.2     GLUCOSE, POC    Collection Time: 05/01/21  5:41 AM   Result Value Ref Range    Glucose (POC) 135 (H) 70 - 110 mg/dL   GLUCOSE, POC    Collection Time: 05/01/21 11:57 AM   Result Value Ref Range    Glucose (POC) 132 (H) 70 - 110 mg/dL

## 2021-05-01 NOTE — CONSULTS
NEUROLOGY CONSULT NOTE    Patient ID:  Olamide Oscar  [id number]  25 y.o.  1950    Date of Consultation:  May 1, 2021    Referring Physician: Dr Denae Hebert    Reason for Consultation:  Abnormal movements        Subjective:       History of Present Illness: This is a 80 y/o AAM who presented on 4/4 with respiratory compromise secondary to angioedema. On presentation he also experienced a witnessed inhospital cardiac arrest with immediate CPR and a pulseless time of less than 2 minutes. He has remained in the ICU since that time. He hospital course has been complicated by DVT/PE and severe agitation and now there has been an issue with bleeding from his tracheostomy site. He is now stable enough to be transferred out of the ICU. I have been asked to see him for some asymmetries in his motor behavior. Per nursing, he has been extremely agitated at times and has pulled out his NGT. He was noted today to be having repetitive right arm/leg flailing like movements with stimulation. He does reportedly move all extremities.        Patient Active Problem List    Diagnosis Date Noted    Encounter for palliative care     Goals of care, counseling/discussion     Multiple subsegmental pulmonary emboli without acute cor pulmonale (HCC)     DVT (deep venous thrombosis) ([facility] 04/10/2021    Respiratory failure ([facility] 04/05/2021    Obesity (BMI 30-39.9) 04/05/2021    Diabetic neuropathy associated with type 2 diabetes mellitus ([facility] 04/05/2021    Diabetic retinopathy associated with type 2 diabetes mellitus ([facility] 04/05/2021    Pulmonary infiltrates 04/05/2021    Controlled type 2 diabetes mellitus with neurologic complication, without long-term current use of insulin ([facility] 04/05/2021    Angioedema due to angiotensin converting enzyme inhibitor (ACE-I) 04/04/2021    JACQUELYN (acute kidney injury) ([facility] 04/04/2021    Card[facility] 04/04/2021     Past Medical History:   Diagnosis Date    DDD (degenerative disc disease), lumbar     Diabetes ([facility]     Diabetic neuropathy ([facility]     Diabetic retinopathy ([facility]     DM2 (diabetes mellitus, type 2) ([facility]     HLD (hyperlipidemia)     HTN (hypertension)     Obesity (BMI 30-39. 9)       History reviewed. No pertinent surgical history. Prior to Admission medications    Medication Sig Start Date End Date Taking? Authorizing Provider   LISINOPRIL, BULK, by Does Not Apply route. Other, MD Cheryl     Allergies   Allergen Reactions    Lisinopril Angioedema      Social History     Tobacco Use    Smoking status: Current Every Day Smoker     Packs/day: 0.50   Substance Use Topics    Alcohol use: Not on file      History reviewed. No pertinent family history. Review of Systems    Review of systems not obtained due to patient factors. Objective:     Patient Vitals for the past 8 hrs:   BP Temp Pulse Resp SpO2   05/01/21 1200 121/79  91 25 99 %   05/01/21 1100 113/77  93 29 98 %   05/01/21 1000 110/74  91 24 99 %   05/01/21 0900 105/69  92 26 98 %   05/01/21 0800 112/66 99.4 °F (37.4 °C) 90 24 98 %   05/01/21 0720     99 %   05/01/21 0600   94 26 99 %       General Exam  No acute distress, normal body habitus, has trach, and NGT in place. HEENT: Normocephalic, atraumatic, Sclera anicteric, normal conjunctiva  Mucous membranes: normal color and hydration   Skin: no lesions no rashes, normal color  CV: Heart: regular to rate and rhythm. No murmurs     Neurologic Exam:    Mental status:  Opens eyes to painful stimuli, will make brief eye contact. Language: none, not following commands    Cranial nerves: PERRL, Eyes conjugate, blinks to threat bilaterally      Motor: strength >4/5 throughout, withdrawals vigorously to nail bed pressure but less so with left lower extremity.  Tends to shake right leg in an agitated semi-purposeful manner with any painful stimulation  No abnormal movements    DTRs 0 throughout    Sensation: withdrawal and grimace x 4                Data Review:    Recent Results (from the past 24 hour(s))   CULTURE, RESPIRATORY/SPUTUM/BRONCH W GRAM STAIN    Collection Time: 04/30/21 12:45 PM    Specimen: Sputum,ET Suction   Result Value Ref Range    Special Requests: NO SPECIAL REQUESTS      GRAM STAIN 1+ WBCS SEEN      GRAM STAIN RARE EPITHELIAL CELLS SEEN      GRAM STAIN 1+ GRAM POSITIVE COCCI IN CHAINS      GRAM STAIN OCCASIONAL GRAM NEGATIVE COCCOBACILLI      Culture result: PENDING    GLUCOSE, POC    Collection Time: 04/30/21  5:12 PM   Result Value Ref Range    Glucose (POC) 154 (H) 70 - 110 mg/dL   PROTHROMBIN TIME + INR    Collection Time: 04/30/21  7:47 PM   Result Value Ref Range    Prothrombin time 15.2 11.5 - 15.2 sec    INR 1.2 0.8 - 1.2     CBC WITH AUTOMATED DIFF    Collection Time: 04/30/21  7:47 PM   Result Value Ref Range    WBC 13.1 4.6 - 13.2 K/uL    RBC 4.38 4.35 - 5.65 M/uL    HGB 11.2 (L) 13.0 - 16.0 g/dL    HCT 37.0 36.0 - 48.0 %    MCV 84.5 74.0 - 97.0 FL    MCH 25.6 24.0 - 34.0 PG    MCHC 30.3 (L) 31.0 - 37.0 g/dL    RDW 15.9 (H) 11.6 - 14.5 %    PLATELET 950 110 - 940 K/uL    MPV 12.2 (H) 9.2 - 11.8 FL    NEUTROPHILS 75 (H) 40 - 73 %    LYMPHOCYTES 18 (L) 21 - 52 %    MONOCYTES 6 3 - 10 %    EOSINOPHILS 1 0 - 5 %    BASOPHILS 1 0 - 2 %    ABS. NEUTROPHILS 9.8 (H) 1.8 - 8.0 K/UL    ABS. LYMPHOCYTES 2.4 0.9 - 3.6 K/UL    ABS. MONOCYTES 0.8 0.05 - 1.2 K/UL    ABS. EOSINOPHILS 0.1 0.0 - 0.4 K/UL    ABS.  BASOPHILS 0.1 0.0 - 0.1 K/UL    DF AUTOMATED     GLUCOSE, POC    Collection Time: 05/01/21 12:07 AM   Result Value Ref Range    Glucose (POC) 149 (H) 70 - 110 mg/dL   MAGNESIUM    Collection Time: 05/01/21  1:08 AM   Result Value Ref Range    Magnesium 2.2 1.6 - 2.6 mg/dL   CBC WITH AUTOMATED DIFF    Collection Time: 05/01/21  1:08 AM   Result Value Ref Range    WBC 11.7 4.6 - 13.2 K/uL    RBC 3.96 (L) 4.35 - 5.65 M/uL    HGB 10.0 (L) 13.0 - 16.0 g/dL    HCT 33.1 (L) 36.0 - 48.0 %    MCV 83.6 74.0 - 97.0 FL    MCH 25.3 24.0 - 34.0 PG    MCHC 30.2 (L) 31.0 - 37.0 g/dL    RDW 15.9 (H) 11.6 - 14.5 %    PLATELET 054 079 - 747 K/uL    MPV 11.7 9.2 - 11.8 FL    NEUTROPHILS 77 (H) 40 - 73 %    LYMPHOCYTES 15 (L) 21 - 52 %    MONOCYTES 6 3 - 10 %    EOSINOPHILS 1 0 - 5 %    BASOPHILS 0 0 - 2 %    ABS. NEUTROPHILS 8.9 (H) 1.8 - 8.0 K/UL    ABS. LYMPHOCYTES 1.8 0.9 - 3.6 K/UL    ABS. MONOCYTES 0.7 0.05 - 1.2 K/UL    ABS. EOSINOPHILS 0.1 0.0 - 0.4 K/UL    ABS. BASOPHILS 0.0 0.0 - 0.1 K/UL    DF AUTOMATED     PTT    Collection Time: 05/01/21  1:08 AM   Result Value Ref Range    aPTT 40.8 (H) 23.0 - 36.4 SEC   RENAL FUNCTION PANEL    Collection Time: 05/01/21  1:08 AM   Result Value Ref Range    Sodium 138 136 - 145 mmol/L    Potassium 4.6 3.5 - 5.5 mmol/L    Chloride 106 100 - 111 mmol/L    CO2 28 21 - 32 mmol/L    Anion gap 4 3.0 - 18 mmol/L    Glucose 153 (H) 74 - 99 mg/dL    BUN 47 (H) 7.0 - 18 MG/DL    Creatinine 1.28 0.6 - 1.3 MG/DL    BUN/Creatinine ratio 37 (H) 12 - 20      GFR est AA >60 >60 ml/min/1.73m2    GFR est non-AA 56 (L) >60 ml/min/1.73m2    Calcium 8.8 8.5 - 10.1 MG/DL    Phosphorus 3.5 2.5 - 4.9 MG/DL    Albumin 2.2 (L) 3.4 - 5.0 g/dL   PROTHROMBIN TIME + INR    Collection Time: 05/01/21  1:08 AM   Result Value Ref Range    Prothrombin time 15.7 (H) 11.5 - 15.2 sec    INR 1.3 (H) 0.8 - 1.2     GLUCOSE, POC    Collection Time: 05/01/21  5:41 AM   Result Value Ref Range    Glucose (POC) 135 (H) 70 - 110 mg/dL   GLUCOSE, POC    Collection Time: 05/01/21 11:57 AM   Result Value Ref Range    Glucose (POC) 132 (H) 70 - 110 mg/dL         Radiology studies: NA      Assessment: This is a 80 y/o AAM with a prolonged admission, s/p airway compromise secondary to angioedema and brief witnessed cardiac arrest. Presently I believe his right sided movements are an agitated stereotypy and not consistent with myoclonus or seizure. Will re-evaluate tomorrow.        Principal Problem:    Respiratory failure ([facility] (4/5/2021)    Active Problems:    Angioedema due to angiotensin converting enzyme inhibitor (ACE-I) (4/4/2021)      JACQUELYN (acute kidney injury) ([facility] (4/4/2021)      Cardiac arrest ([address] (4/4/2021)      Obesity (BMI 30-39.9) (4/5/2021)      Diabetic neuropathy associated with type 2 diabetes mellitus ([facility] (4/5/2021)      Diabetic retinopathy associated with type 2 diabetes mellitus ([facility] (4/5/2021)      Pulmonary infiltrates (4/5/2021)      Controlled type 2 diabetes mellitus with neurologic complication, without long-term current use of insulin ([facility] (4/5/2021)      DVT (deep venous thrombosis) ([facility] (4/10/2021)      Encounter for palliative care ()      Goals of care, counseling/discussion ()      Multiple subsegmental pulmonary emboli without acute cor pulmonale (HCC) ()        Plan:     1. No further recommendations at this time. Tanner Huntley M.D.   Clinical Neurophysiology  Neuromuscular specialist

## 2021-05-01 NOTE — PROGRESS NOTES
Problem: Diabetes Self-Management  Goal: *Disease process and treatment process  Description: Define diabetes and identify own type of diabetes; list 3 options for treating diabetes. Outcome: Progressing Towards Goal  Goal: *Incorporating nutritional management into lifestyle  Description: Describe effect of type, amount and timing of food on blood glucose; list 3 methods for planning meals. Outcome: Progressing Towards Goal  Goal: *Incorporating physical activity into lifestyle  Description: State effect of exercise on blood glucose levels. Outcome: Progressing Towards Goal  Goal: *Developing strategies to promote health/change behavior  Description: Define the ABC's of diabetes; identify appropriate screenings, schedule and personal plan for screenings. Outcome: Progressing Towards Goal  Goal: *Using medications safely  Description: State effect of diabetes medications on diabetes; name diabetes medication taking, action and side effects. Outcome: Progressing Towards Goal  Goal: *Monitoring blood glucose, interpreting and using results  Description: Identify recommended blood glucose targets  and personal targets. Outcome: Progressing Towards Goal  Goal: *Prevention, detection, treatment of acute complications  Description: List symptoms of hyper- and hypoglycemia; describe how to treat low blood sugar and actions for lowering  high blood glucose level. Outcome: Progressing Towards Goal  Goal: *Prevention, detection and treatment of chronic complications  Description: Define the natural course of diabetes and describe the relationship of blood glucose levels to long term complications of diabetes.   Outcome: Progressing Towards Goal  Goal: *Developing strategies to address psychosocial issues  Description: Describe feelings about living with diabetes; identify support needed and support network  Outcome: Progressing Towards Goal  Goal: *Sick day guidelines  Outcome: Progressing Towards Goal     Problem: Patient Education: Go to Patient Education Activity  Goal: Patient/Family Education  Outcome: Progressing Towards Goal     Problem: Falls - Risk of  Goal: *Absence of Falls  Description: Document Misha Acosta Fall Risk and appropriate interventions in the flowsheet. Outcome: Progressing Towards Goal  Note: Fall Risk Interventions:       Mentation Interventions: Adequate sleep, hydration, pain control, Evaluate medications/consider consulting pharmacy    Medication Interventions: Evaluate medications/consider consulting pharmacy    Elimination Interventions: Bed/chair exit alarm, Toileting schedule/hourly rounds              Problem: Patient Education: Go to Patient Education Activity  Goal: Patient/Family Education  Outcome: Progressing Towards Goal     Problem: Pressure Injury - Risk of  Goal: *Prevention of pressure injury  Description: Document Lang Scale and appropriate interventions in the flowsheet.   Outcome: Progressing Towards Goal  Note: Pressure Injury Interventions:  Sensory Interventions: Assess changes in LOC    Moisture Interventions: Absorbent underpads    Activity Interventions: Pressure redistribution bed/mattress(bed type)    Mobility Interventions: HOB 30 degrees or less, Pressure redistribution bed/mattress (bed type)    Nutrition Interventions: Document food/fluid/supplement intake    Friction and Shear Interventions: HOB 30 degrees or less                Problem: Patient Education: Go to Patient Education Activity  Goal: Patient/Family Education  Outcome: Progressing Towards Goal     Problem: Nutrition Deficit  Goal: *Optimize nutritional status  Outcome: Progressing Towards Goal     Problem: Injury - Risk of, Adverse Drug Event  Goal: *Absence of adverse drug events  Outcome: Progressing Towards Goal  Goal: *Absence of medication errors  Outcome: Progressing Towards Goal  Goal: *Knowledge of prescribed medications  Outcome: Progressing Towards Goal     Problem: Patient Education: Go to Patient Education Activity  Goal: Patient/Family Education  Outcome: Progressing Towards Goal     Problem: Pain  Goal: *Control of Pain  Outcome: Progressing Towards Goal  Goal: *PALLIATIVE CARE:  Alleviation of Pain  Outcome: Progressing Towards Goal     Problem: Patient Education: Go to Patient Education Activity  Goal: Patient/Family Education  Outcome: Progressing Towards Goal     Problem: Delirium Treatment  Goal: *Level of consciousness restored to baseline  Outcome: Progressing Towards Goal  Goal: *Level of environmental perceptions restored to baseline  Outcome: Progressing Towards Goal  Goal: *Sensory perception restored to baseline  Outcome: Progressing Towards Goal  Goal: *Emotional stability restored to baseline  Outcome: Progressing Towards Goal  Goal: *Functional assessment restored to baseline  Outcome: Progressing Towards Goal  Goal: *Absence of falls  Outcome: Progressing Towards Goal  Goal: *Will remain free of delirium, CAM Score negative  Outcome: Progressing Towards Goal  Goal: *Cognitive status will be restored to baseline  Outcome: Progressing Towards Goal  Goal: Interventions  Outcome: Progressing Towards Goal     Problem: Patient Education: Go to Patient Education Activity  Goal: Patient/Family Education  Outcome: Progressing Towards Goal     Problem: Risk for Spread of Infection  Goal: Prevent transmission of infectious organism to others  Description: Prevent the transmission of infectious organisms to other patients, staff members, and visitors.   Outcome: Progressing Towards Goal     Problem: Patient Education:  Go to Education Activity  Goal: Patient/Family Education  Outcome: Progressing Towards Goal     Problem: Patient Education: Go to Patient Education Activity  Goal: Patient/Family Education  Outcome: Progressing Towards Goal

## 2021-05-01 NOTE — PROGRESS NOTES
[employer] [facility]  Pulmonary, Critical Care, and Sleep Medicine    Name: Bryan Ball MRN: [id number]   : 1950 Hospital: [address] [place] [address] Dr   Date: 2021        IMPRESSION:   · Angioedema- with airway and respiratory failure and collapse; thought due to ACE inhibitor  · S/P Emergent Cricthyrotomy [facility]-ED 21- converted to 6.5 ETT intraoperative by anesthesia team 21, 8.0 ETT by anesthesia 21, packing removed evening 21-ENT  · S/P cardiac arrest @ [facility] 21- brief, likely due to hypoxia and airway collapse  · Respiratory Failure: Acute due to above; remains on ventilator. SBTs. · Tracheostomy - #9 Portex on  with Dr. Aria Hernandez- ENT, now complicated by hemorrhage overnight  · Pulmonary Infiltrates: suspect aspiration secretions and/or blood due to above- can't exclude other infectious process at this time. Rapid Covid Negative at [facility]  · Ileus- mild, noted KUB   · DVT: Popliteal- Left; acute non-occlusive thrombus.  Heparin ACS protocol started - stopped  due to bleeding from Cric site.  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 Patient Active Problem List   Diagnosis Code    Angioedema due to angiotensin converting enzyme inhibitor (ACE-I) T78. 3XXA, [id number]    Respiratory failure ([address] J96.90    JACQUELYN (acute kidney injury) ([address] N17.9    Cardiac arrest (HCC) I46.9    Obesity (BMI 30-39. 9) E66.9    Diabetic neuropathy associated with type 2 diabetes mellitus ([address] E11.40    Diabetic retinopathy associated with type 2 diabetes mellitus ([address] E11.319    Pulmonary infiltrates R91.8    Controlled type 2 diabetes mellitus with neurologic complication, without long-term current use of insulin (HCC) E11.49    DVT (deep venous thrombosis) ([facility]) I82.409    Encounter for palliative care Z51.5    Goals of care, counseling/discussion Z71.89    Multiple subsegmental pulmonary emboli without acute cor pulmonale ([facility]) I26.94      PLAN:   NEURO:  · Serial neuro evaluations  · Off sedation. Analgesics as needed.     CARDIO:  · MAP goal >64 - No IV pressor requirement  · Echo on 4/6/21 with EF of 55%, dilated RV and moderate pulmonary hypertension (PASP 61 mmHg)    PULM:  · Maintain aspiration precautions: HOB >30 degrees  · SpO2 goal >92%  · Bronchial /oral hygiene   · Trach care per protocol  · Transitioned to TC full time   · Completed course of decadron  · Continue Brovana  · Albuterol PRN  · #9 Portex Trach on 4/26       GI/ NUTRITION:  · NGT Continue and stop low-intermittent suction, holding PEG as patient has been accepted by facility   · Diet: Tube feeds: Jevity 1.5, 60mls/hr, Free Water: 100mls Q 4  · SUP:Pepcid 20 mg Q 12  · Follow up with nutritional recommendations  · Lipitor 80mg QHS  · MV  · KUB yesterday with increased gaseous distention, BM per nurses as of 5/1, consider reglan   · Continue miralax.  Consider simethicone    RENAL/ METAB/ :  · Monitor I&Os  · Trend electrolytes - replace as needed  · Condom cath: Continue, monitor I/Os  · S/p Kayexalate 4/29     HEM/ONC  · Trend Hb/HCT and PLTs  · DVT prophylaxis: SCDs, heparin gttp  · Restart heparin gtt 4/27    · Day #2 coumadin: pharmacy to dose, stop heparin when theraputic-holding due to bleeding around trach     ID  · Antibioitcs: Cipro eye drop, Zosyn 4/4 - 4/8  Vanco: 4/4-6, MRSA swab negative.   · Zosyn restarted on 4/11-4/19, Arvil Lachelle started 4/19-25  · Repeat Covid negative (4/11)  · Monitor and trend WBC and temp curve  · Send sputum culture 4/30- growing mild GNR, foul smelling- will discuss restarting of abx with attending physicians/primary team   · Send pro calcitonin      ENDO  · BGs Q 6,SSI, TSH normal  · C1 inhibitor normal level (4/7/21)     MSK/ ORTHO  · No active issues  · Aggressive PT/OT for deconditioning     SKIN/ WOUNDS  · Per protocol  · Trach sutured to skin      CODE STATUS: Full Code- Full     Discussed in interdisciplinary rounds. Anticipate that patient will transfer to [facility] in [facility] [place]. OK to send patient on PO coumadin and NGT. LTACH will perform PEG if patient not able to transition to PO with trach. Best practice :  Glycemic control  IHI ICU bundles:              Cardona Bundle Followed and Vent Bundle Followed    [facility] Vent patients- VAP bundle, aim to keep peak plateau pressure 23-40SZ H2O  Sress ulcer prophylaxis. DVT prophylaxis. Need for Lines, cardona assessed. Restraints need. - not needed  Palliative care evaluation. Subjective/Interval History:        Interval HPI:69 yo M h/o HTN tx w/ lisinopril presenting to [facility] ED 4/4 for rapidly progressing respiratory distress due to severe angioedema. During intubation attempts pt had brief cardiac arrest w/ pulse loss and severe airway swelling leading to emergent cricothyrotomy. Pt stabilized before Nightingale transport to [place] at [facility] pt taken emergently to [address] was converted to ETT. Patient continued to have trouble with o2 requirements disproportionate to CXR findings and despite adjustment of FiO2 and PEEP, now improved. S/p administration of inhaled epoprostenol. CTA revelaed B/L pulmonary emboli which is likely the cause of his hypoxemia and RH strain. Continuing heparin gtt, which had already been initiated for left popliteal non-occlusive thrombus. Pt is s/p dobutamine and  diureses w/ bumex and metolazone.  COVID - 19 negative. Now with minimal vent support, however, mentation precludes extubation at this time. Patient required tracheostomy placement 4/26.  His ICU course has been complicated by encephalopathy and critical care myopathy      Subjective 21  LOS: 27    · Patient had bleeding alesha trach overnight, coughing out large clots  · Cont' with thick, foul smelling secretions via trach, strong cough  · No eye opening this morning even with stimulation   · Gas and stool in FMS per nursing staff          Inpat Anti-Infectives (From admission, onward)     Start     Ordered Stop    21 1800  ciprofloxacin HCl (CILOXAN) 0.3 % ophthalmic solution 2 Drop  2 Drop,   Both Eyes,   EVERY 4 HOURS      21 1724 21 17521 1800  ciprofloxacin HCl (CILOXAN) 0.3 % ophthalmic solution 2 Drop  2 Drop,   Both Eyes,   EVERY 2 HOURS WHILE AWAKE      21 17221 175              ROS:Review of systems not obtained due to patient factors. Objective:   Vital Signs:    Visit Vitals  /87   Pulse 94   Temp 99.5 °F (37.5 °C)   Resp 26   Ht 5' 11\" (1.803 m)   Wt 101.7 kg (224 lb 3.3 oz)   SpO2 99%   BMI 31.27 kg/m²       O2 Device: Tracheostomy, Tracheal collar, Humidifier   O2 Flow Rate (L/min): 6 l/min   Temp (24hrs), Av.1 °F (37.3 °C), Min:98.9 °F (37.2 °C), Max:99.5 °F (37.5 °C)       Intake/Output:   Last shift:      No intake/output data recorded. Last 3 shifts:  1901 -  0700  In: 3232.2 [I.V.:322.2]  Out: 2400 [Urine:1075; Drains:50]    Intake/Output Summary (Last 24 hours) at 2021 0820  Last data filed at 2021 0200  Gross per 24 hour   Intake 1706.33 ml   Output 425 ml   Net 1281.33 ml      Physical Exam:                 General: NAD. Trach in place on TC.   Keeps head turned to the right  HEENT:  Anicteric sclerae; pink palpebral conjunctivae; mucosa moist  Resp:  Symmetrical chest expansion, no accessory muscle use; good airway entry; no rales/ wheezing/ diminished b/l, green-brown blood tinged secretions via trach   CV:  S1, S2 present; regular rate and rhythm  GI:  Obese. Abdomen soft, non-tender; (+) active bowel sounds  Extremities:  +2 pulses on all extremities; no edema/ cyanosis/ clubbing noted; no restraints   Skin:  Warm; no rashes/ lesions noted, normal turgor/cap refill , dry appearing   Neurologic:  no eye opening or command following this am   Devices:  NGT, Trach, condom cath        DATA:  Labs:  Recent Labs     05/01/21 0108 04/30/21 1947 04/30/21  0408   WBC 11.7 13.1 12.0   HGB 10.0* 11.2* 10.4*   HCT 33.1* 37.0 33.9*    400 412     Recent Labs     05/01/21 0108 04/30/21 1947 04/30/21 0408 04/29/21  0114 04/29/21  0114     --  139  --  138   K 4.6  --  4.5  --  5.0     --  106  --  107   CO2 28  --  27  --  29   *  --  134*  --  144*   BUN 47*  --  50*  --  50*   CREA 1.28  --  1.35*  --  1.23   CA 8.8  --  9.3  --  9.1   MG 2.2  --  2.4  --  2.4   PHOS 3.5  --  3.8  --  3.3   ALB 2.2*  --  2.3*  --  2.3*   INR 1.3* 1.2 1.2   < >  --     < > = values in this interval not displayed. No results for input(s): PH, PCO2, PO2, HCO3, FIO2 in the last 72 hours.     Lab Results   Component Value Date/Time    Hemoglobin A1c 6.6 (H) 04/04/2021 05:11 PM     Lab Results   Component Value Date/Time    TSH 1.35 04/04/2021 05:11 PM     MICRO:  Results     Procedure Component Value Units Date/Time    CULTURE, RESPIRATORY/SPUTUM/BRONCH Mollie Amen STAIN [[id number]] Collected: 04/30/21 1245    Order Status: Completed Specimen: Sputum,ET Suction Updated: 04/30/21 3069     Special Requests: NO SPECIAL REQUESTS        GRAM STAIN 1+ WBCS SEEN               RARE EPITHELIAL CELLS SEEN                  1+ GRAM POSITIVE COCCI IN CHAINS                  OCCASIONAL GRAM NEGATIVE COCCOBACILLI           Culture result: PENDING    COVID-19 RAPID TEST [[id number]] Collected: 04/25/21 1214    Order Status: Completed Specimen: Nasopharyngeal Updated: 04/25/21 1237     Specimen source Nasopharyngeal        COVID-19 rapid test Not detected        Comment: Rapid Abbott ID Now       Rapid NAAT:  The specimen is NEGATIVE for SARS-CoV-2, the novel coronavirus associated with COVID-19. Negative results should be treated as presumptive and, if inconsistent with clinical signs and symptoms or necessary for patient management, should be tested with an alternative molecular assay. Negative results do not preclude SARS-CoV-2 infection and should not be used as the sole basis for patient management decisions. This test has been authorized by the FDA under an Emergency Use Authorization (EUA) for use by authorized laboratories. Fact sheet for Healthcare Providers: ConventionInboxdate.co.nz  Fact sheet for Patients: Solvoyodate.co.nz       Methodology: Isothermal Nucleic Acid Amplification                                                               Echo  04/04/21   ECHO ADULT COMPLETE 04/06/2021 4/6/2021    Narrative · Contrast used: DEFINITY. · Image quality for this study was technically difficult. · LV: Calculated LVEF is 55%. Visually measured ejection fraction. Normal   cavity size, wall thickness and systolic function (ejection fraction   normal). Wall motion: normal. Moderate (grade 2) left ventricular   diastolic dysfunction. · AO: Mild aortic root and ascending aorta dilatation. Ascending aorta   diameter = 3.6 cm. Aortic root measures 3.9 cm  · RA: Dilated right atrium. · RV: Dilated right ventricle. Borderline low systolic function. · TV: Mild tricuspid valve regurgitation is present. · IVC: Mechanically ventilated; cannot use inferior caval vein diameter to   estimate central venous pressure. · PA: Moderate pulmonary hypertension. Pulmonary arterial systolic   pressure is 61 mmHg.         Signed by: Enrike Ordoñez MD        Imaging:  [x]I have personally reviewed the patients radiographs    CXR Results  (Last 48 hours)    None               CT Results (most recent):  Results from Hospital Encounter encounter on 04/04/21   CTA CHEST W OR W WO CONT    Narrative CTA CHEST PULMONARY ANGIOGRAM    HISTORY/INDICATION:  Shortness of breath, clinical concern for pulmonary  embolism, history of cardiac arrest 4/4/2021    COMPARISON:  No prior CTA chest. Reference chest radiograph 4/13/2021. TECHNIQUE:  Helical multidetector array volumetric acquisition of the chest is  performed following intravenous contrast administration of 72cc Isovue-370, per  pulmonary angiogram protocol. These images are reviewed and 2.5 mm and 5 mm  images along with coronal and sagittal 3D reconstruction maximum intensity  projection (MIP) images. Dose reduction techniques:  Automated exposure control,  mAs and/or kVp reductions based on patient size, and iterative reconstruction. CTA SPECIFIC FINDINGS:  Pulmonary arteries: Central contrast filling defect in the left lower lobe,  involving lobar, segmental, and segmental branches. Small contrast filling  defect in a segmental branch in the right upper lobe. Aorta: No aneurysm. CHEST FINDINGS:   Thyroid:  No focal lesion. Mediastinum: Heart is mildly enlarged. Mild burden of coronary artery  atherosclerotic calcifications. No pericardial effusion. Reflux of trace  contrast into the IVC. No right ventricular dilatation or bowing of the  intraventricular septum. Pleural space: Trace bilateral pleural effusions. No pneumothorax. Lungs: There is dense multifocal consolidations bilaterally and in the dependent  lower lobes. There is suggestion of focal hypoattenuation within the  consolidation in the left lower lobe. Endotracheal tube in appropriate position,  terminating 3.7 cm above the gabriella on the  view. Included Abdomen: Visualized solid organs of the upper abdomen are normal.  Enteric tube tip terminates within the body of the stomach. Skeleton: No suspicious lytic or blastic lesions. No fractures.     Soft tissues: Normal.    Lymph Nodes: Increased number of mildly prominent mediastinal and bilateral  hilar nodes, including a 1.0 x 1.8 cm AP window node and a 1.1 x 2.1 cm right  paratracheal node. Impression 1. Moderate burden of acute pulmonary embolism in the left lower lobe. Additional small acute segmental pulmonary embolism in the right upper lobe. 2. There is dense multifocal consolidations bilaterally, as well as in the  dependent lower lobes, favoring a combination of multifocal pneumonia, edema,  and dependent atelectasis. -There is suggestion of focal hypoattenuation within the consolidation in the  left lower lobe, which may be infectious in etiology versus sequela of pulmonary  infarct given #1.    3. Mildly prominent mediastinal and bilateral hilar nodes, likely  benign/reactive. 4. Mild cardiomegaly. Reflux of trace contrast into the IVC, which can be seen  in the setting of right heart dysfunction. 5. Trace bilateral pleural effusions. 6. Endotracheal and enteric tubes noted in appropriate position.     Discussed findings #1-2 with Tahmina Bergman PA-C on 4/13/2021 at 7:19 PM.     Split share time 25 minutes   Dickson Smith PA-C  05/01/21  Pulmonary, Critical Care Medicine  [facility]

## 2021-05-01 NOTE — PROGRESS NOTES
2000 Assumed care of pt after bedside report with pt in bed with HOB elevated. Monitor denotes NSR. Pt alert but confused. Opens eyes, moves lt arm to reach for NGT and trach. Pt reoriented. Pt has trach in place and frequently coughing up bloody sputum. Heparin drip and Coumadin both dc'd for now. Pt has condom catheter in place and draining emerald urine. 2200 Pt continues to cough up bloody sputum from trach. 05/01/2021 0000 Accucgeck result 149. No Lispro insulin given as per sliding scale coverageDr. Aditi Cohn, hospitalist paged and advised of continued bloody sputum and pt continually expectorating the same. Trent Yuen 0100 AM labs drawn and sent to lab. 0400 Pt status unchanged. Dr. Aditi Cohn paged and advised of pt's continued restless and bloody sputum. 0405 Ativan 0.5 mg IV given as ordered. 0600 Accucheck result 135. No Lispro insulin given as per sliding scale coverage.

## 2021-05-01 NOTE — PROGRESS NOTES
Bedside and Verbal shift change report given to Ector (oncoming nurse) by Gibson Ganser (offgoing nurse). Report included the following information SBAR, Kardex and MAR.

## 2021-05-02 LAB
ALBUMIN SERPL-MCNC: 2 G/DL (ref 3.4–5)
ALBUMIN/GLOB SERPL: 0.3 {RATIO} (ref 0.8–1.7)
ALP SERPL-CCNC: 94 U/L (ref 45–117)
ALT SERPL-CCNC: 65 U/L (ref 16–61)
ANION GAP SERPL CALC-SCNC: 3 MMOL/L (ref 3–18)
APTT PPP: 40.5 SEC (ref 23–36.4)
AST SERPL-CCNC: 41 U/L (ref 10–38)
BASOPHILS # BLD: 0.1 K/UL (ref 0–0.1)
BASOPHILS NFR BLD: 1 % (ref 0–2)
BILIRUB SERPL-MCNC: 0.4 MG/DL (ref 0.2–1)
BUN SERPL-MCNC: 52 MG/DL (ref 7–18)
BUN/CREAT SERPL: 38 (ref 12–20)
CALCIUM SERPL-MCNC: 9.3 MG/DL (ref 8.5–10.1)
CHLORIDE SERPL-SCNC: 107 MMOL/L (ref 100–111)
CO2 SERPL-SCNC: 27 MMOL/L (ref 21–32)
COVID-19 RAPID TEST, COVR: NOT DETECTED
CREAT SERPL-MCNC: 1.37 MG/DL (ref 0.6–1.3)
DIFFERENTIAL METHOD BLD: ABNORMAL
EOSINOPHIL # BLD: 0.2 K/UL (ref 0–0.4)
EOSINOPHIL NFR BLD: 2 % (ref 0–5)
ERYTHROCYTE [DISTWIDTH] IN BLOOD BY AUTOMATED COUNT: 15.7 % (ref 11.6–14.5)
GLOBULIN SER CALC-MCNC: 7.1 G/DL (ref 2–4)
GLUCOSE BLD STRIP.AUTO-MCNC: 121 MG/DL (ref 70–110)
GLUCOSE BLD STRIP.AUTO-MCNC: 137 MG/DL (ref 70–110)
GLUCOSE BLD STRIP.AUTO-MCNC: 148 MG/DL (ref 70–110)
GLUCOSE SERPL-MCNC: 131 MG/DL (ref 74–99)
HCT VFR BLD AUTO: 31.3 % (ref 36–48)
HGB BLD-MCNC: 9.5 G/DL (ref 13–16)
INR PPP: 1.2 (ref 0.8–1.2)
LYMPHOCYTES # BLD: 2.2 K/UL (ref 0.9–3.6)
LYMPHOCYTES NFR BLD: 22 % (ref 21–52)
MAGNESIUM SERPL-MCNC: 2.5 MG/DL (ref 1.6–2.6)
MCH RBC QN AUTO: 25.5 PG (ref 24–34)
MCHC RBC AUTO-ENTMCNC: 30.4 G/DL (ref 31–37)
MCV RBC AUTO: 83.9 FL (ref 74–97)
MONOCYTES # BLD: 0.7 K/UL (ref 0.05–1.2)
MONOCYTES NFR BLD: 7 % (ref 3–10)
NEUTS SEG # BLD: 7 K/UL (ref 1.8–8)
NEUTS SEG NFR BLD: 69 % (ref 40–73)
PLATELET # BLD AUTO: 313 K/UL (ref 135–420)
PMV BLD AUTO: 11.5 FL (ref 9.2–11.8)
POTASSIUM SERPL-SCNC: 5 MMOL/L (ref 3.5–5.5)
PROT SERPL-MCNC: 9.1 G/DL (ref 6.4–8.2)
PROTHROMBIN TIME: 15.2 SEC (ref 11.5–15.2)
RBC # BLD AUTO: 3.73 M/UL (ref 4.35–5.65)
SARS-COV-2, COV2: NORMAL
SODIUM SERPL-SCNC: 137 MMOL/L (ref 136–145)
SOURCE, COVRS: NORMAL
WBC # BLD AUTO: 10.1 K/UL (ref 4.6–13.2)

## 2021-05-02 PROCEDURE — 74011250637 HC RX REV CODE- 250/637: Performed by: REGISTERED NURSE

## 2021-05-02 PROCEDURE — 80053 COMPREHEN METABOLIC PANEL: CPT

## 2021-05-02 PROCEDURE — 99233 SBSQ HOSP IP/OBS HIGH 50: CPT | Performed by: INTERNAL MEDICINE

## 2021-05-02 PROCEDURE — 2709999900 HC NON-CHARGEABLE SUPPLY

## 2021-05-02 PROCEDURE — 74011250636 HC RX REV CODE- 250/636: Performed by: INTERNAL MEDICINE

## 2021-05-02 PROCEDURE — 87635 SARS-COV-2 COVID-19 AMP PRB: CPT

## 2021-05-02 PROCEDURE — 77010033678 HC OXYGEN DAILY

## 2021-05-02 PROCEDURE — 85025 COMPLETE CBC W/AUTO DIFF WBC: CPT

## 2021-05-02 PROCEDURE — 74011000250 HC RX REV CODE- 250: Performed by: INTERNAL MEDICINE

## 2021-05-02 PROCEDURE — 82962 GLUCOSE BLOOD TEST: CPT

## 2021-05-02 PROCEDURE — 74011000250 HC RX REV CODE- 250: Performed by: PHYSICIAN ASSISTANT

## 2021-05-02 PROCEDURE — 85730 THROMBOPLASTIN TIME PARTIAL: CPT

## 2021-05-02 PROCEDURE — 94668 MNPJ CHEST WALL SBSQ: CPT

## 2021-05-02 PROCEDURE — 74011250637 HC RX REV CODE- 250/637: Performed by: INTERNAL MEDICINE

## 2021-05-02 PROCEDURE — 36415 COLL VENOUS BLD VENIPUNCTURE: CPT

## 2021-05-02 PROCEDURE — 99232 SBSQ HOSP IP/OBS MODERATE 35: CPT | Performed by: INTERNAL MEDICINE

## 2021-05-02 PROCEDURE — 65660000000 HC RM CCU STEPDOWN

## 2021-05-02 PROCEDURE — 94762 N-INVAS EAR/PLS OXIMTRY CONT: CPT

## 2021-05-02 PROCEDURE — 94669 MECHANICAL CHEST WALL OSCILL: CPT

## 2021-05-02 PROCEDURE — 99232 SBSQ HOSP IP/OBS MODERATE 35: CPT | Performed by: PSYCHIATRY & NEUROLOGY

## 2021-05-02 PROCEDURE — 94640 AIRWAY INHALATION TREATMENT: CPT

## 2021-05-02 PROCEDURE — 83735 ASSAY OF MAGNESIUM: CPT

## 2021-05-02 PROCEDURE — 85610 PROTHROMBIN TIME: CPT

## 2021-05-02 RX ORDER — WARFARIN 7.5 MG/1
7.5 TABLET ORAL ONCE
Status: DISCONTINUED | OUTPATIENT
Start: 2021-05-02 | End: 2021-05-02

## 2021-05-02 RX ORDER — CEFAZOLIN SODIUM 2 G/50ML
2 SOLUTION INTRAVENOUS ONCE
Status: COMPLETED | OUTPATIENT
Start: 2021-05-03 | End: 2021-05-03

## 2021-05-02 RX ADMIN — IPRATROPIUM BROMIDE AND ALBUTEROL SULFATE 3 ML: .5; 3 SOLUTION RESPIRATORY (INHALATION) at 03:04

## 2021-05-02 RX ADMIN — FAMOTIDINE 20 MG: 10 INJECTION INTRAVENOUS at 10:33

## 2021-05-02 RX ADMIN — Medication 30 ML: at 10:33

## 2021-05-02 RX ADMIN — FAMOTIDINE 20 MG: 10 INJECTION INTRAVENOUS at 00:11

## 2021-05-02 RX ADMIN — LORAZEPAM 0.5 MG: 2 INJECTION INTRAMUSCULAR; INTRAVENOUS at 22:03

## 2021-05-02 RX ADMIN — IPRATROPIUM BROMIDE AND ALBUTEROL SULFATE 3 ML: .5; 3 SOLUTION RESPIRATORY (INHALATION) at 21:35

## 2021-05-02 RX ADMIN — IPRATROPIUM BROMIDE AND ALBUTEROL SULFATE 3 ML: .5; 3 SOLUTION RESPIRATORY (INHALATION) at 23:57

## 2021-05-02 RX ADMIN — LORAZEPAM 0.5 MG: 2 INJECTION INTRAMUSCULAR; INTRAVENOUS at 07:24

## 2021-05-02 RX ADMIN — IPRATROPIUM BROMIDE AND ALBUTEROL SULFATE 3 ML: .5; 3 SOLUTION RESPIRATORY (INHALATION) at 08:39

## 2021-05-02 RX ADMIN — ATORVASTATIN CALCIUM 80 MG: 40 TABLET, FILM COATED ORAL at 00:11

## 2021-05-02 RX ADMIN — FAMOTIDINE 20 MG: 10 INJECTION INTRAVENOUS at 21:59

## 2021-05-02 RX ADMIN — IPRATROPIUM BROMIDE AND ALBUTEROL SULFATE 3 ML: .5; 3 SOLUTION RESPIRATORY (INHALATION) at 16:18

## 2021-05-02 RX ADMIN — IPRATROPIUM BROMIDE AND ALBUTEROL SULFATE 3 ML: .5; 3 SOLUTION RESPIRATORY (INHALATION) at 11:18

## 2021-05-02 RX ADMIN — ATORVASTATIN CALCIUM 80 MG: 40 TABLET, FILM COATED ORAL at 22:01

## 2021-05-02 RX ADMIN — ARFORMOTEROL TARTRATE 15 MCG: 15 SOLUTION RESPIRATORY (INHALATION) at 21:35

## 2021-05-02 RX ADMIN — ARFORMOTEROL TARTRATE 15 MCG: 15 SOLUTION RESPIRATORY (INHALATION) at 08:39

## 2021-05-02 NOTE — PROGRESS NOTES
Hospitalist Progress Note        Patient: Christel Harden               Sex: male          DOA: 4/4/2021       YOB: 1950      Age:  79 y.o.        LOS:  LOS: 28 days               Subjective:     Patient opens eyes on tactile command. He remains nonverbal.  Tolerating tube feeding per nursing. No new issues otherwise. HPI:  Patient was initially seen at [facility] and had emergent Crychothyrotomy during Cardiac arrest.  Initially there was concern that he had Angioedema. He was stabilized and flown to [facility] for ongoing management. Mr Ritika Back has required prolonged support on the mechanical ventilator for ongoing respiratory failure. He has had Tracheostomy via ENT and he continues with Tracheostomy collar currently which he is tolerating well. Mr Ritika Back had cardiac arrest initially, but he has stable hemodynamics currently. Mr Ritika Back was treated for sepsis and has received a full course of antibiotics, he is off antibiotics currently. He has limited interaction, he does not have seizure activity. He has had sedation to maintain the ventilator which he no longer needs. He has tracheostomy, but he currently does not have a PEG. He has ongoing NG tube and continues with NG tube feeding. Objective:      BP (!) 111/92   Pulse (!) 102   Temp 98.6 °F (37 °C)   Resp (!) 36   Ht 5' 11\" (1.803 m)   Wt 101.7 kg (224 lb 3.3 oz)   SpO2 98%   BMI 31.27 kg/m²       Intake/Output Summary (Last 24 hours) at 5/2/2021 1551  Last data filed at 5/2/2021 1406  Gross per 24 hour   Intake 1580 ml   Output 1200 ml   Net 380 ml     Gen: Open eyes, cachectic, not in acute distress, NG tube is in situ  Neck: Tracheostomy is in situ with dressing  Lungs: Diminished breath sound bibasilar without wheezes  Heart:  Regular Rate and Rhythm.     Abdomen:  Soft, nondistended, bowel sounds present  Extremities: Pedal edema present  Neuro: Opens eyes on tactile command, no tremors noted.      Assessment/Plan     ASSESSMENT:    1. Reportedly acute hypoxic respiratory failure due to angioedema s/p emergent cricothyrotomy followed by tracheostomy  2. Tracheostomy site bleeding, better currently  3. Post respiratory failure cardiac arrest  4. Chronic hypoxic respiratory failure status post trach and on trach collar  5. Bilateral pulmonary infiltrates  6. Oropharyngeal dysphagia status post NG tube  7. Left leg popliteal DVT and pulmonary embolism  8. Reportedly acute metabolic encephalopathy  9. 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 JACQULEYN, resolved  11. Left pneumothorax resolved  12. Diabetic neuropathy  13. Obesity    PLAN:    Continue holding Coumadin and heparin drip until PEG tube is placed. GI is planning to put a PEG tube tomorrow  Neurology input noted about no need for further intervention  Discussed with pulmonologist: Patient is not bleeding anymore from tracheostomy site. Continue trach collar and will add nebulizer  Continue to feeding through NG tube until PEG tube is placed  Continue multivitamin  Patient is waiting to be transferred to [facility] unit. Discussed with patient sister over the phone and updated her about current treatment plan    Total time to take care of this patient was 25 minutes and more than 50% of time was spent counseling and coordinating care. Disclaimer: Sections of this note are dictated using utilizing voice recognition software, which may have resulted in some phonetic based errors in grammar and contents. Even though attempts were made to correct all the mistakes, some may have been missed, and remained in the body of the document. If questions arise, please contact our department.     Recent Results (from the past 24 hour(s))   PROCALCITONIN    Collection Time: 05/01/21  4:21 PM   Result Value Ref Range    Procalcitonin 0.23 ng/mL   TSH 3RD GENERATION    Collection Time: 05/01/21  4:21 PM   Result Value Ref Range    TSH 2.62 0.36 - 3.74 uIU/mL   T4, FREE    Collection Time: 05/01/21  4:21 PM   Result Value Ref Range    T4, Free 1.2 0.7 - 1.5 NG/DL   GLUCOSE, POC    Collection Time: 05/01/21  6:35 PM   Result Value Ref Range    Glucose (POC) 150 (H) 70 - 110 mg/dL   GLUCOSE, POC    Collection Time: 05/02/21 12:41 AM   Result Value Ref Range    Glucose (POC) 148 (H) 70 - 110 mg/dL   MAGNESIUM    Collection Time: 05/02/21  3:51 AM   Result Value Ref Range    Magnesium 2.5 1.6 - 2.6 mg/dL   CBC WITH AUTOMATED DIFF    Collection Time: 05/02/21  3:51 AM   Result Value Ref Range    WBC 10.1 4.6 - 13.2 K/uL    RBC 3.73 (L) 4.35 - 5.65 M/uL    HGB 9.5 (L) 13.0 - 16.0 g/dL    HCT 31.3 (L) 36.0 - 48.0 %    MCV 83.9 74.0 - 97.0 FL    MCH 25.5 24.0 - 34.0 PG    MCHC 30.4 (L) 31.0 - 37.0 g/dL    RDW 15.7 (H) 11.6 - 14.5 %    PLATELET 464 334 - 521 K/uL    MPV 11.5 9.2 - 11.8 FL    NEUTROPHILS 69 40 - 73 %    LYMPHOCYTES 22 21 - 52 %    MONOCYTES 7 3 - 10 %    EOSINOPHILS 2 0 - 5 %    BASOPHILS 1 0 - 2 %    ABS. NEUTROPHILS 7.0 1.8 - 8.0 K/UL    ABS. LYMPHOCYTES 2.2 0.9 - 3.6 K/UL    ABS. MONOCYTES 0.7 0.05 - 1.2 K/UL    ABS. EOSINOPHILS 0.2 0.0 - 0.4 K/UL    ABS.  BASOPHILS 0.1 0.0 - 0.1 K/UL    DF AUTOMATED     PTT    Collection Time: 05/02/21  3:51 AM   Result Value Ref Range    aPTT 40.5 (H) 23.0 - 36.4 SEC   PROTHROMBIN TIME + INR    Collection Time: 05/02/21  3:51 AM   Result Value Ref Range    Prothrombin time 15.2 11.5 - 15.2 sec    INR 1.2 0.8 - 1.2     METABOLIC PANEL, COMPREHENSIVE    Collection Time: 05/02/21  3:51 AM   Result Value Ref Range    Sodium 137 136 - 145 mmol/L    Potassium 5.0 3.5 - 5.5 mmol/L    Chloride 107 100 - 111 mmol/L    CO2 27 21 - 32 mmol/L    Anion gap 3 3.0 - 18 mmol/L    Glucose 131 (H) 74 - 99 mg/dL    BUN 52 (H) 7.0 - 18 MG/DL    Creatinine 1.37 (H) 0.6 - 1.3 MG/DL    BUN/Creatinine ratio 38 (H) 12 - 20      GFR est AA >60 >60 ml/min/1.73m2    GFR est non-AA 51 (L) >60 ml/min/1.73m2    Calcium 9.3 8.5 - 10.1 MG/DL    Bilirubin, total 0.4 0.2 - 1.0 MG/DL    ALT (SGPT) 65 (H) 16 - 61 U/L    AST (SGOT) 41 (H) 10 - 38 U/L    Alk.  phosphatase 94 45 - 117 U/L    Protein, total 9.1 (H) 6.4 - 8.2 g/dL    Albumin 2.0 (L) 3.4 - 5.0 g/dL    Globulin 7.1 (H) 2.0 - 4.0 g/dL    A-G Ratio 0.3 (L) 0.8 - 1.7     SARS-COV-2    Collection Time: 05/02/21 10:22 AM   Result Value Ref Range    SARS-CoV-2 Please find results under separate order     COVID-19 RAPID TEST    Collection Time: 05/02/21 10:22 AM   Result Value Ref Range    Specimen source Nasopharyngeal      COVID-19 rapid test Not detected NOTD     GLUCOSE, POC    Collection Time: 05/02/21 11:38 AM   Result Value Ref Range    Glucose (POC) 121 (H) 70 - 110 mg/dL

## 2021-05-02 NOTE — CONSULTS
[email or web address]  [phone]    Impression:   1. Dysphagia - secondary to metabolic encephalopathy following cardiac arrest      Plan:     1. PEG placement planned for tomorrow, 5/3/21   - pls hold tube feeds after MN tonight   - pls continue to hold coumadin   - pls hold heparin after midnight tonight   - Ancef ordered to be infused pre-procedure in endoscopy tomorrow      Chief Complaint: PEG evaluation      HPI:  Abdelrahman Cabrera is a 79 y.o. male who is being seen on consult for PEG evaluation. Patient unable to swallow or maintain nutrition due to metabolic encephalopathy after cardiac arrest. Tracheostomy in place. Patient unable to provide any additional information    PMH:   Past Medical History:   Diagnosis Date    DDD (degenerative disc disease), lumbar     Diabetes ([facility]     Diabetic neuropathy ([facility]     Diabetic retinopathy ([facility]     DM2 (diabetes mellitus, type 2) ([facility]     HLD (hyperlipidemia)     HTN (hypertension)     Obesity (BMI 30-39. 9)        PSH:   History reviewed. No pertinent surgical history.     Social HX:   Social History     Socioeconomic History    Marital status:      Spouse name: Not on file    Number of children: Not on file    Years of education: Not on file    Highest education level: Not on file   Occupational History    Not on file   Social Needs    Financial resource strain: Not on file    Food insecurity     Worry: Not on file     Inability: Not on file    Transportation needs     Medical: Not on file     Non-medical: Not on file   Tobacco Use    Smoking status: Current Every Day Smoker     Packs/day: 0.50   Substance and Sexual Activity    Alcohol use: Not on file    Drug use: Not on file    Sexual activity: Not on file   Lifestyle    Physical activity     Days per week: Not on file     Minutes per session: Not on file    Stress: Not on file   Relationships    Social connections     Talks on phone: Not on file     Gets together: Not on file Attends Denominational service: Not on file     Active member of club or organization: Not on file     Attends meetings of clubs or organizations: Not on file     Relationship status: Not on file    Intimate partner violence     Fear of current or ex partner: Not on file     Emotionally abused: Not on file     Physically abused: Not on file     Forced sexual activity: Not on file   Other Topics Concern     Service Not Asked    Blood Transfusions Not Asked    Caffeine Concern Not Asked    Occupational Exposure Not Asked   [place] Hazards Not Asked    Sleep Concern Not Asked    Stress Concern Not Asked    Weight Concern Not Asked    Special Diet Not Asked    Back Care Not Asked    Exercise Not Asked    Bike Helmet Not Asked   [address] [facility] Not Asked    Self-Exams Not Asked   Social History Narrative    Not on file       FHX:   History reviewed. No pertinent family history. Allergy:   Allergies   Allergen Reactions    Lisinopril Angioedema       Home Medications:     Medications Prior to Admission   Medication Sig    LISINOPRIL, BULK, by Does Not Apply route. Review of Systems:     Unable to obtain due to encephalopathy. Visit Vitals  /65   Pulse 95   Temp 98.7 °F (37.1 °C)   Resp (!) 38   Ht 5' 11\" (1.803 m)   Wt 101.7 kg (224 lb 3.3 oz)   SpO2 95%   BMI 31.27 kg/m²       Physical Assessment:     constitutional: appearance: well developed, well nourished, in no acute distress.    skin: no rashes, jaundice  eyes: inspection: normal conjunctivae and lids; no scleral icterus pupils: equal, round, reactive to light  ENMT: tracheostomy in place  respiratory: breath sounds clear, no wheeze/rales/rhonchi  cardiovascular: normal rate, regular rhythm without murmur  abdominal: soft, bowel sounds present, non-tender to palpation without rebound or guarding; no appreciable mass; no appreciable hepatosplenomegaly; no appreciable fluid wave  neurologic:  Responds to voice but does not follow commands     Basic Metabolic Profile   Recent Labs     05/02/21  0351 05/01/21  0108    138   K 5.0 4.6    106   CO2 27 28   BUN 52* 47*   * 153*   CA 9.3 8.8   MG 2.5 2.2   PHOS  --  3.5         CBC w/Diff    Recent Labs     05/02/21  0351   WBC 10.1   RBC 3.73*   HGB 9.5*   HCT 31.3*   MCV 83.9   MCH 25.5   MCHC 30.4*   RDW 15.7*       Recent Labs     05/02/21  0351   GRANS 69   LYMPH 22   EOS 2        Hepatic Function   Recent Labs     05/02/21  0351   ALB 2.0*   TP 9.1*   TBILI 0.4   AP 94          Artist MD Morgan  [facility] [address]  cell - [phone]  [email or web address]

## 2021-05-03 ENCOUNTER — ANESTHESIA EVENT (OUTPATIENT)
Dept: ENDOSCOPY | Age: 71
DRG: 004 | End: 2021-05-03
Payer: MEDICARE

## 2021-05-03 ENCOUNTER — ANESTHESIA (OUTPATIENT)
Dept: ENDOSCOPY | Age: 71
DRG: 004 | End: 2021-05-03
Payer: MEDICARE

## 2021-05-03 VITALS
DIASTOLIC BLOOD PRESSURE: 61 MMHG | OXYGEN SATURATION: 100 % | RESPIRATION RATE: 41 BRPM | SYSTOLIC BLOOD PRESSURE: 93 MMHG | HEART RATE: 86 BPM

## 2021-05-03 PROBLEM — G93.40 ACUTE ENCEPHALOPATHY: Status: ACTIVE | Noted: 2021-05-03

## 2021-05-03 PROBLEM — O22.30 DVT (DEEP VEIN THROMBOSIS) IN PREGNANCY: Status: ACTIVE | Noted: 2021-05-03

## 2021-05-03 LAB
ALBUMIN SERPL-MCNC: 2.1 G/DL (ref 3.4–5)
ANION GAP SERPL CALC-SCNC: 5 MMOL/L (ref 3–18)
APTT PPP: 39.7 SEC (ref 23–36.4)
BACTERIA SPEC CULT: ABNORMAL
BACTERIA SPEC CULT: ABNORMAL
BASOPHILS # BLD: 0.1 K/UL (ref 0–0.1)
BASOPHILS NFR BLD: 1 % (ref 0–2)
BUN SERPL-MCNC: 56 MG/DL (ref 7–18)
BUN/CREAT SERPL: 39 (ref 12–20)
CALCIUM SERPL-MCNC: 8.8 MG/DL (ref 8.5–10.1)
CHLORIDE SERPL-SCNC: 107 MMOL/L (ref 100–111)
CO2 SERPL-SCNC: 28 MMOL/L (ref 21–32)
CREAT SERPL-MCNC: 1.44 MG/DL (ref 0.6–1.3)
DIFFERENTIAL METHOD BLD: ABNORMAL
EOSINOPHIL # BLD: 0.2 K/UL (ref 0–0.4)
EOSINOPHIL NFR BLD: 2 % (ref 0–5)
ERYTHROCYTE [DISTWIDTH] IN BLOOD BY AUTOMATED COUNT: 15.7 % (ref 11.6–14.5)
GLUCOSE BLD STRIP.AUTO-MCNC: 112 MG/DL (ref 70–110)
GLUCOSE BLD STRIP.AUTO-MCNC: 118 MG/DL (ref 70–110)
GLUCOSE BLD STRIP.AUTO-MCNC: 122 MG/DL (ref 70–110)
GLUCOSE BLD STRIP.AUTO-MCNC: 129 MG/DL (ref 70–110)
GLUCOSE BLD STRIP.AUTO-MCNC: 154 MG/DL (ref 70–110)
GLUCOSE SERPL-MCNC: 150 MG/DL (ref 74–99)
GRAM STN SPEC: ABNORMAL
HCT VFR BLD AUTO: 32.4 % (ref 36–48)
HGB BLD-MCNC: 9.7 G/DL (ref 13–16)
INR PPP: 1.2 (ref 0.8–1.2)
LYMPHOCYTES # BLD: 2 K/UL (ref 0.9–3.6)
LYMPHOCYTES NFR BLD: 20 % (ref 21–52)
MAGNESIUM SERPL-MCNC: 2.7 MG/DL (ref 1.6–2.6)
MCH RBC QN AUTO: 24.9 PG (ref 24–34)
MCHC RBC AUTO-ENTMCNC: 29.9 G/DL (ref 31–37)
MCV RBC AUTO: 83.3 FL (ref 74–97)
MONOCYTES # BLD: 0.8 K/UL (ref 0.05–1.2)
MONOCYTES NFR BLD: 8 % (ref 3–10)
NEUTS SEG # BLD: 7 K/UL (ref 1.8–8)
NEUTS SEG NFR BLD: 70 % (ref 40–73)
PHOSPHATE SERPL-MCNC: 4.1 MG/DL (ref 2.5–4.9)
PLATELET # BLD AUTO: 307 K/UL (ref 135–420)
PMV BLD AUTO: 11.7 FL (ref 9.2–11.8)
POTASSIUM SERPL-SCNC: 4.9 MMOL/L (ref 3.5–5.5)
PROTHROMBIN TIME: 15.3 SEC (ref 11.5–15.2)
RBC # BLD AUTO: 3.89 M/UL (ref 4.35–5.65)
SERVICE CMNT-IMP: ABNORMAL
SODIUM SERPL-SCNC: 140 MMOL/L (ref 136–145)
WBC # BLD AUTO: 10 K/UL (ref 4.6–13.2)

## 2021-05-03 PROCEDURE — 85610 PROTHROMBIN TIME: CPT

## 2021-05-03 PROCEDURE — 00731 ANES UPR GI NDSC PX NOS: CPT | Performed by: NURSE ANESTHETIST, CERTIFIED REGISTERED

## 2021-05-03 PROCEDURE — 74011000250 HC RX REV CODE- 250: Performed by: INTERNAL MEDICINE

## 2021-05-03 PROCEDURE — 36415 COLL VENOUS BLD VENIPUNCTURE: CPT

## 2021-05-03 PROCEDURE — 74011250636 HC RX REV CODE- 250/636: Performed by: INTERNAL MEDICINE

## 2021-05-03 PROCEDURE — 74011250636 HC RX REV CODE- 250/636: Performed by: NURSE ANESTHETIST, CERTIFIED REGISTERED

## 2021-05-03 PROCEDURE — 2709999900 HC NON-CHARGEABLE SUPPLY: Performed by: INTERNAL MEDICINE

## 2021-05-03 PROCEDURE — 85025 COMPLETE CBC W/AUTO DIFF WBC: CPT

## 2021-05-03 PROCEDURE — 94640 AIRWAY INHALATION TREATMENT: CPT

## 2021-05-03 PROCEDURE — 77010033678 HC OXYGEN DAILY

## 2021-05-03 PROCEDURE — 65660000000 HC RM CCU STEPDOWN

## 2021-05-03 PROCEDURE — 77030025757

## 2021-05-03 PROCEDURE — 94669 MECHANICAL CHEST WALL OSCILL: CPT

## 2021-05-03 PROCEDURE — 77030038554 HC DRSG WND THERAHONEY MDII -Z

## 2021-05-03 PROCEDURE — 77030041175 HC BAG DIGNSHLD BARD -A

## 2021-05-03 PROCEDURE — 77030040393 HC DRSG OPTIFOAM GENT MDII -B

## 2021-05-03 PROCEDURE — 74011000250 HC RX REV CODE- 250: Performed by: PHYSICIAN ASSISTANT

## 2021-05-03 PROCEDURE — 00731 ANES UPR GI NDSC PX NOS: CPT | Performed by: ANESTHESIOLOGY

## 2021-05-03 PROCEDURE — 83735 ASSAY OF MAGNESIUM: CPT

## 2021-05-03 PROCEDURE — 2709999900 HC NON-CHARGEABLE SUPPLY

## 2021-05-03 PROCEDURE — 76040000007: Performed by: INTERNAL MEDICINE

## 2021-05-03 PROCEDURE — 74011636637 HC RX REV CODE- 636/637: Performed by: PHYSICIAN ASSISTANT

## 2021-05-03 PROCEDURE — 74011250637 HC RX REV CODE- 250/637: Performed by: REGISTERED NURSE

## 2021-05-03 PROCEDURE — 80069 RENAL FUNCTION PANEL: CPT

## 2021-05-03 PROCEDURE — 94762 N-INVAS EAR/PLS OXIMTRY CONT: CPT

## 2021-05-03 PROCEDURE — 0DH63UZ INSERTION OF FEEDING DEVICE INTO STOMACH, PERCUTANEOUS APPROACH: ICD-10-PCS | Performed by: INTERNAL MEDICINE

## 2021-05-03 PROCEDURE — 76060000032 HC ANESTHESIA 0.5 TO 1 HR: Performed by: INTERNAL MEDICINE

## 2021-05-03 PROCEDURE — 74011000250 HC RX REV CODE- 250: Performed by: NURSE ANESTHETIST, CERTIFIED REGISTERED

## 2021-05-03 PROCEDURE — 77030014285 HC CATH GASTMY PEG KT BSC -C: Performed by: INTERNAL MEDICINE

## 2021-05-03 PROCEDURE — 94668 MNPJ CHEST WALL SBSQ: CPT

## 2021-05-03 PROCEDURE — 82962 GLUCOSE BLOOD TEST: CPT

## 2021-05-03 PROCEDURE — 85730 THROMBOPLASTIN TIME PARTIAL: CPT

## 2021-05-03 PROCEDURE — 99232 SBSQ HOSP IP/OBS MODERATE 35: CPT | Performed by: INTERNAL MEDICINE

## 2021-05-03 PROCEDURE — 77030005122 HC CATH GASTMY PEG BSC -B: Performed by: INTERNAL MEDICINE

## 2021-05-03 PROCEDURE — 99233 SBSQ HOSP IP/OBS HIGH 50: CPT | Performed by: INTERNAL MEDICINE

## 2021-05-03 RX ORDER — LIDOCAINE HYDROCHLORIDE 20 MG/ML
INJECTION, SOLUTION EPIDURAL; INFILTRATION; INTRACAUDAL; PERINEURAL AS NEEDED
Status: DISCONTINUED | OUTPATIENT
Start: 2021-05-03 | End: 2021-05-03 | Stop reason: HOSPADM

## 2021-05-03 RX ORDER — SODIUM CHLORIDE 9 MG/ML
INJECTION, SOLUTION INTRAVENOUS
Status: DISCONTINUED | OUTPATIENT
Start: 2021-05-03 | End: 2021-05-03 | Stop reason: HOSPADM

## 2021-05-03 RX ORDER — PROPOFOL 10 MG/ML
INJECTION, EMULSION INTRAVENOUS AS NEEDED
Status: DISCONTINUED | OUTPATIENT
Start: 2021-05-03 | End: 2021-05-03 | Stop reason: HOSPADM

## 2021-05-03 RX ORDER — SODIUM CHLORIDE FOR INHALATION 3 %
4 VIAL, NEBULIZER (ML) INHALATION 2 TIMES DAILY
Status: DISCONTINUED | OUTPATIENT
Start: 2021-05-03 | End: 2021-05-03

## 2021-05-03 RX ORDER — SODIUM CHLORIDE FOR INHALATION 3 %
4 VIAL, NEBULIZER (ML) INHALATION
Status: DISCONTINUED | OUTPATIENT
Start: 2021-05-03 | End: 2021-05-13

## 2021-05-03 RX ADMIN — PROPOFOL 20 MG: 10 INJECTION, EMULSION INTRAVENOUS at 15:46

## 2021-05-03 RX ADMIN — INSULIN LISPRO 2 UNITS: 100 INJECTION, SOLUTION INTRAVENOUS; SUBCUTANEOUS at 00:08

## 2021-05-03 RX ADMIN — IPRATROPIUM BROMIDE AND ALBUTEROL SULFATE 3 ML: .5; 3 SOLUTION RESPIRATORY (INHALATION) at 20:59

## 2021-05-03 RX ADMIN — FAMOTIDINE 20 MG: 10 INJECTION INTRAVENOUS at 10:00

## 2021-05-03 RX ADMIN — SODIUM CHLORIDE: 900 INJECTION, SOLUTION INTRAVENOUS at 15:29

## 2021-05-03 RX ADMIN — LIDOCAINE HYDROCHLORIDE 100 MG: 20 INJECTION, SOLUTION EPIDURAL; INFILTRATION; INTRACAUDAL; PERINEURAL at 15:37

## 2021-05-03 RX ADMIN — IPRATROPIUM BROMIDE AND ALBUTEROL SULFATE 3 ML: .5; 3 SOLUTION RESPIRATORY (INHALATION) at 04:11

## 2021-05-03 RX ADMIN — PROPOFOL 20 MG: 10 INJECTION, EMULSION INTRAVENOUS at 15:42

## 2021-05-03 RX ADMIN — SODIUM CHLORIDE SOLN NEBU 3% 4 ML: 3 NEBU SOLN at 21:00

## 2021-05-03 RX ADMIN — FAMOTIDINE 20 MG: 10 INJECTION INTRAVENOUS at 22:45

## 2021-05-03 RX ADMIN — SODIUM CHLORIDE SOLN NEBU 3% 4 ML: 3 NEBU SOLN at 16:27

## 2021-05-03 RX ADMIN — CEFAZOLIN SODIUM 2 G: 2 SOLUTION INTRAVENOUS at 10:00

## 2021-05-03 RX ADMIN — IPRATROPIUM BROMIDE AND ALBUTEROL SULFATE 3 ML: .5; 3 SOLUTION RESPIRATORY (INHALATION) at 16:27

## 2021-05-03 RX ADMIN — IPRATROPIUM BROMIDE AND ALBUTEROL SULFATE 3 ML: .5; 3 SOLUTION RESPIRATORY (INHALATION) at 08:13

## 2021-05-03 RX ADMIN — ARFORMOTEROL TARTRATE 15 MCG: 15 SOLUTION RESPIRATORY (INHALATION) at 08:13

## 2021-05-03 RX ADMIN — ATORVASTATIN CALCIUM 80 MG: 40 TABLET, FILM COATED ORAL at 22:45

## 2021-05-03 RX ADMIN — PROPOFOL 20 MG: 10 INJECTION, EMULSION INTRAVENOUS at 15:49

## 2021-05-03 RX ADMIN — PROPOFOL 50 MG: 10 INJECTION, EMULSION INTRAVENOUS at 15:37

## 2021-05-03 RX ADMIN — ARFORMOTEROL TARTRATE 15 MCG: 15 SOLUTION RESPIRATORY (INHALATION) at 20:59

## 2021-05-03 NOTE — PROGRESS NOTES
Called patient's payer-source at the request of CM: They have denied LTAC placement for this patient as he is not on ventilator anymore. They recommended to submit patient for respiratory SNF where he can be weaned off from trach when he starts tolerating anticoagulation.

## 2021-05-03 NOTE — PROGRESS NOTES
[facility]  Pulmonary, Critical Care, and Sleep Medicine    Name: Maliha Schaefer MRN: [id number]   : 1950 Hospital: [address] [place] [address] Dr   Date: 5/3/2021        IMPRESSION:   · Angioedema- with airway and respiratory failure and collapse; thought due to ACE inhibitor. S/P Emergent Cricthyrotomy [facility]-ED 21- converted to 6.5 ETT intraoperative by anesthesia team 21, 8.0 ETT by anesthesia 21, packing removed evening 21-ENT  · S/P cardiac arrest and acute respiratory failure-resolved  · Tracheostomy - #9 Portex on  with Dr. Sarah Hunter  · Pulmonary Infiltrates: suspect aspiration secretions and/or blood due to above- can't exclude other infectious process at this time. · Ileus- mild, noted KUB   · DVT: Popliteal- Left; acute non-occlusive thrombus.   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 · Need for nutrition-PEG tube planned     Patient Active Problem List   Diagnosis Code    Angioedema due to angiotensin converting enzyme inhibitor (ACE-I) T78. 3XXA, [id number]    Respiratory failure ([address] J96.90    JACQUELYN (acute kidney injury) ([address] N17.9    Cardiac arrest (HCC) I46.9    Obesity (BMI 30-39. 9) E66.9    Diabetic neuropathy associated with type 2 diabetes mellitus ([address] E11.40    Diabetic retinopathy associated with type 2 diabetes mellitus ([address] E11.319    Pulmonary infiltrates R91.8    Controlled type 2 diabetes mellitus with neurologic complication, without long-term current use of insulin (HCC) E11.49    DVT (deep venous thrombosis) ([address] I82.409    Encounter for palliative care Z51.5    Goals of care, counseling/discussion Z71.89    Multiple subsegmental pulmonary emboli without acute cor pulmonale (HCC) I26.94    DVT (deep vein thrombosis) in pregnancy O22.30    Acute encephalopathy G93.40      PLAN:       PULM:  · Maintain aspiration precautions: HOB >30 degrees  · SpO2 goal >92%  · Bronchial /oral hygiene   · Trach care per protocol  · Transitioned to TC full time   · Completed course of decadron  · Continue Brovana, add hypertonic saline nebs  · Albuterol PRN  · #9 Portex Trach on 4/26      GI/ NUTRITION:  · NGT Continue and stop low-intermittent suction. Plans for PEG tube insertion noted  · Diet: Tube feeds: Jevity 1.5, 60mls/hr, Free Water: 100mls Q 4  · SUP:Pepcid 20 mg Q 12  · Follow up with nutritional recommendations    RENAL/ METAB/ :  · Monitor I&Os  · Trend electrolytes - replace as needed     HEM/ONC  · Trend Hb/HCT and PLTs  · Hold anticoagulation for PEG tube and then resume Coumadin for treatment of DVT PE     ID  · Antibioitcs: Cipro eye drop, Zosyn 4/4 - 4/8  Vanco: 4/4-6, MRSA swab negative.   · Zosyn restarted on 4/11-4/19, [place] Severin started 4/19-25  · Repeat Covid negative (4/11)  · We will check cultures-tracheal secretions with gram-negative rods.     ENDO  · BGs Q 6,SSI, TSH normal  · C1 inhibitor normal level (4/7/21)     MSK/ ORTHO  · No active issues  · Aggressive PT/OT for deconditioning     SKIN/ WOUNDS  · Per protocol  · Trach sutured to skin      CODE STATUS: Full Code- Full          Subjective/Interval History:        Interval HPI:69 yo M h/o HTN tx w/ lisinopril presenting to [facility] ED 4/4 for rapidly progressing respiratory distress due to severe angioedema. During intubation attempts pt had brief cardiac arrest w/ pulse loss and severe airway swelling leading to emergent cricothyrotomy. Pt stabilized before Nightingale transport to [place] [facility] pt taken emergently to [address] was converted to ETT.  Patient continued to have trouble with o2 requirements disproportionate to CXR findings and despite adjustment of FiO2 and PEEP, now improved. S/p administration of inhaled epoprostenol. CTA revelaed B/L pulmonary emboli which is likely the cause of his hypoxemia and RH strain. Continuing heparin gtt, which had already been initiated for left popliteal non-occlusive thrombus. Pt is s/p dobutamine and  diureses w/ bumex and metolazone.  COVID - 19 negative. Now with minimal vent support, however, mentation precludes extubation at this time. Patient required tracheostomy placement . His ICU course has been complicated by encephalopathy and critical care myopathy      Subjective   21    LOS: 29    · Tolerating trach collar  · Not communicating, but opening eyes, tracking  · Copious secretions per staff  · Plan for PEG today. ROS:Review of systems not obtained due to patient factors. Objective:   Vital Signs:    Visit Vitals  /76   Pulse 94   Temp 99.5 °F (37.5 °C)   Resp 30   Ht 5' 11\" (1.803 m)   Wt 101.7 kg (224 lb 2.3 oz)   SpO2 99%   BMI 31.26 kg/m²       O2 Device: Tracheostomy, Tracheal collar, Humidifier   O2 Flow Rate (L/min): 6 l/min   Temp (24hrs), Av.7 °F (37.1 °C), Min:98.4 °F (36.9 °C), Max:99.5 °F (37.5 °C)       Intake/Output:   Last shift:      No intake/output data recorded. Last 3 shifts:  190 -  0700  In:    Out: Vickie Gold [KYLAH:6533; Drains:8]    Intake/Output Summary (Last 24 hours) at 5/3/2021 1034  Last data filed at 5/3/2021 0600  Gross per 24 hour   Intake 1000 ml   Output 1513 ml   Net -513 ml      Physical Exam:                 General: NAD. Trach in place on TC.   Keeps head turned to the right  HEENT:  Anicteric sclerae; pink palpebral conjunctivae; mucosa moist  Resp:  Symmetrical chest expansion, no accessory muscle use; good airway entry; no rales/ wheezing/ diminished b/l, green-brown blood tinged secretions via trach   CV:  S1, S2 present; regular rate and rhythm  GI:  Obese. Abdomen soft, non-tender; (+) active bowel sounds  Extremities:  +2 pulses on all extremities; no edema/ cyanosis/ clubbing noted; no restraints   Skin:  Warm; no rashes/ lesions noted, normal turgor/cap refill , dry appearing   Neurologic:  no eye opening or command following this am   Devices:  NGT, Trach, condom cath        DATA:  Labs:  Recent Labs     05/03/21  0047 05/02/21  0351 05/01/21  0108   WBC 10.0 10.1 11.7   HGB 9.7* 9.5* 10.0*   HCT 32.4* 31.3* 33.1*    313 387     Recent Labs     05/03/21  0047 05/02/21  0351 05/01/21  0108    137 138   K 4.9 5.0 4.6    107 106   CO2 28 27 28   * 131* 153*   BUN 56* 52* 47*   CREA 1.44* 1.37* 1.28   CA 8.8 9.3 8.8   MG 2.7* 2.5 2.2   PHOS 4.1  --  3.5   ALB 2.1* 2.0* 2.2*   ALT  --  65*  --    INR 1.2 1.2 1.3*     No results for input(s): PH, PCO2, PO2, HCO3, FIO2 in the last 72 hours. Lab Results   Component Value Date/Time    Hemoglobin A1c 6.6 (H) 04/04/2021 05:11 PM     Lab Results   Component Value Date/Time    TSH 2.62 05/01/2021 04:21 PM    T4, Free 1.2 05/01/2021 04:21 PM     MICRO:  Results     Procedure Component Value Units Date/Time    COVID-19 RAPID TEST [[id number]] Collected: 05/02/21 1022    Order Status: Completed Specimen: Nasopharyngeal Updated: 05/02/21 1127     Specimen source Nasopharyngeal        COVID-19 rapid test Not detected        Comment: Rapid Abbott ID Now       Rapid NAAT:  The specimen is NEGATIVE for SARS-CoV-2, the novel coronavirus associated with COVID-19. Negative results should be treated as presumptive and, if inconsistent with clinical signs and symptoms or necessary for patient management, should be tested with an alternative molecular assay. Negative results do not preclude SARS-CoV-2 infection and should not be used as the sole basis for patient management decisions.        This test has been authorized by the FDA under an Emergency Use Authorization (EUA) for use by authorized laboratories. Fact sheet for Healthcare Providers: ConventionUpdate.co.nz  Fact sheet for Patients: ConventionUpdate.co.nz       Methodology: Isothermal Nucleic Acid Amplification         CULTURE, RESPIRATORY/SPUTUM/BRONCH Carolyne Grain STAIN [[id number]]  (Abnormal) Collected: 05/01/21 1032    Order Status: Completed Specimen: Sputum from Tracheal Aspirate Updated: 05/02/21 1412     Special Requests: NO SPECIAL REQUESTS        GRAM STAIN RARE WBCS SEEN               RARE EPITHELIAL CELLS SEEN                  OCCASIONAL GRAM NEGATIVE COCCOBACILLI           Culture result:       MODERATE GRAM NEGATIVE RODS                  NO NORMAL RESPIRATORY ANNALEE ISOLATED          CULTURE, RESPIRATORY/SPUTUM/BRONCH Carolyne Grain STAIN [[id number]]  (Abnormal) Collected: 04/30/21 1245    Order Status: Completed Specimen: Sputum,ET Suction Updated: 05/02/21 1328     Special Requests: NO SPECIAL REQUESTS        GRAM STAIN 1+ WBCS SEEN               RARE EPITHELIAL CELLS SEEN                  1+ GRAM POSITIVE COCCI IN CHAINS                  OCCASIONAL GRAM NEGATIVE COCCOBACILLI           Culture result:       MODERATE GRAM NEGATIVE RODS IDENTIFICATION AND SUSCEPTIBILITY TO FOLLOW                  FEW NORMAL RESPIRATORY ANNALEE          COVID-19 RAPID TEST [[id number]] Collected: 04/25/21 1214    Order Status: Completed Specimen: Nasopharyngeal Updated: 04/25/21 1237     Specimen source Nasopharyngeal        COVID-19 rapid test Not detected        Comment: Rapid Abbott ID Now       Rapid NAAT:  The specimen is NEGATIVE for SARS-CoV-2, the novel coronavirus associated with COVID-19. Negative results should be treated as presumptive and, if inconsistent with clinical signs and symptoms or necessary for patient management, should be tested with an alternative molecular assay.   Negative results do not preclude SARS-CoV-2 infection and should not be used as the sole basis for patient management decisions. This test has been authorized by the FDA under an Emergency Use Authorization (EUA) for use by authorized laboratories. Fact sheet for Healthcare Providers: ConventionUpdate.co.nz  Fact sheet for Patients: ConventionUpdate.co.nz       Methodology: Isothermal Nucleic Acid Amplification                                                               Echo  04/04/21   ECHO ADULT COMPLETE 04/06/2021 4/6/2021    Narrative · Contrast used: DEFINITY. · Image quality for this study was technically difficult. · LV: Calculated LVEF is 55%. Visually measured ejection fraction. Normal   cavity size, wall thickness and systolic function (ejection fraction   normal). Wall motion: normal. Moderate (grade 2) left ventricular   diastolic dysfunction. · AO: Mild aortic root and ascending aorta dilatation. Ascending aorta   diameter = 3.6 cm. Aortic root measures 3.9 cm  · RA: Dilated right atrium. · RV: Dilated right ventricle. Borderline low systolic function. · TV: Mild tricuspid valve regurgitation is present. · IVC: Mechanically ventilated; cannot use inferior caval vein diameter to   estimate central venous pressure. · PA: Moderate pulmonary hypertension. Pulmonary arterial systolic   pressure is 61 mmHg. Signed by: Stu Mixon MD        Imaging:  [x]I have personally reviewed the patients radiographs    CXR Results  (Last 48 hours)    None               CT Results (most recent):  Results from Hospital Encounter encounter on 04/04/21   CTA CHEST W OR W WO CONT    Narrative CTA CHEST PULMONARY ANGIOGRAM    HISTORY/INDICATION:  Shortness of breath, clinical concern for pulmonary  embolism, history of cardiac arrest 4/4/2021    COMPARISON:  No prior CTA chest. Reference chest radiograph 4/13/2021.     TECHNIQUE:  Helical multidetector array volumetric acquisition of the chest is  performed following intravenous contrast administration of 72cc Isovue-370, per  pulmonary angiogram protocol. These images are reviewed and 2.5 mm and 5 mm  images along with coronal and sagittal 3D reconstruction maximum intensity  projection (MIP) images. Dose reduction techniques:  Automated exposure control,  mAs and/or kVp reductions based on patient size, and iterative reconstruction. CTA SPECIFIC FINDINGS:  Pulmonary arteries: Central contrast filling defect in the left lower lobe,  involving lobar, segmental, and segmental branches. Small contrast filling  defect in a segmental branch in the right upper lobe. Aorta: No aneurysm. CHEST FINDINGS:   Thyroid:  No focal lesion. Mediastinum: Heart is mildly enlarged. Mild burden of coronary artery  atherosclerotic calcifications. No pericardial effusion. Reflux of trace  contrast into the IVC. No right ventricular dilatation or bowing of the  intraventricular septum. Pleural space: Trace bilateral pleural effusions. No pneumothorax. Lungs: There is dense multifocal consolidations bilaterally and in the dependent  lower lobes. There is suggestion of focal hypoattenuation within the  consolidation in the left lower lobe. Endotracheal tube in appropriate position,  terminating 3.7 cm above the gabriella on the  view. Included Abdomen: Visualized solid organs of the upper abdomen are normal.  Enteric tube tip terminates within the body of the stomach. Skeleton: No suspicious lytic or blastic lesions. No fractures. Soft tissues: Normal.    Lymph Nodes: Increased number of mildly prominent mediastinal and bilateral  hilar nodes, including a 1.0 x 1.8 cm AP window node and a 1.1 x 2.1 cm right  paratracheal node. Impression 1. Moderate burden of acute pulmonary embolism in the left lower lobe. Additional small acute segmental pulmonary embolism in the right upper lobe.     2. There is dense multifocal consolidations bilaterally, as well as in the  dependent lower lobes, favoring a combination of multifocal pneumonia, edema,  and dependent atelectasis. -There is suggestion of focal hypoattenuation within the consolidation in the  left lower lobe, which may be infectious in etiology versus sequela of pulmonary  infarct given #1.    3. Mildly prominent mediastinal and bilateral hilar nodes, likely  benign/reactive. 4. Mild cardiomegaly. Reflux of trace contrast into the IVC, which can be seen  in the setting of right heart dysfunction. 5. Trace bilateral pleural effusions. 6. Endotracheal and enteric tubes noted in appropriate position.     Discussed findings #1-2 with Netta Sy PA-C on 4/13/2021 at 7:19 PM.       Margarette Radford MD  05/03/21  Pulmonary, Critical Care Medicine  [facility]

## 2021-05-03 NOTE — ROUTINE PROCESS
TRANSFER - OUT REPORT:    Verbal report given to Eun(name) on Bryan Gongorao  being transferred to [facility](unit) for routine progression of care       Report consisted of patients Situation, Background, Assessment and   Recommendations(SBAR). Information from the following report(s) SBAR and Procedure Summary was reviewed with the receiving nurse. Lines:   Peripheral IV 04/27/21 Anterior; Left Forearm (Active)   Site Assessment Clean, dry, & intact 05/03/21 1200   Phlebitis Assessment 0 05/03/21 1200   Infiltration Assessment 0 05/03/21 1200   Dressing Status Clean, dry, & intact 05/03/21 1200   Dressing Type Transparent;Tape 05/03/21 1200   Hub Color/Line Status Capped 05/03/21 1200   Action Taken Open ports on tubing capped 05/03/21 0411   Alcohol Cap Used Yes 05/03/21 1200        Opportunity for questions and clarification was provided.       Patient transported with:   Registered Nurse

## 2021-05-03 NOTE — PROCEDURES
[email or web address]  [phone]        Percutaneous Endoscopic Gastroduodenoscopy Procedure Note    Roni Saldana  1950  [id number]      Date of Procedure: 5/3/2021    Preoperative diagnosis: dysphagia    Postoperative diagnosis: Peg placement    Procedure: Procedure(s):  PERCUTANEOUS ENDOSCOPIC GASTROSTOMY TUBE INSERTION/ BEDSIDE    Indication: Dysphagia    :  Dr. Petros Christianson MD    Assistant(s): Endoscopy Technician-1: Alfred Rodriguez  Endoscopy RN-1: Mariana IRWIN RN  Endoscopy RN-2: Jamie Kat RN    Anesthesia/Sedation:  MAC anesthesia Propofol      Procedure Details     After infomed consent was obtained for the procedure, with all risks and benefits of procedure explained the patient was taken to the endoscopy suite and placed in the left lateral decubitus position. Following sequential administration of sedation as per above, the endoscope was inserted into the mouth and advanced under direct vision to the second portion of the duodenum. The esophagus looked normal.  There were no diagnostic abnormalities of the body, fundus, antrum, cardia and iscisura of the stomach. The first and second portion of the duodenum appeared normal.      A site was selected on the anterior abdominal wall where the light transilluminated adequately and where one finger easily indented the anterior abdominal wall. Lidocaine analgesia was utilized (1%). The exploring needle easily indented the stomach and penetrated it. The needle-trocar device was then inserted into the skin after an incision. Once the needle trocar device entered the stomach the trocar was grasped by the snare. The needle was removed and a wire was placed thorough the trocar. The wire was grasped by the snare and removed at the mouth. A 20 Fr [address] externally removable bumper PEG tube was positioned over the wire and pulled out of the anterior abdominal wall under traction.  The incision site was enlarged as necessary and the tube was pulled snug. External bumper was placed at 3.5 cm, lightly touching skin. Complications:   None; patient tolerated the procedure well. EBL:  None. Tissue Implant Device: PEG           Impression:     1. PEG placement    Recommendations:  1. May use PEG immediately for medications  2. May begin tube feedings in 6 hours, if active bowel sounds noted and no apparent complications  3. Feeding, formula and rate per Nutrition    Casey Whitaker MD  5/3/2021  4:06 PM    Casey Whitaker MD  [facility] [address] [phone]  [email or web address]

## 2021-05-03 NOTE — PROGRESS NOTES
Discharge planning    Reviewed chart. Patient remains on trach collar. MD plan is for peg tube placement today. Still waiting on insurance authorization for [facility] placement. CM will continue to monitor and assist with transitional needs.      MARGUERITE Posadas, RN  Pager # [phone]  Care Manager

## 2021-05-03 NOTE — PROGRESS NOTES
Neurology Progress Note    Patient ID:  Alfie Medina  [id number]  86 y.o.  1950    Subjective:      Patient remains in ICU. Per nursing he has not changed much. They are currently restraining him with towels and according to his current nurse he does move all his limbs fully and symmetrically. Current Facility-Administered Medications   Medication Dose Route Frequency    sodium chloride 3% hypertonic nebulizer soln  4 mL Nebulization BID RT    albuterol-ipratropium (DUO-NEB) 2.5 MG-0.5 MG/3 ML  3 mL Nebulization Q4H RT    LORazepam (ATIVAN) injection 0.5 mg  0.5 mg IntraVENous Q6H PRN    [Held by provider] heparin 25,000 units in D5W 250 ml infusion  9.8-25 Units/kg/hr IntraVENous TITRATE    polyethylene glycol (MIRALAX) packet 17 g  17 g Per NG tube DAILY    Warfarin - Pharmacy to dose   Other Q24H    oxyCODONE (ROXICODONE) 5 mg/5 mL oral solution 5 mg  5 mg Oral Q4H PRN    atorvastatin (LIPITOR) tablet 80 mg  80 mg Oral QHS    famotidine (PF) (PEPCID) 20 mg in 0.9% sodium chloride 10 mL injection  20 mg IntraVENous Q12H    acetaminophen (TYLENOL) solution 500 mg  500 mg Oral Q4H PRN    oxymetazoline (AFRIN) 0.05 % nasal spray 2 Spray  2 Spray Other BID PRN    arformoteroL (BROVANA) neb solution 15 mcg  15 mcg Nebulization BID RT    multivit-folic acid-herbal 894 (WELLESSE PLUS) oral liquid 30 mL  30 mL Per NG tube DAILY    glucose chewable tablet 16 g  4 Tab Oral PRN    glucagon (GLUCAGEN) injection 1 mg  1 mg IntraMUSCular PRN    dextrose (D50W) injection syrg 12.5-25 g  25-50 mL IntraVENous PRN    insulin lispro (HUMALOG) injection   SubCUTAneous Q6H    carboxymethylcellulose sodium (REFRESH LIQUIGEL) 1 % ophthalmic solution 1 Drop  1 Drop Both Eyes PRN          Objective: Active hospital medications were reviewed    Lab results and neuroradiology studies from the last 24 hours were reviewed. Prior to Admission medications    Medication Sig Start Date End Date Taking? Authorizing Provider   LISINOPRIL, BULK, by Does Not Apply route. Other, MD Cheryl     Patient Vitals for the past 8 hrs:   BP Temp Pulse Resp SpO2 Weight   05/03/21 1100 95/73  94 (!) 32 99 %    05/03/21 1000 117/84  97 (!) 34 99 %    05/03/21 0900 126/77  98 28 100 %    05/03/21 0813     99 %    05/03/21 0800 115/76 99.5 °F (37.5 °C) 94 30 98 %    05/03/21 0700   97 (!) 31 98 %    05/03/21 0600 129/63  97 30 96 %    05/03/21 0500 (!) 141/67  97 (!) 32 96 %    05/03/21 0411 135/73 98.7 °F (37.1 °C) 95 26 98 % 101.7 kg (224 lb 2.3 oz)   05/03/21 0400   94 26 97 %    DBRQBAJBOUWO68/01 1901 - 05/03 0700  In: 2080   Out: Kasia Bowers [LTWNC:1058; Drains:8]  05/01 1901 - 05/03 0700  In: 2080   Out: 1963 [FBYGQ:5241; Drains:8]RESULTRCNT(24h)Principal Problem:    Respiratory failure ([address] (4/5/2021)    Active Problems:    Angioedema due to angiotensin converting enzyme inhibitor (ACE-I) (4/4/2021)      JACQUELYN (acute kidney injury) ([address] (4/4/2021)      Cardiac arrest ([facility] [address] (4/4/2021)      Obesity (BMI 30-39.9) (4/5/2021)      Diabetic neuropathy associated with type 2 diabetes mellitus ([address] (4/5/2021)      Diabetic retinopathy associated with type 2 diabetes mellitus ([address] (4/5/2021)      Pulmonary infiltrates (4/5/2021)      Controlled type 2 diabetes mellitus with neurologic complication, without long-term current use of insulin ([address] (4/5/2021)      DVT (deep venous thrombosis) ([facility] [address] (4/10/2021)      Encounter for palliative care ()      Goals of care, counseling/discussion ()      Multiple subsegmental pulmonary emboli without acute cor pulmonale (HCC) ()      DVT (deep vein thrombosis) in pregnancy (5/3/2021)      Acute encephalopathy (5/3/2021)        Additional comments:I reviewed the patient's new clinical lab test results.      General Exam  No acute distress, normal body habitus, has trach, and NGT in place.      HEENT: Normocephalic, atraumatic, Sclera anicteric, normal conjunctiva  Mucous membranes: normal color and hydration      Neurologic Exam:     Mental status:  Opens eyes to painful stimuli, will make brief eye contact. Language: none, not following commands     Cranial nerves: PERRL, Eyes conjugate, blinks to threat bilaterally        Motor: strength >4/5 throughout, withdrawals vigorously to nail bed pressure but less so with left lower extremity. Tends to shake right leg in an agitated semi-purposeful manner with any painful stimulation  No abnormal movements     DTRs 0 throughout     Sensation: withdrawal and grimace x 4          Assessment:     Maliha Schaefer is a 79 y.o. who was admitted with severe airway compromising angioedema and then a very brief cardiac arrest. Presently his exam is non-focal and consistent with a likely multifactorial encephalopathy. Given the brevity of his cardiac arrest is not likely he sustained a significant hypoxic ischemic event but a pure hypoxic encephalopathy is also possible. An MRI would be the best way to assess for this but he would likely not tolerate an MRI well. Plan:   1. No further recommendations for now   2. If he doesn't start to show improvement in mental status over the next few days and MRI may be indicated to assess for strokes or hypoxic-ischemic injury. Oma Patrick. Pam Adkins M.D.   Clinical Neurophysiology  Neuromuscular Specialist    Signed:  5/3/2021  11:55 AM  11:55 AM

## 2021-05-03 NOTE — ANESTHESIA POSTPROCEDURE EVALUATION
Procedure(s):  PERCUTANEOUS ENDOSCOPIC GASTROSTOMY TUBE INSERTION/ BEDSIDE. general    Anesthesia Post Evaluation      Multimodal analgesia: multimodal analgesia used between 6 hours prior to anesthesia start to PACU discharge  Patient location during evaluation: bedside  Patient participation: complete - patient participated  Level of consciousness: awake  Pain management: adequate  Airway patency: patent  Anesthetic complications: no  Cardiovascular status: stable  Respiratory status: acceptable  Hydration status: acceptable  Post anesthesia nausea and vomiting:  controlled  Final Post Anesthesia Temperature Assessment:  Normothermia (36.0-37.5 degrees C)      INITIAL Post-op Vital signs:   Vitals Value Taken Time   BP     Temp     Pulse 90 05/03/21 1605   Resp 44 05/03/21 1605   SpO2 95 % 05/03/21 1605   Vitals shown include unvalidated device data.

## 2021-05-03 NOTE — PROGRESS NOTES
Pharmacist consult: warfarin management    Indication: Venous Thrombosis  Goal INR: 2-3; currently subtherapeutic; ordered daily  No S/Sx of active bleeding  PEG today, hold kayode    Labs:  Coags:    Lab Results   Component Value Date/Time    APTT 39.7 (H) 05/03/2021 12:47 AM    PTP 15.3 (H) 05/03/2021 12:47 AM    INR 1.2 05/03/2021 12:47 AM     CBC:    Lab Results   Component Value Date/Time    HGB 9.7 (L) 05/03/2021 12:47 AM    HCT 32.4 (L) 05/03/2021 12:47 AM     05/03/2021 12:47 AM       CrCL: Estimated Creatinine Clearance: 58 mL/min (A) (based on SCr of 1.44 mg/dL (H)). Labs reviewed. Assessment performed for missed warfarin doses, new drug interactions, dietary intake or supplement changes, documentation of bleeding, and other changes that may affect the INR. Pharmacy to follow daily and will provide subsequent warfarin dosing based on clinical status.     Thank you for the consult,  MARIELY Jade  5/3/2021

## 2021-05-03 NOTE — ADT AUTH CERT NOTES
Respiratory Failure GRG - Care Day 29 (5/2/2021) by Juany Shay, RN       Review Status Review Entered   Completed 5/3/2021 12:08      Criteria Review      Care Day: 29 Care Date: 5/2/2021 Level of Care: Step Down    Guideline Day 3    Level Of Care    (X) * Activity level acceptable    ( ) Floor to discharge    5/3/2021 12:07:46 EDT by Juany Shay      ICU TO STEPDOWN. (X) Complete discharge planning    5/3/2021 12:07:46 EDT by Jose Posey Patient is waiting to be transferred to [facility] unit. Clinical Status    (X) * Ventilation status appropriate    5/3/2021 12:07:46 EDT by Juany Shay      SPO2 97% TRACH/HUMIDIFIED FIO2 28% 6 LITERS.    (X) * Airway status acceptable    5/3/2021 12:07:46 EDT by Juany Shay      SPO2 97% TRACH/HUMIDIFIED FIO2 28% 6 LITERS.    ( ) * Respiratory status acceptable    5/3/2021 12:07:46 EDT by Juany Shay      RR 25-39.    ( ) * Stable chest findings    (X) * Neurologic status acceptable    5/3/2021 12:07:46 EDT by Juany Shay      Opens eyes on tactile command, no tremors noted. (X) * Temperature status acceptable    5/3/2021 12:07:46 EDT by Juany Shay      98.4 AX. ( ) * No infection, or status acceptable    5/3/2021 12:07:46 EDT by Juany Shay      Pulmonary Infiltrates: suspect aspiration secretions and/or blood due to above- can't exclude other infectious process at this time. ( ) * General Discharge Criteria met    Interventions    (X) * No chest tube, or status acceptable    5/3/2021 12:07:46 EDT by Juany Shay      NONE.    (X) * Intake acceptable    5/3/2021 12:07:46 EDT by Jose Posey ongoing NG tube and continues with NG tube feeding. PEG PLANNED FOR 5/3. TF JEVITY 1.5 CONTINUOUS @ 60ML/HOUR WITH 100ML WATER FLUSHES Q4H.    ( ) * No inpatient interventions needed    5/3/2021 12:07:46 EDT by [place], [id number] [place] [address]. DAILY CBC/MAG/PHOS/PT/INR/PTT/RENAL FUNCTION PANEL. PEPCID 20MG IV Q12H. ATIVAN 0.5MG IV Q6H PRN.     * Milestone Additional Notes   5/2/21      P 96.     /66. WBC: 10.1   RBC: 3.73    HGB: 9.5    HCT: 31.3    MCV: 83.9   MCH: 25.5   MCHC: 30.4    RDW: 15.7    PLATELET: 794   MPV: 11.5   NEUTROPHILS: 69   LYMPHOCYTES: 22   INR: 1.2   Prothrombin time: 15.2   aPTT: 40.5    Sodium: 137   Potassium: 5.0   Chloride: 107   CO2: 27   Anion gap: 3   Glucose: 131    BUN: 52    Creatinine: 1.37    BUN/Creatinine ratio: 38    Calcium: 9.3   Magnesium: 2.5   GFR est AA: >60   Bilirubin, total: 0.4   Protein, total: 9.1    Albumin: 2.0    Globulin: 7.1    A-G Ratio: 0.3    ALT: 65    AST: 41    Alk. phosphatase: 94      BROVANA NEB BID. DUONEB Q4H PER RT.   LIPITOR 80MG PO QHS. LISPRO SSI SC Q6H. MVI 30ML NG QD.   PERIDEX MOUTHWASH 10ML PO Q12H. FULL CODE. ELEVATE HOB. FECAL MANAGEMENT SYSTEM. HOLLEY CATH CARE. NGT CARE. GLUCOSE MANAGEMENT PER PROTOCOL. ELECTROLYTE REPLACEMENT PROTOCOL. PT AND OT EVAL AND TREAT. CRITICAL CARE>   IMPRESSION:   · Angioedema- with airway and respiratory failure and collapse; thought due to ACE inhibitor. S/P Emergent Cricthyrotomy [facility]-ED 4/4/21- converted to 6.5 ETT intraoperative by anesthesia team 4/4/21, 8.0 ETT by anesthesia 4/7/21, packing removed evening 4/8/21-ENT   · S/P cardiac arrest and acute respiratory failure-resolved   · Tracheostomy - #9 Portex on 4/26 with Dr. Ana Dent   · Pulmonary Infiltrates: suspect aspiration secretions and/or blood due to above- can't exclude other infectious process at this time.     · Ileus- mild, noted KUB 4/30   · DVT: Popliteal- Left; acute non-occlusive thrombus.    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 · Need for nutrition-PEG tube planned          PLAN:           PULM:   · Maintain aspiration precautions: HOB >30 degrees   · SpO2 goal >92%   · Bronchial /oral hygiene    · Trach care per protocol   · Transitioned to TC full time    · Completed course of decadron   · Continue Brovana   · Albuterol PRN   · #9 Portex Trach on 4/26           GI/ NUTRITION:   · NGT Continue and stop low-intermittent suction.  Plans for PEG tube insertion noted   · Diet: Tube feeds: Jevity 1.5, 60mls/hr, Free Water: 100mls Q 4   · SUP:Pepcid 20 mg Q 12   · Follow up with nutritional recommendations       RENAL/ METAB/ :   · Monitor I&Os   · Trend electrolytes - replace as needed       HEM/ONC   · Trend Hb/HCT and PLTs   · Hold anticoagulation for PEG tube and then resume Coumadin for treatment of DVT PE       ID   · Antibioitcs: Cipro eye drop, Zosyn 4/4 - 4/8  Vanco: 4/4-6, MRSA swab negative.    · Zosyn restarted on 4/11-4/19, Arverne Gold started 4/19-25   · Repeat Covid negative (4/11)   · We will check cultures-tracheal secretions with gram-negative rods.       ENDO   · BGs Q 6,SSI, TSH normal   · C1 inhibitor normal level (4/7/21)       MSK/ ORTHO   · No active issues   · Aggressive PT/OT for deconditioning       SKIN/ WOUNDS   · Per protocol   · Trach sutured to skin           Physical Exam:                   General: NAD.  Donald Schneider in place on TC.  Keeps head turned to the right   HEENT:  Anicteric sclerae; pink palpebral conjunctivae; mucosa moist   Resp:  Symmetrical chest expansion, no accessory muscle use; good airway entry; no rales/ wheezing/ diminished b/l, green-brown blood tinged secretions via trach    CV:  S1, S2 present; regular rate and rhythm   GI:  Obese. Abdomen soft, non-tender; (+) active bowel sounds   Extremities:  +2 pulses on all extremities; no edema/ cyanosis/ clubbing noted; no restraints    Skin:  Warm; no rashes/ lesions noted, normal turgor/cap refill , dry appearing    Neurologic:  no eye opening or command following this am    Devices:  NGT, Trach, condom cath          INTERNAL MED>   Assessment/Plan       ASSESSMENT:       1.  Reportedly acute hypoxic respiratory failure due to angioedema s/p emergent cricothyrotomy followed by tracheostomy   2.  Tracheostomy site bleeding, better currently   3.  Post respiratory failure cardiac arrest   4.  Chronic hypoxic respiratory failure status post trach and on trach collar   5.  Bilateral pulmonary infiltrates   6.  Oropharyngeal dysphagia status post NG tube   7.  Left leg popliteal DVT and pulmonary embolism   8.  Reportedly acute metabolic encephalopathy   9.  Critical care myopathy due to critical illness   10.  JACQUELYN, resolved   11.  Left pneumothorax resolved   12.  Diabetic neuropathy   13.  Obesity       PLAN:       Continue holding Coumadin and heparin drip until PEG tube is placed. GI is planning to put a PEG tube tomorrow   Neurology input noted about no need for further intervention   Discussed with pulmonologist: Patient is not bleeding anymore from tracheostomy site. Continue trach collar and will add nebulizer   Continue to feeding through NG tube until PEG tube is placed   Continue multivitamin   Patient is waiting to be transferred to [facility] unit. NEUROLOGY>   General Exam   No acute distress, normal body habitus, has trach, and NGT in place.        HEENT: Normocephalic, atraumatic, Sclera anicteric, normal conjunctiva   Mucous membranes: normal color and hydration        Neurologic Exam:       Mental status:  Opens eyes to painful stimuli, will make brief eye contact. Language: none, not following commands       Cranial nerves: PERRL, Eyes conjugate, blinks to threat bilaterally           Motor: strength >4/5 throughout, withdrawals vigorously to nail bed pressure but less so with left lower extremity.  Tends to shake right leg in an agitated semi-purposeful manner with any painful stimulation No abnormal movements       DTRs 0 throughout       Sensation: withdrawal and grimace x 4                   Assessment:       Cristiano Souza is a 79 y.o. who was admitted with severe airway compromising angioedema and then a very brief cardiac arrest. Presently his exam is non-focal and consistent with a likely multifactorial encephalopathy. Given the brevity of his cardiac arrest is not likely he sustained a significant hypoxic ischemic event but a pure hypoxic encephalopathy is also possible. An MRI would be the best way to assess for this but he would likely not tolerate an MRI well.        Plan:   1. No further recommendations for now    2. If he doesn't start to show improvement in mental status over the next few days and MRI may be indicated to assess for strokes or hypoxic-ischemic injury.       GI>   Impression:   1. Dysphagia - secondary to metabolic encephalopathy following cardiac arrest           Plan:       1. PEG placement planned for tomorrow, 5/3/21               - pls hold tube feeds after MN tonight               - pls continue to hold coumadin               - pls hold heparin after midnight tonight               - Ancef ordered to be infused pre-procedure in endoscopy tomorrow         4/28/21 clinical by Tay Duque RN       Review Status Review Entered   In Primary 5/3/2021 11:39      Criteria Review   99.2 AX. P 95. RR 29-31. SPO2 94% VENT/TRACH COLLAR 8 LITERS FIO2 40%. /66. WBC: 13.1  RBC: 3.88   HGB: 10.2   HCT: 32.5   RDW: 15.9   PLATELET: 677   MPV: 11.5  NEUTROPHILS: 74   LYMPHOCYTES: 16   ABS. NEUTROPHILS: 9.7   ABS. LYMPHOCYTES: 2.1  ABS. MONOCYTES: 1.0  ABS. EOSINOPHILS: 0.1  ABS.  BASOPHILS: 0.1  aPTT: 158.9-85.8   Sodium: 137  Potassium: 4.6  Chloride: 107  CO2: 24  Anion gap: 6  Glucose: 150   BUN: 53   Creatinine: 1.39   BUN/Creatinine ratio: 38   Calcium: 9.4  Phosphorus: 3.0  Magnesium: 2.1  GFR est AA: >60  Albumin: 2.4   Bilirubin, total: 0.4  Bilirubin, direct: 0.2  Protein, total: 9.6   Albumin: 2.4   Globulin: 7.2   A-G Ratio: 0.3   ALT: 79   AST: 40   Alk. phosphatase: 101     BROVANA NEB BID. LIPITOR 80MG PO QHS. PEPCID 20MG IV Q12H. LISPRO SSI SC Q6H. MVI 30ML NG QD.  DUONEB Q4H PRN X 2.  PERIDEX MOUTHWASH 10ML PO Q12H. HEPARIN IV TITRATION.     FULL CODE. ELEVATE HOB. FECAL MANAGEMENT SYSTEM. HOLLEY CATH CARE. NGT CARE. GLUCOSE MANAGEMENT PER PROTOCOL. ELECTROLYTE REPLACEMENT PROTOCOL. PT AND OT EVAL AND TREAT. TF JEVITY 1.5 CONTINUOUS @ 60ML/HOUR WITH 100ML WATER FLUSHES Q4H. CONTINUOUS VS AND OXIMETRY. DAILY CBC/MAG/PHOS/PT/INR/PTT/RENAL FUNCTION PANEL.    CRITICAL CARE>  IMPRESSION:  ·           Angioedema- with airway and respiratory failure and collapse; thought due to ACE inhibitor  ·           S/P Emergent Cricthyrotomy [facility]-ED 4/4/21- converted to 6.5 ETT intraoperative by anesthesia team 4/4/21, 8.0 ETT by anesthesia 4/7/21, packing removed evening 4/8/21-ENT  ·           S/P cardiac arrest @ [facility] 4/4/21- brief, likely due to hypoxia and airway collapse  ·           Respiratory Failure: Acute due to above; remains on ventilator. SBTs. ·           Tracheostomy - #9 Portex on 4/26 with Dr. Thomas Klein- ENT  ·           Pulmonary Infiltrates: suspect aspiration secretions and/or blood due to above- can't exclude other infectious process at this time.  Rapid Covid Negative at [facility]  ·           Bradycardia - improved  ·           DVT: Popliteal- Left; acute non-occlusive thrombus.  Heparin ACS protocol started 4/8- stopped 4/9 due to bleeding from Cric site.  Bleeding controlled -  heparin resumed    ·           Pulmonary Embolism - 4/13/21 - left lower and right upper lobes  ·           JACQUELYN- improving   ·           Thrombocytosis   ·           Hypokalemia - resolved  ·           Left scleral erythema- unknown baseline; possible infection   ·           DM  ·           DM retinopathy per chart review  ·           Diabetic peripheral neuropathy  · Left Pneumothorax - resolved  ·           Obesity: Body mass index is 35.64 kg/m².        PLAN:  NEURO:  ·           Serial neuro evaluations  ·           Off sedation. Transition from PRN sedation to analgesics as needed.     CARDIO:  ·           MAP goal >64 - No IV pressor requirement  ·           Echo on 4/6/21 with EF of 55%, dilated RV and moderate pulmonary hypertension (PASP 61 mmHg)     PULM:  ·           Maintain aspiration precautions: HOB >30 degrees  ·           SpO2 goal >92%  ·           Bronchial /oral hygiene   ·           Trach care per protocol  ·           SBT/TC trials as clinical status allows  ·           Completed course of decadron  ·           Continue Brovana  ·           Change albuterol to PRN  ·           #9 Portex Trach on 4/26        GI/ NUTRITION:  ·           NGT Continue and stop low-intermittent suction  ·           Diet: Restart TF with 100 q6h free H2O  ·           SUP:Pepcid 20 mg Q 12  ·           Follow up with nutritional recommendations  ·           Lipitor 80mg QHS  ·           MV     RENAL/ METAB/ :  ·           Monitor I&Os  ·           Trend electrolytes - replace as needed  ·           Condom cath: Continue, monitor I/Os     HEM/ONC  ·           Trend Hb/HCT and PLTs  ·           DVT prophylaxis: SCDs, heparin gtt  ·           Restart heparin gtt 4/27 now that 24 hours post-procedure.  Monitor for bleeding     ID  ·           Antibioitcs: Cipro eye drop, Zosyn 4/4 - 4/8  Vanco: 4/4-6, MRSA swab negative.   ·           Zosyn restarted on 4/11-4/19, Davied Buttner started 4/19-25  ·           Repeat Covid negative (4/11)  ·           Monitor and trend WBC and temp curve     ENDO  ·           BGs Q 6,SSI, TSH normal  ·           C1 inhibitor normal level (4/7/21)     MSK/ ORTHO  ·           No active Issues  ·           Aggressive PT/OT for deconditioning     SKIN/ WOUNDS  ·           Per protocol  ·           Neck- daily dressing changes per ENT, Quick clot with Afrin if needed, d/c xeroform  ·           Trach sutured to skin    Subjective 04/28/21  Hospital Day: 24  Vent Day: 24  Overnight events: No significant events noted overnight. Mentation/Activity: Trach on TC. Responsive to verbal stimuli. Opens eyes to name and intermittently moves all extremities spontaneously. Very tired. Respiratory/ Secretions: stable. Hemodynamics: H/H stable  Urine output, bowel: adequate  Diet: TF      - No overnight events  - Passed SBT yesterday. Trach collar during the day and vent last night. - Tolerating trach collar against this morning. Trial PS tonight.  - Off versed. Oral pain meds  - Discuss PEG with family  - Trend LFTs  - Maintain ICU status while tracheostomy tract matures. No bleeding at site. - Aggressive PT/OT.          Physical Exam:                 General: NAD. Trach in place on TC. HEENT:  Anicteric sclerae; pink palpebral conjunctivae; mucosa moist  Resp:  Symmetrical chest expansion, no accessory muscle use; good airway entry; no rales/ wheezing/ diminished b/l, + rhonchi noted  CV:  S1, S2 present; regular rate and rhythm  GI:  Obese. Abdomen soft, non-tender; (+) active bowel sounds  Extremities:  +2 pulses on all extremities; no edema/ cyanosis/ clubbing noted;  no restraints   Skin:  Warm; no rashes/ lesions noted, normal turgor/cap refill   Neurologic:  opens eyes, responds to noxious and verbal stimuli, +facial grimacing, moves ext x4 spontaneously, follows commands. Very tired. sujey Mcallister cath     NEPHROLOGY>  Assessment/Plan:      ·           BRAXTON (ischemic atn in a setting of acute pe/resp failure). Scr is up slightly but overall braxton is resolving, good uo. ·           Acute pe/le le dvt. On heparin drip. ·           Angioedema, s/p emergent cricthyrotomy/resp failure. ·           Bl pneumonia, finished abx.      ·           Resp failure.         Respiratory Failure GRG - Care Day 26 (4/29/2021) by Jonathan Washington RN       Review Status Review Entered   Completed 4/29/2021 15:56      Criteria Review      Care Day: 26 Care Date: 4/29/2021 Level of Care: ICU    Guideline Day 3    Level Of Care    (X) * Activity level acceptable    4/29/2021 15:52:57 EDT by Belinda Solorio      PT AND OT EVAL AND TREAT. Clinical Status    ( ) * Ventilation status appropriate    4/29/2021 15:52:57 EDT by Belinda Solorio      SPO2 94% TRACH COLLAR HUMIFIED. 35% FIO2. 8 LITERS.    (X) * Airway status acceptable    4/29/2021 15:52:57 EDT by Belinda Solorio      SPO2 94% TRACH COLLAR HUMIFIED. 35% FIO2. 8 LITERS. VENT.    ( ) * Respiratory status acceptable    4/29/2021 15:52:57 EDT by Belinda Solorio      SPO2 94% TRACH COLLAR HUMIFIED. 35% FIO2. 8 LITERS. RR 25. VENT.    ( ) * Stable chest findings    (X) * Neurologic status acceptable    4/29/2021 15:52:57 EDT by Josie Pineda opens eyes, responds to noxious and verbal stimuli, +facial grimacing, moves ext x4 spontaneously, follows commands. Very tired. (X) * Temperature status acceptable    4/29/2021 15:52:57 EDT by Belinda Solorio      TEMP 99.9 AX. ( ) * No infection, or status acceptable    ( ) * General Discharge Criteria met    Interventions    (X) * No chest tube, or status acceptable    4/29/2021 15:52:57 EDT by Belinda Solorio      NONE.    (X) * Intake acceptable    4/29/2021 15:55:50 EDT by Belinda Solorio      TF JEVITY 1.5 CONTINUOUS @ 60ML/HOUR WITH 100ML WATER FLUSHES Q4H.    ( ) * No inpatient interventions needed    4/29/2021 15:55:50 EDT by Belinda Solorio      PEPCID 20MG IV Q12H.    4/29/2021 15:52:57 EDT by Blayne Santo. CONTINUOUS VS AND OXIMETRY. DAILY CBC/MAG/PHOS/PT/INR/PTT/RENAL FUNCTION PANEL. HEPARIN IV TITRATION CONTINUOUS. GENESIS TUBE CARE.     * Milestone   Additional Notes   4/29/21      WBC: 12.5   RBC: 4.10    HGB: 10.5    HCT: 34.0    MCV: 82.9   MCH: 25.6   MCHC: 30.9    RDW: 16.0    PLATELET: 028    MPV: 11.5   NEUTROPHILS: 74    LYMPHOCYTES: 17    MONOCYTES: 8 EOSINOPHILS: 1   BASOPHILS: 0   aPTT: 105.3    Sodium: 138   Potassium: 5.0   Chloride: 107   CO2: 29   Anion gap: 2    Glucose: 144    BUN: 50    Creatinine: 1.23   BUN/Creatinine ratio: 41    Calcium: 9.1   Phosphorus: 3.3   Magnesium: 2.4   GFR est AA: >60   Albumin: 2.3       FULL CODE. ELEVATE HOB. FECAL MANAGEMENT SYSTEM. HOLLEY CATH CARE. NGT CARE. GLUCOSE MANAGEMENT PER PROTOCOL. ELECTROLYTE REPLACEMENT PROTOCOL. DUONEB Q4H PRN. BROVANA NEB BID. LIPITOR 80MG PO QHS. LISPRO SSI SC Q6H.   KAYEXALATE 15G NG X 1.          CRITICAL CARE>   IMPRESSION:   · Angioedema- with airway and respiratory failure and collapse; thought due to ACE inhibitor   · S/P Emergent Cricthyrotomy [facility]-ED 4/4/21- converted to 6.5 ETT intraoperative by anesthesia team 4/4/21, 8.0 ETT by anesthesia 4/7/21, packing removed evening 4/8/21-ENT   · S/P cardiac arrest @ [facility] 4/4/21- brief, likely due to hypoxia and airway collapse   · Respiratory Failure: Acute due to above; remains on ventilator. SBTs. · Tracheostomy - #9 Portex on 4/26 with Dr. Karis Darnell- ENT   · Pulmonary Infiltrates: suspect aspiration secretions and/or blood due to above- can't exclude other infectious process at this time. Rapid Covid Negative at [facility]   · Bradycardia - improved   · DVT: Popliteal- Left; acute non-occlusive thrombus.  Heparin ACS protocol started 4/8- stopped 4/9 due to bleeding from Cric site.  Bleeding controlled -  heparin resumed     · Pulmonary Embolism - 4/13/21 - left lower and right upper lobes   · JACQUELYN- improving    · Thrombocytosis - improving   · Hypokalemia - resolved   · Left scleral erythema- unknown baseline; possible infection    · DM   · DM retinopathy per chart review   · Diabetic peripheral neuropathy   · Left Pneumothorax - resolved   · Obesity: Body mass index is 35.64 kg/m².          Patient Active Problem List   Diagnosis Code   · Angioedema due to angiotensin converting enzyme inhibitor (ACE-I) T78. 3XXA, T46.4X5A   · Respiratory failure ([facility]) J96.90   · JACQUELYN (acute kidney injury) ([facility] N17.9   · Cardiac arrest ([facility]) I46.9   · Obesity (BMI 30-39. 9) E66.9   · Diabetic neuropathy associated with type 2 diabetes mellitus ([facility]) E11.40   · Diabetic retinopathy associated with type 2 diabetes mellitus ([facility] E11.319   · Pulmonary infiltrates R91.8   · Controlled type 2 diabetes mellitus with neurologic complication, without long-term current use of insulin ([facility]) E11.49   · DVT (deep venous thrombosis) ([facility] I82.409   · Encounter for palliative care Z51.5   · Goals of care, counseling/discussion Z71.89   · Multiple subsegmental pulmonary emboli without acute cor pulmonale ([facility]) I26.94       PLAN:   NEURO:   · Serial neuro evaluations   · Off sedation.  Analgesics as needed.       CARDIO:   · MAP goal >64 - No IV pressor requirement   · Echo on 4/6/21 with EF of 55%, dilated RV and moderate pulmonary hypertension (PASP 61 mmHg)       PULM:   · Maintain aspiration precautions: HOB >30 degrees   · SpO2 goal >92%   · Bronchial /oral hygiene    · Trach care per protocol   · SBT/TC trials as clinical status allows   · Completed course of decadron   · Continue Brovana   · Change albuterol to PRN   · #9 Portex Trach on 4/26           GI/ NUTRITION:   · NGT Continue and stop low-intermittent suction   · Diet: Tube feeds: Jevity 1.5, 60mls/hr, Free Water: 100mls Q 4   · SUP:Pepcid 20 mg Q 12   · Follow up with nutritional recommendations   · Lipitor 80mg QHS   · MV       RENAL/ METAB/ :   · Monitor I&Os   · Trend electrolytes - replace as needed   · Condom cath: Continue, monitor I/Os   · K- excelate today       HEM/ONC   · Trend Hb/HCT and PLTs   · DVT prophylaxis: SCDs, heparin gttp   · Restart heparin gtt 4/27     · Start coumadin tonight: pharmacy to dose, stop heparin when theraputic       ID   · Antibioitcs: Cipro eye drop, Zosyn 4/4 - 4/8  Vanco: 4/4-6, MRSA swab negative.    · Zosyn restarted on 4/11-4/19, Chuck Downs started 4/19-25   · Repeat Covid negative (4/11)   · Monitor and trend WBC and temp curve       ENDO   · BGs Q 6,SSI, TSH normal   · C1 inhibitor normal level (4/7/21)       MSK/ ORTHO   · No active Issues   · Aggressive PT/OT for deconditioning       SKIN/ WOUNDS   · Per protocol   · Trach sutured to skin        CODE STATUS: Full Code- Full       Discussed in interdisciplinary rounds. Anticipate that patient will transfer to [facility] in [facility]. OK to send patient on PO coumadin and NGT. Steafnia Maravilla will perform PEG if patient not able to transition to PO with trach. Best practice :   Glycemic control   IHI ICU bundles:               Cardona Bundle Followed and Vent Bundle Followed, Vent Day 21     [facility] Vent patients- VAP bundle, aim to keep peak plateau pressure 32-65JO H2O   Sress ulcer prophylaxis. DVT prophylaxis. Need for Lines, cardona assessed. Restraints need. Palliative care evaluation.               Subjective 04/28/21   LOS: 25       · No issues overnight   · Remained on TC overnight   · Afebrile   · Remains on heparin drip   · K+: 5 - will give dose of Kayexcelate   · PLT count downtrending       Physical Exam:                   General: NAD. Trach in place on TC.  Keeps head turned to the right   HEENT:  Anicteric sclerae; pink palpebral conjunctivae; mucosa moist   Resp:  Symmetrical chest expansion, no accessory muscle use; good airway entry; no rales/ wheezing/ diminished b/l, + rhonchi noted   CV:  S1, S2 present; regular rate and rhythm   GI:  Obese. Abdomen soft, non-tender; (+) active bowel sounds   Extremities:  +2 pulses on all extremities; no edema/ cyanosis/ clubbing noted;  no restraints    Skin:  Warm; no rashes/ lesions noted, normal turgor/cap refill    Neurologic:  opens eyes, responds to noxious and verbal stimuli, +facial grimacing, moves ext x4 spontaneously, follows commands.  Very tired.     Devices:  NGT, Trach, condom cath      Respiratory Failure GRG - Care Day 23 (4/26/2021) by Alexys Gastelum, RN       Review Status Review Entered   Completed 4/26/2021 16:17      Criteria Review      Care Day: 23 Care Date: 4/26/2021 Level of Care: ICU    Guideline Day 3    Level Of Care    ( ) * Activity level acceptable    ( ) Floor to discharge    4/26/2021 16:17:13 EDT by Paco Hubbard ICU floor    Clinical Status    ( ) * Ventilation status appropriate    4/26/2021 16:17:13 EDT by Rachael Negus      O2% 93 endotrach/vent    ( ) * Airway status acceptable    4/26/2021 16:17:13 EDT by Dewanda Pong placed    ( ) * Respiratory status acceptable    4/26/2021 16:17:13 EDT by Rachael Negus      endotrach/vent    ( ) * Stable chest findings    ( ) * Neurologic status acceptable    (X) * Temperature status acceptable    4/26/2021 16:17:13 EDT by Rachael Negus      T 98.7    ( ) * No infection, or status acceptable    ( ) * General Discharge Criteria met    Interventions    ( ) * No chest tube, or status acceptable    ( ) * Intake acceptable    4/26/2021 16:17:13 EDT by Rachael Negus      Tube feed diet    ( ) * No inpatient interventions needed    * Milestone      Respiratory Failure GRG - Care Day 20 (4/23/2021) by Winston Colvin RN       Review Status Review Entered   Completed 4/23/2021 13:32      Criteria Review      Care Day: 20 Care Date: 4/23/2021 Level of Care: ICU    Guideline Day 3    Level Of Care    ( ) * Activity level acceptable    Clinical Status    ( ) * Ventilation status appropriate    ( ) * Airway status acceptable    ( ) * Respiratory status acceptable    ( ) * Stable chest findings    ( ) * Neurologic status acceptable    (X) * Temperature status acceptable    ( ) * No infection, or status acceptable    ( ) * General Discharge Criteria met    Interventions    ( ) * No chest tube, or status acceptable    ( ) * Intake acceptable    ( ) * No inpatient interventions needed    * Milestone   Additional Notes   ICU PULMONARY PN 4/23:     IMPRESSION:   Angioedema- with airway and respiratory failure and collapse; thought due to ACE inhibitor   S/P Emergent Cricthyrotomy [facility]-ED 4/4/21- converted to 6.5 ETT intraoperative by anesthesia team 4/4/21, 8.0 ETT by anesthesia 4/7/21, packing removed evening 4/8/21-ENT   S/P cardiac arrest @ [facility] 4/4/21- brief, likely due to  hypoxia and  airway collapse   Respiratory Failure: Acute due to above; currently intubated and on vent for airway protection   Pulmonary Infiltrates: suspect aspiration secretions and/or blood due to above- can't exclude other infectious process at this time. Rapid Covid Negative at [facility]   Bradycardia - improved   DVT: Popliteal- Left; acute non-occlusive thrombus.  Heparin ACS protocol started 4/8- stopped 4/9 due to bleeding from Cric site.  Bleeding controlled -  heparin currently back on    Pulmonary Embolism - 4/13/21 - left lower and right upper lobes   JACQUELYN - , Creatinine 3.57   Thrombocytosis - Plt 713   Hypokalemia - resolved   Left scleral erythema- unknown baseline; possible infection    DM   DM retinopathy per chart review   Diabetic peripheral neuropathy   Left Pneumothorax - resolved   Obesity: Body mass index is 35.64 kg/m². PLAN:   NEURO:   Serial neuro evaluations   Daily sedation holiday    Wetting tears and cipro eye drops- stop date for cipro on chart       CARDIO:   MAP goal >64- No IV pressor requirement   Echo on 4/6/21 with EF of 55%, dilated RV and moderate pulmonary hypertension (PASP 61 mmHg)   PULM:   Maintain aspiration precautions: HOB >30 degrees   SpO2 goal >92%   Bronchial /oral hygiene    VAP bundle- Wean vent as clinical status allows   SBT as clinical status allows   Completed course of decadron   Continue brovana and albuterol nebs   Daily CXR and ABG while intubated       GI/ NUTRITION:   OGT    Diet: continue TF   SUP:Pepcid   Follow up with nutritional recommendations   RENAL/ METAB/ :    Monitor I&Os   Trend electrolytes - replace as needed   Garcia: Continue, monitor I/Os     HEM/ONC   Trend Hb/HCT and PLTs   DVT prophylaxis: SCDs, heparin gtt       ID   Antibioitcs: Cipro eye drop, Zosyn 4/4 - 4/8  Vanco: 4/4-6, MRSA swab negative.    Zosyn restarted on 4/11, WBCs elevated to 16.5   Follow up on pending cultures   Repeat Covid negative (4/11)   Tylenol for fevers       ENDO   BGs Q 6,SSI, TSH normal   C1 inhibitor normal level (4/7/21)       MSK/ ORTHO   No active Issues       SKIN/ WOUNDS   Per protocol   Neck- daily dressing changes per ENT, Quick clot with Afrin if needed, d/c xeroform               CODE STATUS: Full Code- Full       Discussed in interdisciplinary rounds   Best practice :       Glycemic control   IHI ICU bundles:               Cardona Bundle Followed and Vent Bundle Followed, Vent Day 17     [facility] Vent patients- VAP bundle, aim to keep peak plateau pressure 75-21HN H2O   Sress ulcer prophylaxis. DVT prophylaxis. Need for Lines, cardona assessed. Restraints need. Palliative care evaluation. Subjective/Interval History:           Interval HPI:71 yo M h/o HTN tx w/ lisinopril presenting to [facility] ED 4/4 for rapidly progressing respiratory distress due to severe angioedema. During intubation attempts pt had brief cardiac arrest w/ pulse loss and severe airway swelling leading to emergent cricothyrotomy. Pt stabilized before [employer] transport to [place] at [place] pt taken emergently to [address] was converted to ETT. Patient continues to have trouble with o2 requirements disproportionate to CXR findings and despite adjustment of FiO2 and PEEP. CTA revelaed B/L pulmonary emboli which is likely causing hypoxemia and RH strain. Administration of inhaled epoprostenol on 4/13 is showing improvement in V/Q  FiO2 75%/ PEEP 10.  Pt is on dobutamine and being diuresed w/ bumex and metolazone.   COVID - 19 negative.       Subjective 04/23/21   Hospital Day: 19   Vent Day:19   Overnight events: No significant events noted overnight Mentation/Activity: intubated, sedation held - responsive to verbal simuli    Respiratory/ Secretions: stable.  CXR with improvement in aeration   Hemodynamics: H/H stable   Urine output, bowel: adequate   Diet: None       -Continue sedation wean and SBT   -continue  D5W @50cc/hr   -50 q1h water flushes        ROS:Review of systems not obtained due to patient factors.     Physical Exam:                   General: Intubated/sedated; in NAD   HEENT:  Anicteric sclerae; pink palpebral conjunctivae; mucosa moist   Resp:  Symmetrical chest expansion, no accessory muscle use; good airway entry; no rales/ wheezing/ rhonchi noted   CV:  S1, S2 present; regular rate and rhythm   GI:  Obese. Abdomen soft, non-tender; (+) active bowel sounds   Extremities:  +2 pulses on all extremities; no edema/ cyanosis/ clubbing noted + restraints    Skin:  Warm; no rashes/ lesions noted, normal turgor/cap refill    Neurologic:  Non-focal   Devices:  OGT, ETT, Garcia               NEPHROLOGY 4/23:         Impression:       #1 Acute kidney injury,( baseline creat unknown) etiology appears to be secondary to Ischaemic ATN d/t hypoperfusion v/s cardiorenal syndrome in   setting of CHF/V failure in setting of PE   #2 acute respiratory failure secondary to rapidly progressive angioedema s/p emergent cricothyrotomy.  Presently intubated    on the vent,  severely hypoxic out of proportion to x-ray findings, therefore PE has been considered , Rx for presumed PE   #3 pulmonary infiltrates/Pulm edema    #4 left-sided pneumothorax, appears resolved   #5 diabetes mellitus with complications   #6 popliteal DVT left   #7 possible colonic obst , awaiting CT abd    #8 hypernatremia        Plan:       1) ok to d/c IVF , continue free water with TF  to 50 cc/hrs    2) strict I/o   3) follow renal parameters , electrolytes q12hrs , replace electrolytes   4) keep MAP > 65       Please call with questions,       Sherly Portillo MD FASN   Cell [phone]   Pager: [phone]   Subjective:       - No acute over night events. - respiratory - stable   - hemodynamics - stable, no pressrs   - UOP-good   - Nutrition -ok                       WBC 14.8    HG 9.5    HCT 30.8    .2    BUN 85    CREATINIE 1.44    CA 8.4    MG 3.1    GFR 49    ALBUMIN 2.3    ABG:  PH 7.51, PCO2 33.4, PO2 149, HCO3 26.5, SO2 100 VENT            PEPCID DAILY 20MG IV X 1    HEPARIN GTT 1910UNITS/HR IV    MERREM Q8H 500MG IV X 1         Respiratory Failure GRG - Care Day 19 (4/22/2021) by Jazmín Caldwell, RN       Review Status Review Entered   Completed 4/23/2021 08:51      Criteria Review      Care Day: 19 Care Date: 4/22/2021 Level of Care: ICU    Guideline Day 3    Level Of Care    ( ) * Activity level acceptable    Clinical Status    ( ) * Ventilation status appropriate    ( ) * Airway status acceptable    ( ) * Respiratory status acceptable    ( ) * Stable chest findings    ( ) * Neurologic status acceptable    (X) * Temperature status acceptable    4/23/2021 08:51:57 EDT by Jazmín Caldwell      98.1F    ( ) * No infection, or status acceptable    ( ) * General Discharge Criteria met    Interventions    ( ) * No chest tube, or status acceptable    ( ) * Intake acceptable    ( ) * No inpatient interventions needed    4/23/2021 08:51:57 EDT by Jazmín Caldwell    Subject: Additional Clinical Information    PULMONARY 4/22      Pulmonary / Critical Care Physician:         Chart and note reviewed. Data reviewed. Seen on rounds earlier today. I have independently evaluated and examined the patient. I agree with the exam, assessment and plans outlined by RUDDY Samuel.         In brief, my findings, evaluation and recommendations are as stated below:         Pt overall improving on the vent. He is not waking up since holding sedation yesterday afternoon.  Will continue to hold for now, may need a CT head, he has never had any head imaging since his cardiac arrest upon admission.         Rest of details and diagnostic/treatment plans per APC note.           IMPRESSION:    Angioedema- with airway and respiratory failure and collapse; thought due to ACE inhibitor    S/P Emergent Cricthyrotomy [facility]-ED 4/4/21- converted to 6.5 ETT intraoperative by anesthesia team 4/4/21, 8.0 ETT by anesthesia 4/7/21, packing removed evening 4/8/21-ENT    S/P cardiac arrest @ [facility] 4/4/21- brief, likely due to  hypoxia and  airway collapse    Respiratory Failure: Acute due to above; currently intubated and on vent for airway protection    Pulmonary Infiltrates: suspect aspiration secretions and/or blood due to above- can't exclude other infectious process at this time.  Rapid Covid Negative at [facility]    Bradycardia - improved    DVT: Popliteal- Left; acute non-occlusive thrombus.  Heparin ACS protocol started 4/8- stopped 4/9 due to bleeding from Cric site.  Bleeding controlled -  heparin currently back on     Pulmonary Embolism - 4/13/21 - left lower and right upper lobes    JACQUELYN - , Creatinine 3.57    Hypokalemia - resolved    Left scleral erythema- unknown baseline; possible infection    DM    DM retinopathy per chart review    Diabetic peripheral neuropathy    Left Pneumothorax - resolved         Obesity: Body mass index is 35.64 kg/m².           PLAN:    NEURO:    Serial neuro evaluations    Daily sedation holiday    Wetting tears and cipro eye drops- stop date for cipro on chart         CARDIO:    MAP goal >64- No IV pressor requirement    Echo on 4/6/21 with EF of 55%, dilated RV and moderate pulmonary hypertension (PASP 61 mmHg)    PULM:    Maintain aspiration precautions: HOB >30 degrees    SpO2 goal >92%    Bronchial /oral hygiene    VAP bundle- Wean vent as clinical status allows    SBT as clinical status allows    Completed course of decadron    Continue brovana and albuterol nebs    Daily CXR and ABG while intubated         GI/ NUTRITION:    OGT    Diet: continue TF    SUP:Pepcid    Follow up with nutritional recommendations    RENAL/ METAB/ : Monitor I&Os    Trend electrolytes - replace as needed    Cardona: Continue, monitor I/Os         HEM/ONC    Trend Hb/HCT and PLTs    DVT prophylaxis: SCDs, heparin gtt         ID    Antibioitcs: Cipro eye drop, Zosyn 4/4 - 4/8  Vanco: 4/4-6, MRSA swab negative.     Zosyn restarted on 4/11, WBCs elevated to 16.5    Follow up on pending cultures    Repeat Covid negative (4/11)    Tylenol for fevers         ENDO    BGs Q 6,SSI, TSH normal    C1 inhibitor normal level (4/7/21)         MSK/ ORTHO    No active Issues         SKIN/ WOUNDS    Per protocol    Neck- daily dressing changes per ENT, Quick clot with Afrin if needed, d/c xeroform                   CODE STATUS: Full Code- Full         Discussed in interdisciplinary rounds    Best practice :         Glycemic control    IHI ICU bundles:                Cardona Bundle Followed and Vent Bundle Followed, Vent Day 17      [facility] Vent patients- VAP bundle, aim to keep peak plateau pressure 34-98CA H2O    Sress ulcer prophylaxis. DVT prophylaxis. Need for Lines, cardona assessed. Restraints need. Palliative care evaluation.              Subjective/Interval History:              Interval HPI:69 yo M h/o HTN tx w/ lisinopril presenting to [facility] ED 4/4 for rapidly progressing respiratory distress due to severe angioedema. During intubation attempts pt had brief cardiac arrest w/ pulse loss and severe airway swelling leading to emergent cricothyrotomy. Pt stabilized before [employer] transport to [place] at [facility] pt taken emergently to [address] was converted to ETT. Patient continues to have trouble with o2 requirements disproportionate to CXR findings and despite adjustment of FiO2 and PEEP. CTA revelaed B/L pulmonary emboli which is likely causing hypoxemia and RH strain. Administration of inhaled epoprostenol on 4/13 is showing improvement in V/Q  FiO2 75%/ PEEP 10.  Pt is on dobutamine and being diuresed w/ bumex and metolazone.   COVID - 19 negative.         Subjective 04/22/21    Hospital Day: 18    Vent Day:18    Overnight events: No significant events noted overnight    Mentation/Activity: intubated/sedated on propofol and versed.  Opening eyes this am, no command following    Respiratory/ Secretions: stable    Hemodynamics: H/H stable    Urine output, bowel: adequate    Diet: None         -Continue sedation wean    -continue D5W @50cc/hr    -50 q1h water flushes     - PEEP 7, FiO2 45% today         ROS:Review of systems not obtained due to patient factors. Physical Exam:                     General: Intubated/sedated; HEENT:  Anicteric sclerae; pink palpebral conjunctivae; mucosa moist    Resp:  Symmetrical chest expansion, no accessory muscle use; good airway entry; no rales/ wheezing/ rhonchi noted    CV:  S1, S2 present; regular rate and rhythm    GI:  Obese. Abdomen soft, non-tender; (+) active bowel sounds    Extremities:  +2 pulses on all extremities; no edema/ cyanosis/ clubbing noted + restraints    Skin:  Warm; no rashes/ lesions noted, normal turgor/cap refill    Neurologic:  Non-focal    Devices:  OGT, ETT, Garcia      WBC 13.9      HG 10.7      HCT 35.8            PTT >180      GLUCOSE 168      BUN 92      CREATININE 1.67      mg 3.8      albumin 2.4      ABG:  PH 7.49, HCO3 27.5 VENT      CHEST XR: 1. Support tubes are as described. 2. Similar bilateral interstitial and airspace opacities          119/72      98.1F      88HR      24RR      90%VENT      d5% 50ml/hr iv       * Milestone   Additional Notes   PULMONARY 4/22   Pulmonary / Critical Care Physician:       Chart and note reviewed. Data reviewed. Seen on rounds earlier today. I have independently evaluated and examined the patient. I agree with the exam, assessment and plans outlined by RUDDY Estevez.        In brief, my findings, evaluation and recommendations are as stated below:       Pt overall improving on the vent.  He is not waking up since holding sedation yesterday afternoon. Will continue to hold for now, may need a CT head, he has never had any head imaging since his cardiac arrest upon admission.        Rest of details and diagnostic/treatment plans per APC note.       IMPRESSION:   Angioedema- with airway and respiratory failure and collapse; thought due to ACE inhibitor   S/P Emergent Cricthyrotomy [facility]-ED 4/4/21- converted to 6.5 ETT intraoperative by anesthesia team 4/4/21, 8.0 ETT by anesthesia 4/7/21, packing removed evening 4/8/21-ENT   S/P cardiac arrest @ [facility] 4/4/21- brief, likely due to  hypoxia and  airway collapse   Respiratory Failure: Acute due to above; currently intubated and on vent for airway protection   Pulmonary Infiltrates: suspect aspiration secretions and/or blood due to above- can't exclude other infectious process at this time.  Rapid Covid Negative at [facility]   Bradycardia - improved   DVT: Popliteal- Left; acute non-occlusive thrombus.  Heparin ACS protocol started 4/8- stopped 4/9 due to bleeding from Cric site.  Bleeding controlled -  heparin currently back on    Pulmonary Embolism - 4/13/21 - left lower and right upper lobes   JACQUELYN - , Creatinine 3.57   Hypokalemia - resolved   Left scleral erythema- unknown baseline; possible infection    DM   DM retinopathy per chart review   Diabetic peripheral neuropathy   Left Pneumothorax - resolved       Obesity: Body mass index is 35.64 kg/m².       PLAN:   NEURO:   Serial neuro evaluations   Daily sedation holiday   Wetting tears and cipro eye drops- stop date for cipro on chart       CARDIO:   MAP goal >64- No IV pressor requirement   Echo on 4/6/21 with EF of 55%, dilated RV and moderate pulmonary hypertension (PASP 61 mmHg)   PULM:   Maintain aspiration precautions: HOB >30 degrees   SpO2 goal >92%   Bronchial /oral hygiene    VAP bundle- Wean vent as clinical status allows   SBT as clinical status allows   Completed course of decadron   Continue brovana and albuterol nebs   Daily CXR and ABG while intubated       GI/ NUTRITION:   OGT    Diet: continue TF   SUP:Pepcid   Follow up with nutritional recommendations   RENAL/ METAB/ : Monitor I&Os   Trend electrolytes - replace as needed   Cardona: Continue, monitor I/Os       HEM/ONC   Trend Hb/HCT and PLTs   DVT prophylaxis: SCDs, heparin gtt       ID   Antibioitcs: Cipro eye drop, Zosyn 4/4 - 4/8  Vanco: 4/4-6, MRSA swab negative.    Zosyn restarted on 4/11, WBCs elevated to 16.5   Follow up on pending cultures   Repeat Covid negative (4/11)   Tylenol for fevers       ENDO   BGs Q 6,SSI, TSH normal   C1 inhibitor normal level (4/7/21)       MSK/ ORTHO   No active Issues       SKIN/ WOUNDS   Per protocol   Neck- daily dressing changes per ENT, Quick clot with Afrin if needed, d/c xeroform               CODE STATUS: Full Code- Full       Discussed in interdisciplinary rounds   Best practice :       Glycemic control   IHI ICU bundles:               Cardona Bundle Followed and Vent Bundle Followed, Vent Day 17     [facility] Vent patients- VAP bundle, aim to keep peak plateau pressure 52-51PQ H2O   Sress ulcer prophylaxis. DVT prophylaxis. Need for Lines, cardona assessed. Restraints need. Palliative care evaluation.           Subjective/Interval History:           Interval HPI:69 yo M h/o HTN tx w/ lisinopril presenting to [facility] ED 4/4 for rapidly progressing respiratory distress due to severe angioedema. During intubation attempts pt had brief cardiac arrest w/ pulse loss and severe airway swelling leading to emergent cricothyrotomy. Pt stabilized before [employer] transport to [place] at [facility] pt taken emergently to [address] was converted to ETT. Patient continues to have trouble with o2 requirements disproportionate to CXR findings and despite adjustment of FiO2 and PEEP. CTA revelaed B/L pulmonary emboli which is likely causing hypoxemia and RH strain.  Administration of inhaled epoprostenol on 4/13 is showing improvement in V/Q  FiO2 75%/ PEEP 10. Pt is on dobutamine and being diuresed w/ bumex and metolazone.   COVID - 19 negative.       Subjective 04/22/21   Hospital Day: 18   Vent Day:18   Overnight events: No significant events noted overnight   Mentation/Activity: intubated/sedated on propofol and versed.  Opening eyes this am, no command following   Respiratory/ Secretions: stable   Hemodynamics: H/H stable   Urine output, bowel: adequate   Diet: None       -Continue sedation wean   -continue D5W @50cc/hr   -50 q1h water flushes    - PEEP 7, FiO2 45% today       ROS:Review of systems not obtained due to patient factors. Physical Exam:                   General: Intubated/sedated; HEENT:  Anicteric sclerae; pink palpebral conjunctivae; mucosa moist   Resp:  Symmetrical chest expansion, no accessory muscle use; good airway entry; no rales/ wheezing/ rhonchi noted   CV:  S1, S2 present; regular rate and rhythm   GI:  Obese. Abdomen soft, non-tender; (+) active bowel sounds   Extremities:  +2 pulses on all extremities; no edema/ cyanosis/ clubbing noted + restraints    Skin:  Warm; no rashes/ lesions noted, normal turgor/cap refill    Neurologic:  Non-focal   Devices:  OGT, ETT, Garcia   WBC 13.9   HG 10.7   HCT 35.8      PTT >180   GLUCOSE 168   BUN 92   CREATININE 1.67   mg 3.8   albumin 2.4   ABG:  PH 7.49, HCO3 27.5 VENT   CHEST XR: 1. Support tubes are as described.    2. Similar bilateral interstitial and airspace opacities        119/72   98.1F   88HR   24RR   90%VENT      d5% 50ml/hr iv   PEPCID DAILY 20MG IV X 1    HEPARIN GTT 2.110UNITS/HR IV    MERREM Q8H 500MG IV X 3    KCL 10MEQ IV X 1

## 2021-05-03 NOTE — ANESTHESIA PREPROCEDURE EVALUATION
Relevant Problems   No relevant active problems       Anesthetic History   No history of anesthetic complications            Review of Systems / Medical History  Patient summary reviewed and pertinent labs reviewed    Pulmonary  Within defined limits                 Neuro/Psych   Within defined limits           Cardiovascular    Hypertension        Dysrhythmias            GI/Hepatic/Renal  Within defined limits              Endo/Other    Diabetes: type 2    Morbid obesity and arthritis     Other Findings              Physical Exam    Airway  Mallampati: III  TM Distance: 4 - 6 cm  Neck ROM: normal range of motion   Mouth opening: Normal  Tracheostomy present   Cardiovascular  Regular rate and rhythm,  S1 and S2 normal,  no murmur, click, rub, or gallop  Rhythm: regular  Rate: normal         Dental    Dentition: Lower dentition intact and Upper dentition intact     Pulmonary  Breath sounds clear to auscultation               Abdominal  GI exam deferred       Other Findings            Anesthetic Plan    ASA: 4  Anesthesia type: general          Induction: Intravenous  Anesthetic plan and risks discussed with: Sibling      Consent from sister by phone

## 2021-05-03 NOTE — ROUTINE PROCESS
Bedside shift change report given to Ludivina Houser RN (oncoming nurse) by Mallika Flores RN (offgoing nurse). Report included the following information SBAR, Kardex, Intake/Output, MAR and Recent Results.

## 2021-05-03 NOTE — ADDENDUM NOTE
Addendum  created 05/03/21 1610 by Fatimah Ramirez CRNA    Flowsheet accepted, Intraprocedure Flowsheets edited

## 2021-05-03 NOTE — PROGRESS NOTES
Discharge planning    Received call from [facility] need for peer to peer for [employer] for insurance authorization. MD will need to call [phone], option 5 and deadline is tomorrow (5/4/21) at 6 am. Notified Dr. Citlali Duran via [employer] communication. 1631: Called [facility] per Dr. Kamille Colindres request to set up appointment. Spoke with Nayan Coronado. She stated \" The doctor has to call [phone], option 5 before 11 am tomorrow. We don't setup appointments. He can have someone else call and then hand the phone over to him if he desires. \"  Notified Dr. Citlali Duran via [employer].     Kee Miller, CRAIGN, RN  Pager # [phone]  Care Manager

## 2021-05-03 NOTE — PROGRESS NOTES
Bedside and Verbal shift change report given to [address] Soria (oncoming nurse) by Giovani Pyle (offgoing nurse). Report included the following information SBAR, Kardex and MAR.

## 2021-05-04 ENCOUNTER — APPOINTMENT (OUTPATIENT)
Dept: CT IMAGING | Age: 71
DRG: 004 | End: 2021-05-04
Attending: INTERNAL MEDICINE
Payer: MEDICARE

## 2021-05-04 ENCOUNTER — APPOINTMENT (OUTPATIENT)
Dept: MRI IMAGING | Age: 71
DRG: 004 | End: 2021-05-04
Attending: INTERNAL MEDICINE
Payer: MEDICARE

## 2021-05-04 LAB
ALBUMIN SERPL-MCNC: 2.3 G/DL (ref 3.4–5)
ANION GAP SERPL CALC-SCNC: 5 MMOL/L (ref 3–18)
APTT PPP: 33.6 SEC (ref 23–36.4)
APTT PPP: 38.8 SEC (ref 23–36.4)
APTT PPP: 45.4 SEC (ref 23–36.4)
BACTERIA SPEC CULT: ABNORMAL
BACTERIA SPEC CULT: ABNORMAL
BASOPHILS # BLD: 0 K/UL (ref 0–0.1)
BASOPHILS NFR BLD: 0 % (ref 0–2)
BUN SERPL-MCNC: 60 MG/DL (ref 7–18)
BUN/CREAT SERPL: 34 (ref 12–20)
CALCIUM SERPL-MCNC: 9 MG/DL (ref 8.5–10.1)
CHLORIDE SERPL-SCNC: 108 MMOL/L (ref 100–111)
CO2 SERPL-SCNC: 26 MMOL/L (ref 21–32)
CREAT SERPL-MCNC: 1.74 MG/DL (ref 0.6–1.3)
DIFFERENTIAL METHOD BLD: ABNORMAL
EOSINOPHIL # BLD: 0.1 K/UL (ref 0–0.4)
EOSINOPHIL NFR BLD: 1 % (ref 0–5)
ERYTHROCYTE [DISTWIDTH] IN BLOOD BY AUTOMATED COUNT: 15.6 % (ref 11.6–14.5)
GLUCOSE BLD STRIP.AUTO-MCNC: 127 MG/DL (ref 70–110)
GLUCOSE BLD STRIP.AUTO-MCNC: 132 MG/DL (ref 70–110)
GLUCOSE BLD STRIP.AUTO-MCNC: 157 MG/DL (ref 70–110)
GLUCOSE SERPL-MCNC: 110 MG/DL (ref 74–99)
GRAM STN SPEC: ABNORMAL
HCT VFR BLD AUTO: 34.4 % (ref 36–48)
HGB BLD-MCNC: 10.4 G/DL (ref 13–16)
INR PPP: 1.3 (ref 0.8–1.2)
LYMPHOCYTES # BLD: 1.5 K/UL (ref 0.9–3.6)
LYMPHOCYTES NFR BLD: 15 % (ref 21–52)
MAGNESIUM SERPL-MCNC: 2.9 MG/DL (ref 1.6–2.6)
MCH RBC QN AUTO: 25.2 PG (ref 24–34)
MCHC RBC AUTO-ENTMCNC: 30.2 G/DL (ref 31–37)
MCV RBC AUTO: 83.5 FL (ref 74–97)
MONOCYTES # BLD: 0.8 K/UL (ref 0.05–1.2)
MONOCYTES NFR BLD: 8 % (ref 3–10)
NEUTS SEG # BLD: 8.1 K/UL (ref 1.8–8)
NEUTS SEG NFR BLD: 77 % (ref 40–73)
PHOSPHATE SERPL-MCNC: 4.8 MG/DL (ref 2.5–4.9)
PLATELET # BLD AUTO: 280 K/UL (ref 135–420)
PMV BLD AUTO: 11.6 FL (ref 9.2–11.8)
POTASSIUM SERPL-SCNC: 5.1 MMOL/L (ref 3.5–5.5)
PROTHROMBIN TIME: 15.9 SEC (ref 11.5–15.2)
RBC # BLD AUTO: 4.12 M/UL (ref 4.35–5.65)
SERVICE CMNT-IMP: ABNORMAL
SODIUM SERPL-SCNC: 139 MMOL/L (ref 136–145)
WBC # BLD AUTO: 10.6 K/UL (ref 4.6–13.2)

## 2021-05-04 PROCEDURE — 65660000000 HC RM CCU STEPDOWN

## 2021-05-04 PROCEDURE — 83735 ASSAY OF MAGNESIUM: CPT

## 2021-05-04 PROCEDURE — 82962 GLUCOSE BLOOD TEST: CPT

## 2021-05-04 PROCEDURE — 74011000250 HC RX REV CODE- 250: Performed by: INTERNAL MEDICINE

## 2021-05-04 PROCEDURE — 74011000250 HC RX REV CODE- 250: Performed by: PHYSICIAN ASSISTANT

## 2021-05-04 PROCEDURE — 97535 SELF CARE MNGMENT TRAINING: CPT

## 2021-05-04 PROCEDURE — 97110 THERAPEUTIC EXERCISES: CPT

## 2021-05-04 PROCEDURE — 85730 THROMBOPLASTIN TIME PARTIAL: CPT

## 2021-05-04 PROCEDURE — 80069 RENAL FUNCTION PANEL: CPT

## 2021-05-04 PROCEDURE — 70551 MRI BRAIN STEM W/O DYE: CPT

## 2021-05-04 PROCEDURE — 94640 AIRWAY INHALATION TREATMENT: CPT

## 2021-05-04 PROCEDURE — 85610 PROTHROMBIN TIME: CPT

## 2021-05-04 PROCEDURE — 51798 US URINE CAPACITY MEASURE: CPT

## 2021-05-04 PROCEDURE — 77030018798 HC PMP KT ENTRL FED COVD -A

## 2021-05-04 PROCEDURE — 74011250637 HC RX REV CODE- 250/637: Performed by: INTERNAL MEDICINE

## 2021-05-04 PROCEDURE — 85025 COMPLETE CBC W/AUTO DIFF WBC: CPT

## 2021-05-04 PROCEDURE — 70450 CT HEAD/BRAIN W/O DYE: CPT

## 2021-05-04 PROCEDURE — 94668 MNPJ CHEST WALL SBSQ: CPT

## 2021-05-04 PROCEDURE — 74011250636 HC RX REV CODE- 250/636: Performed by: INTERNAL MEDICINE

## 2021-05-04 PROCEDURE — 99233 SBSQ HOSP IP/OBS HIGH 50: CPT | Performed by: INTERNAL MEDICINE

## 2021-05-04 PROCEDURE — 97167 OT EVAL HIGH COMPLEX 60 MIN: CPT

## 2021-05-04 PROCEDURE — 2709999900 HC NON-CHARGEABLE SUPPLY

## 2021-05-04 PROCEDURE — 97530 THERAPEUTIC ACTIVITIES: CPT

## 2021-05-04 PROCEDURE — 99232 SBSQ HOSP IP/OBS MODERATE 35: CPT | Performed by: INTERNAL MEDICINE

## 2021-05-04 PROCEDURE — 74011250636 HC RX REV CODE- 250/636: Performed by: PHYSICIAN ASSISTANT

## 2021-05-04 PROCEDURE — 36415 COLL VENOUS BLD VENIPUNCTURE: CPT

## 2021-05-04 PROCEDURE — 77010033678 HC OXYGEN DAILY

## 2021-05-04 RX ORDER — ATORVASTATIN CALCIUM 40 MG/1
80 TABLET, FILM COATED ORAL
Status: DISCONTINUED | OUTPATIENT
Start: 2021-05-04 | End: 2021-05-13 | Stop reason: HOSPADM

## 2021-05-04 RX ORDER — LORAZEPAM 2 MG/ML
1 INJECTION INTRAMUSCULAR
Status: COMPLETED | OUTPATIENT
Start: 2021-05-04 | End: 2021-05-04

## 2021-05-04 RX ORDER — POLYETHYLENE GLYCOL 3350 17 G/17G
17 POWDER, FOR SOLUTION ORAL DAILY
Status: DISCONTINUED | OUTPATIENT
Start: 2021-05-05 | End: 2021-05-13 | Stop reason: HOSPADM

## 2021-05-04 RX ORDER — SODIUM CHLORIDE 9 MG/ML
50 INJECTION, SOLUTION INTRAVENOUS CONTINUOUS
Status: DISCONTINUED | OUTPATIENT
Start: 2021-05-04 | End: 2021-05-06

## 2021-05-04 RX ORDER — LORAZEPAM 1 MG/1
1 TABLET ORAL
Status: DISCONTINUED | OUTPATIENT
Start: 2021-05-04 | End: 2021-05-07

## 2021-05-04 RX ORDER — FAMOTIDINE 20 MG/1
20 TABLET, FILM COATED ORAL DAILY
Status: DISCONTINUED | OUTPATIENT
Start: 2021-05-04 | End: 2021-05-09

## 2021-05-04 RX ORDER — LORAZEPAM 2 MG/ML
0.5 INJECTION INTRAMUSCULAR ONCE
Status: COMPLETED | OUTPATIENT
Start: 2021-05-04 | End: 2021-05-04

## 2021-05-04 RX ORDER — WARFARIN 7.5 MG/1
7.5 TABLET ORAL ONCE
Status: COMPLETED | OUTPATIENT
Start: 2021-05-04 | End: 2021-05-05

## 2021-05-04 RX ADMIN — IPRATROPIUM BROMIDE AND ALBUTEROL SULFATE 3 ML: .5; 3 SOLUTION RESPIRATORY (INHALATION) at 12:27

## 2021-05-04 RX ADMIN — ARFORMOTEROL TARTRATE 15 MCG: 15 SOLUTION RESPIRATORY (INHALATION) at 07:40

## 2021-05-04 RX ADMIN — SODIUM CHLORIDE SOLN NEBU 3% 4 ML: 3 NEBU SOLN at 20:30

## 2021-05-04 RX ADMIN — LORAZEPAM 0.5 MG: 2 INJECTION INTRAMUSCULAR; INTRAVENOUS at 17:00

## 2021-05-04 RX ADMIN — FAMOTIDINE 20 MG: 20 TABLET ORAL at 12:00

## 2021-05-04 RX ADMIN — IPRATROPIUM BROMIDE AND ALBUTEROL SULFATE 3 ML: .5; 3 SOLUTION RESPIRATORY (INHALATION) at 20:30

## 2021-05-04 RX ADMIN — IPRATROPIUM BROMIDE AND ALBUTEROL SULFATE 3 ML: .5; 3 SOLUTION RESPIRATORY (INHALATION) at 00:26

## 2021-05-04 RX ADMIN — SODIUM CHLORIDE SOLN NEBU 3% 4 ML: 3 NEBU SOLN at 07:40

## 2021-05-04 RX ADMIN — IPRATROPIUM BROMIDE AND ALBUTEROL SULFATE 3 ML: .5; 3 SOLUTION RESPIRATORY (INHALATION) at 07:40

## 2021-05-04 RX ADMIN — HEPARIN SODIUM 996.6 UNITS/HR: 10000 INJECTION, SOLUTION INTRAVENOUS at 13:25

## 2021-05-04 RX ADMIN — ARFORMOTEROL TARTRATE 15 MCG: 15 SOLUTION RESPIRATORY (INHALATION) at 20:30

## 2021-05-04 RX ADMIN — ATORVASTATIN CALCIUM 80 MG: 40 TABLET, FILM COATED ORAL at 21:40

## 2021-05-04 RX ADMIN — IPRATROPIUM BROMIDE AND ALBUTEROL SULFATE 3 ML: .5; 3 SOLUTION RESPIRATORY (INHALATION) at 23:50

## 2021-05-04 RX ADMIN — LORAZEPAM 1 MG: 2 INJECTION INTRAMUSCULAR; INTRAVENOUS at 18:20

## 2021-05-04 RX ADMIN — LORAZEPAM 1 MG: 1 TABLET ORAL at 17:00

## 2021-05-04 RX ADMIN — IPRATROPIUM BROMIDE AND ALBUTEROL SULFATE 3 ML: .5; 3 SOLUTION RESPIRATORY (INHALATION) at 04:50

## 2021-05-04 RX ADMIN — IPRATROPIUM BROMIDE AND ALBUTEROL SULFATE 3 ML: .5; 3 SOLUTION RESPIRATORY (INHALATION) at 15:35

## 2021-05-04 RX ADMIN — SODIUM CHLORIDE 75 ML/HR: 900 INJECTION, SOLUTION INTRAVENOUS at 13:25

## 2021-05-04 NOTE — PROGRESS NOTES
Problem: Mobility Impaired (Adult and Pediatric)  Goal: *Acute Goals and Plan of Care (Insert Text)  Description: Physical Therapy Goals  Initiated 4/28/2021 and to be accomplished within 7 day(s)  1. Patient will move from supine to sit and sit to supine , scoot up and down, and roll side to side in bed with maximal assistance. 2.  Patient will transfer from bed to chair and chair to bed with maximal assistance using the least restrictive device. 3.  Patient will perform sit to stand with maximal assistance. 4.  Patient will sit on EOB with maxA for >5 minutes in prep for OOB activities. PLOF: Pt unable to verbalize PLOF. Outcome: Progressing Towards Goal    PHYSICAL THERAPY TREATMENT    Patient: Stacey Matute (72 y.o. male)  Date: 5/4/2021  Diagnosis: Angioedema due to angiotensin converting enzyme inhibitor (ACE-I) [T78. 3XXA, T46.4X5A]  Acute respiratory failure with hypoxia (HCC) [J96.01]  DVT (deep vein thrombosis) in pregnancy [O22.30]  Acute encephalopathy [G93.40] Respiratory failure (HCC)  Procedure(s) (LRB):  PERCUTANEOUS ENDOSCOPIC GASTROSTOMY TUBE INSERTION/ BEDSIDE (N/A) 1 Day Post-Op  Precautions: Fall, Skin  PLOF: see above     ASSESSMENT:  Pt cleared to participate in PT session, pt received semi-reclined in bed and agreeable to therapy session. Completing with OT to maximize safety and mobility. RN present placing IV. Pt completing limited LE active movement to command, able to follow commands ~50% of the time. Passive range of motion to ankle/hip/knee in supine. Ankle DF stretch 4x30 seconds. Pt also assisted with cervical range of motion, no active movement but passively bringing head to neutral. Pt able to move LUE against gravity but not to command this session. Pt positioned for comfort and educated to call for assist before getting up, pt verbalized understanding. Pt left with all needs met and call bell in reach. RN notified of position and participation.  Pt continues to require totalA for all mobility with limited command following. Pt will be placed on 3 day trial to continue to assess progress towards goals. Progression toward goals:   []      Improving appropriately and progressing toward goals  [x]      Improving slowly and progressing toward goals  []      Not making progress toward goals and plan of care will be adjusted     PLAN:  Patient continues to benefit from skilled intervention to address the above impairments. Continue treatment per established plan of care. Discharge Recommendations:  [facility]  Further Equipment Recommendations for Discharge:  hospital bed, lift, wheelchair      SUBJECTIVE:   Pt smiling once    OBJECTIVE DATA SUMMARY:   Critical Behavior:  Neurologic State: Drowsy  Orientation Level: Unable to verbalize  Cognition: Decreased command following, Decreased attention/concentration  Safety/Judgement: Fall prevention  Functional Mobility Training:  Bed Mobility:    Scooting: Total assistance     Range Of Motion:   AROM: Grossly decreased, non-functional(BUE)    PROM: Generally decreased, functional     Therapeutic Exercises:   Passive range of motion throughout LEs      EXERCISE   Sets   Reps   Active Active Assist   Passive Self ROM   Comments   Ankle Pumps 1 20  [x] [] [] []    Quad Sets/Glut Sets    [] [] [] [] Hold for 5 secs   Hamstring Sets   [] [] [] []    Short Arc Quads   [] [] [] []    Heel Slides 1 20 [x] [] [] []    Straight Leg Raises 1 5 [x] [] [] []    Hip Add/abd 1 10 [x] [] [] []    Long Arc Quads   [] [] [] []    Seated Marching   [] [] [] []    Standing Marching   [] [] [] []       [] [] [] []        Pain:  Facial grimace with ankle DF stretch     Activity Tolerance:   Fair activity tolerance    Please refer to the flowsheet for vital signs taken during this treatment.   After treatment:   [] Patient left in no apparent distress sitting up in chair  [x] Patient left in no apparent distress in bed  [x] Call bell left within reach  [x] Nursing notified  [] Caregiver present  [] Bed alarm activated  [] SCDs applied      COMMUNICATION/EDUCATION:   [x]         Role of Physical Therapy in the acute care setting. []         Fall prevention education was provided and the patient/caregiver indicated understanding. []         Patient/family have participated as able in working toward goals and plan of care. []         Patient/family agree to work toward stated goals and plan of care. []         Patient understands intent and goals of therapy, but is neutral about his/her participation.   [x]         Patient is unable to participate in stated goals/plan of care: ongoing with therapy staff.  []         Other:        Emely Berry, PT   Time Calculation: 38 mins

## 2021-05-04 NOTE — PROGRESS NOTES
Discharge planning    Faxed  copy of signed appeal documents to [phone] [facility] admission office. Spoke with Mendel Santee, admissions coordinator, to verify receipt. Signed documents were received per Mendel Santee. Original signed document placed in chart.      MARGUERITE Conway, RN  Pager # [phone]  Care Manager

## 2021-05-04 NOTE — PROGRESS NOTES
Problem: Self Care Deficits Care Plan (Adult)  Goal: *Acute Goals and Plan of Care (Insert Text)  Description: Occupational Therapy Goals  Initiated 5/4/2021 within 7 day(s). 1.  Patient will participate in functional maintenance program (FMP) at this time to maintain/increase BUE ROM and strength for self-care/ADL tasks. Prior Level of Function: Per RN, patient was independent prior to hospital admission. Patient unable to give PLOF this session as patient non verbal today  Outcome: Progressing Towards Goal     OCCUPATIONAL THERAPY EVALUATION    Patient: Madonna White (47 y.o. male)  Date: 5/4/2021  Primary Diagnosis: Angioedema due to angiotensin converting enzyme inhibitor (ACE-I) [T78. 3XXA, T46.4X5A]  Acute respiratory failure with hypoxia (HCC) [J96.01]  DVT (deep vein thrombosis) in pregnancy [O22.30]  Acute encephalopathy [G93.40]  Procedure(s) (LRB):  PERCUTANEOUS ENDOSCOPIC GASTROSTOMY TUBE INSERTION/ BEDSIDE (N/A) 1 Day Post-Op   Precautions:Fall, Skin    ASSESSMENT :  Patient cleared to participate in OT evaluation by RN. Upon entering the room, the pt was semi-reclined in bed, alert, and agreeable to participate in OT evaluation with RN present trying to obtain IV access. Patient educated on the role of OT, evaluation process, and safety during this admission. Patient was seen with PT to maximize patient safety, participation, and functional mobility in preparation for self-care tasks. Patient also seen with rehab technician. RN requesting assistance during evaluation for manipulating ultrasound machine and stabilizing extremities onto pillows during IV placement, assistance given. OT assessed and performed BUE ROM. AROM grossly decreased, PROM generally decreased. See exercise chart below for ROM details. Pt had 2-/5 strength in L hand, 1/5 in L elbow/shoulder and 1/5 movements in RUE overall to command.  Patient observed with increased AROM during IV placement, appears to be more of a response to pain vs. Intentional movement. Patient total assist this session for grooming tasks and for repositioning into center of bed and lifting arms for pillow placement. Patient vitals WFL this session and patient left comfortably in bed with all needs met and RN present and notified of plan of care. Patient with little to no command following this session. Recommending functional maintenance program (FMP) at this time to maintain/increase BUE ROM and strength for self-care/ADL tasks. Educated rehab technician in plan of care and exercises. Rehab technician in agreement and able to demonstrate correct techniques for PROM on BUE/BLE this session. Patient will benefit from skilled intervention to address the above impairments. Patient's rehabilitation potential is considered to be Fair  Factors which may influence rehabilitation potential include:   []             None noted  [x]             Mental ability/status  [x]             Medical condition  [x]             Home/family situation and support systems  [x]             Safety awareness  [x]             Pain tolerance/management  []             Other:      PLAN :  Recommendations and Planned Interventions:   []               Self Care Training                  []      Therapeutic Activities  []               Functional Mobility Training   []      Cognitive Retraining  [x]               Therapeutic Exercises           []      Endurance Activities  []               Balance Training                    []      Neuromuscular Re-Education  []               Visual/Perceptual Training     []      Home Safety Training  []               Patient Education                   []      Family Training/Education  []               Other (comment):    Frequency/Duration: Patient will be followed by rehab tech 3 times a week to address goals.   Discharge Recommendations: Skilled Nursing Facility/LTC  Further Equipment Recommendations for Discharge: hospital bed, mechanical lift, and wheelchair      SUBJECTIVE:   Patient non verbal this session    OBJECTIVE DATA SUMMARY:     Past Medical History:   Diagnosis Date    DDD (degenerative disc disease), lumbar     [facility]     [facility]abetic neuropathy [facility] [address] [facility]     Diabetic retinopathy [facility] [address]     DM2 (diabetes mellitus, type 2) ([facility] [address]     HLD (hyperlipidemia)     HTN (hypertension)     Obesity (BMI 30-39. 9)    History reviewed. No pertinent surgical history. Barriers to Learning/Limitations: yes;  altered mental status (i.e.Sedation, Confusion)  Compensate with: visual, verbal, tactile, kinesthetic cues/model    Home Situation:   Home Situation  Home Environment: Other (comment)( [facility], sedated/intubated)  One/Two Story Residence: Other (Comment)( [facility], sedated/intubated)  Living Alone: No  Support Systems: Parent  Patient Expects to be Discharged to[de-identified] Unknown  Current DME Used/Available at Home: None( [facility], sedated/intubated)  []  Right hand dominant   []  Left hand dominant    Cognitive/Behavioral Status:  Neurologic State: Alert  Orientation Level: Unable to verbalize  Cognition: Decreased command following;Decreased attention/concentration  Safety/Judgement: Fall prevention    Skin: Intact  Edema: None noted    Vision/Perceptual:    Tracking: Able to track stimulus in all quadrants w/o difficulty(pt with delayed movement and additional eye shifts; tracks yellow color better than OT finger)      Coordination: BUE  Fine Motor Skills-Upper: Left Impaired;Right Impaired    Gross Motor Skills-Upper: Left Impaired;Right Impaired    Strength: BUE    Strength: Grossly decreased, non-functional                Tone & Sensation: BUE    Tone: Normal  Sensation: (appears intact)                      Range of Motion: BUE    AROM: Grossly decreased, non-functional(BUE)  PROM: Generally decreased, functional                      Functional Mobility and Transfers for ADLs:  Bed Mobility:           Scooting:  Total assistance  Transfers: ADL Assessment:   Feeding: Total assistance    Oral Facial Hygiene/Grooming: Total assistance    Bathing: Total assistance    Upper Body Dressing: Total assistance    Lower Body Dressing: Total assistance    Toileting: Total assistance                ADL Intervention:       Grooming  Grooming Assistance: Total assistance(dependent)  Position Performed: (supine )  Washing Face: Total assistance (dependent)(approx. 7 minutes as pt with dry skin requiring ext care)      Cognitive Retraining  Safety/Judgement: Fall prevention    Therapeutic Exercise:  Pt was able to perform    EXERCISE   Sets   Reps   Active Active Assist   Passive Self- assited ROM   Comments   Shoulder shrugs  1 3  [x] [] [] [] To own volition. Not command    Shoulder flexion  2 5  [] [] [x] []     Shoulder extension  2 5  [] [] [x] []     Bicep curls  2 5  [] [] [x] [] RUE only as pt with IV placement on L with possible infiltration    Wrist radial deviation 2   5 [] [] [x] []     Wrist ulnar deviation  2  5 [] [] [x] []     Wrist flexion/extension  2  5 [] [] [x] []     Neck lateral rotation 1 3   X  5 second hold to R side   Hand pumps 2 5 [] [x] [x] [] Pt did assist  L hand on 2 repetitions    In preparation for self-care tasks this session. Pain:  Pain indicated with L wrist flexion and R ankle pumps as evident by patient squinting   Pain Intervention(s): Medication (see MAR)  Response to intervention: Nurse notified, See doc flow    Activity Tolerance:   Fair    Please refer to the flowsheet for vital signs taken during this treatment.   After treatment:   [] Patient left in no apparent distress sitting up in chair  [x] Patient left in no apparent distress in bed  [x] Call bell left within reach  [x] Nursing notified  [x] RN present  [x] Bed alarm activated    COMMUNICATION/EDUCATION:   [x] Role of Occupational Therapy in the acute care setting  [x] Home safety education was provided and the patient/caregiver indicated understanding. [x] Patient/family have participated as able in goal setting and plan of care. [x] Patient/family agree to work toward stated goals and plan of care. [] Patient understands intent and goals of therapy, but is neutral about his/her participation. [] Patient is unable to participate in goal setting and plan of care. Thank you for this referral.  Matt Valiente OTR/L  Time Calculation: 38 mins    Eval Complexity: History: HIGH Complexity : Extensive review of history including physical, cognitive and psychosocial history ; Examination: HIGH Complexity : 5 or more performance deficits relating to physical, cognitive , or psychosocial skils that result in activity limitations and / or participation restrictions; Decision Making:HIGH Complexity : Patient presents with comorbidities that affect occupational performance.  Signifigant modification of tasks or assistance (eg, physical or verbal) with assessment (s) is necessary to enable patient to complete evaluation

## 2021-05-04 NOTE — PROGRESS NOTES
Hospitalist Progress Note        Patient: Leesa Blake               Sex: male          DOA: 4/4/2021       YOB: 1950      Age:  79 y.o.        LOS:  LOS: 30 days               Subjective:     Patient is awake but does not follow any verbal commands other than staring at me upon asking questions. Discussed with nursing: Patient will be started on tube feeding today and no more bleeding from tracheostomy site. HPI:  Patient was initially seen at [facility] and had emergent Crychothyrotomy during Cardiac arrest.  Initially there was concern that he had Angioedema. [facility]. Mr Harvinder Aguirre has required prolonged support on the mechanical ventilator for ongoing respiratory failure. He has had Tracheostomy via ENT and he continues with Tracheostomy collar currently which he is tolerating well. Mr Harvinder Aguirre had cardiac arrest initially, but he has stable hemodynamics currently. Mr Harvinder Aguirre was treated for sepsis and has received a full course of antibiotics, he is off antibiotics currently. He has limited interaction, he does not have seizure activity. He has had sedation to maintain the ventilator which he no longer needs. He has tracheostomy, but he currently does not have a PEG. He has ongoing NG tube and continues with NG tube feeding. NG tube was removed and PEG tube was placed on May 3, 2021.     Objective:      /71 (BP 1 Location: Right upper arm, BP Patient Position: At rest)   Pulse 99   Temp 99.4 °F (37.4 °C)   Resp 30   Ht 5' 11\" (1.803 m)   Wt 100.9 kg (222 lb 7.1 oz)   SpO2 100%   BMI 31.02 kg/m²       Intake/Output Summary (Last 24 hours) at 5/4/2021 0919  Last data filed at 5/4/2021 0700  Gross per 24 hour   Intake 200 ml   Output 600 ml   Net -400 ml     General: Awake, Good eye tracking  Neck: Tracheostomy is in situ with dressing  Lungs: Diminished breath sound bibasilar without wheezes  Heart:  Regular Rate and Rhythm. Abdomen:  Soft, nondistended, bowel sounds present, PEG tube is present  Extremities: Pedal edema present  Neuro: Awake, does not follow verbal commands, no tremors noted. Assessment/Plan     ASSESSMENT:    1. Reportedly acute hypoxic respiratory failure due to angioedema s/p emergent cricothyrotomy followed by tracheostomy  2. Tracheostomy site bleeding, better currently  3. Status post respiratory failure cardiac arrest  4. Chronic hypoxic respiratory failure status post trach and on trach collar  5. Bilateral pulmonary infiltrates  6. Oropharyngeal dysphagia status post G tube  7. Left leg popliteal DVT and pulmonary embolism  8. Reportedly acute metabolic encephalopathy  9. 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 JACQUELYN, recurrent  11. Left pneumothorax resolved  12. Diabetic neuropathy  13. Obesity    PLAN:    Resume Coumadin and heparin drip  Dietitian has been consulted to start PEG tube feeding  We will start patient on IV fluid and monitor renal function  CT head report is pending  Pulmonologist to follow this patient for tracheostomy care  Continue trach collar and nebulizer  Continue multivitamin    Spoke to patient's payer source and did peer to peer yesterday. They denied patient going to [facility] as patient is not anymore on ventilator. They want this patient go to respiratory skilled nursing facility where they can manage trach and wean him off it.  to follow this patient    Discussed with patient sister over the phone and updated her about current treatment plan. Total time to take care of this patient was 25 minutes and more than 50% of time was spent counseling and coordinating care. Disclaimer: Sections of this note are dictated using utilizing voice recognition software, which may have resulted in some phonetic based errors in grammar and contents.  Even though attempts were made to correct all the mistakes, some may have been missed, and remained in the body of the document. If questions arise, please contact our department. Recent Results (from the past 24 hour(s))   GLUCOSE, POC    Collection Time: 05/03/21 11:46 AM   Result Value Ref Range    Glucose (POC) 129 (H) 70 - 110 mg/dL   GLUCOSE, POC    Collection Time: 05/03/21  5:29 PM   Result Value Ref Range    Glucose (POC) 118 (H) 70 - 110 mg/dL   GLUCOSE, POC    Collection Time: 05/03/21 11:42 PM   Result Value Ref Range    Glucose (POC) 112 (H) 70 - 110 mg/dL   MAGNESIUM    Collection Time: 05/04/21  3:50 AM   Result Value Ref Range    Magnesium 2.9 (H) 1.6 - 2.6 mg/dL   CBC WITH AUTOMATED DIFF    Collection Time: 05/04/21  3:50 AM   Result Value Ref Range    WBC 10.6 4.6 - 13.2 K/uL    RBC 4.12 (L) 4.35 - 5.65 M/uL    HGB 10.4 (L) 13.0 - 16.0 g/dL    HCT 34.4 (L) 36.0 - 48.0 %    MCV 83.5 74.0 - 97.0 FL    MCH 25.2 24.0 - 34.0 PG    MCHC 30.2 (L) 31.0 - 37.0 g/dL    RDW 15.6 (H) 11.6 - 14.5 %    PLATELET 441 297 - 892 K/uL    MPV 11.6 9.2 - 11.8 FL    NEUTROPHILS 77 (H) 40 - 73 %    LYMPHOCYTES 15 (L) 21 - 52 %    MONOCYTES 8 3 - 10 %    EOSINOPHILS 1 0 - 5 %    BASOPHILS 0 0 - 2 %    ABS. NEUTROPHILS 8.1 (H) 1.8 - 8.0 K/UL    ABS. LYMPHOCYTES 1.5 0.9 - 3.6 K/UL    ABS. MONOCYTES 0.8 0.05 - 1.2 K/UL    ABS. EOSINOPHILS 0.1 0.0 - 0.4 K/UL    ABS.  BASOPHILS 0.0 0.0 - 0.1 K/UL    DF AUTOMATED     PTT    Collection Time: 05/04/21  3:50 AM   Result Value Ref Range    aPTT 38.8 (H) 23.0 - 36.4 SEC   RENAL FUNCTION PANEL    Collection Time: 05/04/21  3:50 AM   Result Value Ref Range    Sodium 139 136 - 145 mmol/L    Potassium 5.1 3.5 - 5.5 mmol/L    Chloride 108 100 - 111 mmol/L    CO2 26 21 - 32 mmol/L    Anion gap 5 3.0 - 18 mmol/L    Glucose 110 (H) 74 - 99 mg/dL    BUN 60 (H) 7.0 - 18 MG/DL    Creatinine 1.74 (H) 0.6 - 1.3 MG/DL    BUN/Creatinine ratio 34 (H) 12 - 20      GFR est AA 47 (L) >60 ml/min/1.73m2    GFR est non-AA 39 (L) >60 ml/min/1.73m2    Calcium 9.0 8.5 - 10.1 MG/DL    Phosphorus 4.8 2.5 - 4.9 MG/DL    Albumin 2.3 (L) 3.4 - 5.0 g/dL   PROTHROMBIN TIME + INR    Collection Time: 05/04/21  3:50 AM   Result Value Ref Range    Prothrombin time 15.9 (H) 11.5 - 15.2 sec    INR 1.3 (H) 0.8 - 1.2

## 2021-05-04 NOTE — PROGRESS NOTES
Problem: Falls - Risk of  Goal: *Absence of Falls  Description: Document Lear Shaker Fall Risk and appropriate interventions in the flowsheet. Outcome: Progressing Towards Goal  Note: Fall Risk Interventions:       Mentation Interventions: Door open when patient unattended, Bed/chair exit alarm, Adequate sleep, hydration, pain control, Room close to nurse's station, Update white board, Reorient patient, Evaluate medications/consider consulting pharmacy    Medication Interventions: Evaluate medications/consider consulting pharmacy, Bed/chair exit alarm    Elimination Interventions: Toileting schedule/hourly rounds              Problem: Pressure Injury - Risk of  Goal: *Prevention of pressure injury  Description: Document Lang Scale and appropriate interventions in the flowsheet. Outcome: Progressing Towards Goal  Note: Pressure Injury Interventions:  Sensory Interventions: Assess changes in LOC, Minimize linen layers, Maintain/enhance activity level, Keep linens dry and wrinkle-free, Pressure redistribution bed/mattress (bed type), Turn and reposition approx. every two hours (pillows and wedges if needed)    Moisture Interventions: Absorbent underpads, Check for incontinence Q2 hours and as needed, Internal/External urinary devices, Internal/External fecal devices, Minimize layers    Activity Interventions: Pressure redistribution bed/mattress(bed type)    Mobility Interventions: Pressure redistribution bed/mattress (bed type), HOB 30 degrees or less, Turn and reposition approx.  every two hours(pillow and wedges)    Nutrition Interventions: Document food/fluid/supplement intake    Friction and Shear Interventions: HOB 30 degrees or less, Foam dressings/transparent film/skin sealants, Transferring/repositioning devices, Minimize layers                Problem: Nutrition Deficit  Goal: *Optimize nutritional status  Outcome: Progressing Towards Goal     Problem: Injury - Risk of, Adverse Drug Event  Goal: *Absence of adverse drug events  Outcome: Progressing Towards Goal  Goal: *Absence of medication errors  Outcome: Progressing Towards Goal  Goal: *Knowledge of prescribed medications  Outcome: Progressing Towards Goal     Problem: Pain  Goal: *Control of Pain  Outcome: Progressing Towards Goal  Goal: *PALLIATIVE CARE:  Alleviation of Pain  Outcome: Progressing Towards Goal     Problem: Delirium Treatment  Goal: *Level of consciousness restored to baseline  Outcome: Progressing Towards Goal  Goal: *Level of environmental perceptions restored to baseline  Outcome: Progressing Towards Goal  Goal: *Sensory perception restored to baseline  Outcome: Progressing Towards Goal  Goal: *Emotional stability restored to baseline  Outcome: Progressing Towards Goal  Goal: *Functional assessment restored to baseline  Outcome: Progressing Towards Goal  Goal: *Absence of falls  Outcome: Progressing Towards Goal  Goal: *Will remain free of delirium, CAM Score negative  Outcome: Progressing Towards Goal  Goal: *Cognitive status will be restored to baseline  Outcome: Progressing Towards Goal  Goal: Interventions  Outcome: Progressing Towards Goal

## 2021-05-04 NOTE — PROGRESS NOTES
MRI Safety Screening form needs to be filled out and FAXED to [phone] BEFORE MRI can be scheduled. If unable to acquire information from patient, [facility] must be contacted, or screening xrays will need to be ordred.     If pt is Claustrophobic or needs Pain Meds, please have these ordered in advance to help facilitate MRI exam.

## 2021-05-04 NOTE — PROGRESS NOTES
Discharge planning    Spoke with Nichole Means, admissions coordinator with [facility], concerning insurance denial for peer to peer with MD.  Per Nichole Means, they are going to appeal the insurance denial for authorization.     CRAIG MooreN, RN  Pager # [phone]  Care Manager

## 2021-05-04 NOTE — PROGRESS NOTES
1230: Attempted to start iv fluids, including heparin, but singular IV access clotted off. This nurse unsuccessful at replacing. Vivienne Rueda, RN at bedside with ultrasound. Patient appears to have small vasculature and pulling arm back when attempting IV insertion. Has demonstrated minimal command following, and does not respond to requests to keep arm still. Dr. Mariel Ervin notified, and prn po ativan ordered. 20 gauge peripheral IV placed in left antecubital with easy flush and no signs of infiltration. Infusions started as ordered.

## 2021-05-04 NOTE — PROGRESS NOTES
Pharmacist consult: warfarin management    Indication: Venous Thrombosis  Goal INR: 2-3; currently subtherapeutic; ordered daily  No S/Sx of active bleeding  S/p PEG tube on 05/03  Warfarin 7.5 mg as a one time dose    Labs:  Coags:    Lab Results   Component Value Date/Time    APTT 39.7 (H) 05/03/2021 12:47 AM    PTP 15.3 (H) 05/03/2021 12:47 AM    INR 1.2 05/03/2021 12:47 AM     CBC:    Lab Results   Component Value Date/Time    HGB 9.7 (L) 05/03/2021 12:47 AM    HCT 32.4 (L) 05/03/2021 12:47 AM     05/03/2021 12:47 AM       CrCL: Estimated Creatinine Clearance: 58 mL/min (A) (based on SCr of 1.44 mg/dL (H)). Labs reviewed. Assessment performed for missed warfarin doses, new drug interactions, dietary intake or supplement changes, documentation of bleeding, and other changes that may affect the INR. Pharmacy to follow daily and will provide subsequent warfarin dosing based on clinical status.     Thank you for the consult,  [place], [place]  5/3/2021

## 2021-05-04 NOTE — CONSULTS
Nutrition Assessment     Type and Reason for Visit: Reassess, Positive nutrition screen, Consult, NPO/clear liquid    Nutrition Recommendations/Plan:   - Resume tube feeding of Jevity 1.5 at goal rate of 60 mL/hr with 100 mL q 4 hour water flushes (goal regimen to provide 2160 kcal, 92 gm protein, 1094 mL free water, 100% RDIs). - IVF per MD.     Nutrition Assessment:  Trach placed 4/26/21 and PEG placed 5/3/21 with plan to resume feeds. Malnutrition Assessment:  Malnutrition Status: At risk for malnutrition (specify)(inability to tolerate oral diet due to respiratory status)     Estimated Daily Nutrient Needs:  Energy (kcal):  1793-4567  Protein (g):         Fluid (ml/day):  6890-6167    Nutrition Related Findings:  BM 5/2.  Medications: miralax, MVI, NS at 75 mL/hr      Current Nutrition Therapies:  DIET NPO Except Meds  DIET TUBE FEEDING    Anthropometric Measures:  · Height:  5' 11\" (180.3 cm)  · Current Body Wt:  100.9 kg (222 lb 7.1 oz)  · BMI: 31    Nutrition Diagnosis:   · Inadequate oral intake related to cognitive or neurological impairment, impaired respiratory function, swallowing difficulty as evidenced by NPO or clear liquid status due to medical condition, intubation      Nutrition Intervention:  Food and/or Nutrient Delivery: Continue NPO, Start tube feeding, IV fluid delivery  Nutrition Education and Counseling: Education not indicated  Coordination of Nutrition Care: Continue to monitor while inpatient, Coordination of community care    Goals:  Nutritional needs will be met through adequate oral intake or nutrition support within the next 7 days       Nutrition Monitoring and Evaluation:   Behavioral-Environmental Outcomes: None identified  Food/Nutrient Intake Outcomes: Enteral nutrition intake/tolerance, IVF intake  Physical Signs/Symptoms Outcomes: Biochemical data, Nutrition focused physical findings    Discharge Planning:    Enteral nutrition     Electronically signed by Maria Isabel Perez Mari Mccoy RD, CNSC on 5/4/2021 at 9:48 AM    Contact Number: [phone]

## 2021-05-04 NOTE — PROGRESS NOTES
MRI form completed with sister Castro to best of her ability. She states patient has not been seeing a doctor until recently. MRI aware and form faxed to them.

## 2021-05-04 NOTE — PROGRESS NOTES
[facility]  Pulmonary, Critical Care, and Sleep Medicine    Name: Bryan Ball MRN: [id number]   : 1950 Hospital: [address] [place] [address] Dr   Date: 2021        IMPRESSION:   · Angioedema- with airway and respiratory failure and collapse; thought due to ACE inhibitor. S/P Emergent Cricthyrotomy [facility]-ED 21- converted to 6.5 ETT intraoperative by anesthesia team 21, 8.0 ETT by anesthesia 21, packing removed evening 21-ENT  · S/P cardiac arrest and acute respiratory failure-resolved  · Tracheostomy - #9 Portex on  with Dr. Aria Hernandez  · Pulmonary Infiltrates: suspect aspiration secretions and/or blood due to above- can't exclude other infectious process at this time. · Ileus- mild, noted KUB   · DVT: Popliteal- Left; acute non-occlusive thrombus.   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 · Need for nutrition-PEG tube planned     Patient Active Problem List   Diagnosis Code    Angioedema due to angiotensin converting enzyme inhibitor (ACE-I) T78. 3XXA, [id number]    Respiratory failure ([address] J96.90    JACQUELYN (acute kidney injury) ([address] N17.9    Cardiac arrest (HCC) I46.9    Obesity (BMI 30-39. 9) E66.9    Diabetic neuropathy associated with type 2 diabetes mellitus ([address] E11.40    Diabetic retinopathy associated with type 2 diabetes mellitus ([address] E11.319    Pulmonary infiltrates R91.8    Controlled type 2 diabetes mellitus with neurologic complication, without long-term current use of insulin (HCC) E11.49    DVT (deep venous thrombosis) ([address] I82.409    Encounter for palliative care Z51.5    Goals of care, counseling/discussion Z71.89    Multiple subsegmental pulmonary emboli without acute cor pulmonale (HCC) I26.94    DVT (deep vein thrombosis) in pregnancy O22.30    Acute encephalopathy G93.40      PLAN:       PULM:  · Maintain aspiration precautions: HOB >30 degrees  · SpO2 goal >92%  · Bronchial /oral hygiene   · Trach care per protocol  · TC full time   · Completed course of decadron  · Continue Brovana, hypertonic saline nebs  · Albuterol PRN  · #9 Portex Trach on 4/26      GI/ NUTRITION:  · NGT Continue and stop low-intermittent suction. Plans for PEG tube insertion noted  · Diet: Tube feeds: Jevity 1.5, 60mls/hr, Free Water: 100mls Q 4  · SUP:Pepcid 20 mg Q 12  · Follow up with nutritional recommendations    RENAL/ METAB/ :  · Monitor I&Os  · Trend electrolytes - replace as needed     HEM/ONC  · Trend Hb/HCT and PLTs  · Hold anticoagulation for PEG tube and then resume Coumadin for treatment of DVT PE     ID  · Antibioitcs: Cipro eye drop, Zosyn 4/4 - 4/8  Vanco: 4/4-6, MRSA swab negative.   · Zosyn restarted on 4/11-4/19, Donnamaria Rase started 4/19-25  · Repeat Covid negative (4/11)  · We will check cultures-tracheal secretions with gram-negative rods.     ENDO  · BGs Q 6,SSI, TSH normal  · C1 inhibitor normal level (4/7/21)     MSK/ ORTHO  · No active issues  · Aggressive PT/OT for deconditioning     SKIN/ WOUNDS  · Per protocol  · Trach sutured to skin      CODE STATUS: Full Code- Full     Will sign off, please call with questions. Subjective/Interval History:        Interval HPI:69 yo M h/o HTN tx w/ lisinopril presenting to [facility] ED 4/4 for rapidly progressing respiratory distress due to severe angioedema. During intubation attempts pt had brief cardiac arrest w/ pulse loss and severe airway swelling leading to emergent cricothyrotomy. Pt stabilized before Nightingale transport to [place] at [facility] pt taken emergently to [address] was converted to ETT.  Patient continued to have trouble with o2 requirements disproportionate to CXR findings and despite adjustment of FiO2 and PEEP, now improved. S/p administration of inhaled epoprostenol. CTA revelaed B/L pulmonary emboli which is likely the cause of his hypoxemia and RH strain. Continuing heparin gtt, which had already been initiated for left popliteal non-occlusive thrombus. Pt is s/p dobutamine and  diureses w/ bumex and metolazone.  COVID - 19 negative. Now with minimal vent support, however, mentation precludes extubation at this time. Patient required tracheostomy placement . His ICU course has been complicated by encephalopathy and critical care myopathy      Subjective   21    LOS: 30    · Tolerating trach collar  · Secretions improving  · PEG placed yesterday  · CT head today per primary. ROS:Review of systems not obtained due to patient factors. Objective:   Vital Signs:    Visit Vitals  /78   Pulse (!) 101   Temp 99.4 °F (37.4 °C)   Resp 30   Ht 5' 11\" (1.803 m)   Wt 100.9 kg (222 lb 7.1 oz)   SpO2 94%   BMI 31.02 kg/m²       O2 Device: Tracheal collar   O2 Flow Rate (L/min): 6 l/min   Temp (24hrs), Av.2 °F (37.3 °C), Min:98.8 °F (37.1 °C), Max:99.7 °F (37.6 °C)       Intake/Output:   Last shift:      701 - 1900  In: -   Out: 375 [Urine:375]  Last 3 shifts: 1901 -  07  In: 520 [I.V.:200]  Out: 9079 [Urine:2150; Drains:58]    Intake/Output Summary (Last 24 hours) at 2021 1149  Last data filed at 2021 0900  Gross per 24 hour   Intake 150 ml   Output 975 ml   Net -825 ml      Physical Exam:                 General: NAD. Trach in place on TC.   Keeps head turned to the right  HEENT:  Anicteric sclerae; pink palpebral conjunctivae; mucosa moist  Resp:  Symmetrical chest expansion, no accessory muscle use; good airway entry; no rales/ wheezing/ diminished b/l, green-brown blood tinged secretions via trach   CV:  S1, S2 present; regular rate and rhythm  GI:  Obese. Abdomen soft, non-tender; (+) active bowel sounds  Extremities:  +2 pulses on all extremities; no edema/ cyanosis/ clubbing noted; no restraints   Skin:  Warm; no rashes/ lesions noted, normal turgor/cap refill , dry appearing   Neurologic:  no eye opening or command following this am   Devices:  NGT, Trach, condom cath        DATA:  Labs:  Recent Labs     05/04/21 0350 05/03/21 0047 05/02/21 0351   WBC 10.6 10.0 10.1   HGB 10.4* 9.7* 9.5*   HCT 34.4* 32.4* 31.3*    307 313     Recent Labs     05/04/21 0350 05/03/21 0047 05/02/21 0351    140 137   K 5.1 4.9 5.0    107 107   CO2 26 28 27   * 150* 131*   BUN 60* 56* 52*   CREA 1.74* 1.44* 1.37*   CA 9.0 8.8 9.3   MG 2.9* 2.7* 2.5   PHOS 4.8 4.1  --    ALB 2.3* 2.1* 2.0*   ALT  --   --  65*   INR 1.3* 1.2 1.2     No results for input(s): PH, PCO2, PO2, HCO3, FIO2 in the last 72 hours. Lab Results   Component Value Date/Time    Hemoglobin A1c 6.6 (H) 04/04/2021 05:11 PM     Lab Results   Component Value Date/Time    TSH 2.62 05/01/2021 04:21 PM    T4, Free 1.2 05/01/2021 04:21 PM     MICRO:  Results     Procedure Component Value Units Date/Time    COVID-19 RAPID TEST [[id number]] Collected: 05/02/21 1022    Order Status: Completed Specimen: Nasopharyngeal Updated: 05/02/21 1127     Specimen source Nasopharyngeal        COVID-19 rapid test Not detected        Comment: Rapid Abbott ID Now       Rapid NAAT:  The specimen is NEGATIVE for SARS-CoV-2, the novel coronavirus associated with COVID-19. Negative results should be treated as presumptive and, if inconsistent with clinical signs and symptoms or necessary for patient management, should be tested with an alternative molecular assay. Negative results do not preclude SARS-CoV-2 infection and should not be used as the sole basis for patient management decisions.        This test has been authorized by the FDA under an Emergency Use Authorization (EUA) for use by authorized laboratories.    Fact sheet for Healthcare Providers: ConventionUpdate.co.nz  Fact sheet for Patients: ConventionUpdate.co.nz       Methodology: Isothermal Nucleic Acid Amplification         CULTURE, RESPIRATORY/SPUTUM/BRONCH Ty Pennant STAIN [[id number]]  (Abnormal)  (Susceptibility) Collected: 05/01/21 1032    Order Status: Completed Specimen: Sputum from Tracheal Aspirate Updated: 05/04/21 1026     Special Requests: NO SPECIAL REQUESTS        GRAM STAIN RARE WBCS SEEN               RARE EPITHELIAL CELLS SEEN                  OCCASIONAL GRAM NEGATIVE COCCOBACILLI           Culture result:       HEAVY ENTEROBACTER AEROGENES                  NO NORMAL RESPIRATORY ANNALEE ISOLATED          Susceptibility      Enterobacter aerogenes     JOANNE     Amikacin ($) Susceptible     Cefazolin ($) Resistant     Cefepime ($$) Susceptible     Cefoxitin Resistant     Ceftazidime ($) Resistant     Ceftriaxone ($) Intermediate     Ciprofloxacin ($) Susceptible     Gentamicin ($) Susceptible     Levofloxacin ($) Susceptible     Meropenem ($$) Susceptible     Piperacillin/Tazobac ($) Resistant     Tobramycin ($) Susceptible     Trimeth/Sulfa Susceptible                    CULTURE, RESPIRATORY/SPUTUM/BRONCH Ty Pennant STAIN [[id number]]  (Abnormal)  (Susceptibility) Collected: 04/30/21 1245    Order Status: Completed Specimen: Sputum,ET Suction Updated: 05/03/21 1131     Special Requests: NO SPECIAL REQUESTS        GRAM STAIN 1+ WBCS SEEN               RARE EPITHELIAL CELLS SEEN                  1+ GRAM POSITIVE COCCI IN CHAINS                  OCCASIONAL GRAM NEGATIVE COCCOBACILLI           Culture result:       MODERATE ENTEROBACTER AEROGENES                  FEW NORMAL RESPIRATORY ANNALEE          Susceptibility      Enterobacter aerogenes     JOANNE     Amikacin ($) Susceptible     Cefazolin ($) Resistant     Cefepime ($$) Susceptible     Cefoxitin Resistant     Ceftazidime ($) Resistant Ceftriaxone ($) Intermediate     Ciprofloxacin ($) Susceptible     Gentamicin ($) Susceptible     Levofloxacin ($) Susceptible     Meropenem ($$) Susceptible     Piperacillin/Tazobac ($) Resistant     Tobramycin ($) Susceptible     Trimeth/Sulfa Susceptible                    COVID-19 RAPID TEST [[id number]] Collected: 04/25/21 1214    Order Status: Completed Specimen: Nasopharyngeal Updated: 04/25/21 1237     Specimen source Nasopharyngeal        COVID-19 rapid test Not detected        Comment: Rapid Abbott ID Now       Rapid NAAT:  The specimen is NEGATIVE for SARS-CoV-2, the novel coronavirus associated with COVID-19. Negative results should be treated as presumptive and, if inconsistent with clinical signs and symptoms or necessary for patient management, should be tested with an alternative molecular assay. Negative results do not preclude SARS-CoV-2 infection and should not be used as the sole basis for patient management decisions. This test has been authorized by the FDA under an Emergency Use Authorization (EUA) for use by authorized laboratories. Fact sheet for Healthcare Providers: ConventionDipJardate.co.nz  Fact sheet for Patients: Branchdate.co.nz       Methodology: Isothermal Nucleic Acid Amplification                                                               Echo  04/04/21   ECHO ADULT COMPLETE 04/06/2021 4/6/2021    Narrative · Contrast used: DEFINITY. · Image quality for this study was technically difficult. · LV: Calculated LVEF is 55%. Visually measured ejection fraction. Normal   cavity size, wall thickness and systolic function (ejection fraction   normal). Wall motion: normal. Moderate (grade 2) left ventricular   diastolic dysfunction. · AO: Mild aortic root and ascending aorta dilatation. Ascending aorta   diameter = 3.6 cm. Aortic root measures 3.9 cm  · RA: Dilated right atrium. · RV: Dilated right ventricle.  Borderline low systolic function. · TV: Mild tricuspid valve regurgitation is present. · IVC: Mechanically ventilated; cannot use inferior caval vein diameter to   estimate central venous pressure. · PA: Moderate pulmonary hypertension. Pulmonary arterial systolic   pressure is 61 mmHg. Signed by: Georgiana Velasquez MD        Imaging:  [x]I have personally reviewed the patients radiographs    CXR Results  (Last 48 hours)    None               CT Results (most recent):  Results from Hospital Encounter encounter on 04/04/21   CTA CHEST W OR W WO CONT    Narrative CTA CHEST PULMONARY ANGIOGRAM    HISTORY/INDICATION:  Shortness of breath, clinical concern for pulmonary  embolism, history of cardiac arrest 4/4/2021    COMPARISON:  No prior CTA chest. Reference chest radiograph 4/13/2021. TECHNIQUE:  Helical multidetector array volumetric acquisition of the chest is  performed following intravenous contrast administration of 72cc Isovue-370, per  pulmonary angiogram protocol. These images are reviewed and 2.5 mm and 5 mm  images along with coronal and sagittal 3D reconstruction maximum intensity  projection (MIP) images. Dose reduction techniques:  Automated exposure control,  mAs and/or kVp reductions based on patient size, and iterative reconstruction. CTA SPECIFIC FINDINGS:  Pulmonary arteries: Central contrast filling defect in the left lower lobe,  involving lobar, segmental, and segmental branches. Small contrast filling  defect in a segmental branch in the right upper lobe. Aorta: No aneurysm. CHEST FINDINGS:   Thyroid:  No focal lesion. Mediastinum: Heart is mildly enlarged. Mild burden of coronary artery  atherosclerotic calcifications. No pericardial effusion. Reflux of trace  contrast into the IVC. No right ventricular dilatation or bowing of the  intraventricular septum. Pleural space: Trace bilateral pleural effusions. No pneumothorax. Lungs:  There is dense multifocal consolidations bilaterally and in the dependent  lower lobes. There is suggestion of focal hypoattenuation within the  consolidation in the left lower lobe. Endotracheal tube in appropriate position,  terminating 3.7 cm above the gabriella on the  view. Included Abdomen: Visualized solid organs of the upper abdomen are normal.  Enteric tube tip terminates within the body of the stomach. Skeleton: No suspicious lytic or blastic lesions. No fractures. Soft tissues: Normal.    Lymph Nodes: Increased number of mildly prominent mediastinal and bilateral  hilar nodes, including a 1.0 x 1.8 cm AP window node and a 1.1 x 2.1 cm right  paratracheal node. Impression 1. Moderate burden of acute pulmonary embolism in the left lower lobe. Additional small acute segmental pulmonary embolism in the right upper lobe. 2. There is dense multifocal consolidations bilaterally, as well as in the  dependent lower lobes, favoring a combination of multifocal pneumonia, edema,  and dependent atelectasis. -There is suggestion of focal hypoattenuation within the consolidation in the  left lower lobe, which may be infectious in etiology versus sequela of pulmonary  infarct given #1.    3. Mildly prominent mediastinal and bilateral hilar nodes, likely  benign/reactive. 4. Mild cardiomegaly. Reflux of trace contrast into the IVC, which can be seen  in the setting of right heart dysfunction. 5. Trace bilateral pleural effusions. 6. Endotracheal and enteric tubes noted in appropriate position.     Discussed findings #1-2 with Keanu Yan PA-C on 4/13/2021 at 7:19 PM.       Claude Burns MD  05/04/21  Pulmonary, Critical Care Medicine  [facility]

## 2021-05-04 NOTE — WOUND CARE
Physical Exam  Musculoskeletal:        Legs:         Focused assessment Rm 302  Poss medical device related pressure injury. 0.2cm x 5cm linear lesions with peeling edges. Base is pink and moist, with some hyperpigmentation to lateral and medial poles. Wound is within buttocks fold, but fleshy area, not bony prominence. Left heel DTI, deep purple area to heel 6cm x 6cm. Area soft, not boggy with dry, scaling skin throughout. No drainage noted. Primary nurse also notes that trach collar is sutured in place but appears to be creating pressure concerns. Some skin is noted to be herniated into upper rim of collar. She reports that she will discuss possible treatment options with Dr. Alexandria Malin. Topical treatment protocol in place. Silicone dressing to right ischial fold and bilat heels. Change every 3 days and prn soilage. Care discussed with primary nurse, Mika's RN. Care turned over to nursing staff at this time. Tomas Steele RN, BSN, [address] [facility]

## 2021-05-04 NOTE — PROGRESS NOTES
New PT order received pt already on caseload. Will acknowledge new orders. Will continue to follow.      Thank you for this referral.   Jacobo Kelley PT DPT

## 2021-05-04 NOTE — PROGRESS NOTES
RENAL PROGRESS NOTE        Cristiano Silveira         Assessment/Plan:     1. JACQUELYN (ischemic atn in a setting of acute pe/resp failure). Scr is up slightly as patient NPO d/t peg placement , should improve  2. Acute pe/le le dvt. On heparin drip. 3. Angioedema, s/p emergent cricthyrotomy/resp failure. 4. Bl pneumonia, finished abx. 5. Resp failure. 6. High K , follow closely    Please call with questions    Maggie Stout MD FASN  Cell [phone]  Pager: [phone]                                                                                                                              Subjective: On trach collar         Patient Active Problem List   Diagnosis Code    Angioedema due to angiotensin converting enzyme inhibitor (ACE-I) T78. 3XXA, [id number]    Respiratory failure ([facility] [address] J96.90    JACQUELYN (acute kidney injury) ([facility] [address] N17.9    Cardiac arrest (HCC) I46.9    Obesity (BMI 30-39. 9) E66.9    Diabetic neuropathy associated with type 2 diabetes mellitus ([facility] [address] E11.40    Diabetic retinopathy associated with type 2 diabetes mellitus ([facility] [address] E11.319    Pulmonary infiltrates R91.8    Controlled type 2 diabetes mellitus with neurologic complication, without long-term current use of insulin (HCC) E11.49    DVT (deep venous thrombosis) ([facility] [address] I82.409    Encounter for palliative care Z51.5    Goals of care, counseling/discussion Z71.89    Multiple subsegmental pulmonary emboli without acute cor pulmonale (HCC) I26.94    DVT (deep vein thrombosis) in pregnancy O22.30    Acute encephalopathy G93.40       Current Facility-Administered Medications   Medication Dose Route Frequency Provider Last Rate Last Admin    0.9% sodium chloride infusion  75 mL/hr IntraVENous CONTINUOUS Mildred Fuller MD 75 mL/hr at 05/04/21 1325 75 mL/hr at 05/04/21 1325    famotidine (PEPCID) tablet 20 mg  20 mg Per G Tube DAILY Mildred Fuller MD   20 mg at 05/04/21 1200    [START ON 9/7/9822] multivit-folic acid-herbal 275 (WELLESSE PLUS) oral liquid 30 mL  30 mL Per G Tube DAILY Delvis Tavarez MD        atorvastatin (LIPITOR) tablet 80 mg  80 mg Per G Tube QHS Delvis Tavarez MD        [START ON 5/5/2021] polyethylene glycol (MIRALAX) packet 17 g  17 g Per G Tube DAILY Delvis Tavarez MD        warfarin (COUMADIN) tablet 7.5 mg  7.5 mg Per G Tube ONCE Kris Alanis MD        LORazepam (ATIVAN) tablet 1 mg  1 mg Per G Tube Q6H PRN Delvis Tavarez MD        LORazepam (ATIVAN) injection 0.5 mg  0.5 mg IntraVENous ONCE Kris Alanis MD        sodium chloride 3% hypertonic nebulizer soln  4 mL Nebulization BID RT Evie ARAUJO MD   4 mL at 05/04/21 0740    albuterol-ipratropium (DUO-NEB) 2.5 MG-0.5 MG/3 ML  3 mL Nebulization Q4H RT Delvis Tavarez MD   3 mL at 05/04/21 1535    heparin 25,000 units in D5W 250 ml infusion  9.8-25 Units/kg/hr IntraVENous TITRATE Loan Webb PA-C 10 mL/hr at 05/04/21 1325 996.6 Units/hr at 05/04/21 1325    Warfarin - Pharmacy to dose   Other Q24H Day Khanna DO   Given at 05/03/21 1600    acetaminophen (TYLENOL) solution 500 mg  500 mg Oral Q4H PRN Loan Webb PA-C   500 mg at 04/29/21 1643    oxymetazoline (AFRIN) 0.05 % nasal spray 2 Spray  2 Spray Other BID PRN Capilitan, Etheleen Ada, NP        arformoteroL (BROVANA) neb solution 15 mcg  15 mcg Nebulization BID RT Chidi Sargent PA-C   15 mcg at 05/04/21 0740    glucose chewable tablet 16 g  4 Tab Oral PRN Radha Cristobal PA-C        glucagon (GLUCAGEN) injection 1 mg  1 mg IntraMUSCular PRN Radhadanny Cristobal PA-C        dextrose (D50W) injection syrg 12.5-25 g  25-50 mL IntraVENous PRN Radha Rides, HORACIO        insulin lispro (HUMALOG) injection   SubCUTAneous Q6H Radha RidesHORACIO   Stopped at 05/03/21 0600    carboxymethylcellulose sodium (REFRESH LIQUIGEL) 1 % ophthalmic solution 1 Drop  1 Drop Both Eyes PRN Radha Rides, HORACIO   1 Drop at 04/14/21 0543 Objective  Vitals:    05/04/21 1400 05/04/21 1500 05/04/21 1538 05/04/21 1600   BP: 106/62 116/72  128/69   Pulse: 96 (!) 101  (!) 104   Resp: 29 (!) 38  28   Temp:    99.3 °F (37.4 °C)   SpO2: 97% 96% 100% 95%   Weight:       Height:             Intake/Output Summary (Last 24 hours) at 5/4/2021 1645  Last data filed at 5/4/2021 1600  Gross per 24 hour   Intake 689.58 ml   Output 875 ml   Net -185.42 ml           Admission weight: Weight: 127 kg (279 lb 15.8 oz) (04/04/21 1726)  Last Weight Metrics:  Weight Loss Metrics 5/3/2021 4/4/2021 4/4/2021 4/4/2021   Today's Wt 222 lb 7.1 oz - 280 lb -   BMI - 31.02 kg/m2 - 37.97 kg/m2             Physical Assessment: On trach collar  Neck: No jvd. Trach. LUNGS: diminished air entry at the bases, no crackles. CVS EXM: S1, S2  RRR. Abdomen: soft, non tender. Lower Extremities:  1+ edema. Lab    CBC w/Diff Recent Labs     05/04/21  0350 05/03/21  0047 05/02/21  0351   WBC 10.6 10.0 10.1   RBC 4.12* 3.89* 3.73*   HGB 10.4* 9.7* 9.5*   HCT 34.4* 32.4* 31.3*    307 313   GRANS 77* 70 69   LYMPH 15* 20* 22   EOS 1 2 2        Chemistry Recent Labs     05/04/21  0350 05/03/21  0047 05/02/21  0351 05/02/21  0351   * 150*  --  131*    140  --  137   K 5.1 4.9  --  5.0    107  --  107   CO2 26 28  --  27   BUN 60* 56*  --  52*   CREA 1.74* 1.44*  --  1.37*   CA 9.0 8.8  --  9.3   AGAP 5 5  --  3   BUCR 34* 39*  --  38*   AP  --   --   --  94   TP  --   --   --  9.1*   ALB 2.3* 2.1*  --  2.0*   GLOB  --   --   --  7.1*   AGRAT  --   --   --  0.3*   PHOS 4.8 4.1   < >  --     < > = values in this interval not displayed.          No results found for: IRON, FE, TIBC, IBCT, PSAT, FERR   Lab Results   Component Value Date/Time    Calcium 9.0 05/04/2021 03:50 AM    Phosphorus 4.8 05/04/2021 03:50 AM

## 2021-05-05 ENCOUNTER — APPOINTMENT (OUTPATIENT)
Dept: GENERAL RADIOLOGY | Age: 71
DRG: 004 | End: 2021-05-05
Attending: INTERNAL MEDICINE
Payer: MEDICARE

## 2021-05-05 LAB
ALBUMIN SERPL-MCNC: 2.2 G/DL (ref 3.4–5)
ANION GAP SERPL CALC-SCNC: 8 MMOL/L (ref 3–18)
APTT PPP: 64.6 SEC (ref 23–36.4)
APTT PPP: 94.9 SEC (ref 23–36.4)
BASOPHILS # BLD: 0.1 K/UL (ref 0–0.1)
BASOPHILS NFR BLD: 0 % (ref 0–2)
BUN SERPL-MCNC: 61 MG/DL (ref 7–18)
BUN/CREAT SERPL: 42 (ref 12–20)
CALCIUM SERPL-MCNC: 8.4 MG/DL (ref 8.5–10.1)
CHLORIDE SERPL-SCNC: 112 MMOL/L (ref 100–111)
CO2 SERPL-SCNC: 25 MMOL/L (ref 21–32)
CREAT SERPL-MCNC: 1.46 MG/DL (ref 0.6–1.3)
DIFFERENTIAL METHOD BLD: ABNORMAL
EOSINOPHIL # BLD: 0.1 K/UL (ref 0–0.4)
EOSINOPHIL NFR BLD: 0 % (ref 0–5)
ERYTHROCYTE [DISTWIDTH] IN BLOOD BY AUTOMATED COUNT: 15.6 % (ref 11.6–14.5)
GLUCOSE BLD STRIP.AUTO-MCNC: 145 MG/DL (ref 70–110)
GLUCOSE BLD STRIP.AUTO-MCNC: 147 MG/DL (ref 70–110)
GLUCOSE BLD STRIP.AUTO-MCNC: 147 MG/DL (ref 70–110)
GLUCOSE BLD STRIP.AUTO-MCNC: 172 MG/DL (ref 70–110)
GLUCOSE SERPL-MCNC: 145 MG/DL (ref 74–99)
HCT VFR BLD AUTO: 33 % (ref 36–48)
HGB BLD-MCNC: 9.9 G/DL (ref 13–16)
INR PPP: 1.3 (ref 0.8–1.2)
LACTATE SERPL-SCNC: 1.6 MMOL/L (ref 0.4–2)
LYMPHOCYTES # BLD: 2.2 K/UL (ref 0.9–3.6)
LYMPHOCYTES NFR BLD: 13 % (ref 21–52)
MAGNESIUM SERPL-MCNC: 2.6 MG/DL (ref 1.6–2.6)
MCH RBC QN AUTO: 25.4 PG (ref 24–34)
MCHC RBC AUTO-ENTMCNC: 30 G/DL (ref 31–37)
MCV RBC AUTO: 84.8 FL (ref 74–97)
MONOCYTES # BLD: 1.2 K/UL (ref 0.05–1.2)
MONOCYTES NFR BLD: 7 % (ref 3–10)
NEUTS SEG # BLD: 13.4 K/UL (ref 1.8–8)
NEUTS SEG NFR BLD: 78 % (ref 40–73)
PHOSPHATE SERPL-MCNC: 3.4 MG/DL (ref 2.5–4.9)
PLATELET # BLD AUTO: 211 K/UL (ref 135–420)
PMV BLD AUTO: 11.8 FL (ref 9.2–11.8)
POTASSIUM SERPL-SCNC: 4.3 MMOL/L (ref 3.5–5.5)
PROTHROMBIN TIME: 16.4 SEC (ref 11.5–15.2)
RBC # BLD AUTO: 3.89 M/UL (ref 4.35–5.65)
SODIUM SERPL-SCNC: 145 MMOL/L (ref 136–145)
WBC # BLD AUTO: 17 K/UL (ref 4.6–13.2)

## 2021-05-05 PROCEDURE — 94668 MNPJ CHEST WALL SBSQ: CPT

## 2021-05-05 PROCEDURE — 74011636637 HC RX REV CODE- 636/637: Performed by: PHYSICIAN ASSISTANT

## 2021-05-05 PROCEDURE — 97164 PT RE-EVAL EST PLAN CARE: CPT

## 2021-05-05 PROCEDURE — 77010033678 HC OXYGEN DAILY

## 2021-05-05 PROCEDURE — 2709999900 HC NON-CHARGEABLE SUPPLY

## 2021-05-05 PROCEDURE — 99232 SBSQ HOSP IP/OBS MODERATE 35: CPT | Performed by: INTERNAL MEDICINE

## 2021-05-05 PROCEDURE — 74011000250 HC RX REV CODE- 250: Performed by: PHYSICIAN ASSISTANT

## 2021-05-05 PROCEDURE — 82962 GLUCOSE BLOOD TEST: CPT

## 2021-05-05 PROCEDURE — 74011000250 HC RX REV CODE- 250: Performed by: INTERNAL MEDICINE

## 2021-05-05 PROCEDURE — 83735 ASSAY OF MAGNESIUM: CPT

## 2021-05-05 PROCEDURE — 85730 THROMBOPLASTIN TIME PARTIAL: CPT

## 2021-05-05 PROCEDURE — 84145 PROCALCITONIN (PCT): CPT

## 2021-05-05 PROCEDURE — 94640 AIRWAY INHALATION TREATMENT: CPT

## 2021-05-05 PROCEDURE — 85610 PROTHROMBIN TIME: CPT

## 2021-05-05 PROCEDURE — 87040 BLOOD CULTURE FOR BACTERIA: CPT

## 2021-05-05 PROCEDURE — 85025 COMPLETE CBC W/AUTO DIFF WBC: CPT

## 2021-05-05 PROCEDURE — 74011250636 HC RX REV CODE- 250/636: Performed by: PHYSICIAN ASSISTANT

## 2021-05-05 PROCEDURE — 71045 X-RAY EXAM CHEST 1 VIEW: CPT

## 2021-05-05 PROCEDURE — 80069 RENAL FUNCTION PANEL: CPT

## 2021-05-05 PROCEDURE — 83605 ASSAY OF LACTIC ACID: CPT

## 2021-05-05 PROCEDURE — 36415 COLL VENOUS BLD VENIPUNCTURE: CPT

## 2021-05-05 PROCEDURE — 74011250637 HC RX REV CODE- 250/637: Performed by: PHYSICIAN ASSISTANT

## 2021-05-05 PROCEDURE — 94762 N-INVAS EAR/PLS OXIMTRY CONT: CPT

## 2021-05-05 PROCEDURE — 65660000004 HC RM CVT STEPDOWN

## 2021-05-05 PROCEDURE — 74011250636 HC RX REV CODE- 250/636: Performed by: INTERNAL MEDICINE

## 2021-05-05 PROCEDURE — 74011250637 HC RX REV CODE- 250/637: Performed by: INTERNAL MEDICINE

## 2021-05-05 RX ORDER — WARFARIN SODIUM 5 MG/1
5 TABLET ORAL ONCE
Status: COMPLETED | OUTPATIENT
Start: 2021-05-05 | End: 2021-05-05

## 2021-05-05 RX ORDER — LANOLIN ALCOHOL/MO/W.PET/CERES
100 CREAM (GRAM) TOPICAL DAILY
Status: DISCONTINUED | OUTPATIENT
Start: 2021-05-05 | End: 2021-05-13 | Stop reason: HOSPADM

## 2021-05-05 RX ORDER — LEVOFLOXACIN 5 MG/ML
500 INJECTION, SOLUTION INTRAVENOUS EVERY 24 HOURS
Status: COMPLETED | OUTPATIENT
Start: 2021-05-05 | End: 2021-05-09

## 2021-05-05 RX ORDER — WARFARIN 7.5 MG/1
7.5 TABLET ORAL ONCE
Status: DISCONTINUED | OUTPATIENT
Start: 2021-05-05 | End: 2021-05-05

## 2021-05-05 RX ADMIN — Medication 30 ML: at 09:00

## 2021-05-05 RX ADMIN — LEVOFLOXACIN 500 MG: 5 INJECTION, SOLUTION INTRAVENOUS at 14:00

## 2021-05-05 RX ADMIN — SODIUM CHLORIDE 75 ML/HR: 900 INJECTION, SOLUTION INTRAVENOUS at 04:30

## 2021-05-05 RX ADMIN — SODIUM CHLORIDE SOLN NEBU 3% 4 ML: 3 NEBU SOLN at 20:55

## 2021-05-05 RX ADMIN — ARFORMOTEROL TARTRATE 15 MCG: 15 SOLUTION RESPIRATORY (INHALATION) at 08:01

## 2021-05-05 RX ADMIN — IPRATROPIUM BROMIDE AND ALBUTEROL SULFATE 3 ML: .5; 3 SOLUTION RESPIRATORY (INHALATION) at 08:01

## 2021-05-05 RX ADMIN — FAMOTIDINE 20 MG: 20 TABLET ORAL at 09:00

## 2021-05-05 RX ADMIN — ACETAMINOPHEN ORAL SOLUTION 500 MG: 650 SOLUTION ORAL at 21:27

## 2021-05-05 RX ADMIN — IPRATROPIUM BROMIDE AND ALBUTEROL SULFATE 3 ML: .5; 3 SOLUTION RESPIRATORY (INHALATION) at 20:55

## 2021-05-05 RX ADMIN — Medication 100 MG: at 12:00

## 2021-05-05 RX ADMIN — IPRATROPIUM BROMIDE AND ALBUTEROL SULFATE 3 ML: .5; 3 SOLUTION RESPIRATORY (INHALATION) at 12:10

## 2021-05-05 RX ADMIN — IPRATROPIUM BROMIDE AND ALBUTEROL SULFATE 3 ML: .5; 3 SOLUTION RESPIRATORY (INHALATION) at 15:04

## 2021-05-05 RX ADMIN — WARFARIN SODIUM 7.5 MG: 7.5 TABLET ORAL at 01:28

## 2021-05-05 RX ADMIN — INSULIN LISPRO 2 UNITS: 100 INJECTION, SOLUTION INTRAVENOUS; SUBCUTANEOUS at 12:00

## 2021-05-05 RX ADMIN — SODIUM CHLORIDE SOLN NEBU 3% 4 ML: 3 NEBU SOLN at 08:01

## 2021-05-05 RX ADMIN — IPRATROPIUM BROMIDE AND ALBUTEROL SULFATE 3 ML: .5; 3 SOLUTION RESPIRATORY (INHALATION) at 04:29

## 2021-05-05 RX ADMIN — ATORVASTATIN CALCIUM 80 MG: 40 TABLET, FILM COATED ORAL at 21:28

## 2021-05-05 RX ADMIN — WARFARIN SODIUM 5 MG: 5 TABLET ORAL at 18:00

## 2021-05-05 RX ADMIN — ARFORMOTEROL TARTRATE 15 MCG: 15 SOLUTION RESPIRATORY (INHALATION) at 20:00

## 2021-05-05 RX ADMIN — ACETAMINOPHEN ORAL SOLUTION 500 MG: 650 SOLUTION ORAL at 09:00

## 2021-05-05 NOTE — ROUTINE PROCESS
0700: Bedside and Verbal shift change report given to [facility], RN (oncoming nurse) by Fanta Mendieta RN (offgoing nurse). Report included the following information SBAR, Intake/Output, MAR, Recent Results and Med Rec Status. 1745: TRANSFER - OUT REPORT:    Verbal report given to KUSHAL Brooke(name) on [employer]  being transferred to CVT stepdown(unit) for routine progression of care       Report consisted of patients Situation, Background, Assessment and   Recommendations(SBAR). Information from the following report(s) SBAR, ED Summary, Procedure Summary, Intake/Output, MAR, Recent Results and Med Rec Status was reviewed with the receiving nurse. Lines:   Peripheral IV 05/04/21 Left Antecubital (Active)   Site Assessment Clean, dry, & intact 05/05/21 0800   Phlebitis Assessment 0 05/05/21 0800   Infiltration Assessment 0 05/05/21 0800   Dressing Status Clean, dry, & intact 05/05/21 0800   Dressing Type Disk with Chlorhexadine gluconate (CHG); Transparent 05/05/21 0800   Hub Color/Line Status Infusing;Patent 05/05/21 0800   Action Taken Open ports on tubing capped 05/05/21 0800   Alcohol Cap Used Yes 05/05/21 0800        Opportunity for questions and clarification was provided.       Patient transported with:   O2 @ 6 liters

## 2021-05-05 NOTE — PROGRESS NOTES
Problem: Mobility Impaired (Adult and Pediatric)  Goal: *Acute Goals and Plan of Care (Insert Text)  Description: Physical Therapy Goals  Initiated 4/28/2021, re-evaluated 5.5.21  and to be accomplished within 7 day(s)  1. Patient will move from supine to sit and sit to supine , scoot up and down, and roll side to side in bed with maximal assistance. 2.  Patient will sit on EOB with maxA for >5 minutes in prep for OOB activities. 3. Pt will participate in TE in supine for LEs. PLOF: Pt unable to verbalize PLOF. , per RN independent prior to admission     5/5/2021 1358 by Kira Mcghee PT  Outcome: Progressing Towards Goal  PHYSICAL THERAPY RE-EVALUATION    Patient: Bernarda Hanks (59 y.o. male)  Date: 5/5/2021  Primary Diagnosis: Angioedema due to angiotensin converting enzyme inhibitor (ACE-I) [T78. 3XXA, T46.4X5A]  Acute respiratory failure with hypoxia (HCC) [J96.01]  DVT (deep vein thrombosis) in pregnancy [O22.30]  Acute encephalopathy [G93.40]  Procedure(s) (LRB):  PERCUTANEOUS ENDOSCOPIC GASTROSTOMY TUBE INSERTION/ BEDSIDE (N/A) 2 Days Post-Op   Precautions:  Fall, Skin  PLOF: see above     ASSESSMENT :  Pt cleared to participate in PT session, pt received semi-reclined in bed and agreeable to therapy session. Based on the objective data described below, the patient presents with decreased endurance, decreased strength, decreased balance reactions, gait deviations, and decreased independence in functional mobility. Pt completing limited active mobility of RLE ankle and digits to command, did not actively move LLE. Pt able to lift RUE from pillow for \"high five\" and wave to PT. Pt smiling and attempting to speak with PT. Pt completing LE passive range of motion through LEs with factial grimace with LLE mobility. Pt with totalA for positioning due to preference for R trunk lean. Pt positioned for comfort. Pt left with all needs met and call bell in reach.  RN notified of position and participation. Pt more interactive today but continues to require totalA for all mobility. Will continue with 3 day trial to assess progress towards goals. Patient will benefit from skilled intervention to address the above impairments. Patient's rehabilitation potential is considered to be Fair  Factors which may influence rehabilitation potential include:   []         None noted  [x]         Mental ability/status  [x]         Medical condition  []         Home/family situation and support systems  []         Safety awareness  []         Pain tolerance/management  []         Other:      PLAN :  Recommendations and Planned Interventions:   [x]           Bed Mobility Training             []    Neuromuscular Re-Education  [x]           Transfer Training                   []    Orthotic/Prosthetic Training  [x]           Gait Training                          []    Modalities  [x]           Therapeutic Exercises           []    Edema Management/Control  [x]           Therapeutic Activities            [x]    Family Training/Education  [x]           Patient Education  []           Other (comment):    Frequency/Duration: Patient will be followed by physical therapy 1-2 times per day/4-7 days per week to address goals. Discharge Recommendations: [place]  Further Equipment Recommendations for Discharge: currently hospital bed, lift, wheelchair      SUBJECTIVE:   Pt smiling at PT and moving lips to attempt to speak but unable to verbalize or understand     OBJECTIVE DATA SUMMARY:   Hospital course since last seen and reason for re-evaluation: Pt due for re-evaluation. Pt continues to require totalA for all mobility with limited active range of motion. Pt more interactive and attempting mobility this date. Pt would continue to benefit from skilled PT while admitted.    Past Medical History:   Diagnosis Date    DDD (degenerative disc disease), lumbar     Diabetes ([facility] [address]     Diabetic neuropathy ([facility] [address] Diabetic retinopathy ([facility] [address]     DM2 (diabetes mellitus, type 2) (HCC)     HLD (hyperlipidemia)     HTN (hypertension)     Obesity (BMI 30-39. 9)    History reviewed. No pertinent surgical history. Barriers to Learning/Limitations: yes;  physical and altered mental status (i.e.Sedation, Confusion)  Compensate with: Visual Cues, Verbal Cues, and Kinesthetic Cues  Home Situation:   Home Situation  Home Environment: Other (comment)( DORA, sedated/intubated)  One/Two Story Residence: Other (Comment)( [facility], sedated/intubated)  Living Alone: No  Support Systems: Parent  Patient Expects to be Discharged to[de-identified] Unknown  Current DME Used/Available at Home: None( [facility]ORA, sedated/intubated)  Critical Behavior:  Neurologic State: Alert     Cognition: Follows commands     Psychosocial  Patient Behaviors: Calm    Strength:    Strength: Generally decreased, functional    Tone & Sensation:   Tone: Normal    Sensation: (unable to fully assess )     Range Of Motion:  AROM: Generally decreased, functional(trace mobility to command, L>R )    PROM: Within functional limits    Therapeutic Exercises:   Passive range of motion through B ankles, knees, hips     Pain:  Pain level pre-treatment: 0/10   Pain level post-treatment: 0/10     Activity Tolerance:   Fair activity tolerance     Please refer to the flowsheet for vital signs taken during this treatment. After treatment:   []         Patient left in no apparent distress sitting up in chair  [x]         Patient left in no apparent distress in bed  [x]         Call bell left within reach  [x]         Nursing notified  []         Caregiver present  []         Bed alarm activated  []         SCDs applied    COMMUNICATION/EDUCATION:   [x]         Role of Physical Therapy in the acute care setting. [x]         Fall prevention education was provided and the patient/caregiver indicated understanding. [x]         Patient/family have participated as able in goal setting and plan of care.   [x] Patient/family agree to work toward stated goals and plan of care. []         Patient understands intent and goals of therapy, but is neutral about his/her participation. []         Patient is unable to participate in goal setting/plan of care: ongoing with therapy staff.  []         Other:     Thank you for this referral.  Janelle Logan, PT   Time Calculation: 12 mins

## 2021-05-05 NOTE — PROGRESS NOTES
Hospitalist Progress Note        Patient: Sola Danielle               Sex: male          DOA: 4/4/2021       YOB: 1950      Age:  79 y.o.        LOS:  LOS: 31 days               Subjective:     Patient opens eyes on verbal commands. However he does not follow any other commands and no tremors noted. HPI:  Patient was initially seen at [facility] and had emergent Crychothyrotomy during Cardiac arrest.  Initially there was concern that he had Angioedema. He was stabilized and flown to [facility] for ongoing management. Mr Mi Campoverde has required prolonged support on the mechanical ventilator for ongoing respiratory failure. He has had Tracheostomy via ENT and he continues with Tracheostomy collar currently which he is tolerating well. Mr Mi Campoverde had cardiac arrest initially, but he has stable hemodynamics currently. Mr Mi Campoverde was treated for sepsis and has received a full course of antibiotics, he is off antibiotics currently. He has limited interaction, he does not have seizure activity. He has had sedation to maintain the ventilator which he no longer needs. He has tracheostomy, but he currently does not have a PEG. He has ongoing NG tube and continues with NG tube feeding. NG tube was removed and PEG tube was placed on May 3, 2021. Objective:      /70   Pulse 94   Temp 99.9 °F (37.7 °C)   Resp (!) 32   Ht 5' 11\" (1.803 m)   Wt 100.9 kg (222 lb 7.1 oz)   SpO2 98%   BMI 31.02 kg/m²       Intake/Output Summary (Last 24 hours) at 5/5/2021 1350  Last data filed at 5/5/2021 0600  Gross per 24 hour   Intake 2849.25 ml   Output 1255 ml   Net 1594.25 ml     General: Awake, Good eye tracking  Neck: Tracheostomy is in situ with dressing  Lungs: Diminished breath sound bibasilar without wheezes  Heart:  Regular Rate and Rhythm.     Abdomen:  Soft, nondistended, bowel sounds present, PEG tube is present  Extremities: Pedal edema present  Neuro: Awake, does not follow verbal commands other than opening eyes, no tremors noted. Assessment/Plan     ASSESSMENT:    1. Reportedly acute hypoxic respiratory failure due to angioedema s/p emergent cricothyrotomy followed by tracheostomy  2. Tracheostomy site bleeding, better currently  3. Status post respiratory failure cardiac arrest  4. Chronic hypoxic respiratory failure status post trach and on trach collar  5. Bilateral pulmonary infiltrates  6. Oropharyngeal dysphagia status post G tube  7. Left leg popliteal DVT and pulmonary embolism  8. Reportedly acute metabolic encephalopathy  9. 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 JACQUELYN, recurrent  11. Left pneumothorax resolved  12. Diabetic neuropathy  13. Obesity  14. Fever and leukocytosis    PLAN:    Continue Coumadin and heparin drip, will stop heparin drip once INR is more than 2  Blood culture have been ordered  Chest x-ray report reviewed  We will add levofloxacin based on sputum culture from May 1, 2021  MRI report reviewed  Continue current tube feeding per dietitian  We will go down on IV fluid and monitor renal function  Pulmonologist to follow this patient for tracheostomy care  Continue trach collar and nebulizer  Continue multivitamin    Discussed with patient sister over the phone and updated her about current treatment plan. Total time to take care of this patient was 25 minutes and more than 50% of time was spent counseling and coordinating care. Disclaimer: Sections of this note are dictated using utilizing voice recognition software, which may have resulted in some phonetic based errors in grammar and contents. Even though attempts were made to correct all the mistakes, some may have been missed, and remained in the body of the document. If questions arise, please contact our department.     Recent Results (from the past 24 hour(s))   GLUCOSE, POC    Collection Time: 05/04/21  5:22 PM   Result Value Ref Range    Glucose (POC) 157 (H) 70 - 110 mg/dL   GLUCOSE, POC    Collection Time: 05/04/21  7:44 PM   Result Value Ref Range    Glucose (POC) 132 (H) 70 - 110 mg/dL   PTT    Collection Time: 05/04/21  8:19 PM   Result Value Ref Range    aPTT 45.4 (H) 23.0 - 36.4 SEC   GLUCOSE, POC    Collection Time: 05/05/21 12:05 AM   Result Value Ref Range    Glucose (POC) 147 (H) 70 - 110 mg/dL   MAGNESIUM    Collection Time: 05/05/21  3:10 AM   Result Value Ref Range    Magnesium 2.6 1.6 - 2.6 mg/dL   CBC WITH AUTOMATED DIFF    Collection Time: 05/05/21  3:10 AM   Result Value Ref Range    WBC 17.0 (H) 4.6 - 13.2 K/uL    RBC 3.89 (L) 4.35 - 5.65 M/uL    HGB 9.9 (L) 13.0 - 16.0 g/dL    HCT 33.0 (L) 36.0 - 48.0 %    MCV 84.8 74.0 - 97.0 FL    MCH 25.4 24.0 - 34.0 PG    MCHC 30.0 (L) 31.0 - 37.0 g/dL    RDW 15.6 (H) 11.6 - 14.5 %    PLATELET 632 719 - 990 K/uL    MPV 11.8 9.2 - 11.8 FL    NEUTROPHILS 78 (H) 40 - 73 %    LYMPHOCYTES 13 (L) 21 - 52 %    MONOCYTES 7 3 - 10 %    EOSINOPHILS 0 0 - 5 %    BASOPHILS 0 0 - 2 %    ABS. NEUTROPHILS 13.4 (H) 1.8 - 8.0 K/UL    ABS. LYMPHOCYTES 2.2 0.9 - 3.6 K/UL    ABS. MONOCYTES 1.2 0.05 - 1.2 K/UL    ABS. EOSINOPHILS 0.1 0.0 - 0.4 K/UL    ABS.  BASOPHILS 0.1 0.0 - 0.1 K/UL    DF AUTOMATED     PTT    Collection Time: 05/05/21  3:10 AM   Result Value Ref Range    aPTT 64.6 (H) 23.0 - 36.4 SEC   RENAL FUNCTION PANEL    Collection Time: 05/05/21  3:10 AM   Result Value Ref Range    Sodium 145 136 - 145 mmol/L    Potassium 4.3 3.5 - 5.5 mmol/L    Chloride 112 (H) 100 - 111 mmol/L    CO2 25 21 - 32 mmol/L    Anion gap 8 3.0 - 18 mmol/L    Glucose 145 (H) 74 - 99 mg/dL    BUN 61 (H) 7.0 - 18 MG/DL    Creatinine 1.46 (H) 0.6 - 1.3 MG/DL    BUN/Creatinine ratio 42 (H) 12 - 20      GFR est AA 58 (L) >60 ml/min/1.73m2    GFR est non-AA 48 (L) >60 ml/min/1.73m2    Calcium 8.4 (L) 8.5 - 10.1 MG/DL    Phosphorus 3.4 2.5 - 4.9 MG/DL    Albumin 2.2 (L) 3.4 - 5.0 g/dL   PROTHROMBIN TIME + INR    Collection Time: 05/05/21  3:10 AM Result Value Ref Range    Prothrombin time 16.4 (H) 11.5 - 15.2 sec    INR 1.3 (H) 0.8 - 1.2     GLUCOSE, POC    Collection Time: 05/05/21 11:14 AM   Result Value Ref Range    Glucose (POC) 172 (H) 70 - 110 mg/dL   PTT    Collection Time: 05/05/21 12:05 PM   Result Value Ref Range    aPTT 94.9 (H) 23.0 - 36.4 SEC

## 2021-05-05 NOTE — PROGRESS NOTES
Patient completed ROM as follows. Bilateral Upper Extremity Exercise:      EXERCISE   Sets   Reps   Active Active Assist   Passive Self- assisted ROM   Comments   Shoulder flexion 1  10  [] [] [x] []     Shoulder extension 1   10 [] [] [x] []     Elbow flex/ext  1  10 [] [] [x] [] RUE Only, patient grimacing with left. Wrist flexion/extension 1   10 [] [] [x] []     Hand flex/ext  1  10 [] [] [x] []             Pain:   Patient unable to give pain level this session. Observed facial expressions. Grimaced during left passive elbow flexion. [x]  Alerted nurse. [x]  Call bell and phone within patient reach. [x]   Patient resting in bed with no apparent distress .        Thank you,  Megan Stewart, Rehab Technician

## 2021-05-05 NOTE — PROGRESS NOTES
Pharmacist consult: warfarin management    Indication: Venous Thrombosis  Goal INR: 2-3; currently subtherapeutic; ordered daily  No S/Sx of active bleeding  S/p PEG tube on 05/03  Bridging with heparin drip  Warfarin 7.5 mg as a one time dose    Labs:  Coags:    Lab Results   Component Value Date/Time    APTT 64.6 (H) 05/05/2021 03:10 AM    PTP 16.4 (H) 05/05/2021 03:10 AM    INR 1.3 (H) 05/05/2021 03:10 AM     CBC:    Lab Results   Component Value Date/Time    HGB 9.9 (L) 05/05/2021 03:10 AM    HCT 33.0 (L) 05/05/2021 03:10 AM     05/05/2021 03:10 AM       CrCL: Estimated Creatinine Clearance: 56.9 mL/min (A) (based on SCr of 1.46 mg/dL (H)). Labs reviewed. Assessment performed for missed warfarin doses, new drug interactions, dietary intake or supplement changes, documentation of bleeding, and other changes that may affect the INR. Pharmacy to follow daily and will provide subsequent warfarin dosing based on clinical status.     Thank you for the consult,  MARIELY Jade  5/5/2021

## 2021-05-05 NOTE — ROUTINE PROCESS
Bedside shift change report given to Narinder Ibarra RN (oncoming nurse) by Hina Napier RN (offgoing nurse). Report included the following information SBAR, Kardex, Intake/Output, MAR and Recent Results.

## 2021-05-05 NOTE — PROGRESS NOTES
Comprehensive Nutrition Assessment    Type and Reason for Visit: Reassess, Positive nutrition screen, Wound, Consult, NPO/clear liquid    Nutrition Recommendations/Plan:   - Continue tube feeding of Jevity 1.5 at goal rate of 60 mL/hr with 100 mL q 4 hour water flushes.   - Add prosource BID for additional protein to promote wound healing (enteral regimen now provides 2280 kcal, 122 gm protein per day). - Continue daily multivitamin and add thiamine.   - IVF per MD. Consider discontinuing or changing to 1/2NS to decrease sodium load. Nutrition Assessment:  Tolerating feeds at goal via PEG with loose stool via FMS (minimal output) and miralax held this morning. BG WNL. Referral received for skin breakdown- stage II pressure injury noted. Hypernatremia noted, receiving NS at 75 mL/hr. Malnutrition Assessment:  Malnutrition Status: At risk for malnutrition (specify)(inability to tolerate oral diet due to respiratory status and dysphagia s/p trach and PEG with weakness and altered mentation)      Estimated Daily Nutrient Needs:  Energy (kcal): 3135-0624; Weight Used for Energy Requirements: Current(100 kg)  Protein (g): 120-150; Weight Used for Protein Requirements: Current(1.2-1.5)  Fluid (ml/day): 7158-9108; Method Used for Fluid Requirements: 1 ml/kcal    Nutrition Related Findings:  Loose stool via FMS (placed 4/29 with 55 mL output x last 24 hours). Wounds: gluteal fold stage II pressure injury, left heel DTI, neck surgical incision (trach placed 4/26/21), abdominal incision (PEG placed by GI on 5/3/21). Medications: NS at 75 mL/hr, multivitamin, warfarin, daily miralax (held for loose stools this morning).       Wounds:  Multiple, Pressure injury, Stage II, Moisture associate skin damage, Wound consult pending, Surgical incision       Current Nutrition Therapies:  DIET NPO Except Meds  DIET TUBE FEEDING     Current Tube Feeding Regimen: Jevity 1.5 at 60 mL/hr via PEG  Current/Goal Tube Feeding Regimen Provides: 2160 kcal, 92 gm protein, 1094 mL free water, 100% RDIs  Current Water Flush Order: 100 mL q 4 hours (600 mL per day)     Anthropometric Measures:  · Height:  5' 11\" (180.3 cm)  · Current Body Wt:  100.9 kg (222 lb 7.1 oz)   · BMI Category:  Obese class 1 (BMI 30.0-34. 9)       Nutrition Diagnosis:   · Inadequate oral intake related to cognitive or neurological impairment, impaired respiratory function, swallowing difficulty as evidenced by NPO or clear liquid status due to medical condition, intubation    · Increased nutrient needs related to catabolic illness, increased demand for energy/nutrients as evidenced by wounds      Nutrition Interventions:   Food and/or Nutrient Delivery: Continue NPO, Continue tube feeding, Modify tube feeding, IV fluid delivery, Vitamin supplement  Nutrition Education and Counseling: Education not indicated  Coordination of Nutrition Care: Continue to monitor while inpatient, Coordination of community care    Goals:  Nutritional needs will be met through adequate oral intake or nutrition support within the next 7 days       Nutrition Monitoring and Evaluation:   Behavioral-Environmental Outcomes: None identified  Food/Nutrient Intake Outcomes: Enteral nutrition intake/tolerance, Vitamin/mineral intake, IVF intake  Physical Signs/Symptoms Outcomes: Biochemical data, GI status, Nutrition focused physical findings    Discharge Planning:    Enteral nutrition     Electronically signed by Elizabeth May RD, [address]  on 5/5/2021 at 9:09 AM    Contact: [phone]

## 2021-05-05 NOTE — PROGRESS NOTES
Problem: Falls - Risk of  Goal: *Absence of Falls  Description: Document Sarahi Santillan Fall Risk and appropriate interventions in the flowsheet.   Outcome: Progressing Towards Goal  Note: Fall Risk Interventions:       Mentation Interventions: Adequate sleep, hydration, pain control, Door open when patient unattended, Evaluate medications/consider consulting pharmacy, More frequent rounding    Medication Interventions: Bed/chair exit alarm, Evaluate medications/consider consulting pharmacy    Elimination Interventions: Bed/chair exit alarm, Call light in reach, Toileting schedule/hourly rounds              Problem: Nutrition Deficit  Goal: *Optimize nutritional status  Outcome: Progressing Towards Goal     Problem: Injury - Risk of, Adverse Drug Event  Goal: *Absence of adverse drug events  Outcome: Progressing Towards Goal  Goal: *Absence of medication errors  Outcome: Progressing Towards Goal  Goal: *Knowledge of prescribed medications  Outcome: Progressing Towards Goal     Problem: Pain  Goal: *Control of Pain  Outcome: Progressing Towards Goal  Goal: *PALLIATIVE CARE:  Alleviation of Pain  Outcome: Progressing Towards Goal     Problem: Delirium Treatment  Goal: *Level of consciousness restored to baseline  Outcome: Progressing Towards Goal  Goal: *Level of environmental perceptions restored to baseline  Outcome: Progressing Towards Goal  Goal: *Sensory perception restored to baseline  Outcome: Progressing Towards Goal  Goal: *Emotional stability restored to baseline  Outcome: Progressing Towards Goal  Goal: *Functional assessment restored to baseline  Outcome: Progressing Towards Goal  Goal: *Absence of falls  Outcome: Progressing Towards Goal  Goal: *Will remain free of delirium, CAM Score negative  Outcome: Progressing Towards Goal  Goal: *Cognitive status will be restored to baseline  Outcome: Progressing Towards Goal  Goal: Interventions  Outcome: Progressing Towards Goal     Problem: Risk for Spread of Infection  Goal: Prevent transmission of infectious organism to others  Description: Prevent the transmission of infectious organisms to other patients, staff members, and visitors.   Outcome: Progressing Towards Goal     Problem: Patient Education: Go to Patient Education Activity  Goal: Patient/Family Education  Outcome: Progressing Towards Goal

## 2021-05-05 NOTE — PROGRESS NOTES
RENAL PROGRESS NOTE        Cristiano Blackwell Trace         Assessment/Plan:     1. JACQUELYN (ischemic atn in a setting of acute pe/resp failure). Scr is up slightly as patient NPO d/t peg placement , better , close to baseline   2. Acute pe/le le dvt. On heparin drip. 3. Angioedema, s/p emergent cricthyrotomy/resp failure. 4. Bl pneumonia, finished abx. 5. Resp failure. Stable   6. Hypernatremia ,restart free water with TF     Will sign off     Please call with questions    Purvis Lefort, MD FASN  Cell [phone]  Pager: [phone]                                                                                                                              Subjective: On trach collar         Patient Active Problem List   Diagnosis Code    Angioedema due to angiotensin converting enzyme inhibitor (ACE-I) T78. 3XXA, [id number]    Respiratory failure ([facility] [address] J96.90    JACQUELYN (acute kidney injury) ([facility] [address] N17.9    Cardiac arrest (HCC) I46.9    Obesity (BMI 30-39. 9) E66.9    Diabetic neuropathy associated with type 2 diabetes mellitus ([facility] [address] E11.40    Diabetic retinopathy associated with type 2 diabetes mellitus ([facility] [address] E11.319    Pulmonary infiltrates R91.8    Controlled type 2 diabetes mellitus with neurologic complication, without long-term current use of insulin (HCC) E11.49    DVT (deep venous thrombosis) ([facility] [address] I82.409    Encounter for palliative care Z51.5    Goals of care, counseling/discussion Z71.89    Multiple subsegmental pulmonary emboli without acute cor pulmonale (HCC) I26.94    DVT (deep vein thrombosis) in pregnancy O22.30    Acute encephalopathy G93.40       Current Facility-Administered Medications   Medication Dose Route Frequency Provider Last Rate Last Admin    thiamine HCL (B-1) tablet 100 mg  100 mg Per G Tube DAILY Garth Carter MD   100 mg at 05/05/21 1200    levoFLOXacin (LEVAQUIN) 500 mg in D5W IVPB  500 mg IntraVENous Q24H Sonja Samaniego  mL/hr at 05/05/21 1400 500 mg at 05/05/21 1400    warfarin (COUMADIN) tablet 5 mg  5 mg Per G Tube ONCE Lulu Alanis MD        warfarin - physician to dose   Other Q24H Vazquez Holley MD        0.9% sodium chloride infusion  50 mL/hr IntraVENous CONTINUOUS Vazquez Holley MD 50 mL/hr at 05/05/21 1400 50 mL/hr at 05/05/21 1400    famotidine (PEPCID) tablet 20 mg  20 mg Per G Tube DAILY Vazquez Holley MD   20 mg at 05/05/21 4958    multivit-folic acid-herbal 145 (WELLESSE PLUS) oral liquid 30 mL  30 mL Per G Tube DAILY Vazquez Holley MD   30 mL at 05/05/21 0900    atorvastatin (LIPITOR) tablet 80 mg  80 mg Per G Tube QHS Vazquez Holley MD   80 mg at 05/04/21 2140    polyethylene glycol (MIRALAX) packet 17 g  17 g Per G Tube DAILY Vazquez Holley MD   Stopped at 05/05/21 0900    LORazepam (ATIVAN) tablet 1 mg  1 mg Per G Tube Q6H PRN Vazquez Holley MD   1 mg at 05/04/21 1700    sodium chloride 3% hypertonic nebulizer soln  4 mL Nebulization BID RT Nahum ARAJUO MD   4 mL at 05/05/21 0801    albuterol-ipratropium (DUO-NEB) 2.5 MG-0.5 MG/3 ML  3 mL Nebulization Q4H RT Vazquez Holley MD   3 mL at 05/05/21 1504    heparin 25,000 units in D5W 250 ml infusion  9.8-25 Units/kg/hr IntraVENous TITRATE Loan Myers PA-C 13.2 mL/hr at 05/05/21 1305 13 Units/kg/hr at 05/05/21 1305    acetaminophen (TYLENOL) solution 500 mg  500 mg Oral Q4H PRN Loan Myers PA-C   500 mg at 05/05/21 0900    oxymetazoline (AFRIN) 0.05 % nasal spray 2 Spray  2 Spray Other BID PRN Jasmine Garcia NP        arformoteroL (BROVANA) neb solution 15 mcg  15 mcg Nebulization BID RT Vanessa Gill PA-C   15 mcg at 05/05/21 0801    glucose chewable tablet 16 g  4 Tab Oral PRN Nicolas West PA-C        glucagon (GLUCAGEN) injection 1 mg  1 mg IntraMUSCular PRN Nicolas West PA-C        dextrose (D50W) injection syrg 12.5-25 g  25-50 mL IntraVENous PRN Nicolas West PA-C        insulin lispro (HUMALOG) injection SubCUTAneous Q6H Dieter Cazares PA-C   2 Units at 05/05/21 1200    carboxymethylcellulose sodium (REFRESH LIQUIGEL) 1 % ophthalmic solution 1 Drop  1 Drop Both Eyes PRN Dieter Cazares, PA-C   1 Drop at 04/14/21 0621       Objective  Vitals:    05/05/21 1300 05/05/21 1400 05/05/21 1500 05/05/21 1504   BP: 123/71 (!) 141/82 (!) 151/74    Pulse: 92 94 96    Resp: (!) 34 30 (!) 33    Temp:       SpO2: 95% 97% 95% 97%   Weight:       Height:             Intake/Output Summary (Last 24 hours) at 5/5/2021 1657  Last data filed at 5/5/2021 1300  Gross per 24 hour   Intake 3437.67 ml   Output 1280 ml   Net 2157.67 ml           Admission weight: Weight: 127 kg (279 lb 15.8 oz) (04/04/21 1726)  Last Weight Metrics:  Weight Loss Metrics 5/3/2021 4/4/2021 4/4/2021 4/4/2021   Today's Wt 222 lb 7.1 oz - 280 lb -   BMI - 31.02 kg/m2 - 37.97 kg/m2             Physical Assessment: On trach collar  Neck: No jvd. Trach. LUNGS: diminished air entry at the bases, no crackles. CVS EXM: S1, S2  RRR. Abdomen: soft, non tender. Lower Extremities:  1+ edema.        Lab    CBC w/Diff Recent Labs     05/05/21 0310 05/04/21  0350 05/03/21  0047   WBC 17.0* 10.6 10.0   RBC 3.89* 4.12* 3.89*   HGB 9.9* 10.4* 9.7*   HCT 33.0* 34.4* 32.4*    280 307   GRANS 78* 77* 70   LYMPH 13* 15* 20*   EOS 0 1 2        Chemistry Recent Labs     05/05/21 0310 05/04/21  0350 05/03/21  0047   * 110* 150*    139 140   K 4.3 5.1 4.9   * 108 107   CO2 25 26 28   BUN 61* 60* 56*   CREA 1.46* 1.74* 1.44*   CA 8.4* 9.0 8.8   AGAP 8 5 5   BUCR 42* 34* 39*   ALB 2.2* 2.3* 2.1*   PHOS 3.4 4.8 4.1         No results found for: IRON, FE, TIBC, IBCT, PSAT, FERR   Lab Results   Component Value Date/Time    Calcium 8.4 (L) 05/05/2021 03:10 AM    Phosphorus 3.4 05/05/2021 03:10 AM

## 2021-05-06 LAB
ALBUMIN SERPL-MCNC: 1.9 G/DL (ref 3.4–5)
ANION GAP SERPL CALC-SCNC: 6 MMOL/L (ref 3–18)
APTT PPP: 103.2 SEC (ref 23–36.4)
APTT PPP: 58.5 SEC (ref 23–36.4)
BASOPHILS # BLD: 0.1 K/UL (ref 0–0.1)
BASOPHILS NFR BLD: 0 % (ref 0–2)
BUN SERPL-MCNC: 49 MG/DL (ref 7–18)
BUN/CREAT SERPL: 40 (ref 12–20)
CALCIUM SERPL-MCNC: 8.3 MG/DL (ref 8.5–10.1)
CHLORIDE SERPL-SCNC: 113 MMOL/L (ref 100–111)
CO2 SERPL-SCNC: 25 MMOL/L (ref 21–32)
CREAT SERPL-MCNC: 1.21 MG/DL (ref 0.6–1.3)
DIFFERENTIAL METHOD BLD: ABNORMAL
EOSINOPHIL # BLD: 0.1 K/UL (ref 0–0.4)
EOSINOPHIL NFR BLD: 0 % (ref 0–5)
ERYTHROCYTE [DISTWIDTH] IN BLOOD BY AUTOMATED COUNT: 15.5 % (ref 11.6–14.5)
GLUCOSE BLD STRIP.AUTO-MCNC: 131 MG/DL (ref 70–110)
GLUCOSE BLD STRIP.AUTO-MCNC: 142 MG/DL (ref 70–110)
GLUCOSE BLD STRIP.AUTO-MCNC: 147 MG/DL (ref 70–110)
GLUCOSE SERPL-MCNC: 135 MG/DL (ref 74–99)
HCT VFR BLD AUTO: 32.2 % (ref 36–48)
HGB BLD-MCNC: 9.6 G/DL (ref 13–16)
INR PPP: 1.5 (ref 0.8–1.2)
LYMPHOCYTES # BLD: 2.4 K/UL (ref 0.9–3.6)
LYMPHOCYTES NFR BLD: 15 % (ref 21–52)
MAGNESIUM SERPL-MCNC: 2.7 MG/DL (ref 1.6–2.6)
MCH RBC QN AUTO: 25.6 PG (ref 24–34)
MCHC RBC AUTO-ENTMCNC: 29.8 G/DL (ref 31–37)
MCV RBC AUTO: 85.9 FL (ref 74–97)
MONOCYTES # BLD: 0.9 K/UL (ref 0.05–1.2)
MONOCYTES NFR BLD: 5 % (ref 3–10)
NEUTS SEG # BLD: 12.3 K/UL (ref 1.8–8)
NEUTS SEG NFR BLD: 78 % (ref 40–73)
PHOSPHATE SERPL-MCNC: 2.2 MG/DL (ref 2.5–4.9)
PLATELET # BLD AUTO: 216 K/UL (ref 135–420)
PMV BLD AUTO: 12.4 FL (ref 9.2–11.8)
POTASSIUM SERPL-SCNC: 4.2 MMOL/L (ref 3.5–5.5)
PROCALCITONIN SERPL-MCNC: 0.97 NG/ML
PROTHROMBIN TIME: 17.7 SEC (ref 11.5–15.2)
RBC # BLD AUTO: 3.75 M/UL (ref 4.35–5.65)
SODIUM SERPL-SCNC: 144 MMOL/L (ref 136–145)
WBC # BLD AUTO: 15.8 K/UL (ref 4.6–13.2)

## 2021-05-06 PROCEDURE — 36415 COLL VENOUS BLD VENIPUNCTURE: CPT

## 2021-05-06 PROCEDURE — 85730 THROMBOPLASTIN TIME PARTIAL: CPT

## 2021-05-06 PROCEDURE — 99232 SBSQ HOSP IP/OBS MODERATE 35: CPT | Performed by: PSYCHIATRY & NEUROLOGY

## 2021-05-06 PROCEDURE — 74011250637 HC RX REV CODE- 250/637: Performed by: INTERNAL MEDICINE

## 2021-05-06 PROCEDURE — 85025 COMPLETE CBC W/AUTO DIFF WBC: CPT

## 2021-05-06 PROCEDURE — 94762 N-INVAS EAR/PLS OXIMTRY CONT: CPT

## 2021-05-06 PROCEDURE — 65660000004 HC RM CVT STEPDOWN

## 2021-05-06 PROCEDURE — 85610 PROTHROMBIN TIME: CPT

## 2021-05-06 PROCEDURE — 94668 MNPJ CHEST WALL SBSQ: CPT

## 2021-05-06 PROCEDURE — 74011000250 HC RX REV CODE- 250: Performed by: INTERNAL MEDICINE

## 2021-05-06 PROCEDURE — 74011000250 HC RX REV CODE- 250: Performed by: PHYSICIAN ASSISTANT

## 2021-05-06 PROCEDURE — 83735 ASSAY OF MAGNESIUM: CPT

## 2021-05-06 PROCEDURE — 94640 AIRWAY INHALATION TREATMENT: CPT

## 2021-05-06 PROCEDURE — 77010033678 HC OXYGEN DAILY

## 2021-05-06 PROCEDURE — 74011250636 HC RX REV CODE- 250/636: Performed by: INTERNAL MEDICINE

## 2021-05-06 PROCEDURE — 2709999900 HC NON-CHARGEABLE SUPPLY

## 2021-05-06 PROCEDURE — 80069 RENAL FUNCTION PANEL: CPT

## 2021-05-06 PROCEDURE — 74011250636 HC RX REV CODE- 250/636: Performed by: PHYSICIAN ASSISTANT

## 2021-05-06 PROCEDURE — 99232 SBSQ HOSP IP/OBS MODERATE 35: CPT | Performed by: INTERNAL MEDICINE

## 2021-05-06 PROCEDURE — 82962 GLUCOSE BLOOD TEST: CPT

## 2021-05-06 RX ADMIN — IPRATROPIUM BROMIDE AND ALBUTEROL SULFATE 3 ML: .5; 3 SOLUTION RESPIRATORY (INHALATION) at 20:25

## 2021-05-06 RX ADMIN — Medication 100 MG: at 08:28

## 2021-05-06 RX ADMIN — POLYETHYLENE GLYCOL 3350 17 G: 17 POWDER, FOR SOLUTION ORAL at 11:41

## 2021-05-06 RX ADMIN — ATORVASTATIN CALCIUM 80 MG: 40 TABLET, FILM COATED ORAL at 21:23

## 2021-05-06 RX ADMIN — DIBASIC SODIUM PHOSPHATE, MONOBASIC POTASSIUM PHOSPHATE AND MONOBASIC SODIUM PHOSPHATE 1 TABLET: 852; 155; 130 TABLET ORAL at 18:19

## 2021-05-06 RX ADMIN — LEVOFLOXACIN 500 MG: 5 INJECTION, SOLUTION INTRAVENOUS at 15:55

## 2021-05-06 RX ADMIN — IPRATROPIUM BROMIDE AND ALBUTEROL SULFATE 3 ML: .5; 3 SOLUTION RESPIRATORY (INHALATION) at 13:25

## 2021-05-06 RX ADMIN — HEPARIN SODIUM 1296 UNITS/HR: 10000 INJECTION, SOLUTION INTRAVENOUS at 12:15

## 2021-05-06 RX ADMIN — LORAZEPAM 1 MG: 1 TABLET ORAL at 02:08

## 2021-05-06 RX ADMIN — DIBASIC SODIUM PHOSPHATE, MONOBASIC POTASSIUM PHOSPHATE AND MONOBASIC SODIUM PHOSPHATE 1 TABLET: 852; 155; 130 TABLET ORAL at 21:23

## 2021-05-06 RX ADMIN — ARFORMOTEROL TARTRATE 15 MCG: 15 SOLUTION RESPIRATORY (INHALATION) at 09:09

## 2021-05-06 RX ADMIN — ARFORMOTEROL TARTRATE 15 MCG: 15 SOLUTION RESPIRATORY (INHALATION) at 20:25

## 2021-05-06 RX ADMIN — SODIUM CHLORIDE SOLN NEBU 3% 4 ML: 3 NEBU SOLN at 09:09

## 2021-05-06 RX ADMIN — IPRATROPIUM BROMIDE AND ALBUTEROL SULFATE 3 ML: .5; 3 SOLUTION RESPIRATORY (INHALATION) at 09:09

## 2021-05-06 RX ADMIN — SODIUM CHLORIDE SOLN NEBU 3% 4 ML: 3 NEBU SOLN at 20:25

## 2021-05-06 RX ADMIN — FAMOTIDINE 20 MG: 20 TABLET ORAL at 08:28

## 2021-05-06 RX ADMIN — Medication 30 ML: at 08:28

## 2021-05-06 NOTE — PROGRESS NOTES
2023- Patient arrived on floor RT and ICU RN at bedside. Heparin gtt verified with Garth Mahajan RN    2030 - Patient receiving respiratory treatment and currently being suctioned. Patient is nonverbal but follows commands. 2107 - Updated family member, sister, on patient's current condition. 2203 - Ice packs placed on patient and cold towels placed on forehead. Paged MD    [id number] - Discussed temperature with MD. No new order for meds. Orders for labs placed by MD.     4716 - Lab notified about STAT Lactic acid. 2238 - Lab at bedside. Labs drawn    0048 - Tube feeding and set changed    0248 - Patient lost IV access. Attempted IV access x 2. Attempt unsuccessful.    0300 - Gown changed. Shampoo scrub cap placed. 80 - RN at bedside attempting IV access    0421 - CVT ICU RN placed 22 G in left FA. Heparin/NS restarted. 1225 - Deep suction trach with 14 FR catheter    0733 - Bedside shift change report given to Laurel Werner  (oncoming nurse) by Clarence Cuevas  (offgoing nurse).  Report included the following information SBAR, Kardex, ED Summary, Procedure Summary, Intake/Output, Accordion, Recent Results and Cardiac Rhythm ST.

## 2021-05-06 NOTE — PROGRESS NOTES
Neurology Progress Note    Patient ID:  Alfie Medina  [id number]  60 y.o.  1950    Subjective:      Patient has subsequently been transferred to [facility]. Today he is more interactive and following commands but is non-verbal due to tracheostomy. Current Facility-Administered Medications   Medication Dose Route Frequency    thiamine HCL (B-1) tablet 100 mg  100 mg Per G Tube DAILY    levoFLOXacin (LEVAQUIN) 500 mg in D5W IVPB  500 mg IntraVENous Q24H    warfarin - physician to dose   Other Q24H    0.9% sodium chloride infusion  50 mL/hr IntraVENous CONTINUOUS    famotidine (PEPCID) tablet 20 mg  20 mg Per G Tube DAILY    multivit-folic acid-herbal 804 (WELLESSE PLUS) oral liquid 30 mL  30 mL Per G Tube DAILY    atorvastatin (LIPITOR) tablet 80 mg  80 mg Per G Tube QHS    polyethylene glycol (MIRALAX) packet 17 g  17 g Per G Tube DAILY    LORazepam (ATIVAN) tablet 1 mg  1 mg Per G Tube Q6H PRN    sodium chloride 3% hypertonic nebulizer soln  4 mL Nebulization BID RT    albuterol-ipratropium (DUO-NEB) 2.5 MG-0.5 MG/3 ML  3 mL Nebulization Q4H RT    heparin 25,000 units in D5W 250 ml infusion  9.8-25 Units/kg/hr IntraVENous TITRATE    acetaminophen (TYLENOL) solution 500 mg  500 mg Oral Q4H PRN    oxymetazoline (AFRIN) 0.05 % nasal spray 2 Spray  2 Spray Other BID PRN    arformoteroL (BROVANA) neb solution 15 mcg  15 mcg Nebulization BID RT    glucose chewable tablet 16 g  4 Tab Oral PRN    glucagon (GLUCAGEN) injection 1 mg  1 mg IntraMUSCular PRN    dextrose (D50W) injection syrg 12.5-25 g  25-50 mL IntraVENous PRN    insulin lispro (HUMALOG) injection   SubCUTAneous Q6H    carboxymethylcellulose sodium (REFRESH LIQUIGEL) 1 % ophthalmic solution 1 Drop  1 Drop Both Eyes PRN          Objective: Active hospital medications were reviewed    Lab results and neuroradiology studies from the last 24 hours were reviewed.       Prior to Admission medications    Medication Sig Start Date End Date Taking? Authorizing Provider   LISINOPRIL, BULK, by Does Not Apply route. Other, MD Cheryl     Patient Vitals for the past 8 hrs:   BP Temp Pulse Resp SpO2   05/06/21 1101 117/80 98.8 °F (37.1 °C) 94 21 96 %   05/06/21 0914     96 %   05/06/21 0738 129/78 98.8 °F (37.1 °C) 94 28 96 %   05/06/21 0423 133/80 98.4 °F (36.9 °C) 93  93 %   [id number]/04 1901 - 05/06 0700  In: 3921.3 [I.V.:1911.3]  Out: 2075 [Urine:2075]  05/04 1901 - 05/06 0700  In: 3921.3 [I.V.:1911.3]  Out: 2075 [Urine:2075]RESULTRCNT(24h)Principal Problem:    Respiratory failure (HCC) (4/5/2021)    Active Problems:    Angioedema due to angiotensin converting enzyme inhibitor (ACE-I) (4/4/2021)      JACQUELYN (acute kidney injury) ([facility] [address] (4/4/2021)      Cardiac arrest ([facility] [address] (4/4/2021)      Obesity (BMI 30-39.9) (4/5/2021)      Diabetic neuropathy associated with type 2 diabetes mellitus ([address] (4/5/2021)      Diabetic retinopathy associated with type 2 diabetes mellitus ([facility] [address] (4/5/2021)      Pulmonary infiltrates (4/5/2021)      Controlled type 2 diabetes mellitus with neurologic complication, without long-term current use of insulin ([address] (4/5/2021)      DVT (deep venous thrombosis) ([facility] [address] (4/10/2021)      Encounter for palliative care ()      Goals of care, counseling/discussion ()      Multiple subsegmental pulmonary emboli without acute cor pulmonale (HCC) ()      DVT (deep vein thrombosis) in pregnancy (5/3/2021)      Acute encephalopathy (5/3/2021)        Additional comments:I reviewed the patient's new clinical lab test results. General Exam  No acute distress, normal body habitus, has trach, and NGT in place.      HEENT: Normocephalic, atraumatic, Sclera anicteric, normal conjunctiva  Mucous membranes: normal color and hydration      Neurologic Exam:     Mental status:  Opens eyes to voice and makes eye contact.  He follows commands about 80% of the time but needs reinforcement on occasion.   Cranial nerves: PERRL, Eyes conjugate,         Motor: He clearly has some proximal weakness of the deltoids and biceps as well as hip flexors and knee extensors but otherwise seems >4      DTRs 0 throughout     MRI reviewed    Assessment:     Maliha Schaefer is a 79 y.o. who was admitted with severe airway compromising angioedema and then a very brief cardiac arrest. His mental status appears to have improved and his MRI is reassuring against any significant hypoxic or hypoxic-ischemic insult. He does however appear to have significant proximal weakness. 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 Plan:   1. CPK  2. Recommend reduction or discontinuation of lipitor if not needed for severe CAD          Oma Adkins M.D.   Clinical Neurophysiology  Neuromuscular Specialist    Signed:  5/6/2021  11:55 AM  11:55 AM

## 2021-05-06 NOTE — PROGRESS NOTES
Problem: Diabetes Self-Management  Goal: *Disease process and treatment process  Description: Define diabetes and identify own type of diabetes; list 3 options for treating diabetes. Outcome: Progressing Towards Goal     Problem: Falls - Risk of  Goal: *Absence of Falls  Description: Document Devon Beaver Fall Risk and appropriate interventions in the flowsheet. Outcome: Progressing Towards Goal  Note: Fall Risk Interventions:       Mentation Interventions: Door open when patient unattended, Bed/chair exit alarm, Adequate sleep, hydration, pain control, More frequent rounding, Room close to nurse's station, Update white board    Medication Interventions: Bed/chair exit alarm    Elimination Interventions: Bed/chair exit alarm, Call light in reach              Problem: Patient Education: Go to Patient Education Activity  Goal: Patient/Family Education  Outcome: Progressing Towards Goal     Problem: Pressure Injury - Risk of  Goal: *Prevention of pressure injury  Description: Document Lang Scale and appropriate interventions in the flowsheet. Outcome: Progressing Towards Goal  Note: Pressure Injury Interventions:  Sensory Interventions: Assess changes in LOC, Check visual cues for pain, Keep linens dry and wrinkle-free, Float heels, Maintain/enhance activity level, Minimize linen layers, Monitor skin under medical devices, Pressure redistribution bed/mattress (bed type), Turn and reposition approx. every two hours (pillows and wedges if needed)    Moisture Interventions: Absorbent underpads, Internal/External fecal devices, Internal/External urinary devices    Activity Interventions: Pressure redistribution bed/mattress(bed type)    Mobility Interventions: Float heels, Pressure redistribution bed/mattress (bed type), Turn and reposition approx.  every two hours(pillow and wedges)    Nutrition Interventions: Document food/fluid/supplement intake    Friction and Shear Interventions: Apply protective barrier, creams and emollients, Foam dressings/transparent film/skin sealants, Minimize layers, Transferring/repositioning devices                Problem: Nutrition Deficit  Goal: *Optimize nutritional status  Outcome: Progressing Towards Goal     Problem: Pain  Goal: *Control of Pain  Outcome: Progressing Towards Goal

## 2021-05-06 NOTE — PROGRESS NOTES
0715-Bedside and Verbal shift change report received from KUSHAL Ozuna (off going nurse). Report included the following information SBAR, Kardex, MAR and Recent Results. Heparin drip verified w/ outgoing nurse. Assumed care,fall prevention program maintained,call bell and bedside table within reach. Will continue care. 0748-aPTT not resulted yet. 1750-Charnge bedding,bath done. TF bottle changed. 1908-Bedside and Verbal shift change report given to Carlos Kline RN (oncoming nurse) by Terrence Balbuena RN (offgoing nurse).  Report included the following information SBAR, Kardex, MAR and Recent Results.

## 2021-05-06 NOTE — PROGRESS NOTES
1900 - Bedside shift change [facility]  ([facility] nurse) by Cathi Díaz  (offgoing nurse). Report included the following information SBAR, Kardex, Intake/Output, MAR, Recent Results and Cardiac Rhythm SR.     1905 - Lab called for STAT PTT    1912 - Patient suctioned with [address] called to draw STAT PTT    2000 - Labs drawn. Phlebotomist leaving floor. 2025 - RT at bedside. Patient receiving respiratory treatment. 2057 - No new results for PTT    2140 - .2. Will maintain rate of 1296 units/hr and order PTT to be drawn the next AM    2216 -  Patient sleeping quietly    0019 - RT at bedside. Patient receiving respiratory treatment    0145 - Patient asking for water. Patient NPO. Education provided. 0 - Patient sleeping    0420 - Bilateral heel Mepilex replaced. Gripper socks placed. Heel boots placed. 0451 - Repositioned to the left side with wedge pillows. Sacrum Mepilex replaced. Right ear cleansed and covered with Mepilex. 516 - . Heparin gtt rate changed from 1296 units/hr to 1196 units/hr. Next PTT to be drawn at 1130    0700 - Bedside shift change report given to Margarita Scanlon RN (oncoming nurse) [facility] Report included the following information SBAR, Kardex, ED Summary, Procedure Summary, Intake/Output, MAR, Recent Results and Cardiac Rhythm SR/ST.

## 2021-05-06 NOTE — PROGRESS NOTES
Problem: Falls - Risk of  Goal: *Absence of Falls  Description: Document Pippa Crowell Fall Risk and appropriate interventions in the flowsheet. Outcome: Progressing Towards Goal  Note: Fall Risk Interventions:       Mentation Interventions: Door open when patient unattended, Bed/chair exit alarm, Adequate sleep, hydration, pain control, More frequent rounding, Room close to nurse's station, Update white board    Medication Interventions: Bed/chair exit alarm    Elimination Interventions: Bed/chair exit alarm, Call light in reach              Problem: Pressure Injury - Risk of  Goal: *Prevention of pressure injury  Description: Document Lang Scale and appropriate interventions in the flowsheet. Outcome: Progressing Towards Goal  Note: Pressure Injury Interventions:  Sensory Interventions: Assess changes in LOC, Check visual cues for pain, Keep linens dry and wrinkle-free, Float heels, Maintain/enhance activity level, Minimize linen layers, Monitor skin under medical devices, Pressure redistribution bed/mattress (bed type), Turn and reposition approx. every two hours (pillows and wedges if needed)    Moisture Interventions: Absorbent underpads, Internal/External fecal devices, Internal/External urinary devices    Activity Interventions: Pressure redistribution bed/mattress(bed type)    Mobility Interventions: Float heels, Pressure redistribution bed/mattress (bed type), Turn and reposition approx.  every two hours(pillow and wedges)    Nutrition Interventions: Document food/fluid/supplement intake    Friction and Shear Interventions: Apply protective barrier, creams and emollients, Foam dressings/transparent film/skin sealants, Minimize layers, Transferring/repositioning devices                Problem: Nutrition Deficit  Goal: *Optimize nutritional status  Outcome: Progressing Towards Goal

## 2021-05-06 NOTE — PROGRESS NOTES
Hospitalist Progress Note        Patient: Mariela White               Sex: male          DOA: 4/4/2021       YOB: 1950      Age:  79 y.o.        LOS:  LOS: 32 days               Subjective:     Patient is more awake today. He follows commands. He tries to answer questions back. No new issues per nursing and he is tolerating tube feeding. HPI:  Patient was initially seen at [facility] and had emergent Crychothyrotomy during Cardiac arrest.  Initially there was concern that he had Angioedema. He was stabilized and flown to [facility] for ongoing management. Mr Jia Almonte has required prolonged support on the mechanical ventilator for ongoing respiratory failure. He has had Tracheostomy via ENT and he continues with Tracheostomy collar currently which he is tolerating well. Mr Jia Almonte had cardiac arrest initially, but he has stable hemodynamics currently. Mr Jia Almonte was treated for sepsis and has received a full course of antibiotics, he is off antibiotics currently. He has limited interaction, he does not have seizure activity. He has had sedation to maintain the ventilator which he no longer needs. He has tracheostomy, but he currently does not have a PEG. He has ongoing NG tube and continues with NG tube feeding. NG tube was removed and PEG tube was placed on May 3, 2021. Objective:      /78 (BP 1 Location: Right upper arm, BP Patient Position: At rest)   Pulse 94   Temp 99.2 °F (37.3 °C)   Resp 30   Ht 5' 11\" (1.803 m)   Wt 98.8 kg (217 lb 13 oz)   SpO2 97%   BMI 30.38 kg/m²       Intake/Output Summary (Last 24 hours) at 5/6/2021 1542  Last data filed at 5/6/2021 0843  Gross per 24 hour   Intake 1191.33 ml   Output 800 ml   Net 391.33 ml     General: Awake, follows commands, not in acute distress  Neck: Tracheostomy is in situ with dressing  Lungs: Diminished breath sound bibasilar without wheezes  Heart:  Regular Rate and Rhythm.     Abdomen: Soft, nondistended, bowel sounds present, PEG tube is present  Extremities: No edema present  Neuro: Awake, follows some commands and moves the left upper extremity very well as well as right foot. Assessment/Plan     ASSESSMENT:    1. Reportedly acute hypoxic respiratory failure due to angioedema s/p emergent cricothyrotomy followed by tracheostomy. Better. 2.  Tracheostomy site bleeding, resolved. 3.  Status post respiratory failure cardiac arrest  4. Chronic hypoxic respiratory failure status post trach and on trach collar  5. Bilateral pulmonary infiltrates  6. Oropharyngeal dysphagia status post G tube  7. Left leg popliteal DVT and pulmonary embolism  8. Reportedly acute metabolic encephalopathy  9. 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 JACQUELYN, recurrent -better today  11. Left pneumothorax resolved  12. Diabetic neuropathy  13. Obesity  14. Fever and leukocytosis    PLAN:    Continue Coumadin and heparin drip, will stop heparin drip once INR is more than 2  Blood culture pending  Continue levofloxacin  Continue current tube feeding per dietitian  Discontinue IV fluid monitor renal function  Pulmonologist to follow this patient for tracheostomy care  Continue trach collar and nebulizer  Continue multivitamin  PT and OT to follow this patient    Discussed with patient sister over the phone and updated her about current treatment plan. Total time to take care of this patient was 25 minutes and more than 50% of time was spent counseling and coordinating care. Disclaimer: Sections of this note are dictated using utilizing voice recognition software, which may have resulted in some phonetic based errors in grammar and contents. Even though attempts were made to correct all the mistakes, some may have been missed, and remained in the body of the document. If questions arise, please contact our department.     Recent Results (from the past 24 hour(s))   GLUCOSE, POC    Collection Time: 05/05/21  5:55 PM   Result Value Ref Range    Glucose (POC) 147 (H) 70 - 110 mg/dL   CULTURE, BLOOD    Collection Time: 05/05/21  6:33 PM    Specimen: Blood   Result Value Ref Range    Special Requests: NO SPECIAL REQUESTS      Culture result: NO GROWTH AFTER 10 HOURS     LACTIC ACID    Collection Time: 05/05/21 10:30 PM   Result Value Ref Range    Lactic acid 1.6 0.4 - 2.0 MMOL/L   PROCALCITONIN    Collection Time: 05/05/21 10:30 PM   Result Value Ref Range    Procalcitonin 0.97 ng/mL   GLUCOSE, POC    Collection Time: 05/05/21 11:50 PM   Result Value Ref Range    Glucose (POC) 145 (H) 70 - 110 mg/dL   MAGNESIUM    Collection Time: 05/06/21  5:36 AM   Result Value Ref Range    Magnesium 2.7 (H) 1.6 - 2.6 mg/dL   CBC WITH AUTOMATED DIFF    Collection Time: 05/06/21  5:36 AM   Result Value Ref Range    WBC 15.8 (H) 4.6 - 13.2 K/uL    RBC 3.75 (L) 4.35 - 5.65 M/uL    HGB 9.6 (L) 13.0 - 16.0 g/dL    HCT 32.2 (L) 36.0 - 48.0 %    MCV 85.9 74.0 - 97.0 FL    MCH 25.6 24.0 - 34.0 PG    MCHC 29.8 (L) 31.0 - 37.0 g/dL    RDW 15.5 (H) 11.6 - 14.5 %    PLATELET 732 158 - 831 K/uL    MPV 12.4 (H) 9.2 - 11.8 FL    NEUTROPHILS 78 (H) 40 - 73 %    LYMPHOCYTES 15 (L) 21 - 52 %    MONOCYTES 5 3 - 10 %    EOSINOPHILS 0 0 - 5 %    BASOPHILS 0 0 - 2 %    ABS. NEUTROPHILS 12.3 (H) 1.8 - 8.0 K/UL    ABS. LYMPHOCYTES 2.4 0.9 - 3.6 K/UL    ABS. MONOCYTES 0.9 0.05 - 1.2 K/UL    ABS. EOSINOPHILS 0.1 0.0 - 0.4 K/UL    ABS.  BASOPHILS 0.1 0.0 - 0.1 K/UL    DF AUTOMATED     PTT    Collection Time: 05/06/21  5:36 AM   Result Value Ref Range    aPTT 58.5 (H) 23.0 - 36.4 SEC   RENAL FUNCTION PANEL    Collection Time: 05/06/21  5:36 AM   Result Value Ref Range    Sodium 144 136 - 145 mmol/L    Potassium 4.2 3.5 - 5.5 mmol/L    Chloride 113 (H) 100 - 111 mmol/L    CO2 25 21 - 32 mmol/L    Anion gap 6 3.0 - 18 mmol/L    Glucose 135 (H) 74 - 99 mg/dL    BUN 49 (H) 7.0 - 18 MG/DL    Creatinine 1.21 0.6 - 1.3 MG/DL    BUN/Creatinine ratio 40 (H) 12 - 20      GFR est AA >60 >60 ml/min/1.73m2    GFR est non-AA 59 (L) >60 ml/min/1.73m2    Calcium 8.3 (L) 8.5 - 10.1 MG/DL    Phosphorus 2.2 (L) 2.5 - 4.9 MG/DL    Albumin 1.9 (L) 3.4 - 5.0 g/dL   PROTHROMBIN TIME + INR    Collection Time: 05/06/21  5:36 AM   Result Value Ref Range    Prothrombin time 17.7 (H) 11.5 - 15.2 sec    INR 1.5 (H) 0.8 - 1.2     GLUCOSE, POC    Collection Time: 05/06/21  6:02 AM   Result Value Ref Range    Glucose (POC) 147 (H) 70 - 110 mg/dL

## 2021-05-06 NOTE — PROGRESS NOTES
completed a follow up visit with and Spiritual assessment of patient and offered Pastoral care support once again. , Patient however was not in a condition where he could communicate as he has a trache in place and was not very responsive.  Chaplains will continue to follow and will provide pastoral care on an as needed/requested basis    Chaplain Ashwin Tovar   Board Certified [address] [place] [address]   [phone]

## 2021-05-07 LAB
ALBUMIN SERPL-MCNC: 1.8 G/DL (ref 3.4–5)
ANION GAP SERPL CALC-SCNC: 4 MMOL/L (ref 3–18)
APTT PPP: 120 SEC (ref 23–36.4)
APTT PPP: 77.1 SEC (ref 23–36.4)
BASOPHILS # BLD: 0 K/UL (ref 0–0.1)
BASOPHILS NFR BLD: 0 % (ref 0–2)
BUN SERPL-MCNC: 41 MG/DL (ref 7–18)
BUN/CREAT SERPL: 39 (ref 12–20)
CALCIUM SERPL-MCNC: 8 MG/DL (ref 8.5–10.1)
CHLORIDE SERPL-SCNC: 114 MMOL/L (ref 100–111)
CO2 SERPL-SCNC: 26 MMOL/L (ref 21–32)
CREAT SERPL-MCNC: 1.04 MG/DL (ref 0.6–1.3)
DIFFERENTIAL METHOD BLD: ABNORMAL
EOSINOPHIL # BLD: 0.2 K/UL (ref 0–0.4)
EOSINOPHIL NFR BLD: 2 % (ref 0–5)
ERYTHROCYTE [DISTWIDTH] IN BLOOD BY AUTOMATED COUNT: 15.4 % (ref 11.6–14.5)
GLUCOSE BLD STRIP.AUTO-MCNC: 116 MG/DL (ref 70–110)
GLUCOSE BLD STRIP.AUTO-MCNC: 128 MG/DL (ref 70–110)
GLUCOSE BLD STRIP.AUTO-MCNC: 137 MG/DL (ref 70–110)
GLUCOSE SERPL-MCNC: 137 MG/DL (ref 74–99)
HCT VFR BLD AUTO: 28.5 % (ref 36–48)
HGB BLD-MCNC: 8.7 G/DL (ref 13–16)
INR PPP: 1.4 (ref 0.8–1.2)
LYMPHOCYTES # BLD: 2.4 K/UL (ref 0.9–3.6)
LYMPHOCYTES NFR BLD: 22 % (ref 21–52)
MAGNESIUM SERPL-MCNC: 2.2 MG/DL (ref 1.6–2.6)
MCH RBC QN AUTO: 25.4 PG (ref 24–34)
MCHC RBC AUTO-ENTMCNC: 30.5 G/DL (ref 31–37)
MCV RBC AUTO: 83.3 FL (ref 74–97)
MONOCYTES # BLD: 0.8 K/UL (ref 0.05–1.2)
MONOCYTES NFR BLD: 7 % (ref 3–10)
NEUTS SEG # BLD: 7.4 K/UL (ref 1.8–8)
NEUTS SEG NFR BLD: 68 % (ref 40–73)
PHOSPHATE SERPL-MCNC: 2.3 MG/DL (ref 2.5–4.9)
PLATELET # BLD AUTO: 208 K/UL (ref 135–420)
PMV BLD AUTO: 12.2 FL (ref 9.2–11.8)
POTASSIUM SERPL-SCNC: 4.3 MMOL/L (ref 3.5–5.5)
PROTHROMBIN TIME: 16.6 SEC (ref 11.5–15.2)
RBC # BLD AUTO: 3.42 M/UL (ref 4.35–5.65)
SODIUM SERPL-SCNC: 144 MMOL/L (ref 136–145)
WBC # BLD AUTO: 10.8 K/UL (ref 4.6–13.2)

## 2021-05-07 PROCEDURE — 85730 THROMBOPLASTIN TIME PARTIAL: CPT

## 2021-05-07 PROCEDURE — 99232 SBSQ HOSP IP/OBS MODERATE 35: CPT | Performed by: INTERNAL MEDICINE

## 2021-05-07 PROCEDURE — 74011250637 HC RX REV CODE- 250/637: Performed by: INTERNAL MEDICINE

## 2021-05-07 PROCEDURE — 94668 MNPJ CHEST WALL SBSQ: CPT

## 2021-05-07 PROCEDURE — 85025 COMPLETE CBC W/AUTO DIFF WBC: CPT

## 2021-05-07 PROCEDURE — 74011000250 HC RX REV CODE- 250: Performed by: INTERNAL MEDICINE

## 2021-05-07 PROCEDURE — 83735 ASSAY OF MAGNESIUM: CPT

## 2021-05-07 PROCEDURE — 74011000250 HC RX REV CODE- 250: Performed by: PHYSICIAN ASSISTANT

## 2021-05-07 PROCEDURE — 74011250636 HC RX REV CODE- 250/636: Performed by: INTERNAL MEDICINE

## 2021-05-07 PROCEDURE — 94762 N-INVAS EAR/PLS OXIMTRY CONT: CPT

## 2021-05-07 PROCEDURE — 85610 PROTHROMBIN TIME: CPT

## 2021-05-07 PROCEDURE — 94640 AIRWAY INHALATION TREATMENT: CPT

## 2021-05-07 PROCEDURE — 77010033678 HC OXYGEN DAILY

## 2021-05-07 PROCEDURE — 97110 THERAPEUTIC EXERCISES: CPT

## 2021-05-07 PROCEDURE — 80069 RENAL FUNCTION PANEL: CPT

## 2021-05-07 PROCEDURE — 74011250637 HC RX REV CODE- 250/637: Performed by: PHYSICIAN ASSISTANT

## 2021-05-07 PROCEDURE — 36415 COLL VENOUS BLD VENIPUNCTURE: CPT

## 2021-05-07 PROCEDURE — 82962 GLUCOSE BLOOD TEST: CPT

## 2021-05-07 PROCEDURE — 2709999900 HC NON-CHARGEABLE SUPPLY

## 2021-05-07 PROCEDURE — P9047 ALBUMIN (HUMAN), 25%, 50ML: HCPCS | Performed by: INTERNAL MEDICINE

## 2021-05-07 PROCEDURE — 74011250636 HC RX REV CODE- 250/636: Performed by: PHYSICIAN ASSISTANT

## 2021-05-07 PROCEDURE — 65660000004 HC RM CVT STEPDOWN

## 2021-05-07 PROCEDURE — 94761 N-INVAS EAR/PLS OXIMETRY MLT: CPT

## 2021-05-07 RX ORDER — LORAZEPAM 0.5 MG/1
0.5 TABLET ORAL
Status: DISCONTINUED | OUTPATIENT
Start: 2021-05-07 | End: 2021-05-13 | Stop reason: HOSPADM

## 2021-05-07 RX ORDER — IPRATROPIUM BROMIDE AND ALBUTEROL SULFATE 2.5; .5 MG/3ML; MG/3ML
3 SOLUTION RESPIRATORY (INHALATION)
Status: DISCONTINUED | OUTPATIENT
Start: 2021-05-07 | End: 2021-05-13 | Stop reason: HOSPADM

## 2021-05-07 RX ORDER — WARFARIN SODIUM 5 MG/1
5 TABLET ORAL EVERY EVENING
Status: DISCONTINUED | OUTPATIENT
Start: 2021-05-07 | End: 2021-05-08

## 2021-05-07 RX ORDER — ALBUMIN HUMAN 250 G/1000ML
25 SOLUTION INTRAVENOUS EVERY 6 HOURS
Status: COMPLETED | OUTPATIENT
Start: 2021-05-07 | End: 2021-05-07

## 2021-05-07 RX ADMIN — SODIUM CHLORIDE SOLN NEBU 3% 4 ML: 3 NEBU SOLN at 20:41

## 2021-05-07 RX ADMIN — DIBASIC SODIUM PHOSPHATE, MONOBASIC POTASSIUM PHOSPHATE AND MONOBASIC SODIUM PHOSPHATE 2 TABLET: 852; 155; 130 TABLET ORAL at 17:17

## 2021-05-07 RX ADMIN — HEPARIN SODIUM 1196 UNITS/HR: 10000 INJECTION, SOLUTION INTRAVENOUS at 07:05

## 2021-05-07 RX ADMIN — FAMOTIDINE 20 MG: 20 TABLET ORAL at 09:25

## 2021-05-07 RX ADMIN — LORAZEPAM 1 MG: 1 TABLET ORAL at 09:25

## 2021-05-07 RX ADMIN — ARFORMOTEROL TARTRATE 15 MCG: 15 SOLUTION RESPIRATORY (INHALATION) at 20:41

## 2021-05-07 RX ADMIN — Medication 30 ML: at 09:25

## 2021-05-07 RX ADMIN — ALBUMIN (HUMAN) 25 G: 0.25 INJECTION, SOLUTION INTRAVENOUS at 13:53

## 2021-05-07 RX ADMIN — DIBASIC SODIUM PHOSPHATE, MONOBASIC POTASSIUM PHOSPHATE AND MONOBASIC SODIUM PHOSPHATE 1 TABLET: 852; 155; 130 TABLET ORAL at 00:23

## 2021-05-07 RX ADMIN — IPRATROPIUM BROMIDE AND ALBUTEROL SULFATE 3 ML: .5; 3 SOLUTION RESPIRATORY (INHALATION) at 16:37

## 2021-05-07 RX ADMIN — WARFARIN SODIUM 5 MG: 5 TABLET ORAL at 17:20

## 2021-05-07 RX ADMIN — LEVOFLOXACIN 500 MG: 5 INJECTION, SOLUTION INTRAVENOUS at 13:54

## 2021-05-07 RX ADMIN — ACETAMINOPHEN ORAL SOLUTION 500 MG: 650 SOLUTION ORAL at 03:59

## 2021-05-07 RX ADMIN — IPRATROPIUM BROMIDE AND ALBUTEROL SULFATE 3 ML: .5; 3 SOLUTION RESPIRATORY (INHALATION) at 00:14

## 2021-05-07 RX ADMIN — DIBASIC SODIUM PHOSPHATE, MONOBASIC POTASSIUM PHOSPHATE AND MONOBASIC SODIUM PHOSPHATE 2 TABLET: 852; 155; 130 TABLET ORAL at 13:54

## 2021-05-07 RX ADMIN — ALBUMIN (HUMAN) 25 G: 0.25 INJECTION, SOLUTION INTRAVENOUS at 17:17

## 2021-05-07 RX ADMIN — Medication 100 MG: at 09:25

## 2021-05-07 RX ADMIN — SODIUM CHLORIDE SOLN NEBU 3% 4 ML: 3 NEBU SOLN at 08:28

## 2021-05-07 RX ADMIN — IPRATROPIUM BROMIDE AND ALBUTEROL SULFATE 3 ML: .5; 3 SOLUTION RESPIRATORY (INHALATION) at 04:02

## 2021-05-07 RX ADMIN — IPRATROPIUM BROMIDE AND ALBUTEROL SULFATE 3 ML: .5; 3 SOLUTION RESPIRATORY (INHALATION) at 08:27

## 2021-05-07 RX ADMIN — ARFORMOTEROL TARTRATE 15 MCG: 15 SOLUTION RESPIRATORY (INHALATION) at 08:27

## 2021-05-07 RX ADMIN — VANCOMYCIN HYDROCHLORIDE 2500 MG: 1 INJECTION, POWDER, LYOPHILIZED, FOR SOLUTION INTRAVENOUS at 12:33

## 2021-05-07 RX ADMIN — IPRATROPIUM BROMIDE AND ALBUTEROL SULFATE 3 ML: .5; 3 SOLUTION RESPIRATORY (INHALATION) at 20:41

## 2021-05-07 NOTE — CONSULTS
Infectious Disease Consultation Note        Reason: sepsis, gram positive bacteremia    Current abx Prior abx   Levofloxacin since 5/5  Vancomycin since 5/7 Meropenem 4/19-4/26  Zosyn 4/11-4/19  unaysn 4/4  Vancomycin 4/5-4/6     Lines:       Assessment :    79 y.o. male  with history of obesity, type 2 diabetes, hypertension presented presented to ED on 4/4/2021 with rapidly progressive respiratory distress due to angioedema and airway swelling presumed due to ACE inhibitor therapy. acute hypoxic respiratory failure due to angioedema s/p emergent cricothyrotomy on 4/4 followed by tracheostomy on 4/26/21- improved    sputum cultures were sent on 4/9, 4/10- Enterobacter resistant to cefoxitin, cefazolin. sputum culture 4/16 revealed Enterobacter resistant to piperacillin/tazobactam.      Sputum culture 4/30, 5/1 revealed Enterobacter resistant to piperacillin/tazobactam, cefoxitin, ceftazidime, cefazolin. Now with fever-T-max 101.8, leukocytosis with WBC 17 K on 5/5/2021, gram-positive bacteremia    Clinical presentation consistent with sepsis-developed after admission likely due to recurrent aspiration pneumonia    Single positive blood culture for gram-positive cocci on 5/5-could be contaminant. Will need to follow-up identification of gram-positive cocci and repeat blood cultures to determine this    Patient seems to be responding to levofloxacin therapy    Recurrent acute kidney injury-creatinine 1.7 on 5/4 could be due to sepsis-improving    Recommendations:    1. Continue levofloxacin, vancomycin  2. Repeat blood culture today  3. Discontinue vancomycin if coagulase-negative Staphylococcus identified from blood culture 5/5  4. Monitor CBC, temperature, clinically        Thank you for consultation request. Above plan was discussed in details with patient, RN and dr Zander Shepard. Please call me if any further questions or concerns.  Will continue to participate in the care of this patient. HPI:    79 y.o. male  with history of obesity, type 2 diabetes, hypertension presented presented to ED on 4/4/2021 with rapidly progressive respiratory distress due to angioedema and airway swelling presumed due to ACE inhibitor therapy.     ED provider unable to intubate due to severity of airway swelling. Brief cardio-pulmonary collapse with brief loss of pulse. s/p emergent tracheostomy. CXR notable for bilateral opacities. Suspect the later due to aspiration of blood and /or airway secretions. Patient was treated with piperacillin/tazobactam for aspiration pneumonia. Patient was noted to have persistent leukocytosis. Repeat sputum cultures were sent on 4/9, 4/10. It revealed Enterobacter resistant to cefoxitin, cefazolin. Repeat sputum culture 4/16 revealed Enterobacter resistant to piperacillin/tazobactam.  Antibiotics were switched to meropenem. It was completed on 4/26. Sputum culture 4/30, 5/1 revealed Enterobacter resistant to piperacillin/tazobactam, cefoxitin, ceftazidime, cefazolin. Intermediate susceptibility to ceftriaxone. On 5/5 patient was noted to have fever with T-max 101.8, worsening leukocytosis with WBC count 17 K. He was started on levofloxacin. Blood cultures were obtained. 1 set of blood culture now positive for gram-positive cocci. I have been consulted for further recommendations. Patient is unable to verbalize. Communicates by lip movements and nodding his head. Denies increasing chest pain, shortness of breath. Complains of some abdominal discomfort. Detailed review of systems not feasible since patient unable to verbalize            Past Medical History:   Diagnosis Date    DDD (degenerative disc disease), lumbar     Diabetes ([address]     Diabetic neuropathy ([address]     Diabetic retinopathy ([address]     DM2 (diabetes mellitus, type 2) ([address]     HLD (hyperlipidemia)     HTN (hypertension)     Obesity (BMI 30-39. 9)        History reviewed.  No pertinent surgical history. home Medication List    Details   LISINOPRIL, BULK, by Does Not Apply route. Current Facility-Administered Medications   Medication Dose Route Frequency    thiamine HCL (B-1) tablet 100 mg  100 mg Per G Tube DAILY    levoFLOXacin (LEVAQUIN) 500 mg in D5W IVPB  500 mg IntraVENous Q24H    warfarin - physician to dose   Other Q24H    famotidine (PEPCID) tablet 20 mg  20 mg Per G Tube DAILY    multivit-folic acid-herbal 422 (WELLESSE PLUS) oral liquid 30 mL  30 mL Per G Tube DAILY    atorvastatin (LIPITOR) tablet 80 mg  80 mg Per G Tube QHS    polyethylene glycol (MIRALAX) packet 17 g  17 g Per G Tube DAILY    LORazepam (ATIVAN) tablet 1 mg  1 mg Per G Tube Q6H PRN    sodium chloride 3% hypertonic nebulizer soln  4 mL Nebulization BID RT    albuterol-ipratropium (DUO-NEB) 2.5 MG-0.5 MG/3 ML  3 mL Nebulization Q4H RT    heparin 25,000 units in D5W 250 ml infusion  9.8-25 Units/kg/hr IntraVENous TITRATE    acetaminophen (TYLENOL) solution 500 mg  500 mg Oral Q4H PRN    oxymetazoline (AFRIN) 0.05 % nasal spray 2 Spray  2 Spray Other BID PRN    arformoteroL (BROVANA) neb solution 15 mcg  15 mcg Nebulization BID RT    glucose chewable tablet 16 g  4 Tab Oral PRN    glucagon (GLUCAGEN) injection 1 mg  1 mg IntraMUSCular PRN    dextrose (D50W) injection syrg 12.5-25 g  25-50 mL IntraVENous PRN    insulin lispro (HUMALOG) injection   SubCUTAneous Q6H    carboxymethylcellulose sodium (REFRESH LIQUIGEL) 1 % ophthalmic solution 1 Drop  1 Drop Both Eyes PRN       Allergies: Lisinopril    History reviewed. No pertinent family history.   Social History     Socioeconomic History    Marital status:      Spouse name: Not on file    Number of children: Not on file    Years of education: Not on file    Highest education level: Not on file   Occupational History    Not on file   Social Needs    Financial resource strain: Not on file    Food insecurity     Worry: Not on file Inability: Not on file    Transportation needs     Medical: Not on file     Non-medical: Not on file   Tobacco Use    Smoking status: Current Every Day Smoker     Packs/day: 0.50   Substance and Sexual Activity    Alcohol use: Not on file    Drug use: Not on file    Sexual activity: Not on file   Lifestyle    Physical activity     Days per week: Not on file     Minutes per session: Not on file    Stress: Not on file   Relationships    Social connections     Talks on phone: Not on file     Gets together: Not on file     Attends [employer] service: Not on file     Active member of club or organization: Not on file     Attends meetings of clubs or organizations: Not on file     Relationship status: Not on file    Intimate partner violence     Fear of current or ex partner: Not on file     Emotionally abused: Not on file     Physically abused: Not on file     Forced sexual activity: Not on file   Other Topics Concern     Service Not Asked    Blood Transfusions Not Asked    Caffeine Concern Not Asked    Occupational Exposure Not Asked    Hobby Hazards Not Asked    Sleep Concern Not Asked    Stress Concern Not Asked    Weight Concern Not Asked    Special Diet Not Asked    Back Care Not Asked    Exercise Not Asked    Bike Helmet Not Asked   [address] Not Asked    Self-Exams Not Asked   Social History Narrative    Not on file     Social History     Tobacco Use   Smoking Status Current Every Day Smoker    Packs/day: 0.50        Temp (24hrs), Av.6 °F (37 °C), Min:97.9 °F (36.6 °C), Max:99.7 °F (37.6 °C)    Visit Vitals  /62 (BP 1 Location: Right upper arm, BP Patient Position: At rest)   Pulse 92   Temp 98.5 °F (36.9 °C)   Resp 29   Ht 5' 11\" (1.803 m)   Wt 98.8 kg (217 lb 13 oz)   SpO2 93%   BMI 30.38 kg/m²       ROS: Unable to obtain due to patient factors  Physical Exam:    General: Well developed, well nourished male laying on the bed, alert, in no acute distress.     General: awake alert and oriented   HEENT:  Normocephalic, atraumatic,  EOMI, no scleral icterus or pallor; no conjunctival hemmohage; Neck -tracheostomy in place, superficial ulcer at the tracheostomy site. Superficial ulcer right ear without any drainage   Lymph Nodes:   no cervical, axillary or inguinal adenopathy   Lungs:   non-labored, bilaterally clear to auscultation- no crackles wheezes rales or rhonchi   Heart:  RRR, s1 and s2; no  rubs or gallops, no edema, + pedal pulses   Abdomen:  soft, non-distended, active bowel sounds, no hepatomegaly, no splenomegaly. Appropriate surgical scars for stated surgeries. PEG tube in place. Mild lower abdominal tenderness   Genitourinary:  Texas cath in place   Extremities:   no clubbing, cyanosis; no joint effusions or swelling; ; muscle mass appropriate for age; deep tissue injury left heel-no ulcers noted   Neurologic:  Awake, moves both upper extremities and right leg. Motor strength is 2-3 out of 5 in left lower extremity.                         Skin:  No rash or ulcers noted   Wound:   Superficial ulcer right ear, deep tissue injury left heel, superficial skin damage in the gluteal fold     Back:  no spinal or paraspinal muscle tenderness or rigidity, no CVA tenderness     Psychiatric:   appropriate mood and affect         Labs: Results:   Chemistry Recent Labs     05/07/21  0351 05/06/21  0536 05/05/21  0310   * 135* 145*    144 145   K 4.3 4.2 4.3   * 113* 112*   CO2 26 25 25   BUN 41* 49* 61*   CREA 1.04 1.21 1.46*   CA 8.0* 8.3* 8.4*   AGAP 4 6 8   BUCR 39* 40* 42*   ALB 1.8* 1.9* 2.2*      CBC w/Diff Recent Labs     05/07/21  0351 05/06/21  0536 05/05/21  0310   WBC 10.8 15.8* 17.0*   RBC 3.42* 3.75* 3.89*   HGB 8.7* 9.6* 9.9*   HCT 28.5* 32.2* 33.0*    216 211   GRANS 68 78* 78*   LYMPH 22 15* 13*   EOS 2 0 0      Microbiology Recent Labs     05/05/21  1833 05/05/21  1430   CULT NO GROWTH 2 DAYS CULTURE IN PROGRESS,FURTHER UPDATES TO FOLLOW Sent to [facility] for ID/Susceptibility if indicated. RADIOLOGY:    All available imaging studies/reports in [facility] for this admission were reviewed      Disclaimer: Sections of this note are dictated utilizing voice recognition software, which may have resulted in some phonetic based errors in grammar and contents. Even though attempts were made to correct all the mistakes, some may have been missed, and remained in the body of the document. If questions arise, please contact our department.     Dr. Marcia Shine, Infectious Disease Specialist  [phone]  May 7, 2021  9:45 AM

## 2021-05-07 NOTE — PROGRESS NOTES
3455:  Agitated pulling at condom catheter. Denies pain or SOB. Repositioned for comfort. Ativan 1mg via PEG given. 1900:  Shift Summary:  Has been compliant with care through the day, follow commands and mouth words to communicate with staff and family visiting earlier. NSR. VSS. IV Heparin infusing per protocol. Tolerating tube feeds. HOB remains elevated with full fall and aspiration precautions in effect. Trach intact, c/d/i, thick white secretions through the day. Pulse ox sats 100% on 28% Trach Collar. Lungs have cleared through the day. Bedside and Verbal shift change report given to [facility] (oncoming nurse) by Brennon Serrano RN(offgoing nurse). Report included the following information SBAR, Kardex, Intake/Output, MAR, Accordion, Recent Results, Cardiac Rhythm NSR. and Alarm Parameters .

## 2021-05-07 NOTE — PROGRESS NOTES
Pharmacy Dosing Services: Vancomycin    Indication: Sepsis of Unknown Etiology    Day of therapy: 1    Other Antimicrobials (Include dose, start day & day of therapy):  Levofloxacin (Levaquin)    Loading dose (date given): 2500 mg   Current Maintenance dose: 1000 mg IV every 12 hours    Goal Vancomycin Level: Vancomycin Trough: 15 - 20 mcg/mL (most infections)    Vancomycin Level (if drawn): No results for input(s): Donavonmaxime Jen in the last 72 hours. Significant Cultures:   Results       Procedure Component Value Units Date/Time    CULTURE, BLOOD [[id number]] Collected: 05/05/21 1833    Order Status: Completed Specimen: Blood Updated: 05/07/21 0716     Special Requests: NO SPECIAL REQUESTS        Culture result: NO GROWTH 2 DAYS       CULTURE, BLOOD [[id number]] Collected: 05/05/21 1430    Order Status: Completed Specimen: Blood Updated: 05/07/21 0842     Special Requests: NO SPECIAL REQUESTS        GRAM STAIN       AEROBIC BOTTLE GRAM POSITIVE COCCI IN GROUPS                  SMEAR CALLED TO AND CORRECTLY REPEATED BY: KIMBERLY DOUGLAS RN  CVSD 5/7/21 0841 TO LAE           Culture result:       CULTURE IN PROGRESS,FURTHER UPDATES TO FOLLOW Sent to [facility] for ID/Susceptibility if indicated. COVID-19 RAPID TEST [[id number]] Collected: 05/02/21 1022    Order Status: Completed Specimen: Nasopharyngeal Updated: 05/02/21 1127     Specimen source Nasopharyngeal        COVID-19 rapid test Not detected        Comment: Rapid Abbott ID Now       Rapid NAAT:  The specimen is NEGATIVE for SARS-CoV-2, the novel coronavirus associated with COVID-19. Negative results should be treated as presumptive and, if inconsistent with clinical signs and symptoms or necessary for patient management, should be tested with an alternative molecular assay. Negative results do not preclude SARS-CoV-2 infection and should not be used as the sole basis for patient management decisions.        This test has been authorized by the FDA under an Emergency Use Authorization (EUA) for use by authorized laboratories.    Fact sheet for Healthcare Providers: ConventionUpdate.co.nz  Fact sheet for Patients: ConventionUpdate.co.nz       Methodology: Isothermal Nucleic Acid Amplification         CULTURE, RESPIRATORY/SPUTUM/BRONCH Demetra Hash STAIN [[id number]]  (Abnormal)  (Susceptibility) Collected: 05/01/21 1032    Order Status: Completed Specimen: Sputum from Tracheal Aspirate Updated: 05/04/21 1026     Special Requests: NO SPECIAL REQUESTS        GRAM STAIN RARE WBCS SEEN               RARE EPITHELIAL CELLS SEEN                  OCCASIONAL GRAM NEGATIVE COCCOBACILLI           Culture result:       HEAVY ENTEROBACTER AEROGENES                  NO NORMAL RESPIRATORY ANNALEE ISOLATED          Susceptibility        Enterobacter aerogenes     JOANNE     Amikacin ($) Susceptible     Cefazolin ($) Resistant     Cefepime ($$) Susceptible     Cefoxitin Resistant     Ceftazidime ($) Resistant     Ceftriaxone ($) Intermediate     Ciprofloxacin ($) Susceptible     Gentamicin ($) Susceptible     Levofloxacin ($) Susceptible     Meropenem ($$) Susceptible     Piperacillin/Tazobac ($) Resistant     Tobramycin ($) Susceptible     Trimeth/Sulfa Susceptible                      CULTURE, RESPIRATORY/SPUTUM/BRONCH Demetra Hash STAIN [[id number]]  (Abnormal)  (Susceptibility) Collected: 04/30/21 1245    Order Status: Completed Specimen: Sputum,ET Suction Updated: 05/03/21 1131     Special Requests: NO SPECIAL REQUESTS        GRAM STAIN 1+ WBCS SEEN               RARE EPITHELIAL CELLS SEEN                  1+ GRAM POSITIVE COCCI IN CHAINS                  OCCASIONAL GRAM NEGATIVE COCCOBACILLI           Culture result:       MODERATE ENTEROBACTER AEROGENES                  FEW NORMAL RESPIRATORY ANNALEE          Susceptibility        Enterobacter aerogenes     JOANNE     Amikacin ($) Susceptible     Cefazolin ($) Resistant     Cefepime ($$) Susceptible     Cefoxitin Resistant     Ceftazidime ($) Resistant     Ceftriaxone ($) Intermediate     Ciprofloxacin ($) Susceptible     Gentamicin ($) Susceptible     Levofloxacin ($) Susceptible     Meropenem ($$) Susceptible     Piperacillin/Tazobac ($) Resistant     Tobramycin ($) Susceptible     Trimeth/Sulfa Susceptible                      COVID-19 RAPID TEST [[id number]] Collected: 21 1214    Order Status: Completed Specimen: Nasopharyngeal Updated: 21 1237     Specimen source Nasopharyngeal        COVID-19 rapid test Not detected        Comment: Rapid Abbott ID Now       Rapid NAAT:  The specimen is NEGATIVE for SARS-CoV-2, the novel coronavirus associated with COVID-19. Negative results should be treated as presumptive and, if inconsistent with clinical signs and symptoms or necessary for patient management, should be tested with an alternative molecular assay. Negative results do not preclude SARS-CoV-2 infection and should not be used as the sole basis for patient management decisions. This test has been authorized by the FDA under an Emergency Use Authorization (EUA) for use by authorized laboratories. Fact sheet for Healthcare Providers: ConventionBrisk.iodate.co.nz  Fact sheet for Patients: ConventionBrisk.iodate.co.nz       Methodology: Isothermal Nucleic Acid Amplification                 Weight 98.8 kg (217 lb 13 oz)  Recent Labs     21  0351 21  0536 21  0310   CREA 1.04 1.21 1.46*   BUN 41* 49* 61*   WBC 10.8 15.8* 17.0*     Temp (72hrs), Av.5 °F (37.5 °C), Min:97.9 °F (36.6 °C), Max:101.9 °F (38.8 °C)    Estimated Creatinine Clearance Estimated Creatinine Clearance: 79.2 mL/min (based on SCr of 1.04 mg/dL).   Estimated Creatinine Clearance (using IBW): 70.4 mL/min       CAPD, Hemodialysis or Renal Replacement Therapy: N/A    Renal function stable? (unstable defined as SCr increase of 0.5 mg/dL or > 50% increase from baseline, whichever is greater) (Y/N): Y       Regimen assessment:   - new start vancomycin. Patient received LD of 2500 mg 5/7 @ 1230. Will obtain trough before 4th dose 5/8 @ 2330  - blood cultures with GPC in groups and fever  - renal function stable   Maintenance dose: 1000 mg IV every 12 hours  Next scheduled level: 5/8 @ 2330 (before 5/9 dose 0000)       Pharmacy will follow daily and adjust medications as appropriate for renal function and/or serum levels.     Thank you,  Ugo Norris, PHARMD  X [phone]

## 2021-05-07 NOTE — PROGRESS NOTES
Hospitalist Progress Note        Patient: King June               Sex: male          DOA: 4/4/2021       YOB: 1950      Age:  79 y.o.        LOS:  LOS: 33 days               Subjective:     Patient is more responsive today and asking whether he can get up and walk. Tolerating tube feeding per nurses. He is continues to require every 2 hour suctioning per nursing. No new event noted. HPI:  Patient was initially seen at [facility] and had emergent Crychothyrotomy during Cardiac arrest.  Initially there was concern that he had Angioedema. He was stabilized and flown to [facility] for ongoing management. Mr Savita Krueger has required prolonged support on the mechanical ventilator for ongoing respiratory failure. He has had Tracheostomy via ENT and he continues with Tracheostomy collar currently which he is tolerating well. Mr Savita Krueger had cardiac arrest initially, but he has stable hemodynamics currently. Mr Savita Krueger was treated for sepsis and has received a full course of antibiotics, he is off antibiotics currently. He has limited interaction, he does not have seizure activity. He has had sedation to maintain the ventilator which he no longer needs. He has tracheostomy, but he currently does not have a PEG. He has ongoing NG tube and continues with NG tube feeding. NG tube was removed and PEG tube was placed on May 3, 2021. Tolerating tube feeding since then.     Objective:      /62 (BP 1 Location: Right upper arm, BP Patient Position: At rest)   Pulse 92   Temp 98.5 °F (36.9 °C)   Resp 29   Ht 5' 11\" (1.803 m)   Wt 98.8 kg (217 lb 13 oz)   SpO2 93%   BMI 30.38 kg/m²       Intake/Output Summary (Last 24 hours) at 5/7/2021 0956  Last data filed at 5/7/2021 0145  Gross per 24 hour   Intake 300 ml   Output 575 ml   Net -275 ml     General: Awake, follows commands, not in acute distress  Neck: Tracheostomy is in situ with dressing  Lungs: Diminished breath sound bibasilar without wheezes  Heart:  Regular Rate and Rhythm. Abdomen:  Soft, nondistended, bowel sounds present, PEG tube is present  Extremities: No edema present  Neuro: Awake, moves both upper extremities and right leg. Motor strength is 2-3 out of 5 in left lower extremity. Assessment/Plan     ASSESSMENT:    1. Reportedly acute hypoxic respiratory failure due to angioedema s/p emergent cricothyrotomy followed by tracheostomy. Better. 2.  Tracheostomy site bleeding, resolved. 3.  Status post respiratory failure cardiac arrest  4. Chronic hypoxic respiratory failure status post trach and on trach collar  5. Bilateral pulmonary infiltrates  6. Oropharyngeal dysphagia status post G tube  7. Left leg popliteal DVT and pulmonary embolism  8. Reportedly acute metabolic encephalopathy  9. 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 JACQUELYN, recurrent -better today  11. Left pneumothorax resolved  12. Diabetic neuropathy  13. Obesity  14. Fever and leukocytosis, resolved  15. GPC bacteremia    PLAN:    Continue Coumadin and heparin drip, will stop heparin drip once INR is more than 2  Blood culture reviewed, ID consulted, will repeat the culture again  Continue levofloxacin and add vancomycin  Continue current tube feeding per dietitian  Continue statin and check CK  Pulmonologist to follow this patient for tracheostomy care  Continue trach collar and nebulizer  Continue multivitamin, thiamine  PT and OT to follow this patient    Discussed with patient's sister over the phone and updated her about current treatment plan. Total time to take care of this patient was 25 minutes and more than 50% of time was spent counseling and coordinating care. Disclaimer: Sections of this note are dictated using utilizing voice recognition software, which may have resulted in some phonetic based errors in grammar and contents.  Even though attempts were made to correct all the mistakes, some may have been missed, and remained in the body of the document. If questions arise, please contact our department. Recent Results (from the past 24 hour(s))   PTT    Collection Time: 05/06/21  7:40 PM   Result Value Ref Range    aPTT 103.2 (H) 23.0 - 36.4 SEC   GLUCOSE, POC    Collection Time: 05/06/21  8:16 PM   Result Value Ref Range    Glucose (POC) 131 (H) 70 - 110 mg/dL   GLUCOSE, POC    Collection Time: 05/06/21 11:57 PM   Result Value Ref Range    Glucose (POC) 142 (H) 70 - 110 mg/dL   MAGNESIUM    Collection Time: 05/07/21  3:51 AM   Result Value Ref Range    Magnesium 2.2 1.6 - 2.6 mg/dL   CBC WITH AUTOMATED DIFF    Collection Time: 05/07/21  3:51 AM   Result Value Ref Range    WBC 10.8 4.6 - 13.2 K/uL    RBC 3.42 (L) 4.35 - 5.65 M/uL    HGB 8.7 (L) 13.0 - 16.0 g/dL    HCT 28.5 (L) 36.0 - 48.0 %    MCV 83.3 74.0 - 97.0 FL    MCH 25.4 24.0 - 34.0 PG    MCHC 30.5 (L) 31.0 - 37.0 g/dL    RDW 15.4 (H) 11.6 - 14.5 %    PLATELET 149 932 - 179 K/uL    MPV 12.2 (H) 9.2 - 11.8 FL    NEUTROPHILS 68 40 - 73 %    LYMPHOCYTES 22 21 - 52 %    MONOCYTES 7 3 - 10 %    EOSINOPHILS 2 0 - 5 %    BASOPHILS 0 0 - 2 %    ABS. NEUTROPHILS 7.4 1.8 - 8.0 K/UL    ABS. LYMPHOCYTES 2.4 0.9 - 3.6 K/UL    ABS. MONOCYTES 0.8 0.05 - 1.2 K/UL    ABS. EOSINOPHILS 0.2 0.0 - 0.4 K/UL    ABS.  BASOPHILS 0.0 0.0 - 0.1 K/UL    DF AUTOMATED     PTT    Collection Time: 05/07/21  3:51 AM   Result Value Ref Range    aPTT 120.0 (H) 23.0 - 36.4 SEC   RENAL FUNCTION PANEL    Collection Time: 05/07/21  3:51 AM   Result Value Ref Range    Sodium 144 136 - 145 mmol/L    Potassium 4.3 3.5 - 5.5 mmol/L    Chloride 114 (H) 100 - 111 mmol/L    CO2 26 21 - 32 mmol/L    Anion gap 4 3.0 - 18 mmol/L    Glucose 137 (H) 74 - 99 mg/dL    BUN 41 (H) 7.0 - 18 MG/DL    Creatinine 1.04 0.6 - 1.3 MG/DL    BUN/Creatinine ratio 39 (H) 12 - 20      GFR est AA >60 >60 ml/min/1.73m2    GFR est non-AA >60 >60 ml/min/1.73m2    Calcium 8.0 (L) 8.5 - 10.1 MG/DL    Phosphorus 2.3 (L) 2.5 - 4.9 MG/DL    Albumin 1.8 (L) 3.4 - 5.0 g/dL   PROTHROMBIN TIME + INR    Collection Time: 05/07/21  3:51 AM   Result Value Ref Range    Prothrombin time 16.6 (H) 11.5 - 15.2 sec    INR 1.4 (H) 0.8 - 1.2     GLUCOSE, POC    Collection Time: 05/07/21  5:25 AM   Result Value Ref Range    Glucose (POC) 137 (H) 70 - 110 mg/dL

## 2021-05-07 NOTE — PROGRESS NOTES
Problem: Mobility Impaired (Adult and Pediatric)  Goal: *Acute Goals and Plan of Care (Insert Text)  Description: Physical Therapy Goals  Initiated 4/28/2021, re-evaluated 5.5.21  and to be accomplished within 7 day(s)  1. Patient will move from supine to sit and sit to supine , scoot up and down, and roll side to side in bed with maximal assistance. 2.  Patient will sit on EOB with maxA for >5 minutes in prep for OOB activities. 3. Pt will participate in TE in supine for LEs. PLOF: Pt unable to verbalize PLOF. , per RN independent prior to admission     Outcome: Progressing Towards Goal     PHYSICAL THERAPY TREATMENT    Patient: Louis Hamm (10 y.o. male)  Date: 5/7/2021  Diagnosis: Angioedema due to angiotensin converting enzyme inhibitor (ACE-I) [T78. 3XXA, T46.4X5A]  Acute respiratory failure with hypoxia (HCC) [J96.01]  DVT (deep vein thrombosis) in pregnancy [O22.30]  Acute encephalopathy [G93.40] Respiratory failure (HCC)  Procedure(s) (LRB):  PERCUTANEOUS ENDOSCOPIC GASTROSTOMY TUBE INSERTION/ BEDSIDE (N/A) 4 Days Post-Op  Precautions: Fall, Skin    ASSESSMENT:  Pt found supine in bed, in NAD, trach collar in place, sleeping. He was bale to open eyes to sternal rub and remained nonverbal for PT session. AAROM/PROM exercises performed per flow sheet. Pt demonstrated spontaneous movement of all 4 extremities however limited AROM. UE flexion limited by elbow flexion, pt requires PROM for shoulder movement. Grimace noted with L knee flexion. He was elft with HOB elevated, R UE propped on pillow, and call bell nearby. Recommend d/c to SNF vs LTC. 2/3 day trial completed. Progression toward goals:   []      Improving appropriately and progressing toward goals  [x]      Improving slowly and progressing toward goals  []      Not making progress toward goals and plan of care will be adjusted     PLAN:  Patient continues to benefit from skilled intervention to address the above impairments. Continue treatment per established plan of care. Discharge Recommendations:  [facility] vs [facility]  Further Equipment Recommendations for Discharge:  N/A     SUBJECTIVE:   Patient is nonverbal    OBJECTIVE DATA SUMMARY:   Critical Behavior:  Neurologic State: Alert, Eyes open to stimulus  Orientation Level: Unable to verbalize  Cognition: Decreased command following  Safety/Judgement: Fall prevention    Therapeutic Exercises:   Pt was assisted with exercises per flow sheet      EXERCISE   Sets   Reps   Active Active Assist   Passive Self ROM   Comments   Ankle Pumps 1 5  [] [x] [x] []    Quad Sets/Glut Sets    [] [] [] [] Hold for 5 secs   Hamstring Sets   [] [] [] []    Short Arc Quads   [] [] [] []    Heel Slides 1 10 [] [x] [x] []    Straight Leg Raises   [] [] [] []    Hip Add   [] [] [] [] Hold for 5 secs, w/ pillow squeeze   Long Arc Quads   [] [] [] []    Seated Marching   [] [] [] []    Standing Marching   [] [] [] []    UE flexion 1 5 [] [x] [x] []        Pain:  Pain level pre-treatment: 0/10  Pain level post-treatment: 0/10   Pain Intervention(s): Medication (see MAR); Rest, Ice, Repositioning   Response to intervention: Nurse notified, See doc flow    Activity Tolerance:   Pt tolerated mobility poorly  Please refer to the flowsheet for vital signs taken during this treatment. After treatment:   [] Patient left in no apparent distress sitting up in chair  [x] Patient left in no apparent distress in bed  [x] Call bell left within reach  [x] Nursing notified  [] Caregiver present  [] Bed alarm activated  [] SCDs applied      COMMUNICATION/EDUCATION:   [x]         Role of Physical Therapy in the acute care setting. [x]         Fall prevention education was provided and the patient/caregiver indicated understanding. [x]         Patient/family have participated as able in working toward goals and plan of care. []         Patient/family agree to work toward stated goals and plan of care.   [] Patient understands intent and goals of therapy, but is neutral about his/her participation.   []         Patient is unable to participate in stated goals/plan of care: ongoing with therapy staff.  []         Other:        Vielka Beal   Time Calculation: 8 mins

## 2021-05-07 NOTE — PROGRESS NOTES
Respiratory Therapy Assessment Care Plan    Patient:  Beatrice Kwok 79 y.o. male 5/7/2021 8:38 AM    Angioedema due to angiotensin converting enzyme inhibitor (ACE-I) [T78. 3XXA, T46.4X5A]  Acute respiratory failure with hypoxia (HCC) [J96.01]  DVT (deep vein thrombosis) in pregnancy [O22.30]  Acute encephalopathy [G93.40]      Chest X-RAY:   Results from Hospital Encounter encounter on 04/04/21   XR CHEST PORT    Impression Persistent left basilar airspace disease, probably pneumonia in the appropriate  clinical setting. Follow-up recommended. XR CHEST PORT    Impression Stable exam. Stable bibasilar lung opacities. XR ABD (KUB)    Impression 1. Findings suggestive of mildly ileus    2. NG tube in place. Vital Signs:     Visit Vitals  /62 (BP 1 Location: Right upper arm, BP Patient Position: At rest)   Pulse 92   Temp 98.5 °F (36.9 °C)   Resp 29   Ht 5' 11\" (1.803 m)   Wt 98.8 kg (217 lb 13 oz)   SpO2 93%   BMI 30.38 kg/m²         Indications for treatment:  Trach patient post vent stay for airway obstruction / Trach to protect airway      Plan of care: T-Collar / Cough and clear / Medications Dorian Dave / Bia Kraft / Hytertonic saline  ?  Metaneb need and benefit      Goal:

## 2021-05-07 NOTE — PROGRESS NOTES
Just received call from [address] [place] [address] upheld denial. 2 options, find snf here that can take pt or have family appeal by filling out appeal forms . Kishor Goldman from [phone] states she will send forms to me, I will discuss with family on Monday.

## 2021-05-08 LAB
ALBUMIN SERPL-MCNC: 2.4 G/DL (ref 3.4–5)
ALBUMIN/GLOB SERPL: 0.4 {RATIO} (ref 0.8–1.7)
ALP SERPL-CCNC: 103 U/L (ref 45–117)
ALT SERPL-CCNC: 74 U/L (ref 16–61)
ANION GAP SERPL CALC-SCNC: 6 MMOL/L (ref 3–18)
APTT PPP: 106.5 SEC (ref 23–36.4)
AST SERPL-CCNC: 55 U/L (ref 10–38)
BACTERIA SPEC CULT: ABNORMAL
BASOPHILS # BLD: 0 K/UL (ref 0–0.1)
BASOPHILS NFR BLD: 0 % (ref 0–2)
BILIRUB SERPL-MCNC: 0.6 MG/DL (ref 0.2–1)
BUN SERPL-MCNC: 29 MG/DL (ref 7–18)
BUN/CREAT SERPL: 33 (ref 12–20)
CALCIUM SERPL-MCNC: 8.4 MG/DL (ref 8.5–10.1)
CHLORIDE SERPL-SCNC: 112 MMOL/L (ref 100–111)
CK SERPL-CCNC: 57 U/L (ref 39–308)
CO2 SERPL-SCNC: 27 MMOL/L (ref 21–32)
CREAT SERPL-MCNC: 0.87 MG/DL (ref 0.6–1.3)
DIFFERENTIAL METHOD BLD: ABNORMAL
EOSINOPHIL # BLD: 0.3 K/UL (ref 0–0.4)
EOSINOPHIL NFR BLD: 3 % (ref 0–5)
ERYTHROCYTE [DISTWIDTH] IN BLOOD BY AUTOMATED COUNT: 15.3 % (ref 11.6–14.5)
GLOBULIN SER CALC-MCNC: 6.5 G/DL (ref 2–4)
GLUCOSE BLD STRIP.AUTO-MCNC: 113 MG/DL (ref 70–110)
GLUCOSE BLD STRIP.AUTO-MCNC: 122 MG/DL (ref 70–110)
GLUCOSE BLD STRIP.AUTO-MCNC: 130 MG/DL (ref 70–110)
GLUCOSE SERPL-MCNC: 113 MG/DL (ref 74–99)
GRAM STN SPEC: ABNORMAL
GRAM STN SPEC: ABNORMAL
HCT VFR BLD AUTO: 30 % (ref 36–48)
HGB BLD-MCNC: 8.8 G/DL (ref 13–16)
INR PPP: 1.3 (ref 0.8–1.2)
LYMPHOCYTES # BLD: 2.4 K/UL (ref 0.9–3.6)
LYMPHOCYTES NFR BLD: 25 % (ref 21–52)
MAGNESIUM SERPL-MCNC: 2.2 MG/DL (ref 1.6–2.6)
MCH RBC QN AUTO: 25.1 PG (ref 24–34)
MCHC RBC AUTO-ENTMCNC: 29.3 G/DL (ref 31–37)
MCV RBC AUTO: 85.7 FL (ref 74–97)
MONOCYTES # BLD: 0.7 K/UL (ref 0.05–1.2)
MONOCYTES NFR BLD: 7 % (ref 3–10)
NEUTS SEG # BLD: 6.2 K/UL (ref 1.8–8)
NEUTS SEG NFR BLD: 64 % (ref 40–73)
PHOSPHATE SERPL-MCNC: 2.5 MG/DL (ref 2.5–4.9)
PLATELET # BLD AUTO: 228 K/UL (ref 135–420)
PMV BLD AUTO: 12.1 FL (ref 9.2–11.8)
POTASSIUM SERPL-SCNC: 4 MMOL/L (ref 3.5–5.5)
PROT SERPL-MCNC: 8.9 G/DL (ref 6.4–8.2)
PROTHROMBIN TIME: 16.3 SEC (ref 11.5–15.2)
RBC # BLD AUTO: 3.5 M/UL (ref 4.35–5.65)
SERVICE CMNT-IMP: ABNORMAL
SODIUM SERPL-SCNC: 145 MMOL/L (ref 136–145)
WBC # BLD AUTO: 9.6 K/UL (ref 4.6–13.2)

## 2021-05-08 PROCEDURE — 74011250636 HC RX REV CODE- 250/636: Performed by: PHYSICIAN ASSISTANT

## 2021-05-08 PROCEDURE — 77030018798 HC PMP KT ENTRL FED COVD -A

## 2021-05-08 PROCEDURE — 94669 MECHANICAL CHEST WALL OSCILL: CPT

## 2021-05-08 PROCEDURE — 94640 AIRWAY INHALATION TREATMENT: CPT

## 2021-05-08 PROCEDURE — 84100 ASSAY OF PHOSPHORUS: CPT

## 2021-05-08 PROCEDURE — 74011000250 HC RX REV CODE- 250: Performed by: INTERNAL MEDICINE

## 2021-05-08 PROCEDURE — 94668 MNPJ CHEST WALL SBSQ: CPT

## 2021-05-08 PROCEDURE — 74011000250 HC RX REV CODE- 250: Performed by: PHYSICIAN ASSISTANT

## 2021-05-08 PROCEDURE — 74011250636 HC RX REV CODE- 250/636: Performed by: INTERNAL MEDICINE

## 2021-05-08 PROCEDURE — 85730 THROMBOPLASTIN TIME PARTIAL: CPT

## 2021-05-08 PROCEDURE — 85610 PROTHROMBIN TIME: CPT

## 2021-05-08 PROCEDURE — 87040 BLOOD CULTURE FOR BACTERIA: CPT

## 2021-05-08 PROCEDURE — 74011250637 HC RX REV CODE- 250/637: Performed by: INTERNAL MEDICINE

## 2021-05-08 PROCEDURE — 65660000004 HC RM CVT STEPDOWN

## 2021-05-08 PROCEDURE — 99232 SBSQ HOSP IP/OBS MODERATE 35: CPT | Performed by: INTERNAL MEDICINE

## 2021-05-08 PROCEDURE — 82550 ASSAY OF CK (CPK): CPT

## 2021-05-08 PROCEDURE — 80053 COMPREHEN METABOLIC PANEL: CPT

## 2021-05-08 PROCEDURE — 82962 GLUCOSE BLOOD TEST: CPT

## 2021-05-08 PROCEDURE — 36415 COLL VENOUS BLD VENIPUNCTURE: CPT

## 2021-05-08 PROCEDURE — 85025 COMPLETE CBC W/AUTO DIFF WBC: CPT

## 2021-05-08 PROCEDURE — 83735 ASSAY OF MAGNESIUM: CPT

## 2021-05-08 PROCEDURE — 74011000250 HC RX REV CODE- 250: Performed by: NURSE PRACTITIONER

## 2021-05-08 PROCEDURE — 80202 ASSAY OF VANCOMYCIN: CPT

## 2021-05-08 PROCEDURE — 2709999900 HC NON-CHARGEABLE SUPPLY

## 2021-05-08 RX ORDER — WARFARIN SODIUM 5 MG/1
10 TABLET ORAL EVERY EVENING
Status: COMPLETED | OUTPATIENT
Start: 2021-05-08 | End: 2021-05-08

## 2021-05-08 RX ADMIN — ATORVASTATIN CALCIUM 80 MG: 40 TABLET, FILM COATED ORAL at 00:00

## 2021-05-08 RX ADMIN — LEVOFLOXACIN 500 MG: 5 INJECTION, SOLUTION INTRAVENOUS at 13:46

## 2021-05-08 RX ADMIN — HEPARIN SODIUM 1196 UNITS/HR: 10000 INJECTION, SOLUTION INTRAVENOUS at 04:02

## 2021-05-08 RX ADMIN — IPRATROPIUM BROMIDE AND ALBUTEROL SULFATE 3 ML: .5; 3 SOLUTION RESPIRATORY (INHALATION) at 20:37

## 2021-05-08 RX ADMIN — ARFORMOTEROL TARTRATE 15 MCG: 15 SOLUTION RESPIRATORY (INHALATION) at 20:31

## 2021-05-08 RX ADMIN — LORAZEPAM 0.5 MG: 0.5 TABLET ORAL at 16:48

## 2021-05-08 RX ADMIN — SODIUM CHLORIDE SOLN NEBU 3% 4 ML: 3 NEBU SOLN at 08:13

## 2021-05-08 RX ADMIN — WARFARIN SODIUM 10 MG: 5 TABLET ORAL at 18:00

## 2021-05-08 RX ADMIN — Medication 30 ML: at 09:52

## 2021-05-08 RX ADMIN — HEPARIN SODIUM 1196 UNITS/HR: 10000 INJECTION, SOLUTION INTRAVENOUS at 23:03

## 2021-05-08 RX ADMIN — Medication 100 MG: at 09:52

## 2021-05-08 RX ADMIN — VANCOMYCIN HYDROCHLORIDE 1000 MG: 1 INJECTION, POWDER, LYOPHILIZED, FOR SOLUTION INTRAVENOUS at 00:00

## 2021-05-08 RX ADMIN — ATORVASTATIN CALCIUM 80 MG: 40 TABLET, FILM COATED ORAL at 22:46

## 2021-05-08 RX ADMIN — VANCOMYCIN HYDROCHLORIDE 1000 MG: 1 INJECTION, POWDER, LYOPHILIZED, FOR SOLUTION INTRAVENOUS at 12:09

## 2021-05-08 RX ADMIN — SODIUM CHLORIDE SOLN NEBU 3% 4 ML: 3 NEBU SOLN at 20:31

## 2021-05-08 RX ADMIN — ARFORMOTEROL TARTRATE 15 MCG: 15 SOLUTION RESPIRATORY (INHALATION) at 08:13

## 2021-05-08 RX ADMIN — POLYETHYLENE GLYCOL 3350 17 G: 17 POWDER, FOR SOLUTION ORAL at 09:52

## 2021-05-08 RX ADMIN — FAMOTIDINE 20 MG: 20 TABLET ORAL at 09:52

## 2021-05-08 NOTE — PROGRESS NOTES
Problem: Falls - Risk of  Goal: *Absence of Falls  Description: Document Jahaira Boyd Fall Risk and appropriate interventions in the flowsheet.   Outcome: Progressing Towards Goal  Note: Fall Risk Interventions:       Mentation Interventions: Bed/chair exit alarm, Door open when patient unattended, More frequent rounding, Toileting rounds, Update white board, Room close to nurse's station    Medication Interventions: Bed/chair exit alarm    Elimination Interventions: Bed/chair exit alarm, Call light in reach, Toileting schedule/hourly rounds

## 2021-05-08 NOTE — PROGRESS NOTES
Bedside and Verbal shift change report given to Janell FATIMA (oncoming nurse) by Rose oTrres RN (offgoing nurse). Report included the following information SBAR, Kardex and Cardiac Rhythm NSR.     1015:Complete linen change and bathe with bath wipes, repositioned patient to right side, suctioned trach and changed split gauze at site. 1200:Changed feed tube set, started Vancomycin infusion. 1616:Patient with thick tracheal secretions, changed dressing to neck, re-positioned to right side. 1820:Emptied 300ml Stephanie urine from condom cath bag.

## 2021-05-08 NOTE — PROGRESS NOTES
0745: Bedside and Verbal shift change report given to KUSHAL Pérez and Manpreet Briceno, RN (oncoming nurse) by Rose Torres RN (offgoing nurse). Report included the following information SBAR, Kardex, [facility] and Cardiac Rhythm NSR.     1648: Pt requested something to help with anxiety. Administered PRN ativan through PEG tube. Pt tolerated well. Will continue to monitor. 1800: Rounded on patient who was sleeping. Administered scheduled routine medication through PEG tube. Site clean and intact. Pt stable other than elevated /112. Will continue to monitor. 1930: Bedside and Verbal shift change report given to Sumeet Dodge RN (oncoming nurse) by Spenser Suarez RN and Manpreet Briceno RN (offgoing nurse). Report included the following information SBAR, Kardex, MAR and Cardiac Rhythm NSR.

## 2021-05-08 NOTE — PROGRESS NOTES
Hospitalist Progress Note        Patient: Silvio Merlos               Sex: male          DOA: 4/4/2021       YOB: 1950      Age:  79 y.o.        LOS:  LOS: 34 days               Subjective:     Patient is awake and frustrated that he cannot get out of bed. Continues to have tracheal secretion and requiring frequent suctioning. Denies chest pain or abdominal pain by nodding his head. Tolerating tube feeding. HPI:  Patient was initially seen at [facility] and had emergent Crychothyrotomy during Cardiac arrest.  Initially there was concern that he had Angioedema. [facility]. Mr Ariana Aguero has required prolonged support on the mechanical ventilator for ongoing respiratory failure. He has had Tracheostomy via ENT and he continues with Tracheostomy collar currently which he is tolerating well. Mr Ariana Aguero had cardiac arrest initially, but he has stable hemodynamics currently. Mr Ariana Aguero was treated for sepsis and has received a full course of antibiotics, he is off antibiotics currently. He has limited interaction, he does not have seizure activity. He has had sedation to maintain the ventilator which he no longer needs. He has tracheostomy, but he currently does not have a PEG. He has ongoing NG tube and continues with NG tube feeding. NG tube was removed and PEG tube was placed on May 3, 2021. Tolerating tube feeding since then.     Objective:      BP (!) 147/98   Pulse 82   Temp 98.6 °F (37 °C)   Resp 21   Ht 5' 11\" (1.803 m)   Wt 99.3 kg (219 lb)   SpO2 99%   BMI 30.54 kg/m²       Intake/Output Summary (Last 24 hours) at 5/8/2021 1555  Last data filed at 5/8/2021 1346  Gross per 24 hour   Intake 1492.4 ml   Output 2350 ml   Net -857.6 ml     General: Awake, follows commands, not in acute distress  Neck: Tracheostomy is in situ with dressing  Lungs: Diminished breath sound bibasilar without wheezes  Heart: Regular Rate and Rhythm. Abdomen:  Soft, nondistended, bowel sounds present, PEG tube is present  Extremities: No edema present  Neuro: Awake, moves both upper extremities and right leg. Motor strength is 2-3 out of 5 in left lower extremity. Assessment/Plan     ASSESSMENT:    1. Reportedly acute hypoxic respiratory failure due to angioedema s/p emergent cricothyrotomy followed by tracheostomy. Better. 2.  Tracheostomy site bleeding, resolved. 3.  Status post respiratory failure cardiac arrest  4. Chronic hypoxic respiratory failure status post trach and on trach collar  5. Bilateral pulmonary infiltrates  6. Oropharyngeal dysphagia status post G tube  7. Left leg popliteal DVT and pulmonary embolism  8. Reportedly acute metabolic encephalopathy  9. 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 JACQUELYN, recurrent -resolved currently  11. Left pneumothorax resolved  12. Diabetic neuropathy  13. Obesity  14. Fever and leukocytosis, resolved  15. GPC bacteremia most likely contamination    PLAN:    Continue Coumadin and heparin drip, will stop heparin drip once INR is more than 2  ID to follow  Continue Levaquin and vancomycin until repeat blood culture report is back  Continue current tube feeding per dietitian  Continue statin as patient has normal CK  Pulmonologist to follow this patient intermittently for tracheostomy care  Continue trach collar and nebulizer  Continue multivitamin, thiamine  PT and OT to follow this patient    Discussed with patient's sister over the phone and updated her about current treatment plan. Total time to take care of this patient was 25 minutes and more than 50% of time was spent counseling and coordinating care. Disclaimer: Sections of this note are dictated using utilizing voice recognition software, which may have resulted in some phonetic based errors in grammar and contents.  Even though attempts were made to correct all the mistakes, some may have been missed, and remained in the body of the document. If questions arise, please contact our department. Recent Results (from the past 24 hour(s))   GLUCOSE, POC    Collection Time: 05/07/21  5:58 PM   Result Value Ref Range    Glucose (POC) 116 (H) 70 - 110 mg/dL   GLUCOSE, POC    Collection Time: 05/08/21 12:20 AM   Result Value Ref Range    Glucose (POC) 130 (H) 70 - 110 mg/dL   MAGNESIUM    Collection Time: 05/08/21  5:41 AM   Result Value Ref Range    Magnesium 2.2 1.6 - 2.6 mg/dL   PHOSPHORUS    Collection Time: 05/08/21  5:41 AM   Result Value Ref Range    Phosphorus 2.5 2.5 - 4.9 MG/DL   CBC WITH AUTOMATED DIFF    Collection Time: 05/08/21  5:41 AM   Result Value Ref Range    WBC 9.6 4.6 - 13.2 K/uL    RBC 3.50 (L) 4.35 - 5.65 M/uL    HGB 8.8 (L) 13.0 - 16.0 g/dL    HCT 30.0 (L) 36.0 - 48.0 %    MCV 85.7 74.0 - 97.0 FL    MCH 25.1 24.0 - 34.0 PG    MCHC 29.3 (L) 31.0 - 37.0 g/dL    RDW 15.3 (H) 11.6 - 14.5 %    PLATELET 124 944 - 738 K/uL    MPV 12.1 (H) 9.2 - 11.8 FL    NEUTROPHILS 64 40 - 73 %    LYMPHOCYTES 25 21 - 52 %    MONOCYTES 7 3 - 10 %    EOSINOPHILS 3 0 - 5 %    BASOPHILS 0 0 - 2 %    ABS. NEUTROPHILS 6.2 1.8 - 8.0 K/UL    ABS. LYMPHOCYTES 2.4 0.9 - 3.6 K/UL    ABS. MONOCYTES 0.7 0.05 - 1.2 K/UL    ABS. EOSINOPHILS 0.3 0.0 - 0.4 K/UL    ABS.  BASOPHILS 0.0 0.0 - 0.1 K/UL    DF AUTOMATED     PTT    Collection Time: 05/08/21  5:41 AM   Result Value Ref Range    aPTT 106.5 (H) 23.0 - 36.4 SEC   PROTHROMBIN TIME + INR    Collection Time: 05/08/21  5:41 AM   Result Value Ref Range    Prothrombin time 16.3 (H) 11.5 - 15.2 sec    INR 1.3 (H) 0.8 - 1.2     METABOLIC PANEL, COMPREHENSIVE    Collection Time: 05/08/21  5:41 AM   Result Value Ref Range    Sodium 145 136 - 145 mmol/L    Potassium 4.0 3.5 - 5.5 mmol/L    Chloride 112 (H) 100 - 111 mmol/L    CO2 27 21 - 32 mmol/L    Anion gap 6 3.0 - 18 mmol/L    Glucose 113 (H) 74 - 99 mg/dL    BUN 29 (H) 7.0 - 18 MG/DL    Creatinine 0.87 0.6 - 1.3 MG/DL BUN/Creatinine ratio 33 (H) 12 - 20      GFR est AA >60 >60 ml/min/1.73m2    GFR est non-AA >60 >60 ml/min/1.73m2    Calcium 8.4 (L) 8.5 - 10.1 MG/DL    Bilirubin, total 0.6 0.2 - 1.0 MG/DL    ALT (SGPT) 74 (H) 16 - 61 U/L    AST (SGOT) 55 (H) 10 - 38 U/L    Alk.  phosphatase 103 45 - 117 U/L    Protein, total 8.9 (H) 6.4 - 8.2 g/dL    Albumin 2.4 (L) 3.4 - 5.0 g/dL    Globulin 6.5 (H) 2.0 - 4.0 g/dL    A-G Ratio 0.4 (L) 0.8 - 1.7     CK    Collection Time: 05/08/21  5:41 AM   Result Value Ref Range    CK 57 39 - 308 U/L   GLUCOSE, POC    Collection Time: 05/08/21 11:15 AM   Result Value Ref Range    Glucose (POC) 122 (H) 70 - 110 mg/dL

## 2021-05-08 NOTE — PROGRESS NOTES
ADULT PROTOCOL: JET AEROSOL ASSESSMENT    Patient  Jesus Stone     79 y.o.   male     5/7/2021  9:03 PM    Breath Sounds Pre Procedure:  Breath Sounds Bilateral: Coarse              Left Breath Sounds: Clear                        Right Breath Sounds: Clear    Breath Sounds Post Procedure: Breath Sounds Bilateral: Coarse                          Left Breath Sounds: Diminished, Coarse               Right Breath Sounds: Coarse, Diminished    Breathing pattern: Pre procedure  Breathing Pattern: Regular          Post procedure  Breathing Pattern: Regular    Cough: Pre procedure  Cough: Productive, Congested, Hacking, Strong               Post procedure Cough: Productive, Congested, Hacking, Strong    Heart Rate: Pre procedure Pulse: 89           Post procedure Pulse: 92    Resp Rate: Pre procedure  Respirations: 24           Post procedure          Nebulizer Therapy: Current medications Aerosolized Medications: Brovana       Problem List:   Patient Active Problem List   Diagnosis Code    Angioedema due to angiotensin converting enzyme inhibitor (ACE-I) T78. 3XXA, [id number]    Respiratory failure ([facility] J96.90    JACQUELYN (acute kidney injury) ([facility] N17.9    Cardiac arrest (HCC) I46.9    Obesity (BMI 30-39. 9) E66.9    Diabetic neuropathy associated with type 2 diabetes mellitus ([facility] E11.40    Diabetic retinopathy associated with type 2 diabetes mellitus ([facility] E11.319    Pulmonary infiltrates R91.8    Controlled type 2 diabetes mellitus with neurologic complication, without long-term current use of insulin ([facility]) E11.49    DVT (deep venous thrombosis) ([facility] I82.409    Encounter for palliative care Z51.5    Goals of care, counseling/discussion Z71.89    Multiple subsegmental pulmonary emboli without acute cor pulmonale (HCC) I26.94    DVT (deep vein thrombosis) in pregnancy O22.30    Acute encephalopathy G93.40       Patient alert and cooperative to use MDI: No    Home Respiratory Therapy Regimen/Frequency: NO  Medication   Device  Frequency     SEVERITY INDEX:    ITEM 0 1 2 3 4 Score   Respiratory Pattern and or Rate Regular  10-19 Regular  20-24   24-30    30-34 Severe SOB or   Greater than 35 1   Breath Sounds Clear Occasional Wheeze Mild Wheezing Moderate Wheezing  wheezing/Absent breath sounds 0   Shortness of Breath None Dyspnea on Exertion Dyspnea at Rest Moderate Shortness of Breath at Rest Severe Shortness of Breath - Limited Speech 1       Total Score:  2    * Scoring Guidelines  0-4 pts:  PRN-BID   5-7 pts:  BID, TID, QID  8-9 pts:  TID, QID, Q6  10-12 pts:  Q4-Q6  * - Guidelines used with clinical judgement. PRN Treatments can be ordered to supplement scheduled treatments. Regardless of score, frequency should not be less than normal home regimen.     Recommended Order/Frequency:  Change duoneb HHN to PRN    Comments:          Respiratory Therapist: Brenda Olmedo, RT

## 2021-05-08 NOTE — PROGRESS NOTES
Heparin drip and feeding tube rates verified. Patient suctioned twice during shift. Tracheostomy care done frequently for copious sputum. Patient turned Q2 hours. Bedside and Verbal shift change report given to Nahomi Weber RN (oncoming nurse) by Dylon Albrecht RN(offgoing nurse). Report included the following information SBAR, Kardex, Intake/Output, MAR and Recent Results.

## 2021-05-09 ENCOUNTER — APPOINTMENT (OUTPATIENT)
Dept: VASCULAR SURGERY | Age: 71
DRG: 004 | End: 2021-05-09
Attending: INTERNAL MEDICINE
Payer: MEDICARE

## 2021-05-09 LAB
ANION GAP SERPL CALC-SCNC: 5 MMOL/L (ref 3–18)
APTT PPP: 138.4 SEC (ref 23–36.4)
APTT PPP: 80.5 SEC (ref 23–36.4)
BASOPHILS # BLD: 0.1 K/UL (ref 0–0.1)
BASOPHILS NFR BLD: 1 % (ref 0–2)
BUN SERPL-MCNC: 24 MG/DL (ref 7–18)
BUN/CREAT SERPL: 29 (ref 12–20)
CALCIUM SERPL-MCNC: 8.2 MG/DL (ref 8.5–10.1)
CHLORIDE SERPL-SCNC: 110 MMOL/L (ref 100–111)
CO2 SERPL-SCNC: 26 MMOL/L (ref 21–32)
CREAT SERPL-MCNC: 0.82 MG/DL (ref 0.6–1.3)
DATE LAST DOSE: NORMAL
DIFFERENTIAL METHOD BLD: ABNORMAL
EOSINOPHIL # BLD: 0.3 K/UL (ref 0–0.4)
EOSINOPHIL NFR BLD: 4 % (ref 0–5)
ERYTHROCYTE [DISTWIDTH] IN BLOOD BY AUTOMATED COUNT: 15.1 % (ref 11.6–14.5)
GLUCOSE BLD STRIP.AUTO-MCNC: 114 MG/DL (ref 70–110)
GLUCOSE BLD STRIP.AUTO-MCNC: 122 MG/DL (ref 70–110)
GLUCOSE BLD STRIP.AUTO-MCNC: 125 MG/DL (ref 70–110)
GLUCOSE BLD STRIP.AUTO-MCNC: 128 MG/DL (ref 70–110)
GLUCOSE SERPL-MCNC: 116 MG/DL (ref 74–99)
HCT VFR BLD AUTO: 29.4 % (ref 36–48)
HCT VFR BLD AUTO: 33.7 % (ref 36–48)
HGB BLD-MCNC: 10.1 G/DL (ref 13–16)
HGB BLD-MCNC: 8.9 G/DL (ref 13–16)
INR PPP: 1.4 (ref 0.8–1.2)
LYMPHOCYTES # BLD: 2.5 K/UL (ref 0.9–3.6)
LYMPHOCYTES NFR BLD: 28 % (ref 21–52)
MAGNESIUM SERPL-MCNC: 2.1 MG/DL (ref 1.6–2.6)
MCH RBC QN AUTO: 25.3 PG (ref 24–34)
MCHC RBC AUTO-ENTMCNC: 30 G/DL (ref 31–37)
MCV RBC AUTO: 84.5 FL (ref 74–97)
MONOCYTES # BLD: 0.6 K/UL (ref 0.05–1.2)
MONOCYTES NFR BLD: 6 % (ref 3–10)
NEUTS SEG # BLD: 5.5 K/UL (ref 1.8–8)
NEUTS SEG NFR BLD: 60 % (ref 40–73)
PHOSPHATE SERPL-MCNC: 2.6 MG/DL (ref 2.5–4.9)
PLATELET # BLD AUTO: 215 K/UL (ref 135–420)
PMV BLD AUTO: 11.9 FL (ref 9.2–11.8)
POTASSIUM SERPL-SCNC: 4.2 MMOL/L (ref 3.5–5.5)
PROTHROMBIN TIME: 16.5 SEC (ref 11.5–15.2)
RBC # BLD AUTO: 3.99 M/UL (ref 4.35–5.65)
REPORTED DOSE,DOSE: NORMAL UNITS
REPORTED DOSE/TIME,TMG: 1200
SODIUM SERPL-SCNC: 141 MMOL/L (ref 136–145)
VANCOMYCIN TROUGH SERPL-MCNC: 19.4 UG/ML (ref 10–20)
WBC # BLD AUTO: 9.1 K/UL (ref 4.6–13.2)

## 2021-05-09 PROCEDURE — 94668 MNPJ CHEST WALL SBSQ: CPT

## 2021-05-09 PROCEDURE — 74011250636 HC RX REV CODE- 250/636: Performed by: PHYSICIAN ASSISTANT

## 2021-05-09 PROCEDURE — 94640 AIRWAY INHALATION TREATMENT: CPT

## 2021-05-09 PROCEDURE — 93970 EXTREMITY STUDY: CPT

## 2021-05-09 PROCEDURE — 77010033678 HC OXYGEN DAILY

## 2021-05-09 PROCEDURE — C9113 INJ PANTOPRAZOLE SODIUM, VIA: HCPCS | Performed by: INTERNAL MEDICINE

## 2021-05-09 PROCEDURE — 65660000004 HC RM CVT STEPDOWN

## 2021-05-09 PROCEDURE — 94669 MECHANICAL CHEST WALL OSCILL: CPT

## 2021-05-09 PROCEDURE — 74011250637 HC RX REV CODE- 250/637: Performed by: INTERNAL MEDICINE

## 2021-05-09 PROCEDURE — 80048 BASIC METABOLIC PNL TOTAL CA: CPT

## 2021-05-09 PROCEDURE — 2709999900 HC NON-CHARGEABLE SUPPLY

## 2021-05-09 PROCEDURE — 85730 THROMBOPLASTIN TIME PARTIAL: CPT

## 2021-05-09 PROCEDURE — 74011000250 HC RX REV CODE- 250: Performed by: NURSE PRACTITIONER

## 2021-05-09 PROCEDURE — 83735 ASSAY OF MAGNESIUM: CPT

## 2021-05-09 PROCEDURE — 74011000250 HC RX REV CODE- 250: Performed by: INTERNAL MEDICINE

## 2021-05-09 PROCEDURE — 85025 COMPLETE CBC W/AUTO DIFF WBC: CPT

## 2021-05-09 PROCEDURE — 84100 ASSAY OF PHOSPHORUS: CPT

## 2021-05-09 PROCEDURE — 99232 SBSQ HOSP IP/OBS MODERATE 35: CPT | Performed by: INTERNAL MEDICINE

## 2021-05-09 PROCEDURE — 85014 HEMATOCRIT: CPT

## 2021-05-09 PROCEDURE — 85610 PROTHROMBIN TIME: CPT

## 2021-05-09 PROCEDURE — 74011250636 HC RX REV CODE- 250/636: Performed by: INTERNAL MEDICINE

## 2021-05-09 PROCEDURE — 74011000250 HC RX REV CODE- 250: Performed by: PHYSICIAN ASSISTANT

## 2021-05-09 PROCEDURE — 82962 GLUCOSE BLOOD TEST: CPT

## 2021-05-09 RX ORDER — WARFARIN 7.5 MG/1
7.5 TABLET ORAL EVERY EVENING
Status: DISCONTINUED | OUTPATIENT
Start: 2021-05-09 | End: 2021-05-10

## 2021-05-09 RX ADMIN — FAMOTIDINE 20 MG: 20 TABLET ORAL at 10:21

## 2021-05-09 RX ADMIN — IPRATROPIUM BROMIDE AND ALBUTEROL SULFATE 3 ML: .5; 3 SOLUTION RESPIRATORY (INHALATION) at 08:34

## 2021-05-09 RX ADMIN — ARFORMOTEROL TARTRATE 15 MCG: 15 SOLUTION RESPIRATORY (INHALATION) at 08:33

## 2021-05-09 RX ADMIN — HEPARIN SODIUM 996 UNITS/HR: 10000 INJECTION, SOLUTION INTRAVENOUS at 21:27

## 2021-05-09 RX ADMIN — Medication 100 MG: at 10:21

## 2021-05-09 RX ADMIN — SODIUM CHLORIDE SOLN NEBU 3% 4 ML: 3 NEBU SOLN at 08:33

## 2021-05-09 RX ADMIN — ATORVASTATIN CALCIUM 80 MG: 40 TABLET, FILM COATED ORAL at 21:37

## 2021-05-09 RX ADMIN — SODIUM CHLORIDE SOLN NEBU 3% 4 ML: 3 NEBU SOLN at 20:46

## 2021-05-09 RX ADMIN — VANCOMYCIN HYDROCHLORIDE 1000 MG: 1 INJECTION, POWDER, LYOPHILIZED, FOR SOLUTION INTRAVENOUS at 00:21

## 2021-05-09 RX ADMIN — POLYETHYLENE GLYCOL 3350 17 G: 17 POWDER, FOR SOLUTION ORAL at 10:21

## 2021-05-09 RX ADMIN — Medication 30 ML: at 10:21

## 2021-05-09 RX ADMIN — SODIUM CHLORIDE 40 MG: 9 INJECTION, SOLUTION INTRAMUSCULAR; INTRAVENOUS; SUBCUTANEOUS at 11:51

## 2021-05-09 RX ADMIN — LEVOFLOXACIN 500 MG: 5 INJECTION, SOLUTION INTRAVENOUS at 14:51

## 2021-05-09 RX ADMIN — SODIUM CHLORIDE 40 MG: 9 INJECTION, SOLUTION INTRAMUSCULAR; INTRAVENOUS; SUBCUTANEOUS at 21:38

## 2021-05-09 RX ADMIN — VANCOMYCIN HYDROCHLORIDE 1000 MG: 1 INJECTION, POWDER, LYOPHILIZED, FOR SOLUTION INTRAVENOUS at 11:34

## 2021-05-09 RX ADMIN — ARFORMOTEROL TARTRATE 15 MCG: 15 SOLUTION RESPIRATORY (INHALATION) at 20:46

## 2021-05-09 NOTE — PROGRESS NOTES
Patient has redness under trach RT recommend MD to help with stiches removal , also patient has strong cough and not as many secretions please has speech evaluate for a passy ruthie

## 2021-05-09 NOTE — PROGRESS NOTES
Problem: Falls - Risk of  Goal: *Absence of Falls  Description: Document Liam Lindsey Fall Risk and appropriate interventions in the flowsheet. Outcome: Progressing Towards Goal  Note: Fall Risk Interventions:       Mentation Interventions: Bed/chair exit alarm, Adequate sleep, hydration, pain control    Medication Interventions: Bed/chair exit alarm    Elimination Interventions: Call light in reach, Toileting schedule/hourly rounds              Problem: Pressure Injury - Risk of  Goal: *Prevention of pressure injury  Description: Document Lang Scale and appropriate interventions in the flowsheet. Outcome: Progressing Towards Goal  Note: Pressure Injury Interventions:  Sensory Interventions: Keep linens dry and wrinkle-free, Float heels, Check visual cues for pain, Suspension boots, Turn and reposition approx.  every two hours (pillows and wedges if needed)    Moisture Interventions: Absorbent underpads, Apply protective barrier, creams and emollients, Internal/External fecal devices, Internal/External urinary devices, Minimize layers, Moisture barrier    Activity Interventions: Pressure redistribution bed/mattress(bed type), PT/OT evaluation    Mobility Interventions: Pressure redistribution bed/mattress (bed type), PT/OT evaluation    Nutrition Interventions: Document food/fluid/supplement intake, Discuss nutritional consult with provider, Offer support with meals,snacks and hydration    Friction and Shear Interventions: Lift team/patient mobility team, Minimize layers, Foam dressings/transparent film/skin sealants, Feet elevated on foot rest, Apply protective barrier, creams and emollients

## 2021-05-09 NOTE — PROGRESS NOTES
0656:Bedside and Verbal shift change report given to [address] [place] [address] (oncoming nurse) by Lina Ladd RN (offgoing nurse). Report included the following information SBAR, Kardex and Cardiac Rhythm NSR. Patient in bed sleeping, heparin gtt verified. 0945:Patient's PEG site with blood drainage, changed dressing, and removed FMS. Verified PEG placement and checked residuals,40ml fluid was brown with flecks, left sample for Dr. Marita Montenegro to assess. 1030:Held Heparin gtt for 30mins due to 138.4 blood level. Dr. Jazmine Pedraza in room with patient, updated him on findings, will check H&H levels more frequently. 1105:Heparing gtt restarted and repeat lab scheduled for 1700.    1650:H&H resulted, updated hospitalist Dr Ally Mayorga    1956:Okay to hold the coumadin and recheck H&H per Dr. Arabella Campbell. Gastric residual tan.    1900:Bedside and Verbal shift change report given to [employer] (oncoming nurse) by Nahomi Weber RN (offgoing nurse). Report included the following information SBAR, Kardex, MAR and Cardiac Rhythm NSR.

## 2021-05-09 NOTE — PROGRESS NOTES
1 -  Received report from Karie Florez, [facility]. Rate verified heparin drip. 2300 - performed trach care. Unable to clean under trach b/c it is attached to his skin via stitches. Cleaned around trach using saline, replaced dressing. 2300 - spoke with lab. They stated they did not see the order for vanc trough. I placed a stat order for vanc trough. 0700 - Bedside shift change report given to Adilia Mendez (oncoming nurse) by Lesley Damon RN (offgoing nurse). Report included the following information SBAR, Kardex, ED Summary, Intake/Output, MAR, Recent Results and Cardiac Rhythm nsr.

## 2021-05-09 NOTE — PROGRESS NOTES
Hospitalist Progress Note        Patient: Felipe Gray               Sex: male          DOA: 4/4/2021       YOB: 1950      Age:  79 y.o.        LOS:  LOS: 35 days               Subjective:     Patient is awake and frustrated that he cannot get out of bed. Continues to have tracheal secretion per nursing. Patient also had some coffee-ground material coming out from G-tube but no fresh red blood. Tolerating tube feeding otherwise. Patient has strong cough. HPI:  Patient was initially seen at [facility] and had emergent Crychothyrotomy during Cardiac arrest.  Initially there was concern that he had Angioedema. He was stabilized and flown to [facility] for ongoing management. Mr Evon Diaz has required prolonged support on the mechanical ventilator for ongoing respiratory failure. He has had Tracheostomy via ENT and he continues with Tracheostomy collar currently which he is tolerating well. Mr Evon Diaz had cardiac arrest initially, but he has stable hemodynamics currently. Mr Evon Diaz was treated for sepsis and has received a full course of antibiotics, he is off antibiotics currently. He has limited interaction, he does not have seizure activity. He has had sedation to maintain the ventilator which he no longer needs. He has tracheostomy, but he currently does not have a PEG. He has ongoing NG tube and continues with NG tube feeding. NG tube was removed and PEG tube was placed on May 3, 2021. Tolerating tube feeding since then.     Objective:      BP (!) 132/91   Pulse 85   Temp 98.3 °F (36.8 °C)   Resp 21   Ht 5' 11\" (1.803 m)   Wt 99.7 kg (219 lb 11.2 oz)   SpO2 100%   BMI 30.64 kg/m²       Intake/Output Summary (Last 24 hours) at 5/9/2021 1136  Last data filed at 5/9/2021 0700  Gross per 24 hour   Intake 3639 ml   Output 1550 ml   Net 2089 ml     General: Awake, follows commands, not in acute distress  Neck: Tracheostomy is in situ with dressing  Lungs: Diminished breath sound bibasilar without wheezes  Heart:  Regular Rate and Rhythm. Abdomen:  Soft, nondistended, bowel sounds present, PEG tube is present  Extremities: No edema present  Neuro: Awake, moves both upper extremities and right leg. Motor strength is 2-3 out of 5 in left lower extremity. Assessment/Plan     ASSESSMENT:    1. Reportedly acute hypoxic respiratory failure due to angioedema s/p emergent cricothyrotomy followed by tracheostomy. Better. 2.  Tracheostomy site bleeding, resolved. 3.  Status post respiratory failure cardiac arrest  4. Chronic hypoxic respiratory failure status post trach and on trach collar  5. Bilateral pulmonary infiltrates  6. Oropharyngeal dysphagia status post G tube  7. Left leg popliteal DVT and pulmonary embolism  8. Reportedly acute metabolic encephalopathy  9. 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 JACQUELYN, recurrent -resolved currently  11. Left pneumothorax resolved  12. Diabetic neuropathy  13. Obesity  14. Fever and leukocytosis, resolved  15. Coag negative staph bacteremia most likely contamination  16. Coffee-ground aspirate from PEG tube    PLAN:    Continue Coumadin and heparin drip, will stop heparin drip once INR is more than 2  We will repeat bilateral lower extremity venous Doppler  Repeat blood culture negative at 23 hours  We will stop vancomycin tomorrow if repeat blood culture remains negative tomorrow  ID is following  Continue current tube feeding per dietitian  Continue statin  We will do frequent H&H and change patient from Pepcid to Protonix IV due to #16.   If patient continues to have coffee-ground aspirate from PEG tube, will obtain vascular surgery consult for IVC filter placement   Pulmonologist to follow for tracheostomy care  Continue trach collar and nebulizer  Continue multivitamin, thiamine  PT and OT to follow this patient    Discussed with patient's sister over the phone and updated her about current treatment plan. Total time to take care of this patient was 25 minutes and more than 50% of time was spent counseling and coordinating care. Disclaimer: Sections of this note are dictated using utilizing voice recognition software, which may have resulted in some phonetic based errors in grammar and contents. Even though attempts were made to correct all the mistakes, some may have been missed, and remained in the body of the document. If questions arise, please contact our department. Recent Results (from the past 24 hour(s))   GLUCOSE, POC    Collection Time: 05/08/21  5:59 PM   Result Value Ref Range    Glucose (POC) 113 (H) 70 - 110 mg/dL   VANCOMYCIN, TROUGH    Collection Time: 05/08/21 11:25 PM   Result Value Ref Range    Vancomycin,trough 19.4 10.0 - 20.0 ug/mL    Reported dose date 20210508      Reported dose time: 1200      Reported dose: 1000 MG UNITS   GLUCOSE, POC    Collection Time: 05/09/21 12:59 AM   Result Value Ref Range    Glucose (POC) 125 (H) 70 - 110 mg/dL   MAGNESIUM    Collection Time: 05/09/21  5:26 AM   Result Value Ref Range    Magnesium 2.1 1.6 - 2.6 mg/dL   PHOSPHORUS    Collection Time: 05/09/21  5:26 AM   Result Value Ref Range    Phosphorus 2.6 2.5 - 4.9 MG/DL   CBC WITH AUTOMATED DIFF    Collection Time: 05/09/21  5:26 AM   Result Value Ref Range    WBC 9.1 4.6 - 13.2 K/uL    RBC 3.99 (L) 4.35 - 5.65 M/uL    HGB 10.1 (L) 13.0 - 16.0 g/dL    HCT 33.7 (L) 36.0 - 48.0 %    MCV 84.5 74.0 - 97.0 FL    MCH 25.3 24.0 - 34.0 PG    MCHC 30.0 (L) 31.0 - 37.0 g/dL    RDW 15.1 (H) 11.6 - 14.5 %    PLATELET 281 868 - 987 K/uL    MPV 11.9 (H) 9.2 - 11.8 FL    NEUTROPHILS 60 40 - 73 %    LYMPHOCYTES 28 21 - 52 %    MONOCYTES 6 3 - 10 %    EOSINOPHILS 4 0 - 5 %    BASOPHILS 1 0 - 2 %    ABS. NEUTROPHILS 5.5 1.8 - 8.0 K/UL    ABS. LYMPHOCYTES 2.5 0.9 - 3.6 K/UL    ABS. MONOCYTES 0.6 0.05 - 1.2 K/UL    ABS. EOSINOPHILS 0.3 0.0 - 0.4 K/UL    ABS.  BASOPHILS 0.1 0.0 - 0.1 K/UL    DF AUTOMATED PTT    Collection Time: 05/09/21  5:26 AM   Result Value Ref Range    aPTT 138.4 (H) 23.0 - 36.4 SEC   PROTHROMBIN TIME + INR    Collection Time: 05/09/21  5:26 AM   Result Value Ref Range    Prothrombin time 16.5 (H) 11.5 - 15.2 sec    INR 1.4 (H) 0.8 - 1.2     METABOLIC PANEL, BASIC    Collection Time: 05/09/21  5:26 AM   Result Value Ref Range    Sodium 141 136 - 145 mmol/L    Potassium 4.2 3.5 - 5.5 mmol/L    Chloride 110 100 - 111 mmol/L    CO2 26 21 - 32 mmol/L    Anion gap 5 3.0 - 18 mmol/L    Glucose 116 (H) 74 - 99 mg/dL    BUN 24 (H) 7.0 - 18 MG/DL    Creatinine 0.82 0.6 - 1.3 MG/DL    BUN/Creatinine ratio 29 (H) 12 - 20      GFR est AA >60 >60 ml/min/1.73m2    GFR est non-AA >60 >60 ml/min/1.73m2    Calcium 8.2 (L) 8.5 - 10.1 MG/DL   GLUCOSE, POC    Collection Time: 05/09/21  5:47 AM   Result Value Ref Range    Glucose (POC) 128 (H) 70 - 110 mg/dL   GLUCOSE, POC    Collection Time: 05/09/21 11:11 AM   Result Value Ref Range    Glucose (POC) 114 (H) 70 - 110 mg/dL

## 2021-05-09 NOTE — PROGRESS NOTES
I did patient's trach care tonight. He has a very strong cough and saturations are stable. He is only on 28% oxygen. Pt doesn't require metaneb anymore and when you use it he looks as if it is causing him pain taking it off and on. He still has stitches so the weight will pull the skin. He does need suctioning because he can expel his own secretions readily. RESPIRATORY CARE ASSESSMENT FOR BRONCHIAL HYGIENE OR LUNG EXPANSION THERAPY  Patient  Shiradanny Sepulveda     79 y.o.   male     5/9/2021  4:34 AM  Patient Active Problem List   Diagnosis Code    Angioedema due to angiotensin converting enzyme inhibitor (ACE-I) T78. 3XXA, [id number]    Respiratory failure ([facility] J96.90    JACQUELYN (acute kidney injury) ([facility] N17.9    Cardiac arrest ([facility]) I46.9    Obesity (BMI 30-39. 9) E66.9    Diabetic neuropathy associated with type 2 diabetes mellitus ([facility]) E11.40    Diabetic retin[facility] with type 2 diabetes mellitus [facility] E11.319    Pulmonary infiltrates R91.8    Controlled type 2 diabetes mellitus with neurologic complication, without long-term current use of insulin ([facility]) E11.49    DVT (deep venous thrombosis) ([facility]) I82.409    Encounter for palliative care Z51.5    Goals of care, counseling/discussion Z71.89    Multiple subsegmental pulmonary emboli without acute cor pulmonale ([facility]) I26.94    DVT (deep vein thrombosis) in pregnancy O22.30    Acute encephalopathy G93.40       ABG:  Date:5/9/2021  No results found for: PH, PHI, PCO2, PCO2I, PO2, PO2I, HCO3, HCO3I, FIO2, FIO2I    Chest X-ray:  Date:5/9/2021  Results from Hospital Encounter encounter on 04/04/21   XR CHEST PORT    Narrative EXAM:  XR CHEST PORT    INDICATION:   Leukocytosis    COMPARISON: 5/1/2021 and several priors. FINDINGS:  Interval removal of enteric tube. Tracheostomy tube in place. Mild  hypoinflation. Stable enlarged cardiac silhouette. Left basilar airspace  opacities with consolidations. No pneumothorax or pleural effusion. Intact  osseous structures. Impression Persistent left basilar airspace disease, probably pneumonia in the appropriate  clinical setting. Follow-up recommended.        Lab Test:  UJ:7/0/3324  WBC:   Lab Results   Component Value Date/Time    WBC 9.6 05/08/2021 05:41 AM   HGB:   Lab Results   Component Value Date/Time    HGB 8.8 (L) 05/08/2021 05:41 AM    PLTS:   Lab Results   Component Value Date/Time    PLATELET 205 38/06/9134 05:41 AM       SaO2%/flow: trach collar @ 28%    Vital Signs:     Patient Vitals for the past 8 hrs:   Temp Pulse Resp BP SpO2   05/09/21 0426 97.9 °F (36.6 °C) 79 20 (!) 158/92 98 %   05/09/21 0321  83 14  99 %   05/09/21 0056 97.7 °F (36.5 °C) 79 24 (!) 158/89 99 %         RA/O2 flow/device:trach collar @28%        First Inital Assesment:     []Yes []No   Reevaluation/Reassessment:    [x]Yes []No     CHART REVIEW   Points 0 X 1 X 2 X 3 X 4 X Points   Pulmonary History Smoking History (1) none  Recent Smoking History <1 PPD  Recent Smoking History >1 PPD  Pulmonary Disease or Impairment  Severe Pulmonary Disease  0   Surgical History No Surgery  General Surgery  Lower Abdominal  Thoracic or Upper Abdominal  Thoracic & Pulmonary Disease  0   CXR Clear or not indicated  Chronic changes or CXR Pending  Infiltrate, atelectasis or pleural effusions  Infiltrates in more than 1lobe  Infiltrates +atelectasis  +/or pleural effusions  2     PATIENT ASSESSMENT    0 X 1 X 2 X 3 X 4 X Points   Respiratory Pattern Regular pattern RR 12-20  Increased RR 21-27   Mild Dyspnea at rest, irregular pattern RR 28-32  Moderate Dyspnea at rest, Use of accessory muscles, RR 33-36  Severe Dyspnea, Use of accessory muscles RR >36  0   Mental Status Alert Oriented cooperative  Confused, Follows commands  Lethargic, Does not follow commands  Obtunded  Unresponsive  1   Breath Sounds Clear  Decreased Unilaterally  Decreased Bilaterally  Mild Scattered wheezing or Crackles in bases  Severe Wheezing or rhonchi  0 Cough Strong dry NPC  Strong Productive  Weak NPC  Weak productive or weak with rhonchi  No cough or may require suctioning  1   Level of Activity Ambulatory  Ambulatory with assistance  Temporarily Non-ambulatory  Non-Ambulatory, able to position self  Unable to position self, confined to bed  3   7     Specific Intervention Chart(prn)    Bronchial Hygiene/Secretion Clearance:    []EZPAP []Rotation bed with vibration    []CPT with percussor []CPT via vest   []Oscillastiang positive pressure expiratory device      Lung Expansion:    []Incentive Spirometer w/RT visits []Incentive Spirometer w/nursing    []EZPAP [] Metaneb     *Suctioning:    []Nasal Tracheal [x]Tracheal     *suctioning will be ordered and done PRN with an associated frequency such as QID/PRN based on score       Other:    Care Plan   Level # Score Modality Frequency Comment   Level 1 >17      Level 2 14-17      Level 3 10-13      Level 4 1-9 suctioning prn      BRONCHIAL HYGIENE SCORING AND FREQUENCY GUIDELINES   Frequency Indications/Findings Level #   Q4 ATC Copious secretions, SOB, unable to sleep 1   QID & PRN at night Moderate amounts of secretions 2   TID or Q6 wa Small amounts of secretions and poor cough: recent history of secretions 3   BID or Q8 wa Unable to deep breathe and cough effectively 4        Comments:  Pt is able to clear own secretions. He needs prn suctioning and to be sat up more.     Respiratory Therapist: Jose Manuel Castano

## 2021-05-10 ENCOUNTER — APPOINTMENT (OUTPATIENT)
Dept: GENERAL RADIOLOGY | Age: 71
DRG: 004 | End: 2021-05-10
Attending: INTERNAL MEDICINE
Payer: MEDICARE

## 2021-05-10 LAB
ANION GAP SERPL CALC-SCNC: 7 MMOL/L (ref 3–18)
APTT PPP: 108.1 SEC (ref 23–36.4)
BASOPHILS # BLD: 0 K/UL (ref 0–0.1)
BASOPHILS NFR BLD: 0 % (ref 0–2)
BUN SERPL-MCNC: 27 MG/DL (ref 7–18)
BUN/CREAT SERPL: 30 (ref 12–20)
CALCIUM SERPL-MCNC: 8.5 MG/DL (ref 8.5–10.1)
CHLORIDE SERPL-SCNC: 110 MMOL/L (ref 100–111)
CO2 SERPL-SCNC: 24 MMOL/L (ref 21–32)
CREAT SERPL-MCNC: 0.91 MG/DL (ref 0.6–1.3)
DIFFERENTIAL METHOD BLD: ABNORMAL
EOSINOPHIL # BLD: 0.3 K/UL (ref 0–0.4)
EOSINOPHIL NFR BLD: 3 % (ref 0–5)
ERYTHROCYTE [DISTWIDTH] IN BLOOD BY AUTOMATED COUNT: 15.3 % (ref 11.6–14.5)
ERYTHROCYTE [DISTWIDTH] IN BLOOD BY AUTOMATED COUNT: 15.4 % (ref 11.6–14.5)
GLUCOSE BLD STRIP.AUTO-MCNC: 111 MG/DL (ref 70–110)
GLUCOSE BLD STRIP.AUTO-MCNC: 114 MG/DL (ref 70–110)
GLUCOSE BLD STRIP.AUTO-MCNC: 127 MG/DL (ref 70–110)
GLUCOSE BLD STRIP.AUTO-MCNC: 127 MG/DL (ref 70–110)
GLUCOSE BLD STRIP.AUTO-MCNC: 128 MG/DL (ref 70–110)
GLUCOSE SERPL-MCNC: 104 MG/DL (ref 74–99)
HCT VFR BLD AUTO: 29.1 % (ref 36–48)
HCT VFR BLD AUTO: 32.9 % (ref 36–48)
HGB BLD-MCNC: 8.7 G/DL (ref 13–16)
HGB BLD-MCNC: 9.9 G/DL (ref 13–16)
INR PPP: 1.4 (ref 0.8–1.2)
LYMPHOCYTES # BLD: 2.6 K/UL (ref 0.9–3.6)
LYMPHOCYTES NFR BLD: 26 % (ref 21–52)
MAGNESIUM SERPL-MCNC: 1.7 MG/DL (ref 1.6–2.6)
MCH RBC QN AUTO: 25.1 PG (ref 24–34)
MCH RBC QN AUTO: 25.3 PG (ref 24–34)
MCHC RBC AUTO-ENTMCNC: 29.9 G/DL (ref 31–37)
MCHC RBC AUTO-ENTMCNC: 30.1 G/DL (ref 31–37)
MCV RBC AUTO: 83.9 FL (ref 74–97)
MCV RBC AUTO: 84.1 FL (ref 74–97)
MONOCYTES # BLD: 0.5 K/UL (ref 0.05–1.2)
MONOCYTES NFR BLD: 5 % (ref 3–10)
NEUTS SEG # BLD: 6.4 K/UL (ref 1.8–8)
NEUTS SEG NFR BLD: 65 % (ref 40–73)
PHOSPHATE SERPL-MCNC: 2.9 MG/DL (ref 2.5–4.9)
PLATELET # BLD AUTO: 236 K/UL (ref 135–420)
PLATELET # BLD AUTO: 252 K/UL (ref 135–420)
PMV BLD AUTO: 11.5 FL (ref 9.2–11.8)
PMV BLD AUTO: 12.2 FL (ref 9.2–11.8)
POTASSIUM SERPL-SCNC: 4.2 MMOL/L (ref 3.5–5.5)
PROTHROMBIN TIME: 17.3 SEC (ref 11.5–15.2)
RBC # BLD AUTO: 3.47 M/UL (ref 4.35–5.65)
RBC # BLD AUTO: 3.91 M/UL (ref 4.35–5.65)
SODIUM SERPL-SCNC: 141 MMOL/L (ref 136–145)
WBC # BLD AUTO: 10.2 K/UL (ref 4.6–13.2)
WBC # BLD AUTO: 9.9 K/UL (ref 4.6–13.2)

## 2021-05-10 PROCEDURE — 92611 MOTION FLUOROSCOPY/SWALLOW: CPT

## 2021-05-10 PROCEDURE — 92610 EVALUATE SWALLOWING FUNCTION: CPT

## 2021-05-10 PROCEDURE — 82962 GLUCOSE BLOOD TEST: CPT

## 2021-05-10 PROCEDURE — 94762 N-INVAS EAR/PLS OXIMTRY CONT: CPT

## 2021-05-10 PROCEDURE — 74011000250 HC RX REV CODE- 250: Performed by: INTERNAL MEDICINE

## 2021-05-10 PROCEDURE — 99232 SBSQ HOSP IP/OBS MODERATE 35: CPT | Performed by: INTERNAL MEDICINE

## 2021-05-10 PROCEDURE — 74011250637 HC RX REV CODE- 250/637: Performed by: PHYSICIAN ASSISTANT

## 2021-05-10 PROCEDURE — 85610 PROTHROMBIN TIME: CPT

## 2021-05-10 PROCEDURE — 85025 COMPLETE CBC W/AUTO DIFF WBC: CPT

## 2021-05-10 PROCEDURE — 85027 COMPLETE CBC AUTOMATED: CPT

## 2021-05-10 PROCEDURE — 74011000250 HC RX REV CODE- 250: Performed by: PHYSICIAN ASSISTANT

## 2021-05-10 PROCEDURE — 97530 THERAPEUTIC ACTIVITIES: CPT

## 2021-05-10 PROCEDURE — 74011250636 HC RX REV CODE- 250/636: Performed by: PHYSICIAN ASSISTANT

## 2021-05-10 PROCEDURE — 84100 ASSAY OF PHOSPHORUS: CPT

## 2021-05-10 PROCEDURE — C9113 INJ PANTOPRAZOLE SODIUM, VIA: HCPCS | Performed by: INTERNAL MEDICINE

## 2021-05-10 PROCEDURE — 80048 BASIC METABOLIC PNL TOTAL CA: CPT

## 2021-05-10 PROCEDURE — 92522 EVALUATE SPEECH PRODUCTION: CPT

## 2021-05-10 PROCEDURE — 74011250636 HC RX REV CODE- 250/636: Performed by: INTERNAL MEDICINE

## 2021-05-10 PROCEDURE — 83735 ASSAY OF MAGNESIUM: CPT

## 2021-05-10 PROCEDURE — 85730 THROMBOPLASTIN TIME PARTIAL: CPT

## 2021-05-10 PROCEDURE — 94640 AIRWAY INHALATION TREATMENT: CPT

## 2021-05-10 PROCEDURE — 2709999900 HC NON-CHARGEABLE SUPPLY

## 2021-05-10 PROCEDURE — 92526 ORAL FUNCTION THERAPY: CPT

## 2021-05-10 PROCEDURE — 77010033678 HC OXYGEN DAILY

## 2021-05-10 PROCEDURE — 36415 COLL VENOUS BLD VENIPUNCTURE: CPT

## 2021-05-10 PROCEDURE — 65660000004 HC RM CVT STEPDOWN

## 2021-05-10 PROCEDURE — 94668 MNPJ CHEST WALL SBSQ: CPT

## 2021-05-10 PROCEDURE — 74011250637 HC RX REV CODE- 250/637: Performed by: INTERNAL MEDICINE

## 2021-05-10 PROCEDURE — 94669 MECHANICAL CHEST WALL OSCILL: CPT

## 2021-05-10 PROCEDURE — 74230 X-RAY XM SWLNG FUNCJ C+: CPT

## 2021-05-10 RX ORDER — WARFARIN 7.5 MG/1
7.5 TABLET ORAL EVERY EVENING
Status: COMPLETED | OUTPATIENT
Start: 2021-05-10 | End: 2021-05-10

## 2021-05-10 RX ADMIN — BARIUM SULFATE 30 ML: 400 SUSPENSION ORAL at 13:38

## 2021-05-10 RX ADMIN — Medication 100 MG: at 08:24

## 2021-05-10 RX ADMIN — SODIUM CHLORIDE 40 MG: 9 INJECTION, SOLUTION INTRAMUSCULAR; INTRAVENOUS; SUBCUTANEOUS at 21:48

## 2021-05-10 RX ADMIN — VANCOMYCIN HYDROCHLORIDE 1000 MG: 1 INJECTION, POWDER, LYOPHILIZED, FOR SOLUTION INTRAVENOUS at 11:18

## 2021-05-10 RX ADMIN — POLYETHYLENE GLYCOL 3350 17 G: 17 POWDER, FOR SOLUTION ORAL at 08:24

## 2021-05-10 RX ADMIN — ATORVASTATIN CALCIUM 80 MG: 40 TABLET, FILM COATED ORAL at 21:48

## 2021-05-10 RX ADMIN — WARFARIN SODIUM 7.5 MG: 7.5 TABLET ORAL at 17:13

## 2021-05-10 RX ADMIN — VANCOMYCIN HYDROCHLORIDE 1000 MG: 1 INJECTION, POWDER, LYOPHILIZED, FOR SOLUTION INTRAVENOUS at 00:40

## 2021-05-10 RX ADMIN — ARFORMOTEROL TARTRATE 15 MCG: 15 SOLUTION RESPIRATORY (INHALATION) at 08:35

## 2021-05-10 RX ADMIN — ARFORMOTEROL TARTRATE 15 MCG: 15 SOLUTION RESPIRATORY (INHALATION) at 21:41

## 2021-05-10 RX ADMIN — HEPARIN SODIUM 996 UNITS/HR: 10000 INJECTION, SOLUTION INTRAVENOUS at 19:09

## 2021-05-10 RX ADMIN — Medication 30 ML: at 08:24

## 2021-05-10 RX ADMIN — ACETAMINOPHEN ORAL SOLUTION 500 MG: 650 SOLUTION ORAL at 21:48

## 2021-05-10 RX ADMIN — SODIUM CHLORIDE SOLN NEBU 3% 4 ML: 3 NEBU SOLN at 21:40

## 2021-05-10 RX ADMIN — BARIUM SULFATE 30 G: 960 POWDER, FOR SUSPENSION ORAL at 13:40

## 2021-05-10 RX ADMIN — SODIUM CHLORIDE 40 MG: 9 INJECTION, SOLUTION INTRAMUSCULAR; INTRAVENOUS; SUBCUTANEOUS at 08:24

## 2021-05-10 RX ADMIN — SODIUM CHLORIDE SOLN NEBU 3% 4 ML: 3 NEBU SOLN at 08:35

## 2021-05-10 NOTE — PROGRESS NOTES
Infectious Disease progress Note        Reason: sepsis, gram positive bacteremia    Current abx Prior abx   Levofloxacin  5/5-5/9  Vancomycin since 5/7 Meropenem 4/19-4/26  Zosyn 4/11-4/19  unaysn 4/4  Vancomycin 4/5-4/6     Lines:       Assessment :    79 y.o. male  with history of obesity, type 2 diabetes, hypertension presented presented to ED on 4/4/2021 with rapidly progressive respiratory distress due to angioedema and airway swelling presumed due to ACE inhibitor therapy. acute hypoxic respiratory failure due to angioedema s/p emergent cricothyrotomy on 4/4 followed by tracheostomy on 4/26/21- improved    sputum cultures were sent on 4/9, 4/10- Enterobacter resistant to cefoxitin, cefazolin. sputum culture 4/16 revealed Enterobacter resistant to piperacillin/tazobactam.      Sputum culture 4/30, 5/1 revealed Enterobacter resistant to piperacillin/tazobactam, cefoxitin, ceftazidime, cefazolin. Now with fever-T-max 101.8, leukocytosis with WBC 17 K on 5/5/2021, gram-positive bacteremia    Clinical presentation consistent with sepsis-developed after admission likely due to recurrent aspiration pneumonia    Single positive blood culture for coagulase-negative Staphylococcus on 5/5-likely  contaminant. Negative repeat blood culture 5/8/2021  Patient seems to be responding to levofloxacin therapy    Recurrent acute kidney injury-creatinine 1.7 on 5/4 could be due to sepsis-improving    Recommendations:    1. Discontinue vancomycin  2. Monitor CBC, temperature, clinically  3. Follow pulmonary recommendations  4. Discharge planning per primary team    Will sign off. Please call if any new questions or concerns. Thanks     Above plan was discussed in details with patient, and dr Sadie Mo. HPI:      Patient is unable to verbalize. Communicates by lip movements and nodding his head. Denies increasing chest pain, shortness of breath.    Detailed review of systems not feasible since patient unable to verbalize          home Medication List    Details   LISINOPRIL, BULK, by Does Not Apply route.              Current Facility-Administered Medications   Medication Dose Route Frequency    pantoprazole (PROTONIX) 40 mg in 0.9% sodium chloride 10 mL injection  40 mg IntraVENous Q12H    warfarin (COUMADIN) tablet 7.5 mg  7.5 mg Oral QPM    LORazepam (ATIVAN) tablet 0.5 mg  0.5 mg Per G Tube Q6H PRN    vancomycin (VANCOCIN) 1,000 mg in 0.9% sodium chloride 250 mL (VIAL-MATE)  1,000 mg IntraVENous Q12H    VANCOMYCIN INFORMATION NOTE   Other Rx Dosing/Monitoring    albuterol-ipratropium (DUO-NEB) 2.5 MG-0.5 MG/3 ML  3 mL Nebulization Q4H PRN    thiamine HCL (B-1) tablet 100 mg  100 mg Per G Tube DAILY    warfarin - physician to dose   Other C61Z    multivit-folic acid-herbal 690 (WELLESSE PLUS) oral liquid 30 mL  30 mL Per G Tube DAILY    atorvastatin (LIPITOR) tablet 80 mg  80 mg Per G Tube QHS    polyethylene glycol (MIRALAX) packet 17 g  17 g Per G Tube DAILY    sodium chloride 3% hypertonic nebulizer soln  4 mL Nebulization BID RT    heparin 25,000 units in D5W 250 ml infusion  9.8-25 Units/kg/hr IntraVENous TITRATE    acetaminophen (TYLENOL) solution 500 mg  500 mg Oral Q4H PRN    oxymetazoline (AFRIN) 0.05 % nasal spray 2 Spray  2 Spray Other BID PRN    arformoteroL (BROVANA) neb solution 15 mcg  15 mcg Nebulization BID RT    glucose chewable tablet 16 g  4 Tab Oral PRN    glucagon (GLUCAGEN) injection 1 mg  1 mg IntraMUSCular PRN    dextrose (D50W) injection syrg 12.5-25 g  25-50 mL IntraVENous PRN    insulin lispro (HUMALOG) injection   SubCUTAneous Q6H    carboxymethylcellulose sodium (REFRESH LIQUIGEL) 1 % ophthalmic solution 1 Drop  1 Drop Both Eyes PRN       Allergies: Lisinopril    Temp (24hrs), Av.5 °F (36.9 °C), Min:97.9 °F (36.6 °C), Max:99.1 °F (37.3 °C)    Visit Vitals  /80   Pulse 82   Temp 99.1 °F (37.3 °C)   Resp 16   Ht 5' 11\" (1.803 m)   Wt 99 kg (218 lb 4.1 oz)   SpO2 99%   BMI 30.44 kg/m²       ROS: Unable to obtain due to patient factors  Physical Exam:    General: Well developed, well nourished male laying on the bed, alert, in no acute distress. General:   awake alert and oriented   HEENT:  Normocephalic, atraumatic,  EOMI, no scleral icterus or pallor; no conjunctival hemmohage; Neck -tracheostomy in place, superficial ulcer at the tracheostomy site. Superficial ulcer right ear without any drainage   Lymph Nodes:   no cervical, axillary or inguinal adenopathy   Lungs:   non-labored, bilaterally clear to auscultation- no crackles wheezes rales or rhonchi   Heart:  RRR, s1 and s2; no  rubs or gallops, no edema, + pedal pulses   Abdomen:  soft, non-distended, active bowel sounds, no hepatomegaly, no splenomegaly. Appropriate surgical scars for stated surgeries. PEG tube in place. Mild lower abdominal tenderness   Genitourinary:  Texas cath in place   Extremities:   no clubbing, cyanosis; no joint effusions or swelling; ; muscle mass appropriate for age; deep tissue injury left heel-no ulcers noted   Neurologic:  Awake, moves both upper extremities and right leg. Motor strength is 2-3 out of 5 in left lower extremity.                         Skin:  No rash or ulcers noted   Wound:   Superficial ulcer right ear, deep tissue injury left heel, superficial skin damage in the gluteal fold     Back:  no spinal or paraspinal muscle tenderness or rigidity, no CVA tenderness     Psychiatric:   appropriate mood and affect         Labs: Results:   Chemistry Recent Labs     05/10/21  0503 05/09/21  0526 05/08/21  0541   * 116* 113*    141 145   K 4.2 4.2 4.0    110 112*   CO2 24 26 27   BUN 27* 24* 29*   CREA 0.91 0.82 0.87   CA 8.5 8.2* 8.4*   AGAP 7 5 6   BUCR 30* 29* 33*   AP  --   --  103   TP  --   --  8.9*   ALB  --   --  2.4*   GLOB  --   --  6.5*   AGRAT  --   --  0.4*      CBC w/Diff Recent Labs     05/10/21  0503 05/09/21  1545 05/09/21  0526 05/08/21  0541   WBC 9.9  --  9.1 9.6   RBC 3.47*  --  3.99* 3.50*   HGB 8.7* 8.9* 10.1* 8.8*   HCT 29.1* 29.4* 33.7* 30.0*     --  215 228   GRANS 65  --  60 64   LYMPH 26  --  28 25   EOS 3  --  4 3      Microbiology Recent Labs     05/08/21  0541   CULT NO GROWTH 2 DAYS          RADIOLOGY:    All available imaging studies/reports in [facility] for this admission were reviewed      Disclaimer: Sections of this note are dictated utilizing voice recognition software, which may have resulted in some phonetic based errors in grammar and contents. Even though attempts were made to correct all the mistakes, some may have been missed, and remained in the body of the document. If questions arise, please contact our department.     Dr. Melisa Rodríguez, Infectious Disease Specialist  [phone]  May 10, 2021  9:45 AM

## 2021-05-10 NOTE — PROGRESS NOTES
Problem: Mobility Impaired (Adult and Pediatric)  Goal: *Acute Goals and Plan of Care (Insert Text)  Description: Physical Therapy Goals  Initiated 4/28/2021, re-evaluated 5.5.21  and to be accomplished within 7 day(s)  1. Patient will move from supine to sit and sit to supine , scoot up and down, and roll side to side in bed with maximal assistance. 2.  Patient will sit on EOB with maxA for >5 minutes in prep for OOB activities. 3. Pt will participate in TE in supine for LEs. PLOF: Pt unable to verbalize PLOF. , per RN independent prior to admission     Outcome: Progressing Towards Goal     PHYSICAL THERAPY TREATMENT    Patient: Juarez Alejandra (57 y.o. male)  Date: 5/10/2021  Diagnosis: Angioedema due to angiotensin converting enzyme inhibitor (ACE-I) [T78. 3XXA, T46.4X5A]  Acute respiratory failure with hypoxia (HCC) [J96.01]  DVT (deep vein thrombosis) in pregnancy [O22.30]  Acute encephalopathy [G93.40] Respiratory failure (HCC)  Procedure(s) (LRB):  PERCUTANEOUS ENDOSCOPIC GASTROSTOMY TUBE INSERTION/ BEDSIDE (N/A) 7 Days Post-Op  Precautions: Fall, Skin  PLOF:     ASSESSMENT:  Pt received in bed in NAD, agreeable to PT, pt does not verbalize during session however does respond with head nods and facial expressions. Pt endorses LLE pain to touch when attempting ROM  thus given AAROM/PROM to knee and hip, avoiding increased pain to ankle/foot. Pt able to perform AAROM/AROM to RLE with no pain noted, all performed at start of session to warm up in prep for movement. Pt requires max A to roll to the R but unable to tolerate 2/2 LLE pain. Pt required mod A to roll to the L and able to tolerate approx 2 min of lying on side with ability to reach for bedrail for support. Pt then able roll onto bed and then scooted up in bed for postioning with max A x 2. Pt with consistent coughing during session and thus positioned with HOB elevated at end of session, tube feed running.  Will continue follow per POC and will progress towards goals off of 3 day trial as pt is appropriate to work towards goals at this time. Will continue to recommend rehab upon discharge. Informed OT pt may benefit from coming off of FMP as pt is more alert and able to follow commands and will benefit from co-tx at this time. Progression toward goals:   [x]      Improving appropriately and progressing toward goals  []      Improving slowly and progressing toward goals  []      Not making progress toward goals and plan of care will be adjusted     PLAN:  Patient continues to benefit from skilled intervention to address the above impairments. Continue treatment per established plan of care. Discharge Recommendations:  Rehab  Further Equipment Recommendations for Discharge:  TBD at next level of care     SUBJECTIVE:   Patient nodded yes to participation. OBJECTIVE DATA SUMMARY:   Critical Behavior:  Neurologic State: Alert  Orientation Level: Oriented to person  Cognition: Follows commands  Safety/Judgement: Fall prevention  Functional Mobility Training:  Bed Mobility:  Rolling: Maximum assistance; Moderate assistance(max A x 1 to the R, mod A x 1 to the L)        Scooting: Maximum assistance;Assist x2(up in bed )      Pain:  Pain level pre-treatment: does not rate, endorses LLE pain distal to knee with touch and movement /10  Pain level post-treatment: \"   \" /10   Pain Intervention(s): Medication (see MAR); Rest,  Repositioning   Response to intervention: Nurse notified    Activity Tolerance:   Good    Please refer to the flowsheet for vital signs taken during this treatment. After treatment:   [] Patient left in no apparent distress sitting up in chair  [x] Patient left in no apparent distress in bed  [x] Call bell left within reach  [x] Nursing notified  [] Caregiver present  [] Bed alarm activated  [] SCDs applied      COMMUNICATION/EDUCATION:   [x]         Role of Physical Therapy in the acute care setting.   [x]         Fall prevention education was provided and the patient/caregiver indicated understanding. [x]         Patient/family have participated as able in working toward goals and plan of care. [x]         Patient/family agree to work toward stated goals and plan of care. []         Patient understands intent and goals of therapy, but is neutral about his/her participation.   []         Patient is unable to participate in stated goals/plan of care: ongoing with therapy staff.  []         Other:        Ryanne Polanco   Time Calculation: 16 mins

## 2021-05-10 NOTE — PROGRESS NOTES
Second appeal denied for [facility]. Received paper work for family appeal. Luly Mccann with son he wants me to talk with him and pt's sister in person, they are coming to hospital  Updates placed in edc for snf facility. Enrique Miner

## 2021-05-10 NOTE — ROUTINE PROCESS
Bedside and Verbal shift change report given to Antonina Anguiano RN (oncoming nurse) by Thais Rangel RN (offgoing nurse). Report included the following information SBAR, Kardex, Intake/Output, MAR, Recent Results and Cardiac Rhythm.

## 2021-05-10 NOTE — PROGRESS NOTES
Problem: Voice Impaired (Adult)  Goal: *Acute Goals and Plan of Care (Insert Text)  Description: Pt will:  1. Tolerate PMV placement for 10 minute increments without compromise to vitals  2. Utilize proper breath support/techniques for increasing tolerance of PMV in 4/5 trials   3. Phonate 5 word utterances with adequate breath support for adequate intensity of speech with PMV   Outcome: Progressing Towards Goal    SPEECH LANGUAGE PATHOLOGY PASSY FREDIS VALVE EVALUATION    Patient: Olamide Oscar (2 y.o. male)  Date: 5/10/2021  Diagnosis: Angioedema due to angiotensin converting enzyme inhibitor (ACE-I) [T78. 3XXA, T46.4X5A]  Acute respiratory failure with hypoxia (HCC) [J96.01]  DVT (deep vein thrombosis) in pregnancy [O22.30]  Acute encephalopathy [G93.40] Respiratory failure (HCC)  Procedure(s) (LRB):  PERCUTANEOUS ENDOSCOPIC GASTROSTOMY TUBE INSERTION/ BEDSIDE (N/A) 7 Days Post-Op  Precautions: Aspiration, Fall, Skin  PLOF: As per H&P    ASSESSMENT:  Pt seen to address aphonia related to trach placement. Pt drowsy, able to maintain arousal given mod verbal/visual cues. Pt oriented to person only, following one step commands with response delay noted. Pt with Portex #9 in place, cuffed with cuff deflated upon arrival.  Pt demo hoarse/breathy/wet and strained vocal quality on 5/5 finger occlusion trials by SLP. Able to improve vocal intensity given mod multi-modal cues. Tolerating PMV for ~1 min with continued hoarse/breathy/wet and strained vocal quality with increased anxiety, coughing and increased secretions noted. Recommend continued SLP to address deficits. D/w pt who verbalized understanding. D/w pt, RN and MD.      Patient will benefit from skilled intervention to address the above impairments.   Patient's rehabilitation potential is considered to be Excellent  Factors which may influence rehabilitation potential include:   []              None noted  [x]              Mental ability/status  [x] Medical condition  []              Home/family situation and support systems  [x]              Safety awareness  []              Pain tolerance/management  []              Other:      PLAN:  Recommendations and Planned Interventions:  As above   Frequency/Duration: Patient will be followed by speech-language pathology 1-2 times per day/3-7 days per week to address goals. Discharge Recommendations: To Be Determined     SUBJECTIVE:   Patient stated ok    OBJECTIVE:    Speak Valve:   Tracheostomy Data/Eval   Trach Size/Status: Cuffed, cuff deflated(#9, Portex )   Date of Placement: 04/26/21   Additional Trach Info: Primary   Mental Status   Neurologic State: Alert   Orientation Level: Oriented to person   Cognition: Follows commands   Perception: Cues to attend right visual field   Perseveration: No perseveration noted   Safety/Judgement: Fall prevention   Evidence of Comprehension   Meaningful Eye Movement/Gaze: Yes   Response to Basic Yes/No Questions (%): (yes)   Cough : Weak   Finger Occlusion   Application: SLP   Tolerance: Increased coughing   Vocal Quality: Breathy, Hoarse, Wet, Strain   Vocal Intensity: Too soft   PMV Trial   Application: SLP   Tolerance: Increased anxiety, Increased secretions, Increased work of breathing, Increased coughing   Vocal Quality: Breathy, Hoarse, Low volume, Strain(Related to trach)   Vocal Intensity: Too soft   Duration (minutes): 1 minutes   Oxygen Therapy   O2 Sat (%): 96 %   Pulse via Oximetry: 86 beats per minute   O2 Device: Tracheal collar   O2 Flow Rate (L/min): 6 l/min   Airway Suction   Suction: Trach   Sputum Amount: Small   Sputum Color/Odor: White   Sputum Consistency: Thick   Suction Device: Suction catheter   Suction Catheter Size: 14 Fr   Treatment Diagnosis   Treatment Diagnosis: Dysphagia   Treatment Diagnosis 2: Aphonia      PAIN:  Pt reports 0/10 pain prior to evaluation. Pt reports 0/10 pain following evaluation.     After evaluation/treatment: []              Patient left in no apparent distress sitting up in chair  [x]              Patient left in no apparent distress in bed  [x]              Call bell left within reach  [x]              Nursing notified  []              Caregiver present  []              Bed alarm activated      COMMUNICATION/EDUCATION:   [] Patient/family have participated as able in goal setting and plan of care. [x] Patient/family agree to work toward stated goals and plan of care. []  Patient understands intent and goals of therapy, but is neutral about his/her participation.   []  Patient is unable to participate in goal setting and plan of care; education ongoing with interdisciplinary staff    Thank you for this referral,   Danilo Ariza M.S., Jarrett Merchant  Speech-Language Pathologist

## 2021-05-10 NOTE — PROGRESS NOTES
[facility]  Pulmonary, Critical Care, and Sleep Medicine    Name: Bernarda Hanks MRN: [id number]   : 1950 Hospital: [address] [place] [address] Dr   Date: 5/10/2021        IMPRESSION:   · Angioedema- with airway and respiratory failure and collapse; thought due to ACE inhibitor. S/P Emergent Cricthyrotomy [facility]-ED 21- converted to 6.5 ETT intraoperative by anesthesia team 21, 8.0 ETT by anesthesia 21, packing removed evening 21-ENT  · S/P cardiac arrest and acute respiratory failure-resolved  · Tracheostomy - #9 Portex on  with Dr. Elsy Harris. No further bleeding at trach site  · Pulmonary Infiltrates: suspect aspiration secretions and/or blood due to above- can't exclude other infectious process at this time. · DVT: Popliteal- Left; acute non-occlusive thrombus.   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 · Need for nutrition-PEG tube      Patient Active Problem List   Diagnosis Code    Angioedema due to angiotensin converting enzyme inhibitor (ACE-I) T78. 3XXA, [id number]    Respiratory failure ([address] J96.90    JACQUELYN (acute kidney injury) ([facility] N17.9    Cardiac arrest (HCC) I46.9    Obesity (BMI 30-39. 9) E66.9    Diabetic neuropathy associated with type 2 diabetes mellitus ([facility] E11.40    Diabetic retinopathy associated with type 2 diabetes mellitus ([address] E11.319    Pulmonary infiltrates R91.8    Controlled type 2 diabetes mellitus with neurologic complication, without long-term current use of insulin (HCC) E11.49    DVT (deep venous thrombosis) ([address] I82.409    Encounter for palliative care Z51.5    Goals of care, counseling/discussion Z71.89    Multiple subsegmental pulmonary emboli without acute cor pulmonale (HCC) I26.94    DVT (deep vein thrombosis) in pregnancy O22.30    Acute encephalopathy G93.40      PLAN:     PULM:  · Maintain aspiration precautions: HOB >30 degrees  · SpO2 goal >92%  · Bronchial /oral hygiene   · Trach care per protocol  · TC full time   · Albuterol PRN  · #9 Portex Trach on 4/26      GI/ NUTRITION:  · Follow up with nutritional recommendations    RENAL/ METAB/ :  · Monitor I&Os  · Trend electrolytes - replace as needed     HEM/ONC  · Trend Hb/HCT and PLTs  · Hold anticoagulation for PEG tube and then resume Coumadin for treatment of DVT PE     ID  · No active issues     ENDO  · BGs Q 6,SSI, TSH normal  · C1 inhibitor normal level (4/7/21)     MSK/ ORTHO  · No active issues  · Aggressive PT/OT for deconditioning     SKIN/ WOUNDS  · Per protocol  · Trach sutured to skin      CODE STATUS: Full Code- Full   Awaiting placement - long term facility     Subjective/Interval History:        Interval HPI:69 yo M h/o HTN tx w/ lisinopril presenting to [facility] ED 4/4 for rapidly progressing respiratory distress due to severe angioedema. During intubation attempts pt had brief cardiac arrest w/ pulse loss and severe airway swelling leading to emergent cricothyrotomy. Pt stabilized before Nightingale transport to [place] at [facility] pt taken emergently to [address] was converted to ETT. Patient continued to have trouble with o2 requirements disproportionate to CXR findings and despite adjustment of FiO2 and PEEP, now improved. S/p administration of inhaled epoprostenol. CTA revelaed B/L pulmonary emboli which is likely the cause of his hypoxemia and RH strain. Continuing heparin gtt, which had already been initiated for left popliteal non-occlusive thrombus. Pt is s/p dobutamine and  diureses w/ bumex and metolazone.  COVID - 19 negative. Now with minimal vent support, however, mentation precludes extubation at this time. Patient required tracheostomy placement 4/26.  His ICU course has been complicated by encephalopathy and critical care myopathy      Subjective   21    LOS: 36    · Tolerating trach collar  · Secretions improving  · PEG placed   · LTACH placement pending           ROS:Review of systems not obtained due to patient factors. Objective:   Vital Signs:    Visit Vitals  /80   Pulse 82   Temp 99.1 °F (37.3 °C)   Resp 16   Ht 5' 11\" (1.803 m)   Wt 99 kg (218 lb 4.1 oz)   SpO2 100%   BMI 30.44 kg/m²       O2 Device: Tracheal collar   O2 Flow Rate (L/min): 6 l/min   Temp (24hrs), Av.5 °F (36.9 °C), Min:97.9 °F (36.6 °C), Max:99.1 °F (37.3 °C)       Intake/Output:   Last shift:      05/10 0701 - 05/10 1900  In: 1440 [I.V.:1000]  Out: 0   Last 3 shifts: 1901 - 05/10 07  In: 4469.7 [I.V.:1819.7]  Out: 1325 [Urine:1325]    Intake/Output Summary (Last 24 hours) at 5/10/2021 1012  Last data filed at 5/10/2021 0733  Gross per 24 hour   Intake 2810.68 ml   Output 475 ml   Net 2335.68 ml      Physical Exam:                 General: NAD. Trach in place on TC.   Keeps head turned to the right  HEENT:  Anicteric sclerae; pink palpebral conjunctivae; mucosa moist  Resp:  Symmetrical chest expansion, no accessory muscle use; good airway entry; no rales/ no wheezing  CV:  S1, S2 present; regular rate and rhythm  GI:  Obese. Abdomen soft, non-tender; (+) active bowel sounds  Extremities:  +2 pulses on all extremities; no edema/ cyanosis/ clubbing noted;  Skin:  Warm; no rashes/ lesions noted, normal turgor/cap refill , dry appearing   Neurologic:  no eye opening or command following this am   Devices: Trach, PEG,condom cath        DATA:  Labs:  Recent Labs     05/10/21  0503 21  1545 21  0526 21  0541   WBC 9.9  --  9.1 9.6   HGB 8.7* 8.9* 10.1* 8.8*   HCT 29.1* 29.4* 33.7* 30.0*     --  215 228     Recent Labs     05/10/21  0503 21  0526 21  0541    141 145   K 4.2 4.2 4.0    110 112*   CO2 24 26 27   * 116* 113*   BUN 27* 24* 29*   CREA 0.91 0.82 0.87   CA 8.5 8.2* 8.4* MG 1.7 2.1 2.2   PHOS 2.9 2.6 2.5   ALB  --   --  2.4*   ALT  --   --  74*   INR 1.4* 1.4* 1.3*     No results for input(s): PH, PCO2, PO2, HCO3, FIO2 in the last 72 hours. Lab Results   Component Value Date/Time    Hemoglobin A1c 6.6 (H) 04/04/2021 05:11 PM     Lab Results   Component Value Date/Time    TSH 2.62 05/01/2021 04:21 PM    T4, Free 1.2 05/01/2021 04:21 PM     MICRO:  Results     Procedure Component Value Units Date/Time    CULTURE, BLOOD [[id number]] Collected: 05/08/21 0541    Order Status: Completed Specimen: Blood Updated: 05/10/21 0658     Special Requests: NO SPECIAL REQUESTS        Culture result: NO GROWTH 2 DAYS       CULTURE, BLOOD [[id number]] Collected: 05/05/21 1833    Order Status: Completed Specimen: Blood Updated: 05/10/21 0658     Special Requests: NO SPECIAL REQUESTS        Culture result: NO GROWTH 5 DAYS       CULTURE, BLOOD [[id number]]  (Abnormal) Collected: 05/05/21 1430    Order Status: Completed Specimen: Blood Updated: 05/08/21 1622     Special Requests: NO SPECIAL REQUESTS        GRAM STAIN       AEROBIC BOTTLE GRAM POSITIVE COCCI IN GROUPS                  SMEAR CALLED TO AND CORRECTLY REPEATED BY: KIMBERLY DOUGLAS RN  [facility]SD 5/7/21 0841 TO LAE           Culture result:       STAPHYLOCOCCUS SPECIES, COAGULASE NEGATIVE GROWING IN THE AEROBIC BOTTLE          COVID-19 RAPID TEST [[id number]] Collected: 05/02/21 1022    Order Status: Completed Specimen: Nasopharyngeal Updated: 05/02/21 1127     Specimen source Nasopharyngeal        COVID-19 rapid test Not detected        Comment: Rapid Abbott ID Now       Rapid NAAT:  The specimen is NEGATIVE for SARS-CoV-2, the novel coronavirus associated with COVID-19. Negative results should be treated as presumptive and, if inconsistent with clinical signs and symptoms or necessary for patient management, should be tested with an alternative molecular assay.   Negative results do not preclude SARS-CoV-2 infection and should not be used as the sole basis for patient management decisions. This test has been authorized by the FDA under an Emergency Use Authorization (EUA) for use by authorized laboratories.    Fact sheet for Healthcare Providers: ConventionUpdate.co.nz  Fact sheet for Patients: ConventionUpdate.co.nz       Methodology: Isothermal Nucleic Acid Amplification         CULTURE, RESPIRATORY/SPUTUM/BRONCH [employer] STAIN [[id number]]  (Abnormal)  (Susceptibility) Collected: 05/01/21 1032    Order Status: Completed Specimen: Sputum from Tracheal Aspirate Updated: 05/04/21 1026     Special Requests: NO SPECIAL REQUESTS        GRAM STAIN RARE WBCS SEEN               RARE EPITHELIAL CELLS SEEN                  OCCASIONAL GRAM NEGATIVE COCCOBACILLI           Culture result:       HEAVY ENTEROBACTER AEROGENES                  NO NORMAL RESPIRATORY ANNALEE ISOLATED          Susceptibility      Enterobacter aerogenes     JOANNE     Amikacin ($) Susceptible     Cefazolin ($) Resistant     Cefepime ($$) Susceptible     Cefoxitin Resistant     Ceftazidime ($) Resistant     Ceftriaxone ($) Intermediate     Ciprofloxacin ($) Susceptible     Gentamicin ($) Susceptible     Levofloxacin ($) Susceptible     Meropenem ($$) Susceptible     Piperacillin/Tazobac ($) Resistant     Tobramycin ($) Susceptible     Trimeth/Sulfa Susceptible                    CULTURE, RESPIRATORY/SPUTUM/BRONCH [employer] STAIN [[id number]]  (Abnormal)  (Susceptibility) Collected: 04/30/21 1245    Order Status: Completed Specimen: Sputum,ET Suction Updated: 05/03/21 1131     Special Requests: NO SPECIAL REQUESTS        GRAM STAIN 1+ WBCS SEEN               RARE EPITHELIAL CELLS SEEN                  1+ GRAM POSITIVE COCCI IN CHAINS                  OCCASIONAL GRAM NEGATIVE COCCOBACILLI           Culture result:       MODERATE ENTEROBACTER AEROGENES                  FEW NORMAL RESPIRATORY ANNALEE          Susceptibility      Enterobacter aerogenes     JOANNE Amikacin ($) Susceptible     Cefazolin ($) Resistant     Cefepime ($$) Susceptible     Cefoxitin Resistant     Ceftazidime ($) Resistant     Ceftriaxone ($) Intermediate     Ciprofloxacin ($) Susceptible     Gentamicin ($) Susceptible     Levofloxacin ($) Susceptible     Meropenem ($$) Susceptible     Piperacillin/Tazobac ($) Resistant     Tobramycin ($) Susceptible     Trimeth/Sulfa Susceptible                                                                          Echo  04/04/21   ECHO ADULT COMPLETE 04/06/2021 4/6/2021    Narrative · Contrast used: DEFINITY. · Image quality for this study was technically difficult. · LV: Calculated LVEF is 55%. Visually measured ejection fraction. Normal   cavity size, wall thickness and systolic function (ejection fraction   normal). Wall motion: normal. Moderate (grade 2) left ventricular   diastolic dysfunction. · AO: Mild aortic root and ascending aorta dilatation. Ascending aorta   diameter = 3.6 cm. Aortic root measures 3.9 cm  · RA: Dilated right atrium. · RV: Dilated right ventricle. Borderline low systolic function. · TV: Mild tricuspid valve regurgitation is present. · IVC: Mechanically ventilated; cannot use inferior caval vein diameter to   estimate central venous pressure. · PA: Moderate pulmonary hypertension. Pulmonary arterial systolic   pressure is 61 mmHg. Signed by: Remi Brown MD        Imaging:  [x]I have personally reviewed the patients radiographs    CXR Results  (Last 48 hours)    None               CT Results (most recent):  Results from Hospital Encounter encounter on 04/04/21   CT HEAD WO CONT    Narrative CT HEAD WO CONT    History: Status post cardiac arrest undergoing follow-up.        Comparison: 7/18/2016    TECHNIQUE: 5 mm helical scan obtained of the head were obtained from the skull  vertex through the base of the skull without intravenous contrast.      All CT scans at this facility are performed using dose optimization technique as  appropriate to a performed exam, to include automated exposure control,  adjustment of the mA and/or kV according to patient size (including appropriate  matching first site-specific examinations), or use of iterative reconstruction  technique. BRAIN RESULT:      There are mild regions of periventricular hypodensity. Gray-white matter  differentiation is maintained. No acute intracranial hemorrhage. No midline  shift. Basilar cisterns are patent. Mild volume loss. Orbits are grossly normal. There is mild linear soft tissue thickening right  scalp. There is mild dependent opacity in the left sphenoid and right maxillary  sinus. Trace left mastoid effusion. Impression No acute findings. Mild sequela of chronic microvascular ischemic disease and mild generalized  volume loss. Mild sinusitis and trace left mastoid effusion.          Vasiliy Morales MD  05/10/21  Pulmonary, Critical Care Medicine  [facility]

## 2021-05-10 NOTE — PROGRESS NOTES
Hospitalist Progress Note        Patient: Pramod Gonsalez               Sex: male          DOA: 4/4/2021       YOB: 1950      Age:  79 y.o.        LOS:  LOS: 36 days               Subjective:     Patient is more awake today. He is frustrated that he cannot talk. Denies any chest pain or abdominal pain by nodding his head. No more bleeding from the PEG tube site today but still had some old blood clots. Patient has good cough. Patient got really excited when his sister came to see him. He desperately wants to go home. HPI:  Patient was initially seen at [facility] and had emergent Crychothyrotomy during Cardiac arrest.  Initially there was concern that he had Angioedema. He was stabilized and flown to [facility] for ongoing management. Mr Karlos Gaston has required prolonged support on the mechanical ventilator for ongoing respiratory failure. He has had Tracheostomy via ENT and he continues with Tracheostomy collar currently which he is tolerating well. Mr Karlos Gaston had cardiac arrest initially, but he has stable hemodynamics currently. Mr Karlos Gaston was treated for sepsis and has received a full course of antibiotics, he is off antibiotics currently. He has limited interaction, he does not have seizure activity. He has had sedation to maintain the ventilator which he no longer needs. He has tracheostomy, but he currently does not have a PEG. He has ongoing NG tube and continues with NG tube feeding. NG tube was removed and PEG tube was placed on May 3, 2021. Tolerating tube feeding since then.     Objective:      BP (!) 144/80   Pulse 79   Temp 98.6 °F (37 °C)   Resp 12   Ht 5' 11\" (1.803 m)   Wt 99 kg (218 lb 4.1 oz)   SpO2 98%   BMI 30.44 kg/m²       Intake/Output Summary (Last 24 hours) at 5/10/2021 1157  Last data filed at 5/10/2021 1133  Gross per 24 hour   Intake 2820 ml   Output 875 ml   Net 1945 ml     General: Awake, follows commands, not in acute distress  Neck: Tracheostomy is in situ with dressing  Lungs: Diminished breath sound bibasilar without wheezes  Heart:  Regular Rate and Rhythm. Abdomen:  Soft, nondistended, bowel sounds present, PEG tube is present with  Extremities: No edema present  Neuro: Awake, moves both upper extremities and right leg. Motor strength is 2-3 out of 5 in left lower extremity. Assessment/Plan     ASSESSMENT:    1. Reportedly acute hypoxic respiratory failure due to angioedema s/p emergent cricothyrotomy followed by tracheostomy. Better. 2.  Tracheostomy site bleeding, resolved. 3.  Status post respiratory failure cardiac arrest  4. Chronic hypoxic respiratory failure status post trach and on trach collar  5. Bilateral pulmonary infiltrates  6. Oropharyngeal dysphagia status post G tube  7. Left leg popliteal DVT and pulmonary embolism  8. Reportedly acute metabolic encephalopathy  9. 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 JACQUELYN, recurrent -resolved currently  11. Left pneumothorax resolved  12. Diabetic neuropathy  13. Obesity  14. Fever and leukocytosis, resolved  15. Coag negative staph bacteremia most likely contamination  16. Bleeding around the PEG tube, better today    PLAN:    Continue Coumadin and heparin drip, will stop heparin drip once INR is more than 2.  GI has been consulted for #16  Vascular surgery has been consulted to evaluate patient for IVC filter if he continues to have bleeding around the PEG tube site  Repeat bilateral lower extremity venous Doppler report reviewed  We will discontinue vancomycin as repeat blood cultures are now negative  Discussed with speech therapist to start Passy-[place] valve and repeating modified barium swallow.   Continue current tube feeding per dietitian  Continue statin  Stable H&H, will continue IV PPI twice daily today as patient does not have any coffee-ground aspirate from PEG tube per nursing  Pulmonologist to follow for tracheostomy care  Continue trach collar and nebulizer  Continue multivitamin, thiamine  PT and OT to follow this patient    Discussed with patient's sister at bedside and updated her about current treatment plan. Estimated date of discharge: May 11, 2021    Total time to take care of this patient was 25 minutes and more than 50% of time was spent counseling and coordinating care. Disclaimer: Sections of this note are dictated using utilizing voice recognition software, which may have resulted in some phonetic based errors in grammar and contents. Even though attempts were made to correct all the mistakes, some may have been missed, and remained in the body of the document. If questions arise, please contact our department.     Recent Results (from the past 24 hour(s))   HGB & HCT    Collection Time: 05/09/21  3:45 PM   Result Value Ref Range    HGB 8.9 (L) 13.0 - 16.0 g/dL    HCT 29.4 (L) 36.0 - 48.0 %   PTT    Collection Time: 05/09/21  5:37 PM   Result Value Ref Range    aPTT 80.5 (H) 23.0 - 36.4 SEC   GLUCOSE, POC    Collection Time: 05/09/21  5:51 PM   Result Value Ref Range    Glucose (POC) 122 (H) 70 - 110 mg/dL   GLUCOSE, POC    Collection Time: 05/10/21 12:15 AM   Result Value Ref Range    Glucose (POC) 114 (H) 70 - 110 mg/dL   MAGNESIUM    Collection Time: 05/10/21  5:03 AM   Result Value Ref Range    Magnesium 1.7 1.6 - 2.6 mg/dL   PHOSPHORUS    Collection Time: 05/10/21  5:03 AM   Result Value Ref Range    Phosphorus 2.9 2.5 - 4.9 MG/DL   CBC WITH AUTOMATED DIFF    Collection Time: 05/10/21  5:03 AM   Result Value Ref Range    WBC 9.9 4.6 - 13.2 K/uL    RBC 3.47 (L) 4.35 - 5.65 M/uL    HGB 8.7 (L) 13.0 - 16.0 g/dL    HCT 29.1 (L) 36.0 - 48.0 %    MCV 83.9 74.0 - 97.0 FL    MCH 25.1 24.0 - 34.0 PG    MCHC 29.9 (L) 31.0 - 37.0 g/dL    RDW 15.3 (H) 11.6 - 14.5 %    PLATELET 141 965 - 137 K/uL    MPV 12.2 (H) 9.2 - 11.8 FL    NEUTROPHILS 65 40 - 73 %    LYMPHOCYTES 26 21 - 52 %    MONOCYTES 5 3 - 10 % EOSINOPHILS 3 0 - 5 %    BASOPHILS 0 0 - 2 %    ABS. NEUTROPHILS 6.4 1.8 - 8.0 K/UL    ABS. LYMPHOCYTES 2.6 0.9 - 3.6 K/UL    ABS. MONOCYTES 0.5 0.05 - 1.2 K/UL    ABS. EOSINOPHILS 0.3 0.0 - 0.4 K/UL    ABS.  BASOPHILS 0.0 0.0 - 0.1 K/UL    DF AUTOMATED     PROTHROMBIN TIME + INR    Collection Time: 05/10/21  5:03 AM   Result Value Ref Range    Prothrombin time 17.3 (H) 11.5 - 15.2 sec    INR 1.4 (H) 0.8 - 1.2     METABOLIC PANEL, BASIC    Collection Time: 05/10/21  5:03 AM   Result Value Ref Range    Sodium 141 136 - 145 mmol/L    Potassium 4.2 3.5 - 5.5 mmol/L    Chloride 110 100 - 111 mmol/L    CO2 24 21 - 32 mmol/L    Anion gap 7 3.0 - 18 mmol/L    Glucose 104 (H) 74 - 99 mg/dL    BUN 27 (H) 7.0 - 18 MG/DL    Creatinine 0.91 0.6 - 1.3 MG/DL    BUN/Creatinine ratio 30 (H) 12 - 20      GFR est AA >60 >60 ml/min/1.73m2    GFR est non-AA >60 >60 ml/min/1.73m2    Calcium 8.5 8.5 - 10.1 MG/DL   PTT    Collection Time: 05/10/21  5:03 AM   Result Value Ref Range    aPTT 108.1 (H) 23.0 - 36.4 SEC   GLUCOSE, POC    Collection Time: 05/10/21  5:43 AM   Result Value Ref Range    Glucose (POC) 111 (H) 70 - 110 mg/dL   GLUCOSE, POC    Collection Time: 05/10/21 11:24 AM   Result Value Ref Range    Glucose (POC) 127 (H) 70 - 110 mg/dL

## 2021-05-10 NOTE — PROGRESS NOTES
[email or web address]  [phone]    Gastroenterology follow up-Progress note    Impression:  1. Dysphagia s/p PEG by Dr. Madi Macdonald 5/3/21  2. Bleeding around PEG site      Plan:  1. Ok to resume anticoagulation as indicated. Discussed w/ Dr. Bishnu Rosado. Chief Complaint: bleeding from PEG site      Subjective:  Re consulted for recommendation regarding anticoagulation after patient noted to have bleeding around PEG site yesterday. Seen w/ RN at bedside. H/H remains stable. Heparin held yesterday and no further bleeding noted. ROS: Denies any fevers, chills, rash. General: well developed, well nourished, no acute distress  Eyes: conjunctiva normal, EOM normal  Cardiovascular: heart normal, intact distal pulses, normal rate and regular rhythm  Pulmonary: breath sounds normal and effort normal  Abdominal: PEG noted w/ small amount of dried blood, gauze dressing clean/dry. appearance normal, bowel sounds normal and soft, non-acute, non-tender    Patient Active Problem List   Diagnosis Code    Angioedema due to angiotensin converting enzyme inhibitor (ACE-I) T78. 3XXA, [id number]    Respiratory failure ([facility] J96.90    JACQUELYN (acute kidney injury) ([facility] N17.9    Cardiac arrest (HCC) I46.9    Obesity (BMI 30-39. 9) E66.9    Diabetic neuropathy associated with type 2 diabetes mellitus ([facility] E11.40    Diabetic retinopathy associated with type 2 diabetes mellitus ([facility] E11.319    Pulmonary infiltrates R91.8    Controlled type 2 diabetes mellitus with neurologic complication, without long-term current use of insulin (HCC) E11.49    DVT (deep venous thrombosis) ([facility] I82.409    Encounter for palliative care Z51.5    Goals of care, counseling/discussion Z71.89    Multiple subsegmental pulmonary emboli without acute cor pulmonale (HCC) I26.94    DVT (deep vein thrombosis) in pregnancy O22.30    Acute encephalopathy G93.40         Visit Vitals  BP (!) 144/80   Pulse 79   Temp 98.6 °F (37 °C)   Resp 12   Ht 5' 11\" (1.803 m)   Wt 99 kg (218 lb 4.1 oz)   SpO2 96%   BMI 30.44 kg/m²           Intake/Output Summary (Last 24 hours) at 5/10/2021 1427  Last data filed at 5/10/2021 1133  Gross per 24 hour   Intake 2820 ml   Output 875 ml   Net 1945 ml       CBC w/Diff    Lab Results   Component Value Date/Time    WBC 9.9 05/10/2021 05:03 AM    RBC 3.47 (L) 05/10/2021 05:03 AM    HGB 8.7 (L) 05/10/2021 05:03 AM    HCT 29.1 (L) 05/10/2021 05:03 AM    MCV 83.9 05/10/2021 05:03 AM    MCH 25.1 05/10/2021 05:03 AM    MCHC 29.9 (L) 05/10/2021 05:03 AM    RDW 15.3 (H) 05/10/2021 05:03 AM     05/10/2021 05:03 AM    Lab Results   Component Value Date/Time    GRANS 65 05/10/2021 05:03 AM    LYMPH 26 05/10/2021 05:03 AM    EOS 3 05/10/2021 05:03 AM    BASOS 0 05/10/2021 05:03 AM      Basic Metabolic Profile   Recent Labs     05/10/21  0503      K 4.2      CO2 24   BUN 27*   CA 8.5   MG 1.7   PHOS 2.9        Hepatic Function    Lab Results   Component Value Date/Time    ALB 2.4 (L) 05/08/2021 05:41 AM    TP 8.9 (H) 05/08/2021 05:41 AM     05/08/2021 05:41 AM    No results found for: TBIL       Coags   Recent Labs     05/10/21  0503 05/09/21  1737 05/09/21  0526   PTP 17.3*  --  16.5*   INR 1.4*  --  1.4*   APTT 108.1* 80.5* 138.4*               MAHAD Caal  05/10/21, 2:41 PM   [facility]. [email or web address]  Phone: [phone]  Pager: [phone]

## 2021-05-10 NOTE — CONSULTS
Vascular Surgery Consultation    Kimberlee Lora is a 79 y.o. male. Dr. Mao July has requested a vascular surgery consultation for IVC filter placement. The patient is admitted for respiratory failure    HPI:    This is a 79 y.o. man with DM, HTN, HLD and obesity. Now with prolonged hospitalization with complex course, including angioedema s/p emergent cricothyrotomy and now tracheostomy with longterm ventilation, s/p cardiac arrest, left pneumothorax and DVT/PE on anticoagulation. Vascular surgery consultation for IVC filter is requested due to bleeding around PEG tube. Patient Active Problem List   Diagnosis Code    Angioedema due to angiotensin converting enzyme inhibitor (ACE-I) T78. 3XXA, [id number]    Respiratory failure ([facility] J96.90    JACQUELYN (acute kidney injury) ([facility] N17.9    Cardiac arrest ([facility]) I46.9    Obesity (BMI 30-39. 9) E66.9    Diabetic neuropathy associated with type 2 diabetes mellitus ([facility]) E11.40    Diabetic retinopathy associated with type 2 diabetes mellitus ([facility] E11.319    Pulmonary infiltrates R91.8    Controlled type 2 diabetes mellitus with neurologic complication, without long-term current use of insulin ([facility]) E11.49    DVT (deep venous thrombosis) ([facility] I82.409    Encounter for palliative care Z51.5    Goals of care, counseling/discussion Z71.89    Multiple subsegmental pulmonary emboli without acute cor pulmonale ([facility]) I26.94    DVT (deep vein thrombosis) in pregnancy O22.30    Acute encephalopathy G93.40     Past Medical History:   Diagnosis Date    DDD (degenerative disc disease), lumbar     Diabetes ([facility]     Diabetic neuropathy ([facility]     Diabetic retinopathy ([facility]     DM2 (diabetes mellitus, type 2) ([facility]     HLD (hyperlipidemia)     HTN (hypertension)     Obesity (BMI 30-39. 9)      History reviewed. No pertinent surgical history.   Social History     Socioeconomic History    Marital status:      Spouse name: Not on file    Number of children: Not on file    Years of education: Not on file    Highest education level: Not on file   Occupational History    Not on file   Social Needs    Financial resource strain: Not on file    Food insecurity     Worry: Not on file     Inability: Not on file    Transportation needs     Medical: Not on file     Non-medical: Not on file   Tobacco Use    Smoking status: Current Every Day Smoker     Packs/day: 0.50   Substance and Sexual Activity    Alcohol use: Not on file    Drug use: Not on file    Sexual activity: Not on file   Lifestyle    Physical activity     Days per week: Not on file     Minutes per session: Not on file    Stress: Not on file   Relationships    Social connections     Talks on phone: Not on file     Gets together: Not on file     Attends Hindu service: Not on file     Active member of club or organization: Not on file     Attends meetings of clubs or organizations: Not on file     Relationship status: Not on file    Intimate partner violence     Fear of current or ex partner: Not on file     Emotionally abused: Not on file     Physically abused: Not on file     Forced sexual activity: Not on file   Other Topics Concern     Service Not Asked    Blood Transfusions Not Asked    Caffeine Concern Not Asked    Occupational Exposure Not Asked   Carley Emmer Hazards Not Asked    Sleep Concern Not Asked    Stress Concern Not Asked    Weight Concern Not Asked    Special Diet Not Asked    Back Care Not Asked    Exercise Not Asked    Bike Helmet Not Asked   [address] Not Asked    Self-Exams Not Asked   Social History Narrative    Not on file     History reviewed. No pertinent surgical history. History reviewed. No pertinent family history.   Allergies   Allergen Reactions    Lisinopril Angioedema       Current Facility-Administered Medications:     warfarin (COUMADIN) tablet 7.5 mg, 7.5 mg, Oral, QPM, Garth Alanis MD    pantoprazole (PROTONIX) 40 mg in 0.9% sodium chloride 10 mL injection, 40 mg, IntraVENous, Q12H, Garth Alanis MD, 40 mg at 05/10/21 0824    LORazepam (ATIVAN) tablet 0.5 mg, 0.5 mg, Per G Tube, Q6H PRN, Shade Hernandez MD, 0.5 mg at 05/08/21 1648    vancomycin (VANCOCIN) 1,000 mg in 0.9% sodium chloride 250 mL (VIAL-MATE), 1,000 mg, IntraVENous, Q12H, Margarette FELTON MD, 1,000 mg at 05/10/21 1118    VANCOMYCIN INFORMATION NOTE, , Other, Rx Dosing/Monitoring, Maura Alanis MD    albuterol-ipratropium (DUO-NEB) 2.5 MG-0.5 MG/3 ML, 3 mL, Nebulization, Q4H PRN, Ebb Massed, NP, 3 mL at 05/09/21 0834    thiamine HCL (B-1) tablet 100 mg, 100 mg, Per G Tube, DAILY, Shade Hernandez MD, 100 mg at 05/10/21 0824    warfarin - physician to dose, , Other, Q24H, Shade Hernandez MD, Given at 05/09/21 5747    multivit-folic acid-herbal 318 (WELLESSE PLUS) oral liquid 30 mL, 30 mL, Per G Tube, DAILY, Shade Hernandez MD, 30 mL at 05/10/21 0824    atorvastatin (LIPITOR) tablet 80 mg, 80 mg, Per G Tube, QHS, Shade Hernandez MD, 80 mg at 05/09/21 2137    polyethylene glycol (MIRALAX) packet 17 g, 17 g, Per G Tube, DAILY, Shade Hernandez MD, 17 g at 05/10/21 0824    sodium chloride 3% hypertonic nebulizer soln, 4 mL, Nebulization, BID RT, Luiz Membreno MD, 4 mL at 05/10/21 0835    heparin 25,000 units in D5W 250 ml infusion, 9.8-25 Units/kg/hr, IntraVENous, TITRATE, Loan Rashid PA-C, Last Rate: 10 mL/hr at 05/09/21 2127, 996 Units/hr at 05/09/21 2127    acetaminophen (TYLENOL) solution 500 mg, 500 mg, Oral, Q4H PRN, Lona Rashid PA-C, 500 mg at 05/07/21 0359    oxymetazoline (AFRIN) 0.05 % nasal spray 2 Spray, 2 Spray, Other, BID PRN, Tyron Garcia, RUDDY    arformoteroL (BROVANA) neb solution 15 mcg, 15 mcg, Nebulization, BID RT, Emma Ng PA-C, 15 mcg at 05/10/21 1585    glucose chewable tablet 16 g, 4 Tab, Oral, PRN, Osvaldo Estes PA-C    glucagon (GLUCAGEN) injection 1 mg, 1 mg, IntraMUSCular, PRN, Theodora Manley, Vaishnavi Rodriguez PA-C    dextrose (D50W) injection syrg 12.5-25 g, 25-50 mL, IntraVENous, PRN, Nilo Cambpell PA-C    insulin lispro (HUMALOG) injection, , SubCUTAneous, Q6H, Vaishnavi Pérez PA-C, Stopped at 05/05/21 1800    carboxymethylcellulose sodium (REFRESH LIQUIGEL) 1 % ophthalmic solution 1 Drop, 1 Drop, Both Eyes, PRN, Nilo Campbell PA-C, 1 Drop at 04/14/21 3804     Home Medications:     No current facility-administered medications on file prior to encounter. Current Outpatient Medications on File Prior to Encounter   Medication Sig Dispense Refill    LISINOPRIL, BULK, by Does Not Apply route. Review of Systems:   Deferred due to patient's mental status    Physical Assessment:      Visit Vitals  BP (!) 144/80   Pulse 79   Temp 98.6 °F (37 °C)   Resp 12   Ht 5' 11\" (1.803 m)   Wt 218 lb 4.1 oz (99 kg)   SpO2 98%   BMI 30.44 kg/m²     Constitutional:  Patient is well developed, well nourished, and not distressed. HEENT: atraumatic, normocephalic. On trach collar. Eyes:   Cunjunctivae clear, no scleral icterus  Neck:  Tracheostomy without evidence of bleeding. Cardiovascular:  Normal rate, regular rhythm  Pulmonary/Chest: Effort normal. On trach collar  Abdominal:  Soft. PEG tube in place, dressing removed. Very minimal amount of blood staining on the dressing. No tenderness to palpation. Extremities: BLE edema, extending to the knees. Warm and well perfused bilaterally. Neurological:  he  is alert and responds appropriately to questions by nodding yes or no  Skin:  Skin is warm and dry. No rash noted. No erythema.  Bilateral heels with mepilex in place        Lab Test Results:     Recent Results (from the past 12 hour(s))   MAGNESIUM    Collection Time: 05/10/21  5:03 AM   Result Value Ref Range    Magnesium 1.7 1.6 - 2.6 mg/dL   PHOSPHORUS    Collection Time: 05/10/21  5:03 AM   Result Value Ref Range    Phosphorus 2.9 2.5 - 4.9 MG/DL   CBC WITH AUTOMATED DIFF    Collection Time: 05/10/21 5:03 AM   Result Value Ref Range    WBC 9.9 4.6 - 13.2 K/uL    RBC 3.47 (L) 4.35 - 5.65 M/uL    HGB 8.7 (L) 13.0 - 16.0 g/dL    HCT 29.1 (L) 36.0 - 48.0 %    MCV 83.9 74.0 - 97.0 FL    MCH 25.1 24.0 - 34.0 PG    MCHC 29.9 (L) 31.0 - 37.0 g/dL    RDW 15.3 (H) 11.6 - 14.5 %    PLATELET 638 560 - 108 K/uL    MPV 12.2 (H) 9.2 - 11.8 FL    NEUTROPHILS 65 40 - 73 %    LYMPHOCYTES 26 21 - 52 %    MONOCYTES 5 3 - 10 %    EOSINOPHILS 3 0 - 5 %    BASOPHILS 0 0 - 2 %    ABS. NEUTROPHILS 6.4 1.8 - 8.0 K/UL    ABS. LYMPHOCYTES 2.6 0.9 - 3.6 K/UL    ABS. MONOCYTES 0.5 0.05 - 1.2 K/UL    ABS. EOSINOPHILS 0.3 0.0 - 0.4 K/UL    ABS. BASOPHILS 0.0 0.0 - 0.1 K/UL    DF AUTOMATED     PROTHROMBIN TIME + INR    Collection Time: 05/10/21  5:03 AM   Result Value Ref Range    Prothrombin time 17.3 (H) 11.5 - 15.2 sec    INR 1.4 (H) 0.8 - 1.2     METABOLIC PANEL, BASIC    Collection Time: 05/10/21  5:03 AM   Result Value Ref Range    Sodium 141 136 - 145 mmol/L    Potassium 4.2 3.5 - 5.5 mmol/L    Chloride 110 100 - 111 mmol/L    CO2 24 21 - 32 mmol/L    Anion gap 7 3.0 - 18 mmol/L    Glucose 104 (H) 74 - 99 mg/dL    BUN 27 (H) 7.0 - 18 MG/DL    Creatinine 0.91 0.6 - 1.3 MG/DL    BUN/Creatinine ratio 30 (H) 12 - 20      GFR est AA >60 >60 ml/min/1.73m2    GFR est non-AA >60 >60 ml/min/1.73m2    Calcium 8.5 8.5 - 10.1 MG/DL   PTT    Collection Time: 05/10/21  5:03 AM   Result Value Ref Range    aPTT 108.1 (H) 23.0 - 36.4 SEC   GLUCOSE, POC    Collection Time: 05/10/21  5:43 AM   Result Value Ref Range    Glucose (POC) 111 (H) 70 - 110 mg/dL   GLUCOSE, POC    Collection Time: 05/10/21 11:24 AM   Result Value Ref Range    Glucose (POC) 127 (H) 70 - 110 mg/dL     PVL:   Interpretation Summary    · Technically difficult/limited exam due to: marked edema, inability to withstand probe pressure and inability to externally rotate leg. · Subacute non-occlusive thrombus present in the left popliteal vein.   · No evidence of deep venous thrombosis in the right lower extremity. Lower Extremity Venous Findings    Right Lower Venous    Technically difficult exam due to: marked edema, inability to withstand probe pressure and inability to externally rotate leg. The common femoral, great saphenous, small saphenous, profunda femoral, proximal femoral, mid femoral, distal femoral, popliteal, posterior tibial and saphenous femoral junction vein(s) were imaged in the transverse and longitudinal planes. The vessels showed normal color filling and compressibility. Doppler interrogation of the veins showed phasic and spontaneous flow. Right peroneal veins not visualized due to edema. Left Lower Venous    Technically difficult exam due to: marked edema, inability to withstand probe pressure and inability to externally rotate leg. Age indeterminate non-occlusive thrombus present in the left popliteal vein. The common femoral, great saphenous, saphenous femoral junction, small saphenous, profunda femoral, proximal femoral, mid femoral, distal femoral and posterior tibial vein(s) were imaged in the transverse and longitudinal planes. The vessels showed normal color filling and compressibility. Doppler interrogation of the veins showed phasic and spontaneous flow. Limited visual posterior to left knee, unable to property position patient. Left peroneal veins not visualized due to edema. Impression:   79 y.o. man with prolonged hospitalization with complex course. Now with subacute, non-occlusive left popliteal vein DVT and PE. Currently on heparin gtt and coumadin. Plan:     1. Bleeding around PEG tube. Per report, clot around PEG tube yesterday. Supratherapeutic PTT noted on both 5/8 and 5/9. I do not appreciate any bleeding around PEG today. Continues on heparin gtt and coumadin (INR 1.4 today). 2. DVT/PE. Left popliteal vein DVT, now subacute. Recommend continued anticoagulation.  There is no indication for IVC filter at this time. Should bleeding complications develop, we could certainly revisit. However, I am hesitant to implant IVC filter in potentially longterm bedbound patient due to prothrombotic nature of the filter and risk for IVC and bilateral iliac vein thrombosis. Thank you very much for the opportunity to participate in the care of this patient. Should you have any further questions or concerns, please do not hesitate to call back.      Baljit Alejandro MD PhD  [facility]      Baljit Alejandro MD

## 2021-05-10 NOTE — PROGRESS NOTES
Problem: Dysphagia (Adult)  Goal: *Acute Goals and Plan of Care (Insert Text)  Description: Patient will:  1. Tolerate PO trials with 0 s/s overt distress in 4/5 trials  2. Utilize compensatory swallow strategies/maneuvers (decrease bite/sip, size/rate, alt. liq/sol) with min cues in 4/5 trials  3. Perform oral-motor/laryngeal exercises to increase oropharyngeal swallow function with min cues  4. Complete an objective swallow study (i.e., MBSS) to assess swallow integrity, r/o aspiration, and determine of safest LRD, min A as indicated/ordered by MD-met 5/10/21    Recommend:   NPO with PEG   Strict aspiration precautions (HOB >30 degrees at all times, Oral care TID)    5/10/2021 1514 by August Mora, SLP  Outcome: [address] STUDY & TREATMENT    Patient: Margaret Dover (37 y.o. male)  Date: 5/10/2021  Primary Diagnosis: Angioedema due to angiotensin converting enzyme inhibitor (ACE-I) [T78. 3XXA, T46.4X5A]  Acute respiratory failure with hypoxia ([facility]) [J96.01]  DVT (deep vein thrombosis) in pregnancy [O22.30]  Acute encephalopathy [G93.40]  Procedure(s) (LRB):  PERCUTANEOUS ENDOSCOPIC GASTROSTOMY TUBE INSERTION/ BEDSIDE (N/A) 7 Days Post-Op   Precautions: Aspiration, Fall, Skin    ASSESSMENT :  Based on the objective data described below, the patient presents with mild oral and moderately severe pharyngeal dysphagia characterized by silent laryngeal penetration during and after the swallow on all consistencies presented (thin, NTL and HTL). Pt refusing trials of pudding. Demo delayed a-p transit, decreased base of tongue of tongue strength with premature spillage and pooling in the valleculae for ~8 seconds prior to swallow initiation, decreased laryngeal elevation/adduction/sensation and slowed epiglottic inversion. Pt with overall decreased strength/motility with mild pharyngeal residuals post intake.   Recommend continue NPO with PEG for primary nutrition/hydration source. Will continue to follow for further dysphagia management and PMV trials. TREATMENT :  Treatment provided post diagnostic testing including oropharyngeal anatomy/physiology, MBS results, diet recommendations and compensatory strategies/positioning. Pt able to verbalize understanding; however, question carryover. Will continue to follow. Patient will benefit from skilled intervention to address the above impairments. Patient's rehabilitation potential is considered to be Good  Factors which may influence rehabilitation potential include:   []              None noted  [x]              Mental ability/status  [x]              Medical condition  []              Home/family situation and support systems  [x]              Safety awareness  []              Pain tolerance/management  []              Other:      PLAN :  Recommendations and Planned Interventions:  As above   Frequency/Duration: Patient will be followed by speech-language pathology 1-2 times per day/4-7 days per week to address goals. Discharge Recommendations: To Be Determined     SUBJECTIVE:   Patient stated ok. OBJECTIVE:     Past Medical History:   Diagnosis Date    DDD (degenerative disc disease), lumbar     Diabetes ([address]     Diabetic neuropathy ([address]     Diabetic retinopathy ([address]     DM2 (diabetes mellitus, type 2) ([address]     HLD (hyperlipidemia)     HTN (hypertension)     Obesity (BMI 30-39. 9)    History reviewed. No pertinent surgical history.   Prior Level of Function/Home Situation:   Home Situation  Home Environment: Other (comment)( [facility], sedated/intubated)  One/Two Story Residence: Other (Comment)( [facility], sedated/intubated)  Living Alone: No  Support Systems: Parent  Patient Expects to be Discharged to[de-identified] Unknown  Current DME Used/Available at Home: None( [facility], sedated/intubated)  Diet prior to admission: unknown   Current Diet:  npo   Radiologist:    Film Views: Fluoro;Lateral  Patient Position: 90 in chair    Trial 1:   Consistency Presented: Thin liquid; Nectar thick liquid;Honey thick liquid(refused pudding trials )   How Presented: SLP-fed/presented;Cup/sip;Spoon   Bolus Acceptance: No impairment   Bolus Formation/Control: Impaired: Delayed;Poor;Posterior   Propulsion: Delayed (# of seconds)   Oral Residue: None   Initiation of Swallow: Triggered at vallecula   Timing: Pooling 6-10 sec;Vallecular   Penetration: After swallow;From initial swallow;From residual;To laryngeal vestibule;During swallow   Aspiration/Timing: No evidence of aspiration   Pharyngeal Clearance: Vallecular residue;Pyriform residue ; Less than 10%   Attempted Modifications: Double swallow;Small sips and bites; Spoon   Effective Modifications: None   Cues for Modifications: Moderate       Decreased Tongue Base Retraction?: Yes  Laryngeal Elevation: Inadequate epiglottic inversion; Incomplete laryngeal closure;Minimal movement of larynx/epiglottis  Aspiration/Penetration Score: 3 (Penetration/Visible residue-Contrast remains above the folds/cords, but is not cleared)  Pharyngeal Symmetry: Not assessed  Pharyngeal-Esophageal Segment: No impairment  Pharyngeal Dysfunction: Decreased tongue base retraction;Decreased strength;Decreased elevation/closure;Decreased pharyngeal wall constriction  Oral Phase Severity: Mild  Pharyngeal Phase Severity: Moderately severe    8-point Penetration-Aspiration Scale: Score 3    PAIN:  Pt reports 0/10 pain or discomfort prior to MBS. Pt reports 0/10 pain or discomfort post MBS. COMMUNICATION/EDUCATION:   [x]  Patient educated regarding MBS results and diet recommendations. []  Patient/family have participated as able in goal setting and plan of care. [x]  Patient/family agree to work toward stated goals and plan of care. []  Patient understands intent and goals of therapy, but is neutral about his/her participation. []  Patient is unable to participate in goal setting and plan of care.     Thank you for this referral,  Shellie Back M.S., [id number] [facility], [place], operated by [facility]  Speech-Language Pathologist

## 2021-05-11 LAB
ANION GAP SERPL CALC-SCNC: 3 MMOL/L (ref 3–18)
APTT PPP: 116.5 SEC (ref 23–36.4)
APTT PPP: 120 SEC (ref 23–36.4)
BACTERIA SPEC CULT: NORMAL
BASOPHILS # BLD: 0.1 K/UL (ref 0–0.1)
BASOPHILS NFR BLD: 1 % (ref 0–2)
BUN SERPL-MCNC: 25 MG/DL (ref 7–18)
BUN/CREAT SERPL: 29 (ref 12–20)
CALCIUM SERPL-MCNC: 7.5 MG/DL (ref 8.5–10.1)
CHLORIDE SERPL-SCNC: 110 MMOL/L (ref 100–111)
CO2 SERPL-SCNC: 28 MMOL/L (ref 21–32)
CREAT SERPL-MCNC: 0.86 MG/DL (ref 0.6–1.3)
DIFFERENTIAL METHOD BLD: ABNORMAL
EOSINOPHIL # BLD: 0.3 K/UL (ref 0–0.4)
EOSINOPHIL NFR BLD: 3 % (ref 0–5)
ERYTHROCYTE [DISTWIDTH] IN BLOOD BY AUTOMATED COUNT: 15.5 % (ref 11.6–14.5)
GLUCOSE BLD STRIP.AUTO-MCNC: 125 MG/DL (ref 70–110)
GLUCOSE BLD STRIP.AUTO-MCNC: 138 MG/DL (ref 70–110)
GLUCOSE BLD STRIP.AUTO-MCNC: 148 MG/DL (ref 70–110)
GLUCOSE SERPL-MCNC: 116 MG/DL (ref 74–99)
HCT VFR BLD AUTO: 27.1 % (ref 36–48)
HGB BLD-MCNC: 8.3 G/DL (ref 13–16)
INR PPP: 1.5 (ref 0.8–1.2)
LYMPHOCYTES # BLD: 2.7 K/UL (ref 0.9–3.6)
LYMPHOCYTES NFR BLD: 32 % (ref 21–52)
MAGNESIUM SERPL-MCNC: 1.7 MG/DL (ref 1.6–2.6)
MCH RBC QN AUTO: 25.2 PG (ref 24–34)
MCHC RBC AUTO-ENTMCNC: 30.6 G/DL (ref 31–37)
MCV RBC AUTO: 82.4 FL (ref 74–97)
MONOCYTES # BLD: 0.5 K/UL (ref 0.05–1.2)
MONOCYTES NFR BLD: 5 % (ref 3–10)
NEUTS SEG # BLD: 4.8 K/UL (ref 1.8–8)
NEUTS SEG NFR BLD: 57 % (ref 40–73)
PHOSPHATE SERPL-MCNC: 3.1 MG/DL (ref 2.5–4.9)
PLATELET # BLD AUTO: 209 K/UL (ref 135–420)
PMV BLD AUTO: 12.4 FL (ref 9.2–11.8)
POTASSIUM SERPL-SCNC: 4.8 MMOL/L (ref 3.5–5.5)
PROTHROMBIN TIME: 18.1 SEC (ref 11.5–15.2)
RBC # BLD AUTO: 3.29 M/UL (ref 4.35–5.65)
SERVICE CMNT-IMP: NORMAL
SODIUM SERPL-SCNC: 141 MMOL/L (ref 136–145)
WBC # BLD AUTO: 8.4 K/UL (ref 4.6–13.2)

## 2021-05-11 PROCEDURE — 92507 TX SP LANG VOICE COMM INDIV: CPT

## 2021-05-11 PROCEDURE — 77010033678 HC OXYGEN DAILY

## 2021-05-11 PROCEDURE — 65660000004 HC RM CVT STEPDOWN

## 2021-05-11 PROCEDURE — 97168 OT RE-EVAL EST PLAN CARE: CPT

## 2021-05-11 PROCEDURE — 94668 MNPJ CHEST WALL SBSQ: CPT

## 2021-05-11 PROCEDURE — 82962 GLUCOSE BLOOD TEST: CPT

## 2021-05-11 PROCEDURE — 92526 ORAL FUNCTION THERAPY: CPT

## 2021-05-11 PROCEDURE — 74011000250 HC RX REV CODE- 250: Performed by: INTERNAL MEDICINE

## 2021-05-11 PROCEDURE — 74011250637 HC RX REV CODE- 250/637: Performed by: INTERNAL MEDICINE

## 2021-05-11 PROCEDURE — 85730 THROMBOPLASTIN TIME PARTIAL: CPT

## 2021-05-11 PROCEDURE — 85610 PROTHROMBIN TIME: CPT

## 2021-05-11 PROCEDURE — 99232 SBSQ HOSP IP/OBS MODERATE 35: CPT | Performed by: INTERNAL MEDICINE

## 2021-05-11 PROCEDURE — 74011000250 HC RX REV CODE- 250: Performed by: PHYSICIAN ASSISTANT

## 2021-05-11 PROCEDURE — 77030018798 HC PMP KT ENTRL FED COVD -A

## 2021-05-11 PROCEDURE — 85025 COMPLETE CBC W/AUTO DIFF WBC: CPT

## 2021-05-11 PROCEDURE — 2709999900 HC NON-CHARGEABLE SUPPLY

## 2021-05-11 PROCEDURE — 36415 COLL VENOUS BLD VENIPUNCTURE: CPT

## 2021-05-11 PROCEDURE — 83735 ASSAY OF MAGNESIUM: CPT

## 2021-05-11 PROCEDURE — 80048 BASIC METABOLIC PNL TOTAL CA: CPT

## 2021-05-11 PROCEDURE — 92522 EVALUATE SPEECH PRODUCTION: CPT

## 2021-05-11 PROCEDURE — 74011250637 HC RX REV CODE- 250/637: Performed by: PHYSICIAN ASSISTANT

## 2021-05-11 PROCEDURE — 84100 ASSAY OF PHOSPHORUS: CPT

## 2021-05-11 PROCEDURE — 74011250636 HC RX REV CODE- 250/636: Performed by: INTERNAL MEDICINE

## 2021-05-11 PROCEDURE — C9113 INJ PANTOPRAZOLE SODIUM, VIA: HCPCS | Performed by: INTERNAL MEDICINE

## 2021-05-11 PROCEDURE — 94640 AIRWAY INHALATION TREATMENT: CPT

## 2021-05-11 RX ORDER — ENOXAPARIN SODIUM 100 MG/ML
1 INJECTION SUBCUTANEOUS EVERY 12 HOURS
Status: DISCONTINUED | OUTPATIENT
Start: 2021-05-11 | End: 2021-05-13 | Stop reason: HOSPADM

## 2021-05-11 RX ADMIN — Medication 100 MG: at 08:56

## 2021-05-11 RX ADMIN — WARFARIN SODIUM 6 MG: 5 TABLET ORAL at 18:17

## 2021-05-11 RX ADMIN — SODIUM CHLORIDE SOLN NEBU 3% 4 ML: 3 NEBU SOLN at 08:06

## 2021-05-11 RX ADMIN — ENOXAPARIN SODIUM 100 MG: 100 INJECTION SUBCUTANEOUS at 17:27

## 2021-05-11 RX ADMIN — LORAZEPAM 0.5 MG: 0.5 TABLET ORAL at 06:54

## 2021-05-11 RX ADMIN — LORAZEPAM 0.5 MG: 0.5 TABLET ORAL at 21:46

## 2021-05-11 RX ADMIN — ACETAMINOPHEN ORAL SOLUTION 500 MG: 650 SOLUTION ORAL at 21:50

## 2021-05-11 RX ADMIN — LANSOPRAZOLE 30 MG: KIT at 21:46

## 2021-05-11 RX ADMIN — ARFORMOTEROL TARTRATE 15 MCG: 15 SOLUTION RESPIRATORY (INHALATION) at 20:35

## 2021-05-11 RX ADMIN — POLYETHYLENE GLYCOL 3350 17 G: 17 POWDER, FOR SOLUTION ORAL at 08:56

## 2021-05-11 RX ADMIN — SODIUM CHLORIDE SOLN NEBU 3% 4 ML: 3 NEBU SOLN at 20:35

## 2021-05-11 RX ADMIN — ARFORMOTEROL TARTRATE 15 MCG: 15 SOLUTION RESPIRATORY (INHALATION) at 08:06

## 2021-05-11 RX ADMIN — Medication 30 ML: at 08:56

## 2021-05-11 RX ADMIN — SODIUM CHLORIDE 40 MG: 9 INJECTION, SOLUTION INTRAMUSCULAR; INTRAVENOUS; SUBCUTANEOUS at 08:56

## 2021-05-11 RX ADMIN — ATORVASTATIN CALCIUM 80 MG: 40 TABLET, FILM COATED ORAL at 21:46

## 2021-05-11 NOTE — PROGRESS NOTES
Hospitalist Progress Note        Patient: Durwood Kehr               Sex: male          DOA: 4/4/2021       YOB: 1950      Age:  79 y.o.        LOS:  LOS: 37 days               Subjective:     Patient states good morning. Wants to go home. Denies any chest pain abdominal pain. Tolerating tube feeding. Follows commands appropriately. Status post downsizing trach yesterday by ENT.    HPI:  Patient was initially seen at [facility] and had emergent Crychothyrotomy during Cardiac arrest.  Initially there was concern that he had Angioedema. [facility]. Mr Iqra Morris has required prolonged support on the mechanical ventilator for ongoing respiratory failure. He has had Tracheostomy via ENT and he continues with Tracheostomy collar currently which he is tolerating well. Mr Iqra Morris had cardiac arrest initially, but he has stable hemodynamics currently. Mr Iqra Morris was treated for sepsis and has received a full course of antibiotics, he is off antibiotics currently. He has limited interaction, he does not have seizure activity. He has had sedation to maintain the ventilator which he no longer needs. He has tracheostomy, but he currently does not have a PEG. He has ongoing NG tube and continues with NG tube feeding. NG tube was removed and PEG tube was placed on May 3, 2021. Tolerating tube feeding since then.     Objective:      /80   Pulse 83   Temp 98.6 °F (37 °C)   Resp 15   Ht 5' 11\" (1.803 m)   Wt 99 kg (218 lb 4.1 oz)   SpO2 97%   BMI 30.44 kg/m²       Intake/Output Summary (Last 24 hours) at 5/11/2021 1257  Last data filed at 5/11/2021 1235  Gross per 24 hour   Intake 2882.83 ml   Output 650 ml   Net 2232.83 ml     General: Awake, follows commands, not in acute distress  Neck: Tracheostomy #8 is in situ with dressing  Lungs: Diminished breath sound bibasilar without wheezes  Heart:  Regular Rate and Rhythm. Abdomen:  Soft, nondistended, bowel sounds present, PEG tube is present with  Extremities: No edema present  Neuro: Awake, moves both upper extremities and right leg. Motor strength is 3 out of 5 in left lower extremity. Assessment/Plan     ASSESSMENT:    1. Reportedly acute hypoxic respiratory failure due to angioedema s/p emergent cricothyrotomy followed by tracheostomy. Better. 2.  Tracheostomy site bleeding, resolved. 3.  Status post respiratory failure cardiac arrest  4. Chronic hypoxic respiratory failure status post trach and on trach collar  5. Bilateral pulmonary infiltrates  6. Oropharyngeal dysphagia status post G tube  7. Left leg popliteal DVT and pulmonary embolism  8. Reportedly acute metabolic encephalopathy  9. 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 JACQUELYN, recurrent -resolved currently  11. Left pneumothorax resolved  12. Diabetic neuropathy  13. Obesity  14. Fever and leukocytosis, resolved  15. Coag negative staph bacteremia most likely contamination  16. Bleeding around the PEG tube,resolved. PLAN:    We will change heparin drip to Lovenox and continue it with Coumadin until INR is greater than 2  GI to follow. Vascular surgery input noted about not putting IVC filter. Repeat modified barium swallow test report reviewed  Continue current tube feeding per dietitian  Continue statin  Stable H&H, change PPI by PEG tube if possible  Pulmonologist to follow for tracheostomy care  Continue trach collar and nebulizer  Continue multivitamin, thiamine  PT and OT to follow this patient    Discussed with patient's sister at bedside yesterday and updated her about current treatment plan. Estimated date of discharge: May 11, 2021 if placement is found    Total time to take care of this patient was 25 minutes and more than 50% of time was spent counseling and coordinating care.      Disclaimer: Sections of this note are dictated using utilizing voice recognition software, which may have resulted in some phonetic based errors in grammar and contents. Even though attempts were made to correct all the mistakes, some may have been missed, and remained in the body of the document. If questions arise, please contact our department. Recent Results (from the past 24 hour(s))   CBC W/O DIFF    Collection Time: 05/10/21  2:32 PM   Result Value Ref Range    WBC 10.2 4.6 - 13.2 K/uL    RBC 3.91 (L) 4.35 - 5.65 M/uL    HGB 9.9 (L) 13.0 - 16.0 g/dL    HCT 32.9 (L) 36.0 - 48.0 %    MCV 84.1 74.0 - 97.0 FL    MCH 25.3 24.0 - 34.0 PG    MCHC 30.1 (L) 31.0 - 37.0 g/dL    RDW 15.4 (H) 11.6 - 14.5 %    PLATELET 324 319 - 498 K/uL    MPV 11.5 9.2 - 11.8 FL   GLUCOSE, POC    Collection Time: 05/10/21  5:38 PM   Result Value Ref Range    Glucose (POC) 128 (H) 70 - 110 mg/dL   GLUCOSE, POC    Collection Time: 05/10/21 11:33 PM   Result Value Ref Range    Glucose (POC) 127 (H) 70 - 110 mg/dL   MAGNESIUM    Collection Time: 05/11/21  3:40 AM   Result Value Ref Range    Magnesium 1.7 1.6 - 2.6 mg/dL   PHOSPHORUS    Collection Time: 05/11/21  3:40 AM   Result Value Ref Range    Phosphorus 3.1 2.5 - 4.9 MG/DL   CBC WITH AUTOMATED DIFF    Collection Time: 05/11/21  3:40 AM   Result Value Ref Range    WBC 8.4 4.6 - 13.2 K/uL    RBC 3.29 (L) 4.35 - 5.65 M/uL    HGB 8.3 (L) 13.0 - 16.0 g/dL    HCT 27.1 (L) 36.0 - 48.0 %    MCV 82.4 74.0 - 97.0 FL    MCH 25.2 24.0 - 34.0 PG    MCHC 30.6 (L) 31.0 - 37.0 g/dL    RDW 15.5 (H) 11.6 - 14.5 %    PLATELET 300 089 - 686 K/uL    MPV 12.4 (H) 9.2 - 11.8 FL    NEUTROPHILS 57 40 - 73 %    LYMPHOCYTES 32 21 - 52 %    MONOCYTES 5 3 - 10 %    EOSINOPHILS 3 0 - 5 %    BASOPHILS 1 0 - 2 %    ABS. NEUTROPHILS 4.8 1.8 - 8.0 K/UL    ABS. LYMPHOCYTES 2.7 0.9 - 3.6 K/UL    ABS. MONOCYTES 0.5 0.05 - 1.2 K/UL    ABS. EOSINOPHILS 0.3 0.0 - 0.4 K/UL    ABS.  BASOPHILS 0.1 0.0 - 0.1 K/UL    DF AUTOMATED     PROTHROMBIN TIME + INR    Collection Time: 05/11/21  3:40 AM   Result Value Ref Range    Prothrombin time 18.1 (H) 11.5 - 15.2 sec    INR 1.5 (H) 0.8 - 1.2     METABOLIC PANEL, BASIC    Collection Time: 05/11/21  3:40 AM   Result Value Ref Range    Sodium 141 136 - 145 mmol/L    Potassium 4.8 3.5 - 5.5 mmol/L    Chloride 110 100 - 111 mmol/L    CO2 28 21 - 32 mmol/L    Anion gap 3 3.0 - 18 mmol/L    Glucose 116 (H) 74 - 99 mg/dL    BUN 25 (H) 7.0 - 18 MG/DL    Creatinine 0.86 0.6 - 1.3 MG/DL    BUN/Creatinine ratio 29 (H) 12 - 20      GFR est AA >60 >60 ml/min/1.73m2    GFR est non-AA >60 >60 ml/min/1.73m2    Calcium 7.5 (L) 8.5 - 10.1 MG/DL   PTT    Collection Time: 05/11/21  3:40 AM   Result Value Ref Range    aPTT 116.5 (H) 23.0 - 36.4 SEC   GLUCOSE, POC    Collection Time: 05/11/21  6:16 AM   Result Value Ref Range    Glucose (POC) 125 (H) 70 - 110 mg/dL   GLUCOSE, POC    Collection Time: 05/11/21 10:57 AM   Result Value Ref Range    Glucose (POC) 148 (H) 70 - 110 mg/dL

## 2021-05-11 NOTE — PROGRESS NOTES
Bedside turnover given to [email or web address]. SANAAR,MAR,ED summary given with updates R/T the night, a chance to ask questions was given. PT is on the cardiac tele monitor in stable condition. Bed is in the lowest position with the wheels locked. Call bell and personal effects  are on the bedside table within reach. Double checked with RN coming on the IV drips.

## 2021-05-11 NOTE — PROGRESS NOTES
[facility]  Pulmonary, Critical Care, and Sleep Medicine    Name: Olamide Oscar MRN: [id number]   : 1950 Hospital: [address] [place] [address] Dr   Date: 2021        IMPRESSION:     · Tracheostomy - #9 Portex on  with Dr. Karey Durbin. No further bleeding at trach site. Plans for reevaluation and potential decannulation as outpatient noted  · S/p angioedema- with airway and respiratory failure and collapse; thought due to ACE inhibitor. S/P [facility] 21- converted to 6.5 ETT intraoperative by anesthesia team 21, 8.0 ETT by anesthesia 21, packing removed evening 21-ENT  · S/P cardiac arrest and acute respiratory failure-resolved  · Pulmonary Infiltrates: suspect aspiration secretions and/or blood due to above- can't exclude other infectious process at this time. · DVT: Popliteal- Left; acute non-occlusive thrombus.   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 · Need for nutrition-PEG tube      Patient Active Problem List   Diagnosis Code    Angioedema due to angiotensin converting enzyme inhibitor (ACE-I) T78. 3XXA, [id number]    Respiratory failure ([facility] [address] J96.90    JACQUELYN (acute kidney injury) ([address] N17.9    Cardiac arrest (HCC) I46.9    Obesity (BMI 30-39. 9) E66.9    Diabetic neuropathy associated with type 2 diabetes mellitus ([address] E11.40    Diabetic retinopathy associated with type 2 diabetes mellitus ([address] E11.319    Pulmonary infiltrates R91.8    Controlled type 2 diabetes mellitus with neurologic complication, without long-term current use of insulin (HCC) E11.49    DVT (deep venous thrombosis) ([facility] [address] I82.409    Encounter for palliative care Z51.5    Goals of care, counseling/discussion Z71.89    Multiple subsegmental pulmonary emboli without acute cor pulmonale (HCC) I26.94    DVT (deep vein thrombosis) in pregnancy O22.30    Acute encephalopathy G93.40      PLAN:     PULM:  · Maintain aspiration precautions: HOB >30 degrees  · SpO2 goal >92%  · Bronchial /oral hygiene   · Trach care per protocol  · TC full time   · Albuterol PRN  · #9 Portex Trach on 4/26, ENT follow-up as outpatient    GI/ NUTRITION:  · Follow up with nutritional recommendations  HEM/ONC  · Trend Hb/HCT and PLTs  · Continue anticoagulation long-term       CODE STATUS: Full Code- Full   Awaiting placement - long term facility     Subjective/Interval History:        Interval HPI:71 yo M h/o HTN tx w/ lisinopril presenting to [facility] ED 4/4 for rapidly progressing respiratory distress due to severe angioedema. During intubation attempts pt had brief cardiac arrest w/ pulse loss and severe airway swelling leading to emergent cricothyrotomy. Pt stabilized before [facility] transport to [place] [facility] [place] pt taken emergently to [address] was converted to ETT. Patient continued to have trouble with o2 requirements disproportionate to CXR findings and despite adjustment of FiO2 and PEEP, now improved. S/p administration of inhaled epoprostenol. CTA revelaed B/L pulmonary emboli which is likely the cause of his hypoxemia and RH strain. Continuing heparin gtt, which had already been initiated for left popliteal non-occlusive thrombus. Pt is s/p dobutamine and  diureses w/ bumex and metolazone.  COVID - 19 negative. Now with minimal vent support, however, mentation precludes extubation at this time. Patient required tracheostomy placement 4/26. His ICU course has been complicated by encephalopathy and critical care myopathy      Subjective     05/11/21  LOS: 37    · Tolerating trach collar  · Asking to eat  · Secretions improving  · PEG placed   · LTACH placement pending           ROS:Review of systems not obtained due to patient factors.     Objective:   Vital Signs: Visit Vitals  /80   Pulse 83   Temp 98.6 °F (37 °C)   Resp 15   Ht 5' 11\" (1.803 m)   Wt 99 kg (218 lb 4.1 oz)   SpO2 96%   BMI 30.44 kg/m²       O2 Device: Tracheal collar   O2 Flow Rate (L/min): 6 l/min   Temp (24hrs), Av.2 °F (36.8 °C), Min:97.5 °F (36.4 °C), Max:98.9 °F (37.2 °C)       Intake/Output:   Last shift:      701 - 1900  In: 470 [I.V.:30]  Out: 0   Last 3 shifts: 1901 -  07  In: 4643.8 [I.V.:1703.8]  Out: 1050 [Urine:1050]    Intake/Output Summary (Last 24 hours) at 2021 1104  Last data filed at 2021 0707  Gross per 24 hour   Intake 3673.83 ml   Output 1050 ml   Net 2623.83 ml      Physical Exam:                 General: NAD. Trach in place on TC. Keeps head turned to the right  HEENT:  Anicteric sclerae; pink palpebral conjunctivae; mucosa moist  Resp:  Symmetrical chest expansion, no accessory muscle use; good airway entry; no rales/ no wheezing  CV:  S1, S2 present; regular rate and rhythm  GI:  Obese. Abdomen soft, non-tender; (+) active bowel sounds  Extremities:  +2 pulses on all extremities; no edema/ cyanosis/ clubbing noted;  Skin:  Warm; no rashes/ lesions noted, normal turgor/cap refill , dry appearing   Neurologic:  no eye opening or command following this am   Devices: Trach, PEG,condom cath        DATA:  Labs:  Recent Labs     21  0340 05/10/21  1432 05/10/21  0503   WBC 8.4 10.2 9.9   HGB 8.3* 9.9* 8.7*   HCT 27.1* 32.9* 29.1*    252 236     Recent Labs     21  0340 05/10/21  0503 21  0526    141 141   K 4.8 4.2 4.2    110 110   CO2 28 24 26   * 104* 116*   BUN 25* 27* 24*   CREA 0.86 0.91 0.82   CA 7.5* 8.5 8.2*   MG 1.7 1.7 2.1   PHOS 3.1 2.9 2.6   INR 1.5* 1.4* 1.4*     No results for input(s): PH, PCO2, PO2, HCO3, FIO2 in the last 72 hours.     Lab Results   Component Value Date/Time    Hemoglobin A1c 6.6 (H) 2021 05:11 PM     Lab Results   Component Value Date/Time    TSH 2.62 05/01/2021 04:21 PM    T4, Free 1.2 05/01/2021 04:21 PM     MICRO:  Results     Procedure Component Value Units Date/Time    CULTURE, BLOOD [[id number]] Collected: 05/08/21 0541    Order Status: Completed Specimen: Blood Updated: 05/11/21 0657     Special Requests: NO SPECIAL REQUESTS        Culture result: NO GROWTH 3 DAYS       CULTURE, BLOOD [[id number]] Collected: 05/05/21 1833    Order Status: Completed Specimen: Blood Updated: 05/11/21 0657     Special Requests: NO SPECIAL REQUESTS        Culture result: NO GROWTH 6 DAYS       CULTURE, BLOOD [[id number]]  (Abnormal) Collected: 05/05/21 1430    Order Status: Completed Specimen: Blood Updated: 05/08/21 1622     Special Requests: NO SPECIAL REQUESTS        GRAM STAIN       AEROBIC BOTTLE GRAM POSITIVE COCCI IN GROUPS                  SMEAR CALLED TO AND CORRECTLY REPEATED BY: KIMBERLY DOUGLAS RN  CVSD 5/7/21 0841 TO LAE           Culture result:       STAPHYLOCOCCUS SPECIES, COAGULASE NEGATIVE GROWING IN THE AEROBIC BOTTLE          COVID-19 RAPID TEST [[id number]] Collected: 05/02/21 1022    Order Status: Completed Specimen: Nasopharyngeal Updated: 05/02/21 1127     Specimen source Nasopharyngeal        COVID-19 rapid test Not detected        Comment: Rapid Abbott ID Now       Rapid NAAT:  The specimen is NEGATIVE for SARS-CoV-2, the novel coronavirus associated with COVID-19. Negative results should be treated as presumptive and, if inconsistent with clinical signs and symptoms or necessary for patient management, should be tested with an alternative molecular assay. Negative results do not preclude SARS-CoV-2 infection and should not be used as the sole basis for patient management decisions. This test has been authorized by the FDA under an Emergency Use Authorization (EUA) for use by authorized laboratories.    Fact sheet for Healthcare Providers: ConventionUpdate.co.nz  Fact sheet for Patients: ConventionUpdate.co.nz Methodology: Isothermal Nucleic Acid Amplification         CULTURE, RESPIRATORY/SPUTUM/BRONCH Lakesha Catrina STAIN [[id number]]  (Abnormal)  (Susceptibility) Collected: 05/01/21 1032    Order Status: Completed Specimen: Sputum from Tracheal Aspirate Updated: 05/04/21 1026     Special Requests: NO SPECIAL REQUESTS        GRAM STAIN RARE WBCS SEEN               RARE EPITHELIAL CELLS SEEN                  OCCASIONAL GRAM NEGATIVE COCCOBACILLI           Culture result:       HEAVY ENTEROBACTER AEROGENES                  NO NORMAL RESPIRATORY ANNALEE ISOLATED          Susceptibility      Enterobacter aerogenes     JOANNE     Amikacin ($) Susceptible     Cefazolin ($) Resistant     Cefepime ($$) Susceptible     Cefoxitin Resistant     Ceftazidime ($) Resistant     Ceftriaxone ($) Intermediate     Ciprofloxacin ($) Susceptible     Gentamicin ($) Susceptible     Levofloxacin ($) Susceptible     Meropenem ($$) Susceptible     Piperacillin/Tazobac ($) Resistant     Tobramycin ($) Susceptible     Trimeth/Sulfa Susceptible                    CULTURE, RESPIRATORY/SPUTUM/BRONCH Lakesha Catrina STAIN [[id number]]  (Abnormal)  (Susceptibility) Collected: 04/30/21 1245    Order Status: Completed Specimen: Sputum,ET Suction Updated: 05/03/21 1131     Special Requests: NO SPECIAL REQUESTS        GRAM STAIN 1+ WBCS SEEN               RARE EPITHELIAL CELLS SEEN                  1+ GRAM POSITIVE COCCI IN CHAINS                  OCCASIONAL GRAM NEGATIVE COCCOBACILLI           Culture result:       MODERATE ENTEROBACTER AEROGENES                  FEW NORMAL RESPIRATORY ANNALEE          Susceptibility      Enterobacter aerogenes     JOANNE     Amikacin ($) Susceptible     Cefazolin ($) Resistant     Cefepime ($$) Susceptible     Cefoxitin Resistant     Ceftazidime ($) Resistant     Ceftriaxone ($) Intermediate     Ciprofloxacin ($) Susceptible     Gentamicin ($) Susceptible     Levofloxacin ($) Susceptible     Meropenem ($$) Susceptible     Piperacillin/Tazobac ($) Resistant     Tobramycin ($) Susceptible     Trimeth/Sulfa Susceptible                                                                          Echo  04/04/21   ECHO ADULT COMPLETE 04/06/2021 4/6/2021    Narrative · Contrast used: DEFINITY. · Image quality for this study was technically difficult. · LV: Calculated LVEF is 55%. Visually measured ejection fraction. Normal   cavity size, wall thickness and systolic function (ejection fraction   normal). Wall motion: normal. Moderate (grade 2) left ventricular   diastolic dysfunction. · AO: Mild aortic root and ascending aorta dilatation. Ascending aorta   diameter = 3.6 cm. Aortic root measures 3.9 cm  · RA: Dilated right atrium. · RV: Dilated right ventricle. Borderline low systolic function. · TV: Mild tricuspid valve regurgitation is present. · IVC: Mechanically ventilated; cannot use inferior caval vein diameter to   estimate central venous pressure. · PA: Moderate pulmonary hypertension. Pulmonary arterial systolic   pressure is 61 mmHg. Signed by: Eduarda Quiñones MD        Imaging:  [x]I have personally reviewed the patients radiographs    CXR Results  (Last 48 hours)    None               CT Results (most recent):  Results from Hospital Encounter encounter on 04/04/21   CT HEAD WO CONT    Narrative CT HEAD WO CONT    History: Status post cardiac arrest undergoing follow-up. Comparison: 7/18/2016    TECHNIQUE: 5 mm helical scan obtained of the head were obtained from the skull  vertex through the base of the skull without intravenous contrast.      All CT scans at this facility are performed using dose optimization technique as  appropriate to a performed exam, to include automated exposure control,  adjustment of the mA and/or kV according to patient size (including appropriate  matching first site-specific examinations), or use of iterative reconstruction  technique.     BRAIN RESULT:      There are mild regions of periventricular hypodensity. Gray-white matter  differentiation is maintained. No acute intracranial hemorrhage. No midline  shift. Basilar cisterns are patent. Mild volume loss. Orbits are grossly normal. There is mild linear soft tissue thickening right  scalp. There is mild dependent opacity in the left sphenoid and right maxillary  sinus. Trace left mastoid effusion. Impression No acute findings. Mild sequela of chronic microvascular ischemic disease and mild generalized  volume loss. Mild sinusitis and trace left mastoid effusion.          Latosha Nichole MD  05/11/21  Pulmonary, Critical Care Medicine  [facility]

## 2021-05-11 NOTE — PROGRESS NOTES
Bedside turnover given from [employer]. SBAR,MAR,ED summary given with updates R/T the night, a chance to ask questions was given. PT is on the cardiac tele monitor in stable condition. Bed is in the lowest position with the wheels locked. Call bell and personal effects  are on the bedside table within reach. Double checked with RN coming on the IV drips. Patient resting comfortably. Whiteboard updated.

## 2021-05-11 NOTE — PROGRESS NOTES
Otolaryngology Head neck surgery   patient seen and examined last evening     trach  changed to a 8. Non cuffed Portex trach tube     would allow the wound to gradually heal around the trach tube with plans for subsequent trach plugging trials and decannulation if flexible laryngoscopy is within normal limits. Anticipate this to be done as an outpatient.     Would be happy to follow up with patient after discharge  Soy Parikh

## 2021-05-11 NOTE — PROGRESS NOTES
Problem: Self Care Deficits Care Plan (Adult)  Goal: *Acute Goals and Plan of Care (Insert Text)  Description: Occupational Therapy Goals  Re-evaluated 5/11/2021 within 7 day(s). 1.  Patient will perform grooming with minimal assistance/contact guard assist.   2.  Patient will perform upper body dressing with moderate assistance . 3. Patient will perform bed mobility in preparation for selfcare with moderate assistance consistently. 4.  Patient will participate in upper extremity therapeutic exercise/activities with minimal assistance/contact guard assist for 8 minutes. 5.  Patient will utilize energy conservation techniques during functional activities with verbal cues. Prior Level of Function: Per chart review, patient was independent prior to hospital admission. Outcome: Progressing Towards Goal     OCCUPATIONAL THERAPY RE-EVALUATION    Patient: Pal Posadas (89 y.o. male)  Date: 5/11/2021  Primary Diagnosis: Angioedema due to angiotensin converting enzyme inhibitor (ACE-I) [T78. 3XXA, T46.4X5A]  Acute respiratory failure with hypoxia (HCC) [J96.01]  DVT (deep vein thrombosis) in pregnancy [O22.30]  Acute encephalopathy [G93.40]  Procedure(s) (LRB):  PERCUTANEOUS ENDOSCOPIC GASTROSTOMY TUBE INSERTION/ BEDSIDE (N/A) 8 Days Post-Op   Precautions: Fall, Skin    ASSESSMENT :  Upon entering the room, the patient was supine in bed, alert but restless moving shoulders frequently, and agreeable to participate in OT re-evaluation. Patient re-educated on the role of OT, re-evaluation process, and safety during this admission with patient nodding understanding. Patient with trach this session, mouthing and requesting to be turned. CNA and RN just scooted patient in bed, max assist x 2 less than 5 minutes prior to request. Patient max assist for rolling to side for repositioning of wedge and moderate assist for grooming tasks as B shoulder AROM limited.  Based on the objective data described below, the patient presents with decreased strength, decreased independence, decreased AROM, decreased functional balance, and decreased functional mobility, which impedes pt performance in basic self-care/ADL tasks. Patient would benefit from continued skilled OT to restore PLOF and maximize function. Patient will benefit from skilled intervention to address the above impairments. Patient's rehabilitation potential is considered to be Fair  Factors which may influence rehabilitation potential include:   []             None noted  [x]             Mental ability/status  [x]             Medical condition  [x]             Home/family situation and support systems  [x]             Safety awareness  [x]             Pain tolerance/management  []             Other:      PLAN :  Recommendations and Planned Interventions:   [x]               Self Care Training                  [x]      Therapeutic Activities  [x]               Functional Mobility Training   [x]      Cognitive Retraining  [x]               Therapeutic Exercises           [x]      Endurance Activities  [x]               Balance Training                    [x]      Neuromuscular Re-Education  []               Visual/Perceptual Training     [x]      Home Safety Training  [x]               Patient Education                   [x]      Family Training/Education  []               Other (comment):    Frequency/Duration: Patient will be followed by occupational therapy 1-2 times per day/4-7 days per week to address goals. Discharge Recommendations: [place]  Further Equipment Recommendations for Discharge: TBD pending patient's progress with therapy     SUBJECTIVE:   Patient stated I need to turn    OBJECTIVE DATA SUMMARY:   Hospital course since last seen and reason for reevaluation: Patient has been progressing from [facility] to now full caseload.  OT will continue to follow the patient for further intervention during this hospitalization, in order to maximize ADL performance and overall functional independence. Past Medical History:   Diagnosis Date    DDD (degenerative disc disease), lumbar     Diabetes ([facility] [address]     Diabetic neuropathy ([facility] [address]     Diabetic retinopathy ([facility] [address]     DM2 (diabetes mellitus, type 2) ([facility] [address]     HLD (hyperlipidemia)     HTN (hypertension)     Obesity (BMI 30-39. 9)    History reviewed. No pertinent surgical history. Barriers to Learning/Limitations: None  Compensate with: visual, verbal, tactile, kinesthetic cues/model    Home Situation:   Home Situation  Home Environment: Other (comment)( [facility], sedated/intubated)  One/Two Story Residence: Other (Comment)( [facility], sedated/intubated)  Living Alone: No  Support Systems: Parent  Patient Expects to be Discharged to[de-identified] Unknown  Current DME Used/Available at Home: None( [facility], sedated/intubated)  [x]  Right hand dominant   []  Left hand dominant    Cognitive/Behavioral Status:  Neurologic State: Alert  Orientation Level: Oriented to person(mouthing)  Cognition: Follows commands  Safety/Judgement: Fall prevention    Skin: Intact  Edema: mild swelling B hands    Vision/Perceptual:    Tracking: Able to track stimulus in all quadrants w/o difficulty        Coordination: BUE  Fine Motor Skills-Upper: Left Impaired;Right Impaired    Gross Motor Skills-Upper: Left Impaired;Right Impaired    Strength: BUE  Strength: Generally decreased, functional    Tone & Sensation: BUE  Tone: Normal  Sensation: (appears intact)    Range of Motion: BUE  AROM: Generally decreased, functional(B elbow/wrist/hand; B shoulders grossly decreased)  PROM: Generally decreased, functional(B shoulders)     Functional Mobility and Transfers for ADLs:  Bed Mobility:  Rolling: Maximum assistance;Assist x1(for wedge placement)  Scooting: Maximum assistance;Assist x2(towards HOB via CNA and RN)    ADL Assessment:   Feeding: Total assistance    Oral Facial Hygiene/Grooming:  Moderate assistance;Maximum assistance    Bathing: Maximum assistance; Total assistance    Upper Body Dressing: Maximum assistance    Lower Body Dressing: Maximum assistance; Total assistance    Toileting: Maximum assistance; Total assistance     ADL Intervention:  Grooming  Grooming Assistance: Moderate assistance  Position Performed: (supine)  Washing Face: Moderate assistance(hand over hand)    Cognitive Retraining  Safety/Judgement: Fall prevention    Pain:  Pain indicated \"all over\" no numerical value given  Pain Intervention(s): Medication (see MAR); Response to intervention: Nurse notified, See doc flow    Activity Tolerance:   Patient demonstrated decreased activity tolerance during OT evaluation. Please refer to the flowsheet for vital signs taken during this treatment. After treatment:   [] Patient left in no apparent distress sitting up in chair  [x] Patient left in no apparent distress in bed  [x] Call bell left within reach  [x] Nursing notified  [] Caregiver present  [x] Bed alarm activated    COMMUNICATION/EDUCATION:   [x] Role of Occupational Therapy in the acute care setting  [x] Home safety education was provided and the patient/caregiver indicated understanding. [x] Patient/family have participated as able in goal setting and plan of care. [x] Patient/family agree to work toward stated goals and plan of care. [] Patient understands intent and goals of therapy, but is neutral about his/her participation. [] Patient is unable to participate in goal setting and plan of care.     Thank you for this referral.  JOE Gann/L  Time Calculation: 10 mins

## 2021-05-11 NOTE — PROGRESS NOTES
Rounded on patient due to him desating on the monitor. Found patient with his trach pulled out in his hand. Paged respiratory. Put obturator in old trach, lubed it up and reinserted it. No resistance going in. Patient O2 saturation went back up to 100%. Will continue to monitor. MD paged about incident.

## 2021-05-11 NOTE — PROGRESS NOTES
Problem: Dysphagia (Adult)  Goal: *Acute Goals and Plan of Care (Insert Text)  Description: Patient will:  1. Tolerate PO trials with 0 s/s overt distress in 4/5 trials  2. Utilize compensatory swallow strategies/maneuvers (decrease bite/sip, size/rate, alt. liq/sol) with min cues in 4/5 trials  3. Perform oral-motor/laryngeal exercises to increase oropharyngeal swallow function with min cues  4. Complete an objective swallow study (i.e., MBSS) to assess swallow integrity, r/o aspiration, and determine of safest LRD, min A as indicated/ordered by MD-met 5/11/21    Recommend:   NPO with PEG   Strict aspiration precautions (HOB >30 degrees at all times, Oral care TID)  Ice chips PRN following oral care     Outcome: Progressing Towards Goal    Problem: Voice Impaired (Adult)  Goal: *Acute Goals and Plan of Care (Insert Text)  Description: Pt will:  1. Tolerate PMV placement for 10 minute increments without compromise to vitals  2. Utilize proper breath support/techniques for increasing tolerance of PMV in 4/5 trials   3. Phonate 5 word utterances with adequate breath support for adequate intensity of speech with PMV     PMV precautions:  Wear with SLP/RN present   ONLY Wear when awake/take off before sleep   5/11/2021 1444 by FER Sinclair  Outcome: Progressing Towards Goal    SPEECH LANGUAGE PATHOLOGY TREATMENT PMV  AND SWALLOW TREATMENT    Patient: Mariela White (74 y.o. male)  Date: 5/11/2021  Primary Diagnosis: Angioedema due to angiotensin converting enzyme inhibitor (ACE-I) [T78. 3XXA, T46.4X5A]  Acute respiratory failure with hypoxia (HCC) [J96.01]  DVT (deep vein thrombosis) in pregnancy [O22.30]  Acute encephalopathy [G93.40]  Procedure(s) (LRB):  PERCUTANEOUS ENDOSCOPIC GASTROSTOMY TUBE INSERTION/ BEDSIDE (N/A) 8 Days Post-Op   Precautions: Aspiration, Fall, Skin  PLOF: Unknown     ASSESSMENT :  PMV Tx:  Pt seen to address aphonia related to trach placement,p downsize to uncuffed #8 trach on 5/10/21. Pt alert and oriented x3, unable to recall why in hospital.  Pt able to voice around trach and tolerate finger occlusion by SLP with no distress. Tolerating PMV placement by SLP for ~20 min with mildly reduced vocal intensity and hoarse/strained vocal quality. No changes to vitals across placement. Pt unable to don and doff independently despite cued attempts. Recommend continue PMV trials with SLP and RN. D/w pt who verbalized understanding. Swallow Tx:   Pt tolerating ice chips with PMV in place following oral care with throat clear noted on 2/10 presentations. Educated with regard to results of MBS including silent airway compromise across trials, Aptito protocol, diet recs, aspiration risk/precautions. Able to complete effortful swallow x5 reps. Unable to complete inderjit despite mod cues. Will continue to follow. Progression toward goals:  [x]         Improving appropriately and progressing toward goals  []         Improving slowly and progressing toward goals  []         Not making progress toward goals and plan of care will be adjusted      PLAN:  Recommendations and Planned Interventions:  Patient continues to benefit from skilled intervention to address the above impairments. Continue treatment per established plan of care. Discharge Recommendations: To Be Determined     SUBJECTIVE:   Patient stated I don't know how I got here. OBJECTIVE:     Past Medical History:   Diagnosis Date    DDD (degenerative disc disease), lumbar     Diabetes ([address]     Diabetic neuropathy ([address]     Diabetic retinopathy ([address]     DM2 (diabetes mellitus, type 2) ([address]     HLD (hyperlipidemia)     HTN (hypertension)     Obesity (BMI 30-39. 9)    History reviewed. No pertinent surgical history.   Home Situation:   Home Situation  Home Environment: Other (comment)( [facility], sedated/intubated)  One/Two Story Residence: Other (Comment)( [facility], sedated/intubated)  Living Alone: No  Support Systems: Parent  Patient Expects to be Discharged to[de-identified] Unknown  Current DME Used/Available at Home: None( [facility], sedated/intubated)  Cognitive and Communication Status:  Neurologic State: Alert  Orientation Level: Oriented to person, place, time   Cognition: Follows commands  Perception: Appears intact  Perseveration: No perseveration noted  Safety/Judgement: Fall prevention  Voice:  Finger Occlusion   Application: SLP   Tolerance: Increased coughing   Vocal Quality: Breathy, Hoarse, Wet, Strain   Vocal Intensity: Too soft   PMV Trial   Application: SLP   Tolerance: Increased anxiety, Increased secretions, Increased work of breathing, Increased coughing   Vocal Quality: Breathy, Hoarse, Low volume, Strain(Related to trach)   Vocal Intensity: Too soft   Duration (minutes): 1 minutes   Oxygen Therapy   O2 Sat (%): 97 %   Pulse via Oximetry: 88 beats per minute   O2 Device: Tracheal collar   O2 Flow Rate (L/min): 6 l/min   Airway Suction   Suction: Trach   Sputum Amount: Moderate   Sputum Color/Odor: White   Sputum Consistency: Thick   Suction Device: Inline suction catheter   Suction Catheter Size: 14 Fr   Treatment Diagnosis   Treatment Diagnosis: Dysphagia, oropharyngeal phase   Treatment Diagnosis 2: Aphonia         PAIN:  Pain level pre-treatment: 0/10   Pain level post-treatment: 0/10     After treatment:   []            Patient left in no apparent distress sitting up in chair  [x]            Patient left in no apparent distress in bed  [x]            Call bell left within reach  [x]            Nursing notified  []            Family present  []            Caregiver present  []            Bed alarm activated    COMMUNICATION/EDUCATION:   [x]            Aspiration precautions; swallow safety; compensatory techniques. []            communication strategies; PmV precautions   [x]            Patient/family have participated as able in goal setting and plan of care.     Thank you for this referral,  Soniya Rangel M.S., [id number] [facility]  Speech-Language Pathologist

## 2021-05-11 NOTE — PROGRESS NOTES
Called chastity at [facility], she will have [facility] review and call me back.

## 2021-05-11 NOTE — PROGRESS NOTES
Bedside report given from [facility]SE[facility]. Patient on cardiac monitoring. Bed in lowest position, wheels locked, and call bell within reach. Patient resting comfortably. Heparin drip verified with off going RN. Trach equipment present at bedside.

## 2021-05-12 LAB
ANION GAP SERPL CALC-SCNC: 7 MMOL/L (ref 3–18)
BASOPHILS # BLD: 0 K/UL (ref 0–0.1)
BASOPHILS NFR BLD: 0 % (ref 0–2)
BUN SERPL-MCNC: 20 MG/DL (ref 7–18)
BUN/CREAT SERPL: 26 (ref 12–20)
CALCIUM SERPL-MCNC: 8.2 MG/DL (ref 8.5–10.1)
CHLORIDE SERPL-SCNC: 105 MMOL/L (ref 100–111)
CO2 SERPL-SCNC: 26 MMOL/L (ref 21–32)
CREAT SERPL-MCNC: 0.78 MG/DL (ref 0.6–1.3)
DIFFERENTIAL METHOD BLD: ABNORMAL
EOSINOPHIL # BLD: 0.2 K/UL (ref 0–0.4)
EOSINOPHIL NFR BLD: 2 % (ref 0–5)
ERYTHROCYTE [DISTWIDTH] IN BLOOD BY AUTOMATED COUNT: 15.4 % (ref 11.6–14.5)
GLUCOSE BLD STRIP.AUTO-MCNC: 124 MG/DL (ref 70–110)
GLUCOSE BLD STRIP.AUTO-MCNC: 131 MG/DL (ref 70–110)
GLUCOSE BLD STRIP.AUTO-MCNC: 143 MG/DL (ref 70–110)
GLUCOSE BLD STRIP.AUTO-MCNC: 97 MG/DL (ref 70–110)
GLUCOSE BLD STRIP.AUTO-MCNC: 98 MG/DL (ref 70–110)
GLUCOSE SERPL-MCNC: 80 MG/DL (ref 74–99)
HCT VFR BLD AUTO: 30.4 % (ref 36–48)
HGB BLD-MCNC: 9.2 G/DL (ref 13–16)
INR PPP: 1.6 (ref 0.8–1.2)
LYMPHOCYTES # BLD: 2.6 K/UL (ref 0.9–3.6)
LYMPHOCYTES NFR BLD: 30 % (ref 21–52)
MAGNESIUM SERPL-MCNC: 1.8 MG/DL (ref 1.6–2.6)
MCH RBC QN AUTO: 25.3 PG (ref 24–34)
MCHC RBC AUTO-ENTMCNC: 30.3 G/DL (ref 31–37)
MCV RBC AUTO: 83.5 FL (ref 74–97)
MONOCYTES # BLD: 0.6 K/UL (ref 0.05–1.2)
MONOCYTES NFR BLD: 6 % (ref 3–10)
NEUTS SEG # BLD: 5.1 K/UL (ref 1.8–8)
NEUTS SEG NFR BLD: 59 % (ref 40–73)
PHOSPHATE SERPL-MCNC: 2.8 MG/DL (ref 2.5–4.9)
PLATELET # BLD AUTO: 265 K/UL (ref 135–420)
PMV BLD AUTO: 11.8 FL (ref 9.2–11.8)
POTASSIUM SERPL-SCNC: 4.4 MMOL/L (ref 3.5–5.5)
PROTHROMBIN TIME: 19.2 SEC (ref 11.5–15.2)
RBC # BLD AUTO: 3.64 M/UL (ref 4.35–5.65)
SODIUM SERPL-SCNC: 138 MMOL/L (ref 136–145)
WBC # BLD AUTO: 8.6 K/UL (ref 4.6–13.2)

## 2021-05-12 PROCEDURE — 94668 MNPJ CHEST WALL SBSQ: CPT

## 2021-05-12 PROCEDURE — 82962 GLUCOSE BLOOD TEST: CPT

## 2021-05-12 PROCEDURE — 65660000004 HC RM CVT STEPDOWN

## 2021-05-12 PROCEDURE — 94640 AIRWAY INHALATION TREATMENT: CPT

## 2021-05-12 PROCEDURE — 77030040392 HC DRSG OPTIFOAM MDII -A

## 2021-05-12 PROCEDURE — 94762 N-INVAS EAR/PLS OXIMTRY CONT: CPT

## 2021-05-12 PROCEDURE — 92522 EVALUATE SPEECH PRODUCTION: CPT

## 2021-05-12 PROCEDURE — 74011250637 HC RX REV CODE- 250/637: Performed by: INTERNAL MEDICINE

## 2021-05-12 PROCEDURE — 83735 ASSAY OF MAGNESIUM: CPT

## 2021-05-12 PROCEDURE — 85610 PROTHROMBIN TIME: CPT

## 2021-05-12 PROCEDURE — 77010033678 HC OXYGEN DAILY

## 2021-05-12 PROCEDURE — 99232 SBSQ HOSP IP/OBS MODERATE 35: CPT | Performed by: INTERNAL MEDICINE

## 2021-05-12 PROCEDURE — 84100 ASSAY OF PHOSPHORUS: CPT

## 2021-05-12 PROCEDURE — 77030040393 HC DRSG OPTIFOAM GENT MDII -B

## 2021-05-12 PROCEDURE — 74011250636 HC RX REV CODE- 250/636: Performed by: INTERNAL MEDICINE

## 2021-05-12 PROCEDURE — 92526 ORAL FUNCTION THERAPY: CPT

## 2021-05-12 PROCEDURE — 77030018798 HC PMP KT ENTRL FED COVD -A

## 2021-05-12 PROCEDURE — 92507 TX SP LANG VOICE COMM INDIV: CPT

## 2021-05-12 PROCEDURE — 74011000250 HC RX REV CODE- 250: Performed by: PHYSICIAN ASSISTANT

## 2021-05-12 PROCEDURE — 36415 COLL VENOUS BLD VENIPUNCTURE: CPT

## 2021-05-12 PROCEDURE — 97110 THERAPEUTIC EXERCISES: CPT

## 2021-05-12 PROCEDURE — 2709999900 HC NON-CHARGEABLE SUPPLY

## 2021-05-12 PROCEDURE — 97530 THERAPEUTIC ACTIVITIES: CPT

## 2021-05-12 PROCEDURE — 74011250637 HC RX REV CODE- 250/637: Performed by: PHYSICIAN ASSISTANT

## 2021-05-12 PROCEDURE — 85025 COMPLETE CBC W/AUTO DIFF WBC: CPT

## 2021-05-12 PROCEDURE — 80048 BASIC METABOLIC PNL TOTAL CA: CPT

## 2021-05-12 PROCEDURE — 97164 PT RE-EVAL EST PLAN CARE: CPT

## 2021-05-12 PROCEDURE — 74011000250 HC RX REV CODE- 250: Performed by: INTERNAL MEDICINE

## 2021-05-12 PROCEDURE — 97535 SELF CARE MNGMENT TRAINING: CPT

## 2021-05-12 RX ORDER — WARFARIN 7.5 MG/1
7.5 TABLET ORAL
Status: COMPLETED | OUTPATIENT
Start: 2021-05-12 | End: 2021-05-12

## 2021-05-12 RX ADMIN — ENOXAPARIN SODIUM 100 MG: 100 INJECTION SUBCUTANEOUS at 05:45

## 2021-05-12 RX ADMIN — LANSOPRAZOLE 30 MG: KIT at 18:23

## 2021-05-12 RX ADMIN — ACETAMINOPHEN ORAL SOLUTION 500 MG: 650 SOLUTION ORAL at 20:22

## 2021-05-12 RX ADMIN — ARFORMOTEROL TARTRATE 15 MCG: 15 SOLUTION RESPIRATORY (INHALATION) at 20:13

## 2021-05-12 RX ADMIN — Medication 30 ML: at 09:54

## 2021-05-12 RX ADMIN — ARFORMOTEROL TARTRATE 15 MCG: 15 SOLUTION RESPIRATORY (INHALATION) at 08:06

## 2021-05-12 RX ADMIN — ATORVASTATIN CALCIUM 80 MG: 40 TABLET, FILM COATED ORAL at 21:16

## 2021-05-12 RX ADMIN — ENOXAPARIN SODIUM 100 MG: 100 INJECTION SUBCUTANEOUS at 18:23

## 2021-05-12 RX ADMIN — WARFARIN SODIUM 7.5 MG: 7.5 TABLET ORAL at 18:23

## 2021-05-12 RX ADMIN — LANSOPRAZOLE 30 MG: KIT at 09:54

## 2021-05-12 RX ADMIN — SODIUM CHLORIDE SOLN NEBU 3% 4 ML: 3 NEBU SOLN at 08:06

## 2021-05-12 RX ADMIN — Medication 100 MG: at 13:11

## 2021-05-12 RX ADMIN — SODIUM CHLORIDE SOLN NEBU 3% 4 ML: 3 NEBU SOLN at 20:13

## 2021-05-12 NOTE — PROGRESS NOTES
[address] [facility]  Pulmonary, Critical Care, and Sleep Medicine    Name: Pal Posadas MRN: [id number]   : 1950 Hospital: [address] [place] [address] Dr   Date: 2021        IMPRESSION:     · Tracheostomy - #9 Portex on  with Dr. Burnie Osler. No further bleeding at trach site. Plans for reevaluation and potential decannulation as outpatient noted  · S/p angioedema- with airway and respiratory failure and collapse; thought due to ACE inhibitor. S/P Emergent Cricthyrotomy [facility]-ED 21- converted to 6.5 ETT intraoperative by anesthesia team 21, 8.0 ETT by anesthesia 21, packing removed evening 21-ENT  · S/P cardiac arrest and acute respiratory failure-resolved  · Pulmonary Infiltrates: suspect aspiration secretions and/or blood due to above- can't exclude other infectious process at this time. Sputum culture from  reported -heavy Enterobacter aerogenes. Not treating since clinically improved. Likely colonization  · DVT: Popliteal- Left; acute non-occlusive thrombus.   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 · Need for nutrition-PEG tube      Patient Active Problem List   Diagnosis Code    Angioedema due to angiotensin converting enzyme inhibitor (ACE-I) T78. 3XXA, [id number]    Respiratory failure ([facility] J96.90    JACQUELYN (acute kidney injury) ([facility] N17.9    Cardiac arrest ([facility]) I46.9    Obesity (BMI 30-39. 9) E66.9    Diabetic neuropathy associated with type 2 diabetes mellitus ([facility] E11.40    Diabetic retinopathy associated with type 2 diabetes mellitus ([facility] E11.319    Pulmonary infiltrates R91.8    Controlled type 2 diabetes mellitus with neurologic complication, without long-term current use of insulin ([facility]) E11.49    DVT (deep venous thrombosis) ([facility]) I82.409    Encounter for palliative care Z51.5    Goals of care, counseling/discussion Z71.89    Multiple subsegmental pulmonary emboli without acute cor pulmonale (HCC) I26.94    DVT (deep vein thrombosis) in pregnancy O22.30    Acute encephalopathy G93.40      PLAN:     PULM:  · Maintain aspiration precautions: HOB >30 degrees  · SpO2 goal >92%  · Bronchial /oral hygiene , monitor secretions  · Trach care per protocol  · TC full time   · Albuterol PRN  · #9 Portex Trach on 4/26, ENT follow-up as outpatient    GI/ NUTRITION:  · Follow up with nutritional recommendations  HEM/ONC  · Trend Hb/HCT and PLTs  · Continue anticoagulation long-term       CODE STATUS: Full Code- Full   Awaiting placement - long term facility     Subjective/Interval History:        Interval HPI:71 yo M h/o HTN tx w/ lisinopril presenting to [facility] ED 4/4 for rapidly progressing respiratory distress due to severe angioedema. During intubation attempts pt had brief cardiac arrest w/ pulse loss and severe airway swelling leading to emergent cricothyrotomy. Pt stabilized before Nightingale transport to [place] at [facility] pt taken emergently to [address] was converted to ETT. Patient continued to have trouble with o2 requirements disproportionate to CXR findings and despite adjustment of FiO2 and PEEP, now improved. S/p administration of inhaled epoprostenol. CTA revelaed B/L pulmonary emboli which is likely the cause of his hypoxemia and RH strain. Continuing heparin gtt, which had already been initiated for left popliteal non-occlusive thrombus. Pt is s/p dobutamine and  diureses w/ bumex and metolazone.  COVID - 19 negative. Now with minimal vent support, however, mentation precludes extubation at this time. Patient required tracheostomy placement 4/26.  His ICU course has been complicated by encephalopathy and critical care myopathy      Subjective     05/12/21  LOS: 38    · Tolerating trach collar  · Sao2 -100%  · Secretions improving  · LTACH placement pending           ROS:Review of systems not obtained due to patient factors. Objective:   Vital Signs:    Visit Vitals  BP (!) 154/85 (BP 1 Location: Left upper arm, BP Patient Position: At rest)   Pulse 73   Temp 97.9 °F (36.6 °C)   Resp 18   Ht 5' 11\" (1.803 m)   Wt 99.6 kg (219 lb 9.6 oz)   SpO2 99%   BMI 30.63 kg/m²       O2 Device: Tracheal collar   O2 Flow Rate (L/min): 6 l/min   Temp (24hrs), Av.4 °F (36.9 °C), Min:97.9 °F (36.6 °C), Max:98.9 °F (37.2 °C)       Intake/Output:   Last shift:      No intake/output data recorded. Last 3 shifts: 05/10 190 -  0700  In: 3165.8 [I.V.:225.8]  Out: 1100 [Urine:1100]    Intake/Output Summary (Last 24 hours) at 2021 0946  Last data filed at 2021 0838  Gross per 24 hour   Intake 1251 ml   Output 550 ml   Net 701 ml      Physical Exam:                 General: NAD. Trach in place on TC.   Keeps head turned to the right  HEENT:  Anicteric sclerae; pink palpebral conjunctivae; mucosa moist  Resp:  Symmetrical chest expansion, no accessory muscle use; good airway entry; no rales/ no wheezing  CV:  S1, S2 present; regular rate and rhythm  GI:  Obese. Abdomen soft, non-tender; (+) active bowel sounds  Extremities:  +2 pulses on all extremities; no edema/ cyanosis/ clubbing noted;  Skin:  Warm; no rashes/ lesions noted, normal turgor/cap refill , dry appearing   Neurologic:  no change- awake, follows commands  Devices: Trach, PEG        DATA:  Labs:  Recent Labs     21  0547 21  0340 05/10/21  1432   WBC 8.6 8.4 10.2   HGB 9.2* 8.3* 9.9*   HCT 30.4* 27.1* 32.9*    209 252     Recent Labs     21  0547 21  0340 05/10/21  0503    141 141   K 4.4 4.8 4.2    110 110   CO2 26 28 24   GLU 80 116* 104*   BUN 20* 25* 27*   CREA 0.78 0.86 0.91   CA 8.2* 7.5* 8.5   MG 1.8 1.7 1.7   PHOS 2.8 3.1 2.9   INR 1.6* 1.5* 1.4*     No results for input(s): PH, PCO2, PO2, HCO3, FIO2 in the last 72 hours. Lab Results   Component Value Date/Time    Hemoglobin A1c 6.6 (H) 04/04/2021 05:11 PM     Lab Results   Component Value Date/Time    TSH 2.62 05/01/2021 04:21 PM    T4, Free 1.2 05/01/2021 04:21 PM     MICRO:  Results     Procedure Component Value Units Date/Time    CULTURE, BLOOD [[id number]] Collected: 05/08/21 0541    Order Status: Completed Specimen: Blood Updated: 05/12/21 0654     Special Requests: NO SPECIAL REQUESTS        Culture result: NO GROWTH 4 DAYS       CULTURE, BLOOD [[id number]] Collected: 05/05/21 1833    Order Status: Completed Specimen: Blood Updated: 05/11/21 0657     Special Requests: NO SPECIAL REQUESTS        Culture result: NO GROWTH 6 DAYS       CULTURE, BLOOD [[id number]]  (Abnormal) Collected: 05/05/21 1430    Order Status: Completed Specimen: Blood Updated: 05/08/21 1622     Special Requests: NO SPECIAL REQUESTS        GRAM STAIN       AEROBIC BOTTLE GRAM POSITIVE COCCI IN GROUPS                  SMEAR CALLED TO AND CORRECTLY REPEATED BY: KIMBERLY DOUGLAS RN  CVSD 5/7/21 0841 TO LAE           Culture result:       STAPHYLOCOCCUS SPECIES, COAGULASE NEGATIVE GROWING IN THE AEROBIC BOTTLE          COVID-19 RAPID TEST [[id number]] Collected: 05/02/21 1022    Order Status: Completed Specimen: Nasopharyngeal Updated: 05/02/21 1127     Specimen source Nasopharyngeal        COVID-19 rapid test Not detected        Comment: Rapid Abbott ID Now       Rapid NAAT:  The specimen is NEGATIVE for SARS-CoV-2, the novel coronavirus associated with COVID-19. Negative results should be treated as presumptive and, if inconsistent with clinical signs and symptoms or necessary for patient management, should be tested with an alternative molecular assay. Negative results do not preclude SARS-CoV-2 infection and should not be used as the sole basis for patient management decisions. This test has been authorized by the FDA under an Emergency Use Authorization (EUA) for use by authorized laboratories. Fact sheet for Healthcare Providers: ConventionUpdate.co.nz  Fact sheet for Patients: ConventionUpdate.co.nz       Methodology: Isothermal Nucleic Acid Amplification         CULTURE, RESPIRATORY/SPUTUM/BRONCH Simran Shaker STAIN [[id number]]  (Abnormal)  (Susceptibility) Collected: 05/01/21 1032    Order Status: Completed Specimen: Sputum from Tracheal Aspirate Updated: 05/04/21 1026     Special Requests: NO SPECIAL REQUESTS        GRAM STAIN RARE WBCS SEEN               RARE EPITHELIAL CELLS SEEN                  OCCASIONAL GRAM NEGATIVE COCCOBACILLI           Culture result:       HEAVY ENTEROBACTER AEROGENES                  NO NORMAL RESPIRATORY ANNALEE ISOLATED          Susceptibility      Enterobacter aerogenes     JOANNE     Amikacin ($) Susceptible     Cefazolin ($) Resistant     Cefepime ($$) Susceptible     Cefoxitin Resistant     Ceftazidime ($) Resistant     Ceftriaxone ($) Intermediate     Ciprofloxacin ($) Susceptible     Gentamicin ($) Susceptible     Levofloxacin ($) Susceptible     Meropenem ($$) Susceptible     Piperacillin/Tazobac ($) Resistant     Tobramycin ($) Susceptible     Trimeth/Sulfa Susceptible                    CULTURE, RESPIRATORY/SPUTUM/BRONCH Simran Shaker STAIN [[id number]]  (Abnormal)  (Susceptibility) Collected: 04/30/21 1245    Order Status: Completed Specimen: Sputum,ET Suction Updated: 05/03/21 1131     Special Requests: NO SPECIAL REQUESTS        GRAM STAIN 1+ WBCS SEEN               RARE EPITHELIAL CELLS SEEN                  1+ GRAM POSITIVE COCCI IN CHAINS                  OCCASIONAL GRAM NEGATIVE COCCOBACILLI           Culture result:       MODERATE ENTEROBACTER AEROGENES                  FEW NORMAL RESPIRATORY ANNALEE          Susceptibility      Enterobacter aerogenes     JOANNE     Amikacin ($) Susceptible     Cefazolin ($) Resistant     Cefepime ($$) Susceptible     Cefoxitin Resistant     Ceftazidime ($) Resistant     Ceftriaxone ($) Intermediate Ciprofloxacin ($) Susceptible     Gentamicin ($) Susceptible     Levofloxacin ($) Susceptible     Meropenem ($$) Susceptible     Piperacillin/Tazobac ($) Resistant     Tobramycin ($) Susceptible     Trimeth/Sulfa Susceptible                                                                          Echo  04/04/21   ECHO ADULT COMPLETE 04/06/2021 4/6/2021    Narrative · Contrast used: DEFINITY. · Image quality for this study was technically difficult. · LV: Calculated LVEF is 55%. Visually measured ejection fraction. Normal   cavity size, wall thickness and systolic function (ejection fraction   normal). Wall motion: normal. Moderate (grade 2) left ventricular   diastolic dysfunction. · AO: Mild aortic root and ascending aorta dilatation. Ascending aorta   diameter = 3.6 cm. Aortic root measures 3.9 cm  · RA: Dilated right atrium. · RV: Dilated right ventricle. Borderline low systolic function. · TV: Mild tricuspid valve regurgitation is present. · IVC: Mechanically ventilated; cannot use inferior caval vein diameter to   estimate central venous pressure. · PA: Moderate pulmonary hypertension. Pulmonary arterial systolic   pressure is 61 mmHg. Signed by: Stu Mixon MD        Imaging:  [x]I have personally reviewed the patients radiographs    CXR Results  (Last 48 hours)    None               CT Results (most recent):  Results from Hospital Encounter encounter on 04/04/21   CT HEAD WO CONT    Narrative CT HEAD WO CONT    History: Status post cardiac arrest undergoing follow-up.        Comparison: 7/18/2016    TECHNIQUE: 5 mm helical scan obtained of the head were obtained from the skull  vertex through the base of the skull without intravenous contrast.      All CT scans at this facility are performed using dose optimization technique as  appropriate to a performed exam, to include automated exposure control,  adjustment of the mA and/or kV according to patient size (including appropriate  matching first site-specific examinations), or use of iterative reconstruction  technique. BRAIN RESULT:      There are mild regions of periventricular hypodensity. Gray-white matter  differentiation is maintained. No acute intracranial hemorrhage. No midline  shift. Basilar cisterns are patent. Mild volume loss. Orbits are grossly normal. There is mild linear soft tissue thickening right  scalp. There is mild dependent opacity in the left sphenoid and right maxillary  sinus. Trace left mastoid effusion. Impression No acute findings. Mild sequela of chronic microvascular ischemic disease and mild generalized  volume loss. Mild sinusitis and trace left mastoid effusion.          Dennis Houser MD  05/12/21  Pulmonary, Critical Care Medicine  [facility]

## 2021-05-12 NOTE — PROGRESS NOTES
Problem: Mobility Impaired (Adult and Pediatric)  Goal: *Acute Goals and Plan of Care (Insert Text)  Description: Physical Therapy Goals  Reevaluated 5/12/2021 and goals updated, to be accomplished within 7 days  1. Patient will roll side to side in bed with moderate assistance . 2.  Patient will perform supine to sit and sit to supine with moderate assistance x2.   3.  Patient will transfer from bed to chair and chair to bed with maximal assistance using the least restrictive device. 4.  Patient will perform sit to stand with maximal assistance x2  5. Patient will sit EOB for 10 minutes with supervision in prep for OOB activity. 6.  Patient will participate in LE TE for 8 minutes with CGA in order to increase strength for transfers. Physical Therapy Goals  Initiated 4/28/2021, re-evaluated 5.5.21  and to be accomplished within 7 day(s)  1. Patient will move from supine to sit and sit to supine , scoot up and down, and roll side to side in bed with maximal assistance. 2.  Patient will sit on EOB with maxA for >5 minutes in prep for OOB activities. 3. Pt will participate in TE in supine for LEs. PLOF: Pt unable to verbalize PLOF. , per RN independent prior to admission     Outcome: Progressing Towards Goal    PHYSICAL THERAPY RE-EVALUATION    Patient: Jose R Grace (25 y.o. male)  Date: 5/12/2021  Primary Diagnosis: Angioedema due to angiotensin converting enzyme inhibitor (ACE-I) [T78. 3XXA, T46.4X5A]  Acute respiratory failure with hypoxia (HCC) [J96.01]  DVT (deep vein thrombosis) in pregnancy [O22.30]  Acute encephalopathy [G93.40]  Procedure(s) (LRB):  PERCUTANEOUS ENDOSCOPIC GASTROSTOMY TUBE INSERTION/ BEDSIDE (N/A) 9 Days Post-Op   Precautions:   Fall, Skin      ASSESSMENT :  Based on the objective data described below, the patient presents with generalized weakness, decreased activity tolerance, decreased balance, resulting in decreased independence with functional mobility. Patient received supine in bed, alert, and is motivated to participate PT. Rehab tech present in order to maximize pt participation and safety with mobility. He presents with left>right LE weakness, right LE grossly 3+/5 and left LE grossly 2 to 3-/5. Patient is able to follow commands with increased verbal/tactile cues for sequencing during bed mobility. Rolling right with moderate assistance and left with maximum assistance limited WB through left foot due to pain. Supine <> sit transfer with max/total assistance x2. He sat EOB for about 8 minutes with UE support on bed and contact guard/minimal assistance. Heart rate 80-90 bpm and O2 >90% with EOB activity. Patient fatigues and requires total assistance x2 to return to supine. Patient is appropriate to be on full caseload for PT and will continue to follow to address functional mobility deficits. Patient will benefit from skilled intervention to address the above impairments.   Patient's rehabilitation potential is considered to be Good  Factors which may influence rehabilitation potential include:   []         None noted  []         Mental ability/status  [x]         Medical condition  [x]         Home/family situation and support systems  [x]         Safety awareness  [x]         Pain tolerance/management  []         Other:      PLAN :  Recommendations and Planned Interventions:   [x]           Bed Mobility Training             [x]    Neuromuscular Re-Education  [x]           Transfer Training                   []    Orthotic/Prosthetic Training  [x]           Gait Training                          []    Modalities  [x]           Therapeutic Exercises           [x]    Edema Management/Control  [x]           Therapeutic Activities            [x]    Family Training/Education  [x]           Patient Education  []           Other (comment):    Frequency/Duration: Patient will be followed by physical therapy 1-2 times per day/4-7 days per week to address goals.  Discharge Recommendations: Rehab  Further Equipment Recommendations for Discharge: TBD at next level of care     SUBJECTIVE:   Patient stated Marylee Sermon? Meliza Torres    OBJECTIVE DATA SUMMARY:   Hospital course since last seen and reason for re-evaluation: Patient is following commands and is now appropriate to transition to full PT caseload. Will continue to follow to address strength, balance, and endurance deficits. Recommend Rehab for DC in order to maximize independence with functional mobility. Past Medical History:   Diagnosis Date    DDD (degenerative disc disease), lumbar     Diabetes ([address]     Diabetic neuropathy ([facility]     Diabetic retinopathy ([address]     DM2 (diabetes mellitus, type 2) ([address]     HLD (hyperlipidemia)     HTN (hypertension)     Obesity (BMI 30-39. 9)    History reviewed. No pertinent surgical history. Barriers to Learning/Limitations: yes;  confusion  Compensate with: Visual Cues, Verbal Cues, and Tactile Cues  Home Situation:   Home Situation  Home Environment: Other (comment)( [facility], sedated/intubated)  One/Two Story Residence: Other (Comment)( [facility], sedated/intubated)  Living Alone: No  Support Systems: Parent  Patient Expects to be Discharged to[de-identified] Unknown  Current DME Used/Available at Home: None( [facility], sedated/intubated)  Critical Behavior:  Neurologic State: Alert  Orientation Level: Oriented to person;Oriented to place  Cognition: Follows commands  Safety/Judgement: Fall prevention  Psychosocial  Patient Behaviors: Calm; Cooperative  Purposeful Interaction: Yes  Pt Identified Daily Priority: Clinical issues (comment)  Caritas Process: Nurture loving kindness;Establish trust;Attend basic human needs; Supportive expression  Caring Interventions: Therapeutic modalities  Reassure: Informing; Instilling jesus and hope;Quiet presence;Caring rounds  Therapeutic Modalities: Intentional therapeutic touch  Other Caring Modalities: Purposeful Rounding  Skin Condition/Temp: Dry;Flaky; Warm     Skin Integrity: Wound (add Wound LDA)  Skin Integumentary  Skin Color: Appropriate for ethnicity  Skin Condition/Temp: Dry;Flaky; Warm  Skin Integrity: Wound (add Wound LDA)  Turgor: Non-tenting  Hair Growth: Present  Varicosities: Absent  Nails: Exceptions to WDL  Exceptions to WDL: Thick; Discolored     Strength BLE:    Strength: Generally decreased, functional( left weaker than right)        Tone & Sensation BLE:   Tone: Normal  Sensation: Intact    Range Of Motion BLE:  AROM: Generally decreased, functional  PROM: Within functional limits       Functional Mobility:  Bed Mobility:  Rolling: Moderate assistance;Maximum assistance  Supine to Sit: Maximum assistance; Total assistance;Assist x2  Sit to Supine: Total assistance;Assist x2  Scooting: Total assistance;Assist x2     Balance:   Sitting: Impaired; With support  Sitting - Static: Fair (occasional)  Sitting - Dynamic: Fair (occasional)      Therapeutic Exercises:   10x heel slides     Pain:  Pain level pre-treatment: 10/10 left foot  Pain level post-treatment: 10/10   Pain Intervention(s) : Medication (see MAR); Rest, Ice, Repositioning   Response to intervention: Nurse notified, See doc flow    Activity Tolerance:   Fair  Please refer to the flowsheet for vital signs taken during this treatment. After treatment:   []         Patient left in no apparent distress sitting up in chair  [x]         Patient left in no apparent distress in bed  [x]         Call bell left within reach  [x]         Nursing notified  []         Caregiver present  []         Bed alarm activated  []         SCDs applied    COMMUNICATION/EDUCATION:   [x]         Role of Physical Therapy in the acute care setting. [x]         Fall prevention education was provided and the patient/caregiver indicated understanding. [x]         Patient/family have participated as able in goal setting and plan of care. [x]         Patient/family agree to work toward stated goals and plan of care.   []         Patient understands intent and goals of therapy, but is neutral about his/her participation. []         Patient is unable to participate in goal setting/plan of care: ongoing with therapy staff.  []         Other:     Thank you for this referral.  Virgie Avila, PT   Time Calculation: 29 mins

## 2021-05-12 NOTE — PROGRESS NOTES
Speech Pathology:     Pt currently NPO for possible endo procedure. Will follow up for treatment next business date or as medical condition permits.      Thank you for this referral.    Olimpia Whitehead M.S. CCC-SLP/L  Speech-Language Pathologist

## 2021-05-12 NOTE — PROGRESS NOTES
Bedside turnover given to [facility]. SBAR,MAR,ED summary given with updates R/T the night, a chance to ask questions was given. PT is on the cardiac tele monitor in stable condition. Bed is in the lowest position with the wheels locked. Call bell and personal effects  are on the bedside table within reach. Double checked with RN coming on the IV drips.

## 2021-05-12 NOTE — PROGRESS NOTES
Problem: Self Care Deficits Care Plan (Adult)  Goal: *Acute Goals and Plan of Care (Insert Text)  Description: Occupational Therapy Goals  Re-evaluated 5/11/2021 within 7 day(s). 1.  Patient will perform grooming with minimal assistance/contact guard assist.   2.  Patient will perform upper body dressing with moderate assistance . 3. Patient will perform bed mobility in preparation for selfcare with moderate assistance consistently. 4.  Patient will participate in upper extremity therapeutic exercise/activities with minimal assistance/contact guard assist for 8 minutes. 5.  Patient will utilize energy conservation techniques during functional activities with verbal cues. Prior Level of Function: Per chart review, patient was independent prior to hospital admission. Outcome: Progressing Towards Goal  OCCUPATIONAL THERAPY TREATMENT    Patient: Bryan Ball (28 y.o. male)  Date: 5/12/2021  Diagnosis: Angioedema due to angiotensin converting enzyme inhibitor (ACE-I) [T78. 3XXA, T46.4X5A]  Acute respiratory failure with hypoxia (HCC) [J96.01]  DVT (deep vein thrombosis) in pregnancy [O22.30]  Acute encephalopathy [G93.40] Respiratory failure (HCC)  Procedure(s) (LRB):  PERCUTANEOUS ENDOSCOPIC GASTROSTOMY TUBE INSERTION/ BEDSIDE (N/A) 9 Days Post-Op  Precautions: Fall, Skin    Chart, occupational therapy assessment, plan of care, and goals were reviewed. ASSESSMENT:  Pt reports he just finished working with PT, reports fatigue, but agreeable to participate in OT at bed level. Pt with marked proximal weakness in bilateral UEs, required Min A to wash face and bring ice chips to mouth (allowed per nursing), which improved to CGA following pillow positioning for proximal support. Pt was able to wash hands with Supervision and was educated on the importance of utilizing BUE as much as possible during his self-care routine to promote improvement in strength and functional use.  Pt requesting to perform BUE TherEx, was able to tolerate x10 repetitions of all TherEx to improve endurance and activity tolerance needed for ADLs (see below). Pt benefited from visual cues with demonstration and RBs to prevent SOB. Pt was issued resistive glove ball and educated on use for hand strengthening. Pt verbalized and demod understanding. Progression toward goals:  [x]          Improving appropriately and progressing toward goals  []          Improving slowly and progressing toward goals  []          Not making progress toward goals and plan of care will be adjusted     PLAN:  Patient continues to benefit from skilled intervention to address the above impairments. Continue treatment per established plan of care. Discharge Recommendations:  Rehab  Further Equipment Recommendations for Discharge:  N/A     SUBJECTIVE:   Patient stated [facility] about exercises?     OBJECTIVE DATA SUMMARY:   Cognitive/Behavioral Status:  Neurologic State: Alert  Orientation Level: Oriented to person, Oriented to place  Cognition: Follows commands  Safety/Judgement: Fall prevention     ADL Intervention:  Feeding  Food to Mouth: Minimum assistance(ice chips)  Grooming  Washing Hands: Supervision  Cognitive Retraining  Safety/Judgement: Fall prevention    UE Therapeutic Exercises:   BUE TherEx in mult planes:  BUE shd flex, abduction AAROM with Min A x10 each  BUE elbow flex/ext with light resistance x10 each  Chest press with Min A x10    Pain:  Pain level pre-treatment: 0/10   Pain level post-treatment: 0/10    Activity Tolerance:    Fair  Please refer to the flowsheet for vital signs taken during this treatment.   After treatment:   []  Patient left in no apparent distress sitting up in chair  [x]  Patient left in no apparent distress in bed  [x]  Call bell left within reach  [x]  Nursing notified  []  Caregiver present  []  Bed alarm activated    COMMUNICATION/EDUCATION:   [x] Role of Occupational Therapy in the acute care setting  [x] Home safety education was provided and the patient/caregiver indicated understanding. [x] Patient/family have participated as able in working towards goals and plan of care. [x] Patient/family agree to work toward stated goals and plan of care. [] Patient understands intent and goals of therapy, but is neutral about his/her participation. [] Patient is unable to participate in goal setting and plan of care.       Thank you for this referral.  No Brown, OTR/L  Time Calculation: 24 mins

## 2021-05-12 NOTE — PROGRESS NOTES
Bedside and Verbal shift change report given to Cheryle Pupa, RN (oncoming nurse) by Yulisa Rivas RN (offgoing nurse). Report included the following information SBAR, Kardex, MAR, Recent Results and Cardiac Rhythm NSR.    0730: Per report, tube feedings were held at midnight. Pt has been NPO for procedure this morning. 3357: Incontinence care     8658: Incontinence care, trach care, tube feeding restarted. 1415: PT working with pt     1900: Bedside and Verbal shift change report given to Yulisa Rivas RN (oncoming nurse) by Cheryle Pupa, RN (offgoing nurse). Report included the following information SBAR, Kardex, MAR and Cardiac Rhythm NSR.

## 2021-05-12 NOTE — PROGRESS NOTES
Problem: Dysphagia (Adult)  Goal: *Acute Goals and Plan of Care (Insert Text)  Description: Patient will:  1. Tolerate PO trials with 0 s/s overt distress in 4/5 trials  2. Utilize compensatory swallow strategies/maneuvers (decrease bite/sip, size/rate, alt. liq/sol) with min cues in 4/5 trials  3. Perform oral-motor/laryngeal exercises to increase oropharyngeal swallow function with min cues  4. Complete an objective swallow study (i.e., MBSS) to assess swallow integrity, r/o aspiration, and determine of safest LRD, min A as indicated/ordered by MD-met 5/11/21    Recommend:   NPO with PEG   Strict aspiration precautions (HOB >30 degrees at all times, Oral care TID)  Ice chips PRN following oral care     Outcome: Progressing Towards Goal     Problem: Voice Impaired (Adult)  Goal: *Acute Goals and Plan of Care (Insert Text)  Description: Pt will:  1. Tolerate PMV placement for 10 minute increments without compromise to vitals  2. Utilize proper breath support/techniques for increasing tolerance of PMV in 4/5 trials   3. Phonate 5 word utterances with adequate breath support for adequate intensity of speech with PMV     PMV precautions:  Wear with SLP/RN/Family present   ONLY Wear when awake/take off before sleep   Outcome: Progressing Towards Goal    [facility] [id number] TREATMENT    Patient: Durwood Kehr (36 y.o. male)  Date: 5/12/2021  Primary Diagnosis: Angioedema due to angiotensin converting enzyme inhibitor (ACE-I) [T78. 3XXA, T46.4X5A]  Acute respiratory failure with hypoxia (HCC) [J96.01]  DVT (deep vein thrombosis) in pregnancy [O22.30]  Acute encephalopathy [G93.40]  Procedure(s) (LRB):  PERCUTANEOUS ENDOSCOPIC GASTROSTOMY TUBE INSERTION/ BEDSIDE (N/A) 9 Days Post-Op   Precautions: Aspiration, Fall, Skin  PLOF: Independent per sister     ASSESSMENT :  Voice Tx:  Pt seen with sister present at bedside. SLP donned PMV with pt tolerating with no changes to vitals, no increase in anxiety.   Demo improved vocal quality with mild hoarse vocal quality noted. Demo adequate vocal intensity during trials. Educated pt and sister regarding donning and doffing valve and wearing precautions. Pt stated \"you can do it\". Left with sister in room and PMV in place-sister to inform RN when leaving for RN to remove. Recommend PMV with RN/SLP/family at this time. D/w pt, sister and RN. Swallow Tx:   Pt seen for follow up dysphagia tx with PMV in place. Demo x2 strong coughs with ice chips. No changes to vitals. Re-educated with regard to MBS results, diet recs, aspiration risk/precautions, oral care and positioning. Able to complete effortful swallow x5 reps each. Pt and sister able to verbalize understanding. Progression toward goals:  [x]         Improving appropriately and progressing toward goals  []         Improving slowly and progressing toward goals  []         Not making progress toward goals and plan of care will be adjusted      PLAN:  Recommendations and Planned Interventions:  Patient continues to benefit from skilled intervention to address the above impairments. Continue treatment per established plan of care. Discharge Recommendations:  Rehab     SUBJECTIVE:   Patient stated Uma Abts let me get some ice    OBJECTIVE:     Past Medical History:   Diagnosis Date    DDD (degenerative disc disease), lumbar     Diabetes ([address]     Diabetic neuropathy ([address]     Diabetic retinopathy ([address]     DM2 (diabetes mellitus, type 2) ([address]     HLD (hyperlipidemia)     HTN (hypertension)     Obesity (BMI 30-39. 9)    History reviewed. No pertinent surgical history.   Home Situation:   Home Situation  Home Environment: Other (comment)( [facility], sedated/intubated)  One/Two Story Residence: Other (Comment)( [facility], sedated/intubated)  Living Alone: No  Support Systems: Parent  Patient Expects to be Discharged to[de-identified] Unknown  Current DME Used/Available at Home: None( [facility], sedated/intubated)  Cognitive and Communication Status:  Neurologic State: Alert, Eyes open spontaneously  Orientation Level: Oriented to person, Oriented to place, Oriented to time  Cognition: Follows commands  Perception: Appears intact  Perseveration: No perseveration noted  Safety/Judgement: Fall prevention  Voice:     Tracheostomy Data/Eval   Trach Size/Status: Uncuffed, #8    Date of Placement: 05/10/21   Additional Trach Info: Size change, Type change   Passy-Ganesh Placement: SLP   On Ventilator: No   Mental Status   Neurologic State: Alert, Eyes open spontaneously   Orientation Level: Oriented to person, Oriented to place, Oriented to time   Cognition: Follows commands   Perception: Appears intact   Perseveration: No perseveration noted   Safety/Judgement: Fall prevention   Evidence of Comprehension   Meaningful Eye Movement/Gaze: Yes   Response to Basic Yes/No Questions (%): (yes)   Cough : Weak   Finger Occlusion   Application: SLP   Tolerance: No changes to vitals, No anxiety    Vocal Quality: Hoarse    Vocal Intensity: No impairment   PMV Trial   Application: SLP   Tolerance: No changes to vitals, no anxiety    Vocal Quality: Hoarse    Duration (minutes): 20 minutes    Oxygen Therapy   O2 Sat (%): 100 %   Pulse via Oximetry: 84 beats per minute   O2 Device: Tracheal collar   O2 Flow Rate (L/min): 6 l/min   Airway Suction   Suction: Trach   Sputum Amount: Moderate   Sputum Color/Odor: White   Sputum Consistency: Thick   Suction Device: Suction catheter   Suction Catheter Size: 14 Fr   Treatment Diagnosis   Treatment Diagnosis: Dysphagia, oropharyngeal phase   Treatment Diagnosis 2: Aphonia      COMMUNICATION/EDUCATION:   [x]            Aspiration precautions; swallow safety; compensatory techniques. [x]            Patient/family have participated as able in goal setting and plan of care. []            Patient/family agree to work toward stated goals and plan of care.   []            Patient understands intent and goals of therapy; neutral about participation. []            Patient unable to participate in goal setting/plan of care; educ ongoing with interdisciplinary staff  [x]         Posted safety precautions in patient's room.     Thank you for this referral,  Delmi Bang M.S., [id number] [facility]  Speech-Language Pathologist

## 2021-05-12 NOTE — PROGRESS NOTES
Bedside turnover given to [facility]. SBAR,MAR,ED summary given with updates R/T the night, a chance to ask questions was given. PT is on the cardiac tele monitor in stable condition. Bed is in the lowest position with the wheels locked. Call bell and personal effects  are on the bedside table within reach. Double checked with RN coming on the IV drips.

## 2021-05-12 NOTE — PROGRESS NOTES
Nutrition Assessment     Type and Reason for Visit: Reassess, Positive nutrition screen, Wound, Consult, NPO/clear liquid    Nutrition Recommendations/Plan:   - Continue tube feeding of Jevity 1.5 at goal rate of 60 mL/hr with ProSource BID & 100 mL q 4 hour water flushes.   - Continue daily multivitamin and thiamine. Nutrition Assessment:  NPO with PEG on trach collar. Tolerating TF & held since midnight for endoscopic procedure today however no plan for procedure per RN and TF resumed at goal rate of 60 mL/hr. PMV trials with SLP. IVF stopped 5/6 and hypernatremia improved. Malnutrition Assessment:  Malnutrition Status: At risk for malnutrition (specify)(inability to tolerate oral diet due to respiratory status)     Estimated Daily Nutrient Needs:  Energy (kcal):  8354-9130  Protein (g):  120-150       Fluid (ml/day):  2207-4529    Nutrition Related Findings:  FMS removed 5/9 with soft BM 5/11- miralax daily. Zander Apo placed 4/26 & PEG placed by GI 5/3, recent MBS 5/10- NPO.  Medications: MVI & thiamine      Current Nutrition Therapies:  DIET NUTRITIONAL SUPPLEMENTS Breakfast, Lunch; Prosource  DIET TUBE FEEDING     Current Tube Feeding Regimen: Jevity 1.5 at 60 mL/hr with ProSource BID via PEG  Current/Goal Tube Feeding Regimen Provides: 2280 kcal, 122 gm protein, 1094 mL free water, 100% RDIs  Current Water Flush Order: 100 mL q 4 hours (600 mL per day)      Anthropometric Measures:  · Height:  5' 11\" (180.3 cm)  · Current Body Wt:  99.6 kg (219 lb 9.3 oz)  · BMI: 30.6    Nutrition Diagnosis:   · Inadequate oral intake related to cognitive or neurological impairment, impaired respiratory function, swallowing difficulty as evidenced by NPO or clear liquid status due to medical condition, nutrition support-enteral nutrition    · Increased nutrient needs related to catabolic illness, increased demand for energy/nutrients as evidenced by wounds    Nutrition Intervention:  Food and/or Nutrient Delivery: Continue NPO, Continue tube feeding, Vitamin supplement  Nutrition Education and Counseling: Education not indicated  Coordination of Nutrition Care: Continue to monitor while inpatient, Speech therapy(EN regimen discussed with RN)    Goals:  Nutritional needs will be met through adequate oral intake or nutrition support within the next 7 days       Nutrition Monitoring and Evaluation:   Behavioral-Environmental Outcomes: None identified  Food/Nutrient Intake Outcomes: Enteral nutrition intake/tolerance, Vitamin/mineral intake  Physical Signs/Symptoms Outcomes: Biochemical data, GI status, Fluid status or edema, Nutrition focused physical findings    Discharge Planning:    Enteral nutrition     Electronically signed by Vandana Souza RD on 5/12/2021 at 1:51 PM    Contact Number: [phone]

## 2021-05-12 NOTE — PROGRESS NOTES
Hospitalist Progress Note        Patient: Madonna White               Sex: male          DOA: 4/4/2021       YOB: 1950      Age:  79 y.o.        LOS:  LOS: 38 days               Subjective:     Patient was seen in presence of his sister. Patient feels much better. Wants to go home. Denies any chest pain or abdominal pain. No nausea or vomiting. Continues to have shortness of breath and cough at its his baseline. As per nursing patient has been n.p.o. for endoscopy procedure. I spoke to GI, Dr. Mago Mendes and there is no endoscopy scheduled. Advised nurses to restart feeding. HPI:  Patient was initially seen at [facility] and had emergent Crychothyrotomy during Cardiac arrest.  Initially there was concern that he had Angioedema. He was stabilized and flown to [facility] for ongoing management. Mr Fina Geiger has required prolonged support on the mechanical ventilator for ongoing respiratory failure. He has had Tracheostomy via ENT and he continues with Tracheostomy collar currently which he is tolerating well. Mr Fina Geiger had cardiac arrest initially, but he has stable hemodynamics currently. Mr Fina Geiger was treated for sepsis and has received a full course of antibiotics, he is off antibiotics currently. He has limited interaction, he does not have seizure activity. He has had sedation to maintain the ventilator which he no longer needs. He has tracheostomy, but he currently does not have a PEG. He has ongoing NG tube and continues with NG tube feeding. NG tube was removed and PEG tube was placed on May 3, 2021. Tolerating tube feeding since then.     Objective:      /77 (BP 1 Location: Left upper arm, BP Patient Position: At rest)   Pulse 85   Temp 97.9 °F (36.6 °C)   Resp 13   Ht 5' 11\" (1.803 m)   Wt 99.6 kg (219 lb 9.6 oz)   SpO2 100%   BMI 30.63 kg/m²       Intake/Output Summary (Last 24 hours) at 5/12/2021 1701  Last data filed at 5/12/2021 1626  Gross per 24 hour   Intake 475 ml   Output 550 ml   Net -75 ml     General: Awake, follows commands, not in acute distress  Neck: Tracheostomy #8 is in situ with dressing  Lungs: Diminished breath sound bibasilar without wheezes  Heart:  Regular Rate and Rhythm. Abdomen:  Soft, nondistended, bowel sounds present, PEG tube is present with  Extremities: No edema present  Neuro: Awake, moves both upper extremities and right leg. Motor strength is 3 out of 5 in left lower extremity. Assessment/Plan     ASSESSMENT:    1. Reportedly acute hypoxic respiratory failure due to angioedema s/p emergent cricothyrotomy followed by tracheostomy. Stable  2. Tracheostomy site bleeding, resolved. 3.  Status post respiratory failure cardiac arrest  4. Chronic hypoxic respiratory failure status post trach and on trach collar  5. Bilateral pulmonary infiltrates  6. Oropharyngeal dysphagia status post G tube  7. Left leg popliteal DVT and pulmonary embolism  8. Reportedly acute metabolic encephalopathy  9. 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 JACQUELYN, recurrent -resolved currently  11. Left pneumothorax resolved  12. Diabetic neuropathy  13. Obesity  14. Fever and leukocytosis, resolved  15. Coag negative staph bacteremia most likely contamination  16. Bleeding around the PEG tube,resolved. PLAN:    Continue Lovenox and Coumadin until INR greater than 2  GI input noted about not doing any further procedure is H&H is stable  Continue current tube feeding per dietitian  Continue statin  Continue PPI  Pulmonologist to follow for tracheostomy care  Continue trach collar and nebulizer  Continue multivitamin, thiamine  PT and OT to follow this patient    Discussed with patient's sister at bedside and updated her about current treatment plan. Patient has been accepted by SNF but waiting for authorization from payer source.   Patient is otherwise medically stable to be transferred to skilled nursing facility. Estimated date of discharge: May 12, 2021 if placement is found    Total time to take care of this patient was 25 minutes and more than 50% of time was spent counseling and coordinating care. Disclaimer: Sections of this note are dictated using utilizing voice recognition software, which may have resulted in some phonetic based errors in grammar and contents. Even though attempts were made to correct all the mistakes, some may have been missed, and remained in the body of the document. If questions arise, please contact our department.     Recent Results (from the past 24 hour(s))   GLUCOSE, POC    Collection Time: 05/11/21  5:51 PM   Result Value Ref Range    Glucose (POC) 138 (H) 70 - 110 mg/dL   GLUCOSE, POC    Collection Time: 05/12/21 12:40 AM   Result Value Ref Range    Glucose (POC) 143 (H) 70 - 110 mg/dL   MAGNESIUM    Collection Time: 05/12/21  5:47 AM   Result Value Ref Range    Magnesium 1.8 1.6 - 2.6 mg/dL   PHOSPHORUS    Collection Time: 05/12/21  5:47 AM   Result Value Ref Range    Phosphorus 2.8 2.5 - 4.9 MG/DL   PROTHROMBIN TIME + INR    Collection Time: 05/12/21  5:47 AM   Result Value Ref Range    Prothrombin time 19.2 (H) 11.5 - 15.2 sec    INR 1.6 (H) 0.8 - 1.2     METABOLIC PANEL, BASIC    Collection Time: 05/12/21  5:47 AM   Result Value Ref Range    Sodium 138 136 - 145 mmol/L    Potassium 4.4 3.5 - 5.5 mmol/L    Chloride 105 100 - 111 mmol/L    CO2 26 21 - 32 mmol/L    Anion gap 7 3.0 - 18 mmol/L    Glucose 80 74 - 99 mg/dL    BUN 20 (H) 7.0 - 18 MG/DL    Creatinine 0.78 0.6 - 1.3 MG/DL    BUN/Creatinine ratio 26 (H) 12 - 20      GFR est AA >60 >60 ml/min/1.73m2    GFR est non-AA >60 >60 ml/min/1.73m2    Calcium 8.2 (L) 8.5 - 10.1 MG/DL   CBC WITH AUTOMATED DIFF    Collection Time: 05/12/21  5:47 AM   Result Value Ref Range    WBC 8.6 4.6 - 13.2 K/uL    RBC 3.64 (L) 4.35 - 5.65 M/uL    HGB 9.2 (L) 13.0 - 16.0 g/dL    HCT 30.4 (L) 36.0 - 48.0 %    MCV 83.5 74.0 - 97.0 FL    MCH 25.3 24.0 - 34.0 PG    MCHC 30.3 (L) 31.0 - 37.0 g/dL    RDW 15.4 (H) 11.6 - 14.5 %    PLATELET 156 168 - 299 K/uL    MPV 11.8 9.2 - 11.8 FL    NEUTROPHILS 59 40 - 73 %    LYMPHOCYTES 30 21 - 52 %    MONOCYTES 6 3 - 10 %    EOSINOPHILS 2 0 - 5 %    BASOPHILS 0 0 - 2 %    ABS. NEUTROPHILS 5.1 1.8 - 8.0 K/UL    ABS. LYMPHOCYTES 2.6 0.9 - 3.6 K/UL    ABS. MONOCYTES 0.6 0.05 - 1.2 K/UL    ABS. EOSINOPHILS 0.2 0.0 - 0.4 K/UL    ABS.  BASOPHILS 0.0 0.0 - 0.1 K/UL    DF AUTOMATED     GLUCOSE, POC    Collection Time: 05/12/21  6:25 AM   Result Value Ref Range    Glucose (POC) 97 70 - 110 mg/dL   GLUCOSE, POC    Collection Time: 05/12/21 11:28 AM   Result Value Ref Range    Glucose (POC) 98 70 - 110 mg/dL

## 2021-05-12 NOTE — PROGRESS NOTES
Still no response from [facility], more updates requested and given. Will need auth if they accept.  Called chastity yesterday x3, called her again this am.

## 2021-05-13 VITALS
OXYGEN SATURATION: 94 % | HEART RATE: 85 BPM | RESPIRATION RATE: 22 BRPM | HEIGHT: 71 IN | DIASTOLIC BLOOD PRESSURE: 77 MMHG | TEMPERATURE: 98.5 F | BODY MASS INDEX: 30.77 KG/M2 | WEIGHT: 219.8 LBS | SYSTOLIC BLOOD PRESSURE: 114 MMHG

## 2021-05-13 LAB
ANION GAP SERPL CALC-SCNC: 6 MMOL/L (ref 3–18)
BUN SERPL-MCNC: 23 MG/DL (ref 7–18)
BUN/CREAT SERPL: 29 (ref 12–20)
CALCIUM SERPL-MCNC: 8.2 MG/DL (ref 8.5–10.1)
CHLORIDE SERPL-SCNC: 102 MMOL/L (ref 100–111)
CO2 SERPL-SCNC: 26 MMOL/L (ref 21–32)
CREAT SERPL-MCNC: 0.79 MG/DL (ref 0.6–1.3)
GLUCOSE BLD STRIP.AUTO-MCNC: 112 MG/DL (ref 70–110)
GLUCOSE SERPL-MCNC: 107 MG/DL (ref 74–99)
HCT VFR BLD AUTO: 31.2 % (ref 36–48)
HGB BLD-MCNC: 9.3 G/DL (ref 13–16)
INR PPP: 1.8 (ref 0.8–1.2)
MAGNESIUM SERPL-MCNC: 2.1 MG/DL (ref 1.6–2.6)
PHOSPHATE SERPL-MCNC: 3 MG/DL (ref 2.5–4.9)
POTASSIUM SERPL-SCNC: 3.7 MMOL/L (ref 3.5–5.5)
PROTHROMBIN TIME: 20.6 SEC (ref 11.5–15.2)
SODIUM SERPL-SCNC: 134 MMOL/L (ref 136–145)

## 2021-05-13 PROCEDURE — 99239 HOSP IP/OBS DSCHRG MGMT >30: CPT | Performed by: INTERNAL MEDICINE

## 2021-05-13 PROCEDURE — 94668 MNPJ CHEST WALL SBSQ: CPT

## 2021-05-13 PROCEDURE — 85014 HEMATOCRIT: CPT

## 2021-05-13 PROCEDURE — 94669 MECHANICAL CHEST WALL OSCILL: CPT

## 2021-05-13 PROCEDURE — 83735 ASSAY OF MAGNESIUM: CPT

## 2021-05-13 PROCEDURE — 74011250637 HC RX REV CODE- 250/637: Performed by: INTERNAL MEDICINE

## 2021-05-13 PROCEDURE — 74011250636 HC RX REV CODE- 250/636: Performed by: INTERNAL MEDICINE

## 2021-05-13 PROCEDURE — 74011000250 HC RX REV CODE- 250: Performed by: INTERNAL MEDICINE

## 2021-05-13 PROCEDURE — 80048 BASIC METABOLIC PNL TOTAL CA: CPT

## 2021-05-13 PROCEDURE — 97112 NEUROMUSCULAR REEDUCATION: CPT

## 2021-05-13 PROCEDURE — 92526 ORAL FUNCTION THERAPY: CPT

## 2021-05-13 PROCEDURE — 77030040392 HC DRSG OPTIFOAM MDII -A

## 2021-05-13 PROCEDURE — 94640 AIRWAY INHALATION TREATMENT: CPT

## 2021-05-13 PROCEDURE — 82962 GLUCOSE BLOOD TEST: CPT

## 2021-05-13 PROCEDURE — 99231 SBSQ HOSP IP/OBS SF/LOW 25: CPT | Performed by: INTERNAL MEDICINE

## 2021-05-13 PROCEDURE — 85610 PROTHROMBIN TIME: CPT

## 2021-05-13 PROCEDURE — 2709999900 HC NON-CHARGEABLE SUPPLY

## 2021-05-13 PROCEDURE — 94762 N-INVAS EAR/PLS OXIMTRY CONT: CPT

## 2021-05-13 PROCEDURE — 74011000250 HC RX REV CODE- 250: Performed by: PHYSICIAN ASSISTANT

## 2021-05-13 PROCEDURE — 36415 COLL VENOUS BLD VENIPUNCTURE: CPT

## 2021-05-13 PROCEDURE — 84100 ASSAY OF PHOSPHORUS: CPT

## 2021-05-13 PROCEDURE — 77010033678 HC OXYGEN DAILY

## 2021-05-13 PROCEDURE — 97530 THERAPEUTIC ACTIVITIES: CPT

## 2021-05-13 RX ORDER — POLYETHYLENE GLYCOL 3350 17 G/17G
17 POWDER, FOR SOLUTION ORAL DAILY
Qty: 30 PACKET | Refills: 0 | Status: SHIPPED
Start: 2021-05-14 | End: 2021-09-23

## 2021-05-13 RX ORDER — WARFARIN SODIUM 5 MG/1
TABLET ORAL
Qty: 1 TAB | Refills: 0 | Status: SHIPPED
Start: 2021-05-13 | End: 2021-09-23

## 2021-05-13 RX ORDER — WARFARIN SODIUM 5 MG/1
5 TABLET ORAL ONCE
Status: DISCONTINUED | OUTPATIENT
Start: 2021-05-13 | End: 2021-05-13 | Stop reason: HOSPADM

## 2021-05-13 RX ORDER — LANOLIN ALCOHOL/MO/W.PET/CERES
100 CREAM (GRAM) TOPICAL DAILY
Qty: 30 TAB | Refills: 0 | Status: SHIPPED
Start: 2021-05-14 | End: 2021-09-23

## 2021-05-13 RX ORDER — ARFORMOTEROL TARTRATE 15 UG/2ML
15 SOLUTION RESPIRATORY (INHALATION) 2 TIMES DAILY
Qty: 60 VIAL | Refills: 0 | Status: SHIPPED
Start: 2021-05-13 | End: 2021-09-23

## 2021-05-13 RX ORDER — IPRATROPIUM BROMIDE AND ALBUTEROL SULFATE 2.5; .5 MG/3ML; MG/3ML
3 SOLUTION RESPIRATORY (INHALATION)
Qty: 30 NEBULE | Refills: 0 | Status: SHIPPED
Start: 2021-05-13 | End: 2021-09-23

## 2021-05-13 RX ORDER — ATORVASTATIN CALCIUM 40 MG/1
40 TABLET, FILM COATED ORAL
Qty: 40 TAB | Refills: 0 | Status: ON HOLD
Start: 2021-05-13 | End: 2021-11-11

## 2021-05-13 RX ORDER — ENOXAPARIN SODIUM 100 MG/ML
INJECTION SUBCUTANEOUS
Qty: 2 ML | Refills: 0 | Status: SHIPPED
Start: 2021-05-13 | End: 2021-09-23

## 2021-05-13 RX ADMIN — LORAZEPAM 0.5 MG: 0.5 TABLET ORAL at 06:02

## 2021-05-13 RX ADMIN — ENOXAPARIN SODIUM 100 MG: 100 INJECTION SUBCUTANEOUS at 06:02

## 2021-05-13 RX ADMIN — LANSOPRAZOLE 30 MG: KIT at 11:09

## 2021-05-13 RX ADMIN — Medication 30 ML: at 11:09

## 2021-05-13 RX ADMIN — ARFORMOTEROL TARTRATE 15 MCG: 15 SOLUTION RESPIRATORY (INHALATION) at 08:40

## 2021-05-13 RX ADMIN — SODIUM CHLORIDE SOLN NEBU 3% 4 ML: 3 NEBU SOLN at 08:40

## 2021-05-13 RX ADMIN — Medication 100 MG: at 11:09

## 2021-05-13 NOTE — PROGRESS NOTES
Bedside and Verbal shift change report given to Cheryle Pupa, RN (oncoming nurse) by Yulisa Rivas RN (offgoing nurse). Report included the following information SBAR, Kardex, MAR, Recent Results and Cardiac Rhythm NSR.

## 2021-05-13 NOTE — PROGRESS NOTES
TRANSFER - OUT REPORT:    Verbal report given to [facility], RN (name) on King June  being transferred to [facility] (unit) for routine progression of care       Report consisted of patients Situation, Background, Assessment and   Recommendations(SBAR). Information from the following report(s) SBAR, Kardex, MAR, Recent Results and Cardiac Rhythm NSR was reviewed with the receiving nurse. Opportunity for questions and clarification was provided.       Patient transported with:   O2 @ 6 liters

## 2021-05-13 NOTE — PROGRESS NOTES
Problem: Mobility Impaired (Adult and Pediatric)  Goal: *Acute Goals and Plan of Care (Insert Text)  Description: Physical Therapy Goals  Reevaluated 5/12/2021 and goals updated, to be accomplished within 7 days  1. Patient will roll side to side in bed with moderate assistance . 2.  Patient will perform supine to sit and sit to supine with moderate assistance x2.   3.  Patient will transfer from bed to chair and chair to bed with maximal assistance using the least restrictive device. 4.  Patient will perform sit to stand with maximal assistance x2  5. Patient will sit EOB for 10 minutes with supervision in prep for OOB activity. 6.  Patient will participate in LE TE for 8 minutes with CGA in order to increase strength for transfers. Physical Therapy Goals  Initiated 4/28/2021, re-evaluated 5.5.21  and to be accomplished within 7 day(s)  1. Patient will move from supine to sit and sit to supine , scoot up and down, and roll side to side in bed with maximal assistance. 2.  Patient will sit on EOB with maxA for >5 minutes in prep for OOB activities. 3. Pt will participate in TE in supine for LEs. PLOF: Pt unable to verbalize PLOF. , per RN independent prior to admission     Outcome: Progressing Towards Goal   PHYSICAL THERAPY TREATMENT    Patient: Ariadna Glass (39 y.o. male)  Date: 5/13/2021  Diagnosis: Angioedema due to angiotensin converting enzyme inhibitor (ACE-I) [T78. 3XXA, T46.4X5A]  Acute respiratory failure with hypoxia (HCC) [J96.01]  DVT (deep vein thrombosis) in pregnancy [O22.30]  Acute encephalopathy [G93.40] Respiratory failure (HCC)  Procedure(s) (LRB):  PERCUTANEOUS ENDOSCOPIC GASTROSTOMY TUBE INSERTION/ BEDSIDE (N/A) 10 Days Post-Op  Precautions: Fall, Skin  ASSESSMENT:  Agreeable to PT in exchange for cup of ice. Max A x2 for supine to sit. Seated EOB with min A for static balance. Performed reaching tasks in sitting with min/mod A for balance.  Attempted sit to stand with max A x2; unable to bear weight on BLE and clear buttocks from bed. Returned to supine with max A x2 for d/t patietn needing ot use bedpan. Total A x2 for rolling to place bedpan. Total Ax2 for rolling, clean-up, and positioning post bowel movement on bedpan. Son present in middle of session; non-interactive with PT goals. Patient easily distracted and decreased participation in PT tasks with son present. Seated in bed with HOB elevated at end of session. Educated on need for RN assistance with mobility; verbalized understanding. Call bell in reach. Progression toward goals:   []      Improving appropriately and progressing toward goals  [x]      Improving slowly and progressing toward goals  []      Not making progress toward goals and plan of care will be adjusted     PLAN:  Patient continues to benefit from skilled intervention to address the above impairments. Continue treatment per established plan of care. Discharge Recommendations:  Rehab  Further Equipment Recommendations for Discharge:  TBD with progression     SUBJECTIVE:   Patient stated Can I get a cup of ice?     OBJECTIVE DATA SUMMARY:   Critical Behavior:  Neurologic State: Alert  Orientation Level: Oriented to person  Cognition: Follows commands     Psychosocial  Patient Behaviors: Cooperative    Functional Mobility:  Bed Mobility:  Rolling:  Total assistance  Supine to Sit: Maximum assistance;Assist x2  Sit to Supine: Maximum assistance;Assist x2  Scooting: Maximum assistance;Assist x2  Balance:   Sitting: Impaired  Sitting - Static: Fair (occasional)  Sitting - Dynamic: Fair (occasional)  Neuro Re-Education:  Seated EOB 8 minutes    Pain:  Pain level pre-treatment: 0/10  Pain level post-treatment: 0/10     Activity Tolerance:   Fair    After treatment:   [] Patient left in no apparent distress sitting up in chair  [x] Patient left in no apparent distress in bed  [x] Call bell left within reach  [x] Nursing notified  [] Caregiver present  [] Bed alarm activated  [] SCDs applied      COMMUNICATION/EDUCATION:   [x]         Role of physical therapy in the acute care setting. [x]         Fall prevention education was provided and the patient/caregiver indicated understanding. [x]         Patient/family have participated as able in working toward goals and plan of care. [x]         Patient/family agree to work toward stated goals and plan of care. []         Patient understands intent and goals of therapy, but is neutral about his/her participation. []         Patient is unable to participate in stated goals/plan of care: ongoing with therapy staff.       Tara Lopes, PT   Time Calculation: 28 mins

## 2021-05-13 NOTE — PROGRESS NOTES
Problem: Falls - Risk of  Goal: *Absence of Falls  Description: Document Jahaira Boyd Fall Risk and appropriate interventions in the flowsheet. Outcome: Progressing Towards Goal  Note: Fall Risk Interventions:  Mobility Interventions: Bed/chair exit alarm, Patient to call before getting OOB, PT Consult for mobility concerns, PT Consult for assist device competence, Strengthening exercises (ROM-active/passive)    Mentation Interventions: Adequate sleep, hydration, pain control, Bed/chair exit alarm, Door open when patient unattended, More frequent rounding, Reorient patient, Room close to nurse's station    Medication Interventions: Patient to call before getting OOB, Teach patient to arise slowly, Bed/chair exit alarm    Elimination Interventions: Bed/chair exit alarm, Call light in reach, Toilet paper/wipes in reach, Toileting schedule/hourly rounds              Problem: Pressure Injury - Risk of  Goal: *Prevention of pressure injury  Description: Document Lang Scale and appropriate interventions in the flowsheet. Outcome: Progressing Towards Goal  Note: Pressure Injury Interventions:  Sensory Interventions: Assess changes in LOC, Check visual cues for pain, Float heels, Keep linens dry and wrinkle-free, Minimize linen layers    Moisture Interventions: Absorbent underpads, Internal/External urinary devices, Minimize layers, Offer toileting Q_hr    Activity Interventions: Pressure redistribution bed/mattress(bed type), PT/OT evaluation    Mobility Interventions: PT/OT evaluation, Turn and reposition approx.  every two hours(pillow and wedges), Pressure redistribution bed/mattress (bed type)    Nutrition Interventions: Document food/fluid/supplement intake    Friction and Shear Interventions: Feet elevated on foot rest, Minimize layers                Problem: Patient Education: Go to Patient Education Activity  Goal: Patient/Family Education  Outcome: Progressing Towards Goal     Problem: Nutrition Deficit  Goal: *Optimize nutritional status  Outcome: Progressing Towards Goal     Problem: Pain  Goal: *Control of Pain  Outcome: Progressing Towards Goal  Goal: *PALLIATIVE CARE:  Alleviation of Pain  Outcome: Progressing Towards Goal     Problem: Patient Education: Go to Patient Education Activity  Goal: Patient/Family Education  Outcome: Progressing Towards Goal     Problem: Patient Education: Go to Patient Education Activity  Goal: Patient/Family Education  Outcome: Progressing Towards Goal     Problem: Patient Education: Go to Patient Education Activity  Goal: Patient/Family Education  Outcome: Progressing Towards Goal     Problem: Patient Education: Go to Patient Education Activity  Goal: Patient/Family Education  Outcome: Progressing Towards Goal     Problem: Patient Education: Go to Patient Education Activity  Goal: Patient/Family Education  Outcome: Progressing Towards Goal

## 2021-05-13 NOTE — PROGRESS NOTES
received call from Deloit at [facility], Tony Coffee obtained. Notified Dr Socorro Wilson. Called lupe franklin through [facility], [phone] with [facility] requested [facility]. REF # [id number]  Transport set for 130 pm spoke with Adonaynash [facility]. Pcs fax'd. Completed, placed on envelope ,placed in nurses station. Notified Ya German, nurse. Notified patient, his sister and left message for his son. Communication to Patient/Family:  Met with patient and family and they are agreeable to the transition plan. The Plan for Transition of Care is related to the following treatment goals:sn/pt/ot    The Patient and/or patient representatives, son and sister was provided with a choice of provider and agrees   with the discharge plan. Yes [x] No []    Freedom of choice list was provided with basic dialogue that supports the patient's individualized plan of care/goals and shares the quality data associated with the providers. Yes [x] No []    SNF/Rehab Transition:  Patient has been accepted to [facility]. SNF/Rehab and meets criteria for admission. Patient will transported by [facility] and expected to leave at 130pm    Communication to SNF/Rehab:  Bedside RN, Ya German, has been notified to update the transition plan to the facility and call report [phone]  Discharge information has been updated on the AVS. And communicated to facility via [employer], or CC link. Discharge instructions to be fax'd to facility at [phone]    Nursing Please include all hard scripts for controlled substances, med rec and dc summary, and AVS in packet. Reviewed and confirmed with facility, They can manage the patient care needs for the following:     Judge Song with (X) only those applicable:  Medication:  [x]Medications are available at the facility  []IV Antibiotics    []Controlled Substance  hard copies available sent.   []Weekly Labs Equipment:  []CPAP/BiPAP  []Wound Vacuum  []Garcia or Urinary Device  []PICC/Central Line  []Nebulizer  []Ventilator    Treatment:  []Isolation (for MRSA, VRE, etc.)  []Surgical Drain Management  [x]Tracheostomy Care  []Dressing Changes  []Dialysis with transportation  [x]PEG Care  [x]Oxygen  []Daily Weights for Heart Failure    Dietary:  []Any diet limitations  [x]Tube Feedings   []Total Parenteral Management (TPN)    Financial Resources:  []Medicaid Application Completed    [x]UAI Completed and copy given to pt/family  and copy given to pt/family  []A screening has previously been completed. []Level II Completed    [] Private pay individual who will not become   financially eligible for Medicaid within 6 months from admission to a [address] [place] [address]. [] Individual refused to have screening conducted. []Medicaid Application Completed    []The screening denied because it was determined individual did not need/did not qualify for nursing facility level of care. [] Out of state residents seeking direct admission to a [address] facility. [] Individuals who are inpatients of an out of state hospital, or in state or out of state veterans/ hospital and seek direct admission to a [address] facility  [] Individuals who are pateints or residents of a state owned/operated facility that is licensed by [employer] ([employer]) and seek direct admission to [address] [place] [address]  [] A screening not required for enrollment in [address] [address] [address] services as set out in [address] [place] [address] 08-  [] [facility]) staff shall perform screenings of the [facility] clients. Uploaded uai in edc. Advanced Care Plan:  []Surrogate Decision Maker of Care  []POA  []Communicated Code Status and copy sent.     Other:

## 2021-05-13 NOTE — PROGRESS NOTES
Hospitalist Progress Note        Patient: Shira Sepulveda               Sex: male          DOA: 4/4/2021       YOB: 1950      Age:  79 y.o.        LOS:  LOS: 39 days               Subjective:     Patient was seen in presence of nursing. Patient is aware that he is going to be discharged today from the hospital and going to a skilled nursing facility. He asked us to call his family and let them know to bring his cell phone, puzzle book and glasses. I spoke to patient's sister about his request.  Patient denies any chest pain abdominal pain. No nausea or vomiting. Tolerating tube feeding. HPI:  Patient was initially seen at [facility] and had emergent Crychothyrotomy during Cardiac arrest.  Initially there was concern that he had Angioedema. He was stabilized and flown to [facility] for ongoing management. Mr Sixto Henry has required prolonged support on the mechanical ventilator for ongoing respiratory failure. He has had Tracheostomy via ENT and he continues with Tracheostomy collar currently which he is tolerating well. Mr Sixto Henry had cardiac arrest initially, but he has stable hemodynamics currently. Mr Sixto Henry was treated for sepsis and has received a full course of antibiotics, he is off antibiotics currently. He has limited interaction, he does not have seizure activity. He has had sedation to maintain the ventilator which he no longer needs. He has tracheostomy, but he currently does not have a PEG. He has ongoing NG tube and continues with NG tube feeding. NG tube was removed and PEG tube was placed on May 3, 2021. Tolerating tube feeding since then.     Objective:      /77 (BP 1 Location: Left upper arm, BP Patient Position: At rest)   Pulse 85   Temp 98.5 °F (36.9 °C)   Resp 22   Ht 5' 11\" (1.803 m)   Wt 99.7 kg (219 lb 12.8 oz)   SpO2 94%   BMI 30.66 kg/m²       Intake/Output Summary (Last 24 hours) at 5/13/2021 1222  Last data filed at 5/13/2021 1117  Gross per 24 hour   Intake 1465 ml   Output 550 ml   Net 915 ml     General: Awake, follows commands, not in acute distress  Neck: Tracheostomy #8 is in situ with dressing  Lungs: Diminished breath sound bibasilar without wheezes  Heart:  Regular Rate and Rhythm. Abdomen:  Soft, nondistended, bowel sounds present, PEG tube is present with  Extremities: No edema present  Neuro: Awake, moves both upper extremities and right leg. Motor strength is 3 out of 5 in left lower extremity. Assessment/Plan     ASSESSMENT:    1. Reportedly acute hypoxic respiratory failure due to angioedema s/p emergent cricothyrotomy followed by tracheostomy. Stable  2. Tracheostomy site bleeding, resolved. 3.  Status post respiratory failure cardiac arrest  4. Chronic hypoxic respiratory failure status post trach and on trach collar  5. Bilateral pulmonary infiltrates  6. Oropharyngeal dysphagia status post G tube  7. Left leg popliteal DVT and pulmonary embolism  8. Reportedly acute metabolic encephalopathy  9. 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 JACQUELYN, recurrent -resolved currently  11. Left pneumothorax resolved  12. Diabetic neuropathy  13. Obesity  14. Fever and leukocytosis, resolved  15. Coag negative staph bacteremia most likely contamination  16. Bleeding around the PEG tube,resolved. PLAN:    Continue Lovenox and Coumadin until INR greater than 2  Continue current tube feeding per dietitian  Continue statin  Continue PPI  Pulmonologist to follow for tracheostomy care  Continue trach collar and nebulizer  Continue multivitamin, thiamine  PT and OT to follow this patient  Discharge patient to skilled nursing facility today. Discussed with patient's sister over the phone and updated her about discharge plan.     Estimated date of discharge: May 13, 2021 if placement is found    Total time to take care of this patient was 25 minutes and more than 50% of time was spent counseling and coordinating care. Disclaimer: Sections of this note are dictated using utilizing voice recognition software, which may have resulted in some phonetic based errors in grammar and contents. Even though attempts were made to correct all the mistakes, some may have been missed, and remained in the body of the document. If questions arise, please contact our department.     Recent Results (from the past 24 hour(s))   GLUCOSE, POC    Collection Time: 05/12/21  6:35 PM   Result Value Ref Range    Glucose (POC) 131 (H) 70 - 110 mg/dL   GLUCOSE, POC    Collection Time: 05/12/21 11:26 PM   Result Value Ref Range    Glucose (POC) 124 (H) 70 - 110 mg/dL   MAGNESIUM    Collection Time: 05/13/21  4:50 AM   Result Value Ref Range    Magnesium 2.1 1.6 - 2.6 mg/dL   PHOSPHORUS    Collection Time: 05/13/21  4:50 AM   Result Value Ref Range    Phosphorus 3.0 2.5 - 4.9 MG/DL   PROTHROMBIN TIME + INR    Collection Time: 05/13/21  4:50 AM   Result Value Ref Range    Prothrombin time 20.6 (H) 11.5 - 15.2 sec    INR 1.8 (H) 0.8 - 1.2     METABOLIC PANEL, BASIC    Collection Time: 05/13/21  4:50 AM   Result Value Ref Range    Sodium 134 (L) 136 - 145 mmol/L    Potassium 3.7 3.5 - 5.5 mmol/L    Chloride 102 100 - 111 mmol/L    CO2 26 21 - 32 mmol/L    Anion gap 6 3.0 - 18 mmol/L    Glucose 107 (H) 74 - 99 mg/dL    BUN 23 (H) 7.0 - 18 MG/DL    Creatinine 0.79 0.6 - 1.3 MG/DL    BUN/Creatinine ratio 29 (H) 12 - 20      GFR est AA >60 >60 ml/min/1.73m2    GFR est non-AA >60 >60 ml/min/1.73m2    Calcium 8.2 (L) 8.5 - 10.1 MG/DL   HGB & HCT    Collection Time: 05/13/21  4:50 AM   Result Value Ref Range    HGB 9.3 (L) 13.0 - 16.0 g/dL    HCT 31.2 (L) 36.0 - 48.0 %   GLUCOSE, POC    Collection Time: 05/13/21 11:53 AM   Result Value Ref Range    Glucose (POC) 112 (H) 70 - 110 mg/dL

## 2021-05-13 NOTE — DISCHARGE SUMMARY
Physician Discharge Summary       Patient: Alfie Medina MRN: [id number]  SSN: [id number]    YOB: 1950  Age: 79 y.o. Sex: male    PCP: DUSTIN Grewal    Allergies: Lisinopril    Admit date: 4/4/2021  Admitting Provider: Salud Barakat DO    Discharge date: 5/13/2021  Discharging Provider: Nery Weldon MD    * Admission Diagnoses: Angioedema due to angiotensin converting enzyme inhibitor (ACE-I) [T78. 3XXA, T46.4X5A]  Acute respiratory failure with hypoxia (HCC) [J96.01]  DVT (deep vein thrombosis) in pregnancy [O22.30]  Acute encephalopathy [G93.40]    * Discharge Diagnoses:      1. Reportedly acute hypoxic respiratory failure due to angioedema s/p emergent cricothyrotomy followed by tracheostomy. Stable  2. Tracheostomy site bleeding, resolved. 3.  Status post respiratory failure cardiac arrest  4. Chronic hypoxic respiratory failure status post trach and on trach collar  5. Bilateral pulmonary infiltrates  6. Oropharyngeal dysphagia status post G tube  7. Left leg popliteal DVT and pulmonary embolism  8. Reportedly acute metabolic encephalopathy  9. 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 JACQUELYN, recurrent -resolved currently  11. Left pneumothorax resolved  12. Diabetic neuropathy  13. Obesity  14. Fever and leukocytosis, resolved  15. Coag negative staph bacteremia most likely contamination  16. Bleeding around the PEG tube,resolved. [facility] Course:  Patient was initially seen at [facility] and had emergent Crychothyrotomy during Cardiac arrest.  Initially there was concern that he had Angioedema. He was stabilized and flown to [facility] for ongoing management. Mr Lexy Chi has required prolonged support on the mechanical ventilator for ongoing respiratory failure. He has had Tracheostomy via ENT and he continues with Tracheostomy collar currently which he is tolerating well.  Mr Lexy Chi had cardiac arrest initially, but he remained hemodynamically stable subsequently. Mr Ariana Aguero was treated for sepsis and has received a full course of antibiotics, he has been off antibiotics currently. Subsequently, he underwent tracheostomy followed by PEG tube placement. Patient initially had some oozing/bleeding from tracheostomy site when he was on heparin drip that got better. When hospitalist service picked up this patient from ICU team, patient had NG tube that was removed after putting a PEG tube with help of GI. Because patient had some oozing from PEG tube site while being on Coumadin and heparin drip, GI was consulted as well as vascular surgery. Bleeding got better and patient did not require blood transfusion. GI did not recommend any other intervention. Vascular surgery did not think patient needs IVC filter for left leg popliteal DVT. Patient's INR is 1.8 and he will continue on Lovenox until INR is more than 2. Patient also developed fever and leukocytosis that got better with levofloxacin and patient was seen by ID service. Patient was also found out to have coag negative staph bacteremia that was contamination and repeat blood culture was negative. Subsequently, patient did not develop any fever or leukocytosis. Patient also do CT head and MRI brain done as initially patient was very weak did not show any new finding. Patient was seen by neurologist and did not recommend further intervention. Patient was moving both upper extremity and right leg very well. He will require PT and OT per neurologist as well as therapy evaluation done here. Patient was tolerating tube feeding without any issue. ENT also downsize his tube to #8 from #9. He has been saturating about 98 200 percentage on 6 L oxygen without any issue. Patient will be transferred to skilled nursing facility today.     * Procedures:  Procedure(s):  PERCUTANEOUS ENDOSCOPIC GASTROSTOMY TUBE INSERTION/ BEDSIDE      Consults: Pulmonary/Intensive care, ID, GI, Nephrology, Neurology, Vascular Surgery and ENT    Significant Diagnostic Studies: Multiple chest x-rays, last 6 x-ray done on May 5, 2021 showed persistent left basilar airspace disease, bilateral lower extremity venous Doppler showed subacute nonocclusive thrombus present in the left popliteal vein. Modified barium swallow showed laryngeal penetration with all tested consistencies. MRI brain was negative for acute infarction or bleed. Echocardiogram showed ejection fraction about 55 percentage. Discharge Exam:  Please refer my progress note from May 13, 2021 for further detail    * Discharge Condition: improved  * Disposition: [place] ([facility])    Discharge Medications:  Current Discharge Medication List      START taking these medications    Details   arformoteroL (BROVANA) 15 mcg/2 mL nebu neb solution 2 mL by Nebulization route two (2) times a day. Qty: 60 Vial, Refills: 0  Start date: 5/13/2021      atorvastatin (LIPITOR) 40 mg tablet 1 Tab by Per G Tube route nightly. Qty: 40 Tab, Refills: 0  Start date: 5/13/2021      enoxaparin (LOVENOX) 100 mg/mL 100 mg subcutaneously twice daily until INR is greater than 2  Qty: 2 mL, Refills: 0  Start date: 5/13/2021      lansoprazole (PREVACID) 3 mg/mL oral suspension 10 mL by Per G Tube route two (2) times a day. Qty: 150 mL, Refills: 0  Start date: 5/13/2021      multivit-folic acid-herbal 832 (WELLESSE PLUS) 400 mcg-200 mg/30 mL liqd oral liquid 30 mL by Per G Tube route daily. Qty: 300 mL, Refills: 0  Start date: 5/14/2021      thiamine HCL (B-1) 100 mg tablet 1 Tab by Per G Tube route daily. Qty: 30 Tab, Refills: 0  Start date: 5/14/2021      polyethylene glycol (MIRALAX) 17 gram packet 1 Packet by Per G Tube route daily.  Indications: Hold it for diarrhea  Qty: 30 Packet, Refills: 0  Start date: 5/14/2021      acetaminophen (TYLENOL) 32MG/ML soln solution Take 15.62 mL by mouth every four (4) hours as needed for Fever or Pain. Qty: 30 mL, Refills: 0  Start date: 5/13/2021      albuterol-ipratropium (DUO-NEB) 2.5 mg-0.5 mg/3 ml nebu 3 mL by Nebulization route every four (4) hours as needed for Wheezing. Qty: 30 Nebule, Refills: 0  Start date: 5/13/2021      warfarin (COUMADIN) 5 mg tablet 5 mg through the PEG tube daily for 3 days to keep INR between 2 and 3 and then as directed by nursing home doctor  Qty: 1 Tab, Refills: 0  Start date: 5/13/2021      OTHER INR on May 16, 2021 morning  Please call report to nursing home doctor  Reason: DVT  Qty: 1 Each, Refills: 0  Start date: 5/13/2021         STOP taking these medications       LISINOPRIL, BULK, Comments:   Reason for Stopping:               * Follow-up Care/Patient Instructions: Activity: PT/OT Eval and Treat  Diet: PEG feeding with Jevity 1.5 at 60 mL/h and water flushes 100 mL every 4 hours, aspiration precautions, dietitian to follow  Wound Care: As directed    Follow-up Information     Follow up With Specialties Details Why [address] [facility], [address], [address] Nurse Practitioner   [place]  [phone]          Follow-up Appointments   Procedures    FOLLOW UP VISIT Appointment in: Other (Specify) Nursing home doctor to follow this patient Follow-up with ENT/Dr. Karey Durbin in 10 days for downsizing tracheostomy     Nursing home doctor to follow this patient  Follow-up with ENT/Dr. Karey Durbin in 10 days for downsizing tracheostomy     Standing Status:   Standing     Number of Occurrences:   1     Order Specific Question:   Appointment in     Answer: Other (Specify)     Total time to take care of this patient was 35 minutes and more than 50% of time was spent counseling and coordinating care. Disclaimer: Sections of this note are dictated using utilizing voice recognition software, which may have resulted in some phonetic based errors in grammar and contents.  Even though attempts were made to correct all the mistakes, some may have been missed, and remained in the body of the document. If questions arise, please contact our department.       Signed:  Wing Mccray MD  5/13/2021  12:27 PM

## 2021-05-13 NOTE — PROGRESS NOTES
Pulmonary/critical care progress note. Patient appears comfortable  Trach collar in place  Secretions are minimal  Discussed with RT-can discontinue hypertonic saline since no further issues with mucus plugging. Patient denies any new complaints  Lungs clear to auscultation  Heart S1-S2  PEG tube in place  No edema    Impression and recommendations    1. S/p airway obstruction-angioedema with need for emergent airway and subsequent tracheostomy. . Plan is to continue with trach support and eventually reevaluate by ENT for consideration of decannulation when patient is further rehabed  2. DVT/PE-continue anticoagulation  3. Critical care mononeuropathy-continue support, aggressive rehab at long-term facility where patient has now accepted and plans for discharge today noted.   Rest of treatment plan and care per primary team.    Lisa Sky MD

## 2021-05-13 NOTE — PROGRESS NOTES
completed a follow up visit with and Spiritual assessment of patient in room 2307 today but found the patient not able to communicate now due to his current condition. Prayer was offered  On behalf of the patient.  Chaplains will continue to follow and will provide pastoral care on an as needed/requested basis    Chaplain Ashwin Tovar   Board Certified [address] [place] [address]   [phone]

## 2021-05-13 NOTE — PROGRESS NOTES
Problem: Dysphagia (Adult)  Goal: *Acute Goals and Plan of Care (Insert Text)  Description: Patient will:  1. Tolerate PO trials with 0 s/s overt distress in 4/5 trials  2. Utilize compensatory swallow strategies/maneuvers (decrease bite/sip, size/rate, alt. liq/sol) with min cues in 4/5 trials  3. Perform oral-motor/laryngeal exercises to increase oropharyngeal swallow function with min cues  4. Complete an objective swallow study (i.e., MBSS) to assess swallow integrity, r/o aspiration, and determine of safest LRD, min A as indicated/ordered by MD-met 5/11/21    Recommend:   NPO with PEG   Strict aspiration precautions (HOB >30 degrees at all times, Oral care TID)  Ice chips PRN following oral care         Outcome: Progressing Towards Goal     SPEECH LANGUAGE PATHOLOGY DYSPHAGIA TREATMENT    Patient: Arnie Lepe (19 y.o. male)  Date: 5/13/2021  Diagnosis: Angioedema due to angiotensin converting enzyme inhibitor (ACE-I) [T78. 3XXA, T46.4X5A]  Acute respiratory failure with hypoxia (HCC) [J96.01]  DVT (deep vein thrombosis) in pregnancy [O22.30]  Acute encephalopathy [G93.40] Respiratory failure (HCC)  Procedure(s) (LRB):  PERCUTANEOUS ENDOSCOPIC GASTROSTOMY TUBE INSERTION/ BEDSIDE (N/A) 10 Days Post-Op  Precautions: Aspiration, Fall, Skin  PLOF: Per H&P     ASSESSMENT:  Pt seen for FU dysphagia management. SLP suctioned trach for mild thin secretions. PMV donned for duration of session with appropriate voicing to a this listener at a distance, SpO2 remaining 97-99% and no change in vitals. Pt completing the following pharyngeal strengthening exercises I'ly following initial models: sustained /i/ +5/5, 5 sec each; hard /k/ words with glottal attack, +10/10; chin tuck against resistance (CTAR), +5/5, 10 sec each; effortful swallow with ice chips +10/10. No s/sx aspiration with ice chips. Recommend continue NPO with PEG, ice chips PRN for comfort following oral care.  D/w RN, PMV left on pt, ok to keep on and monitor throughout day as needed and pt's condom cath on floor. SLP will FU for pharyngeal strengthening exercises. Pt may benefit from continued SLP services upon d/c from this facility. Progression toward goals:  [x]         Improving appropriately and progressing toward goals  []         Improving slowly and progressing toward goals  []         Not making progress toward goals and plan of care will be adjusted     PLAN:  Recommendations and Planned Interventions:  See above. Patient continues to benefit from skilled intervention to address the above impairments. Continue treatment per established plan of care. Discharge Recommendations:  Outpatient     SUBJECTIVE:   Patient stated Fab Cee, let's get them over with.     OBJECTIVE:   Cognitive and Communication Status:  Neurologic State: Alert, Eyes open spontaneously, Restless  Orientation Level: Oriented X4  Cognition: Impulsive, Poor safety awareness, Follows commands  Perception: Appears intact  Perseveration: No perseveration noted  Safety/Judgement: Fall prevention  Dysphagia Treatment:     Exercises:  Laryngeal Exercises:    Effortful Swallow: Yes  Sets : 1  Reps : 10     Sing \"EEE\": Yes  Sets : 1  Reps : 5    Shaker: Yes  Sets : 1  Reps : 5     Hard Glottal Attack: Yes  Sets : 1  Reps : 10    PAIN:  Pain level pre-treatment: 0/10   Pain level post-treatment: 0/10     After treatment:   []              Patient left in no apparent distress sitting up in chair  [x]              Patient left in no apparent distress in bed  [x]              Call bell left within reach  [x]              Nursing notified  []              Family present  []              Caregiver present  []              Bed alarm activated      COMMUNICATION/EDUCATION:   [x] Aspiration precautions; swallow safety; compensatory techniques  []        Patient unable to participate in education; education ongoing with staff  []  Posted safety precautions in patient's room.  [x] Oral-motor/laryngeal strengthening exercises      Thank you for this referral.    Praveen Henley M.S., CCC-SLP  Speech-Language Pathologist    Time Calculation: 23 mins

## 2021-05-13 NOTE — PROGRESS NOTES
Problem: Diabetes Self-Management  Goal: *Disease process and treatment process  Description: Define diabetes and identify own type of diabetes; list 3 options for treating diabetes. Outcome: Resolved/Met  Goal: *Incorporating nutritional management into lifestyle  Description: Describe effect of type, amount and timing of food on blood glucose; list 3 methods for planning meals. Outcome: Resolved/Met  Goal: *Incorporating physical activity into lifestyle  Description: State effect of exercise on blood glucose levels. Outcome: Resolved/Met  Goal: *Developing strategies to promote health/change behavior  Description: Define the ABC's of diabetes; identify appropriate screenings, schedule and personal plan for screenings. Outcome: Resolved/Met  Goal: *Using medications safely  Description: State effect of diabetes medications on diabetes; name diabetes medication taking, action and side effects. Outcome: Resolved/Met  Goal: *Monitoring blood glucose, interpreting and using results  Description: Identify recommended blood glucose targets  and personal targets. Outcome: Resolved/Met  Goal: *Prevention, detection, treatment of acute complications  Description: List symptoms of hyper- and hypoglycemia; describe how to treat low blood sugar and actions for lowering  high blood glucose level. Outcome: Resolved/Met  Goal: *Prevention, detection and treatment of chronic complications  Description: Define the natural course of diabetes and describe the relationship of blood glucose levels to long term complications of diabetes.   Outcome: Resolved/Met  Goal: *Developing strategies to address psychosocial issues  Description: Describe feelings about living with diabetes; identify support needed and support network  Outcome: Resolved/Met  Goal: *Sick day guidelines  Outcome: Resolved/Met     Problem: Patient Education: Go to Patient Education Activity  Goal: Patient/Family Education  Outcome: Resolved/Met     Problem: Falls - Risk of  Goal: *Absence of Falls  Description: Document Christine Gold Fall Risk and appropriate interventions in the flowsheet. 5/13/2021 1229 by Liss Irizarry RN  Outcome: Resolved/Met  Note: Fall Risk Interventions:  Mobility Interventions: Bed/chair exit alarm, Patient to call before getting OOB, PT Consult for mobility concerns, PT Consult for assist device competence, Strengthening exercises (ROM-active/passive)    Mentation Interventions: Adequate sleep, hydration, pain control, Bed/chair exit alarm, Door open when patient unattended, More frequent rounding, Reorient patient, Room close to nurse's station    Medication Interventions: Patient to call before getting OOB, Teach patient to arise slowly, Bed/chair exit alarm    Elimination Interventions: Bed/chair exit alarm, Call light in reach, Toilet paper/wipes in reach, Toileting schedule/hourly rounds           5/13/2021 1205 by Liss Irizarry RN  Outcome: Progressing Towards Goal  Note: Fall Risk Interventions:  Mobility Interventions: Bed/chair exit alarm, Patient to call before getting OOB, PT Consult for mobility concerns, PT Consult for assist device competence, Strengthening exercises (ROM-active/passive)    Mentation Interventions: Adequate sleep, hydration, pain control, Bed/chair exit alarm, Door open when patient unattended, More frequent rounding, Reorient patient, Room close to nurse's station    Medication Interventions: Patient to call before getting OOB, Teach patient to arise slowly, Bed/chair exit alarm    Elimination Interventions: Bed/chair exit alarm, Call light in reach, Toilet paper/wipes in reach, Toileting schedule/hourly rounds              Problem: Patient Education: Go to Patient Education Activity  Goal: Patient/Family Education  Outcome: Resolved/Met     Problem: Pressure Injury - Risk of  Goal: *Prevention of pressure injury  Description: Document Lang Scale and appropriate interventions in the flowsheet.   5/13/2021 1229 by Liss Irizarry RN  Outcome: Resolved/Met  Note: Pressure Injury Interventions:  Sensory Interventions: Assess changes in LOC, Check visual cues for pain, Float heels, Keep linens dry and wrinkle-free, Minimize linen layers    Moisture Interventions: Absorbent underpads, Internal/External urinary devices, Minimize layers, Offer toileting Q_hr    Activity Interventions: Pressure redistribution bed/mattress(bed type), PT/OT evaluation    Mobility Interventions: PT/OT evaluation, Turn and reposition approx. every two hours(pillow and wedges), Pressure redistribution bed/mattress (bed type)    Nutrition Interventions: Document food/fluid/supplement intake    Friction and Shear Interventions: Feet elevated on foot rest, Minimize layers             5/13/2021 1205 by Liss Irizarry RN  Outcome: Progressing Towards Goal  Note: Pressure Injury Interventions:  Sensory Interventions: Assess changes in LOC, Check visual cues for pain, Float heels, Keep linens dry and wrinkle-free, Minimize linen layers    Moisture Interventions: Absorbent underpads, Internal/External urinary devices, Minimize layers, Offer toileting Q_hr    Activity Interventions: Pressure redistribution bed/mattress(bed type), PT/OT evaluation    Mobility Interventions: PT/OT evaluation, Turn and reposition approx.  every two hours(pillow and wedges), Pressure redistribution bed/mattress (bed type)    Nutrition Interventions: Document food/fluid/supplement intake    Friction and Shear Interventions: Feet elevated on foot rest, Minimize layers                Problem: Patient Education: Go to Patient Education Activity  Goal: Patient/Family Education  5/13/2021 1229 by Lsis Irizarry RN  Outcome: Resolved/Met  5/13/2021 1205 by Liss Irizarry RN  Outcome: Progressing Towards Goal     Problem: Nutrition Deficit  Goal: *Optimize nutritional status  5/13/2021 1229 by Liss Irizarry RN  Outcome: Resolved/Met  5/13/2021 1205 by Liss Irizarry RN  Outcome: Progressing Towards Goal     Problem: Injury - Risk of, Adverse Drug Event  Goal: *Absence of adverse drug events  Outcome: Resolved/Met  Goal: *Absence of medication errors  Outcome: Resolved/Met  Goal: *Knowledge of prescribed medications  Outcome: Resolved/Met     Problem: Patient Education: Go to Patient Education Activity  Goal: Patient/Family Education  Outcome: Resolved/Met     Problem: Pain  Goal: *Control of Pain  5/13/2021 1229 by Erin Squires RN  Outcome: Resolved/Met  5/13/2021 1205 by Erin Squires RN  Outcome: Progressing Towards Goal  Goal: *PALLIATIVE CARE:  Alleviation of Pain  5/13/2021 1229 by Erin Squires RN  Outcome: Resolved/Met  5/13/2021 1205 by Erin Squires RN  Outcome: Progressing Towards Goal     Problem: Patient Education: Go to Patient Education Activity  Goal: Patient/Family Education  Outcome: Resolved/Met     Problem: Patient Education: Go to Patient Education Activity  Goal: Patient/Family Education  5/13/2021 1229 by Erin Squires RN  Outcome: Resolved/Met  5/13/2021 1205 by Erin Squires RN  Outcome: Progressing Towards Goal     Problem: Patient Education: Go to Patient Education Activity  Goal: Patient/Family Education  5/13/2021 1229 by Erin Squires RN  Outcome: Resolved/Met  5/13/2021 1205 by Erin Squires RN  Outcome: Progressing Towards Goal     Problem: Patient Education: Go to Patient Education Activity  Goal: Patient/Family Education  5/13/2021 1229 by Erin Squires RN  Outcome: Resolved/Met  5/13/2021 1205 by Erin Squires RN  Outcome: Progressing Towards Goal     Problem: Patient Education: Go to Patient Education Activity  Goal: Patient/Family Education  5/13/2021 1229 by Erin Squires RN  Outcome: Resolved/Met  5/13/2021 1205 by Erin Squires RN  Outcome: Progressing Towards Goal     Problem: Patient Education: Go to Patient Education Activity  Goal: Patient/Family Education  5/13/2021 1229 by Kanchan Howard Nick Amaro RN  Outcome: Resolved/Met  5/13/2021 1205 by Mp Parkinson RN  Outcome: Progressing Towards Goal

## 2021-05-14 LAB
BACTERIA SPEC CULT: NORMAL
SERVICE CMNT-IMP: NORMAL

## 2021-06-25 ENCOUNTER — HOSPITAL ENCOUNTER (EMERGENCY)
Age: 71
Discharge: HOME | End: 2021-06-25
Attending: EMERGENCY MEDICINE
Payer: MEDICARE

## 2021-06-25 VITALS
TEMPERATURE: 97.8 F | RESPIRATION RATE: 20 BRPM | BODY MASS INDEX: 30.66 KG/M2 | OXYGEN SATURATION: 99 % | WEIGHT: 219 LBS | HEART RATE: 75 BPM | SYSTOLIC BLOOD PRESSURE: 169 MMHG | HEIGHT: 71 IN | DIASTOLIC BLOOD PRESSURE: 89 MMHG

## 2021-06-25 DIAGNOSIS — T85.598A FEEDING TUBE DYSFUNCTION, INITIAL ENCOUNTER: Primary | ICD-10-CM

## 2021-06-25 DIAGNOSIS — S30.811A EXCORIATION OF ABDOMEN, INITIAL ENCOUNTER: ICD-10-CM

## 2021-06-25 PROCEDURE — 99282 EMERGENCY DEPT VISIT SF MDM: CPT

## 2021-06-25 RX ORDER — BACITRACIN 500 [USP'U]/G
OINTMENT TOPICAL 3 TIMES DAILY
Qty: 1 TUBE | Refills: 0 | Status: SHIPPED | OUTPATIENT
Start: 2021-06-25 | End: 2021-07-05

## 2021-06-25 RX ORDER — BACITRACIN 500 UNIT/G
1 PACKET (EA) TOPICAL
Status: DISCONTINUED | OUTPATIENT
Start: 2021-06-25 | End: 2021-06-25 | Stop reason: HOSPADM

## 2021-06-25 NOTE — ED TRIAGE NOTES
Pt ambulated with help of a walker to room 8 and c/o feeding tube issues. Feeding tube placed in may  At [facility]. Pt states feeding tube bothering him and wants it out. Pt has no issues eating and drinking.

## 2021-06-25 NOTE — ED PROVIDER NOTES
EMERGENCY DEPARTMENT HISTORY AND PHYSICAL EXAM      Date: 6/25/2021  Patient Name: Jovon Fisher    History of Presenting Illness     Chief Complaint   Patient presents with    Feeding Tube Problem       History Provided By: Patient and Patient's Son    HPI: Jovon Fisher, 79 y.o. 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 S/P PEG presents to the ED with cc of irritation at PEG site for few weeks. Wants PEG removed. Was placed by GI in May 2021 after he had an anaphylactic reaction. Has been getting feeds via PEG but feels he is eating enough by mouth now. Has an appointment t o reevaluate PEG on June 30. Has noted excoriation at PEG site. There are no other complaints, changes, or physical findings at this time. PCP: DUSTIN Watts    No current facility-administered medications on file prior to encounter. Current Outpatient Medications on File Prior to Encounter   Medication Sig Dispense Refill    arformoteroL (BROVANA) 15 mcg/2 mL nebu neb solution 2 mL by Nebulization route two (2) times a day. 60 Vial 0    atorvastatin (LIPITOR) 40 mg tablet 1 Tab by Per G Tube route nightly. 40 Tab 0    enoxaparin (LOVENOX) 100 mg/mL 100 mg subcutaneously twice daily until INR is greater than 2 2 mL 0    lansoprazole (PREVACID) 3 mg/mL oral suspension 10 mL by Per G Tube route two (2) times a day. 150 mL 0    multivit-folic acid-herbal 146 (WELLESSE PLUS) 400 mcg-200 mg/30 mL liqd oral liquid 30 mL by Per G Tube route daily. 300 mL 0    thiamine HCL (B-1) 100 mg tablet 1 Tab by Per G Tube route daily. 30 Tab 0    polyethylene glycol (MIRALAX) 17 gram packet 1 Packet by Per G Tube route daily. Indications: Hold it for diarrhea 30 Packet 0    acetaminophen (TYLENOL) 32MG/ML soln solution Take 15.62 mL by mouth every four (4) hours as needed for Fever or Pain.  30 mL 0    albuterol-ipratropium (DUO-NEB) 2.5 mg-0.5 mg/3 ml nebu 3 mL by Nebulization route every four (4) hours as needed for Wheezing. 30 Nebule 0    warfarin (COUMADIN) 5 mg tablet 5 mg through the PEG tube daily for 3 days to keep INR between 2 and 3 and then as directed by nursing home doctor 1 Tab 0    OTHER INR on May 16, 2021 morning  Please call report to nursing home doctor  Reason: DVT 1 Each 0       Past History     Past Medical History:  Past Medical History:   Diagnosis Date    DDD (degenerative disc disease), lumbar     Diabetes ([address]     [facility]     Di[facility] retinopathy [facility]     DM2 (diabetes mellitus, type 2) ([facility] [facility]     HLD (hyperlipidemia)     HTN (hypertension)     Obesity (BMI 30-39. 9)        Past Surgical History:  History reviewed. No pertinent surgical history. Family History:  History reviewed. No pertinent family history. Social History:  Social History     Tobacco Use    Smoking status: Current Every Day Smoker     Packs/day: 0.50    Smokeless tobacco: Never Used   Substance Use Topics    Alcohol use: Not on file    Drug use: Not on file       Allergies: Allergies   Allergen Reactions    Lisinopril Angioedema         Review of Systems     Review of Systems   Constitutional: Negative. HENT: Negative. Respiratory: Negative. Cardiovascular: Negative. Gastrointestinal: Negative. PEG problem   Genitourinary: Negative. Musculoskeletal: Negative. Neurological: Negative. All other systems reviewed and are negative. Physical Exam     Physical Exam  Vitals and nursing note reviewed. Constitutional:       Appearance: Normal appearance. HENT:      Head: Normocephalic and atraumatic. Nose: Nose normal.   Eyes:      Extraocular Movements: Extraocular movements intact. Pupils: Pupils are equal, round, and reactive to light. Cardiovascular:      Rate and Rhythm: Regular rhythm. Pulses: Normal pulses. Heart sounds: Normal heart sounds. Pulmonary:      Effort: Pulmonary effort is normal.      Breath sounds: Normal breath sounds. Abdominal:      Comments: PEG in place with excoriation at inferior edge. Musculoskeletal:      Cervical back: Normal range of motion. Comments: Ambulates with a walker   Skin:     General: Skin is warm and dry. Neurological:      General: No focal deficit present. Mental Status: He is alert and oriented to person, place, and time. Lab and Diagnostic Study Results     Labs -   No results found for this or any previous visit (from the past 12 hour(s)). Radiologic Studies -   @lastxrresult@  CT Results  (Last 48 hours)    None        CXR Results  (Last 48 hours)    None            Medical Decision Making   - I am the first provider for this patient. - I reviewed the vital signs, available nursing notes, past medical history, past surgical history, family history and social history. - Initial assessment performed. The patients presenting problems have been discussed, and they are in agreement with the care plan formulated and outlined with them. I have encouraged them to ask questions as they arise throughout their visit. Vital Signs-Reviewed the patient's vital signs. Patient Vitals for the past 12 hrs:   Temp Pulse Resp BP SpO2   06/25/21 0800     99 %   06/25/21 0752 97.8 °F (36.6 °C) 75 20 (!) 169/89 99 %       Records Reviewed: Nursing Notes    The patient presents with       ED Course:          Provider Notes (Medical Decision Making): MDM       Procedures   Medical Decision Makingedical Decision Making  Performed by: Linda Colunga MD  PROCEDURES:  Procedures       Disposition   Disposition: DC- Adult Discharges: All of the diagnostic tests were reviewed and questions answered. Diagnosis, care plan and treatment options were discussed. The patient understands the instructions and will follow up as directed. The patients results have been reviewed with them.   They have been counseled regarding their diagnosis. The patient and family member patient and son verbally convey understanding and agreement of the signs, symptoms, diagnosis, treatment and prognosis and additionally agrees to follow up as recommended with their PCP in 24 - 48 hours. They also agree with the care-plan and convey that all of their questions have been answered. I have also put together some discharge instructions for them that include: 1) educational information regarding their diagnosis, 2) how to care for their diagnosis at home, as well a 3) list of reasons why they would want to return to the ED prior to their follow-up appointment, should their condition change. Discharged    DISCHARGE PLAN:  1. Current Discharge Medication List      CONTINUE these medications which have NOT CHANGED    Details   arformoteroL (BROVANA) 15 mcg/2 mL nebu neb solution 2 mL by Nebulization route two (2) times a day. Qty: 60 Vial, Refills: 0      atorvastatin (LIPITOR) 40 mg tablet 1 Tab by Per G Tube route nightly. Qty: 40 Tab, Refills: 0      enoxaparin (LOVENOX) 100 mg/mL 100 mg subcutaneously twice daily until INR is greater than 2  Qty: 2 mL, Refills: 0      lansoprazole (PREVACID) 3 mg/mL oral suspension 10 mL by Per G Tube route two (2) times a day. Qty: 150 mL, Refills: 0      multivit-folic acid-herbal 217 (WELLESSE PLUS) 400 mcg-200 mg/30 mL liqd oral liquid 30 mL by Per G Tube route daily. Qty: 300 mL, Refills: 0      thiamine HCL (B-1) 100 mg tablet 1 Tab by Per G Tube route daily. Qty: 30 Tab, Refills: 0      polyethylene glycol (MIRALAX) 17 gram packet 1 Packet by Per G Tube route daily. Indications: Hold it for diarrhea  Qty: 30 Packet, Refills: 0      acetaminophen (TYLENOL) 32MG/ML soln solution Take 15.62 mL by mouth every four (4) hours as needed for Fever or Pain.   Qty: 30 mL, Refills: 0      albuterol-ipratropium (DUO-NEB) 2.5 mg-0.5 mg/3 ml nebu 3 mL by Nebulization route every four (4) hours as needed for Wheezing. Qty: 30 Nebule, Refills: 0      warfarin (COUMADIN) 5 mg tablet 5 mg through the PEG tube daily for 3 days to keep INR between 2 and 3 and then as directed by nursing home doctor  Qty: 1 Tab, Refills: 0      OTHER INR on May 16, 2021 morning  Please call report to nursing home doctor  Reason: DVT  Qty: 1 Each, Refills: 0           2. Follow-up Information     Follow up With Specialties Details Why Contact Oleksandr    [address] Nurse Practitioner In 3 days  [facility]  [phone]          3. Return to ED if worse   4. Discharge Medication List as of 6/25/2021  8:19 AM      START taking these medications    Details   bacitracin (BACITRACIN) 500 unit/gram oint Apply  to affected area three (3) times daily for 10 days. Apply to affected area, Normal, Disp-1 Tube, R-0         CONTINUE these medications which have NOT CHANGED    Details   arformoteroL (BROVANA) 15 mcg/2 mL nebu neb solution 2 mL by Nebulization route two (2) times a day., No Print, Disp-60 Vial, R-0      atorvastatin (LIPITOR) 40 mg tablet 1 Tab by Per G Tube route nightly., No Print, Disp-40 Tab, R-0      enoxaparin (LOVENOX) 100 mg/mL 100 mg subcutaneously twice daily until INR is greater than 2, No Print, Disp-2 mL, R-0      lansoprazole (PREVACID) 3 mg/mL oral suspension 10 mL by Per G Tube route two (2) times a day., No Print, Disp-150 mL, R-0      multivit-folic acid-herbal 857 (WELLESSE PLUS) 400 mcg-200 mg/30 mL liqd oral liquid 30 mL by Per G Tube route daily. , No Print, Disp-300 mL, R-0      thiamine HCL (B-1) 100 mg tablet 1 Tab by Per G Tube route daily. , No Print, Disp-30 Tab, R-0      polyethylene glycol (MIRALAX) 17 gram packet 1 Packet by Per G Tube route daily.  Indications: Hold it for diarrhea, No Print, Disp-30 Packet, R-0      acetaminophen (TYLENOL) 32MG/ML soln solution Take 15.62 mL by mouth every four (4) hours as needed for Fever or Pain., No Print, Disp-30 mL, R-0 albuterol-ipratropium (DUO-NEB) 2.5 mg-0.5 mg/3 ml nebu 3 mL by Nebulization route every four (4) hours as needed for Wheezing., No Print, Disp-30 Nebule, R-0      warfarin (COUMADIN) 5 mg tablet 5 mg through the PEG tube daily for 3 days to keep INR between 2 and 3 and then as directed by nursing home doctor, No Print, Disp-1 Tab, R-0      OTHER INR on May 16, 2021 morning  Please call report to nursing home doctor  Reason: DVT, No Print, Disp-1 Each, R-0               Diagnosis     Clinical Impression:   1. Feeding tube dysfunction, initial encounter    2. Excoriation of abdomen, initial encounter        Attestations:    Anastacia Stewart MD    Please note that this dictation was completed with [employer], the [employer] voice recognition software. Quite often unanticipated grammatical, syntax, homophones, and other interpretive errors are inadvertently transcribed by the computer software. Please disregard these errors. Please excuse any errors that have escaped final proofreading. Thank you.

## 2021-09-23 ENCOUNTER — HOSPITAL ENCOUNTER (EMERGENCY)
Age: 71
Discharge: HOME | End: 2021-09-23
Attending: FAMILY MEDICINE
Payer: MEDICARE

## 2021-09-23 ENCOUNTER — APPOINTMENT (OUTPATIENT)
Dept: GENERAL RADIOLOGY | Age: 71
End: 2021-09-23
Attending: FAMILY MEDICINE
Payer: MEDICARE

## 2021-09-23 VITALS
OXYGEN SATURATION: 98 % | HEART RATE: 71 BPM | TEMPERATURE: 97.9 F | SYSTOLIC BLOOD PRESSURE: 153 MMHG | BODY MASS INDEX: 34.36 KG/M2 | WEIGHT: 240 LBS | HEIGHT: 70 IN | RESPIRATION RATE: 20 BRPM | DIASTOLIC BLOOD PRESSURE: 89 MMHG

## 2021-09-23 DIAGNOSIS — J18.9 PNEUMONIA OF BOTH LOWER LOBES DUE TO INFECTIOUS ORGANISM: Primary | ICD-10-CM

## 2021-09-23 DIAGNOSIS — Z20.822 PERSON UNDER INVESTIGATION FOR COVID-19: ICD-10-CM

## 2021-09-23 LAB
ANION GAP SERPL CALC-SCNC: 12 MMOL/L
BASOPHILS # BLD: 0 K/UL (ref 0–0.1)
BASOPHILS NFR BLD: 1 % (ref 0–2)
BNP SERPL-MCNC: 777 PG/ML (ref 0–100)
BUN SERPL-MCNC: 20 MG/DL (ref 9–21)
BUN/CREAT SERPL: 17
CA-I BLD-MCNC: 8.9 MG/DL (ref 8.5–10.5)
CHLORIDE SERPL-SCNC: 104 MMOL/L (ref 94–111)
CO2 SERPL-SCNC: 24 MMOL/L (ref 21–33)
COVID-19 RAPID TEST, COVR: NOT DETECTED
CREAT SERPL-MCNC: 1.2 MG/DL (ref 0.8–1.5)
DIFFERENTIAL METHOD BLD: ABNORMAL
EOSINOPHIL # BLD: 0.1 K/UL (ref 0–0.4)
EOSINOPHIL NFR BLD: 1 % (ref 0–5)
ERYTHROCYTE [DISTWIDTH] IN BLOOD BY AUTOMATED COUNT: 17.1 % (ref 11.6–14.5)
GLUCOSE SERPL-MCNC: 108 MG/DL (ref 70–110)
HCT VFR BLD AUTO: 35.1 % (ref 36–48)
HGB BLD-MCNC: 10.7 G/DL (ref 13–16)
IMM GRANULOCYTES # BLD AUTO: 0 K/UL (ref 0–0.04)
IMM GRANULOCYTES NFR BLD AUTO: 0 % (ref 0–0.5)
LYMPHOCYTES # BLD: 2.2 K/UL (ref 0.9–3.6)
LYMPHOCYTES NFR BLD: 26 % (ref 21–52)
MCH RBC QN AUTO: 23.7 PG (ref 24–34)
MCHC RBC AUTO-ENTMCNC: 30.5 G/DL (ref 31–37)
MCV RBC AUTO: 77.8 FL (ref 78–100)
MONOCYTES # BLD: 0.9 K/UL (ref 0.05–1.2)
MONOCYTES NFR BLD: 10 % (ref 3–10)
NEUTS SEG # BLD: 5.4 K/UL (ref 1.8–8)
NEUTS SEG NFR BLD: 62 % (ref 40–73)
NRBC # BLD: 0.02 K/UL (ref 0–0.01)
NRBC BLD-RTO: 0.2 PER 100 WBC
PLATELET # BLD AUTO: 311 K/UL (ref 135–420)
PMV BLD AUTO: 11.3 FL (ref 9.2–11.8)
POTASSIUM SERPL-SCNC: 3.9 MMOL/L (ref 3.2–5.1)
RBC # BLD AUTO: 4.51 M/UL (ref 4.35–5.65)
SODIUM SERPL-SCNC: 140 MMOL/L (ref 135–145)
SPECIMEN SOURCE: NORMAL
TROPONIN I SERPL-MCNC: 0.11 NG/ML (ref 0.02–0.05)
TROPONIN I SERPL-MCNC: 0.11 NG/ML (ref 0.02–0.05)
WBC # BLD AUTO: 8.6 K/UL (ref 4.6–13.2)

## 2021-09-23 PROCEDURE — 99284 EMERGENCY DEPT VISIT MOD MDM: CPT

## 2021-09-23 PROCEDURE — 84484 ASSAY OF TROPONIN QUANT: CPT

## 2021-09-23 PROCEDURE — 74011250637 HC RX REV CODE- 250/637: Performed by: FAMILY MEDICINE

## 2021-09-23 PROCEDURE — 94640 AIRWAY INHALATION TREATMENT: CPT

## 2021-09-23 PROCEDURE — 71045 X-RAY EXAM CHEST 1 VIEW: CPT

## 2021-09-23 PROCEDURE — 96374 THER/PROPH/DIAG INJ IV PUSH: CPT

## 2021-09-23 PROCEDURE — 74011250636 HC RX REV CODE- 250/636: Performed by: FAMILY MEDICINE

## 2021-09-23 PROCEDURE — 83880 ASSAY OF NATRIURETIC PEPTIDE: CPT

## 2021-09-23 PROCEDURE — 93005 ELECTROCARDIOGRAM TRACING: CPT

## 2021-09-23 PROCEDURE — 96375 TX/PRO/DX INJ NEW DRUG ADDON: CPT

## 2021-09-23 PROCEDURE — 87635 SARS-COV-2 COVID-19 AMP PRB: CPT

## 2021-09-23 PROCEDURE — 85025 COMPLETE CBC W/AUTO DIFF WBC: CPT

## 2021-09-23 PROCEDURE — 80048 BASIC METABOLIC PNL TOTAL CA: CPT

## 2021-09-23 RX ORDER — FUROSEMIDE 10 MG/ML
40 INJECTION INTRAMUSCULAR; INTRAVENOUS ONCE
Status: COMPLETED | OUTPATIENT
Start: 2021-09-23 | End: 2021-09-23

## 2021-09-23 RX ORDER — DEXAMETHASONE SODIUM PHOSPHATE 4 MG/ML
10 INJECTION, SOLUTION INTRA-ARTICULAR; INTRALESIONAL; INTRAMUSCULAR; INTRAVENOUS; SOFT TISSUE
Status: COMPLETED | OUTPATIENT
Start: 2021-09-23 | End: 2021-09-23

## 2021-09-23 RX ORDER — ALBUTEROL SULFATE 90 UG/1
4 AEROSOL, METERED RESPIRATORY (INHALATION)
Status: COMPLETED | OUTPATIENT
Start: 2021-09-23 | End: 2021-09-23

## 2021-09-23 RX ORDER — METHYLPREDNISOLONE 4 MG/1
TABLET ORAL
Qty: 1 DOSE PACK | Refills: 0 | Status: SHIPPED | OUTPATIENT
Start: 2021-09-23 | End: 2021-10-18

## 2021-09-23 RX ORDER — AZITHROMYCIN 250 MG/1
TABLET, FILM COATED ORAL
Qty: 6 TABLET | Refills: 0 | Status: SHIPPED | OUTPATIENT
Start: 2021-09-23 | End: 2021-09-28

## 2021-09-23 RX ADMIN — DEXAMETHASONE SODIUM PHOSPHATE 10 MG: 4 INJECTION, SOLUTION INTRAMUSCULAR; INTRAVENOUS at 14:19

## 2021-09-23 RX ADMIN — FUROSEMIDE 40 MG: 10 INJECTION INTRAMUSCULAR; INTRAVENOUS at 14:37

## 2021-09-23 RX ADMIN — ALBUTEROL SULFATE 4 PUFF: 108 AEROSOL, METERED RESPIRATORY (INHALATION) at 13:53

## 2021-09-23 NOTE — ED TRIAGE NOTES
Pt states he started wheezing about 3 days ago, denies fevers. Pt's home health nurse told him to come get checked out. Pt has mild dry cough and mild shortness of breath.

## 2021-09-23 NOTE — ED PROVIDER NOTES
EMERGENCY DEPARTMENT HISTORY AND PHYSICAL EXAM      Date: 9/23/2021  Patient Name: Asher Palomares    History of Presenting Illness     Chief Complaint   Patient presents with    Wheezing       History Provided By: Patient    HPI: Asher Palomares, 79 y.o. male with a past medical history significant Multiple medical problems presents to the ED with cc of wheezing and mild shortness of breath. Patient states the symptoms started couple days ago. He denied any fever. He denied any chest pain. There are no other complaints, changes, or physical findings at this time. PCP: DUSTIN Salomon    No current facility-administered medications on file prior to encounter. Current Outpatient Medications on File Prior to Encounter   Medication Sig Dispense Refill    apixaban (Eliquis) 5 mg tablet Take 5 mg by mouth two (2) times a day.  atorvastatin (LIPITOR) 40 mg tablet 1 Tab by Per G Tube route nightly. 40 Tab 0    lansoprazole (PREVACID) 3 mg/mL oral suspension 10 mL by Per G Tube route two (2) times a day. 150 mL 0    multivit-folic acid-herbal 005 (WELLESSE PLUS) 400 mcg-200 mg/30 mL liqd oral liquid 30 mL by Per G Tube route daily. 300 mL 0    [DISCONTINUED] arformoteroL (BROVANA) 15 mcg/2 mL nebu neb solution 2 mL by Nebulization route two (2) times a day. 60 Vial 0    [DISCONTINUED] enoxaparin (LOVENOX) 100 mg/mL 100 mg subcutaneously twice daily until INR is greater than 2 2 mL 0    [DISCONTINUED] thiamine HCL (B-1) 100 mg tablet 1 Tab by Per G Tube route daily. 30 Tab 0    [DISCONTINUED] polyethylene glycol (MIRALAX) 17 gram packet 1 Packet by Per G Tube route daily. Indications: Hold it for diarrhea 30 Packet 0    [DISCONTINUED] acetaminophen (TYLENOL) 32MG/ML soln solution Take 15.62 mL by mouth every four (4) hours as needed for Fever or Pain.  30 mL 0    [DISCONTINUED] albuterol-ipratropium (DUO-NEB) 2.5 mg-0.5 mg/3 ml nebu 3 mL by Nebulization route every four (4) hours as needed for Wheezing. 30 Nebule 0    [DISCONTINUED] warfarin (COUMADIN) 5 mg tablet 5 mg through the PEG tube daily for 3 days to keep INR between 2 and 3 and then as directed by nursing home doctor 1 Tab 0    [DISCONTINUED] OTHER INR on May 16, 2021 morning  Please call report to nursing home doctor  Reason: DVT 1 Each 0       Past History     Past Medical History:  Past Medical History:   Diagnosis Date    DDD (degenerative disc disease), lumbar     [facility] [address]     [facility]     [facility]opathy [facility]     DM2 (diabetes mellitus, type 2) [facility]     HLD (hyperlipidemia)     HTN (hypertension)     Obesity (BMI 30-39. 9)        Past Surgical History:  History reviewed. No pertinent surgical history. Family History:  History reviewed. No pertinent family history. Social History:  Social History     Tobacco Use    Smoking status: Current Every Day Smoker     Packs/day: 0.50    Smokeless tobacco: Never Used   Substance Use Topics    Alcohol use: Not on file    Drug use: Not on file       Allergies: Allergies   Allergen Reactions    Lisinopril Angioedema         Review of Systems     Review of Systems   Constitutional: Negative for fatigue and fever. HENT: Negative for rhinorrhea and sore throat. Respiratory: Positive for shortness of breath and wheezing. Negative for cough. Cardiovascular: Negative for chest pain and palpitations. Gastrointestinal: Negative for abdominal pain, diarrhea, nausea and vomiting. Genitourinary: Negative for difficulty urinating and dysuria. Musculoskeletal: Negative for arthralgias and myalgias. Skin: Negative for color change and rash. Neurological: Negative for light-headedness and headaches. Physical Exam     Physical Exam  Vitals and nursing note reviewed. Constitutional:       General: He is awake. He is not in acute distress. Appearance: Normal appearance. He is well-developed and normal weight.  He is not ill-appearing, toxic-appearing or diaphoretic. Interventions: Face mask in place. HENT:      Head: Normocephalic and atraumatic. Eyes:      Conjunctiva/sclera: Conjunctivae normal.      Pupils: Pupils are equal, round, and reactive to light. Cardiovascular:      Rate and Rhythm: Normal rate and regular rhythm. Pulses: Normal pulses. Heart sounds: Normal heart sounds. Pulmonary:      Effort: Pulmonary effort is normal.      Breath sounds: Wheezing present. Abdominal:      General: Abdomen is flat. Palpations: Abdomen is soft. Tenderness: There is no abdominal tenderness. Musculoskeletal:      Cervical back: Normal range of motion and neck supple. Right lower leg: No edema. Left lower leg: No edema. Skin:     General: Skin is warm and dry. Neurological:      General: No focal deficit present. Mental Status: He is alert and oriented to person, place, and time. GCS: GCS eye subscore is 4. GCS verbal subscore is 5. GCS motor subscore is 6. Psychiatric:         Mood and Affect: Mood and affect normal.         Behavior: Behavior normal. Behavior is cooperative. Thought Content: Thought content normal.         Lab and Diagnostic Study Results     Labs -     Recent Results (from the past 12 hour(s))   CBC WITH AUTOMATED DIFF    Collection Time: 09/23/21  1:30 PM   Result Value Ref Range    WBC 8.6 4.6 - 13.2 K/uL    RBC 4.51 4.35 - 5.65 M/uL    HGB 10.7 (L) 13.0 - 16.0 g/dL    HCT 35.1 (L) 36.0 - 48.0 %    MCV 77.8 (L) 78.0 - 100.0 FL    MCH 23.7 (L) 24.0 - 34.0 PG    MCHC 30.5 (L) 31.0 - 37.0 g/dL    RDW 17.1 (H) 11.6 - 14.5 %    PLATELET 077 582 - 176 K/uL    MPV 11.3 9.2 - 11.8 FL    NRBC 0.2 (H) 0.0  WBC    ABSOLUTE NRBC 0.02 (H) 0.00 - 0.01 K/uL    NEUTROPHILS 62 40 - 73 %    LYMPHOCYTES 26 21 - 52 %    MONOCYTES 10 3 - 10 %    EOSINOPHILS 1 0 - 5 %    BASOPHILS 1 0 - 2 %    IMMATURE GRANULOCYTES 0 0 - 0.5 %    ABS.  NEUTROPHILS 5.4 1.8 - 8.0 K/UL    ABS. LYMPHOCYTES 2.2 0.9 - 3.6 K/UL    ABS. MONOCYTES 0.9 0.05 - 1.2 K/UL    ABS. EOSINOPHILS 0.1 0.0 - 0.4 K/UL    ABS. BASOPHILS 0.0 0.0 - 0.1 K/UL    ABS. IMM. GRANS. 0.0 0.00 - 0.04 K/UL    DF AUTOMATED     METABOLIC PANEL, BASIC    Collection Time: 09/23/21  1:30 PM   Result Value Ref Range    Sodium 140 135 - 145 mmol/L    Potassium 3.9 3.2 - 5.1 mmol/L    Chloride 104 94 - 111 mmol/L    CO2 24 21 - 33 mmol/L    Anion gap 12 mmol/L    Glucose 108 70 - 110 mg/dL    BUN 20 9 - 21 mg/dL    Creatinine 1.20 0.8 - 1.50 mg/dL    BUN/Creatinine ratio 17      GFR est AA >60 ml/min/1.73m2    GFR est non-AA 60 ml/min/1.73m2    Calcium 8.9 8.5 - 10.5 mg/dL   TROPONIN I    Collection Time: 09/23/21  1:30 PM   Result Value Ref Range    Troponin-I, Qt. 0.11 (H) 0.02 - 0.05 ng/mL   BNP    Collection Time: 09/23/21  1:30 PM   Result Value Ref Range     (H) 0 - 100 pg/mL       Radiologic Studies -   @lastxrresult@  CT Results  (Last 48 hours)    None        CXR Results  (Last 48 hours)               09/23/21 1302  XR CHEST PORT Final result    Impression:      Patchy areas of interstitial and airspace disease greatest at the lung bases       Narrative:  EXAM: XR CHEST PORT       CLINICAL INDICATION/HISTORY: cough, wheezing   -Additional: None       COMPARISON: Radiographs obtained May 5, 2021       TECHNIQUE: Frontal view of the chest       _______________       FINDINGS:       HEART AND MEDIASTINUM: Normal cardiac size and mediastinal contours. LUNGS AND PLEURAL SPACES: Patchy areas of opacity are noted across the mid to   lower lung zones bilaterally. No evidence of pneumothorax or pleural effusion. BONY THORAX AND SOFT TISSUES: No acute osseous abnormality       _______________               EKG -performed 2:18 PM.  Read 2:20 PM.  Rate 77. Normal sinus rhythm. Ventricular trigeminy. No acute ST changes. Normal QTC. Medical Decision Making   - I am the first provider for this patient.     - I reviewed the vital signs, available nursing notes, past medical history, past surgical history, family history and social history. - Initial assessment performed. The patients presenting problems have been discussed, and they are in agreement with the care plan formulated and outlined with them. I have encouraged them to ask questions as they arise throughout their visit. Vital Signs-Reviewed the patient's vital signs. Patient Vitals for the past 12 hrs:   Temp Pulse Resp BP SpO2   09/23/21 1615  71 20 (!) 153/89 98 %   09/23/21 1437  81  (!) 124/57    09/23/21 1414  87 22 (!) 138/94 100 %   09/23/21 1353     98 %   09/23/21 1313  81 22 (!) 150/94 96 %   09/23/21 1212 97.9 °F (36.6 °C) 85 24 (!) 148/87 98 %       Records Reviewed: Nursing Notes    The patient presents with wheezing with a differential diagnosis of reactive airway disease, URI, pneumonia, CHF. ED Course:          Provider Notes (Medical Decision Making):     Patient presented with wheezing. His O2 sats were stable. He was denying chest pain. Chest x-ray showed bilateral infiltrates. BNP was elevated. First troponin was 0.11. White count was normal without a shift. I recommended that the patient be admitted but he wanted to go home. As stated his O2 sats been stable. He will be given prescriptions for Z-Du and Medrol Dosepak. Patient is to follow-up with his PCP tomorrow. MDM       Procedures   Medical Decision Makingedical Decision Making  Performed by: Susan Steen MD  PROCEDURES:  Procedures       Disposition   Disposition:    Discharged    DISCHARGE PLAN:  1. Current Discharge Medication List      START taking these medications    Details   methylPREDNISolone (Medrol, Du,) 4 mg tablet Take as directed. Qty: 1 Dose Pack, Refills: 0      azithromycin (ZITHROMAX) 250 mg tablet Take as directed.   Qty: 6 Tablet, Refills: 0         CONTINUE these medications which have NOT CHANGED    Details   apixaban (Eliquis) 5 mg tablet Take 5 mg by mouth two (2) times a day. atorvastatin (LIPITOR) 40 mg tablet 1 Tab by Per G Tube route nightly. Qty: 40 Tab, Refills: 0      lansoprazole (PREVACID) 3 mg/mL oral suspension 10 mL by Per G Tube route two (2) times a day. Qty: 150 mL, Refills: 0      multivit-folic acid-herbal 632 (WELLESSE PLUS) 400 mcg-200 mg/30 mL liqd oral liquid 30 mL by Per G Tube route daily. Qty: 300 mL, Refills: 0           2. Follow-up Information     Follow up With Specialties Details Why Contact Info    Rusty [address] Nurse Practitioner In 1 day  [facility]  [phone]          3. Return to ED if worse   4. Current Discharge Medication List      START taking these medications    Details   methylPREDNISolone (Medrol, Du,) 4 mg tablet Take as directed. Qty: 1 Dose Pack, Refills: 0  Start date: 9/23/2021      azithromycin (ZITHROMAX) 250 mg tablet Take as directed. Qty: 6 Tablet, Refills: 0  Start date: 9/23/2021, End date: 9/28/2021               Diagnosis     Clinical Impression:   1. Pneumonia of both lower lobes due to infectious organism    2. Person under investigation for COVID-19        Attestations:    Caro Segovia MD    Please note that this dictation was completed with [employer], the [employer] voice recognition software. Quite often unanticipated grammatical, syntax, homophones, and other interpretive errors are inadvertently transcribed by the computer software. Please disregard these errors. Please excuse any errors that have escaped final proofreading. Thank you.

## 2021-09-24 LAB
ATRIAL RATE: 83 BPM
CALCULATED P AXIS, ECG09: -34 DEGREES
CALCULATED R AXIS, ECG10: -69 DEGREES
CALCULATED T AXIS, ECG11: 36 DEGREES
DIAGNOSIS, 93000: NORMAL
P-R INTERVAL, ECG05: 197 MS
Q-T INTERVAL, ECG07: 412 MS
QRS DURATION, ECG06: 88 MS
QTC CALCULATION (BEZET), ECG08: 467 MS
VENTRICULAR RATE, ECG03: 77 BPM

## 2021-10-18 ENCOUNTER — OFFICE VISIT (OUTPATIENT)
Dept: CARDIOLOGY CLINIC | Age: 71
End: 2021-10-18
Payer: MEDICARE

## 2021-10-18 VITALS
BODY MASS INDEX: 37.37 KG/M2 | HEART RATE: 94 BPM | HEIGHT: 70 IN | WEIGHT: 261 LBS | SYSTOLIC BLOOD PRESSURE: 135 MMHG | DIASTOLIC BLOOD PRESSURE: 82 MMHG

## 2021-10-18 DIAGNOSIS — R60.1 GENERALIZED EDEMA: ICD-10-CM

## 2021-10-18 DIAGNOSIS — I73.9 PAD (PERIPHERAL ARTERY DISEASE) (HCC): ICD-10-CM

## 2021-10-18 DIAGNOSIS — R06.02 SHORTNESS OF BREATH: ICD-10-CM

## 2021-10-18 DIAGNOSIS — I49.9 CARDIAC ARRHYTHMIA, UNSPECIFIED CARDIAC ARRHYTHMIA TYPE: Primary | ICD-10-CM

## 2021-10-18 DIAGNOSIS — I49.3 PVC (PREMATURE VENTRICULAR CONTRACTION): ICD-10-CM

## 2021-10-18 PROCEDURE — G8536 NO DOC ELDER MAL SCRN: HCPCS | Performed by: INTERNAL MEDICINE

## 2021-10-18 PROCEDURE — G8427 DOCREV CUR MEDS BY ELIG CLIN: HCPCS | Performed by: INTERNAL MEDICINE

## 2021-10-18 PROCEDURE — 3017F COLORECTAL CA SCREEN DOC REV: CPT | Performed by: INTERNAL MEDICINE

## 2021-10-18 PROCEDURE — 1101F PT FALLS ASSESS-DOCD LE1/YR: CPT | Performed by: INTERNAL MEDICINE

## 2021-10-18 PROCEDURE — G8417 CALC BMI ABV UP PARAM F/U: HCPCS | Performed by: INTERNAL MEDICINE

## 2021-10-18 PROCEDURE — 99204 OFFICE O/P NEW MOD 45 MIN: CPT | Performed by: INTERNAL MEDICINE

## 2021-10-18 PROCEDURE — G8510 SCR DEP NEG, NO PLAN REQD: HCPCS | Performed by: INTERNAL MEDICINE

## 2021-10-18 RX ORDER — FUROSEMIDE 40 MG/1
20 TABLET ORAL 2 TIMES DAILY
Status: ON HOLD | COMMUNITY
End: 2021-11-11

## 2021-10-18 NOTE — PROGRESS NOTES
HISTORY OF PRESENT ILLNESS  Cristiano Pagan is a 79 y.o. male. 10/18/2021  Patient seen today for new patient evaluation. He is referred here for evaluation of cardiac arrhythmia. Patient was in hospital 5/2021 with  · Tracheostomy - #9 Portex on 4/26 with Dr. Beryl Razo. No further bleeding at trach site. Plans for reevaluation and potential decannulation as outpatient noted  · S/p angioedema- with airway and respiratory failure and collapse; thought due to ACE inhibitor. S/P Emergent Cricthyrotomy [facility]-ED 4/4/21- converted to 6.5 ETT intraoperative by anesthesia team 4/4/21, 8.0 ETT by anesthesia 4/7/21, packing removed evening 4/8/21-ENT  · S/P cardiac arrest and acute respiratory failure-resolved  · Pulmonary Infiltrates: suspect aspiration secretions and/or blood due to above- can't exclude other infectious process at this time. Sputum culture from 5 /1 reported 5 /4-heavy Enterobacter aerogenes. Not treating since clinically improved. Likely colonization  · DVT: Popliteal- Left; acute non-occlusive thrombus.   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 · Need for nutrition-PEG tube   Patient now presents with severe edema. He has generalized edema that is progressively getting worse. He is short of breath. He has orthopnea and complaint of wheezing denies any chest pain      Review of Systems   Constitutional: Negative for chills and fever. HENT: Negative for nosebleeds. Eyes: Negative for blurred vision and double vision. Respiratory: Negative for cough, hemoptysis, sputum production, shortness of breath and wheezing. Cardiovascular: Negative for chest pain, palpitations, orthopnea, claudication, leg swelling and PND.    Gastrointestinal: Negative for abdominal pain, heartburn, nausea and vomiting. Musculoskeletal: Negative for myalgias. Skin: Negative for rash. Neurological: Negative for dizziness, weakness and headaches. Endo/Heme/Allergies: Does not bruise/bleed easily. No family history on file. Past Medical History:   Diagnosis Date    DDD (degenerative disc disease), lumbar     [facility] [address]     [facility]     [facility] retinopathy [facility]     DM2 (diabetes mellitus, type 2) ([facility]     HLD (hyperlipidemia)     HTN (hypertension)     Obesity (BMI 30-39. 9)        No past surgical history on file. Social History     Tobacco Use    Smoking status: Current Every Day Smoker     Packs/day: 0.50    Smokeless tobacco: Never Used   Substance Use Topics    Alcohol use: Not on file       Allergies   Allergen Reactions    Lisinopril Angioedema       Prior to Admission medications    Medication Sig Start Date End Date Taking? Authorizing Provider   furosemide (LASIX) 40 mg tablet Take 40 mg by mouth two (2) times a day. Yes Provider, Historical   apixaban (Eliquis) 5 mg tablet Take 5 mg by mouth two (2) times a day. Yes Other, MD Cheryl   atorvastatin (LIPITOR) 40 mg tablet 1 Tab by Per G Tube route nightly. 5/13/21  Yes Roby Sadler MD   lansoprazole (PREVACID) 3 mg/mL oral suspension 10 mL by Per G Tube route two (2) times a day. 5/13/21  Yes Roby Sadler MD         Visit Vitals  /82   Pulse 94   Ht 5' 10\" (1.778 m)   Wt 118.4 kg (261 lb)   BMI 37.45 kg/m²       Physical Exam  Constitutional:       Appearance: He is well-developed. HENT:      Head: Normocephalic and atraumatic. Eyes:      Conjunctiva/sclera: Conjunctivae normal.   Neck:      Thyroid: No thyromegaly. Vascular: No JVD. Trachea: No tracheal deviation. Cardiovascular:      Rate and Rhythm: Normal rate and regular rhythm. Heart sounds: Normal heart sounds. No murmur heard. No friction rub. No gallop. Pulmonary:      Effort: No respiratory distress. Breath sounds: Normal breath sounds. No wheezing or rales. Chest:      Chest wall: No tenderness. Abdominal:      Palpations: Abdomen is soft. Tenderness: There is no abdominal tenderness. Musculoskeletal:      Cervical back: Neck supple. Skin:     General: Skin is warm and dry. Neurological:      Mental Status: He is alert and oriented to person, place, and time. Mr. Rashmi Campos has a reminder for a \"due or due soon\" health maintenance. I have asked that he contact his primary care provider for follow-up on this health maintenance. No flowsheet data found. I have personally reviewed patient's records available from hospital and other providers and incorporated findings in patient care. Notes, lab, EKG, chest x-ray, PVL  Impression   - ABNORMAL ECG -can 2021       Impression   SR-Sinus rhythm-normal P axis, V-rate 50-99        Impression   MFVPC-Multiform ventricular premature complexes-short R-R, variable morphology        Impression   -Left axis/left anterior hemiblock         poor precordial R wave progression-        Impression   -No old tracing for comparison-           Date: 10/08/21     CHEST PA AND LATERAL  10/2021  Impression        1. Nodular lesion right lung base. Consider CT evaluation. 2. Patchy airspace disease superior lingula which may represent subsegmental atelectasis, pneumonia or fibrosis. Questionable very small effusions. 3. Cardiomegaly and pulmonary venous hypertension. PVL10/2021  Signed By: Lamine Hassan MD on 10/7/2021 4:52 PM  Conclusions: Hemodynamically significant stenosis (50-75%) in the left distal superficial femoral artery. Interpretation Summary 4/2021    · Contrast used: DEFINITY. · Image quality for this study was technically difficult. · LV: Calculated LVEF is 55%. Visually measured ejection fraction. Normal cavity size, wall thickness and systolic function (ejection fraction normal).  Wall motion: normal. Moderate (grade 2) left ventricular diastolic dysfunction. · AO: Mild aortic root and ascending aorta dilatation. Ascending aorta diameter = 3.6 cm. Aortic root measures 3.9 cm  · RA: Dilated right atrium. · RV: Dilated right ventricle. Borderline low systolic function. · TV: Mild tricuspid valve regurgitation is present. · IVC: Mechanically ventilated; cannot use inferior caval vein diameter to estimate central venous pressure. · PA: Moderate pulmonary hypertension. Pulmonary arterial systolic pressure is 61 mmHg. Assessment         ICD-10-CM ICD-9-CM    1. Cardiac arrhythmia, unspecified cardiac arrhythmia type  I49.9 427.9     PVCs noted on EKG. Continue monitoring   2. PVC (premature ventricular contraction)  I49.3 427.69     Frequent PVCs on last few EKG   3. PAD (peripheral artery disease) ([facility])  I73.9 443.9     Under treatment with vascular monitor   4. Shortness of breath  R06.02 786.05     Recently worse. Significant fluid overload. Normal ejection fraction in past.  Enlarged right-sided chambers   5. Generalized edema  R60.1 782.3     Possible fluid overload from CHF right heart failure     10/2021  Seen for new patient evaluation for severe fluid overload and PVC. Generalized anasarca. Currently on Lasix but has not seen any improvement. Patient wants to not change any medication at present he is going to go to emergency room and get IV treatment at [facility].  We will follow up closely after discharge. Medications Discontinued During This Encounter   Medication Reason    methylPREDNISolone (Medrol, Du,) 4 mg tablet Not A Current Medication    multivit-folic acid-herbal 940 (WELLESSE PLUS) 400 mcg-200 mg/30 mL liqd oral liquid Not A Current Medication       No orders of the defined types were placed in this encounter. Follow-up and Dispositions    · Return in about 4 weeks (around 11/15/2021).

## 2021-10-18 NOTE — PROGRESS NOTES
1. Have you been to the ER, urgent care clinic since your last visit? Hospitalized since your last visit? Yes Where: 2 weeks/ [facility]    2. Have you seen or consulted any other health care providers outside of the [address] [place] [address] since your last visit? Include any pap smears or colon screening.       No

## 2021-11-11 ENCOUNTER — HOSPITAL ENCOUNTER (INPATIENT)
Age: 71
LOS: 5 days | Discharge: HOME HEALTH | DRG: 291 | End: 2021-11-16
Attending: EMERGENCY MEDICINE | Admitting: INTERNAL MEDICINE
Payer: MEDICARE

## 2021-11-11 ENCOUNTER — APPOINTMENT (OUTPATIENT)
Dept: GENERAL RADIOLOGY | Age: 71
DRG: 291 | End: 2021-11-11
Attending: EMERGENCY MEDICINE
Payer: MEDICARE

## 2021-11-11 DIAGNOSIS — R60.1 ANASARCA: ICD-10-CM

## 2021-11-11 DIAGNOSIS — I50.43 ACUTE ON CHRONIC COMBINED SYSTOLIC AND DIASTOLIC CHF (CONGESTIVE HEART FAILURE) (HCC): Primary | ICD-10-CM

## 2021-11-11 PROBLEM — I10 HYPERTENSION: Status: ACTIVE | Noted: 2021-11-11

## 2021-11-11 PROBLEM — I50.9 CHF (CONGESTIVE HEART FAILURE) (HCC): Status: ACTIVE | Noted: 2021-11-11

## 2021-11-11 PROBLEM — E78.5 HYPERLIPIDEMIA: Status: ACTIVE | Noted: 2021-11-11

## 2021-11-11 LAB
ALBUMIN SERPL-MCNC: 2.9 G/DL (ref 3.5–4.7)
ALBUMIN/GLOB SERPL: 0.6 {RATIO}
ALP SERPL-CCNC: 169 U/L (ref 38–126)
ALT SERPL-CCNC: 45 U/L (ref 3–72)
ANION GAP SERPL CALC-SCNC: 13 MMOL/L
ARTERIAL PATENCY WRIST A: ABNORMAL
AST SERPL W P-5'-P-CCNC: 33 U/L (ref 17–74)
ATRIAL RATE: 80 BPM
BASE EXCESS BLDA CALC-SCNC: 2.8 MMOL/L (ref 0–2.5)
BASOPHILS # BLD: 0.1 K/UL (ref 0–0.1)
BASOPHILS NFR BLD: 1 % (ref 0–2)
BDY SITE: ABNORMAL
BILIRUB SERPL-MCNC: 1.2 MG/DL (ref 0.2–1)
BNP SERPL-MCNC: 1120 PG/ML (ref 0–100)
BUN SERPL-MCNC: 29 MG/DL (ref 9–21)
BUN/CREAT SERPL: 17
CA-I BLD-MCNC: 8.3 MG/DL (ref 8.5–10.5)
CALCULATED P AXIS, ECG09: 29 DEGREES
CALCULATED R AXIS, ECG10: -69 DEGREES
CALCULATED T AXIS, ECG11: 40 DEGREES
CHLORIDE SERPL-SCNC: 102 MMOL/L (ref 94–111)
CK MB SERPL-MCNC: 4.4 NG/ML (ref 5–25)
CK SERPL-CCNC: 122 U/L (ref 38–174)
CO2 SERPL-SCNC: 27 MMOL/L (ref 21–33)
COHGB MFR BLD: 1.8 % (ref 0–3)
COVID-19 RAPID TEST, COVR: NOT DETECTED
CREAT SERPL-MCNC: 1.7 MG/DL (ref 0.8–1.5)
DIAGNOSIS, 93000: NORMAL
DIFFERENTIAL METHOD BLD: ABNORMAL
EOSINOPHIL # BLD: 0.1 K/UL (ref 0–0.4)
EOSINOPHIL NFR BLD: 1 % (ref 0–5)
ERYTHROCYTE [DISTWIDTH] IN BLOOD BY AUTOMATED COUNT: 22.7 % (ref 11.6–14.5)
FIO2 ON VENT: 21 %
GLOBULIN SER CALC-MCNC: 4.7 G/DL
GLUCOSE BLD STRIP.AUTO-MCNC: 151 MG/DL (ref 70–110)
GLUCOSE SERPL-MCNC: 83 MG/DL (ref 70–110)
HCO3 BLDA-SCNC: 27 MMOL/L (ref 23–27)
HCT VFR BLD AUTO: 39.4 % (ref 36–48)
HGB BLD OXIMETRY-MCNC: 11.8 G/DL (ref 12–16)
HGB BLD-MCNC: 11.4 G/DL (ref 13–16)
IMM GRANULOCYTES # BLD AUTO: 0 K/UL (ref 0–0.04)
IMM GRANULOCYTES NFR BLD AUTO: 0 % (ref 0–0.5)
LYMPHOCYTES # BLD: 1.5 K/UL (ref 0.9–3.6)
LYMPHOCYTES NFR BLD: 23 % (ref 21–52)
MAGNESIUM SERPL-MCNC: 1.9 MG/DL (ref 1.7–2.8)
MCH RBC QN AUTO: 23.3 PG (ref 24–34)
MCHC RBC AUTO-ENTMCNC: 28.9 G/DL (ref 31–37)
MCV RBC AUTO: 80.6 FL (ref 78–100)
METHGB MFR BLD: 0.1 % (ref 0–3)
MONOCYTES # BLD: 0.6 K/UL (ref 0.05–1.2)
MONOCYTES NFR BLD: 9 % (ref 3–10)
NEUTS SEG # BLD: 4.1 K/UL (ref 1.8–8)
NEUTS SEG NFR BLD: 66 % (ref 40–73)
NRBC # BLD: 0.02 K/UL (ref 0–0.01)
NRBC BLD-RTO: 0.3 PER 100 WBC
OXYHGB MFR BLD: 95.3 % (ref 90–100)
P-R INTERVAL, ECG05: 162 MS
PCO2 BLDA: 42 MMHG (ref 35–45)
PERFORMED BY, TECHID: ABNORMAL
PH BLDA: 7.43 [PH] (ref 7.37–7.43)
PLATELET # BLD AUTO: 161 K/UL (ref 135–420)
PLATELET COMMENTS,PCOM: ABNORMAL
PO2 BLDA: 90 MMHG (ref 84–98)
POTASSIUM SERPL-SCNC: 3.6 MMOL/L (ref 3.2–5.1)
PROT SERPL-MCNC: 7.6 G/DL (ref 6.1–8.4)
Q-T INTERVAL, ECG07: 384 MS
QRS DURATION, ECG06: 84 MS
QTC CALCULATION (BEZET), ECG08: 443 MS
RBC # BLD AUTO: 4.89 M/UL (ref 4.35–5.65)
RBC MORPH BLD: ABNORMAL
SAO2 % BLD: 97 % (ref 94–100)
SAO2% DEVICE SAO2% SENSOR NAME: ABNORMAL
SODIUM SERPL-SCNC: 142 MMOL/L (ref 135–145)
SPECIMEN SITE: ABNORMAL
SPECIMEN SOURCE: NORMAL
TROPONIN I SERPL-MCNC: 0.17 NG/ML (ref 0.02–0.05)
TROPONIN I SERPL-MCNC: 0.18 NG/ML (ref 0.02–0.05)
TROPONIN I SERPL-MCNC: 0.19 NG/ML (ref 0.02–0.05)
VENTRICULAR RATE, ECG03: 80 BPM
WBC # BLD AUTO: 6.4 K/UL (ref 4.6–13.2)

## 2021-11-11 PROCEDURE — 80053 COMPREHEN METABOLIC PANEL: CPT

## 2021-11-11 PROCEDURE — 74011250636 HC RX REV CODE- 250/636: Performed by: NURSE PRACTITIONER

## 2021-11-11 PROCEDURE — 94640 AIRWAY INHALATION TREATMENT: CPT

## 2021-11-11 PROCEDURE — 36600 WITHDRAWAL OF ARTERIAL BLOOD: CPT

## 2021-11-11 PROCEDURE — 96374 THER/PROPH/DIAG INJ IV PUSH: CPT

## 2021-11-11 PROCEDURE — 84484 ASSAY OF TROPONIN QUANT: CPT

## 2021-11-11 PROCEDURE — 82803 BLOOD GASES ANY COMBINATION: CPT

## 2021-11-11 PROCEDURE — 74011250637 HC RX REV CODE- 250/637: Performed by: EMERGENCY MEDICINE

## 2021-11-11 PROCEDURE — 94761 N-INVAS EAR/PLS OXIMETRY MLT: CPT

## 2021-11-11 PROCEDURE — 94664 DEMO&/EVAL PT USE INHALER: CPT

## 2021-11-11 PROCEDURE — 83880 ASSAY OF NATRIURETIC PEPTIDE: CPT

## 2021-11-11 PROCEDURE — 74011250637 HC RX REV CODE- 250/637: Performed by: NURSE PRACTITIONER

## 2021-11-11 PROCEDURE — 87635 SARS-COV-2 COVID-19 AMP PRB: CPT

## 2021-11-11 PROCEDURE — 82550 ASSAY OF CK (CPK): CPT

## 2021-11-11 PROCEDURE — 74011250636 HC RX REV CODE- 250/636: Performed by: EMERGENCY MEDICINE

## 2021-11-11 PROCEDURE — 85025 COMPLETE CBC W/AUTO DIFF WBC: CPT

## 2021-11-11 PROCEDURE — 82962 GLUCOSE BLOOD TEST: CPT

## 2021-11-11 PROCEDURE — 99284 EMERGENCY DEPT VISIT MOD MDM: CPT

## 2021-11-11 PROCEDURE — 82553 CREATINE MB FRACTION: CPT

## 2021-11-11 PROCEDURE — 93005 ELECTROCARDIOGRAM TRACING: CPT

## 2021-11-11 PROCEDURE — 83735 ASSAY OF MAGNESIUM: CPT

## 2021-11-11 PROCEDURE — 65270000029 HC RM PRIVATE

## 2021-11-11 PROCEDURE — 71045 X-RAY EXAM CHEST 1 VIEW: CPT

## 2021-11-11 RX ORDER — ACETAMINOPHEN 325 MG/1
650 TABLET ORAL
Status: DISCONTINUED | OUTPATIENT
Start: 2021-11-11 | End: 2021-11-16 | Stop reason: HOSPADM

## 2021-11-11 RX ORDER — ONDANSETRON 4 MG/1
4 TABLET, ORALLY DISINTEGRATING ORAL
Status: DISCONTINUED | OUTPATIENT
Start: 2021-11-11 | End: 2021-11-16 | Stop reason: HOSPADM

## 2021-11-11 RX ORDER — ISOSORBIDE MONONITRATE 30 MG/1
30 TABLET, EXTENDED RELEASE ORAL DAILY
Status: DISCONTINUED | OUTPATIENT
Start: 2021-11-12 | End: 2021-11-16 | Stop reason: HOSPADM

## 2021-11-11 RX ORDER — ATORVASTATIN CALCIUM 40 MG/1
80 TABLET, FILM COATED ORAL
Status: DISCONTINUED | OUTPATIENT
Start: 2021-11-11 | End: 2021-11-16 | Stop reason: HOSPADM

## 2021-11-11 RX ORDER — ONDANSETRON 2 MG/ML
4 INJECTION INTRAMUSCULAR; INTRAVENOUS
Status: DISCONTINUED | OUTPATIENT
Start: 2021-11-11 | End: 2021-11-16 | Stop reason: HOSPADM

## 2021-11-11 RX ORDER — LANOLIN ALCOHOL/MO/W.PET/CERES
325 CREAM (GRAM) TOPICAL
COMMUNITY
End: 2022-07-01

## 2021-11-11 RX ORDER — ALBUTEROL SULFATE 90 UG/1
2 AEROSOL, METERED RESPIRATORY (INHALATION)
Status: COMPLETED | OUTPATIENT
Start: 2021-11-11 | End: 2021-11-11

## 2021-11-11 RX ORDER — ALBUTEROL SULFATE 90 UG/1
2 AEROSOL, METERED RESPIRATORY (INHALATION)
Status: DISCONTINUED | OUTPATIENT
Start: 2021-11-11 | End: 2021-11-16 | Stop reason: HOSPADM

## 2021-11-11 RX ORDER — SODIUM CHLORIDE 0.9 % (FLUSH) 0.9 %
5-40 SYRINGE (ML) INJECTION AS NEEDED
Status: DISCONTINUED | OUTPATIENT
Start: 2021-11-11 | End: 2021-11-16 | Stop reason: HOSPADM

## 2021-11-11 RX ORDER — LEVOFLOXACIN 750 MG/1
750 TABLET ORAL EVERY OTHER DAY
COMMUNITY
End: 2021-11-16

## 2021-11-11 RX ORDER — HYDROCODONE BITARTRATE AND ACETAMINOPHEN 5; 325 MG/1; MG/1
1 TABLET ORAL
Status: DISCONTINUED | OUTPATIENT
Start: 2021-11-11 | End: 2021-11-14

## 2021-11-11 RX ORDER — ATORVASTATIN CALCIUM 80 MG/1
80 TABLET, FILM COATED ORAL DAILY
COMMUNITY
End: 2022-07-01 | Stop reason: SDUPTHER

## 2021-11-11 RX ORDER — LANOLIN ALCOHOL/MO/W.PET/CERES
1 CREAM (GRAM) TOPICAL
Status: DISCONTINUED | OUTPATIENT
Start: 2021-11-12 | End: 2021-11-16 | Stop reason: HOSPADM

## 2021-11-11 RX ORDER — ACETAMINOPHEN 650 MG/1
650 SUPPOSITORY RECTAL
Status: DISCONTINUED | OUTPATIENT
Start: 2021-11-11 | End: 2021-11-16 | Stop reason: HOSPADM

## 2021-11-11 RX ORDER — CARVEDILOL 6.25 MG/1
6.25 TABLET ORAL 2 TIMES DAILY WITH MEALS
Status: DISCONTINUED | OUTPATIENT
Start: 2021-11-11 | End: 2021-11-12

## 2021-11-11 RX ORDER — HYDRALAZINE HYDROCHLORIDE 25 MG/1
25 TABLET, FILM COATED ORAL 3 TIMES DAILY
Status: DISCONTINUED | OUTPATIENT
Start: 2021-11-11 | End: 2021-11-15

## 2021-11-11 RX ORDER — ISOSORBIDE MONONITRATE 30 MG/1
30 TABLET, EXTENDED RELEASE ORAL DAILY
COMMUNITY
End: 2022-07-01 | Stop reason: SDUPTHER

## 2021-11-11 RX ORDER — CARVEDILOL 6.25 MG/1
6.25 TABLET ORAL 2 TIMES DAILY WITH MEALS
COMMUNITY
End: 2022-07-01 | Stop reason: SDUPTHER

## 2021-11-11 RX ORDER — FUROSEMIDE 10 MG/ML
80 INJECTION INTRAMUSCULAR; INTRAVENOUS
Status: COMPLETED | OUTPATIENT
Start: 2021-11-11 | End: 2021-11-11

## 2021-11-11 RX ORDER — HYDRALAZINE HYDROCHLORIDE 25 MG/1
25 TABLET, FILM COATED ORAL 3 TIMES DAILY
COMMUNITY
End: 2022-07-01 | Stop reason: SDUPTHER

## 2021-11-11 RX ORDER — FUROSEMIDE 10 MG/ML
60 INJECTION INTRAMUSCULAR; INTRAVENOUS 2 TIMES DAILY
Status: DISCONTINUED | OUTPATIENT
Start: 2021-11-11 | End: 2021-11-12

## 2021-11-11 RX ORDER — HYDROCODONE BITARTRATE AND ACETAMINOPHEN 5; 325 MG/1; MG/1
1 TABLET ORAL
Status: COMPLETED | OUTPATIENT
Start: 2021-11-11 | End: 2021-11-11

## 2021-11-11 RX ORDER — POTASSIUM CHLORIDE 750 MG/1
20 TABLET, EXTENDED RELEASE ORAL DAILY
Status: DISCONTINUED | OUTPATIENT
Start: 2021-11-12 | End: 2021-11-13

## 2021-11-11 RX ORDER — POLYETHYLENE GLYCOL 3350 17 G/17G
17 POWDER, FOR SOLUTION ORAL DAILY PRN
Status: DISCONTINUED | OUTPATIENT
Start: 2021-11-11 | End: 2021-11-16 | Stop reason: HOSPADM

## 2021-11-11 RX ORDER — SODIUM CHLORIDE 0.9 % (FLUSH) 0.9 %
5-40 SYRINGE (ML) INJECTION EVERY 8 HOURS
Status: DISCONTINUED | OUTPATIENT
Start: 2021-11-11 | End: 2021-11-16 | Stop reason: HOSPADM

## 2021-11-11 RX ORDER — FUROSEMIDE 20 MG/1
20 TABLET ORAL 2 TIMES DAILY
COMMUNITY
End: 2021-11-16

## 2021-11-11 RX ADMIN — ALBUTEROL SULFATE 2 PUFF: 108 AEROSOL, METERED RESPIRATORY (INHALATION) at 17:51

## 2021-11-11 RX ADMIN — FUROSEMIDE 60 MG: 10 INJECTION, SOLUTION INTRAMUSCULAR; INTRAVENOUS at 17:40

## 2021-11-11 RX ADMIN — CARVEDILOL 6.25 MG: 6.25 TABLET, FILM COATED ORAL at 17:40

## 2021-11-11 RX ADMIN — HYDRALAZINE HYDROCHLORIDE 25 MG: 25 TABLET, FILM COATED ORAL at 21:36

## 2021-11-11 RX ADMIN — ALBUTEROL SULFATE 2 PUFF: 108 AEROSOL, METERED RESPIRATORY (INHALATION) at 13:19

## 2021-11-11 RX ADMIN — FUROSEMIDE 80 MG: 10 INJECTION, SOLUTION INTRAMUSCULAR; INTRAVENOUS at 09:52

## 2021-11-11 RX ADMIN — HYDROCODONE BITARTRATE AND ACETAMINOPHEN 1 TABLET: 5; 325 TABLET ORAL at 11:02

## 2021-11-11 RX ADMIN — ATORVASTATIN CALCIUM 80 MG: 40 TABLET, FILM COATED ORAL at 21:36

## 2021-11-11 RX ADMIN — Medication 10 ML: at 21:37

## 2021-11-11 RX ADMIN — HYDRALAZINE HYDROCHLORIDE 25 MG: 25 TABLET, FILM COATED ORAL at 17:42

## 2021-11-11 RX ADMIN — APIXABAN 5 MG: 5 TABLET, FILM COATED ORAL at 17:40
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
## 2021-11-11 NOTE — H&P
History and Physical    Subjective:     Myrna Hayward is a 79 y.o. 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 Patient states the shortness of breath started yesterday, but worsened overnight, other accompanied symptoms includes a non-productive cough, orthopnea, wheezing, and bilateral lower extremity edema. Patient denies chest pain, fever, chills, and palpitations. In the ED HH 11.4/39.4, B/C 29/1.70, troponin 0.19, BNP 1120, CXR: No acute cardiopulmonary disease or significant change. Mild cardiomegaly and  probable left basilar subsegmental atelectasis, and EKG Sr with PVC. In the ED patient received Albuterol, Lasix 80 mg via IV, and Norco 1 tab. Admit to telemetry. Patient assessed at the bedside, patient is alert and oriented, currently patient continues to have shortness of breath, with noted wheezing. Patient agrees to admission for a diagnosis of acute exacerbation of congestive heart failure, treatment to include IV diuresing, cardiac monitoring, cardiology consult, and ECHO in am.    Past Medical History:   Diagnosis Date    DDD (degenerative disc disease), lumbar     Diabetes ([address]     Di[facility] neuropathy [facility] [facility]     Diabetic retinopathy [facility]     DM2 (diabetes mellitus, type 2) ([facility] [facility]     HLD (hyperlipidemia)     HTN (hypertension)     Obesity (BMI 30-39. 9)       No past surgical history on file. History reviewed. No pertinent family history. Social History     Tobacco Use    Smoking status: Current Every Day Smoker     Packs/day: 0.50    Smokeless tobacco: Never Used   Substance Use Topics    Alcohol use: Not on file       Prior to Admission medications    Medication Sig Start Date End Date Taking? Authorizing Provider   isosorbide mononitrate ER (IMDUR) 30 mg tablet Take 30 mg by mouth daily. Yes Cleo, MD Cheryl   levoFLOXacin (LEVAQUIN) 750 mg tablet Take 750 mg by mouth every other day. Yes Cleo, MD Cheryl   hydrALAZINE (APRESOLINE) 25 mg tablet Take 25 mg by mouth three (3) times daily. Yes Cleo, MD Cheryl   carvediloL (COREG) 6.25 mg tablet Take 6.25 mg by mouth two (2) times daily (with meals). Yes Cleo, MD Cheryl   albuterol sulfate (PROAIR RESPICLICK) 90 mcg/actuation breath activated inhaler Take 90 mcg by inhalation every six (6) hours as needed. 9/27/21  Yes Provider, Historical   ferrous sulfate (FeroSuL) 325 mg (65 mg iron) tablet Take  by mouth Daily (before breakfast). Yes Provider, Historical   furosemide (LASIX) 40 mg tablet Take 40 mg by mouth two (2) times a day. Yes Provider, Historical   apixaban (Eliquis) 5 mg tablet Take 5 mg by mouth two (2) times a day. Yes Other, MD Cheryl   atorvastatin (LIPITOR) 40 mg tablet 1 Tab by Per G Tube route nightly. Patient taking differently: 80 mg by Per G Tube route nightly. 5/13/21  Yes Lavon Alanis MD   lansoprazole (PREVACID) 3 mg/mL oral suspension 10 mL by Per G Tube route two (2) times a day.  5/13/21   Lavon Alanis MD     Allergies   Allergen Reactions    Lisinopril Angioedema          REVIEW OF SYSTEMS:       Total of 12 systems reviewed as follows:    Positive = Red  Constitutional: Negative for malaise/fatigue and weakness, negative for fever and chills   HENT: Negative for ear pain, headaches, negative for loss of sense of taste and smell   Eyes: Negative for blurred vision and double vision   Skin: Negative for itching, negative for open areas   Cardiovascular: Negative for chest pain, palpitations, Positive for swelling   Respiratory: Positive for shortness or breath and cough, negative for sputum production   Gastrointestinal: Negative for abdominal pain, constipation, nausea, vomiting, and diarrhea   Genitourinary: Negative for dysuria, frequency, and hematuria   Musculoskeletal: Negative for joint pain and myalgias   Neurological: Negative for dizziness, seizures, and headaches   Psychiatric: Negative for depression and anxiousness        Objective:   VITALS:    Visit Vitals  BP (!) 145/99   Pulse 78   Temp 98.5 °F (36.9 °C)   Resp 22   Ht 5' 11\" (1.803 m)   Wt 132.3 kg (291 lb 9.6 oz)   SpO2 97%   BMI 40.67 kg/m²       PHYSICAL EXAM:  Positive = Red  Constitutional: Alert and oriented x 3 and no noted acute distress appears to be stated age. HENT: Atraumatic, nose midline, oropharynx clear ad moist, trachea midline, no supraclavicular   Eyes: Conjunctiva normal and pupils equal   Skin: Left heel wound with wound vac intact, right lower inner extremity irritation with oozing serosanguinous drainage   Cardiovascular: Regular rate and rhythm, normal heart sounds, no murmurs, pulses palpable, bilateral 3+ edema   Respiratory: Audible wheezing throughout, negative rales, or rhonchi, effort normal   Gastrointestinal: Appearance normal, bowel sounds are normal, bowl soft and non-tender   Genitourinary: Deferred   Musculoskeletal: Normal ROM   Neurological: Alert and oriented x 3, awake. No facial droop. No slurred speech. Hand grasps equal. Strength 5/5 in all extremities.  Intact sensations   Psychiatric: Affect normal, Answers questions appropriately     __________________________________________________  Care Plan discussed with:    Comments   Patient X    Family      RN     Care Manager                    Consultant:      _______________________________________________________________________  Expected  Disposition:   Home with Family X   HH/PT/OT/RN    SNF/LTC    KATELYN    ________________________________________________________________________    Labs:  Recent Results (from the past 24 hour(s))   EKG, 12 LEAD, INITIAL    Collection Time: 11/11/21  9:05 AM   Result Value Ref Range    Ventricular Rate 80 BPM    Atrial Rate 80 BPM    P-R Interval 162 ms    QRS Duration 84 ms    Q-T Interval 384 ms    QTC Calculation (Bezet) 443 ms    Calculated P Axis 29 degrees    Calculated R Axis -69 degrees    Calculated T Axis 40 degrees    Diagnosis       Sinus rhythm  Atrial premature complex  Left anterior fascicular block  Anterior infarct, old     CBC WITH AUTOMATED DIFF    Collection Time: 11/11/21  9:10 AM   Result Value Ref Range    WBC 6.4 4.6 - 13.2 K/uL    RBC 4.89 4.35 - 5.65 M/uL    HGB 11.4 (L) 13.0 - 16.0 g/dL    HCT 39.4 36.0 - 48.0 %    MCV 80.6 78.0 - 100.0 FL    MCH 23.3 (L) 24.0 - 34.0 PG    MCHC 28.9 (L) 31.0 - 37.0 g/dL    RDW 22.7 (H) 11.6 - 14.5 %    PLATELET 720 909 - 698 K/uL    NRBC 0.3 (H) 0.0  WBC    ABSOLUTE NRBC 0.02 (H) 0.00 - 0.01 K/uL    NEUTROPHILS 66 40 - 73 %    LYMPHOCYTES 23 21 - 52 %    MONOCYTES 9 3 - 10 %    EOSINOPHILS 1 0 - 5 %    BASOPHILS 1 0 - 2 %    IMMATURE GRANULOCYTES 0 0 - 0.5 %    ABS. NEUTROPHILS 4.1 1.8 - 8.0 K/UL    ABS. LYMPHOCYTES 1.5 0.9 - 3.6 K/UL    ABS. MONOCYTES 0.6 0.05 - 1.2 K/UL    ABS. EOSINOPHILS 0.1 0.0 - 0.4 K/UL    ABS. BASOPHILS 0.1 0.0 - 0.1 K/UL    ABS. IMM. GRANS. 0.0 0.00 - 0.04 K/UL    DF AUTOMATED      PLATELET COMMENTS Large Platelets      RBC COMMENTS Anisocytosis  2+        RBC COMMENTS Hypochromia  1+        RBC COMMENTS Target Cells  1+       METABOLIC PANEL, COMPREHENSIVE    Collection Time: 11/11/21  9:10 AM   Result Value Ref Range    Sodium 142 135 - 145 mmol/L    Potassium 3.6 3.2 - 5.1 mmol/L    Chloride 102 94 - 111 mmol/L    CO2 27 21 - 33 mmol/L    Anion gap 13 mmol/L    Glucose 83 70 - 110 mg/dL    BUN 29 (H) 9 - 21 mg/dL    Creatinine 1.70 (H) 0.8 - 1.50 mg/dL    BUN/Creatinine ratio 17      GFR est AA 49 ml/min/1.73m2    GFR est non-AA 40 ml/min/1.73m2    Calcium 8.3 (L) 8.5 - 10.5 mg/dL    Bilirubin, total 1.2 (H) 0.2 - 1.0 mg/dL    AST (SGOT) 33 17 - 74 U/L    ALT (SGPT) 45 3 - 72 U/L    Alk.  phosphatase 169 (H) 38 - 126 U/L    Protein, total 7.6 6.1 - 8.4 g/dL    Albumin 2.9 (L) 3.5 - 4.7 g/dL    Globulin 4.7 g/dL    A-G Ratio 0.6     TROPONIN I    Collection Time: 11/11/21  9:10 AM   Result Value Ref Range    Troponin-I, Qt. 0.19 (H) 0.02 - 0.05 ng/mL   BNP    Collection Time: 11/11/21  9:10 AM   Result Value Ref Range    BNP 1,120 (H) 0 - 100 pg/mL   MAGNESIUM    Collection Time: 11/11/21  9:10 AM   Result Value Ref Range    Magnesium 1.9 1.7 - 2.8 mg/dL   CK    Collection Time: 11/11/21  9:10 AM   Result Value Ref Range     38 - 174 U/L   CK-MB,QUANT. Collection Time: 11/11/21  9:10 AM   Result Value Ref Range    CK - MB 4.4 ng/mL   BLOOD GAS, ARTERIAL    Collection Time: 11/11/21 10:00 AM   Result Value Ref Range    pH 7.43 7.37 - 7.43      PCO2 42 35.0 - 45.0 mmHg    PO2 90 84 - 98 mmHg    O2 SAT 97 94 - 100 %    BICARBONATE 27 23.0 - 27.0 mmol/L    BASE EXCESS 2.8 (H) 0.0 - 2.5 mmol/L    O2 METHOD Room air      FIO2 21.0 %    Sample source Arterial      SITE Right Radial      CHRISTIANE'S TEST PASS      Carboxy-Hgb 1.8 0 - 3 %    Methemoglobin 0.1 0 - 3 %    tHb 11.8 (L) 12.0 - 16.0 g/dL    Oxyhemoglobin 95.3 90 - 100 %   COVID-19 RAPID TEST    Collection Time: 11/11/21 11:21 AM   Result Value Ref Range    Specimen source Nasopharyngeal      COVID-19 rapid test Not Detected Not Detected         Imaging:  XR CHEST PORT    Result Date: 11/11/2021  CHEST AP PORTABLE Indication: Shortness of breath. Comparison: X-ray 05/05/2021 and 09/23/2021. Findings: Stable marked cardiomegaly without evidence for vascular congestion. Streaky parenchymal opacities in the left lung base without appreciable change suggestive of subsegmental atelectasis. No new alveolar opacities identified. No evidence for pneumothorax or pleural effusion. No acute cardiopulmonary disease or significant change. Mild cardiomegaly and probable left basilar subsegmental atelectasis.        Assessment & Plan:     Shortness of Breath  -likely secondary to acute CHF exacerbation  -stable on room  -continue Albuterol inhaler PRN    Acute Exacerbation of CHF (congestive heart failure) (HCC)  -Lasix 80 mg given in the ED, continue Lasix 60 mg BID  -BNP:1120  -last ECHO 4/2021, repeat ECHO in am  -Monitor I&O and daily weights  -cardiac telemetry   -EKG: SR with PACs  -fluid restriction 1500 ml  -Troponin 0.19, trend serial troponin  -Daily BMP (closely monitor K+ and Creatinine)  -cardiac consulted    Acute Kidney Injury  -likely secondary to fluid overload  -B/C: 29/1.70  -IV diuresing with IV Lasix  -BMP in am, if no improvement will consult nephrologist    Obesity (BMI 30-39.9)  -BMI: 37.38  -adjusting lifestyle modification education    DVT (deep venous thrombosis) ([facility])  -continue Eliquis    Hyperlipidemia  -continue Lipitor    Hypertension  -chronic/mildly above goal  -continue Coreg, Hydralazine, and Imdur  -monitor BP closely    Left Foot Wound  -status post left partial calcanectomy  -wound vac in place   -will convert to wet to dry dressing changes tomorrow    Cocaine Abuse  -per patient last use was a couple of days ago  -cessation education provided    TOTAL TIME:  45 Minutes    Code Status: Full    Prophylaxis:  Eliquis    Electronically Signed : JEAN PIERRE [address]    Please note that this dictation was completed with [employer], the [employer] voice recognition software. Quite often unanticipated grammatical, syntax, homophones, and other interpretive errors are inadvertently transcribed by the computer software. Please disregard these errors. Please excuse any errors that have escaped final proofreading. Thank you.

## 2021-11-11 NOTE — RT PROTOCOL NOTE
RT Inhaler-Nebulizer Bronchodilator Protocol Note    There is a bronchodilator order in the chart from a provider indicating to follow the RT Bronchodilator Protocol and there is an Initiate RT Bronchodilator Protocol order as well (see protocol at bottom of note). CXR Findings:  Recent Results (from the past 2 days)    XR CHEST PORT 11/11/2021    Impression  No acute cardiopulmonary disease or significant change. Mild cardiomegaly and  probable left basilar subsegmental atelectasis. The findings from the last RT Protocol Assessment were as follows:  History of Pulmonary Disease: Chronic pulmonary disease  Respiratory Pattern: Dyspnea on exertion or RR 21-25 bpm  Breath Sounds: Intermittent or unilateral wheezes  Cough: Strong, spontaneous, non-productive  Indication for Bronchodilator Therapy: On home bronchodilators  Bronchodilator Assessment Score: 8    Aerosolized bronchodilator medication orders have been revised according to the RT Inhaler-Nebulizer Bronchodilator Protocol below. Respiratory Therapist to perform RT Therapy Protocol Assessment initially then follow the protocol. Repeat RT Therapy Protocol Assessment PRN for score 0-3 or on second treatment, BID, and PRN for scores above 3. No Indications  adjust the frequency to every 6 hours PRN wheezing or bronchospasm, if no treatments needed after 48 hours then discontinue using Per Protocol order mode. If indication present, adjust the RT bronchodilator orders based on the Bronchodilator Assessment Score as indicated below. Use Inhaler orders unless patient has one or more of the following: on home nebulizer, not able to hold breath for 10 seconds, is not alert and oriented, cannot activate and use MDI correctly, or respiratory rate 25 breaths per minute or more, then use the equivalent nebulizer order(s) with same Frequency and PRN reasons based on the score.   If a patient is on this medication at home then do not decrease Frequency below that used at home. 0-3  enter or revise RT bronchodilator order(s) to equivalent RT Bronchodilator order with Frequency of every 4 hours PRN for wheezing or increased work of breathing using Per Protocol order mode. 4-6  enter or revise RT Bronchodilator order(s) to two equivalent RT bronchodilator orders with one order with BID Frequency and one order with Frequency of every 4 hours PRN wheezing or increased work of breathing using Per Protocol order mode. 7-10  enter or revise RT Bronchodilator order(s) to two equivalent RT bronchodilator orders with one order with TID Frequency and one order with Frequency of every 4 hours PRN wheezing or increased work of breathing using Per Protocol order mode. 11-13  enter or revise RT Bronchodilator order(s) to one equivalent RT bronchodilator order with QID Frequency and an Albuterol order with Frequency of every 4 hours PRN wheezing or increased work of breathing using Per Protocol order mode. Greater than 13  enter or revise RT Bronchodilator order(s) to one equivalent RT bronchodilator order with every 4 hours Frequency and an Albuterol order with Frequency of every 2 hours PRN wheezing or increased work of breathing using Per Protocol order mode. RT to enter RT Home Evaluation for COPD & MDI Assessment order using Per Protocol order mode.     Electronically signed by Nadia Jimenes on 11/11/2021 at 1:25 PM

## 2021-11-11 NOTE — ED PROVIDER NOTES
EMERGENCY DEPARTMENT HISTORY AND PHYSICAL EXAM      Date: 11/11/2021  Patient Name: Mario Frey      History of Presenting Illness     Chief Complaint   Patient presents with    Shortness of Breath       History Provided By: Patient and EMS    HPI: Mario Frey, 79 y.o. male with a past medical history significant diabetes, hypertension, hyperlipidemia and obesity presents to the ED with cc of shortness of breath. Patient brought in by ER EMS complaining of shortness of breath since yesterday. He however mostly complains of \"building up fluid\". He states he was discharged from the hospital about 2 weeks ago for same. He states that he was okay the first few days but began building up fluid again with progressively worsening shortness of breath. He also had surgery to the left foot, since he had left partial colectomy and his wound VAC, the left leg is much more swollen than the right. He states he had a blood clot in the left leg and he is on Eliquis. He denies fever, chills, chest or abdomen pain. He still smokes. There are no other complaints, changes, or physical findings at this time. PCP: DUSTIN Moreno    Current Facility-Administered Medications   Medication Dose Route Frequency Provider Last Rate Last Admin    HYDROcodone-acetaminophen (NORCO) 5-325 mg per tablet 1 Tablet  1 Tablet Oral NOW Sandrine Mcdonnell MD         Current Outpatient Medications   Medication Sig Dispense Refill    isosorbide mononitrate ER (IMDUR) 30 mg tablet Take 30 mg by mouth daily.  levoFLOXacin (LEVAQUIN) 750 mg tablet Take 750 mg by mouth daily.  hydrALAZINE (APRESOLINE) 25 mg tablet Take 25 mg by mouth three (3) times daily.  carvediloL (COREG) 6.25 mg tablet Take 6.25 mg by mouth two (2) times daily (with meals).  furosemide (LASIX) 40 mg tablet Take 40 mg by mouth two (2) times a day.  apixaban (Eliquis) 5 mg tablet Take 5 mg by mouth two (2) times a day.  atorvastatin (LIPITOR) 40 mg tablet 1 Tab by Per G Tube route nightly. 40 Tab 0    lansoprazole (PREVACID) 3 mg/mL oral suspension 10 mL by Per G Tube route two (2) times a day. 150 mL 0       Past History     Past Medical History:  Past Medical History:   Diagnosis Date    DDD (degenerative disc disease), lumbar     Diabetes [facility] [address]     Diabetic neuropathy ([facility] [address]     Diabetic retinopathy ([facility] [address]     DM2 (diabetes mellitus, type 2) ([facility] [address]     HLD (hyperlipidemia)     HTN (hypertension)     Obesity (BMI 30-39. 9)        Past Surgical History:  No past surgical history on file. Family History:  History reviewed. No pertinent family history. Social History:  Social History     Tobacco Use    Smoking status: Current Every Day Smoker     Packs/day: 0.50    Smokeless tobacco: Never Used   Substance Use Topics    Alcohol use: Not on file    Drug use: Not on file       Allergies: Allergies   Allergen Reactions    Lisinopril Angioedema         Review of Systems     Review of Systems   Constitutional: Negative for chills and fever. HENT: Negative for congestion and sore throat. Respiratory: Positive for shortness of breath. Negative for cough. Cardiovascular: Negative for chest pain and palpitations. Gastrointestinal: Negative for abdominal pain, nausea and vomiting. Genitourinary: Negative for dysuria, flank pain and frequency. Musculoskeletal: Positive for joint swelling. Negative for arthralgias and myalgias. Skin: Negative for color change and wound. Neurological: Negative for dizziness, weakness, light-headedness and headaches. Hematological: Negative for adenopathy. Physical Exam     Physical Exam  Vitals and nursing note reviewed. Constitutional:       General: He is not in acute distress. Appearance: He is well-developed. He is obese. He is not diaphoretic. Comments: Obese male in no acute distress. He however has audible expiratory wheezes.    HENT:      Head: Normocephalic and atraumatic. No right periorbital erythema or left periorbital erythema. Jaw: No trismus. Right Ear: External ear normal. No drainage or swelling. Tympanic membrane is not perforated, erythematous or bulging. Left Ear: External ear normal. No drainage or swelling. Tympanic membrane is not perforated, erythematous or bulging. Nose: Nose normal. No mucosal edema or rhinorrhea. Right Sinus: No maxillary sinus tenderness or frontal sinus tenderness. Left Sinus: No maxillary sinus tenderness or frontal sinus tenderness. Mouth/Throat:      Mouth: No oral lesions. Dentition: No dental abscesses. Pharynx: Uvula midline. No oropharyngeal exudate, posterior oropharyngeal erythema or uvula swelling. Tonsils: No tonsillar abscesses. Eyes:      General: No scleral icterus. Right eye: No discharge. Left eye: No discharge. Conjunctiva/sclera: Conjunctivae normal.   Cardiovascular:      Rate and Rhythm: Normal rate and regular rhythm. Heart sounds: Normal heart sounds. No murmur heard. No friction rub. No gallop. Pulmonary:      Effort: Pulmonary effort is normal. No tachypnea, accessory muscle usage or respiratory distress. Breath sounds: Examination of the right-lower field reveals rales. Examination of the left-lower field reveals rales. Wheezing and rales present. No decreased breath sounds or rhonchi. Comments: Expiratory wheezes upper lungs and throat. Has some rales in both bases. Abdominal:      General: Bowel sounds are normal. There is no distension. Palpations: Abdomen is soft. Tenderness: There is no abdominal tenderness. Comments: Abdomen is slightly distended, pannus is edematous. Musculoskeletal:         General: No tenderness. Normal range of motion. Cervical back: Normal range of motion and neck supple.       Comments: 4+ edema left leg 3+ right leg    Left foot which is dressed in some surgical dressing and has a wound VAC. Lymphadenopathy:      Cervical: No cervical adenopathy. Skin:     General: Skin is warm and dry. Neurological:      Mental Status: He is alert and oriented to person, place, and time. Psychiatric:         Judgment: Judgment normal.         Lab and Diagnostic Study Results     Labs -     Recent Results (from the past 12 hour(s))   EKG, 12 LEAD, INITIAL    Collection Time: 11/11/21  9:05 AM   Result Value Ref Range    Ventricular Rate 80 BPM    Atrial Rate 80 BPM    P-R Interval 162 ms    QRS Duration 84 ms    Q-T Interval 384 ms    QTC Calculation (Bezet) 443 ms    Calculated P Axis 29 degrees    Calculated R Axis -69 degrees    Calculated T Axis 40 degrees    Diagnosis       Sinus rhythm  Atrial premature complex  Left anterior fascicular block  Anterior infarct, old     CBC WITH AUTOMATED DIFF    Collection Time: 11/11/21  9:10 AM   Result Value Ref Range    WBC 6.4 4.6 - 13.2 K/uL    RBC 4.89 4.35 - 5.65 M/uL    HGB 11.4 (L) 13.0 - 16.0 g/dL    HCT 39.4 36.0 - 48.0 %    MCV 80.6 78.0 - 100.0 FL    MCH 23.3 (L) 24.0 - 34.0 PG    MCHC 28.9 (L) 31.0 - 37.0 g/dL    RDW 22.7 (H) 11.6 - 14.5 %    PLATELET 272 534 - 456 K/uL    NRBC 0.3 (H) 0.0  WBC    ABSOLUTE NRBC 0.02 (H) 0.00 - 0.01 K/uL    NEUTROPHILS 66 40 - 73 %    LYMPHOCYTES 23 21 - 52 %    MONOCYTES 9 3 - 10 %    EOSINOPHILS 1 0 - 5 %    BASOPHILS 1 0 - 2 %    IMMATURE GRANULOCYTES 0 0 - 0.5 %    ABS. NEUTROPHILS 4.1 1.8 - 8.0 K/UL    ABS. LYMPHOCYTES 1.5 0.9 - 3.6 K/UL    ABS. MONOCYTES 0.6 0.05 - 1.2 K/UL    ABS. EOSINOPHILS 0.1 0.0 - 0.4 K/UL    ABS. BASOPHILS 0.1 0.0 - 0.1 K/UL    ABS. IMM.  GRANS. 0.0 0.00 - 0.04 K/UL    DF AUTOMATED      PLATELET COMMENTS Large Platelets      RBC COMMENTS Anisocytosis  2+        RBC COMMENTS Hypochromia  1+        RBC COMMENTS Target Cells  1+       METABOLIC PANEL, COMPREHENSIVE    Collection Time: 11/11/21  9:10 AM   Result Value Ref Range    Sodium 142 135 - 145 mmol/L    Potassium 3.6 3.2 - 5.1 mmol/L    Chloride 102 94 - 111 mmol/L    CO2 27 21 - 33 mmol/L    Anion gap 13 mmol/L    Glucose 83 70 - 110 mg/dL    BUN 29 (H) 9 - 21 mg/dL    Creatinine 1.70 (H) 0.8 - 1.50 mg/dL    BUN/Creatinine ratio 17      GFR est AA 49 ml/min/1.73m2    GFR est non-AA 40 ml/min/1.73m2    Calcium 8.3 (L) 8.5 - 10.5 mg/dL    Bilirubin, total 1.2 (H) 0.2 - 1.0 mg/dL    AST (SGOT) 33 17 - 74 U/L    ALT (SGPT) 45 3 - 72 U/L    Alk. phosphatase 169 (H) 38 - 126 U/L    Protein, total 7.6 6.1 - 8.4 g/dL    Albumin 2.9 (L) 3.5 - 4.7 g/dL    Globulin 4.7 g/dL    A-G Ratio 0.6     TROPONIN I    Collection Time: 11/11/21  9:10 AM   Result Value Ref Range    Troponin-I, Qt. 0.19 (H) 0.02 - 0.05 ng/mL   BNP    Collection Time: 11/11/21  9:10 AM   Result Value Ref Range    BNP 1,120 (H) 0 - 100 pg/mL   MAGNESIUM    Collection Time: 11/11/21  9:10 AM   Result Value Ref Range    Magnesium 1.9 1.7 - 2.8 mg/dL   CK    Collection Time: 11/11/21  9:10 AM   Result Value Ref Range     38 - 174 U/L   CK-MB,QUANT. Collection Time: 11/11/21  9:10 AM   Result Value Ref Range    CK - MB 4.4 ng/mL   BLOOD GAS, ARTERIAL    Collection Time: 11/11/21 10:00 AM   Result Value Ref Range    pH 7.43 7.37 - 7.43      PCO2 42 35.0 - 45.0 mmHg    PO2 90 84 - 98 mmHg    O2 SAT 97 94 - 100 %    BICARBONATE 27 23.0 - 27.0 mmol/L    BASE EXCESS 2.8 (H) 0.0 - 2.5 mmol/L    O2 METHOD Room air      FIO2 21.0 %    Sample source Arterial      SITE Right Radial      CHRISTIANE'S TEST PASS      Carboxy-Hgb 1.8 0 - 3 %    Methemoglobin 0.1 0 - 3 %    tHb 11.8 (L) 12.0 - 16.0 g/dL    Oxyhemoglobin 95.3 90 - 100 %       Radiologic Studies -   [unfilled]  CT Results  (Last 48 hours)    None        CXR Results  (Last 48 hours)               11/11/21 1004  XR CHEST PORT Final result    Impression:      No acute cardiopulmonary disease or significant change. Mild cardiomegaly and   probable left basilar subsegmental atelectasis. Narrative:  CHEST AP PORTABLE       Indication: Shortness of breath. Comparison: X-ray 05/05/2021 and 09/23/2021. Findings: Stable marked cardiomegaly without evidence for vascular congestion. Streaky parenchymal opacities in the left lung base without appreciable change   suggestive of subsegmental atelectasis. No new alveolar opacities identified. No   evidence for pneumothorax or pleural effusion. Medical Decision Making and ED Course   - I am the first and primary provider for this patient AND AM THE PRIMARY PROVIDER OF RECORD. - I reviewed the vital signs, available nursing notes, past medical history, past surgical history, family history and social history. - Initial assessment performed. The patients presenting problems have been discussed, and the staff are in agreement with the care plan formulated and outlined with them. I have encouraged them to ask questions as they arise throughout their visit. Vital Signs-Reviewed the patient's vital signs. Patient Vitals for the past 12 hrs:   Temp Pulse Resp BP SpO2   11/11/21 0952  79  (!) 180/95    11/11/21 0859 98 °F (36.7 °C) 78 20 (!) 168/76 100 %       EKG interpretation: (Preliminary): Performed at 09:05, and read at 09:05  Rhythm: normal sinus rhythm; and regular . Rate (approx.): 80; Axis: normal; ND interval: normal; QRS interval: normal ; ST/T wave: normal; Other findings: Left anterior fascicular block.       Records Reviewed: Nursing Notes, Old Medical Records, Previous Radiology Studies and Previous Laboratory Studies    The patient presents with shortness of breath with a differential diagnosis of CHF, COPD, asthma exacerbation, pleural effusions, pneumothorax, pneumonia, pericardial effusion, malignancy, medication reaction, electrolyte abnormalities, arrhythmia    ED Course:              Provider Notes (Medical Decision Making):               Consultations:       Consultations:         Procedures and Critical Care                     Disposition     Disposition: Admitted to telemetry the case was discussed with the admitting physician Cooper    Diagnosis:   1. Acute on chronic combined systolic and diastolic CHF (congestive heart failure) ([address]    2. Anasarca                  Diagnosis     Clinical Impression:   1. Acute on chronic combined systolic and diastolic CHF (congestive heart failure) ([address]    2. Anasarca        Attestations:    Do Light MD    Please note that this dictation was completed with [email or web address], the [employer] voice recognition software. Quite often unanticipated grammatical, syntax, homophones, and other interpretive errors are inadvertently transcribed by the computer software. Please disregard these errors. Please excuse any errors that have escaped final proofreading. Thank you.

## 2021-11-11 NOTE — PROGRESS NOTES
Admission Medication Reconciliation:    Information obtained from:  patient's outpatient dispense report    Comments/Recommendations: Reviewed PTA medications using outpatient dispense report. Allergies:  Lisinopril    Significant PMH/Disease States:   Past Medical History:   Diagnosis Date    DDD (degenerative disc disease), lumbar     Diabetes ([facility] [address]     Diabetic neuropathy ([facility] [address]     Diabetic retinopathy ([facility] [address]     DM2 (diabetes mellitus, type 2) ([facility] [address]     HLD (hyperlipidemia)     HTN (hypertension)     Obesity (BMI 30-39. 9)      Chief Complaint for this Admission:    Chief Complaint   Patient presents with    Shortness of Breath     Prior to Admission Medications:   Prior to Admission Medications   Prescriptions Last Dose Informant Patient Reported? Taking? albuterol sulfate (PROAIR RESPICLICK) 90 mcg/actuation breath activated inhaler   Yes Yes   Sig: Take 90 mcg by inhalation every six (6) hours as needed. apixaban (Eliquis) 5 mg tablet 11/10/2021 at Unknown time  Yes Yes   Sig: Take 5 mg by mouth two (2) times a day. atorvastatin (LIPITOR) 80 mg tablet   Yes Yes   Sig: Take 80 mg by mouth daily. carvediloL (COREG) 6.25 mg tablet 11/10/2021 at Unknown time  Yes Yes   Sig: Take 6.25 mg by mouth two (2) times daily (with meals). ferrous sulfate (FeroSuL) 325 mg (65 mg iron) tablet   Yes Yes   Sig: Take 325 mg by mouth Daily (before breakfast). furosemide (LASIX) 20 mg tablet   Yes Yes   Sig: Take 20 mg by mouth two (2) times a day. hydrALAZINE (APRESOLINE) 25 mg tablet 11/10/2021 at Unknown time  Yes Yes   Sig: Take 25 mg by mouth three (3) times daily. isosorbide mononitrate ER (IMDUR) 30 mg tablet 11/10/2021 at Unknown time  Yes Yes   Sig: Take 30 mg by mouth daily. lansoprazole (PREVACID) 3 mg/mL oral suspension   No No   Sig: 10 mL by Per G Tube route two (2) times a day.    levoFLOXacin (LEVAQUIN) 750 mg tablet 11/10/2021 at Unknown time  Yes Yes   Sig: Take 750 mg by mouth every other day.       Facility-Administered Medications: None       Maurizio Cerda

## 2021-11-11 NOTE — ASSESSMENT & PLAN NOTE
-Lasix 80 mg given in the ED, continue Lasix 60 mg BID  -BNP:1120  -last ECHO 4/2021, repeat ECHO  -Monitor I&O and daily weights  -cardiac telemetry   -EKG: SR with PACs  -fluid restriction 1500 ml  -serial troponin  -Daily BMP (closely monitor K+ and Creatinine)  -cardiac consulted

## 2021-11-11 NOTE — PROGRESS NOTES
Pt has been a difficult stick for lab draw; Barb Ortega has successfully drawn green top for Troponin.

## 2021-11-11 NOTE — ED TRIAGE NOTES
Pt arrives via EMS from home c/o shortness of breath since yesterday. Pt states he has taken his inhalers at home without relief. Pt also reports swelling, is scheduled to see cardiology tomorrow.

## 2021-11-11 NOTE — CONSULTS
[facility] CARDIOLOGY  CARDIOLOGY CONSULTATION    REASON FOR CONSULT: Acute heart failure with preserved ejection fraction    REQUESTING PROVIDER: JEAN PIERRE Hills    CHIEF COMPLAINT: Shortness of breath    HISTORY OF PRESENT ILLNESS:  Elida Burt is a 79y.o. year-old male with past medical history significant for DM, HTN, hyperlipidemia, left partial colectomy, peripheral artery disease, left leg DVT, PE, and cardiac arrest in April 2021 due to angioedema who was seen today for evaluation of acute HFpEF. The patient presented to the [facility] ER on today for complaints of shortness of breath. The patient is lethargic during the exam, so history is somewhat limited from the patient but supplemented by the medical record. He reports having leg swelling for about 1 month. He reports shortness of breath with exertion but no chest pain and no orthopnea. He was recently hospitalized at [facility] on 10/18/21 for acute heart failure. He is a patient of Dr. Flora Pratt with [facility] in [place]. Records from hospital admission course thus far reviewed. Telemetry reviewed. The patient is in sinus rhythm with occasional PVCs. INPATIENT MEDICATIONS:  Home medications reviewed.     Current Facility-Administered Medications:     albuterol (PROVENTIL HFA, VENTOLIN HFA, PROAIR HFA) inhaler 2 Puff, 2 Puff, Inhalation, NOW, Leidy Crystal ACNP    albuterol (PROVENTIL HFA, VENTOLIN HFA, PROAIR HFA) inhaler 2 Puff, 2 Puff, Inhalation, Q4H PRN, Leidy Crystal ACNP    furosemide (LASIX) injection 60 mg, 60 mg, IntraVENous, BID, Leidy Crystal ACNP    sodium chloride (NS) flush 5-40 mL, 5-40 mL, IntraVENous, Q8H, Leidy Crystal ACNP    sodium chloride (NS) flush 5-40 mL, 5-40 mL, IntraVENous, PRN, Leidy Crystal ACNP    acetaminophen (TYLENOL) tablet 650 mg, 650 mg, Oral, Q6H PRN **OR** acetaminophen (TYLENOL) suppository 650 mg, 650 mg, Rectal, Q6H PRN, Leidy Crystal, ACNP    polyethylene glycol (MIRALAX) packet 17 g, 17 g, Oral, DAILY PRN, Leidy Crystal ACNP    ondansetron (ZOFRAN ODT) tablet 4 mg, 4 mg, Oral, Q8H PRN **OR** ondansetron (ZOFRAN) injection 4 mg, 4 mg, IntraVENous, Q6H PRN, Leidy Crystal, KARMENP    albuterol (PROVENTIL HFA, VENTOLIN HFA, PROAIR HFA) inhaler 2 Puff, 2 Puff, Inhalation, Q4H PRN, Leidy Crystal ACNP    apixaban (ELIQUIS) tablet 5 mg, 5 mg, Oral, BID, Leidy Crystal ACNP    atorvastatin (LIPITOR) tablet 80 mg, 80 mg, Per G Tube, QHS, Leidy Crystal, KARMENP    carvediloL (COREG) tablet 6.25 mg, 6.25 mg, Oral, BID WITH MEALS, Leidy Crystal ACNP    [START ON 11/12/2021] ferrous sulfate tablet 325 mg, 1 Tablet, Oral, ACB, Leidy Crystal ACNP    hydrALAZINE (APRESOLINE) tablet 25 mg, 25 mg, Oral, TID, Leidy Crystal ACNP    [START ON 11/12/2021] isosorbide mononitrate ER (IMDUR) tablet 30 mg, 30 mg, Oral, DAILY, Leidy Crystal ACNP    [START ON 11/12/2021] levoFLOXacin (LEVAQUIN) tablet 750 mg, 750 mg, Oral, EVERY OTHER DAY, Leidy Crystal ACNP     ALLERGIES:  Allergies reviewed with the patient,  Allergies   Allergen Reactions    Lisinopril Angioedema      FAMILY HISTORY:  Family history reviewed. SOCIAL HISTORY:  Notable for former tobacco use, no alcohol use, and crack cocaine use; last use was about 1 week ago. REVIEW OF SYSTEMS:  Complete review of systems performed, pertinents noted above, all other systems are negative. PHYSICAL EXAMINATION:  Vital sign assessment reveal a blood pressure of 145/99 and pulse rate of 78. Cardiovascular exam has a heart with a regular rate and rhythm, normal S1 and S2. No murmur present. There are no rubs or gallops. Good peripheral pulses. No jugular venous distension. Respiratory exam reveals diminished lung fields, no rales or rhonchi. Lymphatic exam reveals 3-4+ lower extremity weeping edema.   Neurologic exam is nonfocal. Recent labs results and imaging reviewed. Notable findings include:  Recent Results (from the past 24 hour(s))   EKG, 12 LEAD, INITIAL    Collection Time: 11/11/21  9:05 AM   Result Value Ref Range    Ventricular Rate 80 BPM    Atrial Rate 80 BPM    P-R Interval 162 ms    QRS Duration 84 ms    Q-T Interval 384 ms    QTC Calculation (Bezet) 443 ms    Calculated P Axis 29 degrees    Calculated R Axis -69 degrees    Calculated T Axis 40 degrees    Diagnosis       Sinus rhythm  Atrial premature complex  Left anterior fascicular block  Anterior infarct, old     CBC WITH AUTOMATED DIFF    Collection Time: 11/11/21  9:10 AM   Result Value Ref Range    WBC 6.4 4.6 - 13.2 K/uL    RBC 4.89 4.35 - 5.65 M/uL    HGB 11.4 (L) 13.0 - 16.0 g/dL    HCT 39.4 36.0 - 48.0 %    MCV 80.6 78.0 - 100.0 FL    MCH 23.3 (L) 24.0 - 34.0 PG    MCHC 28.9 (L) 31.0 - 37.0 g/dL    RDW 22.7 (H) 11.6 - 14.5 %    PLATELET 112 113 - 330 K/uL    NRBC 0.3 (H) 0.0  WBC    ABSOLUTE NRBC 0.02 (H) 0.00 - 0.01 K/uL    NEUTROPHILS 66 40 - 73 %    LYMPHOCYTES 23 21 - 52 %    MONOCYTES 9 3 - 10 %    EOSINOPHILS 1 0 - 5 %    BASOPHILS 1 0 - 2 %    IMMATURE GRANULOCYTES 0 0 - 0.5 %    ABS. NEUTROPHILS 4.1 1.8 - 8.0 K/UL    ABS. LYMPHOCYTES 1.5 0.9 - 3.6 K/UL    ABS. MONOCYTES 0.6 0.05 - 1.2 K/UL    ABS. EOSINOPHILS 0.1 0.0 - 0.4 K/UL    ABS. BASOPHILS 0.1 0.0 - 0.1 K/UL    ABS. IMM.  GRANS. 0.0 0.00 - 0.04 K/UL    DF AUTOMATED      PLATELET COMMENTS Large Platelets      RBC COMMENTS Anisocytosis  2+        RBC COMMENTS Hypochromia  1+        RBC COMMENTS Target Cells  1+       METABOLIC PANEL, COMPREHENSIVE    Collection Time: 11/11/21  9:10 AM   Result Value Ref Range    Sodium 142 135 - 145 mmol/L    Potassium 3.6 3.2 - 5.1 mmol/L    Chloride 102 94 - 111 mmol/L    CO2 27 21 - 33 mmol/L    Anion gap 13 mmol/L    Glucose 83 70 - 110 mg/dL    BUN 29 (H) 9 - 21 mg/dL    Creatinine 1.70 (H) 0.8 - 1.50 mg/dL    BUN/Creatinine ratio 17      GFR est AA 49 ml/min/1.73m2    GFR est non-AA 40 ml/min/1.73m2    Calcium 8.3 (L) 8.5 - 10.5 mg/dL    Bilirubin, total 1.2 (H) 0.2 - 1.0 mg/dL    AST (SGOT) 33 17 - 74 U/L    ALT (SGPT) 45 3 - 72 U/L    Alk. phosphatase 169 (H) 38 - 126 U/L    Protein, total 7.6 6.1 - 8.4 g/dL    Albumin 2.9 (L) 3.5 - 4.7 g/dL    Globulin 4.7 g/dL    A-G Ratio 0.6     TROPONIN I    Collection Time: 11/11/21  9:10 AM   Result Value Ref Range    Troponin-I, Qt. 0.19 (H) 0.02 - 0.05 ng/mL   BNP    Collection Time: 11/11/21  9:10 AM   Result Value Ref Range    BNP 1,120 (H) 0 - 100 pg/mL   MAGNESIUM    Collection Time: 11/11/21  9:10 AM   Result Value Ref Range    Magnesium 1.9 1.7 - 2.8 mg/dL   CK    Collection Time: 11/11/21  9:10 AM   Result Value Ref Range     38 - 174 U/L   CK-MB,QUANT. Collection Time: 11/11/21  9:10 AM   Result Value Ref Range    CK - MB 4.4 ng/mL   BLOOD GAS, ARTERIAL    Collection Time: 11/11/21 10:00 AM   Result Value Ref Range    pH 7.43 7.37 - 7.43      PCO2 42 35.0 - 45.0 mmHg    PO2 90 84 - 98 mmHg    O2 SAT 97 94 - 100 %    BICARBONATE 27 23.0 - 27.0 mmol/L    BASE EXCESS 2.8 (H) 0.0 - 2.5 mmol/L    O2 METHOD Room air      FIO2 21.0 %    Sample source Arterial      SITE Right Radial      CHRISTIANE'S TEST PASS      Carboxy-Hgb 1.8 0 - 3 %    Methemoglobin 0.1 0 - 3 %    tHb 11.8 (L) 12.0 - 16.0 g/dL    Oxyhemoglobin 95.3 90 - 100 %   COVID-19 RAPID TEST    Collection Time: 11/11/21 11:21 AM   Result Value Ref Range    Specimen source Nasopharyngeal      COVID-19 rapid test Not Detected Not Detected       Discussed case with Dr. Mamadou Cotton, and our impression and recommendations are as follows:  Acute heart failure with preserved ejection fraction: Troponin is 0.19; additional troponins are pending. Initial EKG is nonischemic. BNP is 1120. Chest xray completed in the ER indicates left basilar atelectasis. He has 3-4+ weeping edema to the legs. Agree with Lasix 60 mg IV BID for diuresis.  Recommend daily weights, strict I&Os, and 1500 ml fluid restrictions/24 hours. Continue telemetry monitoring. Recommend to keep serum potassium between 4-5 and serum magnesium > 2. His echocardiogram done in April 2021 shows EF of 55% with grade 2 diastolic dysfunction and moderate pulmonary HTN. Will hold off on repeating echo as inpatient services are not available for the next 2 days. Elevated troponin: Initial troponin is 0.19. EKG is nonischemic. He is free of chest pain. He is anticoagulated with Eliquis. Continue beta blocker. He will need stress testing, likely as outpatient, if not recently done. HTN: BP is at goal. Continue home medications with adjustment as indicated. Hyperlipidemia: Continue statin. Polysubstance abuse: Advised cessation from tobacco and cocaine. Thank you for involving us in the care of this patient. Please do not hesitate to call me or Dr. Henok Marks if additional questions arise. If assistance is needed after hours, please call the answering service at [phone].

## 2021-11-11 NOTE — ED NOTES
TRANSFER - OUT REPORT:    Verbal report given to Denton Leary RN on [facility]  being transferred to 2S bed 258(unit) for admission. Report consisted of patients Situation, Background, Assessment and   Recommendations(SBAR). Information from the following report(s) SBAR, ED Summary, [facility] and Recent Results was reviewed with the receiving nurse. Lines:   Peripheral IV 11/11/21 Right Forearm (Active)   Site Assessment Clean, dry, & intact 11/11/21 0926   Phlebitis Assessment 0 11/11/21 0926        Opportunity for questions and clarification was provided.       Patient transported with:   Monitor  Patient's medications from home  Registered Nurse

## 2021-11-12 LAB
ANION GAP SERPL CALC-SCNC: 10 MMOL/L
BUN SERPL-MCNC: 29 MG/DL (ref 9–21)
BUN/CREAT SERPL: 18
CA-I BLD-MCNC: 8.4 MG/DL (ref 8.5–10.5)
CHLORIDE SERPL-SCNC: 107 MMOL/L (ref 94–111)
CO2 SERPL-SCNC: 28 MMOL/L (ref 21–33)
CREAT SERPL-MCNC: 1.6 MG/DL (ref 0.8–1.5)
ERYTHROCYTE [DISTWIDTH] IN BLOOD BY AUTOMATED COUNT: 22.5 % (ref 11.6–14.5)
GLUCOSE SERPL-MCNC: 105 MG/DL (ref 70–110)
HCT VFR BLD AUTO: 37.4 % (ref 36–48)
HGB BLD-MCNC: 11.3 G/DL (ref 13–16)
MAGNESIUM SERPL-MCNC: 1.8 MG/DL (ref 1.7–2.8)
MCH RBC QN AUTO: 23.5 PG (ref 24–34)
MCHC RBC AUTO-ENTMCNC: 30.2 G/DL (ref 31–37)
MCV RBC AUTO: 77.9 FL (ref 78–100)
NRBC # BLD: 0 K/UL (ref 0–0.01)
NRBC BLD-RTO: 0 PER 100 WBC
PLATELET # BLD AUTO: 120 K/UL (ref 135–420)
POTASSIUM SERPL-SCNC: 3.9 MMOL/L (ref 3.2–5.1)
RBC # BLD AUTO: 4.8 M/UL (ref 4.35–5.65)
SODIUM SERPL-SCNC: 145 MMOL/L (ref 135–145)
TROPONIN I SERPL-MCNC: 0.18 NG/ML (ref 0.02–0.05)
WBC # BLD AUTO: 7.5 K/UL (ref 4.6–13.2)

## 2021-11-12 PROCEDURE — 36415 COLL VENOUS BLD VENIPUNCTURE: CPT

## 2021-11-12 PROCEDURE — 80048 BASIC METABOLIC PNL TOTAL CA: CPT

## 2021-11-12 PROCEDURE — 94761 N-INVAS EAR/PLS OXIMETRY MLT: CPT

## 2021-11-12 PROCEDURE — 84484 ASSAY OF TROPONIN QUANT: CPT

## 2021-11-12 PROCEDURE — 74011250637 HC RX REV CODE- 250/637: Performed by: NURSE PRACTITIONER

## 2021-11-12 PROCEDURE — 83735 ASSAY OF MAGNESIUM: CPT

## 2021-11-12 PROCEDURE — 85027 COMPLETE CBC AUTOMATED: CPT

## 2021-11-12 PROCEDURE — 65270000029 HC RM PRIVATE

## 2021-11-12 PROCEDURE — 94640 AIRWAY INHALATION TREATMENT: CPT

## 2021-11-12 PROCEDURE — 94664 DEMO&/EVAL PT USE INHALER: CPT

## 2021-11-12 PROCEDURE — 74011250636 HC RX REV CODE- 250/636: Performed by: NURSE PRACTITIONER

## 2021-11-12 RX ORDER — FUROSEMIDE 10 MG/ML
60 INJECTION INTRAMUSCULAR; INTRAVENOUS EVERY 8 HOURS
Status: DISCONTINUED | OUTPATIENT
Start: 2021-11-12 | End: 2021-11-13

## 2021-11-12 RX ORDER — CARVEDILOL 12.5 MG/1
12.5 TABLET ORAL 2 TIMES DAILY WITH MEALS
Status: DISCONTINUED | OUTPATIENT
Start: 2021-11-12 | End: 2021-11-12

## 2021-11-12 RX ORDER — CARVEDILOL 12.5 MG/1
12.5 TABLET ORAL EVERY 12 HOURS
Status: DISCONTINUED | OUTPATIENT
Start: 2021-11-12 | End: 2021-11-12

## 2021-11-12 RX ORDER — ALBUTEROL SULFATE 2.5 MG/.5ML
2.5 SOLUTION RESPIRATORY (INHALATION)
Status: DISCONTINUED | OUTPATIENT
Start: 2021-11-12 | End: 2021-11-16 | Stop reason: HOSPADM

## 2021-11-12 RX ORDER — CARVEDILOL 6.25 MG/1
6.25 TABLET ORAL ONCE
Status: COMPLETED | OUTPATIENT
Start: 2021-11-12 | End: 2021-11-12

## 2021-11-12 RX ORDER — CARVEDILOL 12.5 MG/1
12.5 TABLET ORAL EVERY 12 HOURS
Status: DISCONTINUED | OUTPATIENT
Start: 2021-11-12 | End: 2021-11-16 | Stop reason: HOSPADM

## 2021-11-12 RX ADMIN — APIXABAN 5 MG: 5 TABLET, FILM COATED ORAL at 16:26

## 2021-11-12 RX ADMIN — FUROSEMIDE 60 MG: 10 INJECTION, SOLUTION INTRAMUSCULAR; INTRAVENOUS at 13:05

## 2021-11-12 RX ADMIN — HYDRALAZINE HYDROCHLORIDE 25 MG: 25 TABLET, FILM COATED ORAL at 16:26

## 2021-11-12 RX ADMIN — CARVEDILOL 12.5 MG: 12.5 TABLET, FILM COATED ORAL at 22:11

## 2021-11-12 RX ADMIN — APIXABAN 5 MG: 5 TABLET, FILM COATED ORAL at 08:00

## 2021-11-12 RX ADMIN — ALBUTEROL SULFATE 2 PUFF: 108 AEROSOL, METERED RESPIRATORY (INHALATION) at 11:57

## 2021-11-12 RX ADMIN — ALBUTEROL SULFATE 2 PUFF: 108 AEROSOL, METERED RESPIRATORY (INHALATION) at 20:16

## 2021-11-12 RX ADMIN — FUROSEMIDE 60 MG: 10 INJECTION, SOLUTION INTRAMUSCULAR; INTRAVENOUS at 22:12

## 2021-11-12 RX ADMIN — ATORVASTATIN CALCIUM 80 MG: 40 TABLET, FILM COATED ORAL at 22:11

## 2021-11-12 RX ADMIN — HYDROCODONE BITARTRATE AND ACETAMINOPHEN 1 TABLET: 5; 325 TABLET ORAL at 19:38

## 2021-11-12 RX ADMIN — HYDRALAZINE HYDROCHLORIDE 25 MG: 25 TABLET, FILM COATED ORAL at 08:00

## 2021-11-12 RX ADMIN — LEVOFLOXACIN 750 MG: 500 TABLET, FILM COATED ORAL at 13:06

## 2021-11-12 RX ADMIN — Medication 10 ML: at 22:11

## 2021-11-12 RX ADMIN — Medication 10 ML: at 13:06

## 2021-11-12 RX ADMIN — FERROUS SULFATE TAB 325 MG (65 MG ELEMENTAL FE) 325 MG: 325 (65 FE) TAB at 07:59

## 2021-11-12 RX ADMIN — HYDROCODONE BITARTRATE AND ACETAMINOPHEN 1 TABLET: 5; 325 TABLET ORAL at 08:00

## 2021-11-12 RX ADMIN — HYDRALAZINE HYDROCHLORIDE 25 MG: 25 TABLET, FILM COATED ORAL at 22:11

## 2021-11-12 RX ADMIN — Medication 10 ML: at 05:18

## 2021-11-12 RX ADMIN — ACETAMINOPHEN 650 MG: 325 TABLET ORAL at 16:26

## 2021-11-12 RX ADMIN — FUROSEMIDE 60 MG: 10 INJECTION, SOLUTION INTRAMUSCULAR; INTRAVENOUS at 08:00

## 2021-11-12 RX ADMIN — POTASSIUM CHLORIDE 20 MEQ: 10 TABLET, EXTENDED RELEASE ORAL at 16:26

## 2021-11-12 RX ADMIN — CARVEDILOL 6.25 MG: 6.25 TABLET, FILM COATED ORAL at 09:21

## 2021-11-12 RX ADMIN — ISOSORBIDE MONONITRATE 30 MG: 30 TABLET, EXTENDED RELEASE ORAL at 08:00

## 2021-11-12 RX ADMIN — CARVEDILOL 6.25 MG: 6.25 TABLET, FILM COATED ORAL at 07:59

## 2021-11-12 NOTE — PROGRESS NOTES
11/11/21 2217   Critical Result Types   Type of Critical Result Laboratory   Critical Lab Result Types   Critical Lab Value Troponin   Troponin Value 0.18   Notification Information   Notified By (Name) [facility] - Lab   Time of Critical Result Notification 2217   Verbal Readback Provided Yes   Provider Notification   Was Provider Notified Yes   Name of Provider MARK Silva NP   Provider Gave Verbal Readback Yes   Time Provider Notified 2217   Date Provider Notified 11/11/21   Were Orders Received No

## 2021-11-12 NOTE — PROGRESS NOTES
Comprehensive Nutrition Assessment    Type and Reason for Visit: Initial, Consult    Nutrition Recommendations/Plan: 4CHO choice cardiac restricted diet with 1500 mL FR    Nutrition Assessment:  78 yo male PMH: HTN, HLD, CHF, DM, DVT, cocaine abuse, chronic pressure wound to left heel requiring wound vac   morbidly obese male BMI 41. 1. hx of cocaine abuse, CHF, DM, chronic left heel wound requiring wound vac. Reports recenlty used cocaine a few days ago reports to ED with SOB, cough, wheezing, bilateral LE edema having acute exacerbation of CHF  Consulted for CHF nutrition therapy education discussed first Low Na diet pt reports he has not used salt for 6 mos and does not eat out he likes to cook at home. Then discussed 1500 mL FR which pt does report he does not measure fluids and instructed to use an 8oz glass and drink no more than 6 glasses a day to meet 1500 mL FR and to avoid anything that melts including ice, ice cream, soups, jello etc.  Pt provided handout via nutrition care manual for CHF nutrition therapy so pt has material to review at home including sample meal plan.     Recent Results (from the past 24 hour(s))   GLUCOSE, POC    Collection Time: 11/11/21  4:16 PM   Result Value Ref Range    Glucose (POC) 151 (H) 70 - 110 mg/dL    Performed by Muna Argueta    TROPONIN I    Collection Time: 11/11/21  5:30 PM   Result Value Ref Range    Troponin-I, Qt. 0.17 (H) 0.02 - 0.05 ng/mL   TROPONIN I    Collection Time: 11/11/21  9:25 PM   Result Value Ref Range    Troponin-I, Qt. 0.18 (H) 0.02 - 3.72 ng/mL   METABOLIC PANEL, BASIC    Collection Time: 11/12/21  4:00 AM   Result Value Ref Range    Sodium 145 135 - 145 mmol/L    Potassium 3.9 3.2 - 5.1 mmol/L    Chloride 107 94 - 111 mmol/L    CO2 28 21 - 33 mmol/L    Anion gap 10 mmol/L    Glucose 105 70 - 110 mg/dL    BUN 29 (H) 9 - 21 mg/dL    Creatinine 1.60 (H) 0.8 - 1.50 mg/dL    BUN/Creatinine ratio 18      GFR est AA 52 ml/min/1.73m2    GFR est non-AA 43 ml/min/1.73m2    Calcium 8.4 (L) 8.5 - 10.5 mg/dL   MAGNESIUM    Collection Time: 11/12/21  4:00 AM   Result Value Ref Range    Magnesium 1.8 1.7 - 2.8 mg/dL   CBC W/O DIFF    Collection Time: 11/12/21  4:00 AM   Result Value Ref Range    WBC 7.5 4.6 - 13.2 K/uL    RBC 4.80 4.35 - 5.65 M/uL    HGB 11.3 (L) 13.0 - 16.0 g/dL    HCT 37.4 36.0 - 48.0 %    MCV 77.9 (L) 78.0 - 100.0 FL    MCH 23.5 (L) 24.0 - 34.0 PG    MCHC 30.2 (L) 31.0 - 37.0 g/dL    RDW 22.5 (H) 11.6 - 14.5 %    PLATELET 921 (L) 989 - 420 K/uL    NRBC 0.0 0.0  WBC    ABSOLUTE NRBC 0.00 0.00 - 0.01 K/uL   TROPONIN I    Collection Time: 11/12/21  4:00 AM   Result Value Ref Range    Troponin-I, Qt. 0.18 (H) 0.02 - 0.05 ng/mL       Malnutrition Assessment:  Malnutrition Status:  Mild malnutrition    Context:  Acute illness     Findings of the 6 clinical characteristics of malnutrition:   Energy Intake:  No significant decrease in energy intake  Weight Loss:  No significant weight loss     Body Fat Loss:  No significant body fat loss,     Muscle Mass Loss:  No significant muscle mass loss,    Fluid Accumulation:  1 - Mild (LE has CHF), Extremities   Strength:            Estimated Daily Nutrient Needs:  Energy (kcal): 3366-4236 kcal/day; Weight Used for Energy Requirements: Adjusted (92 kg)  Protein (g):  g/day; Weight Used for Protein Requirements: Adjusted (1-1.2 g/kg)  Fluid (ml/day): 1500 mL/day; Method Used for Fluid Requirements: Other (comment) (1500 mL FR)      Nutrition Related Findings:  morbidly obese male BMI 41. 1. hx of cocaine abuse, CHF, DM, chronic left heel wound requiring wound vac. Reports recenlty used cocaine a few days ago reports to ED with SOB, cough, wheezing, bilateral LE edema having acute exacerbation of CHF      Wounds:    Pressure injury, Wound vac (left heel chronic)       Current Nutrition Therapies:  ADULT DIET Regular; 4 carb choices (60 gm/meal); Low Fat/Low Chol/High Fiber/2 gm Na;  Low Sodium (2 gm); 1500 ml    Anthropometric Measures:  · Height:  5' 11\" (180.3 cm)  · Current Body Wt:  133.4 kg (294 lb)   · Admission Body Wt:  294 lb    · Usual Body Wt:        · Ideal Body Wt:  172 lbs:  170.9 %   · Adjusted Body Weight:   ; Weight Adjustment for:  (203 lbs or 92 kg)   · Adjusted BMI:       · BMI Category:  Obese class 3 (BMI 40.0 or greater)       Nutrition Diagnosis:   · Limited adherence to nutrition-related recommendations related to impaired respiratory function, cardiac dysfunction as evidenced by localized or generalized fluid accumulation, other (specify) (SOB)      Nutrition Interventions:   Food and/or Nutrient Delivery: Continue current diet  Nutrition Education and Counseling: Education initiated, Education completed  Coordination of Nutrition Care: Continue to monitor while inpatient    Goals:  BG , Hgb A1c < 7, Pt to eat > 75% of meals, BM q 1-3 days, BMI 22-25, compliance with diet order and fluid restriction       Nutrition Monitoring and Evaluation:   Behavioral-Environmental Outcomes: Readiness for change  Food/Nutrient Intake Outcomes: Food and nutrient intake  Physical Signs/Symptoms Outcomes: Biochemical data, Meal time behavior, Weight, Fluid status or edema     F/U: 11/16/2021    Discharge Planning:    Continue current diet     Electronically signed by Miguel Pulido on 11/12/2021 at 3:35 PM    Contact: JIMY [phone]

## 2021-11-12 NOTE — PROGRESS NOTES
Hospitalist Progress Note             Date of Service:  2021  NAME:  Jeffery Grant  :  1950  MRN:  [id number]    Assessment & Plan:      Acute Exacerbation of CHF (congestive heart failure) ([facility])  -increase lasix to 60mg iv q8h  -BNP:1120  -last ECHO 2021, repeat ECHO in am  -Monitor I&O and daily weights  -cardiac telemetry   -EKG: SR with PACs  -fluid restriction 1500 ml  -Troponin 0.19, trend serial troponin  -Daily BMP (closely monitor K+ and Creatinine)  -cardiac consulted, -1800 cc thus far     Acute Kidney Injury  -likely secondary to fluid overload  -B/C: .  -IV diuresing with IV Lasix  -improving with diuresis, repeat in am     Obesity (BMI 30-39.9)  -BMI: 37.38  -adjusting lifestyle modification education     DVT (deep venous thrombosis) ([facility]  -continue Eliquis     Hyperlipidemia  -continue Lipitor     Hypertension  -chronic/mildly above goal  -continue Coreg, Hydralazine, and Imdur  -monitor BP closely     Left Foot Wound  -status post left partial calcanectomy  -wound vac in place   -will convert to wet to dry dressing changes tomorrow     Cocaine Abuse  -per patient last use was a couple of days ago  -cessation education provided        Hospital Problems  Date Reviewed: 5/3/2021          Codes Class Noted POA    CHF (congestive heart failure) ([facility] ICD-10-CM: I50.9  ICD-9-CM: 428.0  2021 Unknown        Hyperlipidemia ICD-10-CM: E78.5  ICD-9-CM: 272.4  2021 Unknown        Hypertension ICD-10-CM: I10  ICD-9-CM: 401.9  2021 Unknown        DVT (deep venous thrombosis) ([facility] ICD-10-CM: I82.409  ICD-9-CM: 453.40  4/10/2021 Yes        Obesity (BMI 30-39. 9) ICD-10-CM: E66.9  ICD-9-CM: 278.00  2021 Yes                Review of Systems:   A comprehensive review of systems was negative except for that written in the HPI.        Vital Signs:    Last 24hrs VS reviewed since prior progress note. Most recent are:  Visit Vitals  /75   Pulse 66   Temp 97.6 °F (36.4 °C)   Resp 20   Ht 5' 11\" (1.803 m)   Wt 133.8 kg (294 lb 14.4 oz)   SpO2 95%   BMI 41.13 kg/m²         Intake/Output Summary (Last 24 hours) at 11/12/2021 1116  Last data filed at 11/12/2021 1046  Gross per 24 hour   Intake 240 ml   Output 2075 ml   Net -1835 ml        Physical Examination:     Constitutional: Alert and oriented x 3 and no noted acute distress appears to be stated age. HENT: Atraumatic, nose midline, oropharynx clear ad moist, trachea midline, no supraclavicular   Eyes: Conjunctiva normal and pupils equal   Skin: Left heel wound with wound vac intact, right lower inner extremity irritation with oozing serosanguinous drainage   Cardiovascular: Regular rate and rhythm, normal heart sounds, no murmurs, pulses palpable, bilateral 3+ edema   Respiratory: + wheeze, negative rales, or rhonchi, effort normal   Gastrointestinal: Appearance normal, bowel sounds are normal, bowl soft and non-tender   Genitourinary: Deferred   Musculoskeletal: Normal ROM   Neurological: Alert and oriented x 3, awake. No facial droop. No slurred speech. Hand grasps equal. Strength 5/5 in all extremities. Intact sensations   Psychiatric: histrionic            Data Review:    Review and/or order of clinical lab test  Review and/or order of tests in the radiology section of CPT  Review and/or order of tests in the medicine section of CPT      Labs:     Recent Labs     11/12/21  0400 11/11/21  0910   WBC 7.5 6.4   HGB 11.3* 11.4*   HCT 37.4 39.4   * 161     Recent Labs     11/12/21  0400 11/11/21  0910    142   K 3.9 3.6    102   CO2 28 27   BUN 29* 29*   CREA 1.60* 1.70*    83   CA 8.4* 8.3*   MG 1.8 1.9     Recent Labs     11/11/21  0910   ALT 45   *   TBILI 1.2*   TP 7.6   ALB 2.9*   GLOB 4.7     No results for input(s): INR, PTP, APTT, INREXT in the last 72 hours.    No results for input(s): FE, TIBC, PSAT, FERR in the last 72 hours. No results found for: FOL, RBCF   Recent Labs     11/11/21  1000   PH 7.43   PCO2 42   PO2 90     Recent Labs     11/12/21  0400 11/11/21  2125 11/11/21  1730 11/11/21  0910 11/11/21  0910   CPK  --   --   --   --  122   TROIQ 0.18* 0.18* 0.17*   < > 0.19*    < > = values in this interval not displayed.      No results found for: CHOL, CHOLX, CHLST, CHOLV, HDL, HDLP, LDL, LDLC, DLDLP, TGLX, TRIGL, TRIGP, CHHD, CHHDX  Lab Results   Component Value Date/Time    Glucose (POC) 151 (H) 11/11/2021 04:16 PM    Glucose (POC) 112 (H) 05/13/2021 11:53 AM    Glucose (POC) 124 (H) 05/12/2021 11:26 PM    Glucose (POC) 131 (H) 05/12/2021 06:35 PM    Glucose (POC) 98 05/12/2021 11:28 AM    Glucose (POC) 97 05/12/2021 06:25 AM     Lab Results   Component Value Date/Time    Color YELLOW 04/14/2021 05:45 PM    Appearance CLEAR 04/14/2021 05:45 PM    Specific gravity 1.014 04/14/2021 05:45 PM    pH (UA) 5.0 04/14/2021 05:45 PM    Protein Negative 04/14/2021 05:45 PM    Glucose Negative 04/14/2021 05:45 PM    Ketone Negative 04/14/2021 05:45 PM    Bilirubin Negative 04/14/2021 05:45 PM    Urobilinogen 0.2 04/14/2021 05:45 PM    Nitrites Negative 04/14/2021 05:45 PM    Leukocyte Esterase Negative 04/14/2021 05:45 PM    Epithelial cells Negative 04/13/2021 05:10 PM    Bacteria Negative 04/13/2021 05:10 PM    WBC Negative 04/13/2021 05:10 PM    RBC Negative 04/13/2021 05:10 PM         Medications Reviewed:     Current Facility-Administered Medications   Medication Dose Route Frequency    albuterol CONCENTRATE 2.5mg/0.5 mL neb soln  2.5 mg Nebulization Q4H PRN    furosemide (LASIX) injection 60 mg  60 mg IntraVENous Q8H    carvediloL (COREG) tablet 12.5 mg  12.5 mg Oral Q12H    albuterol (PROVENTIL HFA, VENTOLIN HFA, PROAIR HFA) inhaler 2 Puff  2 Puff Inhalation Q4H PRN    sodium chloride (NS) flush 5-40 mL  5-40 mL IntraVENous Q8H    sodium chloride (NS) flush 5-40 mL  5-40 mL IntraVENous PRN    acetaminophen (TYLENOL) tablet 650 mg  650 mg Oral Q6H PRN    Or    acetaminophen (TYLENOL) suppository 650 mg  650 mg Rectal Q6H PRN    polyethylene glycol (MIRALAX) packet 17 g  17 g Oral DAILY PRN    ondansetron (ZOFRAN ODT) tablet 4 mg  4 mg Oral Q8H PRN    Or    ondansetron (ZOFRAN) injection 4 mg  4 mg IntraVENous Q6H PRN    albuterol (PROVENTIL HFA, VENTOLIN HFA, PROAIR HFA) inhaler 2 Puff  2 Puff Inhalation Q4H PRN    apixaban (ELIQUIS) tablet 5 mg  5 mg Oral BID    atorvastatin (LIPITOR) tablet 80 mg  80 mg Per G Tube QHS    ferrous sulfate tablet 325 mg  1 Tablet Oral ACB    hydrALAZINE (APRESOLINE) tablet 25 mg  25 mg Oral TID    isosorbide mononitrate ER (IMDUR) tablet 30 mg  30 mg Oral DAILY    levoFLOXacin (LEVAQUIN) tablet 750 mg  750 mg Oral EVERY OTHER DAY    potassium chloride (KLOR-CON) tablet 20 mEq  20 mEq Oral DAILY    HYDROcodone-acetaminophen (NORCO) 5-325 mg per tablet 1 Tablet  1 Tablet Oral Q6H PRN     ______________________________________________________________________  EXPECTED LENGTH OF STAY: 3d 0h  ACTUAL LENGTH OF STAY:          1                 Joaquin Tobin MD

## 2021-11-12 NOTE — PROGRESS NOTES
Reason for Admission: Chart reviewed and noted patient presented with C/O SOB. Pt states his SOB has gotten worse. He is also having a non-productive cough, orthopnea, wheezing and bilateral lower extremity edema. 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 Plan for utilizing home health: TBD      Current Advanced Directive/Advance Care Plan:  Full Code- No ACP      Healthcare Decision Maker: Noé Cao  Click here to complete [address] including selection of the Healthcare Decision Maker Relationship (ie \"Primary\")              Transition of Care Plan: TBD    Care Management Interventions  PCP Verified by CM: Yes  Transition of Care Consult (CM Consult):  Discharge Planning  Current Support Network: Siobhan Fang [phone]. Patient lives at home in a [address] [place] [address]. He has a wheeled walker that he uses. He also receives [facility] with [facility] for wound care. He will be returning back home with [facility] at discharge.

## 2021-11-12 NOTE — PROGRESS NOTES
Pt called and stated he thought he needed to have a BM, assisted to [facility], pt was unable to have a BM at this time. Assisted back to bed. Pt's home shorts were saturated in urine, clean pad, gown, blankets and top sheet given. Pull up placed on pt. Assisted pt with repositioning for comfort. VSS. No further needs voiced at this time.  CBWR

## 2021-11-12 NOTE — PROGRESS NOTES
CARDIOLOGY PROGRESS NOTE - NP    Patient seen and examined. This is a patient who is followed for acute HFpEF. He reports continued shortness of breath. He denies chest pain. He is -1185 ml over the past 24 hours. No other complaints reported. Telemetry reviewed, there were no events noted in the past 24 hours. He is in sinus rhythm with occasional PVCs. Pertinent review of systems items noted above, all other systems are negative. Current medications reviewed. PHYSICAL EXAMINATION:  Vital sign assessment reveal a blood pressure of 139/100 and pulse rate of 74. Cardiovascular exam has a heart with a regular rate and rhythm, normal S1 and S2. No murmur present. There are no rubs or gallops. Good peripheral pulses. No jugular venous distension. Respiratory exam reveals diminished lung fields, no rales or rhonchi. Anasarca noted. Lymphatic exam reveals 3-4+ lower extremity weeping edema. Neurologic exam is nonfocal.      Recent labs results and imaging reviewed.   Notable findings include:  Recent Results (from the past 24 hour(s))   COVID-19 RAPID TEST    Collection Time: 11/11/21 11:21 AM   Result Value Ref Range    Specimen source Nasopharyngeal      COVID-19 rapid test Not Detected Not Detected     GLUCOSE, POC    Collection Time: 11/11/21  4:16 PM   Result Value Ref Range    Glucose (POC) 151 (H) 70 - 110 mg/dL    Performed by Joaquin Restrepo    TROPONIN I    Collection Time: 11/11/21  5:30 PM   Result Value Ref Range    Troponin-I, Qt. 0.17 (H) 0.02 - 0.05 ng/mL   TROPONIN I    Collection Time: 11/11/21  9:25 PM   Result Value Ref Range    Troponin-I, Qt. 0.18 (H) 0.02 - 0.09 ng/mL   METABOLIC PANEL, BASIC    Collection Time: 11/12/21  4:00 AM   Result Value Ref Range    Sodium 145 135 - 145 mmol/L    Potassium 3.9 3.2 - 5.1 mmol/L    Chloride 107 94 - 111 mmol/L    CO2 28 21 - 33 mmol/L    Anion gap 10 mmol/L    Glucose 105 70 - 110 mg/dL    BUN 29 (H) 9 - 21 mg/dL    Creatinine 1.60 (H) 0.8 - 1.50 mg/dL    BUN/Creatinine ratio 18      GFR est AA 52 ml/min/1.73m2    GFR est non-AA 43 ml/min/1.73m2    Calcium 8.4 (L) 8.5 - 10.5 mg/dL   MAGNESIUM    Collection Time: 11/12/21  4:00 AM   Result Value Ref Range    Magnesium 1.8 1.7 - 2.8 mg/dL   CBC W/O DIFF    Collection Time: 11/12/21  4:00 AM   Result Value Ref Range    WBC 7.5 4.6 - 13.2 K/uL    RBC 4.80 4.35 - 5.65 M/uL    HGB 11.3 (L) 13.0 - 16.0 g/dL    HCT 37.4 36.0 - 48.0 %    MCV 77.9 (L) 78.0 - 100.0 FL    MCH 23.5 (L) 24.0 - 34.0 PG    MCHC 30.2 (L) 31.0 - 37.0 g/dL    RDW 22.5 (H) 11.6 - 14.5 %    PLATELET 204 (L) 312 - 420 K/uL    NRBC 0.0 0.0  WBC    ABSOLUTE NRBC 0.00 0.00 - 0.01 K/uL   TROPONIN I    Collection Time: 11/12/21  4:00 AM   Result Value Ref Range    Troponin-I, Qt. 0.18 (H) 0.02 - 0.05 ng/mL     Discussed case with Dr. Shiva Skinner, and our impression and recommendations are as follows:  Acute heart failure with preserved ejection fraction: Symptoms persistent with continued anasarca and 3-4+ weeping edema to the legs. Will increase Lasix to 60 mg IV every 8 hours for better diuresis. Continue daily weights, strict I&Os, and 1500 ml fluid restrictions/24 hours. Requested nutritionist consultation for diet education. Continue telemetry monitoring. Recommend to keep serum potassium between 4-5 and serum magnesium > 2. His echocardiogram done in April 2021 shows EF of 55% with grade 2 diastolic dysfunction and moderate pulmonary HTN. Will hold off on repeating echo as inpatient services are not available for the next 2 days. Elevated troponin: Troponins are elevated, but trend is flat from 0.17-0.19, which is non-ACS range. EKG is nonischemic. He remains free of chest pain. He is anticoagulated with Eliquis. Continue beta blocker. He will need stress testing as outpatient, if not recently done. HTN: BP is above goal. Will increase carvedilol to 12.5 mg BID. Continue hydralazine 25 mg TID and Imdur 30 once daily.   Hyperlipidemia: Continue atorvastatin 80 mg daily. Polysubstance abuse: Advised cessation from tobacco and cocaine. Thank you for involving us in the care of this patient. Please do not hesitate to call me or Dr. Javy Andrade if additional questions arise. If assistance is needed after hours, please call the answering service at [phone].

## 2021-11-12 NOTE — PROGRESS NOTES
P.T. screen completed, therapist spoke with nursing they state pt is getting up to the [facility] and doing fairly well, he is not walking due to wound vac on heel. No P.T. needs at this time.   Antonio Jackson, PT, DPT

## 2021-11-12 NOTE — PROGRESS NOTES
Received pt laying in bed w/ eyes open. Pt is oriented x4. Lung sounds diminished all fields. Pt has a dry raspy cough this AM.  BS positive. PP positive but diminished. Pt has 20g IV access to the right FA that is patent. Wound vac in place to left foot/heel. Pt states pain 7/10.

## 2021-11-12 NOTE — PROGRESS NOTES
Assisted pt to bed with walker, pt tolerated Fairly. Urinal emptied 275 ml's yellow urine. No needs voiced at this time. CBWR.  Primary nurse aware

## 2021-11-13 LAB
ANION GAP SERPL CALC-SCNC: 9 MMOL/L
BASOPHILS # BLD: 0.1 K/UL (ref 0–0.1)
BASOPHILS NFR BLD: 1 % (ref 0–2)
BUN SERPL-MCNC: 30 MG/DL (ref 9–21)
BUN/CREAT SERPL: 20
CA-I BLD-MCNC: 8.3 MG/DL (ref 8.5–10.5)
CHLORIDE SERPL-SCNC: 108 MMOL/L (ref 94–111)
CO2 SERPL-SCNC: 31 MMOL/L (ref 21–33)
CREAT SERPL-MCNC: 1.5 MG/DL (ref 0.8–1.5)
DIFFERENTIAL METHOD BLD: ABNORMAL
EOSINOPHIL # BLD: 0.1 K/UL (ref 0–0.4)
EOSINOPHIL NFR BLD: 2 % (ref 0–5)
ERYTHROCYTE [DISTWIDTH] IN BLOOD BY AUTOMATED COUNT: 22.3 % (ref 11.6–14.5)
GLUCOSE SERPL-MCNC: 108 MG/DL (ref 70–110)
HCT VFR BLD AUTO: 36 % (ref 36–48)
HGB BLD-MCNC: 10.4 G/DL (ref 13–16)
IMM GRANULOCYTES # BLD AUTO: 0 K/UL (ref 0–0.04)
IMM GRANULOCYTES NFR BLD AUTO: 0 % (ref 0–0.5)
LYMPHOCYTES # BLD: 1.2 K/UL (ref 0.9–3.6)
LYMPHOCYTES NFR BLD: 22 % (ref 21–52)
MAGNESIUM SERPL-MCNC: 1.9 MG/DL (ref 1.7–2.8)
MCH RBC QN AUTO: 23.1 PG (ref 24–34)
MCHC RBC AUTO-ENTMCNC: 28.9 G/DL (ref 31–37)
MCV RBC AUTO: 79.8 FL (ref 78–100)
MONOCYTES # BLD: 0.4 K/UL (ref 0.05–1.2)
MONOCYTES NFR BLD: 8 % (ref 3–10)
NEUTS SEG # BLD: 3.5 K/UL (ref 1.8–8)
NEUTS SEG NFR BLD: 67 % (ref 40–73)
NRBC # BLD: 0 K/UL (ref 0–0.01)
NRBC BLD-RTO: 0 PER 100 WBC
PHOSPHATE SERPL-MCNC: 2.8 MG/DL (ref 2.5–4.5)
PLATELET # BLD AUTO: 113 K/UL (ref 135–420)
PLATELET COMMENTS,PCOM: ABNORMAL
POTASSIUM SERPL-SCNC: 4 MMOL/L (ref 3.2–5.1)
RBC # BLD AUTO: 4.51 M/UL (ref 4.35–5.65)
RBC MORPH BLD: ABNORMAL
SODIUM SERPL-SCNC: 148 MMOL/L (ref 135–145)
WBC # BLD AUTO: 5.3 K/UL (ref 4.6–13.2)

## 2021-11-13 PROCEDURE — 74011250637 HC RX REV CODE- 250/637: Performed by: INTERNAL MEDICINE

## 2021-11-13 PROCEDURE — 36415 COLL VENOUS BLD VENIPUNCTURE: CPT

## 2021-11-13 PROCEDURE — 74011250636 HC RX REV CODE- 250/636: Performed by: NURSE PRACTITIONER

## 2021-11-13 PROCEDURE — 74011250637 HC RX REV CODE- 250/637: Performed by: NURSE PRACTITIONER

## 2021-11-13 PROCEDURE — 94640 AIRWAY INHALATION TREATMENT: CPT

## 2021-11-13 PROCEDURE — 85025 COMPLETE CBC W/AUTO DIFF WBC: CPT

## 2021-11-13 PROCEDURE — 83735 ASSAY OF MAGNESIUM: CPT

## 2021-11-13 PROCEDURE — 74011250636 HC RX REV CODE- 250/636: Performed by: INTERNAL MEDICINE

## 2021-11-13 PROCEDURE — 84100 ASSAY OF PHOSPHORUS: CPT

## 2021-11-13 PROCEDURE — 65270000029 HC RM PRIVATE

## 2021-11-13 PROCEDURE — 80048 BASIC METABOLIC PNL TOTAL CA: CPT

## 2021-11-13 PROCEDURE — 94761 N-INVAS EAR/PLS OXIMETRY MLT: CPT

## 2021-11-13 RX ORDER — POTASSIUM CHLORIDE 750 MG/1
40 TABLET, EXTENDED RELEASE ORAL DAILY
Status: DISCONTINUED | OUTPATIENT
Start: 2021-11-14 | End: 2021-11-16 | Stop reason: HOSPADM

## 2021-11-13 RX ORDER — FUROSEMIDE 10 MG/ML
80 INJECTION INTRAMUSCULAR; INTRAVENOUS EVERY 8 HOURS
Status: DISCONTINUED | OUTPATIENT
Start: 2021-11-13 | End: 2021-11-14

## 2021-11-13 RX ORDER — METOLAZONE 2.5 MG/1
5 TABLET ORAL DAILY
Status: DISCONTINUED | OUTPATIENT
Start: 2021-11-13 | End: 2021-11-14

## 2021-11-13 RX ADMIN — Medication 10 ML: at 15:45

## 2021-11-13 RX ADMIN — HYDROCODONE BITARTRATE AND ACETAMINOPHEN 1 TABLET: 5; 325 TABLET ORAL at 20:20

## 2021-11-13 RX ADMIN — ALBUTEROL SULFATE 2 PUFF: 108 AEROSOL, METERED RESPIRATORY (INHALATION) at 08:26

## 2021-11-13 RX ADMIN — HYDRALAZINE HYDROCHLORIDE 25 MG: 25 TABLET, FILM COATED ORAL at 08:13

## 2021-11-13 RX ADMIN — Medication 10 ML: at 06:03

## 2021-11-13 RX ADMIN — HYDRALAZINE HYDROCHLORIDE 25 MG: 25 TABLET, FILM COATED ORAL at 16:22

## 2021-11-13 RX ADMIN — ISOSORBIDE MONONITRATE 30 MG: 30 TABLET, EXTENDED RELEASE ORAL at 08:13

## 2021-11-13 RX ADMIN — HYDROCODONE BITARTRATE AND ACETAMINOPHEN 1 TABLET: 5; 325 TABLET ORAL at 12:28

## 2021-11-13 RX ADMIN — ALBUTEROL SULFATE 2 PUFF: 108 AEROSOL, METERED RESPIRATORY (INHALATION) at 19:32

## 2021-11-13 RX ADMIN — FUROSEMIDE 80 MG: 10 INJECTION, SOLUTION INTRAMUSCULAR; INTRAVENOUS at 15:41

## 2021-11-13 RX ADMIN — FERROUS SULFATE TAB 325 MG (65 MG ELEMENTAL FE) 325 MG: 325 (65 FE) TAB at 06:32

## 2021-11-13 RX ADMIN — CARVEDILOL 12.5 MG: 12.5 TABLET, FILM COATED ORAL at 08:13

## 2021-11-13 RX ADMIN — HYDROCODONE BITARTRATE AND ACETAMINOPHEN 1 TABLET: 5; 325 TABLET ORAL at 06:03

## 2021-11-13 RX ADMIN — FUROSEMIDE 80 MG: 10 INJECTION, SOLUTION INTRAMUSCULAR; INTRAVENOUS at 22:07

## 2021-11-13 RX ADMIN — FUROSEMIDE 60 MG: 10 INJECTION, SOLUTION INTRAMUSCULAR; INTRAVENOUS at 06:03

## 2021-11-13 RX ADMIN — HYDRALAZINE HYDROCHLORIDE 25 MG: 25 TABLET, FILM COATED ORAL at 22:07

## 2021-11-13 RX ADMIN — POTASSIUM CHLORIDE 20 MEQ: 10 TABLET, EXTENDED RELEASE ORAL at 08:13

## 2021-11-13 RX ADMIN — CARVEDILOL 12.5 MG: 12.5 TABLET, FILM COATED ORAL at 22:14

## 2021-11-13 RX ADMIN — ATORVASTATIN CALCIUM 80 MG: 40 TABLET, FILM COATED ORAL at 22:07

## 2021-11-13 RX ADMIN — Medication 10 ML: at 22:08

## 2021-11-13 RX ADMIN — APIXABAN 5 MG: 5 TABLET, FILM COATED ORAL at 16:22

## 2021-11-13 RX ADMIN — METOLAZONE 5 MG: 2.5 TABLET ORAL at 08:12

## 2021-11-13 RX ADMIN — APIXABAN 5 MG: 5 TABLET, FILM COATED ORAL at 08:12

## 2021-11-13 NOTE — PROGRESS NOTES
1900 - [facility] - SHALOM Leon administered for 7/10 foot pain. VSS. Assessment complete.  CBWR. HOB at 60 degrees

## 2021-11-13 NOTE — PROGRESS NOTES
VSS. Brief clean and dry. Primo fit suctioning clear yellow urine without issue. Primary nurse in to give pt 1/2 cup ice chips.  CBWR

## 2021-11-13 NOTE — PROGRESS NOTES
Assumed care of pt   0812- scheduled medication given per [facility], no voiced concerns at this time. 1228- prn pain medication given, CBWR.     1622- scheduled medication given per [facility], taken with tea from dinner tray. 1800- provided pt with full bath, lotion, changed sheets, changed absorbent pad, replaced PrimoFit and quick changes. 1835-emptied 800mL of clear yellow urine from suction canister. Pt requested PRN respiratory medication, notified RT. No other voiced concerns at this time. CBWR.

## 2021-11-13 NOTE — PROGRESS NOTES
PRN percocet administered. 525 ml clear yellow urine emptied from external catheter suction cannister. Medication administered with 118 ml orange juice.

## 2021-11-13 NOTE — PROGRESS NOTES
Problem: Falls - Risk of  Goal: *Absence of Falls  Description: Document Clydene Bone Fall Risk and appropriate interventions in the flowsheet.   Outcome: Progressing Towards Goal  Note: Fall Risk Interventions:  Mobility Interventions: Bed/chair exit alarm         Medication Interventions: Patient to call before getting OOB    Elimination Interventions: Call light in reach    History of Falls Interventions: Bed/chair exit alarm         Problem: Patient Education: Go to Patient Education Activity  Goal: Patient/Family Education  Outcome: Progressing Towards Goal     Problem: Patient Education: Go to Patient Education Activity  Goal: Patient/Family Education  Outcome: Progressing Towards Goal     Problem: Heart Failure: Day 1  Goal: Off Pathway (Use only if patient is Off Pathway)  Outcome: Progressing Towards Goal  Goal: Activity/Safety  Outcome: Progressing Towards Goal  Goal: Consults, if ordered  Outcome: Progressing Towards Goal  Goal: Diagnostic Test/Procedures  Outcome: Progressing Towards Goal  Goal: Nutrition/Diet  Outcome: Progressing Towards Goal  Goal: Discharge Planning  Outcome: Progressing Towards Goal  Goal: Medications  Outcome: Progressing Towards Goal  Goal: Respiratory  Outcome: Progressing Towards Goal  Goal: Treatments/Interventions/Procedures  Outcome: Progressing Towards Goal  Goal: Psychosocial  Outcome: Progressing Towards Goal  Goal: *Oxygen saturation within defined limits  Outcome: Progressing Towards Goal  Goal: *Hemodynamically stable  Outcome: Progressing Towards Goal  Goal: *Optimal pain control at patient's stated goal  Outcome: Progressing Towards Goal  Goal: *Anxiety reduced or absent  Outcome: Progressing Towards Goal     Problem: Heart Failure: Day 2  Goal: Off Pathway (Use only if patient is Off Pathway)  Outcome: Progressing Towards Goal  Goal: Activity/Safety  Outcome: Progressing Towards Goal  Goal: Consults, if ordered  Outcome: Progressing Towards Goal  Goal: Diagnostic Test/Procedures  Outcome: Progressing Towards Goal  Goal: Nutrition/Diet  Outcome: Progressing Towards Goal  Goal: Discharge Planning  Outcome: Progressing Towards Goal  Goal: Medications  Outcome: Progressing Towards Goal  Goal: Respiratory  Outcome: Progressing Towards Goal  Goal: Treatments/Interventions/Procedures  Outcome: Progressing Towards Goal  Goal: Psychosocial  Outcome: Progressing Towards Goal  Goal: *Oxygen saturation within defined limits  Outcome: Progressing Towards Goal  Goal: *Hemodynamically stable  Outcome: Progressing Towards Goal  Goal: *Optimal pain control at patient's stated goal  Outcome: Progressing Towards Goal  Goal: *Anxiety reduced or absent  Outcome: Progressing Towards Goal  Goal: *Demonstrates progressive activity  Outcome: Progressing Towards Goal     Problem: Heart Failure: Day 3  Goal: Off Pathway (Use only if patient is Off Pathway)  Outcome: Progressing Towards Goal  Goal: Activity/Safety  Outcome: Progressing Towards Goal  Goal: Diagnostic Test/Procedures  Outcome: Progressing Towards Goal  Goal: Nutrition/Diet  Outcome: Progressing Towards Goal  Goal: Discharge Planning  Outcome: Progressing Towards Goal  Goal: Medications  Outcome: Progressing Towards Goal  Goal: Respiratory  Outcome: Progressing Towards Goal  Goal: Treatments/Interventions/Procedures  Outcome: Progressing Towards Goal  Goal: Psychosocial  Outcome: Progressing Towards Goal  Goal: *Oxygen saturation within defined limits  Outcome: Progressing Towards Goal  Goal: *Hemodynamically stable  Outcome: Progressing Towards Goal  Goal: *Optimal pain control at patient's stated goal  Outcome: Progressing Towards Goal  Goal: *Anxiety reduced or absent  Outcome: Progressing Towards Goal  Goal: *Demonstrates progressive activity  Outcome: Progressing Towards Goal     Problem: Heart Failure: Day 4  Goal: Off Pathway (Use only if patient is Off Pathway)  Outcome: Progressing Towards Goal  Goal: Activity/Safety  Outcome: Progressing Towards Goal  Goal: Diagnostic Test/Procedures  Outcome: Progressing Towards Goal  Goal: Nutrition/Diet  Outcome: Progressing Towards Goal  Goal: Discharge Planning  Outcome: Progressing Towards Goal  Goal: Medications  Outcome: Progressing Towards Goal  Goal: Respiratory  Outcome: Progressing Towards Goal  Goal: Treatments/Interventions/Procedures  Outcome: Progressing Towards Goal  Goal: Psychosocial  Outcome: Progressing Towards Goal  Goal: *Oxygen saturation within defined limits  Outcome: Progressing Towards Goal  Goal: *Hemodynamically stable  Outcome: Progressing Towards Goal  Goal: *Optimal pain control at patient's stated goal  Outcome: Progressing Towards Goal  Goal: *Anxiety reduced or absent  Outcome: Progressing Towards Goal  Goal: *Demonstrates progressive activity  Outcome: Progressing Towards Goal     Problem: Heart Failure: Day 5  Goal: Off Pathway (Use only if patient is Off Pathway)  Outcome: Progressing Towards Goal  Goal: Activity/Safety  Outcome: Progressing Towards Goal  Goal: Diagnostic Test/Procedures  Outcome: Progressing Towards Goal  Goal: Nutrition/Diet  Outcome: Progressing Towards Goal  Goal: Discharge Planning  Outcome: Progressing Towards Goal  Goal: Medications  Outcome: Progressing Towards Goal  Goal: Respiratory  Outcome: Progressing Towards Goal  Goal: Treatments/Interventions/Procedures  Outcome: Progressing Towards Goal  Goal: Psychosocial  Outcome: Progressing Towards Goal     Problem: Heart Failure: Discharge Outcomes  Goal: *Demonstrates ability to perform prescribed activity without shortness of breath or discomfort  Outcome: Progressing Towards Goal  Goal: *Left ventricular function assessment completed prior to or during stay, or planned for post-discharge  Outcome: Progressing Towards Goal  Goal: *ACEI prescribed if LVEF less than 40% and no contraindications or ARB prescribed  Outcome: Progressing Towards Goal  Goal: *Verbalizes understanding and describes prescribed diet  Outcome: Progressing Towards Goal  Goal: *Verbalizes understanding/describes prescribed medications  Outcome: Progressing Towards Goal  Goal: *Describes available resources and support systems  Description: (eg: Home Health, Palliative Care, Advanced Medical Directive)  Outcome: Progressing Towards Goal  Goal: *Describes smoking cessation resources  Outcome: Progressing Towards Goal  Goal: *Understands and describes signs and symptoms to report to providers(Stroke Metric)  Outcome: Progressing Towards Goal  Goal: *Describes/verbalizes understanding of follow-up/return appt  Description: (eg: to physicians, diabetes treatment coordinator, and other resources  Outcome: Progressing Towards Goal  Goal: *Describes importance of continuing daily weights and changes to report to physician  Outcome: Progressing Towards Goal

## 2021-11-13 NOTE — PROGRESS NOTES
Hospitalist Progress Note             Date of Service:  2021  NAME:  Raymond Bauer  :  1950  MRN:  [id number]    Assessment & Plan:       Acute Exacerbation of CHF (congestive heart failure) (HCC)  -increase lasix to 80mg iv q8h, add metolazone  -BNP:1120  -last ECHO 2021,   -Monitor I&O and daily weights  -cardiac telemetry   -EKG: SR with PACs  -fluid restriction 1500 ml  -Troponin 0.19, trend serial troponin  -Daily BMP (closely monitor K+ and Creatinine)  -cardiac consulted, -2600 cc thus far     Acute Kidney Injury  -likely secondary to fluid overload  -B/C: .70  -IV diuresing with IV Lasix  -improving with diuresis, repeat in am     Obesity (BMI 30-39.9)  -BMI: 37.38  -adjusting lifestyle modification education     DVT (deep venous thrombosis) ([facility])  -continue Eliquis     Hyperlipidemia  -continue Lipitor     Hypertension  -chronic/mildly above goal  -continue Coreg, Hydralazine, and Imdur  -monitor BP closely     Left Foot Wound  -status post left partial calcanectomy  -wound vac in place   -will convert to wet to dry dressing changes tomorrow     Cocaine Abuse  -per patient last use was a couple of days ago  -cessation education provided          Hospital Problems  Date Reviewed: 5/3/2021          Codes Class Noted POA    [facility] ICD-10-CM: I50.9  ICD-9-CM: 428.0  2021 Unknown        Hyperlipidemia ICD-10-CM: E78.5  ICD-9-CM: 272.4  2021 Unknown        Hypertension ICD-10-CM: I10  ICD-9-CM: 401.9  2021 Unknown        DVT (deep venous thrombosis) ([facility] ICD-10-CM: I82.409  ICD-9-CM: 453.40  4/10/2021 Yes        Obesity (BMI 30-39. 9) ICD-10-CM: E66.9  ICD-9-CM: 278.00  2021 Yes                Review of Systems:   A comprehensive review of systems was negative except for that written in the HPI.    Sob still present but improving, swelling to legs persists      Vital Signs:    Last 24hrs VS reviewed since prior progress note. Most recent are:  Visit Vitals  BP (!) 146/79 (BP 1 Location: Left upper arm, BP Patient Position: Semi fowlers)   Pulse 68   Temp 97 °F (36.1 °C)   Resp 22   Ht 5' 11\" (1.803 m)   Wt 132.7 kg (292 lb 8 oz)   SpO2 99%   BMI 40.80 kg/m²         Intake/Output Summary (Last 24 hours) at 11/13/2021 0956  Last data filed at 11/13/2021 0816  Gross per 24 hour   Intake 796 ml   Output 2125 ml   Net -1329 ml        Physical Examination:             General:          Alert, cooperative, no distress, appears stated age. HEENT:           Atraumatic, anicteric sclerae, pink conjunctivae                          No oral ulcers, mucosa moist, throat clear, dentition fair  Neck:               Supple, symmetrical  Lungs:             + wheeze, + crackles  Chest wall:      No tenderness  No Accessory muscle use. Heart:              Regular  rhythm,  No  murmur   No edema  Abdomen:        Soft, non-tender. Not distended. Bowel sounds normal  Extremities:    Left heel wound with wound vac intact, right lower inner extremity irritation with oozing serosanguinous drainage  Skin:                Not pale. Not Jaundiced  No rashes   Psych:             Not anxious or agitated.   Neurologic:      Alert, moves all extremities, answers questions appropriately and responds to commands        Data Review:    Review and/or order of clinical lab test  Review and/or order of tests in the radiology section of CPT  Review and/or order of tests in the medicine section of CPT      Labs:     Recent Labs     11/13/21  0403 11/12/21  0400   WBC 5.3 7.5   HGB 10.4* 11.3*   HCT 36.0 37.4   * 120*     Recent Labs     11/13/21  0403 11/12/21  0400 11/11/21  0910   * 145 142   K 4.0 3.9 3.6    107 102   CO2 31 28 27   BUN 30* 29* 29*   CREA 1.50 1.60* 1.70*    105 83   CA 8.3* 8.4* 8.3*   MG 1.9 1.8 1.9   PHOS 2.8  --   --      Recent Labs 11/11/21  0910   ALT 45   *   TBILI 1.2*   TP 7.6   ALB 2.9*   GLOB 4.7     No results for input(s): INR, PTP, APTT, INREXT in the last 72 hours. No results for input(s): FE, TIBC, PSAT, FERR in the last 72 hours. No results found for: FOL, RBCF   Recent Labs     11/11/21  1000   PH 7.43   PCO2 42   PO2 90     Recent Labs     11/12/21  0400 11/11/21  2125 11/11/21  1730 11/11/21  0910 11/11/21  0910   CPK  --   --   --   --  122   TROIQ 0.18* 0.18* 0.17*   < > 0.19*    < > = values in this interval not displayed.      No results found for: CHOL, CHOLX, CHLST, CHOLV, HDL, HDLP, LDL, LDLC, DLDLP, TGLX, TRIGL, TRIGP, CHHD, CHHDX  Lab Results   Component Value Date/Time    Glucose (POC) 151 (H) 11/11/2021 04:16 PM    Glucose (POC) 112 (H) 05/13/2021 11:53 AM    Glucose (POC) 124 (H) 05/12/2021 11:26 PM    Glucose (POC) 131 (H) 05/12/2021 06:35 PM    Glucose (POC) 98 05/12/2021 11:28 AM    Glucose (POC) 97 05/12/2021 06:25 AM     Lab Results   Component Value Date/Time    Color YELLOW 04/14/2021 05:45 PM    Appearance CLEAR 04/14/2021 05:45 PM    Specific gravity 1.014 04/14/2021 05:45 PM    pH (UA) 5.0 04/14/2021 05:45 PM    Protein Negative 04/14/2021 05:45 PM    Glucose Negative 04/14/2021 05:45 PM    Ketone Negative 04/14/2021 05:45 PM    Bilirubin Negative 04/14/2021 05:45 PM    Urobilinogen 0.2 04/14/2021 05:45 PM    Nitrites Negative 04/14/2021 05:45 PM    Leukocyte Esterase Negative 04/14/2021 05:45 PM    Epithelial cells Negative 04/13/2021 05:10 PM    Bacteria Negative 04/13/2021 05:10 PM    WBC Negative 04/13/2021 05:10 PM    RBC Negative 04/13/2021 05:10 PM         Medications Reviewed:     Current Facility-Administered Medications   Medication Dose Route Frequency    furosemide (LASIX) injection 80 mg  80 mg IntraVENous Q8H    metOLazone (ZAROXOLYN) tablet 5 mg  5 mg Oral DAILY    [START ON 11/14/2021] potassium chloride (KLOR-CON) tablet 40 mEq  40 mEq Oral DAILY    albuterol CONCENTRATE 2.5mg/0.5 mL neb soln  2.5 mg Nebulization Q4H PRN    carvediloL (COREG) tablet 12.5 mg  12.5 mg Oral Q12H    albuterol (PROVENTIL HFA, VENTOLIN HFA, PROAIR HFA) inhaler 2 Puff  2 Puff Inhalation Q4H PRN    sodium chloride (NS) flush 5-40 mL  5-40 mL IntraVENous Q8H    sodium chloride (NS) flush 5-40 mL  5-40 mL IntraVENous PRN    acetaminophen (TYLENOL) tablet 650 mg  650 mg Oral Q6H PRN    Or    acetaminophen (TYLENOL) suppository 650 mg  650 mg Rectal Q6H PRN    polyethylene glycol (MIRALAX) packet 17 g  17 g Oral DAILY PRN    ondansetron (ZOFRAN ODT) tablet 4 mg  4 mg Oral Q8H PRN    Or    ondansetron (ZOFRAN) injection 4 mg  4 mg IntraVENous Q6H PRN    albuterol (PROVENTIL HFA, VENTOLIN HFA, PROAIR HFA) inhaler 2 Puff  2 Puff Inhalation Q4H PRN    apixaban (ELIQUIS) tablet 5 mg  5 mg Oral BID    atorvastatin (LIPITOR) tablet 80 mg  80 mg Per G Tube QHS    ferrous sulfate tablet 325 mg  1 Tablet Oral ACB    hydrALAZINE (APRESOLINE) tablet 25 mg  25 mg Oral TID    isosorbide mononitrate ER (IMDUR) tablet 30 mg  30 mg Oral DAILY    levoFLOXacin (LEVAQUIN) tablet 750 mg  750 mg Oral EVERY OTHER DAY    HYDROcodone-acetaminophen (NORCO) 5-325 mg per tablet 1 Tablet  1 Tablet Oral Q6H PRN     ______________________________________________________________________  EXPECTED LENGTH OF STAY: 3d 0h  ACTUAL LENGTH OF STAY:          2                 Franklyn Oro MD

## 2021-11-13 NOTE — PROGRESS NOTES
Assisted pt onto bed pan, pt continent of medium soft stool. Pt's gown and pad saturated in urine. Ashly care, clean gown and pad provided. Adjusted Primo fit. Extra blankets given per pt request. No further needs voiced at this time.  CBWR Primary nurse aware

## 2021-11-14 ENCOUNTER — APPOINTMENT (OUTPATIENT)
Dept: CT IMAGING | Age: 71
DRG: 291 | End: 2021-11-14
Attending: NURSE PRACTITIONER
Payer: MEDICARE

## 2021-11-14 LAB
ANION GAP SERPL CALC-SCNC: 11 MMOL/L
BASOPHILS # BLD: 0.1 K/UL (ref 0–0.1)
BASOPHILS NFR BLD: 1 % (ref 0–2)
BUN SERPL-MCNC: 31 MG/DL (ref 9–21)
BUN/CREAT SERPL: 18
CA-I BLD-MCNC: 8.7 MG/DL (ref 8.5–10.5)
CHLORIDE SERPL-SCNC: 102 MMOL/L (ref 94–111)
CO2 SERPL-SCNC: 32 MMOL/L (ref 21–33)
CREAT SERPL-MCNC: 1.7 MG/DL (ref 0.8–1.5)
DIFFERENTIAL METHOD BLD: ABNORMAL
EOSINOPHIL # BLD: 0.1 K/UL (ref 0–0.4)
EOSINOPHIL NFR BLD: 2 % (ref 0–5)
ERYTHROCYTE [DISTWIDTH] IN BLOOD BY AUTOMATED COUNT: 23 % (ref 11.6–14.5)
GLUCOSE SERPL-MCNC: 106 MG/DL (ref 70–110)
HCT VFR BLD AUTO: 37.8 % (ref 36–48)
HGB BLD-MCNC: 10.8 G/DL (ref 13–16)
IMM GRANULOCYTES # BLD AUTO: 0 K/UL (ref 0–0.04)
IMM GRANULOCYTES NFR BLD AUTO: 0 % (ref 0–0.5)
LYMPHOCYTES # BLD: 1.5 K/UL (ref 0.9–3.6)
LYMPHOCYTES NFR BLD: 28 % (ref 21–52)
MAGNESIUM SERPL-MCNC: 1.8 MG/DL (ref 1.7–2.8)
MCH RBC QN AUTO: 23.1 PG (ref 24–34)
MCHC RBC AUTO-ENTMCNC: 28.6 G/DL (ref 31–37)
MCV RBC AUTO: 80.8 FL (ref 78–100)
MONOCYTES # BLD: 0.4 K/UL (ref 0.05–1.2)
MONOCYTES NFR BLD: 8 % (ref 3–10)
NEUTS SEG # BLD: 3.1 K/UL (ref 1.8–8)
NEUTS SEG NFR BLD: 61 % (ref 40–73)
NRBC # BLD: 0 K/UL (ref 0–0.01)
NRBC BLD-RTO: 0 PER 100 WBC
PHOSPHATE SERPL-MCNC: 2.8 MG/DL (ref 2.5–4.5)
PLATELET # BLD AUTO: 110 K/UL (ref 135–420)
POTASSIUM SERPL-SCNC: 4.2 MMOL/L (ref 3.2–5.1)
RBC # BLD AUTO: 4.68 M/UL (ref 4.35–5.65)
SODIUM SERPL-SCNC: 145 MMOL/L (ref 135–145)
WBC # BLD AUTO: 5.1 K/UL (ref 4.6–13.2)

## 2021-11-14 PROCEDURE — 74011250637 HC RX REV CODE- 250/637: Performed by: NURSE PRACTITIONER

## 2021-11-14 PROCEDURE — 85025 COMPLETE CBC W/AUTO DIFF WBC: CPT

## 2021-11-14 PROCEDURE — 83605 ASSAY OF LACTIC ACID: CPT

## 2021-11-14 PROCEDURE — 84100 ASSAY OF PHOSPHORUS: CPT

## 2021-11-14 PROCEDURE — 80048 BASIC METABOLIC PNL TOTAL CA: CPT

## 2021-11-14 PROCEDURE — 36415 COLL VENOUS BLD VENIPUNCTURE: CPT

## 2021-11-14 PROCEDURE — 74011250636 HC RX REV CODE- 250/636: Performed by: INTERNAL MEDICINE

## 2021-11-14 PROCEDURE — 83735 ASSAY OF MAGNESIUM: CPT

## 2021-11-14 PROCEDURE — 74011250637 HC RX REV CODE- 250/637: Performed by: INTERNAL MEDICINE

## 2021-11-14 PROCEDURE — 74011250636 HC RX REV CODE- 250/636: Performed by: NURSE PRACTITIONER

## 2021-11-14 PROCEDURE — 65270000029 HC RM PRIVATE

## 2021-11-14 PROCEDURE — 94640 AIRWAY INHALATION TREATMENT: CPT

## 2021-11-14 PROCEDURE — 74176 CT ABD & PELVIS W/O CONTRAST: CPT

## 2021-11-14 RX ORDER — MORPHINE SULFATE 2 MG/ML
1 INJECTION, SOLUTION INTRAMUSCULAR; INTRAVENOUS
Status: DISCONTINUED | OUTPATIENT
Start: 2021-11-14 | End: 2021-11-16 | Stop reason: HOSPADM

## 2021-11-14 RX ORDER — FAMOTIDINE 20 MG/1
20 TABLET, FILM COATED ORAL DAILY
Status: DISCONTINUED | OUTPATIENT
Start: 2021-11-14 | End: 2021-11-15

## 2021-11-14 RX ORDER — HALOPERIDOL 5 MG/ML
2 INJECTION INTRAMUSCULAR ONCE
Status: COMPLETED | OUTPATIENT
Start: 2021-11-14 | End: 2021-11-14

## 2021-11-14 RX ORDER — HYDROCODONE BITARTRATE AND ACETAMINOPHEN 5; 325 MG/1; MG/1
1 TABLET ORAL ONCE
Status: COMPLETED | OUTPATIENT
Start: 2021-11-14 | End: 2021-11-14

## 2021-11-14 RX ORDER — FUROSEMIDE 10 MG/ML
80 INJECTION INTRAMUSCULAR; INTRAVENOUS EVERY 12 HOURS
Status: DISCONTINUED | OUTPATIENT
Start: 2021-11-14 | End: 2021-11-15

## 2021-11-14 RX ADMIN — ISOSORBIDE MONONITRATE 30 MG: 30 TABLET, EXTENDED RELEASE ORAL at 08:45

## 2021-11-14 RX ADMIN — HYDROCODONE BITARTRATE AND ACETAMINOPHEN 1 TABLET: 5; 325 TABLET ORAL at 20:30

## 2021-11-14 RX ADMIN — CARVEDILOL 12.5 MG: 12.5 TABLET, FILM COATED ORAL at 20:30

## 2021-11-14 RX ADMIN — CARVEDILOL 12.5 MG: 12.5 TABLET, FILM COATED ORAL at 08:45

## 2021-11-14 RX ADMIN — MORPHINE SULFATE 1 MG: 2 INJECTION, SOLUTION INTRAMUSCULAR; INTRAVENOUS at 23:58

## 2021-11-14 RX ADMIN — HYDRALAZINE HYDROCHLORIDE 25 MG: 25 TABLET, FILM COATED ORAL at 08:45

## 2021-11-14 RX ADMIN — POTASSIUM CHLORIDE 40 MEQ: 10 TABLET, EXTENDED RELEASE ORAL at 08:45

## 2021-11-14 RX ADMIN — Medication 10 ML: at 13:34

## 2021-11-14 RX ADMIN — LEVOFLOXACIN 750 MG: 500 TABLET, FILM COATED ORAL at 13:34

## 2021-11-14 RX ADMIN — HYDRALAZINE HYDROCHLORIDE 25 MG: 25 TABLET, FILM COATED ORAL at 17:18

## 2021-11-14 RX ADMIN — APIXABAN 5 MG: 5 TABLET, FILM COATED ORAL at 17:18

## 2021-11-14 RX ADMIN — ALBUTEROL SULFATE 2 PUFF: 108 AEROSOL, METERED RESPIRATORY (INHALATION) at 08:37

## 2021-11-14 RX ADMIN — Medication 10 ML: at 21:52

## 2021-11-14 RX ADMIN — FUROSEMIDE 80 MG: 10 INJECTION, SOLUTION INTRAMUSCULAR; INTRAVENOUS at 05:27

## 2021-11-14 RX ADMIN — HYDRALAZINE HYDROCHLORIDE 25 MG: 25 TABLET, FILM COATED ORAL at 21:52

## 2021-11-14 RX ADMIN — ATORVASTATIN CALCIUM 80 MG: 40 TABLET, FILM COATED ORAL at 21:52

## 2021-11-14 RX ADMIN — ONDANSETRON 4 MG: 4 TABLET, ORALLY DISINTEGRATING ORAL at 18:06

## 2021-11-14 RX ADMIN — HYDROCODONE BITARTRATE AND ACETAMINOPHEN 1 TABLET: 5; 325 TABLET ORAL at 17:17

## 2021-11-14 RX ADMIN — FERROUS SULFATE TAB 325 MG (65 MG ELEMENTAL FE) 325 MG: 325 (65 FE) TAB at 08:45

## 2021-11-14 RX ADMIN — FUROSEMIDE 80 MG: 10 INJECTION, SOLUTION INTRAMUSCULAR; INTRAVENOUS at 20:30

## 2021-11-14 RX ADMIN — ALBUTEROL SULFATE 2 PUFF: 108 AEROSOL, METERED RESPIRATORY (INHALATION) at 17:00

## 2021-11-14 RX ADMIN — Medication 10 ML: at 05:25

## 2021-11-14 RX ADMIN — METOLAZONE 5 MG: 2.5 TABLET ORAL at 08:45

## 2021-11-14 RX ADMIN — APIXABAN 5 MG: 5 TABLET, FILM COATED ORAL at 08:45

## 2021-11-14 RX ADMIN — HYDROCODONE BITARTRATE AND ACETAMINOPHEN 1 TABLET: 5; 325 TABLET ORAL at 08:51

## 2021-11-14 RX ADMIN — HALOPERIDOL LACTATE 2 MG: 5 INJECTION, SOLUTION INTRAMUSCULAR at 19:43

## 2021-11-14 RX ADMIN — FAMOTIDINE 20 MG: 20 TABLET, FILM COATED ORAL at 20:32

## 2021-11-14 NOTE — PROGRESS NOTES
Problem: Falls - Risk of  Goal: *Absence of Falls  Description: Document Wong Reason Fall Risk and appropriate interventions in the flowsheet.   Outcome: Progressing Towards Goal  Note: Fall Risk Interventions:  Mobility Interventions: Bed/chair exit alarm         Medication Interventions: Patient to call before getting OOB    Elimination Interventions: Call light in reach    History of Falls Interventions: Bed/chair exit alarm         Problem: Patient Education: Go to Patient Education Activity  Goal: Patient/Family Education  Outcome: Progressing Towards Goal     Problem: Patient Education: Go to Patient Education Activity  Goal: Patient/Family Education  Outcome: Progressing Towards Goal

## 2021-11-14 NOTE — PROGRESS NOTES
Hospitalist Progress Note             Date of Service:  2021  NAME:  Clinton Duckworth  :  1950  MRN:  [id number]    Assessment & Plan:       Acute Exacerbation of CHF (congestive heart failure) ([facility])  -change lasix to 80mg iv q12h, dc metolazone  -BNP:1120  -last ECHO 2021,   -Monitor I&O and daily weights  -cardiac telemetry   -EKG: SR with PACs  -fluid restriction 1500 ml  -Troponin 0.19, trend serial troponin  -Daily BMP (closely monitor K+ and Creatinine)  -cardiac consulted, -4900 cc thus far     Acute Kidney Injury  -likely secondary to fluid overload  -B/C: .70  -IV diuresing with IV Lasix  -had been improving with diuresis, now going back up, will cut back to 80 bid and dc metolazone     Obesity (BMI 30-39.9)  -BMI: 37.38  -adjusting lifestyle modification education     DVT (deep venous thrombosis) ([facility])  -continue Eliquis     Hyperlipidemia  -continue Lipitor     Hypertension  -chronic/mildly above goal  -continue Coreg, Hydralazine, and Imdur  -monitor BP closely     Left Foot Wound  -status post left partial calcanectomy  -wound vac in place   -will convert to wet to dry dressing changes tomorrow     Cocaine Abuse  -per patient last use was a couple of days ago  -cessation education provided    Told pt we were finishing up on what could be done aggressively in hospital, pt wants to stay as long as possible, doen'st want to be discharged even tomorrow. I explained to him that to get better takes more than just staying in hospital, it takes stopping smoking crack, and putting his health first, and being compliant with medicaitons and diet. I explained that he will be discharged tomorrow unless there is reason to keep him. He should inform his friends and family that tomorrow is his discharge day unless otherwise told.         Hospital Problems  Date Reviewed: 5/3/2021          Codes Class Noted POA    CHF (congestive heart failure) ([facility]) ICD-10-CM: I50.9  ICD-9-CM: 428.0  11/11/2021 Unknown        Hyperlipidemia ICD-10-CM: E78.5  ICD-9-CM: 272.4  11/11/2021 Unknown        Hypertension ICD-10-CM: I10  ICD-9-CM: 401.9  11/11/2021 Unknown        DVT (deep venous thrombosis) [facility] ICD-10-CM: I82.409  ICD-9-CM: 453.40  4/10/2021 Yes        Obesity (BMI 30-39. 9) ICD-10-CM: E66.9  ICD-9-CM: 278.00  4/5/2021 Yes                Review of Systems:   A comprehensive review of systems was negative except for that written in the HPI. Vital Signs:    Last 24hrs VS reviewed since prior progress note. Most recent are:  Visit Vitals  BP (!) 145/88 (BP 1 Location: Left upper arm, BP Patient Position: At rest)   Pulse 72   Temp 97 °F (36.1 °C)   Resp 19   Ht 5' 11\" (1.803 m)   Wt 128.4 kg (283 lb)   SpO2 99%   BMI 39.47 kg/m²         Intake/Output Summary (Last 24 hours) at 11/14/2021 1009  Last data filed at 11/14/2021 0755  Gross per 24 hour   Intake 425 ml   Output 4000 ml   Net -3575 ml        Physical Examination:             General:          Alert, cooperative, no distress, appears stated age. HEENT:           Atraumatic, anicteric sclerae, pink conjunctivae                          No oral ulcers, mucosa moist, throat clear, dentition fair  Neck:               Supple, symmetrical  Lungs:             end exp wheeze  Chest wall:      No tenderness  No Accessory muscle use. Heart:              Regular  rhythm,  No  murmur   2+ edema  Abdomen:        Soft, non-tender. Not distended. Bowel sounds normal  Extremities:     No cyanosis. No clubbing,                            Skin turgor normal, Capillary refill normal  Skin:                wound to L foot  Psych:             Not anxious or agitated.   Neurologic:      Alert, moves all extremities, answers questions appropriately and responds to commands        Data Review:    Review and/or order of clinical lab test  Review and/or order of tests in the radiology section of CPT  Review and/or order of tests in the medicine section of CPT      Labs:     Recent Labs     11/14/21 0315 11/13/21 0403   WBC 5.1 5.3   HGB 10.8* 10.4*   HCT 37.8 36.0   * 113*     Recent Labs     11/14/21  0315 11/13/21  0403 11/12/21  0400    148* 145   K 4.2 4.0 3.9    108 107   CO2 32 31 28   BUN 31* 30* 29*   CREA 1.70* 1.50 1.60*    108 105   CA 8.7 8.3* 8.4*   MG 1.8 1.9 1.8   PHOS 2.8 2.8  --      No results for input(s): ALT, AP, TBIL, TBILI, TP, ALB, GLOB, GGT, AML, LPSE in the last 72 hours. No lab exists for component: SGOT, GPT, AMYP, HLPSE  No results for input(s): INR, PTP, APTT, INREXT in the last 72 hours. No results for input(s): FE, TIBC, PSAT, FERR in the last 72 hours. No results found for: FOL, RBCF   No results for input(s): PH, PCO2, PO2 in the last 72 hours.   Recent Labs     11/12/21 0400 11/11/21 2125 11/11/21  1730   TROIQ 0.18* 0.18* 0.17*     No results found for: CHOL, CHOLX, CHLST, CHOLV, HDL, HDLP, LDL, LDLC, DLDLP, TGLX, TRIGL, TRIGP, CHHD, CHHDX  Lab Results   Component Value Date/Time    Glucose (POC) 151 (H) 11/11/2021 04:16 PM    Glucose (POC) 112 (H) 05/13/2021 11:53 AM    Glucose (POC) 124 (H) 05/12/2021 11:26 PM    Glucose (POC) 131 (H) 05/12/2021 06:35 PM    Glucose (POC) 98 05/12/2021 11:28 AM    Glucose (POC) 97 05/12/2021 06:25 AM     Lab Results   Component Value Date/Time    Color YELLOW 04/14/2021 05:45 PM    Appearance CLEAR 04/14/2021 05:45 PM    Specific gravity 1.014 04/14/2021 05:45 PM    pH (UA) 5.0 04/14/2021 05:45 PM    Protein Negative 04/14/2021 05:45 PM    Glucose Negative 04/14/2021 05:45 PM    Ketone Negative 04/14/2021 05:45 PM    Bilirubin Negative 04/14/2021 05:45 PM    Urobilinogen 0.2 04/14/2021 05:45 PM    Nitrites Negative 04/14/2021 05:45 PM    Leukocyte Esterase Negative 04/14/2021 05:45 PM    Epithelial cells Negative 04/13/2021 05:10 PM    Bacteria Negative 04/13/2021 05:10 PM    WBC Negative 04/13/2021 05:10 PM    RBC Negative 04/13/2021 05:10 PM         Medications Reviewed:     Current Facility-Administered Medications   Medication Dose Route Frequency    furosemide (LASIX) injection 80 mg  80 mg IntraVENous Q12H    metOLazone (ZAROXOLYN) tablet 5 mg  5 mg Oral DAILY    potassium chloride (KLOR-CON) tablet 40 mEq  40 mEq Oral DAILY    albuterol CONCENTRATE 2.5mg/0.5 mL neb soln  2.5 mg Nebulization Q4H PRN    carvediloL (COREG) tablet 12.5 mg  12.5 mg Oral Q12H    albuterol (PROVENTIL HFA, VENTOLIN HFA, PROAIR HFA) inhaler 2 Puff  2 Puff Inhalation Q4H PRN    sodium chloride (NS) flush 5-40 mL  5-40 mL IntraVENous Q8H    sodium chloride (NS) flush 5-40 mL  5-40 mL IntraVENous PRN    acetaminophen (TYLENOL) tablet 650 mg  650 mg Oral Q6H PRN    Or    acetaminophen (TYLENOL) suppository 650 mg  650 mg Rectal Q6H PRN    polyethylene glycol (MIRALAX) packet 17 g  17 g Oral DAILY PRN    ondansetron (ZOFRAN ODT) tablet 4 mg  4 mg Oral Q8H PRN    Or    ondansetron (ZOFRAN) injection 4 mg  4 mg IntraVENous Q6H PRN    albuterol (PROVENTIL HFA, VENTOLIN HFA, PROAIR HFA) inhaler 2 Puff  2 Puff Inhalation Q4H PRN    apixaban (ELIQUIS) tablet 5 mg  5 mg Oral BID    atorvastatin (LIPITOR) tablet 80 mg  80 mg Per G Tube QHS    ferrous sulfate tablet 325 mg  1 Tablet Oral ACB    hydrALAZINE (APRESOLINE) tablet 25 mg  25 mg Oral TID    isosorbide mononitrate ER (IMDUR) tablet 30 mg  30 mg Oral DAILY    levoFLOXacin (LEVAQUIN) tablet 750 mg  750 mg Oral EVERY OTHER DAY    HYDROcodone-acetaminophen (NORCO) 5-325 mg per tablet 1 Tablet  1 Tablet Oral Q6H PRN     ______________________________________________________________________  EXPECTED LENGTH OF STAY: 3d 0h  ACTUAL LENGTH OF STAY:          3                 Leslie Gibson MD

## 2021-11-14 NOTE — PROGRESS NOTES
0700- Shift report received   [id number] mL emptied   1200-Patient had BM- cleaned up   1630- Pt requesting bedpan. No BM. 1717- Pain pill given with night meds. 1730- Pt calling out that he can not breathe- O2 sats 100% RA. Request O2 educated patient he did not need o2.   1800- Pt continuing to call out that he cannot breath - NP notified she as well replied o2 was not needed. 1810- Pt calling on call bell numerous times the complaint changed from breathing to stomach pain. Emesis bag and zofran given. 65- Family member called to express concerns- reassured that someone was tending to him and meds are given. 1825- Pt spitting into bag no vomit and continuing to call out.

## 2021-11-14 NOTE — PROGRESS NOTES
1850 - Shift change report received from [facility] - vss. Patient turned and repositioned. Inspiratory wheezing. Patient educated on importance of raising head of bed to assist with breathing. Patient declines. Head of bed at 30 degrees. 2021 - PRN percocet administered for 8/10 foot pain. [place] and shine crackers given with 1/2 cup ice chips. 0100 - suction cannister emptied of clear yellow urine. Patient turned and repositioned. Snack provided. 0230 - new linens and PrimoFit applied. Suction cannister emptied of clear yellow urine. 0533 - VSS. Suction cannister emptied of clear yellow urine. Scheduled medication admininstered.

## 2021-11-15 ENCOUNTER — APPOINTMENT (OUTPATIENT)
Dept: GENERAL RADIOLOGY | Age: 71
DRG: 291 | End: 2021-11-15
Attending: NURSE PRACTITIONER
Payer: MEDICARE

## 2021-11-15 ENCOUNTER — APPOINTMENT (OUTPATIENT)
Dept: NON INVASIVE DIAGNOSTICS | Age: 71
DRG: 291 | End: 2021-11-15
Attending: NURSE PRACTITIONER
Payer: MEDICARE

## 2021-11-15 LAB
ANION GAP SERPL CALC-SCNC: 12 MMOL/L
BASOPHILS # BLD: 0 K/UL (ref 0–0.1)
BASOPHILS NFR BLD: 1 % (ref 0–2)
BUN SERPL-MCNC: 37 MG/DL (ref 9–21)
BUN/CREAT SERPL: 21
CA-I BLD-MCNC: 8.9 MG/DL (ref 8.5–10.5)
CHLORIDE SERPL-SCNC: 102 MMOL/L (ref 94–111)
CO2 SERPL-SCNC: 30 MMOL/L (ref 21–33)
CREAT SERPL-MCNC: 1.8 MG/DL (ref 0.8–1.5)
DIFFERENTIAL METHOD BLD: ABNORMAL
EOSINOPHIL # BLD: 0 K/UL (ref 0–0.4)
EOSINOPHIL NFR BLD: 1 % (ref 0–5)
ERYTHROCYTE [DISTWIDTH] IN BLOOD BY AUTOMATED COUNT: 22.5 % (ref 11.6–14.5)
GLUCOSE SERPL-MCNC: 104 MG/DL (ref 70–110)
HCT VFR BLD AUTO: 38.9 % (ref 36–48)
HGB BLD-MCNC: 11.4 G/DL (ref 13–16)
IMM GRANULOCYTES # BLD AUTO: 0 K/UL (ref 0–0.04)
IMM GRANULOCYTES NFR BLD AUTO: 0 % (ref 0–0.5)
LACTATE SERPL-SCNC: 1.9 MMOL/L (ref 0.5–2)
LYMPHOCYTES # BLD: 1.3 K/UL (ref 0.9–3.6)
LYMPHOCYTES NFR BLD: 20 % (ref 21–52)
MAGNESIUM SERPL-MCNC: 1.9 MG/DL (ref 1.7–2.8)
MCH RBC QN AUTO: 23.3 PG (ref 24–34)
MCHC RBC AUTO-ENTMCNC: 29.3 G/DL (ref 31–37)
MCV RBC AUTO: 79.4 FL (ref 78–100)
MONOCYTES # BLD: 0.5 K/UL (ref 0.05–1.2)
MONOCYTES NFR BLD: 8 % (ref 3–10)
NEUTS SEG # BLD: 4.5 K/UL (ref 1.8–8)
NEUTS SEG NFR BLD: 70 % (ref 40–73)
NRBC # BLD: 0 K/UL (ref 0–0.01)
NRBC BLD-RTO: 0 PER 100 WBC
PHOSPHATE SERPL-MCNC: 3.6 MG/DL (ref 2.5–4.5)
PLATELET # BLD AUTO: 116 K/UL (ref 135–420)
POTASSIUM SERPL-SCNC: 5.6 MMOL/L (ref 3.2–5.1)
RBC # BLD AUTO: 4.9 M/UL (ref 4.35–5.65)
SODIUM SERPL-SCNC: 144 MMOL/L (ref 135–145)
WBC # BLD AUTO: 6.5 K/UL (ref 4.6–13.2)

## 2021-11-15 PROCEDURE — 80048 BASIC METABOLIC PNL TOTAL CA: CPT

## 2021-11-15 PROCEDURE — 84100 ASSAY OF PHOSPHORUS: CPT

## 2021-11-15 PROCEDURE — 83735 ASSAY OF MAGNESIUM: CPT

## 2021-11-15 PROCEDURE — 85025 COMPLETE CBC W/AUTO DIFF WBC: CPT

## 2021-11-15 PROCEDURE — 74011250637 HC RX REV CODE- 250/637: Performed by: INTERNAL MEDICINE

## 2021-11-15 PROCEDURE — 36415 COLL VENOUS BLD VENIPUNCTURE: CPT

## 2021-11-15 PROCEDURE — 74018 RADEX ABDOMEN 1 VIEW: CPT

## 2021-11-15 PROCEDURE — 74011250637 HC RX REV CODE- 250/637: Performed by: NURSE PRACTITIONER

## 2021-11-15 PROCEDURE — 65270000029 HC RM PRIVATE

## 2021-11-15 RX ORDER — FUROSEMIDE 10 MG/ML
60 INJECTION INTRAMUSCULAR; INTRAVENOUS EVERY 12 HOURS
Status: DISCONTINUED | OUTPATIENT
Start: 2021-11-15 | End: 2021-11-15

## 2021-11-15 RX ORDER — HYDRALAZINE HYDROCHLORIDE 20 MG/ML
10 INJECTION INTRAMUSCULAR; INTRAVENOUS
Status: DISCONTINUED | OUTPATIENT
Start: 2021-11-15 | End: 2021-11-16 | Stop reason: HOSPADM

## 2021-11-15 RX ORDER — LEVOFLOXACIN 5 MG/ML
750 INJECTION, SOLUTION INTRAVENOUS
Status: DISCONTINUED | OUTPATIENT
Start: 2021-11-16 | End: 2021-11-15

## 2021-11-15 RX ORDER — SODIUM CHLORIDE 450 MG/100ML
50 INJECTION, SOLUTION INTRAVENOUS CONTINUOUS
Status: DISCONTINUED | OUTPATIENT
Start: 2021-11-16 | End: 2021-11-15

## 2021-11-15 RX ORDER — ENOXAPARIN SODIUM 100 MG/ML
1 INJECTION SUBCUTANEOUS EVERY 12 HOURS
Status: DISCONTINUED | OUTPATIENT
Start: 2021-11-16 | End: 2021-11-15

## 2021-11-15 RX ORDER — FUROSEMIDE 40 MG/1
80 TABLET ORAL EVERY 12 HOURS
Status: DISCONTINUED | OUTPATIENT
Start: 2021-11-15 | End: 2021-11-15

## 2021-11-15 RX ORDER — LEVOFLOXACIN 500 MG/1
500 TABLET, FILM COATED ORAL EVERY 24 HOURS
Status: DISCONTINUED | OUTPATIENT
Start: 2021-11-15 | End: 2021-11-16 | Stop reason: HOSPADM

## 2021-11-15 RX ADMIN — FUROSEMIDE 60 MG: 40 TABLET ORAL at 17:33

## 2021-11-15 RX ADMIN — Medication 10 ML: at 05:52

## 2021-11-15 RX ADMIN — ISOSORBIDE MONONITRATE 30 MG: 30 TABLET, EXTENDED RELEASE ORAL at 08:49

## 2021-11-15 RX ADMIN — Medication 10 ML: at 21:22

## 2021-11-15 RX ADMIN — FERROUS SULFATE TAB 325 MG (65 MG ELEMENTAL FE) 325 MG: 325 (65 FE) TAB at 08:49

## 2021-11-15 RX ADMIN — CARVEDILOL 12.5 MG: 12.5 TABLET, FILM COATED ORAL at 21:19

## 2021-11-15 RX ADMIN — CARVEDILOL 12.5 MG: 12.5 TABLET, FILM COATED ORAL at 08:49

## 2021-11-15 RX ADMIN — LEVOFLOXACIN 500 MG: 500 TABLET, FILM COATED ORAL at 12:42

## 2021-11-15 RX ADMIN — APIXABAN 5 MG: 5 TABLET, FILM COATED ORAL at 08:49

## 2021-11-15 RX ADMIN — APIXABAN 5 MG: 5 TABLET, FILM COATED ORAL at 17:33

## 2021-11-15 RX ADMIN — ACETAMINOPHEN 650 MG: 325 TABLET ORAL at 19:22

## 2021-11-15 RX ADMIN — FUROSEMIDE 80 MG: 40 TABLET ORAL at 08:49

## 2021-11-15 RX ADMIN — ATORVASTATIN CALCIUM 80 MG: 40 TABLET, FILM COATED ORAL at 21:19

## 2021-11-15 NOTE — PROGRESS NOTES
SURGERY    CALLED RE ABD PAIN  [de-identified] YO MALE  WITH CHF / CRI / COCAINE ABUSE  CT - DILATED SMALL BOWEL,   CELIAC AND SMA   AXIS OPEN  MIN CALCIFICATION  WBC - NL   LA < 2  CO2 - 30    ABD SOFT MIN TENDERNESS  NO G   NO REBOUND    ABD PAIN  DOUBT ISCHEMIC BOWEL    RESTART PO - LIQUIDS  REPEAT LA / LYTES/ WBC      [employer]

## 2021-11-15 NOTE — PROGRESS NOTES
CARDIOLOGY PROGRESS NOTE - NP    Patient seen and examined. This is a patient who is followed for acute HFpEF. He reports improvement in his shortness of breath; he thinks he is feeling 50% better than prior to admission. He denies chest pain. He is -7000 ml since admission and -2100 ml over the past 24 hours. He has been seen by general surgery today for abdominal pain; he pulled out his own NG tube. No other complaints reported. He is refusing to wear telemetry. Pertinent review of systems items noted above, all other systems are negative. Current medications reviewed. PHYSICAL EXAMINATION:  Vital sign assessment reveal a blood pressure of 145/85 and pulse rate of 63. Cardiovascular exam has a heart with a regular rate and rhythm, normal S1 and S2. No murmur present. There are no rubs or gallops. Good peripheral pulses. No jugular venous distension. Respiratory exam reveals clear lung fields, no rales or rhonchi. Anasarca noted. Lymphatic exam reveals 2+ lower extremity edema. Neurologic exam is nonfocal.      Recent labs results and imaging reviewed. Notable findings include:  Recent Results (from the past 24 hour(s))   CBC WITH AUTOMATED DIFF    Collection Time: 11/15/21  3:02 AM   Result Value Ref Range    WBC 6.5 4.6 - 13.2 K/uL    RBC 4.90 4.35 - 5.65 M/uL    HGB 11.4 (L) 13.0 - 16.0 g/dL    HCT 38.9 36.0 - 48.0 %    MCV 79.4 78.0 - 100.0 FL    MCH 23.3 (L) 24.0 - 34.0 PG    MCHC 29.3 (L) 31.0 - 37.0 g/dL    RDW 22.5 (H) 11.6 - 14.5 %    PLATELET 430 (L) 657 - 420 K/uL    NRBC 0.0 0.0  WBC    ABSOLUTE NRBC 0.00 0.00 - 0.01 K/uL    NEUTROPHILS 70 40 - 73 %    LYMPHOCYTES 20 (L) 21 - 52 %    MONOCYTES 8 3 - 10 %    EOSINOPHILS 1 0 - 5 %    BASOPHILS 1 0 - 2 %    IMMATURE GRANULOCYTES 0 0 - 0.5 %    ABS. NEUTROPHILS 4.5 1.8 - 8.0 K/UL    ABS. LYMPHOCYTES 1.3 0.9 - 3.6 K/UL    ABS. MONOCYTES 0.5 0.05 - 1.2 K/UL    ABS. EOSINOPHILS 0.0 0.0 - 0.4 K/UL    ABS.  BASOPHILS 0.0 0.0 - 0.1 K/UL ABS. IMM. GRANS. 0.0 0.00 - 0.04 K/UL    DF AUTOMATED     MAGNESIUM    Collection Time: 11/15/21  3:02 AM   Result Value Ref Range    Magnesium 1.9 1.7 - 2.8 mg/dL   METABOLIC PANEL, BASIC    Collection Time: 11/15/21  3:02 AM   Result Value Ref Range    Sodium 144 135 - 145 mmol/L    Potassium 5.6 (H) 3.2 - 5.1 mmol/L    Chloride 102 94 - 111 mmol/L    CO2 30 21 - 33 mmol/L    Anion gap 12 mmol/L    Glucose 104 70 - 110 mg/dL    BUN 37 (H) 9 - 21 mg/dL    Creatinine 1.80 (H) 0.8 - 1.50 mg/dL    BUN/Creatinine ratio 21      GFR est AA 45 ml/min/1.73m2    GFR est non-AA 37 ml/min/1.73m2    Calcium 8.9 8.5 - 10.5 mg/dL   PHOSPHORUS    Collection Time: 11/15/21  3:02 AM   Result Value Ref Range    Phosphorus 3.6 2.5 - 4.5 mg/dL     Discussed case with Dr. Toño Ochoa, and our impression and recommendations are as follows:  Acute heart failure with preserved ejection fraction: Symptoms improving. But renal function is worse with rising Cr to 1.8 today and serum K of 5.6. Will decrease Lasix to 60 mg po BID. Continue daily weights, strict I&Os, and 1500 ml fluid restrictions/24 hours. Requested nutritionist consultation for diet education. Continue telemetry monitoring. Recommend to keep serum potassium between 4-5 and serum magnesium > 2. His echocardiogram done in April 2021 shows EF of 55% with grade 2 diastolic dysfunction and moderate pulmonary HTN. Elevated troponin: Troponins are elevated, but trend is flat from 0.17-0.19, which is non-ACS range. EKG is nonischemic. He remains free of chest pain. He is anticoagulated with Eliquis. Continue beta blocker. He will need stress testing as outpatient, if not recently done. HTN: BP is improving. Continue carvedilol 12.5 mg BID. Continue hydralazine 25 mg TID and Imdur 30 once daily. Hyperlipidemia: Continue atorvastatin 80 mg daily. Polysubstance abuse: Advised cessation from tobacco and cocaine. Hyperkalemia: K is 5.6 today.  Will hold potassium supplement and recheck tomorrow. Thank you for involving us in the care of this patient. Please do not hesitate to call me or Dr. Toño Ochoa if additional questions arise. If assistance is needed after hours, please call the answering service at [phone].

## 2021-11-15 NOTE — PROGRESS NOTES
Problem: Falls - Risk of  Goal: *Absence of Falls  Description: Document Soledad Velázquez Fall Risk and appropriate interventions in the flowsheet.   Outcome: Progressing Towards Goal  Note: Fall Risk Interventions:  Mobility Interventions: Bed/chair exit alarm         Medication Interventions: Bed/chair exit alarm    Elimination Interventions: Call light in reach, Bed/chair exit alarm    History of Falls Interventions: Bed/chair exit alarm         Problem: Patient Education: Go to Patient Education Activity  Goal: Patient/Family Education  Outcome: Progressing Towards Goal     Problem: Patient Education: Go to Patient Education Activity  Goal: Patient/Family Education  Outcome: Progressing Towards Goal

## 2021-11-15 NOTE — PROGRESS NOTES
1900- assumed care of pt after receiving report from off going nurse  1940- pt has called call bell multiple times c/o abd pain, he is rolling back and forth in the bed c/o epigastric pain. NP aware  1950- haldol 2mg given   2010- Pt continues to yell out, \"help help me\" c/o pain. When talking to patient he is completely alert but as soon as staff walks out of room he yells out. NP made aware again, 1 now dose of norco ordered   2030- norco given, pt placed on bedpan,pt was able to have a small BM. Pt bathed and new linens placed on bed. 2140- pt continues to complain of severe abdominal pain. NP aware and at bedside, stat CT of abd ordered   2220- pt taken down to CT scan   2240-Pt returned to room 258- primofit applied and tele intact. Pt is still yelling,grunting and moving repeatedly in bed c/o abd pain  2330- provider  Aware of CT findings  0015-#18F NG tube inserted to right nare by Stefan Jurado. Pt tolerated fairly well. Morphine 1mg was given IVP prior to insertion. Gastric contents noted in suction cannister. Pt appears more comfortable after insertion and states relief from abdominal pain. 0200- pt rang call bell asking for nurse. Upon entering the room, the NG tube is noted to be on the floor. Pt states \"yeah, I pulled it out because im tired of it, I dont want it anymore\" Nursing sup and NP aware- pt is refusing to have it placed again. Order received to leave pt NPO   0400- pt called CB stating his bed was wet. Patient removed primofit that was attached by hand to a cardona bag due to the amount of urine patient is putting out from lasix, patient threw both bags on the floor and urine was covering the floor. Telemetry was off--reapplied for the 6th time tonight. Patient stated he \"doesn't want it on\" and then pushed his wound vac off the bed onto the floor. Pt turned to his left side after this nurse changed his sheets, placed a new primo fit on and connected to suction and replaced tele.  He refuses to speak to this writer while still in the room. New blankets placed on patient- he also is refusing a gown and is naked in the bed.

## 2021-11-15 NOTE — PROGRESS NOTES
2108: Received phone call from patient's primary nurse, stating that the patient is in the room yelling complain of severe abdominal pain, rounded distended abdomen noted (anasarca), decreased bowels, order placed for CT abdomen without contrast.    CT resulted: There are mildly dilated small bowel loops bowel wall thickening concerning for ileus/enteritis versus small bowel obstruction. Bowel wall thickening concerning for ischemic changes. 2345: Spoke with Dr. Asher Carter about about consultation, will change patient to NPO, gentle hydration, pain management, and NGT to low intermittent suction. 0015: at the bedside with nursing when NGT was placed, patient tolerated well, patient reports improvement in pain    CT resulted: There are mildly dilated small bowel loops bowel wall thickening concerning for ileus/enteritis versus small bowel obstruction. Bowel wall thickening concerning for ischemic changes.   N.p.o.  NG tube to low continuous wall suction  IV hydration: gentle hydration in setting of CHF  Monitor BMP, CBC  General surgery consulted: spoke with Dr. Aaron Real and made his aware of surgical consult

## 2021-11-15 NOTE — PROGRESS NOTES
8953- Shift report received pt resting   0745- Pt states, \"he has no complaints this morning\" CBWR. Clear liquid diet tolerated  0800- Reapplied tele  0825- Pt not on tele, reapplied and educated on importance of keeping it on. Pt noncompliant   0845- Morning meds administered  1500- Wound vac care done by Chi Zavala RN. Pt able to sleep while being done. Reapplied tele- off in 5 minutes.

## 2021-11-15 NOTE — PROGRESS NOTES
HOSPITALIST PROGRESS NOTE  Reynaldo Oliva MD, [address]         Daily Progress Note: 11/15/2021      Subjective:     Patient is alert and oriented x2 and continues to have significant shortness of breath. No overnight hypoxia, shortness of breath,/vomiting or abdominal pain noted. Continues to complain of of significant lower extremity edema and states he needs further diuresis. Diuresed approximately 2 L over the last 24 hours with IV Lasix 80 mg twice daily. Patient noted to have significant hyperkalemia as well as acute kidney injury with continue aggressive diuresis today. Jayla with cardiology to continue to manage patient with decreasing diuresis and medication management.       Medications reviewed  Current Facility-Administered Medications   Medication Dose Route Frequency    hydrALAZINE (APRESOLINE) 20 mg/mL injection 10 mg  10 mg IntraVENous Q6H PRN    levoFLOXacin (LEVAQUIN) tablet 500 mg  500 mg Oral Q24H    furosemide (LASIX) tablet 60 mg  60 mg Oral ACB&D    morphine injection 1 mg  1 mg IntraVENous Q4H PRN    [Held by provider] potassium chloride (KLOR-CON) tablet 40 mEq  40 mEq Oral DAILY    albuterol CONCENTRATE 2.5mg/0.5 mL neb soln  2.5 mg Nebulization Q4H PRN    carvediloL (COREG) tablet 12.5 mg  12.5 mg Oral Q12H    albuterol (PROVENTIL HFA, VENTOLIN HFA, PROAIR HFA) inhaler 2 Puff  2 Puff Inhalation Q4H PRN    sodium chloride (NS) flush 5-40 mL  5-40 mL IntraVENous Q8H    sodium chloride (NS) flush 5-40 mL  5-40 mL IntraVENous PRN    acetaminophen (TYLENOL) tablet 650 mg  650 mg Oral Q6H PRN    Or    acetaminophen (TYLENOL) suppository 650 mg  650 mg Rectal Q6H PRN    polyethylene glycol (MIRALAX) packet 17 g  17 g Oral DAILY PRN    ondansetron (ZOFRAN ODT) tablet 4 mg  4 mg Oral Q8H PRN    Or    ondansetron (ZOFRAN) injection 4 mg  4 mg IntraVENous Q6H PRN    albuterol (PROVENTIL HFA, VENTOLIN HFA, PROAIR HFA) inhaler 2 Puff  2 Puff Inhalation Q4H PRN    apixaban (ELIQUIS) tablet 5 mg  5 mg Oral BID    atorvastatin (LIPITOR) tablet 80 mg  80 mg Per G Tube QHS    ferrous sulfate tablet 325 mg  1 Tablet Oral ACB    isosorbide mononitrate ER (IMDUR) tablet 30 mg  30 mg Oral DAILY       Review of Systems:   A comprehensive review of systems was negative except for that written in the HPI. Objective:   Physical Exam:     Visit Vitals  /84   Pulse 60   Temp 97 °F (36.1 °C)   Resp 18   Ht 5' 11\" (1.803 m)   Wt 128.4 kg (283 lb)   SpO2 95%   BMI 39.47 kg/m²      O2 Device: None (Room air)  Patient Vitals for the past 8 hrs:   Temp Pulse Resp BP SpO2   11/15/21 1140 97 °F (36.1 °C) 60 18 134/84 95 %   11/15/21 0730 96.9 °F (36.1 °C) 63 18 (!) 145/85 94 %          Temp (24hrs), Av.1 °F (36.2 °C), Min:96.8 °F (36 °C), Max:97.7 °F (36.5 °C)    No intake/output data recorded.  1901 - 11/15 0700  In: 540 [P.O.:540]  Out: 4300 [Urine:4300]    General:  Alert, cooperative, no distress, appears stated age. Lungs:    Diminished breath sounds with bilateral rails without any rhonchi noted. Chest wall:  No tenderness or deformity. Heart:  Regular rate and rhythm, S1, S2 normal, no murmur, click, rub or gallop. Abdomen:   Soft, non-tender. Bowel sounds normal. No masses,  No organomegaly. Extremities: Extremities normal, atraumatic, no cyanosis. 2+ bilateral lower extremity pitting edema noted. Pulses: 2+ and symmetric all extremities.    Skin: Skin color, texture, turgor normal. No rashes or lesions   Neurologic: No gross sensory or motor deficits     Data Review:       Recent Days:  Recent Labs     11/15/21  0302 21  0315 21  0403   WBC 6.5 5.1 5.3   HGB 11.4* 10.8* 10.4*   HCT 38.9 37.8 36.0   * 110* 113*     Recent Labs     11/15/21  0302 21  0315 21  0403    145 148*   K 5.6* 4.2 4.0    102 108   CO2 30 32 31    106 108   BUN 37* 31* 30*   CREA 1.80* 1.70* 1.50   CA 8.9 8.7 8.3*   MG 1.9 1.8 1.9   PHOS 3.6 2.8 2.8     No results for input(s): PH, PCO2, PO2, HCO3, FIO2 in the last 72 hours. 24 Hour Results:  Recent Results (from the past 24 hour(s))   CBC WITH AUTOMATED DIFF    Collection Time: 11/15/21  3:02 AM   Result Value Ref Range    WBC 6.5 4.6 - 13.2 K/uL    RBC 4.90 4.35 - 5.65 M/uL    HGB 11.4 (L) 13.0 - 16.0 g/dL    HCT 38.9 36.0 - 48.0 %    MCV 79.4 78.0 - 100.0 FL    MCH 23.3 (L) 24.0 - 34.0 PG    MCHC 29.3 (L) 31.0 - 37.0 g/dL    RDW 22.5 (H) 11.6 - 14.5 %    PLATELET 412 (L) 334 - 420 K/uL    NRBC 0.0 0.0  WBC    ABSOLUTE NRBC 0.00 0.00 - 0.01 K/uL    NEUTROPHILS 70 40 - 73 %    LYMPHOCYTES 20 (L) 21 - 52 %    MONOCYTES 8 3 - 10 %    EOSINOPHILS 1 0 - 5 %    BASOPHILS 1 0 - 2 %    IMMATURE GRANULOCYTES 0 0 - 0.5 %    ABS. NEUTROPHILS 4.5 1.8 - 8.0 K/UL    ABS. LYMPHOCYTES 1.3 0.9 - 3.6 K/UL    ABS. MONOCYTES 0.5 0.05 - 1.2 K/UL    ABS. EOSINOPHILS 0.0 0.0 - 0.4 K/UL    ABS. BASOPHILS 0.0 0.0 - 0.1 K/UL    ABS. IMM.  GRANS. 0.0 0.00 - 0.04 K/UL    DF AUTOMATED     MAGNESIUM    Collection Time: 11/15/21  3:02 AM   Result Value Ref Range    Magnesium 1.9 1.7 - 2.8 mg/dL   METABOLIC PANEL, BASIC    Collection Time: 11/15/21  3:02 AM   Result Value Ref Range    Sodium 144 135 - 145 mmol/L    Potassium 5.6 (H) 3.2 - 5.1 mmol/L    Chloride 102 94 - 111 mmol/L    CO2 30 21 - 33 mmol/L    Anion gap 12 mmol/L    Glucose 104 70 - 110 mg/dL    BUN 37 (H) 9 - 21 mg/dL    Creatinine 1.80 (H) 0.8 - 1.50 mg/dL    BUN/Creatinine ratio 21      GFR est AA 45 ml/min/1.73m2    GFR est non-AA 37 ml/min/1.73m2    Calcium 8.9 8.5 - 10.5 mg/dL   PHOSPHORUS    Collection Time: 11/15/21  3:02 AM   Result Value Ref Range    Phosphorus 3.6 2.5 - 4.5 mg/dL           Assessment/Plan:     Acute on chronic exacerbation of CHF (congestive heart failure) (HCC)  Continued on lasix to 80mg iv q12h, dc metolazone  Per cardiology will change Lasix to 60 mg p.o. every 12 hours with worsening creatinine and GFR noted. Last ECHO 4/2021,   EKG: SR with PACs  Continue fluid restriction 1500 ml  Mildly elevated troponins of 0.18, persistent without accompanying chest pain or EKG changes. Appreciate cardiology consultation.     Acute Kidney Injury  Creatinine elevated to 1.8 noted. 80 mg IV Lasix changed to p.o. Lasix 60 mg twice daily. Previously improving with diuresis, now going back up.     Hyperkalemia  Discontinue p.o. KCl for supplementation. Lasix dosing and repeat BMP in a.m. Abdominal pain overnight  Evaluated by Dr. Bere Mclain in a.m. and recommend to restart clear liquid diet as patient initially had NG tube placed overnight with significant improvement faster than expected. Currently tolerating solid diet. Obesity (BMI 30-39. 9)  BMI: 37.38  Adjusting lifestyle modification education     DVT (deep venous thrombosis) (HCC)  Continue Eliquis     Hyperlipidemia  Continue Lipitor     Hypertension  Continue Coreg, Hydralazine, and Imdur  Monitor BP closely     Left Foot Wound  Status post left partial calcanectomy  wound vac in place   Convert to wet to dry dressing changes.     Cocaine Abuse  Per patient last use was a couple of days ago  Cessation education provided      Care Plan discussed with: Patient/Family, cardiology, case management. Total time spent with patient: 40 minutes. With greater than 50% spent in coordination of care and counseling.     Peng Howard MD

## 2021-11-15 NOTE — PROGRESS NOTES
Problem: Falls - Risk of  Goal: *Absence of Falls  Description: Document Kelby Guzman Fall Risk and appropriate interventions in the flowsheet.   Outcome: Progressing Towards Goal  Note: Fall Risk Interventions:  Mobility Interventions: Bed/chair exit alarm         Medication Interventions: Bed/chair exit alarm, Patient to call before getting OOB    Elimination Interventions: Call light in reach    History of Falls Interventions: Bed/chair exit alarm         Problem: Patient Education: Go to Patient Education Activity  Goal: Patient/Family Education  Outcome: Progressing Towards Goal     Problem: Patient Education: Go to Patient Education Activity  Goal: Patient/Family Education  Outcome: Progressing Towards Goal     Problem: Heart Failure: Day 1  Goal: Off Pathway (Use only if patient is Off Pathway)  Outcome: Progressing Towards Goal  Goal: Activity/Safety  Outcome: Progressing Towards Goal  Goal: Consults, if ordered  Outcome: Progressing Towards Goal  Goal: Diagnostic Test/Procedures  Outcome: Progressing Towards Goal  Goal: Nutrition/Diet  Outcome: Progressing Towards Goal  Goal: Discharge Planning  Outcome: Progressing Towards Goal  Goal: Medications  Outcome: Progressing Towards Goal  Goal: Respiratory  Outcome: Progressing Towards Goal  Goal: Treatments/Interventions/Procedures  Outcome: Progressing Towards Goal  Goal: Psychosocial  Outcome: Progressing Towards Goal  Goal: *Oxygen saturation within defined limits  Outcome: Progressing Towards Goal  Goal: *Hemodynamically stable  Outcome: Progressing Towards Goal  Goal: *Optimal pain control at patient's stated goal  Outcome: Progressing Towards Goal  Goal: *Anxiety reduced or absent  Outcome: Progressing Towards Goal     Problem: Heart Failure: Day 2  Goal: Off Pathway (Use only if patient is Off Pathway)  Outcome: Progressing Towards Goal  Goal: Activity/Safety  Outcome: Progressing Towards Goal  Goal: Consults, if ordered  Outcome: Progressing Towards Goal  Goal: Diagnostic Test/Procedures  Outcome: Progressing Towards Goal  Goal: Nutrition/Diet  Outcome: Progressing Towards Goal  Goal: Discharge Planning  Outcome: Progressing Towards Goal  Goal: Medications  Outcome: Progressing Towards Goal  Goal: Respiratory  Outcome: Progressing Towards Goal  Goal: Treatments/Interventions/Procedures  Outcome: Progressing Towards Goal  Goal: Psychosocial  Outcome: Progressing Towards Goal  Goal: *Oxygen saturation within defined limits  Outcome: Progressing Towards Goal  Goal: *Hemodynamically stable  Outcome: Progressing Towards Goal  Goal: *Optimal pain control at patient's stated goal  Outcome: Progressing Towards Goal  Goal: *Anxiety reduced or absent  Outcome: Progressing Towards Goal  Goal: *Demonstrates progressive activity  Outcome: Progressing Towards Goal     Problem: Heart Failure: Day 3  Goal: Off Pathway (Use only if patient is Off Pathway)  Outcome: Progressing Towards Goal  Goal: Activity/Safety  Outcome: Progressing Towards Goal  Goal: Diagnostic Test/Procedures  Outcome: Progressing Towards Goal  Goal: Nutrition/Diet  Outcome: Progressing Towards Goal  Goal: Discharge Planning  Outcome: Progressing Towards Goal  Goal: Medications  Outcome: Progressing Towards Goal  Goal: Respiratory  Outcome: Progressing Towards Goal  Goal: Treatments/Interventions/Procedures  Outcome: Progressing Towards Goal  Goal: Psychosocial  Outcome: Progressing Towards Goal  Goal: *Oxygen saturation within defined limits  Outcome: Progressing Towards Goal  Goal: *Hemodynamically stable  Outcome: Progressing Towards Goal  Goal: *Optimal pain control at patient's stated goal  Outcome: Progressing Towards Goal  Goal: *Anxiety reduced or absent  Outcome: Progressing Towards Goal  Goal: *Demonstrates progressive activity  Outcome: Progressing Towards Goal     Problem: Heart Failure: Day 4  Goal: Off Pathway (Use only if patient is Off Pathway)  Outcome: Progressing Towards Goal  Goal: Activity/Safety  Outcome: Progressing Towards Goal  Goal: Diagnostic Test/Procedures  Outcome: Progressing Towards Goal  Goal: Nutrition/Diet  Outcome: Progressing Towards Goal  Goal: Discharge Planning  Outcome: Progressing Towards Goal  Goal: Medications  Outcome: Progressing Towards Goal  Goal: Respiratory  Outcome: Progressing Towards Goal  Goal: Treatments/Interventions/Procedures  Outcome: Progressing Towards Goal  Goal: Psychosocial  Outcome: Progressing Towards Goal  Goal: *Oxygen saturation within defined limits  Outcome: Progressing Towards Goal  Goal: *Hemodynamically stable  Outcome: Progressing Towards Goal  Goal: *Optimal pain control at patient's stated goal  Outcome: Progressing Towards Goal  Goal: *Anxiety reduced or absent  Outcome: Progressing Towards Goal  Goal: *Demonstrates progressive activity  Outcome: Progressing Towards Goal     Problem: Heart Failure: Day 5  Goal: Off Pathway (Use only if patient is Off Pathway)  Outcome: Progressing Towards Goal  Goal: Activity/Safety  Outcome: Progressing Towards Goal  Goal: Diagnostic Test/Procedures  Outcome: Progressing Towards Goal  Goal: Nutrition/Diet  Outcome: Progressing Towards Goal  Goal: Discharge Planning  Outcome: Progressing Towards Goal  Goal: Medications  Outcome: Progressing Towards Goal  Goal: Respiratory  Outcome: Progressing Towards Goal  Goal: Treatments/Interventions/Procedures  Outcome: Progressing Towards Goal  Goal: Psychosocial  Outcome: Progressing Towards Goal     Problem: Heart Failure: Discharge Outcomes  Goal: *Demonstrates ability to perform prescribed activity without shortness of breath or discomfort  Outcome: Progressing Towards Goal  Goal: *Left ventricular function assessment completed prior to or during stay, or planned for post-discharge  Outcome: Progressing Towards Goal  Goal: *ACEI prescribed if LVEF less than 40% and no contraindications or ARB prescribed  Outcome: Progressing Towards Goal  Goal: *Verbalizes understanding and describes prescribed diet  Outcome: Progressing Towards Goal  Goal: *Verbalizes understanding/describes prescribed medications  Outcome: Progressing Towards Goal  Goal: *Describes available resources and support systems  Description: (eg: Home Health, Palliative Care, Advanced Medical Directive)  Outcome: Progressing Towards Goal  Goal: *Describes smoking cessation resources  Outcome: Progressing Towards Goal  Goal: *Understands and describes signs and symptoms to report to providers(Stroke Metric)  Outcome: Progressing Towards Goal  Goal: *Describes/verbalizes understanding of follow-up/return appt  Description: (eg: to physicians, diabetes treatment coordinator, and other resources  Outcome: Progressing Towards Goal  Goal: *Describes importance of continuing daily weights and changes to report to physician  Outcome: Progressing Towards Goal

## 2021-11-16 VITALS
SYSTOLIC BLOOD PRESSURE: 110 MMHG | HEIGHT: 71 IN | OXYGEN SATURATION: 96 % | HEART RATE: 65 BPM | BODY MASS INDEX: 38.68 KG/M2 | RESPIRATION RATE: 20 BRPM | DIASTOLIC BLOOD PRESSURE: 73 MMHG | WEIGHT: 276.3 LBS | TEMPERATURE: 97.5 F

## 2021-11-16 LAB
ANION GAP SERPL CALC-SCNC: 8 MMOL/L
BUN SERPL-MCNC: 38 MG/DL (ref 9–21)
BUN/CREAT SERPL: 22
CA-I BLD-MCNC: 8.8 MG/DL (ref 8.5–10.5)
CHLORIDE SERPL-SCNC: 100 MMOL/L (ref 94–111)
CO2 SERPL-SCNC: 36 MMOL/L (ref 21–33)
CREAT SERPL-MCNC: 1.7 MG/DL (ref 0.8–1.5)
GLUCOSE SERPL-MCNC: 109 MG/DL (ref 70–110)
POTASSIUM SERPL-SCNC: 4.3 MMOL/L (ref 3.2–5.1)
SODIUM SERPL-SCNC: 144 MMOL/L (ref 135–145)

## 2021-11-16 PROCEDURE — 36415 COLL VENOUS BLD VENIPUNCTURE: CPT

## 2021-11-16 PROCEDURE — 74011250637 HC RX REV CODE- 250/637: Performed by: NURSE PRACTITIONER

## 2021-11-16 PROCEDURE — 94640 AIRWAY INHALATION TREATMENT: CPT

## 2021-11-16 PROCEDURE — 74011250637 HC RX REV CODE- 250/637: Performed by: INTERNAL MEDICINE

## 2021-11-16 PROCEDURE — 80048 BASIC METABOLIC PNL TOTAL CA: CPT

## 2021-11-16 RX ORDER — FUROSEMIDE 20 MG/1
60 TABLET ORAL 2 TIMES DAILY
Qty: 180 TABLET | Refills: 0 | Status: SHIPPED | OUTPATIENT
Start: 2021-11-16 | End: 2021-12-16

## 2021-11-16 RX ADMIN — LEVOFLOXACIN 500 MG: 500 TABLET, FILM COATED ORAL at 11:15

## 2021-11-16 RX ADMIN — APIXABAN 5 MG: 5 TABLET, FILM COATED ORAL at 17:35

## 2021-11-16 RX ADMIN — FUROSEMIDE 60 MG: 40 TABLET ORAL at 07:51

## 2021-11-16 RX ADMIN — ACETAMINOPHEN 650 MG: 325 TABLET ORAL at 04:17

## 2021-11-16 RX ADMIN — ISOSORBIDE MONONITRATE 30 MG: 30 TABLET, EXTENDED RELEASE ORAL at 08:24

## 2021-11-16 RX ADMIN — ACETAMINOPHEN 650 MG: 325 TABLET ORAL at 11:15

## 2021-11-16 RX ADMIN — APIXABAN 5 MG: 5 TABLET, FILM COATED ORAL at 08:23

## 2021-11-16 RX ADMIN — ALBUTEROL SULFATE 2 PUFF: 108 AEROSOL, METERED RESPIRATORY (INHALATION) at 08:01

## 2021-11-16 RX ADMIN — FERROUS SULFATE TAB 325 MG (65 MG ELEMENTAL FE) 325 MG: 325 (65 FE) TAB at 07:51

## 2021-11-16 RX ADMIN — Medication 10 ML: at 05:54

## 2021-11-16 RX ADMIN — CARVEDILOL 12.5 MG: 12.5 TABLET, FILM COATED ORAL at 08:24

## 2021-11-16 RX ADMIN — Medication 10 ML: at 14:52

## 2021-11-16 RX ADMIN — FUROSEMIDE 60 MG: 40 TABLET ORAL at 17:35

## 2021-11-16 NOTE — PROGRESS NOTES
Rounding, assessment, VS and white board completed. C/O pain w/Tylenol administered. Will monitor results. Requested and provided small container of grape juice but states understanding of fluid restriction. Wound vac intact. Safety measures in place. Will continue to monitor. CB in reach.

## 2021-11-16 NOTE — DISCHARGE SUMMARY
HOSPITALIST DISCHARGE NOTE  Peng Howard MD, [address]       PATIENT ID: Lidia Jon  MRN: [id number]   YOB: 1950    DATE OF ADMISSION: 11/11/2021  8:59 AM    DATE OF DISCHARGE: 11/16/21    PRIMARY CARE PROVIDER: DUSTIN Schwarz     ATTENDING PHYSICIAN: Peng Howard MD  DISCHARGING PROVIDER: Peng Howard MD        CONSULTATIONS: IP CONSULT TO CARDIOLOGY  IP CONSULT TO GENERAL SURGERY    PROCEDURES/SURGERIES: * No surgery found *    [address] COURSE:     Lidia Jon is a 79 y.o. 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 Patient states the shortness of breath started yesterday, but worsened overnight, other accompanied symptoms includes a non-productive cough, orthopnea, wheezing, and bilateral lower extremity edema. Patient denies chest pain, fever, chills, and palpitations.     In the ED HH 11.4/39.4, B/C 29/1.70, troponin 0.19, BNP 1120, CXR: No acute cardiopulmonary disease or significant change. Mild cardiomegaly and  probable left basilar subsegmental atelectasis, and EKG Sr with PVC. In the ED patient received Albuterol, Lasix 80 mg via IV, and Norco 1 tab. Admit to telemetry. Patient assessed at the bedside, patient is alert and oriented, currently patient continues to have shortness of breath, with noted wheezing.  Patient agrees to admission for a diagnosis of acute exacerbation of congestive heart failure, treatment to include IV diuresing, cardiac monitoring, cardiology consult, and ECHO in am.    Lindbergh Homans / PLAN:      Acute on chronic exacerbation of CHF (congestive heart failure) (HCC)  Continued on lasix to 80mg iv q12h, dc metolazone  Per cardiology changed Lasix to 60 mg p.o. every 12 hours with worsening creatinine and GFR noted. Last ECHO 4/2021,   EKG: SR with PACs  Continue fluid restriction 1500 ml  Mildly elevated troponins of 0.18, persistent without accompanying chest pain or EKG changes. [address] cardiology consultation. Patient is stable for discharge home on Lasix 60 mg p.o. twice daily.     Acute Kidney Injury  Creatinine elevated to 1.8 noted. 80 mg IV Lasix changed to p.o. Lasix 60 mg twice daily. Quite stable at Lasix 60 mg twice daily.     Hyperkalemia  Discontinue p.o. KCl for supplementation. Improved.     Abdominal pain overnight  Evaluated by Dr. Nidia Rai in a.m. and recommend to restart clear liquid diet as patient initially had NG tube placed overnight with significant improvement faster than expected. Currently tolerating solid diet.     Obesity (BMI 30-39. 9)  BMI: 37.38  Adjusting lifestyle modification education     DVT (deep venous thrombosis) (HCC)  Continue Eliquis     Hyperlipidemia  Continue Lipitor     Hypertension  Continue Coreg, Hydralazine, and Imdur  Monitor BP closely     Left Foot Wound  Status post left partial calcanectomy  wound vac in place   Convert to wet to dry dressing changes.     Cocaine Abuse  Per patient last use was a couple of days ago  Cessation education provided. Stable enough to be discharged home on increased dose of Lasix.     PENDING TEST RESULTS:   At the time of discharge the following test results are still pending: None    FOLLOW UP APPOINTMENTS:    Follow-up Information     Follow up With Specialties Details Why [address] [facility], [address] [place], [place] Nurse Practitioner   Reno Desai  [phone]             DIET: Cardiac Diet    ACTIVITY: Activity as tolerated      DISCHARGE MEDICATIONS:  Current Discharge Medication List      CONTINUE these medications which have CHANGED Details   furosemide (LASIX) 20 mg tablet Take 3 Tablets by mouth two (2) times a day for 30 days. Indications: accumulation of fluid resulting from chronic heart failure  Qty: 180 Tablet, Refills: 0  Start date: 11/16/2021, End date: 12/16/2021         CONTINUE these medications which have NOT CHANGED    Details   isosorbide mononitrate ER (IMDUR) 30 mg tablet Take 30 mg by mouth daily. hydrALAZINE (APRESOLINE) 25 mg tablet Take 25 mg by mouth three (3) times daily. carvediloL (COREG) 6.25 mg tablet Take 6.25 mg by mouth two (2) times daily (with meals). albuterol sulfate (PROAIR RESPICLICK) 90 mcg/actuation breath activated inhaler Take 90 mcg by inhalation every six (6) hours as needed. ferrous sulfate (FeroSuL) 325 mg (65 mg iron) tablet Take 325 mg by mouth Daily (before breakfast). atorvastatin (LIPITOR) 80 mg tablet Take 80 mg by mouth daily. apixaban (Eliquis) 5 mg tablet Take 5 mg by mouth two (2) times a day. lansoprazole (PREVACID) 3 mg/mL oral suspension 10 mL by Per G Tube route two (2) times a day. Qty: 150 mL, Refills: 0         STOP taking these medications       levoFLOXacin (LEVAQUIN) 750 mg tablet Comments:   Reason for Stopping:                 Recent Days:  Recent Labs     11/15/21  0302 11/14/21 0315   WBC 6.5 5.1   HGB 11.4* 10.8*   HCT 38.9 37.8   * 110*     Recent Labs     11/16/21  0435 11/15/21  0302 11/14/21  0315    144 145   K 4.3 5.6* 4.2    102 102   CO2 36* 30 32    104 106   BUN 38* 37* 31*   CREA 1.70* 1.80* 1.70*   CA 8.8 8.9 8.7   MG  --  1.9 1.8   PHOS  --  3.6 2.8     No results for input(s): PH, PCO2, PO2, HCO3, FIO2 in the last 72 hours.       Recent Results (from the past 336 hour(s))   EKG, 12 LEAD, INITIAL    Collection Time: 11/11/21  9:05 AM   Result Value Ref Range    Ventricular Rate 80 BPM    Atrial Rate 80 BPM    P-R Interval 162 ms    QRS Duration 84 ms    Q-T Interval 384 ms    QTC Calculation (Bezet) 443 ms    Calculated P Axis 29 degrees    Calculated R Axis -69 degrees    Calculated T Axis 40 degrees    Diagnosis       Sinus rhythm  Left anterior fascicular block  Anterior infarct, old    Confirmed by HECTOR Daley ([id number]) on 11/11/2021 1:59:22 PM     CBC WITH AUTOMATED DIFF    Collection Time: 11/11/21  9:10 AM   Result Value Ref Range    WBC 6.4 4.6 - 13.2 K/uL    RBC 4.89 4.35 - 5.65 M/uL    HGB 11.4 (L) 13.0 - 16.0 g/dL    HCT 39.4 36.0 - 48.0 %    MCV 80.6 78.0 - 100.0 FL    MCH 23.3 (L) 24.0 - 34.0 PG    MCHC 28.9 (L) 31.0 - 37.0 g/dL    RDW 22.7 (H) 11.6 - 14.5 %    PLATELET 333 085 - 763 K/uL    NRBC 0.3 (H) 0.0  WBC    ABSOLUTE NRBC 0.02 (H) 0.00 - 0.01 K/uL    NEUTROPHILS 66 40 - 73 %    LYMPHOCYTES 23 21 - 52 %    MONOCYTES 9 3 - 10 %    EOSINOPHILS 1 0 - 5 %    BASOPHILS 1 0 - 2 %    IMMATURE GRANULOCYTES 0 0 - 0.5 %    ABS. NEUTROPHILS 4.1 1.8 - 8.0 K/UL    ABS. LYMPHOCYTES 1.5 0.9 - 3.6 K/UL    ABS. MONOCYTES 0.6 0.05 - 1.2 K/UL    ABS. EOSINOPHILS 0.1 0.0 - 0.4 K/UL    ABS. BASOPHILS 0.1 0.0 - 0.1 K/UL    ABS. IMM. GRANS. 0.0 0.00 - 0.04 K/UL    DF AUTOMATED      PLATELET COMMENTS Large Platelets      RBC COMMENTS Anisocytosis  2+        RBC COMMENTS Hypochromia  1+        RBC COMMENTS Target Cells  1+       METABOLIC PANEL, COMPREHENSIVE    Collection Time: 11/11/21  9:10 AM   Result Value Ref Range    Sodium 142 135 - 145 mmol/L    Potassium 3.6 3.2 - 5.1 mmol/L    Chloride 102 94 - 111 mmol/L    CO2 27 21 - 33 mmol/L    Anion gap 13 mmol/L    Glucose 83 70 - 110 mg/dL    BUN 29 (H) 9 - 21 mg/dL    Creatinine 1.70 (H) 0.8 - 1.50 mg/dL    BUN/Creatinine ratio 17      GFR est AA 49 ml/min/1.73m2    GFR est non-AA 40 ml/min/1.73m2    Calcium 8.3 (L) 8.5 - 10.5 mg/dL    Bilirubin, total 1.2 (H) 0.2 - 1.0 mg/dL    AST (SGOT) 33 17 - 74 U/L    ALT (SGPT) 45 3 - 72 U/L    Alk.  phosphatase 169 (H) 38 - 126 U/L    Protein, total 7.6 6.1 - 8.4 g/dL    Albumin 2.9 (L) 3.5 - 4.7 g/dL    Globulin 4.7 g/dL    A-G Ratio 0.6     TROPONIN I    Collection Time: 11/11/21  9:10 AM   Result Value Ref Range    Troponin-I, Qt. 0.19 (H) 0.02 - 0.05 ng/mL   BNP    Collection Time: 11/11/21  9:10 AM   Result Value Ref Range    BNP 1,120 (H) 0 - 100 pg/mL   MAGNESIUM    Collection Time: 11/11/21  9:10 AM   Result Value Ref Range    Magnesium 1.9 1.7 - 2.8 mg/dL   CK    Collection Time: 11/11/21  9:10 AM   Result Value Ref Range     38 - 174 U/L   CK-MB,QUANT.     Collection Time: 11/11/21  9:10 AM   Result Value Ref Range    CK - MB 4.4 ng/mL   BLOOD GAS, ARTERIAL    Collection Time: 11/11/21 10:00 AM   Result Value Ref Range    pH 7.43 7.37 - 7.43      PCO2 42 35.0 - 45.0 mmHg    PO2 90 84 - 98 mmHg    O2 SAT 97 94 - 100 %    BICARBONATE 27 23.0 - 27.0 mmol/L    BASE EXCESS 2.8 (H) 0.0 - 2.5 mmol/L    O2 METHOD Room air      FIO2 21.0 %    Sample source Arterial      SITE Right Radial      CHRISTIANE'S TEST PASS      Carboxy-Hgb 1.8 0 - 3 %    Methemoglobin 0.1 0 - 3 %    tHb 11.8 (L) 12.0 - 16.0 g/dL    Oxyhemoglobin 95.3 90 - 100 %   COVID-19 RAPID TEST    Collection Time: 11/11/21 11:21 AM   Result Value Ref Range    Specimen source Nasopharyngeal      COVID-19 rapid test Not Detected Not Detected     GLUCOSE, POC    Collection Time: 11/11/21  4:16 PM   Result Value Ref Range    Glucose (POC) 151 (H) 70 - 110 mg/dL    Performed by Heather Wolf    TROPONIN I    Collection Time: 11/11/21  5:30 PM   Result Value Ref Range    Troponin-I, Qt. 0.17 (H) 0.02 - 0.05 ng/mL   TROPONIN I    Collection Time: 11/11/21  9:25 PM   Result Value Ref Range    Troponin-I, Qt. 0.18 (H) 0.02 - 8.15 ng/mL   METABOLIC PANEL, BASIC    Collection Time: 11/12/21  4:00 AM   Result Value Ref Range    Sodium 145 135 - 145 mmol/L    Potassium 3.9 3.2 - 5.1 mmol/L    Chloride 107 94 - 111 mmol/L    CO2 28 21 - 33 mmol/L    Anion gap 10 mmol/L    Glucose 105 70 - 110 mg/dL    BUN 29 (H) 9 - 21 mg/dL    Creatinine 1.60 (H) 0.8 - 1.50 mg/dL BUN/Creatinine ratio 18      GFR est AA 52 ml/min/1.73m2    GFR est non-AA 43 ml/min/1.73m2    Calcium 8.4 (L) 8.5 - 10.5 mg/dL   MAGNESIUM    Collection Time: 11/12/21  4:00 AM   Result Value Ref Range    Magnesium 1.8 1.7 - 2.8 mg/dL   CBC W/O DIFF    Collection Time: 11/12/21  4:00 AM   Result Value Ref Range    WBC 7.5 4.6 - 13.2 K/uL    RBC 4.80 4.35 - 5.65 M/uL    HGB 11.3 (L) 13.0 - 16.0 g/dL    HCT 37.4 36.0 - 48.0 %    MCV 77.9 (L) 78.0 - 100.0 FL    MCH 23.5 (L) 24.0 - 34.0 PG    MCHC 30.2 (L) 31.0 - 37.0 g/dL    RDW 22.5 (H) 11.6 - 14.5 %    PLATELET 615 (L) 207 - 420 K/uL    NRBC 0.0 0.0  WBC    ABSOLUTE NRBC 0.00 0.00 - 0.01 K/uL   TROPONIN I    Collection Time: 11/12/21  4:00 AM   Result Value Ref Range    Troponin-I, Qt. 0.18 (H) 0.02 - 0.05 ng/mL   CBC WITH AUTOMATED DIFF    Collection Time: 11/13/21  4:03 AM   Result Value Ref Range    WBC 5.3 4.6 - 13.2 K/uL    RBC 4.51 4.35 - 5.65 M/uL    HGB 10.4 (L) 13.0 - 16.0 g/dL    HCT 36.0 36.0 - 48.0 %    MCV 79.8 78.0 - 100.0 FL    MCH 23.1 (L) 24.0 - 34.0 PG    MCHC 28.9 (L) 31.0 - 37.0 g/dL    RDW 22.3 (H) 11.6 - 14.5 %    PLATELET 070 (L) 637 - 420 K/uL    NRBC 0.0 0.0  WBC    ABSOLUTE NRBC 0.00 0.00 - 0.01 K/uL    NEUTROPHILS 67 40 - 73 %    LYMPHOCYTES 22 21 - 52 %    MONOCYTES 8 3 - 10 %    EOSINOPHILS 2 0 - 5 %    BASOPHILS 1 0 - 2 %    IMMATURE GRANULOCYTES 0 0 - 0.5 %    ABS. NEUTROPHILS 3.5 1.8 - 8.0 K/UL    ABS. LYMPHOCYTES 1.2 0.9 - 3.6 K/UL    ABS. MONOCYTES 0.4 0.05 - 1.2 K/UL    ABS. EOSINOPHILS 0.1 0.0 - 0.4 K/UL    ABS. BASOPHILS 0.1 0.0 - 0.1 K/UL    ABS. IMM.  GRANS. 0.0 0.00 - 0.04 K/UL    DF Manual      PLATELET COMMENTS Large Platelets      RBC COMMENTS Microcytosis  1+        RBC COMMENTS Poikilocytosis  1+        RBC COMMENTS Anisocytosis  1+        RBC COMMENTS Hypochromia  1+       MAGNESIUM    Collection Time: 11/13/21  4:03 AM   Result Value Ref Range    Magnesium 1.9 1.7 - 2.8 mg/dL   METABOLIC PANEL, BASIC Collection Time: 11/13/21  4:03 AM   Result Value Ref Range    Sodium 148 (H) 135 - 145 mmol/L    Potassium 4.0 3.2 - 5.1 mmol/L    Chloride 108 94 - 111 mmol/L    CO2 31 21 - 33 mmol/L    Anion gap 9 mmol/L    Glucose 108 70 - 110 mg/dL    BUN 30 (H) 9 - 21 mg/dL    Creatinine 1.50 0.8 - 1.50 mg/dL    BUN/Creatinine ratio 20      GFR est AA 56 ml/min/1.73m2    GFR est non-AA 46 ml/min/1.73m2    Calcium 8.3 (L) 8.5 - 10.5 mg/dL   PHOSPHORUS    Collection Time: 11/13/21  4:03 AM   Result Value Ref Range    Phosphorus 2.8 2.5 - 4.5 mg/dL   LACTIC ACID    Collection Time: 11/14/21 12:55 AM   Result Value Ref Range    Lactic acid 1.9 0.5 - 2.0 mmol/L   MAGNESIUM    Collection Time: 11/14/21  3:15 AM   Result Value Ref Range    Magnesium 1.8 1.7 - 2.8 mg/dL   METABOLIC PANEL, BASIC    Collection Time: 11/14/21  3:15 AM   Result Value Ref Range    Sodium 145 135 - 145 mmol/L    Potassium 4.2 3.2 - 5.1 mmol/L    Chloride 102 94 - 111 mmol/L    CO2 32 21 - 33 mmol/L    Anion gap 11 mmol/L    Glucose 106 70 - 110 mg/dL    BUN 31 (H) 9 - 21 mg/dL    Creatinine 1.70 (H) 0.8 - 1.50 mg/dL    BUN/Creatinine ratio 18      GFR est AA 49 ml/min/1.73m2    GFR est non-AA 40 ml/min/1.73m2    Calcium 8.7 8.5 - 10.5 mg/dL   PHOSPHORUS    Collection Time: 11/14/21  3:15 AM   Result Value Ref Range    Phosphorus 2.8 2.5 - 4.5 mg/dL   CBC WITH AUTOMATED DIFF    Collection Time: 11/14/21  3:15 AM   Result Value Ref Range    WBC 5.1 4.6 - 13.2 K/uL    RBC 4.68 4.35 - 5.65 M/uL    HGB 10.8 (L) 13.0 - 16.0 g/dL    HCT 37.8 36.0 - 48.0 %    MCV 80.8 78.0 - 100.0 FL    MCH 23.1 (L) 24.0 - 34.0 PG    MCHC 28.6 (L) 31.0 - 37.0 g/dL    RDW 23.0 (H) 11.6 - 14.5 %    PLATELET 553 (L) 619 - 420 K/uL    NRBC 0.0 0.0  WBC    ABSOLUTE NRBC 0.00 0.00 - 0.01 K/uL    NEUTROPHILS 61 40 - 73 %    LYMPHOCYTES 28 21 - 52 %    MONOCYTES 8 3 - 10 %    EOSINOPHILS 2 0 - 5 %    BASOPHILS 1 0 - 2 %    IMMATURE GRANULOCYTES 0 0 - 0.5 %    ABS.  NEUTROPHILS 3.1 1.8 - 8.0 K/UL    ABS. LYMPHOCYTES 1.5 0.9 - 3.6 K/UL    ABS. MONOCYTES 0.4 0.05 - 1.2 K/UL    ABS. EOSINOPHILS 0.1 0.0 - 0.4 K/UL    ABS. BASOPHILS 0.1 0.0 - 0.1 K/UL    ABS. IMM. GRANS. 0.0 0.00 - 0.04 K/UL    DF AUTOMATED     CBC WITH AUTOMATED DIFF    Collection Time: 11/15/21  3:02 AM   Result Value Ref Range    WBC 6.5 4.6 - 13.2 K/uL    RBC 4.90 4.35 - 5.65 M/uL    HGB 11.4 (L) 13.0 - 16.0 g/dL    HCT 38.9 36.0 - 48.0 %    MCV 79.4 78.0 - 100.0 FL    MCH 23.3 (L) 24.0 - 34.0 PG    MCHC 29.3 (L) 31.0 - 37.0 g/dL    RDW 22.5 (H) 11.6 - 14.5 %    PLATELET 671 (L) 638 - 420 K/uL    NRBC 0.0 0.0  WBC    ABSOLUTE NRBC 0.00 0.00 - 0.01 K/uL    NEUTROPHILS 70 40 - 73 %    LYMPHOCYTES 20 (L) 21 - 52 %    MONOCYTES 8 3 - 10 %    EOSINOPHILS 1 0 - 5 %    BASOPHILS 1 0 - 2 %    IMMATURE GRANULOCYTES 0 0 - 0.5 %    ABS. NEUTROPHILS 4.5 1.8 - 8.0 K/UL    ABS. LYMPHOCYTES 1.3 0.9 - 3.6 K/UL    ABS. MONOCYTES 0.5 0.05 - 1.2 K/UL    ABS. EOSINOPHILS 0.0 0.0 - 0.4 K/UL    ABS. BASOPHILS 0.0 0.0 - 0.1 K/UL    ABS. IMM.  GRANS. 0.0 0.00 - 0.04 K/UL    DF AUTOMATED     MAGNESIUM    Collection Time: 11/15/21  3:02 AM   Result Value Ref Range    Magnesium 1.9 1.7 - 2.8 mg/dL   METABOLIC PANEL, BASIC    Collection Time: 11/15/21  3:02 AM   Result Value Ref Range    Sodium 144 135 - 145 mmol/L    Potassium 5.6 (H) 3.2 - 5.1 mmol/L    Chloride 102 94 - 111 mmol/L    CO2 30 21 - 33 mmol/L    Anion gap 12 mmol/L    Glucose 104 70 - 110 mg/dL    BUN 37 (H) 9 - 21 mg/dL    Creatinine 1.80 (H) 0.8 - 1.50 mg/dL    BUN/Creatinine ratio 21      GFR est AA 45 ml/min/1.73m2    GFR est non-AA 37 ml/min/1.73m2    Calcium 8.9 8.5 - 10.5 mg/dL   PHOSPHORUS    Collection Time: 11/15/21  3:02 AM   Result Value Ref Range    Phosphorus 3.6 2.5 - 4.5 mg/dL   METABOLIC PANEL, BASIC    Collection Time: 11/16/21  4:35 AM   Result Value Ref Range    Sodium 144 135 - 145 mmol/L    Potassium 4.3 3.2 - 5.1 mmol/L    Chloride 100 94 - 111 mmol/L    CO2 36 (H) 21 - 33 mmol/L    Anion gap 8 mmol/L    Glucose 109 70 - 110 mg/dL    BUN 38 (H) 9 - 21 mg/dL    Creatinine 1.70 (H) 0.8 - 1.50 mg/dL    BUN/Creatinine ratio 22      GFR est AA 49 ml/min/1.73m2    GFR est non-AA 40 ml/min/1.73m2    Calcium 8.8 8.5 - 10.5 mg/dL        NOTIFY YOUR PHYSICIAN FOR ANY OF THE FOLLOWING:   Fever over 101 degrees for 24 hours. Chest pain, shortness of breath, fever, chills, nausea, vomiting, diarrhea, change in mentation, falling, weakness, bleeding. Severe pain or pain not relieved by medications. Or, any other signs or symptoms that you may have questions about. DISPOSITION:  x  Home With:   OT  PT xx HH  RN       Long term SNF/Inpatient Rehab    Independent/assisted living    Hospice    Other:       PATIENT CONDITION AT DISCHARGE:     Functional status   x Poor     Deconditioned     Independent      Cognition    x Lucid     Forgetful     Dementia      Catheters/lines (plus indication)    Garcia     PICC     PEG    x None      Code status    x Full code     DNR      PHYSICAL EXAMINATION AT DISCHARGE:  General:          Alert, cooperative, no distress, appears stated age. Neck:               Supple, symmetrical  Lungs:             Clear to auscultation bilaterally. No Wheezing or Rhonchi. No rales. Chest wall:      No tenderness  No Accessory muscle use. Heart:              Regular  rhythm,  No  murmur   No edema  Abdomen:        Soft, non-tender. Not distended. Bowel sounds normal  Extremities:     No cyanosis. No clubbing,                            Skin turgor normal, Capillary refill normal  Skin:                Not pale. Not Jaundiced  No rashes   Psych:             Not anxious or agitated.   Neurologic:      Alert, moves all extremities, answers questions appropriately and responds to commands       CHRONIC MEDICAL DIAGNOSES:  Problem List as of 11/16/2021 Date Reviewed: 5/3/2021          Codes Class Noted - Resolved    CHF (congestive heart failure) ([facility] [facility] ICD-10-CM: I50.9  ICD-9-CM: 428.0  11/11/2021 - Present        Hyperlipidemia ICD-10-CM: E78.5  ICD-9-CM: 272.4  11/11/2021 - Present        Hypertension ICD-10-CM: I10  ICD-9-CM: 401.9  11/11/2021 - Present        DVT (deep vein thrombosis) in pregnancy ICD-10-CM: O22.30  ICD-9-CM: 671.30  5/3/2021 - Present        Acute encephalopathy ICD-10-CM: G93.40  ICD-9-CM: 348.30  5/3/2021 - Present        Encounter for palliative care ICD-10-CM: Z51.5  ICD-9-CM: V66.7  Unknown - Present        Goals of care, counseling/discussion ICD-10-CM: Z71.89  ICD-9-CM: V65.49  Unknown - Present        Multiple subsegmental pulmonary emboli without acute cor pulmonale ([facility] ICD-10-CM: I26.94  ICD-9-CM: 415.19  Unknown - Present        DVT (deep venous thrombosis) ([facility] ICD-10-CM: I82.409  ICD-9-CM: 453.40  4/10/2021 - Present        Respiratory failure ([facility] ICD-10-CM: J96.90  ICD-9-CM: 518.81  4/5/2021 - Present        Obesity (BMI 30-39. 9) ICD-10-CM: E66.9  ICD-9-CM: 278.00  4/5/2021 - Present        Diabetic neuropathy associated with type 2 diabetes mellitus ([facility] ICD-10-CM: E11.40  ICD-9-CM: 250.60, 357.2  4/5/2021 - Present        Diabetic retinopathy associated with type 2 diabetes mellitus ([facility] ICD-10-CM: E11.319  ICD-9-CM: 250.50, 362.01  4/5/2021 - Present        Pulmonary infiltrates ICD-10-CM: R91.8  ICD-9-CM: 793.19  4/5/2021 - Present        Controlled type 2 diabetes mellitus with neurologic complication, without long-term current use of insulin ([address] ICD-10-CM: E11.49  ICD-9-CM: 250.60  4/5/2021 - Present        Angioedema due to angiotensin converting enzyme inhibitor (ACE-I) ICD-10-CM: T78. Juan Pablo Kim, [id number]  ICD-9-CM: 995.1, E942.6  4/4/2021 - Present        JACQUELYN (acute kidney injury) ([facility] ICD-10-CM: N17.9  ICD-9-CM: 584.9  4/4/2021 - Present        Cardiac arrest [facility]) ICD-10-CM: I46.9  ICD-9-CM: 427.5  4/4/2021 - Present              Greater than 35 minutes were spent with the patient on counseling and coordination of care    Signed:   Ellen Bridges MD  11/16/2021  3:23 PM

## 2021-11-16 NOTE — PROGRESS NOTES
Problem: Heart Failure: Day 1  Goal: Off Pathway (Use only if patient is Off Pathway)  Outcome: Not Progressing Towards Goal

## 2021-11-16 NOTE — PROGRESS NOTES
Rounding and VS completed. Primo-fit again displaced. Cleansed of large amount of incontinent urine. Will continue to monitor. CB in reach.

## 2021-11-16 NOTE — DISCHARGE INSTRUCTIONS
NOTIFY YOUR PHYSICIAN FOR ANY OF THE FOLLOWING:   Fever over 101 degrees for 24 hours. Chest pain, shortness of breath, fever, chills, nausea, vomiting, diarrhea, change in mentation, falling, weakness, bleeding. Severe pain or pain not relieved by medications. Or, any other signs or symptoms that you may have questions about.

## 2021-11-16 NOTE — PROGRESS NOTES
CARDIOLOGY PROGRESS NOTE - NP    Patient seen and examined. This is a patient who is followed for acute HFpEF. He reports improvement in his shortness of breath; he thinks he is feeling 50% better than prior to admission. He denies chest pain. He is -7000 ml since admission and -2100 ml over the past 24 hours. He has been seen by general surgery today for abdominal pain; he pulled out his own NG tube. No other complaints reported. He is refusing to wear telemetry. Pertinent review of systems items noted above, all other systems are negative. Current medications reviewed. PHYSICAL EXAMINATION:  Vital sign assessment reveal a blood pressure of 145/85 and pulse rate of 63. Cardiovascular exam has a heart with a regular rate and rhythm, normal S1 and S2. No murmur present. There are no rubs or gallops. Good peripheral pulses. No jugular venous distension. Respiratory exam reveals clear lung fields, no rales or rhonchi. Anasarca noted. Lymphatic exam reveals 2+ lower extremity edema. Neurologic exam is nonfocal.      Recent labs results and imaging reviewed. Notable findings include:  Recent Results (from the past 24 hour(s))   METABOLIC PANEL, BASIC    Collection Time: 11/16/21  4:35 AM   Result Value Ref Range    Sodium 144 135 - 145 mmol/L    Potassium 4.3 3.2 - 5.1 mmol/L    Chloride 100 94 - 111 mmol/L    CO2 36 (H) 21 - 33 mmol/L    Anion gap 8 mmol/L    Glucose 109 70 - 110 mg/dL    BUN 38 (H) 9 - 21 mg/dL    Creatinine 1.70 (H) 0.8 - 1.50 mg/dL    BUN/Creatinine ratio 22      GFR est AA 49 ml/min/1.73m2    GFR est non-AA 40 ml/min/1.73m2    Calcium 8.8 8.5 - 10.5 mg/dL     Discussed case with Dr. Riki Almaraz, and our impression and recommendations are as follows:  Acute heart failure with preserved ejection fraction: Symptoms appear to have resolved. His creatinine has improved to 1.7 today and potassium has improved to 4.3 today. Recommend to continue Lasix 60 mg po BID.  Continue daily weights, strict I&Os, and 1500 ml fluid restrictions/24 hours. Requested nutritionist consultation for diet education. Continue telemetry monitoring. Recommend to keep serum potassium between 4-5 and serum magnesium > 2. His echocardiogram done in April 2021 shows EF of 55% with grade 2 diastolic dysfunction and moderate pulmonary HTN. Elevated troponin: Troponins are elevated, but trend is flat from 0.17-0.19, which is non-ACS range. EKG is nonischemic. He remains free of chest pain. He is anticoagulated with Eliquis. Continue beta blocker. He will need stress testing as outpatient, if not recently done. HTN: BP is stable. Continue carvedilol 12.5 mg BID. Continue hydralazine 25 mg TID and Imdur 30 once daily. Hyperlipidemia: Continue atorvastatin 80 mg daily. Polysubstance abuse: Advised cessation from tobacco and cocaine. Hyperkalemia: K has improved to 4.3 today from 5.6 yesterday. Continue to hold potassium supplement and recheck tomorrow. Thank you for involving us in the care of this patient. Please do not hesitate to call me or Dr. López Douglas if additional questions arise. If assistance is needed after hours, please call the answering service at [phone].

## 2021-11-16 NOTE — PROGRESS NOTES
Rounding and medication pass completed. Snack offered and accepted. Patient removed primo-fit. Cleansed of large amount of incontinent urine. Counseled to leave device in place as increased difficulty breathing when laying down to change linens and diaper. States understanding. Will continue to monitor. CB in reach.

## 2021-11-16 NOTE — PROGRESS NOTES
Removed external urinary catheter several times. Unable to record accurate I&O's. Up in bed watching TV and working on puzzle book. Constantly requests containers of juice and is reminded of fluid restriction. Wound vac remains in place. Bilateral heels elevated on pillows. Will continue to monitor. CB in reach.

## 2021-11-16 NOTE — PROGRESS NOTES
Slept at intervals through the night. Continues to demand fluids and snacks. Counseled about fluid restriction. Will continue to monitor. CB in reach.

## 2021-11-17 NOTE — PROGRESS NOTES
Discharge instructions given, IV removed, telemetry discontinued, patient taken downstairs via wheelchair, patient discharged.

## 2021-11-28 ENCOUNTER — APPOINTMENT (OUTPATIENT)
Dept: GENERAL RADIOLOGY | Age: 71
End: 2021-11-28
Attending: INTERNAL MEDICINE
Payer: MEDICARE

## 2021-11-28 ENCOUNTER — HOSPITAL ENCOUNTER (EMERGENCY)
Age: 71
Discharge: HOME | End: 2021-11-28
Attending: INTERNAL MEDICINE
Payer: MEDICARE

## 2021-11-28 VITALS
HEIGHT: 71 IN | RESPIRATION RATE: 20 BRPM | TEMPERATURE: 98.6 F | OXYGEN SATURATION: 97 % | WEIGHT: 240 LBS | BODY MASS INDEX: 33.6 KG/M2 | SYSTOLIC BLOOD PRESSURE: 156 MMHG | DIASTOLIC BLOOD PRESSURE: 92 MMHG | HEART RATE: 80 BPM

## 2021-11-28 DIAGNOSIS — M25.552 LEFT HIP PAIN: Primary | ICD-10-CM

## 2021-11-28 LAB
ANION GAP SERPL CALC-SCNC: 10 MMOL/L
BASOPHILS # BLD: 0.1 K/UL (ref 0–0.1)
BASOPHILS NFR BLD: 1 % (ref 0–2)
BUN SERPL-MCNC: 15 MG/DL (ref 9–21)
BUN/CREAT SERPL: 17
CA-I BLD-MCNC: 8.7 MG/DL (ref 8.5–10.5)
CHLORIDE SERPL-SCNC: 97 MMOL/L (ref 94–111)
CO2 SERPL-SCNC: 30 MMOL/L (ref 21–33)
CREAT SERPL-MCNC: 0.9 MG/DL (ref 0.8–1.5)
DIFFERENTIAL METHOD BLD: ABNORMAL
EOSINOPHIL # BLD: 0 K/UL (ref 0–0.4)
EOSINOPHIL NFR BLD: 0 % (ref 0–5)
ERYTHROCYTE [DISTWIDTH] IN BLOOD BY AUTOMATED COUNT: 21.1 % (ref 11.6–14.5)
GLUCOSE SERPL-MCNC: 103 MG/DL (ref 70–110)
HCT VFR BLD AUTO: 33.5 % (ref 36–48)
HGB BLD-MCNC: 9.9 G/DL (ref 13–16)
IMM GRANULOCYTES # BLD AUTO: 0 K/UL (ref 0–0.04)
IMM GRANULOCYTES NFR BLD AUTO: 0 % (ref 0–0.5)
LYMPHOCYTES # BLD: 1.5 K/UL (ref 0.9–3.6)
LYMPHOCYTES NFR BLD: 18 % (ref 21–52)
MCH RBC QN AUTO: 23 PG (ref 24–34)
MCHC RBC AUTO-ENTMCNC: 29.6 G/DL (ref 31–37)
MCV RBC AUTO: 77.9 FL (ref 78–100)
MONOCYTES # BLD: 0.9 K/UL (ref 0.05–1.2)
MONOCYTES NFR BLD: 10 % (ref 3–10)
NEUTS SEG # BLD: 6 K/UL (ref 1.8–8)
NEUTS SEG NFR BLD: 71 % (ref 40–73)
NRBC # BLD: 0 K/UL (ref 0–0.01)
NRBC BLD-RTO: 0 PER 100 WBC
PLATELET # BLD AUTO: 296 K/UL (ref 135–420)
PLATELET COMMENTS,PCOM: ABNORMAL
PMV BLD AUTO: 12.1 FL (ref 9.2–11.8)
POTASSIUM SERPL-SCNC: 3.6 MMOL/L (ref 3.2–5.1)
RBC # BLD AUTO: 4.3 M/UL (ref 4.35–5.65)
RBC MORPH BLD: ABNORMAL
SODIUM SERPL-SCNC: 137 MMOL/L (ref 135–145)
TROPONIN I SERPL-MCNC: 0.14 NG/ML (ref 0.02–0.05)
WBC # BLD AUTO: 8.5 K/UL (ref 4.6–13.2)

## 2021-11-28 PROCEDURE — 99284 EMERGENCY DEPT VISIT MOD MDM: CPT

## 2021-11-28 PROCEDURE — 73502 X-RAY EXAM HIP UNI 2-3 VIEWS: CPT

## 2021-11-28 PROCEDURE — 71046 X-RAY EXAM CHEST 2 VIEWS: CPT

## 2021-11-28 PROCEDURE — 80048 BASIC METABOLIC PNL TOTAL CA: CPT

## 2021-11-28 PROCEDURE — 84484 ASSAY OF TROPONIN QUANT: CPT

## 2021-11-28 PROCEDURE — 85025 COMPLETE CBC W/AUTO DIFF WBC: CPT

## 2021-11-28 PROCEDURE — 93005 ELECTROCARDIOGRAM TRACING: CPT

## 2021-11-28 RX ORDER — OXYCODONE HYDROCHLORIDE 5 MG/1
5 TABLET ORAL
COMMUNITY
End: 2022-07-01 | Stop reason: ALTCHOICE

## 2021-11-28 NOTE — ED TRIAGE NOTES
Wheezing started last night, used pump, no relief, catch in left hip started wednesday and right shoulder that wouldn't go away. Would vac to left heel. Ran out of oxycodone a week or so ago, that's when the pain started.  The pcp said met quota for getting refills       Hx blood clot on left leg had that repaired June 2021, left foot surgery in June 2021    Quit cocaine x 2 weeks ago    Patient wants to spend a few days at the hospital

## 2021-11-28 NOTE — DISCHARGE INSTRUCTIONS
Take Tylenol for Pain  Follow up with your doctor tomorrow  Return for any worsening symptoms or concerns

## 2021-11-28 NOTE — ED PROVIDER NOTES
EMERGENCY DEPARTMENT HISTORY AND PHYSICAL EXAM      Date: 11/28/2021  Patient Name: Claudia Martinez      History of Presenting Illness     Chief Complaint   Patient presents with    Wheezing    Hip Pain    Shoulder Pain       History Provided By: Patient    HPI: Claudia Martinez, 70 y.o. male with a past medical history significant for obesity; DM w neuropathy/ retinopathy; Left popliteal DVT/ LLL PE on Eliquis [5/'21]; HTN; HLD; Obesity; CHF EF 55%; ACE inhibitor shock 4/22/'21 requiring trache Floyde Larve /'21] and PEG tube; Polysubstance abuse;  chronic pressure ulcer left heel with wound vac changed on MWF; mild multilevel lumbar djd that  presents to the ED with cc of left hip pain this week; right shoulder pain this morning; had some wheezing this am but not now. Pt lives in boarding house. He states that he has run out of his oxycodone for the past week and his doctor will not refill because it's not due time. States that he has used cocaine recently. States that he needs a place to stay for a day. Atraumatic right shoulder pain this am. Not maintaining fluid restriction. There are no other complaints, changes, or physical findings at this time. PCP: DUSTIN Chauhan    Current Outpatient Medications   Medication Sig Dispense Refill    oxyCODONE IR (ROXICODONE) 5 mg immediate release tablet Take 5 mg by mouth every four (4) hours as needed for Pain.  furosemide (LASIX) 20 mg tablet Take 3 Tablets by mouth two (2) times a day for 30 days. Indications: accumulation of fluid resulting from chronic heart failure 180 Tablet 0    isosorbide mononitrate ER (IMDUR) 30 mg tablet Take 30 mg by mouth daily.  hydrALAZINE (APRESOLINE) 25 mg tablet Take 25 mg by mouth three (3) times daily.  carvediloL (COREG) 6.25 mg tablet Take 6.25 mg by mouth two (2) times daily (with meals).       albuterol sulfate (PROAIR RESPICLICK) 90 mcg/actuation breath activated inhaler Take 90 mcg by inhalation every six (6) hours as needed.  ferrous sulfate (FeroSuL) 325 mg (65 mg iron) tablet Take 325 mg by mouth Daily (before breakfast).  atorvastatin (LIPITOR) 80 mg tablet Take 80 mg by mouth daily.  apixaban (Eliquis) 5 mg tablet Take 5 mg by mouth two (2) times a day.  lansoprazole (PREVACID) 3 mg/mL oral suspension 10 mL by Per G Tube route two (2) times a day. 150 mL 0       Past History     Past Medical History:  Past Medical History:   Diagnosis Date    DDD (degenerative disc disease), lumbar     Diabetes [facility]     Diabetic neuropathy ([facility]     [facility]     DM2 [facility]di[facility] mellitus, type 2[facility]     HLD (hyperlipidemia)     HTN (hypertension)     Obesity (BMI 30-39. 9)        Past Surgical History:  No past surgical history on file. Family History:  No family history on file. Social History:  Social History     Tobacco Use    Smoking status: Current Every Day Smoker     Packs/day: 0.25    Smokeless tobacco: Never Used   Vaping Use    Vaping Use: Not on file   Substance Use Topics    Alcohol use: Not Currently    Drug use: Not Currently     Types: Cocaine       Allergies: Allergies   Allergen Reactions    Lisinopril Angioedema         Review of Systems     Review of Systems   Constitutional: Negative for chills and fever. HENT: Negative for sore throat and trouble swallowing. Eyes: Negative for visual disturbance. Respiratory: Positive for wheezing. Negative for cough, chest tightness, shortness of breath and stridor. Cardiovascular: Negative for chest pain and leg swelling. Gastrointestinal: Negative for abdominal pain, diarrhea, nausea and vomiting. Genitourinary: Negative for flank pain. Musculoskeletal: Positive for arthralgias. Skin: Positive for wound. Neurological: Negative for weakness and headaches. Psychiatric/Behavioral: Negative for confusion.        Physical Exam     Physical Exam  Vitals and nursing note reviewed. Constitutional:       General: He is not in acute distress. Appearance: He is well-developed. He is obese. He is not ill-appearing or diaphoretic. HENT:      Head: Normocephalic. Mouth/Throat:      Pharynx: No oropharyngeal exudate. Eyes:      Conjunctiva/sclera: Conjunctivae normal.      Pupils: Pupils are equal, round, and reactive to light. Cardiovascular:      Rate and Rhythm: Normal rate and regular rhythm. Heart sounds: Normal heart sounds. Pulmonary:      Effort: Pulmonary effort is normal. No respiratory distress. Comments: Few rales left base  Abdominal:      General: Bowel sounds are normal. There is no distension. Palpations: Abdomen is soft. Tenderness: There is no abdominal tenderness. Musculoskeletal:         General: Tenderness present. Normal range of motion. Cervical back: Neck supple. Right lower leg: No edema. Left lower leg: No edema. Comments: Tenderness to palpation of the left hip area. Normal ROM of the right shoulder. Skin:     General: Skin is warm and dry. Comments: Wound vac left heel; changed 2 d ago   Neurological:      Mental Status: He is alert and oriented to person, place, and time.          Lab and Diagnostic Study Results     Labs -     Recent Results (from the past 12 hour(s))   CBC WITH AUTOMATED DIFF    Collection Time: 11/28/21  7:30 AM   Result Value Ref Range    WBC 8.5 4.6 - 13.2 K/uL    RBC 4.30 (L) 4.35 - 5.65 M/uL    HGB 9.9 (L) 13.0 - 16.0 g/dL    HCT 33.5 (L) 36.0 - 48.0 %    MCV 77.9 (L) 78.0 - 100.0 FL    MCH 23.0 (L) 24.0 - 34.0 PG    MCHC 29.6 (L) 31.0 - 37.0 g/dL    RDW 21.1 (H) 11.6 - 14.5 %    PLATELET 622 519 - 247 K/uL    MPV 12.1 (H) 9.2 - 11.8 FL    NRBC 0.0 0.0  WBC    ABSOLUTE NRBC 0.00 0.00 - 0.01 K/uL    NEUTROPHILS 71 40 - 73 %    LYMPHOCYTES 18 (L) 21 - 52 %    MONOCYTES 10 3 - 10 %    EOSINOPHILS 0 0 - 5 %    BASOPHILS 1 0 - 2 %    IMMATURE GRANULOCYTES 0 0 - 0.5 % ABS. NEUTROPHILS 6.0 1.8 - 8.0 K/UL    ABS. LYMPHOCYTES 1.5 0.9 - 3.6 K/UL    ABS. MONOCYTES 0.9 0.05 - 1.2 K/UL    ABS. EOSINOPHILS 0.0 0.0 - 0.4 K/UL    ABS. BASOPHILS 0.1 0.0 - 0.1 K/UL    ABS. IMM. GRANS. 0.0 0.00 - 0.04 K/UL    DF AUTOMATED      PLATELET COMMENTS Large Platelets      RBC COMMENTS Microcytosis  1+        RBC COMMENTS Hypochromia  1+        RBC COMMENTS Target Cells  1+       METABOLIC PANEL, BASIC    Collection Time: 11/28/21  7:30 AM   Result Value Ref Range    Sodium 137 135 - 145 mmol/L    Potassium 3.6 3.2 - 5.1 mmol/L    Chloride 97 94 - 111 mmol/L    CO2 30 21 - 33 mmol/L    Anion gap 10 mmol/L    Glucose 103 70 - 110 mg/dL    BUN 15 9 - 21 mg/dL    Creatinine 0.90 0.8 - 1.50 mg/dL    BUN/Creatinine ratio 17      GFR est AA >60 ml/min/1.73m2    GFR est non-AA >60 ml/min/1.73m2    Calcium 8.7 8.5 - 10.5 mg/dL   TROPONIN I    Collection Time: 11/28/21  7:30 AM   Result Value Ref Range    Troponin-I, Qt. 0.14 (H) 0.02 - 0.05 ng/mL   EKG, 12 LEAD, INITIAL    Collection Time: 11/28/21  8:16 AM   Result Value Ref Range    Ventricular Rate 81 BPM    Atrial Rate 82 BPM    P-R Interval 166 ms    QRS Duration 92 ms    Q-T Interval 407 ms    QTC Calculation (Bezet) 473 ms    Calculated P Axis 28 degrees    Calculated R Axis -93 degrees    Calculated T Axis 7 degrees    Diagnosis       Sinus rhythm  Paired ventricular premature complexes  Probable left atrial enlargement  Left anterior fascicular block  Anterolateral infarct, age indeterminate         Radiologic Studies -   [unfilled]  CT Results  (Last 48 hours)    None        CXR Results  (Last 48 hours)               11/28/21 0856  XR CHEST PA LAT Final result    Impression:      Bibasilar atelectasis and/or scarring similar to the prior exam. Persistent   infiltrates not completely excluded. No acute changes. Narrative:  EXAM: CHEST, TWO VIEWS       CLINICAL INDICATION/HISTORY: shoulder pain     > Additional: None.        COMPARISON: 11/11/2021, 9/23/2021       TECHNIQUE: PA and lateral views of the chest were obtained.       _______________       FINDINGS:       HEART AND MEDIASTINUM: Cardiac silhouette is top normal in size, aorta is   tortuous. LUNGS AND PLEURAL SPACES: Pulmonary vessels are normal. Streaky and patchy   markings are redemonstrated at both lung bases, similar to the prior   examinations. No pneumothorax or pleural effusion. BONY THORAX AND SOFT TISSUES: No acute osseous abnormality. _______________                 Medical Decision Making and ED Course   - I am the first and primary provider for this patient AND AM THE PRIMARY PROVIDER OF RECORD. - I reviewed the vital signs, available nursing notes, past medical history, past surgical history, family history and social history. - Initial assessment performed. The patients presenting problems have been discussed, and the staff are in agreement with the care plan formulated and outlined with them. I have encouraged them to ask questions as they arise throughout their visit. Vital Signs-Reviewed the patient's vital signs. Patient Vitals for the past 12 hrs:   Temp Pulse Resp BP SpO2   11/28/21 0743     100 %   11/28/21 0723 98.6 °F (37 °C) 83 18 (!) 155/82 100 %       EKG interpretation: (Preliminary): Performed at 0816  Rhythm: normal sinus rhythm; and regular . Rate (approx.): 81; Axis: normal; NJ interval: normal; QRS interval: normal ; ST/T wave: non-specific changes; Other findings: QTc 473. Paired PVCs; poor R V1-6. Flat Ts in III; aVF. Records Reviewed: Nursing Notes    ED Course:   Pt. Ran out of his pain meds. Advised that I will not be refilling and he needs to follow up with his health care provider. No significant findings on vital signs or labs. Has chronically elevated trop. Not c/o chest pain; sob. Will dc and advised to return if he worsens.            Consultations:       Consultations:      Procedures and Critical Care Disposition     Disposition: Discharge    Remove if not discharged  DISCHARGE PLAN:  1. Current Discharge Medication List      CONTINUE these medications which have NOT CHANGED    Details   oxyCODONE IR (ROXICODONE) 5 mg immediate release tablet Take 5 mg by mouth every four (4) hours as needed for Pain. furosemide (LASIX) 20 mg tablet Take 3 Tablets by mouth two (2) times a day for 30 days. Indications: accumulation of fluid resulting from chronic heart failure  Qty: 180 Tablet, Refills: 0      isosorbide mononitrate ER (IMDUR) 30 mg tablet Take 30 mg by mouth daily. hydrALAZINE (APRESOLINE) 25 mg tablet Take 25 mg by mouth three (3) times daily. carvediloL (COREG) 6.25 mg tablet Take 6.25 mg by mouth two (2) times daily (with meals). albuterol sulfate (PROAIR RESPICLICK) 90 mcg/actuation breath activated inhaler Take 90 mcg by inhalation every six (6) hours as needed. ferrous sulfate (FeroSuL) 325 mg (65 mg iron) tablet Take 325 mg by mouth Daily (before breakfast). atorvastatin (LIPITOR) 80 mg tablet Take 80 mg by mouth daily. apixaban (Eliquis) 5 mg tablet Take 5 mg by mouth two (2) times a day. lansoprazole (PREVACID) 3 mg/mL oral suspension 10 mL by Per G Tube route two (2) times a day. Qty: 150 mL, Refills: 0           2. Follow-up Information     Follow up With Specialties Details Why Contact Info    Jesus [address] Nurse Practitioner Schedule an appointment as soon as possible for a visit in 1 day  [facility]  [phone]          3. Return to ED if worse   4. Current Discharge Medication List          Diagnosis     Clinical Impression:   1. Left hip pain        Attestations:    Mendel Penning, MD    Please note that this dictation was completed with [employer], the [employer] voice recognition software.   Quite often unanticipated grammatical, syntax, homophones, and other interpretive errors are inadvertently transcribed by the computer software. Please disregard these errors. Please excuse any errors that have escaped final proofreading. Thank you.

## 2021-11-29 LAB
ATRIAL RATE: 82 BPM
CALCULATED P AXIS, ECG09: 28 DEGREES
CALCULATED R AXIS, ECG10: -93 DEGREES
CALCULATED T AXIS, ECG11: 7 DEGREES
DIAGNOSIS, 93000: NORMAL
P-R INTERVAL, ECG05: 166 MS
Q-T INTERVAL, ECG07: 407 MS
QRS DURATION, ECG06: 92 MS
QTC CALCULATION (BEZET), ECG08: 473 MS
VENTRICULAR RATE, ECG03: 81 BPM

## 2022-01-31 ENCOUNTER — TELEPHONE (OUTPATIENT)
Dept: CARDIOLOGY CLINIC | Age: 72
End: 2022-01-31

## 2022-01-31 NOTE — TELEPHONE ENCOUNTER
Patient is scheduled to have a left foot surgery not scheduled yet with Dr Renita Meredith. Patient saw you last October and they would like to know if he can be cleared from that visit. Fax # [phone]. Please Advise.

## 2022-01-31 NOTE — TELEPHONE ENCOUNTER
Patient had follow-up set up in November 2021 that he missed.   He needs to be seen before clearance

## 2022-01-31 NOTE — TELEPHONE ENCOUNTER
Spoke with patient and he has his appointment. Called and left Michael a message that patient needs to be seen before he can be cleared and that his appointment has been made and he is aware.

## 2022-02-06 NOTE — PROGRESS NOTES
1930: Assumed care of patient after report with Layton Souza RN on orientation. Drips verified. Orders reviewed. 2000: Lab located chemistry tube and is running it now. 2105: Updated by Maggie TOBIN to start heparin drip - no initial bolus. Lab called to draw CBC and PTT per Maggie prior to starting drip. BMP results reviewed with Maggie TOBIN and lasix given per order. 2240: Lab here to draw timed lab. 0010: Lab results received - heparin drip started at 12 units/kg/hr per protocol - no bolus. 0500: Bath done, linens changed, incision dressing changed. Patient tolerated well. 0730: Bedside and Verbal shift change report given to Luana Lopez RN (oncoming nurse) by Amanda Joseph RN (offgoing nurse). Report included the following information SBAR, Kardex, MAR and Recent Results. SITUATION:    Code Status: Full Code   Reason for Admission: Angioedema due to angiotensin converting enzyme inhibitor (ACE-I) [T78. [facility] day: 4   Problem List:       Hospital Problems  Date Reviewed: 4/5/2021          Codes Class Noted POA    [facility] ([facility] ICD-10-CM: J96.90  ICD-9-CM: 518.81  4/5/2021 Unknown        Obesity (BMI 30-39. 9) ICD-10-CM: E66.9  ICD-9-CM: 278.00  4/5/2021 Unknown        Diabetic neuropathy associated with type 2 diabetes mellitus ([facility] ICD-10-CM: E11.40  ICD-9-CM: 250.60, 357.2  4/5/2021 Unknown        Diabetic retinopathy associated with type 2 diabetes mellitus ([facility] ICD-10-CM: E11.319  ICD-9-CM: 250.50, 362.01  4/5/2021 Unknown        Pulmonary infiltrates ICD-10-CM: R91.8  ICD-9-CM: 793.19  4/5/2021 Unknown        Controlled type 2 diabetes mellitus with neurologic complication, without long-term current use of insulin ([facility] ICD-10-CM: E11.49  ICD-9-CM: 250.60  4/5/2021 Unknown        Angioedema due to angiotensin converting enzyme inhibitor (ACE-I) ICD-10-CM: T78. Paris Thomas, [id number]  ICD-9-CM: 995.1, E942.6  4/4/2021 Unknown        [facility] (acute [facility]    Medicine Progress Note - Hospitalist Service    Date of Admission: 1/28/2022    Assessment & Plan        Miriam Hall is a 47 year old male admitted with history of HTN who was admitted to [facility] 1/8 with R PCA stroke, refractory HTN and concern for secondary HTN, new onset DMII, hypokalemia, DISHA and encephalopathy. Stabilized and transferred to ARU 1/28. Medicine consulted for assistance with medical management       Acute R PCA stroke: Presented with L hemiplegia. Head CT 1/8 R parieto-occipital infarct. CTA noted occlusion on proximal R PCA and high grade stenosis of M2 branch of R MCA. Outside window for tPA. Repeat imagine 1/12 with hemorrhagic transformation. Source 2/2 high grade atherosclerosis vs ESUS. CARISA negative for LA thrombus. Residual deficits include L hemiplegia, L hemianopsia, mild dysarthria. Sitting up, conversational, and pleasant on exam over the past several days  - Continue ASA, statin  - HTN regimen as below   - Ziopatch through 2/10  - PT/OT following   - Follow up with genetics on discharge given early age CVA  - Follow up with neurology as indicated     DISHA: Stable. BL Cr 1-1.2. Peak 1.56 at [facility], 2/2 hypovolemia and medication adjustments. Cr rising slowly over the past several days. Hypotensive events 1/28. Renal US 1/31 no acute findings. Per d/w nephrology 2/1 and 2/2, etiology likely due to hypotension: hydralazine stopped 2/1, clonidine discontinued 2/4. Other nephrotoxic drugs include chlorthalidone, lisinopril, ASA. Cr 1.71 (1.76) 2/5  - Continue to hold Metformin  - Next BMP 2/7  - Strict I&Os, daily weights     Refractory HTN: SBP>240 on admission. Nephrology consulted, concern for hyperaldosteronism given persistent hypokalemia and concomitant metabolic alkalosis. Aldosterone and renin levels normal. Ddx per nephrology includes Cushing (urinary cortisol pending), Liddle syndrome, apparent mineral corticoid excess  (free urinary cortisol and cortisone pending). Hydralazine stopped 2/1, clonidine 2/4. Current regimen includes: Amlodipine, Coreg, chlorthalidone, lisinopril. BP trending up over the last 24 hours. Per d/w pharmacy, not concerning for rebound HTN after stopping Clonidine. Last /82  - SBP goal <150  - Resume clonidine at 0.1 mg daily   - Continue Amlodipine, Coreg, chlorthalidone, lisinopril   - Hydralazine prn for SBP>160 due to goal SBP<150  - Follow up with nephrology 2/10/22 as planned  - Follow up with endocrinology on discharge for ongoing eval for possible hyperaldosteronemia     DMII: New onset. A1c 7.8 1/8. Endo consulted, started on Metformin prior to discharge. Metformin held starting 2/1 due to DISHA. MSSI stopped 2/3 as patient requiring minimal Novolog. Glucose stable  - Metformin remains on hold  - Monitor for hyperkalemia while on Amiloride     Resolved and Stable Issues:  Refractory hypokalemia: Concern for hyperaldosteronism as above. Negative workup thus far. Discharge on Kdur. K 5.0 2/1 thus Kdur stopped. K stable. Monitor M/Th, sooner if acute changes.   Possible seizures: In the setting of CVA. EEG 1/12 concerning for subclinical seizures. Loaded with Keppra then transitioned to Depakote. Continue Depakote and seizure precautions. Follow up OP with neurology as indicated   Low grade fever: Resolved. See notes 1/30 and prior  Toxic metabolic and stroke related encephalopathy: Resolved prior to transfer to ARU. Per notes, etiology 2/2 CVA vs hypertensive encephalopathy vs seizures. No ongoing issues noted. Monitor  Non-severe protein calorie malnutrition: In the setting of the above. Appetite and oral intake are stable  Platelet defect: 2/2 ASA use. Monitor for bleeding     Writer discussed the above with patient and attending physician, Dr. Spence       Diet: Combination Diet Regular Diet  Room Service    DVT Prophylaxis: Defer to primary service  Mendoza Catheter: Not present  Central Lines:  kidney injury) [facility]) ICD-10-CM: N17.9  ICD-9-CM: 584.9  4/4/2021 Unknown        Cardiac arrest [facility]) ICD-10-CM: I46.9  ICD-9-CM: 427.5  4/4/2021 Unknown              BACKGROUND:    Past Medical History:   Past Medical History:   Diagnosis Date    DDD (degenerative disc disease), lumbar     Diabetes ([email or web address]     Diabetic neuropathy ([email or web address]     Diabetic retinopathy ([email or web address]     DM2 (diabetes mellitus, type 2) ([email or web address]     HLD (hyperlipidemia)     HTN (hypertension)     Obesity (BMI 30-39. 9)          Patient taking anticoagulants yes     ASSESSMENT:    Changes in Assessment Throughout Shift: Started heparin drip. Weaned FIO2 to 80%.  Patient has Central Line: no Reasons if yes: n/a   Patient has Garcia Cath: yes Reasons if yes: Intake and output per ICU team for JACQUELYN      Last Vitals:     Vitals:    04/08/21 0500 04/08/21 0600 04/08/21 0700 04/08/21 0730   BP: 135/80 132/78 137/71    Pulse: 65 65 64 63   Resp: 16 16 16 16   Temp:       SpO2: 98% 99% 96% 96%   Weight:       Height:            IV and DRAINS (will only show if present)   [REMOVED] Airway - Endotracheal Tube 04/04/21 Oral-Site Assessment: Clean, dry, & intact  Peripheral IV 04/04/21 Left Antecubital-Site Assessment: Clean, dry, & intact  Peripheral IV 04/04/21 Anterior;Right Wrist-Site Assessment: Clean, dry, & intact  Peripheral IV 04/04/21 Left;Posterior Hand-Site Assessment: Clean, dry, & intact  [REMOVED] Airway - Continuous Aspiration of Subglottic Secretions (GENESIS) Tube 04/04/21 Oral-Site Assessment: Clean, dry, & intact  Orogastric Tube 04/05/21-Site Assessment: Clean, dry, & intact  Airway - Continuous Aspiration of Subglottic Secretions (GENESIS) Tube 04/07/21 Oral-Site Assessment: Clean, dry, & intact     WOUND (if present)   Wound Type:   Incision neck   Dressing present Dressing Present : Intact, not due to be changed   Wound Concerns/Notes:  none     PAIN    Pain Assessment    Pain Intensity 1: 0 (04/07/21 0400)              Patient Stated None  Cardiac Monitoring: None  Code Status: Full Code      Disposition Plan   Expected Discharge: 02/18/2022     Anticipated discharge location:  Awaiting care coordination huddle  Delays:     Per primary team      Park Lyle PA-C  Hospitalist Service  [facility]  Securely message with the Vocera Web Console (learn more here)  Text page via [employer] Paging/Directory         Clinically Significant Risk Factors Present on Admission                    ______________________________________________________________________    Interval History   No acute changes. Sleep was intermittent last night due to some discomfort with his mattress. Appetite is good. Continues to progress with therapies. Denies fever or chills. No dyspnea, chest pain, abdominal pain, or AMS.     Data reviewed today: I reviewed all medications, new labs and imaging results over the last 24 hours    Physical Exam   Vital Signs: Temp: 98.1  F (36.7  C) Temp src: Oral BP: (!) 150/82 Pulse: 63   Resp: 18 SpO2: 95 % O2 Device: None (Room air)    Weight: 163 lbs 12.8 oz  General Appearance: Alert and oriented x3, sitting up in wheelchair. Very pleasant   Respiratory: Breathing comfortably on room air, lungs CTAB without wheezing or crackles  Cardiovascular: RRR, S1, S2. No murmur noted. ZioPatch in place  GI: Abdomen soft, non-tender with active bowel sounds. No guarding or rebound  Lymph/Hematologic: No peripheral edema, distal pulses palpable   Skin: No rash or jaundice  Neurologic: Freely moves the R side. L sided hemiplegia      Data   Recent Labs   Lab 02/06/22  0731 02/05/22  1744 02/05/22  0709 02/03/22  1728 02/03/22  0754 02/02/22  0810 02/02/22  0548   WBC  --   --   --   --  9.0  --   --    HGB  --   --   --   --  13.8  --   --    MCV  --   --   --   --  90  --   --    PLT  --   --   --   --  256  --   --    NA  --   --  139  --  137  --  137   POTASSIUM  --   --  4.1  --  4.1  --  4.7  Pain Goal: Unable to verbalize/indicate pain  o Interventions for Pain:  none  o Intervention effective: n/a  o Time of last intervention: n/a   o Reassessment Completed: n/a      Last 3 Weights:  Last 3 Recorded Weights in this Encounter    04/05/21 2042 04/06/21 0948 04/08/21 0400   Weight: 114.3 kg (251 lb 15.8 oz) 117.5 kg (259 lb) 113.4 kg (250 lb)     Weight change: -4.082 kg (-7 lb)     INTAKE/OUPUT    Current Shift: No intake/output data recorded. Last three shifts: 04/06 1901 - 04/08 0700  In: 2435.2 [I.V.:1125.2]  Out: 3751 [Urine:3501]     LAB RESULTS     Recent Labs     04/08/21  0610 04/07/21  2248 04/07/21  0413   WBC 11.7 11.3 10.0   HGB 11.4* 11.9* 12.2*   HCT 38.1 38.6 40.4    151 156        Recent Labs     04/08/21  0610 04/07/21  1752 04/07/21  0413   * 144 144   K 3.8 4.6 4.5   * 147* 117*   BUN 64* 64* 54*   CREA 2.71* 2.94* 2.74*   CA 7.9* 8.0* 8.1*   MG 3.0* 2.9* 2.5       RECOMMENDATIONS AND DISCHARGE PLANNING     1. Pending tests/procedures/ Plan of Care or Other Needs: wean down sedation as tolerated with ventilator settings per RT     2. Discharge plan for patient and Needs/Barriers: not identified at this time    3. Estimated Discharge Date: unknown. Posted on Whiteboard in Patients Room: not at this time      4. The patient's care plan was reviewed with the oncoming nurse. \"HEALS\" SAFETY CHECK      Fall Risk    Total Score: 3    Safety Measures: Safety Measures: Bed/Chair alarm on, Bed in low position, Call light within reach, Emergency bedside equipment, Fall prevention (comment), Restraints    A safety check occurred in the patient's room between off going nurse and oncoming nurse listed above.     The safety check included the below items  Area Items   H  High Alert Medications - Verify all high alert medication drips (heparin, PCA, etc.)   E  Equipment - Suction is set up for ALL patients (with dalton)  - Red plugs utilized for all equipment (IV pumps,   CHLORIDE  --   --  106  --  107  --  106   CO2  --   --  26  --  23  --  27   BUN  --   --  47*  --  44*  --  44*   CR  --   --  1.71*  --  1.76*  --  1.77*   ANIONGAP  --   --  7  --  7  --  4   VALERIE  --   --  9.5  --  9.3  --  9.0   * 93 120*   < > 128*   < > 125*    < > = values in this interval not displayed.     No results found for this or any previous visit (from the past 24 hour(s)).   etc.)  - WOWs wiped down at end of shift.  - Room stocked with oxygen, suction, and other unit-specific supplies   A  Alarms - Bed alarm is set for fall risk patients  - Ensure chair alarm is in place and activated if patient is up in a chair   L  Lines - Check IV for any infiltration  - Garcia bag is empty if patient has a Garcia   - Tubing and IV bags are labeled   S  Safety   - Room is clean, patient is clean, and equipment is clean. - Hallways are clear from equipment besides carts. - Fall bracelet on for fall risk patients  - Ensure room is clear and free of clutter  - Suction is set up for ALL patients (with yanker)  - Hallways are clear from equipment besides carts.    - Isolation precautions followed, supplies available outside room, sign posted     Shan Kocher, RN

## 2022-03-01 ENCOUNTER — OFFICE VISIT (OUTPATIENT)
Dept: CARDIOLOGY CLINIC | Age: 72
End: 2022-03-01
Payer: MEDICARE

## 2022-03-01 VITALS
SYSTOLIC BLOOD PRESSURE: 125 MMHG | OXYGEN SATURATION: 98 % | WEIGHT: 249 LBS | DIASTOLIC BLOOD PRESSURE: 89 MMHG | HEART RATE: 81 BPM | BODY MASS INDEX: 34.86 KG/M2 | HEIGHT: 71 IN

## 2022-03-01 DIAGNOSIS — Z01.810 PRE-OPERATIVE CARDIOVASCULAR EXAMINATION: ICD-10-CM

## 2022-03-01 DIAGNOSIS — I42.9 CARDIOMYOPATHY, UNSPECIFIED TYPE (HCC): ICD-10-CM

## 2022-03-01 DIAGNOSIS — I50.42 CHRONIC COMBINED SYSTOLIC AND DIASTOLIC CONGESTIVE HEART FAILURE (HCC): Primary | ICD-10-CM

## 2022-03-01 DIAGNOSIS — I49.3 PVC (PREMATURE VENTRICULAR CONTRACTION): ICD-10-CM

## 2022-03-01 DIAGNOSIS — I73.9 PAD (PERIPHERAL ARTERY DISEASE) (HCC): ICD-10-CM

## 2022-03-01 DIAGNOSIS — R06.02 SHORTNESS OF BREATH: ICD-10-CM

## 2022-03-01 PROCEDURE — G8432 DEP SCR NOT DOC, RNG: HCPCS | Performed by: INTERNAL MEDICINE

## 2022-03-01 PROCEDURE — G8536 NO DOC ELDER MAL SCRN: HCPCS | Performed by: INTERNAL MEDICINE

## 2022-03-01 PROCEDURE — 3017F COLORECTAL CA SCREEN DOC REV: CPT | Performed by: INTERNAL MEDICINE

## 2022-03-01 PROCEDURE — G8427 DOCREV CUR MEDS BY ELIG CLIN: HCPCS | Performed by: INTERNAL MEDICINE

## 2022-03-01 PROCEDURE — 99214 OFFICE O/P EST MOD 30 MIN: CPT | Performed by: INTERNAL MEDICINE

## 2022-03-01 PROCEDURE — G8417 CALC BMI ABV UP PARAM F/U: HCPCS | Performed by: INTERNAL MEDICINE

## 2022-03-01 PROCEDURE — G8752 SYS BP LESS 140: HCPCS | Performed by: INTERNAL MEDICINE

## 2022-03-01 PROCEDURE — G8754 DIAS BP LESS 90: HCPCS | Performed by: INTERNAL MEDICINE

## 2022-03-01 PROCEDURE — 1101F PT FALLS ASSESS-DOCD LE1/YR: CPT | Performed by: INTERNAL MEDICINE

## 2022-03-01 RX ORDER — FUROSEMIDE 40 MG/1
TABLET ORAL
COMMUNITY
Start: 2022-02-27 | End: 2022-07-01

## 2022-03-01 NOTE — PROGRESS NOTES
HISTORY OF PRESENT ILLNESS  Cristiano Garber is a 70 y.o. male. 10/18/2021  Patient seen today for new patient evaluation. He is referred here for evaluation of cardiac arrhythmia. Patient was in hospital 5/2021 with  · Tracheostomy - #9 Portex on 4/26 with Dr. Alisa Ma. No further bleeding at trach site. Plans for reevaluation and potential decannulation as outpatient noted  · S/p angioedema- with airway and respiratory failure and collapse; thought due to ACE inhibitor. S/P Emergent Cricthyrotomy [facility]-ED 4/4/21- converted to 6.5 ETT intraoperative by anesthesia team 4/4/21, 8.0 ETT by anesthesia 4/7/21, packing removed evening 4/8/21-ENT  · S/P cardiac arrest and acute respiratory failure-resolved  · Pulmonary Infiltrates: suspect aspiration secretions and/or blood due to above- can't exclude other infectious process at this time. Sputum culture from 5 /1 reported 5 /4-heavy Enterobacter aerogenes. Not treating since clinically improved. Likely colonization  · DVT: Popliteal- Left; acute non-occlusive thrombus.   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 · Need for nutrition-PEG tube   Patient now presents with severe edema. He has generalized edema that is progressively getting worse. He is short of breath. He has orthopnea and complaint of wheezing denies any chest pain  3/2022  Patient is here for follow-up. He is here for preoperative cardiac assessment. He has multiple medical problems.   He was last admitted in hospital 10/2021 with  Discharge Diagnosis:     Acute on chronic combined systolic and diastolic CHF   CMO EF 63%  Chronic left heel wound  Left lower extremity cellulitis  Suspect osteomyelitis  Acute kidney injury on CKD stage IIIa  Anemia  Iron deficiency  Morbid obesity  Suspect GIAN  Hyperlipidemia  Hypertension  GERD  Pulmonary HTN, PAP 40  Patient is here for follow-up. The shortness of breath is better. Edema is significantly improved. Results  Pertinent negatives include no chest pain, no abdominal pain, no headaches and no shortness of breath. Review of Systems   Constitutional: Negative for chills and fever. HENT: Negative for nosebleeds. Eyes: Negative for blurred vision and double vision. Respiratory: Negative for cough, hemoptysis, sputum production, shortness of breath and wheezing. Cardiovascular: Negative for chest pain, palpitations, orthopnea, claudication, leg swelling and PND. Gastrointestinal: Negative for abdominal pain, heartburn, nausea and vomiting. Musculoskeletal: Negative for myalgias. Skin: Negative for rash. Neurological: Negative for dizziness, weakness and headaches. Endo/Heme/Allergies: Does not bruise/bleed easily. No family history on file. Past Medical History:   Diagnosis Date    DDD (degenerative disc disease), lumbar     [facility]     Diabetic neuropathy ([facility]     Diabetic retinopathy ([facility]     DM2 (diabetes mellitus, type 2) ([facility]     HLD (hyperlipidemia)     HTN (hypertension)     Obesity (BMI 30-39. 9)        No past surgical history on file. Social History     Tobacco Use    Smoking status: Current Every Day Smoker     Packs/day: 0.25    Smokeless tobacco: Never Used   Substance Use Topics    Alcohol use: Not Currently       Allergies   Allergen Reactions    Lisinopril Angioedema       Prior to Admission medications    Medication Sig Start Date End Date Taking? Authorizing Provider   furosemide (LASIX) 40 mg tablet  2/27/22  Yes Provider, Alexandr   isosorbide mononitrate ER (IMDUR) 30 mg tablet Take 30 mg by mouth daily. Yes Other, MD Cheryl   hydrALAZINE (APRESOLINE) 25 mg tablet Take 25 mg by mouth three (3) times daily.    Yes Other, MD Cheryl   carvediloL (COREG) 6.25 mg tablet Take 6.25 mg by mouth two (2) times daily (with meals). Yes Other, MD Cheryl   albuterol sulfate (PROAIR RESPICLICK) 90 mcg/actuation breath activated inhaler Take 90 mcg by inhalation every six (6) hours as needed. 9/27/21  Yes Provider, Historical   ferrous sulfate (FeroSuL) 325 mg (65 mg iron) tablet Take 325 mg by mouth Daily (before breakfast). Yes Provider, Historical   atorvastatin (LIPITOR) 80 mg tablet Take 80 mg by mouth daily. Yes Provider, Historical   apixaban (Eliquis) 5 mg tablet Take 5 mg by mouth two (2) times a day. Yes Cleo, MD Cheryl   lansoprazole (PREVACID) 3 mg/mL oral suspension 10 mL by Per G Tube route two (2) times a day. 5/13/21  Yes Reza iPke MD   oxyCODONE IR (ROXICODONE) 5 mg immediate release tablet Take 5 mg by mouth every four (4) hours as needed for Pain. Patient not taking: Reported on 3/1/2022    Other, MD Cheryl         Visit Vitals  /89 (BP 1 Location: Left upper arm, BP Patient Position: Sitting, BP Cuff Size: Adult)   Pulse 81   Ht 5' 11\" (1.803 m)   Wt 112.9 kg (249 lb)   SpO2 98%   BMI 34.73 kg/m²       Physical Exam  Constitutional:       Appearance: He is well-developed. HENT:      Head: Normocephalic and atraumatic. Eyes:      Conjunctiva/sclera: Conjunctivae normal.   Neck:      Thyroid: No thyromegaly. Vascular: No JVD. Trachea: No tracheal deviation. Cardiovascular:      Rate and Rhythm: Normal rate and regular rhythm. Heart sounds: Normal heart sounds. No murmur heard. No friction rub. No gallop. Pulmonary:      Effort: No respiratory distress. Breath sounds: Normal breath sounds. No wheezing or rales. Chest:      Chest wall: No tenderness. Abdominal:      Palpations: Abdomen is soft. Tenderness: There is no abdominal tenderness. Musculoskeletal:      Cervical back: Neck supple. Skin:     General: Skin is warm and dry. Neurological:      Mental Status: He is alert and oriented to person, place, and time. Mr. Marquise Tucker has a reminder for a \"due or due soon\" health maintenance. I have asked that he contact his primary care provider for follow-up on this health maintenance. No flowsheet data found. I have personally reviewed patient's records available from hospital and other providers and incorporated findings in patient care. Notes, lab, EKG, chest x-ray, PVL  Impression   - ABNORMAL ECG -can 2021       Impression   SR-Sinus rhythm-normal P axis, V-rate 50-99        Impression   MFVPC-Multiform ventricular premature complexes-short R-R, variable morphology        Impression   -Left axis/left anterior hemiblock         poor precordial R wave progression-        Impression   -No old tracing for comparison-           Date: 10/08/21     CHEST PA AND LATERAL  10/2021  Impression        1. Nodular lesion right lung base. Consider CT evaluation. 2. Patchy airspace disease superior lingula which may represent subsegmental atelectasis, pneumonia or fibrosis. Questionable very small effusions. 3. Cardiomegaly and pulmonary venous hypertension. PVL10/2021  Signed By: Tiara Goel MD on 10/7/2021 4:52 PM  Conclusions: Hemodynamically significant stenosis (50-75%) in the left distal superficial femoral artery. Interpretation Summary 4/2021    · Contrast used: DEFINITY. · Image quality for this study was technically difficult. · LV: Calculated LVEF is 55%. Visually measured ejection fraction. Normal cavity size, wall thickness and systolic function (ejection fraction normal). Wall motion: normal. Moderate (grade 2) left ventricular diastolic dysfunction. · AO: Mild aortic root and ascending aorta dilatation. Ascending aorta diameter = 3.6 cm. Aortic root measures 3.9 cm  · RA: Dilated right atrium. · RV: Dilated right ventricle. Borderline low systolic function. · TV: Mild tricuspid valve regurgitation is present.   · IVC: Mechanically ventilated; cannot use inferior caval vein diameter to estimate central venous pressure. · PA: Moderate pulmonary hypertension. Pulmonary arterial systolic pressure is 61 mmHg. Narrative echocardiogram exam 10/2021  Performed by [address] [place] [address] Drive* Left ventricular chamber size and systolic function is normal with an   ejection fraction of 41 % by Arellano's biplane.     * Global hypokinesis of the left ventricle.     * Left ventricular diastolic function is indeterminate due to arrythmia.     * Left atrial chamber is normal with a left atrial volume index of 32.98   ml/m^2 by BP MOD.     * Right ventricular systolic function is normal with TAPSE measuring 1.64   cm.     * Right ventricular chamber dimension is moderately enlarged.     * Right atrial chamber size is mildly enlarged.     * There is mild sclerosis, without stenosis, of the aortic valve cusps with   trace insufficiency.     * There is mild to moderate mitral valve and tricuspid valve regurgitation.     * There is mild pulmonic regurgitation.     * Estimated pulmonary arterial systolic pressure is 40 mmHg.     * The ascending aorta is approaching dilatation measuring 3.8 cm with an   index of 1.54 cm/m2.     * The aortic root at the sinus of Valsalva is dilated measuring 4.2 cm with   an index of 1.68 cm/m2.     * No mass, shunts, or thrombi. Comparison     * No prior study is available for comparison. Assessment         ICD-10-CM ICD-9-CM    1. Chronic combined systolic and diastolic congestive heart failure (HCC)  I50.42 428.42      428.0     Off Lasix for 2 days advised to restart. 2. PVC (premature ventricular contraction)  I49.3 427.69     Stable asymptomatic   3. Shortness of breath  R06.02 786.05     Stable improving. 4. Pre-operative cardiovascular examination  Z01.810 V72.81     Stable cardiac status okay to proceed with surgery moderate to high risk   5. Cardiomyopathy, unspecified type ([facility]  I42.9 425.4     Continue medical management monitor   6.  PAD (peripheral artery disease) ([facility]  I73.9 443.9      10/2021  Seen for new patient evaluation for severe fluid overload and PVC. Generalized anasarca. Currently on Lasix but has not seen any improvement. Patient wants to not change any medication at present he is going to go to emergency room and get IV treatment at [facility].  We will follow up closely after discharge. 3/2021  Seen for preoperative assessment. Since last admission was admitted to [facility] where he had CHF. Systolic and diastolic heart failure that was treated. Ejection fraction 41%. Recent x-ray with mild congestion but his shortness of breath is better he has been out of Lasix which he has restarted advised to take extra dose for next few days. For foot surgery he is cleared with medium to high risk. EKG reviewed  There are no discontinued medications. No orders of the defined types were placed in this encounter. Follow-up and Dispositions    · Return in about 3 months (around 6/1/2022).

## 2022-03-01 NOTE — PROGRESS NOTES
1. Have you been to the ER, urgent care clinic since your last visit? Hospitalized since your last visit? Yes, Daniel jiménez for fluid build up    2. Have you seen or consulted any other health care providers outside of the [address] [place] [address] since your last visit? Include any pap smears or colon screening.  Yes, OBICI  To see his foot doctor

## 2022-03-18 PROBLEM — I46.9 CARDIAC ARREST (HCC): Status: ACTIVE | Noted: 2021-04-04

## 2022-03-18 PROBLEM — I50.9 CHF (CONGESTIVE HEART FAILURE) (HCC): Status: ACTIVE | Noted: 2021-11-11

## 2022-03-18 PROBLEM — E11.40 DIABETIC NEUROPATHY ASSOCIATED WITH TYPE 2 DIABETES MELLITUS (HCC): Status: ACTIVE | Noted: 2021-04-05

## 2022-03-18 PROBLEM — N17.9 AKI (ACUTE KIDNEY INJURY) (HCC): Status: ACTIVE | Noted: 2021-04-04

## 2022-03-18 PROBLEM — E66.9 OBESITY (BMI 30-39.9): Status: ACTIVE | Noted: 2021-04-05

## 2022-03-18 PROBLEM — R91.8 PULMONARY INFILTRATES: Status: ACTIVE | Noted: 2021-04-05

## 2022-03-18 PROBLEM — I82.409 DVT (DEEP VENOUS THROMBOSIS) (HCC): Status: ACTIVE | Noted: 2021-04-10

## 2022-03-18 PROBLEM — E11.49 CONTROLLED TYPE 2 DIABETES MELLITUS WITH NEUROLOGIC COMPLICATION, WITHOUT LONG-TERM CURRENT USE OF INSULIN (HCC): Status: ACTIVE | Noted: 2021-04-05

## 2022-03-18 PROBLEM — O22.30 DVT (DEEP VEIN THROMBOSIS) IN PREGNANCY: Status: ACTIVE | Noted: 2021-05-03

## 2022-03-19 PROBLEM — T46.4X5A ANGIOEDEMA DUE TO ANGIOTENSIN CONVERTING ENZYME INHIBITOR (ACE-I): Status: ACTIVE | Noted: 2021-04-04

## 2022-03-19 PROBLEM — E78.5 HYPERLIPIDEMIA: Status: ACTIVE | Noted: 2021-11-11

## 2022-03-19 PROBLEM — G93.40 ACUTE ENCEPHALOPATHY: Status: ACTIVE | Noted: 2021-05-03

## 2022-03-19 PROBLEM — E11.319 DIABETIC RETINOPATHY ASSOCIATED WITH TYPE 2 DIABETES MELLITUS (HCC): Status: ACTIVE | Noted: 2021-04-05

## 2022-03-19 PROBLEM — I10 HYPERTENSION: Status: ACTIVE | Noted: 2021-11-11

## 2022-03-19 PROBLEM — J96.90 RESPIRATORY FAILURE (HCC): Status: ACTIVE | Noted: 2021-04-05

## 2022-03-19 PROBLEM — T78.3XXA ANGIOEDEMA DUE TO ANGIOTENSIN CONVERTING ENZYME INHIBITOR (ACE-I): Status: ACTIVE | Noted: 2021-04-04

## 2022-03-30 ENCOUNTER — HOSPITAL ENCOUNTER (OUTPATIENT)
Dept: LAB | Age: 72
Discharge: HOME | End: 2022-03-30
Payer: MEDICARE

## 2022-03-30 ENCOUNTER — TRANSCRIBE ORDER (OUTPATIENT)
Dept: REGISTRATION | Age: 72
End: 2022-03-30

## 2022-03-30 DIAGNOSIS — E87.6 HYPOPOTASSEMIA: ICD-10-CM

## 2022-03-30 DIAGNOSIS — E87.6 HYPOPOTASSEMIA: Primary | ICD-10-CM

## 2022-03-30 LAB — POTASSIUM SERPL-SCNC: 3.1 MMOL/L (ref 3.2–5.1)

## 2022-03-30 PROCEDURE — 84132 ASSAY OF SERUM POTASSIUM: CPT

## 2022-03-30 PROCEDURE — 36415 COLL VENOUS BLD VENIPUNCTURE: CPT

## 2022-04-01 ENCOUNTER — TRANSCRIBE ORDER (OUTPATIENT)
Dept: REGISTRATION | Age: 72
End: 2022-04-01

## 2022-04-01 ENCOUNTER — HOSPITAL ENCOUNTER (OUTPATIENT)
Dept: LAB | Age: 72
Discharge: HOME | End: 2022-04-01
Payer: MEDICARE

## 2022-04-01 DIAGNOSIS — E87.6 HYPOKALEMIA: Primary | ICD-10-CM

## 2022-04-01 DIAGNOSIS — E87.6 HYPOKALEMIA: ICD-10-CM

## 2022-04-01 LAB — POTASSIUM SERPL-SCNC: 3.2 MMOL/L (ref 3.2–5.1)

## 2022-04-01 PROCEDURE — 84132 ASSAY OF SERUM POTASSIUM: CPT

## 2022-04-01 PROCEDURE — 36415 COLL VENOUS BLD VENIPUNCTURE: CPT

## 2022-04-04 ENCOUNTER — HOSPITAL ENCOUNTER (OUTPATIENT)
Dept: LAB | Age: 72
Discharge: HOME | End: 2022-04-04
Payer: MEDICARE

## 2022-04-04 ENCOUNTER — TRANSCRIBE ORDER (OUTPATIENT)
Dept: REGISTRATION | Age: 72
End: 2022-04-04

## 2022-04-04 DIAGNOSIS — E87.6 HYPOKALEMIA: Primary | ICD-10-CM

## 2022-04-04 DIAGNOSIS — E87.6 HYPOKALEMIA: ICD-10-CM

## 2022-04-04 LAB — POTASSIUM SERPL-SCNC: 3 MMOL/L (ref 3.2–5.1)

## 2022-04-04 PROCEDURE — 84132 ASSAY OF SERUM POTASSIUM: CPT

## 2022-04-04 PROCEDURE — 36415 COLL VENOUS BLD VENIPUNCTURE: CPT

## 2022-04-15 NOTE — PROGRESS NOTES
Epidural Block    Patient location during procedure: OB  Start time: 4/14/2022 5:16 PM  End time: 4/14/2022 5:07 PM  Reason for block: labor epidural  Staffing  Performed: attending   Anesthesiologist: Esthela Celestin MD  Preanesthetic Checklist  Completed: patient identified, risks and benefits discussed, surgical consent, pre-op evaluation and timeout performed  Block Placement  Patient position: sitting  Prep: ChloraPrep  Sterility prep: cap, drape, gloves, hand and mask  Sedation level: no sedation  Patient monitoring: continuous pulse oximetry and heart rate  Approach: midline  Location: lumbar  Lumbar location: L3-L4  Epidural  Loss of resistance technique: saline  Guidance: landmark technique  Needle  Needle type: Tuohy   Needle gauge: 17 G  Needle length: 10 cm  Needle insertion depth: 5 cm  Catheter type: end hole  Catheter size: 19 G  Catheter at skin depth: 10 cm  Catheter securement method: clear occlusive dressing, liquid medical adhesive and surgical tape  Test dose: negative  Medications Administered  Lidocaine-EPINEPHrine (XYLOCAINE) 1.5 %-1:200,000 Epidural, 4 mL  Assessment  Block outcome: pain improved  Number of attempts: 1  Procedure assessment: patient tolerated procedure well with no immediate complications Hospitalist Progress Note        Patient: Durwood Kehr               Sex: male          DOA: 4/4/2021       YOB: 1950      Age:  79 y.o.        LOS:  LOS: 29 days               Subjective:     Patient is awake but does not follow any verbal commands other than staring at me upon asking questions. He has been n.p.o. since last night per nursing. No new documented events noted. HPI:  Patient was initially seen at [facility] and had emergent Crychothyrotomy during Cardiac arrest.  Initially there was concern that he had Angioedema. [facility] Mr Iqra Morris has required prolonged support on the mechanical ventilator for ongoing respiratory failure. He has had Tracheostomy via ENT and he continues with Tracheostomy collar currently which he is tolerating well. Mr Iqra Morris had cardiac arrest initially, but he has stable hemodynamics currently. Mr Iqra Morris was treated for sepsis and has received a full course of antibiotics, he is off antibiotics currently. He has limited interaction, he does not have seizure activity. He has had sedation to maintain the ventilator which he no longer needs. He has tracheostomy, but he currently does not have a PEG. He has ongoing NG tube and continues with NG tube feeding. Objective:      /76   Pulse 94   Temp 99.5 °F (37.5 °C)   Resp 30   Ht 5' 11\" (1.803 m)   Wt 101.7 kg (224 lb 2.3 oz)   SpO2 99%   BMI 31.26 kg/m²       Intake/Output Summary (Last 24 hours) at 5/3/2021 0855  Last data filed at 5/3/2021 0600  Gross per 24 hour   Intake 1120 ml   Output 1513 ml   Net -393 ml     General: Awake, NG tube in situ. Good eye tracking  Neck: Tracheostomy is in situ with dressing  Lungs: Diminished breath sound bibasilar without wheezes  Heart:  Regular Rate and Rhythm.     Abdomen:  Soft, nondistended, bowel sounds present  Extremities: Pedal edema present  Neuro: Awake, does not follow verbal commands, no tremors noted. Assessment/Plan     ASSESSMENT:    1. Reportedly acute hypoxic respiratory failure due to angioedema s/p emergent cricothyrotomy followed by tracheostomy  2. Tracheostomy site bleeding, better currently  3. Status post respiratory failure cardiac arrest  4. Chronic hypoxic respiratory failure status post trach and on trach collar  5. Bilateral pulmonary infiltrates  6. Oropharyngeal dysphagia status post NG tube  7. Left leg popliteal DVT and pulmonary embolism  8. Reportedly acute metabolic encephalopathy  9. 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 JACQUELYN, resolved  11. Left pneumothorax resolved  12. Diabetic neuropathy  13. Obesity    PLAN:    Continue holding Coumadin and heparin drip until PEG tube is placed today. GI is planning to put a PEG tube today  Neurology has signed off  Pulmonologist to follow this patient for tracheostomy care  Continue trach collar and nebulizer  Continue to feeding through NG tube until PEG tube is placed  Continue multivitamin  Patient is waiting to be transferred to [facility] unit. Discussed with patient sister over the phone and updated her about current treatment plan. She spoke to patient's son and they are in agreement with proceeding for PEG tube placement    Total time to take care of this patient was 25 minutes and more than 50% of time was spent counseling and coordinating care. Disclaimer: Sections of this note are dictated using utilizing voice recognition software, which may have resulted in some phonetic based errors in grammar and contents. Even though attempts were made to correct all the mistakes, some may have been missed, and remained in the body of the document. If questions arise, please contact our department.     Recent Results (from the past 24 hour(s))   SARS-COV-2    Collection Time: 05/02/21 10:22 AM   Result Value Ref Range    SARS-CoV-2 Please find results under separate order     COVID-19 RAPID TEST    Collection Time: 05/02/21 10:22 AM   Result Value Ref Range    Specimen source Nasopharyngeal      COVID-19 rapid test Not detected NOTD     GLUCOSE, POC    Collection Time: 05/02/21 11:38 AM   Result Value Ref Range    Glucose (POC) 121 (H) 70 - 110 mg/dL   GLUCOSE, POC    Collection Time: 05/02/21  5:25 PM   Result Value Ref Range    Glucose (POC) 137 (H) 70 - 110 mg/dL   GLUCOSE, POC    Collection Time: 05/03/21 12:06 AM   Result Value Ref Range    Glucose (POC) 154 (H) 70 - 110 mg/dL   MAGNESIUM    Collection Time: 05/03/21 12:47 AM   Result Value Ref Range    Magnesium 2.7 (H) 1.6 - 2.6 mg/dL   CBC WITH AUTOMATED DIFF    Collection Time: 05/03/21 12:47 AM   Result Value Ref Range    WBC 10.0 4.6 - 13.2 K/uL    RBC 3.89 (L) 4.35 - 5.65 M/uL    HGB 9.7 (L) 13.0 - 16.0 g/dL    HCT 32.4 (L) 36.0 - 48.0 %    MCV 83.3 74.0 - 97.0 FL    MCH 24.9 24.0 - 34.0 PG    MCHC 29.9 (L) 31.0 - 37.0 g/dL    RDW 15.7 (H) 11.6 - 14.5 %    PLATELET 612 686 - 307 K/uL    MPV 11.7 9.2 - 11.8 FL    NEUTROPHILS 70 40 - 73 %    LYMPHOCYTES 20 (L) 21 - 52 %    MONOCYTES 8 3 - 10 %    EOSINOPHILS 2 0 - 5 %    BASOPHILS 1 0 - 2 %    ABS. NEUTROPHILS 7.0 1.8 - 8.0 K/UL    ABS. LYMPHOCYTES 2.0 0.9 - 3.6 K/UL    ABS. MONOCYTES 0.8 0.05 - 1.2 K/UL    ABS. EOSINOPHILS 0.2 0.0 - 0.4 K/UL    ABS.  BASOPHILS 0.1 0.0 - 0.1 K/UL    DF AUTOMATED     PTT    Collection Time: 05/03/21 12:47 AM   Result Value Ref Range    aPTT 39.7 (H) 23.0 - 36.4 SEC   RENAL FUNCTION PANEL    Collection Time: 05/03/21 12:47 AM   Result Value Ref Range    Sodium 140 136 - 145 mmol/L    Potassium 4.9 3.5 - 5.5 mmol/L    Chloride 107 100 - 111 mmol/L    CO2 28 21 - 32 mmol/L    Anion gap 5 3.0 - 18 mmol/L    Glucose 150 (H) 74 - 99 mg/dL    BUN 56 (H) 7.0 - 18 MG/DL    Creatinine 1.44 (H) 0.6 - 1.3 MG/DL    BUN/Creatinine ratio 39 (H) 12 - 20      GFR est AA 59 (L) >60 ml/min/1.73m2    GFR est non-AA 49 (L) >60 ml/min/1.73m2    Calcium 8.8 8.5 - 10.1 MG/DL    Phosphorus 4.1 2.5 - 4.9 MG/DL    Albumin 2.1 (L) 3.4 - 5.0 g/dL   PROTHROMBIN TIME + INR    Collection Time: 05/03/21 12:47 AM   Result Value Ref Range    Prothrombin time 15.3 (H) 11.5 - 15.2 sec    INR 1.2 0.8 - 1.2     GLUCOSE, POC    Collection Time: 05/03/21  7:03 AM   Result Value Ref Range    Glucose (POC) 122 (H) 70 - 110 mg/dL

## 2022-06-03 ENCOUNTER — OFFICE VISIT (OUTPATIENT)
Dept: CARDIOLOGY CLINIC | Age: 72
End: 2022-06-03
Payer: MEDICARE

## 2022-06-03 VITALS
HEIGHT: 71 IN | SYSTOLIC BLOOD PRESSURE: 134 MMHG | HEART RATE: 66 BPM | DIASTOLIC BLOOD PRESSURE: 79 MMHG | BODY MASS INDEX: 34.58 KG/M2 | WEIGHT: 247 LBS

## 2022-06-03 DIAGNOSIS — I73.9 PAD (PERIPHERAL ARTERY DISEASE) (HCC): ICD-10-CM

## 2022-06-03 DIAGNOSIS — I50.42 CHRONIC COMBINED SYSTOLIC AND DIASTOLIC CONGESTIVE HEART FAILURE (HCC): Primary | ICD-10-CM

## 2022-06-03 DIAGNOSIS — I42.9 CARDIOMYOPATHY, UNSPECIFIED TYPE (HCC): ICD-10-CM

## 2022-06-03 DIAGNOSIS — I49.3 PVC (PREMATURE VENTRICULAR CONTRACTION): ICD-10-CM

## 2022-06-03 PROCEDURE — G8432 DEP SCR NOT DOC, RNG: HCPCS | Performed by: INTERNAL MEDICINE

## 2022-06-03 PROCEDURE — G8417 CALC BMI ABV UP PARAM F/U: HCPCS | Performed by: INTERNAL MEDICINE

## 2022-06-03 PROCEDURE — G8427 DOCREV CUR MEDS BY ELIG CLIN: HCPCS | Performed by: INTERNAL MEDICINE

## 2022-06-03 PROCEDURE — 99214 OFFICE O/P EST MOD 30 MIN: CPT | Performed by: INTERNAL MEDICINE

## 2022-06-03 PROCEDURE — 3017F COLORECTAL CA SCREEN DOC REV: CPT | Performed by: INTERNAL MEDICINE

## 2022-06-03 PROCEDURE — G8536 NO DOC ELDER MAL SCRN: HCPCS | Performed by: INTERNAL MEDICINE

## 2022-06-03 PROCEDURE — G8754 DIAS BP LESS 90: HCPCS | Performed by: INTERNAL MEDICINE

## 2022-06-03 PROCEDURE — G8752 SYS BP LESS 140: HCPCS | Performed by: INTERNAL MEDICINE

## 2022-06-03 PROCEDURE — 1123F ACP DISCUSS/DSCN MKR DOCD: CPT | Performed by: INTERNAL MEDICINE

## 2022-06-03 PROCEDURE — 1101F PT FALLS ASSESS-DOCD LE1/YR: CPT | Performed by: INTERNAL MEDICINE

## 2022-06-03 RX ORDER — SPIRONOLACTONE 25 MG/1
25 TABLET ORAL DAILY
Qty: 90 TABLET | Refills: 1 | Status: SHIPPED | OUTPATIENT
Start: 2022-06-03

## 2022-06-03 NOTE — PROGRESS NOTES
HISTORY OF PRESENT ILLNESS  Cristiano Zurita is a 70 y.o. male. 10/18/2021  Patient seen today for new patient evaluation. He is referred here for evaluation of cardiac arrhythmia. Patient was in hospital 5/2021 with  · Tracheostomy - #9 Portex on 4/26 with Dr. Caroline Mccray. No further bleeding at trach site. Plans for reevaluation and potential decannulation as outpatient noted  · S/p angioedema- with airway and respiratory failure and collapse; thought due to ACE inhibitor. S/P Emergent Cricthyrotomy [facility]-ED 4/4/21- converted to 6.5 ETT intraoperative by anesthesia team 4/4/21, 8.0 ETT by anesthesia 4/7/21, packing removed evening 4/8/21-ENT  · S/P cardiac arrest and acute respiratory failure-resolved  · Pulmonary Infiltrates: suspect aspiration secretions and/or blood due to above- can't exclude other infectious process at this time. Sputum culture from 5 /1 reported 5 /4-heavy Enterobacter aerogenes. Not treating since clinically improved. Likely colonization  · DVT: Popliteal- Left; acute non-occlusive thrombus.   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 · Need for nutrition-PEG tube   Patient now presents with severe edema. He has generalized edema that is progressively getting worse. He is short of breath. He has orthopnea and complaint of wheezing denies any chest pain  3/2022  Patient is here for follow-up. He is here for preoperative cardiac assessment. He has multiple medical problems.   He was last admitted in hospital 10/2021 with  Discharge Diagnosis:     Acute on chronic combined systolic and diastolic CHF   CMO EF 66%  Chronic left heel wound  Left lower extremity cellulitis  Suspect osteomyelitis  Acute kidney injury on CKD stage IIIa  Anemia  Iron deficiency  Morbid obesity  Suspect GIAN  Hyperlipidemia  Hypertension  GERD  Pulmonary HTN, PAP 40  Patient is here for follow-up. The shortness of breath is better. Edema is significantly improved. Results  Pertinent negatives include no chest pain, no abdominal pain, no headaches and no shortness of breath. Follow-up  Pertinent negatives include no chest pain, no abdominal pain, no headaches and no shortness of breath. Review of Systems   Constitutional: Negative for chills and fever. HENT: Negative for nosebleeds. Eyes: Negative for blurred vision and double vision. Respiratory: Negative for cough, hemoptysis, sputum production, shortness of breath and wheezing. Cardiovascular: Negative for chest pain, palpitations, orthopnea, claudication, leg swelling and PND. Gastrointestinal: Negative for abdominal pain, heartburn, nausea and vomiting. Musculoskeletal: Negative for myalgias. Skin: Negative for rash. Neurological: Negative for dizziness, weakness and headaches. Endo/Heme/Allergies: Does not bruise/bleed easily. No family history on file. Past Medical History:   Diagnosis Date    DDD (degenerative disc disease), lumbar     Diabetes ([facility]     Diabetic neuropathy ([facility]     Diabetic retinopathy ([facility]     DM2 (diabetes mellitus, type 2) ([facility]     HLD (hyperlipidemia)     HTN (hypertension)     Obesity (BMI 30-39. 9)        No past surgical history on file. Social History     Tobacco Use    Smoking status: Current Every Day Smoker     Packs/day: 0.25    Smokeless tobacco: Never Used   Substance Use Topics    Alcohol use: Not Currently       Allergies   Allergen Reactions    Lisinopril Angioedema       Prior to Admission medications    Medication Sig Start Date End Date Taking? Authorizing Provider   spironolactone (ALDACTONE) 25 mg tablet Take 1 Tablet by mouth daily. 6/3/22  Yes Sherita Rodriguez MD   empagliflozin (Jardiance) 10 mg tablet Take 1 Tablet by mouth daily.  6/3/22  Yes Sherita Rodriguez MD   furosemide (LASIX) 40 mg tablet  2/27/22  Yes Provider, Historical   isosorbide mononitrate ER (IMDUR) 30 mg tablet Take 30 mg by mouth daily. Yes Cleo, MD Cheryl   hydrALAZINE (APRESOLINE) 25 mg tablet Take 25 mg by mouth three (3) times daily. Yes Other, MD Cheryl   carvediloL (COREG) 6.25 mg tablet Take 6.25 mg by mouth two (2) times daily (with meals). Yes Other, MD Cheryl   albuterol sulfate (PROAIR RESPICLICK) 90 mcg/actuation breath activated inhaler Take 90 mcg by inhalation every six (6) hours as needed. 9/27/21  Yes Provider, Historical   ferrous sulfate (FeroSuL) 325 mg (65 mg iron) tablet Take 325 mg by mouth Daily (before breakfast). Yes Provider, Historical   atorvastatin (LIPITOR) 80 mg tablet Take 80 mg by mouth daily. Yes Provider, Historical   apixaban (Eliquis) 5 mg tablet Take 5 mg by mouth two (2) times a day. Yes Other, MD Cheryl   lansoprazole (PREVACID) 3 mg/mL oral suspension 10 mL by Per G Tube route two (2) times a day. 5/13/21  Yes America Richmond MD   oxyCODONE IR (ROXICODONE) 5 mg immediate release tablet Take 5 mg by mouth every four (4) hours as needed for Pain. Patient not taking: Reported on 3/1/2022    OtherCheryl MD         Visit Vitals  /79 (BP 1 Location: Left upper arm, BP Patient Position: Sitting, BP Cuff Size: Adult)   Pulse 66   Ht 5' 11\" (1.803 m)   Wt 112 kg (247 lb)   BMI 34.45 kg/m²       Physical Exam  Constitutional:       Appearance: He is well-developed. HENT:      Head: Normocephalic and atraumatic. Eyes:      Conjunctiva/sclera: Conjunctivae normal.   Neck:      Thyroid: No thyromegaly. Vascular: No JVD. Trachea: No tracheal deviation. Cardiovascular:      Rate and Rhythm: Normal rate and regular rhythm. Heart sounds: Normal heart sounds. No murmur heard. No friction rub. No gallop. Pulmonary:      Effort: No respiratory distress. Breath sounds: Normal breath sounds. No wheezing or rales.    Chest:      Chest wall: No tenderness. Abdominal:      Palpations: Abdomen is soft. Tenderness: There is no abdominal tenderness. Musculoskeletal:      Cervical back: Neck supple. Skin:     General: Skin is warm and dry. Neurological:      Mental Status: He is alert and oriented to person, place, and time. Mr. Bijan Stein has a reminder for a \"due or due soon\" health maintenance. I have asked that he contact his primary care provider for follow-up on this health maintenance. No flowsheet data found. I have personally reviewed patient's records available from hospital and other providers and incorporated findings in patient care. Notes, lab, EKG, chest x-ray, PVL  Impression   - ABNORMAL ECG -can 2021       Impression   SR-Sinus rhythm-normal P axis, V-rate 50-99        Impression   MFVPC-Multiform ventricular premature complexes-short R-R, variable morphology        Impression   -Left axis/left anterior hemiblock         poor precordial R wave progression-        Impression   -No old tracing for comparison-           Date: 10/08/21     CHEST PA AND LATERAL  10/2021  Impression        1. Nodular lesion right lung base. Consider CT evaluation. 2. Patchy airspace disease superior lingula which may represent subsegmental atelectasis, pneumonia or fibrosis. Questionable very small effusions. 3. Cardiomegaly and pulmonary venous hypertension. PVL-10/2021  Signed By: Bernabe Morales MD on 10/7/2021 4:52 PM  Conclusions: Hemodynamically significant stenosis (50-75%) in the left distal superficial femoral artery. Interpretation Summary 4/2021    · Contrast used: DEFINITY. · Image quality for this study was technically difficult. · LV: Calculated LVEF is 55%. Visually measured ejection fraction. Normal cavity size, wall thickness and systolic function (ejection fraction normal). Wall motion: normal. Moderate (grade 2) left ventricular diastolic dysfunction.   · AO: Mild aortic root and ascending aorta dilatation. Ascending aorta diameter = 3.6 cm. Aortic root measures 3.9 cm  · RA: Dilated right atrium. · RV: Dilated right ventricle. Borderline low systolic function. · TV: Mild tricuspid valve regurgitation is present. · IVC: Mechanically ventilated; cannot use inferior caval vein diameter to estimate central venous pressure. · PA: Moderate pulmonary hypertension. Pulmonary arterial systolic pressure is 61 mmHg. Narrative echocardiogram exam 10/2021  Performed by [address] [place] [address] Drive* Left ventricular chamber size and systolic function is normal with an   ejection fraction of 41 % by Arellano's biplane.     * Global hypokinesis of the left ventricle.     * Left ventricular diastolic function is indeterminate due to arrythmia.     * Left atrial chamber is normal with a left atrial volume index of 32.98   ml/m^2 by BP MOD.     * Right ventricular systolic function is normal with TAPSE measuring 1.64   cm.     * Right ventricular chamber dimension is moderately enlarged.     * Right atrial chamber size is mildly enlarged.     * There is mild sclerosis, without stenosis, of the aortic valve cusps with   trace insufficiency.     * There is mild to moderate mitral valve and tricuspid valve regurgitation.     * There is mild pulmonic regurgitation.     * Estimated pulmonary arterial systolic pressure is 40 mmHg.     * The ascending aorta is approaching dilatation measuring 3.8 cm with an   index of 1.54 cm/m2.     * The aortic root at the sinus of Valsalva is dilated measuring 4.2 cm with   an index of 1.68 cm/m2.     * No mass, shunts, or thrombi. Comparison     * No prior study is available for comparison. Assessment         ICD-10-CM ICD-9-CM    1. Heart failure with mildly reduced systolic function  W02.52 226.01 METABOLIC PANEL, BASIC     428.0     Symptoms stable. Continue treatment. Add Aldactone. Add Jardiance.    2. PVC (premature ventricular contraction)  I49.3 427.69     Stable asymptomatic monitor   3. Cardiomyopathy, unspecified type ([facility] [facility]  I42.9 425.4     Continue treatment medication adjusted monitor   4. PAD (peripheral artery disease) ([facility])  I73.9 443.9     Stable symptom continue therapy     10/2021  Seen for new patient evaluation for severe fluid overload and PVC. Generalized anasarca. Currently on Lasix but has not seen any improvement. Patient wants to not change any medication at present he is going to go to emergency room and get IV treatment at [facility].  We will follow up closely after discharge. 3/2022  Seen for preoperative assessment. Since last admission was admitted to [facility] where he had CHF. Systolic and diastolic heart failure that was treated. Ejection fraction 41%. Recent x-ray with mild congestion but his shortness of breath is better he has been out of Lasix which he has restarted advised to take extra dose for next few days. For foot surgery he is cleared with medium to high risk. EKG reviewed  6/2022  Cardiac status stable. Still short of breath class III. I have added Aldactone and Jardiance. Risk-benefit option discussed. Continue other treatment  There are no discontinued medications. Orders Placed This Encounter    METABOLIC PANEL, BASIC     Standing Status:   Future     Standing Expiration Date:   7/3/2022    spironolactone (ALDACTONE) 25 mg tablet     Sig: Take 1 Tablet by mouth daily. Dispense:  90 Tablet     Refill:  1    empagliflozin (Jardiance) 10 mg tablet     Sig: Take 1 Tablet by mouth daily. Dispense:  90 Tablet     Refill:  1       Follow-up and Dispositions    · Return in about 3 months (around 9/3/2022).

## 2022-06-03 NOTE — PROGRESS NOTES
1. Have you been to the ER, urgent care clinic since your last visit? Hospitalized since your last visit? No    2. Have you seen or consulted any other health care providers outside of the [address] [place] [address] since your last visit? Include any pap smears or colon screening. Yes Where:  [facility]

## 2022-06-09 LAB
ANION GAP SERPL CALC-SCNC: 11 MMOL/L (ref 3–15)
BUN SERPL-MCNC: 15 MG/DL (ref 6–22)
CALCIUM SERPL-MCNC: 8.4 MG/DL (ref 8.4–10.5)
CHLORIDE SERPL-SCNC: 98 MMOL/L (ref 98–110)
CO2 SERPL-SCNC: 28 MMOL/L (ref 20–32)
CREAT SERPL-MCNC: 1.4 MG/DL (ref 0.8–1.6)
GLOMERULAR FILTRATION RATE: 52.4 ML/MIN/1.73 SQ.M.
GLUCOSE SERPL-MCNC: 134 MG/DL (ref 70–99)
POTASSIUM SERPL-SCNC: 3.1 MMOL/L (ref 3.5–5.5)
SODIUM SERPL-SCNC: 137 MMOL/L (ref 133–145)

## 2022-06-13 ENCOUNTER — TELEPHONE (OUTPATIENT)
Dept: CARDIOLOGY CLINIC | Age: 72
End: 2022-06-13

## 2022-06-13 DIAGNOSIS — I50.42 CHRONIC COMBINED SYSTOLIC AND DIASTOLIC CONGESTIVE HEART FAILURE (HCC): Primary | ICD-10-CM

## 2022-06-13 NOTE — TELEPHONE ENCOUNTER
----- Message from Cristofer Farmer MD sent at 6/13/2022  7:24 AM EDT -----  Potassium remains low. Increase spironolactone to 50 mg a day.   BMP 1 week

## 2022-06-13 NOTE — TELEPHONE ENCOUNTER
Called and left message with patient regarding lab/test per Dr. Emelia oCrrales. Lab reviewed. Potassium remains low. Increase Spironolactone to 50 mg a day. BMP 1 week.  Please call office if any questions

## 2022-07-01 ENCOUNTER — OFFICE VISIT (OUTPATIENT)
Dept: CARDIOLOGY CLINIC | Age: 72
End: 2022-07-01
Payer: MEDICARE

## 2022-07-01 VITALS
SYSTOLIC BLOOD PRESSURE: 129 MMHG | WEIGHT: 242 LBS | OXYGEN SATURATION: 97 % | HEIGHT: 71 IN | BODY MASS INDEX: 33.88 KG/M2 | HEART RATE: 71 BPM | DIASTOLIC BLOOD PRESSURE: 72 MMHG

## 2022-07-01 DIAGNOSIS — I42.9 CARDIOMYOPATHY, UNSPECIFIED TYPE (HCC): ICD-10-CM

## 2022-07-01 DIAGNOSIS — I49.3 PVC (PREMATURE VENTRICULAR CONTRACTION): ICD-10-CM

## 2022-07-01 DIAGNOSIS — I73.9 PAD (PERIPHERAL ARTERY DISEASE) (HCC): ICD-10-CM

## 2022-07-01 DIAGNOSIS — R06.02 SHORTNESS OF BREATH: ICD-10-CM

## 2022-07-01 DIAGNOSIS — E87.6 HYPOKALEMIA: ICD-10-CM

## 2022-07-01 DIAGNOSIS — I50.42 CHRONIC COMBINED SYSTOLIC AND DIASTOLIC CONGESTIVE HEART FAILURE (HCC): Primary | ICD-10-CM

## 2022-07-01 PROCEDURE — G8536 NO DOC ELDER MAL SCRN: HCPCS | Performed by: INTERNAL MEDICINE

## 2022-07-01 PROCEDURE — G8754 DIAS BP LESS 90: HCPCS | Performed by: INTERNAL MEDICINE

## 2022-07-01 PROCEDURE — G8510 SCR DEP NEG, NO PLAN REQD: HCPCS | Performed by: INTERNAL MEDICINE

## 2022-07-01 PROCEDURE — 99214 OFFICE O/P EST MOD 30 MIN: CPT | Performed by: INTERNAL MEDICINE

## 2022-07-01 PROCEDURE — G8417 CALC BMI ABV UP PARAM F/U: HCPCS | Performed by: INTERNAL MEDICINE

## 2022-07-01 PROCEDURE — G8427 DOCREV CUR MEDS BY ELIG CLIN: HCPCS | Performed by: INTERNAL MEDICINE

## 2022-07-01 PROCEDURE — 3017F COLORECTAL CA SCREEN DOC REV: CPT | Performed by: INTERNAL MEDICINE

## 2022-07-01 PROCEDURE — G8752 SYS BP LESS 140: HCPCS | Performed by: INTERNAL MEDICINE

## 2022-07-01 PROCEDURE — 1123F ACP DISCUSS/DSCN MKR DOCD: CPT | Performed by: INTERNAL MEDICINE

## 2022-07-01 PROCEDURE — 1101F PT FALLS ASSESS-DOCD LE1/YR: CPT | Performed by: INTERNAL MEDICINE

## 2022-07-01 RX ORDER — CARVEDILOL 6.25 MG/1
6.25 TABLET ORAL 2 TIMES DAILY WITH MEALS
Qty: 180 TABLET | Refills: 3 | Status: SHIPPED | OUTPATIENT
Start: 2022-07-01

## 2022-07-01 RX ORDER — HYDRALAZINE HYDROCHLORIDE 25 MG/1
25 TABLET, FILM COATED ORAL 3 TIMES DAILY
Qty: 270 TABLET | Refills: 3 | Status: SHIPPED | OUTPATIENT
Start: 2022-07-01

## 2022-07-01 RX ORDER — ISOSORBIDE MONONITRATE 30 MG/1
30 TABLET, EXTENDED RELEASE ORAL DAILY
Qty: 90 TABLET | Refills: 3 | Status: SHIPPED | OUTPATIENT
Start: 2022-07-01

## 2022-07-01 RX ORDER — FUROSEMIDE 40 MG/1
40 TABLET ORAL 2 TIMES DAILY
Qty: 180 TABLET | Refills: 3 | Status: SHIPPED | OUTPATIENT
Start: 2022-07-01

## 2022-07-01 RX ORDER — ATORVASTATIN CALCIUM 80 MG/1
80 TABLET, FILM COATED ORAL DAILY
Qty: 90 TABLET | Refills: 3 | Status: SHIPPED | OUTPATIENT
Start: 2022-07-01

## 2022-07-01 NOTE — PROGRESS NOTES
HISTORY OF PRESENT ILLNESS  Cristiano Schilling is a 70 y.o. male. 10/18/2021  Patient seen today for new patient evaluation. He is referred here for evaluation of cardiac arrhythmia. Patient was in hospital 5/2021 with  · Tracheostomy - #9 Portex on 4/26 with Dr. Ashvin Gant. No further bleeding at trach site. Plans for reevaluation and potential decannulation as outpatient noted  · S/p angioedema- with airway and respiratory failure and collapse; thought due to ACE inhibitor. S/P Emergent Cricthyrotomy [facility]-ED 4/4/21- converted to 6.5 ETT intraoperative by anesthesia team 4/4/21, 8.0 ETT by anesthesia 4/7/21, packing removed evening 4/8/21-ENT  · S/P cardiac arrest and acute respiratory failure-resolved  · Pulmonary Infiltrates: suspect aspiration secretions and/or blood due to above- can't exclude other infectious process at this time. Sputum culture from 5 /1 reported 5 /4-heavy Enterobacter aerogenes. Not treating since clinically improved. Likely colonization  · DVT: Popliteal- Left; acute non-occlusive thrombus.   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 · Need for nutrition-PEG tube   Patient now presents with severe edema. He has generalized edema that is progressively getting worse. He is short of breath. He has orthopnea and complaint of wheezing denies any chest pain  3/2022  Patient is here for follow-up. He is here for preoperative cardiac assessment. He has multiple medical problems.   He was last admitted in hospital 10/2021 with  Discharge Diagnosis:     Acute on chronic combined systolic and diastolic CHF   CMO EF 34%  Chronic left heel wound  Left lower extremity cellulitis  Suspect osteomyelitis  Acute kidney injury on CKD stage IIIa  Anemia  Iron deficiency  Morbid obesity  Suspect GIAN  Hyperlipidemia  Hypertension  GERD  Pulmonary HTN, PAP 40  Patient is here for follow-up. The shortness of breath is better. Edema is significantly improved. Follow-up  Pertinent negatives include no chest pain, no abdominal pain, no headaches and no shortness of breath. Results  Pertinent negatives include no chest pain, no abdominal pain, no headaches and no shortness of breath. Review of Systems   Constitutional: Negative for chills and fever. HENT: Negative for nosebleeds. Eyes: Negative for blurred vision and double vision. Respiratory: Negative for cough, hemoptysis, sputum production, shortness of breath and wheezing. Cardiovascular: Negative for chest pain, palpitations, orthopnea, claudication, leg swelling and PND. Gastrointestinal: Negative for abdominal pain, heartburn, nausea and vomiting. Musculoskeletal: Negative for myalgias. Skin: Negative for rash. Neurological: Negative for dizziness, weakness and headaches. Endo/Heme/Allergies: Does not bruise/bleed easily. No family history on file. Past Medical History:   Diagnosis Date    DDD (degenerative disc disease), lumbar     Diabetes ([facility] [address]     Diabetic neuropathy ([facility] [address]     Diabetic retinopathy ([facility] [address]     DM2 (diabetes mellitus, type 2) ([facility] [address]     HLD (hyperlipidemia)     HTN (hypertension)     Obesity (BMI 30-39. 9)        No past surgical history on file. Social History     Tobacco Use    Smoking status: Current Every Day Smoker     Packs/day: 0.25    Smokeless tobacco: Never Used    Tobacco comment: 1 cigarette a day   Substance Use Topics    Alcohol use: Not Currently       Allergies   Allergen Reactions    Lisinopril Angioedema       Prior to Admission medications    Medication Sig Start Date End Date Taking? Authorizing Provider   isosorbide mononitrate ER (IMDUR) 30 mg tablet Take 1 Tablet by mouth daily.  7/1/22  Yes Preston John MD   hydrALAZINE (APRESOLINE) 25 mg tablet Take 1 Tablet by mouth three (3) times daily. 7/1/22  Yes Niecy Sotelo MD   carvediloL (COREG) 6.25 mg tablet Take 1 Tablet by mouth two (2) times daily (with meals). 7/1/22  Yes Niecy Sotelo MD   atorvastatin (LIPITOR) 80 mg tablet Take 1 Tablet by mouth daily. 7/1/22  Yes Niecy Sotelo MD   furosemide (LASIX) 40 mg tablet Take 1 Tablet by mouth two (2) times a day. 7/1/22  Yes Niecy Sotelo MD   spironolactone (ALDACTONE) 25 mg tablet Take 1 Tablet by mouth daily. 6/3/22  Yes Niecy Sotelo MD   empagliflozin (Jardiance) 10 mg tablet Take 1 Tablet by mouth daily. 6/3/22  Yes Niecy Sotelo MD   albuterol sulfate (PROAIR RESPICLICK) 90 mcg/actuation breath activated inhaler Take 90 mcg by inhalation every six (6) hours as needed. 9/27/21  Yes Provider, Historical   apixaban (Eliquis) 5 mg tablet Take 5 mg by mouth two (2) times a day. Yes Other, MD Cheryl         Visit Vitals  /72   Pulse 71   Ht 5' 11\" (1.803 m)   Wt 109.8 kg (242 lb)   SpO2 97%   BMI 33.75 kg/m²       Physical Exam  Constitutional:       Appearance: He is well-developed. HENT:      Head: Normocephalic and atraumatic. Eyes:      Conjunctiva/sclera: Conjunctivae normal.   Neck:      Thyroid: No thyromegaly. Vascular: No JVD. Trachea: No tracheal deviation. Cardiovascular:      Rate and Rhythm: Normal rate and regular rhythm. Heart sounds: Normal heart sounds. No murmur heard. No friction rub. No gallop. Pulmonary:      Effort: No respiratory distress. Breath sounds: Normal breath sounds. No wheezing or rales. Chest:      Chest wall: No tenderness. Abdominal:      Palpations: Abdomen is soft. Tenderness: There is no abdominal tenderness. Musculoskeletal:      Cervical back: Neck supple. Skin:     General: Skin is warm and dry. Neurological:      Mental Status: He is alert and oriented to person, place, and time. Mr. Jesus Merritt has a reminder for a \"due or due soon\" health maintenance.  I have asked that he contact his primary care provider for follow-up on this health maintenance. No flowsheet data found. I have personally reviewed patient's records available from hospital and other providers and incorporated findings in patient care. Notes, lab, EKG, chest x-ray, PVL  Impression   - ABNORMAL ECG -can 2021       Impression   SR-Sinus rhythm-normal P axis, V-rate 50-99        Impression   MFVPC-Multiform ventricular premature complexes-short R-R, variable morphology        Impression   -Left axis/left anterior hemiblock         poor precordial R wave progression-        Impression   -No old tracing for comparison-           Date: 10/08/21     CHEST PA AND LATERAL  10/2021  Impression        1. Nodular lesion right lung base. Consider CT evaluation. 2. Patchy airspace disease superior lingula which may represent subsegmental atelectasis, pneumonia or fibrosis. Questionable very small effusions. 3. Cardiomegaly and pulmonary venous hypertension. PVL-10/2021  Signed By: Adilia Galeano MD on 10/7/2021 4:52 PM  Conclusions: Hemodynamically significant stenosis (50-75%) in the left distal superficial femoral artery. Interpretation Summary 4/2021    · Contrast used: DEFINITY. · Image quality for this study was technically difficult. · LV: Calculated LVEF is 55%. Visually measured ejection fraction. Normal cavity size, wall thickness and systolic function (ejection fraction normal). Wall motion: normal. Moderate (grade 2) left ventricular diastolic dysfunction. · AO: Mild aortic root and ascending aorta dilatation. Ascending aorta diameter = 3.6 cm. Aortic root measures 3.9 cm  · RA: Dilated right atrium. · RV: Dilated right ventricle. Borderline low systolic function. · TV: Mild tricuspid valve regurgitation is present. · IVC: Mechanically ventilated; cannot use inferior caval vein diameter to estimate central venous pressure. · PA: Moderate pulmonary hypertension.  Pulmonary arterial systolic pressure is 61 mmHg. Narrative echocardiogram exam 10/2021  Performed by [facility] [address]* Left ventricular chamber size and systolic function is normal with an   ejection fraction of 41 % by Arellano's biplane.     * Global hypokinesis of the left ventricle.     * Left ventricular diastolic function is indeterminate due to arrythmia.     * Left atrial chamber is normal with a left atrial volume index of 32.98   ml/m^2 by BP MOD.     * Right ventricular systolic function is normal with TAPSE measuring 1.64   cm.     * Right ventricular chamber dimension is moderately enlarged.     * Right atrial chamber size is mildly enlarged.     * There is mild sclerosis, without stenosis, of the aortic valve cusps with   trace insufficiency.     * There is mild to moderate mitral valve and tricuspid valve regurgitation.     * There is mild pulmonic regurgitation.     * Estimated pulmonary arterial systolic pressure is 40 mmHg.     * The ascending aorta is approaching dilatation measuring 3.8 cm with an   index of 1.54 cm/m2.     * The aortic root at the sinus of Valsalva is dilated measuring 4.2 cm with   an index of 1.68 cm/m2.     * No mass, shunts, or thrombi. Comparison     * No prior study is available for comparison. Assessment         ICD-10-CM ICD-9-CM    1. Heart failure with reduced ejection fraction  I50.42 428.42      428.0     Continue treatment monitor class III. Out of medication which I refilled   2. Cardiomyopathy, unspecified type ([address]  I42.9 425.4     Continue treatment and monitor   3. PAD (peripheral artery disease) ([facility])  I73.9 443.9     Continue treatment follow-up with vascular   4. Shortness of breath  R06.02 786.05     Continue treatment monitor   5. PVC (premature ventricular contraction)  I49.3 427.69     Symptoms stable. Monitor   6. Hypokalemia  T65.9 237.9 METABOLIC PANEL, BASIC    Related to diuretic now on Aldactone.   Follow-up lab     10/2021  Seen for new patient evaluation for severe fluid overload and PVC. Generalized anasarca. Currently on Lasix but has not seen any improvement. Patient wants to not change any medication at present he is going to go to emergency room and get IV treatment at [facility].  We will follow up closely after discharge. 3/2022  Seen for preoperative assessment. Since last admission was admitted to [facility] where he had CHF. Systolic and diastolic heart failure that was treated. Ejection fraction 41%. Recent x-ray with mild congestion but his shortness of breath is better he has been out of Lasix which he has restarted advised to take extra dose for next few days. For foot surgery he is cleared with medium to high risk. EKG reviewed  6/2022  Cardiac status stable. Still short of breath class III. I have added Aldactone and Jardiance. Risk-benefit option discussed. Continue other treatment  7/2022  CHF class III. Out of multiple medication which I refilled. Continue diet modification. Monitor. Hypokalemia on recent lab now on Aldactone 50 mg.  Recheck labs and decide on supplement. Medications Discontinued During This Encounter   Medication Reason    oxyCODONE IR (ROXICODONE) 5 mg immediate release tablet Therapy Completed    lansoprazole (PREVACID) 3 mg/mL oral suspension Therapy Completed    isosorbide mononitrate ER (IMDUR) 30 mg tablet REORDER    hydrALAZINE (APRESOLINE) 25 mg tablet REORDER    carvediloL (COREG) 6.25 mg tablet REORDER    atorvastatin (LIPITOR) 80 mg tablet REORDER    ferrous sulfate (FeroSuL) 325 mg (65 mg iron) tablet Not A Current Medication    furosemide (LASIX) 40 mg tablet        Orders Placed This Encounter    METABOLIC PANEL, BASIC     Standing Status:   Future     Standing Expiration Date:   7/31/2022    isosorbide mononitrate ER (IMDUR) 30 mg tablet     Sig: Take 1 Tablet by mouth daily.      Dispense:  90 Tablet     Refill:  3    hydrALAZINE (APRESOLINE) 25 mg tablet     Sig: Take 1 Tablet by mouth three (3) times daily. Dispense:  270 Tablet     Refill:  3    carvediloL (COREG) 6.25 mg tablet     Sig: Take 1 Tablet by mouth two (2) times daily (with meals). Dispense:  180 Tablet     Refill:  3    atorvastatin (LIPITOR) 80 mg tablet     Sig: Take 1 Tablet by mouth daily. Dispense:  90 Tablet     Refill:  3    furosemide (LASIX) 40 mg tablet     Sig: Take 1 Tablet by mouth two (2) times a day. Dispense:  180 Tablet     Refill:  3       Follow-up and Dispositions    · Return in about 3 months (around 10/1/2022).

## 2022-07-01 NOTE — PROGRESS NOTES
1. Have you been to the ER, urgent care clinic since your last visit? Hospitalized since your last visit?    no    2. Have you seen or consulted any other health care providers outside of the [address] [place] [address] since your last visit? Include any pap smears or colon screening.   Yes pcp

## 2022-07-08 ENCOUNTER — TELEPHONE (OUTPATIENT)
Dept: CARDIOLOGY CLINIC | Age: 72
End: 2022-07-08

## 2022-07-08 DIAGNOSIS — E87.6 HYPOKALEMIA: Primary | ICD-10-CM

## 2022-07-08 LAB
ANION GAP SERPL CALC-SCNC: 9 MMOL/L (ref 3–15)
BUN SERPL-MCNC: 20 MG/DL (ref 6–22)
CALCIUM SERPL-MCNC: 8.2 MG/DL (ref 8.4–10.5)
CHLORIDE SERPL-SCNC: 99 MMOL/L (ref 98–110)
CO2 SERPL-SCNC: 30 MMOL/L (ref 20–32)
CREAT SERPL-MCNC: 1.6 MG/DL (ref 0.8–1.6)
GLOMERULAR FILTRATION RATE: 44.8 ML/MIN/1.73 SQ.M.
GLUCOSE SERPL-MCNC: 111 MG/DL (ref 70–99)
POTASSIUM SERPL-SCNC: 2.9 MMOL/L (ref 3.5–5.5)
SODIUM SERPL-SCNC: 138 MMOL/L (ref 133–145)

## 2022-07-08 RX ORDER — POTASSIUM CHLORIDE 20 MEQ/1
40 TABLET, EXTENDED RELEASE ORAL DAILY
Qty: 60 TABLET | Refills: 3 | Status: SHIPPED | OUTPATIENT
Start: 2022-07-08

## 2022-07-08 NOTE — TELEPHONE ENCOUNTER
Requested Prescriptions     Pending Prescriptions Disp Refills    potassium chloride (K-DUR, KLOR-CON M20) 20 mEq tablet 60 Tablet 3     Sig: Take 2 Tablets by mouth daily.

## 2022-07-08 NOTE — TELEPHONE ENCOUNTER
Spoke to patient per Eder Bear NP regarding lab. Lab reviewed. Potassium low recommend to begin Potassium 40 meq daily. Repeat BMP on Wednesday. He voices understanding and acceptance of this advice and will call back if any further questions or concerns.

## 2022-07-14 LAB
ANION GAP SERPL CALC-SCNC: 11 MMOL/L (ref 3–15)
BUN SERPL-MCNC: 28 MG/DL (ref 6–22)
CALCIUM SERPL-MCNC: 8.3 MG/DL (ref 8.4–10.5)
CHLORIDE SERPL-SCNC: 100 MMOL/L (ref 98–110)
CO2 SERPL-SCNC: 28 MMOL/L (ref 20–32)
CREAT SERPL-MCNC: 2 MG/DL (ref 0.8–1.6)
GLOMERULAR FILTRATION RATE: 34.6 ML/MIN/1.73 SQ.M.
GLUCOSE SERPL-MCNC: 112 MG/DL (ref 70–99)
POTASSIUM SERPL-SCNC: 3.9 MMOL/L (ref 3.5–5.5)
SODIUM SERPL-SCNC: 139 MMOL/L (ref 133–145)

## 2022-07-18 ENCOUNTER — TELEPHONE (OUTPATIENT)
Dept: CARDIOLOGY CLINIC | Age: 72
End: 2022-07-18

## 2022-07-18 DIAGNOSIS — I50.42 CHRONIC COMBINED SYSTOLIC AND DIASTOLIC CONGESTIVE HEART FAILURE (HCC): Primary | ICD-10-CM

## 2022-07-18 NOTE — TELEPHONE ENCOUNTER
----- Message from Jamey Rea MD sent at 7/18/2022  7:19 AM EDT -----  Potassium is improved. Reduce Lasix to 40 mg once a day continue spironolactone.   BMP 1 week

## 2022-07-18 NOTE — TELEPHONE ENCOUNTER
Spoke with patient regarding lab/test per Dr. Troy Olmedo. Lab reviewed. Potassium is improved. Reduce Lasix to 40 mg once a day continue spironolactone. BMP 1 week. He voices understanding and acceptance of this advice and will call back if any further questions or concerns.

## 2022-08-17 NOTE — PROGRESS NOTES
[facility] Department of Neurology  Virtual Visit      NEUROLOGY  [facility], [place]    Date: 8/17/22  Patient Name: Chasity Kwan   MRN: [id number]   PCP: Odilon Alas  Referring Provider: No ref. provider found    The patient location is: Home  The chief complaint leading to consultation is: Headace  Visit type: Virtual visit with synchronous audio and video  Total time spent with patient: 10 minutes  Each patient to whom he or she provides medical services by telemedicine is:  (1) informed of the relationship between the physician and patient and the respective role of any other health care provider with respect to management of the patient; and (2) notified that he or she may decline to receive medical services by telemedicine and may withdraw from such care at any time.    Notes:    Assessment:   Chasity Kwan is a 43 y.o. female Presenting in follow-up for management of migraine and IIH.  No evidence exacerbation of IIH at this time.  Provided PRN imitrex for migraine.  Plan:     Problem List Items Addressed This Visit    None     Visit Diagnoses     Other migraine without status migrainosus, not intractable    -  Primary    Relevant Medications    sumatriptan (IMITREX) 50 MG tablet        Tesfaye Santoyo MD  [facility]   Department of Neurology    Patient note was created using MModal Dictation.  Any errors in syntax or even information may not have been identified and edited on initial review prior to signing this note.  Subjective:        HPI:   Ms. Chasity Kwan is a 43 y.o. female  female with a history pseudotumor cerebri and migraine presenting in follow-up.  Patient has been lost to follow-up for a year.  Patient reports she is doing extremely well with no vision change and no positional headache.  She reports only very rare episodic migraine and occurring approximately once per year.  She is interested in having rescue medication on hand for  Pharmacist consult: warfarin management    Indication: Venous Thrombosis  Goal INR: 2-3; currently subtherapeutic; ordered daily  Some bleeding from trach, heparin held  Pt to go for a PEG tomorrow, hold warfarin per GI    Labs:  Coags:    Lab Results   Component Value Date/Time    APTT 40.5 (H) 05/02/2021 03:51 AM    PTP 15.2 05/02/2021 03:51 AM    INR 1.2 05/02/2021 03:51 AM     CBC:    Lab Results   Component Value Date/Time    HGB 9.5 (L) 05/02/2021 03:51 AM    HCT 31.3 (L) 05/02/2021 03:51 AM     05/02/2021 03:51 AM       CrCL: Estimated Creatinine Clearance: 61 mL/min (A) (based on SCr of 1.37 mg/dL (H)). Labs reviewed. Assessment performed for missed warfarin doses, new drug interactions, dietary intake or supplement changes, documentation of bleeding, and other changes that may affect the INR. Pharmacy to follow daily and will provide subsequent warfarin dosing based on clinical status.     Thank you for the consult,  MARIELY Lobato  5/2/2021 this.  She has no other complaints today.   PAST MEDICAL HISTORY:  Past Medical History:   Diagnosis Date    Abnormal Pap smear 2009    colpo    Genital herpes in women     H/O: myomectomy     EX lap    History of uterine fibroid     Hyperlipidemia     Hypertension     Morbid obesity     Pseudotumor cerebri        PAST SURGICAL HISTORY:  Past Surgical History:   Procedure Laterality Date    ESOPHAGOGASTRODUODENOSCOPY      GASTRECTOMY      gastric sleeve  2015    LAPAROTOMY      SI joint injection         CURRENT MEDS:  Current Outpatient Medications   Medication Sig Dispense Refill    clonazePAM (KLONOPIN) 0.5 MG tablet Take 0.5 mg by mouth daily as needed.   1    drospirenone-ethinyl estradioL (TIFFANIE) 3-0.02 mg per tablet Take 1 tablet by mouth once daily. 30 tablet 11    EScitalopram oxalate (LEXAPRO) 20 MG tablet       losartan (COZAAR) 25 MG tablet Take 1 tablet (25 mg total) by mouth once daily. 90 tablet 3    multivitamin (THERAGRAN) per tablet Take 1 tablet by mouth once daily. Patient to stop[ 1 week before surgery starting 3/18/15      omeprazole (PRILOSEC) 40 MG capsule Take 1 capsule (40 mg total) by mouth every morning. Open capsule and take contents by mouth 30 capsule 11    propranoloL (INDERAL) 10 MG tablet       rosuvastatin (CRESTOR) 20 MG tablet Take 1 tablet (20 mg total) by mouth once daily. 90 tablet 3    sumatriptan (IMITREX) 50 MG tablet Once for severe headache. May repeat once after 2 hours. Do not exceed 3-4 doses in one week. 12 tablet 3    valACYclovir (VALTREX) 500 MG tablet Take 1 tablet (500 mg total) by mouth daily as needed. 60 tablet 3    zolpidem (AMBIEN CR) 6.25 MG CR tablet Take 1 tablet by mouth every evening.   2     No current facility-administered medications for this visit.       ALLERGIES:  Review of patient's allergies indicates:  No Known Allergies    FAMILY HISTORY:  Family History   Problem Relation Age of Onset    Hypertension Mother      Hyperlipidemia Mother     Graves' disease Mother     Obesity Mother     Hyperlipidemia Sister     Obesity Sister     Colon cancer Father 50    Cataracts Father     Cancer Father         colon, liver, and stomach    Heart disease Maternal Grandfather     Stroke Maternal Grandmother     Diabetes Paternal Grandmother     No Known Problems Paternal Grandfather     Breast cancer Neg Hx     Ovarian cancer Neg Hx        SOCIAL HISTORY:  Social History     Tobacco Use    Smoking status: Never Smoker    Smokeless tobacco: Never Used   Substance Use Topics    Alcohol use: Yes     Comment: occasionally    Drug use: No       Review of Systems:  12 system review of systems is negative except for the symptoms mentioned in HPI.      Objective:   There were no vitals filed for this visit.  General: NAD, well nourished   Eyes: no tearing, discharge, no erythema   ENT: moist mucous membranes of the oral cavity, nares patent    Neck: Supple, full range of motion  Cardiovascular: Appears well perfused  Lungs: Normal work of breathing, normal chest wall excursions  Skin: No rash, lesions, or breakdown on exposed skin  Psychiatry: Mood and affect are appropriate   Abdomen: nondistended, non tender  Extremeties: No cyanosis, clubbing or edema visible    Neurological   MENTAL STATUS: Alert and oriented to person, place, and time. Attention and concentration within normal limits. Speech without dysarthria, able to name and repeat without difficulty. Recent and remote memory within normal limits   CRANIAL NERVES: Visual fields intact. PERRL. EOMI. Facial sensation intact. Face symmetrical. Hearing grossly intact. Full shoulder shrug bilaterally. Tongue protrudes midline   SENSORY: Sensation is intact to light touch throughout.  MOTOR: Normal bulk. 5/5 deltoid, biceps, triceps, interosseous, hand  bilaterally. 5/5 iliopsoas, knee extension/flexion, foot dorsi/plantarflexion bilaterally.    REFLEXES: Deferred due to  virtual visit  CEREBELLAR/COORDINATION/GAIT: Gait steady w Normal rapid alternating movements.

## 2022-09-08 ENCOUNTER — OFFICE VISIT (OUTPATIENT)
Dept: CARDIOLOGY CLINIC | Age: 72
End: 2022-09-08
Payer: MEDICARE

## 2022-09-08 VITALS
WEIGHT: 230 LBS | HEART RATE: 69 BPM | BODY MASS INDEX: 32.2 KG/M2 | SYSTOLIC BLOOD PRESSURE: 138 MMHG | DIASTOLIC BLOOD PRESSURE: 77 MMHG | HEIGHT: 71 IN

## 2022-09-08 DIAGNOSIS — I49.3 PVC (PREMATURE VENTRICULAR CONTRACTION): ICD-10-CM

## 2022-09-08 DIAGNOSIS — R06.02 SHORTNESS OF BREATH: ICD-10-CM

## 2022-09-08 DIAGNOSIS — I42.9 CARDIOMYOPATHY, UNSPECIFIED TYPE (HCC): ICD-10-CM

## 2022-09-08 DIAGNOSIS — I50.42 CHRONIC COMBINED SYSTOLIC AND DIASTOLIC CONGESTIVE HEART FAILURE (HCC): Primary | ICD-10-CM

## 2022-09-08 PROCEDURE — G8427 DOCREV CUR MEDS BY ELIG CLIN: HCPCS | Performed by: INTERNAL MEDICINE

## 2022-09-08 PROCEDURE — 3017F COLORECTAL CA SCREEN DOC REV: CPT | Performed by: INTERNAL MEDICINE

## 2022-09-08 PROCEDURE — G8417 CALC BMI ABV UP PARAM F/U: HCPCS | Performed by: INTERNAL MEDICINE

## 2022-09-08 PROCEDURE — G8432 DEP SCR NOT DOC, RNG: HCPCS | Performed by: INTERNAL MEDICINE

## 2022-09-08 PROCEDURE — G8752 SYS BP LESS 140: HCPCS | Performed by: INTERNAL MEDICINE

## 2022-09-08 PROCEDURE — G8754 DIAS BP LESS 90: HCPCS | Performed by: INTERNAL MEDICINE

## 2022-09-08 PROCEDURE — 1123F ACP DISCUSS/DSCN MKR DOCD: CPT | Performed by: INTERNAL MEDICINE

## 2022-09-08 PROCEDURE — 99214 OFFICE O/P EST MOD 30 MIN: CPT | Performed by: INTERNAL MEDICINE

## 2022-09-08 PROCEDURE — G8536 NO DOC ELDER MAL SCRN: HCPCS | Performed by: INTERNAL MEDICINE

## 2022-09-08 PROCEDURE — 1101F PT FALLS ASSESS-DOCD LE1/YR: CPT | Performed by: INTERNAL MEDICINE

## 2022-09-08 NOTE — PROGRESS NOTES
HISTORY OF PRESENT ILLNESS  Cristiano Edwards is a 70 y.o. male. 10/18/2021  Patient seen today for new patient evaluation. He is referred here for evaluation of cardiac arrhythmia. Patient was in hospital 5/2021 with  · Tracheostomy - #9 Portex on 4/26 with Dr. Rosamaria Coombs. No further bleeding at trach site. Plans for reevaluation and potential decannulation as outpatient noted  · S/p angioedema- with airway and respiratory failure and collapse; thought due to ACE inhibitor. S/P Emergent Cricthyrotomy [facility]-ED 4/4/21- converted to 6.5 ETT intraoperative by anesthesia team 4/4/21, 8.0 ETT by anesthesia 4/7/21, packing removed evening 4/8/21-ENT  · S/P cardiac arrest and acute respiratory failure-resolved  · Pulmonary Infiltrates: suspect aspiration secretions and/or blood due to above- can't exclude other infectious process at this time. Sputum culture from 5 /1 reported 5 /4-heavy Enterobacter aerogenes. Not treating since clinically improved. Likely colonization  · DVT: Popliteal- Left; acute non-occlusive thrombus. 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 · Need for nutrition-PEG tube   Patient now presents with severe edema. He has generalized edema that is progressively getting worse. He is short of breath. He has orthopnea and complaint of wheezing denies any chest pain  3/2022  Patient is here for follow-up. He is here for preoperative cardiac assessment. He has multiple medical problems.   He was last admitted in hospital 10/2021 with  Discharge Diagnosis:     Acute on chronic combined systolic and diastolic CHF   CMO EF 49%  Chronic left heel wound  Left lower extremity cellulitis  Suspect osteomyelitis  Acute kidney injury on CKD stage IIIa  Anemia  Iron deficiency  Morbid obesity  Suspect GIAN  Hyperlipidemia  Hypertension  GERD  Pulmonary HTN, PAP 40  Patient is here for follow-up. The shortness of breath is better. Edema is significantly improved. Follow-up  Pertinent negatives include no chest pain, no abdominal pain, no headaches and no shortness of breath. Results  Pertinent negatives include no chest pain, no abdominal pain, no headaches and no shortness of breath. Review of Systems   Constitutional:  Negative for chills and fever. HENT:  Negative for nosebleeds. Eyes:  Negative for blurred vision and double vision. Respiratory:  Negative for cough, hemoptysis, sputum production, shortness of breath and wheezing. Cardiovascular:  Negative for chest pain, palpitations, orthopnea, claudication, leg swelling and PND. Gastrointestinal:  Negative for abdominal pain, heartburn, nausea and vomiting. Musculoskeletal:  Negative for myalgias. Skin:  Negative for rash. Neurological:  Negative for dizziness, weakness and headaches. Endo/Heme/Allergies:  Does not bruise/bleed easily. No family history on file. Past Medical History:   Diagnosis Date    DDD (degenerative disc disease), lumbar     Diabetes ([facility]     Diabetic neuropathy ([facility]     Diabetic retinopathy ([facility]     DM2 (diabetes mellitus, type 2) ([facility]     HLD (hyperlipidemia)     HTN (hypertension)     Obesity (BMI 30-39. 9)        No past surgical history on file. Social History     Tobacco Use    Smoking status: Every Day     Packs/day: 0.25     Types: Cigarettes    Smokeless tobacco: Never    Tobacco comments:     1 cigarette a day   Substance Use Topics    Alcohol use: Not Currently       Allergies   Allergen Reactions    Lisinopril Angioedema       Prior to Admission medications    Medication Sig Start Date End Date Taking? Authorizing Provider   potassium chloride (K-DUR, KLOR-CON M20) 20 mEq tablet Take 2 Tablets by mouth daily.  7/8/22  Yes Jaime Friday, MD   isosorbide mononitrate ER (IMDUR) 30 mg tablet Take 1 Tablet by mouth daily. 7/1/22  Yes Jaime Ponce MD   hydrALAZINE (APRESOLINE) 25 mg tablet Take 1 Tablet by mouth three (3) times daily. 7/1/22  Yes Jaime Ponce MD   carvediloL (COREG) 6.25 mg tablet Take 1 Tablet by mouth two (2) times daily (with meals). 7/1/22  Yes Jaime Ponce MD   atorvastatin (LIPITOR) 80 mg tablet Take 1 Tablet by mouth daily. 7/1/22  Yes Jaime Ponce MD   furosemide (LASIX) 40 mg tablet Take 1 Tablet by mouth two (2) times a day. 7/1/22  Yes Jaime Ponce MD   spironolactone (ALDACTONE) 25 mg tablet Take 1 Tablet by mouth daily. 6/3/22  Yes Jaime Ponce MD   empagliflozin (Jardiance) 10 mg tablet Take 1 Tablet by mouth daily. 6/3/22  Yes Jaime Ponce MD   albuterol sulfate (PROAIR RESPICLICK) 90 mcg/actuation breath activated inhaler Take 90 mcg by inhalation every six (6) hours as needed. 9/27/21  Yes Provider, Historical   apixaban (ELIQUIS) 5 mg tablet Take 5 mg by mouth two (2) times a day. Yes Other, MD Cheryl         Visit Vitals  /77 (BP 1 Location: Left upper arm, BP Patient Position: Sitting, BP Cuff Size: Adult)   Pulse 69   Ht 5' 11\" (1.803 m)   Wt 104.3 kg (230 lb)   BMI 32.08 kg/m²       Physical Exam  Constitutional:       Appearance: He is well-developed. HENT:      Head: Normocephalic and atraumatic. Eyes:      Conjunctiva/sclera: Conjunctivae normal.   Neck:      Thyroid: No thyromegaly. Vascular: No JVD. Trachea: No tracheal deviation. Cardiovascular:      Rate and Rhythm: Normal rate and regular rhythm. Heart sounds: Normal heart sounds. No murmur heard. No friction rub. No gallop. Pulmonary:      Effort: No respiratory distress. Breath sounds: Normal breath sounds. No wheezing or rales. Chest:      Chest wall: No tenderness. Abdominal:      Palpations: Abdomen is soft. Tenderness: There is no abdominal tenderness. Musculoskeletal:      Cervical back: Neck supple.    Skin: General: Skin is warm and dry. Neurological:      Mental Status: He is alert and oriented to person, place, and time. Mr. Alex Oliver has a reminder for a \"due or due soon\" health maintenance. I have asked that he contact his primary care provider for follow-up on this health maintenance. No flowsheet data found. I have personally reviewed patient's records available from hospital and other providers and incorporated findings in patient care. Notes, lab, EKG, chest x-ray, PVL  Impression   - ABNORMAL ECG -can 2021       Impression   SR-Sinus rhythm-normal P axis, V-rate 50-99        Impression   MFVPC-Multiform ventricular premature complexes-short R-R, variable morphology        Impression   -Left axis/left anterior hemiblock         poor precordial R wave progression-        Impression   -No old tracing for comparison-           Date: 10/08/21     CHEST PA AND LATERAL  10/2021  Impression        1. Nodular lesion right lung base. Consider CT evaluation. 2. Patchy airspace disease superior lingula which may represent subsegmental atelectasis, pneumonia or fibrosis. Questionable very small effusions. 3. Cardiomegaly and pulmonary venous hypertension. [facility]-10/2021  Signed By: Ann Lewis MD on 10/7/2021 4:52 PM  Conclusions: Hemodynamically significant stenosis (50-75%) in the left distal superficial femoral artery. Interpretation Summary 4/2021    Contrast used: DEFINITY. Image quality for this study was technically difficult. LV: Calculated LVEF is 55%. Visually measured ejection fraction. Normal cavity size, wall thickness and systolic function (ejection fraction normal). Wall motion: normal. Moderate (grade 2) left ventricular diastolic dysfunction. AO: Mild aortic root and ascending aorta dilatation. Ascending aorta diameter = 3.6 cm. Aortic root measures 3.9 cm  RA: Dilated right atrium. RV: Dilated right ventricle. Borderline low systolic function.   TV: Mild tricuspid valve regurgitation is present. IVC: Mechanically ventilated; cannot use inferior caval vein diameter to estimate central venous pressure. PA: Moderate pulmonary hypertension. Pulmonary arterial systolic pressure is 61 mmHg. Narrative echocardiogram exam 10/2021  Performed by RADIOLOGY  CONCLUSIONS     * Left ventricular chamber size and systolic function is normal with an   ejection fraction of 41 % by Arellano's biplane. * Global hypokinesis of the left ventricle. * Left ventricular diastolic function is indeterminate due to arrythmia. * Left atrial chamber is normal with a left atrial volume index of 32.98   ml/m^2 by BP MOD. * Right ventricular systolic function is normal with TAPSE measuring 1.64   cm. * Right ventricular chamber dimension is moderately enlarged. * Right atrial chamber size is mildly enlarged. * There is mild sclerosis, without stenosis, of the aortic valve cusps with   trace insufficiency. * There is mild to moderate mitral valve and tricuspid valve regurgitation. * There is mild pulmonic regurgitation. * Estimated pulmonary arterial systolic pressure is 40 mmHg. * The ascending aorta is approaching dilatation measuring 3.8 cm with an   index of 1.54 cm/m2. * The aortic root at the sinus of Valsalva is dilated measuring 4.2 cm with   an index of 1.68 cm/m2. * No mass, shunts, or thrombi. Comparison     * No prior study is available for comparison. Assessment         ICD-10-CM ICD-9-CM    1. Chronic combined systolic and diastolic congestive heart failure (HCC)  I50.42 428.42      428.0     Stable compensated continue treatment monitor      2. Cardiomyopathy, unspecified type ([facility]  I42.9 425.4     Continue current treatment monitor      3. Shortness of breath  R06.02 786.05     Stable continue treatment monitor      4.  PVC (premature ventricular contraction)  I49.3 427.69     Symptoms stable monitor        10/2021  Seen for new patient evaluation for severe fluid overload and PVC. Generalized anasarca. Currently on Lasix but has not seen any improvement. Patient wants to not change any medication at present he is going to go to emergency room and get IV treatment at [facility].  We will follow up closely after discharge. 3/2022  Seen for preoperative assessment. Since last admission was admitted to [facility] where he had CHF. Systolic and diastolic heart failure that was treated. Ejection fraction 41%. Recent x-ray with mild congestion but his shortness of breath is better he has been out of Lasix which he has restarted advised to take extra dose for next few days. For foot surgery he is cleared with medium to high risk. EKG reviewed  6/2022  Cardiac status stable. Still short of breath class III. I have added Aldactone and Jardiance. Risk-benefit option discussed. Continue other treatment  7/2022  CHF class III. Out of multiple medication which I refilled. Continue diet modification. Monitor. Hypokalemia on recent lab now on Aldactone 50 mg.  Recheck labs and decide on supplement. 9/2022  Stable cardiac status CHF compensated class II. Shortness of breath is better. Continue current medical management. Hypokalemia corrected continue Aldactone. On Eliquis for history of DVT and PE managed by PCP  There are no discontinued medications. No orders of the defined types were placed in this encounter.

## 2022-09-08 NOTE — PROGRESS NOTES
1. Have you been to the ER, urgent care clinic since your last visit? Hospitalized since your last visit? No    2. Have you seen or consulted any other health care providers outside of the [address] [place] [address] since your last visit? Include any pap smears or colon screening. Yes Where:  Foot doctor

## 2023-01-04 NOTE — PROGRESS NOTES
attended the interdisciplinary rounds for Rey Raygoza, who is a 79 y.o.,male. Patients Primary Language is: [place]. According to the patients EMR Jewish Affiliation is: No preference. The reason the Patient came to the hospital is:   Patient Active Problem List    Diagnosis Date Noted    Respiratory failure ([facility] 04/05/2021    Obesity (BMI 30-39.9) 04/05/2021    Diabetic neuropathy associated with type 2 diabetes mellitus ([facility] 04/05/2021    Diabetic retinopathy associated with type 2 diabetes mellitus ([facility] 04/05/2021    Pulmonary infiltrates 04/05/2021    Controlled type 2 diabetes mellitus with neurologic complication, without long-term current use of insulin ([facility] 04/05/2021    Angioedema due to angiotensin converting enzyme inhibitor (ACE-I) 04/04/2021    JACQUELYN (acute kidney injury) ([facility] 04/04/2021    Cardiac arrest ([facility] 04/04/2021      Plan:  Chaplains will continue to follow and will provide pastoral care on an as needed/requested basis.  recommends bedside caregivers page  on duty if patient shows signs of acute spiritual or emotional distress.     [address] [facility]  Board Certified [address] [facility]   [phone] Deep Sutures: 4-0 Vicryl

## 2023-02-06 ENCOUNTER — APPOINTMENT (OUTPATIENT)
Dept: CT IMAGING | Age: 73
End: 2023-02-06
Attending: INTERNAL MEDICINE
Payer: MEDICARE

## 2023-02-06 ENCOUNTER — HOSPITAL ENCOUNTER (INPATIENT)
Age: 73
LOS: 3 days | Discharge: HOME | End: 2023-02-09
Attending: INTERNAL MEDICINE | Admitting: INTERNAL MEDICINE
Payer: MEDICARE

## 2023-02-06 ENCOUNTER — APPOINTMENT (OUTPATIENT)
Dept: GENERAL RADIOLOGY | Age: 73
End: 2023-02-06
Attending: INTERNAL MEDICINE
Payer: MEDICARE

## 2023-02-06 DIAGNOSIS — I50.9 ACUTE ON CHRONIC CONGESTIVE HEART FAILURE, UNSPECIFIED HEART FAILURE TYPE (HCC): ICD-10-CM

## 2023-02-06 DIAGNOSIS — J44.1 COPD EXACERBATION (HCC): Primary | ICD-10-CM

## 2023-02-06 DIAGNOSIS — K70.31 ALCOHOLIC CIRRHOSIS OF LIVER WITH ASCITES (HCC): ICD-10-CM

## 2023-02-06 DIAGNOSIS — R77.8 TROPONIN LEVEL ELEVATED: ICD-10-CM

## 2023-02-06 PROBLEM — R79.89 ELEVATED TROPONIN: Status: ACTIVE | Noted: 2023-02-06

## 2023-02-06 PROBLEM — R18.8 CIRRHOSIS OF LIVER WITH ASCITES (HCC): Status: ACTIVE | Noted: 2023-02-06

## 2023-02-06 PROBLEM — K74.60 CIRRHOSIS OF LIVER WITH ASCITES (HCC): Status: ACTIVE | Noted: 2023-02-06

## 2023-02-06 LAB
ALBUMIN SERPL-MCNC: 2.6 G/DL (ref 3.4–5)
ALBUMIN/GLOB SERPL: 0.5 (ref 0.8–1.7)
ALP SERPL-CCNC: 199 U/L (ref 45–117)
ALT SERPL-CCNC: 13 U/L (ref 16–61)
ANION GAP SERPL CALC-SCNC: 8 MMOL/L (ref 3–18)
AST SERPL W P-5'-P-CCNC: 17 U/L (ref 10–38)
ATRIAL RATE: 0 BPM
BASOPHILS # BLD: 0 K/UL (ref 0–0.1)
BASOPHILS NFR BLD: 1 % (ref 0–2)
BILIRUB SERPL-MCNC: 1 MG/DL (ref 0.2–1)
BNP SERPL-MCNC: 6237 PG/ML (ref 0–900)
BUN SERPL-MCNC: 16 MG/DL (ref 7–18)
BUN/CREAT SERPL: 13 (ref 12–20)
CA-I BLD-MCNC: 8.3 MG/DL (ref 8.5–10.1)
CALCULATED P AXIS, ECG09: 14 DEGREES
CALCULATED R AXIS, ECG10: -98 DEGREES
CALCULATED T AXIS, ECG11: 47 DEGREES
CHLORIDE SERPL-SCNC: 103 MMOL/L (ref 100–111)
CO2 SERPL-SCNC: 27 MMOL/L (ref 21–32)
CREAT SERPL-MCNC: 1.28 MG/DL (ref 0.6–1.3)
D DIMER PPP FEU-MCNC: >20 UG/ML(FEU)
DIAGNOSIS, 93000: NORMAL
DIFFERENTIAL METHOD BLD: ABNORMAL
EOSINOPHIL # BLD: 0.1 K/UL (ref 0–0.4)
EOSINOPHIL NFR BLD: 1 % (ref 0–5)
ERYTHROCYTE [DISTWIDTH] IN BLOOD BY AUTOMATED COUNT: 18.9 % (ref 11.6–14.5)
FLUAV RNA SPEC QL NAA+PROBE: NOT DETECTED
FLUBV RNA SPEC QL NAA+PROBE: NOT DETECTED
GLOBULIN SER CALC-MCNC: 5.2 G/DL (ref 2–4)
GLUCOSE SERPL-MCNC: 87 MG/DL (ref 74–99)
HCT VFR BLD AUTO: 38.6 % (ref 36–48)
HGB BLD-MCNC: 11.6 G/DL (ref 13–16)
IMM GRANULOCYTES # BLD AUTO: 0 K/UL (ref 0–0.04)
IMM GRANULOCYTES NFR BLD AUTO: 1 % (ref 0–0.5)
INR PPP: 1.4 (ref 0.8–1.2)
LYMPHOCYTES # BLD: 1.2 K/UL (ref 0.9–3.6)
LYMPHOCYTES NFR BLD: 21 % (ref 21–52)
MAGNESIUM SERPL-MCNC: 1.9 MG/DL (ref 1.6–2.6)
MCH RBC QN AUTO: 23.4 PG (ref 24–34)
MCHC RBC AUTO-ENTMCNC: 30.1 G/DL (ref 31–37)
MCV RBC AUTO: 78 FL (ref 78–100)
MONOCYTES # BLD: 0.7 K/UL (ref 0.05–1.2)
MONOCYTES NFR BLD: 13 % (ref 3–10)
NEUTS SEG # BLD: 3.7 K/UL (ref 1.8–8)
NEUTS SEG NFR BLD: 63 % (ref 40–73)
NRBC # BLD: 0 K/UL (ref 0–0.01)
NRBC BLD-RTO: 0 PER 100 WBC
P-R INTERVAL, ECG05: 186 MS
PLATELET # BLD AUTO: 212 K/UL (ref 135–420)
PMV BLD AUTO: 11.3 FL (ref 9.2–11.8)
POTASSIUM SERPL-SCNC: 3.8 MMOL/L (ref 3.5–5.5)
PROT SERPL-MCNC: 7.8 G/DL (ref 6.4–8.2)
PROTHROMBIN TIME: 17.6 SEC (ref 11.5–15.2)
Q-T INTERVAL, ECG07: 430 MS
QRS DURATION, ECG06: 94 MS
QTC CALCULATION (BEZET), ECG08: 484 MS
RBC # BLD AUTO: 4.95 M/UL (ref 4.35–5.65)
SARS-COV-2 RNA RESP QL NAA+PROBE: NOT DETECTED
SODIUM SERPL-SCNC: 138 MMOL/L (ref 136–145)
TROPONIN I SERPL HS-MCNC: 316 NG/L (ref 0–78)
TROPONIN I SERPL HS-MCNC: 323 NG/L (ref 0–78)
VENTRICULAR RATE, ECG03: 76 BPM
WBC # BLD AUTO: 5.7 K/UL (ref 4.6–13.2)

## 2023-02-06 PROCEDURE — 96374 THER/PROPH/DIAG INJ IV PUSH: CPT

## 2023-02-06 PROCEDURE — 96375 TX/PRO/DX INJ NEW DRUG ADDON: CPT

## 2023-02-06 PROCEDURE — 74011000250 HC RX REV CODE- 250

## 2023-02-06 PROCEDURE — 99285 EMERGENCY DEPT VISIT HI MDM: CPT

## 2023-02-06 PROCEDURE — 85610 PROTHROMBIN TIME: CPT

## 2023-02-06 PROCEDURE — 94761 N-INVAS EAR/PLS OXIMETRY MLT: CPT

## 2023-02-06 PROCEDURE — 74011250636 HC RX REV CODE- 250/636: Performed by: INTERNAL MEDICINE

## 2023-02-06 PROCEDURE — 71275 CT ANGIOGRAPHY CHEST: CPT

## 2023-02-06 PROCEDURE — 94644 CONT INHLJ TX 1ST HOUR: CPT

## 2023-02-06 PROCEDURE — 83735 ASSAY OF MAGNESIUM: CPT

## 2023-02-06 PROCEDURE — 80053 COMPREHEN METABOLIC PANEL: CPT

## 2023-02-06 PROCEDURE — 84484 ASSAY OF TROPONIN QUANT: CPT

## 2023-02-06 PROCEDURE — 74011000636 HC RX REV CODE- 636: Performed by: INTERNAL MEDICINE

## 2023-02-06 PROCEDURE — 85025 COMPLETE CBC W/AUTO DIFF WBC: CPT

## 2023-02-06 PROCEDURE — 74011000250 HC RX REV CODE- 250: Performed by: INTERNAL MEDICINE

## 2023-02-06 PROCEDURE — 74176 CT ABD & PELVIS W/O CONTRAST: CPT

## 2023-02-06 PROCEDURE — 85379 FIBRIN DEGRADATION QUANT: CPT

## 2023-02-06 PROCEDURE — 94664 DEMO&/EVAL PT USE INHALER: CPT

## 2023-02-06 PROCEDURE — 87636 SARSCOV2 & INF A&B AMP PRB: CPT

## 2023-02-06 PROCEDURE — 94645 CONT INHLJ TX EACH ADDL HOUR: CPT

## 2023-02-06 PROCEDURE — 94640 AIRWAY INHALATION TREATMENT: CPT

## 2023-02-06 PROCEDURE — 74011250637 HC RX REV CODE- 250/637: Performed by: INTERNAL MEDICINE

## 2023-02-06 PROCEDURE — 65270000029 HC RM PRIVATE

## 2023-02-06 PROCEDURE — 93005 ELECTROCARDIOGRAM TRACING: CPT

## 2023-02-06 PROCEDURE — 83880 ASSAY OF NATRIURETIC PEPTIDE: CPT

## 2023-02-06 PROCEDURE — 71045 X-RAY EXAM CHEST 1 VIEW: CPT

## 2023-02-06 RX ORDER — MAGNESIUM SULFATE HEPTAHYDRATE 40 MG/ML
2 INJECTION, SOLUTION INTRAVENOUS
Status: COMPLETED | OUTPATIENT
Start: 2023-02-06 | End: 2023-02-06

## 2023-02-06 RX ORDER — GUAIFENESIN 100 MG/5ML
162 LIQUID (ML) ORAL
Status: COMPLETED | OUTPATIENT
Start: 2023-02-06 | End: 2023-02-06

## 2023-02-06 RX ORDER — FUROSEMIDE 10 MG/ML
80 INJECTION INTRAMUSCULAR; INTRAVENOUS
Status: COMPLETED | OUTPATIENT
Start: 2023-02-06 | End: 2023-02-06

## 2023-02-06 RX ORDER — ONDANSETRON 2 MG/ML
4 INJECTION INTRAMUSCULAR; INTRAVENOUS
Status: DISCONTINUED | OUTPATIENT
Start: 2023-02-06 | End: 2023-02-09 | Stop reason: HOSPADM

## 2023-02-06 RX ORDER — ACETAMINOPHEN 650 MG/1
650 SUPPOSITORY RECTAL
Status: DISCONTINUED | OUTPATIENT
Start: 2023-02-06 | End: 2023-02-09 | Stop reason: HOSPADM

## 2023-02-06 RX ORDER — ONDANSETRON 4 MG/1
4 TABLET, ORALLY DISINTEGRATING ORAL
Status: DISCONTINUED | OUTPATIENT
Start: 2023-02-06 | End: 2023-02-09 | Stop reason: HOSPADM

## 2023-02-06 RX ORDER — GUAIFENESIN 100 MG/5ML
162 LIQUID (ML) ORAL DAILY
Status: DISCONTINUED | OUTPATIENT
Start: 2023-02-07 | End: 2023-02-06

## 2023-02-06 RX ORDER — POLYETHYLENE GLYCOL 3350 17 G/17G
17 POWDER, FOR SOLUTION ORAL DAILY PRN
Status: DISCONTINUED | OUTPATIENT
Start: 2023-02-06 | End: 2023-02-09 | Stop reason: HOSPADM

## 2023-02-06 RX ORDER — ACETAMINOPHEN 325 MG/1
650 TABLET ORAL
Status: DISCONTINUED | OUTPATIENT
Start: 2023-02-06 | End: 2023-02-09 | Stop reason: HOSPADM

## 2023-02-06 RX ORDER — IPRATROPIUM BROMIDE AND ALBUTEROL SULFATE 2.5; .5 MG/3ML; MG/3ML
SOLUTION RESPIRATORY (INHALATION)
Status: COMPLETED
Start: 2023-02-06 | End: 2023-02-06

## 2023-02-06 RX ORDER — SODIUM CHLORIDE 0.9 % (FLUSH) 0.9 %
5-40 SYRINGE (ML) INJECTION AS NEEDED
Status: DISCONTINUED | OUTPATIENT
Start: 2023-02-06 | End: 2023-02-09 | Stop reason: HOSPADM

## 2023-02-06 RX ORDER — IPRATROPIUM BROMIDE AND ALBUTEROL SULFATE 2.5; .5 MG/3ML; MG/3ML
3 SOLUTION RESPIRATORY (INHALATION)
Status: DISCONTINUED | OUTPATIENT
Start: 2023-02-06 | End: 2023-02-07

## 2023-02-06 RX ORDER — ACETAMINOPHEN 325 MG/1
650 TABLET ORAL
Status: COMPLETED | OUTPATIENT
Start: 2023-02-06 | End: 2023-02-06

## 2023-02-06 RX ORDER — SODIUM CHLORIDE 0.9 % (FLUSH) 0.9 %
5-40 SYRINGE (ML) INJECTION EVERY 8 HOURS
Status: DISCONTINUED | OUTPATIENT
Start: 2023-02-06 | End: 2023-02-09 | Stop reason: HOSPADM

## 2023-02-06 RX ORDER — MAGNESIUM SULFATE HEPTAHYDRATE 500 MG/ML
2 INJECTION, SOLUTION INTRAMUSCULAR; INTRAVENOUS
Status: DISCONTINUED | OUTPATIENT
Start: 2023-02-06 | End: 2023-02-06

## 2023-02-06 RX ORDER — ALBUTEROL SULFATE 2.5 MG/.5ML
5 SOLUTION RESPIRATORY (INHALATION)
Status: COMPLETED | OUTPATIENT
Start: 2023-02-06 | End: 2023-02-06

## 2023-02-06 RX ADMIN — FUROSEMIDE 80 MG: 10 INJECTION, SOLUTION INTRAMUSCULAR; INTRAVENOUS at 17:15

## 2023-02-06 RX ADMIN — IPRATROPIUM BROMIDE AND ALBUTEROL SULFATE 3 ML: 2.5; .5 SOLUTION RESPIRATORY (INHALATION) at 23:43

## 2023-02-06 RX ADMIN — IOPAMIDOL 95 ML: 755 INJECTION, SOLUTION INTRAVENOUS at 18:34

## 2023-02-06 RX ADMIN — METHYLPREDNISOLONE SODIUM SUCCINATE 125 MG: 125 INJECTION, POWDER, FOR SOLUTION INTRAMUSCULAR; INTRAVENOUS at 17:07

## 2023-02-06 RX ADMIN — MAGNESIUM SULFATE HEPTAHYDRATE 2 G: 40 INJECTION, SOLUTION INTRAVENOUS at 17:19

## 2023-02-06 RX ADMIN — IPRATROPIUM BROMIDE AND ALBUTEROL SULFATE 3 ML: 2.5; .5 SOLUTION RESPIRATORY (INHALATION) at 15:48

## 2023-02-06 RX ADMIN — ALBUTEROL SULFATE 5 MG: 2.5 SOLUTION RESPIRATORY (INHALATION) at 20:03

## 2023-02-06 RX ADMIN — SODIUM CHLORIDE, PRESERVATIVE FREE 10 ML: 5 INJECTION INTRAVENOUS at 23:37

## 2023-02-06 RX ADMIN — ACETAMINOPHEN 650 MG: 325 TABLET ORAL at 17:27

## 2023-02-06 RX ADMIN — ASPIRIN 162 MG: 81 TABLET, CHEWABLE ORAL at 19:33

## 2023-02-06 NOTE — ED PROVIDER NOTES
[address]  EMERGENCY DEPARTMENT HISTORY AND PHYSICAL EXAM      Date: 2/6/2023  Patient Name: Maria Del Carmen Sandhu  MRN: [id number]  [place]: 1950  Date of evaluation: 2/6/2023  Provider: Clarissa Mueller MD   Note Started: 4:21 PM 2/6/23    HISTORY OF PRESENT ILLNESS     Chief Complaint   Patient presents with    Shortness of Breath       History Provided By: Patient    HPI: Maria Del Carmen Sandhu, 67 y.o. male with h/o DM w retinopathy; neuropathy; HTN; HLD; morbid obesity; lumbar DDD that presents with chronic wheezing and SOB for the past several weeks that has worsened over the last 2 days. Has been using his home inhaler but not working as well now. Denies fever; chills; hemoptysis; Covid; flu symptoms    PAST MEDICAL HISTORY   Past Medical History:  Past Medical History:   Diagnosis Date    DDD (degenerative disc disease), lumbar     Diabetes ([address]     Diabetic neuropathy ([address]     Diabetic retinopathy ([address]     DM2 (diabetes mellitus, type 2) ([address]     HLD (hyperlipidemia)     HTN (hypertension)     Obesity (BMI 30-39. 9)        Past Surgical History:  History reviewed. No pertinent surgical history. Family History:  History reviewed. No pertinent family history. Social History:  Social History     Tobacco Use    Smoking status: Every Day     Packs/day: 0.25     Types: Cigarettes    Smokeless tobacco: Never    Tobacco comments:     1 cigarette a day   Substance Use Topics    Alcohol use: Not Currently    Drug use: Not Currently     Types: Cocaine       Allergies:   Allergies   Allergen Reactions    Lisinopril Angioedema       PCP: DUSTIN Mendez    Current Meds:   Discharge Medication List as of 2/9/2023 11:15 AM        CONTINUE these medications which have NOT CHANGED    Details   budesonide-formoteroL (Symbicort) 160-4.5 mcg/actuation HFAA Take 2 Puffs by inhalation two (2) times a day., Historical Med      potassium chloride (K-DUR, KLOR-CON M20) 20 mEq tablet Take 2 Tablets by mouth daily., Normal, Disp-60 Tablet, R-3      isosorbide mononitrate ER (IMDUR) 30 mg tablet Take 1 Tablet by mouth daily. , Normal, Disp-90 Tablet, R-3      hydrALAZINE (APRESOLINE) 25 mg tablet Take 1 Tablet by mouth three (3) times daily. , Normal, Disp-270 Tablet, R-3      carvediloL (COREG) 6.25 mg tablet Take 1 Tablet by mouth two (2) times daily (with meals). , Normal, Disp-180 Tablet, R-3      atorvastatin (LIPITOR) 80 mg tablet Take 1 Tablet by mouth daily. , Normal, Disp-90 Tablet, R-3      spironolactone (ALDACTONE) 25 mg tablet Take 1 Tablet by mouth daily. , Normal, Disp-90 Tablet, R-1      empagliflozin (Jardiance) 10 mg tablet Take 1 Tablet by mouth daily. , Normal, Disp-90 Tablet, R-1      albuterol sulfate (PROAIR RESPICLICK) 90 mcg/actuation breath activated inhaler Take 90 mcg by inhalation every six (6) hours as needed., Historical Med      apixaban (ELIQUIS) 5 mg tablet Take 5 mg by mouth two (2) times a day., Historical Med             REVIEW OF SYSTEMS   Review of Systems   Constitutional:  Negative for chills and fever. HENT:  Negative for sore throat. Respiratory:  Positive for cough, shortness of breath and wheezing. Cardiovascular:  Negative for chest pain. Gastrointestinal:  Negative for abdominal pain, nausea and vomiting. Genitourinary:  Negative for dysuria and flank pain. Musculoskeletal:  Negative for arthralgias and myalgias. Skin:  Negative for wound. Neurological:  Negative for headaches. Psychiatric/Behavioral:  Negative for confusion. Positives and Pertinent negatives as per HPI. PHYSICAL EXAM     ED Triage Vitals [02/06/23 1542]   ED Encounter Vitals Group      BP (!) 167/81      Pulse (Heart Rate) 86      Resp Rate 30      Temp 97.3 °F (36.3 °C)      Temp src       O2 Sat (%) 100 %      Weight 245 lb      Height 5' 11\"      Physical Exam  Vitals and nursing note reviewed. Constitutional:       General: He is not in acute distress. Appearance: He is obese. He is not ill-appearing or diaphoretic. HENT:      Head: Normocephalic. Mouth/Throat:      Pharynx: No oropharyngeal exudate. Eyes:      Pupils: Pupils are equal, round, and reactive to light. Cardiovascular:      Rate and Rhythm: Normal rate and regular rhythm. Heart sounds: Normal heart sounds. No murmur heard. Pulmonary:      Effort: Pulmonary effort is normal. No respiratory distress. Breath sounds: Examination of the right-upper field reveals wheezing. Examination of the left-upper field reveals wheezing. Examination of the right-middle field reveals wheezing. Examination of the left-middle field reveals wheezing. Examination of the right-lower field reveals wheezing. Examination of the left-lower field reveals wheezing. Wheezing present. No rales. Abdominal:      General: Bowel sounds are normal. There is no distension. Palpations: Abdomen is soft. Tenderness: There is no abdominal tenderness. There is no guarding or rebound. Comments: Distended abdomen with midline scar; pt states to ascites   Musculoskeletal:         General: No tenderness. Normal range of motion. Cervical back: Neck supple. Right lower leg: No tenderness. Edema present. Left lower leg: No tenderness. Edema present. Comments: Stasis hyperpigmentation and hyperkeratosis   Skin:     General: Skin is warm and dry. Neurological:      Mental Status: He is alert and oriented to person, place, and time. Psychiatric:         Behavior: Behavior normal.       SCREENINGS               No data recorded        LAB, EKG AND DIAGNOSTIC RESULTS   Labs:  No results found for this or any previous visit (from the past 12 hour(s)). EKG: Initial EKG interpreted by me. 1604 Shows normal sinus rhythm 76/min. Low voltage, normal QRS, normal MI, normal QTc. Nonspecific T wave changes. No acute ST segment changes. Not in AFib ; occ pvc.      Radiologic Studies:  Non-plain film images such as CT, Ultrasound and MRI are read by the radiologist. Plain radiographic images are visualized and preliminarily interpreted by the ED Provider with the below findings:    4:28 PM  Morbidly obese male with what looks like COPD exacerbation and abdominal ascites and possible fluid overload. Will tx for both steroids; nebs; lasix; will need admission    7:18 PM  Case discussed with Dr Miracle Ventura; COPD exacerbation with CHF; abdominal ascites w/o SBP; pt will be admitted but waiting for results of CTA. Clinically, he is better; sitting back and talking to friends on his phone. Still has scattered exp wheezes. Interpretation per the Radiologist below, if available at the time of this note:  No results found. PROCEDURES   Unless otherwise noted below, none.   Performed by: Chelo Gaxiola MD   Procedures      CRITICAL CARE TIME       ED COURSE and DIFFERENTIAL DIAGNOSIS/MDM   Vitals:    Vitals:    02/09/23 3777 02/09/23 0733 02/09/23 0755 02/09/23 1152   BP:   (!) 145/90    Pulse:   71    Resp:   18    Temp:   97.6 °F (36.4 °C)    SpO2:  99% 99% 97%   Weight: 115.7 kg (255 lb)      Height:            Patient was given the following medications:  Medications   albuterol-ipratropium (DUO-NEB) 2.5 mg-0.5 mg/3 ml nebulizer solution (3 mL  Given 2/6/23 1548)   methylPREDNISolone (PF) (Solu-MEDROL) injection 125 mg (125 mg IntraVENous Given 2/6/23 1707)   furosemide (LASIX) injection 80 mg (80 mg IntraVENous Given 2/6/23 1715)   magnesium sulfate 2 g/50 ml IVPB (premix or compounded) (2 g IntraVENous New Bag 2/6/23 1719)   acetaminophen (TYLENOL) tablet 650 mg (650 mg Oral Given 2/6/23 1727)   iopamidoL (ISOVUE-370) 370 mg iodine /mL (76 %) injection  mL (95 mL IntraVENous Given 2/6/23 1834)   aspirin chewable tablet 162 mg (162 mg Oral Given 2/6/23 1933)   albuterol CONCENTRATE 2.5mg/0.5 mL neb soln (5 mg Nebulization Given 2/6/23 2003)       CONSULTS: (Who and What was discussed)  IP CONSULT TO CARDIOLOGY FINAL IMPRESSION     1. COPD exacerbation ([facility]    2. Acute on chronic congestive heart failure, unspecified heart failure type ([facility]    3. Alcoholic cirrhosis of liver with ascites ([facility]    4. Troponin level elevated          DISPOSITION/PLAN   Admitted    Admit Note: Pt is being admitted by Dr Garry Heath. The results of their tests and reason(s) for their admission have been discussed with pt and/or available family. They convey agreement and understanding for the need to be admitted and for the admission diagnosis. PATIENT REFERRED TO:  Follow-up Information       Follow up With Specialties Details Why Contact Info    [address] Nurse Practitioner Go on 2/16/2023 @ 9:40 [facility]  [phone]                DISCHARGE MEDICATIONS:  Discharge Medication List as of 2/9/2023 11:15 AM        START taking these medications    Details   methylPREDNISolone (Medrol, Du,) 4 mg tablet Take as directed on packaging., Normal, Disp-1 Dose Pack, R-0           CONTINUE these medications which have CHANGED    Details   furosemide (LASIX) 40 mg tablet Take 1.5 Tablets by mouth daily. , Normal, Disp-45 Tablet, R-0           CONTINUE these medications which have NOT CHANGED    Details   budesonide-formoteroL (Symbicort) 160-4.5 mcg/actuation HFAA Take 2 Puffs by inhalation two (2) times a day., Historical Med      potassium chloride (K-DUR, KLOR-CON M20) 20 mEq tablet Take 2 Tablets by mouth daily. , Normal, Disp-60 Tablet, R-3      isosorbide mononitrate ER (IMDUR) 30 mg tablet Take 1 Tablet by mouth daily. , Normal, Disp-90 Tablet, R-3      hydrALAZINE (APRESOLINE) 25 mg tablet Take 1 Tablet by mouth three (3) times daily. , Normal, Disp-270 Tablet, R-3      carvediloL (COREG) 6.25 mg tablet Take 1 Tablet by mouth two (2) times daily (with meals). , Normal, Disp-180 Tablet, R-3      atorvastatin (LIPITOR) 80 mg tablet Take 1 Tablet by mouth daily. , Normal, Disp-90 Tablet, R-3      spironolactone (ALDACTONE) 25 mg tablet Take 1 Tablet by mouth daily. , Normal, Disp-90 Tablet, R-1      empagliflozin (Jardiance) 10 mg tablet Take 1 Tablet by mouth daily. , Normal, Disp-90 Tablet, R-1      albuterol sulfate (PROAIR RESPICLICK) 90 mcg/actuation breath activated inhaler Take 90 mcg by inhalation every six (6) hours as needed., Historical Med      apixaban (ELIQUIS) 5 mg tablet Take 5 mg by mouth two (2) times a day., Historical Med               DISCONTINUED MEDICATIONS:  Discharge Medication List as of 2/9/2023 11:15 AM          I am the Primary Clinician of Record: Kanchan Henderson MD (electronically signed)    (Please note that parts of this dictation were completed with voice recognition software. Quite often unanticipated grammatical, syntax, homophones, and other interpretive errors are inadvertently transcribed by the computer software. Please disregards these errors.  Please excuse any errors that have escaped final proofreading.)

## 2023-02-06 NOTE — Clinical Note
Status[de-identified] INPATIENT [101]   Type of Bed: Telemetry [19]   Cardiac Monitoring Required?: Yes   Inpatient Hospitalization Certified Necessary for the Following Reasons: 3.  Patient receiving treatment that can only be provided in an inpatient setting (further clarification in H&P documentation)   Admitting Diagnosis: COPD exacerbation ([facility] [place] [facility] [[id number]]   Admitting Diagnosis: CHF exacerbation ([facility] [place] [facility] [[id number]]   Admitting Diagnosis: Cirrhosis of liver with ascites [facility] [[id number]]   Admitting Physician: Hunter Cornell [[id number]]   Attending Physician: Hunter Cornell [[id number]]   Estimated Length of Stay: 3-4 Midnights   Discharge Plan[de-identified] Home with Office Follow-up

## 2023-02-06 NOTE — PROGRESS NOTES
RT called to ED bed 7 for pt with increased wheezing, at arrival pt was having audible wheezes, insp and exp as well as coarse sounds with coughing, RR 25, Sats 98% on room air and Hr 73, Duoneb tx x 3 doses was started per emergency pull from [facility] to relieve pt SOB and wheezing, RT also took EKG given to Dr Lisa De Jesus. Aeration improved with tx and continued until finished as continuous nebulizer. No resp distress, belly was swollen as well as legs and angles.

## 2023-02-07 ENCOUNTER — APPOINTMENT (OUTPATIENT)
Dept: NON INVASIVE DIAGNOSTICS | Age: 73
End: 2023-02-07
Attending: NURSE PRACTITIONER
Payer: MEDICARE

## 2023-02-07 LAB
ALBUMIN SERPL-MCNC: 2.7 G/DL (ref 3.4–5)
ALBUMIN/GLOB SERPL: 0.5 (ref 0.8–1.7)
ALP SERPL-CCNC: 198 U/L (ref 45–117)
ALT SERPL-CCNC: 12 U/L (ref 16–61)
ANION GAP SERPL CALC-SCNC: 10 MMOL/L (ref 3–18)
AST SERPL W P-5'-P-CCNC: 16 U/L (ref 10–38)
BASOPHILS # BLD: 0 K/UL (ref 0–0.1)
BASOPHILS NFR BLD: 0 % (ref 0–2)
BILIRUB SERPL-MCNC: 0.8 MG/DL (ref 0.2–1)
BUN SERPL-MCNC: 15 MG/DL (ref 7–18)
BUN/CREAT SERPL: 10 (ref 12–20)
CA-I BLD-MCNC: 8.4 MG/DL (ref 8.5–10.1)
CHLORIDE SERPL-SCNC: 101 MMOL/L (ref 100–111)
CO2 SERPL-SCNC: 25 MMOL/L (ref 21–32)
CREAT SERPL-MCNC: 1.45 MG/DL (ref 0.6–1.3)
DIFFERENTIAL METHOD BLD: ABNORMAL
ECHO AO ASC DIAM: 4.3 CM
ECHO AO ASCENDING AORTA INDEX: 1.87 CM/M2
ECHO AO ROOT DIAM: 3.6 CM
ECHO AO ROOT INDEX: 1.57 CM/M2
ECHO AV AREA PEAK VELOCITY: 3.1 CM2
ECHO AV AREA VTI: 3.4 CM2
ECHO AV AREA/BSA PEAK VELOCITY: 1.3 CM2/M2
ECHO AV AREA/BSA VTI: 1.5 CM2/M2
ECHO AV MEAN GRADIENT: 2 MMHG
ECHO AV MEAN VELOCITY: 0.7 M/S
ECHO AV PEAK GRADIENT: 4 MMHG
ECHO AV PEAK VELOCITY: 1.1 M/S
ECHO AV VELOCITY RATIO: 0.82
ECHO AV VTI: 17.6 CM
ECHO EST RA PRESSURE: 15 MMHG
ECHO IVC PROX: 2.6 CM
ECHO LA AREA 2C: 25.9 CM2
ECHO LA AREA 4C: 31 CM2
ECHO LA DIAMETER INDEX: 1.83 CM/M2
ECHO LA DIAMETER: 4.2 CM
ECHO LA MAJOR AXIS: 7.1 CM
ECHO LA MINOR AXIS: 5.8 CM
ECHO LA TO AORTIC ROOT RATIO: 1.17
ECHO LA VOL BP: 110 ML (ref 18–58)
ECHO LA VOL/BSA BIPLANE: 48 ML/M2 (ref 16–34)
ECHO LV E' LATERAL VELOCITY: 9 CM/S
ECHO LV E' SEPTAL VELOCITY: 5 CM/S
ECHO LV EDV A2C: 90 ML
ECHO LV EDV A4C: 156 ML
ECHO LV EDV INDEX A4C: 68 ML/M2
ECHO LV EDV NDEX A2C: 39 ML/M2
ECHO LV EJECTION FRACTION A2C: 38 %
ECHO LV EJECTION FRACTION A4C: 36 %
ECHO LV EJECTION FRACTION BIPLANE: 38 % (ref 55–100)
ECHO LV ESV A2C: 56 ML
ECHO LV ESV A4C: 100 ML
ECHO LV ESV INDEX A2C: 24 ML/M2
ECHO LV ESV INDEX A4C: 43 ML/M2
ECHO LV FRACTIONAL SHORTENING: 15 % (ref 28–44)
ECHO LV INTERNAL DIMENSION DIASTOLE INDEX: 2.35 CM/M2
ECHO LV INTERNAL DIMENSION DIASTOLIC: 5.4 CM (ref 4.2–5.9)
ECHO LV INTERNAL DIMENSION SYSTOLIC INDEX: 2 CM/M2
ECHO LV INTERNAL DIMENSION SYSTOLIC: 4.6 CM
ECHO LV IVSD: 1.2 CM (ref 0.6–1)
ECHO LV MASS 2D: 264.4 G (ref 88–224)
ECHO LV MASS INDEX 2D: 115 G/M2 (ref 49–115)
ECHO LV POSTERIOR WALL DIASTOLIC: 1.2 CM (ref 0.6–1)
ECHO LV RELATIVE WALL THICKNESS RATIO: 0.44
ECHO LVOT AREA: 3.8 CM2
ECHO LVOT AV VTI INDEX: 0.89
ECHO LVOT DIAM: 2.2 CM
ECHO LVOT MEAN GRADIENT: 2 MMHG
ECHO LVOT PEAK GRADIENT: 3 MMHG
ECHO LVOT PEAK VELOCITY: 0.9 M/S
ECHO LVOT STROKE VOLUME INDEX: 25.9 ML/M2
ECHO LVOT SV: 59.7 ML
ECHO LVOT VTI: 15.7 CM
ECHO MV A VELOCITY: 0.56 M/S
ECHO MV AREA VTI: 2.6 CM2
ECHO MV E DECELERATION TIME (DT): 279 MS
ECHO MV E VELOCITY: 0.9 M/S
ECHO MV E/A RATIO: 1.61
ECHO MV E/E' LATERAL: 10
ECHO MV E/E' RATIO (AVERAGED): 14
ECHO MV E/E' SEPTAL: 18
ECHO MV LVOT VTI INDEX: 1.47
ECHO MV MAX VELOCITY: 0.9 M/S
ECHO MV MEAN GRADIENT: 1 MMHG
ECHO MV MEAN VELOCITY: 0.5 M/S
ECHO MV PEAK GRADIENT: 3 MMHG
ECHO MV VTI: 23.1 CM
ECHO PV MAX VELOCITY: 0.8 M/S
ECHO PV PEAK GRADIENT: 3 MMHG
ECHO RA AREA 4C: 29.9 CM2
ECHO RA END SYSTOLIC VOLUME APICAL 4 CHAMBER INDEX BSA: 45 ML/M2
ECHO RA VOLUME: 103 ML
ECHO RIGHT VENTRICULAR SYSTOLIC PRESSURE (RVSP): 51 MMHG
ECHO RV BASAL DIMENSION: 4.8 CM
ECHO RV FREE WALL STRAIN: -11 %
ECHO RV LONGITUDINAL DIMENSION: 8.5 CM
ECHO RV MID DIMENSION: 4.2 CM
ECHO RV TAPSE: 1.3 CM (ref 1.7–?)
ECHO TV REGURGITANT MAX VELOCITY: 3.02 M/S
ECHO TV REGURGITANT PEAK GRADIENT: 36 MMHG
EOSINOPHIL # BLD: 0 K/UL (ref 0–0.4)
EOSINOPHIL NFR BLD: 0 % (ref 0–5)
ERYTHROCYTE [DISTWIDTH] IN BLOOD BY AUTOMATED COUNT: 18.6 % (ref 11.6–14.5)
GLOBULIN SER CALC-MCNC: 5.7 G/DL (ref 2–4)
GLUCOSE SERPL-MCNC: 190 MG/DL (ref 74–99)
HCT VFR BLD AUTO: 37.7 % (ref 36–48)
HGB BLD-MCNC: 11.4 G/DL (ref 13–16)
IMM GRANULOCYTES # BLD AUTO: 0 K/UL
IMM GRANULOCYTES NFR BLD AUTO: 0 %
LYMPHOCYTES # BLD: 0.6 K/UL (ref 0.9–3.6)
LYMPHOCYTES NFR BLD: 8 % (ref 21–52)
MAGNESIUM SERPL-MCNC: 2.2 MG/DL (ref 1.6–2.6)
MCH RBC QN AUTO: 23.7 PG (ref 24–34)
MCHC RBC AUTO-ENTMCNC: 30.2 G/DL (ref 31–37)
MCV RBC AUTO: 78.4 FL (ref 78–100)
MONOCYTES # BLD: 0.2 K/UL (ref 0.05–1.2)
MONOCYTES NFR BLD: 3 % (ref 3–10)
NEUTS BAND NFR BLD MANUAL: 2 % (ref 0–5)
NEUTS SEG # BLD: 7.2 K/UL (ref 1.8–8)
NEUTS SEG NFR BLD: 87 % (ref 40–73)
NRBC # BLD: 0 K/UL (ref 0–0.01)
NRBC BLD-RTO: 0 PER 100 WBC
PHOSPHATE SERPL-MCNC: 2.3 MG/DL (ref 2.5–4.9)
PLATELET # BLD AUTO: 224 K/UL (ref 135–420)
PLATELET COMMENTS,PCOM: ABNORMAL
PMV BLD AUTO: 11.6 FL (ref 9.2–11.8)
POTASSIUM SERPL-SCNC: 3.8 MMOL/L (ref 3.5–5.5)
PROT SERPL-MCNC: 8.4 G/DL (ref 6.4–8.2)
RBC # BLD AUTO: 4.81 M/UL (ref 4.35–5.65)
RBC MORPH BLD: ABNORMAL
RBC MORPH BLD: ABNORMAL
SODIUM SERPL-SCNC: 136 MMOL/L (ref 136–145)
TROPONIN I SERPL HS-MCNC: 362 NG/L (ref 0–78)
TROPONIN I SERPL HS-MCNC: 397 NG/L (ref 0–78)
WBC # BLD AUTO: 8 K/UL (ref 4.6–13.2)
WBC MORPH BLD: ABNORMAL

## 2023-02-07 PROCEDURE — 94761 N-INVAS EAR/PLS OXIMETRY MLT: CPT

## 2023-02-07 PROCEDURE — 93356 MYOCRD STRAIN IMG SPCKL TRCK: CPT

## 2023-02-07 PROCEDURE — 80053 COMPREHEN METABOLIC PANEL: CPT

## 2023-02-07 PROCEDURE — 84100 ASSAY OF PHOSPHORUS: CPT

## 2023-02-07 PROCEDURE — 65270000029 HC RM PRIVATE

## 2023-02-07 PROCEDURE — 85025 COMPLETE CBC W/AUTO DIFF WBC: CPT

## 2023-02-07 PROCEDURE — 74011250636 HC RX REV CODE- 250/636: Performed by: INTERNAL MEDICINE

## 2023-02-07 PROCEDURE — 84484 ASSAY OF TROPONIN QUANT: CPT

## 2023-02-07 PROCEDURE — 36415 COLL VENOUS BLD VENIPUNCTURE: CPT

## 2023-02-07 PROCEDURE — 74011250637 HC RX REV CODE- 250/637: Performed by: INTERNAL MEDICINE

## 2023-02-07 PROCEDURE — 74011250636 HC RX REV CODE- 250/636: Performed by: NURSE PRACTITIONER

## 2023-02-07 PROCEDURE — 74011250637 HC RX REV CODE- 250/637: Performed by: NURSE PRACTITIONER

## 2023-02-07 PROCEDURE — 83735 ASSAY OF MAGNESIUM: CPT

## 2023-02-07 PROCEDURE — 94640 AIRWAY INHALATION TREATMENT: CPT

## 2023-02-07 PROCEDURE — 74011000250 HC RX REV CODE- 250: Performed by: INTERNAL MEDICINE

## 2023-02-07 RX ORDER — SPIRONOLACTONE 25 MG/1
50 TABLET ORAL DAILY
Status: DISCONTINUED | OUTPATIENT
Start: 2023-02-07 | End: 2023-02-09 | Stop reason: HOSPADM

## 2023-02-07 RX ORDER — FUROSEMIDE 10 MG/ML
40 INJECTION INTRAMUSCULAR; INTRAVENOUS 2 TIMES DAILY
Status: DISCONTINUED | OUTPATIENT
Start: 2023-02-07 | End: 2023-02-09 | Stop reason: HOSPADM

## 2023-02-07 RX ORDER — POTASSIUM CHLORIDE 750 MG/1
40 TABLET, EXTENDED RELEASE ORAL DAILY
Status: DISCONTINUED | OUTPATIENT
Start: 2023-02-07 | End: 2023-02-09 | Stop reason: HOSPADM

## 2023-02-07 RX ORDER — HYDRALAZINE HYDROCHLORIDE 25 MG/1
25 TABLET, FILM COATED ORAL 3 TIMES DAILY
Status: DISCONTINUED | OUTPATIENT
Start: 2023-02-07 | End: 2023-02-09 | Stop reason: HOSPADM

## 2023-02-07 RX ORDER — ISOSORBIDE MONONITRATE 30 MG/1
30 TABLET, EXTENDED RELEASE ORAL DAILY
Status: DISCONTINUED | OUTPATIENT
Start: 2023-02-07 | End: 2023-02-09 | Stop reason: HOSPADM

## 2023-02-07 RX ORDER — BUDESONIDE AND FORMOTEROL FUMARATE DIHYDRATE 160; 4.5 UG/1; UG/1
2 AEROSOL RESPIRATORY (INHALATION) 2 TIMES DAILY
COMMUNITY

## 2023-02-07 RX ORDER — IPRATROPIUM BROMIDE AND ALBUTEROL SULFATE 2.5; .5 MG/3ML; MG/3ML
3 SOLUTION RESPIRATORY (INHALATION)
Status: DISCONTINUED | OUTPATIENT
Start: 2023-02-07 | End: 2023-02-09 | Stop reason: HOSPADM

## 2023-02-07 RX ORDER — CARVEDILOL 6.25 MG/1
6.25 TABLET ORAL 2 TIMES DAILY WITH MEALS
Status: DISCONTINUED | OUTPATIENT
Start: 2023-02-07 | End: 2023-02-09 | Stop reason: HOSPADM

## 2023-02-07 RX ORDER — ATORVASTATIN CALCIUM 40 MG/1
80 TABLET, FILM COATED ORAL DAILY
Status: DISCONTINUED | OUTPATIENT
Start: 2023-02-07 | End: 2023-02-09 | Stop reason: HOSPADM

## 2023-02-07 RX ADMIN — IPRATROPIUM BROMIDE AND ALBUTEROL SULFATE 3 ML: 2.5; .5 SOLUTION RESPIRATORY (INHALATION) at 03:28

## 2023-02-07 RX ADMIN — SPIRONOLACTONE 50 MG: 25 TABLET, FILM COATED ORAL at 08:25

## 2023-02-07 RX ADMIN — SODIUM CHLORIDE, PRESERVATIVE FREE 10 ML: 5 INJECTION INTRAVENOUS at 13:22

## 2023-02-07 RX ADMIN — CARVEDILOL 6.25 MG: 6.25 TABLET, FILM COATED ORAL at 17:35

## 2023-02-07 RX ADMIN — FUROSEMIDE 40 MG: 10 INJECTION, SOLUTION INTRAMUSCULAR; INTRAVENOUS at 08:25

## 2023-02-07 RX ADMIN — METHYLPREDNISOLONE SODIUM SUCCINATE 40 MG: 40 INJECTION, POWDER, FOR SOLUTION INTRAMUSCULAR; INTRAVENOUS at 00:46

## 2023-02-07 RX ADMIN — ISOSORBIDE MONONITRATE 30 MG: 30 TABLET, EXTENDED RELEASE ORAL at 08:25

## 2023-02-07 RX ADMIN — APIXABAN 5 MG: 5 TABLET, FILM COATED ORAL at 08:25

## 2023-02-07 RX ADMIN — IPRATROPIUM BROMIDE AND ALBUTEROL SULFATE 3 ML: 2.5; .5 SOLUTION RESPIRATORY (INHALATION) at 08:42

## 2023-02-07 RX ADMIN — APIXABAN 5 MG: 5 TABLET, FILM COATED ORAL at 17:35

## 2023-02-07 RX ADMIN — METHYLPREDNISOLONE SODIUM SUCCINATE 40 MG: 40 INJECTION, POWDER, FOR SOLUTION INTRAMUSCULAR; INTRAVENOUS at 06:14

## 2023-02-07 RX ADMIN — IPRATROPIUM BROMIDE AND ALBUTEROL SULFATE 3 ML: 2.5; .5 SOLUTION RESPIRATORY (INHALATION) at 14:52

## 2023-02-07 RX ADMIN — HYDRALAZINE HYDROCHLORIDE 25 MG: 25 TABLET, FILM COATED ORAL at 17:37

## 2023-02-07 RX ADMIN — SODIUM CHLORIDE, PRESERVATIVE FREE 10 ML: 5 INJECTION INTRAVENOUS at 21:44

## 2023-02-07 RX ADMIN — ATORVASTATIN CALCIUM 80 MG: 40 TABLET, FILM COATED ORAL at 08:25

## 2023-02-07 RX ADMIN — HYDRALAZINE HYDROCHLORIDE 25 MG: 25 TABLET, FILM COATED ORAL at 21:43

## 2023-02-07 RX ADMIN — IPRATROPIUM BROMIDE AND ALBUTEROL SULFATE 3 ML: 2.5; .5 SOLUTION RESPIRATORY (INHALATION) at 11:11

## 2023-02-07 RX ADMIN — METHYLPREDNISOLONE SODIUM SUCCINATE 40 MG: 40 INJECTION, POWDER, FOR SOLUTION INTRAMUSCULAR; INTRAVENOUS at 21:43

## 2023-02-07 RX ADMIN — POTASSIUM CHLORIDE 40 MEQ: 750 TABLET, EXTENDED RELEASE ORAL at 08:25

## 2023-02-07 RX ADMIN — METHYLPREDNISOLONE SODIUM SUCCINATE 40 MG: 40 INJECTION, POWDER, FOR SOLUTION INTRAMUSCULAR; INTRAVENOUS at 11:27

## 2023-02-07 RX ADMIN — IPRATROPIUM BROMIDE AND ALBUTEROL SULFATE 3 ML: 2.5; .5 SOLUTION RESPIRATORY (INHALATION) at 19:11

## 2023-02-07 RX ADMIN — CARVEDILOL 6.25 MG: 6.25 TABLET, FILM COATED ORAL at 08:25

## 2023-02-07 RX ADMIN — FUROSEMIDE 40 MG: 10 INJECTION, SOLUTION INTRAMUSCULAR; INTRAVENOUS at 17:35

## 2023-02-07 RX ADMIN — SODIUM CHLORIDE, PRESERVATIVE FREE 10 ML: 5 INJECTION INTRAVENOUS at 06:14

## 2023-02-07 NOTE — PROGRESS NOTES
0700- Assumed care of patient sitting up on the side of the bed no distress noted. Tele monitor on. Shift assessment complete. Patient denies any needs. 3037- Administered AM medications with no complaints. CB in reach     1430- Patient resting in bed. Patient requesting a breathing treatment RT paged.

## 2023-02-07 NOTE — PROGRESS NOTES
Care Management Interventions  PCP Verified by CM: Yes  Transition of Care Consult (CM Consult): Discharge Planning  Support Systems: Other Family Member(s)  Confirm Follow Up Transport: Family  The Plan for Transition of Care is Related to the Following Treatment Goals : Patient centered discharge planning to ensure smooth transition to community and PLOF. Discharge Location  Patient Expects to be Discharged to[de-identified] Home  Care Management Interventions  PCP Verified by CM: Yes  Transition of Care Consult (CM Consult): Discharge Planning  Support Systems: Other Family Member(s)  Confirm Follow Up Transport: Family  The Plan for Transition of Care is Related to the Following Treatment Goals : Patient centered discharge planning to ensure smooth transition to community and PLOF. Discharge Location  Patient Expects to be Discharged to[de-identified] Home      Reason for Admission:  Chart reviewed and patient interviewed. Patient presented to ED with complaints of SOB and wheezing. Patient admitted for COPD exacerbation. RUR Score:   12%                  Plan for utilizing home health:   Denies       PCP: First and Last name:  Michelle Lockhartj 34     Name of Practice:    Are you a current patient: Yes/No:    Approximate date of last visit:    Can you participate in a virtual visit with your PCP:                     Current Advanced Directive/Advance Care Plan: Full Code      Healthcare Decision Maker:   Click here to complete [address] including selection of the Healthcare Decision Maker Relationship (ie \"Primary\")                             Transition of Care Plan:  Plan of care includes medication reconciliation to be complete, education will be given with teach back method, and will identify need for post-acute care follow up. Patient centered discharge planning to ensure smooth transition to community and PLOF. Patient lives at home with son.   Uses a walker PRN but rarely needs to use.  No home 02, no HH, no DME. CM following for DC needs.

## 2023-02-07 NOTE — CONSULTS
CARDIOLOGY CONSULTATION      REASON FOR CONSULT: CHF    REQUESTING PROVIDER: Camille French MD    CHIEF COMPLAINT:  Shortness of breath    HISTORY OF PRESENT ILLNESS:  Maria Del Carmen Son is a 67y.o. year-old male with past medical history significant for COPD, HFrEF, diabetes, DVT on eliquis, hypertension, and hyperlipidemia who was evaluated today due to shortness of breath. He reports wheezing and SOB has worsened over the last 2 days. In the ED,   Initial troponin 323. CXR showed vascular congestion. CT abd w/ ascites and cirrhosis. On exam, he continues to have extensive abd distension, wheezing and orthopnea. Denies chest pain    Records from hospital admission course thus far reviewed. Telemetry reviewed. No events overnight. INPATIENT MEDICATIONS:  Home medications reviewed.     Current Facility-Administered Medications:     apixaban (ELIQUIS) tablet 5 mg, 5 mg, Oral, BID, Randi Rivera MD, 5 mg at 02/07/23 0825    atorvastatin (LIPITOR) tablet 80 mg, 80 mg, Oral, DAILY, Randi Rivera MD, 80 mg at 02/07/23 0825    carvediloL (COREG) tablet 6.25 mg, 6.25 mg, Oral, BID WITH MEALS, Randi Rivera MD, 6.25 mg at 02/07/23 0825    empagliflozin (JARDIANCE) tablet 10 mg, 10 mg, Oral, DAILY, Randi Rivera MD    isosorbide mononitrate ER (IMDUR) tablet 30 mg, 30 mg, Oral, DAILY, Randi Rivera MD, 30 mg at 02/07/23 0825    potassium chloride (KLOR-CON M10) tablet 40 mEq, 40 mEq, Oral, DAILY, Randi Rivera MD, 40 mEq at 02/07/23 0825    spironolactone (ALDACTONE) tablet 50 mg, 50 mg, Oral, DAILY, Randi Rivera MD, 50 mg at 02/07/23 0825    furosemide (LASIX) injection 40 mg, 40 mg, IntraVENous, BID, Randi Rivera MD, 40 mg at 02/07/23 0825    albuterol-ipratropium (DUO-NEB) 2.5 MG-0.5 MG/3 ML, 3 mL, Nebulization, Q4H PRN, Randi Rivera MD    albuterol-ipratropium (DUO-NEB) 2.5 MG-0.5 MG/3 ML, 3 mL, Nebulization, QID RT, Randi Rivera MD, 3 mL at 02/07/23 1111    benzocaine-menthoL (CEPACOL) lozenge 1 Lozenge, 1 Lozenge, Mucous Membrane, PRN, Mac, Harjit Simple, NP    sodium chloride (NS) flush 5-40 mL, 5-40 mL, IntraVENous, Q8H, Dez Acevedo MD, 10 mL at 02/07/23 1322    sodium chloride (NS) flush 5-40 mL, 5-40 mL, IntraVENous, PRN, Dez Acevedo MD    acetaminophen (TYLENOL) tablet 650 mg, 650 mg, Oral, Q6H PRN **OR** acetaminophen (TYLENOL) suppository 650 mg, 650 mg, Rectal, Q6H PRN, Dez Acevedo MD    polyethylene glycol (MIRALAX) packet 17 g, 17 g, Oral, DAILY PRN, Dez Acevedo MD    ondansetron (ZOFRAN ODT) tablet 4 mg, 4 mg, Oral, Q8H PRN **OR** ondansetron (ZOFRAN) injection 4 mg, 4 mg, IntraVENous, Q6H PRN, Dez Acevedo MD    methylPREDNISolone (PF) (SOLU-MEDROL) injection 40 mg, 40 mg, IntraVENous, Q6H, Dez Acevedo MD, 40 mg at 02/07/23 1127     ALLERGIES:  Allergies reviewed with the patient,  Allergies   Allergen Reactions    Lisinopril Angioedema    . FAMILY HISTORY:  Family history reviewed. SOCIAL HISTORY:  Notable for daily tobacco use, no heavy alcohol or illicit drug use. REVIEW OF SYSTEMS:  Complete review of systems performed, pertinents noted above, all other systems are negative. PHYSICAL EXAMINATION:  Vital sign assessment reveal a blood pressure of  164 /100 and pulse rate of 79. Cardiovascular exam has a heart with a regular rate and rhythm, normal S1 and S2. No murmur present. There are no rubs or gallops. Good peripheral pulses. No jugular venous distension. No carotid bruits are present. Respiratory exam reveals scattered wheezes. Gastrointestinal exam has soft, nontender abdomen with normal bowel sounds. Lymphatic exam reveals 2+BLE edema and mild varicosities. No notable skin changes. Neurologic exam is nonfocal.  Musculoskeletal exam is notable for a normal gait.     Visit Vitals  /81 (BP 1 Location: Left upper arm, BP Patient Position: At rest;Sitting)   Pulse 81   Temp 96.8 °F (36 °C)   Resp 17   Ht 5' 11\" (1.803 m)   Wt 111.1 kg (245 lb)   SpO2 98%   BMI 34.17 kg/m²         Recent labs results and imaging reviewed. Notable findings include   Lab Results   Component Value Date/Time    WBC 8.0 02/07/2023 04:03 AM    HGB 11.4 (L) 02/07/2023 04:03 AM    HCT 37.7 02/07/2023 04:03 AM    PLATELET 163 72/15/2234 04:03 AM    MCV 78.4 02/07/2023 04:03 AM     Lab Results   Component Value Date/Time     11/11/2021 09:10 AM    CK - MB 4.4 11/11/2021 09:10 AM    CK - MB <1.0 04/14/2021 03:27 AM    CK-MB Index  04/14/2021 03:27 AM     CALCULATION NOT PERFORMED WHEN RESULT IS BELOW LINEAR LIMIT    Troponin-I, Qt. 0.14 (H) 11/28/2021 07:30 AM    BNP 1,120 (H) 11/11/2021 09:10 AM      Lab Results   Component Value Date/Time    BNP 1,120 (H) 11/11/2021 09:10 AM     (H) 09/23/2021 01:30 PM    NT pro-BNP 6,237 (H) 02/06/2023 04:20 PM    NT pro-BNP 1,121 (H) 04/14/2021 05:02 AM    NT pro- (H) 04/13/2021 11:20 AM    NT pro- (H) 04/09/2021 09:38 AM    NT pro- 04/06/2021 12:02 PM      Lab Results   Component Value Date/Time    Sodium 136 02/07/2023 04:03 AM    Potassium 3.8 02/07/2023 04:03 AM    Chloride 101 02/07/2023 04:03 AM    CO2 25 02/07/2023 04:03 AM    Anion gap 10 02/07/2023 04:03 AM    Glucose 190 (H) 02/07/2023 04:03 AM    BUN 15 02/07/2023 04:03 AM    Creatinine 1.45 (H) 02/07/2023 04:03 AM    BUN/Creatinine ratio 10 (L) 02/07/2023 04:03 AM    GFR est AA >60 11/28/2021 07:30 AM    GFR est non-AA >60 11/28/2021 07:30 AM    Calcium 8.4 (L) 02/07/2023 04:03 AM     .       Discussed case with Dr. Tylor Castellanos and our impression and recommendations are as follows:  Acute on chronic systolic CHF: SOB multifactoral. Ongoing wheezing, ascites, and  orthopnea. Agree w/ IV diuretics. EF 96%, grade 2 diastolic dysfunction. Daily wt and strict I&O. Continue Jardiance, isosorbide, coreg, spironolactone. Allergy noted to ACEi. Elevated troponin: 521>495>227; relatively flat. Will repeat. No reported chest pain.  No acute ischemic changes on ekg. Likely demand ischemia r/t volume overload and COPD exacerbation. Continue telemetry monitoring. Hypertension: Blood pressure is above goal. Continue home meds w/ IV diuresis. H/O DVT: continue eliquis. Thank you for involving us in the care of this patient. Please do not hesitate to call me or Dr. Nicci Duff if additional questions arise.     Sher Matute, DUSTIN  2/7/2023

## 2023-02-07 NOTE — ACP (ADVANCE CARE PLANNING)
Advance Care Planning     General Advance Care Planning (ACP) Conversation      Date of Conversation: 2/6/2023  Conducted with: Patient with Decision Making Capacity    Healthcare Decision Maker:   No healthcare decision makers have been documented.    Click here to complete [address] including selection of the Healthcare Decision Maker Relationship (ie \"Primary\")      Content/Action Overview:   Has NO ACP documents/care preferences - information provided, considering goals and options  Reviewed DNR/DNI and patient elects Full Code (Attempt Resuscitation)         Length of Voluntary ACP Conversation in minutes:  <16 minutes (Non-Billable)    Leonard Ramos

## 2023-02-07 NOTE — PROGRESS NOTES
Patient was oriented to the hospital floor, given education on call bell use and fall precautions. Patient was given a urinal, prefers to use this over ambulation to the bathroom.

## 2023-02-07 NOTE — PROGRESS NOTES
Progress Note    Patient: Jayde Hale MRN: [id number]  SSN: [id number]    YOB: 1950  Age: 67 y.o. Sex: male      Admit Date: 2/6/2023    LOS: 1 day     Subjective:     Seen for f/up, noted resting on bed on room air. He denies chest pain, or any worsening shortness of breath. No fever, chills, nausea, vomit, abdominal pain or discomfort. States he feels better and asks about his discharge. Bilateral lower leg edema \"is about the same\"  No overnight events reported by RN. Objective:     Vitals:    02/07/23 0957 02/07/23 1112 02/07/23 1149 02/07/23 1219   BP: (!) 164/100   137/81   Pulse:   79 81   Resp:    17   Temp:    96.8 °F (36 °C)   SpO2:  94%  98%   Weight: 111.1 kg (245 lb)      Height: 5' 11\" (1.803 m)           Intake and Output:  Current Shift: No intake/output data recorded. Last three shifts: 02/05 1901 - 02/07 0700  In: -   Out: 700 [Urine:700]    Physical Exam:   Physical Exam  Vitals and nursing note reviewed. Constitutional:       Appearance: He is obese. Comments: Not ill or toxic appearing, speaking in clear sentences on room air. HENT:      Head: Normocephalic and atraumatic. Nose: Nose normal.      Mouth/Throat:      Mouth: Mucous membranes are moist.      Comments: Missing teeth. Eyes:      Extraocular Movements: Extraocular movements intact. Pupils: Pupils are equal, round, and reactive to light. Cardiovascular:      Rate and Rhythm: Normal rate and regular rhythm. Pulses: Normal pulses. Heart sounds: Normal heart sounds. Pulmonary:      Effort: Pulmonary effort is normal.      Breath sounds: Wheezing and rhonchi present. Abdominal:      General: Bowel sounds are normal.      Palpations: Abdomen is soft. Comments: Rounded, midline abd scar   Musculoskeletal:      Cervical back: Normal range of motion and neck supple. Right lower leg: Edema present. Left lower leg: Edema present.       Comments: Left heel scabbed wound--has pending OP podiatrist f/up   Skin:     General: Skin is warm and dry. Capillary Refill: Capillary refill takes less than 2 seconds. Neurological:      General: No focal deficit present. Mental Status: He is alert and oriented to person, place, and time. Mental status is at baseline. Lab/Data Review:  Recent Results (from the past 12 hour(s))   CBC WITH AUTOMATED DIFF    Collection Time: 02/07/23  4:03 AM   Result Value Ref Range    WBC 8.0 4.6 - 13.2 K/uL    RBC 4.81 4.35 - 5.65 M/uL    HGB 11.4 (L) 13.0 - 16.0 g/dL    HCT 37.7 36.0 - 48.0 %    MCV 78.4 78.0 - 100.0 FL    MCH 23.7 (L) 24.0 - 34.0 PG    MCHC 30.2 (L) 31.0 - 37.0 g/dL    RDW 18.6 (H) 11.6 - 14.5 %    PLATELET 419 980 - 138 K/uL    MPV 11.6 9.2 - 11.8 FL    NRBC 0.0 0.0  WBC    ABSOLUTE NRBC 0.00 0.00 - 0.01 K/uL    NEUTROPHILS 87 (H) 40 - 73 %    BAND NEUTROPHILS 2 0 - 5 %    LYMPHOCYTES 8 (L) 21 - 52 %    MONOCYTES 3 3 - 10 %    EOSINOPHILS 0 0 - 5 %    BASOPHILS 0 0 - 2 %    IMMATURE GRANULOCYTES 0 %    ABS. NEUTROPHILS 7.2 1.8 - 8.0 K/UL    ABS. LYMPHOCYTES 0.6 (L) 0.9 - 3.6 K/UL    ABS. MONOCYTES 0.2 0.05 - 1.2 K/UL    ABS. EOSINOPHILS 0.0 0.0 - 0.4 K/UL    ABS. BASOPHILS 0.0 0.0 - 0.1 K/UL    ABS. IMM.  GRANS. 0.0 K/UL    DF Manual      PLATELET COMMENTS LARGE PLATELETS NOTED     RBC COMMENTS Hypochromia  1+        RBC COMMENTS Target Cells  1+        WBC COMMENTS Toxic Granulation     MAGNESIUM    Collection Time: 02/07/23  4:03 AM   Result Value Ref Range    Magnesium 2.2 1.6 - 2.6 mg/dL   PHOSPHORUS    Collection Time: 02/07/23  4:03 AM   Result Value Ref Range    Phosphorus 2.3 (L) 2.5 - 4.9 mg/dL   METABOLIC PANEL, COMPREHENSIVE    Collection Time: 02/07/23  4:03 AM   Result Value Ref Range    Sodium 136 136 - 145 mmol/L    Potassium 3.8 3.5 - 5.5 mmol/L    Chloride 101 100 - 111 mmol/L    CO2 25 21 - 32 mmol/L    Anion gap 10 3.0 - 18.0 mmol/L    Glucose 190 (H) 74 - 99 mg/dL    BUN 15 7 - 18 mg/dL    Creatinine 1.45 (H) 0.60 - 1.30 mg/dL    BUN/Creatinine ratio 10 (L) 12 - 20      eGFR 51 (L) >60 ml/min/1.73m2    Calcium 8.4 (L) 8.5 - 10.1 mg/dL    Bilirubin, total 0.8 0.2 - 1.0 mg/dL    AST (SGOT) 16 10 - 38 U/L    ALT (SGPT) 12 (L) 16 - 61 U/L    Alk.  phosphatase 198 (H) 45 - 117 U/L    Protein, total 8.4 (H) 6.4 - 8.2 g/dL    Albumin 2.7 (L) 3.4 - 5.0 g/dL    Globulin 5.7 (H) 2.0 - 4.0 g/dL    A-G Ratio 0.5 (L) 0.8 - 1.7     TROPONIN-HIGH SENSITIVITY    Collection Time: 02/07/23  8:54 AM   Result Value Ref Range    Troponin-High Sensitivity 397 (HH) 0 - 78 ng/L   ECHO ADULT COMPLETE    Collection Time: 02/07/23 11:10 AM   Result Value Ref Range    LV EDV A2C 90 mL    LV EDV A4C 156 mL    LV ESV A2C 56 mL    LV ESV A4C 100 mL    IVSd 1.2 (A) 0.6 - 1.0 cm    LVIDd 5.4 4.2 - 5.9 cm    LVIDs 4.6 cm    LVOT Diameter 2.2 cm    LVOT Mean Gradient 2 mmHg    LVOT VTI 15.7 cm    LVOT Peak Velocity 0.9 m/s    LVOT Peak Gradient 3 mmHg    LVPWd 1.2 (A) 0.6 - 1.0 cm    LV E' Lateral Velocity 9 cm/s    LV E' Septal Velocity 5 cm/s    LV Ejection Fraction A2C 38 %    LV Ejection Fraction A4C 36 %    EF BP 38 (A) 55 - 100 %    LVOT Area 3.8 cm2    LVOT SV 59.7 ml    LA Minor Axis 5.8 cm    LA Major Axis 7.1 cm    LA Area 2C 25.9 cm2    LA Area 4C 31.0 cm2    LA Volume  (A) 18 - 58 mL    LA Diameter 4.2 cm    RA Area 4C 29.9 cm2    RA Volume 103 ml    Est. RA Pressure 15 mmHg    AV Mean Gradient 2 mmHg    AV VTI 17.6 cm    AV Mean Velocity 0.7 m/s    AV Peak Velocity 1.1 m/s    AV Peak Gradient 4 mmHg    AV Area by VTI 3.4 cm2    AV Area by Peak Velocity 3.1 cm2    Aortic Root 3.6 cm    Ascending Aorta 4.3 cm    IVC Proxmal 2.6 cm    MV E Wave Deceleration Time 279.0 ms    MV A Velocity 0.56 m/s    MV E Velocity 0.90 m/s    MV Mean Gradient 1 mmHg    MV VTI 23.1 cm    MV Mean Velocity 0.5 m/s    MV Max Velocity 0.9 m/s    MV Peak Gradient 3 mmHg    MV Area by VTI 2.6 cm2    PV Max Velocity 0.8 m/s    PV Peak Gradient 3 mmHg    RV Basal Dimension 4.8 cm    RV Longitudinal Dimension 8.5 cm    RV Mid Dimension 4.2 cm    RV Free Wall Strain -11 %    TAPSE 1.3 (A) 1.7 cm    TR Max Velocity 3.02 m/s    TR Peak Gradient 36 mmHg    Fractional Shortening 2D 15 28 - 44 %    LV ESV Index A4C 43 mL/m2    LV EDV Index A4C 68 mL/m2    LV ESV Index A2C 24 mL/m2    LV EDV Index A2C 39 mL/m2    LVIDd Index 2.35 cm/m2    LVIDs Index 2.00 cm/m2    LV RWT Ratio 0.44     LV Mass 2D 264.4 (A) 88 - 224 g    LV Mass 2D Index 115.0 49 - 115 g/m2    MV E/A 1.61     E/E' Ratio (Averaged) 14.00     E/E' Lateral 10.00     E/E' Septal 18.00     LA Volume Index BP 48 (A) 16 - 34 ml/m2    LVOT Stroke Volume Index 25.9 mL/m2    LA Size Index 1.83 cm/m2    LA/AO Root Ratio 1.17     RA Volume Index A4C 45 mL/m2    Ao Root Index 1.57 cm/m2    Ascending Aorta Index 1.87 cm/m2    AV Velocity Ratio 0.82     LVOT:AV VTI Index 0.89     KALEN/BSA VTI 1.5 cm2/m2    KALEN/BSA Peak Velocity 1.3 cm2/m2    MV:LVOT VTI Index 1.47     RVSP 51 mmHg        Assessment:     Active Problems:    COPD exacerbation (HCC) (2/6/2023)      CHF exacerbation (HCC) (2/6/2023)      Cirrhosis of liver with ascites (HCC) (2/6/2023)      Elevated troponin (2/6/2023)      Plan:     #1: COPD exacerbation:  -cont DuoNebs, Solu-Medrol, Symbicort per home dose  -he is a current smoker  -currently on room air, with improved symptoms-taper solumedrol.   -Counseled on smoking cessation. #2: Acute on chronic combined systolic and diastolic CHF:  -cont diuresis with Lasix 40 mg twice daily  -watch BUN Cr daily, Cr 1.45 today--from 1.25, moniotr  -cont strict I&Os, 1500 mL fluid restriction, sodium restricted diet  -Neg 700 output so far  -Per care everywhere, he has an EF of 41% with global hypokinesis of the left ventricle and moderate MR on 10/19/2021.  -Repeat echo is pending.  -Cardiology consultation. #3: Elevated troponin:  -trops 316--397, trend  -? ? Demand ischemia, no chest pain.  Await echo results, cont eliquis. #4: Diabetes mellitus type 2:  -cont accu-checks achs, SSI prn. Cont Jardiance. #5: Hypertension:  -chronic issue, not well controlled  -cont carvedilol and resume hydralazine, hold spironolactone as we diurese. #6: Hyperlipidemia:  -cont atorvastatin. #7.:  History of DVT:  -cont Eliquis. VTE prophylaxis: Eliquis  Code Status: Full code   Total time spent: 30 minutes plan of care discussed with patient, family and nursing staff.             Signed By: Kristen Booth NP     February 7, 2023

## 2023-02-07 NOTE — H&P
Hospitalist Admission Note    NAME: Breanne Abel   :  1950   MRN:  [id number]     Date/Time:  2023 8:16 PM    Patient PCP: DUSTIN Corrales  ______________________________________________________________________  Given the patient's current clinical presentation, I have a high level of concern for decompensation if discharged from the emergency department. Complex decision making was performed, which includes reviewing the patient's available past medical records, laboratory results, and x-ray films. My assessment of this patient's clinical condition and my plan of care is as follows. Assessment / Plan:    COPD Exacerbation,   - admit, tele  - cont nebs,   - check flu, covid  - add solumedrol but watch for worsening fluid retention    CHF, acute on chronic, systolic and diastolic (per chart)  - lasix iv 40mg bid  - cont home aldactone, jardiance, imdur, holding hydralazine to leave a little room for diuresis  - daily wt, strict I/o    Cirrhosis  - follow up with outpt GI    HLD  - cont statin    Hx dvt  - cont eliquis    Trop elevation, no cp  - demand related due to copd/chf, trend, consult cardio if spikes    Subjective:   CHIEF COMPLAINT: sob    HISTORY OF PRESENT ILLNESS:     Hai Nguyen is a 67 y.o.  male who presents with sob. Pt with extenisve cardiac hx as below, CHF, presents with 2 days sob. Pt states he has been SOB intermittently for the past 2 days, chronic wheezing and SOB for the past several weeks that has worsened over the last 2 days. Has been using his home inhaler but not working as well now. Denies fever; chills; hemoptysis; Covid; flu symptoms    We were asked to admit for work up and evaluation of the above problems.      Past Medical History:   Diagnosis Date    DDD (degenerative disc disease), lumbar     Diabetes ([facility]     Diabetic neuropathy ([facility]     Diabetic retinopathy ([facility]     DM2 (diabetes mellitus, type 2) ([facility]     HLD (hyperlipidemia)     HTN (hypertension)     Obesity (BMI 30-39. 9)         History reviewed. No pertinent surgical history. Social History     Tobacco Use    Smoking status: Every Day     Packs/day: 0.25     Types: Cigarettes    Smokeless tobacco: Never    Tobacco comments:     1 cigarette a day   Substance Use Topics    Alcohol use: Not Currently        History reviewed. No pertinent family history. Allergies   Allergen Reactions    Lisinopril Angioedema        Prior to Admission medications    Medication Sig Start Date End Date Taking? Authorizing Provider   potassium chloride (K-DUR, KLOR-CON M20) 20 mEq tablet Take 2 Tablets by mouth daily. 7/8/22  Yes Yvan Loaiza MD   isosorbide mononitrate ER (IMDUR) 30 mg tablet Take 1 Tablet by mouth daily. 7/1/22  Yes Yvan Loaiza MD   hydrALAZINE (APRESOLINE) 25 mg tablet Take 1 Tablet by mouth three (3) times daily. 7/1/22  Yes Yvan Loaiza MD   carvediloL (COREG) 6.25 mg tablet Take 1 Tablet by mouth two (2) times daily (with meals). 7/1/22  Yes Yvan Loaiza MD   atorvastatin (LIPITOR) 80 mg tablet Take 1 Tablet by mouth daily. 7/1/22  Yes Yvan Loaiza MD   furosemide (LASIX) 40 mg tablet Take 1 Tablet by mouth two (2) times a day. 7/1/22  Yes Yvan Loaiza MD   spironolactone (ALDACTONE) 25 mg tablet Take 1 Tablet by mouth daily. 6/3/22  Yes Yvan Loaiza MD   empagliflozin (Jardiance) 10 mg tablet Take 1 Tablet by mouth daily. 6/3/22  Yes Yvan Loaiza MD   albuterol sulfate (PROAIR RESPICLICK) 90 mcg/actuation breath activated inhaler Take 90 mcg by inhalation every six (6) hours as needed. 9/27/21  Yes Provider, Historical   apixaban (ELIQUIS) 5 mg tablet Take 5 mg by mouth two (2) times a day.    Yes Other, MD Cheryl       REVIEW OF SYSTEMS:           Total of 12 systems reviewed as follows:       POSITIVE= underlined text  Negative = text not underlined  General:  fever, chills, sweats, generalized weakness, weight loss/gain, loss of appetite   Eyes:    blurred vision, eye pain, loss of vision, double vision  ENT:    rhinorrhea, pharyngitis   Respiratory:   cough, sputum production, SOB, PONCE, wheezing, pleuritic pain   Cardiology:   chest pain, palpitations, orthopnea, PND, edema, syncope   Gastrointestinal:  abdominal pain , N/V, diarrhea, dysphagia, constipation, bleeding   Genitourinary:  frequency, urgency, dysuria, hematuria, incontinence   Muskuloskeletal :  arthralgia, myalgia, back pain  Hematology:  easy bruising, nose or gum bleeding, lymphadenopathy   Dermatological: rash, ulceration, pruritis, color change / jaundice  Endocrine:   hot flashes or polydipsia   Neurological:  headache, dizziness, confusion, focal weakness, paresthesia,     Speech difficulties, memory loss, gait difficulty  Psychological: Feelings of anxiety, depression, agitation    Objective:   VITALS:    Visit Vitals  BP (!) 155/81   Pulse 70   Temp 97.3 °F (36.3 °C)   Resp 18   Ht 5' 11\" (1.803 m)   Wt 111.1 kg (245 lb)   SpO2 95%   BMI 34.17 kg/m²       PHYSICAL EXAM:    General:    Alert, cooperative, mild resp distress, appears stated age. HEENT: Atraumatic, anicteric sclerae, pink conjunctivae     No oral ulcers, mucosa moist, throat clear, dentition fair  Neck:  Supple, symmetrical,  thyroid: non tender  Lungs:   + wheeze  Chest wall:  No tenderness  No Accessory muscle use. Heart:   Regular  rhythm,  No  murmur   + edema  Abdomen:   Soft, non-tender. Not distended. Bowel sounds normal  Extremities: No cyanosis. No clubbing,      Skin turgor normal, Capillary refill normal, Radial dial pulse 2+  Skin:     Not pale. Not Jaundiced  No rashes   Psych:  Good insight. Not depressed. Not anxious or agitated. Neurologic: EOMs intact. No facial asymmetry. No aphasia or slurred speech. Symmetrical strength, Sensation grossly intact.  Alert and oriented X 4.     _______________________________________________________________________  Care Plan discussed with:    Comments   Patient x    Family      RN     Care Manager                    Consultant:      _______________________________________________________________________  Expected  Disposition:   Home with Family x   HH/PT/OT/RN    SNF/LTC    KATELYN    ________________________________________________________________________  TOTAL TIME:  39 Minutes    Critical Care Provided     Minutes non procedure based      Comments    x Reviewed previous records   >50% of visit spent in counseling and coordination of care  Discussion with patient and/or family and questions answered       ________________________________________________________________________  Signed: Nichelle Castanon MD    Procedures: see electronic medical records for all procedures/Xrays and details which were not copied into this note but were reviewed prior to creation of Plan.     LAB DATA REVIEWED:    Recent Results (from the past 24 hour(s))   EKG, 12 LEAD, INITIAL    Collection Time: 02/06/23  4:04 PM   Result Value Ref Range    Ventricular Rate 76 BPM    Atrial Rate 0 BPM    P-R Interval 186 ms    QRS Duration 94 ms    Q-T Interval 430 ms    QTC Calculation (Bezet) 484 ms    Calculated P Axis 14 degrees    Calculated R Axis -98 degrees    Calculated T Axis 47 degrees    Diagnosis       Sinus Rhythm with occasional Premature atrial complexes and Premature   ventricular complexes  Low voltage QRS  Nonspecific T wave abnormality  Abnormal ECG        Confirmed by Connor Schmidt ([id number]) on 2/6/2023 4:39:12 PM     CBC WITH AUTOMATED DIFF    Collection Time: 02/06/23  4:20 PM   Result Value Ref Range    WBC 5.7 4.6 - 13.2 K/uL    RBC 4.95 4.35 - 5.65 M/uL    HGB 11.6 (L) 13.0 - 16.0 g/dL    HCT 38.6 36.0 - 48.0 %    MCV 78.0 78.0 - 100.0 FL    MCH 23.4 (L) 24.0 - 34.0 PG    MCHC 30.1 (L) 31.0 - 37.0 g/dL    RDW 18.9 (H) 11.6 - 14.5 %    PLATELET 543 936 - 433 K/uL    MPV 11.3 9.2 - 11.8 FL    NRBC 0.0 0.0  WBC    ABSOLUTE NRBC 0.00 0.00 - 0.01 K/uL    NEUTROPHILS 63 40 - 73 %    LYMPHOCYTES 21 21 - 52 %    MONOCYTES 13 (H) 3 - 10 %    EOSINOPHILS 1 0 - 5 %    BASOPHILS 1 0 - 2 %    IMMATURE GRANULOCYTES 1 (H) 0 - 0.5 %    ABS. NEUTROPHILS 3.7 1.8 - 8.0 K/UL    ABS. LYMPHOCYTES 1.2 0.9 - 3.6 K/UL    ABS. MONOCYTES 0.7 0.05 - 1.2 K/UL    ABS. EOSINOPHILS 0.1 0.0 - 0.4 K/UL    ABS. BASOPHILS 0.0 0.0 - 0.1 K/UL    ABS. IMM. GRANS. 0.0 0.00 - 0.04 K/UL    DF AUTOMATED     PROTHROMBIN TIME + INR    Collection Time: 02/06/23  4:20 PM   Result Value Ref Range    Prothrombin time 17.6 (H) 11.5 - 15.2 sec    INR 1.4 (H) 0.8 - 1.2     D DIMER    Collection Time: 02/06/23  4:20 PM   Result Value Ref Range    D DIMER >20.00 (H) <0.46 ug/ml(FEU)   METABOLIC PANEL, COMPREHENSIVE    Collection Time: 02/06/23  4:20 PM   Result Value Ref Range    Sodium 138 136 - 145 mmol/L    Potassium 3.8 3.5 - 5.5 mmol/L    Chloride 103 100 - 111 mmol/L    CO2 27 21 - 32 mmol/L    Anion gap 8 3.0 - 18.0 mmol/L    Glucose 87 74 - 99 mg/dL    BUN 16 7 - 18 mg/dL    Creatinine 1.28 0.60 - 1.30 mg/dL    BUN/Creatinine ratio 13 12 - 20      eGFR 59 (L) >60 ml/min/1.73m2    Calcium 8.3 (L) 8.5 - 10.1 mg/dL    Bilirubin, total 1.0 0.2 - 1.0 mg/dL    AST (SGOT) 17 10 - 38 U/L    ALT (SGPT) 13 (L) 16 - 61 U/L    Alk.  phosphatase 199 (H) 45 - 117 U/L    Protein, total 7.8 6.4 - 8.2 g/dL    Albumin 2.6 (L) 3.4 - 5.0 g/dL    Globulin 5.2 (H) 2.0 - 4.0 g/dL    A-G Ratio 0.5 (L) 0.8 - 1.7     TROPONIN-HIGH SENSITIVITY    Collection Time: 02/06/23  4:20 PM   Result Value Ref Range    Troponin-High Sensitivity 323 (HH) 0 - 78 ng/L   NT-PRO BNP    Collection Time: 02/06/23  4:20 PM   Result Value Ref Range    NT pro-BNP 6,237 (H) 0 - 900 pg/mL   MAGNESIUM    Collection Time: 02/06/23  4:20 PM   Result Value Ref Range    Magnesium 1.9 1.6 - 2.6 mg/dL

## 2023-02-07 NOTE — PROGRESS NOTES
TRANSFER - OUT REPORT:    Verbal report given to [facility] RN(name) on [employer]  being transferred to (unit) for routine progression of care       Report consisted of patients Situation, Background, Assessment and   Recommendations(SBAR). Information from the following report(s) SBAR, ED Summary, MAR, and Recent Results was reviewed with the receiving nurse. Lines:   Peripheral IV 02/06/23 Right Hand (Active)   Site Assessment Clean, dry, & intact 02/06/23 1621        Opportunity for questions and clarification was provided.       Patient transported with:   Monitor  Tech

## 2023-02-08 LAB
ANION GAP SERPL CALC-SCNC: 3 MMOL/L (ref 3–18)
BNP SERPL-MCNC: ABNORMAL PG/ML (ref 0–900)
BUN SERPL-MCNC: 23 MG/DL (ref 7–18)
BUN/CREAT SERPL: 18 (ref 12–20)
CA-I BLD-MCNC: 8.5 MG/DL (ref 8.5–10.1)
CHLORIDE SERPL-SCNC: 101 MMOL/L (ref 100–111)
CO2 SERPL-SCNC: 33 MMOL/L (ref 21–32)
CREAT SERPL-MCNC: 1.28 MG/DL (ref 0.6–1.3)
GLUCOSE BLD STRIP.AUTO-MCNC: 117 MG/DL (ref 70–110)
GLUCOSE BLD STRIP.AUTO-MCNC: 218 MG/DL (ref 70–110)
GLUCOSE SERPL-MCNC: 130 MG/DL (ref 74–99)
MAGNESIUM SERPL-MCNC: 2.3 MG/DL (ref 1.6–2.6)
PERFORMED BY, TECHID: ABNORMAL
PERFORMED BY, TECHID: ABNORMAL
POTASSIUM SERPL-SCNC: 5 MMOL/L (ref 3.5–5.5)
SODIUM SERPL-SCNC: 137 MMOL/L (ref 136–145)

## 2023-02-08 PROCEDURE — 82962 GLUCOSE BLOOD TEST: CPT

## 2023-02-08 PROCEDURE — 36415 COLL VENOUS BLD VENIPUNCTURE: CPT

## 2023-02-08 PROCEDURE — 83735 ASSAY OF MAGNESIUM: CPT

## 2023-02-08 PROCEDURE — 74011250636 HC RX REV CODE- 250/636: Performed by: INTERNAL MEDICINE

## 2023-02-08 PROCEDURE — 94640 AIRWAY INHALATION TREATMENT: CPT

## 2023-02-08 PROCEDURE — 65270000029 HC RM PRIVATE

## 2023-02-08 PROCEDURE — 74011250637 HC RX REV CODE- 250/637: Performed by: NURSE PRACTITIONER

## 2023-02-08 PROCEDURE — 80048 BASIC METABOLIC PNL TOTAL CA: CPT

## 2023-02-08 PROCEDURE — 74011250636 HC RX REV CODE- 250/636: Performed by: NURSE PRACTITIONER

## 2023-02-08 PROCEDURE — 83880 ASSAY OF NATRIURETIC PEPTIDE: CPT

## 2023-02-08 PROCEDURE — 94618 PULMONARY STRESS TESTING: CPT

## 2023-02-08 PROCEDURE — 74011000250 HC RX REV CODE- 250: Performed by: INTERNAL MEDICINE

## 2023-02-08 PROCEDURE — 74011250637 HC RX REV CODE- 250/637: Performed by: INTERNAL MEDICINE

## 2023-02-08 PROCEDURE — 94761 N-INVAS EAR/PLS OXIMETRY MLT: CPT

## 2023-02-08 RX ADMIN — APIXABAN 5 MG: 5 TABLET, FILM COATED ORAL at 08:20

## 2023-02-08 RX ADMIN — METHYLPREDNISOLONE SODIUM SUCCINATE 40 MG: 40 INJECTION, POWDER, FOR SOLUTION INTRAMUSCULAR; INTRAVENOUS at 21:08

## 2023-02-08 RX ADMIN — APIXABAN 5 MG: 5 TABLET, FILM COATED ORAL at 17:02

## 2023-02-08 RX ADMIN — METHYLPREDNISOLONE SODIUM SUCCINATE 40 MG: 40 INJECTION, POWDER, FOR SOLUTION INTRAMUSCULAR; INTRAVENOUS at 06:10

## 2023-02-08 RX ADMIN — FUROSEMIDE 40 MG: 10 INJECTION, SOLUTION INTRAMUSCULAR; INTRAVENOUS at 08:20

## 2023-02-08 RX ADMIN — SODIUM CHLORIDE, PRESERVATIVE FREE 10 ML: 5 INJECTION INTRAVENOUS at 06:10

## 2023-02-08 RX ADMIN — IPRATROPIUM BROMIDE AND ALBUTEROL SULFATE 3 ML: 2.5; .5 SOLUTION RESPIRATORY (INHALATION) at 16:49

## 2023-02-08 RX ADMIN — SPIRONOLACTONE 50 MG: 25 TABLET, FILM COATED ORAL at 09:00

## 2023-02-08 RX ADMIN — SPIRONOLACTONE 50 MG: 25 TABLET, FILM COATED ORAL at 12:17

## 2023-02-08 RX ADMIN — ATORVASTATIN CALCIUM 80 MG: 40 TABLET, FILM COATED ORAL at 08:20

## 2023-02-08 RX ADMIN — CARVEDILOL 6.25 MG: 6.25 TABLET, FILM COATED ORAL at 08:20

## 2023-02-08 RX ADMIN — HYDRALAZINE HYDROCHLORIDE 25 MG: 25 TABLET, FILM COATED ORAL at 08:20

## 2023-02-08 RX ADMIN — ISOSORBIDE MONONITRATE 30 MG: 30 TABLET, EXTENDED RELEASE ORAL at 08:20

## 2023-02-08 RX ADMIN — FUROSEMIDE 40 MG: 10 INJECTION, SOLUTION INTRAMUSCULAR; INTRAVENOUS at 17:02

## 2023-02-08 RX ADMIN — HYDRALAZINE HYDROCHLORIDE 25 MG: 25 TABLET, FILM COATED ORAL at 21:08

## 2023-02-08 RX ADMIN — METHYLPREDNISOLONE SODIUM SUCCINATE 40 MG: 40 INJECTION, POWDER, FOR SOLUTION INTRAMUSCULAR; INTRAVENOUS at 13:20

## 2023-02-08 RX ADMIN — IPRATROPIUM BROMIDE AND ALBUTEROL SULFATE 3 ML: 2.5; .5 SOLUTION RESPIRATORY (INHALATION) at 12:43

## 2023-02-08 RX ADMIN — EMPAGLIFLOZIN 10 MG: 10 TABLET, FILM COATED ORAL at 08:20

## 2023-02-08 RX ADMIN — HYDRALAZINE HYDROCHLORIDE 25 MG: 25 TABLET, FILM COATED ORAL at 17:02

## 2023-02-08 RX ADMIN — SODIUM CHLORIDE, PRESERVATIVE FREE 10 ML: 5 INJECTION INTRAVENOUS at 21:10

## 2023-02-08 RX ADMIN — SODIUM CHLORIDE, PRESERVATIVE FREE 10 ML: 5 INJECTION INTRAVENOUS at 13:20

## 2023-02-08 RX ADMIN — POTASSIUM CHLORIDE 40 MEQ: 750 TABLET, EXTENDED RELEASE ORAL at 08:20

## 2023-02-08 RX ADMIN — IPRATROPIUM BROMIDE AND ALBUTEROL SULFATE 3 ML: 2.5; .5 SOLUTION RESPIRATORY (INHALATION) at 19:58

## 2023-02-08 RX ADMIN — CARVEDILOL 6.25 MG: 6.25 TABLET, FILM COATED ORAL at 17:02

## 2023-02-08 RX ADMIN — IPRATROPIUM BROMIDE AND ALBUTEROL SULFATE 3 ML: 2.5; .5 SOLUTION RESPIRATORY (INHALATION) at 07:40

## 2023-02-08 NOTE — PROGRESS NOTES
Hospitalist Progress Note             Date of Service:  2023  NAME:  Nehal Mclaughlin  :  1950  MRN:  [id number]    Assessment/Plan    #1: COPD exacerbation: didn't require 02 on 6 min walk, plan on home later today vs tomorrow am, kept falling asleep while I was talking to him  -cont DuoNebs, Solu-Medrol, Symbicort per home dose  -he is a current smoker  -currently on room air, with improved symptoms-taper solumedrol.   -Counseled on smoking cessation. #2: Acute on chronic combined systolic and diastolic CHF:  -cont diuresis with Lasix 40 mg twice daily  -watch BUN Cr daily, Cr 1.45 today--from 1.25, moniotr  -cont strict I&Os, 1500 mL fluid restriction, sodium restricted diet  -Neg 700 output so far  -Per care everywhere, he has an EF of 41% with global hypokinesis of the left ventricle and moderate MR on 10/19/2021.  -Repeat echo- ef 38%  -resume aldactone, cont lasix iv, po hydralazine, po jardiance, po coreg     #3: Elevated troponin:  -trops 316--397, trend  -? ? Demand ischemia, no chest pain. Await echo results, cont eliquis. #4: Diabetes mellitus type 2:  -cont accu-checks achs, SSI prn. Cont Jardiance. #5: Hypertension:  -chronic issue, not well controlled  -cont carvedilol and resume hydralazine, resume spironolactone as we diurese. #6: Hyperlipidemia:  -cont atorvastatin. #7.:  History of DVT:  -cont Eliquis.       Hospital Problems  Date Reviewed: 5/3/2021            Codes Class Noted POA    COPD exacerbation ([facility] ICD-10-CM: J44.1  ICD-9-CM: 491.21  2023 Unknown        CHF exacerbation ([facility] ICD-10-CM: I50.9  ICD-9-CM: 428.0  2023 Unknown        Cirrhosis of liver with ascites ([facility] ICD-10-CM: K74.60, R18.8  ICD-9-CM: 571.5  2023 Unknown        Elevated troponin ICD-10-CM: R77.8  ICD-9-CM: 790.6  2023 Unknown             Review of Systems:   A comprehensive review of systems was negative except for that written in the HPI. Vital Signs:    Last 24hrs VS reviewed since prior progress note. Most recent are:  Visit Vitals  BP (!) 151/99 (BP 1 Location: Left upper arm, BP Patient Position: At rest;Sitting)   Pulse 66   Temp 96.8 °F (36 °C)   Resp 18   Ht 5' 11\" (1.803 m)   Wt 116.5 kg (256 lb 14.4 oz)   SpO2 94%   BMI 35.83 kg/m²         Intake/Output Summary (Last 24 hours) at 2/8/2023 1050  Last data filed at 2/8/2023 0430  Gross per 24 hour   Intake 500 ml   Output 2300 ml   Net -1800 ml        Physical Examination:             General:          Alert, cooperative, no distress, appears stated age. HEENT:           Atraumatic, anicteric sclerae, pink conjunctivae                          No oral ulcers, mucosa moist, throat clear, dentition fair  Neck:               Supple, symmetrical  Lungs:             end exp wheezes  Chest wall:      No tenderness  No Accessory muscle use. Heart:              Regular  rhythm,  No  murmur   + edema  Abdomen:        Soft, non-tender. Not distended. Bowel sounds normal  Extremities:     No cyanosis. No clubbing,                            Skin turgor normal, Capillary refill normal  Skin:                Not pale. Not Jaundiced  No rashes   Psych:             Not anxious or agitated.   Neurologic:      Alert, moves all extremities, answers questions appropriately and responds to commands        Data Review:    Review and/or order of clinical lab test  Review and/or order of tests in the radiology section of CPT  Review and/or order of tests in the medicine section of CPT      Labs:     Recent Labs     02/07/23  0403 02/06/23  1620   WBC 8.0 5.7   HGB 11.4* 11.6*   HCT 37.7 38.6    212     Recent Labs     02/08/23  0431 02/07/23  0403 02/06/23  1620    136 138   K 5.0 3.8 3.8    101 103   CO2 33* 25 27   BUN 23* 15 16   CREA 1.28 1.45* 1.28   * 190* 87   CA 8.5 8.4* 8.3*   MG 2.3 2.2 1.9   PHOS  --  2.3* --      Recent Labs     02/07/23  0403 02/06/23  1620   ALT 12* 13*   * 199*   TBILI 0.8 1.0   TP 8.4* 7.8   ALB 2.7* 2.6*   GLOB 5.7* 5.2*     Recent Labs     02/06/23  1620   INR 1.4*   PTP 17.6*      No results for input(s): FE, TIBC, PSAT, FERR in the last 72 hours. No results found for: FOL, RBCF   No results for input(s): PH, PCO2, PO2 in the last 72 hours. No results for input(s): CPK, CKNDX, TROIQ in the last 72 hours.     No lab exists for component: CPKMB  No results found for: CHOL, CHOLX, CHLST, CHOLV, HDL, HDLP, LDL, LDLC, DLDLP, TGLX, TRIGL, TRIGP, CHHD, CHHDX  Lab Results   Component Value Date/Time    Glucose (POC) 151 (H) 11/11/2021 04:16 PM    Glucose (POC) 112 (H) 05/13/2021 11:53 AM    Glucose (POC) 124 (H) 05/12/2021 11:26 PM    Glucose (POC) 131 (H) 05/12/2021 06:35 PM    Glucose (POC) 98 05/12/2021 11:28 AM    Glucose (POC) 97 05/12/2021 06:25 AM     Lab Results   Component Value Date/Time    Color YELLOW 04/14/2021 05:45 PM    Appearance CLEAR 04/14/2021 05:45 PM    Specific gravity 1.014 04/14/2021 05:45 PM    pH (UA) 5.0 04/14/2021 05:45 PM    Protein Negative 04/14/2021 05:45 PM    Glucose Negative 04/14/2021 05:45 PM    Ketone Negative 04/14/2021 05:45 PM    Bilirubin Negative 04/14/2021 05:45 PM    Urobilinogen 0.2 04/14/2021 05:45 PM    Nitrites Negative 04/14/2021 05:45 PM    Leukocyte Esterase Negative 04/14/2021 05:45 PM    Epithelial cells Negative 04/13/2021 05:10 PM    Bacteria Negative 04/13/2021 05:10 PM    WBC Negative 04/13/2021 05:10 PM    RBC Negative 04/13/2021 05:10 PM         Medications Reviewed:     Current Facility-Administered Medications   Medication Dose Route Frequency    apixaban (ELIQUIS) tablet 5 mg  5 mg Oral BID    atorvastatin (LIPITOR) tablet 80 mg  80 mg Oral DAILY    carvediloL (COREG) tablet 6.25 mg  6.25 mg Oral BID WITH MEALS    empagliflozin (JARDIANCE) tablet 10 mg  10 mg Oral DAILY    isosorbide mononitrate ER (IMDUR) tablet 30 mg  30 mg Oral DAILY    potassium chloride (KLOR-CON M10) tablet 40 mEq  40 mEq Oral DAILY    [Held by provider] spironolactone (ALDACTONE) tablet 50 mg  50 mg Oral DAILY    furosemide (LASIX) injection 40 mg  40 mg IntraVENous BID    albuterol-ipratropium (DUO-NEB) 2.5 MG-0.5 MG/3 ML  3 mL Nebulization Q4H PRN    albuterol-ipratropium (DUO-NEB) 2.5 MG-0.5 MG/3 ML  3 mL Nebulization QID RT    benzocaine-menthoL (CEPACOL) lozenge 1 Lozenge  1 Lozenge Mucous Membrane PRN    methylPREDNISolone (PF) (SOLU-MEDROL) injection 40 mg  40 mg IntraVENous Q8H    hydrALAZINE (APRESOLINE) tablet 25 mg  25 mg Oral TID    sodium chloride (NS) flush 5-40 mL  5-40 mL IntraVENous Q8H    sodium chloride (NS) flush 5-40 mL  5-40 mL IntraVENous PRN    acetaminophen (TYLENOL) tablet 650 mg  650 mg Oral Q6H PRN    Or    acetaminophen (TYLENOL) suppository 650 mg  650 mg Rectal Q6H PRN    polyethylene glycol (MIRALAX) packet 17 g  17 g Oral DAILY PRN    ondansetron (ZOFRAN ODT) tablet 4 mg  4 mg Oral Q8H PRN    Or    ondansetron (ZOFRAN) injection 4 mg  4 mg IntraVENous Q6H PRN     ______________________________________________________________________  EXPECTED LENGTH OF STAY: 3d 21h  ACTUAL LENGTH OF STAY:          2                 Vanesa Harrison MD

## 2023-02-08 NOTE — PROGRESS NOTES
Six Minute Walk       02/08/23 0913   Resting (Room Air)   SpO2 96   HR 72   During Walk (Room Air)   SpO2 90   HR 83   Walk/Assistance Device Ambulation   Rate of Dyspnea 1   Symptoms Shortness of breath   Comments Patient stopped for aproximately one minute. After Walk   SpO2 96   HR 91   FIO2 (%) 21   Rate of Dyspnea 1   Symptoms Shortness of breath   Comments audible wheeze   Does the Patient Qualify for Home O2 No   Does the Patient Need Portable Oxygen Tanks No     Patient completed walk on RA. Mr. Garrison Sanford does not require home oxygen.

## 2023-02-08 NOTE — PROGRESS NOTES
CARDIOLOGY PROGRESS NOTE      Patient Name: Tabatha Shen  Age: 67 y.o. Gender:male  :1950  MRN: [id number]        Patient seen and examined. This is a patient who is followed for CHF. Reports breathing improved. No chest pain. No other complaints reported. Telemetry reviewed, there were no events noted in the past 24 hours. Pertinent review of systems items noted above, all other systems are negative. Current medications reviewed. Physical Examination    Allergies   Allergen Reactions    Lisinopril Angioedema       Vital signs are stable. Blood pressure 151/99, Pulse 66  No apparent distress. Heart is regular, rate and rhythm. Normal S1, S2, no murmurs are appreciated. Lungs are fine basilar crackles  Abdomen is firm, distended, nontender, normal bowel sounds. Extremities have 1-2+BLE edema. Labs reviewed: All lab results for the last 24 hours reviewed. Recent Results (from the past 12 hour(s))   NT-PRO BNP    Collection Time: 23  4:31 AM   Result Value Ref Range    NT pro-BNP 11,521 (H) 0 - 331 pg/mL   METABOLIC PANEL, BASIC    Collection Time: 23  4:31 AM   Result Value Ref Range    Sodium 137 136 - 145 mmol/L    Potassium 5.0 3.5 - 5.5 mmol/L    Chloride 101 100 - 111 mmol/L    CO2 33 (H) 21 - 32 mmol/L    Anion gap 3 3.0 - 18.0 mmol/L    Glucose 130 (H) 74 - 99 mg/dL    BUN 23 (H) 7 - 18 mg/dL    Creatinine 1.28 0.60 - 1.30 mg/dL    BUN/Creatinine ratio 18 12 - 20      eGFR 59 (L) >60 ml/min/1.73m2    Calcium 8.5 8.5 - 10.1 mg/dL   MAGNESIUM    Collection Time: 23  4:31 AM   Result Value Ref Range    Magnesium 2.3 1.6 - 2.6 mg/dL   GLUCOSE, POC    Collection Time: 23 11:09 AM   Result Value Ref Range    Glucose (POC) 117 (H) 70 - 110 mg/dL    Performed by Araseli Wright           Case discussed with Dr. Jill Albarran and our impression and recommendations are as follows:  Acute on chronic systolic CHF: SOB multifactoral. Breathing improved.  Continue w/ IV diuretics today; can likely transition to po tomorrow. EF 51%, grade 2 diastolic dysfunction. Daily wt and strict I&O. Continue Jardiance, isosorbide, coreg, spironolactone. Allergy noted to ACEi. Elevated troponin: 323>316>397>362; relatively flat. No reported chest pain. No acute ischemic changes on ekg. Likely demand ischemia r/t volume overload and COPD exacerbation. Continue telemetry monitoring. Hypertension: Blood pressure is above goal. Continue home meds w/ IV diuresis. H/O DVT: continue eliquis. Please do not hesitate to call me or Dr. Ant Munoz if additional questions arise.     DUSTIN Aguirre  2/8/2023

## 2023-02-08 NOTE — PROGRESS NOTES
Problem: Falls - Risk of  Goal: *Absence of Falls  Description: Document Tom Neves Fall Risk and appropriate interventions in the flowsheet.   Outcome: Progressing Towards Goal  Note: Fall Risk Interventions:

## 2023-02-08 NOTE — PROGRESS NOTES
0700- Assumed care of patient resting in bed no distress noted at this time. CB in reach     0820- administered AM medications with no complains. 1200- Patient sitting up in the bedside. Denies any current needs.

## 2023-02-09 VITALS
OXYGEN SATURATION: 97 % | RESPIRATION RATE: 18 BRPM | HEIGHT: 71 IN | WEIGHT: 255 LBS | SYSTOLIC BLOOD PRESSURE: 145 MMHG | HEART RATE: 71 BPM | DIASTOLIC BLOOD PRESSURE: 90 MMHG | TEMPERATURE: 97.6 F | BODY MASS INDEX: 35.7 KG/M2

## 2023-02-09 LAB
ANION GAP SERPL CALC-SCNC: 6 MMOL/L (ref 3–18)
BUN SERPL-MCNC: 33 MG/DL (ref 7–18)
BUN/CREAT SERPL: 21 (ref 12–20)
CA-I BLD-MCNC: 8.9 MG/DL (ref 8.5–10.1)
CHLORIDE SERPL-SCNC: 103 MMOL/L (ref 100–111)
CO2 SERPL-SCNC: 31 MMOL/L (ref 21–32)
CREAT SERPL-MCNC: 1.55 MG/DL (ref 0.6–1.3)
GLUCOSE SERPL-MCNC: 141 MG/DL (ref 74–99)
POTASSIUM SERPL-SCNC: 4.7 MMOL/L (ref 3.5–5.5)
SODIUM SERPL-SCNC: 140 MMOL/L (ref 136–145)

## 2023-02-09 PROCEDURE — 36415 COLL VENOUS BLD VENIPUNCTURE: CPT

## 2023-02-09 PROCEDURE — 74011000250 HC RX REV CODE- 250: Performed by: INTERNAL MEDICINE

## 2023-02-09 PROCEDURE — 94761 N-INVAS EAR/PLS OXIMETRY MLT: CPT

## 2023-02-09 PROCEDURE — 74011250636 HC RX REV CODE- 250/636: Performed by: INTERNAL MEDICINE

## 2023-02-09 PROCEDURE — 80048 BASIC METABOLIC PNL TOTAL CA: CPT

## 2023-02-09 PROCEDURE — 94640 AIRWAY INHALATION TREATMENT: CPT

## 2023-02-09 PROCEDURE — 74011250637 HC RX REV CODE- 250/637: Performed by: INTERNAL MEDICINE

## 2023-02-09 PROCEDURE — 74011250636 HC RX REV CODE- 250/636: Performed by: NURSE PRACTITIONER

## 2023-02-09 PROCEDURE — 74011250637 HC RX REV CODE- 250/637: Performed by: NURSE PRACTITIONER

## 2023-02-09 RX ORDER — METHYLPREDNISOLONE 4 MG/1
TABLET ORAL
Qty: 1 DOSE PACK | Refills: 0 | Status: SHIPPED | OUTPATIENT
Start: 2023-02-09

## 2023-02-09 RX ORDER — FUROSEMIDE 40 MG/1
60 TABLET ORAL DAILY
Qty: 45 TABLET | Refills: 0 | Status: SHIPPED | OUTPATIENT
Start: 2023-02-09

## 2023-02-09 RX ADMIN — IPRATROPIUM BROMIDE AND ALBUTEROL SULFATE 3 ML: 2.5; .5 SOLUTION RESPIRATORY (INHALATION) at 11:51

## 2023-02-09 RX ADMIN — CARVEDILOL 6.25 MG: 6.25 TABLET, FILM COATED ORAL at 08:42

## 2023-02-09 RX ADMIN — METHYLPREDNISOLONE SODIUM SUCCINATE 40 MG: 40 INJECTION, POWDER, FOR SOLUTION INTRAMUSCULAR; INTRAVENOUS at 05:17

## 2023-02-09 RX ADMIN — ISOSORBIDE MONONITRATE 30 MG: 30 TABLET, EXTENDED RELEASE ORAL at 08:42

## 2023-02-09 RX ADMIN — SPIRONOLACTONE 50 MG: 25 TABLET, FILM COATED ORAL at 08:42

## 2023-02-09 RX ADMIN — EMPAGLIFLOZIN 10 MG: 10 TABLET, FILM COATED ORAL at 08:42

## 2023-02-09 RX ADMIN — IPRATROPIUM BROMIDE AND ALBUTEROL SULFATE 3 ML: 2.5; .5 SOLUTION RESPIRATORY (INHALATION) at 04:46

## 2023-02-09 RX ADMIN — ATORVASTATIN CALCIUM 80 MG: 40 TABLET, FILM COATED ORAL at 08:42

## 2023-02-09 RX ADMIN — HYDRALAZINE HYDROCHLORIDE 25 MG: 25 TABLET, FILM COATED ORAL at 08:42

## 2023-02-09 RX ADMIN — APIXABAN 5 MG: 5 TABLET, FILM COATED ORAL at 08:42

## 2023-02-09 RX ADMIN — POTASSIUM CHLORIDE 40 MEQ: 750 TABLET, EXTENDED RELEASE ORAL at 08:42

## 2023-02-09 RX ADMIN — SODIUM CHLORIDE, PRESERVATIVE FREE 10 ML: 5 INJECTION INTRAVENOUS at 05:21

## 2023-02-09 RX ADMIN — IPRATROPIUM BROMIDE AND ALBUTEROL SULFATE 3 ML: 2.5; .5 SOLUTION RESPIRATORY (INHALATION) at 07:32

## 2023-02-09 RX ADMIN — FUROSEMIDE 40 MG: 10 INJECTION, SOLUTION INTRAMUSCULAR; INTRAVENOUS at 08:42

## 2023-02-09 NOTE — PROGRESS NOTES
0700- Assumed care of patient sitting up on the side of the bed no distress noted. Shift assessment complete. Patient denies any needs at this time. CB in reach. 8154- Administered AM medications with no complaints. 1200- Patient discharged home. Iv and tele removed all belongings with patient.

## 2023-02-09 NOTE — DISCHARGE SUMMARY
Discharge Summary       PATIENT ID: Nehal Mclaughlin  MRN: [id number]   YOB: 1950    DATE OF ADMISSION: 2/6/2023  3:41 PM    DATE OF DISCHARGE: 02/09/23    PRIMARY CARE PROVIDER: DUSTIN Lockhart     ATTENDING PHYSICIAN: Marylee Hobby, MD  DISCHARGING PROVIDER: Marylee Hobby, MD        CONSULTATIONS: IP CONSULT TO CARDIOLOGY  IP CONSULT TO CARDIOLOGY    PROCEDURES/SURGERIES: * No surgery found *    ADMITTING [address] [place] [address] COURSE:   Sebastián Durant is a 67 y.o.  male who presents with sob. Pt with extenisve cardiac hx as below, CHF, presents with 2 days sob. Pt states he has been SOB intermittently for the past 2 days, chronic wheezing and SOB for the past several weeks that has worsened over the last 2 days. Has been using his home inhaler but not working as well now. Denies fever; chills; hemoptysis; Covid; flu symptoms     We were asked to admit for work up and evaluation of the above problems        [address] / PLAN:      Assessment/Plan     #1: COPD exacerbation: didn't require 02 on 6 min walk, plan on home later today vs tomorrow am, kept falling asleep while I was talking to him  -cont DuoNebs, Solu-Medrol, Symbicort per home dose  -he is a current smoker  -currently on room air, with improved symptoms-taper solumedrol.   -Counseled on smoking cessation. #2: Acute on chronic combined systolic and diastolic CHF:  -cont diuresis with Lasix 40 mg twice daily  -watch BUN Cr daily, Cr 1.45 today--from 1.25, moniotr  -cont strict I&Os, 1500 mL fluid restriction, sodium restricted diet  -Neg 700 output so far  -Per care everywhere, he has an EF of 41% with global hypokinesis of the left ventricle and moderate MR on 10/19/2021.  -Repeat echo- ef 38%  -resume aldactone, cont lasix iv, po hydralazine, po jardiance, po coreg     #3: Elevated troponin:  -trops 316--397, trend  -? ? Demand ischemia, no chest pain. Await echo results, cont eliquis.      #4: Diabetes mellitus type 2:  -cont accu-checks achs, SSI prn. Cont Jardiance. #5: Hypertension:  -chronic issue, not well controlled  -cont carvedilol and resume hydralazine, resume spironolactone as we diurese. #6: Hyperlipidemia:  -cont atorvastatin. #7.:  History of DVT:  -cont Eliquis. [id number] for [employer] 6 min walk  Still some wheeze  Increased lasix from 40mg daily to 60mg  Medrol dose du  Has proventil at home  Will dc smoking    FOLLOW UP APPOINTMENTS:    Follow-up Information       Follow up With Specialties Details Why Contact Info    [address] Nurse Practitioner Go on 2/16/2023 @ 9:40 [facility]  [phone]                 DIET: Cardiac      DISCHARGE MEDICATIONS:  Current Discharge Medication List        START taking these medications    Details   methylPREDNISolone (Medrol, Du,) 4 mg tablet Take as directed on packaging. Qty: 1 Dose Pack, Refills: 0  Start date: 2/9/2023           CONTINUE these medications which have CHANGED    Details   furosemide (LASIX) 40 mg tablet Take 1.5 Tablets by mouth daily. Qty: 45 Tablet, Refills: 0  Start date: 2/9/2023           CONTINUE these medications which have NOT CHANGED    Details   budesonide-formoteroL (Symbicort) 160-4.5 mcg/actuation HFAA Take 2 Puffs by inhalation two (2) times a day. potassium chloride (K-DUR, KLOR-CON M20) 20 mEq tablet Take 2 Tablets by mouth daily. Qty: 60 Tablet, Refills: 3    Associated Diagnoses: Hypokalemia      isosorbide mononitrate ER (IMDUR) 30 mg tablet Take 1 Tablet by mouth daily. Qty: 90 Tablet, Refills: 3      hydrALAZINE (APRESOLINE) 25 mg tablet Take 1 Tablet by mouth three (3) times daily. Qty: 270 Tablet, Refills: 3      carvediloL (COREG) 6.25 mg tablet Take 1 Tablet by mouth two (2) times daily (with meals). Qty: 180 Tablet, Refills: 3      atorvastatin (LIPITOR) 80 mg tablet Take 1 Tablet by mouth daily.   Qty: 90 Tablet, Refills: 3      spironolactone (ALDACTONE) 25 mg tablet Take 1 Tablet by mouth daily. Qty: 90 Tablet, Refills: 1      empagliflozin (Jardiance) 10 mg tablet Take 1 Tablet by mouth daily. Qty: 90 Tablet, Refills: 1      albuterol sulfate (PROAIR RESPICLICK) 90 mcg/actuation breath activated inhaler Take 90 mcg by inhalation every six (6) hours as needed. apixaban (ELIQUIS) 5 mg tablet Take 5 mg by mouth two (2) times a day. NOTIFY YOUR PHYSICIAN FOR ANY OF THE FOLLOWING:   Fever over 101 degrees for 24 hours. Chest pain, shortness of breath, fever, chills, nausea, vomiting, diarrhea, change in mentation, falling, weakness, bleeding. Severe pain or pain not relieved by medications. Or, any other signs or symptoms that you may have questions about. DISPOSITION:home    Home With:   OT  PT  HH  RN       Long term SNF/Inpatient Rehab    Independent/assisted living    Hospice    Other:       PATIENT CONDITION AT DISCHARGE:     Functional status    Poor     Deconditioned    x Independent      Cognition   x  Lucid     Forgetful     Dementia      Catheters/lines (plus indication)    Garcia     PICC     PEG    x None      Code status    x Full code     DNR      PHYSICAL EXAMINATION AT DISCHARGE:  General:          Alert, cooperative, no distress, appears stated age. HEENT:           Atraumatic, anicteric sclerae, pink conjunctivae                          No oral ulcers, mucosa moist, throat clear, dentition fair  Neck:               Supple, symmetrical  Lungs:             + wheeze  Chest wall:      No tenderness  No Accessory muscle use. Heart:              Regular  rhythm,  No  murmur   + edema  Abdomen:        Soft, non-tender. Not distended. Bowel sounds normal  Extremities:     No cyanosis. No clubbing,                            Skin turgor normal, Capillary refill normal  Skin:                Not pale. Not Jaundiced  No rashes   Psych:             Not anxious or agitated.   Neurologic:      Alert, moves all extremities, answers questions appropriately and responds to commands       CHRONIC MEDICAL DIAGNOSES:  Problem List as of 2/9/2023 Date Reviewed: 5/3/2021            Codes Class Noted - Resolved    COPD exacerbation ([facility] ICD-10-CM: J44.1  ICD-9-CM: 491.21  2/6/2023 - Present        CHF exacerbation ([facility] ICD-10-CM: I50.9  ICD-9-CM: 428.0  2/6/2023 - Present        Cirrhosis of liver with ascites ([facility] ICD-10-CM: K74.60, R18.8  ICD-9-CM: 571.5  2/6/2023 - Present        Elevated troponin ICD-10-CM: R77.8  ICD-9-CM: 790.6  2/6/2023 - Present        CHF (congestive heart failure) ([facility] ICD-10-CM: I50.9  ICD-9-CM: 428.0  11/11/2021 - Present        Hyperlipidemia ICD-10-CM: E78.5  ICD-9-CM: 272.4  11/11/2021 - Present        Hypertension ICD-10-CM: I10  ICD-9-CM: 401.9  11/11/2021 - Present        DVT (deep vein thrombosis) in pregnancy ICD-10-CM: O22.30  ICD-9-CM: 671.30  5/3/2021 - Present        Acute encephalopathy ICD-10-CM: G93.40  ICD-9-CM: 348.30  5/3/2021 - Present        Encounter for palliative care ICD-10-CM: Z51.5  ICD-9-CM: V66.7  Unknown - Present        Goals of care, counseling/discussion ICD-10-CM: Z71.89  ICD-9-CM: V65.49  Unknown - Present        Multiple subsegmental pulmonary emboli without acute cor pulmonale ([facility] ICD-10-CM: I26.94  ICD-9-CM: 415.19  Unknown - Present        DVT (deep venous thrombosis) ([facility] ICD-10-CM: I82.409  ICD-9-CM: 453.40  4/10/2021 - Present        Respiratory failure ([facility] ICD-10-CM: J96.90  ICD-9-CM: 518.81  4/5/2021 - Present        Obesity (BMI 30-39. 9) ICD-10-CM: E66.9  ICD-9-CM: 278.00  4/5/2021 - Present        Diabetic neuropathy associated with type 2 diabetes mellitus ([facility] ICD-10-CM: E11.40  ICD-9-CM: 250.60, 357.2  4/5/2021 - Present        Diabetic retinopathy associated with type 2 diabetes mellitus ([facility] ICD-10-CM: E11.319  ICD-9-CM: 250.50, 362.01  4/5/2021 - Present        Pulmonary infiltrates ICD-10-CM: R91.8  ICD-9-CM: 793.19  4/5/2021 - Present        Controlled type 2 diabetes mellitus with neurologic complication, without long-term current use of insulin ([facility] ICD-10-CM: E11.49  ICD-9-CM: 250.60  4/5/2021 - Present        Angioedema due to angiotensin converting enzyme inhibitor (ACE-I) ICD-10-CM: T78. Bailee Charles, [id number]  ICD-9-CM: 995.1, E942.6  4/4/2021 - Present        JACQUELYN (acute kidney injury) ([facility] ICD-10-CM: N17.9  ICD-9-CM: 584.9  4/4/2021 - Present        Cardiac arrest [facility]) ICD-10-CM: I46.9  ICD-9-CM: 427.5  4/4/2021 - Present           Greater than 35 minutes were spent with the patient on counseling and coordination of care    Signed:   Kathe Moore MD  2/9/2023  10:58 AM

## 2023-02-09 NOTE — PROGRESS NOTES
Problem: Falls - Risk of  Goal: *Absence of Falls  Description: Document Danny Rival Fall Risk and appropriate interventions in the flowsheet.   Outcome: Progressing Towards Goal  Note: Fall Risk Interventions:

## 2023-03-08 PROBLEM — R79.89 ELEVATED TROPONIN: Status: RESOLVED | Noted: 2023-02-06 | Resolved: 2023-03-08

## 2023-03-08 PROBLEM — R77.8 ELEVATED TROPONIN: Status: RESOLVED | Noted: 2023-02-06 | Resolved: 2023-03-08

## 2023-04-17 ENCOUNTER — HOSPITAL ENCOUNTER (OUTPATIENT)
Age: 73
Discharge: HOME | End: 2023-04-20
Payer: MEDICARE

## 2023-04-17 DIAGNOSIS — M25.562 LEFT KNEE PAIN, UNSPECIFIED CHRONICITY: ICD-10-CM

## 2023-04-17 DIAGNOSIS — M25.561 RIGHT KNEE PAIN, UNSPECIFIED CHRONICITY: ICD-10-CM

## 2023-04-17 PROCEDURE — 73562 X-RAY EXAM OF KNEE 3: CPT

## 2023-05-16 ENCOUNTER — APPOINTMENT (OUTPATIENT)
Age: 73
DRG: 853 | End: 2023-05-16
Payer: MEDICARE

## 2023-05-16 ENCOUNTER — HOSPITAL ENCOUNTER (INPATIENT)
Age: 73
LOS: 11 days | Discharge: HOME | DRG: 853 | End: 2023-05-27
Attending: FAMILY MEDICINE | Admitting: INTERNAL MEDICINE
Payer: MEDICARE

## 2023-05-16 DIAGNOSIS — I50.9 CONGESTIVE HEART FAILURE, UNSPECIFIED HF CHRONICITY, UNSPECIFIED HEART FAILURE TYPE (HCC): ICD-10-CM

## 2023-05-16 DIAGNOSIS — A41.9 SEPTICEMIA (HCC): Primary | ICD-10-CM

## 2023-05-16 DIAGNOSIS — L03.119 CELLULITIS AND ABSCESS OF LEG: ICD-10-CM

## 2023-05-16 DIAGNOSIS — I50.43 ACUTE ON CHRONIC COMBINED SYSTOLIC AND DIASTOLIC CONGESTIVE HEART FAILURE (HCC): ICD-10-CM

## 2023-05-16 DIAGNOSIS — I72.1: ICD-10-CM

## 2023-05-16 DIAGNOSIS — L97.429 DIABETIC ULCER OF LEFT HEEL ASSOCIATED WITH DIABETES MELLITUS DUE TO UNDERLYING CONDITION, UNSPECIFIED ULCER STAGE (HCC): ICD-10-CM

## 2023-05-16 DIAGNOSIS — E08.621 DIABETIC ULCER OF LEFT HEEL ASSOCIATED WITH DIABETES MELLITUS DUE TO UNDERLYING CONDITION, UNSPECIFIED ULCER STAGE (HCC): ICD-10-CM

## 2023-05-16 DIAGNOSIS — L02.419 CELLULITIS AND ABSCESS OF LEG: ICD-10-CM

## 2023-05-16 LAB
ALBUMIN SERPL-MCNC: 2.9 G/DL (ref 3.4–5)
ALBUMIN/GLOB SERPL: 0.5 (ref 0.8–1.7)
ALP SERPL-CCNC: 202 U/L (ref 45–117)
ALT SERPL-CCNC: 13 U/L (ref 16–61)
ANION GAP SERPL CALC-SCNC: 7 MMOL/L (ref 3–18)
APPEARANCE UR: CLEAR
ARTERIAL PATENCY WRIST A: YES
AST SERPL W P-5'-P-CCNC: 25 U/L (ref 10–38)
BACTERIA URNS QL MICRO: ABNORMAL /HPF
BASE DEFICIT BLDA-SCNC: 2.2 MMOL/L
BASOPHILS # BLD: 0 K/UL (ref 0–0.1)
BASOPHILS NFR BLD: 0 % (ref 0–2)
BDY SITE: ABNORMAL
BILIRUB SERPL-MCNC: 2 MG/DL (ref 0.2–1)
BILIRUB UR QL: ABNORMAL
BNP SERPL-MCNC: 8819 PG/ML (ref 0–900)
BUN SERPL-MCNC: 26 MG/DL (ref 7–18)
BUN/CREAT SERPL: 16 (ref 12–20)
CA-I BLD-MCNC: 8.7 MG/DL (ref 8.5–10.1)
CHLORIDE SERPL-SCNC: 102 MMOL/L (ref 100–111)
CO2 SERPL-SCNC: 26 MMOL/L (ref 21–32)
COHGB MFR BLD: 1 % (ref 1–2)
COLOR UR: ABNORMAL
CREAT SERPL-MCNC: 1.58 MG/DL (ref 0.6–1.3)
D DIMER PPP FEU-MCNC: >20 UG/ML(FEU)
DIFFERENTIAL METHOD BLD: ABNORMAL
EKG ATRIAL RATE: 101 BPM
EKG DIAGNOSIS: NORMAL
EKG P AXIS: 67 DEGREES
EKG P-R INTERVAL: 184 MS
EKG Q-T INTERVAL: 346 MS
EKG QRS DURATION: 90 MS
EKG QTC CALCULATION (BAZETT): 448 MS
EKG R AXIS: 249 DEGREES
EKG T AXIS: 17 DEGREES
EKG VENTRICULAR RATE: 101 BPM
EOSINOPHIL # BLD: 0 K/UL (ref 0–0.4)
EOSINOPHIL NFR BLD: 0 % (ref 0–5)
EPITH CASTS URNS QL MICRO: ABNORMAL /LPF (ref 0–20)
ERYTHROCYTE [DISTWIDTH] IN BLOOD BY AUTOMATED COUNT: 20.8 % (ref 11.6–14.5)
FIO2 ON VENT: 50 %
GAS FLOW.O2 O2 DELIVERY SYS: 60 L/MIN
GLOBULIN SER CALC-MCNC: 5.7 G/DL (ref 2–4)
GLUCOSE SERPL-MCNC: 75 MG/DL (ref 74–99)
GLUCOSE UR STRIP.AUTO-MCNC: NEGATIVE MG/DL
HCO3 BLDA-SCNC: 22 MMOL/L (ref 22–26)
HCT VFR BLD AUTO: 36.2 % (ref 36–48)
HGB BLD-MCNC: 11.2 G/DL (ref 13–16)
HGB UR QL STRIP: NEGATIVE
IMM GRANULOCYTES # BLD AUTO: 0 K/UL
IMM GRANULOCYTES NFR BLD AUTO: 0 %
KETONES UR QL STRIP.AUTO: NEGATIVE MG/DL
LACTATE SERPL-SCNC: 2.2 MMOL/L (ref 0.4–2)
LACTATE SERPL-SCNC: 3.3 MMOL/L (ref 0.4–2)
LACTATE SERPL-SCNC: 3.8 MMOL/L (ref 0.4–2)
LEUKOCYTE ESTERASE UR QL STRIP.AUTO: NEGATIVE
LYMPHOCYTES # BLD: 1.5 K/UL (ref 0.9–3.6)
LYMPHOCYTES NFR BLD: 7 % (ref 21–52)
MAGNESIUM SERPL-MCNC: 2 MG/DL (ref 1.6–2.6)
MCH RBC QN AUTO: 22.9 PG (ref 24–34)
MCHC RBC AUTO-ENTMCNC: 30.9 G/DL (ref 31–37)
MCV RBC AUTO: 73.9 FL (ref 78–100)
METHGB MFR BLD: 0 % (ref 0–1.4)
MONOCYTES # BLD: 0.8 K/UL (ref 0.05–1.2)
MONOCYTES NFR BLD: 4 % (ref 3–10)
NEUTS SEG # BLD: 18.9 K/UL (ref 1.8–8)
NEUTS SEG NFR BLD: 89 % (ref 40–73)
NITRITE UR QL STRIP.AUTO: NEGATIVE
NRBC # BLD: 0 K/UL (ref 0–0.01)
NRBC BLD-RTO: 0 PER 100 WBC
OXYHGB MFR BLD: 93.6 % (ref 95–99)
PCO2 BLDA: 37 MMHG (ref 35–45)
PERFORMED BY:: ABNORMAL
PH BLDA: 7.4 (ref 7.35–7.45)
PH UR STRIP: 5 (ref 5–8)
PLATELET # BLD AUTO: 191 K/UL (ref 135–420)
PO2 BLDA: 76 MMHG (ref 80–100)
POTASSIUM SERPL-SCNC: 4.2 MMOL/L (ref 3.5–5.5)
PROT SERPL-MCNC: 8.6 G/DL (ref 6.4–8.2)
PROT UR STRIP-MCNC: 30 MG/DL
RBC # BLD AUTO: 4.9 M/UL (ref 4.35–5.65)
RBC #/AREA URNS HPF: ABNORMAL /HPF (ref 0–2)
RBC MORPH BLD: ABNORMAL
SAO2 % BLD: 95 % (ref 95–99)
SAO2% DEVICE SAO2% SENSOR NAME: ABNORMAL
SODIUM SERPL-SCNC: 135 MMOL/L (ref 136–145)
SP GR UR REFRACTOMETRY: 1.02 (ref 1–1.03)
SPECIMEN SITE: ABNORMAL
TROPONIN I SERPL HS-MCNC: 244 NG/L (ref 0–78)
TROPONIN I SERPL HS-MCNC: 247 NG/L (ref 0–78)
TROPONIN I SERPL HS-MCNC: 259 NG/L (ref 0–78)
UROBILINOGEN UR QL STRIP.AUTO: 2 EU/DL (ref 0.2–1)
WBC # BLD AUTO: 21.2 K/UL (ref 4.6–13.2)
WBC URNS QL MICRO: ABNORMAL /HPF (ref 0–4)

## 2023-05-16 PROCEDURE — 94640 AIRWAY INHALATION TREATMENT: CPT

## 2023-05-16 PROCEDURE — 85025 COMPLETE CBC W/AUTO DIFF WBC: CPT

## 2023-05-16 PROCEDURE — 96361 HYDRATE IV INFUSION ADD-ON: CPT

## 2023-05-16 PROCEDURE — 2700000000 HC OXYGEN THERAPY PER DAY

## 2023-05-16 PROCEDURE — 83735 ASSAY OF MAGNESIUM: CPT

## 2023-05-16 PROCEDURE — 93005 ELECTROCARDIOGRAM TRACING: CPT | Performed by: FAMILY MEDICINE

## 2023-05-16 PROCEDURE — 83605 ASSAY OF LACTIC ACID: CPT

## 2023-05-16 PROCEDURE — 71045 X-RAY EXAM CHEST 1 VIEW: CPT

## 2023-05-16 PROCEDURE — 6360000002 HC RX W HCPCS: Performed by: NURSE PRACTITIONER

## 2023-05-16 PROCEDURE — 87040 BLOOD CULTURE FOR BACTERIA: CPT

## 2023-05-16 PROCEDURE — 6370000000 HC RX 637 (ALT 250 FOR IP): Performed by: NURSE PRACTITIONER

## 2023-05-16 PROCEDURE — 1100000000 HC RM PRIVATE

## 2023-05-16 PROCEDURE — 82803 BLOOD GASES ANY COMBINATION: CPT

## 2023-05-16 PROCEDURE — 94669 MECHANICAL CHEST WALL OSCILL: CPT

## 2023-05-16 PROCEDURE — 96365 THER/PROPH/DIAG IV INF INIT: CPT

## 2023-05-16 PROCEDURE — 6360000002 HC RX W HCPCS: Performed by: FAMILY MEDICINE

## 2023-05-16 PROCEDURE — 2580000003 HC RX 258: Performed by: NURSE PRACTITIONER

## 2023-05-16 PROCEDURE — 6370000000 HC RX 637 (ALT 250 FOR IP): Performed by: FAMILY MEDICINE

## 2023-05-16 PROCEDURE — 2580000003 HC RX 258: Performed by: FAMILY MEDICINE

## 2023-05-16 PROCEDURE — 87077 CULTURE AEROBIC IDENTIFY: CPT

## 2023-05-16 PROCEDURE — 87186 SC STD MICRODIL/AGAR DIL: CPT

## 2023-05-16 PROCEDURE — 85379 FIBRIN DEGRADATION QUANT: CPT

## 2023-05-16 PROCEDURE — 2000000000 HC ICU R&B

## 2023-05-16 PROCEDURE — 80053 COMPREHEN METABOLIC PANEL: CPT

## 2023-05-16 PROCEDURE — 36415 COLL VENOUS BLD VENIPUNCTURE: CPT

## 2023-05-16 PROCEDURE — 6360000004 HC RX CONTRAST MEDICATION: Performed by: FAMILY MEDICINE

## 2023-05-16 PROCEDURE — 84484 ASSAY OF TROPONIN QUANT: CPT

## 2023-05-16 PROCEDURE — 71275 CT ANGIOGRAPHY CHEST: CPT

## 2023-05-16 PROCEDURE — 83880 ASSAY OF NATRIURETIC PEPTIDE: CPT

## 2023-05-16 PROCEDURE — 81001 URINALYSIS AUTO W/SCOPE: CPT

## 2023-05-16 PROCEDURE — 93010 ELECTROCARDIOGRAM REPORT: CPT | Performed by: INTERNAL MEDICINE

## 2023-05-16 PROCEDURE — 99285 EMERGENCY DEPT VISIT HI MDM: CPT

## 2023-05-16 PROCEDURE — 36600 WITHDRAWAL OF ARTERIAL BLOOD: CPT

## 2023-05-16 PROCEDURE — 94664 DEMO&/EVAL PT USE INHALER: CPT

## 2023-05-16 RX ORDER — FUROSEMIDE 10 MG/ML
60 INJECTION INTRAMUSCULAR; INTRAVENOUS ONCE
Status: COMPLETED | OUTPATIENT
Start: 2023-05-16 | End: 2023-05-16

## 2023-05-16 RX ORDER — HYDRALAZINE HYDROCHLORIDE 25 MG/1
25 TABLET, FILM COATED ORAL 3 TIMES DAILY
Status: DISCONTINUED | OUTPATIENT
Start: 2023-05-16 | End: 2023-05-26

## 2023-05-16 RX ORDER — POTASSIUM CHLORIDE 750 MG/1
20 TABLET, EXTENDED RELEASE ORAL DAILY
Status: DISCONTINUED | OUTPATIENT
Start: 2023-05-16 | End: 2023-05-27 | Stop reason: HOSPADM

## 2023-05-16 RX ORDER — FUROSEMIDE 10 MG/ML
20 INJECTION INTRAMUSCULAR; INTRAVENOUS ONCE
Status: COMPLETED | OUTPATIENT
Start: 2023-05-16 | End: 2023-05-16

## 2023-05-16 RX ORDER — CARVEDILOL 6.25 MG/1
6.25 TABLET ORAL 2 TIMES DAILY WITH MEALS
Status: DISCONTINUED | OUTPATIENT
Start: 2023-05-16 | End: 2023-05-21

## 2023-05-16 RX ORDER — TRAMADOL HYDROCHLORIDE 50 MG/1
50 TABLET ORAL EVERY 6 HOURS PRN
Status: DISCONTINUED | OUTPATIENT
Start: 2023-05-16 | End: 2023-05-22

## 2023-05-16 RX ORDER — FUROSEMIDE 10 MG/ML
40 INJECTION INTRAMUSCULAR; INTRAVENOUS 2 TIMES DAILY
Status: DISCONTINUED | OUTPATIENT
Start: 2023-05-16 | End: 2023-05-18

## 2023-05-16 RX ORDER — SPIRONOLACTONE 25 MG/1
25 TABLET ORAL DAILY
Status: DISCONTINUED | OUTPATIENT
Start: 2023-05-16 | End: 2023-05-27 | Stop reason: HOSPADM

## 2023-05-16 RX ORDER — IPRATROPIUM BROMIDE AND ALBUTEROL SULFATE 2.5; .5 MG/3ML; MG/3ML
1 SOLUTION RESPIRATORY (INHALATION) 3 TIMES DAILY
Status: DISCONTINUED | OUTPATIENT
Start: 2023-05-17 | End: 2023-05-24

## 2023-05-16 RX ORDER — IPRATROPIUM BROMIDE AND ALBUTEROL SULFATE 2.5; .5 MG/3ML; MG/3ML
1 SOLUTION RESPIRATORY (INHALATION)
Status: COMPLETED | OUTPATIENT
Start: 2023-05-16 | End: 2023-05-16

## 2023-05-16 RX ORDER — 0.9 % SODIUM CHLORIDE 0.9 %
750 INTRAVENOUS SOLUTION INTRAVENOUS ONCE
Status: COMPLETED | OUTPATIENT
Start: 2023-05-16 | End: 2023-05-16

## 2023-05-16 RX ORDER — IPRATROPIUM BROMIDE AND ALBUTEROL SULFATE 2.5; .5 MG/3ML; MG/3ML
1 SOLUTION RESPIRATORY (INHALATION) EVERY 4 HOURS PRN
Status: DISCONTINUED | OUTPATIENT
Start: 2023-05-16 | End: 2023-05-27 | Stop reason: HOSPADM

## 2023-05-16 RX ORDER — ACETAMINOPHEN 325 MG/1
650 TABLET ORAL EVERY 4 HOURS PRN
Status: DISCONTINUED | OUTPATIENT
Start: 2023-05-16 | End: 2023-05-27 | Stop reason: HOSPADM

## 2023-05-16 RX ORDER — ISOSORBIDE MONONITRATE 30 MG/1
30 TABLET, EXTENDED RELEASE ORAL DAILY
Status: DISCONTINUED | OUTPATIENT
Start: 2023-05-16 | End: 2023-05-27 | Stop reason: HOSPADM

## 2023-05-16 RX ADMIN — ISOSORBIDE MONONITRATE 30 MG: 30 TABLET, EXTENDED RELEASE ORAL at 16:40

## 2023-05-16 RX ADMIN — PIPERACILLIN AND TAZOBACTAM 4500 MG: 4; .5 INJECTION, POWDER, LYOPHILIZED, FOR SOLUTION INTRAVENOUS at 10:26

## 2023-05-16 RX ADMIN — FUROSEMIDE 60 MG: 10 INJECTION, SOLUTION INTRAMUSCULAR; INTRAVENOUS at 12:59

## 2023-05-16 RX ADMIN — TRAMADOL HYDROCHLORIDE 50 MG: 50 TABLET, COATED ORAL at 21:44

## 2023-05-16 RX ADMIN — IPRATROPIUM BROMIDE AND ALBUTEROL SULFATE 1 AMPULE: .5; 2.5 SOLUTION RESPIRATORY (INHALATION) at 10:11

## 2023-05-16 RX ADMIN — HYDRALAZINE HYDROCHLORIDE 25 MG: 25 TABLET, FILM COATED ORAL at 16:40

## 2023-05-16 RX ADMIN — IPRATROPIUM BROMIDE AND ALBUTEROL SULFATE 1 AMPULE: .5; 2.5 SOLUTION RESPIRATORY (INHALATION) at 23:57

## 2023-05-16 RX ADMIN — POTASSIUM CHLORIDE 20 MEQ: 750 TABLET, EXTENDED RELEASE ORAL at 16:40

## 2023-05-16 RX ADMIN — SODIUM CHLORIDE 750 ML: 9 INJECTION, SOLUTION INTRAVENOUS at 10:27

## 2023-05-16 RX ADMIN — SPIRONOLACTONE 25 MG: 25 TABLET ORAL at 16:43

## 2023-05-16 RX ADMIN — PIPERACILLIN AND TAZOBACTAM 3375 MG: 3; .375 INJECTION, POWDER, LYOPHILIZED, FOR SOLUTION INTRAVENOUS at 16:18

## 2023-05-16 RX ADMIN — IOPAMIDOL 97 ML: 755 INJECTION, SOLUTION INTRAVENOUS at 14:00

## 2023-05-16 RX ADMIN — PIPERACILLIN AND TAZOBACTAM 3375 MG: 3; .375 INJECTION, POWDER, LYOPHILIZED, FOR SOLUTION INTRAVENOUS at 23:47

## 2023-05-16 RX ADMIN — FUROSEMIDE 20 MG: 10 INJECTION, SOLUTION INTRAMUSCULAR; INTRAVENOUS at 16:19

## 2023-05-16 RX ADMIN — CARVEDILOL 6.25 MG: 6.25 TABLET, FILM COATED ORAL at 16:40

## 2023-05-16 RX ADMIN — FUROSEMIDE 40 MG: 10 INJECTION, SOLUTION INTRAMUSCULAR; INTRAVENOUS at 20:18

## 2023-05-16 RX ADMIN — HYDRALAZINE HYDROCHLORIDE 25 MG: 25 TABLET, FILM COATED ORAL at 20:19

## 2023-05-16 RX ADMIN — IPRATROPIUM BROMIDE AND ALBUTEROL SULFATE 1 AMPULE: .5; 2.5 SOLUTION RESPIRATORY (INHALATION) at 19:13

## 2023-05-16 RX ADMIN — APIXABAN 5 MG: 5 TABLET, FILM COATED ORAL at 20:35

## 2023-05-16 RX ADMIN — ACETAMINOPHEN 650 MG: 325 TABLET ORAL at 21:44

## 2023-05-16 RX ADMIN — VANCOMYCIN HYDROCHLORIDE 2000 MG: 1 INJECTION, POWDER, LYOPHILIZED, FOR SOLUTION INTRAVENOUS at 14:40

## 2023-05-16 ASSESSMENT — PAIN SCALES - GENERAL
PAINLEVEL_OUTOF10: 0
PAINLEVEL_OUTOF10: 0
PAINLEVEL_OUTOF10: 3
PAINLEVEL_OUTOF10: 0
PAINLEVEL_OUTOF10: 8
PAINLEVEL_OUTOF10: 0

## 2023-05-16 ASSESSMENT — PAIN DESCRIPTION - LOCATION
LOCATION: WRIST
LOCATION: WRIST

## 2023-05-16 ASSESSMENT — LIFESTYLE VARIABLES
HOW MANY STANDARD DRINKS CONTAINING ALCOHOL DO YOU HAVE ON A TYPICAL DAY: PATIENT DOES NOT DRINK
HOW OFTEN DO YOU HAVE A DRINK CONTAINING ALCOHOL: NEVER

## 2023-05-16 ASSESSMENT — PAIN DESCRIPTION - DESCRIPTORS: DESCRIPTORS: ACHING

## 2023-05-16 ASSESSMENT — PAIN - FUNCTIONAL ASSESSMENT
PAIN_FUNCTIONAL_ASSESSMENT: NONE - DENIES PAIN
PAIN_FUNCTIONAL_ASSESSMENT: NONE - DENIES PAIN

## 2023-05-16 ASSESSMENT — PAIN DESCRIPTION - ORIENTATION: ORIENTATION: RIGHT

## 2023-05-16 ASSESSMENT — ENCOUNTER SYMPTOMS: SHORTNESS OF BREATH: 1

## 2023-05-17 ENCOUNTER — APPOINTMENT (OUTPATIENT)
Age: 73
DRG: 853 | End: 2023-05-17
Payer: MEDICARE

## 2023-05-17 LAB
ACCESSION NUMBER, LLC1M: ABNORMAL
ACINETOBACTER CALCOAC BAUMANNII COMPLEX BY PCR: NOT DETECTED
ALBUMIN SERPL-MCNC: 2.2 G/DL (ref 3.4–5)
ALBUMIN/GLOB SERPL: 0.5 (ref 0.8–1.7)
ALP SERPL-CCNC: 152 U/L (ref 45–117)
ALT SERPL-CCNC: 12 U/L (ref 16–61)
ANION GAP SERPL CALC-SCNC: 9 MMOL/L (ref 3–18)
AST SERPL W P-5'-P-CCNC: 20 U/L (ref 10–38)
BACTEROIDES FRAGILIS BY PCR: NOT DETECTED
BASOPHILS # BLD: 0 K/UL (ref 0–0.1)
BASOPHILS NFR BLD: 0 % (ref 0–2)
BILIRUB DIRECT SERPL-MCNC: 1.3 MG/DL (ref 0–0.2)
BILIRUB SERPL-MCNC: 1.5 MG/DL (ref 0.2–1)
BIOFIRE TEST COMMENT: ABNORMAL
BUN SERPL-MCNC: 30 MG/DL (ref 7–18)
BUN/CREAT SERPL: 20 (ref 12–20)
CA-I BLD-MCNC: 7.9 MG/DL (ref 8.5–10.1)
CANDIDA ALBICANS BY PCR: NOT DETECTED
CANDIDA AURIS BY PCR: NOT DETECTED
CANDIDA GLABRATA: NOT DETECTED
CANDIDA KRUSEI BY PCR: NOT DETECTED
CANDIDA PARAPSILOSIS BY PCR: NOT DETECTED
CANDIDA TROPICALIS BY PCR: NOT DETECTED
CHLORIDE SERPL-SCNC: 103 MMOL/L (ref 100–111)
CO2 SERPL-SCNC: 24 MMOL/L (ref 21–32)
CREAT SERPL-MCNC: 1.52 MG/DL (ref 0.6–1.3)
CRYPTOCOCCUS NEOFORMANS/GATTII BY PCR: NOT DETECTED
DIFFERENTIAL METHOD BLD: ABNORMAL
ENTEROBACTER CLOACAE COMPLEX BY PCR: NOT DETECTED
ENTEROBACTERALES BY PCR: NOT DETECTED
ENTEROCOCCUS FAECALIS BY PCR: NOT DETECTED
ENTEROCOCCUS FAECIUM BY PCR: NOT DETECTED
EOSINOPHIL # BLD: 0 K/UL (ref 0–0.4)
EOSINOPHIL NFR BLD: 0 % (ref 0–5)
ERYTHROCYTE [DISTWIDTH] IN BLOOD BY AUTOMATED COUNT: 20.3 % (ref 11.6–14.5)
ESCHERICHIA COLI: NOT DETECTED
GLOBULIN SER CALC-MCNC: 4.8 G/DL (ref 2–4)
GLUCOSE SERPL-MCNC: 88 MG/DL (ref 74–99)
HAEMOPHILUS INFLUENZAE BY PCR: NOT DETECTED
HCT VFR BLD AUTO: 30.3 % (ref 36–48)
HGB BLD-MCNC: 9.7 G/DL (ref 13–16)
IMM GRANULOCYTES # BLD AUTO: 0 K/UL
IMM GRANULOCYTES NFR BLD AUTO: 0 %
KLEBSIELLA AEROGENES BY PCR: NOT DETECTED
KLEBSIELLA OXYTOCA BY PCR: NOT DETECTED
KLEBSIELLA PNEUMONIAE GROUP BY PCR: NOT DETECTED
LACTATE SERPL-SCNC: 1.7 MMOL/L (ref 0.4–2)
LISTERIA MONOCYTOGENES BY PCR: NOT DETECTED
LYMPHOCYTES # BLD: 0.5 K/UL (ref 0.9–3.6)
LYMPHOCYTES NFR BLD: 3 % (ref 21–52)
MCH RBC QN AUTO: 23 PG (ref 24–34)
MCHC RBC AUTO-ENTMCNC: 32 G/DL (ref 31–37)
MCV RBC AUTO: 72 FL (ref 78–100)
MONOCYTES # BLD: 0.3 K/UL (ref 0.05–1.2)
MONOCYTES NFR BLD: 2 % (ref 3–10)
NEISSERIA MENINGITIDIS BY PCR: NOT DETECTED
NEUTS BAND NFR BLD MANUAL: 8 % (ref 0–5)
NEUTS SEG # BLD: 15.3 K/UL (ref 1.8–8)
NEUTS SEG NFR BLD: 87 % (ref 40–73)
NRBC # BLD: 0 K/UL (ref 0–0.01)
NRBC BLD-RTO: 0 PER 100 WBC
PLATELET # BLD AUTO: 148 K/UL (ref 135–420)
PMV BLD AUTO: 10.9 FL (ref 9.2–11.8)
POTASSIUM SERPL-SCNC: 3.8 MMOL/L (ref 3.5–5.5)
PROT SERPL-MCNC: 7 G/DL (ref 6.4–8.2)
PROTEUS BY PCR: NOT DETECTED
PSEUDOMONAS AERUGINOSA, PSAEP: NOT DETECTED
RBC # BLD AUTO: 4.21 M/UL (ref 4.35–5.65)
RBC MORPH BLD: ABNORMAL
RESISTANT GENE TARGETS: ABNORMAL
SALMONELLA SPECIES BY PCR: NOT DETECTED
SERRATIA MARCESCENS BY PCR: NOT DETECTED
SODIUM SERPL-SCNC: 136 MMOL/L (ref 136–145)
STAPHYLOCOCCUS AUREUS: NOT DETECTED
STAPHYLOCOCCUS EPIDERMIDIS BY PCR: NOT DETECTED
STAPHYLOCOCCUS LUGDUNENSIS BY PCR: NOT DETECTED
STAPHYLOCOCCUS: NOT DETECTED
STENOTROPHOMONAS MALTOPHILIA BY PCR: NOT DETECTED
STREPTOCOCCUS AGALACTIAE (GROUP B): NOT DETECTED
STREPTOCOCCUS PNEUMONIAE , SPNP: NOT DETECTED
STREPTOCOCCUS PYOGENES (GROUP A), SPYOP: NOT DETECTED
STREPTOCOCCUS: DETECTED
TROPONIN I SERPL HS-MCNC: 240 NG/L (ref 0–78)
TROPONIN I SERPL HS-MCNC: 243 NG/L (ref 0–78)
TROPONIN I SERPL HS-MCNC: 253 NG/L (ref 0–78)
WBC # BLD AUTO: 16.1 K/UL (ref 4.6–13.2)

## 2023-05-17 PROCEDURE — 80076 HEPATIC FUNCTION PANEL: CPT

## 2023-05-17 PROCEDURE — 36415 COLL VENOUS BLD VENIPUNCTURE: CPT

## 2023-05-17 PROCEDURE — 2580000003 HC RX 258: Performed by: INTERNAL MEDICINE

## 2023-05-17 PROCEDURE — 94762 N-INVAS EAR/PLS OXIMTRY CONT: CPT

## 2023-05-17 PROCEDURE — 87150 DNA/RNA AMPLIFIED PROBE: CPT

## 2023-05-17 PROCEDURE — 6370000000 HC RX 637 (ALT 250 FOR IP): Performed by: NURSE PRACTITIONER

## 2023-05-17 PROCEDURE — 2000000000 HC ICU R&B

## 2023-05-17 PROCEDURE — 2700000000 HC OXYGEN THERAPY PER DAY

## 2023-05-17 PROCEDURE — 6360000002 HC RX W HCPCS: Performed by: INTERNAL MEDICINE

## 2023-05-17 PROCEDURE — 97530 THERAPEUTIC ACTIVITIES: CPT

## 2023-05-17 PROCEDURE — 0JBR0ZZ EXCISION OF LEFT FOOT SUBCUTANEOUS TISSUE AND FASCIA, OPEN APPROACH: ICD-10-PCS | Performed by: PODIATRIST

## 2023-05-17 PROCEDURE — 97161 PT EVAL LOW COMPLEX 20 MIN: CPT

## 2023-05-17 PROCEDURE — 84484 ASSAY OF TROPONIN QUANT: CPT

## 2023-05-17 PROCEDURE — 73630 X-RAY EXAM OF FOOT: CPT

## 2023-05-17 PROCEDURE — 83605 ASSAY OF LACTIC ACID: CPT

## 2023-05-17 PROCEDURE — 1100000000 HC RM PRIVATE

## 2023-05-17 PROCEDURE — 80048 BASIC METABOLIC PNL TOTAL CA: CPT

## 2023-05-17 PROCEDURE — 94640 AIRWAY INHALATION TREATMENT: CPT

## 2023-05-17 PROCEDURE — 85025 COMPLETE CBC W/AUTO DIFF WBC: CPT

## 2023-05-17 PROCEDURE — 73100 X-RAY EXAM OF WRIST: CPT

## 2023-05-17 PROCEDURE — 93308 TTE F-UP OR LMTD: CPT

## 2023-05-17 PROCEDURE — 2580000003 HC RX 258: Performed by: NURSE PRACTITIONER

## 2023-05-17 PROCEDURE — 6360000002 HC RX W HCPCS: Performed by: NURSE PRACTITIONER

## 2023-05-17 RX ORDER — BUDESONIDE AND FORMOTEROL FUMARATE DIHYDRATE 160; 4.5 UG/1; UG/1
2 AEROSOL RESPIRATORY (INHALATION) 2 TIMES DAILY
COMMUNITY

## 2023-05-17 RX ADMIN — IPRATROPIUM BROMIDE AND ALBUTEROL SULFATE 1 AMPULE: .5; 2.5 SOLUTION RESPIRATORY (INHALATION) at 13:18

## 2023-05-17 RX ADMIN — IPRATROPIUM BROMIDE AND ALBUTEROL SULFATE 1 AMPULE: .5; 2.5 SOLUTION RESPIRATORY (INHALATION) at 20:00

## 2023-05-17 RX ADMIN — CARVEDILOL 6.25 MG: 6.25 TABLET, FILM COATED ORAL at 07:50

## 2023-05-17 RX ADMIN — POTASSIUM CHLORIDE 20 MEQ: 750 TABLET, EXTENDED RELEASE ORAL at 09:58

## 2023-05-17 RX ADMIN — FUROSEMIDE 40 MG: 10 INJECTION, SOLUTION INTRAMUSCULAR; INTRAVENOUS at 17:22

## 2023-05-17 RX ADMIN — PIPERACILLIN AND TAZOBACTAM 3375 MG: 3; .375 INJECTION, POWDER, LYOPHILIZED, FOR SOLUTION INTRAVENOUS at 16:18

## 2023-05-17 RX ADMIN — APIXABAN 5 MG: 5 TABLET, FILM COATED ORAL at 21:03

## 2023-05-17 RX ADMIN — Medication 2 PUFF: at 20:01

## 2023-05-17 RX ADMIN — VANCOMYCIN HYDROCHLORIDE 750 MG: 750 INJECTION, POWDER, LYOPHILIZED, FOR SOLUTION INTRAVENOUS at 14:46

## 2023-05-17 RX ADMIN — TRAMADOL HYDROCHLORIDE 50 MG: 50 TABLET, COATED ORAL at 20:11

## 2023-05-17 RX ADMIN — PIPERACILLIN AND TAZOBACTAM 3375 MG: 3; .375 INJECTION, POWDER, LYOPHILIZED, FOR SOLUTION INTRAVENOUS at 07:49

## 2023-05-17 RX ADMIN — VANCOMYCIN HYDROCHLORIDE 750 MG: 750 INJECTION, POWDER, LYOPHILIZED, FOR SOLUTION INTRAVENOUS at 02:35

## 2023-05-17 RX ADMIN — APIXABAN 5 MG: 5 TABLET, FILM COATED ORAL at 09:58

## 2023-05-17 RX ADMIN — IPRATROPIUM BROMIDE AND ALBUTEROL SULFATE 1 AMPULE: .5; 2.5 SOLUTION RESPIRATORY (INHALATION) at 07:43

## 2023-05-17 ASSESSMENT — PAIN SCALES - GENERAL
PAINLEVEL_OUTOF10: 2
PAINLEVEL_OUTOF10: 0
PAINLEVEL_OUTOF10: 7
PAINLEVEL_OUTOF10: 0

## 2023-05-17 ASSESSMENT — PAIN DESCRIPTION - DESCRIPTORS: DESCRIPTORS: ACHING

## 2023-05-17 ASSESSMENT — PAIN DESCRIPTION - LOCATION
LOCATION: WRIST
LOCATION: WRIST

## 2023-05-17 ASSESSMENT — PAIN DESCRIPTION - ORIENTATION: ORIENTATION: RIGHT

## 2023-05-17 ASSESSMENT — PAIN SCALES - WONG BAKER: WONGBAKER_NUMERICALRESPONSE: 2

## 2023-05-18 ENCOUNTER — APPOINTMENT (OUTPATIENT)
Age: 73
DRG: 853 | End: 2023-05-18
Payer: MEDICARE

## 2023-05-18 LAB
ALBUMIN SERPL-MCNC: 2.3 G/DL (ref 3.4–5)
ALBUMIN/GLOB SERPL: 0.4 (ref 0.8–1.7)
ALP SERPL-CCNC: 162 U/L (ref 45–117)
ALT SERPL-CCNC: 12 U/L (ref 16–61)
ANION GAP SERPL CALC-SCNC: 4 MMOL/L (ref 3–18)
AST SERPL W P-5'-P-CCNC: 21 U/L (ref 10–38)
BASOPHILS # BLD: 0 K/UL (ref 0–0.1)
BASOPHILS NFR BLD: 0 % (ref 0–2)
BILIRUB DIRECT SERPL-MCNC: 1 MG/DL (ref 0–0.2)
BILIRUB SERPL-MCNC: 1.2 MG/DL (ref 0.2–1)
BUN SERPL-MCNC: 41 MG/DL (ref 7–18)
BUN/CREAT SERPL: 23 (ref 12–20)
CA-I BLD-MCNC: 8.1 MG/DL (ref 8.5–10.1)
CHLORIDE SERPL-SCNC: 105 MMOL/L (ref 100–111)
CO2 SERPL-SCNC: 26 MMOL/L (ref 21–32)
CREAT SERPL-MCNC: 1.78 MG/DL (ref 0.6–1.3)
DIFFERENTIAL METHOD BLD: ABNORMAL
ECHO AO ASC DIAM: 3.8 CM
ECHO AO ASCENDING AORTA INDEX: 1.6 CM/M2
ECHO AO ROOT DIAM: 3.9 CM
ECHO AO ROOT INDEX: 1.64 CM/M2
ECHO BSA: 2.46 M2
ECHO BSA: 2.46 M2
ECHO EST RA PRESSURE: 8 MMHG
ECHO IVC PROX: 2.5 CM
ECHO LA AREA 4C: 21.7 CM2
ECHO LA MAJOR AXIS: 6.4 CM
ECHO LA VOL 4C: 59 ML (ref 18–58)
ECHO LA VOLUME INDEX A4C: 25 ML/M2 (ref 16–34)
ECHO LV E' LATERAL VELOCITY: 5 CM/S
ECHO LV E' SEPTAL VELOCITY: 4 CM/S
ECHO LV EDV A2C: 66 ML
ECHO LV EDV A4C: 110 ML
ECHO LV EDV INDEX A4C: 46 ML/M2
ECHO LV EDV NDEX A2C: 28 ML/M2
ECHO LV EJECTION FRACTION A2C: 54 %
ECHO LV EJECTION FRACTION A4C: 35 %
ECHO LV EJECTION FRACTION BIPLANE: 46 % (ref 55–100)
ECHO LV ESV A2C: 31 ML
ECHO LV ESV A4C: 72 ML
ECHO LV ESV INDEX A2C: 13 ML/M2
ECHO LV ESV INDEX A4C: 30 ML/M2
ECHO LV FRACTIONAL SHORTENING: 21 % (ref 28–44)
ECHO LV GLOBAL LONGITUDINAL STRAIN (GLS): -13 %
ECHO LV INTERNAL DIMENSION DIASTOLE INDEX: 2.39 CM/M2
ECHO LV INTERNAL DIMENSION DIASTOLIC: 5.7 CM (ref 4.2–5.9)
ECHO LV INTERNAL DIMENSION SYSTOLIC INDEX: 1.89 CM/M2
ECHO LV INTERNAL DIMENSION SYSTOLIC: 4.5 CM
ECHO LV IVSD: 1.2 CM (ref 0.6–1)
ECHO LV MASS 2D: 305.3 G (ref 88–224)
ECHO LV MASS INDEX 2D: 128.3 G/M2 (ref 49–115)
ECHO LV POSTERIOR WALL DIASTOLIC: 1.3 CM (ref 0.6–1)
ECHO LV RELATIVE WALL THICKNESS RATIO: 0.46
ECHO LVOT AREA: 4.2 CM2
ECHO LVOT DIAM: 2.3 CM
ECHO MV A VELOCITY: 0.36 M/S
ECHO MV E DECELERATION TIME (DT): 284 MS
ECHO MV E VELOCITY: 0.7 M/S
ECHO MV E/A RATIO: 1.94
ECHO MV E/E' LATERAL: 14
ECHO MV E/E' RATIO (AVERAGED): 15.75
ECHO MV E/E' SEPTAL: 17.5
ECHO RA AREA 4C: 24.2 CM2
ECHO RA END SYSTOLIC VOLUME APICAL 4 CHAMBER INDEX BSA: 34 ML/M2
ECHO RA VOLUME: 82 ML
ECHO RIGHT VENTRICULAR SYSTOLIC PRESSURE (RVSP): 38 MMHG
ECHO RV BASAL DIMENSION: 5.9 CM
ECHO RV GLOBAL SYSTOLIC STRAIN (GLS): -8.2 %
ECHO RV LONGITUDINAL DIMENSION: 8.7 CM
ECHO RV MID DIMENSION: 4.3 CM
ECHO TV REGURGITANT MAX VELOCITY: 2.73 M/S
ECHO TV REGURGITANT PEAK GRADIENT: 30 MMHG
EOSINOPHIL # BLD: 0 K/UL (ref 0–0.4)
EOSINOPHIL NFR BLD: 0 % (ref 0–5)
ERYTHROCYTE [DISTWIDTH] IN BLOOD BY AUTOMATED COUNT: 20 % (ref 11.6–14.5)
GLOBULIN SER CALC-MCNC: 5.2 G/DL (ref 2–4)
GLUCOSE SERPL-MCNC: 122 MG/DL (ref 74–99)
HCT VFR BLD AUTO: 31.7 % (ref 36–48)
HGB BLD-MCNC: 9.9 G/DL (ref 13–16)
IMM GRANULOCYTES # BLD AUTO: 0.1 K/UL (ref 0–0.04)
IMM GRANULOCYTES NFR BLD AUTO: 0 % (ref 0–0.5)
LYMPHOCYTES # BLD: 0.6 K/UL (ref 0.9–3.6)
LYMPHOCYTES NFR BLD: 4 % (ref 21–52)
MAGNESIUM SERPL-MCNC: 2.2 MG/DL (ref 1.6–2.6)
MCH RBC QN AUTO: 23 PG (ref 24–34)
MCHC RBC AUTO-ENTMCNC: 31.2 G/DL (ref 31–37)
MCV RBC AUTO: 73.7 FL (ref 78–100)
MONOCYTES # BLD: 0.6 K/UL (ref 0.05–1.2)
MONOCYTES NFR BLD: 4 % (ref 3–10)
NEUTS SEG # BLD: 12.5 K/UL (ref 1.8–8)
NEUTS SEG NFR BLD: 92 % (ref 40–73)
NRBC # BLD: 0.06 K/UL (ref 0–0.01)
NRBC BLD-RTO: 0.4 PER 100 WBC
PLATELET # BLD AUTO: 150 K/UL (ref 135–420)
POTASSIUM SERPL-SCNC: 4.3 MMOL/L (ref 3.5–5.5)
PROT SERPL-MCNC: 7.5 G/DL (ref 6.4–8.2)
RBC # BLD AUTO: 4.3 M/UL (ref 4.35–5.65)
SODIUM SERPL-SCNC: 135 MMOL/L (ref 136–145)
VANCOMYCIN TROUGH SERPL-MCNC: 16.2 UG/ML (ref 10–20)
VAS LEFT ABI: 1.3
VAS LEFT ARM BP: 127 MMHG
VAS LEFT DORSALIS PEDIS BP: 165 MMHG
VAS LEFT PTA BP: 127 MMHG
VAS LEFT TBI: 0.54
VAS LEFT TOE PRESSURE: 68 MMHG
VAS RIGHT ABI: 1.13
VAS RIGHT DORSALIS PEDIS BP: >250 MMHG
VAS RIGHT PTA BP: 143 MMHG
VAS RIGHT TBI: 0.57
VAS RIGHT TOE PRESSURE: 72 MMHG
WBC # BLD AUTO: 13.7 K/UL (ref 4.6–13.2)

## 2023-05-18 PROCEDURE — 80076 HEPATIC FUNCTION PANEL: CPT

## 2023-05-18 PROCEDURE — 85025 COMPLETE CBC W/AUTO DIFF WBC: CPT

## 2023-05-18 PROCEDURE — 83735 ASSAY OF MAGNESIUM: CPT

## 2023-05-18 PROCEDURE — 6370000000 HC RX 637 (ALT 250 FOR IP): Performed by: NURSE PRACTITIONER

## 2023-05-18 PROCEDURE — 80048 BASIC METABOLIC PNL TOTAL CA: CPT

## 2023-05-18 PROCEDURE — 94761 N-INVAS EAR/PLS OXIMETRY MLT: CPT

## 2023-05-18 PROCEDURE — 97110 THERAPEUTIC EXERCISES: CPT

## 2023-05-18 PROCEDURE — 87040 BLOOD CULTURE FOR BACTERIA: CPT

## 2023-05-18 PROCEDURE — 94640 AIRWAY INHALATION TREATMENT: CPT

## 2023-05-18 PROCEDURE — 6360000002 HC RX W HCPCS: Performed by: NURSE PRACTITIONER

## 2023-05-18 PROCEDURE — 93923 UPR/LXTR ART STDY 3+ LVLS: CPT

## 2023-05-18 PROCEDURE — 2580000003 HC RX 258: Performed by: NURSE PRACTITIONER

## 2023-05-18 PROCEDURE — 93922 UPR/L XTREMITY ART 2 LEVELS: CPT

## 2023-05-18 PROCEDURE — 1100000000 HC RM PRIVATE

## 2023-05-18 PROCEDURE — 87186 SC STD MICRODIL/AGAR DIL: CPT

## 2023-05-18 PROCEDURE — 2000000000 HC ICU R&B

## 2023-05-18 PROCEDURE — 2700000000 HC OXYGEN THERAPY PER DAY

## 2023-05-18 PROCEDURE — 87147 CULTURE TYPE IMMUNOLOGIC: CPT

## 2023-05-18 PROCEDURE — 2580000003 HC RX 258: Performed by: INTERNAL MEDICINE

## 2023-05-18 PROCEDURE — 6360000002 HC RX W HCPCS: Performed by: INTERNAL MEDICINE

## 2023-05-18 PROCEDURE — 80202 ASSAY OF VANCOMYCIN: CPT

## 2023-05-18 RX ORDER — FUROSEMIDE 10 MG/ML
40 INJECTION INTRAMUSCULAR; INTRAVENOUS 3 TIMES DAILY
Status: DISCONTINUED | OUTPATIENT
Start: 2023-05-18 | End: 2023-05-19

## 2023-05-18 RX ADMIN — FUROSEMIDE 40 MG: 10 INJECTION, SOLUTION INTRAMUSCULAR; INTRAVENOUS at 14:08

## 2023-05-18 RX ADMIN — IPRATROPIUM BROMIDE AND ALBUTEROL SULFATE 1 AMPULE: .5; 2.5 SOLUTION RESPIRATORY (INHALATION) at 13:34

## 2023-05-18 RX ADMIN — PIPERACILLIN AND TAZOBACTAM 3375 MG: 3; .375 INJECTION, POWDER, LYOPHILIZED, FOR SOLUTION INTRAVENOUS at 16:11

## 2023-05-18 RX ADMIN — VANCOMYCIN HYDROCHLORIDE 750 MG: 750 INJECTION, POWDER, LYOPHILIZED, FOR SOLUTION INTRAVENOUS at 02:40

## 2023-05-18 RX ADMIN — Medication 2 PUFF: at 19:52

## 2023-05-18 RX ADMIN — CARVEDILOL 6.25 MG: 6.25 TABLET, FILM COATED ORAL at 08:08

## 2023-05-18 RX ADMIN — APIXABAN 5 MG: 5 TABLET, FILM COATED ORAL at 20:51

## 2023-05-18 RX ADMIN — TRAMADOL HYDROCHLORIDE 50 MG: 50 TABLET, COATED ORAL at 20:55

## 2023-05-18 RX ADMIN — POTASSIUM CHLORIDE 20 MEQ: 750 TABLET, EXTENDED RELEASE ORAL at 08:08

## 2023-05-18 RX ADMIN — CARVEDILOL 6.25 MG: 6.25 TABLET, FILM COATED ORAL at 16:11

## 2023-05-18 RX ADMIN — PIPERACILLIN AND TAZOBACTAM 3375 MG: 3; .375 INJECTION, POWDER, LYOPHILIZED, FOR SOLUTION INTRAVENOUS at 08:08

## 2023-05-18 RX ADMIN — ISOSORBIDE MONONITRATE 30 MG: 30 TABLET, EXTENDED RELEASE ORAL at 08:08

## 2023-05-18 RX ADMIN — APIXABAN 5 MG: 5 TABLET, FILM COATED ORAL at 08:08

## 2023-05-18 RX ADMIN — SPIRONOLACTONE 25 MG: 25 TABLET ORAL at 08:08

## 2023-05-18 RX ADMIN — IPRATROPIUM BROMIDE AND ALBUTEROL SULFATE 1 AMPULE: .5; 2.5 SOLUTION RESPIRATORY (INHALATION) at 19:52

## 2023-05-18 RX ADMIN — FUROSEMIDE 40 MG: 10 INJECTION, SOLUTION INTRAMUSCULAR; INTRAVENOUS at 20:50

## 2023-05-18 RX ADMIN — TRAMADOL HYDROCHLORIDE 50 MG: 50 TABLET, COATED ORAL at 16:11

## 2023-05-18 RX ADMIN — PIPERACILLIN AND TAZOBACTAM 3375 MG: 3; .375 INJECTION, POWDER, LYOPHILIZED, FOR SOLUTION INTRAVENOUS at 00:30

## 2023-05-18 RX ADMIN — IPRATROPIUM BROMIDE AND ALBUTEROL SULFATE 1 AMPULE: .5; 2.5 SOLUTION RESPIRATORY (INHALATION) at 07:25

## 2023-05-18 RX ADMIN — Medication 2 PUFF: at 07:25

## 2023-05-18 RX ADMIN — FUROSEMIDE 40 MG: 10 INJECTION, SOLUTION INTRAMUSCULAR; INTRAVENOUS at 08:12

## 2023-05-18 RX ADMIN — EMPAGLIFLOZIN 10 MG: 10 TABLET, FILM COATED ORAL at 08:08

## 2023-05-18 ASSESSMENT — PAIN SCALES - GENERAL
PAINLEVEL_OUTOF10: 3
PAINLEVEL_OUTOF10: 0
PAINLEVEL_OUTOF10: 8
PAINLEVEL_OUTOF10: 0
PAINLEVEL_OUTOF10: 9
PAINLEVEL_OUTOF10: 4
PAINLEVEL_OUTOF10: 7

## 2023-05-18 ASSESSMENT — PAIN DESCRIPTION - LOCATION: LOCATION: GROIN

## 2023-05-19 ENCOUNTER — APPOINTMENT (OUTPATIENT)
Age: 73
DRG: 853 | End: 2023-05-19
Payer: MEDICARE

## 2023-05-19 LAB
ANION GAP SERPL CALC-SCNC: 5 MMOL/L (ref 3–18)
ANION GAP SERPL CALC-SCNC: 8 MMOL/L (ref 3–18)
BASOPHILS # BLD: 0.1 K/UL (ref 0–0.1)
BASOPHILS NFR BLD: 0 % (ref 0–2)
BUN SERPL-MCNC: 42 MG/DL (ref 7–18)
BUN SERPL-MCNC: 45 MG/DL (ref 7–18)
BUN/CREAT SERPL: 25 (ref 12–20)
BUN/CREAT SERPL: 25 (ref 12–20)
CA-I BLD-MCNC: 8.2 MG/DL (ref 8.5–10.1)
CA-I BLD-MCNC: 8.2 MG/DL (ref 8.5–10.1)
CA-I BLD-MCNC: 8.3 MG/DL (ref 8.5–10.1)
CHLORIDE SERPL-SCNC: 104 MMOL/L (ref 100–111)
CHLORIDE SERPL-SCNC: 104 MMOL/L (ref 100–111)
CO2 SERPL-SCNC: 25 MMOL/L (ref 21–32)
CO2 SERPL-SCNC: 25 MMOL/L (ref 21–32)
CREAT SERPL-MCNC: 1.65 MG/DL (ref 0.6–1.3)
CREAT SERPL-MCNC: 1.77 MG/DL (ref 0.6–1.3)
DIFFERENTIAL METHOD BLD: ABNORMAL
EOSINOPHIL # BLD: 0.1 K/UL (ref 0–0.4)
EOSINOPHIL NFR BLD: 0 % (ref 0–5)
ERYTHROCYTE [DISTWIDTH] IN BLOOD BY AUTOMATED COUNT: 19.9 % (ref 11.6–14.5)
FERRITIN SERPL-MCNC: 60 NG/ML (ref 8–388)
GLUCOSE SERPL-MCNC: 111 MG/DL (ref 74–99)
GLUCOSE SERPL-MCNC: 125 MG/DL (ref 74–99)
HCT VFR BLD AUTO: 28.8 % (ref 36–48)
HGB BLD-MCNC: 9.3 G/DL (ref 13–16)
IMM GRANULOCYTES # BLD AUTO: 0.1 K/UL (ref 0–0.04)
IMM GRANULOCYTES NFR BLD AUTO: 1 % (ref 0–0.5)
IRON SATN MFR SERPL: 4 % (ref 20–50)
IRON SERPL-MCNC: 12 UG/DL (ref 50–175)
LYMPHOCYTES # BLD: 0.5 K/UL (ref 0.9–3.6)
LYMPHOCYTES NFR BLD: 4 % (ref 21–52)
MCH RBC QN AUTO: 22.9 PG (ref 24–34)
MCHC RBC AUTO-ENTMCNC: 32.3 G/DL (ref 31–37)
MCV RBC AUTO: 70.9 FL (ref 78–100)
MONOCYTES # BLD: 0.8 K/UL (ref 0.05–1.2)
MONOCYTES NFR BLD: 5 % (ref 3–10)
NEUTS SEG # BLD: 13.7 K/UL (ref 1.8–8)
NEUTS SEG NFR BLD: 90 % (ref 40–73)
NRBC # BLD: 0.22 K/UL (ref 0–0.01)
NRBC BLD-RTO: 1.4 PER 100 WBC
PLATELET # BLD AUTO: 141 K/UL (ref 135–420)
POTASSIUM SERPL-SCNC: 4.2 MMOL/L (ref 3.5–5.5)
POTASSIUM SERPL-SCNC: 4.6 MMOL/L (ref 3.5–5.5)
PTH-INTACT SERPL-MCNC: 93 PG/ML (ref 18.4–88)
RBC # BLD AUTO: 4.06 M/UL (ref 4.35–5.65)
SODIUM SERPL-SCNC: 134 MMOL/L (ref 136–145)
SODIUM SERPL-SCNC: 137 MMOL/L (ref 136–145)
TIBC SERPL-MCNC: 308 UG/DL (ref 250–450)
WBC # BLD AUTO: 15.2 K/UL (ref 4.6–13.2)

## 2023-05-19 PROCEDURE — 83540 ASSAY OF IRON: CPT

## 2023-05-19 PROCEDURE — 2580000003 HC RX 258: Performed by: NURSE PRACTITIONER

## 2023-05-19 PROCEDURE — 94640 AIRWAY INHALATION TREATMENT: CPT

## 2023-05-19 PROCEDURE — 73721 MRI JNT OF LWR EXTRE W/O DYE: CPT

## 2023-05-19 PROCEDURE — 6370000000 HC RX 637 (ALT 250 FOR IP): Performed by: NURSE PRACTITIONER

## 2023-05-19 PROCEDURE — 97110 THERAPEUTIC EXERCISES: CPT

## 2023-05-19 PROCEDURE — 94761 N-INVAS EAR/PLS OXIMETRY MLT: CPT

## 2023-05-19 PROCEDURE — 2580000003 HC RX 258: Performed by: INTERNAL MEDICINE

## 2023-05-19 PROCEDURE — 2700000000 HC OXYGEN THERAPY PER DAY

## 2023-05-19 PROCEDURE — 6360000002 HC RX W HCPCS: Performed by: NURSE PRACTITIONER

## 2023-05-19 PROCEDURE — 83970 ASSAY OF PARATHORMONE: CPT

## 2023-05-19 PROCEDURE — 2500000003 HC RX 250 WO HCPCS: Performed by: INTERNAL MEDICINE

## 2023-05-19 PROCEDURE — 80048 BASIC METABOLIC PNL TOTAL CA: CPT

## 2023-05-19 PROCEDURE — 82728 ASSAY OF FERRITIN: CPT

## 2023-05-19 PROCEDURE — 85025 COMPLETE CBC W/AUTO DIFF WBC: CPT

## 2023-05-19 PROCEDURE — 6360000002 HC RX W HCPCS: Performed by: INTERNAL MEDICINE

## 2023-05-19 PROCEDURE — 1100000000 HC RM PRIVATE

## 2023-05-19 PROCEDURE — 36415 COLL VENOUS BLD VENIPUNCTURE: CPT

## 2023-05-19 PROCEDURE — 6370000000 HC RX 637 (ALT 250 FOR IP): Performed by: INTERNAL MEDICINE

## 2023-05-19 RX ORDER — METOLAZONE 2.5 MG/1
5 TABLET ORAL DAILY
Status: DISCONTINUED | OUTPATIENT
Start: 2023-05-19 | End: 2023-05-22

## 2023-05-19 RX ORDER — BUMETANIDE 0.25 MG/ML
2 INJECTION INTRAMUSCULAR; INTRAVENOUS 2 TIMES DAILY
Status: DISCONTINUED | OUTPATIENT
Start: 2023-05-19 | End: 2023-05-21

## 2023-05-19 RX ADMIN — TRAMADOL HYDROCHLORIDE 50 MG: 50 TABLET, COATED ORAL at 20:36

## 2023-05-19 RX ADMIN — IPRATROPIUM BROMIDE AND ALBUTEROL SULFATE 1 AMPULE: .5; 2.5 SOLUTION RESPIRATORY (INHALATION) at 13:11

## 2023-05-19 RX ADMIN — BUMETANIDE 2 MG: 0.25 INJECTION INTRAMUSCULAR; INTRAVENOUS at 08:48

## 2023-05-19 RX ADMIN — POTASSIUM CHLORIDE 20 MEQ: 750 TABLET, EXTENDED RELEASE ORAL at 08:51

## 2023-05-19 RX ADMIN — METOLAZONE 5 MG: 2.5 TABLET ORAL at 08:48

## 2023-05-19 RX ADMIN — PIPERACILLIN AND TAZOBACTAM 3375 MG: 3; .375 INJECTION, POWDER, LYOPHILIZED, FOR SOLUTION INTRAVENOUS at 08:26

## 2023-05-19 RX ADMIN — Medication 2 PUFF: at 07:18

## 2023-05-19 RX ADMIN — APIXABAN 5 MG: 5 TABLET, FILM COATED ORAL at 08:40

## 2023-05-19 RX ADMIN — PIPERACILLIN AND TAZOBACTAM 3375 MG: 3; .375 INJECTION, POWDER, LYOPHILIZED, FOR SOLUTION INTRAVENOUS at 17:11

## 2023-05-19 RX ADMIN — TRAMADOL HYDROCHLORIDE 50 MG: 50 TABLET, COATED ORAL at 08:45

## 2023-05-19 RX ADMIN — APIXABAN 5 MG: 5 TABLET, FILM COATED ORAL at 20:36

## 2023-05-19 RX ADMIN — BUMETANIDE 2 MG: 0.25 INJECTION INTRAMUSCULAR; INTRAVENOUS at 20:36

## 2023-05-19 RX ADMIN — Medication 2 PUFF: at 19:02

## 2023-05-19 RX ADMIN — ISOSORBIDE MONONITRATE 30 MG: 30 TABLET, EXTENDED RELEASE ORAL at 08:22

## 2023-05-19 RX ADMIN — VANCOMYCIN HYDROCHLORIDE 1000 MG: 1 INJECTION, POWDER, LYOPHILIZED, FOR SOLUTION INTRAVENOUS at 03:20

## 2023-05-19 RX ADMIN — PIPERACILLIN AND TAZOBACTAM 3375 MG: 3; .375 INJECTION, POWDER, LYOPHILIZED, FOR SOLUTION INTRAVENOUS at 23:32

## 2023-05-19 RX ADMIN — CARVEDILOL 6.25 MG: 6.25 TABLET, FILM COATED ORAL at 08:23

## 2023-05-19 RX ADMIN — IPRATROPIUM BROMIDE AND ALBUTEROL SULFATE 1 AMPULE: .5; 2.5 SOLUTION RESPIRATORY (INHALATION) at 19:02

## 2023-05-19 RX ADMIN — IPRATROPIUM BROMIDE AND ALBUTEROL SULFATE 1 AMPULE: .5; 2.5 SOLUTION RESPIRATORY (INHALATION) at 07:18

## 2023-05-19 RX ADMIN — PIPERACILLIN AND TAZOBACTAM 3375 MG: 3; .375 INJECTION, POWDER, LYOPHILIZED, FOR SOLUTION INTRAVENOUS at 00:13

## 2023-05-19 ASSESSMENT — PAIN DESCRIPTION - LOCATION: LOCATION: LEG

## 2023-05-19 ASSESSMENT — PAIN SCALES - GENERAL
PAINLEVEL_OUTOF10: 0
PAINLEVEL_OUTOF10: 0
PAINLEVEL_OUTOF10: 2
PAINLEVEL_OUTOF10: 5
PAINLEVEL_OUTOF10: 3
PAINLEVEL_OUTOF10: 0
PAINLEVEL_OUTOF10: 8
PAINLEVEL_OUTOF10: 0
PAINLEVEL_OUTOF10: 4

## 2023-05-19 ASSESSMENT — PAIN DESCRIPTION - DESCRIPTORS
DESCRIPTORS: ACHING
DESCRIPTORS: ACHING

## 2023-05-19 ASSESSMENT — PAIN - FUNCTIONAL ASSESSMENT
PAIN_FUNCTIONAL_ASSESSMENT: ACTIVITIES ARE NOT PREVENTED
PAIN_FUNCTIONAL_ASSESSMENT: PREVENTS OR INTERFERES SOME ACTIVE ACTIVITIES AND ADLS

## 2023-05-19 ASSESSMENT — PAIN DESCRIPTION - ORIENTATION
ORIENTATION: LEFT
ORIENTATION: LEFT;RIGHT;INNER

## 2023-05-20 LAB
ANION GAP SERPL CALC-SCNC: 7 MMOL/L (ref 3–18)
BACTERIA SPEC CULT: ABNORMAL
BASOPHILS # BLD: 0 K/UL (ref 0–0.1)
BASOPHILS NFR BLD: 0 % (ref 0–2)
BUN SERPL-MCNC: 45 MG/DL (ref 7–18)
BUN/CREAT SERPL: 24 (ref 12–20)
CA-I BLD-MCNC: 8.2 MG/DL (ref 8.5–10.1)
CHLORIDE SERPL-SCNC: 103 MMOL/L (ref 100–111)
CO2 SERPL-SCNC: 25 MMOL/L (ref 21–32)
CREAT SERPL-MCNC: 1.85 MG/DL (ref 0.6–1.3)
DIFFERENTIAL METHOD BLD: ABNORMAL
EOSINOPHIL # BLD: 0.1 K/UL (ref 0–0.4)
EOSINOPHIL NFR BLD: 1 % (ref 0–5)
ERYTHROCYTE [DISTWIDTH] IN BLOOD BY AUTOMATED COUNT: 19.9 % (ref 11.6–14.5)
GLUCOSE SERPL-MCNC: 120 MG/DL (ref 74–99)
GRAM STN SPEC: ABNORMAL
HCT VFR BLD AUTO: 30 % (ref 36–48)
HGB BLD-MCNC: 9.5 G/DL (ref 13–16)
IMM GRANULOCYTES # BLD AUTO: 0.1 K/UL (ref 0–0.04)
IMM GRANULOCYTES NFR BLD AUTO: 1 % (ref 0–0.5)
LYMPHOCYTES # BLD: 0.6 K/UL (ref 0.9–3.6)
LYMPHOCYTES NFR BLD: 5 % (ref 21–52)
Lab: ABNORMAL
Lab: ABNORMAL
MAGNESIUM SERPL-MCNC: 2.3 MG/DL (ref 1.6–2.6)
MCH RBC QN AUTO: 22.8 PG (ref 24–34)
MCHC RBC AUTO-ENTMCNC: 31.7 G/DL (ref 31–37)
MCV RBC AUTO: 72.1 FL (ref 78–100)
MONOCYTES # BLD: 0.8 K/UL (ref 0.05–1.2)
MONOCYTES NFR BLD: 7 % (ref 3–10)
NEUTS SEG # BLD: 10.1 K/UL (ref 1.8–8)
NEUTS SEG NFR BLD: 86 % (ref 40–73)
NRBC # BLD: 0.27 K/UL (ref 0–0.01)
NRBC BLD-RTO: 2.3 PER 100 WBC
PHOSPHATE SERPL-MCNC: 2.6 MG/DL (ref 2.5–4.9)
PLATELET # BLD AUTO: 153 K/UL (ref 135–420)
POTASSIUM SERPL-SCNC: 4.2 MMOL/L (ref 3.5–5.5)
RBC # BLD AUTO: 4.16 M/UL (ref 4.35–5.65)
SODIUM SERPL-SCNC: 135 MMOL/L (ref 136–145)
WBC # BLD AUTO: 11.7 K/UL (ref 4.6–13.2)

## 2023-05-20 PROCEDURE — 6370000000 HC RX 637 (ALT 250 FOR IP): Performed by: INTERNAL MEDICINE

## 2023-05-20 PROCEDURE — 84100 ASSAY OF PHOSPHORUS: CPT

## 2023-05-20 PROCEDURE — 94640 AIRWAY INHALATION TREATMENT: CPT

## 2023-05-20 PROCEDURE — 2500000003 HC RX 250 WO HCPCS: Performed by: INTERNAL MEDICINE

## 2023-05-20 PROCEDURE — 6370000000 HC RX 637 (ALT 250 FOR IP): Performed by: NURSE PRACTITIONER

## 2023-05-20 PROCEDURE — 2580000003 HC RX 258: Performed by: INTERNAL MEDICINE

## 2023-05-20 PROCEDURE — 94669 MECHANICAL CHEST WALL OSCILL: CPT

## 2023-05-20 PROCEDURE — 85025 COMPLETE CBC W/AUTO DIFF WBC: CPT

## 2023-05-20 PROCEDURE — 6360000002 HC RX W HCPCS: Performed by: NURSE PRACTITIONER

## 2023-05-20 PROCEDURE — 36415 COLL VENOUS BLD VENIPUNCTURE: CPT

## 2023-05-20 PROCEDURE — 80048 BASIC METABOLIC PNL TOTAL CA: CPT

## 2023-05-20 PROCEDURE — 83735 ASSAY OF MAGNESIUM: CPT

## 2023-05-20 PROCEDURE — 6360000002 HC RX W HCPCS: Performed by: INTERNAL MEDICINE

## 2023-05-20 PROCEDURE — 2580000003 HC RX 258: Performed by: NURSE PRACTITIONER

## 2023-05-20 PROCEDURE — 1100000000 HC RM PRIVATE

## 2023-05-20 RX ADMIN — PIPERACILLIN AND TAZOBACTAM 3375 MG: 3; .375 INJECTION, POWDER, LYOPHILIZED, FOR SOLUTION INTRAVENOUS at 08:43

## 2023-05-20 RX ADMIN — APIXABAN 5 MG: 5 TABLET, FILM COATED ORAL at 20:31

## 2023-05-20 RX ADMIN — IPRATROPIUM BROMIDE AND ALBUTEROL SULFATE 1 AMPULE: .5; 2.5 SOLUTION RESPIRATORY (INHALATION) at 19:04

## 2023-05-20 RX ADMIN — POTASSIUM CHLORIDE 20 MEQ: 750 TABLET, EXTENDED RELEASE ORAL at 08:44

## 2023-05-20 RX ADMIN — TRAMADOL HYDROCHLORIDE 50 MG: 50 TABLET, COATED ORAL at 23:20

## 2023-05-20 RX ADMIN — TRAMADOL HYDROCHLORIDE 50 MG: 50 TABLET, COATED ORAL at 09:13

## 2023-05-20 RX ADMIN — TRAMADOL HYDROCHLORIDE 50 MG: 50 TABLET, COATED ORAL at 15:01

## 2023-05-20 RX ADMIN — IPRATROPIUM BROMIDE AND ALBUTEROL SULFATE 1 AMPULE: .5; 2.5 SOLUTION RESPIRATORY (INHALATION) at 14:15

## 2023-05-20 RX ADMIN — ISOSORBIDE MONONITRATE 30 MG: 30 TABLET, EXTENDED RELEASE ORAL at 08:44

## 2023-05-20 RX ADMIN — IPRATROPIUM BROMIDE AND ALBUTEROL SULFATE 1 AMPULE: .5; 2.5 SOLUTION RESPIRATORY (INHALATION) at 07:48

## 2023-05-20 RX ADMIN — Medication 2 PUFF: at 07:49

## 2023-05-20 RX ADMIN — CARVEDILOL 6.25 MG: 6.25 TABLET, FILM COATED ORAL at 16:43

## 2023-05-20 RX ADMIN — Medication 2 PUFF: at 19:04

## 2023-05-20 RX ADMIN — BUMETANIDE 2 MG: 0.25 INJECTION INTRAMUSCULAR; INTRAVENOUS at 08:43

## 2023-05-20 RX ADMIN — CARVEDILOL 6.25 MG: 6.25 TABLET, FILM COATED ORAL at 08:44

## 2023-05-20 RX ADMIN — APIXABAN 5 MG: 5 TABLET, FILM COATED ORAL at 08:44

## 2023-05-20 RX ADMIN — BUMETANIDE 2 MG: 0.25 INJECTION INTRAMUSCULAR; INTRAVENOUS at 20:29

## 2023-05-20 RX ADMIN — TRAMADOL HYDROCHLORIDE 50 MG: 50 TABLET, COATED ORAL at 04:12

## 2023-05-20 RX ADMIN — VANCOMYCIN HYDROCHLORIDE 1000 MG: 1 INJECTION, POWDER, LYOPHILIZED, FOR SOLUTION INTRAVENOUS at 02:36

## 2023-05-20 RX ADMIN — PIPERACILLIN AND TAZOBACTAM 3375 MG: 3; .375 INJECTION, POWDER, LYOPHILIZED, FOR SOLUTION INTRAVENOUS at 16:43

## 2023-05-20 RX ADMIN — METOLAZONE 5 MG: 2.5 TABLET ORAL at 08:44

## 2023-05-20 RX ADMIN — EMPAGLIFLOZIN 10 MG: 10 TABLET, FILM COATED ORAL at 08:44

## 2023-05-20 ASSESSMENT — PAIN DESCRIPTION - ORIENTATION
ORIENTATION: LEFT

## 2023-05-20 ASSESSMENT — PAIN DESCRIPTION - LOCATION
LOCATION: LEG
LOCATION: FOOT

## 2023-05-20 ASSESSMENT — PAIN SCALES - GENERAL
PAINLEVEL_OUTOF10: 5
PAINLEVEL_OUTOF10: 9
PAINLEVEL_OUTOF10: 2
PAINLEVEL_OUTOF10: 0
PAINLEVEL_OUTOF10: 0
PAINLEVEL_OUTOF10: 8
PAINLEVEL_OUTOF10: 0
PAINLEVEL_OUTOF10: 9
PAINLEVEL_OUTOF10: 0

## 2023-05-20 ASSESSMENT — PAIN DESCRIPTION - DESCRIPTORS
DESCRIPTORS: ACHING
DESCRIPTORS: ACHING
DESCRIPTORS: THROBBING;ACHING
DESCRIPTORS: ACHING;THROBBING

## 2023-05-20 ASSESSMENT — PAIN - FUNCTIONAL ASSESSMENT
PAIN_FUNCTIONAL_ASSESSMENT: ACTIVITIES ARE NOT PREVENTED
PAIN_FUNCTIONAL_ASSESSMENT: PREVENTS OR INTERFERES WITH MANY ACTIVE NOT PASSIVE ACTIVITIES
PAIN_FUNCTIONAL_ASSESSMENT: PREVENTS OR INTERFERES WITH MANY ACTIVE NOT PASSIVE ACTIVITIES

## 2023-05-21 LAB
ANION GAP SERPL CALC-SCNC: 7 MMOL/L (ref 3–18)
ANION GAP SERPL CALC-SCNC: 9 MMOL/L (ref 3–18)
BASOPHILS # BLD: 0 K/UL (ref 0–0.1)
BASOPHILS NFR BLD: 0 % (ref 0–2)
BUN SERPL-MCNC: 50 MG/DL (ref 7–18)
BUN SERPL-MCNC: 50 MG/DL (ref 7–18)
BUN/CREAT SERPL: 24 (ref 12–20)
BUN/CREAT SERPL: 25 (ref 12–20)
CA-I BLD-MCNC: 8 MG/DL (ref 8.5–10.1)
CA-I BLD-MCNC: 8.1 MG/DL (ref 8.5–10.1)
CHLORIDE SERPL-SCNC: 101 MMOL/L (ref 100–111)
CHLORIDE SERPL-SCNC: 102 MMOL/L (ref 100–111)
CO2 SERPL-SCNC: 24 MMOL/L (ref 21–32)
CO2 SERPL-SCNC: 25 MMOL/L (ref 21–32)
CREAT SERPL-MCNC: 2.02 MG/DL (ref 0.6–1.3)
CREAT SERPL-MCNC: 2.09 MG/DL (ref 0.6–1.3)
DIFFERENTIAL METHOD BLD: ABNORMAL
EKG ATRIAL RATE: 47 BPM
EKG ATRIAL RATE: 48 BPM
EKG ATRIAL RATE: 55 BPM
EKG DIAGNOSIS: NORMAL
EKG P AXIS: 33 DEGREES
EKG P AXIS: 64 DEGREES
EKG P AXIS: 70 DEGREES
EKG P-R INTERVAL: 174 MS
EKG P-R INTERVAL: 174 MS
EKG P-R INTERVAL: 182 MS
EKG Q-T INTERVAL: 400 MS
EKG Q-T INTERVAL: 498 MS
EKG Q-T INTERVAL: 514 MS
EKG QRS DURATION: 106 MS
EKG QRS DURATION: 86 MS
EKG QRS DURATION: 90 MS
EKG QTC CALCULATION (BAZETT): 357 MS
EKG QTC CALCULATION (BAZETT): 440 MS
EKG QTC CALCULATION (BAZETT): 491 MS
EKG R AXIS: 243 DEGREES
EKG R AXIS: 245 DEGREES
EKG R AXIS: 259 DEGREES
EKG T AXIS: -26 DEGREES
EKG T AXIS: 41 DEGREES
EKG T AXIS: 45 DEGREES
EKG VENTRICULAR RATE: 47 BPM
EKG VENTRICULAR RATE: 48 BPM
EKG VENTRICULAR RATE: 55 BPM
EOSINOPHIL # BLD: 0.1 K/UL (ref 0–0.4)
EOSINOPHIL NFR BLD: 1 % (ref 0–5)
ERYTHROCYTE [DISTWIDTH] IN BLOOD BY AUTOMATED COUNT: 19.9 % (ref 11.6–14.5)
GLUCOSE BLD STRIP.AUTO-MCNC: 142 MG/DL (ref 70–110)
GLUCOSE SERPL-MCNC: 103 MG/DL (ref 74–99)
GLUCOSE SERPL-MCNC: 133 MG/DL (ref 74–99)
HCT VFR BLD AUTO: 30.3 % (ref 36–48)
HGB BLD-MCNC: 9.5 G/DL (ref 13–16)
IMM GRANULOCYTES # BLD AUTO: 0.2 K/UL (ref 0–0.04)
IMM GRANULOCYTES NFR BLD AUTO: 3 % (ref 0–0.5)
LYMPHOCYTES # BLD: 0.7 K/UL (ref 0.9–3.6)
LYMPHOCYTES NFR BLD: 7 % (ref 21–52)
MAGNESIUM SERPL-MCNC: 2.4 MG/DL (ref 1.6–2.6)
MCH RBC QN AUTO: 22.4 PG (ref 24–34)
MCHC RBC AUTO-ENTMCNC: 31.4 G/DL (ref 31–37)
MCV RBC AUTO: 71.5 FL (ref 78–100)
MONOCYTES # BLD: 0.7 K/UL (ref 0.05–1.2)
MONOCYTES NFR BLD: 7 % (ref 3–10)
NEUTS SEG # BLD: 7.7 K/UL (ref 1.8–8)
NEUTS SEG NFR BLD: 82 % (ref 40–73)
NRBC # BLD: 0.1 K/UL (ref 0–0.01)
NRBC BLD-RTO: 1.1 PER 100 WBC
PERFORMED BY:: ABNORMAL
PHOSPHATE SERPL-MCNC: 3.1 MG/DL (ref 2.5–4.9)
PLATELET # BLD AUTO: 189 K/UL (ref 135–420)
POTASSIUM SERPL-SCNC: 4.4 MMOL/L (ref 3.5–5.5)
POTASSIUM SERPL-SCNC: 4.4 MMOL/L (ref 3.5–5.5)
RBC # BLD AUTO: 4.24 M/UL (ref 4.35–5.65)
SODIUM SERPL-SCNC: 134 MMOL/L (ref 136–145)
SODIUM SERPL-SCNC: 134 MMOL/L (ref 136–145)
TROPONIN I SERPL HS-MCNC: 111 NG/L (ref 0–78)
TROPONIN I SERPL HS-MCNC: 119 NG/L (ref 0–78)
WBC # BLD AUTO: 9.5 K/UL (ref 4.6–13.2)

## 2023-05-21 PROCEDURE — 93005 ELECTROCARDIOGRAM TRACING: CPT | Performed by: INTERNAL MEDICINE

## 2023-05-21 PROCEDURE — 2700000000 HC OXYGEN THERAPY PER DAY

## 2023-05-21 PROCEDURE — 6370000000 HC RX 637 (ALT 250 FOR IP): Performed by: NURSE PRACTITIONER

## 2023-05-21 PROCEDURE — 6370000000 HC RX 637 (ALT 250 FOR IP): Performed by: INTERNAL MEDICINE

## 2023-05-21 PROCEDURE — 94761 N-INVAS EAR/PLS OXIMETRY MLT: CPT

## 2023-05-21 PROCEDURE — 6360000002 HC RX W HCPCS: Performed by: NURSE PRACTITIONER

## 2023-05-21 PROCEDURE — 2580000003 HC RX 258: Performed by: INTERNAL MEDICINE

## 2023-05-21 PROCEDURE — 2500000003 HC RX 250 WO HCPCS

## 2023-05-21 PROCEDURE — 94669 MECHANICAL CHEST WALL OSCILL: CPT

## 2023-05-21 PROCEDURE — 82962 GLUCOSE BLOOD TEST: CPT

## 2023-05-21 PROCEDURE — 85025 COMPLETE CBC W/AUTO DIFF WBC: CPT

## 2023-05-21 PROCEDURE — 93005 ELECTROCARDIOGRAM TRACING: CPT

## 2023-05-21 PROCEDURE — 94640 AIRWAY INHALATION TREATMENT: CPT

## 2023-05-21 PROCEDURE — 2000000000 HC ICU R&B

## 2023-05-21 PROCEDURE — 80048 BASIC METABOLIC PNL TOTAL CA: CPT

## 2023-05-21 PROCEDURE — 84100 ASSAY OF PHOSPHORUS: CPT

## 2023-05-21 PROCEDURE — 2580000003 HC RX 258: Performed by: NURSE PRACTITIONER

## 2023-05-21 PROCEDURE — 84484 ASSAY OF TROPONIN QUANT: CPT

## 2023-05-21 PROCEDURE — 83735 ASSAY OF MAGNESIUM: CPT

## 2023-05-21 RX ORDER — BUMETANIDE 0.25 MG/ML
1 INJECTION INTRAMUSCULAR; INTRAVENOUS 2 TIMES DAILY
Status: DISCONTINUED | OUTPATIENT
Start: 2023-05-21 | End: 2023-05-22

## 2023-05-21 RX ORDER — BUMETANIDE 0.25 MG/ML
1 INJECTION INTRAMUSCULAR; INTRAVENOUS 2 TIMES DAILY
Status: DISCONTINUED | OUTPATIENT
Start: 2023-05-21 | End: 2023-05-21

## 2023-05-21 RX ORDER — ATROPINE SULFATE 0.1 MG/ML
INJECTION INTRAVENOUS
Status: DISPENSED
Start: 2023-05-21 | End: 2023-05-22

## 2023-05-21 RX ORDER — MOXIFLOXACIN 5 MG/ML
1 SOLUTION/ DROPS OPHTHALMIC 4 TIMES DAILY
Status: DISCONTINUED | OUTPATIENT
Start: 2023-05-21 | End: 2023-05-22

## 2023-05-21 RX ORDER — SODIUM CHLORIDE 9 MG/ML
INJECTION, SOLUTION INTRAVENOUS CONTINUOUS
Status: DISCONTINUED | OUTPATIENT
Start: 2023-05-21 | End: 2023-05-21

## 2023-05-21 RX ORDER — KETOROLAC TROMETHAMINE 5 MG/ML
1 SOLUTION OPHTHALMIC 4 TIMES DAILY
Status: DISCONTINUED | OUTPATIENT
Start: 2023-05-21 | End: 2023-05-22

## 2023-05-21 RX ORDER — IPRATROPIUM BROMIDE AND ALBUTEROL SULFATE 2.5; .5 MG/3ML; MG/3ML
SOLUTION RESPIRATORY (INHALATION)
Status: DISCONTINUED
Start: 2023-05-21 | End: 2023-05-21

## 2023-05-21 RX ADMIN — ACETAMINOPHEN 650 MG: 325 TABLET ORAL at 07:32

## 2023-05-21 RX ADMIN — MOXIFLOXACIN 1 DROP: 5 SOLUTION/ DROPS OPHTHALMIC at 15:50

## 2023-05-21 RX ADMIN — APIXABAN 5 MG: 5 TABLET, FILM COATED ORAL at 20:30

## 2023-05-21 RX ADMIN — KETOROLAC TROMETHAMINE 1 DROP: 5 SOLUTION OPHTHALMIC at 23:06

## 2023-05-21 RX ADMIN — TRAMADOL HYDROCHLORIDE 50 MG: 50 TABLET, COATED ORAL at 07:31

## 2023-05-21 RX ADMIN — Medication 2 PUFF: at 08:11

## 2023-05-21 RX ADMIN — APIXABAN 5 MG: 5 TABLET, FILM COATED ORAL at 08:44

## 2023-05-21 RX ADMIN — KETOROLAC TROMETHAMINE 1 DROP: 5 SOLUTION OPHTHALMIC at 19:20

## 2023-05-21 RX ADMIN — PIPERACILLIN AND TAZOBACTAM 3375 MG: 3; .375 INJECTION, POWDER, LYOPHILIZED, FOR SOLUTION INTRAVENOUS at 08:43

## 2023-05-21 RX ADMIN — MOXIFLOXACIN 1 DROP: 5 SOLUTION/ DROPS OPHTHALMIC at 19:15

## 2023-05-21 RX ADMIN — IPRATROPIUM BROMIDE AND ALBUTEROL SULFATE 1 AMPULE: .5; 2.5 SOLUTION RESPIRATORY (INHALATION) at 19:03

## 2023-05-21 RX ADMIN — BUMETANIDE 1 MG: 0.25 INJECTION INTRAMUSCULAR; INTRAVENOUS at 20:17

## 2023-05-21 RX ADMIN — EMPAGLIFLOZIN 10 MG: 10 TABLET, FILM COATED ORAL at 08:44

## 2023-05-21 RX ADMIN — ISOSORBIDE MONONITRATE 30 MG: 30 TABLET, EXTENDED RELEASE ORAL at 08:44

## 2023-05-21 RX ADMIN — MOXIFLOXACIN 1 DROP: 5 SOLUTION/ DROPS OPHTHALMIC at 11:56

## 2023-05-21 RX ADMIN — CARVEDILOL 6.25 MG: 6.25 TABLET, FILM COATED ORAL at 08:44

## 2023-05-21 RX ADMIN — BUMETANIDE 1 MG: 0.25 INJECTION INTRAMUSCULAR; INTRAVENOUS at 14:21

## 2023-05-21 RX ADMIN — Medication 2 PUFF: at 19:03

## 2023-05-21 RX ADMIN — KETOROLAC TROMETHAMINE 1 DROP: 5 SOLUTION OPHTHALMIC at 15:50

## 2023-05-21 RX ADMIN — PIPERACILLIN AND TAZOBACTAM 3375 MG: 3; .375 INJECTION, POWDER, LYOPHILIZED, FOR SOLUTION INTRAVENOUS at 00:14

## 2023-05-21 RX ADMIN — ASPIRIN 325 MG: 325 TABLET, COATED ORAL at 14:33

## 2023-05-21 RX ADMIN — SODIUM CHLORIDE: 9 INJECTION, SOLUTION INTRAVENOUS at 08:42

## 2023-05-21 RX ADMIN — POTASSIUM CHLORIDE 20 MEQ: 750 TABLET, EXTENDED RELEASE ORAL at 08:44

## 2023-05-21 RX ADMIN — IPRATROPIUM BROMIDE AND ALBUTEROL SULFATE 1 AMPULE: .5; 2.5 SOLUTION RESPIRATORY (INHALATION) at 13:46

## 2023-05-21 RX ADMIN — PIPERACILLIN AND TAZOBACTAM 3375 MG: 3; .375 INJECTION, POWDER, LYOPHILIZED, FOR SOLUTION INTRAVENOUS at 15:57

## 2023-05-21 RX ADMIN — IPRATROPIUM BROMIDE AND ALBUTEROL SULFATE 1 AMPULE: .5; 2.5 SOLUTION RESPIRATORY (INHALATION) at 08:10

## 2023-05-21 RX ADMIN — MOXIFLOXACIN 1 DROP: 5 SOLUTION/ DROPS OPHTHALMIC at 23:00

## 2023-05-21 ASSESSMENT — PAIN SCALES - GENERAL
PAINLEVEL_OUTOF10: 0
PAINLEVEL_OUTOF10: 9
PAINLEVEL_OUTOF10: 3
PAINLEVEL_OUTOF10: 0
PAINLEVEL_OUTOF10: 0
PAINLEVEL_OUTOF10: 9
PAINLEVEL_OUTOF10: 0
PAINLEVEL_OUTOF10: 0

## 2023-05-21 ASSESSMENT — PAIN DESCRIPTION - ORIENTATION
ORIENTATION: LOWER;LEFT
ORIENTATION: LEFT;LOWER

## 2023-05-21 ASSESSMENT — PAIN - FUNCTIONAL ASSESSMENT
PAIN_FUNCTIONAL_ASSESSMENT: PREVENTS OR INTERFERES WITH MANY ACTIVE NOT PASSIVE ACTIVITIES
PAIN_FUNCTIONAL_ASSESSMENT: PREVENTS OR INTERFERES WITH MANY ACTIVE NOT PASSIVE ACTIVITIES

## 2023-05-21 ASSESSMENT — PAIN DESCRIPTION - LOCATION
LOCATION: BACK;FOOT
LOCATION: BACK;FOOT

## 2023-05-21 ASSESSMENT — PAIN DESCRIPTION - DESCRIPTORS
DESCRIPTORS: ACHING;THROBBING
DESCRIPTORS: ACHING;THROBBING

## 2023-05-22 ENCOUNTER — APPOINTMENT (OUTPATIENT)
Age: 73
DRG: 853 | End: 2023-05-22
Payer: MEDICARE

## 2023-05-22 LAB
ANION GAP SERPL CALC-SCNC: 9 MMOL/L (ref 3–18)
BUN SERPL-MCNC: 52 MG/DL (ref 7–18)
BUN/CREAT SERPL: 25 (ref 12–20)
CA-I BLD-MCNC: 8.4 MG/DL (ref 8.5–10.1)
CHLORIDE SERPL-SCNC: 101 MMOL/L (ref 100–111)
CO2 SERPL-SCNC: 25 MMOL/L (ref 21–32)
CREAT SERPL-MCNC: 2.05 MG/DL (ref 0.6–1.3)
ECHO AV MEAN GRADIENT: 3 MMHG
ECHO AV MEAN VELOCITY: 0.9 M/S
ECHO AV PEAK GRADIENT: 6 MMHG
ECHO AV PEAK VELOCITY: 1.2 M/S
ECHO AV VTI: 25.9 CM
ECHO BSA: 2.47 M2
ECHO EST RA PRESSURE: 15 MMHG
ECHO IVC PROX: 3.1 CM
ECHO LA VOL 2C: 170 ML (ref 18–58)
ECHO LA VOL 4C: 134 ML (ref 18–58)
ECHO LA VOL BP: 156 ML (ref 18–58)
ECHO LA VOL/BSA BIPLANE: 65 ML/M2 (ref 16–34)
ECHO LA VOLUME AREA LENGTH: 167 ML
ECHO LA VOLUME INDEX A2C: 71 ML/M2 (ref 16–34)
ECHO LA VOLUME INDEX A4C: 56 ML/M2 (ref 16–34)
ECHO LA VOLUME INDEX AREA LENGTH: 70 ML/M2 (ref 16–34)
ECHO LV GLOBAL LONGITUDINAL STRAIN (GLS): -6.9 %
ECHO MV MAX VELOCITY: 0.8 M/S
ECHO MV MEAN GRADIENT: 1 MMHG
ECHO MV MEAN VELOCITY: 0.5 M/S
ECHO MV PEAK GRADIENT: 2 MMHG
ECHO MV VTI: 36.7 CM
ECHO PULMONARY ARTERY END DIASTOLIC PRESSURE: 3 MMHG
ECHO PV MAX VELOCITY: 0.8 M/S
ECHO PV PEAK GRADIENT: 3 MMHG
ECHO PV REGURGITANT MAX VELOCITY: 0.9 M/S
ECHO RA END SYSTOLIC VOLUME APICAL 4 CHAMBER INDEX BSA: 70 ML/M2
ECHO RA VOLUME BIPLANE METHOD OF DISKS: 155 ML
ECHO RA VOLUME INDEX BP: 65 ML/M2
ECHO RA VOLUME: 167 ML
ECHO RIGHT VENTRICULAR SYSTOLIC PRESSURE (RVSP): 49 MMHG
ECHO RV BASAL DIMENSION: 6.4 CM
ECHO RV FREE WALL PEAK S': 7 CM/S
ECHO RV GLOBAL SYSTOLIC STRAIN (GLS): -7.9 %
ECHO RV INTERNAL DIMENSION: 5.2 CM
ECHO RV LONGITUDINAL DIMENSION: 9.9 CM
ECHO RV MID DIMENSION: 4.7 CM
ECHO RV TAPSE: 1.5 CM (ref 1.7–?)
ECHO TV REGURGITANT MAX VELOCITY: 2.92 M/S
ECHO TV REGURGITANT PEAK GRADIENT: 34 MMHG
GLUCOSE SERPL-MCNC: 119 MG/DL (ref 74–99)
POTASSIUM SERPL-SCNC: 4.3 MMOL/L (ref 3.5–5.5)
SODIUM SERPL-SCNC: 135 MMOL/L (ref 136–145)
TSH SERPL DL<=0.05 MIU/L-ACNC: 10.9 UIU/ML (ref 0.36–3.74)

## 2023-05-22 PROCEDURE — 2000000000 HC ICU R&B

## 2023-05-22 PROCEDURE — 6370000000 HC RX 637 (ALT 250 FOR IP): Performed by: NURSE PRACTITIONER

## 2023-05-22 PROCEDURE — 2580000003 HC RX 258: Performed by: INTERNAL MEDICINE

## 2023-05-22 PROCEDURE — 6360000002 HC RX W HCPCS: Performed by: NURSE PRACTITIONER

## 2023-05-22 PROCEDURE — 2580000003 HC RX 258: Performed by: NURSE PRACTITIONER

## 2023-05-22 PROCEDURE — 94761 N-INVAS EAR/PLS OXIMETRY MLT: CPT

## 2023-05-22 PROCEDURE — 6370000000 HC RX 637 (ALT 250 FOR IP): Performed by: INTERNAL MEDICINE

## 2023-05-22 PROCEDURE — 2500000003 HC RX 250 WO HCPCS: Performed by: INTERNAL MEDICINE

## 2023-05-22 PROCEDURE — 6360000002 HC RX W HCPCS: Performed by: INTERNAL MEDICINE

## 2023-05-22 PROCEDURE — 80048 BASIC METABOLIC PNL TOTAL CA: CPT

## 2023-05-22 PROCEDURE — 36415 COLL VENOUS BLD VENIPUNCTURE: CPT

## 2023-05-22 PROCEDURE — 97530 THERAPEUTIC ACTIVITIES: CPT

## 2023-05-22 PROCEDURE — 2500000003 HC RX 250 WO HCPCS

## 2023-05-22 PROCEDURE — 94640 AIRWAY INHALATION TREATMENT: CPT

## 2023-05-22 PROCEDURE — 93356 MYOCRD STRAIN IMG SPCKL TRCK: CPT

## 2023-05-22 PROCEDURE — P9047 ALBUMIN (HUMAN), 25%, 50ML: HCPCS | Performed by: INTERNAL MEDICINE

## 2023-05-22 PROCEDURE — 84443 ASSAY THYROID STIM HORMONE: CPT

## 2023-05-22 PROCEDURE — 93308 TTE F-UP OR LMTD: CPT

## 2023-05-22 PROCEDURE — 76775 US EXAM ABDO BACK WALL LIM: CPT

## 2023-05-22 RX ORDER — BUMETANIDE 0.25 MG/ML
2 INJECTION INTRAMUSCULAR; INTRAVENOUS 2 TIMES DAILY
Status: DISCONTINUED | OUTPATIENT
Start: 2023-05-22 | End: 2023-05-23

## 2023-05-22 RX ORDER — METOLAZONE 2.5 MG/1
5 TABLET ORAL 2 TIMES DAILY
Status: DISCONTINUED | OUTPATIENT
Start: 2023-05-22 | End: 2023-05-22

## 2023-05-22 RX ORDER — HYDROMORPHONE HYDROCHLORIDE 1 MG/ML
1 INJECTION, SOLUTION INTRAMUSCULAR; INTRAVENOUS; SUBCUTANEOUS EVERY 4 HOURS PRN
Status: DISCONTINUED | OUTPATIENT
Start: 2023-05-22 | End: 2023-05-27 | Stop reason: HOSPADM

## 2023-05-22 RX ORDER — COLCHICINE 0.6 MG/1
0.6 CAPSULE ORAL DAILY
Status: DISCONTINUED | OUTPATIENT
Start: 2023-05-22 | End: 2023-05-27 | Stop reason: HOSPADM

## 2023-05-22 RX ORDER — BUMETANIDE 0.5 MG/1
2 TABLET ORAL 2 TIMES DAILY
Status: DISCONTINUED | OUTPATIENT
Start: 2023-05-22 | End: 2023-05-22

## 2023-05-22 RX ORDER — METHYLPREDNISOLONE SODIUM SUCCINATE 40 MG/ML
40 INJECTION, POWDER, LYOPHILIZED, FOR SOLUTION INTRAMUSCULAR; INTRAVENOUS EVERY 8 HOURS
Status: DISCONTINUED | OUTPATIENT
Start: 2023-05-22 | End: 2023-05-22

## 2023-05-22 RX ORDER — METOLAZONE 2.5 MG/1
5 TABLET ORAL 2 TIMES DAILY
Status: DISCONTINUED | OUTPATIENT
Start: 2023-05-22 | End: 2023-05-25

## 2023-05-22 RX ADMIN — ALBUMIN HUMAN 25 G: 0.25 SOLUTION INTRAVENOUS at 13:49

## 2023-05-22 RX ADMIN — HYDROMORPHONE HYDROCHLORIDE 1 MG: 1 INJECTION, SOLUTION INTRAMUSCULAR; INTRAVENOUS; SUBCUTANEOUS at 20:58

## 2023-05-22 RX ADMIN — KETOROLAC TROMETHAMINE 1 DROP: 5 SOLUTION OPHTHALMIC at 11:17

## 2023-05-22 RX ADMIN — IPRATROPIUM BROMIDE AND ALBUTEROL SULFATE 1 AMPULE: .5; 2.5 SOLUTION RESPIRATORY (INHALATION) at 07:19

## 2023-05-22 RX ADMIN — Medication 2 PUFF: at 07:18

## 2023-05-22 RX ADMIN — HYDROMORPHONE HYDROCHLORIDE 1 MG: 1 INJECTION, SOLUTION INTRAMUSCULAR; INTRAVENOUS; SUBCUTANEOUS at 15:50

## 2023-05-22 RX ADMIN — PIPERACILLIN AND TAZOBACTAM 3375 MG: 3; .375 INJECTION, POWDER, LYOPHILIZED, FOR SOLUTION INTRAVENOUS at 00:13

## 2023-05-22 RX ADMIN — EMPAGLIFLOZIN 10 MG: 10 TABLET, FILM COATED ORAL at 07:55

## 2023-05-22 RX ADMIN — IPRATROPIUM BROMIDE AND ALBUTEROL SULFATE 1 AMPULE: .5; 2.5 SOLUTION RESPIRATORY (INHALATION) at 13:23

## 2023-05-22 RX ADMIN — ISOSORBIDE MONONITRATE 30 MG: 30 TABLET, EXTENDED RELEASE ORAL at 07:55

## 2023-05-22 RX ADMIN — ALBUMIN HUMAN 25 G: 0.25 SOLUTION INTRAVENOUS at 21:07

## 2023-05-22 RX ADMIN — PIPERACILLIN AND TAZOBACTAM 3375 MG: 3; .375 INJECTION, POWDER, LYOPHILIZED, FOR SOLUTION INTRAVENOUS at 15:51

## 2023-05-22 RX ADMIN — HYDROMORPHONE HYDROCHLORIDE 1 MG: 1 INJECTION, SOLUTION INTRAMUSCULAR; INTRAVENOUS; SUBCUTANEOUS at 09:53

## 2023-05-22 RX ADMIN — Medication 2 PUFF: at 20:08

## 2023-05-22 RX ADMIN — APIXABAN 5 MG: 5 TABLET, FILM COATED ORAL at 07:55

## 2023-05-22 RX ADMIN — METHYLPREDNISOLONE SODIUM SUCCINATE 40 MG: 125 INJECTION INTRAMUSCULAR; INTRAVENOUS at 17:56

## 2023-05-22 RX ADMIN — IPRATROPIUM BROMIDE AND ALBUTEROL SULFATE 1 AMPULE: .5; 2.5 SOLUTION RESPIRATORY (INHALATION) at 20:08

## 2023-05-22 RX ADMIN — COLCHICINE 0.6 MG: 0.6 CAPSULE ORAL at 09:55

## 2023-05-22 RX ADMIN — POTASSIUM CHLORIDE 20 MEQ: 750 TABLET, EXTENDED RELEASE ORAL at 07:55

## 2023-05-22 RX ADMIN — BUMETANIDE 2 MG: 0.25 INJECTION INTRAMUSCULAR; INTRAVENOUS at 21:03

## 2023-05-22 RX ADMIN — MOXIFLOXACIN 1 DROP: 5 SOLUTION/ DROPS OPHTHALMIC at 11:17

## 2023-05-22 RX ADMIN — METHYLPREDNISOLONE SODIUM SUCCINATE 40 MG: 125 INJECTION INTRAMUSCULAR; INTRAVENOUS at 09:54

## 2023-05-22 RX ADMIN — METOLAZONE 5 MG: 2.5 TABLET ORAL at 21:07

## 2023-05-22 RX ADMIN — PIPERACILLIN AND TAZOBACTAM 3375 MG: 3; .375 INJECTION, POWDER, LYOPHILIZED, FOR SOLUTION INTRAVENOUS at 07:55

## 2023-05-22 RX ADMIN — APIXABAN 5 MG: 5 TABLET, FILM COATED ORAL at 21:07

## 2023-05-22 RX ADMIN — BUMETANIDE 2 MG: 0.25 INJECTION INTRAMUSCULAR; INTRAVENOUS at 13:49

## 2023-05-22 RX ADMIN — METOLAZONE 5 MG: 2.5 TABLET ORAL at 13:16

## 2023-05-22 RX ADMIN — SPIRONOLACTONE 25 MG: 25 TABLET ORAL at 09:54

## 2023-05-22 RX ADMIN — BUMETANIDE 1 MG: 0.25 INJECTION INTRAMUSCULAR; INTRAVENOUS at 07:55

## 2023-05-22 ASSESSMENT — PAIN DESCRIPTION - LOCATION
LOCATION: FOOT
LOCATION: FOOT

## 2023-05-22 ASSESSMENT — PAIN SCALES - GENERAL
PAINLEVEL_OUTOF10: 0
PAINLEVEL_OUTOF10: 5
PAINLEVEL_OUTOF10: 9
PAINLEVEL_OUTOF10: 8
PAINLEVEL_OUTOF10: 3
PAINLEVEL_OUTOF10: 8
PAINLEVEL_OUTOF10: 0

## 2023-05-22 ASSESSMENT — PAIN DESCRIPTION - DESCRIPTORS: DESCRIPTORS: TINGLING

## 2023-05-22 ASSESSMENT — PAIN DESCRIPTION - ORIENTATION: ORIENTATION: LEFT;RIGHT

## 2023-05-22 NOTE — CONSULTS
Nephrology Consult    Patient: Marcelo Ching MRN: [id number]  SSN: [id number]    YOB: 1950  Age: 67 y.o. Sex: male      Subjective:   Reason for the consultation. Elevated serum creatinine and fluid overload    History of present illness. The patient is 70-year-old man with underlying history of congestive heart failure with low LVEF of 35 5 to 40%, severe obesity, chronic kidney disease stage IIIa, history of gout, diabetes mellitus type 2 was hospitalized with acute hypoxic respiratory failure, CTA chest showed no evidence of PE but pulmonary edema and ascites. He was diagnosed with decompensated CHF and noticed to be on Bumex p.o. and p.o. metolazone which was held. He was also diagnosed with sepsis due to strep bacteremia and lower extremity cellulitis on IV antibiotics. He seems to have underlying chronic kidney disease stage IIIa with baseline creatinine around 1.2-1.4. On admission his creatinine was 1.5 and then gradually have gone up to 2.0 along with generalized anasarca which prompted nephrology consultation. No abdominal pain nausea vomiting or loose stools. He is noticed to have severe bilateral lower extremity swelling to extremities and along with scrotal edema. He is currently on room air. Past Medical History:   Diagnosis Date    DDD (degenerative disc disease), lumbar     Diabetes ([address]     Diabetic neuropathy ([facility]     Diabetic retinopathy ([facility]     DM2 (diabetes mellitus, type 2) ([facility]     HLD (hyperlipidemia)     HTN (hypertension)     Obesity (BMI 30-39. 9)      History reviewed. No pertinent surgical history. History reviewed. No pertinent family history.   Social History     Tobacco Use    Smoking status: Every Day     Packs/day: 0.25     Types: Cigarettes    Smokeless tobacco: Never    Tobacco comments:     Quit smokin cigarette a day   Substance Use Topics    Alcohol use: Not Currently      Current Facility-Administered Medications   Medication

## 2023-05-23 LAB
ALBUMIN SERPL-MCNC: 2.7 G/DL (ref 3.4–5)
AMMONIA PLAS-SCNC: 55 UMOL/L (ref 11–32)
ANION GAP SERPL CALC-SCNC: 7 MMOL/L (ref 3–18)
BACTERIA SPEC CULT: ABNORMAL
BUN SERPL-MCNC: 53 MG/DL (ref 7–18)
BUN/CREAT SERPL: 26 (ref 12–20)
CA-I BLD-MCNC: 8.6 MG/DL (ref 8.5–10.1)
CHLORIDE SERPL-SCNC: 97 MMOL/L (ref 100–111)
CO2 SERPL-SCNC: 28 MMOL/L (ref 21–32)
CREAT SERPL-MCNC: 2.05 MG/DL (ref 0.6–1.3)
ERYTHROCYTE [DISTWIDTH] IN BLOOD BY AUTOMATED COUNT: 19.4 % (ref 11.6–14.5)
FERRITIN SERPL-MCNC: 47 NG/ML (ref 8–388)
GLUCOSE SERPL-MCNC: 173 MG/DL (ref 74–99)
GRAM STN SPEC: ABNORMAL
HCT VFR BLD AUTO: 29.3 % (ref 36–48)
HGB BLD-MCNC: 9.3 G/DL (ref 13–16)
IRON SATN MFR SERPL: 6 % (ref 20–50)
IRON SERPL-MCNC: 21 UG/DL (ref 50–175)
Lab: ABNORMAL
MCH RBC QN AUTO: 22.6 PG (ref 24–34)
MCHC RBC AUTO-ENTMCNC: 31.7 G/DL (ref 31–37)
MCV RBC AUTO: 71.3 FL (ref 78–100)
NRBC # BLD: 0.03 K/UL (ref 0–0.01)
NRBC BLD-RTO: 0.3 PER 100 WBC
PHOSPHATE SERPL-MCNC: 3.9 MG/DL (ref 2.5–4.9)
PLATELET # BLD AUTO: 238 K/UL (ref 135–420)
POTASSIUM SERPL-SCNC: 4.1 MMOL/L (ref 3.5–5.5)
RBC # BLD AUTO: 4.11 M/UL (ref 4.35–5.65)
SODIUM SERPL-SCNC: 132 MMOL/L (ref 136–145)
TIBC SERPL-MCNC: 326 UG/DL (ref 250–450)
WBC # BLD AUTO: 9.6 K/UL (ref 4.6–13.2)

## 2023-05-23 PROCEDURE — 2500000003 HC RX 250 WO HCPCS: Performed by: INTERNAL MEDICINE

## 2023-05-23 PROCEDURE — 36415 COLL VENOUS BLD VENIPUNCTURE: CPT

## 2023-05-23 PROCEDURE — 82728 ASSAY OF FERRITIN: CPT

## 2023-05-23 PROCEDURE — 6370000000 HC RX 637 (ALT 250 FOR IP): Performed by: NURSE PRACTITIONER

## 2023-05-23 PROCEDURE — 6360000002 HC RX W HCPCS: Performed by: INTERNAL MEDICINE

## 2023-05-23 PROCEDURE — 6360000002 HC RX W HCPCS: Performed by: NURSE PRACTITIONER

## 2023-05-23 PROCEDURE — 94640 AIRWAY INHALATION TREATMENT: CPT

## 2023-05-23 PROCEDURE — 6370000000 HC RX 637 (ALT 250 FOR IP): Performed by: INTERNAL MEDICINE

## 2023-05-23 PROCEDURE — P9047 ALBUMIN (HUMAN), 25%, 50ML: HCPCS | Performed by: INTERNAL MEDICINE

## 2023-05-23 PROCEDURE — 2580000003 HC RX 258: Performed by: INTERNAL MEDICINE

## 2023-05-23 PROCEDURE — 94761 N-INVAS EAR/PLS OXIMETRY MLT: CPT

## 2023-05-23 PROCEDURE — 97530 THERAPEUTIC ACTIVITIES: CPT

## 2023-05-23 PROCEDURE — 2000000000 HC ICU R&B

## 2023-05-23 PROCEDURE — 94669 MECHANICAL CHEST WALL OSCILL: CPT

## 2023-05-23 PROCEDURE — 85027 COMPLETE CBC AUTOMATED: CPT

## 2023-05-23 PROCEDURE — 2580000003 HC RX 258: Performed by: NURSE PRACTITIONER

## 2023-05-23 PROCEDURE — 82140 ASSAY OF AMMONIA: CPT

## 2023-05-23 PROCEDURE — 83540 ASSAY OF IRON: CPT

## 2023-05-23 PROCEDURE — 80069 RENAL FUNCTION PANEL: CPT

## 2023-05-23 PROCEDURE — 97116 GAIT TRAINING THERAPY: CPT

## 2023-05-23 RX ORDER — PREDNISONE 1 MG/1
10 TABLET ORAL DAILY
Status: COMPLETED | OUTPATIENT
Start: 2023-05-23 | End: 2023-05-27

## 2023-05-23 RX ORDER — BUMETANIDE 0.25 MG/ML
2 INJECTION INTRAMUSCULAR; INTRAVENOUS 3 TIMES DAILY
Status: DISCONTINUED | OUTPATIENT
Start: 2023-05-23 | End: 2023-05-24

## 2023-05-23 RX ORDER — ALLOPURINOL 100 MG/1
100 TABLET ORAL DAILY
Status: DISCONTINUED | OUTPATIENT
Start: 2023-05-23 | End: 2023-05-27 | Stop reason: HOSPADM

## 2023-05-23 RX ORDER — FERROUS SULFATE 325(65) MG
325 TABLET ORAL 2 TIMES DAILY
Status: DISCONTINUED | OUTPATIENT
Start: 2023-05-23 | End: 2023-05-27 | Stop reason: HOSPADM

## 2023-05-23 RX ADMIN — ALLOPURINOL 100 MG: 100 TABLET ORAL at 09:10

## 2023-05-23 RX ADMIN — PREDNISONE 10 MG: 5 TABLET ORAL at 09:10

## 2023-05-23 RX ADMIN — METOLAZONE 5 MG: 2.5 TABLET ORAL at 09:07

## 2023-05-23 RX ADMIN — Medication 2 PUFF: at 07:10

## 2023-05-23 RX ADMIN — IPRATROPIUM BROMIDE AND ALBUTEROL SULFATE 1 AMPULE: .5; 2.5 SOLUTION RESPIRATORY (INHALATION) at 07:10

## 2023-05-23 RX ADMIN — IPRATROPIUM BROMIDE AND ALBUTEROL SULFATE 1 AMPULE: .5; 2.5 SOLUTION RESPIRATORY (INHALATION) at 19:49

## 2023-05-23 RX ADMIN — APIXABAN 5 MG: 5 TABLET, FILM COATED ORAL at 09:07

## 2023-05-23 RX ADMIN — PIPERACILLIN AND TAZOBACTAM 3375 MG: 3; .375 INJECTION, POWDER, LYOPHILIZED, FOR SOLUTION INTRAVENOUS at 07:51

## 2023-05-23 RX ADMIN — BUMETANIDE 2 MG: 0.25 INJECTION INTRAMUSCULAR; INTRAVENOUS at 20:41

## 2023-05-23 RX ADMIN — EMPAGLIFLOZIN 10 MG: 10 TABLET, FILM COATED ORAL at 09:08

## 2023-05-23 RX ADMIN — METOLAZONE 5 MG: 2.5 TABLET ORAL at 20:37

## 2023-05-23 RX ADMIN — FERROUS SULFATE TAB 325 MG (65 MG ELEMENTAL FE) 325 MG: 325 (65 FE) TAB at 10:45

## 2023-05-23 RX ADMIN — COLCHICINE 0.6 MG: 0.6 CAPSULE ORAL at 09:08

## 2023-05-23 RX ADMIN — HYDROMORPHONE HYDROCHLORIDE 1 MG: 1 INJECTION, SOLUTION INTRAMUSCULAR; INTRAVENOUS; SUBCUTANEOUS at 23:43

## 2023-05-23 RX ADMIN — IPRATROPIUM BROMIDE AND ALBUTEROL SULFATE 1 AMPULE: .5; 2.5 SOLUTION RESPIRATORY (INHALATION) at 14:05

## 2023-05-23 RX ADMIN — FERROUS SULFATE TAB 325 MG (65 MG ELEMENTAL FE) 325 MG: 325 (65 FE) TAB at 20:37

## 2023-05-23 RX ADMIN — PIPERACILLIN AND TAZOBACTAM 3375 MG: 3; .375 INJECTION, POWDER, LYOPHILIZED, FOR SOLUTION INTRAVENOUS at 00:58

## 2023-05-23 RX ADMIN — Medication 2 PUFF: at 19:49

## 2023-05-23 RX ADMIN — ISOSORBIDE MONONITRATE 30 MG: 30 TABLET, EXTENDED RELEASE ORAL at 09:08

## 2023-05-23 RX ADMIN — ALBUMIN HUMAN 25 G: 0.25 SOLUTION INTRAVENOUS at 05:37

## 2023-05-23 RX ADMIN — POTASSIUM CHLORIDE 20 MEQ: 750 TABLET, EXTENDED RELEASE ORAL at 09:07

## 2023-05-23 RX ADMIN — HYDROMORPHONE HYDROCHLORIDE 1 MG: 1 INJECTION, SOLUTION INTRAMUSCULAR; INTRAVENOUS; SUBCUTANEOUS at 20:37

## 2023-05-23 RX ADMIN — BUMETANIDE 2 MG: 0.25 INJECTION INTRAMUSCULAR; INTRAVENOUS at 09:08

## 2023-05-23 RX ADMIN — BUMETANIDE 2 MG: 0.25 INJECTION INTRAMUSCULAR; INTRAVENOUS at 15:03

## 2023-05-23 RX ADMIN — APIXABAN 5 MG: 5 TABLET, FILM COATED ORAL at 20:41

## 2023-05-23 RX ADMIN — METHYLPREDNISOLONE SODIUM SUCCINATE 40 MG: 125 INJECTION INTRAMUSCULAR; INTRAVENOUS at 01:00

## 2023-05-23 RX ADMIN — SPIRONOLACTONE 25 MG: 25 TABLET ORAL at 09:08

## 2023-05-23 RX ADMIN — HYDROMORPHONE HYDROCHLORIDE 1 MG: 1 INJECTION, SOLUTION INTRAMUSCULAR; INTRAVENOUS; SUBCUTANEOUS at 00:57

## 2023-05-23 RX ADMIN — HYDROMORPHONE HYDROCHLORIDE 1 MG: 1 INJECTION, SOLUTION INTRAMUSCULAR; INTRAVENOUS; SUBCUTANEOUS at 13:58

## 2023-05-23 ASSESSMENT — PAIN DESCRIPTION - DESCRIPTORS
DESCRIPTORS: DULL
DESCRIPTORS: ACHING;SHOOTING
DESCRIPTORS: ACHING;SHOOTING
DESCRIPTORS: TINGLING
DESCRIPTORS: ACHING;SHOOTING

## 2023-05-23 ASSESSMENT — PAIN DESCRIPTION - LOCATION
LOCATION: FOOT;LEG
LOCATION: FOOT
LOCATION: LEG
LOCATION: LEG;FOOT
LOCATION: LEG
LOCATION: FOOT;LEG

## 2023-05-23 ASSESSMENT — PAIN SCALES - GENERAL
PAINLEVEL_OUTOF10: 8
PAINLEVEL_OUTOF10: 5
PAINLEVEL_OUTOF10: 9
PAINLEVEL_OUTOF10: 4
PAINLEVEL_OUTOF10: 9
PAINLEVEL_OUTOF10: 7
PAINLEVEL_OUTOF10: 8
PAINLEVEL_OUTOF10: 0
PAINLEVEL_OUTOF10: 0
PAINLEVEL_OUTOF10: 5
PAINLEVEL_OUTOF10: 3
PAINLEVEL_OUTOF10: 0
PAINLEVEL_OUTOF10: 0

## 2023-05-23 ASSESSMENT — PAIN SCALES - WONG BAKER: WONGBAKER_NUMERICALRESPONSE: 2

## 2023-05-23 ASSESSMENT — PAIN DESCRIPTION - ORIENTATION
ORIENTATION: LEFT

## 2023-05-23 ASSESSMENT — PAIN - FUNCTIONAL ASSESSMENT: PAIN_FUNCTIONAL_ASSESSMENT: PREVENTS OR INTERFERES SOME ACTIVE ACTIVITIES AND ADLS

## 2023-05-24 LAB
ALBUMIN SERPL-MCNC: 2.8 G/DL (ref 3.4–5)
ANION GAP SERPL CALC-SCNC: 8 MMOL/L (ref 3–18)
BUN SERPL-MCNC: 56 MG/DL (ref 7–18)
BUN/CREAT SERPL: 30 (ref 12–20)
CA-I BLD-MCNC: 9 MG/DL (ref 8.5–10.1)
CHLORIDE SERPL-SCNC: 97 MMOL/L (ref 100–111)
CO2 SERPL-SCNC: 31 MMOL/L (ref 21–32)
CREAT SERPL-MCNC: 1.85 MG/DL (ref 0.6–1.3)
ERYTHROCYTE [DISTWIDTH] IN BLOOD BY AUTOMATED COUNT: 19.3 % (ref 11.6–14.5)
GLUCOSE SERPL-MCNC: 140 MG/DL (ref 74–99)
HCT VFR BLD AUTO: 28.8 % (ref 36–48)
HGB BLD-MCNC: 9 G/DL (ref 13–16)
MAGNESIUM SERPL-MCNC: 2.7 MG/DL (ref 1.6–2.6)
MCH RBC QN AUTO: 22.7 PG (ref 24–34)
MCHC RBC AUTO-ENTMCNC: 31.3 G/DL (ref 31–37)
MCV RBC AUTO: 72.5 FL (ref 78–100)
NRBC # BLD: 0.13 K/UL (ref 0–0.01)
NRBC BLD-RTO: 1 PER 100 WBC
PHOSPHATE SERPL-MCNC: 3.4 MG/DL (ref 2.5–4.9)
PLATELET # BLD AUTO: 272 K/UL (ref 135–420)
PMV BLD AUTO: 10.4 FL (ref 9.2–11.8)
POTASSIUM SERPL-SCNC: 4 MMOL/L (ref 3.5–5.5)
RBC # BLD AUTO: 3.97 M/UL (ref 4.35–5.65)
SODIUM SERPL-SCNC: 136 MMOL/L (ref 136–145)
WBC # BLD AUTO: 13.1 K/UL (ref 4.6–13.2)

## 2023-05-24 PROCEDURE — 36415 COLL VENOUS BLD VENIPUNCTURE: CPT

## 2023-05-24 PROCEDURE — 94640 AIRWAY INHALATION TREATMENT: CPT

## 2023-05-24 PROCEDURE — 6370000000 HC RX 637 (ALT 250 FOR IP): Performed by: NURSE PRACTITIONER

## 2023-05-24 PROCEDURE — 6370000000 HC RX 637 (ALT 250 FOR IP): Performed by: INTERNAL MEDICINE

## 2023-05-24 PROCEDURE — 94669 MECHANICAL CHEST WALL OSCILL: CPT

## 2023-05-24 PROCEDURE — 94664 DEMO&/EVAL PT USE INHALER: CPT

## 2023-05-24 PROCEDURE — 1100000000 HC RM PRIVATE

## 2023-05-24 PROCEDURE — 85027 COMPLETE CBC AUTOMATED: CPT

## 2023-05-24 PROCEDURE — 2500000003 HC RX 250 WO HCPCS: Performed by: INTERNAL MEDICINE

## 2023-05-24 PROCEDURE — 80069 RENAL FUNCTION PANEL: CPT

## 2023-05-24 PROCEDURE — 94761 N-INVAS EAR/PLS OXIMETRY MLT: CPT

## 2023-05-24 PROCEDURE — 83735 ASSAY OF MAGNESIUM: CPT

## 2023-05-24 PROCEDURE — 87040 BLOOD CULTURE FOR BACTERIA: CPT

## 2023-05-24 RX ORDER — METOPROLOL SUCCINATE 25 MG/1
12.5 TABLET, EXTENDED RELEASE ORAL DAILY
Status: DISCONTINUED | OUTPATIENT
Start: 2023-05-24 | End: 2023-05-27 | Stop reason: HOSPADM

## 2023-05-24 RX ORDER — AMOXICILLIN AND CLAVULANATE POTASSIUM 875; 125 MG/1; MG/1
1 TABLET, FILM COATED ORAL EVERY 12 HOURS SCHEDULED
Status: DISCONTINUED | OUTPATIENT
Start: 2023-05-24 | End: 2023-05-27 | Stop reason: HOSPADM

## 2023-05-24 RX ORDER — BUMETANIDE 0.25 MG/ML
2 INJECTION INTRAMUSCULAR; INTRAVENOUS 2 TIMES DAILY
Status: DISCONTINUED | OUTPATIENT
Start: 2023-05-24 | End: 2023-05-25

## 2023-05-24 RX ADMIN — AMOXICILLIN AND CLAVULANATE POTASSIUM 1 TABLET: 875; 125 TABLET, FILM COATED ORAL at 20:16

## 2023-05-24 RX ADMIN — Medication 2 PUFF: at 07:17

## 2023-05-24 RX ADMIN — POTASSIUM CHLORIDE 20 MEQ: 750 TABLET, EXTENDED RELEASE ORAL at 08:30

## 2023-05-24 RX ADMIN — AMOXICILLIN AND CLAVULANATE POTASSIUM 1 TABLET: 875; 125 TABLET, FILM COATED ORAL at 10:28

## 2023-05-24 RX ADMIN — EMPAGLIFLOZIN 10 MG: 10 TABLET, FILM COATED ORAL at 08:30

## 2023-05-24 RX ADMIN — PREDNISONE 10 MG: 5 TABLET ORAL at 08:30

## 2023-05-24 RX ADMIN — BUMETANIDE 2 MG: 0.25 INJECTION INTRAMUSCULAR; INTRAVENOUS at 20:17

## 2023-05-24 RX ADMIN — APIXABAN 5 MG: 5 TABLET, FILM COATED ORAL at 08:30

## 2023-05-24 RX ADMIN — FERROUS SULFATE TAB 325 MG (65 MG ELEMENTAL FE) 325 MG: 325 (65 FE) TAB at 08:30

## 2023-05-24 RX ADMIN — ISOSORBIDE MONONITRATE 30 MG: 30 TABLET, EXTENDED RELEASE ORAL at 08:30

## 2023-05-24 RX ADMIN — HYDROMORPHONE HYDROCHLORIDE 1 MG: 1 INJECTION, SOLUTION INTRAMUSCULAR; INTRAVENOUS; SUBCUTANEOUS at 08:30

## 2023-05-24 RX ADMIN — SPIRONOLACTONE 25 MG: 25 TABLET ORAL at 08:31

## 2023-05-24 RX ADMIN — HYDROMORPHONE HYDROCHLORIDE 1 MG: 1 INJECTION, SOLUTION INTRAMUSCULAR; INTRAVENOUS; SUBCUTANEOUS at 20:10

## 2023-05-24 RX ADMIN — METOLAZONE 5 MG: 2.5 TABLET ORAL at 20:16

## 2023-05-24 RX ADMIN — ALLOPURINOL 100 MG: 100 TABLET ORAL at 08:30

## 2023-05-24 RX ADMIN — METOPROLOL SUCCINATE 12.5 MG: 25 TABLET, EXTENDED RELEASE ORAL at 11:48

## 2023-05-24 RX ADMIN — APIXABAN 5 MG: 5 TABLET, FILM COATED ORAL at 20:16

## 2023-05-24 RX ADMIN — BUMETANIDE 2 MG: 0.25 INJECTION INTRAMUSCULAR; INTRAVENOUS at 08:30

## 2023-05-24 RX ADMIN — Medication 2 PUFF: at 19:11

## 2023-05-24 RX ADMIN — IPRATROPIUM BROMIDE AND ALBUTEROL SULFATE 1 AMPULE: .5; 2.5 SOLUTION RESPIRATORY (INHALATION) at 07:17

## 2023-05-24 RX ADMIN — HYDROMORPHONE HYDROCHLORIDE 1 MG: 1 INJECTION, SOLUTION INTRAMUSCULAR; INTRAVENOUS; SUBCUTANEOUS at 04:46

## 2023-05-24 RX ADMIN — COLCHICINE 0.6 MG: 0.6 CAPSULE ORAL at 08:30

## 2023-05-24 RX ADMIN — IPRATROPIUM BROMIDE AND ALBUTEROL SULFATE 1 AMPULE: .5; 2.5 SOLUTION RESPIRATORY (INHALATION) at 13:45

## 2023-05-24 RX ADMIN — FERROUS SULFATE TAB 325 MG (65 MG ELEMENTAL FE) 325 MG: 325 (65 FE) TAB at 20:16

## 2023-05-24 RX ADMIN — IPRATROPIUM BROMIDE AND ALBUTEROL SULFATE 1 AMPULE: .5; 2.5 SOLUTION RESPIRATORY (INHALATION) at 19:11

## 2023-05-24 RX ADMIN — METOLAZONE 5 MG: 2.5 TABLET ORAL at 08:29

## 2023-05-24 ASSESSMENT — PAIN DESCRIPTION - ORIENTATION
ORIENTATION: LEFT
ORIENTATION: RIGHT;LEFT
ORIENTATION: LEFT

## 2023-05-24 ASSESSMENT — PAIN DESCRIPTION - LOCATION
LOCATION: FOOT;LEG;HAND
LOCATION: LEG;FOOT
LOCATION: FOOT;LEG
LOCATION: FOOT;LEG
LOCATION: LEG;FOOT

## 2023-05-24 ASSESSMENT — PAIN DESCRIPTION - DESCRIPTORS
DESCRIPTORS: ACHING;SHOOTING
DESCRIPTORS: ACHING
DESCRIPTORS: ACHING;SHOOTING

## 2023-05-24 ASSESSMENT — PAIN SCALES - GENERAL
PAINLEVEL_OUTOF10: 0
PAINLEVEL_OUTOF10: 6
PAINLEVEL_OUTOF10: 8
PAINLEVEL_OUTOF10: 7
PAINLEVEL_OUTOF10: 8
PAINLEVEL_OUTOF10: 8

## 2023-05-24 ASSESSMENT — PAIN - FUNCTIONAL ASSESSMENT
PAIN_FUNCTIONAL_ASSESSMENT: ACTIVITIES ARE NOT PREVENTED
PAIN_FUNCTIONAL_ASSESSMENT: PREVENTS OR INTERFERES WITH MANY ACTIVE NOT PASSIVE ACTIVITIES

## 2023-05-25 ENCOUNTER — APPOINTMENT (OUTPATIENT)
Age: 73
DRG: 853 | End: 2023-05-25
Attending: INTERNAL MEDICINE
Payer: MEDICARE

## 2023-05-25 LAB
ANION GAP SERPL CALC-SCNC: 5 MMOL/L (ref 3–18)
BACTERIA SPEC CULT: NORMAL
BASOPHILS # BLD: 0 K/UL (ref 0–0.1)
BASOPHILS NFR BLD: 0 % (ref 0–2)
BNP SERPL-MCNC: ABNORMAL PG/ML (ref 0–900)
BUN SERPL-MCNC: 56 MG/DL (ref 7–18)
BUN/CREAT SERPL: 34 (ref 12–20)
CA-I BLD-MCNC: 9 MG/DL (ref 8.5–10.1)
CHLORIDE SERPL-SCNC: 94 MMOL/L (ref 100–111)
CO2 SERPL-SCNC: 36 MMOL/L (ref 21–32)
CREAT SERPL-MCNC: 1.65 MG/DL (ref 0.6–1.3)
DIFFERENTIAL METHOD BLD: ABNORMAL
ECHO BSA: 2.33 M2
EOSINOPHIL # BLD: 0 K/UL (ref 0–0.4)
EOSINOPHIL NFR BLD: 0 % (ref 0–5)
ERYTHROCYTE [DISTWIDTH] IN BLOOD BY AUTOMATED COUNT: 19.9 % (ref 11.6–14.5)
GLUCOSE SERPL-MCNC: 114 MG/DL (ref 74–99)
HCT VFR BLD AUTO: 28.9 % (ref 36–48)
HGB BLD-MCNC: 9.2 G/DL (ref 13–16)
IMM GRANULOCYTES # BLD AUTO: 0.2 K/UL (ref 0–0.04)
IMM GRANULOCYTES NFR BLD AUTO: 1 % (ref 0–0.5)
LYMPHOCYTES # BLD: 1 K/UL (ref 0.9–3.6)
LYMPHOCYTES NFR BLD: 8 % (ref 21–52)
Lab: NORMAL
MAGNESIUM SERPL-MCNC: 2.3 MG/DL (ref 1.6–2.6)
MCH RBC QN AUTO: 23.1 PG (ref 24–34)
MCHC RBC AUTO-ENTMCNC: 31.8 G/DL (ref 31–37)
MCV RBC AUTO: 72.4 FL (ref 78–100)
MONOCYTES # BLD: 0.9 K/UL (ref 0.05–1.2)
MONOCYTES NFR BLD: 7 % (ref 3–10)
NEUTS SEG # BLD: 10.6 K/UL (ref 1.8–8)
NEUTS SEG NFR BLD: 84 % (ref 40–73)
NRBC # BLD: 0.08 K/UL (ref 0–0.01)
NRBC BLD-RTO: 0.6 PER 100 WBC
PHOSPHATE SERPL-MCNC: 3.1 MG/DL (ref 2.5–4.9)
PLATELET # BLD AUTO: 297 K/UL (ref 135–420)
PMV BLD AUTO: 10.7 FL (ref 9.2–11.8)
POTASSIUM SERPL-SCNC: 3.9 MMOL/L (ref 3.5–5.5)
RBC # BLD AUTO: 3.99 M/UL (ref 4.35–5.65)
SODIUM SERPL-SCNC: 135 MMOL/L (ref 136–145)
VAS RIGHT AX A MID PSV: 106.4 CM/S
VAS RIGHT BRACHIAL A MID PSV: 78.2 CM/S
VAS RIGHT RADIAL A DIST PSV: 59.6 CM/S
VAS RIGHT RADIAL A MID PSV: 57.9 CM/S
VAS RIGHT RADIAL A PROX PSV: 77.4 CM/S
VAS RIGHT SUBCLAVIAN DIST PSV: 95.2 CM/S
VAS RIGHT ULNAR A DIST PSV: 67 CM/S
WBC # BLD AUTO: 12.8 K/UL (ref 4.6–13.2)

## 2023-05-25 PROCEDURE — 6370000000 HC RX 637 (ALT 250 FOR IP): Performed by: NURSE PRACTITIONER

## 2023-05-25 PROCEDURE — 2500000003 HC RX 250 WO HCPCS: Performed by: INTERNAL MEDICINE

## 2023-05-25 PROCEDURE — 83735 ASSAY OF MAGNESIUM: CPT

## 2023-05-25 PROCEDURE — 97116 GAIT TRAINING THERAPY: CPT

## 2023-05-25 PROCEDURE — 94761 N-INVAS EAR/PLS OXIMETRY MLT: CPT

## 2023-05-25 PROCEDURE — 85025 COMPLETE CBC W/AUTO DIFF WBC: CPT

## 2023-05-25 PROCEDURE — 93931 UPPER EXTREMITY STUDY: CPT

## 2023-05-25 PROCEDURE — 6370000000 HC RX 637 (ALT 250 FOR IP): Performed by: INTERNAL MEDICINE

## 2023-05-25 PROCEDURE — 36415 COLL VENOUS BLD VENIPUNCTURE: CPT

## 2023-05-25 PROCEDURE — 80048 BASIC METABOLIC PNL TOTAL CA: CPT

## 2023-05-25 PROCEDURE — 84100 ASSAY OF PHOSPHORUS: CPT

## 2023-05-25 PROCEDURE — 1100000000 HC RM PRIVATE

## 2023-05-25 PROCEDURE — 97530 THERAPEUTIC ACTIVITIES: CPT

## 2023-05-25 PROCEDURE — 94640 AIRWAY INHALATION TREATMENT: CPT

## 2023-05-25 PROCEDURE — 83880 ASSAY OF NATRIURETIC PEPTIDE: CPT

## 2023-05-25 RX ORDER — METOLAZONE 2.5 MG/1
2.5 TABLET ORAL DAILY
Status: DISCONTINUED | OUTPATIENT
Start: 2023-05-26 | End: 2023-05-26

## 2023-05-25 RX ORDER — BUMETANIDE 0.25 MG/ML
1 INJECTION INTRAMUSCULAR; INTRAVENOUS 2 TIMES DAILY
Status: DISCONTINUED | OUTPATIENT
Start: 2023-05-25 | End: 2023-05-27 | Stop reason: HOSPADM

## 2023-05-25 RX ADMIN — EMPAGLIFLOZIN 10 MG: 10 TABLET, FILM COATED ORAL at 09:37

## 2023-05-25 RX ADMIN — SPIRONOLACTONE 25 MG: 25 TABLET ORAL at 09:37

## 2023-05-25 RX ADMIN — FERROUS SULFATE TAB 325 MG (65 MG ELEMENTAL FE) 325 MG: 325 (65 FE) TAB at 20:48

## 2023-05-25 RX ADMIN — BUMETANIDE 1 MG: 0.25 INJECTION INTRAMUSCULAR; INTRAVENOUS at 20:48

## 2023-05-25 RX ADMIN — APIXABAN 5 MG: 5 TABLET, FILM COATED ORAL at 20:48

## 2023-05-25 RX ADMIN — AMOXICILLIN AND CLAVULANATE POTASSIUM 1 TABLET: 875; 125 TABLET, FILM COATED ORAL at 20:48

## 2023-05-25 RX ADMIN — ISOSORBIDE MONONITRATE 30 MG: 30 TABLET, EXTENDED RELEASE ORAL at 09:37

## 2023-05-25 RX ADMIN — FERROUS SULFATE TAB 325 MG (65 MG ELEMENTAL FE) 325 MG: 325 (65 FE) TAB at 09:37

## 2023-05-25 RX ADMIN — PREDNISONE 10 MG: 5 TABLET ORAL at 09:37

## 2023-05-25 RX ADMIN — BUMETANIDE 2 MG: 0.25 INJECTION INTRAMUSCULAR; INTRAVENOUS at 09:38

## 2023-05-25 RX ADMIN — HYDROMORPHONE HYDROCHLORIDE 1 MG: 1 INJECTION, SOLUTION INTRAMUSCULAR; INTRAVENOUS; SUBCUTANEOUS at 00:51

## 2023-05-25 RX ADMIN — HYDROMORPHONE HYDROCHLORIDE 1 MG: 1 INJECTION, SOLUTION INTRAMUSCULAR; INTRAVENOUS; SUBCUTANEOUS at 06:24

## 2023-05-25 RX ADMIN — Medication 2 PUFF: at 20:45

## 2023-05-25 RX ADMIN — METOLAZONE 5 MG: 2.5 TABLET ORAL at 09:38

## 2023-05-25 RX ADMIN — COLCHICINE 0.6 MG: 0.6 CAPSULE ORAL at 09:38

## 2023-05-25 RX ADMIN — POTASSIUM CHLORIDE 20 MEQ: 750 TABLET, EXTENDED RELEASE ORAL at 09:38

## 2023-05-25 RX ADMIN — AMOXICILLIN AND CLAVULANATE POTASSIUM 1 TABLET: 875; 125 TABLET, FILM COATED ORAL at 09:37

## 2023-05-25 RX ADMIN — ALLOPURINOL 100 MG: 100 TABLET ORAL at 09:37

## 2023-05-25 RX ADMIN — APIXABAN 5 MG: 5 TABLET, FILM COATED ORAL at 09:36

## 2023-05-25 RX ADMIN — HYDROMORPHONE HYDROCHLORIDE 1 MG: 1 INJECTION, SOLUTION INTRAMUSCULAR; INTRAVENOUS; SUBCUTANEOUS at 20:47

## 2023-05-25 RX ADMIN — Medication 2 PUFF: at 07:32

## 2023-05-25 RX ADMIN — METOPROLOL SUCCINATE 12.5 MG: 25 TABLET, EXTENDED RELEASE ORAL at 09:38

## 2023-05-25 ASSESSMENT — PAIN SCALES - GENERAL
PAINLEVEL_OUTOF10: 0
PAINLEVEL_OUTOF10: 8

## 2023-05-25 ASSESSMENT — PAIN DESCRIPTION - LOCATION
LOCATION: FOOT;HAND;LEG
LOCATION: FOOT;HAND;LEG

## 2023-05-25 ASSESSMENT — PAIN DESCRIPTION - ORIENTATION
ORIENTATION: RIGHT;LEFT

## 2023-05-25 ASSESSMENT — PAIN DESCRIPTION - DESCRIPTORS
DESCRIPTORS: ACHING
DESCRIPTORS: ACHING
DESCRIPTORS: THROBBING

## 2023-05-25 NOTE — PLAN OF CARE
Problem: Discharge Planning  Goal: Discharge to home or other facility with appropriate resources  Outcome: Progressing  Flowsheets (Taken 5/24/2023 2000)  Discharge to home or other facility with appropriate resources: Identify barriers to discharge with patient and caregiver     Problem: Skin/Tissue Integrity  Goal: Absence of new skin breakdown  Description: 1. Monitor for areas of redness and/or skin breakdown  2. Assess vascular access sites hourly  3. Every 4-6 hours minimum:  Change oxygen saturation probe site  4. Every 4-6 hours:  If on nasal continuous positive airway pressure, respiratory therapy assess nares and determine need for appliance change or resting period.   5/24/2023 2046 by Dimple Magallanes RN  Outcome: Progressing  5/24/2023 0942 by Kirk Rosas RN  Outcome: Progressing     Problem: ABCDS Injury Assessment  Goal: Absence of physical injury  5/24/2023 2046 by Dimple Magallanes RN  Outcome: Progressing  5/24/2023 0942 by Kirk Rosas RN  Outcome: Progressing     Problem: Safety - Adult  Goal: Free from fall injury  Outcome: Progressing     Problem: Chronic Conditions and Co-morbidities  Goal: Patient's chronic conditions and co-morbidity symptoms are monitored and maintained or improved  Outcome: Progressing  Flowsheets (Taken 5/24/2023 2000)  Care Plan - Patient's Chronic Conditions and Co-Morbidity Symptoms are Monitored and Maintained or Improved: Monitor and assess patient's chronic conditions and comorbid symptoms for stability, deterioration, or improvement     Problem: Pain  Goal: Verbalizes/displays adequate comfort level or baseline comfort level  Outcome: Progressing     Problem: Nutrition Deficit:  Goal: Optimize nutritional status  Outcome: Progressing

## 2023-05-25 NOTE — PLAN OF CARE
Comprehensive Nutrition Assessment    Type and Reason for Visit:  Reassess    Nutrition Recommendations/Plan:   Continue current diet despite his c/o restrictions     Malnutrition Assessment:  Malnutrition Status:  No malnutrition (05/25/23 1511)    Context:  Chronic Illness     Findings of the 6 clinical characteristics of malnutrition:  Energy Intake:  No significant decrease in energy intake  Weight Loss:  No significant weight loss     Body Fat Loss:  No significant body fat loss     Muscle Mass Loss:  No significant muscle mass loss    Fluid Accumulation:  Unable to assess     Strength:  Not Performed    Nutrition Assessment:    Mr Reyes Richardson is a 66 yo male with pmhx sig for HTN, HFrEF (38%), DVT (on Eliquis), liver cirrhosis+ ascites, DM-II, PAD, chronic left heel diabetic ulcer, and HLP who was admitted for respiratory failure, CHF Exacerbationa and sepsis. Since last assessment, patient remains nutritionally consistent. Appetite and intake have been adequate to meet his nutritional needs. He is ordered a cardiac heart healthy diet, this was explained to him. He reports he \"doesnt want to be restricted of anything. \"  RDN will continue the current diet despite due to medical diagnosis. Patient also with -11% loss in weight since admission, this is related to fluid losses. Continue with 1.2L fluid restriction. Nutrition Related Findings:    LABS: Cre 1.65, eGFR @ 44) - indicative of CKD stage 3; Iron - 12 L Wound Type: None       Current Nutrition Intake & Therapies:    Average Meal Intake: %  Average Supplements Intake: None Ordered  ADULT DIET; Regular; Low Fat/Low Chol/High Fiber/JUNG; 1200 ml    Anthropometric Measures:  Height: 5' 11\" (180.3 cm)  Ideal Body Weight (IBW): 172 lbs (78 kg)    Admission Body Weight: 270 lb (122.5 kg)  Current Body Weight: 239 lb (108.4 kg), 139 % IBW.  Weight Source: Bed Scale  Current BMI (kg/m2): 33.3  Usual Body Weight: 270 lb (122.5 kg)  % Weight Change

## 2023-05-26 LAB
ANION GAP SERPL CALC-SCNC: 7 MMOL/L (ref 3–18)
BASOPHILS # BLD: 0.1 K/UL (ref 0–0.1)
BASOPHILS NFR BLD: 0 % (ref 0–2)
BUN SERPL-MCNC: 49 MG/DL (ref 7–18)
BUN/CREAT SERPL: 35 (ref 12–20)
CA-I BLD-MCNC: 9.1 MG/DL (ref 8.5–10.1)
CHLORIDE SERPL-SCNC: 90 MMOL/L (ref 100–111)
CO2 SERPL-SCNC: 39 MMOL/L (ref 21–32)
CREAT SERPL-MCNC: 1.39 MG/DL (ref 0.6–1.3)
DIFFERENTIAL METHOD BLD: ABNORMAL
EKG ATRIAL RATE: 63 BPM
EKG DIAGNOSIS: NORMAL
EKG P AXIS: 22 DEGREES
EKG P-R INTERVAL: 172 MS
EKG Q-T INTERVAL: 448 MS
EKG QRS DURATION: 104 MS
EKG QTC CALCULATION (BAZETT): 458 MS
EKG R AXIS: -78 DEGREES
EKG T AXIS: 38 DEGREES
EKG VENTRICULAR RATE: 63 BPM
EOSINOPHIL # BLD: 0.1 K/UL (ref 0–0.4)
EOSINOPHIL NFR BLD: 1 % (ref 0–5)
ERYTHROCYTE [DISTWIDTH] IN BLOOD BY AUTOMATED COUNT: 19.9 % (ref 11.6–14.5)
GLUCOSE SERPL-MCNC: 150 MG/DL (ref 74–99)
HCT VFR BLD AUTO: 31.3 % (ref 36–48)
HGB BLD-MCNC: 9.4 G/DL (ref 13–16)
IMM GRANULOCYTES # BLD AUTO: 0.2 K/UL (ref 0–0.04)
IMM GRANULOCYTES NFR BLD AUTO: 1 % (ref 0–0.5)
LYMPHOCYTES # BLD: 1.2 K/UL (ref 0.9–3.6)
LYMPHOCYTES NFR BLD: 7 % (ref 21–52)
MAGNESIUM SERPL-MCNC: 2.3 MG/DL (ref 1.6–2.6)
MCH RBC QN AUTO: 22.3 PG (ref 24–34)
MCHC RBC AUTO-ENTMCNC: 30 G/DL (ref 31–37)
MCV RBC AUTO: 74.3 FL (ref 78–100)
MONOCYTES # BLD: 1 K/UL (ref 0.05–1.2)
MONOCYTES NFR BLD: 6 % (ref 3–10)
NEUTS SEG # BLD: 15 K/UL (ref 1.8–8)
NEUTS SEG NFR BLD: 85 % (ref 40–73)
NRBC # BLD: 0.03 K/UL (ref 0–0.01)
NRBC BLD-RTO: 0.2 PER 100 WBC
PHOSPHATE SERPL-MCNC: 2.8 MG/DL (ref 2.5–4.9)
PLATELET # BLD AUTO: 330 K/UL (ref 135–420)
PMV BLD AUTO: 10.7 FL (ref 9.2–11.8)
POTASSIUM SERPL-SCNC: 3.6 MMOL/L (ref 3.5–5.5)
RBC # BLD AUTO: 4.21 M/UL (ref 4.35–5.65)
SODIUM SERPL-SCNC: 136 MMOL/L (ref 136–145)
WBC # BLD AUTO: 17.5 K/UL (ref 4.6–13.2)

## 2023-05-26 PROCEDURE — 2500000003 HC RX 250 WO HCPCS: Performed by: INTERNAL MEDICINE

## 2023-05-26 PROCEDURE — 94640 AIRWAY INHALATION TREATMENT: CPT

## 2023-05-26 PROCEDURE — 93005 ELECTROCARDIOGRAM TRACING: CPT | Performed by: INTERNAL MEDICINE

## 2023-05-26 PROCEDURE — 6360000002 HC RX W HCPCS: Performed by: INTERNAL MEDICINE

## 2023-05-26 PROCEDURE — 94761 N-INVAS EAR/PLS OXIMETRY MLT: CPT

## 2023-05-26 PROCEDURE — 85025 COMPLETE CBC W/AUTO DIFF WBC: CPT

## 2023-05-26 PROCEDURE — 6370000000 HC RX 637 (ALT 250 FOR IP): Performed by: NURSE PRACTITIONER

## 2023-05-26 PROCEDURE — 97530 THERAPEUTIC ACTIVITIES: CPT

## 2023-05-26 PROCEDURE — 1100000000 HC RM PRIVATE

## 2023-05-26 PROCEDURE — 36415 COLL VENOUS BLD VENIPUNCTURE: CPT

## 2023-05-26 PROCEDURE — 84100 ASSAY OF PHOSPHORUS: CPT

## 2023-05-26 PROCEDURE — 6370000000 HC RX 637 (ALT 250 FOR IP): Performed by: INTERNAL MEDICINE

## 2023-05-26 PROCEDURE — 80048 BASIC METABOLIC PNL TOTAL CA: CPT

## 2023-05-26 PROCEDURE — 83735 ASSAY OF MAGNESIUM: CPT

## 2023-05-26 RX ORDER — ACETAZOLAMIDE 500 MG/5ML
250 INJECTION, POWDER, LYOPHILIZED, FOR SOLUTION INTRAVENOUS ONCE
Status: COMPLETED | OUTPATIENT
Start: 2023-05-26 | End: 2023-05-26

## 2023-05-26 RX ADMIN — ISOSORBIDE MONONITRATE 30 MG: 30 TABLET, EXTENDED RELEASE ORAL at 09:35

## 2023-05-26 RX ADMIN — AMOXICILLIN AND CLAVULANATE POTASSIUM 1 TABLET: 875; 125 TABLET, FILM COATED ORAL at 21:30

## 2023-05-26 RX ADMIN — POTASSIUM CHLORIDE 20 MEQ: 750 TABLET, EXTENDED RELEASE ORAL at 09:34

## 2023-05-26 RX ADMIN — Medication 2 PUFF: at 07:50

## 2023-05-26 RX ADMIN — FERROUS SULFATE TAB 325 MG (65 MG ELEMENTAL FE) 325 MG: 325 (65 FE) TAB at 09:35

## 2023-05-26 RX ADMIN — HYDROMORPHONE HYDROCHLORIDE 1 MG: 1 INJECTION, SOLUTION INTRAMUSCULAR; INTRAVENOUS; SUBCUTANEOUS at 06:47

## 2023-05-26 RX ADMIN — APIXABAN 5 MG: 5 TABLET, FILM COATED ORAL at 09:35

## 2023-05-26 RX ADMIN — HYDROMORPHONE HYDROCHLORIDE 1 MG: 1 INJECTION, SOLUTION INTRAMUSCULAR; INTRAVENOUS; SUBCUTANEOUS at 16:56

## 2023-05-26 RX ADMIN — Medication 2 PUFF: at 20:10

## 2023-05-26 RX ADMIN — ALLOPURINOL 100 MG: 100 TABLET ORAL at 09:35

## 2023-05-26 RX ADMIN — BUMETANIDE 1 MG: 0.25 INJECTION INTRAMUSCULAR; INTRAVENOUS at 09:35

## 2023-05-26 RX ADMIN — METOPROLOL SUCCINATE 12.5 MG: 25 TABLET, EXTENDED RELEASE ORAL at 09:34

## 2023-05-26 RX ADMIN — COLCHICINE 0.6 MG: 0.6 CAPSULE ORAL at 09:35

## 2023-05-26 RX ADMIN — HYDROMORPHONE HYDROCHLORIDE 1 MG: 1 INJECTION, SOLUTION INTRAMUSCULAR; INTRAVENOUS; SUBCUTANEOUS at 01:16

## 2023-05-26 RX ADMIN — APIXABAN 5 MG: 5 TABLET, FILM COATED ORAL at 21:30

## 2023-05-26 RX ADMIN — ACETAZOLAMIDE 250 MG: 500 INJECTION, POWDER, LYOPHILIZED, FOR SOLUTION INTRAVENOUS at 15:18

## 2023-05-26 RX ADMIN — FERROUS SULFATE TAB 325 MG (65 MG ELEMENTAL FE) 325 MG: 325 (65 FE) TAB at 21:30

## 2023-05-26 RX ADMIN — BUMETANIDE 1 MG: 0.25 INJECTION INTRAMUSCULAR; INTRAVENOUS at 21:32

## 2023-05-26 RX ADMIN — AMOXICILLIN AND CLAVULANATE POTASSIUM 1 TABLET: 875; 125 TABLET, FILM COATED ORAL at 09:34

## 2023-05-26 RX ADMIN — PREDNISONE 10 MG: 5 TABLET ORAL at 09:34

## 2023-05-26 RX ADMIN — EMPAGLIFLOZIN 10 MG: 10 TABLET, FILM COATED ORAL at 09:35

## 2023-05-26 ASSESSMENT — PAIN DESCRIPTION - DESCRIPTORS
DESCRIPTORS: ACHING;THROBBING
DESCRIPTORS: ACHING;THROBBING

## 2023-05-26 ASSESSMENT — PAIN SCALES - GENERAL
PAINLEVEL_OUTOF10: 8
PAINLEVEL_OUTOF10: 8
PAINLEVEL_OUTOF10: 0
PAINLEVEL_OUTOF10: 8

## 2023-05-26 ASSESSMENT — PAIN DESCRIPTION - ORIENTATION
ORIENTATION: RIGHT;LEFT
ORIENTATION: RIGHT;LEFT

## 2023-05-26 ASSESSMENT — PAIN SCALES - WONG BAKER: WONGBAKER_NUMERICALRESPONSE: 0

## 2023-05-26 NOTE — PLAN OF CARE
Problem: Discharge Planning  Goal: Discharge to home or other facility with appropriate resources  Outcome: Progressing  Flowsheets  Taken 5/25/2023 2000 by Marian Opitz, RN  Discharge to home or other facility with appropriate resources: Identify barriers to discharge with patient and caregiver  Taken 5/25/2023 1323 by Filemon Woodall LPN  Discharge to home or other facility with appropriate resources: Identify barriers to discharge with patient and caregiver     Problem: Skin/Tissue Integrity  Goal: Absence of new skin breakdown  Description: 1. Monitor for areas of redness and/or skin breakdown  2. Assess vascular access sites hourly  3. Every 4-6 hours minimum:  Change oxygen saturation probe site  4. Every 4-6 hours:  If on nasal continuous positive airway pressure, respiratory therapy assess nares and determine need for appliance change or resting period.   Outcome: Progressing     Problem: ABCDS Injury Assessment  Goal: Absence of physical injury  Outcome: Progressing     Problem: Safety - Adult  Goal: Free from fall injury  Outcome: Progressing     Problem: Chronic Conditions and Co-morbidities  Goal: Patient's chronic conditions and co-morbidity symptoms are monitored and maintained or improved  Outcome: Progressing  Flowsheets  Taken 5/25/2023 2000 by Marian Opitz, RN  Care Plan - Patient's Chronic Conditions and Co-Morbidity Symptoms are Monitored and Maintained or Improved: Monitor and assess patient's chronic conditions and comorbid symptoms for stability, deterioration, or improvement  Taken 5/25/2023 1323 by Filemon Woodall LPN  Care Plan - Patient's Chronic Conditions and Co-Morbidity Symptoms are Monitored and Maintained or Improved: Monitor and assess patient's chronic conditions and comorbid symptoms for stability, deterioration, or improvement     Problem: Pain  Goal: Verbalizes/displays adequate comfort level or baseline comfort level  Outcome: Progressing     Problem: Nutrition

## 2023-05-27 VITALS
WEIGHT: 217.4 LBS | DIASTOLIC BLOOD PRESSURE: 64 MMHG | RESPIRATION RATE: 18 BRPM | HEART RATE: 72 BPM | HEIGHT: 71 IN | OXYGEN SATURATION: 98 % | BODY MASS INDEX: 30.44 KG/M2 | SYSTOLIC BLOOD PRESSURE: 106 MMHG | TEMPERATURE: 97.2 F

## 2023-05-27 LAB
ANION GAP SERPL CALC-SCNC: 8 MMOL/L (ref 3–18)
BASOPHILS # BLD: 0 K/UL (ref 0–0.1)
BASOPHILS NFR BLD: 0 % (ref 0–2)
BUN SERPL-MCNC: 43 MG/DL (ref 7–18)
BUN/CREAT SERPL: 32 (ref 12–20)
CA-I BLD-MCNC: 9.1 MG/DL (ref 8.5–10.1)
CHLORIDE SERPL-SCNC: 88 MMOL/L (ref 100–111)
CO2 SERPL-SCNC: 36 MMOL/L (ref 21–32)
CREAT SERPL-MCNC: 1.33 MG/DL (ref 0.6–1.3)
DIFFERENTIAL METHOD BLD: ABNORMAL
EOSINOPHIL # BLD: 0.2 K/UL (ref 0–0.4)
EOSINOPHIL NFR BLD: 1 % (ref 0–5)
ERYTHROCYTE [DISTWIDTH] IN BLOOD BY AUTOMATED COUNT: 19.9 % (ref 11.6–14.5)
GLUCOSE SERPL-MCNC: 115 MG/DL (ref 74–99)
HCT VFR BLD AUTO: 30.4 % (ref 36–48)
HGB BLD-MCNC: 9.4 G/DL (ref 13–16)
IMM GRANULOCYTES # BLD AUTO: 0.2 K/UL (ref 0–0.04)
IMM GRANULOCYTES NFR BLD AUTO: 1 % (ref 0–0.5)
LYMPHOCYTES # BLD: 1.2 K/UL (ref 0.9–3.6)
LYMPHOCYTES NFR BLD: 7 % (ref 21–52)
MAGNESIUM SERPL-MCNC: 4.3 MG/DL (ref 1.6–2.6)
MCH RBC QN AUTO: 22.8 PG (ref 24–34)
MCHC RBC AUTO-ENTMCNC: 30.9 G/DL (ref 31–37)
MCV RBC AUTO: 73.6 FL (ref 78–100)
MONOCYTES # BLD: 1.2 K/UL (ref 0.05–1.2)
MONOCYTES NFR BLD: 7 % (ref 3–10)
NEUTS SEG # BLD: 14.3 K/UL (ref 1.8–8)
NEUTS SEG NFR BLD: 84 % (ref 40–73)
NRBC # BLD: 0 K/UL (ref 0–0.01)
NRBC BLD-RTO: 0 PER 100 WBC
PHOSPHATE SERPL-MCNC: 3 MG/DL (ref 2.5–4.9)
PLATELET # BLD AUTO: 333 K/UL (ref 135–420)
PMV BLD AUTO: 11.2 FL (ref 9.2–11.8)
POTASSIUM SERPL-SCNC: 4 MMOL/L (ref 3.5–5.5)
RBC # BLD AUTO: 4.13 M/UL (ref 4.35–5.65)
RBC MORPH BLD: ABNORMAL
SODIUM SERPL-SCNC: 132 MMOL/L (ref 136–145)
WBC # BLD AUTO: 17.1 K/UL (ref 4.6–13.2)

## 2023-05-27 PROCEDURE — 80048 BASIC METABOLIC PNL TOTAL CA: CPT

## 2023-05-27 PROCEDURE — 84100 ASSAY OF PHOSPHORUS: CPT

## 2023-05-27 PROCEDURE — 6370000000 HC RX 637 (ALT 250 FOR IP): Performed by: NURSE PRACTITIONER

## 2023-05-27 PROCEDURE — 36415 COLL VENOUS BLD VENIPUNCTURE: CPT

## 2023-05-27 PROCEDURE — 94761 N-INVAS EAR/PLS OXIMETRY MLT: CPT

## 2023-05-27 PROCEDURE — 94669 MECHANICAL CHEST WALL OSCILL: CPT

## 2023-05-27 PROCEDURE — 6370000000 HC RX 637 (ALT 250 FOR IP): Performed by: INTERNAL MEDICINE

## 2023-05-27 PROCEDURE — 83735 ASSAY OF MAGNESIUM: CPT

## 2023-05-27 PROCEDURE — 2500000003 HC RX 250 WO HCPCS: Performed by: INTERNAL MEDICINE

## 2023-05-27 PROCEDURE — 85025 COMPLETE CBC W/AUTO DIFF WBC: CPT

## 2023-05-27 PROCEDURE — 94640 AIRWAY INHALATION TREATMENT: CPT

## 2023-05-27 RX ORDER — METOPROLOL SUCCINATE 25 MG/1
12.5 TABLET, EXTENDED RELEASE ORAL DAILY
Qty: 30 TABLET | Refills: 3 | Status: SHIPPED | OUTPATIENT
Start: 2023-05-27

## 2023-05-27 RX ORDER — AMOXICILLIN AND CLAVULANATE POTASSIUM 875; 125 MG/1; MG/1
1 TABLET, FILM COATED ORAL EVERY 12 HOURS SCHEDULED
Qty: 8 TABLET | Refills: 0 | Status: SHIPPED | OUTPATIENT
Start: 2023-05-27 | End: 2023-05-31

## 2023-05-27 RX ORDER — BUMETANIDE 1 MG/1
1 TABLET ORAL 2 TIMES DAILY
Qty: 60 TABLET | Refills: 3 | Status: SHIPPED | OUTPATIENT
Start: 2023-05-27

## 2023-05-27 RX ORDER — FERROUS SULFATE 325(65) MG
325 TABLET ORAL 2 TIMES DAILY
Qty: 30 TABLET | Refills: 3 | Status: SHIPPED | OUTPATIENT
Start: 2023-05-27

## 2023-05-27 RX ADMIN — HYDROMORPHONE HYDROCHLORIDE 1 MG: 1 INJECTION, SOLUTION INTRAMUSCULAR; INTRAVENOUS; SUBCUTANEOUS at 04:02

## 2023-05-27 RX ADMIN — AMOXICILLIN AND CLAVULANATE POTASSIUM 1 TABLET: 875; 125 TABLET, FILM COATED ORAL at 11:11

## 2023-05-27 RX ADMIN — Medication 2 PUFF: at 07:42

## 2023-05-27 RX ADMIN — APIXABAN 5 MG: 5 TABLET, FILM COATED ORAL at 11:11

## 2023-05-27 RX ADMIN — METOPROLOL SUCCINATE 12.5 MG: 25 TABLET, EXTENDED RELEASE ORAL at 11:11

## 2023-05-27 RX ADMIN — HYDROMORPHONE HYDROCHLORIDE 1 MG: 1 INJECTION, SOLUTION INTRAMUSCULAR; INTRAVENOUS; SUBCUTANEOUS at 11:12

## 2023-05-27 RX ADMIN — FERROUS SULFATE TAB 325 MG (65 MG ELEMENTAL FE) 325 MG: 325 (65 FE) TAB at 11:11

## 2023-05-27 RX ADMIN — POTASSIUM CHLORIDE 20 MEQ: 750 TABLET, EXTENDED RELEASE ORAL at 11:12

## 2023-05-27 RX ADMIN — ALLOPURINOL 100 MG: 100 TABLET ORAL at 11:11

## 2023-05-27 RX ADMIN — COLCHICINE 0.6 MG: 0.6 CAPSULE ORAL at 11:10

## 2023-05-27 RX ADMIN — BUMETANIDE 1 MG: 0.25 INJECTION INTRAMUSCULAR; INTRAVENOUS at 11:12

## 2023-05-27 RX ADMIN — PREDNISONE 10 MG: 5 TABLET ORAL at 11:11

## 2023-05-27 RX ADMIN — EMPAGLIFLOZIN 10 MG: 10 TABLET, FILM COATED ORAL at 11:10

## 2023-05-27 RX ADMIN — ISOSORBIDE MONONITRATE 30 MG: 30 TABLET, EXTENDED RELEASE ORAL at 11:11

## 2023-05-27 ASSESSMENT — PAIN DESCRIPTION - DESCRIPTORS: DESCRIPTORS: ACHING

## 2023-05-27 ASSESSMENT — PAIN SCALES - GENERAL
PAINLEVEL_OUTOF10: 8
PAINLEVEL_OUTOF10: 8

## 2023-05-27 ASSESSMENT — PAIN DESCRIPTION - ONSET: ONSET: GRADUAL

## 2023-05-27 ASSESSMENT — PAIN DESCRIPTION - FREQUENCY: FREQUENCY: INTERMITTENT

## 2023-05-27 ASSESSMENT — PAIN DESCRIPTION - LOCATION
LOCATION: WRIST
LOCATION: GROIN

## 2023-05-27 ASSESSMENT — PAIN DESCRIPTION - ORIENTATION: ORIENTATION: RIGHT

## 2023-05-27 ASSESSMENT — PAIN SCALES - WONG BAKER: WONGBAKER_NUMERICALRESPONSE: 8

## 2023-05-27 ASSESSMENT — PAIN DESCRIPTION - PAIN TYPE: TYPE: CHRONIC PAIN

## 2023-05-27 ASSESSMENT — PAIN DESCRIPTION - DIRECTION: RADIATING_TOWARDS: LEG

## 2023-05-27 ASSESSMENT — PAIN - FUNCTIONAL ASSESSMENT: PAIN_FUNCTIONAL_ASSESSMENT: PREVENTS OR INTERFERES SOME ACTIVE ACTIVITIES AND ADLS

## 2023-05-27 NOTE — PLAN OF CARE
Problem: Discharge Planning  Goal: Discharge to home or other facility with appropriate resources  Outcome: Progressing     Problem: Skin/Tissue Integrity  Goal: Absence of new skin breakdown  Description: 1. Monitor for areas of redness and/or skin breakdown  2. Assess vascular access sites hourly  3. Every 4-6 hours minimum:  Change oxygen saturation probe site  4. Every 4-6 hours:  If on nasal continuous positive airway pressure, respiratory therapy assess nares and determine need for appliance change or resting period.   Outcome: Progressing     Problem: ABCDS Injury Assessment  Goal: Absence of physical injury  Outcome: Progressing     Problem: Safety - Adult  Goal: Free from fall injury  Outcome: Progressing     Problem: Chronic Conditions and Co-morbidities  Goal: Patient's chronic conditions and co-morbidity symptoms are monitored and maintained or improved  Outcome: Progressing     Problem: Pain  Goal: Verbalizes/displays adequate comfort level or baseline comfort level  Outcome: Progressing  Flowsheets (Taken 5/27/2023 0400 by Mino Galeas RN)  Verbalizes/displays adequate comfort level or baseline comfort level:   Encourage patient to monitor pain and request assistance   Assess pain using appropriate pain scale     Problem: Nutrition Deficit:  Goal: Optimize nutritional status  Outcome: Progressing

## 2023-05-27 NOTE — DISCHARGE SUMMARY
Discharge Summary       PATIENT ID: Pily Lee  MRN: [id number]   YOB: 1950    DATE OF ADMISSION: 5/16/2023  9:33 AM    DATE OF DISCHARGE: 05/27/23    PRIMARY CARE PROVIDER: LISA Franco NP     ATTENDING PHYSICIAN: Annalisa Garcia MD  DISCHARGING PROVIDER: Annalisa Garcia MD        CONSULTATIONS: IP CONSULT TO PHARMACY  IP CONSULT TO CARDIOLOGY  IP CONSULT TO PODIATRY  IP WOUND CARE NURSE CONSULT TO EVAL  IP CONSULT TO NEPHROLOGY  IP CONSULT TO CARDIOLOGY  IP CONSULT TO CASE MANAGEMENT    PROCEDURES/SURGERIES: * No surgery found *    [id number] [facility] COURSE:   Pily Lee is a 67 y.o. male with a past medical history significant for HTN, HFrEF (38%), DVT (on Eliquis), liver cirrhosis+ ascites, DM-II, PAD, chronic left heel diabetic ulcer, and HLP who presents to the ED today with complaints of shortness of breath. At the time of my evaluation in the ICU, patient is unable to speak in complete full sentences, appears mildly anxious and currently requiring High flow oxygen @60lpm Fi)2 of 50%. ROS is limited due to patient's medical acuity. Patient did endorsed progressively worsening shortness of breath in the last 3 days. Today, \"I couldn't take it anymore\". This prompted the ED visit. He endorsed orthopnea, and dyspnea on exertion. No chest pain at any point. No cough. States that he had some chills yesterday. Endorsed increased extremity and abdominal swelling, as well as pain and tenderness in left lower extremity. He has known LLE DVT, currently on Eliquis which he e admits taking religiously. Chronic left heel ulcer is unchanged, and he has been following with Dr. Juan J Bishop in [place], and he performs daily self-care. In the ED, he was found hypoxic down to 80s, and placed on high flow oxygen. His BNP was 8819, and chest x-ray shows unchanged cardiomegaly and pulmonary vascular congestion.   His WBC is 21.2, tachycardic, and with lactic acidosis and

## 2023-05-27 NOTE — PLAN OF CARE
Problem: Discharge Planning  Goal: Discharge to home or other facility with appropriate resources  5/27/2023 1256 by Briseyda Gomez LPN  Outcome: Adequate for Discharge  5/27/2023 1255 by Briseyda Gomez LPN  Outcome: Progressing     Problem: Skin/Tissue Integrity  Goal: Absence of new skin breakdown  Description: 1. Monitor for areas of redness and/or skin breakdown  2. Assess vascular access sites hourly  3. Every 4-6 hours minimum:  Change oxygen saturation probe site  4. Every 4-6 hours:  If on nasal continuous positive airway pressure, respiratory therapy assess nares and determine need for appliance change or resting period.   5/27/2023 1256 by Briseyda Gomez LPN  Outcome: Adequate for Discharge  5/27/2023 1255 by Briseyda Gomez LPN  Outcome: Progressing     Problem: ABCDS Injury Assessment  Goal: Absence of physical injury  5/27/2023 1256 by Briseyda Gomez LPN  Outcome: Adequate for Discharge  5/27/2023 1255 by Briseyda Gomez LPN  Outcome: Progressing     Problem: Safety - Adult  Goal: Free from fall injury  5/27/2023 1256 by Briseyda Gomez LPN  Outcome: Adequate for Discharge  5/27/2023 1255 by Briseyda Gomez LPN  Outcome: Progressing     Problem: Chronic Conditions and Co-morbidities  Goal: Patient's chronic conditions and co-morbidity symptoms are monitored and maintained or improved  5/27/2023 1256 by Briseyda Gomez LPN  Outcome: Adequate for Discharge  5/27/2023 1255 by Briseyda Gomez LPN  Outcome: Progressing     Problem: Pain  Goal: Verbalizes/displays adequate comfort level or baseline comfort level  5/27/2023 1256 by Briseyda Gomez LPN  Outcome: Adequate for Discharge  5/27/2023 1255 by Briseyda Gomez LPN  Outcome: Progressing  Flowsheets (Taken 5/27/2023 0400 by Caitlin Hartman RN)  Verbalizes/displays adequate comfort level or baseline comfort level:   Encourage patient to monitor pain and request assistance   Assess pain using appropriate pain scale     Problem: Nutrition Deficit:  Goal: Optimize

## 2023-05-27 NOTE — PROGRESS NOTES
conducted a Follow up consultation and Spiritual Assessment for Gita Dunne, who is a 67 y. o.,male. The  provided the following Interventions:  Continued the relationship of care and support. Listened empathically. Offered prayer and assurance of continued prayer on patients behalf. Chart reviewed. The following outcomes were achieved:  Patient expressed gratitude for 's visit. Assessment:  There are no further spiritual or Judaism issues which require Spiritual Care Services interventions at this time. Plan:  Chaplains will continue to follow and will provide pastoral care on an as needed/requested basis.  recommends bedside caregivers page  on duty if patient shows signs of acute spiritual or emotional distress. Per patient doing and felling better. No concerns. Thankful for visit. Advance directive on file. Prayer provided.        [address] [place] [address]   [phone]
05/24/23 1900   RT Protocol   History Pulmonary Disease 1   Respiratory pattern 0   Breath sounds 2   Cough 0   Indications for Bronchodilator Therapy Decreased or absent breath sounds; On home bronchodilators   Bronchodilator Assessment Score 3
0645-Care of patient assumed after receiving report from [address] [place] [address], [address] [facility] [place] in bed, awake. Patient A&O x3. Patient has radial and pedal pulses present. Patient states no pain at this time. Urine canister emptied-800 mL-Clear, yellow, no odor. Patient has dressing on L heel wound-clean, dry, and intact. Patient has call bell within reach. Bed locked and in lowest position. No further needs at this time. 0820-Patient awake in bed, eating breakfast. Patient ate 100% of breakfast. Patient has call bell within reach. No further needs at this time. 1111-Patient in bed awake. Patient expresses pain in R wrist, 8/10. Patient received meds, including PRN dilaudid-tolerated well. Patient has call bell within reach. No further needs at this time. 1126-Patient has had IV removed. Patient canister emptied-650mL-clear, yellow, no odor. Patient's son contacted to inform him that patient was being discharged, son stated that he would be coming to pick him up. Patient has call bell in reach. No further needs at this time. 1300-Patient ate 100% of lunch. Patient received assistance with changing clothes, putting on heel boot, and gathering belongings. Patient's glasses, phone,  and money ($1 and some loose change) was also put into his bag. Patient was given discharge instructions, verbal understanding provided. Patient assisted in a wheelchair downstairs to son for transportation home.
0645-Received report from Elizabet Albert RN at this time and resumed care of pt. Hourly round noted in daily/safety care tab in flowsheet. 0830-Dr. Wilbert Harding into round with pt. MD aware of Sodium 132, no new orders. 0913-DrAgatha Carrier into consult  with pt, new order noted. 1120-DrAgatha Carrier and Dr. Wilbert Harding  aware of Ammonia 55, no new orders. 1358-PRN Dilaudid given per orders, see MAR.    1900-Report given to oncoming nurse.
0700- Assumed care of patient. Received bedside shift report from night shift nurse. 0720- Pt resting quietly in bed at this time. Pt requesting half a cup of ice chips. Requested items given. Pt has no complaints at this time. VSS.     0730- Respiratory in to see patient. 0800- Pt set up with breakfast tray. Marquis THURMAN in to see patient. 0900- Cardiology in to see pt.     0936- Pt given scheduled medications. Plan to discontinue duque gone over with pt. Pt refusing to discontinue the catheter. Risks and benefits explained. Pt still refusing. Will try again later. [address] Nurse Jose De Jesus Berg LPN in to assist pt with understanding the importance of removing the catheter. Pt agrees to allow discontinuation of the catheter. 1100- Duque catheter removed with no complications. 1200 mL of beba colored urine in drainage bag. Primo fit applied to pt. Pt expressed the need to urinate and voided 400 mL of  clear/yellow urine. Pt given 1 container of vanilla ice cream.     1124- 1200 mL of urine emptied from suction canister. 1230- Pt set up with lunch tray. 1300- 1200 mL of urine emptied from suction canister. 1320- Pt complaining of itching on left thigh. Lotion applied. Pt states he feel relief. 1600- VSS. Pt has no complaints at this time. 900 mL of urine emptied from suction canister. 1650- Pt set up with dinner tray. 1900- Shift report given to oncoming night shift nurse.
0700- Assumed care of pt. Received shift report from off going night shift nurse. Pt resting in bed at this time. 0720- Pt VSS. Pt has no complaints at this time. 0830- Pt set up with breakfast tray. George THURMAN in to see pt regarding plan of care. MD wanting to wait for blood cultures to result. Pt staying another day in homes to diurese more fluid from pt.     0934- Pt given scheduled medications. Pt given half a cup of ice chips with a small amount of water. Pt tolerated well. Call bell is within reach. 1050- Cardiology in to see pt regarding plan of care. 1146- 750- mL of urine emptied from suction canister. 1200- Pt set up with lunch tray. Call bell is within reach. 1346- 600 mL of urine emptied from suction canister. 1430- Pt given Small cup of ice chips. Telemetry tech noticed that his rhythm was different and was inconsistent. Stat EKG ordered. 1518- Pt given scheduled Diamox. Pt has no complaints at this time. Pt seen drinking large soda from [employer]. When attempting to remove soda from the table, it was discovered to already be empty. Pt states that he \"doesn't care\" and \"cannot live on 1500 mL of water a day\". 1600- 700 mL of urine emptied from suction canister. 1656- IV dilaudid given for pain 8/10 in wrist and leg and buttocks. Will assess pain later. Pt has no further requests. Call bell in reach. Will continue to monitor. 1720- Pt set up with dinner tray. Pt has no complaints of pain at this time. Call bell is within reach. 1730- Podiatrist in to see pt. MD examined heel and determined that shoe would need to be ordered. MD to write note for case management to look into the shoe being covered by insurance. 1800- Pt sheets changed and pt cleaned up following an episode of urine incontinence. Output unmeasured but brief soaked. Pt in bed resting quietly at this time. Call bell is within reach. 1845- Shift report given to oncoming night shift nurse.
0700- care of the patient assumed via bedside shift report  0755- am medication administered  0905- Dr. Susan Mckeon in to evaluate patient. 9114- scheduled medication and prn dilaudid administered,  in to see patient. 1123- standing at bedside with physical therapy, complete linen change performed, patient refused sitting in chair. 1148- echo complete  1400- scheduled medication administered, ultrasound of kidneys complete, family in room visiting.  1600- scheduled medication and prn dilaudid administered, dressing change completed to left heel. 1756- scheduled medication administered, no complaints per patient.
0710-Bedside and Verbal shift change report received from  Justin Knight RN  (offgoing nurse). Report included the following information Nurse Handoff Report, Intake/Output, MAR, and Recent Results. Patient resting, no distress, call light in reach. 0723-Patient assessment performed, patient a/o x 4, verbal, lungs sounds dim on RA, bowel sounds active, ABD hard and rounds, duque patent, call light in reach      0733-patient set up for breakfast; patent talking on the phone    0830-AM meds given and PRN dilaudid given for cc of left leg/foot pain, Nephrologist at bedside    0900-MD rounds; patient to transfer out of ICU    1013- transfer report given to [facility] to patient going to room 2South room 253.    1059-patient transferred off unit to 2S w/o distress.
1100- Received care of patient from ICU to room 253 via WC. Patient is alert and oriented. Vitals obtained. Patient oriented to room and CB system. Denies any needs. Cb in reac    1500- Patient resting in bed with eyes closed. No acute distress CB in reach.
1900 Assumed care of pt from off going nurse Mariel Ramirez RN. Pt A&Ox4 with c/o pain in L heel 3/10. Pt states that he wants to wait until HS medications to take pain med. 2041 HS medications passed. 1 mg PRN Dilaudid given for pain in L heel 9/10. Pt heel elevated on a pillow and 240 mL of water given with peanut butter crackers per pt request.    2343 PRN Dilaudid given for dressing change. 0015 Dressings changed on L heel. VSS. In bed talking on the phone. Given chocolate ice cream and devika crackers per pt request.    0430 Pt wiped down with wipes, linen, pad and gown changed. Gtz care completed. 0446 PRN dilaudid given for pain 8/10 in left leg and heel. Tolerated well. Bedside and Verbal shift change report given to GA Myers RN (oncoming nurse) by Hmuberto Vail RN  (offgoing nurse). Report included the following information Nurse Handoff Report, Intake/Output, MAR, and Recent Results.
1900 Bedside shift report from  Olivia Bacon. Pt states he is in a position of comfort, patient is A&O X4, c/o pain to left hip and lower back as an 8/10, telemetry box SMU#1 reading sinus rhythm -verified with telemetry monitor tech, patient is on room, Gtz in place; Safety precautions in place, bed is in the lowest position, bed alarm, yellow socks. Wound care dressing change to left heel. Pt able to relax and sleep during the night. Pain level 8/10 - meds given, see MAR.    0700 Bedside shift report given to Olivia Bacon.
1900 Bedside shift report from  SHARON Mercer LPN. Pt states he is in a position of comfort, patient is A&O X4, c/o pain to left hip and lower back as an 8/10, telemetry box SMU#1 reading sinus rhythm with PVCs (occasional bigemy) -verified with telemetry monitor tech, patient is on room air, External Gtz (primofit) in place; Safety precautions in place, bed is in the lowest position, bed alarm, yellow socks. Wound care dressing change to left heel. Pt able to relax and rest during the night. Pain level 8/10 - meds given, see MAR.     0700 Bedside shift report given to SHARON Mercer LPN.
Advance Care Planning     Advance Care Planning Inpatient Note  Spiritual Care Department    Today's Date: 5/22/2023  Unit: [facility] 2 ICU    Received request from rounding. Upon review of chart and communication with care team, patient's decision making abilities are not in question. . Patient was/were present in the room during visit. Goals of ACP Conversation:  Discuss advance care planning documents    Health Care Decision Makers:     No healthcare decision makers have been documented. Click here to complete [address] including selection of the Healthcare Decision Maker Relationship (ie \"Primary\")  Summary:  Verified Documents    Advance Care Planning Documents (Patient Wishes):  Healthcare Power of /Advance Directive Appointment of [address]  Living Will/Advance Directive  Anatomical Gift/Organ Donation     Assessment:     05/22/23 1139   Encounter Summary   Encounter Overview/Reason    (Follow up visit)   Service Provided For: Patient   Referral/Consult From: Other    Support System Family members   Last Encounter  05/22/23  (FV,SA,RRB)   Complexity of Encounter Moderate   Begin Time 0930   End Time  0939   Total Time Calculated 9 min   Encounter    Type Follow up   Spiritual/Emotional needs   Type Spiritual Support   Assessment/Intervention/Outcome   Assessment Calm; Hopeful   Intervention Active listening;Prayer (assurance of)/[place]   Outcome Encouraged;Expressed Gratitude   Plan and Referrals   Plan/Referrals Continue to visit, (comment)         Interventions:  Advance directive already on file    Care Preferences Communicated:   No    Outcomes/Plan:  Existing advance directive reviewed with patient; no changes to patient's previously recorded wishes.     Electronically signed by Chaplain Kamlesh on 5/22/2023 at 11:40 AM
Bedside shift change report given to ALEJO Patricio RN (oncoming nurse) by Padmini Dias RN (offgoing nurse). Report included the following information SBAR, Intake/Output, MAR, Recent Results, Med Rec Status, and Quality Measures. 1930  Shift assessment completed. 2115 2100 medications administered without difficulty. 2330  2400 VSS. HR 61 and in a sinus rhythm on telemetry. SR on telemetry. CBWR.     0100  Dressing change completed after pt was given pain medication. Pt tolerated well.     0300  Pt lying In bed with eyes closed. Pt talks out when sleeping. No distress noted. CBWR.     [id number]  Lab in to draw blood for AM labs. Pt tolerated well.     0500  Complete bed bath given, linens changed, pt positioned for comfort.     0600  PIVs outdated. Nursing supervisor removed old PIVs and placed a #20 gauge in R AC and a #20 gauge in right FA. Pt tolerated well.     0645  Bedside shift change report given to RYAN Mabry RN (oncoming nurse) by ALEJO Patricio RN (offgoing nurse). Report included the following information SBAR, Intake/Output, MAR, Recent Results, Med Rec Status, and Quality Measures.
CARDIOLOGY PROGRESS NOTE - NP     Patient seen and examined. This is a patient who is followed for acute decompensated HFrEF. He is seen this morning after ambulating a long hallway with PT. Feels that his breathing is stable and edema is improving with IV diuresing. Renal is following and managing volume removal.  No chest pain. Continues with hematuria per duque. No dysuria or abdominal pain. No orthopnea. No palpitations. No other complaints reported. Telemetry reviewed, there were no events noted in the past 24 hours. Remains in NSR with occasional PACs. Pertinent review of systems items noted above, all other systems are negative. Current medications reviewed. Physical Examination  Vital signs are stable. Blood pressure 142/79, Pulse 64  No apparent distress. Heart is regular, rate and rhythm. Normal S1, S2, no murmurs are appreciated. Lungs are clear bilaterally. Abdomen is soft, nontender, normal bowel sounds. Lower extremities have mild to moderate edema. Remains with scrotal edema. Right hand edema has improved significantly. Labs reviewed. Troponin as discussed. Lytes stable despite metolazone. Case discussed with Dr. Johnie Rader and our impression and recommendations are as follows:  Acute on chronic systolic CHF: EF 43-53%. Renal is aggressively diuresing with Bumex 2mg TID and metolazone 5mg twice daily. He is improving clinically. Would still benefit from [address] should a follow-up echo continue to show severe TVR and severe RV dilation/reduced function. Chest CTA this admission negative for PE. Continue Jardiance, isosorbide, coreg. Allergy noted to ACEi (angioedema with emergent intubation). Spironolactone on hold. Followed by Dr. Keysha Landis in [place] as primary cards. We discussed a possible OP cath to assess for ischemic origin and pending trend of renal function. Elevated troponin in the setting of sepsis and hypoxia:  Peaked at 253.   Continues to be without chest
CARDIOLOGY PROGRESS NOTE - NP     Patient seen and examined. This is a patient who is followed for acute decompensated HFrEF. He is seen this morning sitting up in bed comfortably. Denies orthopnea. Feels his breathing is nearing baseline with ambulation. No chest pain. No further hematuria this morning per duque. No dysuria or abdominal pain. No palpitations. No other complaints reported. Telemetry reviewed, there were no events noted in the past 24 hours. Remains in NSR with occasional PACs. Occasional PVCs. 11 best run NSVT early this morning. Pertinent review of systems items noted above, all other systems are negative. Current medications reviewed. Physical Examination  Vital signs are stable. Blood pressure 125/80, Pulse 55  No apparent distress. Heart is regular, rate and rhythm. Normal S1, S2, no murmurs are appreciated. Lungs are clear but diminished to bases bilaterally. Abdomen is distended, non-tender, normal bowel sounds  Lower extremities have mild edema. Remains with scrotal edema. Right hand edema has improved significantly. Labs reviewed. Magnesium pending. K+ 4.0. Case discussed with Dr. María Dozier and our impression and recommendations are as follows:  Acute on chronic systolic CHF: EF 95-46%. Renal is aggressively diuresing with Bumex 2mg now reduced from BID and metolazone 5mg twice daily. He is improving clinically. Would still benefit from [address] should a follow-up echo continue to show severe TVR and severe RV dilation/reduced function. Chest CTA this admission negative for PE. Continue Jardiance, isosorbide, coreg. Allergy noted to ACEi (angioedema with emergent intubation). Spironolactone on hold. Followed by Dr. Whitney Marinelli in [place] as primary cards. We discussed a possible RHC/LHC to assess for ischemic origin and right heart pressures pending trend of renal function. Patient is agreeable to this.   Spoke with Dr. Lucina Puente who agrees with cath following
CARDIOLOGY PROGRESS NOTE - NP    Patient seen and examined. This is a patient who is followed for acute decompensated HFrEF. He is seen this morning resting comfortable on the side of the bed. He reports breathing is improved. Remains on RA. Denies shortness of breath, chest pain or palpitations. He ambulated with physical therapy this morning and tolerated activity well. Discussed the need for cardiac catheterization in the near future to assess coronary anatomy, he states he is in agreement. No other complaints reported. Telemetry reviewed, there were no events noted in the past 24 hours. Sinus rhythm with PVCs noted. Pertinent review of systems items noted above, all other systems are negative. Current medications reviewed. Physical Examination  Vital signs are stable. Blood pressure 137/67 , Pulse 63  No apparent distress. Heart is regular, rate and rhythm. Normal S1, S2, no murmurs are appreciated. Lungs clear but diminished to bases bilaterally. Abdomen ascites improved, non tender, normal bowel sounds. Lower extremities have mild edema with wrinkled legs. Labs reviewed: Crt. 1.39, K 3.6, Mag 2.3. WBC 17.5. Case discussed with Dr. Boris Weston and our impression and recommendations are as follows:  Case discussed with Dr. Boris Weston and our impression and recommendations are as follows:  Acute on chronic systolic:  CHF: EF 57-19%. Continue Bumex to 1mg BID and metolazone 5mg QD for diuresis. Continue Jardiance, isosorbide, Toprol and parish. Allergy noted to ACEi (angioedema with emergent intubation). We discussed a possible transfer for RHC/C to assess for ischemic origin and right heart pressures however, if patient remains stable may proceed as an OP. Elevated troponin in the setting of sepsis and hypoxia:  Peaked at 253. Remains chest pain free today. No acute ischemic changes on ekg. Possibly demand ischemia r/t volume overload and sepsis.   No history of ischemic evaluation with previous
CARDIOLOGY PROGRESS NOTE - NP    Patient seen and examined. This is a patient who is followed for acute decompensated HFrEF. He is seen this morning resting comfortably in bed watching TV. He reports breathing is improved on room air, denies orthopnea. He has plans to ambulate the halls today. Denies any chest pain or palpitations. Gtz draining dark yellow urine. Reports his abdomen appears to be less swollen. No other complaints reported. Telemetry reviewed, there were no events noted in the past 24 hours. No acute changes overnight. Remains in NSR with occasional PACs/PVCs. No new episodes of NSVT. Pertinent review of systems items noted above, all other systems are negative. Current medications reviewed. Physical Examination  Vital signs are stable. Blood pressure 136/67, Pulse 62  No apparent distress. Heart is regular, rate and rhythm. Normal S1, S2, no murmurs are appreciated. Lungs clear but diminished to bases bilaterally. Abdomen distended with ascites, non tender, normal bowel sounds. Lower extremities have mild edema. Remains with scrotal edema. Labs reviewed: Crt. 1.65, KCL 3.9, Mag 2.3, H/H 9.2/28.9. Case discussed with Dr. Chico Casiano and our impression and recommendations are as follows:  Acute on chronic systolic CHF: EF 67-15%. Renal is aggressively diuresing, decreased  Bumex to 1mg BID and metolazone 5mg QD. He continues to improve clinically. Would still benefit from [address] should a follow-up echo continue to show severe TVR and severe RV dilation/reduced function. Chest CTA this admission negative for PE. Continue Jardiance, isosorbide, coreg. Allergy noted to ACEi (angioedema with emergent intubation). Spironolactone on hold. Followed by Dr. Jack Bertrand in [employer] as primary cards. We discussed a possible transfer for RHC/LHC to assess for ischemic origin and right heart pressures however, if patient remains stable may proceed as an OP with his cardiologist to follow.
CARDIOLOGY PROGRESS NOTE - NP    Patient seen and examined. This is a patient who is followed for acute decompensated HFrEF. He was transferred to the ICU yesterday for sinus bradycardia in the 50s. Troponin ordered at that time and resulted at 119 with downward trend to 111. HS troponin peaked at 253 on admission. There was concern by NP yesterday regarding He reports having stable breathing though was hypoxic in the high 80% range upon entering the room today and noting the nasal cannula to be displaced. No chest pain or orthopnea. Reports pain to right hand that he associates with gout. Renal is following and managing volume removal.  No other complaints reported. Telemetry reviewed, there were no events noted in the past 24 hours. Remains in NSR with occasional PACs/PVCs    Pertinent review of systems items noted above, all other systems are negative. Current medications reviewed. Physical Examination  Vital signs are stable. Blood pressure 122/74, Pulse 56  No apparent distress. Heart is regular, rate and rhythm. Normal S1, S2, no murmurs are appreciated. Lungs are clear bilaterally. Abdomen is soft, nontender, normal bowel sounds. Extremities have no edema. Labs reviewed. Troponin as discussed. Lytes stable despite metolazone. Case discussed with Dr. Layton Dykes and our impression and recommendations are as follows:  Acute on chronic systolic CHF:   ARF/hypoxia are multifactoral; oxygen demand has decreased  EF 79-17%, grade 2 diastolic dysfunction; repeat echo to assess for progression of pericardial effusion pending. Daily wt and strict I&O. Fluid restriction. GDMT - Continue Jardiance, isosorbide, coreg. Allergy noted to ACEi (angioedema with emergent intubation). Spironolactone on hold. Diuretics switched to bumex/metolazone.   Followed by Dr. Price Ulloa in [place] as primary cards    Elevated troponin in the setting of sepsis and hypoxia:   Peaked at 253, 119 yesterday during episode of
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 , pro-BNP is 8819  - EKG LOW VOLTAGE,   - Echo : EF 30 - 35%. Moderate global hypokinesis present,  Right ventricle is severely dilated. Severely reduced systolic function, SEVERE TR, moderate MR, PAH,   -trops flatly elevated at 247-->244_ 253--likely demand ischemia.   -He has a documented allergy to ACEI with angioedema. - cont IV Bumex 2mg bid and PO zaroxolyn 5mg bid  - net neg 18litres this admission, feels much better, abd only minimally distended, legs wrinkled with minimal edema    Leukocytosis  - new, no explainationn, repeat in am, cont to watch cultures, cont augmentin, not on steriods     R distal arm swelling, radial aspect of wrist  - + for hematoma no aneurysm     Acute on Chronic kidney disease stage III: Cardiorenal , diuretics and contrast   -strict I&O., Avoid nephrotoxin     Bradycardia   - HR down to 40, asymptomatic, may related to sepsis vs new inferior ischemia  - EKG sinus lewis, LOW voltage, ?  Amyloid  - suspect due to bb, resumed yesterday at low dose     Acute Gout arthritis   - acute flare up after diuretics, started on IV steroid and colchicine   - need to be d/c on allopurinol   Acute respiratory failure with hypoxia:   -multifactorial etiology including, CHF exacerbation, sepsis  -CTA chest is negative for PE, shows cardiomegaly, pulmonary edema and ascites in upper abdomen.  -improved after treatment of underlying sepsis and acute CHF  - currently average 95% on RA  Sepsis, POA, Strep Bacteremia - - 3/4 bottle with Streptococcus dyslactiae equisimilis, 
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 , pro-BNP is 8819  - EKG LOW VOLTAGE,   - Echo : EF 30 - 35%. Moderate global hypokinesis present,  Right ventricle is severely dilated. Severely reduced systolic function, SEVERE TR, moderate MR, PAH,   -trops flatly elevated at 247-->244_ 253--likely demand ischemia.   -He has a documented allergy to ACEI with angioedema. - cont IV Bumex 2mg bid and PO zaroxolyn 5mg bid  - net neg 15litres this admission, feels much better, abd still distended, legs still with edema    R distal arm swelling, radial aspect of wrist  - + for hematoma no aneurysm     Acute on Chronic kidney disease stage III: Cardiorenal , diuretics and contrast   -strict I&O., Avoid nephrotoxin     Bradycardia   - HR down to 40, asymptomatic, may related to sepsis vs new inferior ischemia  - EKG sinus lewis, LOW voltage, ? Amyloid  - suspect due to bb, resumed yesterday at low dose     Acute Gout arthritis   - acute flare up after diuretics, started on IV steroid and colchicine   - need to be d/c on allopurinol   Acute respiratory failure with hypoxia:   -multifactorial etiology including, CHF exacerbation, sepsis  -CTA chest is negative for PE, shows cardiomegaly, pulmonary edema and ascites in upper abdomen.  -improved after treatment of underlying sepsis and acute CHF  - currently average 95% on RA  Sepsis, POA, Strep Bacteremia - - 3/ bottle with Streptococcus dyslactiae equisimilis,  1/4 bottle with Streptococci, beta hemolytic group G  -suspected source LLE cellulitis and/or left heel diabetic ulcer.     -UA is
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 , pro-BNP is 8819  - EKG LOW VOLTAGE,   - Echo : EF 30 - 35%. Moderate global hypokinesis present,  Right ventricle is severely dilated. Severely reduced systolic function, SEVERE TR, moderate MR, PAH,   -trops flatly elevated at 247-->244_ 253--likely demand ischemia.   -He has a documented allergy to ACEI with angioedema. - treated with IV Bumex and PO zaroxolyn  - management per cardiology   Acute on Chronic kidney disease stage III: Cardiorenal , diuretics and contrast   -strict I&O., Avoid nephrotoxin  -Monitor BUN, creatinine levels worsening with diuresis. , nephrology consulted and managing.  -recommendation IV Bumex and PO Metolazone  Bradycardia   - HR down to 40, asymptomatic, may related to sepsis vs new inferior ischemia  - EKG sinus lewis, LOW voltage, ? Amyloid  - Avoid BB/CCB, correct lytes, tele monitor, cardiology consult  Acute Gout arthritis   - acute flare up after diuretics, started on IV steroid and colchicine   - need to be d/c on allopurinol   Acute respiratory failure with hypoxia:   -multifactorial etiology including, CHF exacerbation, sepsis  -CTA chest is negative for PE, shows cardiomegaly, pulmonary edema and ascites in upper abdomen.  -improved after treatment of underlying sepsis and acute CHF  - currently average 95% on RA  Sepsis, POA, Strep Bacteremia   -suspected source LLE cellulitis and/or left heel diabetic ulcer. -UA is unimpressive and chest x-ray suggest cardiomegaly with pulmonary vascular congestion.  -unable to complete sepsis fluid protocol due to anasarca and CHF exacerbation  -Echo to r/o vegetation--done 5/117/23--No vegetation on TTE.  EF 35-40%  -finished
INTERNAL MEDICINE PROGRESS NOTE      Patient: Kari Pierson   YOB: 1950   MRN: [id number]      Hospital course / Assessment and Plans   Principle Problems:  Bradycardia   - HR down to 40, asymptomatic, may related to sepsis vs new inferior ischemia  - EKG sinus lewis, LOW voltage, ? 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 , pro-BNP is 8819  -trops flatly elevated at 247-->244_ 253--likely demand ischemia.   -He has a documented allergy to ACEI with angioedema. -SOB resolved, LE and scrotal edema improved, switched to Bumex 2 mg BID, aldactone 25 mg daily   Acute on Chronic kidney disease stage III:   -strict I&O., Avoid nephrotoxin  -Monitor BUN, creatinine levels worsening with diuresis. , nephrology consulted  Acute Gout arthritis   - acute flare up after diuretics, started on IV steroid and colchicine   Acute respiratory failure with hypoxia:   -multifactorial etiology including, CHF exacerbation, sepsis  -CTA chest is negative for PE, shows cardiomegaly, pulmonary edema and ascites in upper abdomen.  -improved after treatment of underlying sepsis and acute CHF  - currently average 95% on RA  Sepsis, POA, Strep Bacteremia   -suspected source LLE cellulitis and/or left heel diabetic ulcer. -UA is unimpressive and chest x-ray suggest cardiomegaly with pulmonary vascular congestion.  -unable to complete sepsis fluid protocol due to anasarca and CHF exacerbation  -Echo to r/o vegetation--done 5/117/23--No vegetation on TTE. Ef 35-40%  -finished IV antibiotics with Zosyn, and vancomycin renally dosed  -improving leukocytosis and lactic acidosis is resolved.   - Afebrile, vitals stable, WBC normal, surveillance blood cultures NG  LLE Cellulitis, left heel
Nephrology Consult    Patient: Mirela Cruz MRN: [id number]  SSN: [id number]    YOB: 1950  Age: 67 y.o. Sex: male      Subjective: The patient is seen in ICU  His lower extremity swelling is improving  He is on room air  Good response to IV Bumex and p.o. metolazone  Creatinine is stable at 2.0. Past Medical History:   Diagnosis Date    DDD (degenerative disc disease), lumbar     Diabetes ([facility]     Diabetic neuropathy ([facility]     Diabetic retinopathy ([facility]     DM2 (diabetes mellitus, type 2) ([facility]     HLD (hyperlipidemia)     HTN (hypertension)     Obesity (BMI 30-39. 9)      History reviewed. No pertinent surgical history. History reviewed. No pertinent family history.   Social History     Tobacco Use    Smoking status: Every Day     Packs/day: 0.25     Types: Cigarettes    Smokeless tobacco: Never    Tobacco comments:     Quit smokin cigarette a day   Substance Use Topics    Alcohol use: Not Currently      Current Facility-Administered Medications   Medication Dose Route Frequency Provider Last Rate Last Admin    predniSONE (DELTASONE) tablet 10 mg  10 mg Oral Daily Tobias Kumar MD   10 mg at 23 0910    allopurinol (ZYLOPRIM) tablet 100 mg  100 mg Oral Daily Tobias Kumar MD   100 mg at 23 0910    colchicine (MITIGARE) capsule 0.6 mg  0.6 mg Oral Daily Tobias Kumar MD   0.6 mg at 23 0908    HYDROmorphone HCl PF (DILAUDID) injection 1 mg  1 mg IntraVENous Q4H PRN Tobias Kumar MD   1 mg at 23 0057    bumetanide (BUMEX) injection 2 mg  2 mg IntraVENous BID Rajendra Cronin MD   2 mg at 23 0908    metOLazone (ZAROXOLYN) tablet 5 mg  5 mg Oral BID Rajendra Cronin MD   5 mg at 23 0907    mometasone-formoterol (DULERA) 200-5 MCG/ACT inhaler 2 puff  2 puff Inhalation BID LISA Carreno - NP   2 puff at 23 0710    empagliflozin (JARDIANCE) tablet 10 mg  10 mg Oral Daily [facility], FNP   10 mg at 23 8383
Nephrology Consult    Patient: Mirela Cruz MRN: [id number]  SSN: [id number]    YOB: 1950  Age: 67 y.o. Sex: male      Subjective: The patient is seen in on Reg floor   His lower extremity swelling is improving  He is on room air  Excellent response to IV Bumex and p.o. metolazone  Creatinine is slightly better at 1.8>>1.6>>1.3    Not in any distress     -  Past Medical History:   Diagnosis Date    DDD (degenerative disc disease), lumbar     Diabetes ([facility] [address] [facility]     Di[facility]     Diabetic retinopathy ([facility]     DM2 (diabetes mellitus, type 2) ([facility]     HLD (hyperlipidemia)     HTN (hypertension)     Obesity (BMI 30-39. 9)      History reviewed. No pertinent surgical history. History reviewed. No pertinent family history.   Social History     Tobacco Use    Smoking status: Every Day     Packs/day: 0.25     Types: Cigarettes    Smokeless tobacco: Never    Tobacco comments:     Quit smokin cigarette a day   Substance Use Topics    Alcohol use: Not Currently      Current Facility-Administered Medications   Medication Dose Route Frequency Provider Last Rate Last Admin    bumetanide (BUMEX) injection 1 mg  1 mg IntraVENous BID Crystal Wild MD   1 mg at 23 0935    amoxicillin-clavulanate (AUGMENTIN) 875-125 MG per tablet 1 tablet  1 tablet Oral 2 times per day Nidia Hernández MD   1 tablet at 23 0934    metoprolol succinate (TOPROL XL) extended release tablet 12.5 mg  12.5 mg Oral Daily LISA Contreras NP   12.5 mg at 23 0934    predniSONE (DELTASONE) tablet 10 mg  10 mg Oral Daily Tobias Kumar MD   10 mg at 23 0934    allopurinol (ZYLOPRIM) tablet 100 mg  100 mg Oral Daily Tobias Kumar MD   100 mg at 23 0993    ferrous sulfate (IRON 325) tablet 325 mg  325 mg Oral BID Rajendra Cronin MD   325 mg at 23 0910    colchicine (MITIGARE) capsule 0.6 mg  0.6 mg Oral Daily Tobias Kumar MD   0.6 mg at 23 8019
Physical Therapy    Attempted to see patient for therapy session however, patient declines reporting \"I walked yesterday and I don't plan on doing anything today besides relaxing\". Increased time spent encouraging and educating patient on benefits of participation in therapy services. Patient continues to decline. Will return attempt at later time.
Physical Therapy  Facility/Department: [address]  Daily Treatment Note  NAME: Teresa Palacio  : 1950  MRN: [id number]    Date of Service: 2023    Discharge Recommendations:  Home independently   PT Equipment Recommendations  Equipment Needed: No    Patient Diagnosis(es): The primary encounter diagnosis was Septicemia ([facility] Diagnoses of Cellulitis and abscess of leg, Diabetic ulcer of left heel associated with diabetes mellitus due to underlying condition, unspecified ulcer stage ([address] Congestive heart failure, unspecified HF chronicity, unspecified heart failure type ([address] Acute on chronic combined systolic and diastolic congestive heart failure ([facility] and Aneurysm of radial artery ([facility] [facility] were also pertinent to this visit. Assessment   Assessment: Pt in bed upon entry agreeable to P.T. He comes to sit with increased time and then sits at the bedside for a prolonged period. He then comes to stand and ambulates to the bathroom. He is able to toilet with assistance only needed to place brief back on. He then walks back to bed and is left with all needs met. Activity Tolerance: Patient tolerated treatment well  Equipment Needed: No     Plan    Physcial Therapy Plan  General Plan: Continue with current plan     Restrictions  Restrictions/Precautions  Restrictions/Precautions: Other (comment) (Currently on 40L at 40%)     Subjective    Subjective  Subjective: What do you want me to do today?   Pain: N/A  Orientation  Overall Orientation Status: Within Normal Limits  Cognition  Overall Cognitive Status: WNL     Objective   Bed Mobility Training  Rolling: Modified independent  Supine to Sit: Modified independent (Increased time to complete, sits at bedside for several minutes scratching back and providing menu to dietary.)  Scooting: Modified independent  Balance  Sitting: Intact  Standing: Intact  Transfer Training  Sit to Stand: Modified independent  Stand to Sit: Modified independent  Toilet
Physical Therapy  Facility/Department: [address]  Daily Treatment Note  NAME: Ariel Duran  : 1950  MRN: [id number]    Date of Service: 2023    Discharge Recommendations:  Home with Home health PT        Patient Diagnosis(es): The primary encounter diagnosis was Septicemia ([facility] Diagnoses of Cellulitis and abscess of leg, Diabetic ulcer of left heel associated with diabetes mellitus due to underlying condition, unspecified ulcer stage ([facility] [facility] Congestive heart failure, unspecified HF chronicity, unspecified heart failure type ([facility] [facility] Acute on chronic combined systolic and diastolic congestive heart failure ([facility] [address] [facility] and Aneurysm of radial artery ([facility] [facility] were also pertinent to this visit. Assessment   Patient supine upon arrival. Patient pleasant however, reports feeling angry regarding conversation had this morning with doctor about possibly being transferred to another facility. Increased time spent listening to patient's concerns. Patient with primofit and noted to have full urine canister on wall. Tele monitor in place. Dietician enters at beginning of session. Patient requires increased time and redirection to complete all tasks. During ambulation in hallway, patient becomes irritated for unknown reason and yells \"take this belt off of me, let me go. I don't need you to try and control me with this belt\". Educated patient on purpose of gait belt during session for safety. However, despite maximal education provided, patient becomes verbally irate, using profanity and vulgar language directed primarily toward this writer. Unable to deescalate situation despite multiple attempts as patient becomes self directed in care and does not follow any direction or cues given by therapist. Further mobility deferred d/t safety concerns secondary to patient's aggressive behavior.      Plan    Physcial Therapy Plan  General Plan: Continue with current plan
Physical Therapy  Facility/Department: [facility] 2 ICU  Daily Treatment Note  NAME: Mohsen Gillespie  : 1950  MRN: [id number]    Date of Service: 2023    Discharge Recommendations:  Home with Home health PT   PT Equipment Recommendations  Equipment Needed: No    Patient Diagnosis(es): The primary encounter diagnosis was Septicemia ([facility] [facility] Diagnoses of Cellulitis and abscess of leg, Diabetic ulcer of left heel associated with diabetes mellitus due to underlying condition, unspecified ulcer stage ([facility] Congestive heart failure, unspecified HF chronicity, unspecified heart failure type ([facility] and Acute on chronic combined systolic and diastolic congestive heart failure ([facility] were also pertinent to this visit. Assessment   Assessment: Pt in bed upon entry and agreeable to P.T. He requires increased time to come to sit but does not require help today. He is noted to have some leaking from his catheter as his gown and wm pad is wet, nursing notified. He was assisted with changing his gown and then he ambulates to the bathroom. He requires increased time to complete toileting, then requests to ambulate further. He walks down the [address] with a RW with good tolerance and mild dyspnea. He then stands at the window watching some construction workers. He wanted to stay standing at the window and nursing was available to stay with him. Activity Tolerance: Patient tolerated treatment well  Equipment Needed: No     Plan    Physcial Therapy Plan  General Plan: Continue with current plan     Restrictions  Restrictions/Precautions  Restrictions/Precautions:  Other (comment) (Currently on 40L at 40%)     Subjective    Subjective  Subjective: I want to walk today  Pain: minimal pain in LEs     Objective     Bed Mobility Training  Bed Mobility Training: Yes  Rolling: Modified independent  Supine to Sit: Modified independent  Scooting: Modified independent  Balance  Sitting: Intact  Standing: Intact  Transfer
Physical Therapy  Facility/Department: [facility] 2 ICU  Daily Treatment Note  NAME: Mirela Cruz  : 1950  MRN: [id number]    Date of Service: 2023    Discharge Recommendations:  Home with Home health PT   PT Equipment Recommendations  Equipment Needed: No    Patient Diagnosis(es): The primary encounter diagnosis was Septicemia ([facility] [address] Diagnoses of Cellulitis and abscess of leg, Diabetic ulcer of left heel associated with diabetes mellitus due to underlying condition, unspecified ulcer stage ([address] Congestive heart failure, unspecified HF chronicity, unspecified heart failure type ([address] and Acute on chronic combined systolic and diastolic congestive heart failure ([facility] were also pertinent to this visit. Assessment   Assessment: Pt in bed upon entry and upset about all the testing he has to do today. He is agreeable to therapy and comes to sit with increased time and up to min assist. He comes to stand and was instructed to limit heel WB for the LLE as much as possible. Nursing in to change linens while he is up. He returns to supine and is left with all needs met. Activity Tolerance: Patient limited by pain  Equipment Needed: No     Plan    Physcial Therapy Plan  General Plan: Continue with current plan     Restrictions  Restrictions/Precautions  Restrictions/Precautions:  Other (comment) (Currently on 40L at 40%)     Subjective    Subjective  Subjective: theses doctors got me upset this morning  Pain: 8-9 B LEs  Orientation  Overall Orientation Status: Within Functional Limits  Cognition  Overall Cognitive Status: WFL     Objective      Bed Mobility Training  Bed Mobility Training: Yes  Rolling: Modified independent  Supine to Sit: Minimum assistance (very slow, limited by pain)  Sit to Supine: Modified independent  Scooting: Modified independent  Balance  Sitting: Intact  Standing: Intact  Transfer Training  Transfer Training: Yes  Sit to Stand: Stand-by assistance  Stand to Sit: Stand-by
Podiatric Surgery Progress Note    Patient: Mohsen Gillespie               Sex: male          DOA: [unfilled]       YOB: 1950      Age:  67 y.o.        LOS:  LOS: 11 days     POD * No surgery found *  Procedure:   * No surgery found *      Subjective:     Patient seen resting quiet and comfortably and no apparent distress. Patient is awaiting discharge. Has no new pedal complaint. Has chronic left posterior heel wound. Denies N/V/C/F. Objective:       Physical Exam:  Left posterior heel wound with fibrotic wound base, no periwound erythema noted. No purulence noted. No malodor present. Wound base is dry. Measure 2.8 x 2.5 cm and 0.8 cm deep. Mild improvement in size noted. Chronic left leg edema noted. Mild pain noted with palpation of left heel and leg. Assessment:     Principal Problem:    Sepsis ([address]  Resolved Problems:    * No resolved hospital problems. *      Plan/Recommendations/Medical Decision Making:       Tedi Krabbe excisional debridement of left posterior heel wound performed down to and including subcutaneous tissue removing all devitalized, fibrotic tissue and healthy tissue exposed. - Discussed in length about proper offloading. Will need multi-podus heel offloading foot. Unfortunately not available in hospital. He has to go to medical supply store or order it online. I showed him description as well as picture of boot on phone. Will also order DME and  may able to help him. - For now offload by placing pillows under legs. - Continue and finish course of augmentin as prescribed. - Medical management per primary team.   - Will follow outpatient in 1 week.
Cavity: No thrush or ulcers  Neck: JVD+ she did  Chest: Bilateral wheezes and basal crackles , decr air entry at bases   Heart: normal sounds  Abdomen: soft and non tender , distended  :  duque+, scrotal edema  Lower Extremities: Bilateral lower extremity edema to thighs   Decubitus ulcer left heel   Skin: no rash  Neuro: intact  Psychiatric: non-depressed            Assessment:         Plan:   1 acute kidney injury on chronic kidney disease stage IIIa. -Etiology is prerenal azotemia from cardiorenal plus received IV contrast for CTA chest.    -On admission creatinine was at his baseline of 1.5 and has gone up gradually to 2.0.  -His creatinine is slightly better at 1.8>>1.6 - which is his baseline ( Cr in 1.4-1.5 range since 2021)   -Clinically fluid overload as evident from extremities edema to thighs along with scrotal edema. He is currently on room air. He is LVEF 35 to 40%. -Excellent diuresis and volume status is improving.  - weight changed from 257 on admissison to 239 lbs today - approx 7.5 kg weight loss. -will decrease Bumex  1 mg bid (from tid) with continue p.o. Metolazone QD. Serum Bicarb has also trended up - likely from intravascular vol depletion. Has Incr Total body water . Can Hold PO Metolazone on the day of the cath . -on Jardiance as well   -Will start Aldactone if his EF is <30%  -Can consider discontinuing metolazone in the next 24-48hours    2. Generalized anasarca. -Etiology is decompensated CHF plus liver cirrhosis.    -His 2D echocardiogram showed LVEF of 35 to 40%. -He is noticed to have severe edema of his legs  to thighs along with scrotal edema.    -He is also having shortness of breath and in fact had pulmonary edema on admission.    -He is currently on room air.    -will decrease Bumex  1 mg bid (from tid) with continue p.o. metolazone QD.       3.  Anemia.    -It can be anemia of chronic disease & iron deficiency   -Hemoglobin 9.3.    -His iron saturation 12%
scrotal edema  Lower Extremities: Bilateral lower extremity edema to thighs   Skin: no rash  Neuro: intact  Psychiatric: non-depressed            Assessment:         Plan:   1 acute kidney injury on chronic kidney disease stage IIIa. -Etiology is prerenal azotemia from cardiorenal plus received IV contrast for CTA chest.    -On admission creatinine was at his baseline of 1.5 and has gone up gradually to 2.0.  -His creatinine is slightly better at 1.8  -His baseline creatinine ranges between 1.2-1.4.    -Clinically fluid overload as evident from extremities edema to thighs along with scrotal edema. He is currently on room air. He is LVEF 35 to 40%. -Excellent diuresis and volume status is improving.  -will decrease Bumex  2 mg bid (from tid) with continue p.o. metolazone.    -Can consider discontinuing metolazone in the next 24-48hours    2. Generalized anasarca. -Etiology is decompensated CHF plus liver cirrhosis.    -His 2D echocardiogram showed LVEF of 35 to 40%. -He is noticed to have severe edema of his legs  to thighs along with scrotal edema.    -He is also having shortness of breath and in fact had pulmonary edema on admission.    -He is currently on room air.    -will decrease Bumex  2 mg bid (from tid) with continue p.o. metolazone.    -Can consider discontinuing metolazone in the next 24-48 hours    3. Anemia.    -It can be anemia of chronic disease.    -Hemoglobin 9.3.    -His iron saturation 12% ferritin 60  -He was started on p.o. iron sulfate 325 mg twice a day. 4.  Acute hypoxic respiratory failure.    -Likely etiology is CHF and fluid overload. -CTA chest negative for PE but showed  pulmonary edema along with ascites.    -He needs aggressive diuresis. 5.  History of DVT. -On Eliquis.         Signed By: Sudhir Johns MD     May 24, 2023
Current Facility-Administered Medications   Medication Dose Route Frequency    predniSONE (DELTASONE) tablet 10 mg  10 mg Oral Daily    allopurinol (ZYLOPRIM) tablet 100 mg  100 mg Oral Daily    ferrous sulfate (IRON 325) tablet 325 mg  325 mg Oral BID    bumetanide (BUMEX) injection 2 mg  2 mg IntraVENous TID    colchicine (MITIGARE) capsule 0.6 mg  0.6 mg Oral Daily    HYDROmorphone HCl PF (DILAUDID) injection 1 mg  1 mg IntraVENous Q4H PRN    metOLazone (ZAROXOLYN) tablet 5 mg  5 mg Oral BID    mometasone-formoterol (DULERA) 200-5 MCG/ACT inhaler 2 puff  2 puff Inhalation BID    empagliflozin (JARDIANCE) tablet 10 mg  10 mg Oral Daily    ipratropium-albuterol (DUONEB) nebulizer solution 1 ampule  1 ampule Inhalation Q4H PRN    apixaban (ELIQUIS) tablet 5 mg  5 mg Oral BID    [Held by provider] hydrALAZINE (APRESOLINE) tablet 25 mg  25 mg Oral TID    isosorbide mononitrate (IMDUR) extended release tablet 30 mg  30 mg Oral Daily    potassium chloride (KLOR-CON M) extended release tablet 20 mEq  20 mEq Oral Daily    spironolactone (ALDACTONE) tablet 25 mg  25 mg Oral Daily    ipratropium 0.5 mg-albuterol 2.5 mg (DUONEB) nebulizer solution 1 ampule  1 ampule Inhalation TID    acetaminophen (TYLENOL) tablet 650 mg  650 mg Oral Q4H PRN     ______________________________________________________________________  EXPECTED LENGTH OF STAY: 2  ACTUAL LENGTH OF STAY:          8                 Damir Tracy MD

## 2023-05-28 LAB
BACTERIA SPEC CULT: NORMAL
BACTERIA SPEC CULT: NORMAL
Lab: NORMAL
Lab: NORMAL

## 2023-05-29 LAB
EKG ATRIAL RATE: 63 BPM
EKG DIAGNOSIS: NORMAL
EKG P AXIS: 22 DEGREES
EKG P-R INTERVAL: 172 MS
EKG Q-T INTERVAL: 448 MS
EKG QRS DURATION: 104 MS
EKG QTC CALCULATION (BAZETT): 458 MS
EKG R AXIS: -78 DEGREES
EKG T AXIS: 38 DEGREES
EKG VENTRICULAR RATE: 63 BPM

## 2023-05-30 LAB
BACTERIA SPEC CULT: NORMAL
BACTERIA SPEC CULT: NORMAL
Lab: NORMAL
Lab: NORMAL

## 2023-06-26 ENCOUNTER — HOSPITAL ENCOUNTER (OUTPATIENT)
Facility: HOSPITAL | Age: 73
Discharge: HOME | End: 2023-06-26
Attending: STUDENT IN AN ORGANIZED HEALTH CARE EDUCATION/TRAINING PROGRAM | Admitting: STUDENT IN AN ORGANIZED HEALTH CARE EDUCATION/TRAINING PROGRAM
Payer: MEDICARE

## 2023-06-26 VITALS
HEIGHT: 71 IN | OXYGEN SATURATION: 94 % | WEIGHT: 230 LBS | SYSTOLIC BLOOD PRESSURE: 155 MMHG | RESPIRATION RATE: 18 BRPM | BODY MASS INDEX: 32.2 KG/M2 | DIASTOLIC BLOOD PRESSURE: 86 MMHG | HEART RATE: 88 BPM | TEMPERATURE: 97.4 F

## 2023-06-26 DIAGNOSIS — I50.9 CHF (CONGESTIVE HEART FAILURE) (HCC): ICD-10-CM

## 2023-06-26 LAB
ALBUMIN SERPL-MCNC: 2.7 G/DL (ref 3.5–5)
ALBUMIN/GLOB SERPL: 0.5 (ref 1.1–2.2)
ALP SERPL-CCNC: 240 U/L (ref 45–117)
ALT SERPL-CCNC: 9 U/L (ref 12–78)
AMPHET UR QL SCN: NEGATIVE
ANION GAP SERPL CALC-SCNC: 4 MMOL/L (ref 5–15)
APTT PPP: 37.6 SEC (ref 21.2–34.1)
AST SERPL W P-5'-P-CCNC: 12 U/L (ref 15–37)
BARBITURATES UR QL SCN: NEGATIVE
BENZODIAZ UR QL: NEGATIVE
BILIRUB SERPL-MCNC: 0.8 MG/DL (ref 0.2–1)
BUN SERPL-MCNC: 15 MG/DL (ref 6–20)
BUN/CREAT SERPL: 15 (ref 12–20)
CA-I BLD-MCNC: 8.6 MG/DL (ref 8.5–10.1)
CANNABINOIDS UR QL SCN: NEGATIVE
CHLORIDE SERPL-SCNC: 108 MMOL/L (ref 97–108)
CO2 SERPL-SCNC: 26 MMOL/L (ref 21–32)
COCAINE UR QL SCN: POSITIVE
CREAT SERPL-MCNC: 1.01 MG/DL (ref 0.7–1.3)
ERYTHROCYTE [DISTWIDTH] IN BLOOD BY AUTOMATED COUNT: 20.8 % (ref 11.5–14.5)
GLOBULIN SER CALC-MCNC: 5.3 G/DL (ref 2–4)
GLUCOSE BLD STRIP.AUTO-MCNC: 86 MG/DL (ref 65–100)
GLUCOSE SERPL-MCNC: 83 MG/DL (ref 65–100)
HCT VFR BLD AUTO: 34.5 % (ref 36.6–50.3)
HGB BLD-MCNC: 10.2 G/DL (ref 12.1–17)
INR PPP: 1.2 (ref 0.9–1.1)
Lab: ABNORMAL
MCH RBC QN AUTO: 22.3 PG (ref 26–34)
MCHC RBC AUTO-ENTMCNC: 29.6 G/DL (ref 30–36.5)
MCV RBC AUTO: 75.3 FL (ref 80–99)
METHADONE UR QL: NEGATIVE
NRBC # BLD: 0 K/UL (ref 0–0.01)
NRBC BLD-RTO: 0 PER 100 WBC
OPIATES UR QL: NEGATIVE
PCP UR QL: NEGATIVE
PERFORMED BY:: NORMAL
PLATELET # BLD AUTO: 239 K/UL (ref 150–400)
PMV BLD AUTO: 9.8 FL (ref 8.9–12.9)
POTASSIUM SERPL-SCNC: 4.1 MMOL/L (ref 3.5–5.1)
PROT SERPL-MCNC: 8 G/DL (ref 6.4–8.2)
PROTHROMBIN TIME: 15.4 SEC (ref 11.9–14.6)
RBC # BLD AUTO: 4.58 M/UL (ref 4.1–5.7)
SODIUM SERPL-SCNC: 138 MMOL/L (ref 136–145)
THERAPEUTIC RANGE: ABNORMAL SEC (ref 82–109)
WBC # BLD AUTO: 5.5 K/UL (ref 4.1–11.1)

## 2023-06-26 PROCEDURE — 80053 COMPREHEN METABOLIC PANEL: CPT

## 2023-06-26 PROCEDURE — 7100000010 HC PHASE II RECOVERY - FIRST 15 MIN: Performed by: STUDENT IN AN ORGANIZED HEALTH CARE EDUCATION/TRAINING PROGRAM

## 2023-06-26 PROCEDURE — 2709999900 HC NON-CHARGEABLE SUPPLY: Performed by: STUDENT IN AN ORGANIZED HEALTH CARE EDUCATION/TRAINING PROGRAM

## 2023-06-26 PROCEDURE — 93458 L HRT ARTERY/VENTRICLE ANGIO: CPT | Performed by: STUDENT IN AN ORGANIZED HEALTH CARE EDUCATION/TRAINING PROGRAM

## 2023-06-26 PROCEDURE — 99152 MOD SED SAME PHYS/QHP 5/>YRS: CPT | Performed by: STUDENT IN AN ORGANIZED HEALTH CARE EDUCATION/TRAINING PROGRAM

## 2023-06-26 PROCEDURE — 2500000003 HC RX 250 WO HCPCS: Performed by: STUDENT IN AN ORGANIZED HEALTH CARE EDUCATION/TRAINING PROGRAM

## 2023-06-26 PROCEDURE — 80307 DRUG TEST PRSMV CHEM ANLYZR: CPT

## 2023-06-26 PROCEDURE — 7100000011 HC PHASE II RECOVERY - ADDTL 15 MIN: Performed by: STUDENT IN AN ORGANIZED HEALTH CARE EDUCATION/TRAINING PROGRAM

## 2023-06-26 PROCEDURE — 6360000004 HC RX CONTRAST MEDICATION: Performed by: STUDENT IN AN ORGANIZED HEALTH CARE EDUCATION/TRAINING PROGRAM

## 2023-06-26 PROCEDURE — 6360000002 HC RX W HCPCS: Performed by: STUDENT IN AN ORGANIZED HEALTH CARE EDUCATION/TRAINING PROGRAM

## 2023-06-26 PROCEDURE — C1894 INTRO/SHEATH, NON-LASER: HCPCS | Performed by: STUDENT IN AN ORGANIZED HEALTH CARE EDUCATION/TRAINING PROGRAM

## 2023-06-26 PROCEDURE — 7100000000 HC PACU RECOVERY - FIRST 15 MIN: Performed by: STUDENT IN AN ORGANIZED HEALTH CARE EDUCATION/TRAINING PROGRAM

## 2023-06-26 PROCEDURE — 82962 GLUCOSE BLOOD TEST: CPT

## 2023-06-26 PROCEDURE — 93005 ELECTROCARDIOGRAM TRACING: CPT | Performed by: STUDENT IN AN ORGANIZED HEALTH CARE EDUCATION/TRAINING PROGRAM

## 2023-06-26 PROCEDURE — C1769 GUIDE WIRE: HCPCS | Performed by: STUDENT IN AN ORGANIZED HEALTH CARE EDUCATION/TRAINING PROGRAM

## 2023-06-26 PROCEDURE — 85610 PROTHROMBIN TIME: CPT

## 2023-06-26 PROCEDURE — 85027 COMPLETE CBC AUTOMATED: CPT

## 2023-06-26 PROCEDURE — 85730 THROMBOPLASTIN TIME PARTIAL: CPT

## 2023-06-26 PROCEDURE — 7100000001 HC PACU RECOVERY - ADDTL 15 MIN: Performed by: STUDENT IN AN ORGANIZED HEALTH CARE EDUCATION/TRAINING PROGRAM

## 2023-06-26 RX ORDER — ONDANSETRON 2 MG/ML
4 INJECTION INTRAMUSCULAR; INTRAVENOUS EVERY 6 HOURS PRN
Status: CANCELLED | OUTPATIENT
Start: 2023-06-26

## 2023-06-26 RX ORDER — NITROGLYCERIN 20 MG/100ML
INJECTION INTRAVENOUS PRN
Status: DISCONTINUED | OUTPATIENT
Start: 2023-06-26 | End: 2023-06-26 | Stop reason: HOSPADM

## 2023-06-26 RX ORDER — SODIUM CHLORIDE 0.9 % (FLUSH) 0.9 %
5-40 SYRINGE (ML) INJECTION PRN
Status: CANCELLED | OUTPATIENT
Start: 2023-06-26

## 2023-06-26 RX ORDER — HEPARIN SODIUM 1000 [USP'U]/ML
INJECTION, SOLUTION INTRAVENOUS; SUBCUTANEOUS PRN
Status: DISCONTINUED | OUTPATIENT
Start: 2023-06-26 | End: 2023-06-26 | Stop reason: HOSPADM

## 2023-06-26 RX ORDER — ACETAMINOPHEN 325 MG/1
650 TABLET ORAL EVERY 4 HOURS PRN
Status: CANCELLED | OUTPATIENT
Start: 2023-06-26

## 2023-06-26 RX ORDER — SODIUM CHLORIDE 0.9 % (FLUSH) 0.9 %
5-40 SYRINGE (ML) INJECTION EVERY 12 HOURS SCHEDULED
Status: CANCELLED | OUTPATIENT
Start: 2023-06-26

## 2023-06-26 RX ORDER — SODIUM CHLORIDE 9 MG/ML
INJECTION, SOLUTION INTRAVENOUS PRN
Status: CANCELLED | OUTPATIENT
Start: 2023-06-26

## 2023-06-26 RX ORDER — VERAPAMIL HYDROCHLORIDE 2.5 MG/ML
INJECTION, SOLUTION INTRAVENOUS PRN
Status: DISCONTINUED | OUTPATIENT
Start: 2023-06-26 | End: 2023-06-26 | Stop reason: HOSPADM

## 2023-06-26 RX ORDER — LIDOCAINE HYDROCHLORIDE 10 MG/ML
INJECTION, SOLUTION INFILTRATION; PERINEURAL PRN
Status: DISCONTINUED | OUTPATIENT
Start: 2023-06-26 | End: 2023-06-26 | Stop reason: HOSPADM

## 2023-06-26 RX ORDER — MIDAZOLAM HYDROCHLORIDE 1 MG/ML
INJECTION INTRAMUSCULAR; INTRAVENOUS PRN
Status: DISCONTINUED | OUTPATIENT
Start: 2023-06-26 | End: 2023-06-26 | Stop reason: HOSPADM

## 2023-06-26 RX ORDER — FENTANYL CITRATE 50 UG/ML
INJECTION, SOLUTION INTRAMUSCULAR; INTRAVENOUS PRN
Status: DISCONTINUED | OUTPATIENT
Start: 2023-06-26 | End: 2023-06-26 | Stop reason: HOSPADM

## 2023-06-26 ASSESSMENT — PAIN - FUNCTIONAL ASSESSMENT: PAIN_FUNCTIONAL_ASSESSMENT: 0-10

## 2023-06-26 ASSESSMENT — PAIN SCALES - GENERAL
PAINLEVEL_OUTOF10: 0
PAINLEVEL_OUTOF10: 0

## 2023-06-27 LAB
EKG ATRIAL RATE: 61 BPM
EKG DIAGNOSIS: NORMAL
EKG P AXIS: 16 DEGREES
EKG P-R INTERVAL: 170 MS
EKG Q-T INTERVAL: 490 MS
EKG QRS DURATION: 102 MS
EKG QTC CALCULATION (BAZETT): 493 MS
EKG R AXIS: -72 DEGREES
EKG T AXIS: 55 DEGREES
EKG VENTRICULAR RATE: 61 BPM

## 2023-07-19 RX ORDER — EMPAGLIFLOZIN 10 MG/1
TABLET, FILM COATED ORAL
Qty: 90 TABLET | Refills: 0 | OUTPATIENT
Start: 2023-07-19

## 2023-07-27 DIAGNOSIS — I50.9 CHF (CONGESTIVE HEART FAILURE) (HCC): Primary | ICD-10-CM

## 2023-07-27 DIAGNOSIS — I50.42 CHRONIC COMBINED SYSTOLIC (CONGESTIVE) AND DIASTOLIC (CONGESTIVE) HEART FAILURE (HCC): ICD-10-CM

## 2023-07-27 NOTE — TELEPHONE ENCOUNTER
Requested Prescriptions     Pending Prescriptions Disp Refills    empagliflozin (JARDIANCE) 10 MG tablet 30 tablet 0     Sig: Take 1 tablet by mouth daily

## 2023-08-10 ENCOUNTER — OFFICE VISIT (OUTPATIENT)
Age: 73
End: 2023-08-10
Payer: MEDICARE

## 2023-08-10 VITALS
DIASTOLIC BLOOD PRESSURE: 69 MMHG | BODY MASS INDEX: 31.08 KG/M2 | WEIGHT: 222 LBS | HEART RATE: 54 BPM | OXYGEN SATURATION: 97 % | HEIGHT: 71 IN | SYSTOLIC BLOOD PRESSURE: 122 MMHG

## 2023-08-10 DIAGNOSIS — I42.9 CARDIOMYOPATHY, UNSPECIFIED TYPE (HCC): ICD-10-CM

## 2023-08-10 DIAGNOSIS — I73.9 PERIPHERAL VASCULAR DISEASE, UNSPECIFIED (HCC): ICD-10-CM

## 2023-08-10 DIAGNOSIS — I34.0 NONRHEUMATIC MITRAL VALVE REGURGITATION: ICD-10-CM

## 2023-08-10 DIAGNOSIS — I49.3 VENTRICULAR PREMATURE DEPOLARIZATION: ICD-10-CM

## 2023-08-10 DIAGNOSIS — I50.42 CHRONIC COMBINED SYSTOLIC (CONGESTIVE) AND DIASTOLIC (CONGESTIVE) HEART FAILURE (HCC): Primary | ICD-10-CM

## 2023-08-10 PROCEDURE — 3074F SYST BP LT 130 MM HG: CPT | Performed by: INTERNAL MEDICINE

## 2023-08-10 PROCEDURE — G8428 CUR MEDS NOT DOCUMENT: HCPCS | Performed by: INTERNAL MEDICINE

## 2023-08-10 PROCEDURE — 99214 OFFICE O/P EST MOD 30 MIN: CPT | Performed by: INTERNAL MEDICINE

## 2023-08-10 PROCEDURE — 3017F COLORECTAL CA SCREEN DOC REV: CPT | Performed by: INTERNAL MEDICINE

## 2023-08-10 PROCEDURE — 4004F PT TOBACCO SCREEN RCVD TLK: CPT | Performed by: INTERNAL MEDICINE

## 2023-08-10 PROCEDURE — G8417 CALC BMI ABV UP PARAM F/U: HCPCS | Performed by: INTERNAL MEDICINE

## 2023-08-10 PROCEDURE — 3078F DIAST BP <80 MM HG: CPT | Performed by: INTERNAL MEDICINE

## 2023-08-10 PROCEDURE — 1124F ACP DISCUSS-NO DSCNMKR DOCD: CPT | Performed by: INTERNAL MEDICINE

## 2023-08-10 RX ORDER — CARVEDILOL 6.25 MG/1
6.25 TABLET ORAL 2 TIMES DAILY
Qty: 180 TABLET | Refills: 5 | Status: SHIPPED | OUTPATIENT
Start: 2023-08-10

## 2023-08-10 RX ORDER — SPIRONOLACTONE 25 MG/1
25 TABLET ORAL DAILY
Qty: 90 TABLET | Refills: 1 | Status: SHIPPED | OUTPATIENT
Start: 2023-08-10

## 2023-08-10 ASSESSMENT — PATIENT HEALTH QUESTIONNAIRE - PHQ9
SUM OF ALL RESPONSES TO PHQ9 QUESTIONS 1 & 2: 0
SUM OF ALL RESPONSES TO PHQ QUESTIONS 1-9: 0
DEPRESSION UNABLE TO ASSESS: FUNCTIONAL CAPACITY MOTIVATION LIMITS ACCURACY
1. LITTLE INTEREST OR PLEASURE IN DOING THINGS: 0
SUM OF ALL RESPONSES TO PHQ QUESTIONS 1-9: 0
2. FEELING DOWN, DEPRESSED OR HOPELESS: 0
SUM OF ALL RESPONSES TO PHQ QUESTIONS 1-9: 0
SUM OF ALL RESPONSES TO PHQ QUESTIONS 1-9: 0

## 2023-08-10 NOTE — PROGRESS NOTES
1. Have you been to the ER, urgent care clinic since your last visit? Hospitalized since your last visit? Yes, [facility]    2. Have you seen or consulted any other health care providers outside of the [address] [place] [address] since your last visit? Include any pap smears or colon screening.       No

## 2023-08-30 DIAGNOSIS — I50.9 CHF (CONGESTIVE HEART FAILURE) (HCC): Primary | ICD-10-CM

## 2024-01-12 NOTE — PROGRESS NOTES
Problem: Falls - Risk of  Goal: *Absence of Falls  Description: Document [place] Fall Risk and appropriate interventions in the flowsheet. Outcome: Progressing Towards Goal  Note: Fall Risk Interventions:       Mentation Interventions: Adequate sleep, hydration, pain control, Bed/chair exit alarm, Door open when patient unattended, More frequent rounding, Reorient patient, Room close to nurse's station    Medication Interventions: Bed/chair exit alarm    Elimination Interventions: Bed/chair exit alarm, Call light in reach, Toilet paper/wipes in reach, Toileting schedule/hourly rounds              Problem: Patient Education: Go to Patient Education Activity  Goal: Patient/Family Education  Outcome: Progressing Towards Goal     Problem: Pressure Injury - Risk of  Goal: *Prevention of pressure injury  Description: Document Lang Scale and appropriate interventions in the flowsheet. Outcome: Progressing Towards Goal  Note: Pressure Injury Interventions:  Sensory Interventions: Assess changes in LOC, Check visual cues for pain, Keep linens dry and wrinkle-free, Minimize linen layers    Moisture Interventions: Absorbent underpads, Internal/External urinary devices, Minimize layers, Offer toileting Q_hr    Activity Interventions: Increase time out of bed, Pressure redistribution bed/mattress(bed type), PT/OT evaluation    Mobility Interventions: Assess need for specialty bed, Pressure redistribution bed/mattress (bed type), PT/OT evaluation, Turn and reposition approx.  every two hours(pillow and wedges)    Nutrition Interventions: Document food/fluid/supplement intake    Friction and Shear Interventions: Apply protective barrier, creams and emollients, Minimize layers                Problem: Patient Education: Go to Patient Education Activity  Goal: Patient/Family Education  Outcome: Progressing Towards Goal     Problem: Pain  Goal: *Control of Pain  Outcome: Progressing Towards Goal     Problem: Patient Education: Go Regular rate and rhythm to Patient Education Activity  Goal: Patient/Family Education  Outcome: Progressing Towards Goal

## 2024-01-22 ENCOUNTER — HOSPITAL ENCOUNTER (OUTPATIENT)
Age: 74
Discharge: HOME | End: 2024-01-25
Payer: MEDICARE

## 2024-01-22 ENCOUNTER — TRANSCRIBE ORDERS (OUTPATIENT)
Age: 74
End: 2024-01-22

## 2024-01-22 DIAGNOSIS — I50.42 CHRONIC COMBINED SYSTOLIC (CONGESTIVE) AND DIASTOLIC (CONGESTIVE) HEART FAILURE (HCC): ICD-10-CM

## 2024-01-22 DIAGNOSIS — I15.9 SECONDARY HYPERTENSION: ICD-10-CM

## 2024-01-22 DIAGNOSIS — Z12.5 SPECIAL SCREENING FOR MALIGNANT NEOPLASM OF PROSTATE: ICD-10-CM

## 2024-01-22 DIAGNOSIS — Z12.5 SPECIAL SCREENING FOR MALIGNANT NEOPLASM OF PROSTATE: Primary | ICD-10-CM

## 2024-01-22 LAB
ALBUMIN SERPL-MCNC: 2.8 G/DL (ref 3.4–5)
ALBUMIN/GLOB SERPL: 0.5 (ref 0.8–1.7)
ALP SERPL-CCNC: 228 U/L (ref 45–117)
ALT SERPL-CCNC: 10 U/L (ref 16–61)
ANION GAP SERPL CALC-SCNC: 5 MMOL/L (ref 3–18)
AST SERPL W P-5'-P-CCNC: 16 U/L (ref 10–38)
BILIRUB SERPL-MCNC: 0.9 MG/DL (ref 0.2–1)
BUN SERPL-MCNC: 22 MG/DL (ref 7–18)
BUN/CREAT SERPL: 20 (ref 12–20)
CA-I BLD-MCNC: 8.7 MG/DL (ref 8.5–10.1)
CHLORIDE SERPL-SCNC: 106 MMOL/L (ref 100–111)
CO2 SERPL-SCNC: 31 MMOL/L (ref 21–32)
CREAT SERPL-MCNC: 1.12 MG/DL (ref 0.6–1.3)
GLOBULIN SER CALC-MCNC: 5.7 G/DL (ref 2–4)
GLUCOSE SERPL-MCNC: 103 MG/DL (ref 74–99)
POTASSIUM SERPL-SCNC: 4.5 MMOL/L (ref 3.5–5.5)
PROT SERPL-MCNC: 8.5 G/DL (ref 6.4–8.2)
SODIUM SERPL-SCNC: 142 MMOL/L (ref 136–145)

## 2024-01-22 PROCEDURE — 84153 ASSAY OF PSA TOTAL: CPT

## 2024-01-22 PROCEDURE — 80053 COMPREHEN METABOLIC PANEL: CPT

## 2024-01-23 LAB
PSA SERPL-MCNC: 1.9 NG/ML (ref 0–4)
REFLEX CRITERIA: NORMAL

## 2024-01-26 ENCOUNTER — OFFICE VISIT (OUTPATIENT)
Age: 74
End: 2024-01-26
Payer: MEDICARE

## 2024-01-26 VITALS
WEIGHT: 241 LBS | HEART RATE: 56 BPM | DIASTOLIC BLOOD PRESSURE: 77 MMHG | BODY MASS INDEX: 33.74 KG/M2 | SYSTOLIC BLOOD PRESSURE: 150 MMHG | HEIGHT: 71 IN | OXYGEN SATURATION: 94 %

## 2024-01-26 DIAGNOSIS — I73.9 PERIPHERAL VASCULAR DISEASE, UNSPECIFIED (HCC): ICD-10-CM

## 2024-01-26 DIAGNOSIS — I34.0 NONRHEUMATIC MITRAL VALVE REGURGITATION: ICD-10-CM

## 2024-01-26 DIAGNOSIS — I42.9 CARDIOMYOPATHY, UNSPECIFIED TYPE (HCC): ICD-10-CM

## 2024-01-26 DIAGNOSIS — I49.3 VENTRICULAR PREMATURE DEPOLARIZATION: ICD-10-CM

## 2024-01-26 DIAGNOSIS — I50.42 CHRONIC COMBINED SYSTOLIC (CONGESTIVE) AND DIASTOLIC (CONGESTIVE) HEART FAILURE (HCC): Primary | ICD-10-CM

## 2024-01-26 PROCEDURE — 1124F ACP DISCUSS-NO DSCNMKR DOCD: CPT | Performed by: NURSE PRACTITIONER

## 2024-01-26 PROCEDURE — 3077F SYST BP >= 140 MM HG: CPT | Performed by: NURSE PRACTITIONER

## 2024-01-26 PROCEDURE — 4004F PT TOBACCO SCREEN RCVD TLK: CPT | Performed by: NURSE PRACTITIONER

## 2024-01-26 PROCEDURE — G8427 DOCREV CUR MEDS BY ELIG CLIN: HCPCS | Performed by: NURSE PRACTITIONER

## 2024-01-26 PROCEDURE — G8484 FLU IMMUNIZE NO ADMIN: HCPCS | Performed by: NURSE PRACTITIONER

## 2024-01-26 PROCEDURE — G8417 CALC BMI ABV UP PARAM F/U: HCPCS | Performed by: NURSE PRACTITIONER

## 2024-01-26 PROCEDURE — 3078F DIAST BP <80 MM HG: CPT | Performed by: NURSE PRACTITIONER

## 2024-01-26 PROCEDURE — 99214 OFFICE O/P EST MOD 30 MIN: CPT | Performed by: NURSE PRACTITIONER

## 2024-01-26 PROCEDURE — 3017F COLORECTAL CA SCREEN DOC REV: CPT | Performed by: NURSE PRACTITIONER

## 2024-01-26 RX ORDER — METOLAZONE 5 MG/1
5 TABLET ORAL DAILY
Qty: 30 TABLET | Refills: 0 | Status: SHIPPED | OUTPATIENT
Start: 2024-01-26

## 2024-01-26 NOTE — PROGRESS NOTES
1. Have you been to the ER, urgent care clinic since your last visit?  Hospitalized since your last visit?     no    2. Have you seen or consulted any other health care providers outside of the [facility] since your last visit?  Include any pap smears or colon screening.      Yes pcp   
pulmonary arterial systolic pressure is 40 mmHg.     * The ascending aorta is approaching dilatation measuring 3.8 cm with an   index of 1.54 cm/m2.     * The aortic root at the sinus of Valsalva is dilated measuring 4.2 cm with   an index of 1.68 cm/m2.     * No mass, shunts, or thrombi.     Comparison     * No prior study is available for comparison.      Interpretation Summary  5/2023  Result status: Final result       Left Ventricle: The EF by visual approximation is 30 - 35%. Moderate global hypokinesis present.    Right Ventricle: Right ventricle is severely dilated. Severely reduced systolic function. TAPSE is abnormal. TAPSE is 1.5 cm. Global hypokinesis present. Global longitudinal strain is -7.9%. RV Peak S' is 7 cm/s. RV free wall strain is abnormal.    Mitral Valve: Moderate regurgitation.    Tricuspid Valve: Severe regurgitation. Moderately elevated RVSP. The estimated RVSP is 49 mmHg.    Pulmonic Valve: Moderate regurgitation.    Left Atrium: Left atrium is severely dilated. Left atrial volume index is severely increased (>48 mL/m2). LA Vol Index is  65 ml/m2.    Right Atrium: Right atrium is severely dilated. The right atrial volume is severely increased.    Pericardium: Small (<1 cm) pericardial effusion present. No indication of cardiac tamponade. No cahnces since 5/17/23            No data to display                  Assessment        Diagnosis Orders   1. Chronic combined systolic (congestive) and diastolic (congestive) heart failure (HCC)  Basic Metabolic Panel    Brain Natriuretic Peptide      2. Cardiomyopathy, unspecified type (HCC)        3. Ventricular premature depolarization        4. Peripheral vascular disease, unspecified (HCC)        5. Nonrheumatic mitral valve regurgitation            There are no discontinued medications.    Orders Placed This Encounter    Basic Metabolic Panel     Standing Status:   Future     Standing Expiration Date:   1/26/2025    Brain Natriuretic Peptide

## 2024-01-29 ENCOUNTER — HOSPITAL ENCOUNTER (OUTPATIENT)
Age: 74
Discharge: HOME | End: 2024-02-01
Payer: MEDICARE

## 2024-01-29 DIAGNOSIS — I50.42 CHRONIC COMBINED SYSTOLIC (CONGESTIVE) AND DIASTOLIC (CONGESTIVE) HEART FAILURE (HCC): ICD-10-CM

## 2024-01-29 LAB
ANION GAP SERPL CALC-SCNC: 9 MMOL/L (ref 3–18)
BNP SERPL-MCNC: 3216 PG/ML (ref 0–900)
BUN SERPL-MCNC: 39 MG/DL (ref 7–18)
BUN/CREAT SERPL: 27 (ref 12–20)
CA-I BLD-MCNC: 8.8 MG/DL (ref 8.5–10.1)
CHLORIDE SERPL-SCNC: 97 MMOL/L (ref 100–111)
CO2 SERPL-SCNC: 34 MMOL/L (ref 21–32)
CREAT SERPL-MCNC: 1.44 MG/DL (ref 0.6–1.3)
GLUCOSE SERPL-MCNC: 108 MG/DL (ref 74–99)
POTASSIUM SERPL-SCNC: 3.6 MMOL/L (ref 3.5–5.5)
SODIUM SERPL-SCNC: 140 MMOL/L (ref 136–145)

## 2024-01-29 PROCEDURE — 36415 COLL VENOUS BLD VENIPUNCTURE: CPT

## 2024-01-29 PROCEDURE — 80048 BASIC METABOLIC PNL TOTAL CA: CPT

## 2024-01-29 PROCEDURE — 83880 ASSAY OF NATRIURETIC PEPTIDE: CPT

## 2024-01-30 ENCOUNTER — TELEPHONE (OUTPATIENT)
Age: 74
End: 2024-01-30

## 2024-01-30 NOTE — TELEPHONE ENCOUNTER
Discussed with nurse practitioner Joe patient to hold metolazone to evaluate for recurrence of edema or weight gain if occurs should resume metolazone follow-up as scheduled

## 2024-01-30 NOTE — TELEPHONE ENCOUNTER
SAMRA gage called regarding patient weight and his medication Metolazone. She states patient weight his down significantly from 221 lbs to 194 lbs. She states patient is pretty dry and someone may need to call him with instructions to cut it back. Please advise

## 2024-02-20 ENCOUNTER — OFFICE VISIT (OUTPATIENT)
Age: 74
End: 2024-02-20
Payer: MEDICARE

## 2024-02-20 VITALS
HEIGHT: 71 IN | WEIGHT: 220 LBS | SYSTOLIC BLOOD PRESSURE: 143 MMHG | HEART RATE: 59 BPM | BODY MASS INDEX: 30.8 KG/M2 | OXYGEN SATURATION: 95 % | DIASTOLIC BLOOD PRESSURE: 54 MMHG

## 2024-02-20 DIAGNOSIS — I49.3 VENTRICULAR PREMATURE DEPOLARIZATION: ICD-10-CM

## 2024-02-20 DIAGNOSIS — I42.9 CARDIOMYOPATHY, UNSPECIFIED TYPE (HCC): ICD-10-CM

## 2024-02-20 DIAGNOSIS — I10 PRIMARY HYPERTENSION: ICD-10-CM

## 2024-02-20 DIAGNOSIS — I46.9 CARDIAC ARREST (HCC): ICD-10-CM

## 2024-02-20 DIAGNOSIS — I50.42 CHRONIC COMBINED SYSTOLIC (CONGESTIVE) AND DIASTOLIC (CONGESTIVE) HEART FAILURE (HCC): Primary | ICD-10-CM

## 2024-02-20 DIAGNOSIS — I34.0 NONRHEUMATIC MITRAL VALVE REGURGITATION: ICD-10-CM

## 2024-02-20 PROCEDURE — 3078F DIAST BP <80 MM HG: CPT | Performed by: NURSE PRACTITIONER

## 2024-02-20 PROCEDURE — 4004F PT TOBACCO SCREEN RCVD TLK: CPT | Performed by: NURSE PRACTITIONER

## 2024-02-20 PROCEDURE — G8427 DOCREV CUR MEDS BY ELIG CLIN: HCPCS | Performed by: NURSE PRACTITIONER

## 2024-02-20 PROCEDURE — 99214 OFFICE O/P EST MOD 30 MIN: CPT | Performed by: NURSE PRACTITIONER

## 2024-02-20 PROCEDURE — 1124F ACP DISCUSS-NO DSCNMKR DOCD: CPT | Performed by: NURSE PRACTITIONER

## 2024-02-20 PROCEDURE — G8417 CALC BMI ABV UP PARAM F/U: HCPCS | Performed by: NURSE PRACTITIONER

## 2024-02-20 PROCEDURE — 3017F COLORECTAL CA SCREEN DOC REV: CPT | Performed by: NURSE PRACTITIONER

## 2024-02-20 PROCEDURE — 3077F SYST BP >= 140 MM HG: CPT | Performed by: NURSE PRACTITIONER

## 2024-02-20 PROCEDURE — G8484 FLU IMMUNIZE NO ADMIN: HCPCS | Performed by: NURSE PRACTITIONER

## 2024-02-20 RX ORDER — ALBUTEROL SULFATE 90 UG/1
INHALANT RESPIRATORY (INHALATION)
COMMUNITY

## 2024-02-20 RX ORDER — POTASSIUM CHLORIDE 1.5 G/1.58G
20 POWDER, FOR SOLUTION ORAL 2 TIMES DAILY
COMMUNITY

## 2024-02-20 RX ORDER — METOPROLOL SUCCINATE 25 MG/1
12.5 TABLET, EXTENDED RELEASE ORAL DAILY
COMMUNITY
End: 2024-02-23 | Stop reason: SDUPTHER

## 2024-02-20 RX ORDER — ASCORBIC ACID 500 MG
500 TABLET ORAL DAILY
COMMUNITY

## 2024-02-20 ASSESSMENT — PATIENT HEALTH QUESTIONNAIRE - PHQ9
SUM OF ALL RESPONSES TO PHQ QUESTIONS 1-9: 0
1. LITTLE INTEREST OR PLEASURE IN DOING THINGS: 0
SUM OF ALL RESPONSES TO PHQ9 QUESTIONS 1 & 2: 0
2. FEELING DOWN, DEPRESSED OR HOPELESS: 0

## 2024-02-20 NOTE — PROGRESS NOTES
HISTORY OF PRESENT ILLNESS  Eliud Ro is a 71 y.o. male.    10/18/2021  Patient seen today for new patient evaluation.  He is referred here for evaluation of cardiac arrhythmia.  Patient was in hospital 5/2021 with  · Tracheostomy - #9 Portex on 4/26 with Dr. Louie. No further bleeding at trach site.  Plans for reevaluation and potential decannulation as outpatient noted  · S/p angioedema- with airway and respiratory failure and collapse; thought due to ACE inhibitor.S/P Emergent Cricthyrotomy [facility] 4/4/21- converted to 6.5 ETT intraoperative by anesthesia team 4/4/21, 8.0 ETT by anesthesia 4/7/21, packing removed evening 4/8/21-ENT  · S/P cardiac arrest and acute respiratory failure-resolved  · Pulmonary Infiltrates: suspect aspiration secretions and/or blood due to above- can't exclude other infectious process at this time.  Sputum culture from 5 /1 reported 5 /4-heavy Enterobacter aerogenes.  Not treating since clinically improved.  Likely colonization  · DVT: Popliteal- Left; acute non-occlusive thrombus.   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  · Need for nutrition-PEG tube   Patient now presents with severe edema.  He has generalized edema that is progressively getting worse.  He is short of breath.  He has orthopnea and complaint of wheezing denies any chest pain  3/2022  Patient is here for follow-up.  He is here for preoperative cardiac assessment.  He has multiple medical problems.  He was last admitted in hospital 10/2021 with  Discharge Diagnosis:     Acute on chronic combined systolic and diastolic CHF   CMO EF 41%  Chronic left heel wound  Left lower extremity cellulitis  Suspect osteomyelitis  Acute kidney injury on CKD stage IIIa  Anemia  Iron deficiency  Morbid obesity  Suspect

## 2024-02-23 DIAGNOSIS — I10 PRIMARY HYPERTENSION: Primary | ICD-10-CM

## 2024-02-23 RX ORDER — METOPROLOL SUCCINATE 25 MG/1
12.5 TABLET, EXTENDED RELEASE ORAL DAILY
Qty: 30 TABLET | Refills: 3 | Status: SHIPPED | OUTPATIENT
Start: 2024-02-23

## 2024-02-23 NOTE — TELEPHONE ENCOUNTER
Requested Prescriptions     Pending Prescriptions Disp Refills    metoprolol succinate (TOPROL XL) 25 MG extended release tablet 30 tablet 3     Sig: Take 0.5 tablets by mouth daily

## 2024-04-18 RX ORDER — METOLAZONE 5 MG/1
5 TABLET ORAL DAILY
Qty: 30 TABLET | Refills: 0 | Status: SHIPPED | OUTPATIENT
Start: 2024-04-18

## 2024-04-22 DIAGNOSIS — I50.42 CHRONIC COMBINED SYSTOLIC (CONGESTIVE) AND DIASTOLIC (CONGESTIVE) HEART FAILURE (HCC): Primary | ICD-10-CM

## 2024-04-22 RX ORDER — BUMETANIDE 1 MG/1
1 TABLET ORAL 2 TIMES DAILY
Qty: 60 TABLET | Refills: 3 | Status: SHIPPED | OUTPATIENT
Start: 2024-04-22

## 2024-04-22 NOTE — TELEPHONE ENCOUNTER
Requested Prescriptions     Pending Prescriptions Disp Refills    bumetanide (BUMEX) 1 MG tablet 60 tablet 3     Sig: Take 1 tablet by mouth 2 times daily

## 2024-05-07 ENCOUNTER — APPOINTMENT (OUTPATIENT)
Age: 74
End: 2024-05-07
Payer: MEDICARE

## 2024-05-07 ENCOUNTER — HOSPITAL ENCOUNTER (EMERGENCY)
Age: 74
Discharge: ANOTHER HOSPITAL | End: 2024-05-07
Attending: FAMILY MEDICINE
Payer: MEDICARE

## 2024-05-07 VITALS
RESPIRATION RATE: 19 BRPM | DIASTOLIC BLOOD PRESSURE: 97 MMHG | BODY MASS INDEX: 28.56 KG/M2 | OXYGEN SATURATION: 100 % | SYSTOLIC BLOOD PRESSURE: 160 MMHG | HEART RATE: 74 BPM | HEIGHT: 71 IN | TEMPERATURE: 97.5 F | WEIGHT: 204 LBS

## 2024-05-07 DIAGNOSIS — I61.9 INTRAPARENCHYMAL HEMORRHAGE OF BRAIN (HCC): Primary | ICD-10-CM

## 2024-05-07 LAB
ALBUMIN SERPL-MCNC: 3.2 G/DL (ref 3.4–5)
ALBUMIN/GLOB SERPL: 0.5 (ref 0.8–1.7)
ALP SERPL-CCNC: 290 U/L (ref 45–117)
ALT SERPL-CCNC: 9 U/L (ref 16–61)
AMMONIA PLAS-SCNC: 21 UMOL/L (ref 11–32)
AMPHET UR QL SCN: NEGATIVE
ANION GAP SERPL CALC-SCNC: 12 MMOL/L (ref 3–18)
APPEARANCE UR: ABNORMAL
ARTERIAL PATENCY WRIST A: YES
AST SERPL W P-5'-P-CCNC: 18 U/L (ref 10–38)
BACTERIA URNS QL MICRO: ABNORMAL /HPF
BARBITURATES UR QL SCN: NEGATIVE
BASE DEFICIT BLDA-SCNC: 1.1 MMOL/L
BASOPHILS # BLD: 0.1 K/UL (ref 0–0.1)
BASOPHILS NFR BLD: 1 % (ref 0–2)
BDY SITE: ABNORMAL
BENZODIAZ UR QL: NEGATIVE
BILIRUB SERPL-MCNC: 2.1 MG/DL (ref 0.2–1)
BILIRUB UR QL: NEGATIVE
BNP SERPL-MCNC: ABNORMAL PG/ML (ref 0–900)
BREATHS.SPONTANEOUS ON VENT: 22
BUN SERPL-MCNC: 14 MG/DL (ref 7–18)
BUN/CREAT SERPL: 12 (ref 12–20)
CA-I BLD-MCNC: 9.8 MG/DL (ref 8.5–10.1)
CANNABINOIDS UR QL SCN: NEGATIVE
CHLORIDE SERPL-SCNC: 98 MMOL/L (ref 100–111)
CK SERPL-CCNC: 50 U/L (ref 39–308)
CO2 SERPL-SCNC: 25 MMOL/L (ref 21–32)
COCAINE UR QL SCN: POSITIVE
COHGB MFR BLD: 1.3 % (ref 1–2)
COLOR UR: YELLOW
CREAT SERPL-MCNC: 1.21 MG/DL (ref 0.6–1.3)
DIFFERENTIAL METHOD BLD: ABNORMAL
EKG ATRIAL RATE: 83 BPM
EKG DIAGNOSIS: NORMAL
EKG Q-T INTERVAL: 426 MS
EKG QRS DURATION: 92 MS
EKG QTC CALCULATION (BAZETT): 500 MS
EKG R AXIS: -77 DEGREES
EKG T AXIS: 52 DEGREES
EKG VENTRICULAR RATE: 83 BPM
EOSINOPHIL # BLD: 0 K/UL (ref 0–0.4)
EOSINOPHIL NFR BLD: 0 % (ref 0–5)
EPITH CASTS URNS QL MICRO: ABNORMAL /LPF (ref 0–20)
ERYTHROCYTE [DISTWIDTH] IN BLOOD BY AUTOMATED COUNT: 19.1 % (ref 11.6–14.5)
ETHANOL SERPL-MCNC: <3 MG/DL (ref 0–3)
FENTANYL UR QL SCN: NEGATIVE
FIO2 ON VENT: 100 %
GAS FLOW.O2 O2 DELIVERY SYS: 60 L/MIN
GAS FLOW.O2 SETTING OXYMISER: 18
GLOBULIN SER CALC-MCNC: 7 G/DL (ref 2–4)
GLUCOSE BLD STRIP.AUTO-MCNC: 96 MG/DL (ref 70–110)
GLUCOSE SERPL-MCNC: 109 MG/DL (ref 74–99)
GLUCOSE UR STRIP.AUTO-MCNC: >1000 MG/DL
HCO3 BLDA-SCNC: 24 MMOL/L (ref 22–26)
HCT VFR BLD AUTO: 40.3 % (ref 36–48)
HGB BLD-MCNC: 12.2 G/DL (ref 13–16)
HGB UR QL STRIP: ABNORMAL
IMM GRANULOCYTES # BLD AUTO: 0 K/UL (ref 0–0.04)
IMM GRANULOCYTES NFR BLD AUTO: 0 % (ref 0–0.5)
INR PPP: 1.5 (ref 0.9–1.1)
KETONES UR QL STRIP.AUTO: ABNORMAL MG/DL
LACTATE SERPL-SCNC: 5.6 MMOL/L (ref 0.4–2)
LEUKOCYTE ESTERASE UR QL STRIP.AUTO: ABNORMAL
LYMPHOCYTES # BLD: 0.8 K/UL (ref 0.9–3.6)
LYMPHOCYTES NFR BLD: 12 % (ref 21–52)
Lab: ABNORMAL
MCH RBC QN AUTO: 23.7 PG (ref 24–34)
MCHC RBC AUTO-ENTMCNC: 30.3 G/DL (ref 31–37)
MCV RBC AUTO: 78.4 FL (ref 78–100)
METHADONE UR QL: NEGATIVE
METHGB MFR BLD: 0.1 % (ref 0–1.4)
MONOCYTES # BLD: 0.6 K/UL (ref 0.05–1.2)
MONOCYTES NFR BLD: 8 % (ref 3–10)
NEUTS SEG # BLD: 5.4 K/UL (ref 1.8–8)
NEUTS SEG NFR BLD: 79 % (ref 40–73)
NITRITE UR QL STRIP.AUTO: NEGATIVE
NRBC # BLD: 0 K/UL (ref 0–0.01)
NRBC BLD-RTO: 0 PER 100 WBC
OPIATES UR QL: NEGATIVE
OXYCODONE UR QL SCN: NEGATIVE
OXYHGB MFR BLD: 98.4 % (ref 95–99)
PCO2 BLDA: 40 MMHG (ref 35–45)
PCP UR QL: NEGATIVE
PEEP RESPIRATORY: 8
PERFORMED BY:: ABNORMAL
PERFORMED BY:: NORMAL
PH BLDA: 7.39 (ref 7.35–7.45)
PH UR STRIP: 6 (ref 5–8)
PLATELET # BLD AUTO: 182 K/UL (ref 135–420)
PLATELET COMMENT: ABNORMAL
PO2 BLDA: 293 MMHG (ref 80–100)
POTASSIUM SERPL-SCNC: 3.8 MMOL/L (ref 3.5–5.5)
PROPOXYPH UR QL: NEGATIVE
PROT SERPL-MCNC: 10.2 G/DL (ref 6.4–8.2)
PROT UR STRIP-MCNC: 100 MG/DL
PROTHROMBIN TIME: 18.6 SEC (ref 11.9–14.7)
RBC # BLD AUTO: 5.14 M/UL (ref 4.35–5.65)
RBC #/AREA URNS HPF: ABNORMAL /HPF (ref 0–2)
RBC MORPH BLD: ABNORMAL
SAO2 % BLD: 100 % (ref 95–99)
SAO2% DEVICE SAO2% SENSOR NAME: ABNORMAL
SERVICE CMNT-IMP: ABNORMAL
SODIUM SERPL-SCNC: 135 MMOL/L (ref 136–145)
SP GR UR REFRACTOMETRY: 1.02 (ref 1–1.03)
SPECIMEN SITE: ABNORMAL
TRICYCLICS UR QL: NEGATIVE
TROPONIN I SERPL HS-MCNC: 272 NG/L (ref 0–78)
URINE CULTURE IF INDICATED: ABNORMAL
UROBILINOGEN UR QL STRIP.AUTO: 1 EU/DL (ref 0.2–1)
VT SETTING VENT: 500
WBC # BLD AUTO: 6.9 K/UL (ref 4.6–13.2)
WBC URNS QL MICRO: ABNORMAL /HPF (ref 0–4)

## 2024-05-07 PROCEDURE — 31500 INSERT EMERGENCY AIRWAY: CPT

## 2024-05-07 PROCEDURE — 82077 ASSAY SPEC XCP UR&BREATH IA: CPT

## 2024-05-07 PROCEDURE — 96375 TX/PRO/DX INJ NEW DRUG ADDON: CPT

## 2024-05-07 PROCEDURE — 87040 BLOOD CULTURE FOR BACTERIA: CPT

## 2024-05-07 PROCEDURE — 2500000003 HC RX 250 WO HCPCS

## 2024-05-07 PROCEDURE — 93005 ELECTROCARDIOGRAM TRACING: CPT | Performed by: FAMILY MEDICINE

## 2024-05-07 PROCEDURE — 85025 COMPLETE CBC W/AUTO DIFF WBC: CPT

## 2024-05-07 PROCEDURE — 71045 X-RAY EXAM CHEST 1 VIEW: CPT

## 2024-05-07 PROCEDURE — 6360000002 HC RX W HCPCS

## 2024-05-07 PROCEDURE — 82803 BLOOD GASES ANY COMBINATION: CPT

## 2024-05-07 PROCEDURE — 85610 PROTHROMBIN TIME: CPT

## 2024-05-07 PROCEDURE — 6360000002 HC RX W HCPCS: Performed by: FAMILY MEDICINE

## 2024-05-07 PROCEDURE — 82550 ASSAY OF CK (CPK): CPT

## 2024-05-07 PROCEDURE — 80053 COMPREHEN METABOLIC PANEL: CPT

## 2024-05-07 PROCEDURE — 2700000000 HC OXYGEN THERAPY PER DAY

## 2024-05-07 PROCEDURE — 36600 WITHDRAWAL OF ARTERIAL BLOOD: CPT

## 2024-05-07 PROCEDURE — 2580000003 HC RX 258: Performed by: FAMILY MEDICINE

## 2024-05-07 PROCEDURE — 87088 URINE BACTERIA CULTURE: CPT

## 2024-05-07 PROCEDURE — 87186 SC STD MICRODIL/AGAR DIL: CPT

## 2024-05-07 PROCEDURE — 31720 CLEARANCE OF AIRWAYS: CPT

## 2024-05-07 PROCEDURE — 51702 INSERT TEMP BLADDER CATH: CPT

## 2024-05-07 PROCEDURE — 82962 GLUCOSE BLOOD TEST: CPT

## 2024-05-07 PROCEDURE — 70450 CT HEAD/BRAIN W/O DYE: CPT

## 2024-05-07 PROCEDURE — 82140 ASSAY OF AMMONIA: CPT

## 2024-05-07 PROCEDURE — 87086 URINE CULTURE/COLONY COUNT: CPT

## 2024-05-07 PROCEDURE — 96365 THER/PROPH/DIAG IV INF INIT: CPT

## 2024-05-07 PROCEDURE — 99285 EMERGENCY DEPT VISIT HI MDM: CPT

## 2024-05-07 PROCEDURE — 83605 ASSAY OF LACTIC ACID: CPT

## 2024-05-07 PROCEDURE — 80307 DRUG TEST PRSMV CHEM ANLYZR: CPT

## 2024-05-07 PROCEDURE — 81001 URINALYSIS AUTO W/SCOPE: CPT

## 2024-05-07 PROCEDURE — 96374 THER/PROPH/DIAG INJ IV PUSH: CPT

## 2024-05-07 PROCEDURE — 83880 ASSAY OF NATRIURETIC PEPTIDE: CPT

## 2024-05-07 PROCEDURE — 84484 ASSAY OF TROPONIN QUANT: CPT

## 2024-05-07 PROCEDURE — 72125 CT NECK SPINE W/O DYE: CPT

## 2024-05-07 RX ORDER — MANNITOL 20 G/100ML
1 INJECTION, SOLUTION INTRAVENOUS ONCE
Status: DISCONTINUED | OUTPATIENT
Start: 2024-05-07 | End: 2024-05-07

## 2024-05-07 RX ORDER — PROPOFOL 10 MG/ML
5-50 INJECTION, EMULSION INTRAVENOUS
Status: COMPLETED | OUTPATIENT
Start: 2024-05-07 | End: 2024-05-07

## 2024-05-07 RX ORDER — SUCCINYLCHOLINE/SOD CL,ISO/PF 100 MG/5ML
SYRINGE (ML) INTRAVENOUS
Status: COMPLETED
Start: 2024-05-07 | End: 2024-05-07

## 2024-05-07 RX ORDER — LEVETIRACETAM 500 MG/5ML
1500 INJECTION, SOLUTION, CONCENTRATE INTRAVENOUS
Status: COMPLETED | OUTPATIENT
Start: 2024-05-07 | End: 2024-05-07

## 2024-05-07 RX ORDER — ETOMIDATE 2 MG/ML
INJECTION INTRAVENOUS
Status: COMPLETED
Start: 2024-05-07 | End: 2024-05-07

## 2024-05-07 RX ORDER — 3% SODIUM CHLORIDE 3 G/100ML
150 INJECTION, SOLUTION INTRAVENOUS ONCE
Status: COMPLETED | OUTPATIENT
Start: 2024-05-07 | End: 2024-05-07

## 2024-05-07 RX ORDER — SODIUM CHLORIDE 9 MG/ML
50 INJECTION, SOLUTION INTRAVENOUS ONCE
Status: DISCONTINUED | OUTPATIENT
Start: 2024-05-07 | End: 2024-05-07 | Stop reason: HOSPADM

## 2024-05-07 RX ORDER — MANNITOL 20 G/100ML
1 INJECTION, SOLUTION INTRAVENOUS ONCE
Status: CANCELLED | OUTPATIENT
Start: 2024-05-07 | End: 2024-05-07

## 2024-05-07 RX ORDER — PROPOFOL 10 MG/ML
INJECTION, EMULSION INTRAVENOUS
Status: COMPLETED
Start: 2024-05-07 | End: 2024-05-07

## 2024-05-07 RX ADMIN — PROPOFOL 20 MCG/KG/MIN: 10 INJECTION, EMULSION INTRAVENOUS at 12:24

## 2024-05-07 RX ADMIN — SODIUM CHLORIDE 150 ML: 3 INJECTION, SOLUTION INTRAVENOUS at 12:59

## 2024-05-07 RX ADMIN — Medication 80 MG: at 12:09

## 2024-05-07 RX ADMIN — Medication 2000 UNITS: at 13:06

## 2024-05-07 RX ADMIN — ETOMIDATE 20 MG: 2 INJECTION, SOLUTION INTRAVENOUS at 12:09

## 2024-05-07 RX ADMIN — SODIUM CHLORIDE 5 MG/HR: 9 INJECTION, SOLUTION INTRAVENOUS at 12:05

## 2024-05-07 RX ADMIN — LEVETIRACETAM 1500 MG: 100 INJECTION, SOLUTION INTRAVENOUS at 12:12

## 2024-05-07 ASSESSMENT — PAIN - FUNCTIONAL ASSESSMENT: PAIN_FUNCTIONAL_ASSESSMENT: ADULT NONVERBAL PAIN SCALE (NPVS)

## 2024-05-07 ASSESSMENT — PULMONARY FUNCTION TESTS: PIF_VALUE: 25

## 2024-05-07 NOTE — ED TRIAGE NOTES
Patient arrives via EMS from home.  Patient found unresponsive by medics on their arrival to scene.  Patient given 2 mg Narcan IV. EMS reports BG of 98.   Patient alert on arrival.

## 2024-05-07 NOTE — ED NOTES
TRANSFER - OUT REPORT:    Verbal report given to Bear Adams RN on Eliud Ro  being transferred to [facility] ER for urgent transfer       Report consisted of patient's Situation, Background, Assessment and   Recommendations(SBAR).     Information from the following report(s) Nurse Handoff Report, ED Encounter Summary, ED SBAR, MAR, and Recent Results was reviewed with the receiving nurse.    [place] Fall Assessment:    Presents to emergency department  because of falls (Syncope, seizure, or loss of consciousness): Yes  Age > 70: Yes  Altered Mental Status, Intoxication with alcohol or substance confusion (Disorientation, impaired judgment, poor safety awaremess, or inability to follow instructions): Yes  Impaired Mobility: Ambulates or transfers with assistive devices or assistance; Unable to ambulate or transer.: Yes             Lines:   Peripheral IV 05/07/24 Left Antecubital (Active)   Site Assessment Clean, dry & intact 05/07/24 1048   Line Status Blood return noted 05/07/24 1048   Phlebitis Assessment No symptoms 05/07/24 1048   Infiltration Assessment 0 05/07/24 1048       Peripheral IV 05/07/24 Posterior;Right Hand (Active)   Site Assessment Clean, dry & intact 05/07/24 1212   Line Status Blood return noted 05/07/24 1212   Phlebitis Assessment no symptoms 05/07/24 1212   Infiltration Assessment 0 05/07/24 1212        Opportunity for questions and clarification was provided.      Patient transported with:  Monitor,   IV Propofol  IV Nicardipine  Ventilator.

## 2024-05-07 NOTE — ED NOTES
Patient received by [facility] Team for transport to [facility] ER.   Patient in stable condition at time of transport.  Personal belongings accompanying patient to inpatient bed.  Medical transport team received report on patient's condition and concerns at time of departure.   Transport team denies further questions or concerns related to patient at time of departure.

## 2024-05-07 NOTE — ED PROVIDER NOTES
Negative      Propoxyphene, Urine Negative Negative      THC, TH-Cannabinol, Urine Negative Negative      Tricyclic Negative Negative      Fentanyl, Ur Negative Negative      Comments:       Urinalysis with Reflex to Culture    Collection Time: 05/07/24 10:30 AM    Specimen: Urine   Result Value Ref Range    Color, UA Yellow      Appearance Turbid      Specific Gravity, UA 1.018 1.005 - 1.030      pH, Urine 6.0 5.0 - 8.0      Protein,  (A) Negative mg/dL    Glucose, Ur >1000 (A) Negative mg/dL    Ketones, Urine Trace (A) Negative mg/dL    Bilirubin, Urine Negative Negative      Blood, Urine Moderate (A) Negative      Urobilinogen, Urine 1.0 0.2 - 1.0 EU/dL    Nitrite, Urine Negative Negative      Leukocyte Esterase, Urine Moderate (A) Negative      WBC, UA  0 - 4 /hpf    RBC, UA 0-5 0 - 2 /hpf    Epithelial Cells UA Few 0 - 20 /lpf    BACTERIA, URINE 2+ (A) None /hpf    Urine Culture if Indicated Urine Culture Ordered (A) Culture not indicated by UA result     Ethanol    Collection Time: 05/07/24 10:55 AM   Result Value Ref Range    Ethanol Lvl <3 0 - 3 mg/dL   CK    Collection Time: 05/07/24 10:55 AM   Result Value Ref Range    Total CK 50 39 - 308 U/L   Comprehensive Metabolic Panel    Collection Time: 05/07/24 10:55 AM   Result Value Ref Range    Sodium 135 (L) 136 - 145 mmol/L    Potassium 3.8 3.5 - 5.5 mmol/L    Chloride 98 (L) 100 - 111 mmol/L    CO2 25 21 - 32 mmol/L    Anion Gap 12 3.0 - 18.0 mmol/L    Glucose 109 (H) 74 - 99 mg/dL    BUN 14 7 - 18 mg/dL    Creatinine 1.21 0.60 - 1.30 mg/dL    Bun/Cre Ratio 12 12 - 20      Est, Glom Filt Rate 63 >60 ml/min/1.73m2    Calcium 9.8 8.5 - 10.1 mg/dL    Total Bilirubin 2.1 (H) 0.2 - 1.0 mg/dL    AST 18 10 - 38 U/L    ALT 9 (L) 16 - 61 U/L    Alk Phosphatase 290 (H) 45 - 117 U/L    Total Protein 10.2 (H) 6.4 - 8.2 g/dL    Albumin 3.2 (L) 3.4 - 5.0 g/dL    Globulin 7.0 (H) 2.0 - 4.0 g/dL    Albumin/Globulin Ratio 0.5 (L) 0.8 - 1.7     Troponin     disregard these errors.  Please excuse any errors that have escaped final proofreading.  Thank you.         Brad Parrish, DO  05/07/24 3456

## 2024-05-07 NOTE — PROGRESS NOTES
Pt intubated for airway protection, pt has large bleed and neurologic changes after fall last night, came  in this morning unresponsive, intubation with 7.5ETT, lot [id number], INTUBATED first time with glydoscope and good  color change on ETC02 adapter, CXR ordered per Dr Parrish, No resp distress, tolerating vent settings and Diprivan started shortly after intubation.       1245 ABG done on initial settings no critical values, will Titrate 02 by sats : pt rate set at 18 but total breath rate 22.    Latest Reference Range & Units 05/07/24 12:49   Vince Test -   YES   Oxyhemoglobin 95 - 99 % 98.4   pH, Arterial 7.35 - 7.45   7.39   pCO2, Arterial 35 - 45 mmHg 40   pO2, Arterial 80 - 100 mmHg 293 (H)   HCO3, Arterial 22 - 26 mmol/L 24   O2 Sat, Arterial 95 - 99 % 100 (H)   Methemoglobin, Arterial 0 - 1.4 % 0.1   Carboxyhgb, Arterial 1 - 2 % 1.3   FIO2 Arterial % 100   Site -   Left Radial   POC PEEP/CPA -   8.0   POC TIDAL VOLUME -   500.0   (H): Data is abnormally high    1310pm [facility] flight team going to [facility], report was given about ABG and vent settings as well as Ambu bag for flight.  He tolerated there vent and settings with alarms before leaving.

## 2024-05-07 NOTE — ED NOTES
Family at bedside. Family states that patient was reportedly laughing and talking at 1230 last night. Family states patient takes his medicines on a regular schedule.

## 2024-05-09 LAB
BACTERIA SPEC CULT: ABNORMAL
COLONY COUNT, CNT: ABNORMAL
Lab: ABNORMAL

## 2024-05-12 LAB
BACTERIA SPEC CULT: NORMAL
BACTERIA SPEC CULT: NORMAL
Lab: NORMAL
Lab: NORMAL

## 2024-05-13 LAB
BACTERIA SPEC CULT: NORMAL
BACTERIA SPEC CULT: NORMAL
Lab: NORMAL
Lab: NORMAL

## (undated) DEVICE — SUT SLK 2-0SH 30IN BLK --

## (undated) DEVICE — SYRINGE MED 25GA 3ML L5/8IN SUBQ PLAS W/ DETACH NDL SFTY

## (undated) DEVICE — BIPOLAR FORCEPS CORD: Brand: VALLEYLAB

## (undated) DEVICE — 3M™ UNIVERSAL ELECTROSURGICAL PLATE, SPLIT, UNCORDED 9160: Brand: 3M™

## (undated) DEVICE — GOWN,AURORA,NONRNF,XL,30/CS: Brand: MEDLINE

## (undated) DEVICE — 3M™ TEGADERM™ TRANSPARENT FILM DRESSING FRAME STYLE, 1626W, 4 IN X 4-3/4 IN (10 CM X 12 CM), 50/CT 4CT/CASE: Brand: 3M™ TEGADERM™

## (undated) DEVICE — SPONGE DRAIN NONWOVEN 4X4IN -- 2/PK

## (undated) DEVICE — SHEAR HARMONIC FOCUS OEM 9CM --

## (undated) DEVICE — PROTECTION STATION PRESSURIZED PG: Brand: NAMIC

## (undated) DEVICE — MEDI-VAC NON-CONDUCTIVE SUCTION TUBING: Brand: CARDINAL HEALTH

## (undated) DEVICE — PEG KIT STD 20 FR PUL W/ ENFIT ENDOVIVE

## (undated) DEVICE — SHEAR RMFG HARMONIC FOCUS 9CM -- OEM ITEM L#322125

## (undated) DEVICE — Device

## (undated) DEVICE — INSULATED BLADE ELECTRODE: Brand: EDGE

## (undated) DEVICE — INTENDED FOR TISSUE SEPARATION, AND OTHER PROCEDURES THAT REQUIRE A SHARP SURGICAL BLADE TO PUNCTURE OR CUT.: Brand: BARD-PARKER SAFETY BLADES SIZE 15, STERILE

## (undated) DEVICE — SYR 50ML LR LCK 1ML GRAD NSAF --

## (undated) DEVICE — TAPE ADH CLTH SILK H2O REPELLENT CURAD

## (undated) DEVICE — BSHR MAJOR BASIN PACK-LF: Brand: MEDLINE INDUSTRIES, INC.

## (undated) DEVICE — BAND COMPR L29CM XLN CLR PLAS HEMSTAT EXT HK AND LOOP RETEN

## (undated) DEVICE — CATHETER DIAG 5FR L100CM LUMN ID0.047IN JL3.5 CRV 0 SIDE H

## (undated) DEVICE — FLEX ADVANTAGE 3000CC: Brand: FLEX ADVANTAGE

## (undated) DEVICE — SYR 10ML LUER LOK 1/5ML GRAD --

## (undated) DEVICE — NEEDLE HYPO 25GA L1.5IN BLU POLYPR HUB S STL REG BVL STR

## (undated) DEVICE — LIMB HOLDER, WRIST/ANKLE: Brand: DEROYAL

## (undated) DEVICE — DRAPE,APERTURE,MINOR PROCEDURE: Brand: MEDLINE

## (undated) DEVICE — FLEXIBLE YANKAUER,MEDIUM TIP, NO VACUUM CONTROL: Brand: ARGYLE

## (undated) DEVICE — TRAY PREP DRY W/ PREM GLV 2 APPL 6 SPNG 2 UNDPD 1 OVERWRAP

## (undated) DEVICE — SUTURE PERMA-HAND SZ 3-0 L18IN 17 STRND NONABSORBABLE BLK SA64H

## (undated) DEVICE — DRAPE,TOP,102X53,STERILE: Brand: MEDLINE

## (undated) DEVICE — LUB SURG MEDC STRL 2OZ TUBE MC -- MEDICHOICE

## (undated) DEVICE — SUTURE PERMAHAND SZ 2-0 L17X18IN NONABSORBABLE BLK SILK SA65H

## (undated) DEVICE — STERILE POLYISOPRENE POWDER-FREE SURGICAL GLOVES: Brand: PROTEXIS

## (undated) DEVICE — GAUZE,SPONGE,4"X4",16PLY,STRL,LF,10/TRAY: Brand: MEDLINE

## (undated) DEVICE — SPONGE GZ W4XL4IN COT 12 PLY TYP VII WVN C FLD DSGN

## (undated) DEVICE — KIT GASTMY PERC PEG PULL 20FR -- ENDOVIVE BX/2

## (undated) DEVICE — GUIDEWIRE VASC L260CM DIA0.035IN TIP L3MM STD EXCHG PTFE J

## (undated) DEVICE — SURGICAL PROCEDURE TRAY CRD CATH 3 PRT

## (undated) DEVICE — GLIDESHEATH SLENDER STAINLESS STEEL KIT: Brand: GLIDESHEATH SLENDER

## (undated) DEVICE — BITE BLOCK ENDOSCP UNIV AD 6 TO 9.4 MM

## (undated) DEVICE — SPONGE DISSECT PNUT SM 3/8IN -- 5/PK

## (undated) DEVICE — SHEET, DRAPE, SPLIT, STERILE: Brand: MEDLINE

## (undated) DEVICE — REM POLYHESIVE ADULT PATIENT RETURN ELECTRODE: Brand: VALLEYLAB

## (undated) DEVICE — SYRINGE MED 10ML RED POLYCARB BRL FIX M LUER CONN FLAT GRP

## (undated) DEVICE — Device: Brand: PORTEX

## (undated) DEVICE — CANNULA ORIG TL CLR W FOAM CUSHIONS AND 14FT SUPL TB 3 CHN

## (undated) DEVICE — SYRINGE MED 10ML PUR GAM COMPATIBLE POLYCARB FIX M LUER CONN

## (undated) DEVICE — CATHETER DIAG 5FR L100CM LUMN ID0.047IN JR4 CRV 0 SIDE H

## (undated) DEVICE — BLADE SCALP SURG 11 GRN -- FUTURA